# Patient Record
Sex: FEMALE | Race: WHITE | NOT HISPANIC OR LATINO | Employment: OTHER | URBAN - NONMETROPOLITAN AREA
[De-identification: names, ages, dates, MRNs, and addresses within clinical notes are randomized per-mention and may not be internally consistent; named-entity substitution may affect disease eponyms.]

---

## 2020-09-09 ENCOUNTER — HOSPITAL ENCOUNTER (INPATIENT)
Facility: HOSPITAL | Age: 55
LOS: 13 days | Discharge: HOME | End: 2020-09-23
Attending: EMERGENCY MEDICINE | Admitting: INTERNAL MEDICINE

## 2020-09-09 DIAGNOSIS — I50.43 ACUTE ON CHRONIC COMBINED SYSTOLIC AND DIASTOLIC CHF (CONGESTIVE HEART FAILURE) (HCC): ICD-10-CM

## 2020-09-09 DIAGNOSIS — R77.8 ELEVATED TROPONIN: ICD-10-CM

## 2020-09-09 DIAGNOSIS — U07.1 COVID-19 VIRUS DETECTED: ICD-10-CM

## 2020-09-09 DIAGNOSIS — E87.1 HYPONATREMIA: ICD-10-CM

## 2020-09-09 DIAGNOSIS — I50.9 ACUTE ON CHRONIC CONGESTIVE HEART FAILURE, UNSPECIFIED HEART FAILURE TYPE (HCC): Primary | ICD-10-CM

## 2020-09-09 DIAGNOSIS — D69.2 PURPURA (HCC): ICD-10-CM

## 2020-09-09 DIAGNOSIS — J96.11 CHRONIC RESPIRATORY FAILURE WITH HYPOXIA (HCC): ICD-10-CM

## 2020-09-09 PROCEDURE — 99285 EMERGENCY DEPT VISIT HI MDM: CPT

## 2020-09-10 ENCOUNTER — APPOINTMENT (OUTPATIENT)
Dept: GENERAL RADIOLOGY | Facility: HOSPITAL | Age: 55
End: 2020-09-10

## 2020-09-10 ENCOUNTER — APPOINTMENT (OUTPATIENT)
Dept: CT IMAGING | Facility: HOSPITAL | Age: 55
End: 2020-09-10

## 2020-09-10 PROBLEM — I50.9 ACUTE ON CHRONIC CONGESTIVE HEART FAILURE: Status: ACTIVE | Noted: 2020-09-10

## 2020-09-10 LAB
6-ACETYL MORPHINE: NEGATIVE
A-A DO2: 61.6 MMHG (ref 0–300)
ALBUMIN SERPL-MCNC: 3.49 G/DL (ref 3.5–5.2)
ALBUMIN/GLOB SERPL: 1.2 G/DL
ALP SERPL-CCNC: 105 U/L (ref 39–117)
ALT SERPL W P-5'-P-CCNC: 25 U/L (ref 1–33)
AMPHET+METHAMPHET UR QL: NEGATIVE
ANION GAP SERPL CALCULATED.3IONS-SCNC: 21.1 MMOL/L (ref 5–15)
ANION GAP SERPL CALCULATED.3IONS-SCNC: 5.2 MMOL/L (ref 5–15)
ANISOCYTOSIS BLD QL: NORMAL
ARTERIAL PATENCY WRIST A: ABNORMAL
AST SERPL-CCNC: 24 U/L (ref 1–32)
ATMOSPHERIC PRESS: 733 MMHG
BACTERIA UR QL AUTO: NORMAL /HPF
BARBITURATES UR QL SCN: NEGATIVE
BASE EXCESS BLDA CALC-SCNC: 1.3 MMOL/L (ref 0–2)
BASOPHILS # BLD AUTO: 0.07 10*3/MM3 (ref 0–0.2)
BASOPHILS NFR BLD AUTO: 0.7 % (ref 0–1.5)
BDY SITE: ABNORMAL
BENZODIAZ UR QL SCN: NEGATIVE
BILIRUB CONJ SERPL-MCNC: 0.8 MG/DL (ref 0–0.3)
BILIRUB INDIRECT SERPL-MCNC: 1.3 MG/DL
BILIRUB SERPL-MCNC: 2 MG/DL (ref 0–1.2)
BILIRUB SERPL-MCNC: 2.1 MG/DL (ref 0–1.2)
BILIRUB UR QL STRIP: NEGATIVE
BODY TEMPERATURE: 0 C
BUN SERPL-MCNC: 33 MG/DL (ref 6–20)
BUN SERPL-MCNC: 36 MG/DL (ref 6–20)
BUN/CREAT SERPL: 26.6 (ref 7–25)
BUN/CREAT SERPL: 30 (ref 7–25)
BUPRENORPHINE SERPL-MCNC: NEGATIVE NG/ML
CALCIUM SPEC-SCNC: 8.8 MG/DL (ref 8.6–10.5)
CALCIUM SPEC-SCNC: 9.2 MG/DL (ref 8.6–10.5)
CANNABINOIDS SERPL QL: NEGATIVE
CHLORIDE SERPL-SCNC: 95 MMOL/L (ref 98–107)
CHLORIDE SERPL-SCNC: 98 MMOL/L (ref 98–107)
CLARITY UR: CLEAR
CO2 BLDA-SCNC: 27.2 MMOL/L (ref 22–33)
CO2 SERPL-SCNC: 17.9 MMOL/L (ref 22–29)
CO2 SERPL-SCNC: 27.8 MMOL/L (ref 22–29)
COCAINE UR QL: NEGATIVE
COHGB MFR BLD: 3.1 % (ref 0–5)
COLOR UR: YELLOW
CREAT SERPL-MCNC: 1.2 MG/DL (ref 0.57–1)
CREAT SERPL-MCNC: 1.24 MG/DL (ref 0.57–1)
CRP SERPL-MCNC: 0.53 MG/DL (ref 0–0.5)
D-LACTATE SERPL-SCNC: 1.5 MMOL/L (ref 0.5–2)
DEPRECATED RDW RBC AUTO: 75.7 FL (ref 37–54)
EOSINOPHIL # BLD AUTO: 0.07 10*3/MM3 (ref 0–0.4)
EOSINOPHIL NFR BLD AUTO: 0.7 % (ref 0.3–6.2)
ERYTHROCYTE [DISTWIDTH] IN BLOOD BY AUTOMATED COUNT: 21.3 % (ref 12.3–15.4)
GAS FLOW AIRWAY: 2 LPM
GFR SERPL CREATININE-BSD FRML MDRD: 45 ML/MIN/1.73
GFR SERPL CREATININE-BSD FRML MDRD: 47 ML/MIN/1.73
GLOBULIN UR ELPH-MCNC: 2.8 GM/DL
GLUCOSE BLDC GLUCOMTR-MCNC: 355 MG/DL (ref 70–130)
GLUCOSE BLDC GLUCOMTR-MCNC: 380 MG/DL (ref 70–130)
GLUCOSE BLDC GLUCOMTR-MCNC: 382 MG/DL (ref 70–130)
GLUCOSE BLDC GLUCOMTR-MCNC: 418 MG/DL (ref 70–130)
GLUCOSE SERPL-MCNC: 337 MG/DL (ref 65–99)
GLUCOSE SERPL-MCNC: 379 MG/DL (ref 65–99)
GLUCOSE UR STRIP-MCNC: ABNORMAL MG/DL
HBA1C MFR BLD: 8.4 % (ref 4.8–5.6)
HCO3 BLDA-SCNC: 26 MMOL/L (ref 20–26)
HCT VFR BLD AUTO: 36.6 % (ref 34–46.6)
HCT VFR BLD CALC: 32.2 % (ref 38–51)
HGB BLD-MCNC: 10.2 G/DL (ref 12–15.9)
HGB BLDA-MCNC: 10.5 G/DL (ref 13.5–17.5)
HGB UR QL STRIP.AUTO: NEGATIVE
HYALINE CASTS UR QL AUTO: NORMAL /LPF
HYPOCHROMIA BLD QL: NORMAL
IMM GRANULOCYTES # BLD AUTO: 0.06 10*3/MM3 (ref 0–0.05)
IMM GRANULOCYTES NFR BLD AUTO: 0.6 % (ref 0–0.5)
INHALED O2 CONCENTRATION: 28 %
INR PPP: 1.64 (ref 0.9–1.1)
KETONES UR QL STRIP: NEGATIVE
LEUKOCYTE ESTERASE UR QL STRIP.AUTO: NEGATIVE
LYMPHOCYTES # BLD AUTO: 0.69 10*3/MM3 (ref 0.7–3.1)
LYMPHOCYTES NFR BLD AUTO: 6.7 % (ref 19.6–45.3)
Lab: ABNORMAL
MACROCYTES BLD QL SMEAR: NORMAL
MAGNESIUM SERPL-MCNC: 1.9 MG/DL (ref 1.6–2.6)
MCH RBC QN AUTO: 27.6 PG (ref 26.6–33)
MCHC RBC AUTO-ENTMCNC: 27.9 G/DL (ref 31.5–35.7)
MCV RBC AUTO: 99.2 FL (ref 79–97)
METHADONE UR QL SCN: NEGATIVE
METHGB BLD QL: 0.1 % (ref 0–3)
MODALITY: ABNORMAL
MONOCYTES # BLD AUTO: 0.07 10*3/MM3 (ref 0.1–0.9)
MONOCYTES NFR BLD AUTO: 0.7 % (ref 5–12)
NEUTROPHILS NFR BLD AUTO: 9.3 10*3/MM3 (ref 1.7–7)
NEUTROPHILS NFR BLD AUTO: 90.6 % (ref 42.7–76)
NITRITE UR QL STRIP: NEGATIVE
NOTE: ABNORMAL
NRBC BLD AUTO-RTO: 0 /100 WBC (ref 0–0.2)
NT-PROBNP SERPL-MCNC: 3325 PG/ML (ref 0–900)
OPIATES UR QL: POSITIVE
OXYCODONE UR QL SCN: NEGATIVE
OXYHGB MFR BLDV: 94.6 % (ref 94–99)
PCO2 BLDA: 40.7 MM HG (ref 35–45)
PCO2 TEMP ADJ BLD: ABNORMAL MM[HG]
PCP UR QL SCN: NEGATIVE
PH BLDA: 7.41 PH UNITS (ref 7.35–7.45)
PH UR STRIP.AUTO: <=5 [PH] (ref 5–8)
PH, TEMP CORRECTED: ABNORMAL
PLAT MORPH BLD: NORMAL
PLATELET # BLD AUTO: 199 10*3/MM3 (ref 140–450)
PMV BLD AUTO: 10.1 FL (ref 6–12)
PO2 BLDA: 84.8 MM HG (ref 83–108)
PO2 TEMP ADJ BLD: ABNORMAL MM[HG]
POTASSIUM SERPL-SCNC: 4.9 MMOL/L (ref 3.5–5.2)
POTASSIUM SERPL-SCNC: 5.4 MMOL/L (ref 3.5–5.2)
PROT SERPL-MCNC: 6.3 G/DL (ref 6–8.5)
PROT UR QL STRIP: ABNORMAL
PROTHROMBIN TIME: 19.3 SECONDS (ref 11.9–14.1)
RBC # BLD AUTO: 3.69 10*6/MM3 (ref 3.77–5.28)
RBC # UR: NORMAL /HPF
REF LAB TEST METHOD: NORMAL
SAO2 % BLDCOA: 97.7 % (ref 94–99)
SARS-COV-2 RDRP RESP QL NAA+PROBE: NOT DETECTED
SODIUM SERPL-SCNC: 128 MMOL/L (ref 136–145)
SODIUM SERPL-SCNC: 137 MMOL/L (ref 136–145)
SP GR UR STRIP: 1.01 (ref 1–1.03)
SQUAMOUS #/AREA URNS HPF: NORMAL /HPF
T4 FREE SERPL-MCNC: 1.39 NG/DL (ref 0.93–1.7)
TROPONIN T SERPL-MCNC: 0.02 NG/ML (ref 0–0.03)
TROPONIN T SERPL-MCNC: 0.03 NG/ML (ref 0–0.03)
TROPONIN T SERPL-MCNC: 0.03 NG/ML (ref 0–0.03)
TROPONIN T SERPL-MCNC: 0.04 NG/ML (ref 0–0.03)
TSH SERPL DL<=0.05 MIU/L-ACNC: 3.83 UIU/ML (ref 0.27–4.2)
UROBILINOGEN UR QL STRIP: ABNORMAL
VENTILATOR MODE: ABNORMAL
WBC # BLD AUTO: 10.26 10*3/MM3 (ref 3.4–10.8)
WBC UR QL AUTO: NORMAL /HPF

## 2020-09-10 PROCEDURE — 63710000001 INSULIN DETEMIR PER 5 UNITS: Performed by: INTERNAL MEDICINE

## 2020-09-10 PROCEDURE — 93010 ELECTROCARDIOGRAM REPORT: CPT | Performed by: INTERNAL MEDICINE

## 2020-09-10 PROCEDURE — 80307 DRUG TEST PRSMV CHEM ANLYZR: CPT | Performed by: PHYSICIAN ASSISTANT

## 2020-09-10 PROCEDURE — 63710000001 PREDNISONE PER 5 MG: Performed by: FAMILY MEDICINE

## 2020-09-10 PROCEDURE — 71250 CT THORAX DX C-: CPT

## 2020-09-10 PROCEDURE — 93005 ELECTROCARDIOGRAM TRACING: CPT | Performed by: EMERGENCY MEDICINE

## 2020-09-10 PROCEDURE — 87635 SARS-COV-2 COVID-19 AMP PRB: CPT | Performed by: EMERGENCY MEDICINE

## 2020-09-10 PROCEDURE — 63710000001 ONDANSETRON ODT 4 MG TABLET DISPERSIBLE: Performed by: EMERGENCY MEDICINE

## 2020-09-10 PROCEDURE — 82962 GLUCOSE BLOOD TEST: CPT

## 2020-09-10 PROCEDURE — 82247 BILIRUBIN TOTAL: CPT | Performed by: PHYSICIAN ASSISTANT

## 2020-09-10 PROCEDURE — 36600 WITHDRAWAL OF ARTERIAL BLOOD: CPT

## 2020-09-10 PROCEDURE — 84484 ASSAY OF TROPONIN QUANT: CPT | Performed by: EMERGENCY MEDICINE

## 2020-09-10 PROCEDURE — 82805 BLOOD GASES W/O2 SATURATION: CPT

## 2020-09-10 PROCEDURE — 86140 C-REACTIVE PROTEIN: CPT | Performed by: EMERGENCY MEDICINE

## 2020-09-10 PROCEDURE — 25010000002 ONDANSETRON PER 1 MG: Performed by: EMERGENCY MEDICINE

## 2020-09-10 PROCEDURE — 80050 GENERAL HEALTH PANEL: CPT | Performed by: EMERGENCY MEDICINE

## 2020-09-10 PROCEDURE — 99223 1ST HOSP IP/OBS HIGH 75: CPT | Performed by: PHYSICIAN ASSISTANT

## 2020-09-10 PROCEDURE — 82375 ASSAY CARBOXYHB QUANT: CPT

## 2020-09-10 PROCEDURE — 99253 IP/OBS CNSLTJ NEW/EST LOW 45: CPT | Performed by: SPECIALIST

## 2020-09-10 PROCEDURE — 25010000002 FUROSEMIDE PER 20 MG: Performed by: FAMILY MEDICINE

## 2020-09-10 PROCEDURE — 25010000002 FUROSEMIDE PER 20 MG: Performed by: EMERGENCY MEDICINE

## 2020-09-10 PROCEDURE — 85007 BL SMEAR W/DIFF WBC COUNT: CPT | Performed by: EMERGENCY MEDICINE

## 2020-09-10 PROCEDURE — 83050 HGB METHEMOGLOBIN QUAN: CPT

## 2020-09-10 PROCEDURE — 71045 X-RAY EXAM CHEST 1 VIEW: CPT

## 2020-09-10 PROCEDURE — 83735 ASSAY OF MAGNESIUM: CPT | Performed by: PHYSICIAN ASSISTANT

## 2020-09-10 PROCEDURE — 93005 ELECTROCARDIOGRAM TRACING: CPT | Performed by: PHYSICIAN ASSISTANT

## 2020-09-10 PROCEDURE — 25010000002 MORPHINE PER 10 MG: Performed by: EMERGENCY MEDICINE

## 2020-09-10 PROCEDURE — 80048 BASIC METABOLIC PNL TOTAL CA: CPT | Performed by: FAMILY MEDICINE

## 2020-09-10 PROCEDURE — 63710000001 INSULIN ASPART PER 5 UNITS: Performed by: INTERNAL MEDICINE

## 2020-09-10 PROCEDURE — 84484 ASSAY OF TROPONIN QUANT: CPT | Performed by: FAMILY MEDICINE

## 2020-09-10 PROCEDURE — 83036 HEMOGLOBIN GLYCOSYLATED A1C: CPT | Performed by: PHYSICIAN ASSISTANT

## 2020-09-10 PROCEDURE — 25010000002 CEFTRIAXONE PER 250 MG: Performed by: EMERGENCY MEDICINE

## 2020-09-10 PROCEDURE — 36415 COLL VENOUS BLD VENIPUNCTURE: CPT

## 2020-09-10 PROCEDURE — 85610 PROTHROMBIN TIME: CPT | Performed by: EMERGENCY MEDICINE

## 2020-09-10 PROCEDURE — 25010000002 MORPHINE PER 10 MG: Performed by: FAMILY MEDICINE

## 2020-09-10 PROCEDURE — 87040 BLOOD CULTURE FOR BACTERIA: CPT | Performed by: EMERGENCY MEDICINE

## 2020-09-10 PROCEDURE — 71250 CT THORAX DX C-: CPT | Performed by: RADIOLOGY

## 2020-09-10 PROCEDURE — 63710000001 INSULIN REGULAR HUMAN PER 5 UNITS: Performed by: EMERGENCY MEDICINE

## 2020-09-10 PROCEDURE — 83880 ASSAY OF NATRIURETIC PEPTIDE: CPT | Performed by: EMERGENCY MEDICINE

## 2020-09-10 PROCEDURE — 82248 BILIRUBIN DIRECT: CPT | Performed by: PHYSICIAN ASSISTANT

## 2020-09-10 PROCEDURE — 63710000001 ONDANSETRON PER 8 MG: Performed by: FAMILY MEDICINE

## 2020-09-10 PROCEDURE — 83605 ASSAY OF LACTIC ACID: CPT | Performed by: EMERGENCY MEDICINE

## 2020-09-10 PROCEDURE — 63710000001 INSULIN ASPART PER 5 UNITS: Performed by: PHYSICIAN ASSISTANT

## 2020-09-10 PROCEDURE — 81001 URINALYSIS AUTO W/SCOPE: CPT | Performed by: PHYSICIAN ASSISTANT

## 2020-09-10 PROCEDURE — 84439 ASSAY OF FREE THYROXINE: CPT | Performed by: EMERGENCY MEDICINE

## 2020-09-10 RX ORDER — VENLAFAXINE 75 MG/1
75 TABLET ORAL DAILY
Status: ON HOLD | COMMUNITY
End: 2020-09-10

## 2020-09-10 RX ORDER — PREDNISONE 2.5 MG
2.5 TABLET ORAL DAILY
COMMUNITY
End: 2020-10-21

## 2020-09-10 RX ORDER — HYDROCODONE BITARTRATE AND ACETAMINOPHEN 5; 325 MG/1; MG/1
1 TABLET ORAL ONCE
Status: COMPLETED | OUTPATIENT
Start: 2020-09-10 | End: 2020-09-10

## 2020-09-10 RX ORDER — ASPIRIN 81 MG/1
81 TABLET ORAL DAILY
Status: DISCONTINUED | OUTPATIENT
Start: 2020-09-10 | End: 2020-09-23 | Stop reason: HOSPADM

## 2020-09-10 RX ORDER — FUROSEMIDE 10 MG/ML
40 INJECTION INTRAMUSCULAR; INTRAVENOUS EVERY 8 HOURS
Status: COMPLETED | OUTPATIENT
Start: 2020-09-10 | End: 2020-09-13

## 2020-09-10 RX ORDER — WARFARIN SODIUM 3 MG/1
1.5 TABLET ORAL
Status: COMPLETED | OUTPATIENT
Start: 2020-09-10 | End: 2020-09-10

## 2020-09-10 RX ORDER — SODIUM CHLORIDE 0.9 % (FLUSH) 0.9 %
10 SYRINGE (ML) INJECTION AS NEEDED
Status: DISCONTINUED | OUTPATIENT
Start: 2020-09-10 | End: 2020-09-23 | Stop reason: HOSPADM

## 2020-09-10 RX ORDER — DEXTROSE MONOHYDRATE 25 G/50ML
25 INJECTION, SOLUTION INTRAVENOUS
Status: DISCONTINUED | OUTPATIENT
Start: 2020-09-10 | End: 2020-09-23 | Stop reason: HOSPADM

## 2020-09-10 RX ORDER — VENLAFAXINE HYDROCHLORIDE 75 MG/1
75 CAPSULE, EXTENDED RELEASE ORAL DAILY
Status: DISCONTINUED | OUTPATIENT
Start: 2020-09-10 | End: 2020-09-23 | Stop reason: HOSPADM

## 2020-09-10 RX ORDER — NICOTINE POLACRILEX 4 MG
15 LOZENGE BUCCAL
Status: DISCONTINUED | OUTPATIENT
Start: 2020-09-10 | End: 2020-09-23 | Stop reason: HOSPADM

## 2020-09-10 RX ORDER — PREGABALIN 100 MG/1
100 CAPSULE ORAL EVERY 12 HOURS SCHEDULED
Status: DISCONTINUED | OUTPATIENT
Start: 2020-09-10 | End: 2020-09-23 | Stop reason: HOSPADM

## 2020-09-10 RX ORDER — SPIRONOLACTONE 25 MG/1
25 TABLET ORAL DAILY
Status: DISCONTINUED | OUTPATIENT
Start: 2020-09-10 | End: 2020-09-15

## 2020-09-10 RX ORDER — AMIODARONE HYDROCHLORIDE 200 MG/1
200 TABLET ORAL DAILY
Status: DISCONTINUED | OUTPATIENT
Start: 2020-09-10 | End: 2020-09-23 | Stop reason: HOSPADM

## 2020-09-10 RX ORDER — AMIODARONE HYDROCHLORIDE 200 MG/1
200 TABLET ORAL DAILY
COMMUNITY
End: 2020-10-14

## 2020-09-10 RX ORDER — ONDANSETRON 2 MG/ML
4 INJECTION INTRAMUSCULAR; INTRAVENOUS ONCE
Status: COMPLETED | OUTPATIENT
Start: 2020-09-10 | End: 2020-09-10

## 2020-09-10 RX ORDER — VENLAFAXINE 37.5 MG/1
75 TABLET ORAL DAILY
Status: DISCONTINUED | OUTPATIENT
Start: 2020-09-10 | End: 2020-09-10

## 2020-09-10 RX ORDER — ROPINIROLE 1 MG/1
1 TABLET, FILM COATED ORAL 3 TIMES DAILY
Status: DISCONTINUED | OUTPATIENT
Start: 2020-09-10 | End: 2020-09-23 | Stop reason: HOSPADM

## 2020-09-10 RX ORDER — FOLIC ACID 1 MG/1
1 TABLET ORAL DAILY
COMMUNITY

## 2020-09-10 RX ORDER — PREDNISONE 1 MG/1
2.5 TABLET ORAL DAILY
Status: DISCONTINUED | OUTPATIENT
Start: 2020-09-10 | End: 2020-09-23 | Stop reason: HOSPADM

## 2020-09-10 RX ORDER — WARFARIN SODIUM 3 MG/1
3 TABLET ORAL
COMMUNITY
End: 2020-09-23 | Stop reason: HOSPADM

## 2020-09-10 RX ORDER — VENLAFAXINE HYDROCHLORIDE 75 MG/1
75 CAPSULE, EXTENDED RELEASE ORAL DAILY
COMMUNITY
End: 2023-01-31

## 2020-09-10 RX ORDER — SODIUM CHLORIDE 0.9 % (FLUSH) 0.9 %
10 SYRINGE (ML) INJECTION EVERY 12 HOURS SCHEDULED
Status: DISCONTINUED | OUTPATIENT
Start: 2020-09-10 | End: 2020-09-23 | Stop reason: HOSPADM

## 2020-09-10 RX ORDER — ONDANSETRON 4 MG/1
4 TABLET, FILM COATED ORAL EVERY 8 HOURS PRN
Status: DISCONTINUED | OUTPATIENT
Start: 2020-09-10 | End: 2020-09-17

## 2020-09-10 RX ORDER — PREGABALIN 100 MG/1
100 CAPSULE ORAL 2 TIMES DAILY
COMMUNITY

## 2020-09-10 RX ORDER — WARFARIN SODIUM 3 MG/1
3 TABLET ORAL
Status: DISCONTINUED | OUTPATIENT
Start: 2020-09-10 | End: 2020-09-11

## 2020-09-10 RX ORDER — ONDANSETRON 4 MG/1
4 TABLET, FILM COATED ORAL EVERY 8 HOURS PRN
COMMUNITY

## 2020-09-10 RX ORDER — NITROGLYCERIN 0.4 MG/1
0.4 TABLET SUBLINGUAL
Status: DISCONTINUED | OUTPATIENT
Start: 2020-09-10 | End: 2020-09-23 | Stop reason: HOSPADM

## 2020-09-10 RX ORDER — AMOXICILLIN 250 MG
2 CAPSULE ORAL NIGHTLY
Status: DISCONTINUED | OUTPATIENT
Start: 2020-09-10 | End: 2020-09-23 | Stop reason: HOSPADM

## 2020-09-10 RX ORDER — LISINOPRIL 2.5 MG/1
5 TABLET ORAL DAILY
Status: DISCONTINUED | OUTPATIENT
Start: 2020-09-10 | End: 2020-09-11

## 2020-09-10 RX ORDER — FOLIC ACID 1 MG/1
1 TABLET ORAL DAILY
Status: DISCONTINUED | OUTPATIENT
Start: 2020-09-10 | End: 2020-09-23 | Stop reason: HOSPADM

## 2020-09-10 RX ORDER — ONDANSETRON 4 MG/1
4 TABLET, ORALLY DISINTEGRATING ORAL ONCE
Status: COMPLETED | OUTPATIENT
Start: 2020-09-10 | End: 2020-09-10

## 2020-09-10 RX ORDER — AMPICILLIN 500 MG/1
500 CAPSULE ORAL 3 TIMES DAILY
COMMUNITY
End: 2020-09-23 | Stop reason: HOSPADM

## 2020-09-10 RX ORDER — POLYETHYLENE GLYCOL 3350 17 G/17G
17 POWDER, FOR SOLUTION ORAL DAILY
Status: DISCONTINUED | OUTPATIENT
Start: 2020-09-10 | End: 2020-09-23 | Stop reason: HOSPADM

## 2020-09-10 RX ORDER — FUROSEMIDE 10 MG/ML
40 INJECTION INTRAMUSCULAR; INTRAVENOUS ONCE
Status: COMPLETED | OUTPATIENT
Start: 2020-09-10 | End: 2020-09-10

## 2020-09-10 RX ADMIN — SODIUM CHLORIDE 2 G: 900 INJECTION INTRAVENOUS at 08:01

## 2020-09-10 RX ADMIN — HUMAN INSULIN 10 UNITS: 100 INJECTION, SOLUTION SUBCUTANEOUS at 08:52

## 2020-09-10 RX ADMIN — PREGABALIN 100 MG: 100 CAPSULE ORAL at 20:01

## 2020-09-10 RX ADMIN — FOLIC ACID 1 MG: 1 TABLET ORAL at 16:38

## 2020-09-10 RX ADMIN — SODIUM CHLORIDE, PRESERVATIVE FREE 10 ML: 5 INJECTION INTRAVENOUS at 20:02

## 2020-09-10 RX ADMIN — ROPINIROLE HYDROCHLORIDE 1 MG: 1 TABLET, FILM COATED ORAL at 20:01

## 2020-09-10 RX ADMIN — INSULIN DETEMIR 10 UNITS: 100 INJECTION, SOLUTION SUBCUTANEOUS at 20:59

## 2020-09-10 RX ADMIN — ASPIRIN 81 MG: 81 TABLET, COATED ORAL at 16:38

## 2020-09-10 RX ADMIN — HYDROCODONE BITARTRATE AND ACETAMINOPHEN 1 TABLET: 5; 325 TABLET ORAL at 11:04

## 2020-09-10 RX ADMIN — DOCUSATE SODIUM 50 MG AND SENNOSIDES 8.6 MG 2 TABLET: 8.6; 5 TABLET, FILM COATED ORAL at 20:01

## 2020-09-10 RX ADMIN — FUROSEMIDE 40 MG: 10 INJECTION, SOLUTION INTRAMUSCULAR; INTRAVENOUS at 23:55

## 2020-09-10 RX ADMIN — MORPHINE SULFATE 4 MG: 4 INJECTION, SOLUTION INTRAMUSCULAR; INTRAVENOUS at 16:39

## 2020-09-10 RX ADMIN — ONDANSETRON HYDROCHLORIDE 4 MG: 4 TABLET, FILM COATED ORAL at 22:33

## 2020-09-10 RX ADMIN — VENLAFAXINE HYDROCHLORIDE 75 MG: 75 CAPSULE, EXTENDED RELEASE ORAL at 18:26

## 2020-09-10 RX ADMIN — FUROSEMIDE 40 MG: 10 INJECTION, SOLUTION INTRAMUSCULAR; INTRAVENOUS at 05:00

## 2020-09-10 RX ADMIN — POLYETHYLENE GLYCOL 3350 17 G: 17 POWDER, FOR SOLUTION ORAL at 18:26

## 2020-09-10 RX ADMIN — HYDROCODONE BITARTRATE AND ACETAMINOPHEN 1 TABLET: 5; 325 TABLET ORAL at 04:27

## 2020-09-10 RX ADMIN — WARFARIN SODIUM 1.5 MG: 3 TABLET ORAL at 18:25

## 2020-09-10 RX ADMIN — INSULIN ASPART 10 UNITS: 100 INJECTION, SOLUTION INTRAVENOUS; SUBCUTANEOUS at 20:01

## 2020-09-10 RX ADMIN — MORPHINE SULFATE 4 MG: 4 INJECTION, SOLUTION INTRAMUSCULAR; INTRAVENOUS at 07:30

## 2020-09-10 RX ADMIN — FUROSEMIDE 40 MG: 10 INJECTION, SOLUTION INTRAMUSCULAR; INTRAVENOUS at 16:39

## 2020-09-10 RX ADMIN — INSULIN ASPART 12 UNITS: 100 INJECTION, SOLUTION INTRAVENOUS; SUBCUTANEOUS at 16:39

## 2020-09-10 RX ADMIN — MORPHINE SULFATE 4 MG: 4 INJECTION, SOLUTION INTRAMUSCULAR; INTRAVENOUS at 22:33

## 2020-09-10 RX ADMIN — PREDNISONE 2.5 MG: 5 TABLET ORAL at 18:26

## 2020-09-10 RX ADMIN — ONDANSETRON 4 MG: 2 INJECTION INTRAMUSCULAR; INTRAVENOUS at 07:30

## 2020-09-10 RX ADMIN — DOXYCYCLINE 100 MG: 100 INJECTION, POWDER, LYOPHILIZED, FOR SOLUTION INTRAVENOUS at 08:45

## 2020-09-10 RX ADMIN — AMIODARONE HYDROCHLORIDE 200 MG: 200 TABLET ORAL at 16:38

## 2020-09-10 RX ADMIN — SPIRONOLACTONE 25 MG: 25 TABLET ORAL at 16:39

## 2020-09-10 RX ADMIN — ROPINIROLE HYDROCHLORIDE 1 MG: 1 TABLET, FILM COATED ORAL at 18:26

## 2020-09-10 RX ADMIN — LISINOPRIL 5 MG: 2.5 TABLET ORAL at 16:39

## 2020-09-10 RX ADMIN — METOPROLOL TARTRATE 25 MG: 25 TABLET, FILM COATED ORAL at 16:38

## 2020-09-10 RX ADMIN — WARFARIN 3 MG: 3 TABLET ORAL at 16:38

## 2020-09-10 RX ADMIN — ONDANSETRON 4 MG: 4 TABLET, ORALLY DISINTEGRATING ORAL at 04:27

## 2020-09-10 NOTE — ED NOTES
Rob rn reports he is busy right now and will call me back as soon as he can for report.      Olivia Rosales RN  09/10/20 8402

## 2020-09-10 NOTE — ED NOTES
Pt alert and oriented, skin warm, pink, seeping to bilateral legs noted. Respirations are even and unlabored, pt remains on 2lpm nc. Pt remains in afib. Vss. Pt ready for transport to floor.      Olivia Rosales RN  09/10/20 0180

## 2020-09-10 NOTE — PROGRESS NOTES
Pharmacy Anticoagulation Service Note:    Ms. Purdy has been evaluated for warfarin management while hospitalized.       Indication for anticoagulation: atrial fibrillation  Specified goal INR range:  2-3  Usual home regimen:  3 mg daily (last dose taken on 9/9 prior to admission)  Admission INR: 1.64  Today's INR:  1.64   Other pertinent information:  Home medications include: amiodarone, ropinirole, and prednisone, all of which increase warfarin sensitivity.  TSH/T4 WNL.  Question of L foot vasculitis which did not improve in a trial off warfarin.     Assessment/Plan:  Since INR is subtherapeutic today, will give a total of 4.5 mg this evening.  Will monitor INRs daily while here to guide further dosing.      Thank you.  Isaura Clemente, Pharm.D.  9/10/2020  16:56

## 2020-09-10 NOTE — H&P
"     [facility] HOSPITALIST HISTORY AND PHYSICAL    Patient Identification:  Name:  Yumiko Purdy  Age:  54 y.o.  Sex:  female  :  1965  MRN:  [id number]   Visit Number:  [id number]  Primary Care Physician:  Niko Schaefer MD     2020 11:51 PM    Subjective     Chief complaint:   Chief Complaint   Patient presents with   • Leg Swelling   • Rash     History of presenting illness:   Yumiko Purdy is a 54 y.o. female with past medical history significant for chronic atrial fibrillation, congestive heart failure, diabetes mellitus, and cirrhosis of the liver.  She presented to the emergency department of [facility] on 2020 with complaints of lower extremity edema and rash. She reports 1 week of increased edema \"all over\" associated with shortness of breath. She has been short of air with minimal exertion. She reports having some soreness in the central chest earlier in the week, but no further in the last couple days. She went to [facility] on Monday and Wednesday of this week with similar complaints. She was given single doses of diuretics on both occasions, without much improvement in her symptoms. The swelling and SOA has worsened in the last few days, therefore, she came to the ED at [facility] for further evaluation.     She reports she has had a diffuse rash for the last few months and was told she had vasculitis. She was worked up for this at [facility] in May 2020 and she does not believe she received a final diagnosis. She had a similar rash a few years ago which had not returned until a few months ago. Her warfarin was recently changed to Xarelto to see if it was causing the rash, with no improvement noted. So her warfarin was recently resumed.      Upon arrival to the ED, vitals were /75, heart rate 112, respirations 18, SPO2 100%, afebrile.  ABG is unremarkable.  Serial troponins were mildly elevated with a downward trend at 0.045, 0.034, and 0.029 " consecutively.  proBNP 3325.  Sodium low at 128 with glucose of 379.  Creatinine 1.24 with BUN of 33.  Lactic acid is 1.5.  INR 1.64.  TSH and free T4 are within normal limits.  X-ray shows cardiomegaly and vascular congestion with asymmetric infiltrates concerning for pneumonia on the right lung per radiology. COVID testing is negative with [employer] nasopharyngeal swab. Patient has been admitted to the telemetry unit of [facility] for further evaluation and therapy.   ---------------------------------------------------------------------------------------------------------------------   Review of Systems   Constitutional: Negative for chills and fever.   HENT: Negative for congestion, rhinorrhea, sinus pressure and sinus pain.    Respiratory: Positive for shortness of breath. Negative for cough and wheezing.    Cardiovascular: Positive for chest pain and leg swelling.   Gastrointestinal: Positive for constipation. Negative for anal bleeding and blood in stool.   Genitourinary: Positive for decreased urine volume and dysuria. Negative for difficulty urinating, frequency, hematuria and urgency.   Musculoskeletal: Negative for arthralgias and back pain.   Skin: Positive for rash and wound. Negative for color change and pallor.   Neurological: Negative for dizziness, syncope and weakness.   Psychiatric/Behavioral: Negative for agitation, behavioral problems and confusion.      ---------------------------------------------------------------------------------------------------------------------   Past Medical History:   Diagnosis Date   • Anemia    • CHF (congestive heart failure) (CMS/HCC)    • Chronic a-fib (CMS/HCC)     stated by patient    • Cirrhosis of liver (CMS/HCC)     stated by patient    • Diabetes mellitus (CMS/HCC)      Past Surgical History:   Procedure Laterality Date   • APPENDECTOMY     • CHOLECYSTECTOMY     • TOE AMPUTATION Right     stated by patient.      History reviewed. No pertinent family  history.  Social History     Socioeconomic History   • Marital status:      Spouse name: Not on file   • Number of children: Not on file   • Years of education: Not on file   • Highest education level: Not on file     ---------------------------------------------------------------------------------------------------------------------   Allergies:  Phenergan [promethazine]  ---------------------------------------------------------------------------------------------------------------------   Prior to Admission Medications     Prescriptions Last Dose Informant Patient Reported? Taking?    amiodarone (PACERONE) 200 MG tablet  Self Yes Yes    Take 200 mg by mouth Daily.    ampicillin (PRINCIPEN) 500 MG capsule  Self Yes Yes    Take 500 mg by mouth 3 (Three) Times a Day.    folic acid (FOLVITE) 1 MG tablet  Self Yes Yes    Take 1 mg by mouth Daily.    Insulin Glargine (BASAGLAR KWIKPEN SC)  Self Yes Yes    Inject 100 Units under the skin into the appropriate area as directed Daily.    Insulin Regular Human, Conc, (HumuLIN R U-500 KwikPen) 500 UNIT/ML solution pen-injector CONCENTRATED injection  Self Yes Yes    Inject 30 Units under the skin into the appropriate area as directed 3 (Three) Times a Day Before Meals.    metoprolol tartrate (LOPRESSOR) 25 MG tablet  Self Yes Yes    Take 25 mg by mouth 2 (Two) Times a Day.    ondansetron (ZOFRAN) 4 MG tablet  Self Yes Yes    Take 4 mg by mouth Every 8 (Eight) Hours As Needed for Nausea or Vomiting.    pregabalin (LYRICA) 100 MG capsule  Self Yes Yes    Take 100 mg by mouth 2 (Two) Times a Day.    venlafaxine (EFFEXOR) 75 MG tablet  Self Yes Yes    Take 75 mg by mouth Daily.    warfarin (COUMADIN) 3 MG tablet  Self Yes Yes    Take 3 mg by mouth Daily.        ---------------------------------------------------------------------------------------------------------------------  Objective   Hospital Scheduled Meds:         ---------------------------------------------------------------------------------------------------------------------   Vital Signs:  Temp:  [97.7 °F (36.5 °C)-98 °F (36.7 °C)] 98 °F (36.7 °C)  Heart Rate:  [112-116] 114  Resp:  [18] 18  BP: (104-154)/() 148/94  Mean Arterial Pressure (Non-Invasive) for the past 24 hrs (Last 3 readings):   Noninvasive MAP (mmHg)   09/10/20 1302 121   09/10/20 1242 113   09/10/20 1222 126     SpO2 Percentage    09/10/20 1255 09/10/20 1302 09/10/20 1322   SpO2: 100% 100% 100%     SpO2:  [98 %-100 %] 100 %  on  Flow (L/min):  [2] 2;   Device (Oxygen Therapy): nasal cannula    Body mass index is 41.6 kg/m².  Wt Readings from Last 3 Encounters:   09/09/20 113 kg (250 lb)     ---------------------------------------------------------------------------------------------------------------------   Physical Exam:  Constitutional:  Well-developed and well-nourished.  No respiratory distress on  2L O2 via nasal cannula.      HENT:  Head: Normocephalic and atraumatic.  Mouth:  Moist mucous membranes.    Eyes:  Conjunctivae and EOM are normal. No scleral icterus. No erythema or drainage.   Neck:  Neck supple.  + JVD present.  No carotid bruits noted.   Cardiovascular:  Normal rate, regular rhythm and normal heart sounds with no murmur.  Pulmonary/Chest:  No respiratory distress, no wheezes. Reduced breath sounds at both bases. Difficult assessment due to body habitus.   Abdominal:  Soft. Abdominal wall edema present. Bowel sounds are present x4. No distension and no tenderness.   Musculoskeletal: 2+ edema bilateral lower extremities noted. Great toe on the right has been amputated. Anasarca noted.  Neurological:  Alert and oriented to person, place, and time.  No cranial nerve deficit.  No tongue deviation.  No facial droop.  No slurred speech.   Skin:  Skin is warm and dry. Diffuse purpura/petechial rash noted on all 4 extremities and to a milder degree on the abdomen with diffuse  scabbing noted as well. Wound present at the base of the 1st and 2nd toes on the left foot.   Psychiatric:  Normal mood and affect.  Behavior is normal.  Judgment and thought content normal.   Peripheral vascular: 2+ edema bilateral lower extremities and 1+ pedal pulses bilaterally.   Genitourinary: No bauer catheter in place.   ---------------------------------------------------------------------------------------------------------------------  EKG:          I have personally reviewed the EKG.  ---------------------------------------------------------------------------------------------------------------------   Results from last 7 days   Lab Units 09/10/20  0921 09/10/20  0256 09/10/20  0058   TROPONIN T ng/mL 0.029 0.034* 0.045*     Results from last 7 days   Lab Units 09/10/20  0058   PROBNP pg/mL 3,325.0*       Results from last 7 days   Lab Units 09/10/20  0049   PH, ARTERIAL pH units 7.413   PO2 ART mm Hg 84.8   PCO2, ARTERIAL mm Hg 40.7   HCO3 ART mmol/L 26.0     Results from last 7 days   Lab Units 09/10/20  0058   CRP mg/dL 0.53*   LACTATE mmol/L 1.5   WBC 10*3/mm3 10.26   HEMOGLOBIN g/dL 10.2*   HEMATOCRIT % 36.6   MCV fL 99.2*   MCHC g/dL 27.9*   PLATELETS 10*3/mm3 199   INR  1.64*     Results from last 7 days   Lab Units 09/10/20  0058   SODIUM mmol/L 128*   POTASSIUM mmol/L 4.9   CHLORIDE mmol/L 95*   CO2 mmol/L 27.8   BUN mg/dL 33*   CREATININE mg/dL 1.24*   EGFR IF NONAFRICN AM mL/min/1.73 45*   CALCIUM mg/dL 8.8   GLUCOSE mg/dL 379*   ALBUMIN g/dL 3.49*   BILIRUBIN mg/dL 2.0*   ALK PHOS U/L 105   AST (SGOT) U/L 24   ALT (SGPT) U/L 25   Estimated Creatinine Clearance: 65.8 mL/min (A) (by C-G formula based on SCr of 1.24 mg/dL (H)).  No results found for: AMMONIA    Glucose   Date/Time Value Ref Range Status   09/10/2020 0843 355 (H) 70 - 130 mg/dL Final     Lab Results   Component Value Date    HGBA1C 5.4 04/21/2016     Lab Results   Component Value Date    TSH 3.830 09/10/2020    FREET4 1.39  09/10/2020     No results found for: BLOODCX  No results found for: URINECX  No results found for: WOUNDCX  No results found for: STOOLCX  No results found for: RESPCX    Pain Management Panel     Pain Management Panel Latest Ref Rng & Units 12/8/2015    AMPHETAMINES SCREEN, URINE Negative Negative    BARBITURATES SCREEN Negative Negative    BENZODIAZEPINE SCREEN, URINE Negative Negative    COCAINE SCREEN, URINE Negative Negative    METHADONE SCREEN, URINE Negative Negative        I have personally reviewed the above laboratory results.   ---------------------------------------------------------------------------------------------------------------------  Imaging Results (Last 7 Days)     Procedure Component Value Units Date/Time    CT Chest Without Contrast [[id number]] Collected:  09/10/20 0934     Updated:  09/10/20 0938    Narrative:       CT CHEST WO CONTRAST-      TECHNIQUE: Multiple axial CT images were obtained from lung apex through  upper abdomen without administration of IV contrast. Reformatted images  in the coronal and/or sagittal plane(s) were generated from the axial  data set to facilitate diagnostic accuracy and/or surgical planning.  Oral Contrast:NONE.     Radiation dose reduction techniques were utilized per ALARA protocol.  Automated exposure control was initiated through either or Five9 or  DoseRight software packages by  protocol.             Clinical information  SOA, abn CXR; I50.9-Heart failure, unspecified; D69.2-Other  nonthrombocytopenic purpura; E87.7-Kzry-yozpqniduo and hyponatremia      Comparison  NONE.     Findings  LUNGS: Unremarkable. No parenchymal soft tissue nodules.  No focal air  space disease.     HEART: Unremarkable.     PERICARDIUM: No effusion.     MEDIASTINUM: No masses. No enlarged lymph nodes.  No fluid collections.     PLEURA: SMALL RIGHT PLEURAL EFFUSION AND CARDIOMEGALY NOTED.     MAJOR AIRWAYS: Clear. No intrinsic mass.     VASCULATURE: No evidence  of aneurysm.     VISUALIZED UPPER ABDOMEN:        LIVER: Homogeneous. No focal hepatic mass or ductal dilatation.        SPLEEN: Homogeneous. No splenomegaly.        ADRENALS: No mass.        KIDNEYS: No mass. No obstructive uropathy.  No evidence of  urolithiasis.        GI TRACT: Non-dilated. No definite wall thickening.        PERITONEUM: No free air. No free fluid or loculated fluid  collections.           ABDOMINAL WALL: GENERALIZED BODY WALL ANASARCA SUBCUTANEOUS EDEMA  PREDOMINANTLY ON THE LEFT SIDE OF THE CHEST AND SHOULDER AS WELL AS  BREAST TISSUE THICKENING.        OTHER: Lymph nodes in left axillary region may be secondary to edema.     BONES: No acute bony abnormality.       Impression:       Impression:  Small right pleural effusion and cardiomegaly noted. Generalized body  wall anasarca subcutaneous edema predominantly on the left side of the  chest and shoulder as well as breast tissue thickening. Lymph nodes in  left axillary region may be secondary to edema.     This report was finalized on 9/10/2020 9:36 AM by Dr. Syed Cárdenas MD.       XR Chest 1 View [[id number]] Collected:  09/10/20 0221     Updated:  09/10/20 0223    Narrative:       CR Chest 1 Vw    INDICATION:   Difficulty breathing today.     COMPARISON:    6/15/2016    FINDINGS:  Single portable AP view(s) of the chest.  Heart is enlarged. There is vascular congestion. There is infiltrate in the right mid lower lung. Asymmetry is concerning for pneumonia. No pneumothorax is seen.      Impression:         1. Cardiomegaly and vascular congestion.  2. Asymmetric infiltrates in the right lung concerning for pneumonia.    Signer Name: Braydon uHdson MD   Signed: 9/10/2020 2:21 AM   Workstation Name: [facility]    [facility]        I have personally reviewed the above radiology results.   ---------------------------------------------------------------------------------------------------------------------  Assessment &  Plan      · Acute on chronic systolic versus diastolic CHF with anasarca  -She received IV furosemide 40mg x1 in the ED with good urine output reported, monitor renal function closely  -Obtain transthoracic echocardiogram   -Strict I's and O's and daily weights  -Follow up on serial troponins   -Cardiology consulted, appreciate their input    · Elevated troponin  -Now normalized, follow up on serial troponins and EKGs  -Reports anginal symptoms earlier in the week, none last 24 hours  -Place on low-dose aspirin for now  -Check fasting lipid panel in AM   -Echocardiogram pending  -Cardiology following    · Pseudohyponatremia secondary to hyperglycemia  -Corrected sodium for hyperglycemia is 132  -Volume overload likely contributing as well  -Monitor with repeat BMP in AM    · Acute renal insufficiency  -Admission creatinine 1.24, unknown recent baseline, but reported to be elevated at [facility] recently   -Cautious with diuresis  -Avoid nephrotoxic medications     · Possibly paroxysmal atrial fibrillation  -In accelerated junctional rhythm per cardiology on admission EKG, ?afib versus atrial flutter on telemetry monitoring  -Repeat EKG pending  -Monitor electrolytes  -On rhythm management with amiodarone and metoprolol, resumed   -Chronically anticoagulated with warfarin with subtherapeutic INR, pharmacy consulted to dose    · Petechial/purpura rash, wound left foot  -Reported to have vasculitis with prior workup at , records requested  -Rash reportedly present for several months  -Wound care consulted    · Chronic hypoxic respiratory failure  -On 2L O2 via nasal cannula at home and maintaining O2 sat on this for now  -Monitor continuous pulse oximetry    · Anemia, Macrocytic   -Unknown recent baseline, but patient reports chronic anemia, no bleeding reported    · IDDM, type II  -Will place on low dose sliding scale insulin with finger stick blood glucose readings AC and HS. Place on hypoglycemia protocol.    -Check hemoglobin A1c level    · Cirrhosis of the liver  -Reported remote heavy alcohol intake, denies other known liver issues  -Bilirubin elevated, check direct/indirect levels    · Constipation  -Place on sennakot and miralax    · Dysuria  -Check UA with culture if indicated    DVT Prophylaxis: Anticoagulated with warfarin.     The patient is considered to be a high risk patient due to: CHF, anasarca, renal insufficiency, ?vasculitis.     Code Status: FULL CODE    I have discussed the patient's assessment and plan with the patient and admitting physician, Dr. Guajardo.    BRITTANY Grier  09/10/20  13:27         Gen: alert oriented x 3 NAD  HEENT: PERRL, EOMI, AT NC  CV: RRR  LUNG: CTAB  ABD: s, nt , nd  EXT: no c, c, e  Neuro: CN II-XII intact grossly   Skin: multiple excoriations in various stages of healing      I have reviewed the chart, agree with assessment and plans per above.    I have personally seen and examined patient at bedside.            ---------------------------------------------------------------------------------------------------------------------

## 2020-09-10 NOTE — ED NOTES
Assisted pt to bedside toilet and back to bed, pt complains pain is worse.      Olivia Rosales RN  09/10/20 0743

## 2020-09-10 NOTE — PLAN OF CARE
Patient admitted to [address] [place] [address]. No s/s of distress. Will continue to monitor and follow plan of care.

## 2020-09-10 NOTE — ED NOTES
Pt complaining bilateral leg pain is still present and is worse, provider made aware, will await new orders. Call light within reach of pt.     Olivia Rosales, RN  09/10/20 4653

## 2020-09-10 NOTE — ED NOTES
Pt assisted to bedside toilet and back to bed, pt reports increased pain with movement. Call light within reach of pt.     Olivia Rosales RN  09/10/20 1181

## 2020-09-10 NOTE — ED NOTES
Pt placed on hospital bed at this time. Call light within reach of pt.      Olivia Rosales, RN  09/10/20 9408

## 2020-09-10 NOTE — ED PROVIDER NOTES
Subjective   History of Present Illness    Review of Systems    Past Medical History:   Diagnosis Date   • Anemia    • CHF (congestive heart failure) (CMS/HCC)    • Chronic a-fib (CMS/HCC)     stated by patient    • Cirrhosis of liver (CMS/HCC)     stated by patient    • Diabetes mellitus (CMS/HCC)        Allergies   Allergen Reactions   • Phenergan [Promethazine]        Past Surgical History:   Procedure Laterality Date   • APPENDECTOMY     • CHOLECYSTECTOMY     • TOE AMPUTATION Right     stated by patient.        History reviewed. No pertinent family history.    Social History     Socioeconomic History   • Marital status:      Spouse name: Not on file   • Number of children: Not on file   • Years of education: Not on file   • Highest education level: Not on file           Objective   Physical Exam    Procedures  EKG shows a regular supraventricular rhythm, accelerated junctional versus sinus tach.  Possible old anteroseptal infarct versus poor R wave progression.  No apparent acute ischemia.  CT Chest Without Contrast   Final Result   Impression:   Small right pleural effusion and cardiomegaly noted. Generalized body   wall anasarca subcutaneous edema predominantly on the left side of the   chest and shoulder as well as breast tissue thickening. Lymph nodes in   left axillary region may be secondary to edema.       This report was finalized on 9/10/2020 9:36 AM by Dr. Syed Cárdenas MD.          XR Chest 1 View   Final Result      1. Cardiomegaly and vascular congestion.   2. Asymmetric infiltrates in the right lung concerning for pneumonia.      Signer Name: Braydon Hudson MD    Signed: 9/10/2020 2:21 AM    Workstation Name: [facility]     [facility]        Results for orders placed or performed during the hospital encounter of 09/09/20   COVID-19, [facility] IN-HOUSE,NP Swab (NO TRANSPORT MEDIA) 2 HR TAT - Swab, Nasopharynx   Result Value Ref Range    COVID19 Not Detected Not Detected  - Ref. Range   Comprehensive Metabolic Panel   Result Value Ref Range    Glucose 379 (H) 65 - 99 mg/dL    BUN 33 (H) 6 - 20 mg/dL    Creatinine 1.24 (H) 0.57 - 1.00 mg/dL    Sodium 128 (L) 136 - 145 mmol/L    Potassium 4.9 3.5 - 5.2 mmol/L    Chloride 95 (L) 98 - 107 mmol/L    CO2 27.8 22.0 - 29.0 mmol/L    Calcium 8.8 8.6 - 10.5 mg/dL    Total Protein 6.3 6.0 - 8.5 g/dL    Albumin 3.49 (L) 3.50 - 5.20 g/dL    ALT (SGPT) 25 1 - 33 U/L    AST (SGOT) 24 1 - 32 U/L    Alkaline Phosphatase 105 39 - 117 U/L    Total Bilirubin 2.0 (H) 0.0 - 1.2 mg/dL    eGFR Non African Amer 45 (L) >60 mL/min/1.73    Globulin 2.8 gm/dL    A/G Ratio 1.2 g/dL    BUN/Creatinine Ratio 26.6 (H) 7.0 - 25.0    Anion Gap 5.2 5.0 - 15.0 mmol/L   BNP   Result Value Ref Range    proBNP 3,325.0 (H) 0.0 - 900.0 pg/mL   Troponin   Result Value Ref Range    Troponin T 0.045 (C) 0.000 - 0.030 ng/mL   Troponin   Result Value Ref Range    Troponin T 0.034 (C) 0.000 - 0.030 ng/mL   Lactic Acid, Plasma   Result Value Ref Range    Lactate 1.5 0.5 - 2.0 mmol/L   C-reactive Protein   Result Value Ref Range    C-Reactive Protein 0.53 (H) 0.00 - 0.50 mg/dL   Protime-INR   Result Value Ref Range    Protime 19.3 (H) 11.9 - 14.1 Seconds    INR 1.64 (H) 0.90 - 1.10   CBC Auto Differential   Result Value Ref Range    WBC 10.26 3.40 - 10.80 10*3/mm3    RBC 3.69 (L) 3.77 - 5.28 10*6/mm3    Hemoglobin 10.2 (L) 12.0 - 15.9 g/dL    Hematocrit 36.6 34.0 - 46.6 %    MCV 99.2 (H) 79.0 - 97.0 fL    MCH 27.6 26.6 - 33.0 pg    MCHC 27.9 (L) 31.5 - 35.7 g/dL    RDW 21.3 (H) 12.3 - 15.4 %    RDW-SD 75.7 (H) 37.0 - 54.0 fl    MPV 10.1 6.0 - 12.0 fL    Platelets 199 140 - 450 10*3/mm3    Neutrophil % 90.6 (H) 42.7 - 76.0 %    Lymphocyte % 6.7 (L) 19.6 - 45.3 %    Monocyte % 0.7 (L) 5.0 - 12.0 %    Eosinophil % 0.7 0.3 - 6.2 %    Basophil % 0.7 0.0 - 1.5 %    Immature Grans % 0.6 (H) 0.0 - 0.5 %    Neutrophils, Absolute 9.30 (H) 1.70 - 7.00 10*3/mm3    Lymphocytes, Absolute 0.69 (L)  0.70 - 3.10 10*3/mm3    Monocytes, Absolute 0.07 (L) 0.10 - 0.90 10*3/mm3    Eosinophils, Absolute 0.07 0.00 - 0.40 10*3/mm3    Basophils, Absolute 0.07 0.00 - 0.20 10*3/mm3    Immature Grans, Absolute 0.06 (H) 0.00 - 0.05 10*3/mm3    nRBC 0.0 0.0 - 0.2 /100 WBC   Blood Gas, Arterial With Co-Ox   Result Value Ref Range    Site Left Brachial     Bharathi's Test N/A     pH, Arterial 7.413 7.350 - 7.450 pH units    pCO2, Arterial 40.7 35.0 - 45.0 mm Hg    pO2, Arterial 84.8 83.0 - 108.0 mm Hg    HCO3, Arterial 26.0 20.0 - 26.0 mmol/L    Base Excess, Arterial 1.3 0.0 - 2.0 mmol/L    O2 Saturation, Arterial 97.7 94.0 - 99.0 %    Hemoglobin, Blood Gas 10.5 (L) 13.5 - 17.5 g/dL    Hematocrit, Blood Gas 32.2 (L) 38.0 - 51.0 %    Oxyhemoglobin 94.6 94 - 99 %    Methemoglobin 0.10 0.00 - 3.00 %    Carboxyhemoglobin 3.1 0 - 5 %    A-a Gradiant 61.6 0.0 - 300.0 mmHg    CO2 Content 27.2 22 - 33 mmol/L    Temperature 0.0 C    Barometric Pressure for Blood Gas 733 mmHg    Modality Nasal Cannula     FIO2 28 %    Flow Rate 2.0 lpm    Ventilator Mode NA     Note      Collected by [id number]     pH, Temp Corrected      pCO2, Temperature Corrected      pO2, Temperature Corrected     Scan Slide   Result Value Ref Range    Anisocytosis Slight/1+ None Seen    Hypochromia Mod/2+ None Seen    Macrocytes Slight/1+ None Seen    Platelet Morphology Normal Normal   Troponin   Result Value Ref Range    Troponin T 0.029 0.000 - 0.030 ng/mL   POC Glucose Once   Result Value Ref Range    Glucose 355 (H) 70 - 130 mg/dL                ED Course  ED Course as of Sep 10 1014   Thu Sep 10, 2020   0410 Sinus tachycardia versus accelerated junctional rhythm.  Rate 114.  Low voltage.  Normal axis.  No acute ST elevation or depression.    [BC]   0444 Admission pending bed availability.    [BC]   0904 I assumed patient's care from Dr. Desai this morning at shift change.  Please see his chart for details.  Patient is doing well, states breathing is improving.   Patient does have anasarca, with edema in her legs, even in her abdominal wall.  Currently she is eating breakfast, tolerating it well.  I have ordered CT chest without contrast to clarify the situation.  I ordered this at 0739, but still has not been done.  House supervisor advises that we have discharges scheduled upstairs, that we should be able to get a bed for her here.    [CM]   1002 Patient's emergency department stay has not been complicated.  I discussed the case with Dr. Guajardo.  He is admitting patient to the hospitalist service.    [CM]      ED Course User Index  [BC] Sam Desai MD  [CM] Jonathan Craig MD                                           [facility]    Final diagnoses:   Acute on chronic congestive heart failure, unspecified heart failure type (CMS/HCC)   Purpura (CMS/HCC)   Hyponatremia             Please note that portions of this note were completed with a voice recognition program.        Jonathan Craig MD  09/10/20 2899

## 2020-09-10 NOTE — ED PROVIDER NOTES
Subjective   54-year-old white female complains of leg swelling and rash.  Patient describes 6-day history of increased lower extremity, upper extremity, face, and abdominal swelling.  She has a history of congestive heart failure.  She also has a rash noted on her legs and other areas of her body.  She said that previously she was diagnosed with vasculitis, and the rash had resolved.  This rash again started a few weeks ago.  She also has a history of chronic A. fib and is on Coumadin for chronic anticoagulation.  She notes her dyspnea is worse with exertion.  She denied any chest pain, fever, chills, abdominal pain or other complaints.          Review of Systems   All other systems reviewed and are negative.      Past Medical History:   Diagnosis Date   • Anemia    • CHF (congestive heart failure) (CMS/HCC)    • Chronic a-fib (CMS/HCC)     stated by patient    • Cirrhosis of liver (CMS/HCC)     stated by patient    • Diabetes mellitus (CMS/HCC)        Allergies   Allergen Reactions   • Phenergan [Promethazine]        Past Surgical History:   Procedure Laterality Date   • APPENDECTOMY     • CHOLECYSTECTOMY     • TOE AMPUTATION Right     stated by patient.        History reviewed. No pertinent family history.    Social History     Socioeconomic History   • Marital status:      Spouse name: Not on file   • Number of children: Not on file   • Years of education: Not on file   • Highest education level: Not on file   Tobacco Use   • Smoking status: Never Smoker   • Smokeless tobacco: Never Used           Objective   Physical Exam   Constitutional: She is oriented to person, place, and time. She appears well-developed and well-nourished.   HENT:   Head: Normocephalic and atraumatic.   Cardiovascular: Normal rate, regular rhythm and normal heart sounds. Exam reveals no gallop and no friction rub.   No murmur heard.  Pulmonary/Chest: Effort normal and breath sounds normal. No respiratory distress. She has no  wheezes. She has no rales.   Abdominal: Soft. Bowel sounds are normal. She exhibits no distension. There is no tenderness.   Musculoskeletal: Normal range of motion. She exhibits edema (4+ edema bilateral lower extremities extending to the lower abdomen and lower back.).   Neurological: She is alert and oriented to person, place, and time.   Skin: Skin is warm and dry.        Multiple areas of petechiae and purpura, nonblanching.  These are of varying sizes from 2 mm up to 10 cm.  Some have ulceration and necrotic appearing superficial ulcers.  The largest is on the right posterior calf.  Lesions are worse on the lower extremities, she has fewer on the distal upper extremities, and small amount on the back.   Psychiatric: She has a normal mood and affect.   Nursing note and vitals reviewed.      Procedures           ED Course  ED Course as of Sep 10 1952   Thu Sep 10, 2020   0410 Sinus tachycardia versus accelerated junctional rhythm.  Rate 114.  Low voltage.  Normal axis.  No acute ST elevation or depression.    [BC]   0444 Admission pending bed availability.    [BC]   0904 I assumed patient's care from Dr. Desai this morning at shift change.  Please see his chart for details.  Patient is doing well, states breathing is improving.  Patient does have anasarca, with edema in her legs, even in her abdominal wall.  Currently she is eating breakfast, tolerating it well.  I have ordered CT chest without contrast to clarify the situation.  I ordered this at 0739, but still has not been done.  House supervisor advises that we have discharges scheduled upstairs, that we should be able to get a bed for her here.    [CM]   1002 Patient's emergency department stay has not been complicated.  I discussed the case with Dr. Guajardo.  He is admitting patient to the hospitalist service.    [CM]      ED Course User Index  [BC] Sam Desai MD  [CM] Jonathan Craig MD                                           MDM  Number of  Diagnoses or Management Options  Acute on chronic congestive heart failure, unspecified heart failure type (CMS/HCC):   Hyponatremia:   Purpura (CMS/HCC):      Amount and/or Complexity of Data Reviewed  Clinical lab tests: reviewed  Tests in the radiology section of CPT®: reviewed  Tests in the medicine section of CPT®: reviewed  Decide to obtain previous medical records or to obtain history from someone other than the patient: yes    Risk of Complications, Morbidity, and/or Mortality  Presenting problems: high  Diagnostic procedures: moderate  Management options: high        Final diagnoses:   Acute on chronic congestive heart failure, unspecified heart failure type (CMS/HCC)   Purpura (CMS/HCC)   Hyponatremia            Sam Desai MD  09/10/20 1953

## 2020-09-10 NOTE — ED NOTES
Pt alert and oriented, skin pink and warm, seeping noted to bilateral legs, afib noted. Pt remains on 2lpm nc. poc updated, waiting on admit bed, call light within reach of pt.      Olivia Rosales RN  09/10/20 9027

## 2020-09-10 NOTE — ED NOTES
Numerous areas of broken tissue and ulcers to bilateral legs. Numerous areas of darkened red and purple skin noted to pt trunk, back, abdomen, bilateral arms and legs. Generalized petechia and purpura noted. Present at change of shift, redness to pt buttocks. Pt has generalized edema all over, weeping noted to bilateral legs.      Olivia Rosales RN  09/10/20 0756

## 2020-09-10 NOTE — PROGRESS NOTES
Discharge Planning Assessment   [place]     Patient Name: Yumiko Purdy  MRN: [id number]  Today's Date: 9/10/2020    Admit Date: 9/9/2020    Discharge Needs Assessment     Row Name 09/10/20 1532       Living Environment    Lives With  child(ferdinand), adult    Name(s) of Who Lives With Patient  Pt lives at home with her son, Sam.     Current Living Arrangements  home/apartment/condo    Primary Care Provided by  self;child(ferdinand);other (see comments) brother    Provides Primary Care For  no one, unable/limited ability to care for self    Family Caregiver if Needed  child(ferdinand), adult;sibling(s)    Quality of Family Relationships  involved;helpful    Able to Return to Prior Arrangements  yes       Resource/Environmental Concerns    Transportation Concerns  car, none       Transition Planning    Patient/Family Anticipates Transition to  home with family    Transportation Anticipated  family or friend will provide       Discharge Needs Assessment    Equipment Currently Used at Home  oxygen;wheelchair;walker, rolling;shower chair    Equipment Needed After Discharge  none        Discharge Plan     Row Name 09/10/20 5982       Plan    Plan  SS spoke with pt on this day. Pt lives at home with her son, Sam. Pt's brother also lives next door and assists her when needed. Pt does not receive  services. Pt has O2 @ 2L via [facility], wheelchair, walker, and shower chair. Pt does not have a POA or a living will. Pt's PCP is Niko Schaefer. Pt plans to return home at discharge, with transportation provided by family. SS will follow and assist as needed.     Patient/Family in Agreement with Plan  yes          Demographic Summary     Row Name 09/10/20 7391       General Information    Admission Type  inpatient    Referral Source  nursing    Reason for Consult  discharge planning    Preferred Language  English     Used During This Interaction  no            SAMY Mccoy

## 2020-09-10 NOTE — CONSULTS
Date of Admit: 9/9/2020  Date of Consult: 09/10/20  No ref. provider found        Acute on chronic congestive heart failure (CMS/[facility])      Assessment      1. Acute on chronic diastolic versus systolic congestive heart failure, echo pending, decompensated  2. Elevated troponin, trending up to 0.045 now trending down to normal, EKG tracing reviewed and shows no acute ischemia  3. Paroxysmal atrial fibrillation, on Coumadin, INR 1.64, amiodarone   4. Diabetes mellitus type 2  5. Cirrhosis of the liver    Recommendations     1. We will get an echocardiogram to assess her cardiac function will motion valve morphology continue with diuretics for now we will try to keep her negative about a liter a day  2. Troponin slightly elevated could be related to acute CHF however consider ischemia work-up once her CHF is compensated  3. Paroxysmal atrial fibrillation currently she is in sinus rhythm continue with anticoagulation keep INR between 2-3  4.  Vasculitis management as per medicine service      Reason for consultation: Congestive heart failure    Subjective       Subjective     History of Present Illness     Yumiko Purdy is a 54 year old female with a past medical history significant for chronic atrial fibrillation, congestive heart failure, diabetes mellitus and cirrhosis of the liver.  Patient presented to the ER with complaints of lower extremity edema, abdominal distention and rash.  Patient states that for the last week she has had worsening lower extremity edema that goes up into her knees all the way up into her abdomen.  She also has had worsening shortness of breath and orthopnea.  He denies any recent chest pains.  She states that she follows with her PCP who manages her water pills and heart medications.  She was recently seen at [facility] on Monday and Wednesday of this week with similar complaints and was given single doses of diuretics without much improvement of her symptoms.  She also reports she  is a diffuse rash for last few months and was told that she had vasculitis.  She was worked up for this at [facility] in May 2020.  She was taken off Coumadin briefly because it was thought that this may be causing the rash however there was no improvement noted so she was placed back on Xarelto.  She states that she had a left heart catheterization a couple years ago at [facility] in [facility] that showed nonobstructive CAD however these records are unavailable.  Patient did receive Lasix IV in the ER and reports that her breathing has had mild improvement.  Creatinine 1.24 on admission.    Cardiac risk factors:arteriosclerotic heart disease, hypercholesterolemia, hypertension, Sedentary life style and Obesity    Last Stress: 2.10.2015  Conclusions  *1. Low probability nuclear stress test.  *2. Small partially reversible defect noted in the mid inferolateral and  apical lateral segment.  *3. Normal overall wall motion is noted with a stress EF of 65%     Electronically signed by: Jer Alvarez MD on 02/11/2015 11:40:39  Thank you for the courtesy of this referral.      Past Medical History:   Diagnosis Date   • Anemia    • CHF (congestive heart failure) (CMS/HCC)    • Chronic a-fib (CMS/HCC)     stated by patient    • Cirrhosis of liver (CMS/HCC)     stated by patient    • Diabetes mellitus (CMS/HCC)      Past Surgical History:   Procedure Laterality Date   • APPENDECTOMY     • CHOLECYSTECTOMY     • TOE AMPUTATION Right     stated by patient.      History reviewed. No pertinent family history.  Social History     Tobacco Use   • Smoking status: Not on file   Substance Use Topics   • Alcohol use: Not on file   • Drug use: Not on file     Medications Prior to Admission   Medication Sig Dispense Refill Last Dose   • amiodarone (PACERONE) 200 MG tablet Take 200 mg by mouth Daily.      • ampicillin (PRINCIPEN) 500 MG capsule Take 500 mg by mouth 3 (Three) Times a Day.      • folic acid  (FOLVITE) 1 MG tablet Take 1 mg by mouth Daily.      • Insulin Glargine (BASAGLAR KWIKPEN SC) Inject 100 Units under the skin into the appropriate area as directed Daily.      • Insulin Regular Human, Conc, (HumuLIN R U-500 KwikPen) 500 UNIT/ML solution pen-injector CONCENTRATED injection Inject 30 Units under the skin into the appropriate area as directed 3 (Three) Times a Day Before Meals.      • metoprolol tartrate (LOPRESSOR) 25 MG tablet Take 25 mg by mouth 2 (Two) Times a Day.      • ondansetron (ZOFRAN) 4 MG tablet Take 4 mg by mouth Every 8 (Eight) Hours As Needed for Nausea or Vomiting.      • pregabalin (LYRICA) 100 MG capsule Take 100 mg by mouth 2 (Two) Times a Day.      • venlafaxine (EFFEXOR) 75 MG tablet Take 75 mg by mouth Daily.      • warfarin (COUMADIN) 3 MG tablet Take 3 mg by mouth Daily.        Allergies:  Phenergan [promethazine]    Review of Systems   Constitutional: Negative for chills, diaphoresis, fatigue, fever and unexpected weight change.   HENT: Negative for congestion and trouble swallowing.    Eyes: Negative for photophobia and visual disturbance.   Respiratory: Positive for shortness of breath. Negative for chest tightness and wheezing.    Cardiovascular: Positive for leg swelling. Negative for chest pain and palpitations.   Gastrointestinal: Positive for abdominal distention. Negative for abdominal pain, nausea and vomiting.   Endocrine: Negative for polyphagia and polyuria.   Genitourinary: Negative for dysuria and hematuria.   Musculoskeletal: Negative for neck pain and neck stiffness.   Skin: Positive for rash. Negative for wound.   Allergic/Immunologic: Negative for food allergies and immunocompromised state.   Neurological: Positive for weakness. Negative for dizziness, syncope and light-headedness.   Hematological: Negative for adenopathy. Does not bruise/bleed easily.   Psychiatric/Behavioral: Negative for confusion and suicidal ideas.       Objective     Objective       Vital Signs  Temp:  [97.7 °F (36.5 °C)-98 °F (36.7 °C)] 98 °F (36.7 °C)  Heart Rate:  [112-116] 116  Resp:  [18] 18  BP: (104-154)/() 148/90     Vital Signs (last 72 hrs)       09/07 0700  -  09/08 0659 09/08 0700  -  09/09 0659 09/09 0700  -  09/10 0659 09/10 0700  -  09/10 1453   Most Recent    Temp (°F)       97.7      98     98 (36.7)    Heart Rate     112 -  115    113 -  116     116    Resp       18      18     18    BP     113/77 -  130/75    104/78 -  154/100     148/90    SpO2 (%)       100    98 -  100     99        Body mass index is 50.95 kg/m².    Intake/Output Summary (Last 24 hours) at 9/10/2020 1453  Last data filed at 9/10/2020 1104  Gross per 24 hour   Intake 300 ml   Output 500 ml   Net -200 ml     Physical Exam   Constitutional: She is oriented to person, place, and time. She appears well-developed and well-nourished.   Chronically ill appearing female in no acute distress    HENT:   Head: Normocephalic and atraumatic.   Eyes: Pupils are equal, round, and reactive to light. Conjunctivae, EOM and lids are normal.   Neck: Normal range of motion. Neck supple. JVD present.   Cardiovascular: Normal rate, regular rhythm, S1 normal, S2 normal and normal heart sounds.   No murmur heard.  Pulses:       Radial pulses are 2+ on the right side, and 2+ on the left side.        Dorsalis pedis pulses are 2+ on the right side, and 1+ on the left side.        Posterior tibial pulses are 2+ on the right side, and 1+ on the left side.   Pulmonary/Chest: Effort normal and breath sounds normal. No respiratory distress. She has no wheezes.   Abdominal: Soft. Normal appearance and bowel sounds are normal. She exhibits no distension. There is no tenderness.   Musculoskeletal: Normal range of motion. She exhibits edema (2+ BLE).   Neurological: She is alert and oriented to person, place, and time. She has normal strength.   Skin: Skin is warm, dry and intact. Rash (Diffuse purpura and petechial rash on BLE  extending up into abdomen) noted.   Psychiatric: She has a normal mood and affect. Her speech is normal and behavior is normal. Judgment and thought content normal. Cognition and memory are normal.     Results review     Results Review:    I reviewed the patient's new clinical results.  Results from last 7 days   Lab Units 09/10/20  0921 09/10/20  0256 09/10/20  0058   TROPONIN T ng/mL 0.029 0.034* 0.045*     Results from last 7 days   Lab Units 09/10/20  0058   WBC 10*3/mm3 10.26   HEMOGLOBIN g/dL 10.2*   PLATELETS 10*3/mm3 199     Results from last 7 days   Lab Units 09/10/20  0058   SODIUM mmol/L 128*   POTASSIUM mmol/L 4.9   CHLORIDE mmol/L 95*   CO2 mmol/L 27.8   BUN mg/dL 33*   CREATININE mg/dL 1.24*   CALCIUM mg/dL 8.8   GLUCOSE mg/dL 379*   ALT (SGPT) U/L 25   AST (SGOT) U/L 24     Lab Results   Component Value Date    INR 1.64 (H) 09/10/2020    INR 0.90 06/02/2016    INR 0.97 04/20/2016     Lab Results   Component Value Date    MG 1.8 06/29/2016    MG 1.7 05/09/2016    MG 1.8 05/08/2016     Lab Results   Component Value Date    TSH 3.830 09/10/2020    CHLPL 103 04/21/2016    TRIG 154 (H) 04/21/2016    HDL 22 (L) 04/21/2016    LDL 50 04/21/2016      Lab Results   Component Value Date    PROBNP 3,325.0 (H) 09/10/2020       ECG  ECG/EMG Results (last 24 hours)     Procedure Component Value Units Date/Time    ECG 12 Lead [[id number]] Collected:  09/10/20 0136     Updated:  09/10/20 1318    Narrative:       Test Reason : Short of breath  Blood Pressure : **/** mmHG  Vent. Rate : 114 BPM     Atrial Rate : 052 BPM     P-R Int : 000 ms          QRS Dur : 090 ms      QT Int : 350 ms       P-R-T Axes : 000 083 011 degrees     QTc Int : 482 ms    Accelerated Junctional rhythm  Low voltage QRS  Cannot rule out Anteroseptal infarct , age undetermined  Abnormal ECG  No previous ECGs available  Confirmed by Mark Hutchins (2020) on 9/10/2020 1:18:21 PM    Referred By:  CURD           Confirmed By:Mark Hutchins             Imaging Results (Last 72 Hours)     Procedure Component Value Units Date/Time    CT Chest Without Contrast [[id number]] Collected:  09/10/20 0934     Updated:  09/10/20 0938    Narrative:       CT CHEST WO CONTRAST-      TECHNIQUE: Multiple axial CT images were obtained from lung apex through  upper abdomen without administration of IV contrast. Reformatted images  in the coronal and/or sagittal plane(s) were generated from the axial  data set to facilitate diagnostic accuracy and/or surgical planning.  Oral Contrast:NONE.     Radiation dose reduction techniques were utilized per ALARA protocol.  Automated exposure control was initiated through either or [employer] or  frenting software packages by  protocol.             Clinical information  SOA, abn CXR; I50.9-Heart failure, unspecified; D69.2-Other  nonthrombocytopenic purpura; E87.7-Oiob-oohivasftd and hyponatremia      Comparison  NONE.     Findings  LUNGS: Unremarkable. No parenchymal soft tissue nodules.  No focal air  space disease.     HEART: Unremarkable.     PERICARDIUM: No effusion.     MEDIASTINUM: No masses. No enlarged lymph nodes.  No fluid collections.     PLEURA: SMALL RIGHT PLEURAL EFFUSION AND CARDIOMEGALY NOTED.     MAJOR AIRWAYS: Clear. No intrinsic mass.     VASCULATURE: No evidence of aneurysm.     VISUALIZED UPPER ABDOMEN:        LIVER: Homogeneous. No focal hepatic mass or ductal dilatation.        SPLEEN: Homogeneous. No splenomegaly.        ADRENALS: No mass.        KIDNEYS: No mass. No obstructive uropathy.  No evidence of  urolithiasis.        GI TRACT: Non-dilated. No definite wall thickening.        PERITONEUM: No free air. No free fluid or loculated fluid  collections.           ABDOMINAL WALL: GENERALIZED BODY WALL ANASARCA SUBCUTANEOUS EDEMA  PREDOMINANTLY ON THE LEFT SIDE OF THE CHEST AND SHOULDER AS WELL AS  BREAST TISSUE THICKENING.        OTHER: Lymph nodes in left axillary region may be secondary to edema.      BONES: No acute bony abnormality.       Impression:       Impression:  Small right pleural effusion and cardiomegaly noted. Generalized body  wall anasarca subcutaneous edema predominantly on the left side of the  chest and shoulder as well as breast tissue thickening. Lymph nodes in  left axillary region may be secondary to edema.     This report was finalized on 9/10/2020 9:36 AM by Dr. Syed Cárdenas MD.       XR Chest 1 View [[id number]] Collected:  09/10/20 0221     Updated:  09/10/20 0223    Narrative:       CR Chest 1 Vw    INDICATION:   Difficulty breathing today.     COMPARISON:    6/15/2016    FINDINGS:  Single portable AP view(s) of the chest.  Heart is enlarged. There is vascular congestion. There is infiltrate in the right mid lower lung. Asymmetry is concerning for pneumonia. No pneumothorax is seen.      Impression:         1. Cardiomegaly and vascular congestion.  2. Asymmetric infiltrates in the right lung concerning for pneumonia.    Signer Name: Braydon Hudson MD   Signed: 9/10/2020 2:21 AM   Workstation Name: [facility]    [facility]          I have discussed my impression and recommendations with the patient and family.    Thank you very much for asking us to be involved in this patient's care.  We will follow along with you.      KENNY Beckman, acting as scribe for Dr. Charlee Ken MD [facility]  09/10/20  14:53  ICharlee MD, [facility], performed the services described in this documentation as documented by the above-named individual under my supervision and made necessary changes in the note is both accurate and complete  Please note that portions of this note were completed with a voice recognition program.

## 2020-09-10 NOTE — ED NOTES
Pt resting on hospital bed with eyes closed, awakens to verbal stimuli, pt remains on 2lpm nc. afib noted. Pt alert and oriented, skin pw, seeping noted to bilateral lower legs. poc updated, pt waiting on admit bed, call light within reach of pt.      Olivia Rosales, RN  09/10/20 9414

## 2020-09-11 ENCOUNTER — APPOINTMENT (OUTPATIENT)
Dept: CARDIOLOGY | Facility: HOSPITAL | Age: 55
End: 2020-09-11

## 2020-09-11 LAB
ANION GAP SERPL CALCULATED.3IONS-SCNC: 11.8 MMOL/L (ref 5–15)
BH CV ECHO MEAS - ACS: 1.8 CM
BH CV ECHO MEAS - AO MAX PG: 5.3 MMHG
BH CV ECHO MEAS - AO MEAN PG: 3 MMHG
BH CV ECHO MEAS - AO ROOT AREA (BSA CORRECTED): 1.2
BH CV ECHO MEAS - AO ROOT AREA: 6.8 CM^2
BH CV ECHO MEAS - AO ROOT DIAM: 3 CM
BH CV ECHO MEAS - AO V2 MAX: 115 CM/SEC
BH CV ECHO MEAS - AO V2 MEAN: 79.4 CM/SEC
BH CV ECHO MEAS - AO V2 VTI: 20.1 CM
BH CV ECHO MEAS - BSA(HAYCOCK): 2.6 M^2
BH CV ECHO MEAS - BSA: 2.4 M^2
BH CV ECHO MEAS - BZI_BMI: 50.4 KILOGRAMS/M^2
BH CV ECHO MEAS - BZI_METRIC_HEIGHT: 165.1 CM
BH CV ECHO MEAS - BZI_METRIC_WEIGHT: 137.4 KG
BH CV ECHO MEAS - EDV(CUBED): 187.1 ML
BH CV ECHO MEAS - EDV(MOD-SP4): 59.9 ML
BH CV ECHO MEAS - EDV(TEICH): 161.3 ML
BH CV ECHO MEAS - EF(CUBED): -10.3 %
BH CV ECHO MEAS - EF(MOD-SP4): 29.9 %
BH CV ECHO MEAS - EF(TEICH): -7.8 %
BH CV ECHO MEAS - ESV(CUBED): 206.4 ML
BH CV ECHO MEAS - ESV(MOD-SP4): 42 ML
BH CV ECHO MEAS - ESV(TEICH): 173.9 ML
BH CV ECHO MEAS - FS: -3.3 %
BH CV ECHO MEAS - IVS/LVPW: 0.75
BH CV ECHO MEAS - IVSD: 0.7 CM
BH CV ECHO MEAS - LA DIMENSION: 4 CM
BH CV ECHO MEAS - LA/AO: 1.4
BH CV ECHO MEAS - LV DIASTOLIC VOL/BSA (35-75): 25.4 ML/M^2
BH CV ECHO MEAS - LV MASS(C)D: 175.7 GRAMS
BH CV ECHO MEAS - LV MASS(C)DI: 74.4 GRAMS/M^2
BH CV ECHO MEAS - LV SYSTOLIC VOL/BSA (12-30): 17.8 ML/M^2
BH CV ECHO MEAS - LVIDD: 5.7 CM
BH CV ECHO MEAS - LVIDS: 5.9 CM
BH CV ECHO MEAS - LVLD AP4: 7.1 CM
BH CV ECHO MEAS - LVLS AP4: 7.1 CM
BH CV ECHO MEAS - LVOT AREA (M): 4.9 CM^2
BH CV ECHO MEAS - LVOT AREA: 4.9 CM^2
BH CV ECHO MEAS - LVOT DIAM: 2.5 CM
BH CV ECHO MEAS - LVPWD: 0.93 CM
BH CV ECHO MEAS - MV A MAX VEL: 50.2 CM/SEC
BH CV ECHO MEAS - MV E MAX VEL: 121 CM/SEC
BH CV ECHO MEAS - MV E/A: 2.4
BH CV ECHO MEAS - PA ACC TIME: 0.07 SEC
BH CV ECHO MEAS - PA PR(ACCEL): 45.7 MMHG
BH CV ECHO MEAS - RAP SYSTOLE: 10 MMHG
BH CV ECHO MEAS - RVSP: 30.5 MMHG
BH CV ECHO MEAS - SI(AO): 58.2 ML/M^2
BH CV ECHO MEAS - SI(CUBED): -8.2 ML/M^2
BH CV ECHO MEAS - SI(MOD-SP4): 7.6 ML/M^2
BH CV ECHO MEAS - SI(TEICH): -5.3 ML/M^2
BH CV ECHO MEAS - SV(AO): 137.4 ML
BH CV ECHO MEAS - SV(CUBED): -19.3 ML
BH CV ECHO MEAS - SV(MOD-SP4): 17.9 ML
BH CV ECHO MEAS - SV(TEICH): -12.5 ML
BH CV ECHO MEAS - TR MAX VEL: 226 CM/SEC
BUN SERPL-MCNC: 41 MG/DL (ref 6–20)
BUN/CREAT SERPL: 31.5 (ref 7–25)
CALCIUM SPEC-SCNC: 9.1 MG/DL (ref 8.6–10.5)
CHLORIDE SERPL-SCNC: 97 MMOL/L (ref 98–107)
CHOLEST SERPL-MCNC: 137 MG/DL (ref 0–200)
CO2 SERPL-SCNC: 25.2 MMOL/L (ref 22–29)
CREAT SERPL-MCNC: 1.3 MG/DL (ref 0.57–1)
DEPRECATED RDW RBC AUTO: 74.4 FL (ref 37–54)
ERYTHROCYTE [DISTWIDTH] IN BLOOD BY AUTOMATED COUNT: 21.2 % (ref 12.3–15.4)
FOLATE SERPL-MCNC: >20 NG/ML (ref 4.78–24.2)
GFR SERPL CREATININE-BSD FRML MDRD: 43 ML/MIN/1.73
GLUCOSE BLDC GLUCOMTR-MCNC: 225 MG/DL (ref 70–130)
GLUCOSE BLDC GLUCOMTR-MCNC: 325 MG/DL (ref 70–130)
GLUCOSE BLDC GLUCOMTR-MCNC: 327 MG/DL (ref 70–130)
GLUCOSE BLDC GLUCOMTR-MCNC: 390 MG/DL (ref 70–130)
GLUCOSE BLDC GLUCOMTR-MCNC: 413 MG/DL (ref 70–130)
GLUCOSE SERPL-MCNC: 376 MG/DL (ref 65–99)
HCT VFR BLD AUTO: 34.2 % (ref 34–46.6)
HDLC SERPL-MCNC: 43 MG/DL (ref 40–60)
HGB BLD-MCNC: 9.7 G/DL (ref 12–15.9)
INR PPP: 1.72 (ref 0.9–1.1)
LDLC SERPL CALC-MCNC: 78 MG/DL (ref 0–100)
LDLC/HDLC SERPL: 1.81 {RATIO}
MAGNESIUM SERPL-MCNC: 2.1 MG/DL (ref 1.6–2.6)
MAXIMAL PREDICTED HEART RATE: 167 BPM
MCH RBC QN AUTO: 27.9 PG (ref 26.6–33)
MCHC RBC AUTO-ENTMCNC: 28.4 G/DL (ref 31.5–35.7)
MCV RBC AUTO: 98.3 FL (ref 79–97)
PLATELET # BLD AUTO: 198 10*3/MM3 (ref 140–450)
PMV BLD AUTO: 10.3 FL (ref 6–12)
POTASSIUM SERPL-SCNC: 5.3 MMOL/L (ref 3.5–5.2)
PROTHROMBIN TIME: 20 SECONDS (ref 11.9–14.1)
RBC # BLD AUTO: 3.48 10*6/MM3 (ref 3.77–5.28)
SODIUM SERPL-SCNC: 134 MMOL/L (ref 136–145)
STRESS TARGET HR: 142 BPM
TRIGL SERPL-MCNC: 80 MG/DL (ref 0–150)
TROPONIN T SERPL-MCNC: 0.03 NG/ML (ref 0–0.03)
VIT B12 BLD-MCNC: 1134 PG/ML (ref 211–946)
VLDLC SERPL-MCNC: 16 MG/DL
WBC # BLD AUTO: 9.14 10*3/MM3 (ref 3.4–10.8)

## 2020-09-11 PROCEDURE — 99233 SBSQ HOSP IP/OBS HIGH 50: CPT | Performed by: FAMILY MEDICINE

## 2020-09-11 PROCEDURE — 63710000001 INSULIN DETEMIR PER 5 UNITS: Performed by: INTERNAL MEDICINE

## 2020-09-11 PROCEDURE — 93306 TTE W/DOPPLER COMPLETE: CPT | Performed by: INTERNAL MEDICINE

## 2020-09-11 PROCEDURE — 80048 BASIC METABOLIC PNL TOTAL CA: CPT | Performed by: FAMILY MEDICINE

## 2020-09-11 PROCEDURE — 85610 PROTHROMBIN TIME: CPT | Performed by: PHYSICIAN ASSISTANT

## 2020-09-11 PROCEDURE — 83735 ASSAY OF MAGNESIUM: CPT | Performed by: FAMILY MEDICINE

## 2020-09-11 PROCEDURE — 63710000001 INSULIN ASPART PER 5 UNITS: Performed by: INTERNAL MEDICINE

## 2020-09-11 PROCEDURE — 99233 SBSQ HOSP IP/OBS HIGH 50: CPT | Performed by: INTERNAL MEDICINE

## 2020-09-11 PROCEDURE — 25010000002 FUROSEMIDE PER 20 MG: Performed by: FAMILY MEDICINE

## 2020-09-11 PROCEDURE — 80061 LIPID PANEL: CPT | Performed by: PHYSICIAN ASSISTANT

## 2020-09-11 PROCEDURE — 82962 GLUCOSE BLOOD TEST: CPT

## 2020-09-11 PROCEDURE — 94799 UNLISTED PULMONARY SVC/PX: CPT

## 2020-09-11 PROCEDURE — 82607 VITAMIN B-12: CPT | Performed by: PHYSICIAN ASSISTANT

## 2020-09-11 PROCEDURE — 63710000001 ONDANSETRON PER 8 MG: Performed by: FAMILY MEDICINE

## 2020-09-11 PROCEDURE — 85027 COMPLETE CBC AUTOMATED: CPT | Performed by: FAMILY MEDICINE

## 2020-09-11 PROCEDURE — 63710000001 PREDNISONE PER 5 MG: Performed by: FAMILY MEDICINE

## 2020-09-11 PROCEDURE — 25010000002 MORPHINE PER 10 MG: Performed by: FAMILY MEDICINE

## 2020-09-11 PROCEDURE — 84484 ASSAY OF TROPONIN QUANT: CPT | Performed by: FAMILY MEDICINE

## 2020-09-11 PROCEDURE — 93306 TTE W/DOPPLER COMPLETE: CPT

## 2020-09-11 PROCEDURE — 63710000001 INSULIN ASPART PER 5 UNITS: Performed by: PHYSICIAN ASSISTANT

## 2020-09-11 PROCEDURE — 82746 ASSAY OF FOLIC ACID SERUM: CPT | Performed by: PHYSICIAN ASSISTANT

## 2020-09-11 RX ORDER — CARVEDILOL 6.25 MG/1
6.25 TABLET ORAL 2 TIMES DAILY WITH MEALS
Status: DISCONTINUED | OUTPATIENT
Start: 2020-09-11 | End: 2020-09-13

## 2020-09-11 RX ORDER — VALSARTAN 80 MG/1
40 TABLET ORAL
Status: DISCONTINUED | OUTPATIENT
Start: 2020-09-11 | End: 2020-09-12

## 2020-09-11 RX ADMIN — INSULIN DETEMIR 10 UNITS: 100 INJECTION, SOLUTION SUBCUTANEOUS at 20:55

## 2020-09-11 RX ADMIN — METOPROLOL TARTRATE 25 MG: 25 TABLET, FILM COATED ORAL at 08:36

## 2020-09-11 RX ADMIN — MORPHINE SULFATE 4 MG: 4 INJECTION, SOLUTION INTRAMUSCULAR; INTRAVENOUS at 15:22

## 2020-09-11 RX ADMIN — INSULIN ASPART 10 UNITS: 100 INJECTION, SOLUTION INTRAVENOUS; SUBCUTANEOUS at 17:08

## 2020-09-11 RX ADMIN — PREGABALIN 100 MG: 100 CAPSULE ORAL at 20:54

## 2020-09-11 RX ADMIN — SODIUM CHLORIDE, PRESERVATIVE FREE 10 ML: 5 INJECTION INTRAVENOUS at 08:38

## 2020-09-11 RX ADMIN — FUROSEMIDE 40 MG: 10 INJECTION, SOLUTION INTRAMUSCULAR; INTRAVENOUS at 08:35

## 2020-09-11 RX ADMIN — MORPHINE SULFATE 4 MG: 4 INJECTION, SOLUTION INTRAMUSCULAR; INTRAVENOUS at 07:15

## 2020-09-11 RX ADMIN — LISINOPRIL 5 MG: 2.5 TABLET ORAL at 08:35

## 2020-09-11 RX ADMIN — INSULIN ASPART 5 UNITS: 100 INJECTION, SOLUTION INTRAVENOUS; SUBCUTANEOUS at 08:37

## 2020-09-11 RX ADMIN — POLYETHYLENE GLYCOL 3350 17 G: 17 POWDER, FOR SOLUTION ORAL at 08:33

## 2020-09-11 RX ADMIN — VALSARTAN 40 MG: 80 TABLET, FILM COATED ORAL at 13:34

## 2020-09-11 RX ADMIN — FUROSEMIDE 40 MG: 10 INJECTION, SOLUTION INTRAMUSCULAR; INTRAVENOUS at 15:23

## 2020-09-11 RX ADMIN — PREDNISONE 2.5 MG: 5 TABLET ORAL at 08:36

## 2020-09-11 RX ADMIN — ROPINIROLE HYDROCHLORIDE 1 MG: 1 TABLET, FILM COATED ORAL at 20:54

## 2020-09-11 RX ADMIN — SODIUM CHLORIDE, PRESERVATIVE FREE 10 ML: 5 INJECTION INTRAVENOUS at 20:54

## 2020-09-11 RX ADMIN — WARFARIN 4.5 MG: 3 TABLET ORAL at 13:34

## 2020-09-11 RX ADMIN — INSULIN ASPART 5 UNITS: 100 INJECTION, SOLUTION INTRAVENOUS; SUBCUTANEOUS at 11:49

## 2020-09-11 RX ADMIN — INSULIN ASPART 10 UNITS: 100 INJECTION, SOLUTION INTRAVENOUS; SUBCUTANEOUS at 08:33

## 2020-09-11 RX ADMIN — CARVEDILOL 6.25 MG: 6.25 TABLET, FILM COATED ORAL at 17:08

## 2020-09-11 RX ADMIN — INSULIN ASPART 12 UNITS: 100 INJECTION, SOLUTION INTRAVENOUS; SUBCUTANEOUS at 11:48

## 2020-09-11 RX ADMIN — ONDANSETRON HYDROCHLORIDE 4 MG: 4 TABLET, FILM COATED ORAL at 15:22

## 2020-09-11 RX ADMIN — FUROSEMIDE 40 MG: 10 INJECTION, SOLUTION INTRAMUSCULAR; INTRAVENOUS at 23:34

## 2020-09-11 RX ADMIN — MORPHINE SULFATE 4 MG: 4 INJECTION, SOLUTION INTRAMUSCULAR; INTRAVENOUS at 02:28

## 2020-09-11 RX ADMIN — ROPINIROLE HYDROCHLORIDE 1 MG: 1 TABLET, FILM COATED ORAL at 15:23

## 2020-09-11 RX ADMIN — ROPINIROLE HYDROCHLORIDE 1 MG: 1 TABLET, FILM COATED ORAL at 08:36

## 2020-09-11 RX ADMIN — ASPIRIN 81 MG: 81 TABLET, COATED ORAL at 08:34

## 2020-09-11 RX ADMIN — FOLIC ACID 1 MG: 1 TABLET ORAL at 08:34

## 2020-09-11 RX ADMIN — DOCUSATE SODIUM 50 MG AND SENNOSIDES 8.6 MG 2 TABLET: 8.6; 5 TABLET, FILM COATED ORAL at 20:54

## 2020-09-11 RX ADMIN — SPIRONOLACTONE 25 MG: 25 TABLET ORAL at 08:35

## 2020-09-11 RX ADMIN — AMIODARONE HYDROCHLORIDE 200 MG: 200 TABLET ORAL at 08:34

## 2020-09-11 RX ADMIN — PREGABALIN 100 MG: 100 CAPSULE ORAL at 08:34

## 2020-09-11 RX ADMIN — SODIUM CHLORIDE, PRESERVATIVE FREE 10 ML: 5 INJECTION INTRAVENOUS at 08:36

## 2020-09-11 RX ADMIN — VENLAFAXINE HYDROCHLORIDE 75 MG: 75 CAPSULE, EXTENDED RELEASE ORAL at 08:36

## 2020-09-11 RX ADMIN — INSULIN ASPART 5 UNITS: 100 INJECTION, SOLUTION INTRAVENOUS; SUBCUTANEOUS at 17:09

## 2020-09-11 NOTE — NURSING NOTE
Time In 1200 Time Out 1215      Order received for Cardiac Rehab Consultation.     CR staff will follow up with patient      Information discussed with: Patient        Educated on: Benefits of Exercise,  Educated on Cardiac Rehab and Program Protocol, Brochure and/or educational material provided, Contact information given and Teach Back Verified        Comments: Patient interested in the program. She stated she will think about it.  I explained it will be October before I will be scheduling more patients. Due to covid pandemic we are limited space at this time. She stated that was fine with her.       Thank you for the referral. Please contact the Cardiac Rehab Dept. (ext. [phone]) with any further questions or concerns.

## 2020-09-11 NOTE — PROGRESS NOTES
Discharge Planning Assessment   Isaías     Patient Name: Yumiko Purdy  MRN: [id number]  Today's Date: 9/11/2020    Admit Date: 9/9/2020        Discharge Plan     Row Name 09/11/20 1313       Plan    Plan  Pt admitted on 9/9/20.  Pt lives at home with son, Sam, and plans to return home at discharge.  Pt currently does not utilize home health services.  Pt currently utilizes home 02 at 2 liters, w/c, walker and shower chair via [facility].  SS will follow.        MG DoranW

## 2020-09-11 NOTE — PLAN OF CARE
Pt resting quietly in bed with no complaints noted.   V/S stable with no signs of respiratory distress present.   Pt gets up to BSC with one assist.  UO good r/t diuresis of pt.   Will continue to monitor pt and follow plan of care.

## 2020-09-11 NOTE — PROGRESS NOTES
LOS: 1 day     Name: Yumiko Purdy  Age/Sex: 54 y.o. female  :  1965        PCP: Niko Schaefer MD  REF: No ref. provider found    Active Problems:    Acute on chronic congestive heart failure (CMS/[facility])      Reason for follow-up: Congestive heart failure    Subjective       Subjective     Yumiko Purdy is a 54 year old female with a past medical history significant for chronic atrial fibrillation, congestive heart failure, diabetes mellitus and cirrhosis of the liver.  Patient presented to the ER with complaints of lower extremity edema, abdominal distention and rash.  Patient states that for the last week she has had worsening lower extremity edema that goes up into her knees all the way up into her abdomen.  She also has had worsening shortness of breath and orthopnea.  He denies any recent chest pains.  She states that she follows with her PCP who manages her water pills and heart medications.  She was recently seen at [facility] on Monday and Wednesday of this week with similar complaints and was given single doses of diuretics without much improvement of her symptoms.  She also reports she is a diffuse rash for last few months and was told that she had vasculitis.  She was worked up for this at [facility] in May 2020.  She was taken off Coumadin briefly because it was thought that this may be causing the rash however there was no improvement noted so she was placed back on Xarelto.  She states that she had a left heart catheterization a couple years ago at [facility] in [facility] that showed nonobstructive CAD however these records are unavailable.  Patient did receive Lasix IV in the ER and reports that her breathing has had mild improvement.  Creatinine 1.24 on admission.     Interval History: Patient has had good urine output with IV diuresis. Creatinine slightly worse today at 1.30.  No complaints of chest pains.  Her echo Doppler study revealed globally  dilated left ventricle chamber with severely depressed global LV systolic function with an estimated LV ejection fraction of about 20-25%.  There was no significant valvular disease noted.      Vital Signs  Temp:  [97 °F (36.1 °C)-98.4 °F (36.9 °C)] 97.7 °F (36.5 °C)  Heart Rate:  [112-116] 116  Resp:  [18-22] 20  BP: (110-154)/() 119/82     Vital Signs (last 72 hrs)       09/08 0700  -  09/09 0659 09/09 0700  -  09/10 0659 09/10 0700  -  09/11 0659 09/11 0700  -  09/11 0902   Most Recent    Temp (°F)     97.7    97 -  98.4       97 (36.1)    Heart Rate   112 -  115    112 -  116       112    Resp     18    18 -  22       20    BP   113/77 -  130/75    104/78 -  154/100       136/80    SpO2 (%)     100    92 -  100       92        Body mass index is 50.42 kg/m².    Intake/Output Summary (Last 24 hours) at 9/11/2020 1128  Last data filed at 9/11/2020 0948  Gross per 24 hour   Intake 836 ml   Output 1500 ml   Net -664 ml     Objective    Objective     I have seen and examined Ms. Purdy today.    Physical Exam:     General Appearance:   Chronically ill looking lady who is alert, cooperative, in no acute distress   Head:    Normocephalic, without obvious abnormality, atraumatic   Eyes:            Conjunctivae and sclerae normal, no   icterus, no pallor, corneas clear.   Neck:   No adenopathy, supple, trachea midline, no thyromegaly, no   carotid bruit, no JVD   Lungs:    Scattered rales,respirations regular, even and                  unlabored    Heart:    Regular rhythm and normal rate, normal S1 and S2, no            murmur, no gallop, no rub, no click   Chest Wall:    No abnormalities observed   Abdomen:     Normal bowel sounds, no masses, no organomegaly, soft        non-tender, non-distended, no guarding, no rebound                tenderness   Extremities:   Moves all extremities well, 3+ edema on both legs.       Pulses:   Pulses palpable and 1+ equal bilaterally   Skin:  Has purpura/petechical rash on BLE  and upper extremity       Neurologic:  Alert and oriented   I have seen and performed a physical examination today.  Results review     Yumiko Purdy   Echo Complete w/Doppler and Color Flow   Order# [id number]   Reading physician:   Ramon Sahu MD Ordering physician:   Sonja An PA Study date: 20   Patient Information     Patient Name  Yumiko Purdy MRN  [id number] Sex  Female  (Age)  1965 (54 y.o.)   Admission Information     Admission Date/Time Discharge Date/Time Room/Bed   20  2351  3303/2S   Sedation Narrator Report     Interpretation Summary     · The left ventricular cavity is mildly dilated.  · The following left ventricular wall segments are hypokinetic: mid anterior, apical lateral, apical inferior, apical septal, basal inferoseptal, mid inferoseptal, apex hypokinetic, mid anteroseptal, basal anterior and basal inferoseptal.  · Left ventricular systolic function is severely decreased.  · Estimated EF appears to be in the range of 21 - 25%.  · The aortic valve is structurally normal. No aortic valve regurgitation is present. No aortic valve stenosis is present.  · The mitral valve is normal in structure. Mild mitral valve regurgitation is present. No significant mitral valve stenosis is present.  · The tricuspid valve is normal. Mild to moderate tricuspid valve regurgitation is present. Estimated right ventricular systolic pressure from tricuspid regurgitation is normal (<35 mmHg).  · There is no evidence of pericardial effusion.       Results Review:   Results from last 7 days   Lab Units 20  0044 09/10/20  0058   WBC 10*3/mm3 9.14 10.26   HEMOGLOBIN g/dL 9.7* 10.2*   PLATELETS 10*3/mm3 198 199     Results from last 7 days   Lab Units 20  0044 09/10/20  1516 09/10/20  0058   SODIUM mmol/L 134* 137 128*   POTASSIUM mmol/L 5.3* 5.4* 4.9   CHLORIDE mmol/L 97* 98 95*   CO2 mmol/L 25.2 17.9* 27.8   BUN mg/dL 41* 36* 33*   CREATININE mg/dL 1.30* 1.20* 1.24*    CALCIUM mg/dL 9.1 9.2 8.8   GLUCOSE mg/dL 376* 337* 379*   ALT (SGPT) U/L  --   --  25   AST (SGOT) U/L  --   --  24     Results from last 7 days   Lab Units 09/11/20  0044 09/10/20  1727 09/10/20  0921 09/10/20  0256 09/10/20  0058   TROPONIN T ng/mL 0.027 0.024 0.029 0.034* 0.045*     Lab Results   Component Value Date    INR 1.72 (H) 09/11/2020    INR 1.64 (H) 09/10/2020    INR 0.90 06/02/2016    INR 0.97 04/20/2016     Lab Results   Component Value Date    MG 2.1 09/11/2020    MG 1.9 09/10/2020    MG 1.8 06/29/2016     Lab Results   Component Value Date    TSH 3.830 09/10/2020    CHLPL 103 04/21/2016    TRIG 80 09/11/2020    HDL 43 09/11/2020    LDL 78 09/11/2020      Imaging Results (Last 48 Hours)     Procedure Component Value Units Date/Time    CT Chest Without Contrast [[id number]] Collected:  09/10/20 0934     Updated:  09/10/20 0938    Narrative:       CT CHEST WO CONTRAST-      TECHNIQUE: Multiple axial CT images were obtained from lung apex through  upper abdomen without administration of IV contrast. Reformatted images  in the coronal and/or sagittal plane(s) were generated from the axial  data set to facilitate diagnostic accuracy and/or surgical planning.  Oral Contrast:NONE.     Radiation dose reduction techniques were utilized per ALARA protocol.  Automated exposure control was initiated through either or [employer] or  DoseRight software packages by  protocol.             Clinical information  SOA, abn CXR; I50.9-Heart failure, unspecified; D69.2-Other  nonthrombocytopenic purpura; E87.2-Zrnz-bortwzjffq and hyponatremia      Comparison  NONE.     Findings  LUNGS: Unremarkable. No parenchymal soft tissue nodules.  No focal air  space disease.     HEART: Unremarkable.     PERICARDIUM: No effusion.     MEDIASTINUM: No masses. No enlarged lymph nodes.  No fluid collections.     PLEURA: SMALL RIGHT PLEURAL EFFUSION AND CARDIOMEGALY NOTED.     MAJOR AIRWAYS: Clear. No intrinsic mass.      VASCULATURE: No evidence of aneurysm.     VISUALIZED UPPER ABDOMEN:        LIVER: Homogeneous. No focal hepatic mass or ductal dilatation.        SPLEEN: Homogeneous. No splenomegaly.        ADRENALS: No mass.        KIDNEYS: No mass. No obstructive uropathy.  No evidence of  urolithiasis.        GI TRACT: Non-dilated. No definite wall thickening.        PERITONEUM: No free air. No free fluid or loculated fluid  collections.           ABDOMINAL WALL: GENERALIZED BODY WALL ANASARCA SUBCUTANEOUS EDEMA  PREDOMINANTLY ON THE LEFT SIDE OF THE CHEST AND SHOULDER AS WELL AS  BREAST TISSUE THICKENING.        OTHER: Lymph nodes in left axillary region may be secondary to edema.     BONES: No acute bony abnormality.       Impression:       Impression:  Small right pleural effusion and cardiomegaly noted. Generalized body  wall anasarca subcutaneous edema predominantly on the left side of the  chest and shoulder as well as breast tissue thickening. Lymph nodes in  left axillary region may be secondary to edema.     This report was finalized on 9/10/2020 9:36 AM by Dr. Syed Cárdenas MD.       XR Chest 1 View [[id number]] Collected:  09/10/20 0221     Updated:  09/10/20 0223    Narrative:       CR Chest 1 Vw    INDICATION:   Difficulty breathing today.     COMPARISON:    6/15/2016    FINDINGS:  Single portable AP view(s) of the chest.  Heart is enlarged. There is vascular congestion. There is infiltrate in the right mid lower lung. Asymmetry is concerning for pneumonia. No pneumothorax is seen.      Impression:         1. Cardiomegaly and vascular congestion.  2. Asymmetric infiltrates in the right lung concerning for pneumonia.    Signer Name: Braydon Hudson MD   Signed: 9/10/2020 2:21 AM   Workstation Name: [facility]    [facility]        Lab Results   Component Value Date    BNP 86 05/08/2016      I reviewed the patient's new clinical results.    Telemetry: A.fib  bpm      Medication Review:      amiodarone 200 mg Oral Daily   aspirin 81 mg Oral Daily   folic acid 1 mg Oral Daily   furosemide 40 mg Intravenous Q8H   insulin aspart 0-14 Units Subcutaneous TID AC   insulin aspart 5 Units Subcutaneous TID With Meals   insulin detemir 10 Units Subcutaneous Nightly   lisinopril 5 mg Oral Daily   metoprolol tartrate 25 mg Oral Q12H   polyethylene glycol 17 g Oral Daily   predniSONE 2.5 mg Oral Daily   pregabalin 100 mg Oral Q12H   rOPINIRole 1 mg Oral TID   senna-docusate sodium 2 tablet Oral Nightly   sodium chloride 10 mL Intravenous Q12H   sodium chloride 10 mL Intravenous Q12H   spironolactone 25 mg Oral Daily   venlafaxine XR 75 mg Oral Daily   warfarin 4.5 mg Oral Once         Pharmacy to dose warfarin        Assessment      Assessment:  1. Acute on chronic  started systolic congestive heart failure.  2. Advanced cardiomyopathy with LV ejection fraction of about 20-25% (newly diagnosed).  3. Reported vasculitis with purpuric rash on both lower extremities.  4. Elevated troponin, trending up to 0.045 now trending down to normal, EKG tracing reviewed and shows no acute ischemia  5. Paroxysmal atrial fibrillation, on Coumadin, INR 1.72, on amiodarone   6. Diabetes mellitus type 2  7. Cirrhosis of the liver    Plan     Recommendations:  1. I have discussed the echocardiographic findings with the patient and the need for further cardiac evaluation with cardiac catheterization later on when she is more stable.  She expressed understanding and seems agreeable.  2. Continue with IV diuretics for another day and continue to monitor the kidney function closely.  3. Consult nephrology for chronic kidney disease and her vasculitis.  4. Start low-dose ARB and if her kidney function remains stable, will switch to Entresto and continue monitor the kidney function closely.  5. Discontinue metoprolol tartrate and start carvedilol for better efficacy for her cardiomyopathy.  6. She will need further cardiac evaluation  with cardiac catheterization at some point when her heart failure has resolved and her kidney function remained stable.  7. Arrange LifeVest prior to discharge.  8. Continue to emphasize on salt restricted diet and the foods to avoid.    I discussed the patients findings and my recommendations with patient.      Ramon Sahu MD, acting as scribe for Dr. Ramon Sahu MD, [facility]  09/11/20  11:28    Please note that portions of this note were completed with a voice recognition program.

## 2020-09-11 NOTE — PAYOR COMM NOTE
"[facility]  NPI:[id number]    Utilization Review  Contact: Sandy Powell RN  Phone: [phone]  Fax:[phone]    INITIATE INPATIENT AUTHORIZATION   Icd: i50.9   R60.1   J18.9    Dio Brery (54 y.o. Female)     Date of Birth Social Security Number Address Home Phone MRN    1965  [address]   [place] [address] [phone] [id number]    [employer] Marital Status          Unknown        Admission Date Admission Type Admitting Provider Attending Provider Department, Room/Bed    9/10/20 Emergency Ronald Guajardo, Ronald Esquivel DO [facility] 3 [facility], 3303/2S    Discharge Date Discharge Disposition Discharge Destination                       Attending Provider:  Ronald Guajardo DO    Allergies:  Phenergan [Promethazine]    Isolation:  None   Infection:  COVID (rule out) (09/10/20)   Code Status:  CPR    Ht:  165.1 cm (65\")   Wt:  137 kg (303 lb)    Admission Cmt:  None   Principal Problem:  None                Active Insurance as of 2020     Primary Coverage     Payor Plan Insurance Group Employer/Plan Group    [employer] [place] [employer] [place]ICA[place]      Payor Plan Address Payor Plan Phone Number Payor Plan Fax Number Effective Dates    [address] [address] [phone]  2020 - None Entered    [facility] [id number]       Subscriber Name Subscriber Birth Date Member ID       DIO BERRY 1965 [id number]                 Emergency Contacts      (Rel.) Home Phone Work Phone Mobile Phone    Gladis Sigala (Grandparent) [phone]               History & Physical      Ronald Guajardo DO at 09/10/20 1326               [facility] HOSPITALIST HISTORY AND PHYSICAL    Patient Identification:  Name:  Dio Berry  Age:  54 y.o.  Sex:  female  :  1965  MRN:  [id number]   Visit Number:  [id number]  Primary Care Physician:  Niko Schaefer MD     2020 11:51 PM    Subjective     Chief complaint:   Chief " "Complaint   Patient presents with   • Leg Swelling   • Rash     History of presenting illness:   Yumiko Purdy is a 54 y.o. female with past medical history significant for chronic atrial fibrillation, congestive heart failure, diabetes mellitus, and cirrhosis of the liver.  She presented to the emergency department of [facility]2020 with complaints of lower extremity edema and rash. She reports 1 week of increased edema \"all over\" associated with shortness of breath. She has been short of air with minimal exertion. She reports having some soreness in the central chest earlier in the week, but no further in the last couple days. She went to [facility] on Monday and Wednesday of this week with similar complaints. She was given single doses of diuretics on both occasions, without much improvement in her symptoms. The swelling and SOA has worsened in the last few days, therefore, she came to the ED at [facility] for further evaluation.     She reports she has had a diffuse rash for the last few months and was told she had vasculitis. She was worked up for this at [facility] in May 2020 and she does not believe she received a final diagnosis. She had a similar rash a few years ago which had not returned until a few months ago. Her warfarin was recently changed to Xarelto to see if it was causing the rash, with no improvement noted. So her warfarin was recently resumed.      Upon arrival to the ED, vitals were /75, heart rate 112, respirations 18, SPO2 100%, afebrile.  ABG is unremarkable.  Serial troponins were mildly elevated with a downward trend at 0.045, 0.034, and 0.029 consecutively.  proBNP 3325.  Sodium low at 128 with glucose of 379.  Creatinine 1.24 with BUN of 33.  Lactic acid is 1.5.  INR 1.64.  TSH and free T4 are within normal limits.  X-ray shows cardiomegaly and vascular congestion with asymmetric infiltrates concerning for pneumonia on the right lung per radiology. COVID " testing is negative with Abbott nasopharyngeal swab. Patient has been admitted to the telemetry unit of [facility] for further evaluation and therapy.   ---------------------------------------------------------------------------------------------------------------------   Review of Systems   Constitutional: Negative for chills and fever.   HENT: Negative for congestion, rhinorrhea, sinus pressure and sinus pain.    Respiratory: Positive for shortness of breath. Negative for cough and wheezing.    Cardiovascular: Positive for chest pain and leg swelling.   Gastrointestinal: Positive for constipation. Negative for anal bleeding and blood in stool.   Genitourinary: Positive for decreased urine volume and dysuria. Negative for difficulty urinating, frequency, hematuria and urgency.   Musculoskeletal: Negative for arthralgias and back pain.   Skin: Positive for rash and wound. Negative for color change and pallor.   Neurological: Negative for dizziness, syncope and weakness.   Psychiatric/Behavioral: Negative for agitation, behavioral problems and confusion.      ---------------------------------------------------------------------------------------------------------------------   Past Medical History:   Diagnosis Date   • Anemia    • CHF (congestive heart failure) (CMS/HCC)    • Chronic a-fib (CMS/HCC)     stated by patient    • Cirrhosis of liver (CMS/HCC)     stated by patient    • Diabetes mellitus (CMS/HCC)      Past Surgical History:   Procedure Laterality Date   • APPENDECTOMY     • CHOLECYSTECTOMY     • TOE AMPUTATION Right     stated by patient.      History reviewed. No pertinent family history.  Social History     Socioeconomic History   • Marital status:      Spouse name: Not on file   • Number of children: Not on file   • Years of education: Not on file   • Highest education level: Not on file      ---------------------------------------------------------------------------------------------------------------------   Allergies:  Phenergan [promethazine]  ---------------------------------------------------------------------------------------------------------------------   Prior to Admission Medications     Prescriptions Last Dose Informant Patient Reported? Taking?    amiodarone (PACERONE) 200 MG tablet  Self Yes Yes    Take 200 mg by mouth Daily.    ampicillin (PRINCIPEN) 500 MG capsule  Self Yes Yes    Take 500 mg by mouth 3 (Three) Times a Day.    folic acid (FOLVITE) 1 MG tablet  Self Yes Yes    Take 1 mg by mouth Daily.    Insulin Glargine (BASAGLAR KWIKPEN SC)  Self Yes Yes    Inject 100 Units under the skin into the appropriate area as directed Daily.    Insulin Regular Human, Conc, (HumuLIN R U-500 KwikPen) 500 UNIT/ML solution pen-injector CONCENTRATED injection  Self Yes Yes    Inject 30 Units under the skin into the appropriate area as directed 3 (Three) Times a Day Before Meals.    metoprolol tartrate (LOPRESSOR) 25 MG tablet  Self Yes Yes    Take 25 mg by mouth 2 (Two) Times a Day.    ondansetron (ZOFRAN) 4 MG tablet  Self Yes Yes    Take 4 mg by mouth Every 8 (Eight) Hours As Needed for Nausea or Vomiting.    pregabalin (LYRICA) 100 MG capsule  Self Yes Yes    Take 100 mg by mouth 2 (Two) Times a Day.    venlafaxine (EFFEXOR) 75 MG tablet  Self Yes Yes    Take 75 mg by mouth Daily.    warfarin (COUMADIN) 3 MG tablet  Self Yes Yes    Take 3 mg by mouth Daily.        ---------------------------------------------------------------------------------------------------------------------  Objective   [facility] Meds:        ---------------------------------------------------------------------------------------------------------------------   Vital Signs:  Temp:  [97.7 °F (36.5 °C)-98 °F (36.7 °C)] 98 °F (36.7 °C)  Heart Rate:  [112-116] 114  Resp:  [18] 18  BP: (104-154)/() 148/94  Mean  Arterial Pressure (Non-Invasive) for the past 24 hrs (Last 3 readings):   Noninvasive MAP (mmHg)   09/10/20 1302 121   09/10/20 1242 113   09/10/20 1222 126     SpO2 Percentage    09/10/20 1255 09/10/20 1302 09/10/20 1322   SpO2: 100% 100% 100%     SpO2:  [98 %-100 %] 100 %  on  Flow (L/min):  [2] 2;   Device (Oxygen Therapy): nasal cannula    Body mass index is 41.6 kg/m².  Wt Readings from Last 3 Encounters:   09/09/20 113 kg (250 lb)     ---------------------------------------------------------------------------------------------------------------------   Physical Exam:  Constitutional:  Well-developed and well-nourished.  No respiratory distress on  2L O2 via nasal cannula.      HENT:  Head: Normocephalic and atraumatic.  Mouth:  Moist mucous membranes.    Eyes:  Conjunctivae and EOM are normal. No scleral icterus. No erythema or drainage.   Neck:  Neck supple.  + JVD present.  No carotid bruits noted.   Cardiovascular:  Normal rate, regular rhythm and normal heart sounds with no murmur.  Pulmonary/Chest:  No respiratory distress, no wheezes. Reduced breath sounds at both bases. Difficult assessment due to body habitus.   Abdominal:  Soft. Abdominal wall edema present. Bowel sounds are present x4. No distension and no tenderness.   Musculoskeletal: 2+ edema bilateral lower extremities noted. Great toe on the right has been amputated. Anasarca noted.  Neurological:  Alert and oriented to person, place, and time.  No cranial nerve deficit.  No tongue deviation.  No facial droop.  No slurred speech.   Skin:  Skin is warm and dry. Diffuse purpura/petechial rash noted on all 4 extremities and to a milder degree on the abdomen with diffuse scabbing noted as well. Wound present at the base of the 1st and 2nd toes on the left foot.   Psychiatric:  Normal mood and affect.  Behavior is normal.  Judgment and thought content normal.   Peripheral vascular: 2+ edema bilateral lower extremities and 1+ pedal pulses bilaterally.    Genitourinary: No bauer catheter in place.   ---------------------------------------------------------------------------------------------------------------------  EKG:          I have personally reviewed the EKG.  ---------------------------------------------------------------------------------------------------------------------   Results from last 7 days   Lab Units 09/10/20  0921 09/10/20  0256 09/10/20  0058   TROPONIN T ng/mL 0.029 0.034* 0.045*     Results from last 7 days   Lab Units 09/10/20  0058   PROBNP pg/mL 3,325.0*       Results from last 7 days   Lab Units 09/10/20  0049   PH, ARTERIAL pH units 7.413   PO2 ART mm Hg 84.8   PCO2, ARTERIAL mm Hg 40.7   HCO3 ART mmol/L 26.0     Results from last 7 days   Lab Units 09/10/20  0058   CRP mg/dL 0.53*   LACTATE mmol/L 1.5   WBC 10*3/mm3 10.26   HEMOGLOBIN g/dL 10.2*   HEMATOCRIT % 36.6   MCV fL 99.2*   MCHC g/dL 27.9*   PLATELETS 10*3/mm3 199   INR  1.64*     Results from last 7 days   Lab Units 09/10/20  0058   SODIUM mmol/L 128*   POTASSIUM mmol/L 4.9   CHLORIDE mmol/L 95*   CO2 mmol/L 27.8   BUN mg/dL 33*   CREATININE mg/dL 1.24*   EGFR IF NONAFRICN AM mL/min/1.73 45*   CALCIUM mg/dL 8.8   GLUCOSE mg/dL 379*   ALBUMIN g/dL 3.49*   BILIRUBIN mg/dL 2.0*   ALK PHOS U/L 105   AST (SGOT) U/L 24   ALT (SGPT) U/L 25   Estimated Creatinine Clearance: 65.8 mL/min (A) (by C-G formula based on SCr of 1.24 mg/dL (H)).  No results found for: AMMONIA    Glucose   Date/Time Value Ref Range Status   09/10/2020 0843 355 (H) 70 - 130 mg/dL Final     Lab Results   Component Value Date    HGBA1C 5.4 04/21/2016     Lab Results   Component Value Date    TSH 3.830 09/10/2020    FREET4 1.39 09/10/2020     No results found for: BLOODCX  No results found for: URINECX  No results found for: WOUNDCX  No results found for: STOOLCX  No results found for: RESPCX    Pain Management Panel     Pain Management Panel Latest Ref Rng & Units 12/8/2015    AMPHETAMINES SCREEN, URINE Negative  Negative    BARBITURATES SCREEN Negative Negative    BENZODIAZEPINE SCREEN, URINE Negative Negative    COCAINE SCREEN, URINE Negative Negative    METHADONE SCREEN, URINE Negative Negative        I have personally reviewed the above laboratory results.   ---------------------------------------------------------------------------------------------------------------------  Imaging Results (Last 7 Days)     Procedure Component Value Units Date/Time    CT Chest Without Contrast [[id number]] Collected:  09/10/20 0934     Updated:  09/10/20 0938    Narrative:       CT CHEST WO CONTRAST-      TECHNIQUE: Multiple axial CT images were obtained from lung apex through  upper abdomen without administration of IV contrast. Reformatted images  in the coronal and/or sagittal plane(s) were generated from the axial  data set to facilitate diagnostic accuracy and/or surgical planning.  Oral Contrast:NONE.     Radiation dose reduction techniques were utilized per ALARA protocol.  Automated exposure control was initiated through either or [employer] or  [employer] software packages by  protocol.             Clinical information  SOA, abn CXR; I50.9-Heart failure, unspecified; D69.2-Other  nonthrombocytopenic purpura; E87.5-Xigt-votidlwekt and hyponatremia      Comparison  NONE.     Findings  LUNGS: Unremarkable. No parenchymal soft tissue nodules.  No focal air  space disease.     HEART: Unremarkable.     PERICARDIUM: No effusion.     MEDIASTINUM: No masses. No enlarged lymph nodes.  No fluid collections.     PLEURA: SMALL RIGHT PLEURAL EFFUSION AND CARDIOMEGALY NOTED.     MAJOR AIRWAYS: Clear. No intrinsic mass.     VASCULATURE: No evidence of aneurysm.     VISUALIZED UPPER ABDOMEN:        LIVER: Homogeneous. No focal hepatic mass or ductal dilatation.        SPLEEN: Homogeneous. No splenomegaly.        ADRENALS: No mass.        KIDNEYS: No mass. No obstructive uropathy.  No evidence of  urolithiasis.        GI TRACT:  Non-dilated. No definite wall thickening.        PERITONEUM: No free air. No free fluid or loculated fluid  collections.           ABDOMINAL WALL: GENERALIZED BODY WALL ANASARCA SUBCUTANEOUS EDEMA  PREDOMINANTLY ON THE LEFT SIDE OF THE CHEST AND SHOULDER AS WELL AS  BREAST TISSUE THICKENING.        OTHER: Lymph nodes in left axillary region may be secondary to edema.     BONES: No acute bony abnormality.       Impression:       Impression:  Small right pleural effusion and cardiomegaly noted. Generalized body  wall anasarca subcutaneous edema predominantly on the left side of the  chest and shoulder as well as breast tissue thickening. Lymph nodes in  left axillary region may be secondary to edema.     This report was finalized on 9/10/2020 9:36 AM by Dr. Syed Cárdenas MD.       XR Chest 1 View [[id number]] Collected:  09/10/20 0221     Updated:  09/10/20 0223    Narrative:       CR Chest 1 Vw    INDICATION:   Difficulty breathing today.     COMPARISON:    6/15/2016    FINDINGS:  Single portable AP view(s) of the chest.  Heart is enlarged. There is vascular congestion. There is infiltrate in the right mid lower lung. Asymmetry is concerning for pneumonia. No pneumothorax is seen.      Impression:         1. Cardiomegaly and vascular congestion.  2. Asymmetric infiltrates in the right lung concerning for pneumonia.    Signer Name: Braydon Hudson MD   Signed: 9/10/2020 2:21 AM   Workstation Name: [email or web address]    [facility]        I have personally reviewed the above radiology results.   ---------------------------------------------------------------------------------------------------------------------  Assessment & Plan      · Acute on chronic systolic versus diastolic CHF with anasarca  -She received IV furosemide 40mg x1 in the ED with good urine output reported, monitor renal function closely  -Obtain transthoracic echocardiogram   -Strict I's and O's and daily weights  -Follow up on serial  troponins   -Cardiology consulted, appreciate their input    · Elevated troponin  -Now normalized, follow up on serial troponins and EKGs  -Reports anginal symptoms earlier in the week, none last 24 hours  -Place on low-dose aspirin for now  -Check fasting lipid panel in AM   -Echocardiogram pending  -Cardiology following    · Pseudohyponatremia secondary to hyperglycemia  -Corrected sodium for hyperglycemia is 132  -Volume overload likely contributing as well  -Monitor with repeat BMP in AM    · Acute renal insufficiency  -Admission creatinine 1.24, unknown recent baseline, but reported to be elevated at [facility] recently   -Cautious with diuresis  -Avoid nephrotoxic medications     · Possibly paroxysmal atrial fibrillation  -In accelerated junctional rhythm per cardiology on admission EKG, ?afib versus atrial flutter on telemetry monitoring  -Repeat EKG pending  -Monitor electrolytes  -On rhythm management with amiodarone and metoprolol, resumed   -Chronically anticoagulated with warfarin with subtherapeutic INR, pharmacy consulted to dose    · Petechial/purpura rash, wound left foot  -Reported to have vasculitis with prior workup at , records requested  -Rash reportedly present for several months  -Wound care consulted    · Chronic hypoxic respiratory failure  -On 2L O2 via nasal cannula at home and maintaining O2 sat on this for now  -Monitor continuous pulse oximetry    · Anemia, Macrocytic   -Unknown recent baseline, but patient reports chronic anemia, no bleeding reported    · IDDM, type II  -Will place on low dose sliding scale insulin with finger stick blood glucose readings AC and HS. Place on hypoglycemia protocol.   -Check hemoglobin A1c level    · Cirrhosis of the liver  -Reported remote heavy alcohol intake, denies other known liver issues  -Bilirubin elevated, check direct/indirect levels    · Constipation  -Place on sennakot and miralax    · Dysuria  -Check UA with culture if  indicated    DVT Prophylaxis: Anticoagulated with warfarin.     The patient is considered to be a high risk patient due to: CHF, anasarca, renal insufficiency, ?vasculitis.     Code Status: FULL CODE    I have discussed the patient's assessment and plan with the patient and admitting physician, Dr. Guajardo.    BRITTANY Grier  09/10/20  13:27         Gen: alert oriented x 3 NAD  HEENT: PERRL, EOMI, AT NC  CV: RRR  LUNG: CTAB  ABD: s, nt , nd  EXT: no c, c, e  Neuro: CN II-XII intact grossly   Skin: multiple excoriations in various stages of healing      I have reviewed the chart, agree with assessment and plans per above.    I have personally seen and examined patient at bedside.            ---------------------------------------------------------------------------------------------------------------------       Electronically signed by Ronald Guajardo DO at 09/10/20 1638          Emergency Department Notes      Sam Desai MD at 09/10/20 0039          Subjective   54-year-old white female complains of leg swelling and rash.  Patient describes 6-day history of increased lower extremity, upper extremity, face, and abdominal swelling.  She has a history of congestive heart failure.  She also has a rash noted on her legs and other areas of her body.  She said that previously she was diagnosed with vasculitis, and the rash had resolved.  This rash again started a few weeks ago.  She also has a history of chronic A. fib and is on Coumadin for chronic anticoagulation.  She notes her dyspnea is worse with exertion.  She denied any chest pain, fever, chills, abdominal pain or other complaints.          Review of Systems   All other systems reviewed and are negative.      Past Medical History:   Diagnosis Date   • Anemia    • CHF (congestive heart failure) (CMS/HCC)    • Chronic a-fib (CMS/HCC)     stated by patient    • Cirrhosis of liver (CMS/HCC)     stated by patient    • Diabetes mellitus (CMS/HCC)         Allergies   Allergen Reactions   • Phenergan [Promethazine]        Past Surgical History:   Procedure Laterality Date   • APPENDECTOMY     • CHOLECYSTECTOMY     • TOE AMPUTATION Right     stated by patient.        History reviewed. No pertinent family history.    Social History     Socioeconomic History   • Marital status:      Spouse name: Not on file   • Number of children: Not on file   • Years of education: Not on file   • Highest education level: Not on file   Tobacco Use   • Smoking status: Never Smoker   • Smokeless tobacco: Never Used           Objective   Physical Exam   Constitutional: She is oriented to person, place, and time. She appears well-developed and well-nourished.   HENT:   Head: Normocephalic and atraumatic.   Cardiovascular: Normal rate, regular rhythm and normal heart sounds. Exam reveals no gallop and no friction rub.   No murmur heard.  Pulmonary/Chest: Effort normal and breath sounds normal. No respiratory distress. She has no wheezes. She has no rales.   Abdominal: Soft. Bowel sounds are normal. She exhibits no distension. There is no tenderness.   Musculoskeletal: Normal range of motion. She exhibits edema (4+ edema bilateral lower extremities extending to the lower abdomen and lower back.).   Neurological: She is alert and oriented to person, place, and time.   Skin: Skin is warm and dry.        Multiple areas of petechiae and purpura, nonblanching.  These are of varying sizes from 2 mm up to 10 cm.  Some have ulceration and necrotic appearing superficial ulcers.  The largest is on the right posterior calf.  Lesions are worse on the lower extremities, she has fewer on the distal upper extremities, and small amount on the back.   Psychiatric: She has a normal mood and affect.   Nursing note and vitals reviewed.      Procedures          ED Course  ED Course as of Sep 10 1952   Thu Sep 10, 2020   0410 Sinus tachycardia versus accelerated junctional rhythm.  Rate 114.  Low  voltage.  Normal axis.  No acute ST elevation or depression.    [BC]   0444 Admission pending bed availability.    [BC]   0904 I assumed patient's care from Dr. Desai this morning at shift change.  Please see his chart for details.  Patient is doing well, states breathing is improving.  Patient does have anasarca, with edema in her legs, even in her abdominal wall.  Currently she is eating breakfast, tolerating it well.  I have ordered CT chest without contrast to clarify the situation.  I ordered this at 0739, but still has not been done.  House supervisor advises that we have discharges scheduled upstairs, that we should be able to get a bed for her here.    [CM]   1002 Patient's emergency department stay has not been complicated.  I discussed the case with Dr. Guajardo.  He is admitting patient to the hospitalist service.    [CM]      ED Course User Index  [BC] Sam Desai MD  [CM] Jonathan Craig MD                                           MDM  Number of Diagnoses or Management Options  Acute on chronic congestive heart failure, unspecified heart failure type (CMS/HCC):   Hyponatremia:   Purpura (CMS/HCC):      Amount and/or Complexity of Data Reviewed  Clinical lab tests: reviewed  Tests in the radiology section of CPT®:  reviewed  Tests in the medicine section of CPT®:  reviewed  Decide to obtain previous medical records or to obtain history from someone other than the patient: yes    Risk of Complications, Morbidity, and/or Mortality  Presenting problems: high  Diagnostic procedures: moderate  Management options: high        Final diagnoses:   Acute on chronic congestive heart failure, unspecified heart failure type (CMS/HCC)   Purpura (CMS/HCC)   Hyponatremia            Sam Desai MD  09/10/20 1953      Electronically signed by Sam Desai MD at 09/10/20 1953     Nic Glez at 09/10/20 0150        Called [facility] to get medical records.      Nic Glez  09/10/20  0151      Electronically signed by Nic Glez at 09/10/20 0151     Olivia Rosales, RN at 09/10/20 0709        Assisted pt to bedside toilet and back to bed, pt complains pain is worse.      Olivia Rosales RN  09/10/20 0737      Electronically signed by Olivia Rosales RN at 09/10/20 0737     Olivia Rosales RN at 09/10/20 0710        Numerous areas of broken tissue and ulcers to bilateral legs. Numerous areas of darkened red and purple skin noted to pt trunk, back, abdomen, bilateral arms and legs. Generalized petechia and purpura noted. Present at change of shift, redness to pt buttocks. Pt has generalized edema all over, weeping noted to bilateral legs.      Olivia Rosales RN  09/10/20 0751      Electronically signed by Olivia Rosales RN at 09/10/20 [id number]     Olivia Rosales RN at 09/10/20 0900        Called [facility] for hospital bed.     Olivia Rosales RN  09/10/20 0909      Electronically signed by Olivia Rosales RN at 09/10/20 0909     Jonathan Craig MD at 09/10/20 0911          Subjective   History of Present Illness    Review of Systems    Past Medical History:   Diagnosis Date   • Anemia    • CHF (congestive heart failure) (CMS/HCC)    • Chronic a-fib (CMS/HCC)     stated by patient    • Cirrhosis of liver (CMS/HCC)     stated by patient    • Diabetes mellitus (CMS/HCC)        Allergies   Allergen Reactions   • Phenergan [Promethazine]        Past Surgical History:   Procedure Laterality Date   • APPENDECTOMY     • CHOLECYSTECTOMY     • TOE AMPUTATION Right     stated by patient.        History reviewed. No pertinent family history.    Social History     Socioeconomic History   • Marital status:      Spouse name: Not on file   • Number of children: Not on file   • Years of education: Not on file   • Highest education level: Not on file           Objective   Physical Exam    Procedures  EKG shows a regular  supraventricular rhythm, accelerated junctional versus sinus tach.  Possible old anteroseptal infarct versus poor R wave progression.  No apparent acute ischemia.  CT Chest Without Contrast   Final Result   Impression:   Small right pleural effusion and cardiomegaly noted. Generalized body   wall anasarca subcutaneous edema predominantly on the left side of the   chest and shoulder as well as breast tissue thickening. Lymph nodes in   left axillary region may be secondary to edema.       This report was finalized on 9/10/2020 9:36 AM by Dr. Syed Cárdenas MD.          XR Chest 1 View   Final Result      1. Cardiomegaly and vascular congestion.   2. Asymmetric infiltrates in the right lung concerning for pneumonia.      Signer Name: Braydon Hudson MD    Signed: 9/10/2020 2:21 AM    Workstation Name: [id number]     [facility]        Results for orders placed or performed during the hospital encounter of 09/09/20   COVID-19, [facility] IN-HOUSE,NP Swab (NO TRANSPORT MEDIA) 2 HR TAT - Swab, Nasopharynx   Result Value Ref Range    COVID19 Not Detected Not Detected - Ref. Range   Comprehensive Metabolic Panel   Result Value Ref Range    Glucose 379 (H) 65 - 99 mg/dL    BUN 33 (H) 6 - 20 mg/dL    Creatinine 1.24 (H) 0.57 - 1.00 mg/dL    Sodium 128 (L) 136 - 145 mmol/L    Potassium 4.9 3.5 - 5.2 mmol/L    Chloride 95 (L) 98 - 107 mmol/L    CO2 27.8 22.0 - 29.0 mmol/L    Calcium 8.8 8.6 - 10.5 mg/dL    Total Protein 6.3 6.0 - 8.5 g/dL    Albumin 3.49 (L) 3.50 - 5.20 g/dL    ALT (SGPT) 25 1 - 33 U/L    AST (SGOT) 24 1 - 32 U/L    Alkaline Phosphatase 105 39 - 117 U/L    Total Bilirubin 2.0 (H) 0.0 - 1.2 mg/dL    eGFR Non African Amer 45 (L) >60 mL/min/1.73    Globulin 2.8 gm/dL    A/G Ratio 1.2 g/dL    BUN/Creatinine Ratio 26.6 (H) 7.0 - 25.0    Anion Gap 5.2 5.0 - 15.0 mmol/L   BNP   Result Value Ref Range    proBNP 3,325.0 (H) 0.0 - 900.0 pg/mL   Troponin   Result Value Ref Range    Troponin T 0.045 (C)  0.000 - 0.030 ng/mL   Troponin   Result Value Ref Range    Troponin T 0.034 (C) 0.000 - 0.030 ng/mL   Lactic Acid, Plasma   Result Value Ref Range    Lactate 1.5 0.5 - 2.0 mmol/L   C-reactive Protein   Result Value Ref Range    C-Reactive Protein 0.53 (H) 0.00 - 0.50 mg/dL   Protime-INR   Result Value Ref Range    Protime 19.3 (H) 11.9 - 14.1 Seconds    INR 1.64 (H) 0.90 - 1.10   CBC Auto Differential   Result Value Ref Range    WBC 10.26 3.40 - 10.80 10*3/mm3    RBC 3.69 (L) 3.77 - 5.28 10*6/mm3    Hemoglobin 10.2 (L) 12.0 - 15.9 g/dL    Hematocrit 36.6 34.0 - 46.6 %    MCV 99.2 (H) 79.0 - 97.0 fL    MCH 27.6 26.6 - 33.0 pg    MCHC 27.9 (L) 31.5 - 35.7 g/dL    RDW 21.3 (H) 12.3 - 15.4 %    RDW-SD 75.7 (H) 37.0 - 54.0 fl    MPV 10.1 6.0 - 12.0 fL    Platelets 199 140 - 450 10*3/mm3    Neutrophil % 90.6 (H) 42.7 - 76.0 %    Lymphocyte % 6.7 (L) 19.6 - 45.3 %    Monocyte % 0.7 (L) 5.0 - 12.0 %    Eosinophil % 0.7 0.3 - 6.2 %    Basophil % 0.7 0.0 - 1.5 %    Immature Grans % 0.6 (H) 0.0 - 0.5 %    Neutrophils, Absolute 9.30 (H) 1.70 - 7.00 10*3/mm3    Lymphocytes, Absolute 0.69 (L) 0.70 - 3.10 10*3/mm3    Monocytes, Absolute 0.07 (L) 0.10 - 0.90 10*3/mm3    Eosinophils, Absolute 0.07 0.00 - 0.40 10*3/mm3    Basophils, Absolute 0.07 0.00 - 0.20 10*3/mm3    Immature Grans, Absolute 0.06 (H) 0.00 - 0.05 10*3/mm3    nRBC 0.0 0.0 - 0.2 /100 WBC   Blood Gas, Arterial With Co-Ox   Result Value Ref Range    Site Left Brachial     Bharathi's Test N/A     pH, Arterial 7.413 7.350 - 7.450 pH units    pCO2, Arterial 40.7 35.0 - 45.0 mm Hg    pO2, Arterial 84.8 83.0 - 108.0 mm Hg    HCO3, Arterial 26.0 20.0 - 26.0 mmol/L    Base Excess, Arterial 1.3 0.0 - 2.0 mmol/L    O2 Saturation, Arterial 97.7 94.0 - 99.0 %    Hemoglobin, Blood Gas 10.5 (L) 13.5 - 17.5 g/dL    Hematocrit, Blood Gas 32.2 (L) 38.0 - 51.0 %    Oxyhemoglobin 94.6 94 - 99 %    Methemoglobin 0.10 0.00 - 3.00 %    Carboxyhemoglobin 3.1 0 - 5 %    A-a Gradiant 61.6 0.0 -  300.0 mmHg    CO2 Content 27.2 22 - 33 mmol/L    Temperature 0.0 C    Barometric Pressure for Blood Gas 733 mmHg    Modality Nasal Cannula     FIO2 28 %    Flow Rate 2.0 lpm    Ventilator Mode NA     Note      Collected by [id number]     pH, Temp Corrected      pCO2, Temperature Corrected      pO2, Temperature Corrected     Scan Slide   Result Value Ref Range    Anisocytosis Slight/1+ None Seen    Hypochromia Mod/2+ None Seen    Macrocytes Slight/1+ None Seen    Platelet Morphology Normal Normal   Troponin   Result Value Ref Range    Troponin T 0.029 0.000 - 0.030 ng/mL   POC Glucose Once   Result Value Ref Range    Glucose 355 (H) 70 - 130 mg/dL               ED Course  ED Course as of Sep 10 1014   Thu Sep 10, 2020   0410 Sinus tachycardia versus accelerated junctional rhythm.  Rate 114.  Low voltage.  Normal axis.  No acute ST elevation or depression.    [BC]   0444 Admission pending bed availability.    [BC]   0904 I assumed patient's care from Dr. Desai this morning at shift change.  Please see his chart for details.  Patient is doing well, states breathing is improving.  Patient does have anasarca, with edema in her legs, even in her abdominal wall.  Currently she is eating breakfast, tolerating it well.  I have ordered CT chest without contrast to clarify the situation.  I ordered this at 0739, but still has not been done.  House supervisor advises that we have discharges scheduled upstairs, that we should be able to get a bed for her here.    [CM]   1002 Patient's emergency department stay has not been complicated.  I discussed the case with Dr. Guajardo.  He is admitting patient to the hospitalist service.    [CM]      ED Course User Index  [BC] Sam Desai MD  [CM] Jonathan Craig MD                                           [facility]    Final diagnoses:   Acute on chronic congestive heart failure, unspecified heart failure type (CMS/HCC)   Purpura (CMS/HCC)   Hyponatremia             Please note that portions  of this note were completed with a voice recognition program.        Jonathan Craig MD  09/10/20 1014      Electronically signed by Jonathan Craig MD at 09/10/20 1014     Olivia Rosales RN at 09/10/20 1045        Pt complaining bilateral leg pain is still present and is worse, provider made aware, will await new orders. Call light within reach of pt.     Olivia Rosales RN  09/10/20 1045      Electronically signed by Olivia Rosales RN at 09/10/20 1045     Olivia Rosales RN at 09/10/20 1100        Pt assisted to bedside toilet and back to bed, pt reports increased pain with movement. Call light within reach of pt.     Olivia Rosales RN  09/10/20 1115      Electronically signed by Olivia Rosales RN at 09/10/20 1115     Olivia Rosales RN at 09/10/20 1104        Pt placed on hospital bed at this time. Call light within reach of pt.      Olivia Rosales RN  09/10/20 1105      Electronically signed by Olivia Rosales RN at 09/10/20 1105     Olivia Rosales RN at 09/10/20 1111        Pt alert and oriented, skin pink and warm, seeping noted to bilateral legs, afib noted. Pt remains on 2lpm nc. poc updated, waiting on admit bed, call light within reach of pt.      Olivia Rosales RN  09/10/20 1112      Electronically signed by Olivia Rosales RN at 09/10/20 1112     Olivia Rosales RN at 09/10/20 1210        Pt resting on hospital bed with eyes closed, awakens to verbal stimuli, pt remains on 2lpm nc. afib noted. Pt alert and oriented, skin pw, seeping noted to bilateral lower legs. poc updated, pt waiting on admit bed, call light within reach of pt.      Olivia Rosales RN  09/10/20 1226      Electronically signed by Olivia Rosales RN at 09/10/20 1226     Olivia Rosales RN at 09/10/20 1306        glenn Rivas reports he is busy right now and will call me back as soon as he can for  report.      Olivia Rosales RN  09/10/20 1306      Electronically signed by Olivia Rosales RN at 09/10/20 1306     Olivia Rosales RN at 09/10/20 1309        Pt alert and oriented, skin warm, pink, seeping to bilateral legs noted. Respirations are even and unlabored, pt remains on 2lpm nc. Pt remains in afib. Vss. Pt ready for transport to floor.      Olivia Rosales RN  09/10/20 1319      Electronically signed by Olivia Rosales RN at 09/10/20 1319         Facility-Administered Medications as of 9/11/2020   Medication Dose Route Frequency Provider Last Rate Last Dose   • amiodarone (PACERONE) tablet 200 mg  200 mg Oral Daily Ronald Guajardo DO   200 mg at 09/10/20 1638   • aspirin EC tablet 81 mg  81 mg Oral Daily Sonja An PA   81 mg at 09/10/20 1638   • [COMPLETED] cefTRIAXone (ROCEPHIN) 2 g/100 mL 0.9% NS IVPB (MBP)  2 g Intravenous Once Jonathan Craig MD   Stopped at 09/10/20 0833   • dextrose (D50W) 25 g/ 50mL Intravenous Solution 25 g  25 g Intravenous Q15 Min PRN Sonja An PA       • dextrose (GLUTOSE) oral gel 15 g  15 g Oral Q15 Min PRN Sonja An PA       • [COMPLETED] doxycycline (VIBRAMYCIN) 100 mg/100 mL 0.9% NS MBP  100 mg Intravenous Once Jonathan Craig MD   Stopped at 09/10/20 1103   • folic acid (FOLVITE) tablet 1 mg  1 mg Oral Daily Ronald Guajardo DO   1 mg at 09/10/20 1638   • [COMPLETED] furosemide (LASIX) injection 40 mg  40 mg Intravenous Once Sam Desai MD   40 mg at 09/10/20 0500   • furosemide (LASIX) injection 40 mg  40 mg Intravenous Q8H Ronald Guajardo DO   40 mg at 09/10/20 2355   • glucagon (human recombinant) (GLUCAGEN DIAGNOSTIC) injection 1 mg  1 mg Subcutaneous Q15 Min PRN Juve, Sonja Magalis, PA       • [COMPLETED] HYDROcodone-acetaminophen (NORCO) 5-325 MG per tablet 1 tablet  1 tablet Oral Once Sam Desai MD   1 tablet at 09/10/20 0427   • [COMPLETED]  HYDROcodone-acetaminophen (NORCO) 5-325 MG per tablet 1 tablet  1 tablet Oral Once Jonathan Craig MD   1 tablet at 09/10/20 1104   • insulin aspart (novoLOG) injection 0-14 Units  0-14 Units Subcutaneous TID AC Sonja An PA   12 Units at 09/10/20 1639   • [COMPLETED] insulin aspart (novoLOG) injection 10 Units  10 Units Subcutaneous Once Dave Bishop MD   10 Units at 09/10/20 2001   • insulin aspart (novoLOG) injection 5 Units  5 Units Subcutaneous TID With Meals Dave Bishop MD       • insulin detemir (LEVEMIR) injection 10 Units  10 Units Subcutaneous Nightly Dave Bishop MD   10 Units at 09/10/20 2059   • [COMPLETED] insulin regular (humuLIN R,novoLIN R) injection 10 Units  10 Units Subcutaneous Once Jonathan Craig MD   10 Units at 09/10/20 0852   • lisinopril (PRINIVIL,ZESTRIL) tablet 5 mg  5 mg Oral Daily Ronald Guajardo DO   5 mg at 09/10/20 1639   • metoprolol tartrate (LOPRESSOR) tablet 25 mg  25 mg Oral Q12H Sonja An PA   25 mg at 09/10/20 1638   • [COMPLETED] morphine injection 4 mg  4 mg Intravenous Once Sam Desai MD   4 mg at 09/10/20 0730   • morphine injection 4 mg  4 mg Intravenous Q6H PRN Ronald Guajardo DO   4 mg at 09/11/20 0715   • nitroglycerin (NITROSTAT) SL tablet 0.4 mg  0.4 mg Sublingual Q5 Min PRN Sonja An PA       • [COMPLETED] ondansetron (ZOFRAN) injection 4 mg  4 mg Intravenous Once Sam Desai MD   4 mg at 09/10/20 0730   • ondansetron (ZOFRAN) tablet 4 mg  4 mg Oral Q8H PRN Ronald Guajardo DO   4 mg at 09/10/20 2233   • [COMPLETED] ondansetron ODT (ZOFRAN-ODT) disintegrating tablet 4 mg  4 mg Oral Once Sam Desai MD   4 mg at 09/10/20 6747   • Pharmacy to dose warfarin   Does not apply Continuous PRN Ronald Guajardo DO       • polyethylene glycol (MIRALAX) packet 17 g  17 g Oral Daily Sonja An PA   17 g at 09/10/20 9416   • predniSONE (DELTASONE) tablet 2.5 mg  2.5 mg Oral Daily  Ronald Guajardo DO   2.5 mg at 09/10/20 1826   • pregabalin (LYRICA) capsule 100 mg  100 mg Oral Q12H Ronald Guajardo DO   100 mg at 09/10/20 2001   • rOPINIRole (REQUIP) tablet 1 mg  1 mg Oral TID Ronald Guajardo DO   1 mg at 09/10/20 2001   • sennosides-docusate (PERICOLACE) 8.6-50 MG per tablet 2 tablet  2 tablet Oral Nightly Sonja An PA   2 tablet at 09/10/20 2001   • sodium chloride 0.9 % flush 10 mL  10 mL Intravenous PRN Ronald Guajardo DO       • sodium chloride 0.9 % flush 10 mL  10 mL Intravenous Q12H Ronald Guajardo DO   10 mL at 09/10/20 2002   • sodium chloride 0.9 % flush 10 mL  10 mL Intravenous PRN Ronald Guajardo DO       • sodium chloride 0.9 % flush 10 mL  10 mL Intravenous Q12H Sonja An PA   10 mL at 09/10/20 2002   • sodium chloride 0.9 % flush 10 mL  10 mL Intravenous PRN Sonja An PA       • spironolactone (ALDACTONE) tablet 25 mg  25 mg Oral Daily Ronald Guajardo DO   25 mg at 09/10/20 1639   • venlafaxine XR (EFFEXOR-XR) 24 hr capsule 75 mg  75 mg Oral Daily Ronald Guajardo DO   75 mg at 09/10/20 1826   • [COMPLETED] warfarin (COUMADIN) tablet 1.5 mg  1.5 mg Oral Once Sonja An PA   1.5 mg at 09/10/20 1825   • warfarin (COUMADIN) tablet 3 mg  3 mg Oral Daily Ronald Guajardo DO   3 mg at 09/10/20 1638     Physician Progress Notes (all)    No notes of this type exist for this encounter.            Consult Notes (all)      Charlee Ken MD at 09/10/20 1457      Consult Orders    1. Inpatient Cardiology Consult [[id number]] ordered by Ronald Guajardo DO at 09/10/20 1010                Date of Admit: 9/9/2020  Date of Consult: 09/10/20  No ref. provider found        Acute on chronic congestive heart failure (CMS/[facility])      Assessment      2. Acute on chronic diastolic versus systolic congestive heart failure, echo pending, decompensated  3. Elevated troponin, trending up to 0.045 now trending down to normal, EKG tracing reviewed and  shows no acute ischemia  4. Paroxysmal atrial fibrillation, on Coumadin, INR 1.64, amiodarone   5. Diabetes mellitus type 2  6. Cirrhosis of the liver    Recommendations     2. We will get an echocardiogram to assess her cardiac function will motion valve morphology continue with diuretics for now we will try to keep her negative about a liter a day  3. Troponin slightly elevated could be related to acute CHF however consider ischemia work-up once her CHF is compensated  4. Paroxysmal atrial fibrillation currently she is in sinus rhythm continue with anticoagulation keep INR between 2-3  4.  Vasculitis management as per medicine service      Reason for consultation: Congestive heart failure    Subjective       Subjective     History of Present Illness     Yumiko Purdy is a 54 year old female with a past medical history significant for chronic atrial fibrillation, congestive heart failure, diabetes mellitus and cirrhosis of the liver.  Patient presented to the ER with complaints of lower extremity edema, abdominal distention and rash.  Patient states that for the last week she has had worsening lower extremity edema that goes up into her knees all the way up into her abdomen.  She also has had worsening shortness of breath and orthopnea.  He denies any recent chest pains.  She states that she follows with her PCP who manages her water pills and heart medications.  She was recently seen at [facility] on Monday and Wednesday of this week with similar complaints and was given single doses of diuretics without much improvement of her symptoms.  She also reports she is a diffuse rash for last few months and was told that she had vasculitis.  She was worked up for this at [facility] in May 2020.  She was taken off Coumadin briefly because it was thought that this may be causing the rash however there was no improvement noted so she was placed back on Xarelto.  She states that she had a left heart  catheterization a couple years ago at [facility] in [facility] that showed nonobstructive CAD however these records are unavailable.  Patient did receive Lasix IV in the ER and reports that her breathing has had mild improvement.  Creatinine 1.24 on admission.    Cardiac risk factors:arteriosclerotic heart disease, hypercholesterolemia, hypertension, Sedentary life style and Obesity    Last Stress: 2.10.2015  Conclusions  *1. Low probability nuclear stress test.  *2. Small partially reversible defect noted in the mid inferolateral and  apical lateral segment.  *3. Normal overall wall motion is noted with a stress EF of 65%     Electronically signed by: Jer Alvarez MD on 02/11/2015 11:40:39  Thank you for the courtesy of this referral.      Past Medical History:   Diagnosis Date   • Anemia    • CHF (congestive heart failure) (CMS/HCC)    • Chronic a-fib (CMS/HCC)     stated by patient    • Cirrhosis of liver (CMS/HCC)     stated by patient    • Diabetes mellitus (CMS/HCC)      Past Surgical History:   Procedure Laterality Date   • APPENDECTOMY     • CHOLECYSTECTOMY     • TOE AMPUTATION Right     stated by patient.      History reviewed. No pertinent family history.  Social History     Tobacco Use   • Smoking status: Not on file   Substance Use Topics   • Alcohol use: Not on file   • Drug use: Not on file     Medications Prior to Admission   Medication Sig Dispense Refill Last Dose   • amiodarone (PACERONE) 200 MG tablet Take 200 mg by mouth Daily.      • ampicillin (PRINCIPEN) 500 MG capsule Take 500 mg by mouth 3 (Three) Times a Day.      • folic acid (FOLVITE) 1 MG tablet Take 1 mg by mouth Daily.      • Insulin Glargine (BASAGLAR KWIKPEN SC) Inject 100 Units under the skin into the appropriate area as directed Daily.      • Insulin Regular Human, Conc, (HumuLIN R U-500 KwikPen) 500 UNIT/ML solution pen-injector CONCENTRATED injection Inject 30 Units under the skin into the appropriate area  as directed 3 (Three) Times a Day Before Meals.      • metoprolol tartrate (LOPRESSOR) 25 MG tablet Take 25 mg by mouth 2 (Two) Times a Day.      • ondansetron (ZOFRAN) 4 MG tablet Take 4 mg by mouth Every 8 (Eight) Hours As Needed for Nausea or Vomiting.      • pregabalin (LYRICA) 100 MG capsule Take 100 mg by mouth 2 (Two) Times a Day.      • venlafaxine (EFFEXOR) 75 MG tablet Take 75 mg by mouth Daily.      • warfarin (COUMADIN) 3 MG tablet Take 3 mg by mouth Daily.        Allergies:  Phenergan [promethazine]    Review of Systems   Constitutional: Negative for chills, diaphoresis, fatigue, fever and unexpected weight change.   HENT: Negative for congestion and trouble swallowing.    Eyes: Negative for photophobia and visual disturbance.   Respiratory: Positive for shortness of breath. Negative for chest tightness and wheezing.    Cardiovascular: Positive for leg swelling. Negative for chest pain and palpitations.   Gastrointestinal: Positive for abdominal distention. Negative for abdominal pain, nausea and vomiting.   Endocrine: Negative for polyphagia and polyuria.   Genitourinary: Negative for dysuria and hematuria.   Musculoskeletal: Negative for neck pain and neck stiffness.   Skin: Positive for rash. Negative for wound.   Allergic/Immunologic: Negative for food allergies and immunocompromised state.   Neurological: Positive for weakness. Negative for dizziness, syncope and light-headedness.   Hematological: Negative for adenopathy. Does not bruise/bleed easily.   Psychiatric/Behavioral: Negative for confusion and suicidal ideas.       Objective     Objective      Vital Signs  Temp:  [97.7 °F (36.5 °C)-98 °F (36.7 °C)] 98 °F (36.7 °C)  Heart Rate:  [112-116] 116  Resp:  [18] 18  BP: (104-154)/() 148/90     Vital Signs (last 72 hrs)       09/07 0700  -  09/08 0659 09/08 0700  -  09/09 0659 09/09 0700  -  09/10 0659 09/10 0700  -  09/10 1453   Most Recent    Temp (°F)       97.7      98     98 (36.7)     Heart Rate     112 -  115    113 -  116     116    Resp       18      18     18    BP     113/77 -  130/75    104/78 -  154/100     148/90    SpO2 (%)       100    98 -  100     99        Body mass index is 50.95 kg/m².    Intake/Output Summary (Last 24 hours) at 9/10/2020 1453  Last data filed at 9/10/2020 1104  Gross per 24 hour   Intake 300 ml   Output 500 ml   Net -200 ml     Physical Exam   Constitutional: She is oriented to person, place, and time. She appears well-developed and well-nourished.   Chronically ill appearing female in no acute distress    HENT:   Head: Normocephalic and atraumatic.   Eyes: Pupils are equal, round, and reactive to light. Conjunctivae, EOM and lids are normal.   Neck: Normal range of motion. Neck supple. JVD present.   Cardiovascular: Normal rate, regular rhythm, S1 normal, S2 normal and normal heart sounds.   No murmur heard.  Pulses:       Radial pulses are 2+ on the right side, and 2+ on the left side.        Dorsalis pedis pulses are 2+ on the right side, and 1+ on the left side.        Posterior tibial pulses are 2+ on the right side, and 1+ on the left side.   Pulmonary/Chest: Effort normal and breath sounds normal. No respiratory distress. She has no wheezes.   Abdominal: Soft. Normal appearance and bowel sounds are normal. She exhibits no distension. There is no tenderness.   Musculoskeletal: Normal range of motion. She exhibits edema (2+ BLE).   Neurological: She is alert and oriented to person, place, and time. She has normal strength.   Skin: Skin is warm, dry and intact. Rash (Diffuse purpura and petechial rash on BLE extending up into abdomen) noted.   Psychiatric: She has a normal mood and affect. Her speech is normal and behavior is normal. Judgment and thought content normal. Cognition and memory are normal.     Results review     Results Review:    I reviewed the patient's new clinical results.  Results from last 7 days   Lab Units 09/10/20  0921 09/10/20  0256  09/10/20  0058   TROPONIN T ng/mL 0.029 0.034* 0.045*     Results from last 7 days   Lab Units 09/10/20  0058   WBC 10*3/mm3 10.26   HEMOGLOBIN g/dL 10.2*   PLATELETS 10*3/mm3 199     Results from last 7 days   Lab Units 09/10/20  0058   SODIUM mmol/L 128*   POTASSIUM mmol/L 4.9   CHLORIDE mmol/L 95*   CO2 mmol/L 27.8   BUN mg/dL 33*   CREATININE mg/dL 1.24*   CALCIUM mg/dL 8.8   GLUCOSE mg/dL 379*   ALT (SGPT) U/L 25   AST (SGOT) U/L 24     Lab Results   Component Value Date    INR 1.64 (H) 09/10/2020    INR 0.90 06/02/2016    INR 0.97 04/20/2016     Lab Results   Component Value Date    MG 1.8 06/29/2016    MG 1.7 05/09/2016    MG 1.8 05/08/2016     Lab Results   Component Value Date    TSH 3.830 09/10/2020    CHLPL 103 04/21/2016    TRIG 154 (H) 04/21/2016    HDL 22 (L) 04/21/2016    LDL 50 04/21/2016      Lab Results   Component Value Date    PROBNP 3,325.0 (H) 09/10/2020       ECG  ECG/EMG Results (last 24 hours)     Procedure Component Value Units Date/Time    ECG 12 Lead [[id number]] Collected:  09/10/20 0136     Updated:  09/10/20 1318    Narrative:       Test Reason : Short of breath  Blood Pressure : **/** mmHG  Vent. Rate : 114 BPM     Atrial Rate : 052 BPM     P-R Int : 000 ms          QRS Dur : 090 ms      QT Int : 350 ms       P-R-T Axes : 000 083 011 degrees     QTc Int : 482 ms    Accelerated Junctional rhythm  Low voltage QRS  Cannot rule out Anteroseptal infarct , age undetermined  Abnormal ECG  No previous ECGs available  Confirmed by Mark Hutchins (2020) on 9/10/2020 1:18:21 PM    Referred By:  CURD           Confirmed By:Mark Hutchins            Imaging Results (Last 72 Hours)     Procedure Component Value Units Date/Time    CT Chest Without Contrast [[id number]] Collected:  09/10/20 0934     Updated:  09/10/20 0938    Narrative:       CT CHEST WO CONTRAST-      TECHNIQUE: Multiple axial CT images were obtained from lung apex through  upper abdomen without administration of IV contrast.  Reformatted images  in the coronal and/or sagittal plane(s) were generated from the axial  data set to facilitate diagnostic accuracy and/or surgical planning.  Oral Contrast:NONE.     Radiation dose reduction techniques were utilized per ALARA protocol.  Automated exposure control was initiated through either or [employer] or  [employer] software packages by  protocol.             Clinical information  SOA, abn CXR; I50.9-Heart failure, unspecified; D69.2-Other  nonthrombocytopenic purpura; E87.1-Aovn-zbdgixupec and hyponatremia      Comparison  NONE.     Findings  LUNGS: Unremarkable. No parenchymal soft tissue nodules.  No focal air  space disease.     HEART: Unremarkable.     PERICARDIUM: No effusion.     MEDIASTINUM: No masses. No enlarged lymph nodes.  No fluid collections.     PLEURA: SMALL RIGHT PLEURAL EFFUSION AND CARDIOMEGALY NOTED.     MAJOR AIRWAYS: Clear. No intrinsic mass.     VASCULATURE: No evidence of aneurysm.     VISUALIZED UPPER ABDOMEN:        LIVER: Homogeneous. No focal hepatic mass or ductal dilatation.        SPLEEN: Homogeneous. No splenomegaly.        ADRENALS: No mass.        KIDNEYS: No mass. No obstructive uropathy.  No evidence of  urolithiasis.        GI TRACT: Non-dilated. No definite wall thickening.        PERITONEUM: No free air. No free fluid or loculated fluid  collections.           ABDOMINAL WALL: GENERALIZED BODY WALL ANASARCA SUBCUTANEOUS EDEMA  PREDOMINANTLY ON THE LEFT SIDE OF THE CHEST AND SHOULDER AS WELL AS  BREAST TISSUE THICKENING.        OTHER: Lymph nodes in left axillary region may be secondary to edema.     BONES: No acute bony abnormality.       Impression:       Impression:  Small right pleural effusion and cardiomegaly noted. Generalized body  wall anasarca subcutaneous edema predominantly on the left side of the  chest and shoulder as well as breast tissue thickening. Lymph nodes in  left axillary region may be secondary to edema.     This report was  finalized on 9/10/2020 9:36 AM by Dr. Syed Cárdenas MD.       XR Chest 1 View [[id number]] Collected:  09/10/20 0221     Updated:  09/10/20 0223    Narrative:       CR Chest 1 Vw    INDICATION:   Difficulty breathing today.     COMPARISON:    6/15/2016    FINDINGS:  Single portable AP view(s) of the chest.  Heart is enlarged. There is vascular congestion. There is infiltrate in the right mid lower lung. Asymmetry is concerning for pneumonia. No pneumothorax is seen.      Impression:         1. Cardiomegaly and vascular congestion.  2. Asymmetric infiltrates in the right lung concerning for pneumonia.    Signer Name: Braydon Hudson MD   Signed: 9/10/2020 2:21 AM   Workstation Name: [facility]    [facility]          I have discussed my impression and recommendations with the patient and family.    Thank you very much for asking us to be involved in this patient's care.  We will follow along with you.      KENNY Beckman, acting as scribe for Dr. Charlee Ken MD [facility]  09/10/20  14:53  I, Charlee Ken MD, [facility], performed the services described in this documentation as documented by the above-named individual under my supervision and made necessary changes in the note is both accurate and complete  Please note that portions of this note were completed with a voice recognition program.          Electronically signed by Charlee Ken MD at 09/10/20 1552

## 2020-09-11 NOTE — NURSING NOTE
Patient with extensive rash to bilat upper and lower extremities of various stages of healing.  States she was diagnosed with vasculitis at [facility] and was unable to continue treatment due to reaction to steroids; they caused her to swell.  Lesions range from red to purple, some are covered in scab tissue.  Her posterior right calf has large, open lesions. This appears to be related to ruptured blistering. Serosanguinous drainage noted to bed from this area. Will cleanse with NS, apply vaseline gauze, abd pad, and roll guaze.

## 2020-09-11 NOTE — PLAN OF CARE
Problem: Patient Care Overview  Goal: Interprofessional Rounds/Family Conf  Outcome: Ongoing (interventions implemented as appropriate)  Note:   Patient has complained of pain x2 in BLE, PRN pain medicine administered which was effective. Pt is stable. Will continue to follow care plan.

## 2020-09-11 NOTE — PROGRESS NOTES
[facility]iatric [facility]OSPITAL[facility]    PROGRESS NOTE    Name:  Yumiko Purdy   Age:  54 y.o.  Sex:  female  :  1965  MRN:  [id number]   Visit Number:  [id number]  Admission Date:  2020  Date Of Service:  20  Primary Care Physician:  Niko Schaefer MD     LOS: 1 day :  Patient Care Team:  Niko Schaefer MD as PCP - General (Gastroenterology):    Chief Complaint:      Rash  Leg swelling    Subjective / Interval History:   Patient seen and examined bedside  No adverse events overnight  Patient denies chest pain, abdominal pain, extremity pain      Vital Signs:    Temp:  [97 °F (36.1 °C)-98.4 °F (36.9 °C)] 97.7 °F (36.5 °C)  Heart Rate:  [112-116] 116  Resp:  [18-22] 20  BP: (110-154)/() 119/82    Intake and output:    I/O last 3 completed shifts:  In: 900 [P.O.:600; IV Piggyback:300]  Out: 2000 [Urine:2000]  I/O this shift:  In: 236 [P.O.:236]  Out: -     Physical Examination:    General Appearance:  Alert and cooperative, not in any acute distress.   Head:  Atraumatic and normocephalic, without obvious abnormality.   Eyes:          PERRLA, conjunctivae and sclerae normal, no Icterus. No pallor. Extra-occular movements are within normal limits.   Neck: Supple, trachea midline, no thyromegaly, no carotid bruit.   Lungs:   Chest shape is normal. Breath sounds heard bilaterally equally.  No crackles or wheezing. No Pleural rub or bronchial breathing.   Heart:  Normal S1 and S2, no murmur, no gallop, no rub. No JVD   Abdomen:   Normal bowel sounds, no masses, no organomegaly. Soft       non-tender, non-distended, no guarding, no rebound tenderness.   Extremities: Moves all extremities well, 3+ pitting  bl le edema, no cyanosis, no  clubbing.   Skin: No bleeding, bruising but has many ~100 U/L E and torso annular erythematous, various stages of healing lesions   Neurologic: Awake, alert and oriented times 3. Moves all 4 extremities equally.   Laboratory results:    Results from last 7 days   Lab  Units 09/11/20  0044 09/10/20  1516 09/10/20  0058   SODIUM mmol/L 134* 137 128*   POTASSIUM mmol/L 5.3* 5.4* 4.9   CHLORIDE mmol/L 97* 98 95*   CO2 mmol/L 25.2 17.9* 27.8   BUN mg/dL 41* 36* 33*   CREATININE mg/dL 1.30* 1.20* 1.24*   CALCIUM mg/dL 9.1 9.2 8.8   BILIRUBIN mg/dL  --  2.1* 2.0*   ALK PHOS U/L  --   --  105   ALT (SGPT) U/L  --   --  25   AST (SGOT) U/L  --   --  24   GLUCOSE mg/dL 376* 337* 379*     Results from last 7 days   Lab Units 09/11/20  0044 09/10/20  0058   WBC 10*3/mm3 9.14 10.26   HEMOGLOBIN g/dL 9.7* 10.2*   HEMATOCRIT % 34.2 36.6   PLATELETS 10*3/mm3 198 199     Results from last 7 days   Lab Units 09/11/20  0044 09/10/20  0058   INR  1.72* 1.64*     Results from last 7 days   Lab Units 09/11/20  0044 09/10/20  1727 09/10/20  0921   TROPONIN T ng/mL 0.027 0.024 0.029     Results from last 7 days   Lab Units 09/10/20  0134 09/10/20  0058   BLOODCX  No growth at 24 hours No growth at 24 hours       I have reviewed the patient's laboratory results.    Radiology results:    Imaging Results (Last 24 Hours)     ** No results found for the last 24 hours. **          I have reviewed the patient's radiology reports.    Medication Review:     I have reviewed the patients active and prn medications.       Assessment:    Acute on chronic congestive heart failure (CMS/HCC)          Plan:    Anasarca  Secondary to AE sCHF  ECHO 9/10/2020 reveals 21-25% EF w/out filling defects noted  Appreciate Cards seeing our patient  Continue diuresis w goal ~ 1 L per day negative balance    Decompensated AE sCHF  BNP 3,325  Continue diuresis  Appreciate cards  Severely reduced EF 21-25%  Heart failure clinic participation after DC    Elevated troponin  Mild,  0.045>> 0.024>> 0.027  Possibly from fluid overload straining myocardium  Cardiology not concerned for ischemia    Hyperglycemia secondary to DM2 and contributing steroid effect  Continue to treat with correctional insulin after ACHS accuchecks along with  long acting scheduled insulin  Taper down steroids from initial 125 mg dose from ER    Vasculitis   Annular lesions throughout upper and lower torso/extremities in various stages of healing.  They also highly resemble pickers nodules as well, will ask patient if she picks at her skin from anxiety, agitation, etc.  Could entertain punch biopsy while here and send for path to confirm vasculitis diagnosis vs aforementioned self inflicting wounds.  Wound care would be appropriate regardless of diagnosis    Paroxysmal atrial fibrillation  Currently in NSR  Continue TELE  Electrolytes WNL  Appreciate Cards  Appreciate INR monitoring Pharmacy provides  Currently INR subtherapeutic on home dose warfarin  Pharmacy adjusting dose to get to target INR    Subtherapeutic INR  Appreciate Pharmacy monitoring and adjusting warfarin  Currently 1.72  4.5 mg warfarin dose this evening up from the 3 mg home dose    Cirrhosis with hyperbilirubinemia  Avoid hepatotoxic medications.  Direct bilirubin elevated as well as total bili  Continue monitoring bilirubin  Transaminases WNL  Secondary to alcohol abuse previous dependence    IDRIS, iatrogenic  Creatinine normal 6/29/2016  POA was 1.24  Could be from chronic diuretic therapy vs recent hospitalization with attempts at aggressive diuresis  Continue to monitor      Warfarin as VTE prophylaxis  Carb conscious and cardiac diet      40 minutes was spent in chart review, examining the patient and formulating new plans for care.    Ronald Guajardo DO  09/11/20  11:07

## 2020-09-11 NOTE — CONSULTS
Nut[facility] Services    Patient Name:  Yumiko Purdy  YOB: 1965  MRN: [id number]  Admit Date:  9/9/2020    RD attempted to educate pt. Pt falling asleep during discussion - Will cont to follow and educate appropriately.    Thank you for your consult.     Electronically signed by:  Mary Ann Ohara RD  09/11/20 11:22

## 2020-09-12 LAB
ALBUMIN SERPL-MCNC: 3.17 G/DL (ref 3.5–5.2)
ALP SERPL-CCNC: 87 U/L (ref 39–117)
ALT SERPL W P-5'-P-CCNC: 25 U/L (ref 1–33)
ANION GAP SERPL CALCULATED.3IONS-SCNC: 10.7 MMOL/L (ref 5–15)
AST SERPL-CCNC: 26 U/L (ref 1–32)
BILIRUB CONJ SERPL-MCNC: 0.4 MG/DL (ref 0–0.3)
BILIRUB INDIRECT SERPL-MCNC: 0.8 MG/DL
BILIRUB SERPL-MCNC: 1.2 MG/DL (ref 0–1.2)
BUN SERPL-MCNC: 50 MG/DL (ref 6–20)
BUN/CREAT SERPL: 35.7 (ref 7–25)
CALCIUM SPEC-SCNC: 9.1 MG/DL (ref 8.6–10.5)
CHLORIDE SERPL-SCNC: 101 MMOL/L (ref 98–107)
CO2 SERPL-SCNC: 23.3 MMOL/L (ref 22–29)
CREAT SERPL-MCNC: 1.4 MG/DL (ref 0.57–1)
DEPRECATED RDW RBC AUTO: 71.3 FL (ref 37–54)
ERYTHROCYTE [DISTWIDTH] IN BLOOD BY AUTOMATED COUNT: 21.2 % (ref 12.3–15.4)
GFR SERPL CREATININE-BSD FRML MDRD: 39 ML/MIN/1.73
GLUCOSE BLDC GLUCOMTR-MCNC: 170 MG/DL (ref 70–130)
GLUCOSE BLDC GLUCOMTR-MCNC: 233 MG/DL (ref 70–130)
GLUCOSE BLDC GLUCOMTR-MCNC: 235 MG/DL (ref 70–130)
GLUCOSE BLDC GLUCOMTR-MCNC: 237 MG/DL (ref 70–130)
GLUCOSE SERPL-MCNC: 169 MG/DL (ref 65–99)
HCT VFR BLD AUTO: 35.4 % (ref 34–46.6)
HGB BLD-MCNC: 10.5 G/DL (ref 12–15.9)
INR PPP: 2.08 (ref 0.9–1.1)
MAGNESIUM SERPL-MCNC: 2.2 MG/DL (ref 1.6–2.6)
MCH RBC QN AUTO: 27.9 PG (ref 26.6–33)
MCHC RBC AUTO-ENTMCNC: 29.7 G/DL (ref 31.5–35.7)
MCV RBC AUTO: 93.9 FL (ref 79–97)
PLATELET # BLD AUTO: 139 10*3/MM3 (ref 140–450)
PMV BLD AUTO: 9.9 FL (ref 6–12)
POTASSIUM SERPL-SCNC: 4.7 MMOL/L (ref 3.5–5.2)
PROT SERPL-MCNC: 6 G/DL (ref 6–8.5)
PROTHROMBIN TIME: 23.3 SECONDS (ref 11.9–14.1)
RBC # BLD AUTO: 3.77 10*6/MM3 (ref 3.77–5.28)
SODIUM SERPL-SCNC: 135 MMOL/L (ref 136–145)
WBC # BLD AUTO: 8.65 10*3/MM3 (ref 3.4–10.8)

## 2020-09-12 PROCEDURE — 82962 GLUCOSE BLOOD TEST: CPT

## 2020-09-12 PROCEDURE — 85027 COMPLETE CBC AUTOMATED: CPT | Performed by: FAMILY MEDICINE

## 2020-09-12 PROCEDURE — 63710000001 INSULIN DETEMIR PER 5 UNITS: Performed by: INTERNAL MEDICINE

## 2020-09-12 PROCEDURE — 80076 HEPATIC FUNCTION PANEL: CPT | Performed by: FAMILY MEDICINE

## 2020-09-12 PROCEDURE — 84156 ASSAY OF PROTEIN URINE: CPT | Performed by: INTERNAL MEDICINE

## 2020-09-12 PROCEDURE — 63710000001 INSULIN ASPART PER 5 UNITS: Performed by: INTERNAL MEDICINE

## 2020-09-12 PROCEDURE — 99232 SBSQ HOSP IP/OBS MODERATE 35: CPT | Performed by: NURSE PRACTITIONER

## 2020-09-12 PROCEDURE — 93010 ELECTROCARDIOGRAM REPORT: CPT | Performed by: INTERNAL MEDICINE

## 2020-09-12 PROCEDURE — 94799 UNLISTED PULMONARY SVC/PX: CPT

## 2020-09-12 PROCEDURE — 85610 PROTHROMBIN TIME: CPT | Performed by: PHYSICIAN ASSISTANT

## 2020-09-12 PROCEDURE — 99233 SBSQ HOSP IP/OBS HIGH 50: CPT | Performed by: FAMILY MEDICINE

## 2020-09-12 PROCEDURE — 82043 UR ALBUMIN QUANTITATIVE: CPT | Performed by: INTERNAL MEDICINE

## 2020-09-12 PROCEDURE — 63710000001 ONDANSETRON PER 8 MG: Performed by: FAMILY MEDICINE

## 2020-09-12 PROCEDURE — 93005 ELECTROCARDIOGRAM TRACING: CPT | Performed by: INTERNAL MEDICINE

## 2020-09-12 PROCEDURE — 63710000001 PREDNISONE PER 5 MG: Performed by: FAMILY MEDICINE

## 2020-09-12 PROCEDURE — 25010000002 FUROSEMIDE PER 20 MG: Performed by: FAMILY MEDICINE

## 2020-09-12 PROCEDURE — 80048 BASIC METABOLIC PNL TOTAL CA: CPT | Performed by: FAMILY MEDICINE

## 2020-09-12 PROCEDURE — 25010000002 MORPHINE PER 10 MG: Performed by: FAMILY MEDICINE

## 2020-09-12 PROCEDURE — 83735 ASSAY OF MAGNESIUM: CPT | Performed by: FAMILY MEDICINE

## 2020-09-12 PROCEDURE — 63710000001 INSULIN ASPART PER 5 UNITS: Performed by: PHYSICIAN ASSISTANT

## 2020-09-12 PROCEDURE — 82570 ASSAY OF URINE CREATININE: CPT | Performed by: INTERNAL MEDICINE

## 2020-09-12 RX ADMIN — ONDANSETRON HYDROCHLORIDE 4 MG: 4 TABLET, FILM COATED ORAL at 21:20

## 2020-09-12 RX ADMIN — POLYETHYLENE GLYCOL 3350 17 G: 17 POWDER, FOR SOLUTION ORAL at 07:55

## 2020-09-12 RX ADMIN — CARVEDILOL 6.25 MG: 6.25 TABLET, FILM COATED ORAL at 16:43

## 2020-09-12 RX ADMIN — ROPINIROLE HYDROCHLORIDE 1 MG: 1 TABLET, FILM COATED ORAL at 07:53

## 2020-09-12 RX ADMIN — SPIRONOLACTONE 25 MG: 25 TABLET ORAL at 07:54

## 2020-09-12 RX ADMIN — VENLAFAXINE HYDROCHLORIDE 75 MG: 75 CAPSULE, EXTENDED RELEASE ORAL at 07:53

## 2020-09-12 RX ADMIN — INSULIN ASPART 5 UNITS: 100 INJECTION, SOLUTION INTRAVENOUS; SUBCUTANEOUS at 07:55

## 2020-09-12 RX ADMIN — INSULIN ASPART 3 UNITS: 100 INJECTION, SOLUTION INTRAVENOUS; SUBCUTANEOUS at 07:55

## 2020-09-12 RX ADMIN — FUROSEMIDE 40 MG: 10 INJECTION, SOLUTION INTRAMUSCULAR; INTRAVENOUS at 21:13

## 2020-09-12 RX ADMIN — ROPINIROLE HYDROCHLORIDE 1 MG: 1 TABLET, FILM COATED ORAL at 20:13

## 2020-09-12 RX ADMIN — MORPHINE SULFATE 4 MG: 4 INJECTION, SOLUTION INTRAMUSCULAR; INTRAVENOUS at 09:11

## 2020-09-12 RX ADMIN — PREGABALIN 100 MG: 100 CAPSULE ORAL at 07:54

## 2020-09-12 RX ADMIN — MORPHINE SULFATE 4 MG: 4 INJECTION, SOLUTION INTRAMUSCULAR; INTRAVENOUS at 15:21

## 2020-09-12 RX ADMIN — FOLIC ACID 1 MG: 1 TABLET ORAL at 07:53

## 2020-09-12 RX ADMIN — INSULIN ASPART 5 UNITS: 100 INJECTION, SOLUTION INTRAVENOUS; SUBCUTANEOUS at 16:44

## 2020-09-12 RX ADMIN — PREGABALIN 100 MG: 100 CAPSULE ORAL at 20:13

## 2020-09-12 RX ADMIN — INSULIN ASPART 5 UNITS: 100 INJECTION, SOLUTION INTRAVENOUS; SUBCUTANEOUS at 11:10

## 2020-09-12 RX ADMIN — FUROSEMIDE 40 MG: 10 INJECTION, SOLUTION INTRAMUSCULAR; INTRAVENOUS at 07:55

## 2020-09-12 RX ADMIN — SODIUM CHLORIDE, PRESERVATIVE FREE 10 ML: 5 INJECTION INTRAVENOUS at 20:12

## 2020-09-12 RX ADMIN — CARVEDILOL 6.25 MG: 6.25 TABLET, FILM COATED ORAL at 07:53

## 2020-09-12 RX ADMIN — DOCUSATE SODIUM 50 MG AND SENNOSIDES 8.6 MG 2 TABLET: 8.6; 5 TABLET, FILM COATED ORAL at 20:13

## 2020-09-12 RX ADMIN — WARFARIN 4 MG: 3 TABLET ORAL at 16:43

## 2020-09-12 RX ADMIN — ROPINIROLE HYDROCHLORIDE 1 MG: 1 TABLET, FILM COATED ORAL at 16:43

## 2020-09-12 RX ADMIN — MORPHINE SULFATE 4 MG: 4 INJECTION, SOLUTION INTRAMUSCULAR; INTRAVENOUS at 03:03

## 2020-09-12 RX ADMIN — INSULIN ASPART 5 UNITS: 100 INJECTION, SOLUTION INTRAVENOUS; SUBCUTANEOUS at 16:43

## 2020-09-12 RX ADMIN — FUROSEMIDE 40 MG: 10 INJECTION, SOLUTION INTRAMUSCULAR; INTRAVENOUS at 16:43

## 2020-09-12 RX ADMIN — MORPHINE SULFATE 4 MG: 4 INJECTION, SOLUTION INTRAMUSCULAR; INTRAVENOUS at 21:13

## 2020-09-12 RX ADMIN — INSULIN DETEMIR 10 UNITS: 100 INJECTION, SOLUTION SUBCUTANEOUS at 20:13

## 2020-09-12 RX ADMIN — AMIODARONE HYDROCHLORIDE 200 MG: 200 TABLET ORAL at 07:54

## 2020-09-12 RX ADMIN — ASPIRIN 81 MG: 81 TABLET, COATED ORAL at 07:53

## 2020-09-12 RX ADMIN — PREDNISONE 2.5 MG: 5 TABLET ORAL at 07:54

## 2020-09-12 RX ADMIN — VALSARTAN 40 MG: 80 TABLET, FILM COATED ORAL at 07:54

## 2020-09-12 NOTE — PROGRESS NOTES
[facility] HOSPITALIST    PROGRESS NOTE    Name:  Yumiko Purdy   Age:  54 y.o.  Sex:  female  :  1965  MRN:  [id number]   Visit Number:  [id number]  Admission Date:  2020  Date Of Service:  20  Primary Care Physician:  Niko Schaefer MD     LOS: 2 days :  Patient Care Team:  Niko Schaefer MD as PCP - General (Gastroenterology):    Chief Complaint:      swelling    Subjective / Interval History:   Patient seen and examined bedside  No adverse events overnight  Patient denies chest pain, abdominal pain, extremity pain      Vital Signs:    Temp:  [97.2 °F (36.2 °C)-97.7 °F (36.5 °C)] 97.7 °F (36.5 °C)  Heart Rate:  [] 109  Resp:  [18-20] 20  BP: ()/(54-71) 98/54    Intake and output:    I/O last 3 completed shifts:  In: 1316 [P.O.:1316]  Out: 3150 [Urine:3150]  I/O this shift:  In: 240 [P.O.:240]  Out: 500 [Urine:500]    Physical Examination:    General Appearance:  Alert and cooperative, not in any acute distress.   Head:  Atraumatic and normocephalic, without obvious abnormality.   Eyes:          PERRLA, conjunctivae and sclerae normal, no Icterus. No pallor. Extra-occular movements are within normal limits.   Neck: Supple, trachea midline, no thyromegaly, no carotid bruit.   Lungs:   Chest shape is normal. Breath sounds heard bilaterally equally.  No crackles or wheezing. No Pleural rub or bronchial breathing.   Heart:  Normal S1 and S2, no murmur, no gallop, no rub. No JVD   Abdomen:   Normal bowel sounds, no masses, no organomegaly. Soft       non-tender, non-distended, no guarding, no rebound tenderness.   Extremities: Moves all extremities well, 2+  Edema BL LE  no cyanosis, no            clubbing.   Skin: No bleeding, bruising or rash.   Neurologic: Awake, alert and oriented times 3. Moves all 4 extremities equally.   Laboratory results:    Results from last 7 days   Lab Units 20  0149 20  0044 09/10/20  1516 09/10/20  0058   SODIUM mmol/L 135* 134* 137  128*   POTASSIUM mmol/L 4.7 5.3* 5.4* 4.9   CHLORIDE mmol/L 101 97* 98 95*   CO2 mmol/L 23.3 25.2 17.9* 27.8   BUN mg/dL 50* 41* 36* 33*   CREATININE mg/dL 1.40* 1.30* 1.20* 1.24*   CALCIUM mg/dL 9.1 9.1 9.2 8.8   BILIRUBIN mg/dL  --   --  2.1* 2.0*   ALK PHOS U/L  --   --   --  105   ALT (SGPT) U/L  --   --   --  25   AST (SGOT) U/L  --   --   --  24   GLUCOSE mg/dL 169* 376* 337* 379*     Results from last 7 days   Lab Units 09/12/20  0149 09/11/20  0044 09/10/20  0058   WBC 10*3/mm3 8.65 9.14 10.26   HEMOGLOBIN g/dL 10.5* 9.7* 10.2*   HEMATOCRIT % 35.4 34.2 36.6   PLATELETS 10*3/mm3 139* 198 199     Results from last 7 days   Lab Units 09/12/20  0149 09/11/20  0044 09/10/20  0058   INR  2.08* 1.72* 1.64*     Results from last 7 days   Lab Units 09/11/20  0044 09/10/20  1727 09/10/20  0921   TROPONIN T ng/mL 0.027 0.024 0.029     Results from last 7 days   Lab Units 09/10/20  0134 09/10/20  0058   BLOODCX  No growth at 2 days No growth at 2 days       I have reviewed the patient's laboratory results.    Radiology results:    Imaging Results (Last 24 Hours)     ** No results found for the last 24 hours. **          I have reviewed the patient's radiology reports.    Medication Review:     I have reviewed the patients active and prn medications.       Assessment:    Acute on chronic congestive heart failure (CMS/HCC)          Plan:    Anasarca  Secondary to AE sCHF  ECHO 9/10/2020 reveals 21-25% EF w/out filling defects noted  Appreciate Cards seeing our patient  Continue diuresis w goal ~ 1 L per day negative balance     Decompensated AE sCHF  BNP 3,325  Continue diuresis  Appreciate cards  Starting entresto   Severely reduced EF 21-25%  Heart failure clinic participation after DC     Elevated troponin  Mild,  0.045>> 0.024>> 0.027  Possibly from fluid overload straining myocardium  Cardiology not concerned for ischemia     Hyperglycemia secondary to DM2 and contributing steroid effect  Continue to treat with  correctional insulin after ACHS accuchecks along with long acting scheduled insulin  Taper down steroids from initial 125 mg dose from ER     Vasculitis   Annular lesions throughout upper and lower torso/extremities in various stages of healing.  Wound care would be appropriate  Nephrology consulted     Paroxysmal atrial fibrillation  Currently in NSR  Continue TELE  Electrolytes WNL  Appreciate Cards  Appreciate INR monitoring Pharmacy provides  Currently INR subtherapeutic on home dose warfarin  Pharmacy adjusting dose to get to target INR     Subtherapeutic INR  Appreciate Pharmacy monitoring and adjusting warfarin  Currently 2.08  4.5 mg warfarin dose       Cirrhosis with hyperbilirubinemia  Avoid hepatotoxic medications.  Direct bilirubin elevated as well as total bili  Continue monitoring bilirubin  Transaminases WNL  Secondary to alcohol abuse previous dependence     IDRIS, iatrogenic  Creatinine normal 6/29/2016  POA was 1.24  Could be from chronic diuretic therapy vs recent hospitalization with attempts at aggressive diuresis  Continue to monitor        Warfarin as VTE prophylaxis  Carb conscious and cardiac diet              40 minutes was spent in chart review, examining the patient and formulating new plans for care.    Ronald Guajardo DO  09/12/20  11:36 EDT

## 2020-09-12 NOTE — PHARMACY PATIENT ASSISTANCE
Pharmacy was asked to check on the cost of Entresto for this patient. It will require a PA through her insurance. Will update when more info is available.     Thank you,  Chyna Avilez [facility]

## 2020-09-12 NOTE — PROGRESS NOTES
LOS: 2 days     Name: Yumiko Purdy  Age/Sex: 54 y.o. female  :  1965        PCP: Niko Schaefer MD  REF: No ref. provider found    Active Problems:    Acute on chronic congestive heart failure (CMS/HCC)      Reason for follow-up: Congestive heart failure and cardiomyopathy    Subjective       Subjective     Yumiko Purdy is a 54 year old female with a past medical history significant for chronic atrial fibrillation, congestive heart failure, diabetes mellitus and cirrhosis of the liver.  Patient presented to the ER with complaints of lower extremity edema, abdominal distention and rash.  Patient states that for the last week she has had worsening lower extremity edema that goes up into her knees all the way up into her abdomen.  She also has had worsening shortness of breath and orthopnea.  He denies any recent chest pains.  She states that she follows with her PCP who manages her water pills and heart medications.  She was recently seen at [facility] on Monday and Wednesday of this week with similar complaints and was given single doses of diuretics without much improvement of her symptoms.  She also reports she is a diffuse rash for last few months and was told that she had vasculitis.  She was worked up for this at [facility] in May 2020.  She was taken off Coumadin briefly because it was thought that this may be causing the rash however there was no improvement noted so she was placed back on Xarelto.  She states that she had a left heart catheterization a couple years ago at [facility] in [facility] that showed nonobstructive CAD however these records are unavailable.  Patient did receive Lasix IV in the ER and reports that her breathing has had mild improvement.  Creatinine 1.24 on admission.      Interval History: Patient has had good urine output with IV diuresis -1574.  Creatinine has worsened today at 1.4.  Patient reports that her breathing is a little  better today.  She denies any chest pain.      Vital Signs  Temp:  [97.2 °F (36.2 °C)-97.7 °F (36.5 °C)] 97.7 °F (36.5 °C)  Heart Rate:  [] 99  Resp:  [18-20] 18  BP: (100-119)/(63-82) 113/63     Vital Signs (last 72 hrs)       09/09 0700  -  09/10 0659 09/10 0700  -  09/11 0659 09/11 0700  -  09/12 0659 09/12 0700  -  09/12 0813   Most Recent    Temp (°F)   97.7    97 -  98.4    97.2 -  97.7       97.7 (36.5)    Heart Rate 112 -  115    112 -  116    99 -  116       99    Resp   18    18 -  22    18 -  20       18    /77 -  130/75    104/78 -  154/100    100/71 -  119/82       113/63    SpO2 (%)   100    92 -  100    93 -  99       99        Body mass index is 49.71 kg/m².    Intake/Output Summary (Last 24 hours) at 9/12/2020 0813  Last data filed at 9/12/2020 0252  Gross per 24 hour   Intake 1076 ml   Output 2650 ml   Net -1574 ml     Objective    Objective       Physical Exam:     General Appearance:   Chronically ill-appearing female in no acute distress, alert, cooperative, in no acute distress   Head:    Normocephalic, without obvious abnormality, atraumatic   Eyes:            Conjunctivae and sclerae normal, no   icterus, no pallor, corneas clear.   Neck:   No adenopathy, supple, trachea midline, no thyromegaly, no   carotid bruit, no JVD   Lungs:    Rales,respirations regular, even and                  unlabored    Heart:    iregular rhythm and tachycardia, normal S1 and S2, no            murmur, no gallop, no rub, no click   Chest Wall:    No abnormalities observed   Abdomen:     Normal bowel sounds, no masses, no organomegaly, soft        non-tender, non-distended, no guarding, no rebound                tenderness   Extremities:   Moves all extremities well, 3+ BLE edema with both lower  extremities wrapped, no cyanosis, no   redness   Pulses:   Pulses palpable and equal bilaterally   Skin:   No bleeding, bruising or purpura/petechial rash on bilateral lower extremities and upper extremities        Neurologic:  Alert and oriented   I have seen and performed a physical examination today.   Results review       Results Review:   Results from last 7 days   Lab Units 09/12/20  0149 09/11/20  0044 09/10/20  0058   WBC 10*3/mm3 8.65 9.14 10.26   HEMOGLOBIN g/dL 10.5* 9.7* 10.2*   PLATELETS 10*3/mm3 139* 198 199     Results from last 7 days   Lab Units 09/12/20  0149 09/11/20  0044 09/10/20  1516 09/10/20  0058   SODIUM mmol/L 135* 134* 137 128*   POTASSIUM mmol/L 4.7 5.3* 5.4* 4.9   CHLORIDE mmol/L 101 97* 98 95*   CO2 mmol/L 23.3 25.2 17.9* 27.8   BUN mg/dL 50* 41* 36* 33*   CREATININE mg/dL 1.40* 1.30* 1.20* 1.24*   CALCIUM mg/dL 9.1 9.1 9.2 8.8   GLUCOSE mg/dL 169* 376* 337* 379*   ALT (SGPT) U/L  --   --   --  25   AST (SGOT) U/L  --   --   --  24     Results from last 7 days   Lab Units 09/11/20  0044 09/10/20  1727 09/10/20  0921 09/10/20  0256 09/10/20  0058   TROPONIN T ng/mL 0.027 0.024 0.029 0.034* 0.045*     Lab Results   Component Value Date    INR 2.08 (H) 09/12/2020    INR 1.72 (H) 09/11/2020    INR 1.64 (H) 09/10/2020    INR 0.90 06/02/2016    INR 0.97 04/20/2016     Lab Results   Component Value Date    MG 2.2 09/12/2020    MG 2.1 09/11/2020    MG 1.9 09/10/2020     Lab Results   Component Value Date    TSH 3.830 09/10/2020    CHLPL 103 04/21/2016    TRIG 80 09/11/2020    HDL 43 09/11/2020    LDL 78 09/11/2020      Imaging Results (Last 48 Hours)     Procedure Component Value Units Date/Time    CT Chest Without Contrast [[id number]] Collected: 09/10/20 0934     Updated: 09/10/20 0938    Narrative:      CT CHEST WO CONTRAST-      TECHNIQUE: Multiple axial CT images were obtained from lung apex through  upper abdomen without administration of IV contrast. Reformatted images  in the coronal and/or sagittal plane(s) were generated from the axial  data set to facilitate diagnostic accuracy and/or surgical planning.  Oral Contrast:NONE.     Radiation dose reduction techniques were utilized per ALARA  protocol.  Automated exposure control was initiated through either or imbookin ([employer]) or  NLT SPINE software packages by  protocol.             Clinical information  SOA, abn CXR; I50.9-Heart failure, unspecified; D69.2-Other  nonthrombocytopenic purpura; E87.4-Jtnf-whpvsuyhxm and hyponatremia      Comparison  NONE.     Findings  LUNGS: Unremarkable. No parenchymal soft tissue nodules.  No focal air  space disease.     HEART: Unremarkable.     PERICARDIUM: No effusion.     MEDIASTINUM: No masses. No enlarged lymph nodes.  No fluid collections.     PLEURA: SMALL RIGHT PLEURAL EFFUSION AND CARDIOMEGALY NOTED.     MAJOR AIRWAYS: Clear. No intrinsic mass.     VASCULATURE: No evidence of aneurysm.     VISUALIZED UPPER ABDOMEN:        LIVER: Homogeneous. No focal hepatic mass or ductal dilatation.        SPLEEN: Homogeneous. No splenomegaly.        ADRENALS: No mass.        KIDNEYS: No mass. No obstructive uropathy.  No evidence of  urolithiasis.        GI TRACT: Non-dilated. No definite wall thickening.        PERITONEUM: No free air. No free fluid or loculated fluid  collections.           ABDOMINAL WALL: GENERALIZED BODY WALL ANASARCA SUBCUTANEOUS EDEMA  PREDOMINANTLY ON THE LEFT SIDE OF THE CHEST AND SHOULDER AS WELL AS  BREAST TISSUE THICKENING.        OTHER: Lymph nodes in left axillary region may be secondary to edema.     BONES: No acute bony abnormality.       Impression:      Impression:  Small right pleural effusion and cardiomegaly noted. Generalized body  wall anasarca subcutaneous edema predominantly on the left side of the  chest and shoulder as well as breast tissue thickening. Lymph nodes in  left axillary region may be secondary to edema.     This report was finalized on 9/10/2020 9:36 AM by Dr. Syed Cárdenas MD.           Lab Results   Component Value Date    BNP 86 05/08/2016     Echo   Results for orders placed during the hospital encounter of 09/09/20   Adult Transthoracic Echo Complete W/ Cont  if Necessary Per Protocol    Narrative · The left ventricular cavity is mildly dilated.  · The following left ventricular wall segments are hypokinetic: mid   anterior, apical lateral, apical inferior, apical septal, basal   inferoseptal, mid inferoseptal, apex hypokinetic, mid anteroseptal, basal   anterior and basal inferoseptal.  · Left ventricular systolic function is severely decreased.  · Estimated EF appears to be in the range of 21 - 25%.  · The aortic valve is structurally normal. No aortic valve regurgitation   is present. No aortic valve stenosis is present.  · The mitral valve is normal in structure. Mild mitral valve regurgitation   is present. No significant mitral valve stenosis is present.  · The tricuspid valve is normal. Mild to moderate tricuspid valve   regurgitation is present. Estimated right ventricular systolic pressure   from tricuspid regurgitation is normal (<35 mmHg).  · There is no evidence of pericardial effusion.         I reviewed the patient's new clinical results.    Telemetry: A.fib  bpm        Medication Review:   amiodarone, 200 mg, Oral, Daily  aspirin, 81 mg, Oral, Daily  carvedilol, 6.25 mg, Oral, BID With Meals  folic acid, 1 mg, Oral, Daily  furosemide, 40 mg, Intravenous, Q8H  insulin aspart, 0-14 Units, Subcutaneous, TID AC  insulin aspart, 5 Units, Subcutaneous, TID With Meals  insulin detemir, 10 Units, Subcutaneous, Nightly  polyethylene glycol, 17 g, Oral, Daily  predniSONE, 2.5 mg, Oral, Daily  pregabalin, 100 mg, Oral, Q12H  rOPINIRole, 1 mg, Oral, TID  senna-docusate sodium, 2 tablet, Oral, Nightly  sodium chloride, 10 mL, Intravenous, Q12H  sodium chloride, 10 mL, Intravenous, Q12H  spironolactone, 25 mg, Oral, Daily  valsartan, 40 mg, Oral, Q24H  venlafaxine XR, 75 mg, Oral, Daily        Pharmacy to dose warfarin,         Assessment      Assessment:  1. Acute on chronic combined systolic and diastolic congestive heart failure, decompensated  2. Newly  diagnosed advanced cardiomyopathy with LV ejection fraction of about 20-25%   3. Reported vasculitis with purpuric rash on both lower extremities.  4. Elevated troponin, trending up to 0.045 now trending down to normal, EKG tracing reviewed and shows no acute ischemia  5. Paroxysmal atrial fibrillation, on Coumadin, INR 2.08, on amiodarone   6. Acute kidney injury, creatinine 1.4, worsening  7. Diabetes mellitus type 2  8. Cirrhosis of the liver    Plan     Recommendations:  1. Cardiomyopathy/CHF/Elevated troponin  · Patient continues to have good urine output with IV diuresis.  Kidney function has worsened today with creatinine of 1.4.  Nephrology has been consulted given the need for left heart catheterization in the next few days and worsening kidney function.  · Echocardiogram showed LV function is about 20-25%.  Patient will need to undergo left heart catheterization to rule out ischemia.  We will plan for this when her heart failure is more compensated and kidney function stabilizes.  Patient is agreeable for this.  · Continue with Coreg, spironolactone and valsartan for her cardiomyopathy.  If kidney function stabilizes will consider switching valsartan to Entresto.  · LifeVest has been ordered and will need to be placed before discharge.  · Outpatient referral to heart failure clinic has been placed.    2.  Atrial fibrillation  · Patient is currently atrial fibrillation with some mild tachycardia.  Will continue to monitor given patient is in decompensated heart failure.  If tachycardia does not improve with diuresis will consider increasing Coreg if blood pressure allows.  · Continue with amiodarone.  Pharmacy is dosing Coumadin.  INR 2.08 today.      I discussed the patients findings and my recommendations with patient and family      Sandy KENNY Ellis  09/12/20  08:13 EDT    Please note that portions of this note were completed with a voice recognition program.

## 2020-09-12 NOTE — PLAN OF CARE
Problem: Patient Care Overview  Goal: Plan of Care Review  Recent Flowsheet Documentation  Taken 9/12/2020 1511 by Luis Antonio Dawn RN  Progress: improving  Plan of Care Reviewed With: patient  Outcome Summary: Patient tolerated shift well.  Requests pain meds frequently.   Goal Outcome Evaluation:  Plan of Care Reviewed With: patient  Progress: improving  Outcome Summary: Patient tolerated shift well.  Requests pain meds frequently.

## 2020-09-12 NOTE — CONSULTS
Referring Provider: Dr Guajardo  Reason for Consultation: Acute renal failure    Chief complaint swelling of the leg and rash    Subjective .     History of present illness: Patient has been having progressively worsening swelling of the abdomen and lower extremities for the last several days.  She is very vague about the time intervals.  She reports that she has had the skin rash at least going back to May this year when she had been hospitalized at the [facility].  She reports having a skin biopsy but is uncertain as to which hospital it was done.  She was told that very little could be done for as she was poorly tolerant of steroids and she is on a very low dose for the same.  The patient has not had any fever or chills.  She has had no hemoptysis or epistaxis or hematemesis or melena.  She has had no oral ulcers.  Patient denies any oliguria or dysuria or hematuria.  She also reports a recent diagnosis of cirrhosis.  She has known systolic CHF and an echocardiogram done at this facility confirms the same.  Patient presented to this facility after having visited the emergency room at [facility] twice and not being admitted    Her creatinine is marginally higher with concomitant Lasix and valsartan therapy    All other systems were reviewed and negative.    History  Past Medical History:   Diagnosis Date   • Anemia    • CHF (congestive heart failure) (CMS/HCC)    • Chronic a-fib (CMS/HCC)     stated by patient    • Cirrhosis of liver (CMS/HCC)     stated by patient    • Diabetes mellitus (CMS/HCC)    ,   Past Surgical History:   Procedure Laterality Date   • APPENDECTOMY     • CHOLECYSTECTOMY     • TOE AMPUTATION Right     stated by patient.    , History reviewed. No pertinent family history.,   Social History     Tobacco Use   • Smoking status: Never Smoker   • Smokeless tobacco: Never Used   Substance Use Topics   • Alcohol use: Not on file   • Drug use: Not on file    and  Allergies:  Phenergan [promethazine]      Vital Signs   Temp:  [97.4 °F (36.3 °C)-98 °F (36.7 °C)] 98 °F (36.7 °C)  Heart Rate:  [] 110  Resp:  [18-20] 20  BP: ()/(54-67) 103/59    Physical Exam:     General Appearance:    Alert, cooperative, in no acute distress, oriented times three, obese with anasarca   Head:    Normocephalic, without obvious abnormality   Eyes:            no pallor, pupils CCERLA       Throat:   oral mucosa moist   Neck:   Mild JVD       Lungs:     Clear to auscultation anteriorly    Heart:    Regular rhythm and normal rate, normal S1 and S2   Chest Wall:    No abnormalities observed   Abdomen:     Normal bowel sounds, no tenderness   Rectal:     Deferred   Extremities:  Very edematous       Skin:  Extensive vasculitic rash different stages of evolution       Neurologic:   Cr Ns grossly intact, moves all extremities.     Results Review:   I reviewed the patient's new clinical results.      Lab Results (last 24 hours)     Procedure Component Value Units Date/Time    POC Glucose Once [[id number]]  (Abnormal) Collected: 09/12/20 1628    Specimen: Blood Updated: 09/12/20 1640     Glucose 237 mg/dL     Hepatic Function Panel [[id number]]  (Abnormal) Collected: 09/12/20 0149    Specimen: Blood Updated: 09/12/20 1214     Total Protein 6.0 g/dL      Albumin 3.17 g/dL      ALT (SGPT) 25 U/L      AST (SGOT) 26 U/L      Alkaline Phosphatase 87 U/L      Total Bilirubin 1.2 mg/dL      Bilirubin, Direct 0.4 mg/dL      Bilirubin, Indirect 0.8 mg/dL     POC Glucose Once [[id number]]  (Abnormal) Collected: 09/12/20 0958    Specimen: Blood Updated: 09/12/20 1006     Glucose 235 mg/dL     POC Glucose Once [[id number]]  (Abnormal) Collected: 09/12/20 0658    Specimen: Blood Updated: 09/12/20 0706     Glucose 170 mg/dL     Protime-INR [[id number]]  (Abnormal) Collected: 09/12/20 0149    Specimen: Blood Updated: 09/12/20 0657     Protime 23.3 Seconds      Comment: Note new Reference Range        INR 2.08     Narrative:      Suggested INR therapeutic range for stable oral anticoagulant therapy:    Low Intensity therapy:   1.5-2.0  Moderate Intensity therapy:   2.0-3.0  High Intensity therapy:   2.5-4.0    CBC (No Diff) [[id number]]  (Abnormal) Collected: 09/12/20 0149    Specimen: Blood Updated: 09/12/20 0654     WBC 8.65 10*3/mm3      RBC 3.77 10*6/mm3      Hemoglobin 10.5 g/dL      Hematocrit 35.4 %      MCV 93.9 fL      MCH 27.9 pg      MCHC 29.7 g/dL      RDW 21.2 %      RDW-SD 71.3 fl      MPV 9.9 fL      Platelets 139 10*3/mm3     Basic Metabolic Panel [[id number]]  (Abnormal) Collected: 09/12/20 0149    Specimen: Blood Updated: 09/12/20 0637     Glucose 169 mg/dL      BUN 50 mg/dL      Creatinine 1.40 mg/dL      Sodium 135 mmol/L      Potassium 4.7 mmol/L      Comment: Slight hemolysis detected by analyzer. Results may be affected.        Chloride 101 mmol/L      CO2 23.3 mmol/L      Calcium 9.1 mg/dL      eGFR Non African Amer 39 mL/min/1.73      BUN/Creatinine Ratio 35.7     Anion Gap 10.7 mmol/L     Narrative:      GFR Normal >60  Chronic Kidney Disease <60  Kidney Failure <15      Magnesium [[id number]]  (Normal) Collected: 09/12/20 0149    Specimen: Blood Updated: 09/12/20 0637     Magnesium 2.2 mg/dL     Blood Culture - Blood, Arm, Right [[id number]] Collected: 09/10/20 0134    Specimen: Blood from Arm, Right Updated: 09/12/20 0145     Blood Culture No growth at 2 days    Blood Culture - Blood, Arm, Left [[id number]] Collected: 09/10/20 0058    Specimen: Blood from Arm, Left Updated: 09/12/20 0115     Blood Culture No growth at 2 days    POC Glucose Once [[id number]]  (Abnormal) Collected: 09/11/20 2054    Specimen: Blood Updated: 09/11/20 2103     Glucose 225 mg/dL           Imaging Results (Last 24 Hours)     ** No results found for the last 24 hours. **                    Assessment and Plan:    1.  CKD stage III  2.  Vasculitis  3.  Anasarca  4.  Chronic systolic CHF  5.  Cirrhosis  6.   Diabetes    Recommendations at this point are to go ahead and stop the valsartan while aggressive diuresis is in progress.  Check for proteinuria.  Order serologies for vasculitis and lupus.  And get records from [facility].          Ramiro Madrid MD  09/12/20  19:43 EDT

## 2020-09-13 LAB
ALBUMIN SERPL-MCNC: 3.25 G/DL (ref 3.5–5.2)
ALBUMIN UR-MCNC: 3.2 MG/DL
ALP SERPL-CCNC: 86 U/L (ref 39–117)
ALT SERPL W P-5'-P-CCNC: 25 U/L (ref 1–33)
ANION GAP SERPL CALCULATED.3IONS-SCNC: 9 MMOL/L (ref 5–15)
AST SERPL-CCNC: 26 U/L (ref 1–32)
BILIRUB CONJ SERPL-MCNC: 0.5 MG/DL (ref 0–0.3)
BILIRUB INDIRECT SERPL-MCNC: 1.2 MG/DL
BILIRUB SERPL-MCNC: 1.7 MG/DL (ref 0–1.2)
BUN SERPL-MCNC: 45 MG/DL (ref 6–20)
BUN/CREAT SERPL: 32.8 (ref 7–25)
C3 SERPL-MCNC: 90 MG/DL (ref 82–167)
C4 SERPL-MCNC: 16 MG/DL (ref 14–44)
CALCIUM SPEC-SCNC: 9 MG/DL (ref 8.6–10.5)
CHLORIDE SERPL-SCNC: 100 MMOL/L (ref 98–107)
CO2 SERPL-SCNC: 30 MMOL/L (ref 22–29)
CREAT SERPL-MCNC: 1.37 MG/DL (ref 0.57–1)
CREAT UR-MCNC: 46.3 MG/DL
CREAT UR-MCNC: 46.3 MG/DL
DEPRECATED RDW RBC AUTO: 75.7 FL (ref 37–54)
ERYTHROCYTE [DISTWIDTH] IN BLOOD BY AUTOMATED COUNT: 21.1 % (ref 12.3–15.4)
GFR SERPL CREATININE-BSD FRML MDRD: 40 ML/MIN/1.73
GLUCOSE BLDC GLUCOMTR-MCNC: 131 MG/DL (ref 70–130)
GLUCOSE BLDC GLUCOMTR-MCNC: 141 MG/DL (ref 70–130)
GLUCOSE BLDC GLUCOMTR-MCNC: 190 MG/DL (ref 70–130)
GLUCOSE BLDC GLUCOMTR-MCNC: 97 MG/DL (ref 70–130)
GLUCOSE SERPL-MCNC: 155 MG/DL (ref 65–99)
HCT VFR BLD AUTO: 32.1 % (ref 34–46.6)
HGB BLD-MCNC: 9.1 G/DL (ref 12–15.9)
INR PPP: 2.18 (ref 0.9–1.1)
MAGNESIUM SERPL-MCNC: 2.1 MG/DL (ref 1.6–2.6)
MCH RBC QN AUTO: 28 PG (ref 26.6–33)
MCHC RBC AUTO-ENTMCNC: 28.3 G/DL (ref 31.5–35.7)
MCV RBC AUTO: 98.8 FL (ref 79–97)
MICROALBUMIN/CREAT UR: 69.1 MG/G
NT-PROBNP SERPL-MCNC: 1788 PG/ML (ref 0–900)
PLATELET # BLD AUTO: 141 10*3/MM3 (ref 140–450)
PMV BLD AUTO: 9.6 FL (ref 6–12)
POTASSIUM SERPL-SCNC: 4.8 MMOL/L (ref 3.5–5.2)
PROT SERPL-MCNC: 5.8 G/DL (ref 6–8.5)
PROT UR-MCNC: 9 MG/DL
PROT/CREAT UR: 194.4 MG/G CREA (ref 0–200)
PROTHROMBIN TIME: 24.2 SECONDS (ref 11.9–14.1)
RBC # BLD AUTO: 3.25 10*6/MM3 (ref 3.77–5.28)
SODIUM SERPL-SCNC: 139 MMOL/L (ref 136–145)
WBC # BLD AUTO: 6.56 10*3/MM3 (ref 3.4–10.8)

## 2020-09-13 PROCEDURE — 86256 FLUORESCENT ANTIBODY TITER: CPT | Performed by: INTERNAL MEDICINE

## 2020-09-13 PROCEDURE — 99231 SBSQ HOSP IP/OBS SF/LOW 25: CPT | Performed by: NURSE PRACTITIONER

## 2020-09-13 PROCEDURE — 99233 SBSQ HOSP IP/OBS HIGH 50: CPT | Performed by: FAMILY MEDICINE

## 2020-09-13 PROCEDURE — 94799 UNLISTED PULMONARY SVC/PX: CPT

## 2020-09-13 PROCEDURE — 93010 ELECTROCARDIOGRAM REPORT: CPT | Performed by: INTERNAL MEDICINE

## 2020-09-13 PROCEDURE — 83520 IMMUNOASSAY QUANT NOS NONAB: CPT | Performed by: INTERNAL MEDICINE

## 2020-09-13 PROCEDURE — 63710000001 ONDANSETRON PER 8 MG: Performed by: FAMILY MEDICINE

## 2020-09-13 PROCEDURE — 25010000002 FUROSEMIDE PER 20 MG: Performed by: FAMILY MEDICINE

## 2020-09-13 PROCEDURE — 93005 ELECTROCARDIOGRAM TRACING: CPT | Performed by: INTERNAL MEDICINE

## 2020-09-13 PROCEDURE — 63710000001 INSULIN ASPART PER 5 UNITS: Performed by: PHYSICIAN ASSISTANT

## 2020-09-13 PROCEDURE — 80048 BASIC METABOLIC PNL TOTAL CA: CPT | Performed by: FAMILY MEDICINE

## 2020-09-13 PROCEDURE — 25010000002 FUROSEMIDE PER 20 MG: Performed by: INTERNAL MEDICINE

## 2020-09-13 PROCEDURE — 63710000001 PREDNISONE PER 5 MG: Performed by: FAMILY MEDICINE

## 2020-09-13 PROCEDURE — 86225 DNA ANTIBODY NATIVE: CPT | Performed by: INTERNAL MEDICINE

## 2020-09-13 PROCEDURE — 80076 HEPATIC FUNCTION PANEL: CPT | Performed by: FAMILY MEDICINE

## 2020-09-13 PROCEDURE — 82962 GLUCOSE BLOOD TEST: CPT

## 2020-09-13 PROCEDURE — 85027 COMPLETE CBC AUTOMATED: CPT | Performed by: FAMILY MEDICINE

## 2020-09-13 PROCEDURE — 63710000001 INSULIN ASPART PER 5 UNITS: Performed by: INTERNAL MEDICINE

## 2020-09-13 PROCEDURE — 83735 ASSAY OF MAGNESIUM: CPT | Performed by: FAMILY MEDICINE

## 2020-09-13 PROCEDURE — 83880 ASSAY OF NATRIURETIC PEPTIDE: CPT | Performed by: NURSE PRACTITIONER

## 2020-09-13 PROCEDURE — 25010000002 MORPHINE PER 10 MG: Performed by: FAMILY MEDICINE

## 2020-09-13 PROCEDURE — 85610 PROTHROMBIN TIME: CPT | Performed by: PHYSICIAN ASSISTANT

## 2020-09-13 PROCEDURE — 86160 COMPLEMENT ANTIGEN: CPT | Performed by: INTERNAL MEDICINE

## 2020-09-13 PROCEDURE — 63710000001 INSULIN DETEMIR PER 5 UNITS: Performed by: INTERNAL MEDICINE

## 2020-09-13 PROCEDURE — 86038 ANTINUCLEAR ANTIBODIES: CPT | Performed by: INTERNAL MEDICINE

## 2020-09-13 RX ORDER — CARVEDILOL 6.25 MG/1
12.5 TABLET ORAL 2 TIMES DAILY WITH MEALS
Status: DISCONTINUED | OUTPATIENT
Start: 2020-09-13 | End: 2020-09-13

## 2020-09-13 RX ORDER — CARVEDILOL 6.25 MG/1
6.25 TABLET ORAL 2 TIMES DAILY WITH MEALS
Status: DISCONTINUED | OUTPATIENT
Start: 2020-09-13 | End: 2020-09-14

## 2020-09-13 RX ORDER — FUROSEMIDE 10 MG/ML
60 INJECTION INTRAMUSCULAR; INTRAVENOUS EVERY 12 HOURS
Status: DISCONTINUED | OUTPATIENT
Start: 2020-09-13 | End: 2020-09-15

## 2020-09-13 RX ADMIN — WARFARIN 4 MG: 3 TABLET ORAL at 15:46

## 2020-09-13 RX ADMIN — SPIRONOLACTONE 25 MG: 25 TABLET ORAL at 08:32

## 2020-09-13 RX ADMIN — MORPHINE SULFATE 4 MG: 4 INJECTION, SOLUTION INTRAMUSCULAR; INTRAVENOUS at 08:43

## 2020-09-13 RX ADMIN — ASPIRIN 81 MG: 81 TABLET, COATED ORAL at 08:32

## 2020-09-13 RX ADMIN — SODIUM CHLORIDE, PRESERVATIVE FREE 10 ML: 5 INJECTION INTRAVENOUS at 08:34

## 2020-09-13 RX ADMIN — INSULIN ASPART 5 UNITS: 100 INJECTION, SOLUTION INTRAVENOUS; SUBCUTANEOUS at 11:46

## 2020-09-13 RX ADMIN — ROPINIROLE HYDROCHLORIDE 1 MG: 1 TABLET, FILM COATED ORAL at 15:46

## 2020-09-13 RX ADMIN — ROPINIROLE HYDROCHLORIDE 1 MG: 1 TABLET, FILM COATED ORAL at 08:32

## 2020-09-13 RX ADMIN — PREGABALIN 100 MG: 100 CAPSULE ORAL at 20:28

## 2020-09-13 RX ADMIN — SODIUM CHLORIDE, PRESERVATIVE FREE 10 ML: 5 INJECTION INTRAVENOUS at 20:28

## 2020-09-13 RX ADMIN — PREDNISONE 2.5 MG: 5 TABLET ORAL at 08:32

## 2020-09-13 RX ADMIN — FUROSEMIDE 40 MG: 10 INJECTION, SOLUTION INTRAMUSCULAR; INTRAVENOUS at 08:32

## 2020-09-13 RX ADMIN — INSULIN ASPART 3 UNITS: 100 INJECTION, SOLUTION INTRAVENOUS; SUBCUTANEOUS at 11:47

## 2020-09-13 RX ADMIN — FUROSEMIDE 60 MG: 20 INJECTION, SOLUTION INTRAMUSCULAR; INTRAVENOUS at 15:47

## 2020-09-13 RX ADMIN — POLYETHYLENE GLYCOL 3350 17 G: 17 POWDER, FOR SOLUTION ORAL at 08:32

## 2020-09-13 RX ADMIN — ROPINIROLE HYDROCHLORIDE 1 MG: 1 TABLET, FILM COATED ORAL at 20:28

## 2020-09-13 RX ADMIN — SODIUM CHLORIDE, PRESERVATIVE FREE 10 ML: 5 INJECTION INTRAVENOUS at 08:35

## 2020-09-13 RX ADMIN — CARVEDILOL 6.25 MG: 6.25 TABLET, FILM COATED ORAL at 15:46

## 2020-09-13 RX ADMIN — FOLIC ACID 1 MG: 1 TABLET ORAL at 08:32

## 2020-09-13 RX ADMIN — INSULIN ASPART 5 UNITS: 100 INJECTION, SOLUTION INTRAVENOUS; SUBCUTANEOUS at 08:33

## 2020-09-13 RX ADMIN — CARVEDILOL 6.25 MG: 6.25 TABLET, FILM COATED ORAL at 08:32

## 2020-09-13 RX ADMIN — MORPHINE SULFATE 4 MG: 4 INJECTION, SOLUTION INTRAMUSCULAR; INTRAVENOUS at 15:46

## 2020-09-13 RX ADMIN — AMIODARONE HYDROCHLORIDE 200 MG: 200 TABLET ORAL at 08:32

## 2020-09-13 RX ADMIN — PREGABALIN 100 MG: 100 CAPSULE ORAL at 08:32

## 2020-09-13 RX ADMIN — DOCUSATE SODIUM 50 MG AND SENNOSIDES 8.6 MG 2 TABLET: 8.6; 5 TABLET, FILM COATED ORAL at 20:28

## 2020-09-13 RX ADMIN — VENLAFAXINE HYDROCHLORIDE 75 MG: 75 CAPSULE, EXTENDED RELEASE ORAL at 08:32

## 2020-09-13 RX ADMIN — INSULIN ASPART 5 UNITS: 100 INJECTION, SOLUTION INTRAVENOUS; SUBCUTANEOUS at 16:39

## 2020-09-13 RX ADMIN — MORPHINE SULFATE 4 MG: 4 INJECTION, SOLUTION INTRAMUSCULAR; INTRAVENOUS at 03:05

## 2020-09-13 RX ADMIN — ONDANSETRON HYDROCHLORIDE 4 MG: 4 TABLET, FILM COATED ORAL at 20:50

## 2020-09-13 RX ADMIN — ONDANSETRON HYDROCHLORIDE 4 MG: 4 TABLET, FILM COATED ORAL at 13:16

## 2020-09-13 RX ADMIN — INSULIN DETEMIR 10 UNITS: 100 INJECTION, SOLUTION SUBCUTANEOUS at 20:29

## 2020-09-13 NOTE — PLAN OF CARE
Goal Outcome Evaluation:  Plan of Care Reviewed With: patient  Progress: improving  Outcome Summary: Patient tolerated shift well.  Requests pain meds frequently.  PT alert and cooperative to care. PT has been on RA this morning with SpO2 remaining above 93% PT does use O2 at night per her baseline. PT expresses arthritis pain to BLE and BUE and her back . PRN Morphine controls the pain, but only for about 3 hrs. PT up with assist to bedside commode

## 2020-09-13 NOTE — PROGRESS NOTES
"    [facility]    Name:  Yumiko Purdy   Age:  54 y.o.  Sex:  female  :  1965  MRN:  [id number]   Visit Number:  [id number]  Admission Date:  2020  Date Of Service:  20  Primary Care Physician:  Niko Schaefer MD     LOS: 3 days :  Patient Care Team:  Niko Schaefer MD as PCP - General (Gastroenterology):    Chief Complaint:      Swelling and rash    Subjective / Interval History:   Patient seen and examined bedside  No adverse events overnight  Patient denies chest pain, abdominal pain, extremity pain  \"Heart racing this am\"      Vital Signs:    Temp:  [97.4 °F (36.3 °C)-98.4 °F (36.9 °C)] 97.7 °F (36.5 °C)  Heart Rate:  [] 106  Resp:  [18-20] 20  BP: (103-119)/(59-71) 116/71    Intake and output:    I/O last 3 completed shifts:  In: 1580 [P.O.:1580]  Out: 4960 [Urine:4960]  No intake/output data recorded.    Physical Examination:    General Appearance:  Alert and cooperative, not in any acute distress.   Head:  Atraumatic and normocephalic, without obvious abnormality.   Eyes:          PERRLA, conjunctivae and sclerae normal, no Icterus. No pallor. Extra-occular movements are within normal limits.   Neck: Supple, trachea midline, no thyromegaly, no carotid bruit.   Lungs:   Chest shape is normal. Breath sounds heard bilaterally equally.  No crackles or wheezing. No Pleural rub or bronchial breathing.   Heart:  Sinus tachy, normal S1 and S2, no murmur, no gallop, no rub. No JVD   Abdomen:   Normal bowel sounds, no masses, no organomegaly. Soft       non-tender, non-distended, no guarding, no rebound tenderness.   Extremities: Moves all extremities well, 2+ BL LE edema, no cyanosis, no   clubbing.   Skin: No bleeding, bruising or rash.   Neurologic: Awake, alert and oriented times 3. Moves all 4 extremities equally.   Laboratory results:    Results from last 7 days   Lab Units 20  0343 20  0149 20  0044 09/10/20  1516 09/10/20  0058 "   SODIUM mmol/L 139 135* 134* 137 128*   POTASSIUM mmol/L 4.8 4.7 5.3* 5.4* 4.9   CHLORIDE mmol/L 100 101 97* 98 95*   CO2 mmol/L 30.0* 23.3 25.2 17.9* 27.8   BUN mg/dL 45* 50* 41* 36* 33*   CREATININE mg/dL 1.37* 1.40* 1.30* 1.20* 1.24*   CALCIUM mg/dL 9.0 9.1 9.1 9.2 8.8   BILIRUBIN mg/dL 1.7* 1.2  --  2.1* 2.0*   ALK PHOS U/L 86 87  --   --  105   ALT (SGPT) U/L 25 25  --   --  25   AST (SGOT) U/L 26 26  --   --  24   GLUCOSE mg/dL 155* 169* 376* 337* 379*     Results from last 7 days   Lab Units 09/13/20  0343 09/12/20  0149 09/11/20  0044   WBC 10*3/mm3 6.56 8.65 9.14   HEMOGLOBIN g/dL 9.1* 10.5* 9.7*   HEMATOCRIT % 32.1* 35.4 34.2   PLATELETS 10*3/mm3 141 139* 198     Results from last 7 days   Lab Units 09/13/20  0343 09/12/20  0149 09/11/20  0044   INR  2.18* 2.08* 1.72*     Results from last 7 days   Lab Units 09/11/20  0044 09/10/20  1727 09/10/20  0921   TROPONIN T ng/mL 0.027 0.024 0.029     Results from last 7 days   Lab Units 09/10/20  0134 09/10/20  0058   BLOODCX  No growth at 3 days No growth at 3 days       I have reviewed the patient's laboratory results.    Radiology results:    Imaging Results (Last 24 Hours)     ** No results found for the last 24 hours. **          I have reviewed the patient's radiology reports.    Medication Review:     I have reviewed the patients active and prn medications.       Assessment:    Acute on chronic congestive heart failure (CMS/HCC)          Plan:    Anasarca  Secondary to AE sCHF  ECHO 9/10/2020 reveals 21-25% EF w/out filling defects noted  Appreciate Cards seeing our patient  Continue diuresis w goal ~ 1 L per day negative balance     Decompensated AE sCHF  BNP 3,325  Continue diuresis  Appreciate cards  Starting entresto   Severely reduced EF 21-25%  Heart failure clinic participation after DC     Elevated troponin  Mild,  0.045>> 0.024>> 0.027  Possibly from fluid overload straining myocardium  Cardiology not concerned for ischemia     Hyperglycemia  secondary to DM2 and contributing steroid effect  Continue to treat with correctional insulin after ACHS accuchecks along with long acting scheduled insulin  Taper down steroids from initial 125 mg dose from ER     Vasculitis   Annular lesions throughout upper and lower torso/extremities in various stages of healing.  Wound care would be appropriate  Nephrology consulted     Paroxysmal atrial fibrillation  Currently in NSR  Continue TELE  Electrolytes WNL  Appreciate Cards  Appreciate INR monitoring Pharmacy provides  Currently INR subtherapeutic on home dose warfarin  Pharmacy adjusting dose to get to target INR     Subtherapeutic INR  Appreciate Pharmacy monitoring and adjusting warfarin  Currently 2.18  4.5 mg warfarin dose        Cirrhosis with hyperbilirubinemia  Avoid hepatotoxic medications.  Direct bilirubin elevated as well as total bili  Continue monitoring bilirubin  Transaminases WNL  Secondary to alcohol abuse previous dependence     IDRIS, iatrogenic  Creatinine normal 6/29/2016  POA was 1.24  Could be from chronic diuretic therapy vs recent hospitalization with attempts at aggressive diuresis  Continue to monitor        Warfarin as VTE prophylaxis  Carb conscious and cardiac diet          40 minutes was spent in chart review, examining the patient and formulating new plans for care.    Ronald uGajardo DO  09/13/20  11:26 EDT

## 2020-09-13 NOTE — PLAN OF CARE
Problem: Skin Injury Risk (Adult)  Intervention: Prevent/Manage Excess Moisture  Recent Flowsheet Documentation  Taken 9/12/2020 2113 by Cindy Lubin, RN  Skin Protection:   adhesive use limited   incontinence pads utilized  Intervention: Prevent/Minimize Shear/Friction Injuries  Recent Flowsheet Documentation  Taken 9/12/2020 2113 by Cindy Lubin, RN  Pressure Reduction Devices:   positioning supports utilized   pressure-redistributing mattress utilized   foam padding utilized  Intervention: Prevent or Minimize Pressure  Recent Flowsheet Documentation  Taken 9/12/2020 2113 by Cindy Lubin, RN  Pressure Reduction Techniques:   frequent weight shift encouraged   heels elevated off bed   weight shift assistance provided

## 2020-09-13 NOTE — PROGRESS NOTES
LOS: 3 days     Name: Yumiko Purdy  Age/Sex: 54 y.o. female  :  1965        PCP: Niko Schaefer MD  REF: No ref. provider found    Active Problems:    Acute on chronic congestive heart failure (CMS/[facility])      Reason for follow-up: Congestive heart failure and cardiomyopathy    Subjective       Subjective     Yumiko Purdy is a 54 year old female with a past medical history significant for chronic atrial fibrillation, congestive heart failure, diabetes mellitus and cirrhosis of the liver.  Patient presented to the ER with complaints of lower extremity edema, abdominal distention and rash.  Patient states that for the last week she has had worsening lower extremity edema that goes up into her knees all the way up into her abdomen.  She also has had worsening shortness of breath and orthopnea.  He denies any recent chest pains.  She states that she follows with her PCP who manages her water pills and heart medications.  She was recently seen at [facility] on Monday and Wednesday of this week with similar complaints and was given single doses of diuretics without much improvement of her symptoms.  She also reports she is a diffuse rash for last few months and was told that she had vasculitis.  She was worked up for this at [place] in May 2020.  She was taken off Coumadin briefly because it was thought that this may be causing the rash however there was no improvement noted so she was placed back on Xarelto.  She states that she had a left heart catheterization a couple years ago at [facility] that showed nonobstructive CAD however these records are unavailable.  Patient did receive Lasix IV in the ER and reports that her breathing has had mild improvement.  Creatinine 1.24 on admission.    Interval History: Patient continues to have good urine output with IV diuresis.  Creatinine slightly improved at 1.37 from 1.4.  Valsartan has been discontinued due to  worsening renal function.  Patient reports that her breathing has slightly improved today.  She does report increased pain in her lower extremities.  Denies any chest pain.      Vital Signs  Temp:  [97.4 °F (36.3 °C)-98.4 °F (36.9 °C)] 97.4 °F (36.3 °C)  Heart Rate:  [] 119  Resp:  [18-20] 18  BP: ()/(54-71) 116/71     Vital Signs (last 72 hrs)       09/10 0700  -  09/11 0659 09/11 0700  -  09/12 0659 09/12 0700  -  09/13 0659 09/13 0700  -  09/13 0820   Most Recent    Temp (°F) 97 -  98.4    97.2 -  97.7    97.4 -  98.4       97.4 (36.3)    Heart Rate 112 -  116    99 -  116    97 -  119       119    Resp 18 -  22    18 -  20    18 -  20       18    /78 -  154/100    100/71 -  119/82    98/54 -  119/70       116/71    SpO2 (%) 92 -  100    93 -  99    94 -  99       99        Body mass index is 49.62 kg/m².    Intake/Output Summary (Last 24 hours) at 9/13/2020 0820  Last data filed at 9/13/2020 0531  Gross per 24 hour   Intake 860 ml   Output 4160 ml   Net -3300 ml     Objective    Objective       Physical Exam:     General Appearance:   Obese, alert, cooperative, in no acute distress   Head:    Normocephalic, without obvious abnormality, atraumatic   Eyes:            Conjunctivae and sclerae normal, no   icterus, no pallor, corneas clear.   Neck:   No adenopathy, supple, trachea midline, no thyromegaly, no   carotid bruit, JVD   Lungs:    Clear to auscultation anteriorly,respirations regular, even and    unlabored    Heart:    Regular rhythm and tachycardia, normal S1 and S2, no            murmur, no gallop, no rub, no click   Chest Wall:    No abnormalities observed   Abdomen:     Normal bowel sounds, no masses, no organomegaly, soft        non-tender, non-distended, no guarding, no rebound                tenderness   Extremities:   Moves all extremities well, no edema, no cyanosis, no             redness   Pulses:   Pulses palpable and equal bilaterally   Skin:   No bleeding, bruising,  vasculitic rash on bilateral lower extremities and bilateral upper extremities       Neurologic:  Alert and oriented   I have seen and performed a physical examination today.     Results review       Results Review:   Results from last 7 days   Lab Units 09/13/20  0343 09/12/20  0149 09/11/20  0044 09/10/20  0058   WBC 10*3/mm3 6.56 8.65 9.14 10.26   HEMOGLOBIN g/dL 9.1* 10.5* 9.7* 10.2*   PLATELETS 10*3/mm3 141 139* 198 199     Results from last 7 days   Lab Units 09/13/20  0343 09/12/20  0149 09/11/20  0044 09/10/20  1516 09/10/20  0058   SODIUM mmol/L 139 135* 134* 137 128*   POTASSIUM mmol/L 4.8 4.7 5.3* 5.4* 4.9   CHLORIDE mmol/L 100 101 97* 98 95*   CO2 mmol/L 30.0* 23.3 25.2 17.9* 27.8   BUN mg/dL 45* 50* 41* 36* 33*   CREATININE mg/dL 1.37* 1.40* 1.30* 1.20* 1.24*   CALCIUM mg/dL 9.0 9.1 9.1 9.2 8.8   GLUCOSE mg/dL 155* 169* 376* 337* 379*   ALT (SGPT) U/L 25 25  --   --  25   AST (SGOT) U/L 26 26  --   --  24     Results from last 7 days   Lab Units 09/11/20  0044 09/10/20  1727 09/10/20  0921 09/10/20  0256 09/10/20  0058   TROPONIN T ng/mL 0.027 0.024 0.029 0.034* 0.045*     Lab Results   Component Value Date    INR 2.18 (H) 09/13/2020    INR 2.08 (H) 09/12/2020    INR 1.72 (H) 09/11/2020    INR 1.64 (H) 09/10/2020    INR 0.90 06/02/2016    INR 0.97 04/20/2016     Lab Results   Component Value Date    MG 2.1 09/13/2020    MG 2.2 09/12/2020    MG 2.1 09/11/2020     Lab Results   Component Value Date    TSH 3.830 09/10/2020    CHLPL 103 04/21/2016    TRIG 80 09/11/2020    HDL 43 09/11/2020    LDL 78 09/11/2020      Imaging Results (Last 48 Hours)     ** No results found for the last 48 hours. **        Lab Results   Component Value Date    BNP 86 05/08/2016     Echo   Results for orders placed during the hospital encounter of 09/09/20   Adult Transthoracic Echo Complete W/ Cont if Necessary Per Protocol    Narrative · The left ventricular cavity is mildly dilated.  · The following left ventricular wall  segments are hypokinetic: mid   anterior, apical lateral, apical inferior, apical septal, basal   inferoseptal, mid inferoseptal, apex hypokinetic, mid anteroseptal, basal   anterior and basal inferoseptal.  · Left ventricular systolic function is severely decreased.  · Estimated EF appears to be in the range of 21 - 25%.  · The aortic valve is structurally normal. No aortic valve regurgitation   is present. No aortic valve stenosis is present.  · The mitral valve is normal in structure. Mild mitral valve regurgitation   is present. No significant mitral valve stenosis is present.  · The tricuspid valve is normal. Mild to moderate tricuspid valve   regurgitation is present. Estimated right ventricular systolic pressure   from tricuspid regurgitation is normal (<35 mmHg).  · There is no evidence of pericardial effusion.         I reviewed the patient's new clinical results.    Telemetry: Sinus tachycardia 110-120s       Medication Review:   amiodarone, 200 mg, Oral, Daily  aspirin, 81 mg, Oral, Daily  carvedilol, 6.25 mg, Oral, BID With Meals  folic acid, 1 mg, Oral, Daily  furosemide, 40 mg, Intravenous, Q8H  insulin aspart, 0-14 Units, Subcutaneous, TID AC  insulin aspart, 5 Units, Subcutaneous, TID With Meals  insulin detemir, 10 Units, Subcutaneous, Nightly  polyethylene glycol, 17 g, Oral, Daily  predniSONE, 2.5 mg, Oral, Daily  pregabalin, 100 mg, Oral, Q12H  rOPINIRole, 1 mg, Oral, TID  senna-docusate sodium, 2 tablet, Oral, Nightly  sodium chloride, 10 mL, Intravenous, Q12H  sodium chloride, 10 mL, Intravenous, Q12H  spironolactone, 25 mg, Oral, Daily  venlafaxine XR, 75 mg, Oral, Daily  warfarin, 4 mg, Oral, Once        Pharmacy to dose warfarin,         Assessment      Assessment:  1. Acute on chronic combined systolic and diastolic congestive heart failure, improving   2. Newly diagnosed advanced cardiomyopathy with LV ejection fraction of about 20-25%   3. 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  4. Elevated troponin, trending up to 0.045 now trending down to normal, EKG tracing reviewed and shows no acute ischemia  5. Paroxysmal atrial fibrillation, on Coumadin, INR 2.18, on amiodarone   6. Acute kidney injury, creatinine 1.37, slight improvement from 1.4 yesterday   7. Diabetes mellitus type 2  8. Cirrhosis of the liver    Plan     Recommendations:  1. Cardiomyopathy, CHF, elevated troponin  · Patient he has had good urine output with IV diuresis.  Creatinine 1.37 from 1.4 yesterday.  Nephrology is following and has discontinued valsartan in the setting of diuresis with worsening kidney function.  Appreciate their recommendations.  Will recheck proBNP and continue with IV diuresis and monitor kidney function closely.  · Pharmacy checked on the cost of Entresto and it was approved with a $0 co-pay.  Once kidney function stabilizes and blood pressure remains stable will consider adding Entresto for her cardiomyopathy.  · Patient will need to undergo left heart catheterization due to decreased LV function and elevated troponin.  We will plan for this when patient is more compensated from a heart failure standpoint and kidney function stabilizes.  Will keep n.p.o. after midnight and reassess kidney function and volume status in the a.m.  · Patient noted to be tachycardic today in the 120s. WBC within normal limits.  She reports increased pain in her lower extremities which may be contributing to her tachycardia. Will consider increasing Coreg if tachycardia persists however blood pressure is borderline.  · LifeVest has been ordered and will need to be placed after left heart catheterization is performed.  · Outpatient referral to heart failure clinic has been placed.      I discussed the patients findings and my recommendations with patient and family      SandyKENNY Maki  09/13/20  08:20 EDT    Please note that portions of this note were completed with a voice recognition program.

## 2020-09-13 NOTE — PROGRESS NOTES
"Interval History:     Patient Complaints: Patient is still edematous.  She has diuresed well.  Her creatinine is stable.  We have done an extensive review of records obtained from [facility] from June 2020.  Her serologies for systemic illness were negative and that includes lupus and vasculitis and hepatitis and HIV and rheumatoid arthritis.      She did undergo a skin biopsy then and lesion demonstrated dermatitis with eosinophils and hemorrhage.  Vasculitis was not seen in those sections.  I paraphrase the comment \"a drug reaction, and amino bullous disease and a reaction to an arthro-product assault could all account for these changes\".  The patient had been advised dermatology follow-up with [facility] dermatology in [place] as well as a rheumatology follow-up with the [facility].  I do not believe she has gone for either    Additional note is made of the fact that the rheumatologist felt that Coumadin could have been contributing to this rash and she had been discharged home on Xarelto.  I make this comment as she is on Coumadin now.  Noted that her antiphospholipid panel at that time was negative. It would seem less likely as a drug induced reaction would become a lot worse if the drug was continued for such length of time     I note cardiology plans for possible cardiac catheterization in the morning        Vital Signs  Temp:  [97.4 °F (36.3 °C)-98.4 °F (36.9 °C)] 97.7 °F (36.5 °C)  Heart Rate:  [] 106  Resp:  [18-20] 20  BP: (103-119)/(59-71) 116/71    Physical Exam:    General:           No distress      HEENT:  Pallor               Neck:  JVD       Lungs:    Basal crackles   Heart:   regular,  no rub       Abdomen:   Normal bowel sounds, soft non-tender, non-distended, no guarding       Extremities:  Edema and extensive rash annular and hemorrhagic and ecchymotic       Skin:  As above       Neurologic: Cranial nerves grossly intact,  " moves all extremities.        Results Review:    I reviewed the patient's new clinical results.    Lab Results (last 24 hours)     Procedure Component Value Units Date/Time    C3 Complement [[id number]]  (Normal) Collected: 09/13/20 0343    Specimen: Blood Updated: 09/13/20 1253     C3 Complement 90.0 mg/dl     C4 Complement [[id number]]  (Normal) Collected: 09/13/20 0343    Specimen: Blood Updated: 09/13/20 1253     C4 Complement 16.0 mg/dl     Protein / Creatinine Ratio, Urine - Urine, Clean Catch [[id number]] Collected: 09/12/20 1944    Specimen: Urine, Clean Catch Updated: 09/13/20 1245     Protein/Creatinine Ratio, Urine 194.4 mg/G Crea      Creatinine, Urine 46.3 mg/dL      Total Protein, Urine 9.0 mg/dL     Microalbumin / Creatinine Urine Ratio - Urine, Clean Catch [[id number]] Collected: 09/12/20 1944    Specimen: Urine, Clean Catch Updated: 09/13/20 1245     Microalbumin/Creatinine Ratio 69.1 mg/g      Creatinine, Urine 46.3 mg/dL      Microalbumin, Urine 3.2 mg/dL     BNP [[id number]]  (Abnormal) Collected: 09/13/20 0343    Specimen: Blood Updated: 09/13/20 1118     proBNP 1,788.0 pg/mL     Narrative:      Among patients with dyspnea, NT-proBNP is highly sensitive for the detection of acute congestive heart failure. In addition NT-proBNP of <300 pg/ml effectively rules out acute congestive heart failure with 99% negative predictive value.    Results may be falsely decreased if patient taking Biotin.      POC Glucose Once [[id number]]  (Abnormal) Collected: 09/13/20 0958    Specimen: Blood Updated: 09/13/20 1025     Glucose 190 mg/dL     POC Glucose Once [[id number]]  (Abnormal) Collected: 09/13/20 0612    Specimen: Blood Updated: 09/13/20 0629     Glucose 131 mg/dL     CBC (No Diff) [[id number]]  (Abnormal) Collected: 09/13/20 0343    Specimen: Blood Updated: 09/13/20 0512     WBC 6.56 10*3/mm3      RBC 3.25 10*6/mm3      Hemoglobin 9.1 g/dL      Hematocrit 32.1 %      MCV 98.8 fL      MCH 28.0 pg      MCHC 28.3  g/dL      RDW 21.1 %      RDW-SD 75.7 fl      MPV 9.6 fL      Platelets 141 10*3/mm3     Basic Metabolic Panel [[id number]]  (Abnormal) Collected: 09/13/20 0343    Specimen: Blood Updated: 09/13/20 0455     Glucose 155 mg/dL      BUN 45 mg/dL      Creatinine 1.37 mg/dL      Sodium 139 mmol/L      Potassium 4.8 mmol/L      Chloride 100 mmol/L      CO2 30.0 mmol/L      Calcium 9.0 mg/dL      eGFR Non African Amer 40 mL/min/1.73      BUN/Creatinine Ratio 32.8     Anion Gap 9.0 mmol/L     Narrative:      GFR Normal >60  Chronic Kidney Disease <60  Kidney Failure <15      Magnesium [[id number]]  (Normal) Collected: 09/13/20 0343    Specimen: Blood Updated: 09/13/20 0455     Magnesium 2.1 mg/dL     Hepatic Function Panel [[id number]]  (Abnormal) Collected: 09/13/20 0343    Specimen: Blood Updated: 09/13/20 0455     Total Protein 5.8 g/dL      Albumin 3.25 g/dL      ALT (SGPT) 25 U/L      AST (SGOT) 26 U/L      Alkaline Phosphatase 86 U/L      Total Bilirubin 1.7 mg/dL      Bilirubin, Direct 0.5 mg/dL      Bilirubin, Indirect 1.2 mg/dL     Protime-INR [[id number]]  (Abnormal) Collected: 09/13/20 0343    Specimen: Blood Updated: 09/13/20 0441     Protime 24.2 Seconds      Comment: Note new Reference Range        INR 2.18    Narrative:      Suggested INR therapeutic range for stable oral anticoagulant therapy:    Low Intensity therapy:   1.5-2.0  Moderate Intensity therapy:   2.0-3.0  High Intensity therapy:   2.5-4.0    Antinuclear Antibodies Direct [[id number]] Collected: 09/13/20 0343    Specimen: Blood Updated: 09/13/20 0423    Anti-DNA Antibody, Double-stranded [[id number]] Collected: 09/13/20 0343    Specimen: Blood Updated: 09/13/20 0423    ANCA Panel [[id number]] Collected: 09/13/20 0343    Specimen: Blood Updated: 09/13/20 0423    Blood Culture - Blood, Arm, Right [[id number]] Collected: 09/10/20 0134    Specimen: Blood from Arm, Right Updated: 09/13/20 0146     Blood Culture No growth at 3 days    Blood Culture - Blood,  Arm, Left [[id number]] Collected: 09/10/20 0058    Specimen: Blood from Arm, Left Updated: 09/13/20 0115     Blood Culture No growth at 3 days    POC Glucose Once [[id number]]  (Abnormal) Collected: 09/12/20 2012    Specimen: Blood Updated: 09/12/20 2018     Glucose 233 mg/dL     POC Glucose Once [[id number]]  (Abnormal) Collected: 09/12/20 1628    Specimen: Blood Updated: 09/12/20 1640     Glucose 237 mg/dL           Imaging Results (Last 24 Hours)     ** No results found for the last 24 hours. **          Assessment and Plan:    1.  CKD stage III  2.    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  Anasarca  4.  Chronic systolic CHF  5.  Cirrhosis  6.  Diabetes    Although I have reordered serologies, it seems unlikely that this is vasculitis but we will await serologies that I have reordered yesterday.    I have had an extensive discussion with Dr. Guajardo about the report from the [facility] and I will defer the decision to continue Coumadin to the primary team and to cardiology    I did speak to the cardiology nurse practitioner Sandy Ellis and did advise that for now I am continuing diuretics in a modified regimen, but in case cardiac catheterization is planned, I would like to hold diuretics for 24 hours to mitigate risk of dye injury    Ramiro Madrid MD  09/13/20  13:20 EDT

## 2020-09-13 NOTE — PHARMACY PATIENT ASSISTANCE
Update to previous assistance note: Entresto prior auth has been approved with a $0 copay. No further issues noted at this time.    Thank you,  Chyna Avilez, PharmD

## 2020-09-14 LAB
ALBUMIN SERPL-MCNC: 3 G/DL (ref 3.5–5.2)
ALP SERPL-CCNC: 106 U/L (ref 39–117)
ALT SERPL W P-5'-P-CCNC: 30 U/L (ref 1–33)
ANA SER QL: NEGATIVE
ANION GAP SERPL CALCULATED.3IONS-SCNC: 9.6 MMOL/L (ref 5–15)
AST SERPL-CCNC: 42 U/L (ref 1–32)
BILIRUB CONJ SERPL-MCNC: 0.6 MG/DL (ref 0–0.3)
BILIRUB INDIRECT SERPL-MCNC: 1.4 MG/DL
BILIRUB SERPL-MCNC: 2 MG/DL (ref 0–1.2)
BUN SERPL-MCNC: 49 MG/DL (ref 6–20)
BUN/CREAT SERPL: 33.6 (ref 7–25)
CALCIUM SPEC-SCNC: 8.9 MG/DL (ref 8.6–10.5)
CHLORIDE SERPL-SCNC: 100 MMOL/L (ref 98–107)
CO2 SERPL-SCNC: 28.4 MMOL/L (ref 22–29)
CREAT SERPL-MCNC: 1.46 MG/DL (ref 0.57–1)
DEPRECATED RDW RBC AUTO: 73.3 FL (ref 37–54)
DSDNA AB SER-ACNC: 1 IU/ML (ref 0–9)
ERYTHROCYTE [DISTWIDTH] IN BLOOD BY AUTOMATED COUNT: 21 % (ref 12.3–15.4)
GFR SERPL CREATININE-BSD FRML MDRD: 37 ML/MIN/1.73
GLUCOSE BLDC GLUCOMTR-MCNC: 119 MG/DL (ref 70–130)
GLUCOSE BLDC GLUCOMTR-MCNC: 183 MG/DL (ref 70–130)
GLUCOSE BLDC GLUCOMTR-MCNC: 239 MG/DL (ref 70–130)
GLUCOSE BLDC GLUCOMTR-MCNC: 246 MG/DL (ref 70–130)
GLUCOSE SERPL-MCNC: 103 MG/DL (ref 65–99)
HCT VFR BLD AUTO: 34 % (ref 34–46.6)
HGB BLD-MCNC: 9.7 G/DL (ref 12–15.9)
INR PPP: 1.97 (ref 0.9–1.1)
MAGNESIUM SERPL-MCNC: 2 MG/DL (ref 1.6–2.6)
MCH RBC QN AUTO: 27.7 PG (ref 26.6–33)
MCHC RBC AUTO-ENTMCNC: 28.5 G/DL (ref 31.5–35.7)
MCV RBC AUTO: 97.1 FL (ref 79–97)
PLATELET # BLD AUTO: 130 10*3/MM3 (ref 140–450)
PMV BLD AUTO: 9.9 FL (ref 6–12)
POTASSIUM SERPL-SCNC: 5.4 MMOL/L (ref 3.5–5.2)
PROT SERPL-MCNC: 5.7 G/DL (ref 6–8.5)
PROTHROMBIN TIME: 22.3 SECONDS (ref 11.9–14.1)
RBC # BLD AUTO: 3.5 10*6/MM3 (ref 3.77–5.28)
SODIUM SERPL-SCNC: 138 MMOL/L (ref 136–145)
WBC # BLD AUTO: 7.34 10*3/MM3 (ref 3.4–10.8)

## 2020-09-14 PROCEDURE — 82962 GLUCOSE BLOOD TEST: CPT

## 2020-09-14 PROCEDURE — 63710000001 INSULIN ASPART PER 5 UNITS: Performed by: PHYSICIAN ASSISTANT

## 2020-09-14 PROCEDURE — 80076 HEPATIC FUNCTION PANEL: CPT | Performed by: FAMILY MEDICINE

## 2020-09-14 PROCEDURE — 25010000002 FUROSEMIDE PER 20 MG: Performed by: INTERNAL MEDICINE

## 2020-09-14 PROCEDURE — 63710000001 INSULIN ASPART PER 5 UNITS: Performed by: INTERNAL MEDICINE

## 2020-09-14 PROCEDURE — 80048 BASIC METABOLIC PNL TOTAL CA: CPT | Performed by: FAMILY MEDICINE

## 2020-09-14 PROCEDURE — 63710000001 INSULIN DETEMIR PER 5 UNITS: Performed by: INTERNAL MEDICINE

## 2020-09-14 PROCEDURE — 99232 SBSQ HOSP IP/OBS MODERATE 35: CPT | Performed by: SPECIALIST

## 2020-09-14 PROCEDURE — 63710000001 PREDNISONE PER 5 MG: Performed by: FAMILY MEDICINE

## 2020-09-14 PROCEDURE — 85610 PROTHROMBIN TIME: CPT | Performed by: PHYSICIAN ASSISTANT

## 2020-09-14 PROCEDURE — 25010000002 MORPHINE PER 10 MG: Performed by: FAMILY MEDICINE

## 2020-09-14 PROCEDURE — 94799 UNLISTED PULMONARY SVC/PX: CPT

## 2020-09-14 PROCEDURE — 83735 ASSAY OF MAGNESIUM: CPT | Performed by: FAMILY MEDICINE

## 2020-09-14 PROCEDURE — 85027 COMPLETE CBC AUTOMATED: CPT | Performed by: FAMILY MEDICINE

## 2020-09-14 PROCEDURE — 99232 SBSQ HOSP IP/OBS MODERATE 35: CPT | Performed by: INTERNAL MEDICINE

## 2020-09-14 RX ORDER — CARVEDILOL 6.25 MG/1
6.25 TABLET ORAL ONCE
Status: DISCONTINUED | OUTPATIENT
Start: 2020-09-14 | End: 2020-09-15

## 2020-09-14 RX ORDER — CARVEDILOL 6.25 MG/1
12.5 TABLET ORAL 2 TIMES DAILY WITH MEALS
Status: DISCONTINUED | OUTPATIENT
Start: 2020-09-14 | End: 2020-09-15

## 2020-09-14 RX ORDER — WARFARIN SODIUM 5 MG/1
5 TABLET ORAL
Status: COMPLETED | OUTPATIENT
Start: 2020-09-14 | End: 2020-09-14

## 2020-09-14 RX ADMIN — INSULIN ASPART 5 UNITS: 100 INJECTION, SOLUTION INTRAVENOUS; SUBCUTANEOUS at 11:21

## 2020-09-14 RX ADMIN — FUROSEMIDE 60 MG: 20 INJECTION, SOLUTION INTRAMUSCULAR; INTRAVENOUS at 05:43

## 2020-09-14 RX ADMIN — ROPINIROLE HYDROCHLORIDE 1 MG: 1 TABLET, FILM COATED ORAL at 17:01

## 2020-09-14 RX ADMIN — DOCUSATE SODIUM 50 MG AND SENNOSIDES 8.6 MG 2 TABLET: 8.6; 5 TABLET, FILM COATED ORAL at 20:31

## 2020-09-14 RX ADMIN — FUROSEMIDE 60 MG: 20 INJECTION, SOLUTION INTRAMUSCULAR; INTRAVENOUS at 17:05

## 2020-09-14 RX ADMIN — AMIODARONE HYDROCHLORIDE 200 MG: 200 TABLET ORAL at 08:27

## 2020-09-14 RX ADMIN — WARFARIN 5 MG: 5 TABLET ORAL at 17:01

## 2020-09-14 RX ADMIN — INSULIN ASPART 5 UNITS: 100 INJECTION, SOLUTION INTRAVENOUS; SUBCUTANEOUS at 08:26

## 2020-09-14 RX ADMIN — CARVEDILOL 6.25 MG: 6.25 TABLET, FILM COATED ORAL at 08:27

## 2020-09-14 RX ADMIN — ROPINIROLE HYDROCHLORIDE 1 MG: 1 TABLET, FILM COATED ORAL at 08:27

## 2020-09-14 RX ADMIN — MORPHINE SULFATE 4 MG: 4 INJECTION, SOLUTION INTRAMUSCULAR; INTRAVENOUS at 04:48

## 2020-09-14 RX ADMIN — ROPINIROLE HYDROCHLORIDE 1 MG: 1 TABLET, FILM COATED ORAL at 20:31

## 2020-09-14 RX ADMIN — PREGABALIN 100 MG: 100 CAPSULE ORAL at 08:28

## 2020-09-14 RX ADMIN — INSULIN ASPART 3 UNITS: 100 INJECTION, SOLUTION INTRAVENOUS; SUBCUTANEOUS at 11:20

## 2020-09-14 RX ADMIN — SODIUM CHLORIDE, PRESERVATIVE FREE 10 ML: 5 INJECTION INTRAVENOUS at 08:29

## 2020-09-14 RX ADMIN — POLYETHYLENE GLYCOL 3350 17 G: 17 POWDER, FOR SOLUTION ORAL at 08:28

## 2020-09-14 RX ADMIN — PREDNISONE 2.5 MG: 5 TABLET ORAL at 08:27

## 2020-09-14 RX ADMIN — PREGABALIN 100 MG: 100 CAPSULE ORAL at 20:31

## 2020-09-14 RX ADMIN — INSULIN ASPART 5 UNITS: 100 INJECTION, SOLUTION INTRAVENOUS; SUBCUTANEOUS at 17:03

## 2020-09-14 RX ADMIN — INSULIN ASPART 5 UNITS: 100 INJECTION, SOLUTION INTRAVENOUS; SUBCUTANEOUS at 17:02

## 2020-09-14 RX ADMIN — INSULIN DETEMIR 10 UNITS: 100 INJECTION, SOLUTION SUBCUTANEOUS at 20:33

## 2020-09-14 RX ADMIN — MORPHINE SULFATE 4 MG: 4 INJECTION, SOLUTION INTRAMUSCULAR; INTRAVENOUS at 11:20

## 2020-09-14 RX ADMIN — SODIUM CHLORIDE, PRESERVATIVE FREE 10 ML: 5 INJECTION INTRAVENOUS at 20:31

## 2020-09-14 RX ADMIN — FOLIC ACID 1 MG: 1 TABLET ORAL at 08:27

## 2020-09-14 RX ADMIN — VENLAFAXINE HYDROCHLORIDE 75 MG: 75 CAPSULE, EXTENDED RELEASE ORAL at 08:28

## 2020-09-14 RX ADMIN — SPIRONOLACTONE 25 MG: 25 TABLET ORAL at 08:27

## 2020-09-14 RX ADMIN — ASPIRIN 81 MG: 81 TABLET, COATED ORAL at 08:27

## 2020-09-14 NOTE — PROGRESS NOTES
LOS: 4 days     Name: Yumiko Purdy  Age/Sex: 54 y.o. female  :  1965        PCP: Niko Schaefer MD  REF: No ref. provider found    Active Problems:    Acute on chronic congestive heart failure (CMS/[facility])      Reason for follow-up: Congestive heart failure and cardiomyopathy     Subjective       Subjective     Yumiko Purdy is a 54 year old female with a past medical history significant for chronic atrial fibrillation, congestive heart failure, diabetes mellitus and cirrhosis of the liver.  Patient presented to the ER with complaints of lower extremity edema, abdominal distention and rash.  Patient states that for the last week she has had worsening lower extremity edema that goes up into her knees all the way up into her abdomen.  She also has had worsening shortness of breath and orthopnea.  He denies any recent chest pains.  She states that she follows with her PCP who manages her water pills and heart medications.  She was recently seen at [facility] on Monday and Wednesday of this week with similar complaints and was given single doses of diuretics without much improvement of her symptoms.  She also reports she is a diffuse rash for last few months and was told that she had vasculitis.  She was worked up for this at [facility] in May 2020.  She was taken off Coumadin briefly because it was thought that this may be causing the rash however there was no improvement noted so she was placed back on Xarelto.  She states that she had a left heart catheterization a couple years ago at [facility] in [facility] that showed nonobstructive CAD however these records are unavailable.  Patient did receive Lasix IV in the ER and reports that her breathing has had mild improvement.  Creatinine 1.24 on admission.    Interval History: Patient in atrial fibrillation with intermittent RVR. She continues to have good urine output with IV diuresis. Creatinine today at 1.46 up from  yesterday. Breathing is improved today per her report.       Vital Signs  Temp:  [97.6 °F (36.4 °C)-98 °F (36.7 °C)] 98 °F (36.7 °C)  Heart Rate:  [] 76  Resp:  [18-20] 18  BP: (107-138)/(51-64) 109/51     Vital Signs (last 72 hrs)       09/11 0700  -  09/12 0659 09/12 0700  -  09/13 0659 09/13 0700  -  09/14 0659 09/14 0700  -  09/14 0854   Most Recent    Temp (°F) 97.2 -  97.7    97.4 -  98.4    97.6 -  98       98 (36.7)    Heart Rate 99 -  116    97 -  119    76 -  127       76    Resp 18 -  20    18 -  20    18 -  20       18    /71 -  119/82    98/54 -  119/70    107/57 -  138/63       109/51    SpO2 (%) 93 -  99    94 -  99    94 -  99       95        Body mass index is 49.01 kg/m².    Intake/Output Summary (Last 24 hours) at 9/14/2020 0854  Last data filed at 9/14/2020 0500  Gross per 24 hour   Intake --   Output 2500 ml   Net -2500 ml     Objective    Objective       Physical Exam:     General Appearance:    Alert, cooperative, in no acute distress   Head:    Normocephalic, without obvious abnormality, atraumatic   Eyes:            Conjunctivae and sclerae normal, no   icterus, no pallor, corneas clear.   Neck:   No adenopathy, supple, trachea midline, no thyromegaly, no   carotid bruit, no JVD   Lungs:     Clear to auscultation,respirations regular, even and                  unlabored    Heart:    irregular rhythm and tachycardia, normal S1 and S2, no            murmur, no gallop, no rub, no click   Chest Wall:    No abnormalities observed   Abdomen:     Normal bowel sounds, no masses, no organomegaly, soft        non-tender, non-distended, no guarding, no rebound                tenderness   Extremities:   Moves all extremities well, no edema, no cyanosis, no             redness   Pulses:   Pulses palpable and equal bilaterally   Skin:   No bleeding, bruising, diffuse rash over BLE and RUE       Neurologic:   Alert and oriented    I have seen and performed a physical examination today.      Results review       Results Review:   Results from last 7 days   Lab Units 09/14/20  0706 09/13/20  0343 09/12/20  0149 09/11/20  0044 09/10/20  0058   WBC 10*3/mm3 7.34 6.56 8.65 9.14 10.26   HEMOGLOBIN g/dL 9.7* 9.1* 10.5* 9.7* 10.2*   PLATELETS 10*3/mm3 130* 141 139* 198 199     Results from last 7 days   Lab Units 09/14/20  0514 09/13/20  0343 09/12/20  0149 09/11/20  0044 09/10/20  1516 09/10/20  0058   SODIUM mmol/L 138 139 135* 134* 137 128*   POTASSIUM mmol/L 5.4* 4.8 4.7 5.3* 5.4* 4.9   CHLORIDE mmol/L 100 100 101 97* 98 95*   CO2 mmol/L 28.4 30.0* 23.3 25.2 17.9* 27.8   BUN mg/dL 49* 45* 50* 41* 36* 33*   CREATININE mg/dL 1.46* 1.37* 1.40* 1.30* 1.20* 1.24*   CALCIUM mg/dL 8.9 9.0 9.1 9.1 9.2 8.8   GLUCOSE mg/dL 103* 155* 169* 376* 337* 379*   ALT (SGPT) U/L 30 25 25  --   --  25   AST (SGOT) U/L 42* 26 26  --   --  24     Results from last 7 days   Lab Units 09/11/20  0044 09/10/20  1727 09/10/20  0921 09/10/20  0256 09/10/20  0058   TROPONIN T ng/mL 0.027 0.024 0.029 0.034* 0.045*     Lab Results   Component Value Date    INR 1.97 (H) 09/14/2020    INR 2.18 (H) 09/13/2020    INR 2.08 (H) 09/12/2020    INR 1.72 (H) 09/11/2020    INR 1.64 (H) 09/10/2020    INR 0.90 06/02/2016    INR 0.97 04/20/2016     Lab Results   Component Value Date    MG 2.0 09/14/2020    MG 2.1 09/13/2020    MG 2.2 09/12/2020     Lab Results   Component Value Date    TSH 3.830 09/10/2020    CHLPL 103 04/21/2016    TRIG 80 09/11/2020    HDL 43 09/11/2020    LDL 78 09/11/2020      Imaging Results (Last 48 Hours)     ** No results found for the last 48 hours. **        Lab Results   Component Value Date    BNP 86 05/08/2016     Echo   Results for orders placed during the hospital encounter of 09/09/20   Adult Transthoracic Echo Complete W/ Cont if Necessary Per Protocol    Narrative · The left ventricular cavity is mildly dilated.  · The following left ventricular wall segments are hypokinetic: mid   anterior, apical lateral,  apical inferior, apical septal, basal   inferoseptal, mid inferoseptal, apex hypokinetic, mid anteroseptal, basal   anterior and basal inferoseptal.  · Left ventricular systolic function is severely decreased.  · Estimated EF appears to be in the range of 21 - 25%.  · The aortic valve is structurally normal. No aortic valve regurgitation   is present. No aortic valve stenosis is present.  · The mitral valve is normal in structure. Mild mitral valve regurgitation   is present. No significant mitral valve stenosis is present.  · The tricuspid valve is normal. Mild to moderate tricuspid valve   regurgitation is present. Estimated right ventricular systolic pressure   from tricuspid regurgitation is normal (<35 mmHg).  · There is no evidence of pericardial effusion.         I reviewed the patient's new clinical results.    Telemetry: Atrial fibrillation 120-130 bpm       Medication Review:   amiodarone, 200 mg, Oral, Daily  aspirin, 81 mg, Oral, Daily  carvedilol, 6.25 mg, Oral, BID With Meals  folic acid, 1 mg, Oral, Daily  furosemide, 60 mg, Intravenous, Q12H  insulin aspart, 0-14 Units, Subcutaneous, TID AC  insulin aspart, 5 Units, Subcutaneous, TID With Meals  insulin detemir, 10 Units, Subcutaneous, Nightly  polyethylene glycol, 17 g, Oral, Daily  predniSONE, 2.5 mg, Oral, Daily  pregabalin, 100 mg, Oral, Q12H  rOPINIRole, 1 mg, Oral, TID  senna-docusate sodium, 2 tablet, Oral, Nightly  sodium chloride, 10 mL, Intravenous, Q12H  sodium chloride, 10 mL, Intravenous, Q12H  spironolactone, 25 mg, Oral, Daily  venlafaxine XR, 75 mg, Oral, Daily        Pharmacy to dose warfarin,         Assessment      Assessment:  1. Acute on chronic combined systolic and diastolic congestive heart failure, improving   2. Newly diagnosed advanced cardiomyopathy with LV ejection fraction of about 20-25%   3. Elevated troponin, trending up to 0.045 now trending down to normal, EKG tracing reviewed and shows no acute  ischemia  4. Paroxysmal atrial fibrillation with intermittent RVR, on Coumadin, INR 1.97, on amiodarone   5. Acute kidney injury, creatinine 1.46, worsening   6. Diabetes mellitus type 2  7. Cirrhosis of the liver     Plan     Recommendations:  1. Patient still tachycardic will adjust dose of coreg to try to improve rate control  2. Appreciate nephrology input  3. Consider switching to Eliquis, will consult pharmacy for cost    I discussed the patients findings and my recommendations with patient and family      Sandy KENNY Ellis, acting as scribe for Dr. Charlee Ken MD [facility]  09/14/20  08:54 EDT  ICharlee MD, [facility], performed the services described in this documentation as documented by the above-named individual under my supervision and made necessary changes in the note is both accurate and complete  Please note that portions of this note were completed with a voice recognition program.

## 2020-09-14 NOTE — PLAN OF CARE
Goal Outcome Evaluation:  Plan of Care Reviewed With: patient  Progress: improving  Outcome Summary: Patient tolerated shift well.  Requests pain meds frequently.continues to request IV pain medication frequently, no s/s of any distress, will continue with current plan of care

## 2020-09-14 NOTE — PHARMACY PATIENT ASSISTANCE
Pharmacy was asked to find the cost of Eliquis and Xarelto for patient. Both are covered on the patient's insurance with no copay.    Thank you,    Ave Crane, PharmD  09/14/20  11:34 EDT

## 2020-09-14 NOTE — PROGRESS NOTES
[facility] Medicine Services  PROGRESS NOTE     Patient Identification:  Name:  Yumiko Purdy  Age:  54 y.o.  Sex:  female  :  1965  MRN:  [id number]  Visit Number:  [id number]  Primary Care Provider:  Niko Schaefer MD    Length of stay:  4    ----------------------------------------------------------------------------------------------------------------------  Subjective     Chief Complaint:  Follow up for CHF     Subjective:  Today, the patient reports feeling okay but still not to her baseline and does not feel good overall.  She otherwise is unable to provide any further history.  She does report some tenderness in the back of her leg where her rash and ulcerations are.    ----------------------------------------------------------------------------------------------------------------------  Objective     Current Hospital Meds:  amiodarone, 200 mg, Oral, Daily  aspirin, 81 mg, Oral, Daily  carvedilol, 12.5 mg, Oral, BID With Meals  carvedilol, 6.25 mg, Oral, Once  folic acid, 1 mg, Oral, Daily  furosemide, 60 mg, Intravenous, Q12H  insulin aspart, 0-14 Units, Subcutaneous, TID AC  insulin aspart, 5 Units, Subcutaneous, TID With Meals  insulin detemir, 10 Units, Subcutaneous, Nightly  polyethylene glycol, 17 g, Oral, Daily  predniSONE, 2.5 mg, Oral, Daily  pregabalin, 100 mg, Oral, Q12H  rOPINIRole, 1 mg, Oral, TID  senna-docusate sodium, 2 tablet, Oral, Nightly  sodium chloride, 10 mL, Intravenous, Q12H  sodium chloride, 10 mL, Intravenous, Q12H  spironolactone, 25 mg, Oral, Daily  venlafaxine XR, 75 mg, Oral, Daily      Pharmacy Consult,   Pharmacy to dose warfarin,       ----------------------------------------------------------------------------------------------------------------------  Vital Signs:  Temp:  [97.6 °F (36.4 °C)-98.3 °F (36.8 °C)] 98.3 °F (36.8 °C)  Heart Rate:  [] 104  Resp:  [18] 18  BP: ()/(51-69) 99/69  Mean Arterial  Pressure (Non-Invasive) for the past 24 hrs (Last 3 readings):   Noninvasive MAP (mmHg)   09/14/20 1439 76   09/14/20 1009 79   09/14/20 0622 69     SpO2 Percentage    09/14/20 0622 09/14/20 1009 09/14/20 1439   SpO2: 95% 96% 97%     SpO2:  [94 %-97 %] 97 %  on  Flow (L/min):  [2] 2;   Device (Oxygen Therapy): nasal cannula    Body mass index is 49.01 kg/m².  Wt Readings from Last 3 Encounters:   09/14/20 134 kg (294 lb 8 oz)        Intake/Output Summary (Last 24 hours) at 9/14/2020 1836  Last data filed at 9/14/2020 1740  Gross per 24 hour   Intake 840 ml   Output 2000 ml   Net -1160 ml     Diet Regular; Cardiac, Consistent Carbohydrate, Renal, Daily Fluid Restriction, Low Sodium; 1500 mL Fluid Per Day; 2,000 mg Na  ----------------------------------------------------------------------------------------------------------------------  Physical exam:   GEN: Chronically ill-appearing, pale but in no acute distress  CV: Tachycardic, regular rhythm, no audible murmur  PULM: Clear to auscultation at the apices, decreased breath sounds at the bases  SKIN: Large petechial rash all over extremities with some scabs and ulcerations, no evidence of cellulitis  -------------------------------------------------------------------------------------------------  Results from last 7 days   Lab Units 09/14/20  0706 09/14/20  0705 09/13/20  0343 09/12/20  0149 09/11/20  0044 09/10/20  0058   CRP mg/dL  --   --   --   --   --  0.53*   LACTATE mmol/L  --   --   --   --   --  1.5   WBC 10*3/mm3 7.34  --  6.56 8.65 9.14 10.26   HEMOGLOBIN g/dL 9.7*  --  9.1* 10.5* 9.7* 10.2*   HEMATOCRIT % 34.0  --  32.1* 35.4 34.2 36.6   MCV fL 97.1*  --  98.8* 93.9 98.3* 99.2*   MCHC g/dL 28.5*  --  28.3* 29.7* 28.4* 27.9*   PLATELETS 10*3/mm3 130*  --  141 139* 198 199   INR   --  1.97* 2.18* 2.08* 1.72* 1.64*     Results from last 7 days   Lab Units 09/14/20  0514 09/13/20  0343 09/12/20  0149   SODIUM mmol/L 138 139 135*   POTASSIUM mmol/L 5.4* 4.8  4.7   MAGNESIUM mg/dL 2.0 2.1 2.2   CHLORIDE mmol/L 100 100 101   CO2 mmol/L 28.4 30.0* 23.3   BUN mg/dL 49* 45* 50*   CREATININE mg/dL 1.46* 1.37* 1.40*   EGFR IF NONAFRICN AM mL/min/1.73 37* 40* 39*   CALCIUM mg/dL 8.9 9.0 9.1   GLUCOSE mg/dL 103* 155* 169*   ALBUMIN g/dL 3.00* 3.25* 3.17*   BILIRUBIN mg/dL 2.0* 1.7* 1.2   ALK PHOS U/L 106 86 87   AST (SGOT) U/L 42* 26 26   ALT (SGPT) U/L 30 25 25   Estimated Creatinine Clearance: 61.8 mL/min (A) (by C-G formula based on SCr of 1.46 mg/dL (H)).  No results found for: AMMONIA    Glucose   Date/Time Value Ref Range Status   09/14/2020 1608 246 (H) 70 - 130 mg/dL Final   09/14/2020 1011 183 (H) 70 - 130 mg/dL Final   09/14/2020 0624 119 70 - 130 mg/dL Final   09/13/2020 2004 97 70 - 130 mg/dL Final   09/13/2020 1620 141 (H) 70 - 130 mg/dL Final   09/13/2020 0958 190 (H) 70 - 130 mg/dL Final   09/13/2020 0612 131 (H) 70 - 130 mg/dL Final   09/12/2020 2012 233 (H) 70 - 130 mg/dL Final     Lab Results   Component Value Date    HGBA1C 8.40 (H) 09/10/2020     Lab Results   Component Value Date    TSH 3.830 09/10/2020    FREET4 1.39 09/10/2020       Blood Culture   Date Value Ref Range Status   09/10/2020 No growth at 4 days  Preliminary   09/10/2020 No growth at 4 days  Preliminary       ----------------------------------------------------------------------------------------------------------------------  Assessment/Plan       · Acute systolic congestive heart failure-EF 21 to 25%.  No prior echocardiogram in the system.  Was admitted with volume overload concerning for CHF. generally edematous with anasarca.  Has been receiving 60 mg of IV Lasix twice daily with good response.  Reaction has been stable for the most part around 1.4.  Will monitor closely.  On Aldactone with potassium borderline high however it has remained stable.  Will monitor closely and if continues to rise will stop.  She is on carvedilol low-dose however dose was increased for A. fib but if A. fib is  not controlled with carvedilol and given blood pressures the limiting factor will transition to metoprolol.  Patient may be a candidate for Entresto however hurled per nephrology recommendations for now.  Will discuss need fo ischemic work-up with cardiology as outpatient versus inpatient.  · Eosinophilic skin rash.  Vasculitis was ruled out recently per biopsy but serologic w/u still pending. On low-dose prednisone at this time.  She has been followed up at  for work-up  · Persistent atrial fibrillation-on amiodarone chronically however was very tachycardic this morning.  Telemetry shows sinus tachycardia.  Cardiology had increased carvedilol dose this morning.  Heart rate remains elevated.  Continue warfarin  · Borderline hyperkalemia-potassium around 5.4.  Historically between 4-5.  Will monitor closely on Aldactone  · Liver cirrhosis-due to history of alcoholism  · Chronic thrombocytopenia-new liver cirrhosis  · Type 2 diabetes with peripheral neuropathy-continue Levemir and aspart, open  · The patient-continue MiraLAX, doc/senna  · Depression-continue venlafaxine  · But obesity-BMI 49, complicating aspects of care  · chronic anemia-likely due to chronic disease.  Hemoglobin 9.7 mostly stable    --------------------------------------------------  DVT Prophylaxis: Friend     Disposition: Further diuresis and renal function.  --------------------------------------------------      Katayoun Behbahani, MD  Hospitalist Service -- [facility]     09/14/20  18:36 EDT

## 2020-09-14 NOTE — PROGRESS NOTES
Discharge Planning Assessment  LATASHA Metz     Patient Name: Yumiko Purdy  MRN: [id number]  Today's Date: 9/14/2020    Admit Date: 9/9/2020    Discharge Needs Assessment    No documentation.       Discharge Plan     Row Name 09/14/20 1515       Plan    Plan Pt plans on returning home with son at discharge. Pt currently does not utilize  services. Pt uses home O2@ 2l n/c, wheelchair and walker from [facility] [employer]. SS will follow.          Renea Ferro

## 2020-09-14 NOTE — PLAN OF CARE
Goal Outcome Evaluation:  Plan of Care Reviewed With: patient  Progress: improving  Outcome Summary: Patient tolerated shift well.  Requests pain meds frequently. Wound care completed, and yellow drainage noted from wound on lower legs.

## 2020-09-14 NOTE — PROGRESS NOTES
Nephrology Progress Note      Subjective     Patient feels better today, no chest pain or shortness of breath    Objective       Vital signs :     Temp:  [97.6 °F (36.4 °C)-98 °F (36.7 °C)] 98 °F (36.7 °C)  Heart Rate:  [] 76  Resp:  [18-20] 18  BP: (107-138)/(51-64) 109/51      Intake/Output Summary (Last 24 hours) at 9/14/2020 0951  Last data filed at 9/14/2020 0500  Gross per 24 hour   Intake --   Output 2500 ml   Net -2500 ml       Physical Exam:    General Appearance : not in acute distress  Lungs : clear to auscultation, respirations regular  Heart :  regular rhythm & normal rate, normal S1, S2 and no murmur, no rub  Abdomen : normal bowel sounds, no masses, no hepatomegaly, no splenomegaly, soft non-tender and no guarding  Extremities : moves extremities well, No edema, no cyanosis and no redness  Skin :  generalized rash on both Upper and lower ext  Neurologic :   orientated to person, place, time and situation, Grossly no focal deficits      Laboratory Data :     Albumin Albumin   Date Value Ref Range Status   09/14/2020 3.00 (L) 3.50 - 5.20 g/dL Final   09/13/2020 3.25 (L) 3.50 - 5.20 g/dL Final   09/12/2020 3.17 (L) 3.50 - 5.20 g/dL Final      Magnesium Magnesium   Date Value Ref Range Status   09/14/2020 2.0 1.6 - 2.6 mg/dL Final   09/13/2020 2.1 1.6 - 2.6 mg/dL Final   09/12/2020 2.2 1.6 - 2.6 mg/dL Final          PTH               No results found for: PTH    CBC and coagulation:  Results from last 7 days   Lab Units 09/14/20  0706 09/14/20  0705 09/13/20  0343 09/12/20  0149  09/10/20  0058   LACTATE mmol/L  --   --   --   --   --  1.5   CRP mg/dL  --   --   --   --   --  0.53*   WBC 10*3/mm3 7.34  --  6.56 8.65   < > 10.26   HEMOGLOBIN g/dL 9.7*  --  9.1* 10.5*   < > 10.2*   HEMATOCRIT % 34.0  --  32.1* 35.4   < > 36.6   MCV fL 97.1*  --  98.8* 93.9   < > 99.2*   MCHC g/dL 28.5*  --  28.3* 29.7*   < > 27.9*   PLATELETS 10*3/mm3 130*  --  141 139*   < > 199   INR   --  1.97* 2.18* 2.08*   < >  1.64*    < > = values in this interval not displayed.     Acid/base balance:  Results from last 7 days   Lab Units 09/10/20  0049   PH, ARTERIAL pH units 7.413   PO2 ART mm Hg 84.8   PCO2, ARTERIAL mm Hg 40.7   HCO3 ART mmol/L 26.0     Renal and electrolytes:  Results from last 7 days   Lab Units 09/14/20  0514 09/13/20  0343 09/12/20  0149 09/11/20  0044 09/10/20  1516   SODIUM mmol/L 138 139 135* 134* 137   POTASSIUM mmol/L 5.4* 4.8 4.7 5.3* 5.4*   MAGNESIUM mg/dL 2.0 2.1 2.2 2.1  --    CHLORIDE mmol/L 100 100 101 97* 98   CO2 mmol/L 28.4 30.0* 23.3 25.2 17.9*   BUN mg/dL 49* 45* 50* 41* 36*   CREATININE mg/dL 1.46* 1.37* 1.40* 1.30* 1.20*   EGFR IF NONAFRICN AM mL/min/1.73 37* 40* 39* 43* 47*   CALCIUM mg/dL 8.9 9.0 9.1 9.1 9.2     Estimated Creatinine Clearance: 61.8 mL/min (A) (by C-G formula based on SCr of 1.46 mg/dL (H)).    Liver and pancreatic function:  Results from last 7 days   Lab Units 09/14/20  0514 09/13/20  0343 09/12/20  0149   ALBUMIN g/dL 3.00* 3.25* 3.17*   BILIRUBIN mg/dL 2.0* 1.7* 1.2   ALK PHOS U/L 106 86 87   AST (SGOT) U/L 42* 26 26   ALT (SGPT) U/L 30 25 25         Cardiac:  Results from last 7 days   Lab Units 09/13/20  0343 09/10/20  0058   PROBNP pg/mL 1,788.0* 3,325.0*     Liver and pancreatic function:  Results from last 7 days   Lab Units 09/14/20  0514 09/13/20  0343 09/12/20  0149   ALBUMIN g/dL 3.00* 3.25* 3.17*   BILIRUBIN mg/dL 2.0* 1.7* 1.2   ALK PHOS U/L 106 86 87   AST (SGOT) U/L 42* 26 26   ALT (SGPT) U/L 30 25 25       Medications :     amiodarone, 200 mg, Oral, Daily  aspirin, 81 mg, Oral, Daily  carvedilol, 6.25 mg, Oral, BID With Meals  folic acid, 1 mg, Oral, Daily  furosemide, 60 mg, Intravenous, Q12H  insulin aspart, 0-14 Units, Subcutaneous, TID AC  insulin aspart, 5 Units, Subcutaneous, TID With Meals  insulin detemir, 10 Units, Subcutaneous, Nightly  polyethylene glycol, 17 g, Oral, Daily  predniSONE, 2.5 mg, Oral, Daily  pregabalin, 100 mg, Oral,  Q12H  rOPINIRole, 1 mg, Oral, TID  senna-docusate sodium, 2 tablet, Oral, Nightly  sodium chloride, 10 mL, Intravenous, Q12H  sodium chloride, 10 mL, Intravenous, Q12H  spironolactone, 25 mg, Oral, Daily  venlafaxine XR, 75 mg, Oral, Daily      Pharmacy to dose warfarin,           Assessment/Plan      1.  CKD stage III  2.  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  Anasarca  4.  Chronic systolic CHF  5.  Cirrhosis  6.  Diabetes     Cr is slightly up than admission, would be likely due to diuretics. I will hold diuretics today. Serology for vasculitis are pending.   UPCR 194mg/g proteinuria, no hematuria and its extremely unlikely having vasculitis. Still recommend holding warferin at this time.     skin biopsy then and lesion demonstrated dermatitis with eosinophils and hemorrhage      Ayanna Mejia MD  09/14/20  09:51 EDT

## 2020-09-14 NOTE — CONSULTS
Heart Failure Education Consult   54 y.o. female with past medical history significant for chronic atrial fibrillation, congestive heart failure, diabetes mellitus, and cirrhosis of the liver  Type of Heart Failure: Systolic     Length of diagnosis: New Diagnosis      Current HF knowledge: poor     Have you had HF education/teaching in the past? No  Do you check your weight daily? No    Current weight        09/13/20  0456 09/14/20  0500   Weight: 135 kg (298 lb 3.2 oz) 134 kg (294 lb 8 oz)          Most recent EF 21-25% Date 9/11/2020       Most recent ProBNP 1788pg/ml Date 9/13/2020      Edema Yes 4+ with multiple scabbed areas on BLE and BLE and wraps on BLE          Shortness of Breath: Yes and Improving     Number of pillows used to sleep at night: 4  Barriers to learning: Other drowsiness     Materials Provided:Living with HF Book, HF Action Plan, Daily Weight Monitoring  and 2 Gm Na+ diet             Referral candidate for HF Clinic:Yes      Would benefit from HF clinic follow and additional eduction as she had difficulty remaining awake during this encounter. Small pieces of education would benefit her as opposed to a large amount at once.    Thank you for this consult. Please let me know if I can be of any assistance with HF education for this patient.  Katalina Bedolla, RN   09/14/20 14:38 EDT

## 2020-09-14 NOTE — PAYOR COMM NOTE
"[facility]  NPI:[id number]    Utilization Review  Contact: Sandy Powell RN  Phone: [phone]  Fax:[phone]    CLINICAL UPDATE   auth # [id number]    Dio Berry (54 y.o. Female)     Date of Birth Social Security Number Address Home Phone MRN    1965  [address]   [place] [address] [phone] [id number]    Tenriism Marital Status          Unknown        Admission Date Admission Type Admitting Provider Attending Provider Department, Room/Bed    9/10/20 Emergency Likens, Dwayne, DO Behbahani, Katayoun, MD [facility] 3 [facility], 3303/2S    Discharge Date Discharge Disposition Discharge Destination                       Attending Provider: Behbahani, Katayoun, MD    Allergies: Phenergan [Promethazine]    Isolation: None   Infection: None   Code Status: CPR    Ht: 165.1 cm (65\")   Wt: 134 kg (294 lb 8 oz)    Admission Cmt: None   Principal Problem: None                Active Insurance as of 2020     Primary Coverage     Payor Plan Insurance Group Employer/Plan Group    [employer] [place] [employer] [place]      Payor Plan Address Payor Plan Phone Number Payor Plan Fax Number Effective Dates    [address] [address] [phone]  2020 - None Entered    [place] [address]       Subscriber Name Subscriber Birth Date Member ID       DIO BERRY 1965 [id number]                 Emergency Contacts      (Rel.) Home Phone Work Phone Mobile Phone    Gladis Sigala (Grandparent) [phone]            Charlee Ken MD   Physician   Cardiology   Progress Notes   Signed   Date of Service:  20   Creation Time:  20            Signed        Expand All Collapse All []Expand All by Default    Show:Clear all  [x]Manual[x]Template[x]Copied    Added by:  [x]Sandy Ellis APRN[x]Charlee Ken MD    []Ryanver for details         LOS: 4 days      Name: Dio Brery  Age/Sex: 54 y.o. female  :  1965 "        PCP: Niko Schaefer MD  REF: No ref. provider found     Active Problems:    Acute on chronic congestive heart failure (CMS/[facility]ly [facility])        Reason for follow-up: Congestive heart failure and cardiomyopathy      Subjective            Subjective         Yumiko Purdy is a 54 year old female with a past medical history significant for chronic atrial fibrillation, congestive heart failure, diabetes mellitus and cirrhosis of the liver.  Patient presented to the ER with complaints of lower extremity edema, abdominal distention and rash.  Patient states that for the last week she has had worsening lower extremity edema that goes up into her knees all the way up into her abdomen.  She also has had worsening shortness of breath and orthopnea.  He denies any recent chest pains.  She states that she follows with her PCP who manages her water pills and heart medications.  She was recently seen at [facility] on Monday and Wednesday of this week with similar complaints and was given single doses of diuretics without much improvement of her symptoms.  She also reports she is a diffuse rash for last few months and was told that she had vasculitis.  She was worked up for this at [facility] in May 2020.  She was taken off Coumadin briefly because it was thought that this may be causing the rash however there was no improvement noted so she was placed back on Xarelto.  She states that she had a left heart catheterization a couple years ago at [facility] in [facility] that showed nonobstructive CAD however these records are unavailable.  Patient did receive Lasix IV in the ER and reports that her breathing has had mild improvement.  Creatinine 1.24 on admission.     Interval History: Patient in atrial fibrillation with intermittent RVR. She continues to have good urine output with IV diuresis. Creatinine today at 1.46 up from yesterday. Breathing is improved today per her report.         Vital  Signs  Temp:  [97.6 °F (36.4 °C)-98 °F (36.7 °C)] 98 °F (36.7 °C)  Heart Rate:  [] 76  Resp:  [18-20] 18  BP: (107-138)/(51-64) 109/51                 Vital Signs (last 72 hrs)        09/11 0700  -  09/12 0659 09/12 0700  -  09/13 0659 09/13 0700  -  09/14 0659 09/14 0700  -  09/14 0854   Most Recent     Temp (°F) 97.2 -  97.7    97.4 -  98.4    97.6 -  98        98 (36.7)     Heart Rate 99 -  116    97 -  119    76 -  127        76     Resp 18 -  20    18 -  20    18 -  20        18     /71 -  119/82    98/54 -  119/70    107/57 -  138/63        109/51     SpO2 (%) 93 -  99    94 -  99    94 -  99        95          Body mass index is 49.01 kg/m².     Intake/Output Summary (Last 24 hours) at 9/14/2020 0854  Last data filed at 9/14/2020 0500      Gross per 24 hour   Intake --   Output 2500 ml   Net -2500 ml      Objective     Objective         Physical Exam:                General Appearance:    Alert, cooperative, in no acute distress   Head:    Normocephalic, without obvious abnormality, atraumatic   Eyes:            Conjunctivae and sclerae normal, no   icterus, no pallor, corneas clear.   Neck:   No adenopathy, supple, trachea midline, no thyromegaly, no   carotid bruit, no JVD   Lungs:     Clear to auscultation,respirations regular, even and                  unlabored    Heart:    irregular rhythm and tachycardia, normal S1 and S2, no            murmur, no gallop, no rub, no click   Chest Wall:    No abnormalities observed   Abdomen:     Normal bowel sounds, no masses, no organomegaly, soft        non-tender, non-distended, no guarding, no rebound                tenderness   Extremities:   Moves all extremities well, no edema, no cyanosis, no             redness   Pulses:   Pulses palpable and equal bilaterally   Skin:   No bleeding, bruising, diffuse rash over BLE and RUE         Neurologic:   Alert and oriented    I have seen and performed a physical examination today.      Results review          Results Review:            Results from last 7 days   Lab Units 09/14/20  0706 09/13/20  0343 09/12/20  0149 09/11/20  0044 09/10/20  0058   WBC 10*3/mm3 7.34 6.56 8.65 9.14 10.26   HEMOGLOBIN g/dL 9.7* 9.1* 10.5* 9.7* 10.2*   PLATELETS 10*3/mm3 130* 141 139* 198 199                Results from last 7 days   Lab Units 09/14/20  0514 09/13/20  0343 09/12/20  0149 09/11/20  0044 09/10/20  1516 09/10/20  0058   SODIUM mmol/L 138 139 135* 134* 137 128*   POTASSIUM mmol/L 5.4* 4.8 4.7 5.3* 5.4* 4.9   CHLORIDE mmol/L 100 100 101 97* 98 95*   CO2 mmol/L 28.4 30.0* 23.3 25.2 17.9* 27.8   BUN mg/dL 49* 45* 50* 41* 36* 33*   CREATININE mg/dL 1.46* 1.37* 1.40* 1.30* 1.20* 1.24*   CALCIUM mg/dL 8.9 9.0 9.1 9.1 9.2 8.8   GLUCOSE mg/dL 103* 155* 169* 376* 337* 379*   ALT (SGPT) U/L 30 25 25  --   --  25   AST (SGOT) U/L 42* 26 26  --   --  24               Results from last 7 days   Lab Units 09/11/20  0044 09/10/20  1727 09/10/20  0921 09/10/20  0256 09/10/20  0058   TROPONIN T ng/mL 0.027 0.024 0.029 0.034* 0.045*            Lab Results   Component Value Date     INR 1.97 (H) 09/14/2020     INR 2.18 (H) 09/13/2020     INR 2.08 (H) 09/12/2020     INR 1.72 (H) 09/11/2020     INR 1.64 (H) 09/10/2020     INR 0.90 06/02/2016     INR 0.97 04/20/2016            Lab Results   Component Value Date     MG 2.0 09/14/2020     MG 2.1 09/13/2020     MG 2.2 09/12/2020            Lab Results   Component Value Date     TSH 3.830 09/10/2020     CHLPL 103 04/21/2016     TRIG 80 09/11/2020     HDL 43 09/11/2020     LDL 78 09/11/2020          Imaging Results (Last 48 Hours)      ** No results found for the last 48 hours. **                Lab Results   Component Value Date     BNP 86 05/08/2016      Echo        Results for orders placed during the hospital encounter of 09/09/20   Adult Transthoracic Echo Complete W/ Cont if Necessary Per Protocol     Narrative · The left ventricular cavity is mildly dilated.  · The following left ventricular wall  segments are hypokinetic: mid   anterior, apical lateral, apical inferior, apical septal, basal   inferoseptal, mid inferoseptal, apex hypokinetic, mid anteroseptal, basal   anterior and basal inferoseptal.  · Left ventricular systolic function is severely decreased.  · Estimated EF appears to be in the range of 21 - 25%.  · The aortic valve is structurally normal. No aortic valve regurgitation   is present. No aortic valve stenosis is present.  · The mitral valve is normal in structure. Mild mitral valve regurgitation   is present. No significant mitral valve stenosis is present.  · The tricuspid valve is normal. Mild to moderate tricuspid valve   regurgitation is present. Estimated right ventricular systolic pressure   from tricuspid regurgitation is normal (<35 mmHg).  · There is no evidence of pericardial effusion.          I reviewed the patient's new clinical results.     Telemetry: Atrial fibrillation 120-130 bpm        Medication Review:   amiodarone, 200 mg, Oral, Daily  aspirin, 81 mg, Oral, Daily  carvedilol, 6.25 mg, Oral, BID With Meals  folic acid, 1 mg, Oral, Daily  furosemide, 60 mg, Intravenous, Q12H  insulin aspart, 0-14 Units, Subcutaneous, TID AC  insulin aspart, 5 Units, Subcutaneous, TID With Meals  insulin detemir, 10 Units, Subcutaneous, Nightly  polyethylene glycol, 17 g, Oral, Daily  predniSONE, 2.5 mg, Oral, Daily  pregabalin, 100 mg, Oral, Q12H  rOPINIRole, 1 mg, Oral, TID  senna-docusate sodium, 2 tablet, Oral, Nightly  sodium chloride, 10 mL, Intravenous, Q12H  sodium chloride, 10 mL, Intravenous, Q12H  spironolactone, 25 mg, Oral, Daily  venlafaxine XR, 75 mg, Oral, Daily           Pharmacy to dose warfarin,            Assessment       Assessment:  1. Acute on chronic combined systolic and diastolic congestive heart failure, improving   2. Newly diagnosed advanced cardiomyopathy with LV ejection fraction of about 20-25%   3. Elevated troponin, trending up to 0.045 now trending down to  normal, EKG tracing reviewed and shows no acute ischemia  4. Paroxysmal atrial fibrillation with intermittent RVR, on Coumadin, INR 1.97, on amiodarone   5. Acute kidney injury, creatinine 1.46, worsening   6. Diabetes mellitus type 2  7. Cirrhosis of the liver     Plan      Recommendations:  1. Patient still tachycardic will adjust dose of coreg to try to improve rate control  2. Appreciate nephrology input  3. Consider switching to Eliquis, will consult pharmacy for cost     I discussed the patients findings and my recommendations with patient and family        Sandy KENNY Ellis, acting as scribe for Dr. Charlee Ken MD [facility]  09/14/20  08:54 EDT  I, Charlee Ken MD, [facility], performed the services described in this documentation as documented by the above-named individual under my supervision and made necessary changes in the note is both accurate and complete  Please note that portions of this note were completed with a voice recognition program.               Revision History                          Ayanna Mejia MD   Physician   Medicine   Progress Notes   Signed   Date of Service:  09/14/20 0951   Creation Time:  09/14/20 0951            Signed        Expand All Collapse All []Expand All by Default    Show:Clear all  [x]Manual[x]Template[x]Copied    Added by:  [x]Ayanna Mejia MD    []Placido for details  Nephrology Progress Note           Subjective         Patient feels better today, no chest pain or shortness of breath        Objective            Vital signs :      Temp:  [97.6 °F (36.4 °C)-98 °F (36.7 °C)] 98 °F (36.7 °C)  Heart Rate:  [] 76  Resp:  [18-20] 18  BP: (107-138)/(51-64) 109/51        Intake/Output Summary (Last 24 hours) at 9/14/2020 0951  Last data filed at 9/14/2020 0500      Gross per 24 hour   Intake --   Output 2500 ml   Net -2500 ml         Physical Exam:     General Appearance : not in acute distress  Lungs : clear to auscultation, respirations regular  Heart :  regular rhythm &  normal rate, normal S1, S2 and no murmur, no rub  Abdomen : normal bowel sounds, no masses, no hepatomegaly, no splenomegaly, soft non-tender and no guarding  Extremities : moves extremities well, No edema, no cyanosis and no redness  Skin :  generalized rash on both Upper and lower ext  Neurologic :   orientated to person, place, time and situation, Grossly no focal deficits        Laboratory Data :      Albumin       Albumin   Date Value Ref Range Status   09/14/2020 3.00 (L) 3.50 - 5.20 g/dL Final   09/13/2020 3.25 (L) 3.50 - 5.20 g/dL Final   09/12/2020 3.17 (L) 3.50 - 5.20 g/dL Final       Magnesium       Magnesium   Date Value Ref Range Status   09/14/2020 2.0 1.6 - 2.6 mg/dL Final   09/13/2020 2.1 1.6 - 2.6 mg/dL Final   09/12/2020 2.2 1.6 - 2.6 mg/dL Final             PTH               No results found for: PTH     CBC and coagulation:            Results from last 7 days   Lab Units 09/14/20  0706 09/14/20  0705 09/13/20  0343 09/12/20  0149   09/10/20  0058   LACTATE mmol/L  --   --   --   --   --  1.5   CRP mg/dL  --   --   --   --   --  0.53*   WBC 10*3/mm3 7.34  --  6.56 8.65   < > 10.26   HEMOGLOBIN g/dL 9.7*  --  9.1* 10.5*   < > 10.2*   HEMATOCRIT % 34.0  --  32.1* 35.4   < > 36.6   MCV fL 97.1*  --  98.8* 93.9   < > 99.2*   MCHC g/dL 28.5*  --  28.3* 29.7*   < > 27.9*   PLATELETS 10*3/mm3 130*  --  141 139*   < > 199   INR    --  1.97* 2.18* 2.08*   < > 1.64*    < > = values in this interval not displayed.      Acid/base balance:       Results from last 7 days   Lab Units 09/10/20  0049   PH, ARTERIAL pH units 7.413   PO2 ART mm Hg 84.8   PCO2, ARTERIAL mm Hg 40.7   HCO3 ART mmol/L 26.0      Renal and electrolytes:           Results from last 7 days   Lab Units 09/14/20  0514 09/13/20  0343 09/12/20  0149 09/11/20  0044 09/10/20  1516   SODIUM mmol/L 138 139 135* 134* 137   POTASSIUM mmol/L 5.4* 4.8 4.7 5.3* 5.4*   MAGNESIUM mg/dL 2.0 2.1 2.2 2.1  --    CHLORIDE mmol/L 100 100 101 97* 98   CO2 mmol/L  28.4 30.0* 23.3 25.2 17.9*   BUN mg/dL 49* 45* 50* 41* 36*   CREATININE mg/dL 1.46* 1.37* 1.40* 1.30* 1.20*   EGFR IF NONAFRICN AM mL/min/1.73 37* 40* 39* 43* 47*   CALCIUM mg/dL 8.9 9.0 9.1 9.1 9.2      Estimated Creatinine Clearance: 61.8 mL/min (A) (by C-G formula based on SCr of 1.46 mg/dL (H)).     Liver and pancreatic function:         Results from last 7 days   Lab Units 09/14/20  0514 09/13/20  0343 09/12/20  0149   ALBUMIN g/dL 3.00* 3.25* 3.17*   BILIRUBIN mg/dL 2.0* 1.7* 1.2   ALK PHOS U/L 106 86 87   AST (SGOT) U/L 42* 26 26   ALT (SGPT) U/L 30 25 25            Cardiac:        Results from last 7 days   Lab Units 09/13/20  0343 09/10/20  0058   PROBNP pg/mL 1,788.0* 3,325.0*      Liver and pancreatic function:         Results from last 7 days   Lab Units 09/14/20  0514 09/13/20  0343 09/12/20  0149   ALBUMIN g/dL 3.00* 3.25* 3.17*   BILIRUBIN mg/dL 2.0* 1.7* 1.2   ALK PHOS U/L 106 86 87   AST (SGOT) U/L 42* 26 26   ALT (SGPT) U/L 30 25 25         Medications :      amiodarone, 200 mg, Oral, Daily  aspirin, 81 mg, Oral, Daily  carvedilol, 6.25 mg, Oral, BID With Meals  folic acid, 1 mg, Oral, Daily  furosemide, 60 mg, Intravenous, Q12H  insulin aspart, 0-14 Units, Subcutaneous, TID AC  insulin aspart, 5 Units, Subcutaneous, TID With Meals  insulin detemir, 10 Units, Subcutaneous, Nightly  polyethylene glycol, 17 g, Oral, Daily  predniSONE, 2.5 mg, Oral, Daily  pregabalin, 100 mg, Oral, Q12H  rOPINIRole, 1 mg, Oral, TID  senna-docusate sodium, 2 tablet, Oral, Nightly  sodium chloride, 10 mL, Intravenous, Q12H  sodium chloride, 10 mL, Intravenous, Q12H  spironolactone, 25 mg, Oral, Daily  venlafaxine XR, 75 mg, Oral, Daily        Pharmacy to dose warfarin,                  Assessment/Plan          1.  CKD stage III  2.  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  Anasarca  4.  Chronic systolic CHF  5.  Cirrhosis  6.  Diabetes     Cr is slightly up than admission, would  be likely due to diuretics. I will hold diuretics today. Serology for vasculitis are pending.   UPCR 194mg/g proteinuria, no hematuria and its extremely unlikely having vasculitis. Still recommend holding warferin at this time.      skin biopsy then and lesion demonstrated dermatitis with eosinophils and hemorrhage        Ayanna Mejia MD  09/14/20  09:51 EDT

## 2020-09-14 NOTE — CONSULTS
"Diabetes Education  Assessment/Teaching    Patient Name:  Yumiko Purdy  YOB: 1965  MRN: [id number]  Admit Date:  9/9/2020      Assessment Date:  9/14/2020    Most Recent Value   General Information    Height  165.1 cm (65\")   Height Method  Stated   Weight  134 kg (294 lb 8 oz)   Weight Method  Bed scale   Pregnancy Assessment   Diabetes History   Education Preferences   Nutrition Information   Assessment Topics   DM Goals            Most Recent Value   DM Education Needs   Meter  Has own   Frequency of Testing  3 times a day   Blood Glucose Target  200   Medication  Insulin   Problem Solving  Hypoglycemia, Hyperglycemia   Reducing Risks  A1C testing, Eye exam, Foot care, Lipids, Blood pressure, Neuropathy, Cardiovascular   Healthy Eating  Other (comment) [discussed healthy eating options, and about fast food options that family is bringing to bedside.]   Physical Activity Frequency  Rarely   Healthy Coping  Appropriate   Discharge Plan  Home, Follow-up with PCP   Motivation  Moderate [patient dozed off frequently during discussions.]   Teaching Method  Explanation, Handouts, Demonstration, Discussion   Patient Response  Verbalized understanding, Needs reinforcement            Other Comments:  H1C 8.4.        Electronically signed by:  Katalina Bedolla RN  09/14/20 16:28 EDT  "

## 2020-09-15 LAB
ALBUMIN SERPL-MCNC: 3.1 G/DL (ref 3.5–5.2)
ALP SERPL-CCNC: 104 U/L (ref 39–117)
ALT SERPL W P-5'-P-CCNC: 26 U/L (ref 1–33)
ANION GAP SERPL CALCULATED.3IONS-SCNC: 13.9 MMOL/L (ref 5–15)
AST SERPL-CCNC: 29 U/L (ref 1–32)
BACTERIA SPEC AEROBE CULT: NORMAL
BACTERIA SPEC AEROBE CULT: NORMAL
BILIRUB CONJ SERPL-MCNC: 0.6 MG/DL (ref 0–0.3)
BILIRUB INDIRECT SERPL-MCNC: 1.3 MG/DL
BILIRUB SERPL-MCNC: 1.9 MG/DL (ref 0–1.2)
BUN SERPL-MCNC: 50 MG/DL (ref 6–20)
BUN/CREAT SERPL: 37.3 (ref 7–25)
C-ANCA TITR SER IF: NORMAL TITER
C3 SERPL-MCNC: 95 MG/DL (ref 82–167)
C4 SERPL-MCNC: 19 MG/DL (ref 14–44)
CALCIUM SPEC-SCNC: 9.1 MG/DL (ref 8.6–10.5)
CHLORIDE SERPL-SCNC: 98 MMOL/L (ref 98–107)
CO2 SERPL-SCNC: 29.1 MMOL/L (ref 22–29)
CREAT SERPL-MCNC: 1.34 MG/DL (ref 0.57–1)
DEPRECATED RDW RBC AUTO: 72.7 FL (ref 37–54)
ERYTHROCYTE [DISTWIDTH] IN BLOOD BY AUTOMATED COUNT: 20.8 % (ref 12.3–15.4)
GFR SERPL CREATININE-BSD FRML MDRD: 41 ML/MIN/1.73
GLUCOSE BLDC GLUCOMTR-MCNC: 137 MG/DL (ref 70–130)
GLUCOSE BLDC GLUCOMTR-MCNC: 218 MG/DL (ref 70–130)
GLUCOSE BLDC GLUCOMTR-MCNC: 223 MG/DL (ref 70–130)
GLUCOSE BLDC GLUCOMTR-MCNC: 282 MG/DL (ref 70–130)
GLUCOSE SERPL-MCNC: 198 MG/DL (ref 65–99)
HCT VFR BLD AUTO: 32.8 % (ref 34–46.6)
HGB BLD-MCNC: 9.6 G/DL (ref 12–15.9)
INR PPP: 2.08 (ref 0.9–1.1)
INR PPP: 2.13 (ref 0.9–1.1)
MAGNESIUM SERPL-MCNC: 1.9 MG/DL (ref 1.6–2.6)
MCH RBC QN AUTO: 28.2 PG (ref 26.6–33)
MCHC RBC AUTO-ENTMCNC: 29.3 G/DL (ref 31.5–35.7)
MCV RBC AUTO: 96.5 FL (ref 79–97)
MYELOPEROXIDASE AB SER-ACNC: <9 U/ML (ref 0–9)
P-ANCA ATYPICAL TITR SER IF: NORMAL TITER
P-ANCA TITR SER IF: NORMAL TITER
PLATELET # BLD AUTO: 130 10*3/MM3 (ref 140–450)
PMV BLD AUTO: 10.6 FL (ref 6–12)
POTASSIUM SERPL-SCNC: 4.6 MMOL/L (ref 3.5–5.2)
PROT SERPL-MCNC: 5.7 G/DL (ref 6–8.5)
PROTEINASE3 AB SER IA-ACNC: <3.5 U/ML (ref 0–3.5)
PROTHROMBIN TIME: 23.2 SECONDS (ref 11.9–14.1)
PROTHROMBIN TIME: 23.7 SECONDS (ref 11.9–14.1)
RBC # BLD AUTO: 3.4 10*6/MM3 (ref 3.77–5.28)
SODIUM SERPL-SCNC: 141 MMOL/L (ref 136–145)
WBC # BLD AUTO: 8.23 10*3/MM3 (ref 3.4–10.8)

## 2020-09-15 PROCEDURE — 94799 UNLISTED PULMONARY SVC/PX: CPT

## 2020-09-15 PROCEDURE — 86160 COMPLEMENT ANTIGEN: CPT | Performed by: INTERNAL MEDICINE

## 2020-09-15 PROCEDURE — 83520 IMMUNOASSAY QUANT NOS NONAB: CPT | Performed by: INTERNAL MEDICINE

## 2020-09-15 PROCEDURE — 99232 SBSQ HOSP IP/OBS MODERATE 35: CPT | Performed by: INTERNAL MEDICINE

## 2020-09-15 PROCEDURE — 97162 PT EVAL MOD COMPLEX 30 MIN: CPT

## 2020-09-15 PROCEDURE — 63710000001 PREDNISONE PER 5 MG: Performed by: FAMILY MEDICINE

## 2020-09-15 PROCEDURE — 82962 GLUCOSE BLOOD TEST: CPT

## 2020-09-15 PROCEDURE — 80048 BASIC METABOLIC PNL TOTAL CA: CPT | Performed by: INTERNAL MEDICINE

## 2020-09-15 PROCEDURE — 63710000001 INSULIN DETEMIR PER 5 UNITS: Performed by: INTERNAL MEDICINE

## 2020-09-15 PROCEDURE — 63710000001 INSULIN ASPART PER 5 UNITS: Performed by: INTERNAL MEDICINE

## 2020-09-15 PROCEDURE — 80076 HEPATIC FUNCTION PANEL: CPT | Performed by: FAMILY MEDICINE

## 2020-09-15 PROCEDURE — 25010000002 MORPHINE PER 10 MG: Performed by: HOSPITALIST

## 2020-09-15 PROCEDURE — 86256 FLUORESCENT ANTIBODY TITER: CPT | Performed by: INTERNAL MEDICINE

## 2020-09-15 PROCEDURE — 85610 PROTHROMBIN TIME: CPT | Performed by: PHYSICIAN ASSISTANT

## 2020-09-15 PROCEDURE — 85610 PROTHROMBIN TIME: CPT | Performed by: INTERNAL MEDICINE

## 2020-09-15 PROCEDURE — 85027 COMPLETE CBC AUTOMATED: CPT | Performed by: FAMILY MEDICINE

## 2020-09-15 PROCEDURE — 63710000001 INSULIN ASPART PER 5 UNITS: Performed by: PHYSICIAN ASSISTANT

## 2020-09-15 PROCEDURE — 25010000002 FUROSEMIDE PER 20 MG: Performed by: INTERNAL MEDICINE

## 2020-09-15 PROCEDURE — 83735 ASSAY OF MAGNESIUM: CPT | Performed by: FAMILY MEDICINE

## 2020-09-15 PROCEDURE — 63710000001 ONDANSETRON PER 8 MG: Performed by: FAMILY MEDICINE

## 2020-09-15 PROCEDURE — 25010000002 ALBUMIN HUMAN 25% PER 50 ML: Performed by: INTERNAL MEDICINE

## 2020-09-15 PROCEDURE — P9047 ALBUMIN (HUMAN), 25%, 50ML: HCPCS | Performed by: INTERNAL MEDICINE

## 2020-09-15 RX ORDER — VALSARTAN 80 MG/1
40 TABLET ORAL
Status: DISCONTINUED | OUTPATIENT
Start: 2020-09-15 | End: 2020-09-17

## 2020-09-15 RX ORDER — MORPHINE SULFATE 2 MG/ML
1 INJECTION, SOLUTION INTRAMUSCULAR; INTRAVENOUS EVERY 4 HOURS PRN
Status: DISCONTINUED | OUTPATIENT
Start: 2020-09-15 | End: 2020-09-18

## 2020-09-15 RX ORDER — ALBUMIN (HUMAN) 12.5 G/50ML
12.5 SOLUTION INTRAVENOUS ONCE
Status: COMPLETED | OUTPATIENT
Start: 2020-09-15 | End: 2020-09-15

## 2020-09-15 RX ORDER — FUROSEMIDE 10 MG/ML
60 INJECTION INTRAMUSCULAR; INTRAVENOUS ONCE
Status: DISCONTINUED | OUTPATIENT
Start: 2020-09-15 | End: 2020-09-15

## 2020-09-15 RX ORDER — WARFARIN SODIUM 5 MG/1
5 TABLET ORAL
Status: COMPLETED | OUTPATIENT
Start: 2020-09-15 | End: 2020-09-15

## 2020-09-15 RX ORDER — FUROSEMIDE 10 MG/ML
60 INJECTION INTRAMUSCULAR; INTRAVENOUS DAILY
Status: DISCONTINUED | OUTPATIENT
Start: 2020-09-16 | End: 2020-09-16

## 2020-09-15 RX ORDER — SPIRONOLACTONE 25 MG/1
12.5 TABLET ORAL DAILY
Status: DISCONTINUED | OUTPATIENT
Start: 2020-09-16 | End: 2020-09-23 | Stop reason: HOSPADM

## 2020-09-15 RX ADMIN — ROPINIROLE HYDROCHLORIDE 1 MG: 1 TABLET, FILM COATED ORAL at 08:50

## 2020-09-15 RX ADMIN — PREDNISONE 2.5 MG: 5 TABLET ORAL at 11:35

## 2020-09-15 RX ADMIN — SODIUM CHLORIDE, PRESERVATIVE FREE 10 ML: 5 INJECTION INTRAVENOUS at 20:48

## 2020-09-15 RX ADMIN — MORPHINE SULFATE 1 MG: 2 INJECTION, SOLUTION INTRAMUSCULAR; INTRAVENOUS at 03:08

## 2020-09-15 RX ADMIN — INSULIN ASPART 5 UNITS: 100 INJECTION, SOLUTION INTRAVENOUS; SUBCUTANEOUS at 17:33

## 2020-09-15 RX ADMIN — SPIRONOLACTONE 25 MG: 25 TABLET ORAL at 08:51

## 2020-09-15 RX ADMIN — PREGABALIN 100 MG: 100 CAPSULE ORAL at 08:50

## 2020-09-15 RX ADMIN — POLYETHYLENE GLYCOL 3350 17 G: 17 POWDER, FOR SOLUTION ORAL at 08:49

## 2020-09-15 RX ADMIN — ASPIRIN 81 MG: 81 TABLET, COATED ORAL at 08:50

## 2020-09-15 RX ADMIN — SODIUM CHLORIDE, PRESERVATIVE FREE 10 ML: 5 INJECTION INTRAVENOUS at 08:54

## 2020-09-15 RX ADMIN — INSULIN DETEMIR 10 UNITS: 100 INJECTION, SOLUTION SUBCUTANEOUS at 20:49

## 2020-09-15 RX ADMIN — ONDANSETRON HYDROCHLORIDE 4 MG: 4 TABLET, FILM COATED ORAL at 03:12

## 2020-09-15 RX ADMIN — INSULIN ASPART 5 UNITS: 100 INJECTION, SOLUTION INTRAVENOUS; SUBCUTANEOUS at 08:50

## 2020-09-15 RX ADMIN — DOCUSATE SODIUM 50 MG AND SENNOSIDES 8.6 MG 2 TABLET: 8.6; 5 TABLET, FILM COATED ORAL at 20:48

## 2020-09-15 RX ADMIN — ROPINIROLE HYDROCHLORIDE 1 MG: 1 TABLET, FILM COATED ORAL at 17:33

## 2020-09-15 RX ADMIN — FUROSEMIDE 60 MG: 20 INJECTION, SOLUTION INTRAMUSCULAR; INTRAVENOUS at 05:23

## 2020-09-15 RX ADMIN — INSULIN ASPART 5 UNITS: 100 INJECTION, SOLUTION INTRAVENOUS; SUBCUTANEOUS at 11:37

## 2020-09-15 RX ADMIN — VENLAFAXINE HYDROCHLORIDE 75 MG: 75 CAPSULE, EXTENDED RELEASE ORAL at 08:50

## 2020-09-15 RX ADMIN — PREGABALIN 100 MG: 100 CAPSULE ORAL at 20:48

## 2020-09-15 RX ADMIN — WARFARIN 5 MG: 5 TABLET ORAL at 17:33

## 2020-09-15 RX ADMIN — AMIODARONE HYDROCHLORIDE 200 MG: 200 TABLET ORAL at 08:50

## 2020-09-15 RX ADMIN — VALSARTAN 40 MG: 80 TABLET, FILM COATED ORAL at 11:36

## 2020-09-15 RX ADMIN — FOLIC ACID 1 MG: 1 TABLET ORAL at 08:51

## 2020-09-15 RX ADMIN — INSULIN ASPART 5 UNITS: 100 INJECTION, SOLUTION INTRAVENOUS; SUBCUTANEOUS at 08:52

## 2020-09-15 RX ADMIN — CARVEDILOL 12.5 MG: 6.25 TABLET, FILM COATED ORAL at 08:51

## 2020-09-15 RX ADMIN — INSULIN ASPART 8 UNITS: 100 INJECTION, SOLUTION INTRAVENOUS; SUBCUTANEOUS at 11:37

## 2020-09-15 RX ADMIN — ALBUMIN HUMAN 12.5 G: 0.25 SOLUTION INTRAVENOUS at 12:45

## 2020-09-15 RX ADMIN — ROPINIROLE HYDROCHLORIDE 1 MG: 1 TABLET, FILM COATED ORAL at 20:48

## 2020-09-15 NOTE — NURSING NOTE
Notified Dr. Mejia of patients blood pressure of 88/60. Doctor stated to hold Lasix 60 mg noted. Will continue to monitor.     Dr. Behbahani notified as well.

## 2020-09-15 NOTE — PROGRESS NOTES
LOS: 5 days     Name: Yumiko Purdy  Age/Sex: 54 y.o. female  :  1965        PCP: Niko Schaefer MD  REF: No ref. provider found    Active Problems:    Acute on chronic congestive heart failure (CMS/HCC)      Reason for follow-up: Congestive heart failure and cardiomyopathy    Subjective       Subjective     Yumiko Purdy is a 54 year old female with a past medical history significant for chronic atrial fibrillation, congestive heart failure, diabetes mellitus and cirrhosis of the liver.  Patient was admitted for decompensated congestive heart failure and found to have newly diagnosed cardiomyopathy.    Interval History: Patient continues to have good urine output with IV diuresis.  Creatinine slightly improved today 1.34.  She says her breathing is better.    Vital Signs  Temp:  [97.9 °F (36.6 °C)-98.3 °F (36.8 °C)] 98 °F (36.7 °C)  Heart Rate:  [104-119] 116  Resp:  [18-20] 18  BP: ()/(63-77) 111/77     Vital Signs (last 72 hrs)        0700  -   0659  0700  -   0659  0700  -  09/15 0659 09/15 0700  -  09/15 0918   Most Recent    Temp (°F) 97.4 -  98.4    97.6 -  98    97.9 -  98.3       98 (36.7)    Heart Rate 97 -  119    76 -  127    104 -  119      116     116    Resp 18 -  20    18 -  20    18 -  20       18    BP 98/54 -  119/70    107/57 -  138/63    98/72 -  126/74      111/77     111/77    SpO2 (%) 94 -  99    94 -  99    96 -  99      97     97        Body mass index is 48.1 kg/m².    Intake/Output Summary (Last 24 hours) at 9/15/2020 0918  Last data filed at 9/15/2020 0010  Gross per 24 hour   Intake 840 ml   Output 3150 ml   Net -2310 ml     Objective    Objective     I have seen and examined Ms. Purdy today.  Physical Exam:     General Appearance:    Alert, cooperative, in no acute distress   Head:    Normocephalic, without obvious abnormality, atraumatic   Eyes:            Conjunctivae and sclerae normal, no   icterus, no pallor, corneas clear.   Neck:    No adenopathy, supple, trachea midline, no thyromegaly, no   carotid bruit, no JVD   Lungs:     Clearer to auscultation,respirations regular, even and                  unlabored    Heart:   Irregularly irregular rhythm and normal rate, normal S1 and S2, no significant  murmur, no gallop, no rub, no click   Chest Wall:    No abnormalities observed   Abdomen:     Normal bowel sounds, no masses, no organomegaly, soft        non-tender, non-distended, no guarding, no rebound                tenderness   Extremities:  Has purpuric rash on both lower extremities and upper extremities..   Pulses:   Pulses are 1+ palpable and equal bilaterally   Skin:   No bleeding, bruising or rash       Neurologic:   Alert and oriented, no focal neurologic deficits noted.   I have seen and performed a physical examination today.    Results review       Results Review:   Results from last 7 days   Lab Units 09/15/20  0049 09/14/20  0706 09/13/20  0343 09/12/20  0149 09/11/20  0044 09/10/20  0058   WBC 10*3/mm3 8.23 7.34 6.56 8.65 9.14 10.26   HEMOGLOBIN g/dL 9.6* 9.7* 9.1* 10.5* 9.7* 10.2*   PLATELETS 10*3/mm3 130* 130* 141 139* 198 199     Results from last 7 days   Lab Units 09/15/20  0049 09/14/20  0514 09/13/20  0343 09/12/20  0149 09/11/20  0044 09/10/20  1516 09/10/20  0058   SODIUM mmol/L 141 138 139 135* 134* 137 128*   POTASSIUM mmol/L 4.6 5.4* 4.8 4.7 5.3* 5.4* 4.9   CHLORIDE mmol/L 98 100 100 101 97* 98 95*   CO2 mmol/L 29.1* 28.4 30.0* 23.3 25.2 17.9* 27.8   BUN mg/dL 50* 49* 45* 50* 41* 36* 33*   CREATININE mg/dL 1.34* 1.46* 1.37* 1.40* 1.30* 1.20* 1.24*   CALCIUM mg/dL 9.1 8.9 9.0 9.1 9.1 9.2 8.8   GLUCOSE mg/dL 198* 103* 155* 169* 376* 337* 379*   ALT (SGPT) U/L 26 30 25 25  --   --  25   AST (SGOT) U/L 29 42* 26 26  --   --  24     Results from last 7 days   Lab Units 09/11/20  0044 09/10/20  1727 09/10/20  0921 09/10/20  0256 09/10/20  0058   TROPONIN T ng/mL 0.027 0.024 0.029 0.034* 0.045*     Lab Results   Component Value  Date    INR 2.08 (H) 09/15/2020    INR 1.97 (H) 09/14/2020    INR 2.18 (H) 09/13/2020    INR 2.08 (H) 09/12/2020    INR 1.72 (H) 09/11/2020    INR 1.64 (H) 09/10/2020    INR 0.90 06/02/2016     Lab Results   Component Value Date    MG 1.9 09/15/2020    MG 2.0 09/14/2020    MG 2.1 09/13/2020     Lab Results   Component Value Date    TSH 3.830 09/10/2020    CHLPL 103 04/21/2016    TRIG 80 09/11/2020    HDL 43 09/11/2020    LDL 78 09/11/2020      Imaging Results (Last 48 Hours)     ** No results found for the last 48 hours. **        Lab Results   Component Value Date    BNP 86 05/08/2016     Echo   Results for orders placed during the hospital encounter of 09/09/20   Adult Transthoracic Echo Complete W/ Cont if Necessary Per Protocol    Narrative · The left ventricular cavity is mildly dilated.  · The following left ventricular wall segments are hypokinetic: mid   anterior, apical lateral, apical inferior, apical septal, basal   inferoseptal, mid inferoseptal, apex hypokinetic, mid anteroseptal, basal   anterior and basal inferoseptal.  · Left ventricular systolic function is severely decreased.  · Estimated EF appears to be in the range of 21 - 25%.  · The aortic valve is structurally normal. No aortic valve regurgitation   is present. No aortic valve stenosis is present.  · The mitral valve is normal in structure. Mild mitral valve regurgitation   is present. No significant mitral valve stenosis is present.  · The tricuspid valve is normal. Mild to moderate tricuspid valve   regurgitation is present. Estimated right ventricular systolic pressure   from tricuspid regurgitation is normal (<35 mmHg).  · There is no evidence of pericardial effusion.         I reviewed the patient's new clinical results.    Telemetry: A.fib 110-120 bpm      Medication Review:   amiodarone, 200 mg, Oral, Daily  aspirin, 81 mg, Oral, Daily  carvedilol, 12.5 mg, Oral, BID With Meals  carvedilol, 6.25 mg, Oral, Once  folic acid, 1 mg, Oral,  Daily  [START ON 9/16/2020] furosemide, 60 mg, Intravenous, Daily  insulin aspart, 0-14 Units, Subcutaneous, TID AC  insulin aspart, 5 Units, Subcutaneous, TID With Meals  insulin detemir, 10 Units, Subcutaneous, Nightly  polyethylene glycol, 17 g, Oral, Daily  predniSONE, 2.5 mg, Oral, Daily  pregabalin, 100 mg, Oral, Q12H  rOPINIRole, 1 mg, Oral, TID  senna-docusate sodium, 2 tablet, Oral, Nightly  sodium chloride, 10 mL, Intravenous, Q12H  sodium chloride, 10 mL, Intravenous, Q12H  spironolactone, 25 mg, Oral, Daily  venlafaxine XR, 75 mg, Oral, Daily        Pharmacy Consult,   Pharmacy to dose warfarin,         Assessment      Assessment:  1. Acute on chronic combined systolic and diastolic congestive heart failure, improving   2. Newly diagnosed advanced cardiomyopathy with LV ejection fraction of about 20-25%, unclear chronicity   3. Elevated troponin, trending up to 0.045 now trending down to normal, EKG tracing reviewed and shows no acute ischemia, patient will need to undergo left heart catheterization  4. Paroxysmal atrial fibrillation with intermittent RVR, on Coumadin, INR 1.97, on amiodarone   5. Eosinophilic skin rash, vasculitis was ruled out with recent biopsy at   6. Acute kidney injury, creatinine 1.34, improved    7. Diabetes mellitus type 2  8. Cirrhosis of the liver    Plan     Recommendations:  1. Continue with IV furosemide as tolerated.  2. Add valsartan 40 mg daily.  Continue to monitor the kidney function closely.  3. I have discussed with her about the option of KHAI guided cardioversion to hopefully get her back to sinus rhythm that might help her LV systolic function.  I discussed the procedure, potential risk and benefits and alternatives.  She expressed understanding is wanting to proceed.  We will schedule for a.m.  4. With regards to further evaluation for underlying significant coronary artery disease, with her significant peripheral artery disease, it may be technically difficult  to get an arterial access for coronary angiography.  We will evaluate with Lexiscan sestamibi study for any underlying significant myocardial ischemia and then consider cardiac catheterization if she has large areas of ischemia.    I discussed the patients findings and my recommendations with patient and family      Sandy KENNY Ellis, acting as scribe for Dr. Ramon Sahu MD, FACC  09/15/20  09:18 EDT    I, Ramon Sahu [facility], [facility], personally performed the services described in this documentation as documented by the above named individual under my supervision and made necessary changes and the note is both accurate and complete.     Ramon Sahu MD, FACC  9/15/2020            Please note that portions of this note were completed with a voice recognition program.

## 2020-09-15 NOTE — PROGRESS NOTES
[facility] Medicine Services  PROGRESS NOTE     Patient Identification:  Name:  Yumiko Purdy  Age:  54 y.o.  Sex:  female  :  1965  MRN:  [id number]  Visit Number:  [id number]  Primary Care Provider:  Niko Schaefer MD    Length of stay:  5    ----------------------------------------------------------------------------------------------------------------------  Subjective     Chief Complaint:  Follow up for CHF, persistent flutter with RVR    Subjective:  Today, the patient reports not doing well because she feels weak and has some pain on areas of rash that have ulcerated.  Otherwise no events overnight.  She continues to remain tachycardic.  And was hypotensive later in the afternoon yesterday therefore, p.m. dose of Coreg was held.  Blood pressure has improved this morning.    ----------------------------------------------------------------------------------------------------------------------  Objective     Current Hospital Meds:  amiodarone, 200 mg, Oral, Daily  aspirin, 81 mg, Oral, Daily  carvedilol, 12.5 mg, Oral, BID With Meals  folic acid, 1 mg, Oral, Daily  [START ON 2020] furosemide, 60 mg, Intravenous, Daily  furosemide, 60 mg, Intravenous, Once  insulin aspart, 0-14 Units, Subcutaneous, TID AC  insulin aspart, 5 Units, Subcutaneous, TID With Meals  insulin detemir, 10 Units, Subcutaneous, Nightly  polyethylene glycol, 17 g, Oral, Daily  predniSONE, 2.5 mg, Oral, Daily  pregabalin, 100 mg, Oral, Q12H  rOPINIRole, 1 mg, Oral, TID  senna-docusate sodium, 2 tablet, Oral, Nightly  sodium chloride, 10 mL, Intravenous, Q12H  sodium chloride, 10 mL, Intravenous, Q12H  [START ON 2020] spironolactone, 12.5 mg, Oral, Daily  valsartan, 40 mg, Oral, Q24H  venlafaxine XR, 75 mg, Oral, Daily  warfarin, 5 mg, Oral, Once      Pharmacy Consult,   Pharmacy to dose warfarin,        ----------------------------------------------------------------------------------------------------------------------  Vital Signs:  Temp:  [97.8 °F (36.6 °C)-98.3 °F (36.8 °C)] 98 °F (36.7 °C)  Heart Rate:  [100-116] 103  Resp:  [18-20] 18  BP: ()/(60-77) 88/60  Mean Arterial Pressure (Non-Invasive) for the past 24 hrs (Last 3 readings):   Noninvasive MAP (mmHg)   09/15/20 1500 71   09/15/20 1448 68   09/15/20 1020 61     SpO2 Percentage    09/15/20 0622 09/15/20 0849 09/15/20 1448   SpO2: 98% 97% 98%     SpO2:  [97 %-99 %] 98 %  on  Flow (L/min):  [2] 2;   Device (Oxygen Therapy): nasal cannula    Body mass index is 48.1 kg/m².  Wt Readings from Last 3 Encounters:   09/15/20 131 kg (289 lb 1 oz)        Intake/Output Summary (Last 24 hours) at 9/15/2020 1553  Last data filed at 9/15/2020 0010  Gross per 24 hour   Intake 240 ml   Output 2100 ml   Net -1860 ml     NPO Diet  Diet Regular; Cardiac, Consistent Carbohydrate, Renal, Daily Fluid Restriction, Low Sodium; 1500 mL Fluid Per Day; 2,000 mg Na  ----------------------------------------------------------------------------------------------------------------------  Physical exam:   GEN: NAD, pale and chronically ill-appearing  CV: Regular rhythm but tachycardic with no audible murmurs  PULM: Mostly clear to auscultation at the apices decreased breath sound at the bases mild crackles  GI: Abdomen is obese, positive bowel sounds, soft and nontender  SKIN: 2+ pitting edema lower extremities, rash has remained unchanged, ulcerated large/and posterior right calf    -------------------------------------------------------------------------------------------------      Results from last 7 days   Lab Units 09/15/20  1021 09/15/20  0049 09/14/20  0706 09/14/20  0705 09/13/20  0343 09/12/20  0149 09/11/20  0044 09/10/20  0058   CRP mg/dL  --   --   --   --   --   --   --  0.53*   LACTATE mmol/L  --   --   --   --   --   --   --  1.5   WBC 10*3/mm3  --  8.23 7.34   --  6.56 8.65 9.14 10.26   HEMOGLOBIN g/dL  --  9.6* 9.7*  --  9.1* 10.5* 9.7* 10.2*   HEMATOCRIT %  --  32.8* 34.0  --  32.1* 35.4 34.2 36.6   MCV fL  --  96.5 97.1*  --  98.8* 93.9 98.3* 99.2*   MCHC g/dL  --  29.3* 28.5*  --  28.3* 29.7* 28.4* 27.9*   PLATELETS 10*3/mm3  --  130* 130*  --  141 139* 198 199   INR  2.13* 2.08*  --  1.97* 2.18* 2.08* 1.72* 1.64*     Results from last 7 days   Lab Units 09/15/20  0049 09/14/20  0514 09/13/20  0343   SODIUM mmol/L 141 138 139   POTASSIUM mmol/L 4.6 5.4* 4.8   MAGNESIUM mg/dL 1.9 2.0 2.1   CHLORIDE mmol/L 98 100 100   CO2 mmol/L 29.1* 28.4 30.0*   BUN mg/dL 50* 49* 45*   CREATININE mg/dL 1.34* 1.46* 1.37*   EGFR IF NONAFRICN AM mL/min/1.73 41* 37* 40*   CALCIUM mg/dL 9.1 8.9 9.0   GLUCOSE mg/dL 198* 103* 155*   ALBUMIN g/dL 3.10* 3.00* 3.25*   BILIRUBIN mg/dL 1.9* 2.0* 1.7*   ALK PHOS U/L 104 106 86   AST (SGOT) U/L 29 42* 26   ALT (SGPT) U/L 26 30 25   Estimated Creatinine Clearance: 66.4 mL/min (A) (by C-G formula based on SCr of 1.34 mg/dL (H)).  No results found for: AMMONIA    Glucose   Date/Time Value Ref Range Status   09/15/2020 1019 282 (H) 70 - 130 mg/dL Final   09/15/2020 0623 218 (H) 70 - 130 mg/dL Final   09/14/2020 1952 239 (H) 70 - 130 mg/dL Final   09/14/2020 1608 246 (H) 70 - 130 mg/dL Final   09/14/2020 1011 183 (H) 70 - 130 mg/dL Final   09/14/2020 0624 119 70 - 130 mg/dL Final   09/13/2020 2004 97 70 - 130 mg/dL Final   09/13/2020 1620 141 (H) 70 - 130 mg/dL Final     Lab Results   Component Value Date    HGBA1C 8.40 (H) 09/10/2020     Lab Results   Component Value Date    TSH 3.830 09/10/2020    FREET4 1.39 09/10/2020     ----------------------------------------------------------------------------------------------------------------------  Assessment/Plan       · Acute systolic congestive heart failure-EF 21 to 25%.  Responding well to IV Lasix daily.  Negative 2300 fluid balance yesterday.cardiology has started valsartan, will decrease Aldactone  dose to 12.5 mg given potassium and a higher level of normal and monitor closely in the meantime. She is on carvedilol but not tolerating at times due to intermittent hypotension.  Likely need to reduce dose.  She will undergo stress test in 2 days for ischemic work-up.  Given renal function left heart catheterization will be challenging. will need to discuss possible ICD placement as outpatient if no recovery noted on repeat TTE.  · Persistent atrial fibrillation-on amiodarone but remains tachycardic.  Cardiology would like to cardiovert tomorrow.  This could be contributing to cardiomyopathy.cont warfarin, INR is therapeutic.   · Eosinophilic skin rash.  Vasculitis was ruled out recently per biopsy but serologic w/u still pending. On low-dose prednisone at this time.  She has been followed up at  for work-up  · Borderline hyperkalemia-potassium- Aldactone reduced as above. Will need very close monitoring with addition of Valsartan.   · CKD3- cr stable for the most part  · Liver cirrhosis-due to history of alcoholism  · Chronic thrombocytopenia- liver cirrhosis  · Type 2 diabetes with peripheral neuropathy-continue Levemir and aspart  · The patient-continue MiraLAX, doc/senna  · Depression-continue venlafaxine  · But obesity-BMI 48, complicating aspects of care  · chronic anemia-likely due to chronic disease.  Hemoglobin 9.7 mostly stable    --------------------------------------------------  DVT Prophylaxis: warfarin     Disposition: home vs rehab pending further PT assessment and recs  --------------------------------------------------      Katayoun Behbahani, MD  Hospitalist Service -- [facility]     09/15/20  15:53 EDT

## 2020-09-15 NOTE — PROGRESS NOTES
Nephrology Progress Note      Subjective     Mild shortness of breath, otherwise feeling comfortable    Objective       Vital signs :     Temp:  [97.9 °F (36.6 °C)-98.3 °F (36.8 °C)] 98 °F (36.7 °C)  Heart Rate:  [104-119] 116  Resp:  [18-20] 18  BP: ()/(63-77) 111/77      Intake/Output Summary (Last 24 hours) at 9/15/2020 0935  Last data filed at 9/15/2020 0010  Gross per 24 hour   Intake 840 ml   Output 3150 ml   Net -2310 ml       Physical Exam:    General Appearance : not in acute distress  Lungs : clear to auscultation, respirations regular  Heart :  regular rhythm & normal rate, normal S1, S2 and no murmur, no rub  Abdomen : normal bowel sounds, no masses, no hepatomegaly, no splenomegaly, soft non-tender and no guarding  Extremities : moves extremities well, No edema, no cyanosis and no redness  Skin :  generalized rash on both Upper and lower ext  Neurologic :   orientated to person, place, time and situation, Grossly no focal deficits      Laboratory Data :     Albumin Albumin   Date Value Ref Range Status   09/15/2020 3.10 (L) 3.50 - 5.20 g/dL Final   09/14/2020 3.00 (L) 3.50 - 5.20 g/dL Final   09/13/2020 3.25 (L) 3.50 - 5.20 g/dL Final      Magnesium Magnesium   Date Value Ref Range Status   09/15/2020 1.9 1.6 - 2.6 mg/dL Final   09/14/2020 2.0 1.6 - 2.6 mg/dL Final   09/13/2020 2.1 1.6 - 2.6 mg/dL Final          PTH               No results found for: PTH    CBC and coagulation:  Results from last 7 days   Lab Units 09/15/20  0049 09/14/20  0706 09/14/20  0705 09/13/20  0343  09/10/20  0058   LACTATE mmol/L  --   --   --   --   --  1.5   CRP mg/dL  --   --   --   --   --  0.53*   WBC 10*3/mm3 8.23 7.34  --  6.56   < > 10.26   HEMOGLOBIN g/dL 9.6* 9.7*  --  9.1*   < > 10.2*   HEMATOCRIT % 32.8* 34.0  --  32.1*   < > 36.6   MCV fL 96.5 97.1*  --  98.8*   < > 99.2*   MCHC g/dL 29.3* 28.5*  --  28.3*   < > 27.9*   PLATELETS 10*3/mm3 130* 130*  --  141   < > 199   INR  2.08*  --  1.97* 2.18*   < >  1.64*    < > = values in this interval not displayed.     Acid/base balance:  Results from last 7 days   Lab Units 09/10/20  0049   PH, ARTERIAL pH units 7.413   PO2 ART mm Hg 84.8   PCO2, ARTERIAL mm Hg 40.7   HCO3 ART mmol/L 26.0     Renal and electrolytes:  Results from last 7 days   Lab Units 09/15/20  0049 09/14/20  0514 09/13/20  0343 09/12/20  0149 09/11/20  0044   SODIUM mmol/L 141 138 139 135* 134*   POTASSIUM mmol/L 4.6 5.4* 4.8 4.7 5.3*   MAGNESIUM mg/dL 1.9 2.0 2.1 2.2 2.1   CHLORIDE mmol/L 98 100 100 101 97*   CO2 mmol/L 29.1* 28.4 30.0* 23.3 25.2   BUN mg/dL 50* 49* 45* 50* 41*   CREATININE mg/dL 1.34* 1.46* 1.37* 1.40* 1.30*   EGFR IF NONAFRICN AM mL/min/1.73 41* 37* 40* 39* 43*   CALCIUM mg/dL 9.1 8.9 9.0 9.1 9.1     Estimated Creatinine Clearance: 66.4 mL/min (A) (by C-G formula based on SCr of 1.34 mg/dL (H)).    Liver and pancreatic function:  Results from last 7 days   Lab Units 09/15/20  0049 09/14/20  0514 09/13/20  0343   ALBUMIN g/dL 3.10* 3.00* 3.25*   BILIRUBIN mg/dL 1.9* 2.0* 1.7*   ALK PHOS U/L 104 106 86   AST (SGOT) U/L 29 42* 26   ALT (SGPT) U/L 26 30 25         Cardiac:  Results from last 7 days   Lab Units 09/13/20  0343 09/10/20  0058   PROBNP pg/mL 1,788.0* 3,325.0*     Liver and pancreatic function:  Results from last 7 days   Lab Units 09/15/20  0049 09/14/20  0514 09/13/20  0343   ALBUMIN g/dL 3.10* 3.00* 3.25*   BILIRUBIN mg/dL 1.9* 2.0* 1.7*   ALK PHOS U/L 104 106 86   AST (SGOT) U/L 29 42* 26   ALT (SGPT) U/L 26 30 25       Medications :     amiodarone, 200 mg, Oral, Daily  aspirin, 81 mg, Oral, Daily  carvedilol, 12.5 mg, Oral, BID With Meals  folic acid, 1 mg, Oral, Daily  [START ON 9/16/2020] furosemide, 60 mg, Intravenous, Daily  insulin aspart, 0-14 Units, Subcutaneous, TID AC  insulin aspart, 5 Units, Subcutaneous, TID With Meals  insulin detemir, 10 Units, Subcutaneous, Nightly  polyethylene glycol, 17 g, Oral, Daily  predniSONE, 2.5 mg, Oral, Daily  pregabalin, 100  mg, Oral, Q12H  rOPINIRole, 1 mg, Oral, TID  senna-docusate sodium, 2 tablet, Oral, Nightly  sodium chloride, 10 mL, Intravenous, Q12H  sodium chloride, 10 mL, Intravenous, Q12H  spironolactone, 25 mg, Oral, Daily  venlafaxine XR, 75 mg, Oral, Daily      Pharmacy Consult,   Pharmacy to dose warfarin,           Assessment/Plan      1.  CKD stage III  2.  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  Anasarca  4.  Chronic systolic CHF  5.  Cirrhosis  6.  Diabetes     Cr got better, will resume lasix with albumin support, noted lasix 60mg IV has been ordered. Serology for vasculitis are pending.   UPCR 194mg/g proteinuria, no hematuria and its extremely unlikely having vasculitis.     skin biopsy then and lesion demonstrated dermatitis with eosinophils and hemorrhage      Ayanna Mejia MD  09/15/20  09:35 EDT

## 2020-09-15 NOTE — THERAPY EVALUATION
Acute Care - Physical Therapy Initial Evaluation   Isaías     Patient Name: Yumiko Purdy  : 1965  MRN: [id number]  Today's Date: 9/15/2020   Onset of Illness/Injury or Date of Surgery: 20       PT Assessment (last 12 hours)      PT Evaluation and Treatment     Row Name 09/15/20 1136          Physical Therapy Time and Intention    Subjective Information  complains of;weakness;fatigue  -CT     Document Type  evaluation  -CT     Mode of Treatment  physical therapy  -CT     Patient Effort  good  -CT     Symptoms Noted During/After Treatment  fatigue  -CT     Comment  Pt tolerated evaluation well with rest breaks provided as needed. Pt transfer to BS and chair today but reports she is unable to ambulate.   -CT     Row Name 09/15/20 1136          General Information    Patient Profile Reviewed  yes  -CT     Onset of Illness/Injury or Date of Surgery  20  -CT     Referring Physician  Likens  -CT     Patient Observations  alert;cooperative;agree to therapy  -CT     Prior Level of Function  independent:;min assist:  -CT     Equipment Currently Used at Home  walker, rolling  -CT     Existing Precautions/Restrictions  fall;oxygen therapy device and L/min  -CT     Limitations/Impairments  safety/cognitive  -CT     Equipment Issued to Patient  gait belt  -CT     Risks Reviewed  patient:;LOB;nausea/vomiting;dizziness;increased discomfort;change in vital signs;increased drainage;lines disloged  -CT     Benefits Reviewed  patient:;improve function;increase independence;increase strength;increase balance;decrease pain;decrease risk of DVT;improve skin integrity;increase knowledge  -CT     Barriers to Rehab  medically complex  -CT     Row Name 09/15/20 1136          Previous Level of Function/Home Environm    BADLs, Premorbid Functional Level  needs assistive device for safe performance  -CT     IADLs, Premorbid Functional Level  needs assistive device for safe performance  -CT     Household Ambulation,  Premorbid Functional Level  needs assistive device for safe performance  -CT     Row Name 09/15/20 1136          Living Environment    Current Living Arrangements  home/apartment/condo  -CT     Lives With  child(ferdinand), adult  -CT     Row Name 09/15/20 1136          Cognition    Orientation Status (Cognition)  oriented x 3  -CT     Follows Commands (Cognition)  follows multi-step commands  -CT     Row Name 09/15/20 1136          Range of Motion Comprehensive    Comment, General Range of Motion  BLE grossly decreased 25% but functional for pt  -CT     Row Name 09/15/20 1136          Strength Comprehensive (MMT)    Comment, General Manual Muscle Testing (MMT) Assessment  BLE grossly 3/5 within available ROM  -CT     Row Name 09/15/20 1136          Bed Mobility    Bed Mobility  bed mobility (all) activities  -CT     All Activities, [place] (Bed Mobility)  moderate assist (50% patient effort)  -CT     Bed Mobility, Safety Issues  decreased use of arms for pushing/pulling;decreased use of legs for bridging/pushing  -CT     Assistive Device (Bed Mobility)  bed rails  -CT     Row Name 09/15/20 1136          Transfers    Transfers  bed-chair transfer;chair-bed transfer;sit-stand transfer;stand-sit transfer  -CT     Bed-Chair [place] (Transfers)  moderate assist (50% patient effort)  -CT     Chair-Bed [place] (Transfers)  moderate assist (50% patient effort)  -CT     Assistive Device (Bed-Chair Transfers)  -- holds onto arm rest  -CT     Sit-Stand [place] (Transfers)  moderate assist (50% patient effort)  -CT     Stand-Sit [place] (Transfers)  moderate assist (50% patient effort)  -CT     Row Name 09/15/20 1136          Chair-Bed Transfer    Assistive Device (Chair-Bed Transfers)  -- holds onto arm rest  -CT     Row Name 09/15/20 1136          Sit-Stand Transfer    Assistive Device (Sit-Stand Transfers)  -- holds onto arm rest  -CT     Row Name 09/15/20 1136          Stand-Sit Transfer    Assistive  Device (Stand-Sit Transfers)  -- holds onto arm rest  -CT     Row Name 09/15/20 1136          Gait/Stairs (Locomotion)    Comment (Gait/Stairs)  pt reports she is unable to ambulate today  -CT     Row Name 09/15/20 1136          Safety Issues, Functional Mobility    Safety Issues Affecting Function (Mobility)  safety precaution awareness;safety precautions follow-through/compliance  -CT     Impairments Affecting Function (Mobility)  endurance/activity tolerance;strength  -CT     Row Name             Wound 09/10/20 1345 Right calf Venous Ulcer    Wound - Properties Group Placement Date: 09/10/20  -KG Placement Time: 1345  -KG Side: Right  -KG Location: calf  -KG Primary Wound Type: Venous ulcer  -KG Additional Comments: several large open ulcers on back of right leg  -KG    Retired Wound - Properties Group Date first assessed: 09/10/20  -KG Time first assessed: 1345  -KG Side: Right  -KG Location: calf  -KG Primary Wound Type: Venous ulcer  -KG Retired Stage, Pressure Injury : other (see comments)  -KG Additional Comments: several large open ulcers on back of right leg  -KG    Row Name             Wound 09/10/20 1345 Left anterior toe Venous Ulcer    Wound - Properties Group Placement Date: 09/10/20  -KG Placement Time: 1345  -KG Side: Left  -KG Location: toe  -KG Primary Wound Type: Venous ulcer  -KG    Retired Wound - Properties Group Date first assessed: 09/10/20  -KG Time first assessed: 1345  -KG Side: Left  -KG Retired Orientation: anterior  -KG Location: toe  -KG Primary Wound Type: Venous ulcer  -KG Retired Stage, Pressure Injury : other (see comments)  -KG    Row Name 09/15/20 1136          Coping    Observed Emotional State  calm;cooperative  -CT     Row Name 09/15/20 1136          Plan of Care Review    Plan of Care Reviewed With  patient  -CT     Row Name 09/15/20 1136          Physical Therapy Goals    Bed Mobility Goal Selection (PT)  bed mobility, PT goal 1  -CT     Transfer Goal Selection (PT)   transfer, PT goal 1  -CT     Row Name 09/15/20 1136          Bed Mobility Goal 1 (PT)    Activity/Assistive Device (Bed Mobility Goal 1, PT)  bed mobility activities, all  -CT     [place] Level/Cues Needed (Bed Mobility Goal 1, PT)  standby assist  -CT     Time Frame (Bed Mobility Goal 1, PT)  by discharge  -CT     Row Name 09/15/20 1136          Transfer Goal 1 (PT)    Activity/Assistive Device (Transfer Goal 1, PT)  sit-to-stand/stand-to-sit;bed-to-chair/chair-to-bed  -CT     [place] Level/Cues Needed (Transfer Goal 1, PT)  standby assist  -CT     Time Frame (Transfer Goal 1, PT)  by discharge  -CT     Row Name 09/15/20 1136          Positioning and Restraints    Pre-Treatment Position  in bed  -CT     Row Name 09/15/20 1136          Therapy Assessment/Plan (PT)    Patient/Family Therapy Goals Statement (PT)  Pt goals are to return to PLOF  -CT     Functional Level at Time of Evaluation (PT)  Mod A   -CT     PT Diagnosis (PT)  decreased functional mobility  -CT     Rehab Potential (PT)  good, to achieve stated therapy goals  -CT     Criteria for Skilled Interventions Met (PT)  yes;meets criteria  -CT     Predicted Duration of Therapy Intervention (PT)  length of stay  -CT     Row Name 09/15/20 1136          Therapy Plan Review/Discharge Plan (PT)    Therapy Plan Review (PT)  evaluation/treatment results reviewed;care plan/treatment goals reviewed;risks/benefits reviewed;current/potential barriers reviewed  -CT       User Key  (r) = Recorded By, (t) = Taken By, (c) = Cosigned By    Initials Name Provider Type    Rochelle Auguste RN Registered Nurse    Lynn Chaudhary, PT Physical Therapist        Physical Therapy Education                 Title: PT OT SLP Therapies (Done)     Topic: Physical Therapy (Done)     Point: Mobility training (Done)     Learning Progress Summary           Patient Acceptance, E,TB, VU by CT at 9/15/2020 1143                   Point: Home exercise program (Done)      Learning Progress Summary           Patient Acceptance, E,TB, VU by CT at 9/15/2020 1143                   Point: Body mechanics (Done)     Learning Progress Summary           Patient Acceptance, E,TB, VU by CT at 9/15/2020 1143                   Point: Precautions (Done)     Learning Progress Summary           Patient Acceptance, E,TB, VU by CT at 9/15/2020 1143                               User Key     Initials Effective Dates Name Provider Type Discipline    CT 04/03/18 -  Lynn Mueller PT Physical Therapist PT              PT Recommendation and Plan  Anticipated Discharge Disposition (PT): (tbd)  Planned Therapy Interventions (PT): balance training, bed mobility training, gait training, home exercise program, manual therapy techniques, motor coordination training, neuromuscular re-education, patient/family education, postural re-education, strengthening, transfer training  Therapy Frequency (PT): 3 times/wk(3-5 times/wk )  Plan of Care Reviewed With: patient       Time Calculation:   PT Charges     Row Name 09/15/20 1143             Time Calculation    PT Received On  09/15/20  -CT      PT Goal Re-Cert Due Date  09/29/20  -CT        User Key  (r) = Recorded By, (t) = Taken By, (c) = Cosigned By    Initials Name Provider Type    CT Lynn Mueller PT Physical Therapist        Therapy Charges for Today     Code Description Service Date Service Provider Modifiers Qty    95649505901 HC PT EVAL MOD COMPLEXITY 4 9/15/2020 Lynn Mueller, PT GP 1               Lynn Mueller, PT  9/15/2020

## 2020-09-15 NOTE — PLAN OF CARE
Goal Outcome Evaluation:  Plan of Care Reviewed With: patient  Progress: improving  Pt with no s/s of any distress, continues to complain of pain frequently, blood pressure monitored for IV pain meds, will continue with current plan of care

## 2020-09-15 NOTE — PLAN OF CARE
Goal Outcome Evaluation:  Plan of Care Reviewed With: patient  Progress: improving  Outcome Summary: Patient tolerated shift well.  Requests pain meds frequently.  Patient's Blood pressure has been running low. Last taken 88/60. IV Albumin given and Lasix and Metoprolol held.  Notified. Patient has no other complaints. NPO after midnight for a KHAI and possible Cardioversion.

## 2020-09-16 ENCOUNTER — ANESTHESIA EVENT (OUTPATIENT)
Dept: CARDIOLOGY | Facility: HOSPITAL | Age: 55
End: 2020-09-16

## 2020-09-16 ENCOUNTER — ANESTHESIA (OUTPATIENT)
Dept: CARDIOLOGY | Facility: HOSPITAL | Age: 55
End: 2020-09-16

## 2020-09-16 ENCOUNTER — APPOINTMENT (OUTPATIENT)
Dept: CARDIOLOGY | Facility: HOSPITAL | Age: 55
End: 2020-09-16

## 2020-09-16 LAB
ALBUMIN SERPL-MCNC: 2.88 G/DL (ref 3.5–5.2)
ALP SERPL-CCNC: 81 U/L (ref 39–117)
ALT SERPL W P-5'-P-CCNC: 22 U/L (ref 1–33)
ANION GAP SERPL CALCULATED.3IONS-SCNC: 6.4 MMOL/L (ref 5–15)
ANISOCYTOSIS BLD QL: ABNORMAL
AST SERPL-CCNC: 24 U/L (ref 1–32)
BILIRUB CONJ SERPL-MCNC: 0.6 MG/DL (ref 0–0.3)
BILIRUB INDIRECT SERPL-MCNC: 1.6 MG/DL
BILIRUB SERPL-MCNC: 2.2 MG/DL (ref 0–1.2)
BUN SERPL-MCNC: 39 MG/DL (ref 6–20)
BUN/CREAT SERPL: 32.5 (ref 7–25)
CALCIUM SPEC-SCNC: 8.5 MG/DL (ref 8.6–10.5)
CHLORIDE SERPL-SCNC: 100 MMOL/L (ref 98–107)
CO2 SERPL-SCNC: 33.6 MMOL/L (ref 22–29)
CREAT SERPL-MCNC: 1.2 MG/DL (ref 0.57–1)
CYTOLOGIST CVX/VAG CYTO: NORMAL
DEPRECATED RDW RBC AUTO: 71.8 FL (ref 37–54)
EOSINOPHIL NFR BLD MANUAL: 3 % (ref 0.3–6.2)
ERYTHROCYTE [DISTWIDTH] IN BLOOD BY AUTOMATED COUNT: 20.5 % (ref 12.3–15.4)
GFR SERPL CREATININE-BSD FRML MDRD: 47 ML/MIN/1.73
GLUCOSE BLDC GLUCOMTR-MCNC: 111 MG/DL (ref 70–130)
GLUCOSE BLDC GLUCOMTR-MCNC: 117 MG/DL (ref 70–130)
GLUCOSE BLDC GLUCOMTR-MCNC: 129 MG/DL (ref 70–130)
GLUCOSE BLDC GLUCOMTR-MCNC: 141 MG/DL (ref 70–130)
GLUCOSE BLDC GLUCOMTR-MCNC: 151 MG/DL (ref 70–130)
GLUCOSE SERPL-MCNC: 150 MG/DL (ref 65–99)
HCT VFR BLD AUTO: 29.9 % (ref 34–46.6)
HGB BLD-MCNC: 8.5 G/DL (ref 12–15.9)
HYPOCHROMIA BLD QL: ABNORMAL
INR PPP: 2.38 (ref 0.9–1.1)
LYMPHOCYTES # BLD MANUAL: ABNORMAL 10*3/UL
LYMPHOCYTES NFR BLD MANUAL: 16 % (ref 19.6–45.3)
LYMPHOCYTES NFR BLD MANUAL: 2 % (ref 5–12)
MACROCYTES BLD QL SMEAR: ABNORMAL
MCH RBC QN AUTO: 27.2 PG (ref 26.6–33)
MCHC RBC AUTO-ENTMCNC: 28.4 G/DL (ref 31.5–35.7)
MCV RBC AUTO: 95.8 FL (ref 79–97)
NEUTROPHILS # BLD AUTO: ABNORMAL 10*3/UL
NEUTROPHILS NFR BLD MANUAL: 79 % (ref 42.7–76)
PATH INTERP BLD-IMP: NORMAL
PLAT MORPH BLD: NORMAL
PLATELET # BLD AUTO: 107 10*3/MM3 (ref 140–450)
PMV BLD AUTO: 10 FL (ref 6–12)
POLYCHROMASIA BLD QL SMEAR: ABNORMAL
POTASSIUM SERPL-SCNC: 4.2 MMOL/L (ref 3.5–5.2)
PROT SERPL-MCNC: 5.3 G/DL (ref 6–8.5)
PROTHROMBIN TIME: 25.9 SECONDS (ref 11.9–14.1)
RBC # BLD AUTO: 3.12 10*6/MM3 (ref 3.77–5.28)
SODIUM SERPL-SCNC: 140 MMOL/L (ref 136–145)
WBC # BLD AUTO: 6.94 10*3/MM3 (ref 3.4–10.8)

## 2020-09-16 PROCEDURE — 93010 ELECTROCARDIOGRAM REPORT: CPT | Performed by: SPECIALIST

## 2020-09-16 PROCEDURE — 63710000001 PREDNISONE PER 5 MG: Performed by: FAMILY MEDICINE

## 2020-09-16 PROCEDURE — 85610 PROTHROMBIN TIME: CPT | Performed by: PHYSICIAN ASSISTANT

## 2020-09-16 PROCEDURE — 63710000001 INSULIN ASPART PER 5 UNITS: Performed by: INTERNAL MEDICINE

## 2020-09-16 PROCEDURE — 93312 ECHO TRANSESOPHAGEAL: CPT | Performed by: SPECIALIST

## 2020-09-16 PROCEDURE — 85027 COMPLETE CBC AUTOMATED: CPT | Performed by: FAMILY MEDICINE

## 2020-09-16 PROCEDURE — 93325 DOPPLER ECHO COLOR FLOW MAPG: CPT | Performed by: INTERNAL MEDICINE

## 2020-09-16 PROCEDURE — 80048 BASIC METABOLIC PNL TOTAL CA: CPT | Performed by: INTERNAL MEDICINE

## 2020-09-16 PROCEDURE — 80076 HEPATIC FUNCTION PANEL: CPT | Performed by: FAMILY MEDICINE

## 2020-09-16 PROCEDURE — 93005 ELECTROCARDIOGRAM TRACING: CPT | Performed by: SPECIALIST

## 2020-09-16 PROCEDURE — 93321 DOPPLER ECHO F-UP/LMTD STD: CPT | Performed by: INTERNAL MEDICINE

## 2020-09-16 PROCEDURE — 85060 BLOOD SMEAR INTERPRETATION: CPT | Performed by: INTERNAL MEDICINE

## 2020-09-16 PROCEDURE — 85007 BL SMEAR W/DIFF WBC COUNT: CPT | Performed by: INTERNAL MEDICINE

## 2020-09-16 PROCEDURE — B245ZZ4 ULTRASONOGRAPHY OF LEFT HEART, TRANSESOPHAGEAL: ICD-10-PCS | Performed by: SPECIALIST

## 2020-09-16 PROCEDURE — 93321 DOPPLER ECHO F-UP/LMTD STD: CPT | Performed by: SPECIALIST

## 2020-09-16 PROCEDURE — 63710000001 ONDANSETRON PER 8 MG: Performed by: FAMILY MEDICINE

## 2020-09-16 PROCEDURE — 25010000002 PHENYLEPHRINE PER 1 ML: Performed by: NURSE ANESTHETIST, CERTIFIED REGISTERED

## 2020-09-16 PROCEDURE — 92960 CARDIOVERSION ELECTRIC EXT: CPT

## 2020-09-16 PROCEDURE — P9047 ALBUMIN (HUMAN), 25%, 50ML: HCPCS | Performed by: INTERNAL MEDICINE

## 2020-09-16 PROCEDURE — 82962 GLUCOSE BLOOD TEST: CPT

## 2020-09-16 PROCEDURE — 25010000002 MIDAZOLAM PER 1 MG: Performed by: NURSE ANESTHETIST, CERTIFIED REGISTERED

## 2020-09-16 PROCEDURE — 99232 SBSQ HOSP IP/OBS MODERATE 35: CPT | Performed by: INTERNAL MEDICINE

## 2020-09-16 PROCEDURE — 63710000001 INSULIN ASPART PER 5 UNITS: Performed by: SPECIALIST

## 2020-09-16 PROCEDURE — 93312 ECHO TRANSESOPHAGEAL: CPT | Performed by: INTERNAL MEDICINE

## 2020-09-16 PROCEDURE — 5A2204Z RESTORATION OF CARDIAC RHYTHM, SINGLE: ICD-10-PCS | Performed by: SPECIALIST

## 2020-09-16 PROCEDURE — 25010000002 PROPOFOL 10 MG/ML EMULSION: Performed by: NURSE ANESTHETIST, CERTIFIED REGISTERED

## 2020-09-16 PROCEDURE — 93325 DOPPLER ECHO COLOR FLOW MAPG: CPT | Performed by: SPECIALIST

## 2020-09-16 PROCEDURE — 25010000002 ALBUMIN HUMAN 25% PER 50 ML: Performed by: INTERNAL MEDICINE

## 2020-09-16 PROCEDURE — 92960 CARDIOVERSION ELECTRIC EXT: CPT | Performed by: SPECIALIST

## 2020-09-16 RX ORDER — MIDAZOLAM HYDROCHLORIDE 1 MG/ML
INJECTION INTRAMUSCULAR; INTRAVENOUS AS NEEDED
Status: DISCONTINUED | OUTPATIENT
Start: 2020-09-16 | End: 2020-09-16 | Stop reason: SURG

## 2020-09-16 RX ORDER — LIDOCAINE HYDROCHLORIDE 20 MG/ML
INJECTION, SOLUTION EPIDURAL; INFILTRATION; INTRACAUDAL; PERINEURAL AS NEEDED
Status: DISCONTINUED | OUTPATIENT
Start: 2020-09-16 | End: 2020-09-16 | Stop reason: SURG

## 2020-09-16 RX ORDER — BISACODYL 10 MG
10 SUPPOSITORY, RECTAL RECTAL ONCE
Status: COMPLETED | OUTPATIENT
Start: 2020-09-16 | End: 2020-09-16

## 2020-09-16 RX ORDER — ACETAMINOPHEN 325 MG/1
650 TABLET ORAL EVERY 6 HOURS PRN
Status: DISCONTINUED | OUTPATIENT
Start: 2020-09-16 | End: 2020-09-23 | Stop reason: HOSPADM

## 2020-09-16 RX ORDER — SODIUM CHLORIDE 9 MG/ML
INJECTION, SOLUTION INTRAVENOUS CONTINUOUS PRN
Status: DISCONTINUED | OUTPATIENT
Start: 2020-09-16 | End: 2020-09-16 | Stop reason: SURG

## 2020-09-16 RX ORDER — PROPOFOL 10 MG/ML
VIAL (ML) INTRAVENOUS AS NEEDED
Status: DISCONTINUED | OUTPATIENT
Start: 2020-09-16 | End: 2020-09-16 | Stop reason: SURG

## 2020-09-16 RX ORDER — ALBUMIN (HUMAN) 12.5 G/50ML
12.5 SOLUTION INTRAVENOUS ONCE
Status: COMPLETED | OUTPATIENT
Start: 2020-09-16 | End: 2020-09-16

## 2020-09-16 RX ADMIN — PHENYLEPHRINE HYDROCHLORIDE 100 MCG: 10 INJECTION INTRAVENOUS at 10:58

## 2020-09-16 RX ADMIN — VENLAFAXINE HYDROCHLORIDE 75 MG: 75 CAPSULE, EXTENDED RELEASE ORAL at 13:02

## 2020-09-16 RX ADMIN — ROPINIROLE HYDROCHLORIDE 1 MG: 1 TABLET, FILM COATED ORAL at 16:18

## 2020-09-16 RX ADMIN — ALBUMIN HUMAN 12.5 G: 0.25 SOLUTION INTRAVENOUS at 16:18

## 2020-09-16 RX ADMIN — PROPOFOL 50 MG: 10 INJECTION, EMULSION INTRAVENOUS at 10:50

## 2020-09-16 RX ADMIN — LIDOCAINE HYDROCHLORIDE 5 ML: 20 INJECTION, SOLUTION EPIDURAL; INFILTRATION; INTRACAUDAL; PERINEURAL at 10:50

## 2020-09-16 RX ADMIN — MIDAZOLAM HYDROCHLORIDE 2 MG: 1 INJECTION, SOLUTION INTRAMUSCULAR; INTRAVENOUS at 10:46

## 2020-09-16 RX ADMIN — PREGABALIN 100 MG: 100 CAPSULE ORAL at 13:44

## 2020-09-16 RX ADMIN — FOLIC ACID 1 MG: 1 TABLET ORAL at 13:05

## 2020-09-16 RX ADMIN — BISACODYL 10 MG: 10 SUPPOSITORY RECTAL at 21:29

## 2020-09-16 RX ADMIN — ROPINIROLE HYDROCHLORIDE 1 MG: 1 TABLET, FILM COATED ORAL at 13:02

## 2020-09-16 RX ADMIN — ACETAMINOPHEN 650 MG: 325 TABLET ORAL at 13:03

## 2020-09-16 RX ADMIN — INSULIN ASPART 5 UNITS: 100 INJECTION, SOLUTION INTRAVENOUS; SUBCUTANEOUS at 08:15

## 2020-09-16 RX ADMIN — SODIUM CHLORIDE: 0.9 INJECTION, SOLUTION INTRAVENOUS at 10:37

## 2020-09-16 RX ADMIN — POLYETHYLENE GLYCOL 3350 17 G: 17 POWDER, FOR SOLUTION ORAL at 13:04

## 2020-09-16 RX ADMIN — INSULIN ASPART 5 UNITS: 100 INJECTION, SOLUTION INTRAVENOUS; SUBCUTANEOUS at 17:17

## 2020-09-16 RX ADMIN — WARFARIN 4 MG: 3 TABLET ORAL at 18:07

## 2020-09-16 RX ADMIN — PREDNISONE 2.5 MG: 5 TABLET ORAL at 13:04

## 2020-09-16 RX ADMIN — ASPIRIN 81 MG: 81 TABLET, COATED ORAL at 13:03

## 2020-09-16 RX ADMIN — DOCUSATE SODIUM 50 MG AND SENNOSIDES 8.6 MG 2 TABLET: 8.6; 5 TABLET, FILM COATED ORAL at 21:29

## 2020-09-16 RX ADMIN — AMIODARONE HYDROCHLORIDE 200 MG: 200 TABLET ORAL at 13:03

## 2020-09-16 RX ADMIN — ONDANSETRON HYDROCHLORIDE 4 MG: 4 TABLET, FILM COATED ORAL at 13:44

## 2020-09-16 RX ADMIN — SODIUM CHLORIDE, PRESERVATIVE FREE 10 ML: 5 INJECTION INTRAVENOUS at 13:00

## 2020-09-16 RX ADMIN — INSULIN ASPART 5 UNITS: 100 INJECTION, SOLUTION INTRAVENOUS; SUBCUTANEOUS at 13:02

## 2020-09-16 NOTE — PROGRESS NOTES
Discharge Planning Assessment   Isaías     Patient Name: Yumiko Purdy  MRN: [id number]  Today's Date: 9/16/2020    Admit Date: 9/9/2020        Discharge Plan     Row Name 09/16/20 1523       Plan    Plan  Pt admitted on 9/9/20.  Pt lives at home with son, Sam, and plans to return home at discharge.  Pt currently does not utilize home health services.  Pt currently utilizes home 02 at 2 liters, w/c, walker and shower chair via [employer].  SS will follow and assist with discharge needs.            MG DoranW

## 2020-09-16 NOTE — ANESTHESIA POSTPROCEDURE EVALUATION
Patient: Yumiko Purdy    Procedure Summary     Date: 09/16/20 Room / Location: [facility] NONINVASIVE LAB    Anesthesia Start: 1041 Anesthesia Stop: Not recorded    Procedure: ADULT TRANSESOPHAGEAL ECHO (KHAI) W/ CONT IF NECESSARY PER PROTOCOL Diagnosis: (Cardiac Source of Emboli)    Scheduled Providers: Not recorded Provider: Not recorded    Anesthesia Type: general ASA Status: 3          Anesthesia Type: general    Vitals  Vitals Value Taken Time   /74 09/16/20 1041   Temp     Pulse 117 09/16/20 1041   Resp 18 09/16/20 1041   SpO2 100 % 09/16/20 1041           Post Anesthesia Care and Evaluation    Patient location during evaluation: bedside  Patient participation: complete - patient participated  Level of consciousness: awake and alert  Pain score: 0  Pain management: adequate  Airway patency: patent  Anesthetic complications: No anesthetic complications  PONV Status: controlled  Cardiovascular status: acceptable  Respiratory status: acceptable and room air  Hydration status: euvolemic  No anesthesia care post op

## 2020-09-16 NOTE — PROGRESS NOTES
LOS: 6 days     Name: Yumiko Purdy  Age/Sex: 54 y.o. female  :  1965        PCP: Niko Schaefer MD  REF: No ref. provider found    Active Problems:    Acute on chronic congestive heart failure (CMS/HCC)      Reason for follow-up: Acute systolic heart failure and newly diagnosed cardiomyopathy.    Subjective     Subjective  uYmiko Purdy is a 54 year old female with a past medical history significant for chronic atrial fibrillation, congestive heart failure, diabetes mellitus and cirrhosis of the liver.  Patient was admitted for decompensated congestive heart failure and found to have newly diagnosed cardiomyopathy    Interval History: Patient states that she is breathing some better.  She had fairly good urine output in the last 24 hours with a negative fluid balance of 2128cc.    ROS    Vital Signs  Temp:  [96.1 °F (35.6 °C)-98 °F (36.7 °C)] 96.1 °F (35.6 °C)  Heart Rate:  [100-116] 114  Resp:  [18] 18  BP: ()/(58-70) 95/58  Vital Signs (last 72 hrs)        0700  -   0659  0700  -  09/15 0659 09/15 0700  -   0659 16 0700  -   0925   Most Recent    Temp (°F) 97.6 -  98    97.9 -  98.3    96.1 -  98       96.1 (35.6)    Heart Rate 76 -  127    104 -  119    100 -  116       114    Resp 18 -  20    18 -  20      18       18    /57 -  138/63    98/72 -  126/74    87/61 -  111/77       95/58    SpO2 (%) 94 -  99    96 -  99    97 -  99       98        Body mass index is 45.76 kg/m².    Intake/Output Summary (Last 24 hours) at 2020 0925  Last data filed at 2020 0200  Gross per 24 hour   Intake 721.74 ml   Output 2850 ml   Net -2128.26 ml     Objective    Objective       Physical Exam:     General Appearance:    Alert, cooperative, in no acute distress   Head:    Normocephalic, without obvious abnormality, atraumatic   Eyes:            Conjunctivae and sclerae normal, no   icterus, no pallor, corneas clear.   Neck:   No adenopathy, supple, trachea midline,  no thyromegaly, no   carotid bruit, no JVD   Lungs:     Clearer to auscultation,respirations regular, even and                  unlabored    Heart:   Irregularly irregular rhythm and normal rate, normal S1 and S2, no  murmur, no gallop, no rub, no click   Chest Wall:    No abnormalities observed   Abdomen:     Normal bowel sounds, no masses, no organomegaly, soft        non-tender, non-distended, no guarding, no rebound                tenderness   Extremities:  2+ edema on both legs with purpuric rash on both lower extremities and some on the upper extremities as well.   Pulses:   Pulses 0-1+ palpable and equal bilaterally             Results review       Results Review:   Results from last 7 days   Lab Units 09/16/20  0343 09/15/20  0049 09/14/20  0706 09/13/20  0343 09/12/20  0149 09/11/20  0044 09/10/20  0058   WBC 10*3/mm3 6.94 8.23 7.34 6.56 8.65 9.14 10.26   HEMOGLOBIN g/dL 8.5* 9.6* 9.7* 9.1* 10.5* 9.7* 10.2*   PLATELETS 10*3/mm3 107* 130* 130* 141 139* 198 199     Results from last 7 days   Lab Units 09/16/20  0343 09/15/20  0049 09/14/20  0514 09/13/20  0343 09/12/20  0149 09/11/20  0044 09/10/20  1516 09/10/20  0058   SODIUM mmol/L 140 141 138 139 135* 134* 137 128*   POTASSIUM mmol/L 4.2 4.6 5.4* 4.8 4.7 5.3* 5.4* 4.9   CHLORIDE mmol/L 100 98 100 100 101 97* 98 95*   CO2 mmol/L 33.6* 29.1* 28.4 30.0* 23.3 25.2 17.9* 27.8   BUN mg/dL 39* 50* 49* 45* 50* 41* 36* 33*   CREATININE mg/dL 1.20* 1.34* 1.46* 1.37* 1.40* 1.30* 1.20* 1.24*   CALCIUM mg/dL 8.5* 9.1 8.9 9.0 9.1 9.1 9.2 8.8   GLUCOSE mg/dL 150* 198* 103* 155* 169* 376* 337* 379*   ALT (SGPT) U/L 22 26 30 25 25  --   --  25   AST (SGOT) U/L 24 29 42* 26 26  --   --  24     Results from last 7 days   Lab Units 09/11/20  0044 09/10/20  1727 09/10/20  0921 09/10/20  0256 09/10/20  0058   TROPONIN T ng/mL 0.027 0.024 0.029 0.034* 0.045*     Lab Results   Component Value Date    INR 2.38 (H) 09/16/2020    INR 2.13 (H) 09/15/2020    INR 2.08 (H) 09/15/2020     INR 1.97 (H) 09/14/2020    INR 2.18 (H) 09/13/2020    INR 2.08 (H) 09/12/2020    INR 1.72 (H) 09/11/2020     Lab Results   Component Value Date    MG 1.9 09/15/2020    MG 2.0 09/14/2020    MG 2.1 09/13/2020     Lab Results   Component Value Date    TSH 3.830 09/10/2020    CHLPL 103 04/21/2016    TRIG 80 09/11/2020    HDL 43 09/11/2020    LDL 78 09/11/2020      Imaging Results (Last 48 Hours)     ** No results found for the last 48 hours. **        Lab Results   Component Value Date    BNP 86 05/08/2016     ECG    Echo   Results for orders placed during the hospital encounter of 09/09/20   Adult Transthoracic Echo Complete W/ Cont if Necessary Per Protocol    Narrative · The left ventricular cavity is mildly dilated.  · The following left ventricular wall segments are hypokinetic: mid   anterior, apical lateral, apical inferior, apical septal, basal   inferoseptal, mid inferoseptal, apex hypokinetic, mid anteroseptal, basal   anterior and basal inferoseptal.  · Left ventricular systolic function is severely decreased.  · Estimated EF appears to be in the range of 21 - 25%.  · The aortic valve is structurally normal. No aortic valve regurgitation   is present. No aortic valve stenosis is present.  · The mitral valve is normal in structure. Mild mitral valve regurgitation   is present. No significant mitral valve stenosis is present.  · The tricuspid valve is normal. Mild to moderate tricuspid valve   regurgitation is present. Estimated right ventricular systolic pressure   from tricuspid regurgitation is normal (<35 mmHg).  · There is no evidence of pericardial effusion.         I reviewed the patient's new clinical results.    Telemetry: Atrial fibrillation with ventricular rate around 100 bpm     Medication Review:   amiodarone, 200 mg, Oral, Daily  aspirin, 81 mg, Oral, Daily  folic acid, 1 mg, Oral, Daily  furosemide, 60 mg, Intravenous, Daily  insulin aspart, 0-14 Units, Subcutaneous, TID AC  insulin aspart, 5  Units, Subcutaneous, TID With Meals  insulin detemir, 10 Units, Subcutaneous, Nightly  metoprolol tartrate, 25 mg, Oral, Q12H  polyethylene glycol, 17 g, Oral, Daily  predniSONE, 2.5 mg, Oral, Daily  pregabalin, 100 mg, Oral, Q12H  rOPINIRole, 1 mg, Oral, TID  senna-docusate sodium, 2 tablet, Oral, Nightly  sodium chloride, 10 mL, Intravenous, Q12H  sodium chloride, 10 mL, Intravenous, Q12H  spironolactone, 12.5 mg, Oral, Daily  valsartan, 40 mg, Oral, Q24H  venlafaxine XR, 75 mg, Oral, Daily        Pharmacy Consult,   Pharmacy to dose warfarin,         Assessment    1. Acute on chronic combined systolic and diastolic heart failure, improving.  2. Newly diagnosed advanced cardiomyopathy of unclear etiology (possibly ischemic) with LV ejection fraction of 20 to 25%.  3. Mild elevated troponin, trending down to normal.  4. Persistent atrial fibrillation with controlled ventricular rate.  Patient has been on warfarin and amiodarone.  5. Acute kidney injury on chronic kidney disease, improving.  6. Type 2 diabetes mellitus.  7. Cirrhosis of liver.    Plan   1. Continue with IV furosemide and continue to monitor her kidney function.  2. Continue with valsartan at current dose.  3. Patient will schedule for KHAI guided cardioversion today.  I have discussed with her about the procedure, potential risks and benefits.  She is agreeable.  4. Will evaluate with Lexiscan study tomorrow for any underlying significant myocardial ischemia causing her cardiomyopathy.    I discussed the patients findings and my recommendations with patient.      Ramon Sahu [facility]  09/16/20  09:25 EDT    Please note that portions of this note were completed with a voice recognition program.

## 2020-09-16 NOTE — PLAN OF CARE
Goal Outcome Evaluation:    Patient is alert and oriented x4. Patient has complained of pain frequently today. Patients BP has been running in the 80's (systolic) so no morphine has been given. Patient has a wound on her coccyx, wound care consulted. Educated patient on her KHAI and the stress test she is scheduled for tomorrow. Patient stated she has no further questions at this time. Patient complained of nausea intermittently throughout shift, zofran given with some relief.  Patient will be NPO at midnight. Patient is currently resting in bed. Will continue to monitor and provide care.

## 2020-09-16 NOTE — PROGRESS NOTES
[facility] Medicine Services  PROGRESS NOTE     Patient Identification:  Name:  Yumiko Purdy  Age:  54 y.o.  Sex:  female  :  1965  MRN:  [id number]  Visit Number:  [id number]  Primary Care Provider:  Niko Schaefer MD    Length of stay:  6    ----------------------------------------------------------------------------------------------------------------------  Subjective     Chief Complaint:  Follow up for CHF and persistent a flutter with RVR    Subjective:  Today, the patient is slightly lethargic after cardioversion.  Denies any chest pain or shortness of breath.  After procedure blood pressure and vital vital signs were checked and she was found to be hypotensive with systolic blood pressure in the 70s when blood pressure was repeated with chronic cough blood pressure improved to 88.  Patient remains asymptomatic.  She is in normal sinus rhythm.    ----------------------------------------------------------------------------------------------------------------------  Objective     Current Hospital Meds:  albumin human, 12.5 g, Intravenous, Once  amiodarone, 200 mg, Oral, Daily  aspirin, 81 mg, Oral, Daily  folic acid, 1 mg, Oral, Daily  insulin aspart, 0-14 Units, Subcutaneous, TID AC  insulin aspart, 5 Units, Subcutaneous, TID With Meals  insulin detemir, 10 Units, Subcutaneous, Nightly  metoprolol tartrate, 25 mg, Oral, Q12H  polyethylene glycol, 17 g, Oral, Daily  predniSONE, 2.5 mg, Oral, Daily  pregabalin, 100 mg, Oral, Q12H  rOPINIRole, 1 mg, Oral, TID  senna-docusate sodium, 2 tablet, Oral, Nightly  sodium chloride, 10 mL, Intravenous, Q12H  sodium chloride, 10 mL, Intravenous, Q12H  spironolactone, 12.5 mg, Oral, Daily  valsartan, 40 mg, Oral, Q24H  venlafaxine XR, 75 mg, Oral, Daily  warfarin, 4 mg, Oral, Once      Pharmacy Consult,   Pharmacy to dose warfarin,        ----------------------------------------------------------------------------------------------------------------------  Vital Signs:  Temp:  [96.1 °F (35.6 °C)-98 °F (36.7 °C)] 96.1 °F (35.6 °C)  Heart Rate:  [] 74  Resp:  [10-18] 10  BP: ()/(45-74) 86/55  Mean Arterial Pressure (Non-Invasive) for the past 24 hrs (Last 3 readings):   Noninvasive MAP (mmHg)   09/16/20 0648 69   09/15/20 1800 71   09/15/20 1500 71     SpO2 Percentage    09/16/20 1041 09/16/20 1115 09/16/20 1128   SpO2: 100% 99% 99%     SpO2:  [98 %-100 %] 99 %  on  Flow (L/min):  [2] 2;   Device (Oxygen Therapy): nasal cannula    Body mass index is 45.76 kg/m².  Wt Readings from Last 3 Encounters:   09/16/20 125 kg (275 lb)        Intake/Output Summary (Last 24 hours) at 9/16/2020 1446  Last data filed at 9/16/2020 1102  Gross per 24 hour   Intake 461.74 ml   Output 1050 ml   Net -588.26 ml     NPO Diet  ----------------------------------------------------------------------------------------------------------------------  Physical exam:   GEN: Pale, chronically ill-appearing, no acute distress  CV: RRR, no audible murmur  PULM: Mostly clear to auscultation, decreased breath sound at the bases but no audible crackles  SKIN: 1+ pitting edema bilaterally, ulcerative rash posterior right lower extremity still present.  Multiple scabs over arms and legs remains unchanged with large petechial rash still remains the same  --------------------------------------------------------------------------------------------  Results from last 7 days   Lab Units 09/16/20  0343 09/15/20  1021 09/15/20  0049 09/14/20  0706 09/14/20  0705 09/13/20  0343 09/12/20  0149 09/11/20  0044 09/10/20  0058   CRP mg/dL  --   --   --   --   --   --   --   --  0.53*   LACTATE mmol/L  --   --   --   --   --   --   --   --  1.5   WBC 10*3/mm3 6.94  --  8.23 7.34  --  6.56 8.65 9.14 10.26   HEMOGLOBIN g/dL 8.5*  --  9.6* 9.7*  --  9.1* 10.5* 9.7* 10.2*   HEMATOCRIT %  29.9*  --  32.8* 34.0  --  32.1* 35.4 34.2 36.6   MCV fL 95.8  --  96.5 97.1*  --  98.8* 93.9 98.3* 99.2*   MCHC g/dL 28.4*  --  29.3* 28.5*  --  28.3* 29.7* 28.4* 27.9*   PLATELETS 10*3/mm3 107*  --  130* 130*  --  141 139* 198 199   INR  2.38* 2.13* 2.08*  --  1.97* 2.18* 2.08* 1.72* 1.64*     Results from last 7 days   Lab Units 09/16/20  0343 09/15/20  0049 09/14/20  0514 09/13/20  0343   SODIUM mmol/L 140 141 138 139   POTASSIUM mmol/L 4.2 4.6 5.4* 4.8   MAGNESIUM mg/dL  --  1.9 2.0 2.1   CHLORIDE mmol/L 100 98 100 100   CO2 mmol/L 33.6* 29.1* 28.4 30.0*   BUN mg/dL 39* 50* 49* 45*   CREATININE mg/dL 1.20* 1.34* 1.46* 1.37*   EGFR IF NONAFRICN AM mL/min/1.73 47* 41* 37* 40*   CALCIUM mg/dL 8.5* 9.1 8.9 9.0   GLUCOSE mg/dL 150* 198* 103* 155*   ALBUMIN g/dL 2.88* 3.10* 3.00* 3.25*   BILIRUBIN mg/dL 2.2* 1.9* 2.0* 1.7*   ALK PHOS U/L 81 104 106 86   AST (SGOT) U/L 24 29 42* 26   ALT (SGPT) U/L 22 26 30 25   Estimated Creatinine Clearance: 72.1 mL/min (A) (by C-G formula based on SCr of 1.2 mg/dL (H)).  No results found for: AMMONIA    Glucose   Date/Time Value Ref Range Status   09/16/2020 1220 117 70 - 130 mg/dL Final   09/16/2020 0715 141 (H) 70 - 130 mg/dL Final   09/15/2020 2023 223 (H) 70 - 130 mg/dL Final   09/15/2020 1625 137 (H) 70 - 130 mg/dL Final   09/15/2020 1019 282 (H) 70 - 130 mg/dL Final   09/15/2020 0623 218 (H) 70 - 130 mg/dL Final   09/14/2020 1952 239 (H) 70 - 130 mg/dL Final   09/14/2020 1608 246 (H) 70 - 130 mg/dL Final     Lab Results   Component Value Date    HGBA1C 8.40 (H) 09/10/2020     Lab Results   Component Value Date    TSH 3.830 09/10/2020    FREET4 1.39 09/10/2020       ----------------------------------------------------------------------------------------------------------------------  Assessment/Plan       · Acute systolic congestive heart failure-EF 21 to 25%.  Has been receiving diuresis once daily with good response.  Net -2100 cc yesterday.  Given she is hypotensive  postprocedure after cardioversion Lasix and metoprolol as well as valsartan have been held today.  Will reassess in afternoon.  Patient remains asymptomatic.  Will be pursuing stress test tomorrow for ischemic work-up. cont low dose Aldactone for now, K in normal range today.  · Persistent atrial fibrillation-status post DCCV this morning.  Currently normal sinus rhythm.  cont amiodarone.  Beta-blocker held this morning due to hypotension (likely due to sedation for procedure. However, BP is low at baseline).  Warfarin is therapeutic.  · Eosinophilic skin rash.  Vasculitis was ruled out recently per biopsy, ANCA is negative. Therefore, likely ruled out per Nephrology. On low-dose prednisone at this time.  She has been followed up at  for work-up. Rash has not changed while here.  · Borderline hyperkalemia- K in normal range today  · CKD3- cr stable for the most part (1.2-1.5)  · Liver cirrhosis-due to history of alcoholism  · acute thrombocytopenia- (corrected from prior note).  Noted gradual decline since admission (199 to 107  Today). Peripheral smear did not show schistocytes but pending final review by pathologist. Not on heparin product, therefore, HIT ruled out.   · Type 2 diabetes with peripheral neuropathy-continue Levemir and aspart  · constipation-continue MiraLAX, doc/senna  · Depression-continue venlafaxine  · But obesity-BMI 48, complicating aspects of care  · chronic anemia-likely due to chronic disease.  Hemoglobin 9.7 mostly stable  · Chronic hypoxic resp failure- on 2L NC at home at all times. Stable here. Unclear etiology.    --------------------------------------------------  DVT Prophylaxis: warfarin. Dose management by pharmacy     Disposition: home vs rehab pending further PT assessemrnt  --------------------------------------------------      Katayoun Behbahani, MD  Hospitalist Service -- [facility]     09/16/20  14:46 EDT

## 2020-09-16 NOTE — ANESTHESIA PREPROCEDURE EVALUATION
Anesthesia Evaluation     NPO Solid Status: > 8 hours             Airway   Mallampati: II  Dental    (+) poor dentition    Pulmonary    Cardiovascular     Rhythm: irregular    (+) dysrhythmias Atrial Fib,       Neuro/Psych  GI/Hepatic/Renal/Endo      Musculoskeletal     Abdominal    Substance History      OB/GYN          Other                      Anesthesia Plan    ASA 3     general     intravenous induction     Anesthetic plan, all risks, benefits, and alternatives have been provided, discussed and informed consent has been obtained with: patient.

## 2020-09-16 NOTE — PLAN OF CARE
Goal Outcome Evaluation:  Plan of Care Reviewed With: patient  Pt with no complaints at this time, no s/s of any distress, skin care performed as  directed, pt NPO at this time for KHAI/cardioversion in am, will continue with current plan of care

## 2020-09-16 NOTE — PROGRESS NOTES
Nephrology Progress Note      Subjective     Shortness of breath is better, was able to sleep well last night.     Objective       Vital signs :     Temp:  [96.1 °F (35.6 °C)-98 °F (36.7 °C)] 96.1 °F (35.6 °C)  Heart Rate:  [100-116] 114  Resp:  [18] 18  BP: ()/(58-70) 95/58      Intake/Output Summary (Last 24 hours) at 9/16/2020 0938  Last data filed at 9/16/2020 0200  Gross per 24 hour   Intake 721.74 ml   Output 2850 ml   Net -2128.26 ml       Physical Exam:    General Appearance : not in acute distress  Lungs : clear to auscultation, respirations regular  Heart :  regular rhythm & normal rate, normal S1, S2 and no murmur, no rub  Abdomen : normal bowel sounds, no masses, no hepatomegaly, no splenomegaly, soft non-tender and no guarding  Extremities : moves extremities well, No edema, no cyanosis and no redness  Skin :  generalized rash on both Upper and lower ext  Neurologic :   orientated to person, place, time and situation, Grossly no focal deficits      Laboratory Data :     Albumin Albumin   Date Value Ref Range Status   09/16/2020 2.88 (L) 3.50 - 5.20 g/dL Final   09/15/2020 3.10 (L) 3.50 - 5.20 g/dL Final   09/14/2020 3.00 (L) 3.50 - 5.20 g/dL Final      Magnesium Magnesium   Date Value Ref Range Status   09/15/2020 1.9 1.6 - 2.6 mg/dL Final   09/14/2020 2.0 1.6 - 2.6 mg/dL Final          PTH               No results found for: PTH    CBC and coagulation:  Results from last 7 days   Lab Units 09/16/20  0343 09/15/20  1021 09/15/20  0049 09/14/20  0706  09/10/20  0058   LACTATE mmol/L  --   --   --   --   --  1.5   CRP mg/dL  --   --   --   --   --  0.53*   WBC 10*3/mm3 6.94  --  8.23 7.34   < > 10.26   HEMOGLOBIN g/dL 8.5*  --  9.6* 9.7*   < > 10.2*   HEMATOCRIT % 29.9*  --  32.8* 34.0   < > 36.6   MCV fL 95.8  --  96.5 97.1*   < > 99.2*   MCHC g/dL 28.4*  --  29.3* 28.5*   < > 27.9*   PLATELETS 10*3/mm3 107*  --  130* 130*   < > 199   INR  2.38* 2.13* 2.08*  --    < > 1.64*    < > = values in this  interval not displayed.     Acid/base balance:  Results from last 7 days   Lab Units 09/10/20  0049   PH, ARTERIAL pH units 7.413   PO2 ART mm Hg 84.8   PCO2, ARTERIAL mm Hg 40.7   HCO3 ART mmol/L 26.0     Renal and electrolytes:  Results from last 7 days   Lab Units 09/16/20  0343 09/15/20  0049 09/14/20  0514 09/13/20  0343 09/12/20  0149   SODIUM mmol/L 140 141 138 139 135*   POTASSIUM mmol/L 4.2 4.6 5.4* 4.8 4.7   MAGNESIUM mg/dL  --  1.9 2.0 2.1 2.2   CHLORIDE mmol/L 100 98 100 100 101   CO2 mmol/L 33.6* 29.1* 28.4 30.0* 23.3   BUN mg/dL 39* 50* 49* 45* 50*   CREATININE mg/dL 1.20* 1.34* 1.46* 1.37* 1.40*   EGFR IF NONAFRICN AM mL/min/1.73 47* 41* 37* 40* 39*   CALCIUM mg/dL 8.5* 9.1 8.9 9.0 9.1     Estimated Creatinine Clearance: 72.1 mL/min (A) (by C-G formula based on SCr of 1.2 mg/dL (H)).    Liver and pancreatic function:  Results from last 7 days   Lab Units 09/16/20  0343 09/15/20  0049 09/14/20  0514   ALBUMIN g/dL 2.88* 3.10* 3.00*   BILIRUBIN mg/dL 2.2* 1.9* 2.0*   ALK PHOS U/L 81 104 106   AST (SGOT) U/L 24 29 42*   ALT (SGPT) U/L 22 26 30         Cardiac:  Results from last 7 days   Lab Units 09/13/20  0343 09/10/20  0058   PROBNP pg/mL 1,788.0* 3,325.0*     Liver and pancreatic function:  Results from last 7 days   Lab Units 09/16/20  0343 09/15/20  0049 09/14/20  0514   ALBUMIN g/dL 2.88* 3.10* 3.00*   BILIRUBIN mg/dL 2.2* 1.9* 2.0*   ALK PHOS U/L 81 104 106   AST (SGOT) U/L 24 29 42*   ALT (SGPT) U/L 22 26 30       Medications :     amiodarone, 200 mg, Oral, Daily  aspirin, 81 mg, Oral, Daily  folic acid, 1 mg, Oral, Daily  furosemide, 60 mg, Intravenous, Daily  insulin aspart, 0-14 Units, Subcutaneous, TID AC  insulin aspart, 5 Units, Subcutaneous, TID With Meals  insulin detemir, 10 Units, Subcutaneous, Nightly  metoprolol tartrate, 25 mg, Oral, Q12H  polyethylene glycol, 17 g, Oral, Daily  predniSONE, 2.5 mg, Oral, Daily  pregabalin, 100 mg, Oral, Q12H  rOPINIRole, 1 mg, Oral,  TID  senna-docusate sodium, 2 tablet, Oral, Nightly  sodium chloride, 10 mL, Intravenous, Q12H  sodium chloride, 10 mL, Intravenous, Q12H  spironolactone, 12.5 mg, Oral, Daily  valsartan, 40 mg, Oral, Q24H  venlafaxine XR, 75 mg, Oral, Daily      Pharmacy Consult,   Pharmacy to dose warfarin,           Assessment/Plan      1.  CKD stage III  2.  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  Anasarca  4.  Chronic systolic CHF  5.  Cirrhosis  6.  Diabetes     Cr is 1.2, that is at baseline, ANCA panel is negative. She is euvolumic on exam, IV diuretics can be switch to PO from tomorrow, I will give her IV albumin today as well. I will sign off, please call if any questions, will see her in clinic in 3 wks after discharge.   UPCR 194mg/g proteinuria, no hematuria and its extremely unlikely having vasculitis.     skin biopsy then and lesion demonstrated dermatitis with eosinophils and hemorrhage      Ayanna Mejia MD  09/16/20  09:38 EDT

## 2020-09-17 ENCOUNTER — APPOINTMENT (OUTPATIENT)
Dept: GENERAL RADIOLOGY | Facility: HOSPITAL | Age: 55
End: 2020-09-17

## 2020-09-17 ENCOUNTER — APPOINTMENT (OUTPATIENT)
Dept: CARDIOLOGY | Facility: HOSPITAL | Age: 55
End: 2020-09-17

## 2020-09-17 ENCOUNTER — APPOINTMENT (OUTPATIENT)
Dept: NUCLEAR MEDICINE | Facility: HOSPITAL | Age: 55
End: 2020-09-17

## 2020-09-17 LAB
ANION GAP SERPL CALCULATED.3IONS-SCNC: 7.7 MMOL/L (ref 5–15)
BUN SERPL-MCNC: 38 MG/DL (ref 6–20)
BUN/CREAT SERPL: 27.1 (ref 7–25)
CALCIUM SPEC-SCNC: 8.7 MG/DL (ref 8.6–10.5)
CHLORIDE SERPL-SCNC: 100 MMOL/L (ref 98–107)
CO2 SERPL-SCNC: 33.3 MMOL/L (ref 22–29)
CREAT SERPL-MCNC: 1.4 MG/DL (ref 0.57–1)
DEPRECATED RDW RBC AUTO: 74.4 FL (ref 37–54)
ERYTHROCYTE [DISTWIDTH] IN BLOOD BY AUTOMATED COUNT: 20.8 % (ref 12.3–15.4)
GFR SERPL CREATININE-BSD FRML MDRD: 39 ML/MIN/1.73
GLUCOSE BLDC GLUCOMTR-MCNC: 120 MG/DL (ref 70–130)
GLUCOSE BLDC GLUCOMTR-MCNC: 122 MG/DL (ref 70–130)
GLUCOSE BLDC GLUCOMTR-MCNC: 152 MG/DL (ref 70–130)
GLUCOSE BLDC GLUCOMTR-MCNC: 184 MG/DL (ref 70–130)
GLUCOSE BLDC GLUCOMTR-MCNC: 203 MG/DL (ref 70–130)
GLUCOSE SERPL-MCNC: 125 MG/DL (ref 65–99)
HCT VFR BLD AUTO: 31.1 % (ref 34–46.6)
HGB BLD-MCNC: 8.8 G/DL (ref 12–15.9)
INR PPP: 2.77 (ref 0.9–1.1)
MCH RBC QN AUTO: 27.7 PG (ref 26.6–33)
MCHC RBC AUTO-ENTMCNC: 28.3 G/DL (ref 31.5–35.7)
MCV RBC AUTO: 97.8 FL (ref 79–97)
PLATELET # BLD AUTO: 121 10*3/MM3 (ref 140–450)
PMV BLD AUTO: 10.9 FL (ref 6–12)
POTASSIUM SERPL-SCNC: 4.4 MMOL/L (ref 3.5–5.2)
PROTHROMBIN TIME: 29.2 SECONDS (ref 11.9–14.1)
RBC # BLD AUTO: 3.18 10*6/MM3 (ref 3.77–5.28)
SODIUM SERPL-SCNC: 141 MMOL/L (ref 136–145)
WBC # BLD AUTO: 7.87 10*3/MM3 (ref 3.4–10.8)

## 2020-09-17 PROCEDURE — 99232 SBSQ HOSP IP/OBS MODERATE 35: CPT | Performed by: INTERNAL MEDICINE

## 2020-09-17 PROCEDURE — 78452 HT MUSCLE IMAGE SPECT MULT: CPT

## 2020-09-17 PROCEDURE — 94799 UNLISTED PULMONARY SVC/PX: CPT

## 2020-09-17 PROCEDURE — 74018 RADEX ABDOMEN 1 VIEW: CPT | Performed by: RADIOLOGY

## 2020-09-17 PROCEDURE — 63710000001 INSULIN ASPART PER 5 UNITS: Performed by: SPECIALIST

## 2020-09-17 PROCEDURE — 97116 GAIT TRAINING THERAPY: CPT

## 2020-09-17 PROCEDURE — 63710000001 PREDNISONE PER 5 MG: Performed by: SPECIALIST

## 2020-09-17 PROCEDURE — 97530 THERAPEUTIC ACTIVITIES: CPT

## 2020-09-17 PROCEDURE — A9500 TC99M SESTAMIBI: HCPCS | Performed by: INTERNAL MEDICINE

## 2020-09-17 PROCEDURE — 85027 COMPLETE CBC AUTOMATED: CPT | Performed by: SPECIALIST

## 2020-09-17 PROCEDURE — 93017 CV STRESS TEST TRACING ONLY: CPT

## 2020-09-17 PROCEDURE — 85610 PROTHROMBIN TIME: CPT | Performed by: SPECIALIST

## 2020-09-17 PROCEDURE — 0 TECHNETIUM SESTAMIBI: Performed by: INTERNAL MEDICINE

## 2020-09-17 PROCEDURE — 93018 CV STRESS TEST I&R ONLY: CPT | Performed by: INTERNAL MEDICINE

## 2020-09-17 PROCEDURE — 25010000002 MORPHINE PER 10 MG: Performed by: SPECIALIST

## 2020-09-17 PROCEDURE — 74018 RADEX ABDOMEN 1 VIEW: CPT

## 2020-09-17 PROCEDURE — 25010000002 REGADENOSON 0.4 MG/5ML SOLUTION: Performed by: INTERNAL MEDICINE

## 2020-09-17 PROCEDURE — 82962 GLUCOSE BLOOD TEST: CPT

## 2020-09-17 PROCEDURE — 63710000001 INSULIN DETEMIR PER 5 UNITS: Performed by: SPECIALIST

## 2020-09-17 PROCEDURE — 80048 BASIC METABOLIC PNL TOTAL CA: CPT | Performed by: SPECIALIST

## 2020-09-17 PROCEDURE — 25010000002 ONDANSETRON PER 1 MG: Performed by: INTERNAL MEDICINE

## 2020-09-17 PROCEDURE — 78452 HT MUSCLE IMAGE SPECT MULT: CPT | Performed by: INTERNAL MEDICINE

## 2020-09-17 RX ORDER — CARVEDILOL 3.12 MG/1
3.12 TABLET ORAL 2 TIMES DAILY WITH MEALS
Status: DISCONTINUED | OUTPATIENT
Start: 2020-09-17 | End: 2020-09-18

## 2020-09-17 RX ORDER — BUMETANIDE 1 MG/1
2 TABLET ORAL ONCE
Status: COMPLETED | OUTPATIENT
Start: 2020-09-17 | End: 2020-09-17

## 2020-09-17 RX ORDER — DIAPER,BRIEF,INFANT-TODD,DISP
EACH MISCELLANEOUS EVERY 12 HOURS SCHEDULED
Status: DISCONTINUED | OUTPATIENT
Start: 2020-09-17 | End: 2020-09-23 | Stop reason: HOSPADM

## 2020-09-17 RX ORDER — MAGNESIUM CARB/ALUMINUM HYDROX 105-160MG
150 TABLET,CHEWABLE ORAL ONCE
Status: COMPLETED | OUTPATIENT
Start: 2020-09-17 | End: 2020-09-17

## 2020-09-17 RX ORDER — ONDANSETRON 2 MG/ML
4 INJECTION INTRAMUSCULAR; INTRAVENOUS EVERY 6 HOURS PRN
Status: DISCONTINUED | OUTPATIENT
Start: 2020-09-17 | End: 2020-09-23 | Stop reason: HOSPADM

## 2020-09-17 RX ADMIN — BUMETANIDE 2 MG: 1 TABLET ORAL at 18:13

## 2020-09-17 RX ADMIN — ROPINIROLE HYDROCHLORIDE 1 MG: 1 TABLET, FILM COATED ORAL at 18:11

## 2020-09-17 RX ADMIN — REGADENOSON 0.4 MG: 0.08 INJECTION, SOLUTION INTRAVENOUS at 15:34

## 2020-09-17 RX ADMIN — ACETAMINOPHEN 650 MG: 325 TABLET ORAL at 07:40

## 2020-09-17 RX ADMIN — SPIRONOLACTONE 12.5 MG: 25 TABLET ORAL at 18:12

## 2020-09-17 RX ADMIN — INSULIN ASPART 5 UNITS: 100 INJECTION, SOLUTION INTRAVENOUS; SUBCUTANEOUS at 18:10

## 2020-09-17 RX ADMIN — CARVEDILOL 3.12 MG: 3.12 TABLET, FILM COATED ORAL at 18:17

## 2020-09-17 RX ADMIN — PREDNISONE 2.5 MG: 5 TABLET ORAL at 18:14

## 2020-09-17 RX ADMIN — AMIODARONE HYDROCHLORIDE 200 MG: 200 TABLET ORAL at 18:14

## 2020-09-17 RX ADMIN — SODIUM CHLORIDE, PRESERVATIVE FREE 10 ML: 5 INJECTION INTRAVENOUS at 18:15

## 2020-09-17 RX ADMIN — MORPHINE SULFATE 1 MG: 2 INJECTION, SOLUTION INTRAMUSCULAR; INTRAVENOUS at 18:15

## 2020-09-17 RX ADMIN — FOLIC ACID 1 MG: 1 TABLET ORAL at 18:13

## 2020-09-17 RX ADMIN — PREGABALIN 100 MG: 100 CAPSULE ORAL at 20:39

## 2020-09-17 RX ADMIN — ASPIRIN 81 MG: 81 TABLET, COATED ORAL at 18:13

## 2020-09-17 RX ADMIN — DOCUSATE SODIUM 50 MG AND SENNOSIDES 8.6 MG 2 TABLET: 8.6; 5 TABLET, FILM COATED ORAL at 20:39

## 2020-09-17 RX ADMIN — Medication: at 18:18

## 2020-09-17 RX ADMIN — TECHNETIUM TC 99M SESTAMIBI 1 DOSE: 1 INJECTION INTRAVENOUS at 12:45

## 2020-09-17 RX ADMIN — CITROMA MAGNESIUM CITRATE 150 ML: 1.75 LIQUID ORAL at 18:31

## 2020-09-17 RX ADMIN — ACETAMINOPHEN 650 MG: 325 TABLET ORAL at 20:39

## 2020-09-17 RX ADMIN — TECHNETIUM TC 99M SESTAMIBI 1 DOSE: 1 INJECTION INTRAVENOUS at 15:34

## 2020-09-17 RX ADMIN — HYDROCORTISONE: 1 CREAM TOPICAL at 18:19

## 2020-09-17 RX ADMIN — ONDANSETRON 4 MG: 2 INJECTION INTRAMUSCULAR; INTRAVENOUS at 07:41

## 2020-09-17 RX ADMIN — INSULIN DETEMIR 10 UNITS: 100 INJECTION, SOLUTION SUBCUTANEOUS at 20:44

## 2020-09-17 RX ADMIN — VENLAFAXINE HYDROCHLORIDE 75 MG: 75 CAPSULE, EXTENDED RELEASE ORAL at 18:13

## 2020-09-17 RX ADMIN — HYDROCORTISONE: 1 CREAM TOPICAL at 20:44

## 2020-09-17 NOTE — PROGRESS NOTES
LOS: 7 days     Name: Yumiko Purdy  Age/Sex: 54 y.o. female  :  1965        PCP: Niko Schaefer MD  REF: No ref. provider found    Active Problems:    Acute on chronic congestive heart failure (CMS/HCC)      Reason for follow-up: Acute systolic heart failure and newly diagnosed cardiomyopathy     Subjective       Subjective     Yumiko Purdy is a 54 year old female with a past medical history significant for chronic atrial fibrillation, congestive heart failure, diabetes mellitus and cirrhosis of the liver.  Patient was admitted for decompensated congestive heart failure and found to have newly diagnosed cardiomyopathy with LV ejection fraction of about 20 to 25%.    Interval History: Patient is resting in bed. She had a successful cardioversion yesterday and is maintaining normal sinus rhythm. Creatinine slightly worse today. Breathing has improved. She is scheduled for a stress test today.       Vital Signs  Temp:  [96.7 °F (35.9 °C)-98.2 °F (36.8 °C)] 96.8 °F (36 °C)  Heart Rate:  [] 70  Resp:  [10-20] 20  BP: ()/(45-74) 106/59     Vital Signs (last 72 hrs)        0700  -  /15 0659 09/15 0700  -   0659  0700  -   0659  0700  -   0851   Most Recent    Temp (°F) 97.9 -  98.3    96.1 -  98    96.7 -  98.2      96.8     96.8 (36)    Heart Rate 104 -  119    100 -  116    68 -  117      70     70    Resp 18 -  20      18    10 -  20      20     20    BP 98/72 -  126/74    87/61 -  111/77    72/48 -  101/74      106/59     106/59    SpO2 (%) 96 -  99    97 -  99    97 -  100      97     97        Body mass index is 45.2 kg/m².    Intake/Output Summary (Last 24 hours) at 2020 0851  Last data filed at 2020 2300  Gross per 24 hour   Intake 320 ml   Output 1000 ml   Net -680 ml     Objective    Objective     I have seen and examined Ms. Purdy today.  Physical Exam:     General Appearance:    Alert, cooperative, in no acute distress   Head:     Normocephalic, without obvious abnormality, atraumatic   Eyes:            Conjunctivae and sclerae normal, no   icterus, no pallor, corneas clear.   Neck:   No adenopathy, supple, trachea midline, no thyromegaly, no   carotid bruit, no JVD   Lungs:     Clearer to auscultation,respirations regular, even and                  unlabored    Heart:    Regular rhythm and normal rate, normal S1 and S2, grade 3/6 systolic ejection  murmur LLSB, no gallop, no rub, no click   Chest Wall:    No abnormalities observed   Abdomen:     Normal bowel sounds, no masses, no organomegaly, soft        non-tender, non-distended, no guarding, no rebound                tenderness   Extremities:   Moves all extremities well, 1-2+ BLE edema with purpuric rash on BLE and BUE, no cyanosis.   Pulses:   Pulses palpable and equal bilaterally   Skin:   No bleeding, bruising or rash       Neurologic:   Alert and oriented    I have seen and performed a physical examination today.     Results review       Results Review:   Results from last 7 days   Lab Units 09/17/20  0459 09/16/20  0343 09/15/20  0049 09/14/20  0706 09/13/20  0343 09/12/20  0149 09/11/20  0044   WBC 10*3/mm3 7.87 6.94 8.23 7.34 6.56 8.65 9.14   HEMOGLOBIN g/dL 8.8* 8.5* 9.6* 9.7* 9.1* 10.5* 9.7*   PLATELETS 10*3/mm3 121* 107* 130* 130* 141 139* 198     Results from last 7 days   Lab Units 09/17/20  0459 09/16/20  0343 09/15/20  0049 09/14/20  0514 09/13/20  0343 09/12/20  0149 09/11/20  0044   SODIUM mmol/L 141 140 141 138 139 135* 134*   POTASSIUM mmol/L 4.4 4.2 4.6 5.4* 4.8 4.7 5.3*   CHLORIDE mmol/L 100 100 98 100 100 101 97*   CO2 mmol/L 33.3* 33.6* 29.1* 28.4 30.0* 23.3 25.2   BUN mg/dL 38* 39* 50* 49* 45* 50* 41*   CREATININE mg/dL 1.40* 1.20* 1.34* 1.46* 1.37* 1.40* 1.30*   CALCIUM mg/dL 8.7 8.5* 9.1 8.9 9.0 9.1 9.1   GLUCOSE mg/dL 125* 150* 198* 103* 155* 169* 376*   ALT (SGPT) U/L  --  22 26 30 25 25  --    AST (SGOT) U/L  --  24 29 42* 26 26  --      Results from last 7  days   Lab Units 09/11/20  0044 09/10/20  1727 09/10/20  0921   TROPONIN T ng/mL 0.027 0.024 0.029     Lab Results   Component Value Date    INR 2.77 (H) 09/17/2020    INR 2.38 (H) 09/16/2020    INR 2.13 (H) 09/15/2020    INR 2.08 (H) 09/15/2020    INR 1.97 (H) 09/14/2020    INR 2.18 (H) 09/13/2020    INR 2.08 (H) 09/12/2020     Lab Results   Component Value Date    MG 1.9 09/15/2020    MG 2.0 09/14/2020    MG 2.1 09/13/2020     Lab Results   Component Value Date    TSH 3.830 09/10/2020    CHLPL 103 04/21/2016    TRIG 80 09/11/2020    HDL 43 09/11/2020    LDL 78 09/11/2020      Imaging Results (Last 48 Hours)     ** No results found for the last 48 hours. **        Lab Results   Component Value Date    BNP 86 05/08/2016     Echo   Results for orders placed during the hospital encounter of 09/09/20   Adult Transesophageal Echo (KHAI) W/ Cont if Necessary Per Protocol (Cardiology Department)    Narrative · Ejection fraction appears to be 21 - 25%. Left ventricular systolic   function is severely decreased.  · Right ventricular cavity is mildly dilated. Moderately reduced right   ventricular systolic function noted.  · Trace mitral valve regurgitation is present.  · Moderate tricuspid valve regurgitation is present. Estimated right   ventricular systolic pressure from tricuspid regurgitation is normal (<35   mmHg).  · There is (grade 1) plaque in the proximal aorta present. There is (grade   1) plaque in the ascending aorta present. There is (grade 1) plaque in the   aortic arch present. There is (grade 1) plaque in the descending aorta   present.         I reviewed the patient's new clinical results.    Telemetry: NSR 70's        Medication Review:   amiodarone, 200 mg, Oral, Daily  aspirin, 81 mg, Oral, Daily  folic acid, 1 mg, Oral, Daily  insulin aspart, 0-14 Units, Subcutaneous, TID AC  insulin aspart, 5 Units, Subcutaneous, TID With Meals  insulin detemir, 10 Units, Subcutaneous, Nightly  metoprolol tartrate, 25  mg, Oral, Q12H  polyethylene glycol, 17 g, Oral, Daily  predniSONE, 2.5 mg, Oral, Daily  pregabalin, 100 mg, Oral, Q12H  rOPINIRole, 1 mg, Oral, TID  senna-docusate sodium, 2 tablet, Oral, Nightly  sodium chloride, 10 mL, Intravenous, Q12H  sodium chloride, 10 mL, Intravenous, Q12H  spironolactone, 12.5 mg, Oral, Daily  valsartan, 40 mg, Oral, Q24H  venlafaxine XR, 75 mg, Oral, Daily        Pharmacy Consult,   Pharmacy to dose warfarin,         Assessment      Assessment:  1. Acute on chronic combined systolic and diastolic heart failure, clinically improved   2. Newly diagnosed advanced cardiomyopathy of unclear etiology (possibly ischemic) with LV ejection fraction of 20 to 25%, undergoing stress test today  3. Mild elevated troponin, trending down to normal  4. Persistent atrial fibrillation with controlled ventricular rate. Patient has been on warfarin and amiodarone, underwent successful cardioversion yesterday, maintaining NSR  5. Acute kidney injury on chronic kidney disease, cr. 1.40, worse slightly today which in part could be due to an episode of hypotension yesterday.  6. Type 2 diabetes mellitus  7. Cirrhosis of liver    Plan     Recommendations:  1. Will hold valsartan for now.  2. We will change metoprolol tartrate to carvedilol for better efficacy for her cardiomyopathy.  We will start her at a low dose and gradually increase it as tolerated by her blood pressure.  3. Hold IV diuretics today.  4. Evaluate further with a Lexiscan sestamibi study for any underlying significant myocardial ischemia/coronary artery disease causing her cardiomyopathy.  5. We will try to change her anticoagulant from warfarin to Eliquis.  Patient is currently fully anticoagulated with warfarin.  Hence will have to hold Coumadin for couple of days and then start Eliquis.    I discussed the patients findings and my recommendations with patient .      KENNY Beckman, acting as scribe for Dr. Ramon Sahu MD  [facility]  09/17/20  08:51 EDT    I, Ramon Sahu MD, [facility], personally performed the services described in this documentation as documented by the above named individual under my supervision and made necessary changes and the note is both accurate and complete.     Ramon Sahu MD, [facility]  9/17/2020        Please note that portions of this note were completed with a voice recognition program.

## 2020-09-17 NOTE — NURSING NOTE
"Attempted to get standing weight on patient. Patient refuses stating \"what are you doing why are you doing this\". Educated patient there is a order for standing weights. Patient refused at this time. I asked patient if she would stand on the scale if she had to get up to the bedside commode again and she said \"if im able to I may try it\".   "

## 2020-09-17 NOTE — NURSING NOTE
Consult to reevaluate wound treatments.  Skin to carlotta area and bilateral buttocks Intact with non blanchable erythema. Area moist with slight yeasty odor. New order for magic barrier cream. Prior popped venous ulcer to RT leg has resolved with new orders for cortisone cream BID and leave open to air. Continue to paint arterial ulcers with betadine and apply cortisone cream to laceration to LT great toe.

## 2020-09-17 NOTE — PLAN OF CARE
Goal Outcome Evaluation:  Plan of Care Reviewed With: patient  Progress: improving    Patient c/o constipation with no bowel movement in 7-8 days. Patient received a suppository with relief. Patient had 2 good bowel movements. Patient NPO for stress test this AM. Wound care consult for MASD to gluteal area. BP remains low, will cont. To monitor.

## 2020-09-17 NOTE — THERAPY TREATMENT NOTE
Acute Care - Physical Therapy Treatment Note   [place]     Patient Name: Yumiko Purdy  : 1965  MRN: [id number]  Today's Date: 2020   Onset of Illness/Injury or Date of Surgery: 20       PT Assessment (last 12 hours)      PT Evaluation and Treatment     Row Name 20 1130          Physical Therapy Time and Intention    Subjective Information  complains of;weakness  -CT     Document Type  therapy note (daily note)  -CT     Mode of Treatment  physical therapy  -CT     Patient Effort  good  -CT     Comment  Pt agreeable to therapy and tolerated well.   -CT     Row Name 20 1130          General Information    Patient Profile Reviewed  yes  -CT     Equipment Currently Used at Home  walker, rolling  -CT     Existing Precautions/Restrictions  fall;oxygen therapy device and L/min  -CT     Limitations/Impairments  safety/cognitive  -CT     Row Name 20 1130          Cognition    Orientation Status (Cognition)  oriented x 3  -CT     Follows Commands (Cognition)  follows multi-step commands  -CT     Row Name 20 1130          Bed Mobility    Bed Mobility  bed mobility (all) activities  -CT     All Activities, [place] (Bed Mobility)  moderate assist (50% patient effort)  -CT     Bed Mobility, Safety Issues  decreased use of arms for pushing/pulling;decreased use of legs for bridging/pushing  -CT     Assistive Device (Bed Mobility)  bed rails  -CT     Row Name 20 1130          Transfers    Transfers  bed-chair transfer;chair-bed transfer;sit-stand transfer;stand-sit transfer  -CT     Sit-Stand [place] (Transfers)  moderate assist (50% patient effort)  -CT     Stand-Sit [place] (Transfers)  moderate assist (50% patient effort)  -CT     Row Name 20 1130          Sit-Stand Transfer    Assistive Device (Sit-Stand Transfers)  walker, front-wheeled  -CT     Row Name 20 1130          Stand-Sit Transfer    Assistive Device (Stand-Sit Transfers)  walker,  front-wheeled  -CT     Row Name 09/17/20 1130          Gait/Stairs (Locomotion)    [place] Level (Gait)  minimum assist (75% patient effort)  -CT     Assistive Device (Gait)  walker, front-wheeled  -CT     Distance in Feet (Gait)  40  -CT     Pattern (Gait)  step-to  -CT     Deviations/Abnormal Patterns (Gait)  gait speed decreased  -CT     Bilateral Gait Deviations  forward flexed posture;weight shift ability decreased  -CT     Row Name 09/17/20 1130          Safety Issues, Functional Mobility    Impairments Affecting Function (Mobility)  endurance/activity tolerance;strength  -CT     Row Name             Wound 09/10/20 1345 Right calf Venous Ulcer    Wound - Properties Group Placement Date: 09/10/20  -KG Placement Time: 1345  -KG Side: Right  -KG Location: calf  -KG Primary Wound Type: Venous ulcer  -KG Additional Comments: several large open ulcers on back of right leg  -KG    Retired Wound - Properties Group Date first assessed: 09/10/20  -KG Time first assessed: 1345  -KG Side: Right  -KG Location: calf  -KG Primary Wound Type: Venous ulcer  -KG Retired Stage, Pressure Injury : other (see comments)  -KG Additional Comments: several large open ulcers on back of right leg  -KG    Row Name             Wound 09/10/20 1345 Left anterior toe Venous Ulcer    Wound - Properties Group Placement Date: 09/10/20  -KG Placement Time: 1345  -KG Side: Left  -KG Location: toe  -KG Primary Wound Type: Venous ulcer  -KG    Retired Wound - Properties Group Date first assessed: 09/10/20  -KG Time first assessed: 1345  -KG Side: Left  -KG Retired Orientation: anterior  -KG Location: toe  -KG Primary Wound Type: Venous ulcer  -KG Retired Stage, Pressure Injury : other (see comments)  -KG    Row Name 09/17/20 1130          Coping    Observed Emotional State  calm;cooperative  -CT     Row Name 09/17/20 1130          Plan of Care Review    Plan of Care Reviewed With  patient  -CT     Progress  improving  -CT     Row Name 09/17/20  1130          Positioning and Restraints    Pre-Treatment Position  in bed  -CT     Post Treatment Position  chair  -CT     In Chair  sitting;call light within reach;encouraged to call for assist;notified nsg  -CT     Row Name 09/17/20 1130          Therapy Assessment/Plan (PT)    Rehab Potential (PT)  --  -CT     Criteria for Skilled Interventions Met (PT)  --  -CT     Row Name 09/17/20 1130          Progress Summary (PT)    Progress Toward Functional Goals (PT)  progress toward functional goals is good  -CT       User Key  (r) = Recorded By, (t) = Taken By, (c) = Cosigned By    Initials Name Provider Type    Rochelle Auguste, RN Registered Nurse    Lynn Chaudhary, PT Physical Therapist        Physical Therapy Education                 Title: PT OT SLP Therapies (Done)     Topic: Physical Therapy (Done)     Point: Mobility training (Done)     Learning Progress Summary           Patient Acceptance, E,TB, VU by CT at 9/17/2020 1132    Acceptance, E, VU by EA at 9/15/2020 2116    Acceptance, E, VU by RD at 9/15/2020 1236    Acceptance, E,TB, VU by CT at 9/15/2020 1143                   Point: Home exercise program (Done)     Learning Progress Summary           Patient Acceptance, E,TB, VU by CT at 9/17/2020 1132    Acceptance, E, VU by EA at 9/15/2020 2116    Acceptance, E, VU by RD at 9/15/2020 1236    Acceptance, E,TB, VU by CT at 9/15/2020 1143                   Point: Body mechanics (Done)     Learning Progress Summary           Patient Acceptance, E,TB, VU by CT at 9/17/2020 1132    Acceptance, E, VU by EA at 9/15/2020 2116    Acceptance, E, VU by RD at 9/15/2020 1236    Acceptance, E,TB, VU by CT at 9/15/2020 1143                   Point: Precautions (Done)     Learning Progress Summary           Patient Acceptance, E,TB, VU by CT at 9/17/2020 1132    Acceptance, E, VU by EA at 9/15/2020 2116    Acceptance, E, VU by RD at 9/15/2020 1236    Acceptance, E,TB, VU by CT at 9/15/2020 1143                                User Key     Initials Effective Dates Name Provider Type Discipline    EA 06/16/16 -  Chelle Nino, RN Registered Nurse Nurse    CT 04/03/18 -  Lynn Mueller PT Physical Therapist PT    RD 06/10/20 -  Silvana Melgar, OLIVIA Registered Nurse Nurse              PT Recommendation and Plan  Anticipated Discharge Disposition (PT): (tbd)  Planned Therapy Interventions (PT): balance training, bed mobility training, gait training, home exercise program, manual therapy techniques, motor coordination training, neuromuscular re-education, patient/family education, postural re-education, strengthening, transfer training  Therapy Frequency (PT): 3 times/wk(3-5 times/wk)  Progress Summary (PT)  Progress Toward Functional Goals (PT): progress toward functional goals is good  Plan of Care Reviewed With: patient  Progress: improving       Time Calculation:   PT Charges     Row Name 09/17/20 1132             Time Calculation    PT Received On  09/17/20  -CT      PT Goal Re-Cert Due Date  09/29/20  -CT         Time Calculation- PT    Total Timed Code Minutes- PT  28 minute(s)  -CT        User Key  (r) = Recorded By, (t) = Taken By, (c) = Cosigned By    Initials Name Provider Type    CT Lynn Mueller, PARK Physical Therapist        Therapy Charges for Today     Code Description Service Date Service Provider Modifiers Qty    55886049085 HC GAIT TRAINING EA 15 MIN 9/17/2020 Lynn Mueller, PT GP 1    21943350748 HC PT THERAPEUTIC ACT EA 15 MIN 9/17/2020 Lynn Mueller, PT GP 1               Lynn Mueller PT  9/17/2020

## 2020-09-17 NOTE — PROGRESS NOTES
[facility] Medicine Services  PROGRESS NOTE     Patient Identification:  Name:  Yumiko Purdy  Age:  54 y.o.  Sex:  female  :  1965  MRN:  [id number]  Visit Number:  [id number]  Primary Care Provider:  Niko Schaefer MD    Length of stay:  7    ----------------------------------------------------------------------------------------------------------------------  Subjective     Chief Complaint:  Follow up for CHF     Subjective:  Today, the patient reports feeling better. Had three large Bm overnight. No fevers, CP or SOA.  One episode of vomiting this morning. Denies abdominal pain.     ----------------------------------------------------------------------------------------------------------------------  Objective     Current Hospital Meds:  amiodarone, 200 mg, Oral, Daily  aspirin, 81 mg, Oral, Daily  carvedilol, 3.125 mg, Oral, BID With Meals  folic acid, 1 mg, Oral, Daily  hydrocortisone, , Topical, Q12H  insulin aspart, 0-14 Units, Subcutaneous, TID AC  insulin aspart, 5 Units, Subcutaneous, TID With Meals  insulin detemir, 10 Units, Subcutaneous, Nightly  magnesium citrate, 150 mL, Oral, Once  polyethylene glycol, 17 g, Oral, Daily  predniSONE, 2.5 mg, Oral, Daily  pregabalin, 100 mg, Oral, Q12H  rOPINIRole, 1 mg, Oral, TID  senna-docusate sodium, 2 tablet, Oral, Nightly  sodium chloride, 10 mL, Intravenous, Q12H  sodium chloride, 10 mL, Intravenous, Q12H  spironolactone, 12.5 mg, Oral, Daily  venlafaxine XR, 75 mg, Oral, Daily      Pharmacy Consult,   Pharmacy to dose warfarin,       ----------------------------------------------------------------------------------------------------------------------  Vital Signs:  Temp:  [96.7 °F (35.9 °C)-98.1 °F (36.7 °C)] 97.9 °F (36.6 °C)  Heart Rate:  [68-75] 75  Resp:  [16-20] 20  BP: ()/(48-65) 139/65  Mean Arterial Pressure (Non-Invasive) for the past 24 hrs (Last 3 readings):   Noninvasive MAP (mmHg)    09/17/20 1035 120   09/17/20 0700 75   09/16/20 1827 63     SpO2 Percentage    09/17/20 0700 09/17/20 0730 09/17/20 1035   SpO2: 97% (P) 99% 98%     SpO2:  [97 %-99 %] 98 %  on  Flow (L/min):  [2] 2;   Device (Oxygen Therapy): nasal cannula    Body mass index is 45.2 kg/m².  Wt Readings from Last 3 Encounters:   09/17/20 123 kg (271 lb 9.6 oz)        Intake/Output Summary (Last 24 hours) at 9/17/2020 1521  Last data filed at 9/16/2020 2300  Gross per 24 hour   Intake 220 ml   Output 1000 ml   Net -780 ml     NPO Diet  ----------------------------------------------------------------------------------------------------------------------  Physical exam:   GEN: obese, NAD, sitting up in chair  CV: RRR, no audible murmur  PULM: CTA mostly, decrease bibasilar breathsounds  SKIN: rash is unchanged for the most part. Areas of ulceration post right calf w/o purulence. Trace edema only  PSYCH: alert and oriented x 4, no anxiety or confusion  --------------------------------------------------------------------------------------------  Results from last 7 days   Lab Units 09/17/20  0459 09/16/20  0343 09/15/20  1021 09/15/20  0049  09/14/20  0705 09/13/20  0343 09/12/20  0149   WBC 10*3/mm3 7.87 6.94  --  8.23   < >  --  6.56 8.65   HEMOGLOBIN g/dL 8.8* 8.5*  --  9.6*   < >  --  9.1* 10.5*   HEMATOCRIT % 31.1* 29.9*  --  32.8*   < >  --  32.1* 35.4   MCV fL 97.8* 95.8  --  96.5   < >  --  98.8* 93.9   MCHC g/dL 28.3* 28.4*  --  29.3*   < >  --  28.3* 29.7*   PLATELETS 10*3/mm3 121* 107*  --  130*   < >  --  141 139*   INR  2.77* 2.38* 2.13* 2.08*  --  1.97* 2.18* 2.08*    < > = values in this interval not displayed.     Results from last 7 days   Lab Units 09/17/20  0459 09/16/20  0343 09/15/20  0049 09/14/20  0514 09/13/20  0343   SODIUM mmol/L 141 140 141 138 139   POTASSIUM mmol/L 4.4 4.2 4.6 5.4* 4.8   MAGNESIUM mg/dL  --   --  1.9 2.0 2.1   CHLORIDE mmol/L 100 100 98 100 100   CO2 mmol/L 33.3* 33.6* 29.1* 28.4 30.0*    BUN mg/dL 38* 39* 50* 49* 45*   CREATININE mg/dL 1.40* 1.20* 1.34* 1.46* 1.37*   EGFR IF NONAFRICN AM mL/min/1.73 39* 47* 41* 37* 40*   CALCIUM mg/dL 8.7 8.5* 9.1 8.9 9.0   GLUCOSE mg/dL 125* 150* 198* 103* 155*   ALBUMIN g/dL  --  2.88* 3.10* 3.00* 3.25*   BILIRUBIN mg/dL  --  2.2* 1.9* 2.0* 1.7*   ALK PHOS U/L  --  81 104 106 86   AST (SGOT) U/L  --  24 29 42* 26   ALT (SGPT) U/L  --  22 26 30 25   Estimated Creatinine Clearance: 61.2 mL/min (A) (by C-G formula based on SCr of 1.4 mg/dL (H)).  No results found for: AMMONIA    Glucose   Date/Time Value Ref Range Status   09/17/2020 1050 120 70 - 130 mg/dL Final   09/17/2020 0659 122 70 - 130 mg/dL Final   09/16/2020 2119 151 (H) 70 - 130 mg/dL Final   09/16/2020 1835 129 70 - 130 mg/dL Final   09/16/2020 1601 111 70 - 130 mg/dL Final   09/16/2020 1220 117 70 - 130 mg/dL Final   09/16/2020 0715 141 (H) 70 - 130 mg/dL Final   09/15/2020 2023 223 (H) 70 - 130 mg/dL Final       ----------------------------------------------------------------------------------------------------------------------  Imaging Results (Last 24 Hours)     Procedure Component Value Units Date/Time    XR Abdomen KUB [[id number]] Collected: 09/17/20 0928     Updated: 09/17/20 0930    Narrative:      EXAMINATION: XR ABDOMEN KUB-      CLINICAL INDICATION: vomiting; I50.9-Heart failure, unspecified;  D69.2-Other nonthrombocytopenic purpura; E87.1-[place] and  hyponatremia; I50.43-Acute on chronic combined systolic (congestive) and  diastolic (congestive) heart failure; R79.89-Other specified abnormal  findings of blood chemistry        COMPARISON: None available     FINDINGS: One view of the abdomen shows large volume stool     No evidence of bowel obstruction     Surgical clips in the right upper quadrant.       Impression:      Large volume stool in the colon      This report was finalized on 9/17/2020 9:28 AM by Dr. Angelo Deleon MD.           I have personally reviewed the above  radiology results.   ----------------------------------------------------------------------------------------------------------------------  Assessment/Plan       · Acute systolic congestive heart failure-EF 21 to 25%.  diuresed well for a few days. This was held yesterday  Due to hypotension after DCCV. BP has recovered. She appears to be euvolemic on exam. Will start po maintenance diuresis. Cardiology has stopped Valsartan due to BP intolerance. BB changed back to Coreg again and will titrate dose as tolerated. Cont Aldactone (will increase dose back to 25 mg since no longer on ARB). Stress test completed today for ischemic etiology and pending result.   · Persistent atrial fibrillation-s/p DCCV 9/16.  Currently normal sinus rhythm.  cont amiodarone.  Warfarin is therapeutic. INR managed by pharmacy.   · Eosinophilic skin rash.  Vasculitis was ruled out recently per biopsy, ANCA is negative. Therefore, likely ruled out per Nephrology. On low-dose prednisone at this time.  She has been followed up at  for work-up. Rash has not changed while here. Will need f/u with dermatology as op. Rash is chronic for  The past couple of years. May benefit from wound clinic to assure proper care and treatment.   · CKD3- cr stable for the most part (1.2-1.5)  · Liver cirrhosis-due to history of alcoholism  · acute thrombocytopenia- slight improvement noted, etiology remains unclear.   · Type 2 diabetes with peripheral neuropathy-continue Levemir and aspart  · constipation- on MiraLAX, doc/senna. Had 3 BM yesterday. Vomited this am. KUB shows persistent constipation in colon. Mag citrate ordered.   · Depression-continue venlafaxine  · But obesity-BMI 45, complicating aspects of care  · chronic anemia-likely due to chronic disease.  Hemoglobin 9.7 mostly stable  · Chronic hypoxic resp failure- on 2L NC at home at all times. Stable here. Unclear etiology.    --------------------------------------------------  DVT Prophylaxis:  warfarin     Disposition: home soon, likely tomorrow   --------------------------------------------------      Katayoun Behbahani, MD  Hospitalist Service -- [facility]     09/17/20  15:21 EDT

## 2020-09-18 LAB
ANION GAP SERPL CALCULATED.3IONS-SCNC: 9.5 MMOL/L (ref 5–15)
BH CV NUCLEAR PRIOR STUDY: 3
BH CV STRESS BP STAGE 1: NORMAL
BH CV STRESS BP STAGE 2: NORMAL
BH CV STRESS COMMENTS STAGE 1: NORMAL
BH CV STRESS COMMENTS STAGE 2: NORMAL
BH CV STRESS DOSE REGADENOSON STAGE 1: 0.4
BH CV STRESS DURATION MIN STAGE 1: 0
BH CV STRESS DURATION MIN STAGE 2: 4
BH CV STRESS DURATION SEC STAGE 1: 10
BH CV STRESS DURATION SEC STAGE 2: 0
BH CV STRESS HR STAGE 1: 75
BH CV STRESS HR STAGE 2: 76
BH CV STRESS PROTOCOL 1: NORMAL
BH CV STRESS RECOVERY BP: NORMAL MMHG
BH CV STRESS RECOVERY HR: 74 BPM
BH CV STRESS STAGE 1: 1
BH CV STRESS STAGE 2: 2
BUN SERPL-MCNC: 30 MG/DL (ref 6–20)
BUN/CREAT SERPL: 25.4 (ref 7–25)
CALCIUM SPEC-SCNC: 8.5 MG/DL (ref 8.6–10.5)
CHLORIDE SERPL-SCNC: 101 MMOL/L (ref 98–107)
CO2 SERPL-SCNC: 27.5 MMOL/L (ref 22–29)
CREAT SERPL-MCNC: 1.18 MG/DL (ref 0.57–1)
DEPRECATED RDW RBC AUTO: 77.4 FL (ref 37–54)
ERYTHROCYTE [DISTWIDTH] IN BLOOD BY AUTOMATED COUNT: 20.7 % (ref 12.3–15.4)
FERRITIN SERPL-MCNC: 1289 NG/ML (ref 13–150)
GFR SERPL CREATININE-BSD FRML MDRD: 48 ML/MIN/1.73
GLUCOSE BLDC GLUCOMTR-MCNC: 172 MG/DL (ref 70–130)
GLUCOSE BLDC GLUCOMTR-MCNC: 177 MG/DL (ref 70–130)
GLUCOSE BLDC GLUCOMTR-MCNC: 208 MG/DL (ref 70–130)
GLUCOSE BLDC GLUCOMTR-MCNC: 266 MG/DL (ref 70–130)
GLUCOSE SERPL-MCNC: 215 MG/DL (ref 65–99)
HCT VFR BLD AUTO: 30.7 % (ref 34–46.6)
HGB BLD-MCNC: 8.6 G/DL (ref 12–15.9)
INR PPP: 3.28 (ref 0.9–1.1)
IRON 24H UR-MRATE: 36 MCG/DL (ref 37–145)
IRON SATN MFR SERPL: 15 % (ref 20–50)
LV EF NUC BP: 40 %
MAXIMAL PREDICTED HEART RATE: 167 BPM
MCH RBC QN AUTO: 28.4 PG (ref 26.6–33)
MCHC RBC AUTO-ENTMCNC: 28 G/DL (ref 31.5–35.7)
MCV RBC AUTO: 101.3 FL (ref 79–97)
PERCENT MAX PREDICTED HR: 45.51 %
PLATELET # BLD AUTO: 100 10*3/MM3 (ref 140–450)
PMV BLD AUTO: 10.6 FL (ref 6–12)
POTASSIUM SERPL-SCNC: 4.1 MMOL/L (ref 3.5–5.2)
PROTHROMBIN TIME: 33.4 SECONDS (ref 11.9–14.1)
RBC # BLD AUTO: 3.03 10*6/MM3 (ref 3.77–5.28)
SODIUM SERPL-SCNC: 138 MMOL/L (ref 136–145)
STRESS BASELINE BP: NORMAL MMHG
STRESS BASELINE HR: 73 BPM
STRESS PERCENT HR: 54 %
STRESS POST PEAK BP: NORMAL MMHG
STRESS POST PEAK HR: 76 BPM
STRESS TARGET HR: 142 BPM
TIBC SERPL-MCNC: 237 MCG/DL (ref 298–536)
TRANSFERRIN SERPL-MCNC: 159 MG/DL (ref 200–360)
WBC # BLD AUTO: 5.82 10*3/MM3 (ref 3.4–10.8)

## 2020-09-18 PROCEDURE — 94799 UNLISTED PULMONARY SVC/PX: CPT

## 2020-09-18 PROCEDURE — 99232 SBSQ HOSP IP/OBS MODERATE 35: CPT | Performed by: INTERNAL MEDICINE

## 2020-09-18 PROCEDURE — 84466 ASSAY OF TRANSFERRIN: CPT | Performed by: INTERNAL MEDICINE

## 2020-09-18 PROCEDURE — 25010000002 ONDANSETRON PER 1 MG: Performed by: INTERNAL MEDICINE

## 2020-09-18 PROCEDURE — 82962 GLUCOSE BLOOD TEST: CPT

## 2020-09-18 PROCEDURE — 63710000001 INSULIN ASPART PER 5 UNITS: Performed by: SPECIALIST

## 2020-09-18 PROCEDURE — 83540 ASSAY OF IRON: CPT | Performed by: INTERNAL MEDICINE

## 2020-09-18 PROCEDURE — 82728 ASSAY OF FERRITIN: CPT | Performed by: INTERNAL MEDICINE

## 2020-09-18 PROCEDURE — 99232 SBSQ HOSP IP/OBS MODERATE 35: CPT | Performed by: SPECIALIST

## 2020-09-18 PROCEDURE — 85610 PROTHROMBIN TIME: CPT | Performed by: SPECIALIST

## 2020-09-18 PROCEDURE — 80048 BASIC METABOLIC PNL TOTAL CA: CPT | Performed by: SPECIALIST

## 2020-09-18 PROCEDURE — 63710000001 PREDNISONE PER 5 MG: Performed by: SPECIALIST

## 2020-09-18 PROCEDURE — 25010000002 MORPHINE PER 10 MG: Performed by: SPECIALIST

## 2020-09-18 PROCEDURE — 63710000001 INSULIN DETEMIR PER 5 UNITS: Performed by: SPECIALIST

## 2020-09-18 PROCEDURE — 85027 COMPLETE CBC AUTOMATED: CPT | Performed by: SPECIALIST

## 2020-09-18 RX ORDER — CARVEDILOL 6.25 MG/1
6.25 TABLET ORAL 2 TIMES DAILY WITH MEALS
Status: DISCONTINUED | OUTPATIENT
Start: 2020-09-18 | End: 2020-09-23 | Stop reason: HOSPADM

## 2020-09-18 RX ORDER — BUMETANIDE 1 MG/1
2 TABLET ORAL DAILY
Status: DISCONTINUED | OUTPATIENT
Start: 2020-09-18 | End: 2020-09-23 | Stop reason: HOSPADM

## 2020-09-18 RX ADMIN — INSULIN DETEMIR 10 UNITS: 100 INJECTION, SOLUTION SUBCUTANEOUS at 21:52

## 2020-09-18 RX ADMIN — VENLAFAXINE HYDROCHLORIDE 75 MG: 75 CAPSULE, EXTENDED RELEASE ORAL at 08:03

## 2020-09-18 RX ADMIN — INSULIN ASPART 5 UNITS: 100 INJECTION, SOLUTION INTRAVENOUS; SUBCUTANEOUS at 17:22

## 2020-09-18 RX ADMIN — INSULIN ASPART 5 UNITS: 100 INJECTION, SOLUTION INTRAVENOUS; SUBCUTANEOUS at 11:51

## 2020-09-18 RX ADMIN — ROPINIROLE HYDROCHLORIDE 1 MG: 1 TABLET, FILM COATED ORAL at 08:03

## 2020-09-18 RX ADMIN — ACETAMINOPHEN 650 MG: 325 TABLET ORAL at 21:51

## 2020-09-18 RX ADMIN — ASPIRIN 81 MG: 81 TABLET, COATED ORAL at 08:04

## 2020-09-18 RX ADMIN — HYDROCORTISONE: 1 CREAM TOPICAL at 21:52

## 2020-09-18 RX ADMIN — SODIUM CHLORIDE, PRESERVATIVE FREE 10 ML: 5 INJECTION INTRAVENOUS at 08:06

## 2020-09-18 RX ADMIN — PREGABALIN 100 MG: 100 CAPSULE ORAL at 21:51

## 2020-09-18 RX ADMIN — Medication: at 08:07

## 2020-09-18 RX ADMIN — POLYETHYLENE GLYCOL 3350 17 G: 17 POWDER, FOR SOLUTION ORAL at 08:06

## 2020-09-18 RX ADMIN — ONDANSETRON 4 MG: 2 INJECTION INTRAMUSCULAR; INTRAVENOUS at 23:33

## 2020-09-18 RX ADMIN — INSULIN ASPART 5 UNITS: 100 INJECTION, SOLUTION INTRAVENOUS; SUBCUTANEOUS at 17:25

## 2020-09-18 RX ADMIN — INSULIN ASPART 3 UNITS: 100 INJECTION, SOLUTION INTRAVENOUS; SUBCUTANEOUS at 07:27

## 2020-09-18 RX ADMIN — SPIRONOLACTONE 12.5 MG: 25 TABLET ORAL at 08:05

## 2020-09-18 RX ADMIN — ONDANSETRON 4 MG: 2 INJECTION INTRAMUSCULAR; INTRAVENOUS at 07:28

## 2020-09-18 RX ADMIN — Medication: at 21:51

## 2020-09-18 RX ADMIN — ACETAMINOPHEN 650 MG: 325 TABLET ORAL at 11:49

## 2020-09-18 RX ADMIN — HYDROCORTISONE: 1 CREAM TOPICAL at 08:07

## 2020-09-18 RX ADMIN — BUMETANIDE 2 MG: 1 TABLET ORAL at 09:55

## 2020-09-18 RX ADMIN — FOLIC ACID 1 MG: 1 TABLET ORAL at 08:05

## 2020-09-18 RX ADMIN — INSULIN ASPART 5 UNITS: 100 INJECTION, SOLUTION INTRAVENOUS; SUBCUTANEOUS at 07:25

## 2020-09-18 RX ADMIN — SODIUM CHLORIDE, PRESERVATIVE FREE 10 ML: 5 INJECTION INTRAVENOUS at 21:51

## 2020-09-18 RX ADMIN — AMIODARONE HYDROCHLORIDE 200 MG: 200 TABLET ORAL at 08:02

## 2020-09-18 RX ADMIN — INSULIN ASPART 8 UNITS: 100 INJECTION, SOLUTION INTRAVENOUS; SUBCUTANEOUS at 11:49

## 2020-09-18 RX ADMIN — CARVEDILOL 3.12 MG: 3.12 TABLET, FILM COATED ORAL at 07:24

## 2020-09-18 RX ADMIN — ROPINIROLE HYDROCHLORIDE 1 MG: 1 TABLET, FILM COATED ORAL at 21:51

## 2020-09-18 RX ADMIN — MORPHINE SULFATE 1 MG: 2 INJECTION, SOLUTION INTRAMUSCULAR; INTRAVENOUS at 00:58

## 2020-09-18 RX ADMIN — PREDNISONE 2.5 MG: 5 TABLET ORAL at 08:03

## 2020-09-18 RX ADMIN — PREGABALIN 100 MG: 100 CAPSULE ORAL at 08:03

## 2020-09-18 NOTE — NURSING NOTE
Patient received mag citrate at start of shift. Patient has constant liquid bowel movements and is unable to get up to bedside without incontinence. Stool is constantly flowing out of patient if she moves in the least bit. Patients carlotta area/buttock is extremely excoriated and almost bleeding from being wiped constantly. Notified Zahra NP for rectal tube placement to help with patient comfort and to decrease risk of further skin break down to her bottom. Patient tolerated well with output immediately. Will cont to monitor.

## 2020-09-18 NOTE — PLAN OF CARE
Goal Outcome Evaluation:  Plan of Care Reviewed With: patient  Pt required rectal tube placement r/t several liquid bowel movements with incontinence to protect skin.

## 2020-09-18 NOTE — PROGRESS NOTES
[facility] Medicine Services  PROGRESS NOTE     Patient Identification:  Name:  Yumiko Purdy  Age:  54 y.o.  Sex:  female  :  1965  MRN:  [id number]  Visit Number:  [id number]  Primary Care Provider:  Niko Schaefer MD    Length of stay:  8    ----------------------------------------------------------------------------------------------------------------------  Subjective     Chief Complaint:  Follow up for CHF     Subjective:  Patient was found to have constipation a few days ago and bowel regimens were uptitrated.  Had 3 bowel movements 2 days ago but had an episode of nausea and vomiting in the morning.  KUB showed retained stool in the colon still.  She received laxatives with good results and had diarrhea overnight early this morning.  Constipation likely has resolved.  She complains of musculoskeletal pain today but no dizziness or chest pain.  Her blood pressure intermittently is low 80s 200s however she remains asymptomatic.     ----------------------------------------------------------------------------------------------------------------------  Objective     Current Hospital Meds:  amiodarone, 200 mg, Oral, Daily  aspirin, 81 mg, Oral, Daily  bumetanide, 2 mg, Oral, Daily  carvedilol, 6.25 mg, Oral, BID With Meals  folic acid, 1 mg, Oral, Daily  hydrocortisone, , Topical, Q12H  insulin aspart, 0-14 Units, Subcutaneous, TID AC  insulin aspart, 5 Units, Subcutaneous, TID With Meals  insulin detemir, 10 Units, Subcutaneous, Nightly  polyethylene glycol, 17 g, Oral, Daily  predniSONE, 2.5 mg, Oral, Daily  pregabalin, 100 mg, Oral, Q12H  rOPINIRole, 1 mg, Oral, TID  senna-docusate sodium, 2 tablet, Oral, Nightly  sodium chloride, 10 mL, Intravenous, Q12H  sodium chloride, 10 mL, Intravenous, Q12H  spironolactone, 12.5 mg, Oral, Daily  venlafaxine XR, 75 mg, Oral, Daily      Pharmacy Consult,        ----------------------------------------------------------------------------------------------------------------------  Vital Signs:  Temp:  [97 °F (36.1 °C)-98.7 °F (37.1 °C)] 97.9 °F (36.6 °C)  Heart Rate:  [70-74] 72  Resp:  [18-20] 20  BP: (100-123)/(51-68) 103/53  Mean Arterial Pressure (Non-Invasive) for the past 24 hrs (Last 3 readings):   Noninvasive MAP (mmHg)   09/18/20 0711 70   09/18/20 0255 79   09/17/20 2038 79     SpO2 Percentage    09/18/20 0012 09/18/20 0255 09/18/20 0711   SpO2: 99% 99% 99%     SpO2:  [94 %-99 %] 99 %  on  Flow (L/min):  [2] 2;   Device (Oxygen Therapy): nasal cannula    Body mass index is 43.87 kg/m².  Wt Readings from Last 3 Encounters:   09/18/20 120 kg (263 lb 9.6 oz)        Intake/Output Summary (Last 24 hours) at 9/18/2020 1147  Last data filed at 9/18/2020 0400  Gross per 24 hour   Intake 1140 ml   Output 1900 ml   Net -760 ml     Diet Regular; Cardiac, Consistent Carbohydrate, Renal, Daily Fluid Restriction, Low Sodium; 1500 mL Fluid Per Day; 2,000 mg Na  ----------------------------------------------------------------------------------------------------------------------  Physical exam:   GEN: Chronically ill-appearing but in no acute distress  CV: RRR, no audible murmur  PULM: Clear to auscultation, no wheeze or crackles.  Some decreased bibasilar breath sounds noted  GI: Obese, positive bowel sounds, soft and nontender  SKIN: Trace edema bilateral lower extremities in dependent position.  Appears to have evidence of chronic venous stasis.  Scabs and rash remain unchanged  PSYCH: Alert and oriented x4, no confusions or agitation  --------------------------------------------------------------------------------------------   Results from last 7 days   Lab Units 09/18/20  0211 09/17/20  0459 09/16/20  0343 09/15/20  1021 09/15/20  0049  09/14/20  0705 09/13/20  0343   WBC 10*3/mm3 5.82 7.87 6.94  --  8.23   < >  --  6.56   HEMOGLOBIN g/dL 8.6* 8.8* 8.5*  --  9.6*   < >   --  9.1*   HEMATOCRIT % 30.7* 31.1* 29.9*  --  32.8*   < >  --  32.1*   MCV fL 101.3* 97.8* 95.8  --  96.5   < >  --  98.8*   MCHC g/dL 28.0* 28.3* 28.4*  --  29.3*   < >  --  28.3*   PLATELETS 10*3/mm3 100* 121* 107*  --  130*   < >  --  141   INR  3.28* 2.77* 2.38* 2.13* 2.08*  --  1.97* 2.18*    < > = values in this interval not displayed.     Results from last 7 days   Lab Units 09/18/20  0211 09/17/20  0459 09/16/20  0343 09/15/20  0049 09/14/20  0514 09/13/20  0343   SODIUM mmol/L 138 141 140 141 138 139   POTASSIUM mmol/L 4.1 4.4 4.2 4.6 5.4* 4.8   MAGNESIUM mg/dL  --   --   --  1.9 2.0 2.1   CHLORIDE mmol/L 101 100 100 98 100 100   CO2 mmol/L 27.5 33.3* 33.6* 29.1* 28.4 30.0*   BUN mg/dL 30* 38* 39* 50* 49* 45*   CREATININE mg/dL 1.18* 1.40* 1.20* 1.34* 1.46* 1.37*   EGFR IF NONAFRICN AM mL/min/1.73 48* 39* 47* 41* 37* 40*   CALCIUM mg/dL 8.5* 8.7 8.5* 9.1 8.9 9.0   GLUCOSE mg/dL 215* 125* 150* 198* 103* 155*   ALBUMIN g/dL  --   --  2.88* 3.10* 3.00* 3.25*   BILIRUBIN mg/dL  --   --  2.2* 1.9* 2.0* 1.7*   ALK PHOS U/L  --   --  81 104 106 86   AST (SGOT) U/L  --   --  24 29 42* 26   ALT (SGPT) U/L  --   --  22 26 30 25   Estimated Creatinine Clearance: 71.5 mL/min (A) (by C-G formula based on SCr of 1.18 mg/dL (H)).  No results found for: AMMONIA    Glucose   Date/Time Value Ref Range Status   09/18/2020 1036 266 (H) 70 - 130 mg/dL Final   09/18/2020 0611 172 (H) 70 - 130 mg/dL Final   09/17/2020 2044 203 (H) 70 - 130 mg/dL Final   09/17/2020 1910 184 (H) 70 - 130 mg/dL Final   09/17/2020 1739 152 (H) 70 - 130 mg/dL Final   09/17/2020 1050 120 70 - 130 mg/dL Final   09/17/2020 0659 122 70 - 130 mg/dL Final   09/16/2020 2119 151 (H) 70 - 130 mg/dL Final       ----------------------------------------------------------------------------------------------------------------------  Assessment/Plan       · Acute systolic congestive heart failure-echocardiogram showed EF of 21 to 25%.  A stress test done yesterday  which showed EF of 40%.  She does have degree of moderately severe inferior wall reversible ischemia on stress.  Cardiology is following.  Risks and benefits of cardiac catheterization is being considered.  Patient is on Coumadin with therapeutic INR therefore will hold Coumadin for now and monitor platelet counts through the weekend to understand the trend better before deciding on left heart cath on Monday.  She did not tolerated valsartan therefore was discontinued yesterday.  She is on low-dose carvedilol with blood pressure ranging from 80s to 100s but she remains asymptomatic.   · Persistent atrial fibrillation-status post DCCV 2 days ago.  Continue to remain in normal sinus rhythm.  Continue amiodarone and low-dose Coreg.  We will hold warfarin today to prep for possible left heart catheterization on Monday.   · Constipation-likely resolved.  Having diarrhea from laxative last night.  Continue current bowel regimen  · Eosinophilic skin rash-unclear etiology.  Was ruled out for vasculitis recently.  She does have extremely elevated ferritin at 1300 which is concerning for a rheumatologic inflammatory process.  Patient will need to follow-up with rheumatology as outpatient.  · Acute thrombocytopenia-etiology also unclear.  Normal platelets on admission but have trended down during this admission.  Peripheral smear done and reviewed by pathology showed normal platelet morphology with  Anemia.  Iron is slightly low but iron studies are consistent with anemia of chronic disease.  If etiology remains unclear and no recovery delaying other medical work-up and care can benefit from hematology consult.  --------------------------------------------------  DVT Prophylaxis: hold warfarin. INR is therapeutic. Can start hep sq once INR < 2.      Disposition: monitor as inpatient until Monday. Monitoring platelets and reassess creatinine and plat count on Monday for possible LHC.    --------------------------------------------------       Katayoun Behbahani, MD  Hospitalist Service -- [facility]     09/18/20  11:47 EDT

## 2020-09-18 NOTE — PROGRESS NOTES
Patient Identification:  Name:  Yumiko Purdy  Age:  54 y.o.  Sex:  female  :  1965  MRN:  [id number]  Visit Number:  [id number]    Chief Complaint:   Acute systolic heart failure    Subjective:  The patient is resting in bed. She has had some chest pains with exertion. Breathing is about the same. She reports diarrhea this a.m.  ----------------------------------------------------------------------------------------------------------------------  Current Hospital Meds:  amiodarone, 200 mg, Oral, Daily  aspirin, 81 mg, Oral, Daily  bumetanide, 2 mg, Oral, Daily  carvedilol, 3.125 mg, Oral, BID With Meals  folic acid, 1 mg, Oral, Daily  hydrocortisone, , Topical, Q12H  insulin aspart, 0-14 Units, Subcutaneous, TID AC  insulin aspart, 5 Units, Subcutaneous, TID With Meals  insulin detemir, 10 Units, Subcutaneous, Nightly  polyethylene glycol, 17 g, Oral, Daily  predniSONE, 2.5 mg, Oral, Daily  pregabalin, 100 mg, Oral, Q12H  rOPINIRole, 1 mg, Oral, TID  senna-docusate sodium, 2 tablet, Oral, Nightly  sodium chloride, 10 mL, Intravenous, Q12H  sodium chloride, 10 mL, Intravenous, Q12H  spironolactone, 12.5 mg, Oral, Daily  venlafaxine XR, 75 mg, Oral, Daily      Pharmacy Consult,   Pharmacy to dose warfarin,       ----------------------------------------------------------------------------------------------------------------------  Vital Signs:  Temp:  [97 °F (36.1 °C)-98.7 °F (37.1 °C)] 97.9 °F (36.6 °C)  Heart Rate:  [70-74] 72  Resp:  [18-20] 20  BP: (100-123)/(51-68) 103/53      20  0500 20  0816 20  0500   Weight: 125 kg (276 lb) 123 kg (271 lb 9.6 oz) 120 kg (263 lb 9.6 oz)     Body mass index is 43.87 kg/m².    Intake/Output Summary (Last 24 hours) at 2020 1118  Last data filed at 2020 0400  Gross per 24 hour   Intake 1140 ml   Output 1900 ml   Net -760 ml     Diet Regular; Cardiac, Consistent Carbohydrate, Renal, Daily Fluid Restriction, Low Sodium; 1500 mL Fluid Per Day;  2,000 mg Na  ----------------------------------------------------------------------------------------------------------------------  Physical exam:    HEENT:  Head:  Normocephalic and atraumatic.     Eyes:  Conjunctivae and EOM are normal.  Pupils are equal, round, and reactive to light.  No scleral icterus.    Neck:  Neck supple.  No JVD present.  No bruit.  Cardiovascular: Normal rate, regular rhythm, grade 2/6 systolic ejection murmur  Pulmonary/Chest:  Vesicular breath sounds B/L, Clear to auscultation, with no added sounds.  Abdominal:  Soft.  Bowel sounds are normal.  No distension and no tenderness.  No organomegaly.  Neurological:  Alert and oriented to person, place, and time. No focal defecits  Skin:  Skin is warm and dry. Erythematous macular rash noted. No pallor.   Musculoskeletal:  No tenderness, and no deformity.  No red or swollen joints anywhere.   Lower extremities: No edema, Peripheral vascular:  2+ Pulses B/L DP.  ----------------------------------------------------------------------------------------------------------------------    ----------------------------------------------------------------------------------------------------------------------      Results from last 7 days   Lab Units 09/18/20  0211 09/17/20  0459 09/16/20  0343 09/15/20  1021 09/15/20  0049  09/14/20  0705 09/13/20 0343   WBC 10*3/mm3 5.82 7.87 6.94  --  8.23   < >  --  6.56   HEMOGLOBIN g/dL 8.6* 8.8* 8.5*  --  9.6*   < >  --  9.1*   HEMATOCRIT % 30.7* 31.1* 29.9*  --  32.8*   < >  --  32.1*   MCV fL 101.3* 97.8* 95.8  --  96.5   < >  --  98.8*   MCHC g/dL 28.0* 28.3* 28.4*  --  29.3*   < >  --  28.3*   PLATELETS 10*3/mm3 100* 121* 107*  --  130*   < >  --  141   INR  3.28* 2.77* 2.38* 2.13* 2.08*  --  1.97* 2.18*    < > = values in this interval not displayed.         Results from last 7 days   Lab Units 09/18/20  0211 09/17/20  0459 09/16/20  0343 09/15/20  0049 09/14/20  0514 09/13/20  0343   SODIUM mmol/L 138 141  140 141 138 139   POTASSIUM mmol/L 4.1 4.4 4.2 4.6 5.4* 4.8   MAGNESIUM mg/dL  --   --   --  1.9 2.0 2.1   CHLORIDE mmol/L 101 100 100 98 100 100   CO2 mmol/L 27.5 33.3* 33.6* 29.1* 28.4 30.0*   BUN mg/dL 30* 38* 39* 50* 49* 45*   CREATININE mg/dL 1.18* 1.40* 1.20* 1.34* 1.46* 1.37*   EGFR IF NONAFRICN AM mL/min/1.73 48* 39* 47* 41* 37* 40*   CALCIUM mg/dL 8.5* 8.7 8.5* 9.1 8.9 9.0   GLUCOSE mg/dL 215* 125* 150* 198* 103* 155*   ALBUMIN g/dL  --   --  2.88* 3.10* 3.00* 3.25*   BILIRUBIN mg/dL  --   --  2.2* 1.9* 2.0* 1.7*   ALK PHOS U/L  --   --  81 104 106 86   AST (SGOT) U/L  --   --  24 29 42* 26   ALT (SGPT) U/L  --   --  22 26 30 25   Estimated Creatinine Clearance: 71.5 mL/min (A) (by C-G formula based on SCr of 1.18 mg/dL (H)).    No results found for: AMMONIA      No results found for: BLOODCX  No results found for: URINECX  No results found for: WOUNDCX  No results found for: STOOLCX    I have personally looked at the labs and they are summarized above.  ----------------------------------------------------------------------------------------------------------------------  Imaging Results (Last 24 Hours)     ** No results found for the last 24 hours. **        ----------------------------------------------------------------------------------------------------------------------    Assessment:  1. Acute on chronic combined systolic and diastolic heart failure, clinically improved   2. Newly diagnosed advanced cardiomyopathy of unclear etiology (possibly ischemic) with LV ejection fraction of 20 to 25%, stress results pending.  3. Mild elevated troponin, trending down to normal  4. Paroxysmal atrial fibrillation with controlled ventricular rate. Patient has been on warfarin and amiodarone, underwent successful cardioversion yesterday, maintaining NSR  5. Acute kidney injury on chronic kidney disease, cr. 1.18, improved today.  6. Type 2 diabetes mellitus  7. Cirrhosis of liver    Plan:  1.  Patient is  maintaining normal sinus rhythm continue current medications except increasing the carvedilol to 6.25 mg/day we will hold valsartan for now  2.  We will switch to Eliquis hold warfarin  3.  her creatinine is much better we will put hger a standing putting her in a standing dose of p.o. diuretic  4. Will monitor renal function if she continues to be stable consider restarting valsartan in a day or 2      KENNY Lawler   09/18/20 11:18 EDT     Addendum:  I, Charlee Ken MD, [facility], performed the services described in this documentation as documented by the above-named individual under my supervision and made necessary changes in the note is both accurate and complete  Dr. Charlee Ken MD

## 2020-09-18 NOTE — PROGRESS NOTES
Discharge Planning Assessment   Isaías     Patient Name: Yumiko Purdy  MRN: [id number]  Today's Date: 9/18/2020    Admit Date: 9/9/2020        Discharge Plan     Row Name 09/18/20 1604       Plan    Plan  Pt admitted on 9/9/20.  Pt lives at home with son, Sam, and plans to return home at discharge.  Pt currently does not utilize home health services.  Pt currently utilizes home 02, w/c. walker and shower chair.  SS will follow and assist with discharge needs.        Continued Care and Services - Admitted Since 9/9/2020    Coordination has not been started for this encounter.       Sonya Lindquist, MGW

## 2020-09-18 NOTE — PAYOR COMM NOTE
"[facility]  NPI:[id number]    Utilization Review  Contact: Sandy Powell RN  Phone: [phone]  Fax:[phone]    CLINICAL UPDATE    auth # [id number]    Dio Berry (54 y.o. Female)     Date of Birth Social Security Number Address Home Phone MRN    1965  [address]   [place] [address] [phone] [id number]    Ori[employer] orthodox Marital Status          Unknown        Admission Date Admission Type Admitting Provider Attending Provider Department, Room/Bed    9/10/20 Emergency Likens, Dwayne, DO Behbahani, Katayoun, MD Psychiatric 3 [facility], 3303/2S    Discharge Date Discharge Disposition Discharge Destination                       Attending Provider: Behbahani, Katayoun, MD    Allergies: Phenergan [Promethazine]    Isolation: None   Infection: None   Code Status: CPR    Ht: 165.1 cm (65\")   Wt: 120 kg (263 lb 9.6 oz)    Admission Cmt: None   Principal Problem: None                Active Insurance as of 2020     Primary Coverage     Payor Plan Insurance Group Employer/Plan Group    [employer] [place] [employer]ICA[employer]      Payor Plan Address Payor Plan Phone Number Payor Plan Fax Number Effective Dates    [address] [address] [phone]  2020 - None Entered    [place] [address]       Subscriber Name Subscriber Birth Date Member ID       DIO BERRY 1965 [id number]                 Emergency Contacts      (Rel.) Home Phone Work Phone Mobile Phone    Gladis Sigala (Grandparent) [phone]            Charlee Ken MD   Physician   Cardiology   Progress Notes   Signed   Date of Service:  20   Creation Time:  20            Signed        Expand All Collapse All []Expand All by Default    Show:Clear all  [x]Manual[x]Template[x]Copied    Added by:  [x]Charlee Ken MD[x]Courtney Baires APRN    []Placido for details  Patient Identification:  Name:  Dio Berry  Age:  54 y.o.  Sex:  female  :  " 1965  MRN:  [id number]  Visit Number:  [id number]     Chief Complaint:   Acute systolic heart failure     Subjective:  The patient is resting in bed. She has had some chest pains with exertion. Breathing is about the same. She reports diarrhea this a.m.  ----------------------------------------------------------------------------------------------------------------------  Current Hospital Meds:  amiodarone, 200 mg, Oral, Daily  aspirin, 81 mg, Oral, Daily  bumetanide, 2 mg, Oral, Daily  carvedilol, 3.125 mg, Oral, BID With Meals  folic acid, 1 mg, Oral, Daily  hydrocortisone, , Topical, Q12H  insulin aspart, 0-14 Units, Subcutaneous, TID AC  insulin aspart, 5 Units, Subcutaneous, TID With Meals  insulin detemir, 10 Units, Subcutaneous, Nightly  polyethylene glycol, 17 g, Oral, Daily  predniSONE, 2.5 mg, Oral, Daily  pregabalin, 100 mg, Oral, Q12H  rOPINIRole, 1 mg, Oral, TID  senna-docusate sodium, 2 tablet, Oral, Nightly  sodium chloride, 10 mL, Intravenous, Q12H  sodium chloride, 10 mL, Intravenous, Q12H  spironolactone, 12.5 mg, Oral, Daily  venlafaxine XR, 75 mg, Oral, Daily        Pharmacy Consult,   Pharmacy to dose warfarin,         ----------------------------------------------------------------------------------------------------------------------  Vital Signs:  Temp:  [97 °F (36.1 °C)-98.7 °F (37.1 °C)] 97.9 °F (36.6 °C)  Heart Rate:  [70-74] 72  Resp:  [18-20] 20  BP: (100-123)/(51-68) 103/53  Vitals              09/17/20  0500 09/17/20  0816 09/18/20  0500   Weight: 125 kg (276 lb) 123 kg (271 lb 9.6 oz) 120 kg (263 lb 9.6 oz)        Body mass index is 43.87 kg/m².     Intake/Output Summary (Last 24 hours) at 9/18/2020 1118  Last data filed at 9/18/2020 0400      Gross per 24 hour   Intake 1140 ml   Output 1900 ml   Net -760 ml      Diet Regular; Cardiac, Consistent Carbohydrate, Renal, Daily Fluid Restriction, Low Sodium; 1500 mL Fluid Per Day; 2,000 mg  Na  ----------------------------------------------------------------------------------------------------------------------  Physical exam:     HEENT:  Head:  Normocephalic and atraumatic.     Eyes:  Conjunctivae and EOM are normal.  Pupils are equal, round, and reactive to light.  No scleral icterus.    Neck:  Neck supple.  No JVD present.  No bruit.  Cardiovascular: Normal rate, regular rhythm, grade 2/6 systolic ejection murmur  Pulmonary/Chest:  Vesicular breath sounds B/L, Clear to auscultation, with no added sounds.  Abdominal:  Soft.  Bowel sounds are normal.  No distension and no tenderness.  No organomegaly.  Neurological:  Alert and oriented to person, place, and time. No focal defecits  Skin:  Skin is warm and dry. Erythematous macular rash noted. No pallor.   Musculoskeletal:  No tenderness, and no deformity.  No red or swollen joints anywhere.   Lower extremities: No edema, Peripheral vascular:  2+ Pulses B/L DP.  ----------------------------------------------------------------------------------------------------------------------     ----------------------------------------------------------------------------------------------------------------------                  Results from last 7 days   Lab Units 09/18/20  0211 09/17/20  0459 09/16/20  0343 09/15/20  1021 09/15/20  0049   09/14/20  0705 09/13/20 0343   WBC 10*3/mm3 5.82 7.87 6.94  --  8.23   < >  --  6.56   HEMOGLOBIN g/dL 8.6* 8.8* 8.5*  --  9.6*   < >  --  9.1*   HEMATOCRIT % 30.7* 31.1* 29.9*  --  32.8*   < >  --  32.1*   MCV fL 101.3* 97.8* 95.8  --  96.5   < >  --  98.8*   MCHC g/dL 28.0* 28.3* 28.4*  --  29.3*   < >  --  28.3*   PLATELETS 10*3/mm3 100* 121* 107*  --  130*   < >  --  141   INR   3.28* 2.77* 2.38* 2.13* 2.08*  --  1.97* 2.18*    < > = values in this interval not displayed.                    Results from last 7 days   Lab Units 09/18/20  0211 09/17/20  0459 09/16/20  0343 09/15/20  0049 09/14/20  0514 09/13/20  0343   SODIUM  mmol/L 138 141 140 141 138 139   POTASSIUM mmol/L 4.1 4.4 4.2 4.6 5.4* 4.8   MAGNESIUM mg/dL  --   --   --  1.9 2.0 2.1   CHLORIDE mmol/L 101 100 100 98 100 100   CO2 mmol/L 27.5 33.3* 33.6* 29.1* 28.4 30.0*   BUN mg/dL 30* 38* 39* 50* 49* 45*   CREATININE mg/dL 1.18* 1.40* 1.20* 1.34* 1.46* 1.37*   EGFR IF NONAFRICN AM mL/min/1.73 48* 39* 47* 41* 37* 40*   CALCIUM mg/dL 8.5* 8.7 8.5* 9.1 8.9 9.0   GLUCOSE mg/dL 215* 125* 150* 198* 103* 155*   ALBUMIN g/dL  --   --  2.88* 3.10* 3.00* 3.25*   BILIRUBIN mg/dL  --   --  2.2* 1.9* 2.0* 1.7*   ALK PHOS U/L  --   --  81 104 106 86   AST (SGOT) U/L  --   --  24 29 42* 26   ALT (SGPT) U/L  --   --  22 26 30 25   Estimated Creatinine Clearance: 71.5 mL/min (A) (by C-G formula based on SCr of 1.18 mg/dL (H)).     No results found for: AMMONIA      No results found for: BLOODCX  No results found for: URINECX  No results found for: WOUNDCX  No results found for: STOOLCX     I have personally looked at the labs and they are summarized above.  ----------------------------------------------------------------------------------------------------------------------      Imaging Results (Last 24 Hours)      ** No results found for the last 24 hours. **          ----------------------------------------------------------------------------------------------------------------------     Assessment:  1. Acute on chronic combined systolic and diastolic heart failure, clinically improved   2. Newly diagnosed advanced cardiomyopathy of unclear etiology (possibly ischemic) with LV ejection fraction of 20 to 25%, stress results pending.  3. Mild elevated troponin, trending down to normal  4. Paroxysmal atrial fibrillation with controlled ventricular rate. Patient has been on warfarin and amiodarone, underwent successful cardioversion yesterday, maintaining NSR  5. Acute kidney injury on chronic kidney disease, cr. 1.18, improved today.  6. Type 2 diabetes mellitus  7. Cirrhosis of liver      Plan:  1.  Patient is maintaining normal sinus rhythm continue current medications except increasing the carvedilol to 6.25 mg/day we will hold valsartan for now  2.  We will switch to Eliquis hold warfarin  3.  her creatinine is much better we will put hger a standing putting her in a standing dose of p.o. diuretic  4. Will monitor renal function if she continues to be stable consider restarting valsartan in a day or 2        Courtney Baires, KENNY   20 11:18 EDT      Addendum:  Charlee SANTIAGO MD, [facility], performed the services described in this documentation as documented by the above-named individual under my supervision and made necessary changes in the note is both accurate and complete  Dr. Charlee Ken MD                       Revision History                          Behbahani, Katayoun, MD   Physician   Medicine   Progress Notes   Signed   Date of Service:  20   Creation Time:  20            Signed            Show:Clear all  [x]Manual[x]Template[]Copied    Added by:  [x]Behbahani, Katayoun, MD    []Placido for details                            [facility] Medicine Services  PROGRESS NOTE     Patient Identification:  Name:  Yumiko Purdy  Age:  54 y.o.  Sex:  female  :  1965  MRN:  [id number]  Visit Number:  [id number]  Primary Care Provider:  Niko Schaefer MD     Length of stay:  8     ----------------------------------------------------------------------------------------------------------------------  Subjective      Chief Complaint:  Follow up for CHF     Subjective:  Patient was found to have constipation a few days ago and bowel regimens were uptitrated.  Had 3 bowel movements 2 days ago but had an episode of nausea and vomiting in the morning.  KUB showed retained stool in the colon still.  She received laxatives with good results and had diarrhea overnight early this morning.  Constipation likely has resolved.  She complains of  musculoskeletal pain today but no dizziness or chest pain.  Her blood pressure intermittently is low 80s 200s however she remains asymptomatic.      ----------------------------------------------------------------------------------------------------------------------  Objective      Current [facility] Meds:  amiodarone, 200 mg, Oral, Daily  aspirin, 81 mg, Oral, Daily  bumetanide, 2 mg, Oral, Daily  carvedilol, 6.25 mg, Oral, BID With Meals  folic acid, 1 mg, Oral, Daily  hydrocortisone, , Topical, Q12H  insulin aspart, 0-14 Units, Subcutaneous, TID AC  insulin aspart, 5 Units, Subcutaneous, TID With Meals  insulin detemir, 10 Units, Subcutaneous, Nightly  polyethylene glycol, 17 g, Oral, Daily  predniSONE, 2.5 mg, Oral, Daily  pregabalin, 100 mg, Oral, Q12H  rOPINIRole, 1 mg, Oral, TID  senna-docusate sodium, 2 tablet, Oral, Nightly  sodium chloride, 10 mL, Intravenous, Q12H  sodium chloride, 10 mL, Intravenous, Q12H  spironolactone, 12.5 mg, Oral, Daily  venlafaxine XR, 75 mg, Oral, Daily        Pharmacy Consult,         ----------------------------------------------------------------------------------------------------------------------  Vital Signs:  Temp:  [97 °F (36.1 °C)-98.7 °F (37.1 °C)] 97.9 °F (36.6 °C)  Heart Rate:  [70-74] 72  Resp:  [18-20] 20  BP: (100-123)/(51-68) 103/53  Mean Arterial Pressure (Non-Invasive) for the past 24 hrs (Last 3 readings):    Noninvasive MAP (mmHg)   09/18/20 0711 70   09/18/20 0255 79   09/17/20 2038 79            SpO2 Percentage     09/18/20 0012 09/18/20 0255 09/18/20 0711   SpO2: 99% 99% 99%      SpO2:  [94 %-99 %] 99 %  on  Flow (L/min):  [2] 2;   Device (Oxygen Therapy): nasal cannula     Body mass index is 43.87 kg/m².      Wt Readings from Last 3 Encounters:   09/18/20 120 kg (263 lb 9.6 oz)         Intake/Output Summary (Last 24 hours) at 9/18/2020 1147  Last data filed at 9/18/2020 0400      Gross per 24 hour   Intake 1140 ml   Output 1900 ml   Net -760 ml      Diet  Regular; Cardiac, Consistent Carbohydrate, Renal, Daily Fluid Restriction, Low Sodium; 1500 mL Fluid Per Day; 2,000 mg Na  ----------------------------------------------------------------------------------------------------------------------  Physical exam:   GEN: Chronically ill-appearing but in no acute distress  CV: RRR, no audible murmur  PULM: Clear to auscultation, no wheeze or crackles.  Some decreased bibasilar breath sounds noted  GI: Obese, positive bowel sounds, soft and nontender  SKIN: Trace edema bilateral lower extremities in dependent position.  Appears to have evidence of chronic venous stasis.  Scabs and rash remain unchanged  PSYCH: Alert and oriented x4, no confusions or agitation  --------------------------------------------------------------------------------------------               Results from last 7 days   Lab Units 09/18/20  0211 09/17/20  0459 09/16/20  0343 09/15/20  1021 09/15/20  0049   09/14/20  0705 09/13/20  0343   WBC 10*3/mm3 5.82 7.87 6.94  --  8.23   < >  --  6.56   HEMOGLOBIN g/dL 8.6* 8.8* 8.5*  --  9.6*   < >  --  9.1*   HEMATOCRIT % 30.7* 31.1* 29.9*  --  32.8*   < >  --  32.1*   MCV fL 101.3* 97.8* 95.8  --  96.5   < >  --  98.8*   MCHC g/dL 28.0* 28.3* 28.4*  --  29.3*   < >  --  28.3*   PLATELETS 10*3/mm3 100* 121* 107*  --  130*   < >  --  141   INR   3.28* 2.77* 2.38* 2.13* 2.08*  --  1.97* 2.18*    < > = values in this interval not displayed.                Results from last 7 days   Lab Units 09/18/20  0211 09/17/20  0459 09/16/20  0343 09/15/20  0049 09/14/20  0514 09/13/20  0343   SODIUM mmol/L 138 141 140 141 138 139   POTASSIUM mmol/L 4.1 4.4 4.2 4.6 5.4* 4.8   MAGNESIUM mg/dL  --   --   --  1.9 2.0 2.1   CHLORIDE mmol/L 101 100 100 98 100 100   CO2 mmol/L 27.5 33.3* 33.6* 29.1* 28.4 30.0*   BUN mg/dL 30* 38* 39* 50* 49* 45*   CREATININE mg/dL 1.18* 1.40* 1.20* 1.34* 1.46* 1.37*   EGFR IF NONAFRICN AM mL/min/1.73 48* 39* 47* 41* 37* 40*   CALCIUM mg/dL 8.5* 8.7  8.5* 9.1 8.9 9.0   GLUCOSE mg/dL 215* 125* 150* 198* 103* 155*   ALBUMIN g/dL  --   --  2.88* 3.10* 3.00* 3.25*   BILIRUBIN mg/dL  --   --  2.2* 1.9* 2.0* 1.7*   ALK PHOS U/L  --   --  81 104 106 86   AST (SGOT) U/L  --   --  24 29 42* 26   ALT (SGPT) U/L  --   --  22 26 30 25   Estimated Creatinine Clearance: 71.5 mL/min (A) (by C-G formula based on SCr of 1.18 mg/dL (H)).  No results found for: AMMONIA           Glucose   Date/Time Value Ref Range Status   09/18/2020 1036 266 (H) 70 - 130 mg/dL Final   09/18/2020 0611 172 (H) 70 - 130 mg/dL Final   09/17/2020 2044 203 (H) 70 - 130 mg/dL Final   09/17/2020 1910 184 (H) 70 - 130 mg/dL Final   09/17/2020 1739 152 (H) 70 - 130 mg/dL Final   09/17/2020 1050 120 70 - 130 mg/dL Final   09/17/2020 0659 122 70 - 130 mg/dL Final   09/16/2020 2119 151 (H) 70 - 130 mg/dL Final         ----------------------------------------------------------------------------------------------------------------------  Assessment/Plan         · Acute systolic congestive heart failure-echocardiogram showed EF of 21 to 25%.  A stress test done yesterday which showed EF of 40%.  She does have degree of moderately severe inferior wall reversible ischemia on stress.  Cardiology is following.  Risks and benefits of cardiac catheterization is being considered.  Patient is on Coumadin with therapeutic INR therefore will hold Coumadin for now and monitor platelet counts through the weekend to understand the trend better before deciding on left heart cath on Monday.  She did not tolerated valsartan therefore was discontinued yesterday.  She is on low-dose carvedilol with blood pressure ranging from 80s to 100s but she remains asymptomatic.   · Persistent atrial fibrillation-status post DCCV 2 days ago.  Continue to remain in normal sinus rhythm.  Continue amiodarone and low-dose Coreg.  We will hold warfarin today to prep for possible left heart catheterization on Monday.   · Constipation-likely  resolved.  Having diarrhea from laxative last night.  Continue current bowel regimen  · Eosinophilic skin rash-unclear etiology.  Was ruled out for vasculitis recently.  She does have extremely elevated ferritin at 1300 which is concerning for a rheumatologic inflammatory process.  Patient will need to follow-up with rheumatology as outpatient.  · Acute thrombocytopenia-etiology also unclear.  Normal platelets on admission but have trended down during this admission.  Peripheral smear done and reviewed by pathology showed normal platelet morphology with  Anemia.  Iron is slightly low but iron studies are consistent with anemia of chronic disease.  If etiology remains unclear and no recovery delaying other medical work-up and care can benefit from hematology consult.  --------------------------------------------------  DVT Prophylaxis: hold warfarin. INR is therapeutic. Can start hep sq once INR < 2.      Disposition: monitor as inpatient until Monday. Monitoring platelets and reassess creatinine and plat count on Monday for possible LHC.   --------------------------------------------------        Katayoun Behbahani, MD  Hospitalist Service -- [facility]     09/18/20  11:47 EDT

## 2020-09-19 LAB
ANION GAP SERPL CALCULATED.3IONS-SCNC: 6.4 MMOL/L (ref 5–15)
BUN SERPL-MCNC: 27 MG/DL (ref 6–20)
BUN/CREAT SERPL: 19 (ref 7–25)
C-ANCA TITR SER IF: NORMAL TITER
CALCIUM SPEC-SCNC: 8.5 MG/DL (ref 8.6–10.5)
CHLORIDE SERPL-SCNC: 102 MMOL/L (ref 98–107)
CO2 SERPL-SCNC: 32.6 MMOL/L (ref 22–29)
CREAT SERPL-MCNC: 1.42 MG/DL (ref 0.57–1)
GFR SERPL CREATININE-BSD FRML MDRD: 39 ML/MIN/1.73
GLUCOSE BLDC GLUCOMTR-MCNC: 110 MG/DL (ref 70–130)
GLUCOSE BLDC GLUCOMTR-MCNC: 192 MG/DL (ref 70–130)
GLUCOSE BLDC GLUCOMTR-MCNC: 194 MG/DL (ref 70–130)
GLUCOSE BLDC GLUCOMTR-MCNC: 201 MG/DL (ref 70–130)
GLUCOSE BLDC GLUCOMTR-MCNC: 246 MG/DL (ref 70–130)
GLUCOSE SERPL-MCNC: 105 MG/DL (ref 65–99)
INR PPP: 2.77 (ref 0.9–1.1)
MYELOPEROXIDASE AB SER IA-ACNC: <9 U/ML (ref 0–9)
P-ANCA ATYPICAL TITR SER IF: NORMAL TITER
P-ANCA TITR SER IF: NORMAL TITER
POTASSIUM SERPL-SCNC: 4.2 MMOL/L (ref 3.5–5.2)
PROTEINASE3 AB SER IA-ACNC: <3.5 U/ML (ref 0–3.5)
PROTHROMBIN TIME: 29.2 SECONDS (ref 11.9–14.1)
SODIUM SERPL-SCNC: 141 MMOL/L (ref 136–145)

## 2020-09-19 PROCEDURE — 85610 PROTHROMBIN TIME: CPT | Performed by: SPECIALIST

## 2020-09-19 PROCEDURE — 63710000001 INSULIN ASPART PER 5 UNITS: Performed by: SPECIALIST

## 2020-09-19 PROCEDURE — 63710000001 INSULIN DETEMIR PER 5 UNITS: Performed by: SPECIALIST

## 2020-09-19 PROCEDURE — 99232 SBSQ HOSP IP/OBS MODERATE 35: CPT | Performed by: HOSPITALIST

## 2020-09-19 PROCEDURE — 80048 BASIC METABOLIC PNL TOTAL CA: CPT | Performed by: INTERNAL MEDICINE

## 2020-09-19 PROCEDURE — 82962 GLUCOSE BLOOD TEST: CPT

## 2020-09-19 PROCEDURE — 25010000002 ONDANSETRON PER 1 MG: Performed by: INTERNAL MEDICINE

## 2020-09-19 PROCEDURE — 86022 PLATELET ANTIBODIES: CPT | Performed by: HOSPITALIST

## 2020-09-19 PROCEDURE — 99232 SBSQ HOSP IP/OBS MODERATE 35: CPT | Performed by: SPECIALIST

## 2020-09-19 PROCEDURE — 63710000001 PREDNISONE PER 5 MG: Performed by: SPECIALIST

## 2020-09-19 PROCEDURE — 82542 COL CHROMOTOGRAPHY QUAL/QUAN: CPT | Performed by: HOSPITALIST

## 2020-09-19 PROCEDURE — 94799 UNLISTED PULMONARY SVC/PX: CPT

## 2020-09-19 RX ADMIN — INSULIN DETEMIR 10 UNITS: 100 INJECTION, SOLUTION SUBCUTANEOUS at 21:20

## 2020-09-19 RX ADMIN — HYDROCORTISONE: 1 CREAM TOPICAL at 09:27

## 2020-09-19 RX ADMIN — SODIUM CHLORIDE, PRESERVATIVE FREE 10 ML: 5 INJECTION INTRAVENOUS at 20:38

## 2020-09-19 RX ADMIN — SODIUM CHLORIDE, PRESERVATIVE FREE 10 ML: 5 INJECTION INTRAVENOUS at 09:26

## 2020-09-19 RX ADMIN — FOLIC ACID 1 MG: 1 TABLET ORAL at 09:25

## 2020-09-19 RX ADMIN — INSULIN ASPART 5 UNITS: 100 INJECTION, SOLUTION INTRAVENOUS; SUBCUTANEOUS at 11:59

## 2020-09-19 RX ADMIN — ACETAMINOPHEN 650 MG: 325 TABLET ORAL at 13:42

## 2020-09-19 RX ADMIN — POLYETHYLENE GLYCOL 3350 17 G: 17 POWDER, FOR SOLUTION ORAL at 09:24

## 2020-09-19 RX ADMIN — SODIUM CHLORIDE, PRESERVATIVE FREE 10 ML: 5 INJECTION INTRAVENOUS at 09:41

## 2020-09-19 RX ADMIN — INSULIN ASPART 3 UNITS: 100 INJECTION, SOLUTION INTRAVENOUS; SUBCUTANEOUS at 09:24

## 2020-09-19 RX ADMIN — ONDANSETRON 4 MG: 2 INJECTION INTRAMUSCULAR; INTRAVENOUS at 05:25

## 2020-09-19 RX ADMIN — ASPIRIN 81 MG: 81 TABLET, COATED ORAL at 09:25

## 2020-09-19 RX ADMIN — INSULIN ASPART 5 UNITS: 100 INJECTION, SOLUTION INTRAVENOUS; SUBCUTANEOUS at 09:23

## 2020-09-19 RX ADMIN — VENLAFAXINE HYDROCHLORIDE 75 MG: 75 CAPSULE, EXTENDED RELEASE ORAL at 09:24

## 2020-09-19 RX ADMIN — ROPINIROLE HYDROCHLORIDE 1 MG: 1 TABLET, FILM COATED ORAL at 09:25

## 2020-09-19 RX ADMIN — INSULIN ASPART 5 UNITS: 100 INJECTION, SOLUTION INTRAVENOUS; SUBCUTANEOUS at 17:46

## 2020-09-19 RX ADMIN — ROPINIROLE HYDROCHLORIDE 1 MG: 1 TABLET, FILM COATED ORAL at 16:13

## 2020-09-19 RX ADMIN — CARVEDILOL 6.25 MG: 6.25 TABLET, FILM COATED ORAL at 09:25

## 2020-09-19 RX ADMIN — PREDNISONE 2.5 MG: 5 TABLET ORAL at 09:25

## 2020-09-19 RX ADMIN — HYDROCORTISONE: 1 CREAM TOPICAL at 20:37

## 2020-09-19 RX ADMIN — ACETAMINOPHEN 650 MG: 325 TABLET ORAL at 20:37

## 2020-09-19 RX ADMIN — AMIODARONE HYDROCHLORIDE 200 MG: 200 TABLET ORAL at 09:25

## 2020-09-19 RX ADMIN — PREGABALIN 100 MG: 100 CAPSULE ORAL at 20:37

## 2020-09-19 RX ADMIN — SODIUM CHLORIDE, PRESERVATIVE FREE 10 ML: 5 INJECTION INTRAVENOUS at 20:37

## 2020-09-19 RX ADMIN — ROPINIROLE HYDROCHLORIDE 1 MG: 1 TABLET, FILM COATED ORAL at 20:37

## 2020-09-19 RX ADMIN — PREGABALIN 100 MG: 100 CAPSULE ORAL at 10:24

## 2020-09-19 RX ADMIN — ACETAMINOPHEN 650 MG: 325 TABLET ORAL at 04:00

## 2020-09-19 RX ADMIN — DOCUSATE SODIUM 50 MG AND SENNOSIDES 8.6 MG 2 TABLET: 8.6; 5 TABLET, FILM COATED ORAL at 20:37

## 2020-09-19 RX ADMIN — Medication: at 09:26

## 2020-09-19 RX ADMIN — INSULIN ASPART 3 UNITS: 100 INJECTION, SOLUTION INTRAVENOUS; SUBCUTANEOUS at 17:46

## 2020-09-19 NOTE — PROGRESS NOTES
Patient Identification:  Name:  Yumiko Purdy  Age:  54 y.o.  Sex:  female  :  1965  MRN:  [id number]  Visit Number:  [id number]    Chief Complaint:   Acute systolic heart failure    Subjective:  The patient states she Is breathing better. She c/o diffuse arthritic pains. Nuclear stress test revealed a small to medium sized, mild degree of ischemia in the inferior wall. The patient reports she had a heart catheterization 2 to 3 years ago and [facility] in [place], [place].  ----------------------------------------------------------------------------------------------------------------------  Current Hospital Meds:  amiodarone, 200 mg, Oral, Daily  aspirin, 81 mg, Oral, Daily  bumetanide, 2 mg, Oral, Daily  carvedilol, 6.25 mg, Oral, BID With Meals  folic acid, 1 mg, Oral, Daily  hydrocortisone, , Topical, Q12H  insulin aspart, 0-14 Units, Subcutaneous, TID AC  insulin aspart, 5 Units, Subcutaneous, TID With Meals  insulin detemir, 10 Units, Subcutaneous, Nightly  polyethylene glycol, 17 g, Oral, Daily  predniSONE, 2.5 mg, Oral, Daily  pregabalin, 100 mg, Oral, Q12H  rOPINIRole, 1 mg, Oral, TID  senna-docusate sodium, 2 tablet, Oral, Nightly  sodium chloride, 10 mL, Intravenous, Q12H  sodium chloride, 10 mL, Intravenous, Q12H  spironolactone, 12.5 mg, Oral, Daily  venlafaxine XR, 75 mg, Oral, Daily      Pharmacy Consult,       ----------------------------------------------------------------------------------------------------------------------  Vital Signs:  Temp:  [97.5 °F (36.4 °C)-98.2 °F (36.8 °C)] 97.5 °F (36.4 °C)  Heart Rate:  [57-79] 79  Resp:  [18-20] 18  BP: ()/(56-61) 102/60      20  0816 20  0500 20  0500   Weight: 123 kg (271 lb 9.6 oz) 120 kg (263 lb 9.6 oz) 119 kg (262 lb 3.2 oz)     Body mass index is 43.63 kg/m².    Intake/Output Summary (Last 24 hours) at 2020 0937  Last data filed at 2020 0909  Gross per 24 hour   Intake 960 ml   Output 1125 ml   Net  -165 ml     Diet Regular; Cardiac, Consistent Carbohydrate, Renal, Daily Fluid Restriction, Low Sodium; 1500 mL Fluid Per Day; 2,000 mg Na  ----------------------------------------------------------------------------------------------------------------------  Physical exam:    HEENT:  Head:  Normocephalic and atraumatic.     Eyes:  Conjunctivae and EOM are normal.  Pupils are equal, round, and reactive to light.  No scleral icterus.    Neck:  Neck supple.  No JVD present.  No bruit.  Cardiovascular: Normal rate, regular rhythm,grade 2/6 systolic ejection murmur  Pulmonary/Chest:  Vesicular breath sounds B/L, Clear to auscultation, with no added sounds.  Abdominal:  Soft.  Bowel sounds are normal.  No distension and no tenderness.  No organomegaly.  Neurological:  Alert and oriented to person, place, and time. No focal defecits  Skin:  Skin is warm and dry. Erythematous, macular rash, improving. No pallor.   Musculoskeletal:  No tenderness, and no deformity.  No red or swollen joints anywhere.   Lower extremities: No edema, Peripheral vascular:  2+ Pulses B/L DP.  ----------------------------------------------------------------------------------------------------------------------    ----------------------------------------------------------------------------------------------------------------------      Results from last 7 days   Lab Units 09/19/20  0143 09/18/20  0211 09/17/20  0459 09/16/20  0343 09/15/20  1021 09/15/20  0049  09/14/20  0705   WBC 10*3/mm3  --  5.82 7.87 6.94  --  8.23   < >  --    HEMOGLOBIN g/dL  --  8.6* 8.8* 8.5*  --  9.6*   < >  --    HEMATOCRIT %  --  30.7* 31.1* 29.9*  --  32.8*   < >  --    MCV fL  --  101.3* 97.8* 95.8  --  96.5   < >  --    MCHC g/dL  --  28.0* 28.3* 28.4*  --  29.3*   < >  --    PLATELETS 10*3/mm3  --  100* 121* 107*  --  130*   < >  --    INR  2.77* 3.28* 2.77* 2.38* 2.13* 2.08*  --  1.97*    < > = values in this interval not displayed.         Results from last 7  days   Lab Units 09/19/20  0143 09/18/20  0211 09/17/20  0459 09/16/20  0343 09/15/20  0049 09/14/20  0514 09/13/20  0343   SODIUM mmol/L 141 138 141 140 141 138 139   POTASSIUM mmol/L 4.2 4.1 4.4 4.2 4.6 5.4* 4.8   MAGNESIUM mg/dL  --   --   --   --  1.9 2.0 2.1   CHLORIDE mmol/L 102 101 100 100 98 100 100   CO2 mmol/L 32.6* 27.5 33.3* 33.6* 29.1* 28.4 30.0*   BUN mg/dL 27* 30* 38* 39* 50* 49* 45*   CREATININE mg/dL 1.42* 1.18* 1.40* 1.20* 1.34* 1.46* 1.37*   EGFR IF NONAFRICN AM mL/min/1.73 39* 48* 39* 47* 41* 37* 40*   CALCIUM mg/dL 8.5* 8.5* 8.7 8.5* 9.1 8.9 9.0   GLUCOSE mg/dL 105* 215* 125* 150* 198* 103* 155*   ALBUMIN g/dL  --   --   --  2.88* 3.10* 3.00* 3.25*   BILIRUBIN mg/dL  --   --   --  2.2* 1.9* 2.0* 1.7*   ALK PHOS U/L  --   --   --  81 104 106 86   AST (SGOT) U/L  --   --   --  24 29 42* 26   ALT (SGPT) U/L  --   --   --  22 26 30 25   Estimated Creatinine Clearance: 59.2 mL/min (A) (by C-G formula based on SCr of 1.42 mg/dL (H)).    No results found for: AMMONIA      No results found for: BLOODCX  No results found for: URINECX  No results found for: WOUNDCX  No results found for: STOOLCX    I have personally looked at the labs and they are summarized above.  ----------------------------------------------------------------------------------------------------------------------  Imaging Results (Last 24 Hours)     ** No results found for the last 24 hours. **        ----------------------------------------------------------------------------------------------------------------------    Assessment:  1. Acute on chronic combined systolic and diastolic heart failure, clinically improved   2. Newly diagnosed advanced cardiomyopathy of unclear etiology (possibly ischemic) with LV ejection fraction of 20 to 25%, stress test revealing a small to medium sized, mild degree of ischemia in the inferior wall  3. Mild elevated troponin, trending down to normal  4. Paroxysmal atrial fibrillation with controlled  ventricular rate. Patient has been on warfarin and amiodarone, underwent successful cardioversion 9/17/2020, maintaining NSR  5. Acute kidney injury on chronic kidney disease, slightly worse today, 1.42  6. Type 2 diabetes mellitus  7. Cirrhosis of liver    Plan:  1.  Patient feels a little bit better today however her creatinine is rising we will hold diuretic this morning  2.  I discussed the stress test result will consider cardiac catheterization she did have higher catheterization she said about 2 years ago at [facility] in [place] we will try to get the report  3.  She is maintaining sinus rhythm continue current medications  4.  Plan to switch warfarin to Eliquis her INR today is still therapeutic once INR is below 2 we can start Eliquis      Courtney Dequan, KENNY   09/19/20 09:37 EDT     Addendum:  I, Charlee Ken MD, [facility], performed the services described in this documentation as documented by the above-named individual under my supervision and made necessary changes in the note is both accurate and complete  Dr. Charlee Ken MD

## 2020-09-19 NOTE — PROGRESS NOTES
[facility] HOSPITALIST PROGRESS NOTE     Patient Identification:  Name:  Yumiko Purdy  Age:  54 y.o.  Sex:  female  :  1965  MRN:  [id number]  Visit Number:  [id number]  Primary Care Provider:  Niko Schaefer MD    Length of stay:  9    Subjective: Anasarca weight gain and shortness of breath has improved, unfortunately unable to measure I's and O's, based on weight she is losing weight.  Her blood pressure is borderline low today unable to receive diuretics, I did prioritize her beta-blocker.  With regards to her rash she said she disease chronic and has been going for years.  Her work-up for vasculitis is negative.  Platelets slightly low, I have ordered HIT work-up.  Neurology help appreciated.    Chief Complaint: Anasarca  ----------------------------------------------------------------------------------------------------------------------  Current Hospital Meds:  amiodarone, 200 mg, Oral, Daily  aspirin, 81 mg, Oral, Daily  bumetanide, 2 mg, Oral, Daily  carvedilol, 6.25 mg, Oral, BID With Meals  folic acid, 1 mg, Oral, Daily  hydrocortisone, , Topical, Q12H  insulin aspart, 0-14 Units, Subcutaneous, TID AC  insulin aspart, 5 Units, Subcutaneous, TID With Meals  insulin detemir, 10 Units, Subcutaneous, Nightly  polyethylene glycol, 17 g, Oral, Daily  predniSONE, 2.5 mg, Oral, Daily  pregabalin, 100 mg, Oral, Q12H  rOPINIRole, 1 mg, Oral, TID  senna-docusate sodium, 2 tablet, Oral, Nightly  sodium chloride, 10 mL, Intravenous, Q12H  sodium chloride, 10 mL, Intravenous, Q12H  spironolactone, 12.5 mg, Oral, Daily  venlafaxine XR, 75 mg, Oral, Daily      Pharmacy Consult,       ----------------------------------------------------------------------------------------------------------------------  Vital Signs:  Temp:  [97.5 °F (36.4 °C)-98.4 °F (36.9 °C)] 98.4 °F (36.9 °C)  Heart Rate:  [57-79] 76  Resp:  [18-20] 18  BP: ()/(55-61) 109/58       Tele: Currently sinus rhythm 70 bpm       09/17/20  0816 09/18/20  0500 09/19/20  0500   Weight: 123 kg (271 lb 9.6 oz) 120 kg (263 lb 9.6 oz) 119 kg (262 lb 3.2 oz)     Body mass index is 43.63 kg/m².    Intake/Output Summary (Last 24 hours) at 9/19/2020 1156  Last data filed at 9/19/2020 0909  Gross per 24 hour   Intake 960 ml   Output 1125 ml   Net -165 ml     Diet Regular; Cardiac, Consistent Carbohydrate, Renal, Daily Fluid Restriction, Low Sodium; 1500 mL Fluid Per Day; 2,000 mg Na  ----------------------------------------------------------------------------------------------------------------------  Physical exam:  General: Chronically ill-appearing, she is lying in bed in sitting up position, no complaints, she obvious anasarca and hypervolemia noted.   No respiratory distress.    Skin:  Skin is warm and dry.  Diffuse rash noted with scabbing noted no signs of cellulitis.  No pallor.    HENT:  Head:  Normocephalic and atraumatic.  Mouth:  Moist mucous membranes.    Eyes:  Conjunctivae and EOM are normal.  Pupils are equal, round, and reactive to light.  No scleral icterus.    Neck:  Neck supple.  JVD present.     Pulmonary/Chest:  No respiratory distress, no wheezes, no crackles, with normal breath sounds and good air movement.  Slight decreased breath sounds both bases especially in the left  Cardiovascular:  Normal rate, regular rhythm and normal heart sounds with no murmur.  Abdominal:  Soft.  Bowel sounds are normal.  No distension and no tenderness.   Extremities: Anasarca and edema noted again rash noted.Strong pulses in all 4 extremities with no clubbing, no cyanosis.  Neurological:  Motor strength equal no obvious deficit, sensory grossly intact.   No cranial nerve deficit.  No tongue deviation.  No facial droop.  No slurred speech.    Genitourinary: No Harrington catheter  Back: No skin  break  ----------------------------------------------------------------------------------------------------------------------  ----------------------------------------------------------------------------------------------------------------------      Results from last 7 days   Lab Units 09/19/20 0143 09/18/20 0211 09/17/20  0459 09/16/20  0343 09/15/20  1021 09/15/20  0049  09/14/20  0705   WBC 10*3/mm3  --  5.82 7.87 6.94  --  8.23   < >  --    HEMOGLOBIN g/dL  --  8.6* 8.8* 8.5*  --  9.6*   < >  --    HEMATOCRIT %  --  30.7* 31.1* 29.9*  --  32.8*   < >  --    MCV fL  --  101.3* 97.8* 95.8  --  96.5   < >  --    MCHC g/dL  --  28.0* 28.3* 28.4*  --  29.3*   < >  --    PLATELETS 10*3/mm3  --  100* 121* 107*  --  130*   < >  --    INR  2.77* 3.28* 2.77* 2.38* 2.13* 2.08*  --  1.97*    < > = values in this interval not displayed.         Results from last 7 days   Lab Units 09/19/20 0143 09/18/20 0211 09/17/20  0459 09/16/20  0343 09/15/20  0049 09/14/20  0514 09/13/20  0343   SODIUM mmol/L 141 138 141 140 141 138 139   POTASSIUM mmol/L 4.2 4.1 4.4 4.2 4.6 5.4* 4.8   MAGNESIUM mg/dL  --   --   --   --  1.9 2.0 2.1   CHLORIDE mmol/L 102 101 100 100 98 100 100   CO2 mmol/L 32.6* 27.5 33.3* 33.6* 29.1* 28.4 30.0*   BUN mg/dL 27* 30* 38* 39* 50* 49* 45*   CREATININE mg/dL 1.42* 1.18* 1.40* 1.20* 1.34* 1.46* 1.37*   EGFR IF NONAFRICN AM mL/min/1.73 39* 48* 39* 47* 41* 37* 40*   CALCIUM mg/dL 8.5* 8.5* 8.7 8.5* 9.1 8.9 9.0   GLUCOSE mg/dL 105* 215* 125* 150* 198* 103* 155*   ALBUMIN g/dL  --   --   --  2.88* 3.10* 3.00* 3.25*   BILIRUBIN mg/dL  --   --   --  2.2* 1.9* 2.0* 1.7*   ALK PHOS U/L  --   --   --  81 104 106 86   AST (SGOT) U/L  --   --   --  24 29 42* 26   ALT (SGPT) U/L  --   --   --  22 26 30 25   Estimated Creatinine Clearance: 59.2 mL/min (A) (by C-G formula based on SCr of 1.42 mg/dL (H)).    No results found for: AMMONIA      No results found for: BLOODCX  No results found for: URINECX  No results  found for: WOUNDCX  No results found for: STOOLCX    I have personally looked at the labs and they are summarized above.  ----------------------------------------------------------------------------------------------------------------------  Imaging Results (Last 24 Hours)     ** No results found for the last 24 hours. **        ----------------------------------------------------------------------------------------------------------------------  Assessment and Plan:    -Acute on chronic combined systolic and diastolic  -Nearly diagnosed severe cardiomyopathy ejection was 20 to 25%  -Abnormal stress test  -Acute kidney injury  -Morbid obesity with a BMI of 43.6  -Diabetes mellitus type 2 insulin requiring  -Paroxysmal atrial fibrillation currently sinus rhythm  -Liver cirrhosis  -Chronic diffuse rash  -Macrocytosis with anemia likely related to liver disease with normal TSH B12 folate  -Mild thrombocytopenia could be related to liver cirrhosis.  -Debility and functional decline    Follow-up INR, work-up for ischemia care of cardiology discretion, will place hold parameters for Coreg and Lasix.  HIT work-up will be ordered.  PT OT.  Patient will require rehab.    Halle Keita MD  09/19/20  11:56 EDT

## 2020-09-19 NOTE — PLAN OF CARE
Goal Outcome Evaluation:  Plan of Care Reviewed With: patient  Progress: no change  Outcome Summary: Pt provided prn tylenol x2. No s/s of distress noted. Will continue to monitor.

## 2020-09-19 NOTE — PLAN OF CARE
Goal Outcome Evaluation:  Pt has rested in bed throughout shift. Pt morning dose of Bumex was not given per MD order. Pt complained of pain in her right extremity this shift and was given PRN medications. Pt has had no other complaints this shift. Wound care completed per order. Will continue to monitor and follow plan of care.

## 2020-09-20 LAB
ANION GAP SERPL CALCULATED.3IONS-SCNC: 7 MMOL/L (ref 5–15)
ANISOCYTOSIS BLD QL: NORMAL
BASOPHILS # BLD AUTO: 0.03 10*3/MM3 (ref 0–0.2)
BASOPHILS NFR BLD AUTO: 0.8 % (ref 0–1.5)
BUN SERPL-MCNC: 26 MG/DL (ref 6–20)
BUN/CREAT SERPL: 16.8 (ref 7–25)
CALCIUM SPEC-SCNC: 8.2 MG/DL (ref 8.6–10.5)
CHLORIDE SERPL-SCNC: 100 MMOL/L (ref 98–107)
CO2 SERPL-SCNC: 32 MMOL/L (ref 22–29)
CREAT SERPL-MCNC: 1.55 MG/DL (ref 0.57–1)
DEPRECATED RDW RBC AUTO: 71.5 FL (ref 37–54)
EOSINOPHIL # BLD AUTO: 0.06 10*3/MM3 (ref 0–0.4)
EOSINOPHIL NFR BLD AUTO: 1.5 % (ref 0.3–6.2)
ERYTHROCYTE [DISTWIDTH] IN BLOOD BY AUTOMATED COUNT: 20 % (ref 12.3–15.4)
FIBRINOGEN PPP-MCNC: 338 MG/DL (ref 173–524)
GFR SERPL CREATININE-BSD FRML MDRD: 35 ML/MIN/1.73
GLUCOSE BLDC GLUCOMTR-MCNC: 162 MG/DL (ref 70–130)
GLUCOSE BLDC GLUCOMTR-MCNC: 232 MG/DL (ref 70–130)
GLUCOSE BLDC GLUCOMTR-MCNC: 253 MG/DL (ref 70–130)
GLUCOSE BLDC GLUCOMTR-MCNC: 287 MG/DL (ref 70–130)
GLUCOSE SERPL-MCNC: 194 MG/DL (ref 65–99)
HCT VFR BLD AUTO: 28.6 % (ref 34–46.6)
HGB BLD-MCNC: 8.1 G/DL (ref 12–15.9)
HYPOCHROMIA BLD QL: NORMAL
IMM GRANULOCYTES # BLD AUTO: 0.02 10*3/MM3 (ref 0–0.05)
IMM GRANULOCYTES NFR BLD AUTO: 0.5 % (ref 0–0.5)
INR PPP: 1.55 (ref 0.9–1.1)
LYMPHOCYTES # BLD AUTO: 0.9 10*3/MM3 (ref 0.7–3.1)
LYMPHOCYTES NFR BLD AUTO: 22.6 % (ref 19.6–45.3)
MACROCYTES BLD QL SMEAR: NORMAL
MCH RBC QN AUTO: 27.6 PG (ref 26.6–33)
MCHC RBC AUTO-ENTMCNC: 28.3 G/DL (ref 31.5–35.7)
MCV RBC AUTO: 97.3 FL (ref 79–97)
MONOCYTES # BLD AUTO: 0.32 10*3/MM3 (ref 0.1–0.9)
MONOCYTES NFR BLD AUTO: 8 % (ref 5–12)
NEUTROPHILS NFR BLD AUTO: 2.66 10*3/MM3 (ref 1.7–7)
NEUTROPHILS NFR BLD AUTO: 66.6 % (ref 42.7–76)
NRBC BLD AUTO-RTO: 0 /100 WBC (ref 0–0.2)
PLATELET # BLD AUTO: 103 10*3/MM3 (ref 140–450)
PMV BLD AUTO: 11.1 FL (ref 6–12)
POTASSIUM SERPL-SCNC: 4.2 MMOL/L (ref 3.5–5.2)
PROTHROMBIN TIME: 18.4 SECONDS (ref 11.9–14.1)
RBC # BLD AUTO: 2.94 10*6/MM3 (ref 3.77–5.28)
SMALL PLATELETS BLD QL SMEAR: NORMAL
SODIUM SERPL-SCNC: 139 MMOL/L (ref 136–145)
WBC # BLD AUTO: 3.99 10*3/MM3 (ref 3.4–10.8)

## 2020-09-20 PROCEDURE — 63710000001 INSULIN DETEMIR PER 5 UNITS: Performed by: SPECIALIST

## 2020-09-20 PROCEDURE — 85384 FIBRINOGEN ACTIVITY: CPT | Performed by: HOSPITALIST

## 2020-09-20 PROCEDURE — 63710000001 INSULIN ASPART PER 5 UNITS: Performed by: SPECIALIST

## 2020-09-20 PROCEDURE — 99232 SBSQ HOSP IP/OBS MODERATE 35: CPT | Performed by: HOSPITALIST

## 2020-09-20 PROCEDURE — 94799 UNLISTED PULMONARY SVC/PX: CPT

## 2020-09-20 PROCEDURE — 25010000002 ONDANSETRON PER 1 MG: Performed by: INTERNAL MEDICINE

## 2020-09-20 PROCEDURE — 85610 PROTHROMBIN TIME: CPT | Performed by: HOSPITALIST

## 2020-09-20 PROCEDURE — 85007 BL SMEAR W/DIFF WBC COUNT: CPT | Performed by: HOSPITALIST

## 2020-09-20 PROCEDURE — 63710000001 PREDNISONE PER 5 MG: Performed by: SPECIALIST

## 2020-09-20 PROCEDURE — 80048 BASIC METABOLIC PNL TOTAL CA: CPT | Performed by: HOSPITALIST

## 2020-09-20 PROCEDURE — 82962 GLUCOSE BLOOD TEST: CPT

## 2020-09-20 PROCEDURE — 99232 SBSQ HOSP IP/OBS MODERATE 35: CPT | Performed by: SPECIALIST

## 2020-09-20 PROCEDURE — 85025 COMPLETE CBC W/AUTO DIFF WBC: CPT | Performed by: HOSPITALIST

## 2020-09-20 RX ADMIN — ONDANSETRON 4 MG: 2 INJECTION INTRAMUSCULAR; INTRAVENOUS at 14:16

## 2020-09-20 RX ADMIN — BUMETANIDE 2 MG: 1 TABLET ORAL at 08:39

## 2020-09-20 RX ADMIN — ACETAMINOPHEN 650 MG: 325 TABLET ORAL at 05:23

## 2020-09-20 RX ADMIN — INSULIN ASPART 5 UNITS: 100 INJECTION, SOLUTION INTRAVENOUS; SUBCUTANEOUS at 08:44

## 2020-09-20 RX ADMIN — Medication: at 08:39

## 2020-09-20 RX ADMIN — AMIODARONE HYDROCHLORIDE 200 MG: 200 TABLET ORAL at 08:39

## 2020-09-20 RX ADMIN — INSULIN ASPART 3 UNITS: 100 INJECTION, SOLUTION INTRAVENOUS; SUBCUTANEOUS at 08:40

## 2020-09-20 RX ADMIN — ACETAMINOPHEN 650 MG: 325 TABLET ORAL at 14:16

## 2020-09-20 RX ADMIN — SODIUM CHLORIDE, PRESERVATIVE FREE 10 ML: 5 INJECTION INTRAVENOUS at 08:40

## 2020-09-20 RX ADMIN — ASPIRIN 81 MG: 81 TABLET, COATED ORAL at 08:39

## 2020-09-20 RX ADMIN — DOCUSATE SODIUM 50 MG AND SENNOSIDES 8.6 MG 2 TABLET: 8.6; 5 TABLET, FILM COATED ORAL at 20:44

## 2020-09-20 RX ADMIN — HYDROCORTISONE: 1 CREAM TOPICAL at 08:40

## 2020-09-20 RX ADMIN — ROPINIROLE HYDROCHLORIDE 1 MG: 1 TABLET, FILM COATED ORAL at 08:39

## 2020-09-20 RX ADMIN — PREGABALIN 100 MG: 100 CAPSULE ORAL at 20:44

## 2020-09-20 RX ADMIN — POLYETHYLENE GLYCOL 3350 17 G: 17 POWDER, FOR SOLUTION ORAL at 08:39

## 2020-09-20 RX ADMIN — ROPINIROLE HYDROCHLORIDE 1 MG: 1 TABLET, FILM COATED ORAL at 20:45

## 2020-09-20 RX ADMIN — INSULIN ASPART 5 UNITS: 100 INJECTION, SOLUTION INTRAVENOUS; SUBCUTANEOUS at 11:14

## 2020-09-20 RX ADMIN — INSULIN DETEMIR 10 UNITS: 100 INJECTION, SOLUTION SUBCUTANEOUS at 20:46

## 2020-09-20 RX ADMIN — VENLAFAXINE HYDROCHLORIDE 75 MG: 75 CAPSULE, EXTENDED RELEASE ORAL at 08:39

## 2020-09-20 RX ADMIN — HYDROCORTISONE: 1 CREAM TOPICAL at 20:45

## 2020-09-20 RX ADMIN — PREGABALIN 100 MG: 100 CAPSULE ORAL at 08:39

## 2020-09-20 RX ADMIN — ROPINIROLE HYDROCHLORIDE 1 MG: 1 TABLET, FILM COATED ORAL at 17:12

## 2020-09-20 RX ADMIN — CARVEDILOL 6.25 MG: 6.25 TABLET, FILM COATED ORAL at 08:39

## 2020-09-20 RX ADMIN — INSULIN ASPART 8 UNITS: 100 INJECTION, SOLUTION INTRAVENOUS; SUBCUTANEOUS at 17:13

## 2020-09-20 RX ADMIN — SODIUM CHLORIDE, PRESERVATIVE FREE 10 ML: 5 INJECTION INTRAVENOUS at 08:41

## 2020-09-20 RX ADMIN — INSULIN ASPART 5 UNITS: 100 INJECTION, SOLUTION INTRAVENOUS; SUBCUTANEOUS at 17:13

## 2020-09-20 RX ADMIN — FOLIC ACID 1 MG: 1 TABLET ORAL at 08:39

## 2020-09-20 RX ADMIN — SPIRONOLACTONE 12.5 MG: 25 TABLET ORAL at 08:39

## 2020-09-20 RX ADMIN — PREDNISONE 2.5 MG: 5 TABLET ORAL at 08:39

## 2020-09-20 NOTE — PLAN OF CARE
Problem: Adult Inpatient Plan of Care  Goal: Patient-Specific Goal (Individualized)  Outcome: Ongoing, Progressing   Patient resting in bed with complaints of generalized pain, prn medication administered.  No other complaints at this time, patient adhering to 1500 fluid restriction as advised.  Will monitor.

## 2020-09-20 NOTE — PROGRESS NOTES
[facility] HOSPITALIST PROGRESS NOTE     Patient Identification:  Name:  Yumiko Purdy  Age:  54 y.o.  Sex:  female  :  1965  MRN:  [id number]  Visit Number:  [id number]  Primary Care Provider:  Niko Schaefer MD    Length of stay:  10    Subjective: Patient seen and examined, patient is currently eating breakfast, eating well, no complaints, breathing reported as same as yesterday.  She did not receive diuretics yesterday due to borderline hypotension.  Creatinine slightly increased.  Difficult to measure accurate urine output.  Cardiology contemplating of cardiac catheterization, she is off Eliquis, may need to call and reconsult nephrology if renal function is worsening.    Chief Complaint: Anasarca shortness of breath  ----------------------------------------------------------------------------------------------------------------------  Current Hospital Meds:  amiodarone, 200 mg, Oral, Daily  aspirin, 81 mg, Oral, Daily  bumetanide, 2 mg, Oral, Daily  carvedilol, 6.25 mg, Oral, BID With Meals  folic acid, 1 mg, Oral, Daily  hydrocortisone, , Topical, Q12H  insulin aspart, 0-14 Units, Subcutaneous, TID AC  insulin aspart, 5 Units, Subcutaneous, TID With Meals  insulin detemir, 10 Units, Subcutaneous, Nightly  polyethylene glycol, 17 g, Oral, Daily  predniSONE, 2.5 mg, Oral, Daily  pregabalin, 100 mg, Oral, Q12H  rOPINIRole, 1 mg, Oral, TID  senna-docusate sodium, 2 tablet, Oral, Nightly  sodium chloride, 10 mL, Intravenous, Q12H  sodium chloride, 10 mL, Intravenous, Q12H  spironolactone, 12.5 mg, Oral, Daily  venlafaxine XR, 75 mg, Oral, Daily      Pharmacy Consult,       ----------------------------------------------------------------------------------------------------------------------  Vital Signs:  Temp:  [98.1 °F (36.7 °C)-98.5 °F (36.9 °C)] 98.2 °F (36.8 °C)  Heart Rate:  [70-76] 71  Resp:  [18-20] 18  BP: ()/(55-66) 120/64       Tele: Sinus rhythm rate of 77 bpm       09/18/20  0500 09/19/20  0500 09/20/20  0500   Weight: 120 kg (263 lb 9.6 oz) 119 kg (262 lb 3.2 oz) 120 kg (265 lb) (OLIVIA melvin)     Body mass index is 44.1 kg/m².    Intake/Output Summary (Last 24 hours) at 9/20/2020 1010  Last data filed at 9/20/2020 0857  Gross per 24 hour   Intake 1200 ml   Output 550 ml   Net 650 ml     Diet Regular; Cardiac, Consistent Carbohydrate, Renal, Daily Fluid Restriction, Low Sodium; 1500 mL Fluid Per Day; 2,000 mg Na  ----------------------------------------------------------------------------------------------------------------------  Physical exam:  General: Chronically ill-appearing, morbidly obese, just finished breakfast she is sitting upright on her bed, awake, alert, oriented to self, place, and time.  No respiratory distress.   Skin:  Skin is warm and dry.  Diffuse rash noted with scabbing noted no signs of cellulitis.  No pallor.    HENT:  Head:  Normocephalic and atraumatic.  Mouth:  Moist mucous membranes.    Eyes:  Conjunctivae and EOM are normal.  Pupils are equal, round, and reactive to light.  No scleral icterus.    Neck:  Neck supple.  JVD present.     Pulmonary/Chest:  No respiratory distress, no wheezes, no crackles, with normal breath sounds and good air movement.  Slight decreased breath sounds both bases especially in the left  Cardiovascular:  Normal rate, regular rhythm and normal heart sounds with no murmur.  Abdominal:  Soft.  Bowel sounds are normal.  No distension and no tenderness.   Extremities: Anasarca and edema noted, again rash noted.Strong pulses in all 4 extremities with no clubbing, no cyanosis.  Neurological:  Motor strength equal no obvious deficit, sensory grossly intact.   No cranial nerve deficit.  No tongue deviation.  No facial droop.  No slurred speech.    Genitourinary: No Harrington catheter  Back: No skin  break  ----------------------------------------------------------------------------------------------------------------------  ----------------------------------------------------------------------------------------------------------------------      Results from last 7 days   Lab Units 09/20/20  0007 09/19/20  0143 09/18/20  0211 09/17/20  0459 09/16/20  0343 09/15/20  1021 09/15/20  0049   WBC 10*3/mm3 3.99  --  5.82 7.87 6.94  --  8.23   HEMOGLOBIN g/dL 8.1*  --  8.6* 8.8* 8.5*  --  9.6*   HEMATOCRIT % 28.6*  --  30.7* 31.1* 29.9*  --  32.8*   MCV fL 97.3*  --  101.3* 97.8* 95.8  --  96.5   MCHC g/dL 28.3*  --  28.0* 28.3* 28.4*  --  29.3*   PLATELETS 10*3/mm3 103*  --  100* 121* 107*  --  130*   INR  1.55* 2.77* 3.28* 2.77* 2.38* 2.13* 2.08*         Results from last 7 days   Lab Units 09/20/20 0007 09/19/20 0143 09/18/20 0211 09/16/20  0343 09/15/20  0049 09/14/20  0514   SODIUM mmol/L 139 141 138   < > 140 141 138   POTASSIUM mmol/L 4.2 4.2 4.1   < > 4.2 4.6 5.4*   MAGNESIUM mg/dL  --   --   --   --   --  1.9 2.0   CHLORIDE mmol/L 100 102 101   < > 100 98 100   CO2 mmol/L 32.0* 32.6* 27.5   < > 33.6* 29.1* 28.4   BUN mg/dL 26* 27* 30*   < > 39* 50* 49*   CREATININE mg/dL 1.55* 1.42* 1.18*   < > 1.20* 1.34* 1.46*   EGFR IF NONAFRICN AM mL/min/1.73 35* 39* 48*   < > 47* 41* 37*   CALCIUM mg/dL 8.2* 8.5* 8.5*   < > 8.5* 9.1 8.9   GLUCOSE mg/dL 194* 105* 215*   < > 150* 198* 103*   ALBUMIN g/dL  --   --   --   --  2.88* 3.10* 3.00*   BILIRUBIN mg/dL  --   --   --   --  2.2* 1.9* 2.0*   ALK PHOS U/L  --   --   --   --  81 104 106   AST (SGOT) U/L  --   --   --   --  24 29 42*   ALT (SGPT) U/L  --   --   --   --  22 26 30    < > = values in this interval not displayed.   Estimated Creatinine Clearance: 54.5 mL/min (A) (by C-G formula based on SCr of 1.55 mg/dL (H)).    No results found for: AMMONIA      No results found for: BLOODCX  No results found for: URINECX  No results found for: WOUNDCX  No results found  for: STOOLCX    I have personally looked at the labs and they are summarized above.  ----------------------------------------------------------------------------------------------------------------------  Imaging Results (Last 24 Hours)     ** No results found for the last 24 hours. **        ----------------------------------------------------------------------------------------------------------------------  Assessment and Plan:  -Acute on chronic combined systolic and diastolic heart failure  -Newly diagnosed severe cardiomyopathy with ejection fraction 20 to 25%  -Acute kidney injury and CKD stage III  -Morbid obesity  -Liver cirrhosis  -Anasarca multifactorial secondary to heart failure, hypoalbuminemia with liver cirrhosis  -Chronic diffuse rash biopsy apparently is negative for vasculitis  -Macrocytosis with normal B12 folate  -Mild thrombocytopenia  -Functional decline and physical deconditioning  -Paroxysmal atrial fibrillation currently off anticoagulation for possible diagnostic heart cath  -Diabetes type 2 insulin requiring    We will monitor renal function closely, monitor electrolytes, follow-up HIT work-up if renal function continues to worsen may need to reconsult nephrology especially if diagnostic cardiac cath has been contemplated.  Continue PT OT.  With regards to diuretics and cardiac management, cardiology help appreciated.    Halle Keita MD  09/20/20  10:10 EDT

## 2020-09-20 NOTE — PROGRESS NOTES
Patient Identification:  Name:  Yumiko Purdy  Age:  54 y.o.  Sex:  female  :  1965  MRN:  [id number]  Visit Number:  [id number]    Chief Complaint:   Acute systolic heart failure    Subjective:  The patient states she is feeling about the same today. BLE edema is persistent. She was hypotensive yesterday, diuretics were held. Creatinine is slightly worse today. INR is 1.55  ----------------------------------------------------------------------------------------------------------------------  Current Hospital Meds:  amiodarone, 200 mg, Oral, Daily  aspirin, 81 mg, Oral, Daily  bumetanide, 2 mg, Oral, Daily  carvedilol, 6.25 mg, Oral, BID With Meals  folic acid, 1 mg, Oral, Daily  hydrocortisone, , Topical, Q12H  insulin aspart, 0-14 Units, Subcutaneous, TID AC  insulin aspart, 5 Units, Subcutaneous, TID With Meals  insulin detemir, 10 Units, Subcutaneous, Nightly  polyethylene glycol, 17 g, Oral, Daily  predniSONE, 2.5 mg, Oral, Daily  pregabalin, 100 mg, Oral, Q12H  rOPINIRole, 1 mg, Oral, TID  senna-docusate sodium, 2 tablet, Oral, Nightly  sodium chloride, 10 mL, Intravenous, Q12H  sodium chloride, 10 mL, Intravenous, Q12H  spironolactone, 12.5 mg, Oral, Daily  venlafaxine XR, 75 mg, Oral, Daily      Pharmacy Consult,       ----------------------------------------------------------------------------------------------------------------------  Vital Signs:  Temp:  [98.1 °F (36.7 °C)-98.5 °F (36.9 °C)] 98.2 °F (36.8 °C)  Heart Rate:  [70-76] 71  Resp:  [18-20] 18  BP: ()/(55-66) 120/64      20  0500 20  0500 20  0500   Weight: 120 kg (263 lb 9.6 oz) 119 kg (262 lb 3.2 oz) 120 kg (265 lb) (RN Rochelle aware)     Body mass index is 44.1 kg/m².    Intake/Output Summary (Last 24 hours) at 2020 0937  Last data filed at 2020 0857  Gross per 24 hour   Intake 1200 ml   Output 550 ml   Net 650 ml     Diet Regular; Cardiac, Consistent Carbohydrate, Renal, Daily Fluid Restriction, Low  Sodium; 1500 mL Fluid Per Day; 2,000 mg Na  ----------------------------------------------------------------------------------------------------------------------  Physical exam:    HEENT:  Head:  Normocephalic and atraumatic.     Eyes:  Conjunctivae and EOM are normal.  Pupils are equal, round, and reactive to light.  No scleral icterus.    Neck:  Neck supple.  No JVD present.  No bruit.  Cardiovascular: Normal rate, regular rhythm, grade 2/6 systolic ejection murmur  Pulmonary/Chest:  Vesicular breath sounds B/L, Clear to auscultation, with no added sounds.  Abdominal:  Soft.  Bowel sounds are normal.  No distension and no tenderness.  No organomegaly.  Neurological:  Alert and oriented to person, place, and time. No focal defecits  Skin:  Macular rash noted. No pallor.   Musculoskeletal:  No tenderness, and no deformity.  No red or swollen joints anywhere.   Lower extremities: BLE edema, Peripheral vascular:  2+ Pulses B/L DP.  ----------------------------------------------------------------------------------------------------------------------    ----------------------------------------------------------------------------------------------------------------------      Results from last 7 days   Lab Units 09/20/20  0007 09/19/20 0143 09/18/20 0211 09/17/20  0459 09/16/20 0343 09/15/20  1021 09/15/20  0049   WBC 10*3/mm3 3.99  --  5.82 7.87 6.94  --  8.23   HEMOGLOBIN g/dL 8.1*  --  8.6* 8.8* 8.5*  --  9.6*   HEMATOCRIT % 28.6*  --  30.7* 31.1* 29.9*  --  32.8*   MCV fL 97.3*  --  101.3* 97.8* 95.8  --  96.5   MCHC g/dL 28.3*  --  28.0* 28.3* 28.4*  --  29.3*   PLATELETS 10*3/mm3 103*  --  100* 121* 107*  --  130*   INR  1.55* 2.77* 3.28* 2.77* 2.38* 2.13* 2.08*         Results from last 7 days   Lab Units 09/20/20  0007 09/19/20  0143 09/18/20  0211  09/16/20  0343 09/15/20  0049 09/14/20  0514   SODIUM mmol/L 139 141 138   < > 140 141 138   POTASSIUM mmol/L 4.2 4.2 4.1   < > 4.2 4.6 5.4*   MAGNESIUM mg/dL  --    --   --   --   --  1.9 2.0   CHLORIDE mmol/L 100 102 101   < > 100 98 100   CO2 mmol/L 32.0* 32.6* 27.5   < > 33.6* 29.1* 28.4   BUN mg/dL 26* 27* 30*   < > 39* 50* 49*   CREATININE mg/dL 1.55* 1.42* 1.18*   < > 1.20* 1.34* 1.46*   EGFR IF NONAFRICN AM mL/min/1.73 35* 39* 48*   < > 47* 41* 37*   CALCIUM mg/dL 8.2* 8.5* 8.5*   < > 8.5* 9.1 8.9   GLUCOSE mg/dL 194* 105* 215*   < > 150* 198* 103*   ALBUMIN g/dL  --   --   --   --  2.88* 3.10* 3.00*   BILIRUBIN mg/dL  --   --   --   --  2.2* 1.9* 2.0*   ALK PHOS U/L  --   --   --   --  81 104 106   AST (SGOT) U/L  --   --   --   --  24 29 42*   ALT (SGPT) U/L  --   --   --   --  22 26 30    < > = values in this interval not displayed.   Estimated Creatinine Clearance: 54.5 mL/min (A) (by C-G formula based on SCr of 1.55 mg/dL (H)).    No results found for: AMMONIA      No results found for: BLOODCX  No results found for: URINECX  No results found for: WOUNDCX  No results found for: STOOLCX    I have personally looked at the labs and they are summarized above.  ----------------------------------------------------------------------------------------------------------------------  Imaging Results (Last 24 Hours)     ** No results found for the last 24 hours. **        ----------------------------------------------------------------------------------------------------------------------    Assessment:  1. Acute on chronic combined systolic and diastolic heart failure, clinically improved   2. Newly diagnosed advanced cardiomyopathy of unclear etiology (possibly ischemic) with LV ejection fraction of 20 to 25%, stress test revealing a small to medium sized, mild degree of ischemia in the inferior wall  3. Mild elevated troponin, trending down to normal  4. Paroxysmal atrial fibrillation with controlled ventricular rate. Patient has been on warfarin and amiodarone, underwent successful cardioversion 9/17/2020, maintaining NSR  5. Acute kidney injury on chronic kidney  disease, slightly worse today, 1.55  6. Type 2 diabetes mellitus  7. Cirrhosis of liver    Plan:  1. Better blood pressure, but rising creatinine, hold diuresis today  2. INR subtherapeutic, will not yet start eliquis for possible catheterization if creatinine is better, start heparin in the intrim  3. Maintaining sinus rhythm, continue  Current management      Courtneybrooklynn Abbottpherd, KENNY   09/20/20 09:37 EDT     Addendum:  I, Charlee Ken MD, FACC, performed the services described in this documentation as documented by the above-named individual under my supervision and made necessary changes in the note is both accurate and complete    Dr. Charlee Ken MD

## 2020-09-20 NOTE — PLAN OF CARE
Goal Outcome Evaluation: pt mobility improving, encouraged self care, scabs  to lower extremities drying, no reports of distress

## 2020-09-21 LAB
ABO GROUP BLD: NORMAL
ANION GAP SERPL CALCULATED.3IONS-SCNC: 5.1 MMOL/L (ref 5–15)
ANISOCYTOSIS BLD QL: NORMAL
ANISOCYTOSIS BLD QL: NORMAL
APTT PPP: 36.9 SECONDS (ref 25.6–35.3)
APTT PPP: 57.7 SECONDS (ref 25.6–35.3)
APTT PPP: 89.5 SECONDS (ref 25.6–35.3)
B PERT DNA SPEC QL NAA+PROBE: NOT DETECTED
BASOPHILS # BLD AUTO: 0.03 10*3/MM3 (ref 0–0.2)
BASOPHILS # BLD AUTO: 0.03 10*3/MM3 (ref 0–0.2)
BASOPHILS NFR BLD AUTO: 0.6 % (ref 0–1.5)
BASOPHILS NFR BLD AUTO: 0.7 % (ref 0–1.5)
BLD GP AB SCN SERPL QL: NEGATIVE
BUN SERPL-MCNC: 24 MG/DL (ref 6–20)
BUN/CREAT SERPL: 18 (ref 7–25)
C PNEUM DNA NPH QL NAA+NON-PROBE: NOT DETECTED
CALCIUM SPEC-SCNC: 8.4 MG/DL (ref 8.6–10.5)
CHLORIDE SERPL-SCNC: 101 MMOL/L (ref 98–107)
CO2 SERPL-SCNC: 31.9 MMOL/L (ref 22–29)
CREAT SERPL-MCNC: 1.33 MG/DL (ref 0.57–1)
CRP SERPL-MCNC: 0.69 MG/DL (ref 0–0.5)
D DIMER PPP FEU-MCNC: 0.41 MCGFEU/ML (ref 0–0.5)
D-LACTATE SERPL-SCNC: 1.2 MMOL/L (ref 0.5–2)
DEPRECATED RDW RBC AUTO: 70.9 FL (ref 37–54)
DEPRECATED RDW RBC AUTO: 72.4 FL (ref 37–54)
EOSINOPHIL # BLD AUTO: 0.05 10*3/MM3 (ref 0–0.4)
EOSINOPHIL # BLD AUTO: 0.1 10*3/MM3 (ref 0–0.4)
EOSINOPHIL NFR BLD AUTO: 1.2 % (ref 0.3–6.2)
EOSINOPHIL NFR BLD AUTO: 2.2 % (ref 0.3–6.2)
ERYTHROCYTE [DISTWIDTH] IN BLOOD BY AUTOMATED COUNT: 20.1 % (ref 12.3–15.4)
ERYTHROCYTE [DISTWIDTH] IN BLOOD BY AUTOMATED COUNT: 20.3 % (ref 12.3–15.4)
FERRITIN SERPL-MCNC: 1242 NG/ML (ref 13–150)
FLUAV H1 2009 PAND RNA NPH QL NAA+PROBE: NOT DETECTED
FLUAV H1 HA GENE NPH QL NAA+PROBE: NOT DETECTED
FLUAV H3 RNA NPH QL NAA+PROBE: NOT DETECTED
FLUAV SUBTYP SPEC NAA+PROBE: NOT DETECTED
FLUBV RNA ISLT QL NAA+PROBE: NOT DETECTED
GFR SERPL CREATININE-BSD FRML MDRD: 42 ML/MIN/1.73
GLUCOSE BLDC GLUCOMTR-MCNC: 149 MG/DL (ref 70–130)
GLUCOSE BLDC GLUCOMTR-MCNC: 151 MG/DL (ref 70–130)
GLUCOSE BLDC GLUCOMTR-MCNC: 188 MG/DL (ref 70–130)
GLUCOSE BLDC GLUCOMTR-MCNC: 208 MG/DL (ref 70–130)
GLUCOSE BLDC GLUCOMTR-MCNC: 221 MG/DL (ref 70–130)
GLUCOSE SERPL-MCNC: 123 MG/DL (ref 65–99)
HADV DNA SPEC NAA+PROBE: NOT DETECTED
HCOV 229E RNA SPEC QL NAA+PROBE: NOT DETECTED
HCOV HKU1 RNA SPEC QL NAA+PROBE: NOT DETECTED
HCOV NL63 RNA SPEC QL NAA+PROBE: NOT DETECTED
HCOV OC43 RNA SPEC QL NAA+PROBE: NOT DETECTED
HCT VFR BLD AUTO: 29.4 % (ref 34–46.6)
HCT VFR BLD AUTO: 29.6 % (ref 34–46.6)
HGB BLD-MCNC: 8.4 G/DL (ref 12–15.9)
HGB BLD-MCNC: 8.5 G/DL (ref 12–15.9)
HMPV RNA NPH QL NAA+NON-PROBE: NOT DETECTED
HPIV1 RNA SPEC QL NAA+PROBE: NOT DETECTED
HPIV2 RNA SPEC QL NAA+PROBE: NOT DETECTED
HPIV3 RNA NPH QL NAA+PROBE: NOT DETECTED
HPIV4 P GENE NPH QL NAA+PROBE: NOT DETECTED
HYPOCHROMIA BLD QL: NORMAL
HYPOCHROMIA BLD QL: NORMAL
IMM GRANULOCYTES # BLD AUTO: 0.01 10*3/MM3 (ref 0–0.05)
IMM GRANULOCYTES # BLD AUTO: 0.02 10*3/MM3 (ref 0–0.05)
IMM GRANULOCYTES NFR BLD AUTO: 0.2 % (ref 0–0.5)
IMM GRANULOCYTES NFR BLD AUTO: 0.5 % (ref 0–0.5)
INR PPP: 1.17 (ref 0.9–1.1)
INR PPP: 1.26 (ref 0.9–1.1)
LDH SERPL-CCNC: 297 U/L (ref 135–214)
LYMPHOCYTES # BLD AUTO: 0.98 10*3/MM3 (ref 0.7–3.1)
LYMPHOCYTES # BLD AUTO: 1.18 10*3/MM3 (ref 0.7–3.1)
LYMPHOCYTES NFR BLD AUTO: 21.1 % (ref 19.6–45.3)
LYMPHOCYTES NFR BLD AUTO: 28.9 % (ref 19.6–45.3)
M PNEUMO IGG SER IA-ACNC: NOT DETECTED
MACROCYTES BLD QL SMEAR: NORMAL
MACROCYTES BLD QL SMEAR: NORMAL
MCH RBC QN AUTO: 27.8 PG (ref 26.6–33)
MCH RBC QN AUTO: 28.1 PG (ref 26.6–33)
MCHC RBC AUTO-ENTMCNC: 28.6 G/DL (ref 31.5–35.7)
MCHC RBC AUTO-ENTMCNC: 28.7 G/DL (ref 31.5–35.7)
MCV RBC AUTO: 97.4 FL (ref 79–97)
MCV RBC AUTO: 98 FL (ref 79–97)
MONOCYTES # BLD AUTO: 0.2 10*3/MM3 (ref 0.1–0.9)
MONOCYTES # BLD AUTO: 0.23 10*3/MM3 (ref 0.1–0.9)
MONOCYTES NFR BLD AUTO: 4.3 % (ref 5–12)
MONOCYTES NFR BLD AUTO: 5.6 % (ref 5–12)
NEUTROPHILS NFR BLD AUTO: 2.57 10*3/MM3 (ref 1.7–7)
NEUTROPHILS NFR BLD AUTO: 3.33 10*3/MM3 (ref 1.7–7)
NEUTROPHILS NFR BLD AUTO: 63.1 % (ref 42.7–76)
NEUTROPHILS NFR BLD AUTO: 71.6 % (ref 42.7–76)
NRBC BLD AUTO-RTO: 0 /100 WBC (ref 0–0.2)
NRBC BLD AUTO-RTO: 0 /100 WBC (ref 0–0.2)
PLAT MORPH BLD: NORMAL
PLATELET # BLD AUTO: 113 10*3/MM3 (ref 140–450)
PLATELET # BLD AUTO: 115 10*3/MM3 (ref 140–450)
PMV BLD AUTO: 10.3 FL (ref 6–12)
PMV BLD AUTO: 12 FL (ref 6–12)
POTASSIUM SERPL-SCNC: 4.3 MMOL/L (ref 3.5–5.2)
PROTHROMBIN TIME: 14.7 SECONDS (ref 11.9–14.1)
PROTHROMBIN TIME: 15.7 SECONDS (ref 11.9–14.1)
RBC # BLD AUTO: 3.02 10*6/MM3 (ref 3.77–5.28)
RBC # BLD AUTO: 3.02 10*6/MM3 (ref 3.77–5.28)
RH BLD: POSITIVE
RHINOVIRUS RNA SPEC NAA+PROBE: NOT DETECTED
RSV RNA NPH QL NAA+NON-PROBE: NOT DETECTED
SARS-COV-2 RDRP RESP QL NAA+PROBE: DETECTED
SMALL PLATELETS BLD QL SMEAR: NORMAL
SODIUM SERPL-SCNC: 138 MMOL/L (ref 136–145)
T&S EXPIRATION DATE: NORMAL
WBC # BLD AUTO: 4.08 10*3/MM3 (ref 3.4–10.8)
WBC # BLD AUTO: 4.65 10*3/MM3 (ref 3.4–10.8)

## 2020-09-21 PROCEDURE — 63710000001 INSULIN ASPART PER 5 UNITS: Performed by: HOSPITALIST

## 2020-09-21 PROCEDURE — 99232 SBSQ HOSP IP/OBS MODERATE 35: CPT | Performed by: SPECIALIST

## 2020-09-21 PROCEDURE — 85007 BL SMEAR W/DIFF WBC COUNT: CPT | Performed by: NURSE PRACTITIONER

## 2020-09-21 PROCEDURE — 63710000001 PREDNISONE PER 5 MG: Performed by: SPECIALIST

## 2020-09-21 PROCEDURE — 82728 ASSAY OF FERRITIN: CPT | Performed by: HOSPITALIST

## 2020-09-21 PROCEDURE — 85025 COMPLETE CBC W/AUTO DIFF WBC: CPT | Performed by: HOSPITALIST

## 2020-09-21 PROCEDURE — 85730 THROMBOPLASTIN TIME PARTIAL: CPT | Performed by: HOSPITALIST

## 2020-09-21 PROCEDURE — 85610 PROTHROMBIN TIME: CPT | Performed by: NURSE PRACTITIONER

## 2020-09-21 PROCEDURE — 83605 ASSAY OF LACTIC ACID: CPT | Performed by: HOSPITALIST

## 2020-09-21 PROCEDURE — 82962 GLUCOSE BLOOD TEST: CPT

## 2020-09-21 PROCEDURE — 0099U HC BIOFIRE FILMARRAY RESP PANEL 1: CPT | Performed by: HOSPITALIST

## 2020-09-21 PROCEDURE — 83615 LACTATE (LD) (LDH) ENZYME: CPT | Performed by: HOSPITALIST

## 2020-09-21 PROCEDURE — 86901 BLOOD TYPING SEROLOGIC RH(D): CPT | Performed by: HOSPITALIST

## 2020-09-21 PROCEDURE — 25010000002 HEPARIN (PORCINE) PER 1000 UNITS: Performed by: NURSE PRACTITIONER

## 2020-09-21 PROCEDURE — U0004 COV-19 TEST NON-CDC HGH THRU: HCPCS | Performed by: HOSPITALIST

## 2020-09-21 PROCEDURE — 63710000001 INSULIN ASPART PER 5 UNITS: Performed by: SPECIALIST

## 2020-09-21 PROCEDURE — 84145 PROCALCITONIN (PCT): CPT | Performed by: HOSPITALIST

## 2020-09-21 PROCEDURE — 63710000001 INSULIN DETEMIR PER 5 UNITS: Performed by: HOSPITALIST

## 2020-09-21 PROCEDURE — 86850 RBC ANTIBODY SCREEN: CPT | Performed by: HOSPITALIST

## 2020-09-21 PROCEDURE — 85610 PROTHROMBIN TIME: CPT | Performed by: SPECIALIST

## 2020-09-21 PROCEDURE — 85007 BL SMEAR W/DIFF WBC COUNT: CPT | Performed by: HOSPITALIST

## 2020-09-21 PROCEDURE — 85730 THROMBOPLASTIN TIME PARTIAL: CPT | Performed by: NURSE PRACTITIONER

## 2020-09-21 PROCEDURE — 85025 COMPLETE CBC W/AUTO DIFF WBC: CPT | Performed by: NURSE PRACTITIONER

## 2020-09-21 PROCEDURE — 86900 BLOOD TYPING SEROLOGIC ABO: CPT | Performed by: HOSPITALIST

## 2020-09-21 PROCEDURE — 85379 FIBRIN DEGRADATION QUANT: CPT | Performed by: HOSPITALIST

## 2020-09-21 PROCEDURE — 86140 C-REACTIVE PROTEIN: CPT | Performed by: HOSPITALIST

## 2020-09-21 PROCEDURE — 80048 BASIC METABOLIC PNL TOTAL CA: CPT | Performed by: HOSPITALIST

## 2020-09-21 PROCEDURE — 87635 SARS-COV-2 COVID-19 AMP PRB: CPT | Performed by: NURSE PRACTITIONER

## 2020-09-21 PROCEDURE — 99232 SBSQ HOSP IP/OBS MODERATE 35: CPT | Performed by: HOSPITALIST

## 2020-09-21 RX ORDER — HEPARIN SODIUM 5000 [USP'U]/ML
5000 INJECTION, SOLUTION INTRAVENOUS; SUBCUTANEOUS ONCE
Status: COMPLETED | OUTPATIENT
Start: 2020-09-21 | End: 2020-09-21

## 2020-09-21 RX ORDER — HEPARIN SODIUM 5000 [USP'U]/ML
5000 INJECTION, SOLUTION INTRAVENOUS; SUBCUTANEOUS AS NEEDED
Status: DISCONTINUED | OUTPATIENT
Start: 2020-09-21 | End: 2020-09-22

## 2020-09-21 RX ORDER — HEPARIN SODIUM 10000 [USP'U]/100ML
8.13 INJECTION, SOLUTION INTRAVENOUS
Status: DISCONTINUED | OUTPATIENT
Start: 2020-09-21 | End: 2020-09-22

## 2020-09-21 RX ORDER — HEPARIN SODIUM 5000 [USP'U]/ML
2500 INJECTION, SOLUTION INTRAVENOUS; SUBCUTANEOUS AS NEEDED
Status: DISCONTINUED | OUTPATIENT
Start: 2020-09-21 | End: 2020-09-22

## 2020-09-21 RX ADMIN — HYDROCORTISONE: 1 CREAM TOPICAL at 20:53

## 2020-09-21 RX ADMIN — ROPINIROLE HYDROCHLORIDE 1 MG: 1 TABLET, FILM COATED ORAL at 08:30

## 2020-09-21 RX ADMIN — INSULIN DETEMIR 10 UNITS: 100 INJECTION, SOLUTION SUBCUTANEOUS at 20:48

## 2020-09-21 RX ADMIN — ASPIRIN 81 MG: 81 TABLET, COATED ORAL at 08:30

## 2020-09-21 RX ADMIN — INSULIN ASPART 5 UNITS: 100 INJECTION, SOLUTION INTRAVENOUS; SUBCUTANEOUS at 17:05

## 2020-09-21 RX ADMIN — CARVEDILOL 6.25 MG: 6.25 TABLET, FILM COATED ORAL at 17:00

## 2020-09-21 RX ADMIN — INSULIN ASPART 5 UNITS: 100 INJECTION, SOLUTION INTRAVENOUS; SUBCUTANEOUS at 08:30

## 2020-09-21 RX ADMIN — PREGABALIN 100 MG: 100 CAPSULE ORAL at 20:47

## 2020-09-21 RX ADMIN — ACETAMINOPHEN 650 MG: 325 TABLET ORAL at 17:05

## 2020-09-21 RX ADMIN — SODIUM CHLORIDE, PRESERVATIVE FREE 10 ML: 5 INJECTION INTRAVENOUS at 08:32

## 2020-09-21 RX ADMIN — HEPARIN SODIUM 5000 UNITS: 5000 INJECTION INTRAVENOUS; SUBCUTANEOUS at 11:18

## 2020-09-21 RX ADMIN — SODIUM CHLORIDE, PRESERVATIVE FREE 10 ML: 5 INJECTION INTRAVENOUS at 20:48

## 2020-09-21 RX ADMIN — INSULIN ASPART 3 UNITS: 100 INJECTION, SOLUTION INTRAVENOUS; SUBCUTANEOUS at 11:19

## 2020-09-21 RX ADMIN — HEPARIN SODIUM 8.13 UNITS/KG/HR: 10000 INJECTION, SOLUTION INTRAVENOUS at 11:18

## 2020-09-21 RX ADMIN — POLYETHYLENE GLYCOL 3350 17 G: 17 POWDER, FOR SOLUTION ORAL at 08:29

## 2020-09-21 RX ADMIN — HYDROCORTISONE: 1 CREAM TOPICAL at 11:19

## 2020-09-21 RX ADMIN — ACETAMINOPHEN 650 MG: 325 TABLET ORAL at 23:00

## 2020-09-21 RX ADMIN — BUMETANIDE 2 MG: 1 TABLET ORAL at 08:30

## 2020-09-21 RX ADMIN — VENLAFAXINE HYDROCHLORIDE 75 MG: 75 CAPSULE, EXTENDED RELEASE ORAL at 08:30

## 2020-09-21 RX ADMIN — SODIUM CHLORIDE, PRESERVATIVE FREE 10 ML: 5 INJECTION INTRAVENOUS at 08:29

## 2020-09-21 RX ADMIN — ROPINIROLE HYDROCHLORIDE 1 MG: 1 TABLET, FILM COATED ORAL at 20:48

## 2020-09-21 RX ADMIN — SODIUM CHLORIDE, PRESERVATIVE FREE 10 ML: 5 INJECTION INTRAVENOUS at 20:53

## 2020-09-21 RX ADMIN — DOCUSATE SODIUM 50 MG AND SENNOSIDES 8.6 MG 2 TABLET: 8.6; 5 TABLET, FILM COATED ORAL at 20:47

## 2020-09-21 RX ADMIN — PREGABALIN 100 MG: 100 CAPSULE ORAL at 08:29

## 2020-09-21 RX ADMIN — ROPINIROLE HYDROCHLORIDE 1 MG: 1 TABLET, FILM COATED ORAL at 17:00

## 2020-09-21 RX ADMIN — SPIRONOLACTONE 12.5 MG: 25 TABLET ORAL at 08:30

## 2020-09-21 RX ADMIN — FOLIC ACID 1 MG: 1 TABLET ORAL at 08:30

## 2020-09-21 RX ADMIN — INSULIN ASPART 5 UNITS: 100 INJECTION, SOLUTION INTRAVENOUS; SUBCUTANEOUS at 11:21

## 2020-09-21 RX ADMIN — ACETAMINOPHEN 650 MG: 325 TABLET ORAL at 03:19

## 2020-09-21 RX ADMIN — AMIODARONE HYDROCHLORIDE 200 MG: 200 TABLET ORAL at 08:30

## 2020-09-21 RX ADMIN — PREDNISONE 2.5 MG: 5 TABLET ORAL at 08:30

## 2020-09-21 RX ADMIN — Medication: at 08:29

## 2020-09-21 NOTE — NURSING NOTE
Attempted to call emergency contact about patient being transferred to new room, emergency contact did not answer and has a voicemail that has not been set up yet.

## 2020-09-21 NOTE — PROGRESS NOTES
[facility] HOSPITALIST PROGRESS NOTE     Patient Identification:  Name:  Yumiko Purdy  Age:  54 y.o.  Sex:  female  :  1965  MRN:  [id number]  Visit Number:  [id number]  Primary Care Provider:  Niko Schaefer MD    Length of stay:  11    Subjective: Patient seen and examined, patient is currently out of bed to chair, she is breathing better she appears better, blood pressure is normally hypotensive, she has good urine output.  Renal function also noted to be improved.  Patient tentatively is for heart catheterization for new onset cardiomyopathy.  Neurology help appreciated.  Patient continued to maintain sinus rhythm after cardioversion.  Her skin rash also notably has improved.    Chief Complaint: Shortness of breath edema  ----------------------------------------------------------------------------------------------------------------------  Current Hospital Meds:  amiodarone, 200 mg, Oral, Daily  aspirin, 81 mg, Oral, Daily  bumetanide, 2 mg, Oral, Daily  carvedilol, 6.25 mg, Oral, BID With Meals  folic acid, 1 mg, Oral, Daily  hydrocortisone, , Topical, Q12H  insulin aspart, 0-14 Units, Subcutaneous, TID AC  insulin aspart, 5 Units, Subcutaneous, TID With Meals  insulin detemir, 10 Units, Subcutaneous, Nightly  polyethylene glycol, 17 g, Oral, Daily  predniSONE, 2.5 mg, Oral, Daily  pregabalin, 100 mg, Oral, Q12H  rOPINIRole, 1 mg, Oral, TID  senna-docusate sodium, 2 tablet, Oral, Nightly  sodium chloride, 10 mL, Intravenous, Q12H  sodium chloride, 10 mL, Intravenous, Q12H  spironolactone, 12.5 mg, Oral, Daily  venlafaxine XR, 75 mg, Oral, Daily      heparin (porcine), 8.13 Units/kg/hr, Last Rate: 8.13 Units/kg/hr (20 1118)  Pharmacy Consult,       ----------------------------------------------------------------------------------------------------------------------  Vital Signs:  Temp:  [97.6 °F (36.4 °C)-98.6 °F (37 °C)] 98.3 °F (36.8 °C)  Heart Rate:  [65-79] 71  Resp:  [18-20]  18  BP: ()/(52-71) 114/67       Tele: Sinus rhythm 72 bpm      09/19/20  0500 09/20/20  0500 09/21/20  0359   Weight: 119 kg (262 lb 3.2 oz) 120 kg (265 lb) (RN Rochelle aware) 123 kg (270 lb 8 oz)     Body mass index is 45.01 kg/m².    Intake/Output Summary (Last 24 hours) at 9/21/2020 1125  Last data filed at 9/21/2020 0931  Gross per 24 hour   Intake 600 ml   Output 2450 ml   Net -1850 ml     NPO Diet  ----------------------------------------------------------------------------------------------------------------------  Physical exam:  General: Comfortable,awake, alert, oriented to self, place, and time, chronically ill-appearing, she is currently out of bed to chair/commode.  Morbidly obese no respiratory distress.   Skin:  Skin is warm and dry.  The rash that was noted is now less prominent and now drying, with scabbing noted no signs of cellulitis.  No pallor.    HENT:  Head:  Normocephalic and atraumatic.  Mouth:  Moist mucous membranes.    Eyes:  Conjunctivae and EOM are normal.  Pupils are equal, round, and reactive to light.  No scleral icterus.    Neck:  Neck supple.  JVD present.     Pulmonary/Chest:  No respiratory distress, no wheezes, no crackles, with normal breath sounds and good air movement.  Slight decreased breath sounds both bases especially in the left  Cardiovascular:  Normal rate, regular rhythm and normal heart sounds with no murmur.  Abdominal:  Soft.  Bowel sounds are normal.  No distension and no tenderness.   Extremities: Anasarca and edema noted, again rash noted.Strong pulses in all 4 extremities with no clubbing, no cyanosis.  Neurological:  Motor strength equal no obvious deficit, sensory grossly intact.   No cranial nerve deficit.  No tongue deviation.  No facial droop.  No slurred speech.    Genitourinary: No Harrington catheter  Back: No skin  break    ----------------------------------------------------------------------------------------------------------------------  ----------------------------------------------------------------------------------------------------------------------      Results from last 7 days   Lab Units 09/21/20 0057 09/20/20 0007 09/19/20 0143 09/18/20 0211 09/17/20  0459 09/16/20  0343 09/15/20  1021   WBC 10*3/mm3 4.08 3.99  --  5.82 7.87 6.94  --    HEMOGLOBIN g/dL 8.4* 8.1*  --  8.6* 8.8* 8.5*  --    HEMATOCRIT % 29.4* 28.6*  --  30.7* 31.1* 29.9*  --    MCV fL 97.4* 97.3*  --  101.3* 97.8* 95.8  --    MCHC g/dL 28.6* 28.3*  --  28.0* 28.3* 28.4*  --    PLATELETS 10*3/mm3 113* 103*  --  100* 121* 107*  --    INR  1.26* 1.55* 2.77* 3.28* 2.77* 2.38* 2.13*         Results from last 7 days   Lab Units 09/21/20 0057 09/20/20 0007 09/19/20 0143 09/16/20  0343 09/15/20  0049   SODIUM mmol/L 138 139 141   < > 140 141   POTASSIUM mmol/L 4.3 4.2 4.2   < > 4.2 4.6   MAGNESIUM mg/dL  --   --   --   --   --  1.9   CHLORIDE mmol/L 101 100 102   < > 100 98   CO2 mmol/L 31.9* 32.0* 32.6*   < > 33.6* 29.1*   BUN mg/dL 24* 26* 27*   < > 39* 50*   CREATININE mg/dL 1.33* 1.55* 1.42*   < > 1.20* 1.34*   EGFR IF NONAFRICN AM mL/min/1.73 42* 35* 39*   < > 47* 41*   CALCIUM mg/dL 8.4* 8.2* 8.5*   < > 8.5* 9.1   GLUCOSE mg/dL 123* 194* 105*   < > 150* 198*   ALBUMIN g/dL  --   --   --   --  2.88* 3.10*   BILIRUBIN mg/dL  --   --   --   --  2.2* 1.9*   ALK PHOS U/L  --   --   --   --  81 104   AST (SGOT) U/L  --   --   --   --  24 29   ALT (SGPT) U/L  --   --   --   --  22 26    < > = values in this interval not displayed.   Estimated Creatinine Clearance: 64.4 mL/min (A) (by C-G formula based on SCr of 1.33 mg/dL (H)).    No results found for: AMMONIA      No results found for: BLOODCX  No results found for: URINECX  No results found for: WOUNDCX  No results found for: STOOLCX    I have personally looked at the labs and they are summarized  above.  ----------------------------------------------------------------------------------------------------------------------  Imaging Results (Last 24 Hours)     ** No results found for the last 24 hours. **        ----------------------------------------------------------------------------------------------------------------------  Assessment and Plan:    -Acute on chronic combined systolic and diastolic heart failure  -New onset severe cardiomyopathy  -Acute kidney injury on CKD stage III now improved  -Liver cirrhosis  -Macrocytosis with normal B12 folate  -Mild thrombocytopenia  -Thrombocytopenia  -Debility and physical deconditioning  -Skin rash negative for vasculitis on biopsy,  -Anasarca multifactorial secondary to heart failure, hypoalbuminemia and liver cirrhosis  -Diabetes type 2 insulin requiring    Continue out of bed to chair, PT OT, monitor renal function, follow-up signout report for HIT work-up, patient is for cardiac cath for work-up of new onset cardiomyopathy.  Patient will require physical therapy.  Will discuss with .    Halle Keita MD  09/21/20  11:25 EDT

## 2020-09-21 NOTE — PROGRESS NOTES
Discharge Planning Assessment  [facility]     Patient Name: Yumiko Purdy  MRN: [id number]  Today's Date: 9/21/2020    Admit Date: 9/9/2020        Discharge Plan     Row Name 09/21/20 1236       Plan    Plan  Pt admitted on 9/9/20.  Pt lives at home with son Sam.  SS spoke with pt on this date regarding discharge options.  Pt is adamantly refusing nursing home placement for rehab.  Pt stated that son Sam is agreeable and able to care for her at home.  SS requested to discuss with son, Sam.  Pt stated son would be at [facility] on this date and she would notify him that SS needed to clarify with him that he would be available and agreeable to care for pt at home.  Pt currently does not utilize home health services.  Pt currently utilizes home 02 at 2 liters, wheelchair, walker and shower chair via [employer].  SS will follow.       SAMY Doran

## 2020-09-21 NOTE — PLAN OF CARE
Goal Outcome Evaluation:  Plan of Care Reviewed With: patient  Progress: no change   Patient resting in bed, has been able to get up to BSC today with one assist.  Patient currently NPO pending heart cath and is on heparin gtt. Will monitor.

## 2020-09-21 NOTE — PROGRESS NOTES
LOS: 11 days     Name: Yumiko Purdy  Age/Sex: 54 y.o. female  :  1965        PCP: Niko Schaefer MD  REF: No ref. provider found    Active Problems:    Acute on chronic congestive heart failure (CMS/HCC)      Reason for follow-up: Acute systolic heart failure, atrial fibrillation and abnormal stress test    Subjective       Subjective     Yumiko Purdy is a 54-year-old female admitted with decompensated congestive heart failure and found to have newly diagnosed cardiomyopathy with acute kidney injury.    Interval History: Patient had good urine output with IV diuresis.  Creatinine slightly better today. She states her breathing has improved. Denies any chest pain.       Vital Signs  Temp:  [97.6 °F (36.4 °C)-98.6 °F (37 °C)] 98.1 °F (36.7 °C)  Heart Rate:  [65-79] 71  Resp:  [18-20] 20  BP: ()/(52-71) 100/52     Vital Signs (last 72 hrs)        07  -   0659  07  -   0659  07  -   0659  07  -   0843   Most Recent    Temp (°F) 97.5 -  98.2    98.1 -  98.5    97.6 -  98.6       98.1 (36.7)    Heart Rate 57 -  76    70 -  79    65 -  79       71    Resp 18 -  20    18 -  20    18 -  20       20    BP 90/56 -  129/58    90/57 -  120/64    99/60 -  126/71       100/52    SpO2 (%) 90 -  99    92 -  96    95 -  99       96        Body mass index is 45.01 kg/m².    Intake/Output Summary (Last 24 hours) at 2020 0843  Last data filed at 2020 0839  Gross per 24 hour   Intake 720 ml   Output 2850 ml   Net -2130 ml     Objective    Objective       Physical Exam:     General Appearance:    Alert, cooperative, in no acute distress   Head:    Normocephalic, without obvious abnormality, atraumatic   Eyes:            Conjunctivae and sclerae normal, no   icterus, no pallor, corneas clear.   Neck:   No adenopathy, supple, trachea midline, no thyromegaly, no   carotid bruit, no JVD   Lungs:     Clear to auscultation,respirations regular, even and                   unlabored    Heart:    Regular rhythm and normal rate, normal S1 and S2, no            murmur, no gallop, no rub, no click   Chest Wall:    No abnormalities observed   Abdomen:     Normal bowel sounds, no masses, no organomegaly, soft        non-tender, non-distended, no guarding, no rebound                tenderness   Extremities:   Moves all extremities well, 1+ BLE edema, no cyanosis, no             redness   Pulses:   Pulses palpable and equal bilaterally   Skin:   No bleeding, bruising or rash       Neurologic:  Alert and oriented   I have seen and performed a physical examination today.     Results review       Results Review:   Results from last 7 days   Lab Units 09/21/20  0057 09/20/20  0007 09/18/20  0211 09/17/20  0459 09/16/20  0343 09/15/20  0049   WBC 10*3/mm3 4.08 3.99 5.82 7.87 6.94 8.23   HEMOGLOBIN g/dL 8.4* 8.1* 8.6* 8.8* 8.5* 9.6*   PLATELETS 10*3/mm3 113* 103* 100* 121* 107* 130*     Results from last 7 days   Lab Units 09/21/20  0057 09/20/20  0007 09/19/20  0143 09/18/20  0211 09/17/20  0459 09/16/20  0343 09/15/20  0049   SODIUM mmol/L 138 139 141 138 141 140 141   POTASSIUM mmol/L 4.3 4.2 4.2 4.1 4.4 4.2 4.6   CHLORIDE mmol/L 101 100 102 101 100 100 98   CO2 mmol/L 31.9* 32.0* 32.6* 27.5 33.3* 33.6* 29.1*   BUN mg/dL 24* 26* 27* 30* 38* 39* 50*   CREATININE mg/dL 1.33* 1.55* 1.42* 1.18* 1.40* 1.20* 1.34*   CALCIUM mg/dL 8.4* 8.2* 8.5* 8.5* 8.7 8.5* 9.1   GLUCOSE mg/dL 123* 194* 105* 215* 125* 150* 198*   ALT (SGPT) U/L  --   --   --   --   --  22 26   AST (SGOT) U/L  --   --   --   --   --  24 29         Lab Results   Component Value Date    INR 1.26 (H) 09/21/2020    INR 1.55 (H) 09/20/2020    INR 2.77 (H) 09/19/2020    INR 3.28 (H) 09/18/2020    INR 2.77 (H) 09/17/2020    INR 2.38 (H) 09/16/2020    INR 2.13 (H) 09/15/2020     Lab Results   Component Value Date    MG 1.9 09/15/2020    MG 2.0 09/14/2020    MG 2.1 09/13/2020     Lab Results   Component Value Date    TSH 3.830 09/10/2020     CHLPL 103 04/21/2016    TRIG 80 09/11/2020    HDL 43 09/11/2020    LDL 78 09/11/2020      Imaging Results (Last 48 Hours)     ** No results found for the last 48 hours. **        Lab Results   Component Value Date    BNP 86 05/08/2016       Echo   Results for orders placed during the hospital encounter of 09/09/20   Adult Transesophageal Echo ([facility]) W/ Cont if Necessary Per Protocol (Cardiology Department)    Narrative · Ejection fraction appears to be 21 - 25%. Left ventricular systolic   function is severely decreased.  · Right ventricular cavity is mildly dilated. Moderately reduced right   ventricular systolic function noted.  · Trace mitral valve regurgitation is present.  · Moderate tricuspid valve regurgitation is present. Estimated right   ventricular systolic pressure from tricuspid regurgitation is normal (<35   mmHg).  · There is (grade 1) plaque in the proximal aorta present. There is (grade   1) plaque in the ascending aorta present. There is (grade 1) plaque in the   aortic arch present. There is (grade 1) plaque in the descending aorta   present.         I reviewed the patient's new clinical results.    Telemetry: NSR 60's        Medication Review:   amiodarone, 200 mg, Oral, Daily  aspirin, 81 mg, Oral, Daily  bumetanide, 2 mg, Oral, Daily  carvedilol, 6.25 mg, Oral, BID With Meals  folic acid, 1 mg, Oral, Daily  hydrocortisone, , Topical, Q12H  insulin aspart, 0-14 Units, Subcutaneous, TID AC  insulin aspart, 5 Units, Subcutaneous, TID With Meals  insulin detemir, 10 Units, Subcutaneous, Nightly  polyethylene glycol, 17 g, Oral, Daily  predniSONE, 2.5 mg, Oral, Daily  pregabalin, 100 mg, Oral, Q12H  rOPINIRole, 1 mg, Oral, TID  senna-docusate sodium, 2 tablet, Oral, Nightly  sodium chloride, 10 mL, Intravenous, Q12H  sodium chloride, 10 mL, Intravenous, Q12H  spironolactone, 12.5 mg, Oral, Daily  venlafaxine XR, 75 mg, Oral, Daily        Pharmacy Consult,         Assessment       Assessment:  1. Acute on chronic combined systolic and diastolic congestive heart failure, clinically improving  2. Newly diagnosed advanced cardiomyopathy of unclear etiology (possibly ischemic) with LV ejection fraction of 20 to 25%  3. Abnormal nuclear stress test showing small to medium sized mild degree of ischemia in the inferior wall  4. Paroxysmal atrial fibrillation with controlled ventricular rate, patient underwent successful cardioversion on 9/17/2020 and is maintaining normal sinus rhythm, on Coumadin which is being held for possible LHC and amiodarone  5. Acute kidney injury on chronic kidney disease, creatinine 1.33 today, improved  6. Diabetes mellitus type 2  7. Cirrhosis of the liver    Plan     Recommendations:  1. Patient is feeling better continues to diurese spite holding diuretics her creatinine is improved  2. Plan for cardiac catheterization  3. We will start heparin pending catheterization subsequently we will start on Eliquis  4. Maintaining sinus rhythm    I discussed the patients findings and my recommendations with patient and family      KENNY Beckman, acting as scribe for Dr. Charlee Ken MD FACC  09/21/20  08:43 EDT  I, Charlee Ken MD, FACC, performed the services described in this documentation as documented by the above-named individual under my supervision and made necessary changes in the note is both accurate and complete  Please note that portions of this note were completed with a voice recognition program.

## 2020-09-21 NOTE — PLAN OF CARE
Goal Outcome Evaluation:  Plan of Care Reviewed With: patient  Progress: no change  Outcome Summary: Pt provided prn tylenol x2. No s/s of distress noted. Will continue to monitor.    Pt has been lying in bed and reports no new issues at this time. Will continue to monitor. Continue plan of care.

## 2020-09-22 ENCOUNTER — APPOINTMENT (OUTPATIENT)
Dept: GENERAL RADIOLOGY | Facility: HOSPITAL | Age: 55
End: 2020-09-22

## 2020-09-22 LAB
ANION GAP SERPL CALCULATED.3IONS-SCNC: 5.9 MMOL/L (ref 5–15)
ANISOCYTOSIS BLD QL: NORMAL
APTT PPP: 51.7 SECONDS (ref 25.6–35.3)
APTT PPP: 56.6 SECONDS (ref 25.6–35.3)
BASOPHILS # BLD AUTO: 0.02 10*3/MM3 (ref 0–0.2)
BASOPHILS NFR BLD AUTO: 0.4 % (ref 0–1.5)
BUN SERPL-MCNC: 19 MG/DL (ref 6–20)
BUN/CREAT SERPL: 16.4 (ref 7–25)
CALCIUM SPEC-SCNC: 8.6 MG/DL (ref 8.6–10.5)
CHLORIDE SERPL-SCNC: 100 MMOL/L (ref 98–107)
CO2 SERPL-SCNC: 33.1 MMOL/L (ref 22–29)
CREAT SERPL-MCNC: 1.16 MG/DL (ref 0.57–1)
CRP SERPL-MCNC: 0.55 MG/DL (ref 0–0.5)
DEPRECATED RDW RBC AUTO: 71.7 FL (ref 37–54)
EOSINOPHIL # BLD AUTO: 0.13 10*3/MM3 (ref 0–0.4)
EOSINOPHIL NFR BLD AUTO: 2.8 % (ref 0.3–6.2)
ERYTHROCYTE [DISTWIDTH] IN BLOOD BY AUTOMATED COUNT: 19.9 % (ref 12.3–15.4)
GFR SERPL CREATININE-BSD FRML MDRD: 49 ML/MIN/1.73
GLUCOSE BLDC GLUCOMTR-MCNC: 123 MG/DL (ref 70–130)
GLUCOSE BLDC GLUCOMTR-MCNC: 127 MG/DL (ref 70–130)
GLUCOSE BLDC GLUCOMTR-MCNC: 164 MG/DL (ref 70–130)
GLUCOSE BLDC GLUCOMTR-MCNC: 234 MG/DL (ref 70–130)
GLUCOSE SERPL-MCNC: 135 MG/DL (ref 65–99)
HCT VFR BLD AUTO: 29.9 % (ref 34–46.6)
HGB BLD-MCNC: 8.5 G/DL (ref 12–15.9)
HYPOCHROMIA BLD QL: NORMAL
IMM GRANULOCYTES # BLD AUTO: 0.02 10*3/MM3 (ref 0–0.05)
IMM GRANULOCYTES NFR BLD AUTO: 0.4 % (ref 0–0.5)
INR PPP: 1.09 (ref 0.9–1.1)
LYMPHOCYTES # BLD AUTO: 1.83 10*3/MM3 (ref 0.7–3.1)
LYMPHOCYTES NFR BLD AUTO: 40 % (ref 19.6–45.3)
MACROCYTES BLD QL SMEAR: NORMAL
MCH RBC QN AUTO: 27.8 PG (ref 26.6–33)
MCHC RBC AUTO-ENTMCNC: 28.4 G/DL (ref 31.5–35.7)
MCV RBC AUTO: 97.7 FL (ref 79–97)
MONOCYTES # BLD AUTO: 0.18 10*3/MM3 (ref 0.1–0.9)
MONOCYTES NFR BLD AUTO: 3.9 % (ref 5–12)
NEUTROPHILS NFR BLD AUTO: 2.4 10*3/MM3 (ref 1.7–7)
NEUTROPHILS NFR BLD AUTO: 52.5 % (ref 42.7–76)
NRBC BLD AUTO-RTO: 0 /100 WBC (ref 0–0.2)
PF4 HEPARIN CMPLX IGG SERPL IA: 0.04 OD (ref 0–0.4)
PLAT MORPH BLD: NORMAL
PLATELET # BLD AUTO: 120 10*3/MM3 (ref 140–450)
PMV BLD AUTO: 10.6 FL (ref 6–12)
POLYCHROMASIA BLD QL SMEAR: NORMAL
POTASSIUM SERPL-SCNC: 4.3 MMOL/L (ref 3.5–5.2)
PROCALCITONIN SERPL-MCNC: 0.18 NG/ML (ref 0–0.25)
PROTHROMBIN TIME: 13.9 SECONDS (ref 11.9–14.1)
RBC # BLD AUTO: 3.06 10*6/MM3 (ref 3.77–5.28)
SARS-COV-2 RNA NOSE QL NAA+PROBE: DETECTED
SODIUM SERPL-SCNC: 139 MMOL/L (ref 136–145)
WBC # BLD AUTO: 4.58 10*3/MM3 (ref 3.4–10.8)

## 2020-09-22 PROCEDURE — 82962 GLUCOSE BLOOD TEST: CPT

## 2020-09-22 PROCEDURE — 85730 THROMBOPLASTIN TIME PARTIAL: CPT | Performed by: HOSPITALIST

## 2020-09-22 PROCEDURE — 71045 X-RAY EXAM CHEST 1 VIEW: CPT | Performed by: RADIOLOGY

## 2020-09-22 PROCEDURE — 25010000002 ONDANSETRON PER 1 MG: Performed by: HOSPITALIST

## 2020-09-22 PROCEDURE — 80048 BASIC METABOLIC PNL TOTAL CA: CPT | Performed by: HOSPITALIST

## 2020-09-22 PROCEDURE — 97530 THERAPEUTIC ACTIVITIES: CPT

## 2020-09-22 PROCEDURE — 63710000001 INSULIN DETEMIR PER 5 UNITS: Performed by: HOSPITALIST

## 2020-09-22 PROCEDURE — 99232 SBSQ HOSP IP/OBS MODERATE 35: CPT | Performed by: SPECIALIST

## 2020-09-22 PROCEDURE — 85610 PROTHROMBIN TIME: CPT | Performed by: HOSPITALIST

## 2020-09-22 PROCEDURE — 85025 COMPLETE CBC W/AUTO DIFF WBC: CPT | Performed by: HOSPITALIST

## 2020-09-22 PROCEDURE — 99233 SBSQ HOSP IP/OBS HIGH 50: CPT | Performed by: HOSPITALIST

## 2020-09-22 PROCEDURE — 63710000001 PREDNISONE PER 5 MG: Performed by: HOSPITALIST

## 2020-09-22 PROCEDURE — 25010000002 HEPARIN (PORCINE) PER 1000 UNITS: Performed by: HOSPITALIST

## 2020-09-22 PROCEDURE — 85007 BL SMEAR W/DIFF WBC COUNT: CPT | Performed by: HOSPITALIST

## 2020-09-22 PROCEDURE — 63710000001 INSULIN ASPART PER 5 UNITS: Performed by: HOSPITALIST

## 2020-09-22 PROCEDURE — 71045 X-RAY EXAM CHEST 1 VIEW: CPT

## 2020-09-22 PROCEDURE — 94799 UNLISTED PULMONARY SVC/PX: CPT

## 2020-09-22 PROCEDURE — 97116 GAIT TRAINING THERAPY: CPT

## 2020-09-22 PROCEDURE — 86140 C-REACTIVE PROTEIN: CPT | Performed by: NURSE PRACTITIONER

## 2020-09-22 RX ADMIN — BUMETANIDE 2 MG: 1 TABLET ORAL at 08:41

## 2020-09-22 RX ADMIN — CARVEDILOL 6.25 MG: 6.25 TABLET, FILM COATED ORAL at 08:41

## 2020-09-22 RX ADMIN — DOCUSATE SODIUM 50 MG AND SENNOSIDES 8.6 MG 2 TABLET: 8.6; 5 TABLET, FILM COATED ORAL at 20:33

## 2020-09-22 RX ADMIN — ROPINIROLE HYDROCHLORIDE 1 MG: 1 TABLET, FILM COATED ORAL at 20:33

## 2020-09-22 RX ADMIN — PREGABALIN 100 MG: 100 CAPSULE ORAL at 20:33

## 2020-09-22 RX ADMIN — ASPIRIN 81 MG: 81 TABLET, COATED ORAL at 08:40

## 2020-09-22 RX ADMIN — INSULIN DETEMIR 10 UNITS: 100 INJECTION, SOLUTION SUBCUTANEOUS at 20:28

## 2020-09-22 RX ADMIN — INSULIN ASPART 5 UNITS: 100 INJECTION, SOLUTION INTRAVENOUS; SUBCUTANEOUS at 11:00

## 2020-09-22 RX ADMIN — ACETAMINOPHEN 650 MG: 325 TABLET ORAL at 08:39

## 2020-09-22 RX ADMIN — ROPINIROLE HYDROCHLORIDE 1 MG: 1 TABLET, FILM COATED ORAL at 08:40

## 2020-09-22 RX ADMIN — ONDANSETRON 4 MG: 2 INJECTION INTRAMUSCULAR; INTRAVENOUS at 08:40

## 2020-09-22 RX ADMIN — SODIUM CHLORIDE, PRESERVATIVE FREE 10 ML: 5 INJECTION INTRAVENOUS at 20:34

## 2020-09-22 RX ADMIN — HEPARIN SODIUM 2500 UNITS: 5000 INJECTION INTRAVENOUS; SUBCUTANEOUS at 13:43

## 2020-09-22 RX ADMIN — HYDROCORTISONE: 1 CREAM TOPICAL at 08:43

## 2020-09-22 RX ADMIN — INSULIN ASPART 5 UNITS: 100 INJECTION, SOLUTION INTRAVENOUS; SUBCUTANEOUS at 08:39

## 2020-09-22 RX ADMIN — SPIRONOLACTONE 12.5 MG: 25 TABLET ORAL at 08:40

## 2020-09-22 RX ADMIN — INSULIN ASPART 5 UNITS: 100 INJECTION, SOLUTION INTRAVENOUS; SUBCUTANEOUS at 17:09

## 2020-09-22 RX ADMIN — APIXABAN 5 MG: 5 TABLET, FILM COATED ORAL at 20:33

## 2020-09-22 RX ADMIN — ACETAMINOPHEN 650 MG: 325 TABLET ORAL at 15:08

## 2020-09-22 RX ADMIN — HYDROCORTISONE: 1 CREAM TOPICAL at 20:34

## 2020-09-22 RX ADMIN — CARVEDILOL 6.25 MG: 6.25 TABLET, FILM COATED ORAL at 17:08

## 2020-09-22 RX ADMIN — PREDNISONE 2.5 MG: 5 TABLET ORAL at 08:40

## 2020-09-22 RX ADMIN — AMIODARONE HYDROCHLORIDE 200 MG: 200 TABLET ORAL at 08:40

## 2020-09-22 RX ADMIN — FOLIC ACID 1 MG: 1 TABLET ORAL at 08:40

## 2020-09-22 RX ADMIN — ACETAMINOPHEN 650 MG: 325 TABLET ORAL at 21:30

## 2020-09-22 RX ADMIN — INSULIN ASPART 3 UNITS: 100 INJECTION, SOLUTION INTRAVENOUS; SUBCUTANEOUS at 17:10

## 2020-09-22 RX ADMIN — ROPINIROLE HYDROCHLORIDE 1 MG: 1 TABLET, FILM COATED ORAL at 15:09

## 2020-09-22 RX ADMIN — PREGABALIN 100 MG: 100 CAPSULE ORAL at 08:41

## 2020-09-22 RX ADMIN — VENLAFAXINE HYDROCHLORIDE 75 MG: 75 CAPSULE, EXTENDED RELEASE ORAL at 08:39

## 2020-09-22 RX ADMIN — APIXABAN 5 MG: 5 TABLET, FILM COATED ORAL at 15:08

## 2020-09-22 RX ADMIN — Medication: at 08:41

## 2020-09-22 NOTE — PAYOR COMM NOTE
"[facility]  NPI:[id number]    Utilization Review  Contact: Sandy Powell RN  Phone: [phone]  Fax:[phone]    CLINICAL UPDATE        CLINICAL FOR RECONSIDERATION OF DENIAL PLEASE BE AWARE THIS PATIENT IS IN HEARTY FAILURE AND POSITIVE COVID 19      Dio Berry (54 y.o. Female)     Date of Birth Social Security Number Address Home Phone MRN    1965  [address]   [place] [address] [phone] [id number]    Mandaen Marital Status          Unknown        Admission Date Admission Type Admitting Provider Attending Provider Department, Room/Bed    9/10/20 Emergency Halle Keita MD Oculam, Claire Chin, MD [facility], P220/1P    Discharge Date Discharge Disposition Discharge Destination                       Attending Provider: Halle Keita MD    Allergies: Phenergan [Promethazine]    Isolation: Enh Drop/Con   Infection: COVID (confirmed) (09/21/20)   Code Status: CPR    Ht: 165.1 cm (65\")   Wt: 123 kg (271 lb)    Admission Cmt: None   Principal Problem: None                Active Insurance as of 9/9/2020     Primary Coverage     Payor Plan Insurance Group Employer/Plan Group    [employer] [place] [employer] [place]      Payor Plan Address Payor Plan Phone Number Payor Plan Fax Number Effective Dates    [address] [address] [phone]  9/9/2020 - None Entered    [facility] [id number]       Subscriber Name Subscriber Birth Date Member ID       DIO BERRY 1965 [id number]                 Emergency Contacts      (Rel.) Home Phone Work Phone Mobile Phone    Hayden Cárdenas (Brother) -- -- [phone]            COVID PRE-OP / PRE-PROCEDURE SCREENING ORDER (NO ISOLATION) - Swab, Nasal Cavity [[id number]] (Order [id number])  Order  Date: 9/21/2020 Department: [facility] Released By/Authorizing: Sandy Ellis APRN (auto-released)   Linked Results    Procedure Abnormality Status   COVID-19 ABBOTT " IN-HOUSE,NP Swab (NO TRANSPORT MEDIA) 2 HR TAT - Swab, Nasal Cavity CriticalCritical  Final result   Reprint Order Requisition    COVID-19, ABBOTT IN-HOUSE,NP Swab (NO TRANSPORT MEDIA) 2 HR TAT - Swab, Nasal Cavity (Order #[id number]) on 9/21/20       Contains critical data COVID-19, ABBOTT IN-HOUSE,NP Swab (NO TRANSPORT MEDIA) 2 HR TAT - Swab, Nasal Cavity  Order: [id number] - Part of Panel Order [id number]  Status:  Final result   Visible to patient:  No (not released) Next appt:  None  Specimen Information: Nasal Cavity; Swab        Component   Ref Range & Units    COVID19   Not Detected - Ref. Range DetectedCritical     Resulting Agency  [facility]      Narrative  Performed by:  [facility]  Fact sheet for providers: https://www.fda.gov/media/305473/download     Fact sheet for patients: https://www.fda.gov/media/393131/download      Specimen Collected: 09/21/20 13:25 Last Resulted: 09/21/20 14:15 Order Details View Encounter Lab and Collection Details Routing Result History            Result Read / Acknowledged     Acknowledge result  No acknowledgement history exists for this order.   Other Results from 9/9/2020     aPTT  Final result 9/22/2020    Protime-INR  Final result 9/22/2020    Basic Metabolic Panel  Final result 9/22/2020    CBC Auto Differential  Final result 9/22/2020    POC Glucose Once  Final result 9/22/2020    Scan Slide  Final result 9/22/2020    aPTT  Final result 9/21/2020    POC Glucose Once  Final result 9/21/2020    POC Glucose Once  Final result 9/21/2020    Lactate Dehydrogenase  Final result 9/21/2020    C-reactive Protein  Final result 9/21/2020    Lactic Acid, Plasma  Final result 9/21/2020    Ferritin  Final result 9/21/2020    aPTT  Final result 9/21/2020    D-dimer, Quantitative  Final result 9/21/2020    Type & Screen  Edited Result - FINAL 9/21/2020    Procalcitonin  Final result 9/21/2020    Respiratory Panel, PCR - Swab, Nasopharynx  Final result 9/21/2020    COVID-19,[facility], NP  SWAB IN [employer]IRAL TRANSPORT MEDIA 24-30 HR TAT - Swab, Nasopharynx  In process 2020    Protime-INR  Final result 2020   (important suggestion)  Warning: Additional results from 2020 are available but are not displayed in this report.     Charlee Ken MD   Physician   Cardiology   Progress Notes   Signed   Date of Service:  20   Creation Time:  20            Signed        Expand All Collapse All []Expand All by Default    Show:Clear all  [x]Manual[x]Template[]Copied    Added by:  [x]Sandy Ellis APRN[x]Charlee Ken MD    []Hover for details         LOS: 11 days      Name: Yumiko Purdy  Age/Sex: 54 y.o. female  :  1965        PCP: Niko Schaefer MD  REF: No ref. provider found     Active Problems:    Acute on chronic congestive heart failure (CMS/HCC)        Reason for follow-up: Acute systolic heart failure, atrial fibrillation and abnormal stress test     Subjective            Subjective         Yumiko Purdy is a 54-year-old female admitted with decompensated congestive heart failure and found to have newly diagnosed cardiomyopathy with acute kidney injury.     Interval History: Patient had good urine output with IV diuresis.  Creatinine slightly better today. She states her breathing has improved. Denies any chest pain.         Vital Signs  Temp:  [97.6 °F (36.4 °C)-98.6 °F (37 °C)] 98.1 °F (36.7 °C)  Heart Rate:  [65-79] 71  Resp:  [18-20] 20  BP: ()/(52-71) 100/52                 Vital Signs (last 72 hrs)         0700  -   0659  07  -   0659  07  -   0659  07  -   0843   Most Recent     Temp (°F) 97.5 -  98.2    98.1 -  98.5    97.6 -  98.6        98.1 (36.7)     Heart Rate 57 -  76    70 -  79    65 -  79        71     Resp 18 -  20    18 -  20    18 -  20        20     BP 90/56 -  129/58    90/57 -  120/64    99/60 -  126/71        100/52     SpO2 (%) 90 -  99    92 -  96    95 -  99        96           Body mass index is 45.01 kg/m².     Intake/Output Summary (Last 24 hours) at 9/21/2020 0843  Last data filed at 9/21/2020 0839      Gross per 24 hour   Intake 720 ml   Output 2850 ml   Net -2130 ml      Objective     Objective         Physical Exam:                General Appearance:    Alert, cooperative, in no acute distress   Head:    Normocephalic, without obvious abnormality, atraumatic   Eyes:            Conjunctivae and sclerae normal, no   icterus, no pallor, corneas clear.   Neck:   No adenopathy, supple, trachea midline, no thyromegaly, no   carotid bruit, no JVD   Lungs:     Clear to auscultation,respirations regular, even and                  unlabored    Heart:    Regular rhythm and normal rate, normal S1 and S2, no            murmur, no gallop, no rub, no click   Chest Wall:    No abnormalities observed   Abdomen:     Normal bowel sounds, no masses, no organomegaly, soft        non-tender, non-distended, no guarding, no rebound                tenderness   Extremities:   Moves all extremities well, 1+ BLE edema, no cyanosis, no             redness   Pulses:   Pulses palpable and equal bilaterally   Skin:   No bleeding, bruising or rash         Neurologic:  Alert and oriented   I have seen and performed a physical examination today.      Results review         Results Review:             Results from last 7 days   Lab Units 09/21/20  0057 09/20/20  0007 09/18/20  0211 09/17/20 0459 09/16/20  0343 09/15/20  0049   WBC 10*3/mm3 4.08 3.99 5.82 7.87 6.94 8.23   HEMOGLOBIN g/dL 8.4* 8.1* 8.6* 8.8* 8.5* 9.6*   PLATELETS 10*3/mm3 113* 103* 100* 121* 107* 130*                 Results from last 7 days   Lab Units 09/21/20  0057 09/20/20  0007 09/19/20  0143 09/18/20  0211 09/17/20  0459 09/16/20  0343 09/15/20  0049   SODIUM mmol/L 138 139 141 138 141 140 141   POTASSIUM mmol/L 4.3 4.2 4.2 4.1 4.4 4.2 4.6   CHLORIDE mmol/L 101 100 102 101 100 100 98   CO2 mmol/L 31.9* 32.0* 32.6* 27.5 33.3* 33.6* 29.1*    BUN mg/dL 24* 26* 27* 30* 38* 39* 50*   CREATININE mg/dL 1.33* 1.55* 1.42* 1.18* 1.40* 1.20* 1.34*   CALCIUM mg/dL 8.4* 8.2* 8.5* 8.5* 8.7 8.5* 9.1   GLUCOSE mg/dL 123* 194* 105* 215* 125* 150* 198*   ALT (SGPT) U/L  --   --   --   --   --  22 26   AST (SGOT) U/L  --   --   --   --   --  24 29                Lab Results   Component Value Date     INR 1.26 (H) 09/21/2020     INR 1.55 (H) 09/20/2020     INR 2.77 (H) 09/19/2020     INR 3.28 (H) 09/18/2020     INR 2.77 (H) 09/17/2020     INR 2.38 (H) 09/16/2020     INR 2.13 (H) 09/15/2020            Lab Results   Component Value Date     MG 1.9 09/15/2020     MG 2.0 09/14/2020     MG 2.1 09/13/2020            Lab Results   Component Value Date     TSH 3.830 09/10/2020     CHLPL 103 04/21/2016     TRIG 80 09/11/2020     HDL 43 09/11/2020     LDL 78 09/11/2020          Imaging Results (Last 48 Hours)      ** No results found for the last 48 hours. **                Lab Results   Component Value Date     BNP 86 05/08/2016         Echo        Results for orders placed during the hospital encounter of 09/09/20   Adult Transesophageal Echo (KHAI) W/ Cont if Necessary Per Protocol (Cardiology Department)     Narrative · Ejection fraction appears to be 21 - 25%. Left ventricular systolic   function is severely decreased.  · Right ventricular cavity is mildly dilated. Moderately reduced right   ventricular systolic function noted.  · Trace mitral valve regurgitation is present.  · Moderate tricuspid valve regurgitation is present. Estimated right   ventricular systolic pressure from tricuspid regurgitation is normal (<35   mmHg).  · There is (grade 1) plaque in the proximal aorta present. There is (grade   1) plaque in the ascending aorta present. There is (grade 1) plaque in the   aortic arch present. There is (grade 1) plaque in the descending aorta   present.          I reviewed the patient's new clinical results.     Telemetry: NSR 60's         Medication Review:    amiodarone, 200 mg, Oral, Daily  aspirin, 81 mg, Oral, Daily  bumetanide, 2 mg, Oral, Daily  carvedilol, 6.25 mg, Oral, BID With Meals  folic acid, 1 mg, Oral, Daily  hydrocortisone, , Topical, Q12H  insulin aspart, 0-14 Units, Subcutaneous, TID AC  insulin aspart, 5 Units, Subcutaneous, TID With Meals  insulin detemir, 10 Units, Subcutaneous, Nightly  polyethylene glycol, 17 g, Oral, Daily  predniSONE, 2.5 mg, Oral, Daily  pregabalin, 100 mg, Oral, Q12H  rOPINIRole, 1 mg, Oral, TID  senna-docusate sodium, 2 tablet, Oral, Nightly  sodium chloride, 10 mL, Intravenous, Q12H  sodium chloride, 10 mL, Intravenous, Q12H  spironolactone, 12.5 mg, Oral, Daily  venlafaxine XR, 75 mg, Oral, Daily           Pharmacy Consult,            Assessment       Assessment:  1. Acute on chronic combined systolic and diastolic congestive heart failure, clinically improving  2. Newly diagnosed advanced cardiomyopathy of unclear etiology (possibly ischemic) with LV ejection fraction of 20 to 25%  3. Abnormal nuclear stress test showing small to medium sized mild degree of ischemia in the inferior wall  4. Paroxysmal atrial fibrillation with controlled ventricular rate, patient underwent successful cardioversion on 9/17/2020 and is maintaining normal sinus rhythm, on Coumadin which is being held for possible LHC and amiodarone  5. Acute kidney injury on chronic kidney disease, creatinine 1.33 today, improved  6. Diabetes mellitus type 2  7. Cirrhosis of the liver     Plan      Recommendations:  1. Patient is feeling better continues to diurese spite holding diuretics her creatinine is improved  2. Plan for cardiac catheterization  3. We will start heparin pending catheterization subsequently we will start on Eliquis  4. Maintaining sinus rhythm     I discussed the patients findings and my recommendations with patient and family        Sandy KENNY Ellis, acting as scribe for Dr. Charlee Ken MD [facility]  09/21/20  08:43 EDT  Charlee SANTIAGO  MD Jesus Manuel, [facility], performed the services described in this documentation as documented by the above-named individual under my supervision and made necessary changes in the note is both accurate and complete  Please note that portions of this note were completed with a voice recognition program.               Revision History                                Halle Keita MD   Physician   Medicine   Progress Notes   Signed   Date of Service:  20   Creation Time:  20            Signed        Expand All Collapse All []Expand All by Default    Show:Clear all  [x]Manual[x]Template[x]Copied    Added by:  [x]Halle Keita MD    []Placido for details       [facility] PROGRESS NOTE     Patient Identification:  Name:  Yumiko Purdy  Age:  54 y.o.  Sex:  female  :  1965  MRN:  [id number]  Visit Number:  [id number]  Primary Care Provider:  Niko Schaefer MD     Length of stay:  11     Subjective: Patient seen and examined, patient is currently out of bed to chair, she is breathing better she appears better, blood pressure is normally hypotensive, she has good urine output.  Renal function also noted to be improved.  Patient tentatively is for heart catheterization for new onset cardiomyopathy.  Neurology help appreciated.  Patient continued to maintain sinus rhythm after cardioversion.  Her skin rash also notably has improved.     Chief Complaint: Shortness of breath edema  ----------------------------------------------------------------------------------------------------------------------  Current Hospital Meds:  amiodarone, 200 mg, Oral, Daily  aspirin, 81 mg, Oral, Daily  bumetanide, 2 mg, Oral, Daily  carvedilol, 6.25 mg, Oral, BID With Meals  folic acid, 1 mg, Oral, Daily  hydrocortisone, , Topical, Q12H  insulin aspart, 0-14 Units, Subcutaneous, TID AC  insulin aspart, 5 Units, Subcutaneous, TID With Meals  insulin detemir, 10 Units, Subcutaneous,  Nightly  polyethylene glycol, 17 g, Oral, Daily  predniSONE, 2.5 mg, Oral, Daily  pregabalin, 100 mg, Oral, Q12H  rOPINIRole, 1 mg, Oral, TID  senna-docusate sodium, 2 tablet, Oral, Nightly  sodium chloride, 10 mL, Intravenous, Q12H  sodium chloride, 10 mL, Intravenous, Q12H  spironolactone, 12.5 mg, Oral, Daily  venlafaxine XR, 75 mg, Oral, Daily        heparin (porcine), 8.13 Units/kg/hr, Last Rate: 8.13 Units/kg/hr (09/21/20 1118)  Pharmacy Consult,         ----------------------------------------------------------------------------------------------------------------------  Vital Signs:  Temp:  [97.6 °F (36.4 °C)-98.6 °F (37 °C)] 98.3 °F (36.8 °C)  Heart Rate:  [65-79] 71  Resp:  [18-20] 18  BP: ()/(52-71) 114/67       Tele: Sinus rhythm 72 bpm  Vitals              09/19/20  0500 09/20/20  0500 09/21/20  0359   Weight: 119 kg (262 lb 3.2 oz) 120 kg (265 lb) (RN Rochelle aware) 123 kg (270 lb 8 oz)        Body mass index is 45.01 kg/m².     Intake/Output Summary (Last 24 hours) at 9/21/2020 1125  Last data filed at 9/21/2020 0931      Gross per 24 hour   Intake 600 ml   Output 2450 ml   Net -1850 ml      NPO Diet  ----------------------------------------------------------------------------------------------------------------------  Physical exam:  General: Comfortable,awake, alert, oriented to self, place, and time, chronically ill-appearing, she is currently out of bed to chair/commode.  Morbidly obese no respiratory distress.   Skin:  Skin is warm and dry.  The rash that was noted is now less prominent and now drying, with scabbing noted no signs of cellulitis.  No pallor.    HENT:  Head:  Normocephalic and atraumatic.  Mouth:  Moist mucous membranes.    Eyes:  Conjunctivae and EOM are normal.  Pupils are equal, round, and reactive to light.  No scleral icterus.    Neck:  Neck supple.  JVD present.     Pulmonary/Chest:  No respiratory distress, no wheezes, no crackles, with normal breath sounds and good air  movement.  Slight decreased breath sounds both bases especially in the left  Cardiovascular:  Normal rate, regular rhythm and normal heart sounds with no murmur.  Abdominal:  Soft.  Bowel sounds are normal.  No distension and no tenderness.   Extremities: Anasarca and edema noted, again rash noted.Strong pulses in all 4 extremities with no clubbing, no cyanosis.  Neurological:  Motor strength equal no obvious deficit, sensory grossly intact.   No cranial nerve deficit.  No tongue deviation.  No facial droop.  No slurred speech.    Genitourinary: No Harrington catheter  Back: No skin break     ----------------------------------------------------------------------------------------------------------------------  ----------------------------------------------------------------------------------------------------------------------                 Results from last 7 days   Lab Units 09/21/20  0057 09/20/20  0007 09/19/20  0143 09/18/20  0211 09/17/20  0459 09/16/20  0343 09/15/20  1021   WBC 10*3/mm3 4.08 3.99  --  5.82 7.87 6.94  --    HEMOGLOBIN g/dL 8.4* 8.1*  --  8.6* 8.8* 8.5*  --    HEMATOCRIT % 29.4* 28.6*  --  30.7* 31.1* 29.9*  --    MCV fL 97.4* 97.3*  --  101.3* 97.8* 95.8  --    MCHC g/dL 28.6* 28.3*  --  28.0* 28.3* 28.4*  --    PLATELETS 10*3/mm3 113* 103*  --  100* 121* 107*  --    INR   1.26* 1.55* 2.77* 3.28* 2.77* 2.38* 2.13*                    Results from last 7 days   Lab Units 09/21/20  0057 09/20/20  0007 09/19/20  0143   09/16/20  0343 09/15/20  0049   SODIUM mmol/L 138 139 141   < > 140 141   POTASSIUM mmol/L 4.3 4.2 4.2   < > 4.2 4.6   MAGNESIUM mg/dL  --   --   --   --   --  1.9   CHLORIDE mmol/L 101 100 102   < > 100 98   CO2 mmol/L 31.9* 32.0* 32.6*   < > 33.6* 29.1*   BUN mg/dL 24* 26* 27*   < > 39* 50*   CREATININE mg/dL 1.33* 1.55* 1.42*   < > 1.20* 1.34*   EGFR IF NONAFRICN AM mL/min/1.73 42* 35* 39*   < > 47* 41*   CALCIUM mg/dL 8.4* 8.2* 8.5*   < > 8.5* 9.1   GLUCOSE mg/dL 123* 194* 105*    < > 150* 198*   ALBUMIN g/dL  --   --   --   --  2.88* 3.10*   BILIRUBIN mg/dL  --   --   --   --  2.2* 1.9*   ALK PHOS U/L  --   --   --   --  81 104   AST (SGOT) U/L  --   --   --   --  24 29   ALT (SGPT) U/L  --   --   --   --  22 26    < > = values in this interval not displayed.   Estimated Creatinine Clearance: 64.4 mL/min (A) (by C-G formula based on SCr of 1.33 mg/dL (H)).     No results found for: AMMONIA      No results found for: BLOODCX  No results found for: URINECX  No results found for: WOUNDCX  No results found for: STOOLCX     I have personally looked at the labs and they are summarized above.  ----------------------------------------------------------------------------------------------------------------------      Imaging Results (Last 24 Hours)      ** No results found for the last 24 hours. **          ----------------------------------------------------------------------------------------------------------------------  Assessment and Plan:     -Acute on chronic combined systolic and diastolic heart failure  -New onset severe cardiomyopathy  -Acute kidney injury on CKD stage III now improved  -Liver cirrhosis  -Macrocytosis with normal B12 folate  -Mild thrombocytopenia  -Thrombocytopenia  -Debility and physical deconditioning  -Skin rash negative for vasculitis on biopsy,  -Anasarca multifactorial secondary to heart failure, hypoalbuminemia and liver cirrhosis  -Diabetes type 2 insulin requiring     Continue out of bed to chair, PT OT, monitor renal function, follow-up signout report for HIT work-up, patient is for cardiac cath for work-up of new onset cardiomyopathy.  Patient will require physical therapy.  Will discuss with .     Halle Keita MD  09/21/20  11:25 EDT

## 2020-09-22 NOTE — CONSULTS
INFECTIOUS DISEASE CONSULTATION REPORT        Patient Identification:  Name:  Yumiko Purdy  Age:  54 y.o.  Sex:  female  :  1965  MRN:  [id number]   Visit Number:  [id number]  Primary Care Physician:  Niko Schaefer MD       LOS: 12 days        Subjective       Subjective     History of present illness:      Thank you Dr. Keita for allowing us to participate in the care of your patient.  As you well know, Ms. Yumiko Purdy is a 54 y.o. female with past medical history significant for chronic A. fib, CHF, diabetes, cirrhosis, who presented to [facility] Emergency Department on 2020 for lower extremity edema and rash.  Patient has been afebrile since admission.  WBC normal.  CRP 0.69.  Lactic acid normal.  D-dimer normal at 0.41.  Procalcitonin normal at 0.18.  Respiratory panel PCR negative.  Admission COVID-19 PCR on 9/10/2020 was negative.  COVID-19 PCR performed on 2020 for possibility of heart cath and was found to be positive. Repeat Lexar COVID-19 PCR in progress.  Patient is currently on home level of oxygen 2 L per nasal cannula with no apparent distress.  Denies worsening shortness of breath.  Denies any known sick contacts.      Infectious Disease consultation was requested for antimicrobial management.      ---------------------------------------------------------------------------------------------------------------------     Review Of Systems:    Constitutional: no fever, chills and night sweats. No appetite change or unexpected weight change. No fatigue.  Eyes: no eye drainage, itching or redness.  HEENT: no mouth sores, dysphagia or nose bleed.  Respiratory: no for shortness of breath, cough or production of sputum.  Cardiovascular: no chest pain, no palpitations, no orthopnea.  Gastrointestinal: no nausea, vomiting or diarrhea. No abdominal pain, hematemesis or rectal bleeding.  Genitourinary: no dysuria or polyuria.  Hematologic/lymphatic: no lymph node  abnormalities, no easy bruising or easy bleeding.  Musculoskeletal: no muscle or joint pain.  Skin: No rash and no itching.  Neurological: no loss of consciousness, no seizure, no headache.  Behavioral/Psych: no depression or suicidal ideation.  Endocrine: no hot flashes.  Immunologic: negative.    ---------------------------------------------------------------------------------------------------------------------     Past Medical History    Past Medical History:   Diagnosis Date   • Anemia    • CHF (congestive heart failure) (CMS/HCC)    • Chronic a-fib (CMS/HCC)     stated by patient    • Cirrhosis of liver (CMS/HCC)     stated by patient    • Diabetes mellitus (CMS/HCC)        Past Surgical History    Past Surgical History:   Procedure Laterality Date   • APPENDECTOMY     • CHOLECYSTECTOMY     • TOE AMPUTATION Right     stated by patient.        Family History    History reviewed. No pertinent family history.    Social History    Social History     Tobacco Use   • Smoking status: Never Smoker   • Smokeless tobacco: Never Used   Substance Use Topics   • Alcohol use: Not on file   • Drug use: Not on file       Allergies    Phenergan [promethazine]  ---------------------------------------------------------------------------------------------------------------------     Home Medications:    Prior to Admission Medications     Prescriptions Last Dose Informant Patient Reported? Taking?    amiodarone (PACERONE) 200 MG tablet 9/9/2020 Pharmacy Yes Yes    Take 200 mg by mouth Daily.    ampicillin (PRINCIPEN) 500 MG capsule 9/9/2020 Pharmacy Yes Yes    Take 500 mg by mouth 3 (Three) Times a Day. Prior to [facility] Admission, Patient was on: was filled on 09/03/20 for 7 day supply, has 4 or 5 doses left according to patient.    folic acid (FOLVITE) 1 MG tablet 9/9/2020 Pharmacy Yes Yes    Take 1 mg by mouth Daily.    Insulin Glargine (BASAGLAR KWIKPEN SC) 9/9/2020 Pharmacy Yes Yes    Inject 100 Units under the skin into the  appropriate area as directed Daily.    Insulin Regular Human, Conc, (HumuLIN R U-500 KwikPen) 500 UNIT/ML solution pen-injector CONCENTRATED injection 9/9/2020 Pharmacy Yes Yes    Inject 30 Units under the skin into the appropriate area as directed 3 (Three) Times a Day Before Meals.    metoprolol tartrate (LOPRESSOR) 25 MG tablet 9/9/2020 Pharmacy Yes Yes    Take 25 mg by mouth 2 (Two) Times a Day.    ondansetron (ZOFRAN) 4 MG tablet 9/9/2020 Pharmacy Yes Yes    Take 4 mg by mouth Every 8 (Eight) Hours As Needed for Nausea or Vomiting.    predniSONE (DELTASONE) 2.5 MG tablet Past Week Pharmacy Yes Yes    Take 2.5 mg by mouth Daily.    pregabalin (LYRICA) 100 MG capsule 9/9/2020 Pharmacy Yes Yes    Take 100 mg by mouth 2 (Two) Times a Day As Needed.    venlafaxine XR (EFFEXOR-XR) 75 MG 24 hr capsule 9/9/2020 Pharmacy Yes Yes    Take 75 mg by mouth Daily.    warfarin (COUMADIN) 3 MG tablet 9/9/2020 Pharmacy Yes Yes    Take 3 mg by mouth Daily.        ---------------------------------------------------------------------------------------------------------------------    Objective       Objective     Hospital Scheduled Meds:  amiodarone, 200 mg, Oral, Daily  aspirin, 81 mg, Oral, Daily  bumetanide, 2 mg, Oral, Daily  carvedilol, 6.25 mg, Oral, BID With Meals  folic acid, 1 mg, Oral, Daily  hydrocortisone, , Topical, Q12H  insulin aspart, 0-14 Units, Subcutaneous, TID AC  insulin aspart, 5 Units, Subcutaneous, TID With Meals  insulin detemir, 10 Units, Subcutaneous, Nightly  polyethylene glycol, 17 g, Oral, Daily  predniSONE, 2.5 mg, Oral, Daily  pregabalin, 100 mg, Oral, Q12H  rOPINIRole, 1 mg, Oral, TID  senna-docusate sodium, 2 tablet, Oral, Nightly  sodium chloride, 10 mL, Intravenous, Q12H  sodium chloride, 10 mL, Intravenous, Q12H  spironolactone, 12.5 mg, Oral, Daily  venlafaxine XR, 75 mg, Oral, Daily      heparin (porcine), 8.13 Units/kg/hr, Last Rate: 6.13 Units/kg/hr (09/22/20 0639)  Pharmacy Consult,        ---------------------------------------------------------------------------------------------------------------------   Vital Signs:  Temp:  [97.4 °F (36.3 °C)-98.5 °F (36.9 °C)] 97.7 °F (36.5 °C)  Heart Rate:  [62-75] 67  Resp:  [13-22] 14  BP: ()/(59-95) 114/70  Mean Arterial Pressure (Non-Invasive) for the past 24 hrs (Last 3 readings):   Noninvasive MAP (mmHg)   09/22/20 0904 91   09/22/20 0802 105   09/22/20 0702 107     SpO2 Percentage    09/22/20 0702 09/22/20 0802 09/22/20 0904   SpO2: 100% 100% 100%     SpO2:  [93 %-100 %] 100 %  on  Flow (L/min):  [2] 2;   Device (Oxygen Therapy): nasal cannula    Body mass index is 45.1 kg/m².  Wt Readings from Last 3 Encounters:   09/22/20 123 kg (271 lb)     ---------------------------------------------------------------------------------------------------------------------     Physical Exam:    Deferred due to COVID-19 isolation.    ---------------------------------------------------------------------------------------------------------------------              Results from last 7 days   Lab Units 09/22/20  0613 09/21/20  1605 09/21/20  1047 09/21/20  0057 09/20/20  0007 09/19/20  0143 09/18/20  0211 09/17/20  0459   CRP mg/dL  --  0.69*  --   --   --   --   --   --    LACTATE mmol/L  --  1.2  --   --   --   --   --   --    WBC 10*3/mm3 4.58  --  4.65 4.08 3.99  --  5.82 7.87   HEMOGLOBIN g/dL 8.5*  --  8.5* 8.4* 8.1*  --  8.6* 8.8*   HEMATOCRIT % 29.9*  --  29.6* 29.4* 28.6*  --  30.7* 31.1*   MCV fL 97.7*  --  98.0* 97.4* 97.3*  --  101.3* 97.8*   MCHC g/dL 28.4*  --  28.7* 28.6* 28.3*  --  28.0* 28.3*   PLATELETS 10*3/mm3 120*  --  115* 113* 103*  --  100* 121*   INR  1.09  --  1.17* 1.26* 1.55* 2.77* 3.28* 2.77*     Results from last 7 days   Lab Units 09/22/20  0613 09/21/20  0057 09/20/20  0007  09/16/20  0343   SODIUM mmol/L 139 138 139   < > 140   POTASSIUM mmol/L 4.3 4.3 4.2   < > 4.2   CHLORIDE mmol/L 100 101 100   < > 100   CO2 mmol/L 33.1* 31.9*  32.0*   < > 33.6*   BUN mg/dL 19 24* 26*   < > 39*   CREATININE mg/dL 1.16* 1.33* 1.55*   < > 1.20*   EGFR IF NONAFRICN AM mL/min/1.73 49* 42* 35*   < > 47*   CALCIUM mg/dL 8.6 8.4* 8.2*   < > 8.5*   GLUCOSE mg/dL 135* 123* 194*   < > 150*   ALBUMIN g/dL  --   --   --   --  2.88*   BILIRUBIN mg/dL  --   --   --   --  2.2*   ALK PHOS U/L  --   --   --   --  81   AST (SGOT) U/L  --   --   --   --  24   ALT (SGPT) U/L  --   --   --   --  22    < > = values in this interval not displayed.   Estimated Creatinine Clearance: 73.8 mL/min (A) (by C-G formula based on SCr of 1.16 mg/dL (H)).  No results found for: AMMONIA    Glucose   Date/Time Value Ref Range Status   09/22/2020 0613 123 70 - 130 mg/dL Final   09/21/2020 2046 221 (H) 70 - 130 mg/dL Final   09/21/2020 1703 208 (H) 70 - 130 mg/dL Final   09/21/2020 1009 188 (H) 70 - 130 mg/dL Final   09/21/2020 0559 149 (H) 70 - 130 mg/dL Final   09/21/2020 0205 151 (H) 70 - 130 mg/dL Final   09/20/2020 1955 253 (H) 70 - 130 mg/dL Final   09/20/2020 1650 287 (H) 70 - 130 mg/dL Final     Lab Results   Component Value Date    HGBA1C 8.40 (H) 09/10/2020     Lab Results   Component Value Date    TSH 3.830 09/10/2020    FREET4 1.39 09/10/2020       No results found for: BLOODCX  No results found for: URINECX  No results found for: WOUNDCX  No results found for: STOOLCX  No results found for: RESPCX  Pain Management Panel     Pain Management Panel Latest Ref Rng & Units 9/12/2020 9/12/2020    CREATININE UR mg/dL 46.3 46.3    AMPHETAMINES SCREEN, URINE Negative - -    BARBITURATES SCREEN Negative - -    BENZODIAZEPINE SCREEN, URINE Negative - -    BUPRENORPHINEUR Negative - -    COCAINE SCREEN, URINE Negative - -    METHADONE SCREEN, URINE Negative - -        I have personally reviewed the above laboratory results.   ---------------------------------------------------------------------------------------------------------------------  Imaging Results (Last 7 Days)     Procedure  Component Value Units Date/Time    XR Abdomen KUB [[id number]] Collected: 09/17/20 0928     Updated: 09/17/20 0930    Narrative:      EXAMINATION: XR ABDOMEN KUB-      CLINICAL INDICATION: vomiting; I50.9-Heart failure, unspecified;  D69.2-Other nonthrombocytopenic purpura; E87.9-Rkyx-prlgctscao and  hyponatremia; I50.43-Acute on chronic combined systolic (congestive) and  diastolic (congestive) heart failure; R79.89-Other specified abnormal  findings of blood chemistry        COMPARISON: None available     FINDINGS: One view of the abdomen shows large volume stool     No evidence of bowel obstruction     Surgical clips in the right upper quadrant.       Impression:      Large volume stool in the colon      This report was finalized on 9/17/2020 9:28 AM by Dr. Angelo Deleon MD.           I have personally reviewed the above radiology results.   ---------------------------------------------------------------------------------------------------------------------      Assessment & Plan        Assessment/Plan       ASSESSMENT:    1.  COVID-19    PLAN:    Patient presents with lower extremity edema and rash.  Patient has been afebrile since admission.  WBC normal.  CRP 0.69.  Lactic acid normal.  D-dimer normal at 0.41.  Procalcitonin normal at 0.18.  Respiratory panel PCR negative.  Admission COVID-19 PCR on 9/10/2020 was negative.  COVID-19 PCR performed on 9/21/2020 for possibility of heart cath and was found to be positive. Repeat Lexar COVID-19 PCR in progress.  Patient is currently on home level of oxygen 2 L per nasal cannula with no apparent distress.  Denies worsening shortness of breath.  Denies any known sick contacts.    Repeat chest x-ray ordered.  At this time it is likely patient is either an asymptomatic positive or false positive.  As patient is showing no symptoms at this time with normal WBC and almost normal CRP we do not recommend antibiotic therapy at this time.      Again, thank you Dr. Keita for  allowing us to participate in the care of your patient and please feel free to call for any questions you may have.        Code Status:   Code Status and Medical Interventions:   Ordered at: 09/10/20 1356     Code Status:    CPR     Medical Interventions (Level of Support Prior to Arrest):    Full           KENNY Amos  09/22/20  10:23 EDT

## 2020-09-22 NOTE — PLAN OF CARE
Goal Outcome Evaluation:  Plan of Care Reviewed With: patient  Progress: improving  Outcome Summary: Patient heparin infusion stopped at 1407. VS have remain stable. Pt on 2L O2 NC. No complaints noted at this time.

## 2020-09-22 NOTE — CONSULTS
Duplicate consult order. Please refer to previous consult note.    Thank you,   KENNY Amos  Infectious Disease

## 2020-09-22 NOTE — PLAN OF CARE
Problem: Heart Failure Comorbidity  Goal: Maintenance of Heart Failure Symptom Control  Outcome: Ongoing, Progressing   Goal Outcome Evaluation:  Plan of Care Reviewed With: patient  Progress: no change   Patient resting in bed. Vital signs are stable and on 2 L NC. Heparin drip running at 6.13 with next PTT draw at 0530. No complaints noted at this time. Will continue to monitor.

## 2020-09-22 NOTE — PROGRESS NOTES
LOS: 12 days     Name: Yumiko Purdy  Age/Sex: 54 y.o. female  :  1965        PCP: Niko Schaefer MD  REF: No ref. provider found    Active Problems:    Acute on chronic congestive heart failure (CMS/HCC)      Reason for follow-up: Acute systolic heart failure and atrial fibrillation    Subjective       Subjective     Yumiko Purdy is a 54-year-old female admitted with decompensated congestive heart failure and found to have newly diagnosed cardiomyopathy with acute kidney injury.    Interval History: Patient reports her breathing is some better today.  Kidney function much improved and back to baseline.  She denies any chest pain.  Patient did test positive for COVID-19 infection yesterday.      Vital Signs  Temp:  [97.4 °F (36.3 °C)-98.5 °F (36.9 °C)] 97.7 °F (36.5 °C)  Heart Rate:  [62-75] 67  Resp:  [-22] 14  BP: ()/(59-95) 114/70     Vital Signs (last 72 hrs)        0700  -   0659  07  -   0659  07  -   0659  07  -   1019   Most Recent    Temp (°F) 98.1 -  98.5    97.6 -  98.6    97.4 -  98.5      97.7     97.7 (36.5)    Heart Rate 70 -  79    65 -  79    62 -  75    65 -  67     67    Resp 18 -  20    18 -  20    13 -  22    14 -  16     14    BP 90/57 -  120/64    99/60 -  126/71    94/82 -  139/79    114/70 -  127/78     114/70    SpO2 (%) 92 -  96    95 -  99    93 -  100      100     100        Body mass index is 45.1 kg/m².    Intake/Output Summary (Last 24 hours) at 2020 1019  Last data filed at 2020 0904  Gross per 24 hour   Intake 169.34 ml   Output 2725 ml   Net -2555.66 ml     Objective    Objective       Physical Exam:  Patient was interviewed through video due to the coronavirus pandemic patient skin tone was normal as well as patient`s rate of breathing as well as clarity of speech the patient was sitting there was no pallor and breathing rate was normal patient was not wheezing the patient was able to complete full  sentences without difficulty patient mood and behavior were both appropriate there was no sign of the stress the patient could also follow instruction to with examination and patient was not in pain all or distress and through the camera the patient's eyes and oropharynx seems to be unremarkable and facial appearance were essentially unremarkable.    Results review       Results Review:   Results from last 7 days   Lab Units 09/22/20  0613 09/21/20  1047 09/21/20  0057 09/20/20  0007 09/18/20  0211 09/17/20  0459 09/16/20  0343   WBC 10*3/mm3 4.58 4.65 4.08 3.99 5.82 7.87 6.94   HEMOGLOBIN g/dL 8.5* 8.5* 8.4* 8.1* 8.6* 8.8* 8.5*   PLATELETS 10*3/mm3 120* 115* 113* 103* 100* 121* 107*     Results from last 7 days   Lab Units 09/22/20  0613 09/21/20  0057 09/20/20  0007 09/19/20  0143 09/18/20  0211 09/17/20  0459 09/16/20  0343   SODIUM mmol/L 139 138 139 141 138 141 140   POTASSIUM mmol/L 4.3 4.3 4.2 4.2 4.1 4.4 4.2   CHLORIDE mmol/L 100 101 100 102 101 100 100   CO2 mmol/L 33.1* 31.9* 32.0* 32.6* 27.5 33.3* 33.6*   BUN mg/dL 19 24* 26* 27* 30* 38* 39*   CREATININE mg/dL 1.16* 1.33* 1.55* 1.42* 1.18* 1.40* 1.20*   CALCIUM mg/dL 8.6 8.4* 8.2* 8.5* 8.5* 8.7 8.5*   GLUCOSE mg/dL 135* 123* 194* 105* 215* 125* 150*   ALT (SGPT) U/L  --   --   --   --   --   --  22   AST (SGOT) U/L  --   --   --   --   --   --  24         Lab Results   Component Value Date    INR 1.09 09/22/2020    INR 1.17 (H) 09/21/2020    INR 1.26 (H) 09/21/2020    INR 1.55 (H) 09/20/2020    INR 2.77 (H) 09/19/2020    INR 3.28 (H) 09/18/2020    INR 2.77 (H) 09/17/2020     Lab Results   Component Value Date    MG 1.9 09/15/2020    MG 2.0 09/14/2020    MG 2.1 09/13/2020     Lab Results   Component Value Date    TSH 3.830 09/10/2020    CHLPL 103 04/21/2016    TRIG 80 09/11/2020    HDL 43 09/11/2020    LDL 78 09/11/2020      Imaging Results (Last 48 Hours)     ** No results found for the last 48 hours. **        Lab Results   Component Value Date    BNP 86  05/08/2016       Echo   Results for orders placed during the hospital encounter of 09/09/20   Adult Transesophageal Echo ([facility]) W/ Cont if Necessary Per Protocol (Cardiology Department)    Narrative · Ejection fraction appears to be 21 - 25%. Left ventricular systolic   function is severely decreased.  · Right ventricular cavity is mildly dilated. Moderately reduced right   ventricular systolic function noted.  · Trace mitral valve regurgitation is present.  · Moderate tricuspid valve regurgitation is present. Estimated right   ventricular systolic pressure from tricuspid regurgitation is normal (<35   mmHg).  · There is (grade 1) plaque in the proximal aorta present. There is (grade   1) plaque in the ascending aorta present. There is (grade 1) plaque in the   aortic arch present. There is (grade 1) plaque in the descending aorta   present.         I reviewed the patient's new clinical results.    Telemetry: Normal sinus rhythm 60s    Medication Review:   amiodarone, 200 mg, Oral, Daily  aspirin, 81 mg, Oral, Daily  bumetanide, 2 mg, Oral, Daily  carvedilol, 6.25 mg, Oral, BID With Meals  folic acid, 1 mg, Oral, Daily  hydrocortisone, , Topical, Q12H  insulin aspart, 0-14 Units, Subcutaneous, TID AC  insulin aspart, 5 Units, Subcutaneous, TID With Meals  insulin detemir, 10 Units, Subcutaneous, Nightly  polyethylene glycol, 17 g, Oral, Daily  predniSONE, 2.5 mg, Oral, Daily  pregabalin, 100 mg, Oral, Q12H  rOPINIRole, 1 mg, Oral, TID  senna-docusate sodium, 2 tablet, Oral, Nightly  sodium chloride, 10 mL, Intravenous, Q12H  sodium chloride, 10 mL, Intravenous, Q12H  spironolactone, 12.5 mg, Oral, Daily  venlafaxine XR, 75 mg, Oral, Daily        heparin (porcine), 8.13 Units/kg/hr, Last Rate: 6.13 Units/kg/hr (09/22/20 0639)  Pharmacy Consult,         Assessment      Assessment:  1. Acute on chronic combined systolic and diastolic congestive heart failure, clinically improving  2. Newly diagnosed advanced  cardiomyopathy of unclear etiology (possibly ischemic) with LV ejection fraction of 20 to 25%  3. Abnormal nuclear stress test showing small to medium sized mild degree of ischemia in the inferior wall  4. Paroxysmal atrial fibrillation with controlled ventricular rate, patient underwent successful cardioversion on 9/17/2020 and is maintaining normal sinus rhythm, on Coumadin  5. Acute kidney injury on chronic kidney disease, creatinine 1.16 today, improved  6. Diabetes mellitus type 2  7. Cirrhosis of the liver  8. COVID-19 infection    Plan     Recommendations:  1. Unfortunately could not proceed with cardiac aspiration because of COVID-19 status she is actually not having any chest pain so cardiac excision can be done electively after the quarantine.  2. CHF now is compensated through current management  3. She is maintaining sinus rhythm we will switch her to Eliquis instead of the warfarin  4. Patient can be discharged home if is okay with medicine service to be followed up with cardiology office after the quarantine.  For elective cardiac catheterization as an outpatient, in the interim will follow patient the patient at a distance    I discussed the patients findings and my recommendations with patient and family      SandyKENNY Maki, acting as scribe for Dr. Charlee Ken MD [facility]  09/22/20  10:19 EDT    Please note that portions of this note were completed with a voice recognition program.

## 2020-09-22 NOTE — PROGRESS NOTES
[facility] HOSPITALIST PROGRESS NOTE     Patient Identification:  Name:  Yumiko Purdy  Age:  54 y.o.  Sex:  female  :  1965  MRN:  [id number]  Visit Number:  [id number]  Primary Care Provider:  Niko Schaefer MD    Length of stay:  12    Subjective: Patient seen personally inside her room together with Prakash Cardona RN.  Patient is Accu-Chek result noted, improved, she continued to have improvement of her renal function, she is maintained on Bumex 2 mg daily, she diuresed well, her skin rashes is also drying and starting to scab.  There is no more redness.  She reports she was started on prednisone by her primary care writer because of this, I think once this continues to improve we can slowly taper off the low-dose prednisone.  She was transferred from third floor to the PCU for enhanced isolation protocol after she turned out positive for COVID-19 during screen for planned cardiac cath.  Today cardiology is planning to proceed with cardiac cath.  Help appreciated.  She continued to maintain sinus rhythm.  Review of her CT scan on admission #10 did not show any infiltrate.  She does have bilateral pleural effusion worse on the right.    Chief Complaint: Follow-up of COVID-19 positive, CHF  ----------------------------------------------------------------------------------------------------------------------  Current Hospital Meds:  amiodarone, 200 mg, Oral, Daily  aspirin, 81 mg, Oral, Daily  bumetanide, 2 mg, Oral, Daily  carvedilol, 6.25 mg, Oral, BID With Meals  folic acid, 1 mg, Oral, Daily  hydrocortisone, , Topical, Q12H  insulin aspart, 0-14 Units, Subcutaneous, TID AC  insulin aspart, 5 Units, Subcutaneous, TID With Meals  insulin detemir, 10 Units, Subcutaneous, Nightly  polyethylene glycol, 17 g, Oral, Daily  predniSONE, 2.5 mg, Oral, Daily  pregabalin, 100 mg, Oral, Q12H  rOPINIRole, 1 mg, Oral, TID  senna-docusate sodium, 2 tablet, Oral, Nightly  sodium chloride, 10 mL,  Intravenous, Q12H  sodium chloride, 10 mL, Intravenous, Q12H  spironolactone, 12.5 mg, Oral, Daily  venlafaxine XR, 75 mg, Oral, Daily      heparin (porcine), 8.13 Units/kg/hr, Last Rate: 6.13 Units/kg/hr (09/22/20 0639)  Pharmacy Consult,       ----------------------------------------------------------------------------------------------------------------------  Vital Signs:  Temp:  [97.4 °F (36.3 °C)-98.5 °F (36.9 °C)] 97.7 °F (36.5 °C)  Heart Rate:  [62-75] 65  Resp:  [13-22] 14  BP: ()/(59-95) 103/63       Tele: Sinus rhythm 63 bpm      09/20/20  0500 09/21/20  0359 09/22/20  0403   Weight: 120 kg (265 lb) (RN Rochelle aware) 123 kg (270 lb 8 oz) 123 kg (271 lb)     Body mass index is 45.1 kg/m².    Intake/Output Summary (Last 24 hours) at 9/22/2020 1033  Last data filed at 9/22/2020 0904  Gross per 24 hour   Intake 169.34 ml   Output 2725 ml   Net -2555.66 ml     NPO Diet  ----------------------------------------------------------------------------------------------------------------------  Physical exam:  General: Comfortable,awake, alert, oriented to self, place, and time, she is currently sitting on her bed, trouble, she is not tachypneic.  Well-developed and well-nourished.  No respiratory distress.    Skin:  Skin is warm and dry.  Rash as mentioned no longer has purpura, is dried with scab dried.  No obvious signs of cellulitis. No pallor.    HENT:  Head:  Normocephalic and atraumatic.  Mouth:  Moist mucous membranes.    Eyes:  Conjunctivae and EOM are normal.  Pupils are equal, round, and reactive to light.  No scleral icterus.    Neck:  Neck supple.  No JVD present.  No hepatojugular reflux  Pulmonary/Chest:  No respiratory distress, no wheezes, no crackles, with normal breath sounds and good air movement.  Cardiovascular:  Normal rate, regular rhythm and normal heart sounds with no murmur.  Abdominal:  Soft.  Bowel sounds are normal.  No distension and no tenderness.  Obese  Extremities: Edema has  almost completely resolved, no cyanosis or clubbing, rash as mentioned above.  Strong pedal pulse bilateral no edema, no tenderness, and no deformity.  No red or swollen joints anywhere.  Strong pulses in all 4 extremities with no clubbing, no cyanosis, no edema.  Neurological:  Motor strength equal no obvious deficit, sensory grossly intact.   No cranial nerve deficit.  No tongue deviation.  No facial droop.  No slurred speech.    Genitourinary: She has no Harrington catheter  Back: No skin break  ----------------------------------------------------------------------------------------------------------------------  ----------------------------------------------------------------------------------------------------------------------      Results from last 7 days   Lab Units 09/22/20  0613 09/21/20  1605 09/21/20  1047 09/21/20  0057 09/20/20  0007 09/19/20  0143 09/18/20  0211 09/17/20  0459   CRP mg/dL  --  0.69*  --   --   --   --   --   --    LACTATE mmol/L  --  1.2  --   --   --   --   --   --    WBC 10*3/mm3 4.58  --  4.65 4.08 3.99  --  5.82 7.87   HEMOGLOBIN g/dL 8.5*  --  8.5* 8.4* 8.1*  --  8.6* 8.8*   HEMATOCRIT % 29.9*  --  29.6* 29.4* 28.6*  --  30.7* 31.1*   MCV fL 97.7*  --  98.0* 97.4* 97.3*  --  101.3* 97.8*   MCHC g/dL 28.4*  --  28.7* 28.6* 28.3*  --  28.0* 28.3*   PLATELETS 10*3/mm3 120*  --  115* 113* 103*  --  100* 121*   INR  1.09  --  1.17* 1.26* 1.55* 2.77* 3.28* 2.77*         Results from last 7 days   Lab Units 09/22/20  0613 09/21/20  0057 09/20/20  0007  09/16/20  0343   SODIUM mmol/L 139 138 139   < > 140   POTASSIUM mmol/L 4.3 4.3 4.2   < > 4.2   CHLORIDE mmol/L 100 101 100   < > 100   CO2 mmol/L 33.1* 31.9* 32.0*   < > 33.6*   BUN mg/dL 19 24* 26*   < > 39*   CREATININE mg/dL 1.16* 1.33* 1.55*   < > 1.20*   EGFR IF NONAFRICN AM mL/min/1.73 49* 42* 35*   < > 47*   CALCIUM mg/dL 8.6 8.4* 8.2*   < > 8.5*   GLUCOSE mg/dL 135* 123* 194*   < > 150*   ALBUMIN g/dL  --   --   --   --  2.88*    BILIRUBIN mg/dL  --   --   --   --  2.2*   ALK PHOS U/L  --   --   --   --  81   AST (SGOT) U/L  --   --   --   --  24   ALT (SGPT) U/L  --   --   --   --  22    < > = values in this interval not displayed.   Estimated Creatinine Clearance: 73.8 mL/min (A) (by C-G formula based on SCr of 1.16 mg/dL (H)).    No results found for: AMMONIA      No results found for: BLOODCX  No results found for: URINECX  No results found for: WOUNDCX  No results found for: STOOLCX    I have personally looked at the labs and they are summarized above.  ----------------------------------------------------------------------------------------------------------------------  Imaging Results (Last 24 Hours)     ** No results found for the last 24 hours. **        ----------------------------------------------------------------------------------------------------------------------  Assessment and Plan:    -Acute on chronic combined systolic and diastolic heart failure  -New onset cardiomyopathy needs ischemic work-up  -Acute kidney injury and CKD stage III now continue to improve  -Mild thrombocytopenia improving  -Macrocytosis with normal B12 and folate and TSH  -History of liver cirrhosis likely secondary to [place]  -Diabetes type 2 is requiring  -Diffuse skin rash improved, negative for vasculitis work-up  -Anasarca multifactorial secondary to hypoalbuminemia, CHF and liver cirrhosis    Patient is symptomatic in terms of her COVID-19 infection at least for now.  Infectious disease recommendation noted and appreciated, follow-up on cardiology, no plans of cardiac catheterization at this time, patient is improved medically, will plan to do it at a later date once her COVID-19 is resolved.  Patient refused home health services when discharged, she has a brother and son who will help her at home, she does not want home health or home PT.  , I think she can be discharged home with self isolation and home health.  With regards to prednisone very  low dose.  Her heparin drip has been discontinued she will not be placed on Eliquis for stroke prophylaxis.    Total time spent on this encounter including discussion with patient examined, cardiology service,  regarding discharge plan is more than 30 minutes.    Halle Keita MD  09/22/20  10:33 EDT

## 2020-09-22 NOTE — THERAPY TREATMENT NOTE
Acute Care - Physical Therapy Treatment Note  [facility]     Patient Name: Yumiko Purdy  : 1965  MRN: [id number]  Today's Date: 2020   Onset of Illness/Injury or Date of Surgery: 20       PT Assessment (last 12 hours)      PT Evaluation and Treatment     Row Name 20 1636          Physical Therapy Time and Intention    Subjective Information  no complaints  -AG     Document Type  therapy note (daily note)  -AG     Mode of Treatment  individual therapy;physical therapy  -AG     Patient Effort  good  -AG     Symptoms Noted During/After Treatment  fatigue  -AG     Row Name 20 1636          Cognition    Orientation Status (Cognition)  oriented x 3  -AG     Follows Commands (Cognition)  WNL  -AG     Row Name 20 1636          Pain    Additional Documentation  Pain Scale: Numbers Pre/Post-Treatment (Group)  -AG     Row Name 20 1636          Pain Scale: Numbers Pre/Post-Treatment    Pretreatment Pain Rating  0/10 - no pain  -AG     Posttreatment Pain Rating  0/10 - no pain  -AG     Row Name 20 1636          Bed Mobility    Bed Mobility  scooting/bridging;supine-sit;sit-supine  -AG     Scooting/Bridging [place] (Bed Mobility)  standby assist;verbal cues;nonverbal cues (demo/gesture)  -AG     Supine-Sit [place] (Bed Mobility)  verbal cues;nonverbal cues (demo/gesture);contact guard  -AG     Sit-Supine [place] (Bed Mobility)  verbal cues;nonverbal cues (demo/gesture);contact guard  -AG     Bed Mobility, Safety Issues  decreased use of arms for pushing/pulling;decreased use of legs for bridging/pushing  -AG     Assistive Device (Bed Mobility)  bed rails  -AG     Row Name 20 1636          Transfers    Transfers  sit-stand transfer;stand-sit transfer;stand pivot/stand step transfer;toilet transfer  -AG     Sit-Stand [place] (Transfers)  verbal cues;nonverbal cues (demo/gesture);minimum assist (75% patient effort)  -AG     Stand-Sit [place] (Transfers)   verbal cues;nonverbal cues (demo/gesture);minimum assist (75% patient effort)  -AG     [place] Level (Toilet Transfer)  verbal cues;nonverbal cues (demo/gesture);contact guard  -AG     Assistive Device (Toilet Transfer)  commode, bedside without drop arms;raised toilet seat  -AG     Row Name 09/22/20 1636          Sit-Stand Transfer    Assistive Device (Sit-Stand Transfers)  walker, front-wheeled  -AG     Row Name 09/22/20 1636          Stand-Sit Transfer    Assistive Device (Stand-Sit Transfers)  walker, front-wheeled  -AG     Row Name 09/22/20 1636          Stand Pivot/Stand Step Transfer    Stand Pivot/Stand Step [place] (Transfers)  verbal cues;nonverbal cues (demo/gesture);minimum assist (75% patient effort)  -AG     Assistive Device (Stand Pivot Stand Step Transfer)  walker, front-wheeled  -AG     Row Name 09/22/20 1636          Toilet Transfer    Type (Toilet Transfer)  stand pivot/stand step  -AG     Row Name 09/22/20 1636          Gait/Stairs (Locomotion)    [place] Level (Gait)  verbal cues;nonverbal cues (demo/gesture);contact guard  -AG     Assistive Device (Gait)  walker, front-wheeled  -AG     Distance in Feet (Gait)  40  -AG     Pattern (Gait)  step-to  -AG     Deviations/Abnormal Patterns (Gait)  base of support, wide;weight shifting decreased  -AG     Bilateral Gait Deviations  forward flexed posture  -AG     Row Name 09/22/20 1636          Safety Issues, Functional Mobility    Safety Issues Affecting Function (Mobility)  safety precaution awareness;safety precautions follow-through/compliance  -AG     Impairments Affecting Function (Mobility)  balance;cognition;endurance/activity tolerance;strength  -AG     Row Name 09/22/20 1636          Balance    Balance Assessment  sitting static balance;sitting dynamic balance;sit to stand dynamic balance;standing static balance  -AG     Static Sitting Balance  WFL  -AG     Sit to Stand Dynamic Balance  mild impairment  -AG     Static Standing  Balance  mild impairment;supported;standing  -AG     Row Name             Wound 09/10/20 1345 Right calf Venous Ulcer    Wound - Properties Group Placement Date: 09/10/20  -KG Placement Time: 1345  -KG Side: Right  -KG Location: calf  -KG Primary Wound Type: Venous ulcer  -KG Additional Comments: several large open ulcers on back of right leg  -KG    Retired Wound - Properties Group Date first assessed: 09/10/20  -KG Time first assessed: 1345  -KG Side: Right  -KG Location: calf  -KG Primary Wound Type: Venous ulcer  -KG Retired Stage, Pressure Injury : other (see comments)  -KG Additional Comments: several large open ulcers on back of right leg  -KG    Row Name             Wound 09/10/20 1345 Left anterior toe Venous Ulcer    Wound - Properties Group Placement Date: 09/10/20  -KG Placement Time: 1345  -KG Side: Left  -KG Location: toe  -KG Primary Wound Type: Venous ulcer  -KG    Retired Wound - Properties Group Date first assessed: 09/10/20  -KG Time first assessed: 1345  -KG Side: Left  -KG Retired Orientation: anterior  -KG Location: toe  -KG Primary Wound Type: Venous ulcer  -KG Retired Stage, Pressure Injury : other (see comments)  -KG    Row Name 09/22/20 1636          Coping    Observed Emotional State  calm;cooperative  -AG     Verbalized Emotional State  acceptance  -AG     Trust Relationship/Rapport  care explained;choices provided;thoughts/feelings acknowledged  -AG     Row Name 09/22/20 1636          Plan of Care Review    Plan of Care Reviewed With  patient  -AG     Progress  improving  -AG     Row Name 09/22/20 1636          Positioning and Restraints    Pre-Treatment Position  in bed  -AG     Post Treatment Position  bed  -AG     In Bed  notified nsg;sitting EOB;call light within reach;encouraged to call for assist;side rails up x3  -AG     Row Name 09/22/20 1636          Progress Summary (PT)    Progress Toward Functional Goals (PT)  progress toward functional goals as expected  -AG     Daily  Progress Summary (PT)  demonstration of more independent bed mobility and transfers today.   -AG     Barriers to Overall Progress (PT)  PCU setting (COVID); decr functional endurance  -AG       User Key  (r) = Recorded By, (t) = Taken By, (c) = Cosigned By    Initials Name Provider Type    Rochelle Auguste, RN Registered Nurse    Renea Bullock, PT Physical Therapist        Physical Therapy Education                 Title: PT OT SLP Therapies (Done)     Topic: Physical Therapy (Done)     Point: Mobility training (Done)     Learning Progress Summary           Patient Acceptance, E,D, VU,NR by AG at 9/22/2020 1635    Acceptance, E,TB, VU by MP at 9/21/2020 0935    Acceptance, E, VU by SM at 9/20/2020 2314    Acceptance, E,TB, VU by MP at 9/20/2020 0927    Acceptance, E,TB, VU by CT at 9/17/2020 1132    Acceptance, E, VU by EA at 9/15/2020 2116    Acceptance, E, VU by RD at 9/15/2020 1236    Acceptance, E,TB, VU by CT at 9/15/2020 1143                   Point: Home exercise program (Done)     Learning Progress Summary           Patient Acceptance, E,D, VU,NR by AG at 9/22/2020 1635    Acceptance, E,TB, VU by MP at 9/21/2020 0935    Acceptance, E, VU by SM at 9/20/2020 2314    Acceptance, E,TB, VU by MP at 9/20/2020 0927    Acceptance, E,TB, VU by CT at 9/17/2020 1132    Acceptance, E, VU by EA at 9/15/2020 2116    Acceptance, E, VU by RD at 9/15/2020 1236    Acceptance, E,TB, VU by CT at 9/15/2020 1143                   Point: Body mechanics (Done)     Learning Progress Summary           Patient Acceptance, E,D, VU,NR by AG at 9/22/2020 1635    Acceptance, E,TB, VU by MP at 9/21/2020 0935    Acceptance, E, VU by SM at 9/20/2020 2314    Acceptance, E,TB, VU by MP at 9/20/2020 0927    Acceptance, E,TB, VU by CT at 9/17/2020 1132    Acceptance, E, VU by EA at 9/15/2020 2116    Acceptance, E, VU by RD at 9/15/2020 1236    Acceptance, E,TB, VU by CT at 9/15/2020 1143                   Point: Precautions (Done)      Learning Progress Summary           Patient Acceptance, E,D, VU,NR by  at 9/22/2020 1635    Acceptance, E,TB, VU by MP at 9/21/2020 0935    Acceptance, E, VU by  at 9/20/2020 2314    Acceptance, E,TB, VU by MP at 9/20/2020 0927    Acceptance, E,TB, VU by CT at 9/17/2020 1132    Acceptance, E, VU by  at 9/15/2020 2116    Acceptance, E, VU by RD at 9/15/2020 1236    Acceptance, E,TB, VU by CT at 9/15/2020 1143                               User Key     Initials Effective Dates Name Provider Type Discipline     06/16/16 -  Chelle Nino, RN Registered Nurse Nurse     06/16/16 -  Libra Muro, RN Registered Nurse Nurse     04/03/18 -  Renea Luna, PT Physical Therapist PT    CT 04/03/18 -  Lynn Mueller, PARK Physical Therapist PT     09/12/18 -  Yasir Cisneros, OLIVIA Registered Nurse Nurse     06/10/20 -  Silvana Melgar RN Registered Nurse Nurse              PT Recommendation and Plan     Progress Summary (PT)  Progress Toward Functional Goals (PT): progress toward functional goals as expected  Daily Progress Summary (PT): demonstration of more independent bed mobility and transfers today.   Barriers to Overall Progress (PT): PCU setting (COVID); decr functional endurance  Plan of Care Reviewed With: patient  Progress: improving       Time Calculation:   PT Charges     Row Name 09/22/20 1641             Time Calculation    PT Received On  09/22/20  -AG         Time Calculation- PT    Total Timed Code Minutes- PT  30 minute(s)  -        User Key  (r) = Recorded By, (t) = Taken By, (c) = Cosigned By    Initials Name Provider Type     Renea Luna, PT Physical Therapist        Therapy Charges for Today     Code Description Service Date Service Provider Modifiers Qty    94146729114 HC GAIT TRAINING EA 15 MIN 9/22/2020 Renea Luna, PT GP 1    06448373652 HC PT THERAPEUTIC ACT EA 15 MIN 9/22/2020 Renea Luna, PT GP 1               Renea Luna PT  9/22/2020

## 2020-09-22 NOTE — NURSING NOTE
Spoke with pt about the possibility of going home with home health. She currently refuses home health. Asked her for a number to contact son at home to make him aware of need for isolation due to positive covid test. She states that the cell phone she has is the one that her son would typically use. However, due to her having it I would not be able to speak with him. She states her son however, would have no issues with her returning home.

## 2020-09-23 ENCOUNTER — READMISSION MANAGEMENT (OUTPATIENT)
Dept: CALL CENTER | Facility: HOSPITAL | Age: 55
End: 2020-09-23

## 2020-09-23 VITALS
OXYGEN SATURATION: 96 % | SYSTOLIC BLOOD PRESSURE: 90 MMHG | TEMPERATURE: 98.2 F | DIASTOLIC BLOOD PRESSURE: 70 MMHG | HEIGHT: 65 IN | HEART RATE: 68 BPM | RESPIRATION RATE: 16 BRPM | WEIGHT: 271 LBS | BODY MASS INDEX: 45.15 KG/M2

## 2020-09-23 LAB
ANION GAP SERPL CALCULATED.3IONS-SCNC: 13.8 MMOL/L (ref 5–15)
BUN SERPL-MCNC: 17 MG/DL (ref 6–20)
BUN/CREAT SERPL: 13.5 (ref 7–25)
CALCIUM SPEC-SCNC: 8.7 MG/DL (ref 8.6–10.5)
CHLORIDE SERPL-SCNC: 99 MMOL/L (ref 98–107)
CO2 SERPL-SCNC: 28.2 MMOL/L (ref 22–29)
CREAT SERPL-MCNC: 1.26 MG/DL (ref 0.57–1)
CRP SERPL-MCNC: 0.48 MG/DL (ref 0–0.5)
GFR SERPL CREATININE-BSD FRML MDRD: 44 ML/MIN/1.73
GLUCOSE BLDC GLUCOMTR-MCNC: 139 MG/DL (ref 70–130)
GLUCOSE BLDC GLUCOMTR-MCNC: 209 MG/DL (ref 70–130)
GLUCOSE SERPL-MCNC: 134 MG/DL (ref 65–99)
INR PPP: 1.25 (ref 0.9–1.1)
POTASSIUM SERPL-SCNC: 4.4 MMOL/L (ref 3.5–5.2)
PROTHROMBIN TIME: 15.5 SECONDS (ref 11.9–14.1)
SODIUM SERPL-SCNC: 141 MMOL/L (ref 136–145)
SRA .2 IU/ML UFH SER-ACNC: 7 % (ref 0–20)
SRA 100IU/ML UFH SER-ACNC: 3 % (ref 0–20)
SRA UFH SER-IMP: NORMAL

## 2020-09-23 PROCEDURE — 63710000001 INSULIN ASPART PER 5 UNITS: Performed by: HOSPITALIST

## 2020-09-23 PROCEDURE — 99239 HOSP IP/OBS DSCHRG MGMT >30: CPT | Performed by: HOSPITALIST

## 2020-09-23 PROCEDURE — 86140 C-REACTIVE PROTEIN: CPT | Performed by: NURSE PRACTITIONER

## 2020-09-23 PROCEDURE — 85610 PROTHROMBIN TIME: CPT | Performed by: HOSPITALIST

## 2020-09-23 PROCEDURE — 80048 BASIC METABOLIC PNL TOTAL CA: CPT | Performed by: HOSPITALIST

## 2020-09-23 PROCEDURE — 82962 GLUCOSE BLOOD TEST: CPT

## 2020-09-23 PROCEDURE — 63710000001 PREDNISONE PER 5 MG: Performed by: HOSPITALIST

## 2020-09-23 RX ORDER — ROPINIROLE 1 MG/1
1 TABLET, FILM COATED ORAL 3 TIMES DAILY
Qty: 90 TABLET | Refills: 0 | Status: SHIPPED | OUTPATIENT
Start: 2020-09-23 | End: 2023-01-31 | Stop reason: SDUPTHER

## 2020-09-23 RX ORDER — OMEPRAZOLE 20 MG/1
20 CAPSULE, DELAYED RELEASE ORAL DAILY
Qty: 30 CAPSULE | Refills: 3 | Status: SHIPPED | OUTPATIENT
Start: 2020-09-23 | End: 2023-01-31

## 2020-09-23 RX ORDER — POLYETHYLENE GLYCOL 3350 17 G/17G
17 POWDER, FOR SOLUTION ORAL DAILY
Qty: 30 PACKET | Refills: 3 | Status: SHIPPED | OUTPATIENT
Start: 2020-09-24

## 2020-09-23 RX ORDER — BUMETANIDE 2 MG/1
2 TABLET ORAL DAILY
Qty: 30 TABLET | Refills: 3 | Status: SHIPPED | OUTPATIENT
Start: 2020-09-24 | End: 2023-01-31

## 2020-09-23 RX ORDER — SPIRONOLACTONE 25 MG/1
12.5 TABLET ORAL DAILY
Qty: 30 TABLET | Refills: 3 | Status: SHIPPED | OUTPATIENT
Start: 2020-09-24 | End: 2023-01-31

## 2020-09-23 RX ORDER — DIAPER,BRIEF,INFANT-TODD,DISP
EACH MISCELLANEOUS 2 TIMES DAILY
Qty: 1 EACH | Refills: 0 | Status: SHIPPED | OUTPATIENT
Start: 2020-09-23 | End: 2020-11-06

## 2020-09-23 RX ORDER — CARVEDILOL 6.25 MG/1
6.25 TABLET ORAL 2 TIMES DAILY WITH MEALS
Qty: 60 TABLET | Refills: 3 | Status: SHIPPED | OUTPATIENT
Start: 2020-09-23 | End: 2020-10-21 | Stop reason: SDUPTHER

## 2020-09-23 RX ORDER — AMOXICILLIN 250 MG
2 CAPSULE ORAL NIGHTLY
Qty: 60 TABLET | Refills: 3 | Status: SHIPPED | OUTPATIENT
Start: 2020-09-23 | End: 2020-10-14

## 2020-09-23 RX ORDER — ASPIRIN 81 MG/1
81 TABLET ORAL DAILY
Qty: 30 TABLET | Refills: 3 | Status: SHIPPED | OUTPATIENT
Start: 2020-09-24

## 2020-09-23 RX ADMIN — ASPIRIN 81 MG: 81 TABLET, COATED ORAL at 08:32

## 2020-09-23 RX ADMIN — APIXABAN 5 MG: 5 TABLET, FILM COATED ORAL at 08:35

## 2020-09-23 RX ADMIN — INSULIN ASPART 5 UNITS: 100 INJECTION, SOLUTION INTRAVENOUS; SUBCUTANEOUS at 11:22

## 2020-09-23 RX ADMIN — ACETAMINOPHEN 650 MG: 325 TABLET ORAL at 08:33

## 2020-09-23 RX ADMIN — CARVEDILOL 6.25 MG: 6.25 TABLET, FILM COATED ORAL at 08:30

## 2020-09-23 RX ADMIN — BUMETANIDE 2 MG: 1 TABLET ORAL at 08:32

## 2020-09-23 RX ADMIN — PREDNISONE 2.5 MG: 5 TABLET ORAL at 08:34

## 2020-09-23 RX ADMIN — PREGABALIN 100 MG: 100 CAPSULE ORAL at 08:34

## 2020-09-23 RX ADMIN — INSULIN ASPART 5 UNITS: 100 INJECTION, SOLUTION INTRAVENOUS; SUBCUTANEOUS at 08:34

## 2020-09-23 RX ADMIN — INSULIN ASPART 5 UNITS: 100 INJECTION, SOLUTION INTRAVENOUS; SUBCUTANEOUS at 11:23

## 2020-09-23 RX ADMIN — FOLIC ACID 1 MG: 1 TABLET ORAL at 08:32

## 2020-09-23 RX ADMIN — HYDROCORTISONE: 1 CREAM TOPICAL at 08:36

## 2020-09-23 RX ADMIN — AMIODARONE HYDROCHLORIDE 200 MG: 200 TABLET ORAL at 08:32

## 2020-09-23 RX ADMIN — SODIUM CHLORIDE, PRESERVATIVE FREE 10 ML: 5 INJECTION INTRAVENOUS at 08:31

## 2020-09-23 RX ADMIN — ROPINIROLE HYDROCHLORIDE 1 MG: 1 TABLET, FILM COATED ORAL at 08:30

## 2020-09-23 RX ADMIN — VENLAFAXINE HYDROCHLORIDE 75 MG: 75 CAPSULE, EXTENDED RELEASE ORAL at 08:32

## 2020-09-23 RX ADMIN — POLYETHYLENE GLYCOL 3350 17 G: 17 POWDER, FOR SOLUTION ORAL at 08:31

## 2020-09-23 RX ADMIN — SPIRONOLACTONE 12.5 MG: 25 TABLET ORAL at 08:31

## 2020-09-23 RX ADMIN — SODIUM CHLORIDE, PRESERVATIVE FREE 10 ML: 5 INJECTION INTRAVENOUS at 08:36

## 2020-09-23 NOTE — PROGRESS NOTES
PROGRESS NOTE         Patient Identification:  Name:  Yumiko Purdy  Age:  54 y.o.  Sex:  female  :  1965  MRN:  [id number]  Visit Number:  [id number]  Primary Care Provider:  Niko Schaefer MD         LOS: 13 days       ----------------------------------------------------------------------------------------------------------------------  Subjective       Chief Complaints:    Leg Swelling and Rash        Interval History:      Case discussed with primary RN.  Patient sanya on home level of oxygen requirement at 2 L nasal cannula.  Afebrile, no diarrhea.  CRP normal at 0.48.  Chest x-ray unremarkable.  Lexar COVID-19 PCR positive.    Review of Systems:    Constitutional: no fever, chills and night sweats.  Generalized fatigue.  Eyes: no eye drainage, itching or redness.  HEENT: no mouth sores, dysphagia or nose bleed.  Respiratory: no for shortness of breath, cough or production of sputum.  Cardiovascular: no chest pain, no palpitations, no orthopnea.  Gastrointestinal: no nausea, vomiting or diarrhea. No abdominal pain, hematemesis or rectal bleeding.  Genitourinary: no dysuria or polyuria.  Hematologic/lymphatic: no lymph node abnormalities, no easy bruising or easy bleeding.  Musculoskeletal: no muscle or joint pain.  Skin: No rash and no itching.  Neurological: no loss of consciousness, no seizure, no headache.  Behavioral/Psych: no depression or suicidal ideation.  Endocrine: no hot flashes.  Immunologic: negative.    ----------------------------------------------------------------------------------------------------------------------      Objective       Current [facility] Meds:  amiodarone, 200 mg, Oral, Daily  apixaban, 5 mg, Oral, Q12H  aspirin, 81 mg, Oral, Daily  bumetanide, 2 mg, Oral, Daily  carvedilol, 6.25 mg, Oral, BID With Meals  folic acid, 1 mg, Oral, Daily  hydrocortisone, , Topical, Q12H  insulin aspart, 0-14 Units, Subcutaneous, TID AC  insulin aspart, 5 Units, Subcutaneous,  TID With Meals  insulin detemir, 10 Units, Subcutaneous, Nightly  polyethylene glycol, 17 g, Oral, Daily  predniSONE, 2.5 mg, Oral, Daily  pregabalin, 100 mg, Oral, Q12H  rOPINIRole, 1 mg, Oral, TID  senna-docusate sodium, 2 tablet, Oral, Nightly  sodium chloride, 10 mL, Intravenous, Q12H  sodium chloride, 10 mL, Intravenous, Q12H  spironolactone, 12.5 mg, Oral, Daily  venlafaxine XR, 75 mg, Oral, Daily      Pharmacy Consult,       ----------------------------------------------------------------------------------------------------------------------    Vital Signs:  Temp:  [97.7 °F (36.5 °C)-98.2 °F (36.8 °C)] 97.9 °F (36.6 °C)  Heart Rate:  [62-73] 73  Resp:  [13-23] 16  BP: ()/(48-91) 116/51  Mean Arterial Pressure (Non-Invasive) for the past 24 hrs (Last 3 readings):   Noninvasive MAP (mmHg)   09/23/20 0902 73   09/23/20 0503 82   09/23/20 0402 87     SpO2 Percentage    09/23/20 0503 09/23/20 0608 09/23/20 0902   SpO2: 99% 97% 97%     SpO2:  [92 %-100 %] 97 %  on  Flow (L/min):  [2] 2;   Device (Oxygen Therapy): room air    Body mass index is 45.1 kg/m².  Wt Readings from Last 3 Encounters:   09/23/20 123 kg (271 lb)        Intake/Output Summary (Last 24 hours) at 9/23/2020 0928  Last data filed at 9/23/2020 0600  Gross per 24 hour   Intake 715 ml   Output 2600 ml   Net -1885 ml     Diet Regular; Cardiac, Consistent Carbohydrate, Renal, Daily Fluid Restriction, Low Sodium; 1500 mL Fluid Per Day; 2,000 mg Na  ----------------------------------------------------------------------------------------------------------------------      Physical Exam:    Deferred due to COVID-19 isolation.    ----------------------------------------------------------------------------------------------------------------------            Results from last 7 days   Lab Units 09/23/20  0340 09/22/20  1113 09/22/20  0613 09/21/20  1605 09/21/20  1047 09/21/20  0057 09/20/20  0007 09/19/20  0143 09/18/20  0211   CRP mg/dL 0.48 0.55*  --   0.69*  --   --   --   --   --    LACTATE mmol/L  --   --   --  1.2  --   --   --   --   --    WBC 10*3/mm3  --   --  4.58  --  4.65 4.08 3.99  --  5.82   HEMOGLOBIN g/dL  --   --  8.5*  --  8.5* 8.4* 8.1*  --  8.6*   HEMATOCRIT %  --   --  29.9*  --  29.6* 29.4* 28.6*  --  30.7*   MCV fL  --   --  97.7*  --  98.0* 97.4* 97.3*  --  101.3*   MCHC g/dL  --   --  28.4*  --  28.7* 28.6* 28.3*  --  28.0*   PLATELETS 10*3/mm3  --   --  120*  --  115* 113* 103*  --  100*   INR  1.25*  --  1.09  --  1.17* 1.26* 1.55* 2.77* 3.28*     Results from last 7 days   Lab Units 09/23/20  0340 09/22/20  0613 09/21/20  0057   SODIUM mmol/L 141 139 138   POTASSIUM mmol/L 4.4 4.3 4.3   CHLORIDE mmol/L 99 100 101   CO2 mmol/L 28.2 33.1* 31.9*   BUN mg/dL 17 19 24*   CREATININE mg/dL 1.26* 1.16* 1.33*   EGFR IF NONAFRICN AM mL/min/1.73 44* 49* 42*   CALCIUM mg/dL 8.7 8.6 8.4*   GLUCOSE mg/dL 134* 135* 123*   Estimated Creatinine Clearance: 68 mL/min (A) (by C-G formula based on SCr of 1.26 mg/dL (H)).  No results found for: AMMONIA    Glucose   Date/Time Value Ref Range Status   09/23/2020 0522 139 (H) 70 - 130 mg/dL Final   09/22/2020 2027 234 (H) 70 - 130 mg/dL Final   09/22/2020 1703 164 (H) 70 - 130 mg/dL Final   09/22/2020 1101 127 70 - 130 mg/dL Final   09/22/2020 0613 123 70 - 130 mg/dL Final   09/21/2020 2046 221 (H) 70 - 130 mg/dL Final   09/21/2020 1703 208 (H) 70 - 130 mg/dL Final   09/21/2020 1009 188 (H) 70 - 130 mg/dL Final     Lab Results   Component Value Date    HGBA1C 8.40 (H) 09/10/2020     Lab Results   Component Value Date    TSH 3.830 09/10/2020    FREET4 1.39 09/10/2020       No results found for: BLOODCX  No results found for: URINECX  No results found for: WOUNDCX  No results found for: STOOLCX  No results found for: RESPCX  Pain Management Panel     Pain Management Panel Latest Ref Rng & Units 9/12/2020 9/12/2020    CREATININE UR mg/dL 46.3 46.3    AMPHETAMINES SCREEN, URINE Negative - -    BARBITURATES SCREEN  Negative - -    BENZODIAZEPINE SCREEN, URINE Negative - -    BUPRENORPHINEUR Negative - -    COCAINE SCREEN, URINE Negative - -    METHADONE SCREEN, URINE Negative - -            ----------------------------------------------------------------------------------------------------------------------  Imaging Results (Last 24 Hours)     Procedure Component Value Units Date/Time    XR Chest 1 View [[id number]] Collected: 09/22/20 1259     Updated: 09/22/20 1330    Narrative:      EXAMINATION: XR CHEST 1 VW-      CLINICAL INDICATION:     COVID-19; I50.9-Heart failure, unspecified;  D69.2-Other nonthrombocytopenic purpura; E87.2-Epnr-lnkvplsnpx and  hyponatremia; I50.43-Acute on chronic combined systolic (congestive) and  diastolic (congestive) heart failure; R79.89-Other specified abnormal  findings of blood chemistry     TECHNIQUE:  XR CHEST 1 VW-      COMPARISON: 09/10/2020      FINDINGS:      Lungs are aerated.   Heart size, mediastinum, and pulmonary vascularity are unremarkable.   No pneumothorax.   No effusions.   No acute osseous findings.          Impression:      No radiographic evidence of acute cardiac or pulmonary  disease.     This report was finalized on 9/22/2020 12:59 PM by Dr. Marlo Parr MD.             ----------------------------------------------------------------------------------------------------------------------    Assessment/Plan       Assessment/Plan     ASSESSMENT:    1.  COVID-19    PLAN:    Case discussed with primary RN.  Patient sanya on home level of oxygen requirement at 2 L nasal cannula.  Afebrile, no diarrhea.  CRP normal at 0.48.  Chest x-ray unremarkable.  Lexar COVID-19 PCR positive.    Admission COVID-19 PCR on 9/10/2020 was negative.  COVID-19 PCR performed on 9/21/2020 for possibility of heart cath and was found to be positive.    At this time it is likely patient is either an asymptomatic positive, as patient is showing no respiratory symptoms and continues with normal WBC  and normal CRP.  We do not recommend antibiotic therapy.  Patient is not a candidate for Remdesivir or convalescent plasma therapy.  If patient is to be discharged recommend self-isolation upon discharge.  Patient stable from ID standpoint.    Code Status:   Code Status and Medical Interventions:   Ordered at: 09/10/20 1356     Code Status:    CPR     Medical Interventions (Level of Support Prior to Arrest):    Full       KENNY Amos  09/23/20  09:28 EDT

## 2020-09-23 NOTE — OUTREACH NOTE
Prep Survey      Responses   Denominational facility patient discharged from?  [facility]   Is LACE score < 7 ?  No   Eligibility  Readm Mgmt   Discharge diagnosis  anasarca secondary to acute on chronic combined systolic and diastolic CHF,    atrial fibrillation status post cardioversion,    Covid 19 positive    COVID-19 Test Status  Confirmed   Does the patient have one of the following disease processes/diagnoses(primary or secondary)?  Other   Does the patient have Home health ordered?  No   Is there a DME ordered?  No   Prep survey completed?  Yes          Nicki Crane RN

## 2020-09-23 NOTE — PLAN OF CARE
Goal Outcome Evaluation:  Plan of Care Reviewed With: patient  Progress: improving   Patient resting in bed. Vital signs are stable. 2 L NC. No complaints noted at this time. Will continue to monitor.

## 2020-09-23 NOTE — PAYOR COMM NOTE
"[facility]  NPI:[id number]    Utilization Review  Contact: Sandy Powell RN  Phone: [phone]  Fax:[phone]    DISCHARGE NOTIFICATION      Dio Berry (54 y.o. Female)     Date of Birth Social Security Number Address Home Phone MRN    1965  [address]   [place] [address] [phone] [id number]    [employer] Status          Unknown        Admission Date Admission Type Admitting Provider Attending Provider Department, Room/Bed    9/10/2020 Emergency Halle Keita MD  [facility] PROGRESS CARE, P220/1P    Discharge Date Discharge Disposition Discharge Destination        2020 Home or Self Care              Attending Provider: (none)   Allergies: Phenergan [Promethazine]    Isolation: Enh Drop/Con   Infection: COVID (confirmed) (20)   Code Status: CPR    Ht: 165.1 cm (65\")   Wt: 123 kg (271 lb)    Admission Cmt: None   Principal Problem: None                Active Insurance as of 2020     Primary Coverage     Payor Plan Insurance Group Employer/Plan Group    [employer] [place] [employer] [place]ICA[place]      Payor Plan Address Payor Plan Phone Number Payor Plan Fax Number Effective Dates    [address] [address] [phone]  2020 - None Entered    [facility] [id number]       Subscriber Name Subscriber Birth Date Member ID       DIO BERRY 1965 [id number]                 Emergency Contacts      (Rel.) Home Phone Work Phone Mobile Phone    Hayden Cárdenas (Brother) -- -- [phone]               Discharge Summary      Halle Keita MD at [facility]              [facility] HOSPITALIST MEDICINE DISCHARGE SUMMARY    Patient Identification:  Name:  Dio Berry  Age:  54 y.o.  Sex:  female  :  1965  MRN:  [id number]  Visit Number:  [id number]    Date of Admission: 2020  Date of Discharge: 2020  DISCHARGE DISPOSITION   Stable  PCP: Niko Schaefer MD    DISCHARGE DIAGNOSIS : Chronic " hypoxic respiratory failure, macrocytic anemia with normal TSH B12 folate, mild thrombocytopenia likely secondary to liver cirrhosis, constipation resolved, morbid obesity with BMI 45.1, COVID-19 positive asymptomatic, atrial fibrillation status post cardioversion, acute kidney injury and CKD stage III now resolved, pseudohyponatremia secondary to hyperglycemia, episodes of diffuse purpuric rash, records from [facility] is negative for vasculitis improved with hydrocortisone cream, diabetes type 2 insulin requiring, acute hyperkalemia resolved, anasarca secondary to acute on chronic combined systolic and diastolic CHF, newly diagnosed cardiomyopathy with ejection fraction of 21 to 25%.  Peripheral arterial disease.  Abnormal nuclear stress test showing small to medium sized inferior wall ischemia, diagnostic cardiac cath deferred due to COVID-19 as well as patient symptomatic at this time to be followed outpatient by cardiology.    HOSPITAL COURSE  Patient is a 54 y.o. female presented to [facility] complaining of worsening of edema anasarca and worsening of diffuse purpuric rash.  Review of her records shows she has this previous episode in the past she was seen by rheumatologist, dermatologist and biopsy was performed.  There was no report of findings on pathology on her biopsy to suggest vasculitis.  Here at our facility serologic work-up was also performed which came back negative.  She was noted to have anasarca and CHF, aggressive diuresis was done, 2D echo shows new advanced cardiomyopathy with ejection fraction of 21 to 25%.  She was also in atrial fibrillation, because of her significant cardiomyopathy and atrial fibrillation, patient underwent transesophageal echo with cardioversion which was successful.  Since then she continues to maintain sinus rhythm.  She did develop episodes of acute kidney injury CKD stage but subsequently improved.  After her renal function remained stable she was  restarted on Bumex 2 mg daily which she continues to diurese well, renal function remained stable and in fact improved.  Regarding the rash, is not clear as to the cause, she was started on low-dose prednisone outpatient which we will continue and to be tapered by her primary care provider.  We did start her on hydrocortisone cream twice a day with significant improvement the purpuric rash is almost completely resolved is now dried and scabbing.  As to the cause of the rash is unclear, initially Coumadin was suspected but was held and did not show improvement of the rash, restarting the Coumadin also did not worsen the rash.  Nonetheless for stroke prophylaxis cardiology recommended Eliquis which the patient was started.  On admission patient was also noted to have mild thrombocytopenia but remained stable, B12 folate is normal, HIT work-up was ordered with HIT platelet antibody is negative, serotonin release assay is pending. Of note while she was here cardiology was contemplating of performing heart catheterization but on screening she was found to be positive for COVID-19.  She was transferred to enhance isolation, she was monitored closely and did not have respiratory symptoms, x-ray was unremarkable.  Infectious disease recommends self isolation with no treatment at this time.  Patient is for discharge, she was offered multiple times today and yesterday for home health which she refused, she claims she has a brother and son who can help her.  She was informed the importance of isolation due to COVID-19 infection, she was also advised to have low threshold for me to the emergency room should she develop short of breath, coughing or fever.  She reveals remain on isolation for total of 14 days.  Appointment has been made through tele-visit to her primary care provider within a week for follow-up with COVID-19 infection.  Pulse ox monitor was also given to the patient.      VITAL SIGNS:      09/21/20  0359  09/22/20  0403 09/23/20  0400   Weight: 123 kg (270 lb 8 oz) 123 kg (271 lb) 123 kg (271 lb)     Body mass index is 45.1 kg/m².  Vitals:    09/23/20 0902   BP: 116/51   Pulse: 73   Resp: 16   Temp: 97.9 °F (36.6 °C)   SpO2: 97%     PHYSICAL EXAM:  General: Comfortable,awake, alert, oriented to self, place, and time, she is currently sitting on her bed, trouble, she is not tachypneic.  Well-developed and well-nourished.  No respiratory distress.    Skin:  Skin is warm and dry.  Rash as mentioned no longer has purpura, is dried with scab dried.  No obvious signs of cellulitis. No pallor.    HENT:  Head:  Normocephalic and atraumatic.  Mouth:  Moist mucous membranes.    Eyes:  Conjunctivae and EOM are normal.  Pupils are equal, round, and reactive to light.  No scleral icterus.    Neck:  Neck supple.  No JVD present.  No hepatojugular reflux  Pulmonary/Chest:  No respiratory distress, no wheezes, no crackles, with normal breath sounds and good air movement.  Cardiovascular:  Normal rate, regular rhythm and normal heart sounds with no murmur.  Abdominal:  Soft.  Bowel sounds are normal.  No distension and no tenderness.  Obese  Extremities: Edema has almost completely resolved, no cyanosis or clubbing, rash as mentioned above.  Strong pedal pulse bilateral no edema, no tenderness, and no deformity.  No red or swollen joints right big toe noted to be amputated.    Strong pulses in all 4 extremities with no clubbing, no cyanosis, no edema.  Neurological:  Motor strength equal no obvious deficit, sensory grossly intact.   No cranial nerve deficit.  No tongue deviation.  No facial droop.  No slurred speech.    Genitourinary: She has no Harrington catheter  Back: No skin break except for the purpura which is already scabbing and dry.    ----------    DISCHARGE MEDICATIONS:     Discharge Medications      New Medications      Instructions Start Date   apixaban 5 MG tablet tablet  Commonly known as: ELIQUIS   5 mg, Oral, Every 12  Hours Scheduled      aspirin 81 MG EC tablet   81 mg, Oral, Daily   Start Date: September 24, 2020     bumetanide 2 MG tablet  Commonly known as: BUMEX   2 mg, Oral, Daily   Start Date: September 24, 2020     carvedilol 6.25 MG tablet  Commonly known as: COREG   6.25 mg, Oral, 2 Times Daily With Meals      hydrocortisone 1 % cream   Topical, 2 Times Daily, 453.6 grams jar Apply bid until rash resolves      insulin detemir 100 UNIT/ML injection  Commonly known as: LEVEMIR   10 Units, Subcutaneous, Nightly      omeprazole 20 MG capsule  Commonly known as: PrilOSEC   20 mg, Oral, Daily      polyethylene glycol 17 g packet  Commonly known as: MIRALAX   17 g, Oral, Daily   Start Date: September 24, 2020     rOPINIRole 1 MG tablet  Commonly known as: REQUIP   1 mg, Oral, 3 Times Daily, Take 1 hour before bedtime.      sennosides-docusate 8.6-50 MG per tablet  Commonly known as: PERICOLACE   2 tablets, Oral, Nightly      spironolactone 25 MG tablet  Commonly known as: ALDACTONE   12.5 mg, Oral, Daily   Start Date: September 24, 2020        Continue These Medications      Instructions Start Date   amiodarone 200 MG tablet  Commonly known as: PACERONE   200 mg, Oral, Daily      folic acid 1 MG tablet  Commonly known as: FOLVITE   1 mg, Oral, Daily      ondansetron 4 MG tablet  Commonly known as: ZOFRAN   4 mg, Oral, Every 8 Hours PRN      predniSONE 2.5 MG tablet  Commonly known as: DELTASONE   2.5 mg, Oral, Daily      pregabalin 100 MG capsule  Commonly known as: LYRICA   100 mg, Oral, 2 Times Daily PRN      venlafaxine XR 75 MG 24 hr capsule  Commonly known as: EFFEXOR-XR   75 mg, Oral, Daily         Stop These Medications    ampicillin 500 MG capsule  Commonly known as: PRINCIPEN     BASAGLAR KWIKPEN SC     HumuLIN R U-500 KwikPen 500 UNIT/ML solution pen-injector CONCENTRATED injection  Generic drug: Insulin Regular Human (Conc)     metoprolol tartrate 25 MG tablet  Commonly known as: LOPRESSOR     warfarin 3 MG  tablet  Commonly known as: COUMADIN                  Additional Instructions for the Follow-ups that You Need to Schedule     Discharge Follow-up with PCP   As directed       Currently Documented PCP:    Niko Schaefer MD    PCP Phone Number:    [phone]     Follow Up Details: Posthospitalization follow-up by (Televisit) COVID-Jose positive on self-isolation         Discharge Follow-up with Specified Provider: Cardiology; 3 Weeks   As directed      To: Cardiology    Follow Up: 3 Weeks           Follow-up Information     [facility] .    Specialty: Cardiology  Contact information:  [address] [place] [id number] [phone]           Ayanna Mejia MD Follow up in 3 week(s).    Specialty: Nephrology  Contact information:  [address] [place] [address] [phone]             Niko Schaefer MD .    Specialty: Gastroenterology  Why: Posthospitalization follow-up by (Televisit) COVID-19 positive on self-isolation  Contact information:  [address] [place] [address] [address]TERO [address] [place] [address] [phone]                   Halle Keita MD  09/23/20  10:52 EDT    Please note that this discharge summary required more than 30 minutes to complete.      Electronically signed by Halle Keita MD at 09/23/20 7101

## 2020-09-23 NOTE — NURSING NOTE
"Pt at this time refuses home health, states \"that she does not want or need them at this time\". She states that \"if she ever needs them she will notify her primary pcp and get home health, but does not want them now\".  "

## 2020-09-23 NOTE — DISCHARGE SUMMARY
[facility] HOSPITALIST MEDICINE DISCHARGE SUMMARY    Patient Identification:  Name:  Yumiko Purdy  Age:  54 y.o.  Sex:  female  :  1965  MRN:  [id number]  Visit Number:  [id number]    Date of Admission: 2020  Date of Discharge: 2020  DISCHARGE DISPOSITION   Stable  PCP: Niko Schaefer MD    DISCHARGE DIAGNOSIS : Chronic hypoxic respiratory failure, macrocytic anemia with normal TSH B12 folate, mild thrombocytopenia likely secondary to liver cirrhosis, constipation resolved, morbid obesity with BMI 45.1, COVID-19 positive asymptomatic, atrial fibrillation status post cardioversion, acute kidney injury and CKD stage III now resolved, pseudohyponatremia secondary to hyperglycemia, episodes of diffuse purpuric rash, records from [facility] is negative for vasculitis improved with hydrocortisone cream, diabetes type 2 insulin requiring, acute hyperkalemia resolved, anasarca secondary to acute on chronic combined systolic and diastolic CHF, newly diagnosed cardiomyopathy with ejection fraction of 21 to 25%.  Peripheral arterial disease.  Abnormal nuclear stress test showing small to medium sized inferior wall ischemia, diagnostic cardiac cath deferred due to COVID-19 as well as patient symptomatic at this time to be followed outpatient by cardiology.    HOSPITAL COURSE  Patient is a 54 y.o. female presented to [facility] complaining of worsening of edema anasarca and worsening of diffuse purpuric rash.  Review of her records shows she has this previous episode in the past she was seen by rheumatologist, dermatologist and biopsy was performed.  There was no report of findings on pathology on her biopsy to suggest vasculitis.  Here at our facility serologic work-up was also performed which came back negative.  She was noted to have anasarca and CHF, aggressive diuresis was done, 2D echo shows new advanced cardiomyopathy with ejection fraction of 21 to 25%.  She  was also in atrial fibrillation, because of her significant cardiomyopathy and atrial fibrillation, patient underwent transesophageal echo with cardioversion which was successful.  Since then she continues to maintain sinus rhythm.  She did develop episodes of acute kidney injury CKD stage but subsequently improved.  After her renal function remained stable she was restarted on Bumex 2 mg daily which she continues to diurese well, renal function remained stable and in fact improved.  Regarding the rash, is not clear as to the cause, she was started on low-dose prednisone outpatient which we will continue and to be tapered by her primary care provider.  We did start her on hydrocortisone cream twice a day with significant improvement the purpuric rash is almost completely resolved is now dried and scabbing.  As to the cause of the rash is unclear, initially Coumadin was suspected but was held and did not show improvement of the rash, restarting the Coumadin also did not worsen the rash.  Nonetheless for stroke prophylaxis cardiology recommended Eliquis which the patient was started.  On admission patient was also noted to have mild thrombocytopenia but remained stable, B12 folate is normal, HIT work-up was ordered with HIT platelet antibody is negative, serotonin release assay is pending. Of note while she was here cardiology was contemplating of performing heart catheterization but on screening she was found to be positive for COVID-19.  She was transferred to enhance isolation, she was monitored closely and did not have respiratory symptoms, x-ray was unremarkable.  Infectious disease recommends self isolation with no treatment at this time.  Patient is for discharge, she was offered multiple times today and yesterday for home health which she refused, she claims she has a brother and son who can help her.  She was informed the importance of isolation due to COVID-19 infection, she was also advised to have low  threshold for me to the emergency room should she develop short of breath, coughing or fever.  She reveals remain on isolation for total of 14 days.  Appointment has been made through tele-visit to her primary care provider within a week for follow-up with COVID-19 infection.  Pulse ox monitor was also given to the patient.      VITAL SIGNS:      09/21/20  0359 09/22/20  0403 09/23/20  0400   Weight: 123 kg (270 lb 8 oz) 123 kg (271 lb) 123 kg (271 lb)     Body mass index is 45.1 kg/m².  Vitals:    09/23/20 0902   BP: 116/51   Pulse: 73   Resp: 16   Temp: 97.9 °F (36.6 °C)   SpO2: 97%     PHYSICAL EXAM:  General: Comfortable,awake, alert, oriented to self, place, and time, she is currently sitting on her bed, trouble, she is not tachypneic.  Well-developed and well-nourished.  No respiratory distress.    Skin:  Skin is warm and dry.  Rash as mentioned no longer has purpura, is dried with scab dried.  No obvious signs of cellulitis. No pallor.    HENT:  Head:  Normocephalic and atraumatic.  Mouth:  Moist mucous membranes.    Eyes:  Conjunctivae and EOM are normal.  Pupils are equal, round, and reactive to light.  No scleral icterus.    Neck:  Neck supple.  No JVD present.  No hepatojugular reflux  Pulmonary/Chest:  No respiratory distress, no wheezes, no crackles, with normal breath sounds and good air movement.  Cardiovascular:  Normal rate, regular rhythm and normal heart sounds with no murmur.  Abdominal:  Soft.  Bowel sounds are normal.  No distension and no tenderness.  Obese  Extremities: Edema has almost completely resolved, no cyanosis or clubbing, rash as mentioned above.  Strong pedal pulse bilateral no edema, no tenderness, and no deformity.  No red or swollen joints right big toe noted to be amputated.    Strong pulses in all 4 extremities with no clubbing, no cyanosis, no edema.  Neurological:  Motor strength equal no obvious deficit, sensory grossly intact.   No cranial nerve deficit.  No tongue  deviation.  No facial droop.  No slurred speech.    Genitourinary: She has no Harrington catheter  Back: No skin break except for the purpura which is already scabbing and dry.    ----------    DISCHARGE MEDICATIONS:     Discharge Medications      New Medications      Instructions Start Date   apixaban 5 MG tablet tablet  Commonly known as: ELIQUIS   5 mg, Oral, Every 12 Hours Scheduled      aspirin 81 MG EC tablet   81 mg, Oral, Daily   Start Date: September 24, 2020     bumetanide 2 MG tablet  Commonly known as: BUMEX   2 mg, Oral, Daily   Start Date: September 24, 2020     carvedilol 6.25 MG tablet  Commonly known as: COREG   6.25 mg, Oral, 2 Times Daily With Meals      hydrocortisone 1 % cream   Topical, 2 Times Daily, 453.6 grams jar Apply bid until rash resolves      insulin detemir 100 UNIT/ML injection  Commonly known as: LEVEMIR   10 Units, Subcutaneous, Nightly      omeprazole 20 MG capsule  Commonly known as: PrilOSEC   20 mg, Oral, Daily      polyethylene glycol 17 g packet  Commonly known as: MIRALAX   17 g, Oral, Daily   Start Date: September 24, 2020     rOPINIRole 1 MG tablet  Commonly known as: REQUIP   1 mg, Oral, 3 Times Daily, Take 1 hour before bedtime.      sennosides-docusate 8.6-50 MG per tablet  Commonly known as: PERICOLACE   2 tablets, Oral, Nightly      spironolactone 25 MG tablet  Commonly known as: ALDACTONE   12.5 mg, Oral, Daily   Start Date: September 24, 2020        Continue These Medications      Instructions Start Date   amiodarone 200 MG tablet  Commonly known as: PACERONE   200 mg, Oral, Daily      folic acid 1 MG tablet  Commonly known as: FOLVITE   1 mg, Oral, Daily      ondansetron 4 MG tablet  Commonly known as: ZOFRAN   4 mg, Oral, Every 8 Hours PRN      predniSONE 2.5 MG tablet  Commonly known as: DELTASONE   2.5 mg, Oral, Daily      pregabalin 100 MG capsule  Commonly known as: LYRICA   100 mg, Oral, 2 Times Daily PRN      venlafaxine XR 75 MG 24 hr capsule  Commonly known as:  EFFEXOR-XR   75 mg, Oral, Daily         Stop These Medications    ampicillin 500 MG capsule  Commonly known as: PRINCIPEN     BASAGLAR KWIKPEN SC     HumuLIN R U-500 KwikPen 500 UNIT/ML solution pen-injector CONCENTRATED injection  Generic drug: Insulin Regular Human (Conc)     metoprolol tartrate 25 MG tablet  Commonly known as: LOPRESSOR     warfarin 3 MG tablet  Commonly known as: COUMADIN                  Additional Instructions for the Follow-ups that You Need to Schedule     Discharge Follow-up with PCP   As directed       Currently Documented PCP:    Niko Schaefer MD    PCP Phone Number:    [phone]     Follow Up Details: Posthospitalization follow-up by (Televisit) COVID-19 positive on self-isolation         Discharge Follow-up with Specified Provider: Cardiology; 3 Weeks   As directed      To: Cardiology    Follow Up: 3 Weeks           Follow-up Information     [facility] .    Specialty: Cardiology  Contact information:  [address] [place] [id number] [phone]           Ayanna Mejia MD Follow up in 3 week(s).    Specialty: Nephrology  Contact information:  [address] [place] [address] [phone]             Niko Schaefer MD .    Specialty: Gastroenterology  Why: Posthospitalization follow-up by (Televisit) COVID-19 positive on self-isolation  Contact information:  [address] [place] [address] [address]TERO [address] [place] [address] [phone]                   Halle Keita MD  09/23/20  10:52 EDT    Please note that this discharge summary required more than 30 minutes to complete.

## 2020-09-23 NOTE — PROGRESS NOTES
Discharge Planning Assessment  LATASHA Metz     Patient Name: Yumiko Pudry  MRN: [id number]  Today's Date: 9/23/2020    Admit Date: 9/9/2020      Discharge Plan     Row Name 09/23/20 1120       Plan    Final Discharge Disposition Code  01 - home or self-care    Final Note  Pt is being discharged home on this date. Pt refused home health services. No other needs identified.        Expected Discharge Date and Time     Expected Discharge Date Expected Discharge Time    Sep 23, 2020               Neyda Prieto

## 2020-09-24 ENCOUNTER — READMISSION MANAGEMENT (OUTPATIENT)
Dept: CALL CENTER | Facility: HOSPITAL | Age: 55
End: 2020-09-24

## 2020-09-24 NOTE — OUTREACH NOTE
Medical Week 1 Survey      Responses   [facility] patient discharged from?  [facility]   COVID-19 Test Status  Confirmed   Does the patient have one of the following disease processes/diagnoses(primary or secondary)?  Other   Is there a successful TCM telephone encounter documented?  No   Week 1 attempt successful?  No   Unsuccessful attempts  Attempt 1          Jovita Queen RN

## 2020-09-25 ENCOUNTER — READMISSION MANAGEMENT (OUTPATIENT)
Dept: CALL CENTER | Facility: HOSPITAL | Age: 55
End: 2020-09-25

## 2020-09-25 NOTE — OUTREACH NOTE
Medical Week 1 Survey      Responses   [facility] [place] patient discharged from?  [facility]   COVID-19 Test Status  Confirmed   Does the patient have one of the following disease processes/diagnoses(primary or secondary)?  Other   Is there a successful TCM telephone encounter documented?  No   Week 1 attempt successful?  No          Sabine Lynch RN

## 2020-09-26 ENCOUNTER — READMISSION MANAGEMENT (OUTPATIENT)
Dept: CALL CENTER | Facility: HOSPITAL | Age: 55
End: 2020-09-26

## 2020-09-26 NOTE — OUTREACH NOTE
Medical Week 1 Survey      Responses   [facility] patient discharged from?  [facility]   COVID-19 Test Status  Confirmed   Does the patient have one of the following disease processes/diagnoses(primary or secondary)?  Other   Is there a successful TCM telephone encounter documented?  No   Week 1 attempt successful?  No          Rosalino Rodriguez RN

## 2020-09-29 ENCOUNTER — READMISSION MANAGEMENT (OUTPATIENT)
Dept: CALL CENTER | Facility: HOSPITAL | Age: 55
End: 2020-09-29

## 2020-09-29 NOTE — OUTREACH NOTE
Medical Week 1 Survey      Responses   [facility] patient discharged from?  [facility]   COVID-19 Test Status  Confirmed   Does the patient have one of the following disease processes/diagnoses(primary or secondary)?  Other   Is there a successful TCM telephone encounter documented?  No   Week 1 attempt successful?  Yes   Call start time  0832   Call end time  0832   Discharge diagnosis  anasarca secondary to acute on chronic combined systolic and diastolic CHF,    atrial fibrillation status post cardioversion,    Covid 19 positive    Meds reviewed with patient/caregiver?  Yes   Is the patient having any side effects they believe may be caused by any medication additions or changes?  No   Does the patient have all medications ordered at discharge?  Yes   Is the patient taking all medications as directed (includes completed medication regime)?  Yes   Does the patient have a primary care provider?   Yes   Does the patient have an appointment with their PCP within 7 days of discharge?  Yes   Has the patient kept scheduled appointments due by today?  N/A   Pulse Ox monitoring  None   Psychosocial issues?  No   Did the patient receive a copy of their discharge instructions?  Yes   Nursing interventions  Reviewed instructions with patient   What is the patient's perception of their health status since discharge?  Same   Is the patient/caregiver able to teach back signs and symptoms related to disease process for when to call PCP?  Yes   Is the patient/caregiver able to teach back signs and symptoms related to disease process for when to call 911?  Yes   Is the patient/caregiver able to teach back the hierarchy of who to call/visit for symptoms/problems? PCP, Specialist, Home health nurse, Urgent Care, ED, 911  Yes   Week 1 call completed?  Yes          Jovita Queen RN

## 2020-10-02 ENCOUNTER — READMISSION MANAGEMENT (OUTPATIENT)
Dept: CALL CENTER | Facility: HOSPITAL | Age: 55
End: 2020-10-02

## 2020-10-02 NOTE — OUTREACH NOTE
COVID-19 Week 2 Survey      Responses   [facility] patient discharged from?  [facility]   Does the patient have one of the following disease processes/diagnoses(primary or secondary)?  Other   Call Number  Call 1   COVID-19 Week 2: Call 1 attempt successful?  No   Discharge diagnosis  anasarca secondary to acute on chronic combined systolic and diastolic CHF,    atrial fibrillation status post cardioversion,    Covid 19 positive           Emilie Maxwell LPN

## 2020-10-05 ENCOUNTER — READMISSION MANAGEMENT (OUTPATIENT)
Dept: CALL CENTER | Facility: HOSPITAL | Age: 55
End: 2020-10-05

## 2020-10-05 NOTE — OUTREACH NOTE
COVID-19 Week 2 Survey      Responses   [facility] patient discharged from?  [facility]   Does the patient have one of the following disease processes/diagnoses(primary or secondary)?  Other   Call Number  Call 2   Discharge diagnosis  anasarca secondary to acute on chronic combined systolic and diastolic CHF,    atrial fibrillation status post cardioversion,    Covid 19 positive           Emilie Maxwell LPN

## 2020-10-12 ENCOUNTER — READMISSION MANAGEMENT (OUTPATIENT)
Dept: CALL CENTER | Facility: HOSPITAL | Age: 55
End: 2020-10-12

## 2020-10-12 NOTE — OUTREACH NOTE
COVID-19 Week 3 Survey      Responses   [facility] patient discharged from?  [facility]   Does the patient have one of the following disease processes/diagnoses(primary or secondary)?  Other   Call Number  Call 1   COVID-19 Week 3: Call 1 attempt successful?  No   Discharge diagnosis  anasarca secondary to acute on chronic combined systolic and diastolic CHF,    atrial fibrillation status post cardioversion,    Covid 19 positive           Sabine Lynch RN

## 2020-10-14 ENCOUNTER — HOSPITAL ENCOUNTER (OUTPATIENT)
Dept: CARDIOLOGY | Facility: HOSPITAL | Age: 55
Discharge: HOME | End: 2020-10-14
Admitting: NURSE PRACTITIONER

## 2020-10-14 ENCOUNTER — TELEPHONE (OUTPATIENT)
Dept: CARDIOLOGY | Facility: CLINIC | Age: 55
End: 2020-10-14

## 2020-10-14 VITALS
DIASTOLIC BLOOD PRESSURE: 65 MMHG | WEIGHT: 229.6 LBS | TEMPERATURE: 98.1 F | RESPIRATION RATE: 14 BRPM | SYSTOLIC BLOOD PRESSURE: 108 MMHG | BODY MASS INDEX: 38.21 KG/M2 | OXYGEN SATURATION: 95 % | HEART RATE: 81 BPM

## 2020-10-14 DIAGNOSIS — I50.42 CHRONIC COMBINED SYSTOLIC AND DIASTOLIC CONGESTIVE HEART FAILURE (HCC): ICD-10-CM

## 2020-10-14 DIAGNOSIS — E66.01 SEVERE OBESITY (BMI 35.0-39.9) WITH COMORBIDITY (HCC): ICD-10-CM

## 2020-10-14 DIAGNOSIS — N18.2 CKD (CHRONIC KIDNEY DISEASE) STAGE 2, GFR 60-89 ML/MIN: ICD-10-CM

## 2020-10-14 DIAGNOSIS — I42.9 CARDIOMYOPATHY, UNSPECIFIED TYPE (HCC): Primary | ICD-10-CM

## 2020-10-14 LAB
ANION GAP SERPL CALCULATED.3IONS-SCNC: 7.7 MMOL/L (ref 5–15)
BUN SERPL-MCNC: 20 MG/DL (ref 6–20)
BUN/CREAT SERPL: 23.5 (ref 7–25)
CALCIUM SPEC-SCNC: 9.1 MG/DL (ref 8.6–10.5)
CHLORIDE SERPL-SCNC: 100 MMOL/L (ref 98–107)
CO2 SERPL-SCNC: 27.3 MMOL/L (ref 22–29)
CREAT SERPL-MCNC: 0.85 MG/DL (ref 0.57–1)
GFR SERPL CREATININE-BSD FRML MDRD: 70 ML/MIN/1.73
GLUCOSE SERPL-MCNC: 209 MG/DL (ref 65–99)
MAGNESIUM SERPL-MCNC: 1.7 MG/DL (ref 1.6–2.6)
NT-PROBNP SERPL-MCNC: 1851 PG/ML (ref 0–900)
POTASSIUM SERPL-SCNC: 4.6 MMOL/L (ref 3.5–5.2)
SODIUM SERPL-SCNC: 135 MMOL/L (ref 136–145)

## 2020-10-14 PROCEDURE — 94618 PULMONARY STRESS TESTING: CPT

## 2020-10-14 PROCEDURE — 83735 ASSAY OF MAGNESIUM: CPT | Performed by: NURSE PRACTITIONER

## 2020-10-14 PROCEDURE — 80048 BASIC METABOLIC PNL TOTAL CA: CPT | Performed by: NURSE PRACTITIONER

## 2020-10-14 PROCEDURE — 99214 OFFICE O/P EST MOD 30 MIN: CPT | Performed by: NURSE PRACTITIONER

## 2020-10-14 PROCEDURE — 83880 ASSAY OF NATRIURETIC PEPTIDE: CPT | Performed by: NURSE PRACTITIONER

## 2020-10-14 PROCEDURE — G0463 HOSPITAL OUTPT CLINIC VISIT: HCPCS

## 2020-10-14 RX ORDER — INSULIN HUMAN 500 [IU]/ML
40 INJECTION, SOLUTION SUBCUTANEOUS 2 TIMES DAILY
COMMUNITY

## 2020-10-14 RX ORDER — INSULIN GLARGINE 100 [IU]/ML
100 INJECTION, SOLUTION SUBCUTANEOUS DAILY
COMMUNITY
End: 2023-01-31 | Stop reason: CLARIF

## 2020-10-14 RX ORDER — AMIODARONE HYDROCHLORIDE 200 MG/1
200 TABLET ORAL 2 TIMES DAILY
COMMUNITY

## 2020-10-14 RX ORDER — CLINDAMYCIN HYDROCHLORIDE 300 MG/1
300 CAPSULE ORAL EVERY 6 HOURS
COMMUNITY
End: 2020-10-21

## 2020-10-14 NOTE — TELEPHONE ENCOUNTER
Spoke with patient in regards to MUGA scan scheduled tomorrow. Prep instructions were given. Advised patient of appointment at 2:00 p.m. tomorrow. Advised that registration/pre-authorization department would contact patient if there was no pre-authorization given from insurance. She expresses understanding.

## 2020-10-14 NOTE — PROGRESS NOTES
Heart Failure Clinic    Patient and son did wear a mask for this visit.     Vitals:    10/14/20 1010   BP: 108/65   Pulse: 81   Resp: 14   Temp: 98.1 °F (36.7 °C)   SpO2: 95%        Method of arrival: Other wheelchair. Uses a walker at home but able to walk to scale and stand to be weighed    Weighing self daily: No    Monitoring Heart Failure Zones: No    Today's HF Zone: Yellow     Taking medications as prescribed: Yes    Edema 1+    Shortness of Air: Yes but improved.    States that she has lost nearly 50 pounds in fluid since hospitalization. Still has some areas on her BLE that are scabbed, but improved from hospitalization.    Number of pillows used at night:>3    Educational Materials given:  Sodium Restriction in Diet  Daily Weight monitoring  Don't Pass the Salt recipe book  Katalina Bedolla RN 10/14/20 10:25 EDT

## 2020-10-14 NOTE — PROGRESS NOTES
[facility] CHF CLINIC OFFICE VISIT    Subjective:     History of Present Illness  Yumiko Purdy is a 54 y.o.  female who presents to the clinic today accompanied by her son for hospital follow up. She was recently hospitalized for acute on chronic systolic and diastolic heart failure with anasarca. During her stay she was also diagnosed with new onset cardiomyopathy with EF of 21-25%. Her current medications are Bumex 2 mg daily, Spironolactone 12.5 mg daily and Coreg 6.25 BID. She was placed on Valsartan during hospital stay but was discontinued due to hypotension. She reports medication compliance. She does daily home weights with readings around 226 lbs. She has a home wrist BP monitor and reports readings around 140 on top and unsure of any other numbers or HR. She doesn't monitor sodium or fluids. She reports prior to hospitalization she was on lasix po as an outpatient and since being placed on the bumex she feels results are better. She reports urine output is good. She reports a hx of heart cath in the past at [facility] with little blockage and medical management about 4 years ago. She denies any implantable devices. She doesn't recall anyone talking to her about a life vest. Reports chronically sleeping on 3 pillows. Denies any chest pain, palpitations, worsening shortness of air or leg swelling.     PCP: Dr. Schaefer  Cardiologist: Dr. Ken   Nephrologist:Dr. Mejia - missed recent appt and needs to resschedule    Hospitalizations: Discharged on 9/23/2020    Past Medical History:   Diagnosis Date   • Anemia    • CHF (congestive heart failure) (CMS/HCC)    • Chronic a-fib (CMS/HCC)     stated by patient    • Cirrhosis of liver (CMS/HCC)     stated by patient    • Diabetes mellitus (CMS/HCC)      Past Surgical History:   Procedure Laterality Date   • APPENDECTOMY     • CHOLECYSTECTOMY     • TOE AMPUTATION Right     stated by patient.      Social History     Socioeconomic History   • Marital status:       Spouse name: Not on file   • Number of children: Not on file   • Years of education: Not on file   • Highest education level: Not on file   Tobacco Use   • Smoking status: Never Smoker   • Smokeless tobacco: Never Used     No family history on file.    Allergies:  Allergies   Allergen Reactions   • Phenergan [Promethazine]      Review of Systems   Constitution: Negative for chills, fever, weight gain and weight loss.   HENT: Negative for congestion, ear pain, hoarse voice and sore throat.    Eyes: Negative for discharge, double vision and visual disturbance.   Cardiovascular: Positive for dyspnea on exertion and leg swelling. Negative for chest pain, claudication, irregular heartbeat, near-syncope, orthopnea, palpitations and syncope.   Respiratory: Positive for shortness of breath. Negative for cough and wheezing.    Endocrine: Negative for cold intolerance, heat intolerance and polyuria.   Hematologic/Lymphatic: Negative for bleeding problem. Does not bruise/bleed easily.   Skin: Negative for color change, dry skin, flushing and rash.   Musculoskeletal: Negative for back pain, joint pain, joint swelling and neck pain.   Gastrointestinal: Negative for abdominal pain, constipation, diarrhea, nausea and vomiting.   Genitourinary: Negative for dysuria, flank pain, frequency, hesitancy and urgency.   Neurological: Negative for difficulty with concentration, excessive daytime sleepiness, dizziness, loss of balance, numbness, tremors and weakness.   Psychiatric/Behavioral: Negative for depression. The patient does not have insomnia and is not nervous/anxious.        Current Outpatient Medications   Medication Sig Dispense Refill   • amiodarone (PACERONE) 200 MG tablet Take 200 mg by mouth Daily.     • apixaban (ELIQUIS) 5 MG tablet tablet Take 1 tablet by mouth Every 12 (Twelve) Hours. Indications: Atrial Fibrillation 60 tablet 3   • aspirin 81 MG EC tablet Take 1 tablet by mouth Daily. 30 tablet 3   • bumetanide (BUMEX)  2 MG tablet Take 1 tablet by mouth Daily. 30 tablet 3   • carvedilol (COREG) 6.25 MG tablet Take 1 tablet by mouth 2 (Two) Times a Day With Meals. 60 tablet 3   • folic acid (FOLVITE) 1 MG tablet Take 1 mg by mouth Daily.     • hydrocortisone 1 % cream Apply  topically to the appropriate area as directed 2 (Two) Times a Day. 453.6 grams jar  Apply bid until rash resolves 1 each 0   • insulin detemir (LEVEMIR) 100 UNIT/ML injection Inject 10 Units under the skin into the appropriate area as directed Every Night. 10 mL 12   • omeprazole (PrilOSEC) 20 MG capsule Take 1 capsule by mouth Daily. 30 capsule 3   • ondansetron (ZOFRAN) 4 MG tablet Take 4 mg by mouth Every 8 (Eight) Hours As Needed for Nausea or Vomiting.     • polyethylene glycol (MIRALAX) 17 g packet Take 17 g by mouth Daily. 30 packet 3   • predniSONE (DELTASONE) 2.5 MG tablet Take 2.5 mg by mouth Daily.     • pregabalin (LYRICA) 100 MG capsule Take 100 mg by mouth 2 (Two) Times a Day As Needed.     • rOPINIRole (REQUIP) 1 MG tablet Take 1 tablet by mouth 3 (Three) Times a Day. Take 1 hour before bedtime. 90 tablet 0   • sennosides-docusate (PERICOLACE) 8.6-50 MG per tablet Take 2 tablets by mouth Every Night. 60 tablet 3   • spironolactone (ALDACTONE) 25 MG tablet Take 0.5 tablets by mouth Daily. 30 tablet 3   • venlafaxine XR (EFFEXOR-XR) 75 MG 24 hr capsule Take 75 mg by mouth Daily.       No current facility-administered medications for this encounter.       Objective:   There were no vitals filed for this visit.    Wt Readings from Last 3 Encounters:   09/23/20 123 kg (271 lb)        Vitals signs reviewed.   Constitutional:       Appearance: Normal appearance. Well-developed.      Comments: Wears mask during encounter, came into clinic in wheelchair but attempted 6MWT   Eyes:      Conjunctiva/sclera: Conjunctivae normal.   HENT:      Head: Normocephalic.   Neck:      Musculoskeletal: Normal range of motion and neck supple.      Vascular: No JVD or  JVR.   Pulmonary:      Effort: Pulmonary effort is normal.      Breath sounds: Normal breath sounds.   Cardiovascular:      Normal rate. Regular rhythm.   Pulses:     Intact distal pulses.   Edema:     Ankle: bilateral trace edema of the ankle.     Feet: bilateral trace edema of the feet.  Abdominal:      General: Bowel sounds are normal.      Palpations: Abdomen is soft. There is no hepatomegaly or splenomegaly.   Musculoskeletal: Normal range of motion.   Skin:     General: Skin is warm and dry.   Neurological:      Mental Status: Alert and oriented to person, place, and time.   Psychiatric:         Attention and Perception: Attention normal.         Mood and Affect: Mood normal.         Speech: Speech normal.         Behavior: Behavior is cooperative.         Cognition and Memory: Cognition normal.     Cardiographics  Results for orders placed during the hospital encounter of 09/09/20   Adult Transesophageal Echo ([facility]) W/ Cont if Necessary Per Protocol (Cardiology Department)    Narrative · Ejection fraction appears to be 21 - 25%. Left ventricular systolic   function is severely decreased.  · Right ventricular cavity is mildly dilated. Moderately reduced right   ventricular systolic function noted.  · Trace mitral valve regurgitation is present.  · Moderate tricuspid valve regurgitation is present. Estimated right   ventricular systolic pressure from tricuspid regurgitation is normal (<35   mmHg).  · There is (grade 1) plaque in the proximal aorta present. There is (grade   1) plaque in the ascending aorta present. There is (grade 1) plaque in the   aortic arch present. There is (grade 1) plaque in the descending aorta   present.        EKG 9/16/2020    Chest x-ray:9/22/2020    IMPRESSION:  No radiographic evidence of acute cardiac or pulmonary  disease.  This report was finalized on 9/22/2020 12:59 PM by Dr. Marlo Parr MD.    Stress Test: 9/17/2020  Interpretation Summary  · A pharmacological stress test  was performed using regadenoson without low-level exercise.  · Findings consistent with a normal ECG stress test.  · Myocardial perfusion imaging indicates a small-to-medium-sized, mild degree of ischemia located in the inferior wall.  · Abnormal LV wall motion consistent with moderate global hypokinesis.  · Left ventricular ejection fraction is moderately reduced. (Calculated EF = 40%).  · Impressions are consistent with an intermediate risk study.        Lab Review     Lab Results   Component Value Date    TSH 3.830 09/10/2020     Lab Results   Component Value Date    GLUCOSE 134 (H) 09/23/2020    BUN 17 09/23/2020    CREATININE 1.26 (H) 09/23/2020    EGFRIFNONA 44 (L) 09/23/2020    BCR 13.5 09/23/2020    K 4.4 09/23/2020    CO2 28.2 09/23/2020    CALCIUM 8.7 09/23/2020    ALBUMIN 2.88 (L) 09/16/2020    LABIL2 1.3 (L) 06/09/2016    AST 24 09/16/2020    ALT 22 09/16/2020     Lab Results   Component Value Date    WBC 4.58 09/22/2020    HGB 8.5 (L) 09/22/2020    HCT 29.9 (L) 09/22/2020    MCV 97.7 (H) 09/22/2020     (L) 09/22/2020     Lab Results   Component Value Date    TROPONINT 0.027 09/11/2020     Lab Results   Component Value Date    PROBNP 1,788.0 (H) 09/13/2020     6MWT - Patient walked 45 meters resting and requiring assistance. Her oxygen saturation remained > 94%. She complained of dyspnea and fatigue at 3-4 on Don Scale.      The following portions of the patient's history were reviewed and updated as appropriate: allergies, current medications, past family history, past medical history, past social history, past surgical history and problem list.     Old records reviewed and pertinent information is included in the above objective data.     Assessment/Plan:   1. Chronic systolic and diastolic congestive heart failure  2. Advanced cardiomyopathy, new onset with unclear etiology, EF 21-25%  3. CKD, stage II  4. Obesity    1. BNP, BMP and magnesium today  2. Reviewed and discussed labs with patient  and son today  3. 6MWT  4. Reviewed and discussed 6MWT with patient and son today  5. Discussed lifevest due to EF of 21-25% with patient today and she is willing to proceed - order placed.   6. Continue to monitor BP  7. Order placed for a MUGA scan ASAP to look further at EF - results pending.   8. Continue on current medications. Further medication titrations at follow up visit if BP, HR and kidney function allows.   9. Follow up visit in 1 week, sooner if needed.       30 minutes face to face spent counseling patient extensively on dietary Na+ intake, importance of activity, weight monitoring, compliance with medications and follow up appointments.

## 2020-10-14 NOTE — PATIENT INSTRUCTIONS
Heart Failure, Diagnosis    Heart failure means that your heart is not able to pump blood in the right way. This makes it hard for your body to work well. Heart failure is usually a long-term (chronic) condition. You must take good care of yourself and follow your treatment plan from your doctor.  What are the causes?  This condition may be caused by:  · High blood pressure.  · Build up of cholesterol and fat in the arteries.  · Heart attack. This injures the heart muscle.  · Heart valves that do not open and close properly.  · Damage of the heart muscle. This is also called cardiomyopathy.  · Lung disease.  · Abnormal heart rhythms.  What increases the risk?  The risk of heart failure goes up as a person ages. This condition is also more likely to develop in people who:  · Are overweight.  · Are male.  · Smoke or chew tobacco.  · Abuse alcohol or illegal drugs.  · Have taken medicines that can damage the heart.  · Have diabetes.  · Have abnormal heart rhythms.  · Have thyroid problems.  · Have low blood counts (anemia).  What are the signs or symptoms?  Symptoms of this condition include:  · Shortness of breath.  · Coughing.  · Swelling of the feet, ankles, legs, or belly.  · Losing weight for no reason.  · Trouble breathing.  · Waking from sleep because of the need to sit up and get more air.  · Rapid heartbeat.  · Being very tired.  · Feeling dizzy, or feeling like you may pass out (faint).  · Having no desire to eat.  · Feeling like you may vomit (nauseous).  · Peeing (urinating) more at night.  · Feeling confused.  How is this treated?         This condition may be treated with:  · Medicines. These can be given to treat blood pressure and to make the heart muscles stronger.  · Changes in your daily life. These may include eating a healthy diet, staying at a healthy body weight, quitting tobacco and illegal drug use, or doing exercises.  · Surgery. Surgery can be done to open blocked valves, or to put devices in  the heart, such as pacemakers.  · A donor heart (heart transplant). You will receive a healthy heart from a donor.  Follow these instructions at home:  · Treat other conditions as told by your doctor. These may include high blood pressure, diabetes, thyroid disease, or abnormal heart rhythms.  · Learn as much as you can about heart failure.  · Get support as you need it.  · Keep all follow-up visits as told by your doctor. This is important.  Summary  · Heart failure means that your heart is not able to pump blood in the right way.  · This condition is caused by high blood pressure, heart attack, or damage of the heart muscle.  · Symptoms of this condition include shortness of breath and swelling of the feet, ankles, legs, or belly. You may also feel very tired or feel like you may vomit.  · You may be treated with medicines, surgery, or changes in your daily life.  · Treat other health conditions as told by your doctor.  This information is not intended to replace advice given to you by your health care provider. Make sure you discuss any questions you have with your health care provider.  Document Released: 09/26/2009 Document Revised: 03/06/2020 Document Reviewed: 03/06/2020  "[employer] Patient Education © 2020 "[employer] Inc.    Living With Heart Failure   Heart failure is a long-term (chronic) condition in which the heart cannot pump enough blood through the body. When this happens, parts of the body do not get the blood and oxygen they need.  There is no cure for heart failure at this time, so it is important for you to take good care of yourself and follow the treatment plan set by your health care provider. If you are living with heart failure, there are ways to help you manage the disease.  Follow these instructions at home:  Living with heart failure requires you to make changes in your life. Your health care team will teach you about the changes you need to make in order to relieve your symptoms and lower  your risk of going to the hospital. Follow the treatment plan as set by your health care provider.  Medicines  Medicines are important in reducing your heart's workload, slowing the progression of heart failure, and improving your symptoms.  · Take over-the-counter and prescription medicines only as told by your health care provider.  · Do not stop taking your medicine unless your health care provider tells you to do that.  · Do not skip any dose of your medicine.  · Refill prescriptions before you run out of medicine. You need your medicines every day.  Eating and drinking    · Eat heart-healthy foods. Talk with a dietitian to make an eating plan that is right for you.  ? If directed by your health care provider:  ? Limit salt (sodium). Lowering your sodium intake may reduce symptoms of heart failure. Ask a dietitian to recommend heart-healthy seasonings.  ? Limit your fluid intake. Fluid restriction may reduce symptoms of heart failure.  ? Use low-fat cooking methods instead of frying. Low-fat methods include roasting, grilling, broiling, baking, poaching, steaming, and stir-frying.  ? Choose foods that contain no trans fat and are low in saturated fat and cholesterol. Healthy choices include fresh or frozen fruits and vegetables, fish, lean meats, legumes, fat-free or low-fat dairy products, and whole-grain or high-fiber foods.  · Limit alcohol intake to no more than 1 drink a day for nonpregnant women and 2 drinks a day for men. One drink equals 12 oz of beer, 5 oz of wine, or 1½ oz of hard liquor.  ? Drinking more than that is harmful to your heart. Tell your health care provider if you drink alcohol several times a week.  ? Talk with your health care provider about whether any level of alcohol use is safe for you.  Activity    · Ask your health care provider about attending cardiac rehabilitation. These programs include aerobic physical activity, which provides many benefits for your heart.  · If no cardiac  rehabilitation program is available, ask your health care provider what aerobic exercises are safe for you to do.  Lifestyle  Make the lifestyle changes recommended by your health care provider. In general:  · Lose weight if your health care provider tells you to do that. Weight loss may reduce symptoms of heart failure.  · Do not use any products that contain nicotine or tobacco, such as cigarettes or e-cigarettes. If you need help quitting, ask your health care provider.  · Do not use street (illegal) drugs.  · Return to your normal activities as told by your health care provider. Ask your health care provider what activities are safe for you.  General instructions    · Make sure you weigh yourself every day to track your weight. Rapid weight gain may indicate an increase in fluid in your body and may increase the workload of your heart.  ? Weigh yourself every morning. Do this after you urinate but before you eat breakfast.  ? Wear the same type of clothing, without shoes, each time you weigh yourself.  ? Weigh yourself on the same scale and in the same spot each time.  · Living with chronic heart failure often leads to emotions such as fear, stress, anxiety, and depression. If you feel any of these emotions and need help coping, contact your health care provider. Other ways to get help include:  ? Talking to friends and family members about your condition. They can give you support and guidance. Explain your symptoms to them and, if comfortable, invite them to attend appointments or rehabilitation with you.  ? Joining a support group for people with chronic heart failure. Talking with other people who have the same symptoms may give you new ways of coping with your disease and your emotions.  · Stay up to date with your shots (vaccines). Staying current on pneumococcal and influenza vaccines is especially important in preventing germs from attacking your airways (respiratory infections).  · Keep all follow-up  visits as told by your health care provider. This is important.  How to recognize changes in your condition  You and your family members need to know what changes to watch for in your condition.  Watch for the following changes and report them to your health care provider:  · Sudden weight gain. Ask your health care provider what amount of weight gain to report.  · Shortness of breath:  ? Feeling short of breath while at rest, with no exercise or activity that required great effort.  ? Feeling breathless with activity.  · Swelling of your lower legs or ankles.  · Difficulty sleeping:  ? You wake up feeling short of breath.  ? You have to use more pillows to raise your head in order to sleep.  · Frequent, dry, hacking cough.  · Loss of appetite.  · Feeling more tired all the time.  · Depression or feelings of sadness or hopelessness.  · Bloating in the stomach.  Where to find more information  · Local support groups. Ask your health care provider about groups near you.  · The American Heart Association: www.heart.org  Contact a health care provider if:  · You have a rapid weight gain.  · You have increasing shortness of breath that is unusual for you.  · You are unable to participate in your usual physical activities.  · You tire easily.  · You cough more than normal, especially with physical activity.  · You have any swelling or more swelling in areas such as your hands, feet, ankles, or abdomen.  · You feel like your heart is beating quickly (palpitations).  · You become dizzy or light-headed when you stand up.  Get help right away if:  · You have difficulty breathing.  · You notice or your family notices a change in your awareness, such as having trouble staying awake or having difficulty with concentration.  · You have pain or discomfort in your chest.  · You have an episode of fainting (syncope).  Summary  · There is no cure for heart failure, so it is important for you to take good care of yourself and follow  the treatment plan set by your health care provider.  · Medicines are important in reducing your heart's workload, slowing the progression of heart failure, and improving your symptoms.  · Living with chronic heart failure often leads to emotions such as fear, stress, anxiety, and depression. If you are feeling any of these emotions and need help coping, contact your health care provider.  This information is not intended to replace advice given to you by your health care provider. Make sure you discuss any questions you have with your health care provider.  Document Released: 05/02/2018 Document Revised: 11/30/2018 Document Reviewed: 05/02/2018  [employer] Patient Education © 2020 [employer]    Obesity, Adult  Obesity is having too much body fat. Being obese means that your weight is more than what is healthy for you.  BMI is a number that explains how much body fat you have. If you have a BMI of 30 or more, you are obese. Obesity is often caused by eating or drinking more calories than your body uses. Changing your lifestyle can help you lose weight.  Obesity can cause serious health problems, such as:  · Stroke.  · Coronary artery disease (CAD).  · Type 2 diabetes.  · Some types of cancer, including cancers of the colon, breast, uterus, and gallbladder.  · Osteoarthritis.  · High blood pressure (hypertension).  · High cholesterol.  · Sleep apnea.  · Gallbladder stones.  · Infertility problems.  What are the causes?  · Eating meals each day that are high in calories, sugar, and fat.  · Being born with genes that may make you more likely to become obese.  · Having a medical condition that causes obesity.  · Taking certain medicines.  · Sitting a lot (having a sedentary lifestyle).  · Not getting enough sleep.  · Drinking a lot of drinks that have sugar in them.  What increases the risk?  · Having a family history of obesity.  · Being an  woman.  · Being a  man.  · Living in an area with  limited access to:  ? [place], recreation centers, or sidewalks.  ? Healthy food choices, such as grocery stores and [employer] markets.  What are the signs or symptoms?  The main sign is having too much body fat.  How is this treated?  · Treatment for this condition often includes changing your lifestyle. Treatment may include:  ? Changing your diet. This may include making a healthy meal plan.  ? Exercise. This may include activity that causes your heart to beat faster (aerobic exercise) and strength training. Work with your doctor to design a program that works for you.  ? Medicine to help you lose weight. This may be used if you are not able to lose 1 pound a week after 6 weeks of healthy eating and more exercise.  ? Treating conditions that cause the obesity.  ? Surgery. Options may include gastric banding and gastric bypass. This may be done if:  § Other treatments have not helped to improve your condition.  § You have a BMI of 40 or higher.  § You have life-threatening health problems related to obesity.  Follow these instructions at home:  Eating and drinking    · Follow advice from your doctor about what to eat and drink. Your doctor may tell you to:  ? Limit fast food, sweets, and processed snack foods.  ? Choose low-fat options. For example, choose low-fat milk instead of whole milk.  ? Eat 5 or more servings of fruits or vegetables each day.  ? Eat at home more often. This gives you more control over what you eat.  ? Choose healthy foods when you eat out.  ? Learn to read food labels. This will help you learn how much food is in 1 serving.  ? Keep low-fat snacks available.  ? Avoid drinks that have a lot of sugar in them. These include soda, fruit juice, iced tea with sugar, and flavored milk.  · Drink enough water to keep your pee (urine) pale yellow.  · Do not go on fad diets.  Physical activity  · Exercise often, as told by your doctor. Most adults should get up to 150 minutes of moderate-intensity  exercise every week.Ask your doctor:  ? What types of exercise are safe for you.  ? How often you should exercise.  · Warm up and stretch before being active.  · Do slow stretching after being active (cool down).  · Rest between times of being active.  Lifestyle  · Work with your doctor and a food expert (dietitian) to set a weight-loss goal that is best for you.  · Limit your screen time.  · Find ways to reward yourself that do not involve food.  · Do not drink alcohol if:  ? Your doctor tells you not to drink.  ? You are pregnant, may be pregnant, or are planning to become pregnant.  · If you drink alcohol:  ? Limit how much you use to:  § 0-1 drink a day for women.  § 0-2 drinks a day for men.  ? Be aware of how much alcohol is in your drink. In the [place] one drink equals one 12 oz bottle of beer (355 mL), one 5 oz glass of wine (148 mL), or one 1½ oz glass of hard liquor (44 mL).  General instructions  · Keep a weight-loss journal. This can help you keep track of:  ? The food that you eat.  ? How much exercise you get.  · Take over-the-counter and prescription medicines only as told by your doctor.  · Take vitamins and supplements only as told by your doctor.  · Think about joining a support group.  · Keep all follow-up visits as told by your doctor. This is important.  Contact a doctor if:  · You cannot meet your weight loss goal after you have changed your diet and lifestyle for 6 weeks.  Get help right away if you:  · Are having trouble breathing.  · Are having thoughts of harming yourself.  Summary  · Obesity is having too much body fat.  · Being obese means that your weight is more than what is healthy for you.  · Work with your doctor to set a weight-loss goal.  · Get regular exercise as told by your doctor.  This information is not intended to replace advice given to you by your health care provider. Make sure you discuss any questions you have with your health care provider.  Document Released:  03/11/2013 Document Revised: 08/22/2019 Document Reviewed: 08/22/2019  [employer] Patient Education © 2020 [employer]

## 2020-10-14 NOTE — PROGRESS NOTES
Heart Failure Pharmacy Note  Patient Name: Yumiko Purdy  Referring Provider: Sandy  Primary Cardiologist: Jesus Manuel    Medication Use:   Adherence: reported missing a couple of doses of medications  Hx of med intolerances: none reported   Affordability: Eliquis and Entresto $0 co-pay, Reports issues with insulin prescribed at discharge   Retail Rx Management: None at this time     Past Medical History:   Diagnosis Date   • Anemia    • CHF (congestive heart failure) (CMS/HCC)    • Chronic a-fib (CMS/HCC)     stated by patient    • Cirrhosis of liver (CMS/HCC)     stated by patient    • Diabetes mellitus (CMS/[facility])      ALLERGIES: Phenergan [promethazine]  Current Outpatient Medications   Medication Sig Dispense Refill   • apixaban (ELIQUIS) 5 MG tablet tablet Take 1 tablet by mouth Every 12 (Twelve) Hours. Indications: Atrial Fibrillation 60 tablet 3   • aspirin 81 MG EC tablet Take 1 tablet by mouth Daily. 30 tablet 3   • bumetanide (BUMEX) 2 MG tablet Take 1 tablet by mouth Daily. 30 tablet 3   • carvedilol (COREG) 6.25 MG tablet Take 1 tablet by mouth 2 (Two) Times a Day With Meals. 60 tablet 3   • clindamycin (CLEOCIN) 300 MG capsule Take 300 mg by mouth Every 6 (Six) Hours.     • folic acid (FOLVITE) 1 MG tablet Take 1 mg by mouth Daily.     • hydrocortisone 1 % cream Apply  topically to the appropriate area as directed 2 (Two) Times a Day. 453.6 grams jar  Apply bid until rash resolves 1 each 0   • Insulin Glargine (BASAGLAR KWIKPEN) 100 UNIT/ML injection pen Inject 100 Units under the skin into the appropriate area as directed Daily.     • Insulin Regular Human, Conc, (HumuLIN R U-500 KwikPen) 500 UNIT/ML solution pen-injector CONCENTRATED injection Inject 30 Units under the skin into the appropriate area as directed 3 (Three) Times a Day Before Meals.     • omeprazole (PrilOSEC) 20 MG capsule Take 1 capsule by mouth Daily. 30 capsule 3   • ondansetron (ZOFRAN) 4 MG tablet Take 4 mg by mouth Every 8 (Eight)  Hours As Needed for Nausea or Vomiting.     • polyethylene glycol (MIRALAX) 17 g packet Take 17 g by mouth Daily. 30 packet 3   • predniSONE (DELTASONE) 2.5 MG tablet Take 2.5 mg by mouth Daily.     • pregabalin (LYRICA) 100 MG capsule Take 100 mg by mouth 2 (Two) Times a Day As Needed.     • rOPINIRole (REQUIP) 1 MG tablet Take 1 tablet by mouth 3 (Three) Times a Day. Take 1 hour before bedtime. 90 tablet 0   • spironolactone (ALDACTONE) 25 MG tablet Take 0.5 tablets by mouth Daily. 30 tablet 3   • venlafaxine XR (EFFEXOR-XR) 75 MG 24 hr capsule Take 75 mg by mouth Daily.     • amiodarone (PACERONE) 200 MG tablet Take 200 mg by mouth Daily.     • sacubitril-valsartan (ENTRESTO) 24-26 MG tablet 24-26 tablets. 60 tablet 0     No current facility-administered medications for this encounter.        Vaccination History:   Pneumonia: Needs vaccination   Annual Influenza: Needs vaccination     Objective  Vitals:    10/14/20 1010   BP: 108/65   BP Location: Left arm   Patient Position: Sitting   Cuff Size: Adult   Pulse: 81   Resp: 14   Temp: 98.1 °F (36.7 °C)   TempSrc: Oral   SpO2: 95%   Weight: 104 kg (229 lb 9.6 oz)     Wt Readings from Last 3 Encounters:   10/14/20 104 kg (229 lb 9.6 oz)   09/23/20 123 kg (271 lb)         10/14/20  1010   Weight: 104 kg (229 lb 9.6 oz)     Lab Results   Component Value Date    GLUCOSE 134 (H) 09/23/2020    BUN 17 09/23/2020    CREATININE 1.26 (H) 09/23/2020    EGFRIFNONA 44 (L) 09/23/2020    BCR 13.5 09/23/2020    K 4.4 09/23/2020    CO2 28.2 09/23/2020    CALCIUM 8.7 09/23/2020    ALBUMIN 2.88 (L) 09/16/2020    LABIL2 1.3 (L) 06/09/2016    AST 24 09/16/2020    ALT 22 09/16/2020     Lab Results   Component Value Date    WBC 4.58 09/22/2020    HGB 8.5 (L) 09/22/2020    HCT 29.9 (L) 09/22/2020    MCV 97.7 (H) 09/22/2020     (L) 09/22/2020     Lab Results   Component Value Date    TROPONINT 0.027 09/11/2020     Lab Results   Component Value Date    PROBNP 1,788.0 (H) 09/13/2020      Results for orders placed during the hospital encounter of 09/09/20   Adult Transesophageal Echo ([facility]) W/ Cont if Necessary Per Protocol (Cardiology Department)    Narrative · Ejection fraction appears to be 21 - 25%. Left ventricular systolic   function is severely decreased.  · Right ventricular cavity is mildly dilated. Moderately reduced right   ventricular systolic function noted.  · Trace mitral valve regurgitation is present.  · Moderate tricuspid valve regurgitation is present. Estimated right   ventricular systolic pressure from tricuspid regurgitation is normal (<35   mmHg).  · There is (grade 1) plaque in the proximal aorta present. There is (grade   1) plaque in the ascending aorta present. There is (grade 1) plaque in the   aortic arch present. There is (grade 1) plaque in the descending aorta   present.               GDMT:       Class   Drug   Dose Last Dose Adjustment Additional Titration   ACEi/ARB/ARNI       Beta Blocker carvedilol 6.25mg  9/23 needed   MRA spironolactone 12.5mg 9/24 needed     Drug Therapy Problems    1. Drug Interactions Screening: Aspirin and Eliquis may increase bleeding risk, amiodarone and zofran may prolong QTc interval   2. Uncontrolled Diabetes   3. Non-compliance: Pt no longer taking amiodarone. Rx last filled at [facility] on 8/31 for a 30 days supply by prescriber Masha Escalante.   4. GDMT: Will need additional titration/ARB added     Recommendations:     1. Educated Pt on risk of bleeding associated with aspirin and Eliquis.  Advised Pt to avoid any additional OTC aspirin for pain or headache and to only take the 81mg prescribed. Also educated to avoid any OTC NSAIDs. Pt not currently taking amiodarone but still advised to limit zofran use to only when truly needed.   2. Educated Pt on importance of monitoring blood sugar and keeping a log.  Educated on S/Sx of hypoglycemia and the Rule of 15. I have scheduled Pt appointment with PCP for Tuesday Oct 20th at 8:45 for patient  to discuss glucose management.   3. Have scheduled Pt appointment with cardiology for 10/27/20.  Pt will need negative COVID test approximately 2 days before appointment and results need faxed to Dr. Sahu's office.  Pt's PCP has COVID testing daily.   4. GDMT: Consider up-titration at a later time when BP can tolerate.     Discharge medications have been reviewed and reconciled.    Thank you for allowing me to participate in the care of your patient,    Zunilda Gonzalez, PharmD  10/14/20  11:03 EDT

## 2020-10-14 NOTE — PROGRESS NOTES
Exercise Oximetry    Patient Name:Yumiko Purdy   MRN: [id number]   Date: 10/14/20             ROOM AIR BASELINE   SpO2% 95   Heart Rate 81   Blood Pressure 108/65     EXERCISE ON ROOM AIR SpO2% EXERCISE ON O2 @  LPM SpO2%   1 MINUTE 94/91 1 MINUTE    2 MINUTES 96/96 2 MINUTES    3 MINUTES n/a 3 MINUTES    4 MINUTES n/a 4 MINUTES    5 MINUTES n/a 5 MINUTES    6 MINUTES n/a 6 MINUTES               Pre Assessment Post Assessment    Dyspnea (Don Scale)  3 Dyspnea (Don Scale) 4   Fatigue (Don Scale)  3 Fatigue (Don Scale) 4   SpO2% Post Exercise  97 SpO2% Post Exercise 97   HR Post Exercise  80 HR Post Exercise 97   Assistance Required- yes Time to Recovery 0     Comments: Patient walked 45 meters with resting a couple times while holding onto the wall/RN assisting. Patent requested at 2 minutes in to stop the test.

## 2020-10-15 ENCOUNTER — HOSPITAL ENCOUNTER (OUTPATIENT)
Dept: NUCLEAR MEDICINE | Facility: HOSPITAL | Age: 55
Discharge: HOME | End: 2020-10-15

## 2020-10-15 DIAGNOSIS — I50.42 CHRONIC COMBINED SYSTOLIC AND DIASTOLIC CONGESTIVE HEART FAILURE (HCC): ICD-10-CM

## 2020-10-15 DIAGNOSIS — I42.9 CARDIOMYOPATHY, UNSPECIFIED TYPE (HCC): ICD-10-CM

## 2020-10-15 PROCEDURE — 78472 GATED HEART PLANAR SINGLE: CPT | Performed by: RADIOLOGY

## 2020-10-15 PROCEDURE — 0 TECHNETIUM LABELED RED BLOOD CELLS: Performed by: NURSE PRACTITIONER

## 2020-10-15 PROCEDURE — A9560 TC99M LABELED RBC: HCPCS | Performed by: NURSE PRACTITIONER

## 2020-10-15 PROCEDURE — 78472 GATED HEART PLANAR SINGLE: CPT

## 2020-10-15 RX ADMIN — TECHNETIUM TC 99M-LABELED RED BLOOD CELLS 1 DOSE: KIT at 14:40

## 2020-10-16 ENCOUNTER — TELEPHONE (OUTPATIENT)
Dept: CARDIOLOGY | Facility: CLINIC | Age: 55
End: 2020-10-16

## 2020-10-16 NOTE — TELEPHONE ENCOUNTER
Called and spoke with Mrs. Purdy about her MUGA scan.  Her EF was reported at 38% on MUGA. She is advised of these results and will cancel placement for lifevest and continue to optimize medical therapy. Keep scheduled follow up as previously discussed. Verbal order to [facility] to d/c lifevest order.

## 2020-10-19 ENCOUNTER — READMISSION MANAGEMENT (OUTPATIENT)
Dept: CALL CENTER | Facility: HOSPITAL | Age: 55
End: 2020-10-19

## 2020-10-19 NOTE — OUTREACH NOTE
COVID-19 Week 4 Survey      Responses   [facility] patient discharged from?  [facility]   Does the patient have one of the following disease processes/diagnoses(primary or secondary)?  Other   Call Number  Call 1   COVID-19 Week 4: Call 1 attempt successful?  No          Christina Edmonds RN

## 2020-10-21 ENCOUNTER — HOSPITAL ENCOUNTER (OUTPATIENT)
Dept: CARDIOLOGY | Facility: HOSPITAL | Age: 55
Discharge: HOME | End: 2020-10-21
Admitting: NURSE PRACTITIONER

## 2020-10-21 VITALS
HEART RATE: 79 BPM | TEMPERATURE: 97.9 F | BODY MASS INDEX: 38.67 KG/M2 | DIASTOLIC BLOOD PRESSURE: 66 MMHG | SYSTOLIC BLOOD PRESSURE: 102 MMHG | RESPIRATION RATE: 20 BRPM | WEIGHT: 232.4 LBS | OXYGEN SATURATION: 97 %

## 2020-10-21 DIAGNOSIS — I50.42 CHRONIC COMBINED SYSTOLIC AND DIASTOLIC CONGESTIVE HEART FAILURE (HCC): ICD-10-CM

## 2020-10-21 DIAGNOSIS — N18.2 CKD (CHRONIC KIDNEY DISEASE) STAGE 2, GFR 60-89 ML/MIN: ICD-10-CM

## 2020-10-21 DIAGNOSIS — I42.9 CARDIOMYOPATHY, UNSPECIFIED TYPE (HCC): Primary | ICD-10-CM

## 2020-10-21 DIAGNOSIS — E66.01 SEVERE OBESITY (BMI 35.0-39.9) WITH COMORBIDITY (HCC): ICD-10-CM

## 2020-10-21 DIAGNOSIS — R17 ELEVATED BILIRUBIN: ICD-10-CM

## 2020-10-21 DIAGNOSIS — D64.9 CHRONIC ANEMIA: ICD-10-CM

## 2020-10-21 LAB
ALBUMIN SERPL-MCNC: 3.54 G/DL (ref 3.5–5.2)
ALBUMIN/GLOB SERPL: 1.4 G/DL
ALP SERPL-CCNC: 94 U/L (ref 39–117)
ALT SERPL W P-5'-P-CCNC: 17 U/L (ref 1–33)
ANION GAP SERPL CALCULATED.3IONS-SCNC: 7.2 MMOL/L (ref 5–15)
ANION GAP SERPL CALCULATED.3IONS-SCNC: 8.1 MMOL/L (ref 5–15)
AST SERPL-CCNC: 21 U/L (ref 1–32)
BILIRUB SERPL-MCNC: 2.3 MG/DL (ref 0–1.2)
BUN SERPL-MCNC: 18 MG/DL (ref 6–20)
BUN SERPL-MCNC: 18 MG/DL (ref 6–20)
BUN/CREAT SERPL: 22.2 (ref 7–25)
BUN/CREAT SERPL: 22.2 (ref 7–25)
CALCIUM SPEC-SCNC: 8.6 MG/DL (ref 8.6–10.5)
CALCIUM SPEC-SCNC: 8.6 MG/DL (ref 8.6–10.5)
CHLORIDE SERPL-SCNC: 99 MMOL/L (ref 98–107)
CHLORIDE SERPL-SCNC: 99 MMOL/L (ref 98–107)
CO2 SERPL-SCNC: 25.9 MMOL/L (ref 22–29)
CO2 SERPL-SCNC: 26.8 MMOL/L (ref 22–29)
CREAT SERPL-MCNC: 0.81 MG/DL (ref 0.57–1)
CREAT SERPL-MCNC: 0.81 MG/DL (ref 0.57–1)
GFR SERPL CREATININE-BSD FRML MDRD: 74 ML/MIN/1.73
GFR SERPL CREATININE-BSD FRML MDRD: 74 ML/MIN/1.73
GLOBULIN UR ELPH-MCNC: 2.6 GM/DL
GLUCOSE SERPL-MCNC: 345 MG/DL (ref 65–99)
GLUCOSE SERPL-MCNC: 345 MG/DL (ref 65–99)
MAGNESIUM SERPL-MCNC: 1.6 MG/DL (ref 1.6–2.6)
NT-PROBNP SERPL-MCNC: 2003 PG/ML (ref 0–900)
POTASSIUM SERPL-SCNC: 4.9 MMOL/L (ref 3.5–5.2)
POTASSIUM SERPL-SCNC: 4.9 MMOL/L (ref 3.5–5.2)
PROT SERPL-MCNC: 6.1 G/DL (ref 6–8.5)
SODIUM SERPL-SCNC: 133 MMOL/L (ref 136–145)
SODIUM SERPL-SCNC: 133 MMOL/L (ref 136–145)

## 2020-10-21 PROCEDURE — 99214 OFFICE O/P EST MOD 30 MIN: CPT | Performed by: NURSE PRACTITIONER

## 2020-10-21 PROCEDURE — 83735 ASSAY OF MAGNESIUM: CPT | Performed by: NURSE PRACTITIONER

## 2020-10-21 PROCEDURE — 80053 COMPREHEN METABOLIC PANEL: CPT | Performed by: NURSE PRACTITIONER

## 2020-10-21 PROCEDURE — G0463 HOSPITAL OUTPT CLINIC VISIT: HCPCS | Performed by: PHARMACIST

## 2020-10-21 PROCEDURE — 83880 ASSAY OF NATRIURETIC PEPTIDE: CPT | Performed by: NURSE PRACTITIONER

## 2020-10-21 RX ORDER — CARVEDILOL 6.25 MG/1
3.12 TABLET ORAL 2 TIMES DAILY WITH MEALS
Qty: 60 TABLET | Refills: 3
Start: 2020-10-21 | End: 2020-10-27 | Stop reason: ALTCHOICE

## 2020-10-21 NOTE — PATIENT INSTRUCTIONS
Living With Heart Failure   Heart failure is a long-term (chronic) condition in which the heart cannot pump enough blood through the body. When this happens, parts of the body do not get the blood and oxygen they need.  There is no cure for heart failure at this time, so it is important for you to take good care of yourself and follow the treatment plan set by your health care provider. If you are living with heart failure, there are ways to help you manage the disease.  Follow these instructions at home:  Living with heart failure requires you to make changes in your life. Your health care team will teach you about the changes you need to make in order to relieve your symptoms and lower your risk of going to the hospital. Follow the treatment plan as set by your health care provider.  Medicines  Medicines are important in reducing your heart's workload, slowing the progression of heart failure, and improving your symptoms.  · Take over-the-counter and prescription medicines only as told by your health care provider.  · Do not stop taking your medicine unless your health care provider tells you to do that.  · Do not skip any dose of your medicine.  · Refill prescriptions before you run out of medicine. You need your medicines every day.  Eating and drinking    · Eat heart-healthy foods. Talk with a dietitian to make an eating plan that is right for you.  ? If directed by your health care provider:  ? Limit salt (sodium). Lowering your sodium intake may reduce symptoms of heart failure. Ask a dietitian to recommend heart-healthy seasonings.  ? Limit your fluid intake. Fluid restriction may reduce symptoms of heart failure.  ? Use low-fat cooking methods instead of frying. Low-fat methods include roasting, grilling, broiling, baking, poaching, steaming, and stir-frying.  ? Choose foods that contain no trans fat and are low in saturated fat and cholesterol. Healthy choices include fresh or frozen fruits and vegetables,  fish, lean meats, legumes, fat-free or low-fat dairy products, and whole-grain or high-fiber foods.  · Limit alcohol intake to no more than 1 drink a day for nonpregnant women and 2 drinks a day for men. One drink equals 12 oz of beer, 5 oz of wine, or 1½ oz of hard liquor.  ? Drinking more than that is harmful to your heart. Tell your health care provider if you drink alcohol several times a week.  ? Talk with your health care provider about whether any level of alcohol use is safe for you.  Activity    · Ask your health care provider about attending cardiac rehabilitation. These programs include aerobic physical activity, which provides many benefits for your heart.  · If no cardiac rehabilitation program is available, ask your health care provider what aerobic exercises are safe for you to do.  Lifestyle  Make the lifestyle changes recommended by your health care provider. In general:  · Lose weight if your health care provider tells you to do that. Weight loss may reduce symptoms of heart failure.  · Do not use any products that contain nicotine or tobacco, such as cigarettes or e-cigarettes. If you need help quitting, ask your health care provider.  · Do not use street (illegal) drugs.  · Return to your normal activities as told by your health care provider. Ask your health care provider what activities are safe for you.  General instructions    · Make sure you weigh yourself every day to track your weight. Rapid weight gain may indicate an increase in fluid in your body and may increase the workload of your heart.  ? Weigh yourself every morning. Do this after you urinate but before you eat breakfast.  ? Wear the same type of clothing, without shoes, each time you weigh yourself.  ? Weigh yourself on the same scale and in the same spot each time.  · Living with chronic heart failure often leads to emotions such as fear, stress, anxiety, and depression. If you feel any of these emotions and need help coping,  contact your health care provider. Other ways to get help include:  ? Talking to friends and family members about your condition. They can give you support and guidance. Explain your symptoms to them and, if comfortable, invite them to attend appointments or rehabilitation with you.  ? Joining a support group for people with chronic heart failure. Talking with other people who have the same symptoms may give you new ways of coping with your disease and your emotions.  · Stay up to date with your shots (vaccines). Staying current on pneumococcal and influenza vaccines is especially important in preventing germs from attacking your airways (respiratory infections).  · Keep all follow-up visits as told by your health care provider. This is important.  How to recognize changes in your condition  You and your family members need to know what changes to watch for in your condition.  Watch for the following changes and report them to your health care provider:  · Sudden weight gain. Ask your health care provider what amount of weight gain to report.  · Shortness of breath:  ? Feeling short of breath while at rest, with no exercise or activity that required great effort.  ? Feeling breathless with activity.  · Swelling of your lower legs or ankles.  · Difficulty sleeping:  ? You wake up feeling short of breath.  ? You have to use more pillows to raise your head in order to sleep.  · Frequent, dry, hacking cough.  · Loss of appetite.  · Feeling more tired all the time.  · Depression or feelings of sadness or hopelessness.  · Bloating in the stomach.  Where to find more information  · Local support groups. Ask your health care provider about groups near you.  · The American Heart Association: www.heart.org  Contact a health care provider if:  · You have a rapid weight gain.  · You have increasing shortness of breath that is unusual for you.  · You are unable to participate in your usual physical activities.  · You tire  easily.  · You cough more than normal, especially with physical activity.  · You have any swelling or more swelling in areas such as your hands, feet, ankles, or abdomen.  · You feel like your heart is beating quickly (palpitations).  · You become dizzy or light-headed when you stand up.  Get help right away if:  · You have difficulty breathing.  · You notice or your family notices a change in your awareness, such as having trouble staying awake or having difficulty with concentration.  · You have pain or discomfort in your chest.  · You have an episode of fainting (syncope).  Summary  · There is no cure for heart failure, so it is important for you to take good care of yourself and follow the treatment plan set by your health care provider.  · Medicines are important in reducing your heart's workload, slowing the progression of heart failure, and improving your symptoms.  · Living with chronic heart failure often leads to emotions such as fear, stress, anxiety, and depression. If you are feeling any of these emotions and need help coping, contact your health care provider.  This information is not intended to replace advice given to you by your health care provider. Make sure you discuss any questions you have with your health care provider.  Document Released: 05/02/2018 Document Revised: 11/30/2018 Document Reviewed: 05/02/2018  [employer] Patient Education © 2020 [employer]    Heart Failure, Self Care  Heart failure is a serious condition. This document explains the things you need to do to take care of yourself after a heart failure diagnosis. You may be asked to change your diet, take certain medicines, and make other lifestyle changes in order to stay as healthy as possible. Your health care provider may also give you more specific instructions. If you have problems or questions, contact your health care provider.  What are the risks?  Having heart failure puts you at higher risk for certain problems. These  problems can get worse if you do not take good care of yourself. Problems may include:  · Blood clotting problems. This may cause a stroke.  · Damage to the kidneys, liver, or lungs.  · Abnormal heart rhythms.  Supplies needed:  · Scale for monitoring weight.  · Blood pressure monitor.  · Notebook.  · Medicines.  How to care for yourself when you have heart failure  Medicines  Take over-the-counter and prescription medicines only as told by your health care provider. Medicines reduce the workload of your heart, slow the progression of heart failure, and improve symptoms. Take your medicines every day.  · Do not stop taking your medicine unless your health care provider tells you to do so.  · Do not skip any dose of medicine.  · Refill your prescriptions before you run out of medicine.  Eating and drinking    · Eat heart-healthy foods. Talk with a dietitian to make an eating plan that is right for you.  ? Choose foods that contain no trans fat and are low in saturated fat and cholesterol. Healthy choices include fresh or frozen fruits and vegetables, fish, lean meats, legumes, fat-free or low-fat dairy products, and whole-grain or high-fiber foods.  ? Limit salt (sodium) if told by your health care provider. Sodium restriction may reduce symptoms of heart failure. Ask a dietitian to recommend heart-healthy seasonings.  ? Use healthy cooking methods instead of frying. Healthy methods include roasting, grilling, broiling, baking, poaching, steaming, and stir-frying.  · Limit your fluid intake, if directed by your health care provider. Fluid restriction may reduce symptoms of heart failure.  Alcohol use  · Do not drink alcohol if:  ? Your health care provider tells you not to drink.  ? Your heart was damaged by alcohol, or you have severe heart failure.  ? You are pregnant, may be pregnant, or are planning to become pregnant.  · If you drink alcohol:  ? Limit how much you use to:  § 0-1 drink a day for women.  § 0-2  drinks a day for men.  ? Be aware of how much alcohol is in your drink. In the [place] one drink equals one 12 oz bottle of beer (355 mL), one 5 oz glass of wine (148 mL), or one 1½ oz glass of hard liquor (44 mL).  Lifestyle    · Do not use any products that contain nicotine or tobacco, such as cigarettes, e-cigarettes, and chewing tobacco. If you need help quitting, ask your health care provider.  ? Do not use nicotine gum or patches before talking to your health care provider.  · Do not use illegal drugs.  · Work with your health care provider to safely reach the right body weight.  · Do physical activity if told by your health care provider. Talk to your health care provider before you begin an exercise if:  ? You are an older adult.  ? You have severe heart failure.  · Learn to manage stress. If you need help to do this, ask your health care provider.  · Participate in or seek rehabilitation as needed to keep or improve your independence and quality of life.  · Plan rest periods when you get tired.  Monitoring important information    · Weigh yourself every day. This will help you to notice if too much fluid is building up in your body.  ? Weigh yourself every morning after you urinate and before you eat breakfast.  ? Wear the same amount of clothing each time you weigh yourself.  ? Record your daily weight. Provide your health care provider with your weight record.  · Monitor and record your pulse and blood pressure as told by your health care provider.  Dealing with extreme temperatures  · If the weather is extremely hot:  ? Avoid vigorous physical activity.  ? Use air conditioning or fans, or find a cooler location.  ? Avoid caffeine and alcohol.  ? Wear loose-fitting, lightweight, and light-colored clothing.  · If the weather is extremely cold:  ? Avoid vigorous activity.  ? Layer your clothes.  ? Wear mittens or gloves, a hat, and a scarf when you go outside.  ? Avoid alcohol.  Follow these instructions at  home:  · Stay up to date with vaccines. Pneumococcal and flu (influenza) vaccines are especially important in preventing infections of the airways.  · Keep all follow-up visits as told by your health care provider. This is important.  Contact a health care provider if you:  · Have a rapid weight gain.  · Have increasing shortness of breath.  · Are unable to participate in your usual physical activities.  · Get tired easily.  · Cough more than normal, especially with physical activity.  · Lose your appetite or feel nauseous.  · Have any swelling or more swelling in areas such as your hands, feet, ankles, or abdomen.  · Are unable to sleep because it is hard to breathe.  · Feel like your heart is beating quickly (palpitations).  · Become dizzy or light-headed when you stand up.  Get help right away if you:  · Have trouble breathing.  · Notice or your family notices a change in your awareness, such as having trouble staying awake or concentrating.  · Have pain or discomfort in your chest.  · Have an episode of fainting (syncope).  These symptoms may represent a serious problem that is an emergency. Do not wait to see if the symptoms will go away. Get medical help right away. Call your local emergency services (911 in the [place] Do not drive yourself to the hospital.  Summary  · Heart failure is a serious condition. To care for yourself, you may be asked to change your diet, take certain medicines, and make other lifestyle changes.  · Take your medicines every day. Do not stop taking them unless your health care provider tells you to do so.  · Eat heart-healthy foods, such as fresh or frozen fruits and vegetables, fish, lean meats, legumes, fat-free or low-fat dairy products, and whole-grain or high-fiber foods.  · Ask your health care provider if you have any alcohol restrictions. You may have to stop drinking alcohol if you have severe heart failure.  · Contact your health care provider if you notice problems, such as  rapid weight gain or a fast heartbeat. Get help right away if you faint, or have chest pain or trouble breathing.  This information is not intended to replace advice given to you by your health care provider. Make sure you discuss any questions you have with your health care provider.  Document Released: 04/01/2020 Document Revised: 03/31/2020 Document Reviewed: 04/01/2020  Elsevier Patient Education © 2020 [employer]    Hypertension, Adult  Hypertension is another name for high blood pressure. High blood pressure forces your heart to work harder to pump blood. This can cause problems over time.  There are two numbers in a blood pressure reading. There is a top number (systolic) over a bottom number (diastolic). It is best to have a blood pressure that is below 120/80. Healthy choices can help lower your blood pressure, or you may need medicine to help lower it.  What are the causes?  The cause of this condition is not known. Some conditions may be related to high blood pressure.  What increases the risk?  · Smoking.  · Having type 2 diabetes mellitus, high cholesterol, or both.  · Not getting enough exercise or physical activity.  · Being overweight.  · Having too much fat, sugar, calories, or salt (sodium) in your diet.  · Drinking too much alcohol.  · Having long-term (chronic) kidney disease.  · Having a family history of high blood pressure.  · Age. Risk increases with age.  · Race. You may be at higher risk if you are .  · Gender. Men are at higher risk than women before age 45. After age 65, women are at higher risk than men.  · Having obstructive sleep apnea.  · Stress.  What are the signs or symptoms?  · High blood pressure may not cause symptoms. Very high blood pressure (hypertensive crisis) may cause:  ? Headache.  ? Feelings of worry or nervousness (anxiety).  ? Shortness of breath.  ? Nosebleed.  ? A feeling of being sick to your stomach (nausea).  ? Throwing up  (vomiting).  ? Changes in how you see.  ? Very bad chest pain.  ? Seizures.  How is this treated?  · This condition is treated by making healthy lifestyle changes, such as:  ? Eating healthy foods.  ? Exercising more.  ? Drinking less alcohol.  · Your health care provider may prescribe medicine if lifestyle changes are not enough to get your blood pressure under control, and if:  ? Your top number is above 130.  ? Your bottom number is above 80.  · Your personal target blood pressure may vary.  Follow these instructions at home:  Eating and drinking    · If told, follow the DASH eating plan. To follow this plan:  ? Fill one half of your plate at each meal with fruits and vegetables.  ? Fill one fourth of your plate at each meal with whole grains. Whole grains include whole-wheat pasta, brown rice, and whole-grain bread.  ? Eat or drink low-fat dairy products, such as skim milk or low-fat yogurt.  ? Fill one fourth of your plate at each meal with low-fat (lean) proteins. Low-fat proteins include fish, chicken without skin, eggs, beans, and tofu.  ? Avoid fatty meat, cured and processed meat, or chicken with skin.  ? Avoid pre-made or processed food.  · Eat less than 1,500 mg of salt each day.  · Do not drink alcohol if:  ? Your doctor tells you not to drink.  ? You are pregnant, may be pregnant, or are planning to become pregnant.  · If you drink alcohol:  ? Limit how much you use to:  § 0-1 drink a day for women.  § 0-2 drinks a day for men.  ? Be aware of how much alcohol is in your drink. In the [place] one drink equals one 12 oz bottle of beer (355 mL), one 5 oz glass of wine (148 mL), or one 1½ oz glass of hard liquor (44 mL).  Lifestyle    · Work with your doctor to stay at a healthy weight or to lose weight. Ask your doctor what the best weight is for you.  · Get at least 30 minutes of exercise most days of the week. This may include walking, swimming, or biking.  · Get at least 30 minutes of exercise that  strengthens your muscles (resistance exercise) at least 3 days a week. This may include lifting weights or doing Pilates.  · Do not use any products that contain nicotine or tobacco, such as cigarettes, e-cigarettes, and chewing tobacco. If you need help quitting, ask your doctor.  · Check your blood pressure at home as told by your doctor.  · Keep all follow-up visits as told by your doctor. This is important.  Medicines  · Take over-the-counter and prescription medicines only as told by your doctor. Follow directions carefully.  · Do not skip doses of blood pressure medicine. The medicine does not work as well if you skip doses. Skipping doses also puts you at risk for problems.  · Ask your doctor about side effects or reactions to medicines that you should watch for.  Contact a doctor if you:  · Think you are having a reaction to the medicine you are taking.  · Have headaches that keep coming back (recurring).  · Feel dizzy.  · Have swelling in your ankles.  · Have trouble with your vision.  Get help right away if you:  · Get a very bad headache.  · Start to feel mixed up (confused).  · Feel weak or numb.  · Feel faint.  · Have very bad pain in your:  ? Chest.  ? Belly (abdomen).  · Throw up more than once.  · Have trouble breathing.  Summary  · Hypertension is another name for high blood pressure.  · High blood pressure forces your heart to work harder to pump blood.  · For most people, a normal blood pressure is less than 120/80.  · Making healthy choices can help lower blood pressure. If your blood pressure does not get lower with healthy choices, you may need to take medicine.  This information is not intended to replace advice given to you by your health care provider. Make sure you discuss any questions you have with your health care provider.  Document Released: 06/05/2009 Document Revised: 08/28/2019 Document Reviewed: 08/28/2019  Elsevier Patient Education © 2020 [employer] Inc.    Obesity, Adult  Obesity  is having too much body fat. Being obese means that your weight is more than what is healthy for you.  BMI is a number that explains how much body fat you have. If you have a BMI of 30 or more, you are obese. Obesity is often caused by eating or drinking more calories than your body uses. Changing your lifestyle can help you lose weight.  Obesity can cause serious health problems, such as:  · Stroke.  · Coronary artery disease (CAD).  · Type 2 diabetes.  · Some types of cancer, including cancers of the colon, breast, uterus, and gallbladder.  · Osteoarthritis.  · High blood pressure (hypertension).  · High cholesterol.  · Sleep apnea.  · Gallbladder stones.  · Infertility problems.  What are the causes?  · Eating meals each day that are high in calories, sugar, and fat.  · Being born with genes that may make you more likely to become obese.  · Having a medical condition that causes obesity.  · Taking certain medicines.  · Sitting a lot (having a sedentary lifestyle).  · Not getting enough sleep.  · Drinking a lot of drinks that have sugar in them.  What increases the risk?  · Having a family history of obesity.  · Being an  woman.  · Being a  man.  · Living in an area with limited access to:  ? [place], recreation centers, or sidewalks.  ? Healthy food choices, such as grocery stores and [employer] markets.  What are the signs or symptoms?  The main sign is having too much body fat.  How is this treated?  · Treatment for this condition often includes changing your lifestyle. Treatment may include:  ? Changing your diet. This may include making a healthy meal plan.  ? Exercise. This may include activity that causes your heart to beat faster (aerobic exercise) and strength training. Work with your doctor to design a program that works for you.  ? Medicine to help you lose weight. This may be used if you are not able to lose 1 pound a week after 6 weeks of healthy eating and more  exercise.  ? Treating conditions that cause the obesity.  ? Surgery. Options may include gastric banding and gastric bypass. This may be done if:  § Other treatments have not helped to improve your condition.  § You have a BMI of 40 or higher.  § You have life-threatening health problems related to obesity.  Follow these instructions at home:  Eating and drinking    · Follow advice from your doctor about what to eat and drink. Your doctor may tell you to:  ? Limit fast food, sweets, and processed snack foods.  ? Choose low-fat options. For example, choose low-fat milk instead of whole milk.  ? Eat 5 or more servings of fruits or vegetables each day.  ? Eat at home more often. This gives you more control over what you eat.  ? Choose healthy foods when you eat out.  ? Learn to read food labels. This will help you learn how much food is in 1 serving.  ? Keep low-fat snacks available.  ? Avoid drinks that have a lot of sugar in them. These include soda, fruit juice, iced tea with sugar, and flavored milk.  · Drink enough water to keep your pee (urine) pale yellow.  · Do not go on fad diets.  Physical activity  · Exercise often, as told by your doctor. Most adults should get up to 150 minutes of moderate-intensity exercise every week.Ask your doctor:  ? What types of exercise are safe for you.  ? How often you should exercise.  · Warm up and stretch before being active.  · Do slow stretching after being active (cool down).  · Rest between times of being active.  Lifestyle  · Work with your doctor and a food expert (dietitian) to set a weight-loss goal that is best for you.  · Limit your screen time.  · Find ways to reward yourself that do not involve food.  · Do not drink alcohol if:  ? Your doctor tells you not to drink.  ? You are pregnant, may be pregnant, or are planning to become pregnant.  · If you drink alcohol:  ? Limit how much you use to:  § 0-1 drink a day for women.  § 0-2 drinks a day for men.  ? Be aware of  how much alcohol is in your drink. In the [place] one drink equals one 12 oz bottle of beer (355 mL), one 5 oz glass of wine (148 mL), or one 1½ oz glass of hard liquor (44 mL).  General instructions  · Keep a weight-loss journal. This can help you keep track of:  ? The food that you eat.  ? How much exercise you get.  · Take over-the-counter and prescription medicines only as told by your doctor.  · Take vitamins and supplements only as told by your doctor.  · Think about joining a support group.  · Keep all follow-up visits as told by your doctor. This is important.  Contact a doctor if:  · You cannot meet your weight loss goal after you have changed your diet and lifestyle for 6 weeks.  Get help right away if you:  · Are having trouble breathing.  · Are having thoughts of harming yourself.  Summary  · Obesity is having too much body fat.  · Being obese means that your weight is more than what is healthy for you.  · Work with your doctor to set a weight-loss goal.  · Get regular exercise as told by your doctor.  This information is not intended to replace advice given to you by your health care provider. Make sure you discuss any questions you have with your health care provider.  Document Released: 03/11/2013 Document Revised: 08/22/2019 Document Reviewed: 08/22/2019  Elsevier Patient Education © 2020 [employer]

## 2020-10-21 NOTE — PROGRESS NOTES
[facility] CHF CLINIC OFFICE VISIT    Subjective:     History of Present Illness  Yumiko Purdy is a 54 y.o.  female who presents to the clinic today accompanied by her son for heart failure follow up. She has hx of chronic systolic and diastolic heart failure. Most recent echo on 9/16/2020 shows EF of 21-25% however muga on 10/16/2020 showed an EF at 38%. She continues on Bumex 2 mg daily, Spironolactone 12.5 mg daily and Coreg 6.25 BID. She was placed on Valsartan during hospital stay but was discontinued due to hypotension. She reports medication compliance. She reports she occasionally weights herself. No record available for review but reports that she is checking BP and HR at home. Her BP is generally > 100 systolic. Reports her reading this morning was around 120 systolic. She apparently seen primary yesterday and had low systolic BP of 69. She reports that her BP is generally not that low. She reports just feeling tired yesterday and her primary advised her to cut her coreg down and stop it. She hasn't done that yet and took a full 6.25 mg pill this am. She reports trying to adhere to sodium and fluid restrictions.  Apparently there was some confusion with amiodarone and she hasn't been taking that. Reports trying to walk in the house some but reports fatigue. She reports hx of chronic anemia and her PCP ordered an IV iron transfusion yesterday and she is waiting to have that done. She reports GI workup for anemia about 1-2 years ago by Dr. Schaefer. She also reports hx of cirrhosis and elevated bilirubin levels in the past. Denies any chest pain, palpitations, worsening shortness of air or leg swelling.     PCP: Dr. Schaefer  Cardiologist: Dr. Ken   Nephrologist:Dr. Mejia - missed recent appt and needs to resschedule    Hospitalizations: Discharged on 9/23/2020    Past Medical History:   Diagnosis Date   • Anemia    • CHF (congestive heart failure) (CMS/HCC)    • Chronic a-fib (CMS/HCC)     stated by patient     • Cirrhosis of liver (CMS/HCC)     stated by patient    • Diabetes mellitus (CMS/HCC)      Past Surgical History:   Procedure Laterality Date   • APPENDECTOMY     • CHOLECYSTECTOMY     • TOE AMPUTATION Right     stated by patient.      Social History     Socioeconomic History   • Marital status:      Spouse name: Not on file   • Number of children: Not on file   • Years of education: Not on file   • Highest education level: Not on file   Tobacco Use   • Smoking status: Never Smoker   • Smokeless tobacco: Never Used     No family history on file.    Allergies:  Allergies   Allergen Reactions   • Phenergan [Promethazine]      Review of Systems   Constitution: Negative for chills, fever, weight gain and weight loss.   HENT: Negative for congestion, ear pain, hoarse voice and sore throat.    Eyes: Negative for discharge, double vision and visual disturbance.   Cardiovascular: Positive for leg swelling (stable, improved). Negative for chest pain, claudication, dyspnea on exertion, irregular heartbeat, near-syncope, orthopnea, palpitations and syncope.   Respiratory: Positive for shortness of breath (stable, improved). Negative for cough and wheezing.    Endocrine: Negative for cold intolerance, heat intolerance and polyuria.   Hematologic/Lymphatic: Negative for bleeding problem. Does not bruise/bleed easily.   Skin: Negative for color change, dry skin, flushing and rash.   Musculoskeletal: Negative for back pain, joint pain, joint swelling and neck pain.   Gastrointestinal: Negative for abdominal pain, constipation, diarrhea, nausea and vomiting.   Genitourinary: Negative for dysuria, flank pain, frequency, hesitancy and urgency.   Neurological: Negative for difficulty with concentration, excessive daytime sleepiness, dizziness, loss of balance, numbness, tremors and weakness.   Psychiatric/Behavioral: Negative for depression. The patient does not have insomnia and is not nervous/anxious.        Current  Outpatient Medications   Medication Sig Dispense Refill   • apixaban (ELIQUIS) 5 MG tablet tablet Take 1 tablet by mouth Every 12 (Twelve) Hours. Indications: Atrial Fibrillation 60 tablet 3   • aspirin 81 MG EC tablet Take 1 tablet by mouth Daily. 30 tablet 3   • bumetanide (BUMEX) 2 MG tablet Take 1 tablet by mouth Daily. 30 tablet 3   • carvedilol (COREG) 6.25 MG tablet Take 1 tablet by mouth 2 (Two) Times a Day With Meals. 60 tablet 3   • folic acid (FOLVITE) 1 MG tablet Take 1 mg by mouth Daily.     • hydrocortisone 1 % cream Apply  topically to the appropriate area as directed 2 (Two) Times a Day. 453.6 grams jar  Apply bid until rash resolves 1 each 0   • Insulin Glargine (BASAGLAR KWIKPEN) 100 UNIT/ML injection pen Inject 100 Units under the skin into the appropriate area as directed Daily.     • Insulin Regular Human, Conc, (HumuLIN R U-500 KwikPen) 500 UNIT/ML solution pen-injector CONCENTRATED injection Inject 30 Units under the skin into the appropriate area as directed 3 (Three) Times a Day Before Meals.     • omeprazole (PrilOSEC) 20 MG capsule Take 1 capsule by mouth Daily. 30 capsule 3   • ondansetron (ZOFRAN) 4 MG tablet Take 4 mg by mouth Every 8 (Eight) Hours As Needed for Nausea or Vomiting.     • polyethylene glycol (MIRALAX) 17 g packet Take 17 g by mouth Daily. 30 packet 3   • pregabalin (LYRICA) 100 MG capsule Take 100 mg by mouth 2 (Two) Times a Day As Needed.     • rOPINIRole (REQUIP) 1 MG tablet Take 1 tablet by mouth 3 (Three) Times a Day. Take 1 hour before bedtime. 90 tablet 0   • spironolactone (ALDACTONE) 25 MG tablet Take 0.5 tablets by mouth Daily. 30 tablet 3   • venlafaxine XR (EFFEXOR-XR) 75 MG 24 hr capsule Take 75 mg by mouth Daily.     • amiodarone (PACERONE) 200 MG tablet Take 200 mg by mouth Daily.       No current facility-administered medications for this encounter.       Objective:     Vitals:    10/21/20 1025   BP: 102/66   BP Location: Left arm   Patient Position:  Sitting   Cuff Size: Large Adult   Pulse: 79   Resp: 20   Temp: 97.9 °F (36.6 °C)   TempSrc: Oral   SpO2: 97%   Weight: 105 kg (232 lb 6.4 oz)       Wt Readings from Last 3 Encounters:   10/21/20 105 kg (232 lb 6.4 oz)   10/14/20 104 kg (229 lb 9.6 oz)   09/23/20 123 kg (271 lb)        Vitals signs reviewed.   Constitutional:       Appearance: Normal appearance. Well-developed.      Comments: Wears mask during encounter, came into clinic in wheelchair    Eyes:      General: Scleral icterus (minimal).   HENT:      Head: Normocephalic.   Neck:      Musculoskeletal: Normal range of motion and neck supple.      Vascular: No JVD or JVR.   Pulmonary:      Effort: Pulmonary effort is normal.      Breath sounds: Normal breath sounds.   Cardiovascular:      Normal rate. Regular rhythm.   Pulses:     Intact distal pulses.   Edema:     Ankle: bilateral trace edema of the ankle.     Feet: bilateral trace edema of the feet.  Abdominal:      General: Bowel sounds are normal.      Palpations: Abdomen is soft. There is no hepatomegaly or splenomegaly.   Musculoskeletal: Normal range of motion.   Skin:     General: Skin is warm and dry.      Coloration: Skin is jaundiced (minimal).   Neurological:      Mental Status: Alert and oriented to person, place, and time.   Psychiatric:         Attention and Perception: Attention normal.         Mood and Affect: Mood normal.         Speech: Speech normal.         Behavior: Behavior is cooperative.         Cognition and Memory: Cognition normal.     Cardiographics  Results for orders placed during the hospital encounter of 09/09/20   Adult Transesophageal Echo (K[facility]I) W/ Cont if Necessary Per Protocol (Cardiology Department)    Narrative · Ejection fraction appears to be 21 - 25%. Left ventricular systolic   function is severely decreased.  · Right ventricular cavity is mildly dilated. Moderately reduced right   ventricular systolic function noted.  · Trace mitral valve regurgitation is  present.  · Moderate tricuspid valve regurgitation is present. Estimated right   ventricular systolic pressure from tricuspid regurgitation is normal (<35   mmHg).  · There is (grade 1) plaque in the proximal aorta present. There is (grade   1) plaque in the ascending aorta present. There is (grade 1) plaque in the   aortic arch present. There is (grade 1) plaque in the descending aorta   present.        EKG 9/16/2020    Chest x-ray:9/22/2020    IMPRESSION:  No radiographic evidence of acute cardiac or pulmonary  disease.  This report was finalized on 9/22/2020 12:59 PM by Dr. Marlo Parr MD.    Stress Test: 9/17/2020  Interpretation Summary  · A pharmacological stress test was performed using regadenoson without low-level exercise.  · Findings consistent with a normal ECG stress test.  · Myocardial perfusion imaging indicates a small-to-medium-sized, mild degree of ischemia located in the inferior wall.  · Abnormal LV wall motion consistent with moderate global hypokinesis.  · Left ventricular ejection fraction is moderately reduced. (Calculated EF = 40%).  · Impressions are consistent with an intermediate risk study.        Lab Review     Lab Results   Component Value Date    TSH 3.830 09/10/2020     Lab Results   Component Value Date    GLUCOSE 209 (H) 10/14/2020    BUN 20 10/14/2020    CREATININE 0.85 10/14/2020    EGFRIFNONA 70 10/14/2020    BCR 23.5 10/14/2020    K 4.6 10/14/2020    CO2 27.3 10/14/2020    CALCIUM 9.1 10/14/2020    ALBUMIN 2.88 (L) 09/16/2020    LABIL2 1.3 (L) 06/09/2016    AST 24 09/16/2020    ALT 22 09/16/2020     Lab Results   Component Value Date    WBC 4.58 09/22/2020    HGB 8.5 (L) 09/22/2020    HCT 29.9 (L) 09/22/2020    MCV 97.7 (H) 09/22/2020     (L) 09/22/2020     Lab Results   Component Value Date    TROPONINT 0.027 09/11/2020     Lab Results   Component Value Date    PROBNP 1,851.0 (H) 10/14/2020     The following portions of the patient's history were reviewed and updated  as appropriate: allergies, current medications, past family history, past medical history, past social history, past surgical history and problem list.     Old records reviewed and pertinent information is included in the above objective data.     Assessment/Plan:   1. Chronic systolic and diastolic congestive heart failure  2. Cardiomyopathy, new onset with unclear etiology, EF 38% on muga 10/16/2020   3. Elevated bilirubin  4. Chronic anemia  3. CKD, stage II  4. Obesity    1. BNP, BMP and magnesium today  2. Reviewed and discussed labs with patient and son today  3. Discussed benefits of coreg and risk of hypotension.   4. Decrease coreg 6.25 mg BID to 3.125 mg BID  5. Monitor BP, call if systolic <100 and HR <60. Cautioned in quick positional changed and risk of hypotension.   6. Contacted Dr. Schaefer's office about elevated bilirubin they report recent elevation and following. Labs faxed to office.   7. Follow up with pcp for iron infusion  8. Keep follow up with cardiology and discuss amiodarone further.   9. Follow up in 1 week sooner if needed     25 minutes face to face spent counseling patient extensively on dietary Na+ intake, importance of activity, weight monitoring, compliance with medications and follow up appointments.

## 2020-10-21 NOTE — PROGRESS NOTES
Heart Failure Pharmacy Note  Patient Name: Yumiko Purdy  Referring Provider: Sandy  Primary Cardiologist: Jesus Manuel    Medication Use:   Adherence: no issues   Hx of med intolerances: none reported   Affordability: no issues   Retail Rx Management: None at this time     Past Medical History:   Diagnosis Date   • Anemia    • CHF (congestive heart failure) (CMS/HCC)    • Chronic a-fib (CMS/HCC)     stated by patient    • Cirrhosis of liver (CMS/HCC)     stated by patient    • Diabetes mellitus (CMS/[facility])      ALLERGIES: Phenergan [promethazine]  Current Outpatient Medications   Medication Sig Dispense Refill   • apixaban (ELIQUIS) 5 MG tablet tablet Take 1 tablet by mouth Every 12 (Twelve) Hours. Indications: Atrial Fibrillation 60 tablet 3   • aspirin 81 MG EC tablet Take 1 tablet by mouth Daily. 30 tablet 3   • bumetanide (BUMEX) 2 MG tablet Take 1 tablet by mouth Daily. 30 tablet 3   • carvedilol (COREG) 6.25 MG tablet Take 0.5 tablets by mouth 2 (Two) Times a Day With Meals. 60 tablet 3   • folic acid (FOLVITE) 1 MG tablet Take 1 mg by mouth Daily.     • hydrocortisone 1 % cream Apply  topically to the appropriate area as directed 2 (Two) Times a Day. 453.6 grams jar  Apply bid until rash resolves 1 each 0   • Insulin Glargine (BASAGLAR KWIKPEN) 100 UNIT/ML injection pen Inject 100 Units under the skin into the appropriate area as directed Daily.     • Insulin Regular Human, Conc, (HumuLIN R U-500 KwikPen) 500 UNIT/ML solution pen-injector CONCENTRATED injection Inject 30 Units under the skin into the appropriate area as directed 3 (Three) Times a Day Before Meals.     • omeprazole (PrilOSEC) 20 MG capsule Take 1 capsule by mouth Daily. 30 capsule 3   • ondansetron (ZOFRAN) 4 MG tablet Take 4 mg by mouth Every 8 (Eight) Hours As Needed for Nausea or Vomiting.     • polyethylene glycol (MIRALAX) 17 g packet Take 17 g by mouth Daily. 30 packet 3   • pregabalin (LYRICA) 100 MG capsule Take 100 mg by mouth 2 (Two)  Times a Day As Needed.     • rOPINIRole (REQUIP) 1 MG tablet Take 1 tablet by mouth 3 (Three) Times a Day. Take 1 hour before bedtime. 90 tablet 0   • spironolactone (ALDACTONE) 25 MG tablet Take 0.5 tablets by mouth Daily. 30 tablet 3   • venlafaxine XR (EFFEXOR-XR) 75 MG 24 hr capsule Take 75 mg by mouth Daily.     • amiodarone (PACERONE) 200 MG tablet Take 200 mg by mouth Daily.       No current facility-administered medications for this encounter.        Vaccination History:   Pneumonia: Needs vaccination   Annual Influenza: Needs vaccination     Objective  Vitals:    10/21/20 1025   BP: 102/66   BP Location: Left arm   Patient Position: Sitting   Cuff Size: Large Adult   Pulse: 79   Resp: 20   Temp: 97.9 °F (36.6 °C)   TempSrc: Oral   SpO2: 97%   Weight: 105 kg (232 lb 6.4 oz)     Wt Readings from Last 3 Encounters:   10/21/20 105 kg (232 lb 6.4 oz)   10/14/20 104 kg (229 lb 9.6 oz)   09/23/20 123 kg (271 lb)         10/21/20  1025   Weight: 105 kg (232 lb 6.4 oz)     Lab Results   Component Value Date    GLUCOSE 345 (H) 10/21/2020    GLUCOSE 345 (H) 10/21/2020    BUN 18 10/21/2020    BUN 18 10/21/2020    CREATININE 0.81 10/21/2020    CREATININE 0.81 10/21/2020    EGFRIFNONA 74 10/21/2020    EGFRIFNONA 74 10/21/2020    BCR 22.2 10/21/2020    BCR 22.2 10/21/2020    K 4.9 10/21/2020    K 4.9 10/21/2020    CO2 26.8 10/21/2020    CO2 25.9 10/21/2020    CALCIUM 8.6 10/21/2020    CALCIUM 8.6 10/21/2020    ALBUMIN 3.54 10/21/2020    LABIL2 1.3 (L) 06/09/2016    AST 21 10/21/2020    ALT 17 10/21/2020     Lab Results   Component Value Date    WBC 4.58 09/22/2020    HGB 8.5 (L) 09/22/2020    HCT 29.9 (L) 09/22/2020    MCV 97.7 (H) 09/22/2020     (L) 09/22/2020     Lab Results   Component Value Date    TROPONINT 0.027 09/11/2020     Lab Results   Component Value Date    PROBNP 2,003.0 (H) 10/21/2020     Results for orders placed during the hospital encounter of 09/09/20   Adult Transesophageal Echo ([facility]I) W/ Cont if  Necessary Per Protocol (Cardiology Department)    Narrative · Ejection fraction appears to be 21 - 25%. Left ventricular systolic   function is severely decreased.  · Right ventricular cavity is mildly dilated. Moderately reduced right   ventricular systolic function noted.  · Trace mitral valve regurgitation is present.  · Moderate tricuspid valve regurgitation is present. Estimated right   ventricular systolic pressure from tricuspid regurgitation is normal (<35   mmHg).  · There is (grade 1) plaque in the proximal aorta present. There is (grade   1) plaque in the ascending aorta present. There is (grade 1) plaque in the   aortic arch present. There is (grade 1) plaque in the descending aorta   present.               GDMT:       Class   Drug   Dose Last Dose Adjustment Additional Titration   ACEi/ARB/ARNI       Beta Blocker carvedilol 6.25mg  9/23 needed   MRA spironolactone 12.5mg 9/24 needed     Drug Therapy Problems    1.Non-compliance: Pt no longer taking amiodarone. Rx last filled at [facility] on 8/31 for a 30 days supply by prescriber Masha Escalante.   2. Hypotensive episode- PCP instructed Pt to titrate coreg down by taking 6.25mg daily x 1 week. Pt hasn't started down titration       Recommendations:     1. Educated Pt on the importance of following up with cardiology so they can assess need for amiodarone since Pt has not been taking.  PCP still has as an active med and Pt is unsure why she stopped taking. Educated her on potential adverse effects with amiodarone that will require close monitoring by cardiology.   2. Recommended Pt get carvedilol 3.125mg BID instead of a once daily carvedilol dose and close monitoring of BP.     Thank you for allowing me to participate in the care of your patient,    Zunilda Gonzalez, PharmD  10/21/20  12:20 EDT

## 2020-10-27 ENCOUNTER — OFFICE VISIT (OUTPATIENT)
Dept: CARDIOLOGY | Facility: CLINIC | Age: 55
End: 2020-10-27

## 2020-10-27 VITALS — DIASTOLIC BLOOD PRESSURE: 60 MMHG | HEART RATE: 63 BPM | SYSTOLIC BLOOD PRESSURE: 111 MMHG | TEMPERATURE: 98.7 F

## 2020-10-27 DIAGNOSIS — E66.01 SEVERE OBESITY (BMI 35.0-39.9) WITH COMORBIDITY (HCC): ICD-10-CM

## 2020-10-27 DIAGNOSIS — I42.9 CARDIOMYOPATHY, UNSPECIFIED TYPE (HCC): Primary | ICD-10-CM

## 2020-10-27 DIAGNOSIS — I50.43 ACUTE ON CHRONIC COMBINED SYSTOLIC AND DIASTOLIC CHF (CONGESTIVE HEART FAILURE) (HCC): ICD-10-CM

## 2020-10-27 PROCEDURE — 99213 OFFICE O/P EST LOW 20 MIN: CPT | Performed by: SPECIALIST

## 2020-10-27 RX ORDER — PREDNISONE 2.5 MG
2.5 TABLET ORAL DAILY
COMMUNITY
End: 2023-01-31

## 2020-10-27 RX ORDER — LISINOPRIL 5 MG/1
5 TABLET ORAL DAILY
Qty: 30 TABLET | Refills: 11 | Status: SHIPPED | OUTPATIENT
Start: 2020-10-27 | End: 2023-01-31

## 2020-10-27 RX ORDER — METOPROLOL SUCCINATE 50 MG/1
50 TABLET, EXTENDED RELEASE ORAL DAILY
COMMUNITY
End: 2023-01-31 | Stop reason: SDUPTHER

## 2020-10-27 NOTE — PROGRESS NOTES
Subjective   Follow up, cardiomyopathy, CHF, paroxysmal atrial fibrillation  Yumiko Purdy is a 54 y.o. female who presents to day for Follow-up (PER CHF CLINIC-PATIENT BEEN HOSPITALIZED SINCE AT [facility]) and Med Management (LIST).    CHIEF COMPLIANT  Chief Complaint   Patient presents with   • Follow-up     PER CHF CLINIC-PATIENT BEEN HOSPITALIZED SINCE AT [facility]   • Med Management     LIST       Active Problems:  Problem List Items Addressed This Visit        Cardiovascular and Mediastinum    Acute on chronic congestive heart failure (CMS/HCC) - Primary    Relevant Medications    metoprolol succinate XL (TOPROL-XL) 50 MG 24 hr tablet    Cardiomyopathy (CMS/HCC)    Relevant Medications    metoprolol succinate XL (TOPROL-XL) 50 MG 24 hr tablet       Digestive    Severe obesity (BMI 35.0-39.9) with comorbidity (CMS/HCC)          HPI  HPI  Discharged few days ago from [facility], admitted with CHF, she in class III dyspnea, with mild LE edema, she denies any chest pain, she denies recent palpitations, she sleeps on 3 pillows, no PND, she never smoked  PRIOR MEDS  Current Outpatient Medications on File Prior to Visit   Medication Sig Dispense Refill   • apixaban (ELIQUIS) 5 MG tablet tablet Take 1 tablet by mouth Every 12 (Twelve) Hours. Indications: Atrial Fibrillation 60 tablet 3   • aspirin 81 MG EC tablet Take 1 tablet by mouth Daily. 30 tablet 3   • bumetanide (BUMEX) 2 MG tablet Take 1 tablet by mouth Daily. 30 tablet 3   • folic acid (FOLVITE) 1 MG tablet Take 1 mg by mouth Daily.     • Insulin Glargine (BASAGLAR KWIKPEN) 100 UNIT/ML injection pen Inject 100 Units under the skin into the appropriate area as directed Daily.     • Insulin Regular Human, Conc, (HumuLIN R U-500 KwikPen) 500 UNIT/ML solution pen-injector CONCENTRATED injection Inject 30 Units under the skin into the appropriate area as directed 3 (Three) Times a Day Before Meals.     • metoprolol succinate XL (TOPROL-XL) 50 MG 24 hr tablet Take 50 mg by  mouth Daily.     • omeprazole (PrilOSEC) 20 MG capsule Take 1 capsule by mouth Daily. 30 capsule 3   • ondansetron (ZOFRAN) 4 MG tablet Take 4 mg by mouth Every 8 (Eight) Hours As Needed for Nausea or Vomiting.     • polyethylene glycol (MIRALAX) 17 g packet Take 17 g by mouth Daily. 30 packet 3   • predniSONE (DELTASONE) 2.5 MG tablet Take 2.5 mg by mouth Daily.     • pregabalin (LYRICA) 100 MG capsule Take 100 mg by mouth 2 (Two) Times a Day As Needed.     • rOPINIRole (REQUIP) 1 MG tablet Take 1 tablet by mouth 3 (Three) Times a Day. Take 1 hour before bedtime. 90 tablet 0   • spironolactone (ALDACTONE) 25 MG tablet Take 0.5 tablets by mouth Daily. (Patient taking differently: Take 25 mg by mouth Daily.) 30 tablet 3   • venlafaxine XR (EFFEXOR-XR) 75 MG 24 hr capsule Take 75 mg by mouth Daily.     • amiodarone (PACERONE) 200 MG tablet Take 200 mg by mouth Daily.     • hydrocortisone 1 % cream Apply  topically to the appropriate area as directed 2 (Two) Times a Day. 453.6 grams jar  Apply bid until rash resolves 1 each 0   • [DISCONTINUED] carvedilol (COREG) 6.25 MG tablet Take 0.5 tablets by mouth 2 (Two) Times a Day With Meals. 60 tablet 3     No current facility-administered medications on file prior to visit.        ALLERGIES  Phenergan [promethazine]    HISTORY  Past Medical History:   Diagnosis Date   • Anemia    • CHF (congestive heart failure) (CMS/HCC)    • Chronic a-fib (CMS/HCC)     stated by patient    • Cirrhosis of liver (CMS/HCC)     stated by patient    • Diabetes mellitus (CMS/HCC)        Social History     Socioeconomic History   • Marital status:      Spouse name: Not on file   • Number of children: Not on file   • Years of education: Not on file   • Highest education level: Not on file   Tobacco Use   • Smoking status: Never Smoker   • Smokeless tobacco: Never Used       History reviewed. No pertinent family history.    Review of Systems   Constitutional: Negative for activity change and  appetite change.   HENT: Negative for congestion.    Eyes: Negative for discharge and itching.   Respiratory: Positive for shortness of breath. Negative for apnea, cough, choking, chest tightness, wheezing and stridor.    Cardiovascular: Positive for palpitations and leg swelling.   Gastrointestinal: Negative for abdominal distention and abdominal pain.   Endocrine: Negative for cold intolerance and heat intolerance.   Genitourinary: Negative for flank pain.   Musculoskeletal: Negative for neck stiffness.   Skin: Negative for color change and pallor.   Allergic/Immunologic: Negative for immunocompromised state.   Neurological: Negative for dizziness.   Hematological: Does not bruise/bleed easily.   Psychiatric/Behavioral: Negative for agitation and behavioral problems.       Objective     VITALS: /60   Pulse 63   Temp 98.7 °F (37.1 °C)     LABS:   Lab Results (most recent)     None          IMAGING:   Ct Chest Without Contrast    Result Date: 9/10/2020  Impression: Small right pleural effusion and cardiomegaly noted. Generalized body wall anasarca subcutaneous edema predominantly on the left side of the chest and shoulder as well as breast tissue thickening. Lymph nodes in left axillary region may be secondary to edema.  This report was finalized on 9/10/2020 9:36 AM by Dr. Syed Cárdenas MD.      Xr Chest 1 View    Result Date: 9/22/2020  No radiographic evidence of acute cardiac or pulmonary disease.  This report was finalized on 9/22/2020 12:59 PM by Dr. Marlo Parr MD.      Xr Chest 1 View    Result Date: 9/10/2020  1. Cardiomegaly and vascular congestion. 2. Asymmetric infiltrates in the right lung concerning for pneumonia. Signer Name: Braydon Hudson MD  Signed: 9/10/2020 2:21 AM  Workstation Name: [facility]  [facility]    Xr Abdomen Kub    Result Date: 9/17/2020  Large volume stool in the colon  This report was finalized on 9/17/2020 9:28 AM by Dr. Angelo Deleon MD.         EXAM:  Physical Exam  Vitals signs reviewed.   Constitutional:       Appearance: She is well-developed.   HENT:      Head: Normocephalic and atraumatic.   Eyes:      Pupils: Pupils are equal, round, and reactive to light.   Neck:      Musculoskeletal: Neck supple.      Thyroid: No thyromegaly.      Vascular: No JVD.   Cardiovascular:      Rate and Rhythm: Normal rate and regular rhythm.      Heart sounds: Normal heart sounds. No murmur. No friction rub. No gallop.    Pulmonary:      Effort: Pulmonary effort is normal. No respiratory distress.      Breath sounds: Normal breath sounds. No stridor. No wheezing or rales.   Chest:      Chest wall: No tenderness.   Musculoskeletal:         General: No tenderness or deformity.   Skin:     General: Skin is warm and dry.   Neurological:      Mental Status: She is alert and oriented to person, place, and time.      Cranial Nerves: No cranial nerve deficit.      Coordination: Coordination normal.         Procedure   Procedures       Assessment/Plan    Diagnosis Plan   1. Acute on chronic combined systolic and diastolic congestive heart failure (CMS/HCC)     2. Cardiomyopathy, unspecified type (CMS/HCC)     3. Severe obesity (BMI 35.0-39.9) with comorbidity (CMS/HCC)     1. Stress test with mild ischemia, but with improved systolic function, will plan for left heart catheterization, risks and benefits of heart catheterization discussed with patient in details, patient agreed to go for the procedure, will hold Eliquis one day prior to catheterization  2.  She could not tolerate Coreg with low blood pressure and valsartan as has already started her metoprolol and she is tolerating that I will start her on a small dose of lisinopril see if she can tolerate that at 5 mg/day  3.  Advised to lose weight  4.  We will continue with amiodarone for now as well as Eliquis for her paroxysmal atrial fibrillation  5.  Regarding her lipids she had good satisfactory lipids back on  September 6.  She will follow up with Dr. Schaefer for anemia and abnormal liver functions    No follow-ups on file.    Diagnoses and all orders for this visit:    1. Acute on chronic combined systolic and diastolic congestive heart failure (CMS/HCC) (Primary)    2. Cardiomyopathy, unspecified type (CMS/HCC)    3. Severe obesity (BMI 35.0-39.9) with comorbidity (CMS/HCC)                   MEDS ORDERED DURING VISIT:  No orders of the defined types were placed in this encounter.        This document has been electronically signed by Charlee Ken MD  October 27, 2020 13:15 EDT

## 2020-10-30 ENCOUNTER — HOSPITAL ENCOUNTER (OUTPATIENT)
Dept: CARDIOLOGY | Facility: HOSPITAL | Age: 55
End: 2020-10-30

## 2020-11-04 ENCOUNTER — TRANSCRIBE ORDERS (OUTPATIENT)
Dept: ADMINISTRATIVE | Facility: HOSPITAL | Age: 55
End: 2020-11-04

## 2020-11-04 DIAGNOSIS — Z01.818 PRE-OPERATIVE CLEARANCE: Primary | ICD-10-CM

## 2020-11-05 ENCOUNTER — DOCUMENTATION (OUTPATIENT)
Dept: CARDIAC REHAB | Facility: HOSPITAL | Age: 55
End: 2020-11-05

## 2020-11-05 NOTE — PROGRESS NOTES
Due to Covid 19 Pandemic. Cardiac Rehab has limited space at this time.  Patients are contacted and made aware of this and if they are interested in participating in the program they are called back as soon as an opening is available.  We are sorry for the inconvenience .       Patient stated she is not interested in participating in the program

## 2020-11-06 ENCOUNTER — HOSPITAL ENCOUNTER (OUTPATIENT)
Dept: CARDIOLOGY | Facility: HOSPITAL | Age: 55
Discharge: HOME | End: 2020-11-06

## 2020-11-06 ENCOUNTER — LAB (OUTPATIENT)
Dept: LAB | Facility: HOSPITAL | Age: 55
End: 2020-11-06

## 2020-11-06 VITALS
OXYGEN SATURATION: 100 % | WEIGHT: 225 LBS | TEMPERATURE: 98.7 F | HEART RATE: 71 BPM | RESPIRATION RATE: 20 BRPM | DIASTOLIC BLOOD PRESSURE: 62 MMHG | SYSTOLIC BLOOD PRESSURE: 103 MMHG | BODY MASS INDEX: 37.44 KG/M2

## 2020-11-06 DIAGNOSIS — D64.9 CHRONIC ANEMIA: ICD-10-CM

## 2020-11-06 DIAGNOSIS — I50.42 CHRONIC COMBINED SYSTOLIC AND DIASTOLIC CONGESTIVE HEART FAILURE (HCC): ICD-10-CM

## 2020-11-06 DIAGNOSIS — N18.2 CKD (CHRONIC KIDNEY DISEASE) STAGE 2, GFR 60-89 ML/MIN: ICD-10-CM

## 2020-11-06 DIAGNOSIS — I42.9 CARDIOMYOPATHY, UNSPECIFIED TYPE (HCC): Primary | ICD-10-CM

## 2020-11-06 DIAGNOSIS — Z01.818 PRE-OPERATIVE CLEARANCE: ICD-10-CM

## 2020-11-06 DIAGNOSIS — E87.1 HYPONATREMIA: ICD-10-CM

## 2020-11-06 DIAGNOSIS — E66.01 SEVERE OBESITY (BMI 35.0-39.9) WITH COMORBIDITY (HCC): ICD-10-CM

## 2020-11-06 LAB
ANION GAP SERPL CALCULATED.3IONS-SCNC: 5.8 MMOL/L (ref 5–15)
BUN SERPL-MCNC: 32 MG/DL (ref 6–20)
BUN/CREAT SERPL: 30.8 (ref 7–25)
CALCIUM SPEC-SCNC: 9.2 MG/DL (ref 8.6–10.5)
CHLORIDE SERPL-SCNC: 98 MMOL/L (ref 98–107)
CO2 SERPL-SCNC: 26.2 MMOL/L (ref 22–29)
CREAT SERPL-MCNC: 1.04 MG/DL (ref 0.57–1)
GFR SERPL CREATININE-BSD FRML MDRD: 55 ML/MIN/1.73
GLUCOSE SERPL-MCNC: 335 MG/DL (ref 65–99)
MAGNESIUM SERPL-MCNC: 2 MG/DL (ref 1.6–2.6)
NT-PROBNP SERPL-MCNC: 934.4 PG/ML (ref 0–900)
POTASSIUM SERPL-SCNC: 5.1 MMOL/L (ref 3.5–5.2)
SODIUM SERPL-SCNC: 130 MMOL/L (ref 136–145)

## 2020-11-06 PROCEDURE — 99214 OFFICE O/P EST MOD 30 MIN: CPT | Performed by: NURSE PRACTITIONER

## 2020-11-06 PROCEDURE — C9803 HOPD COVID-19 SPEC COLLECT: HCPCS

## 2020-11-06 PROCEDURE — U0004 COV-19 TEST NON-CDC HGH THRU: HCPCS | Performed by: INTERNAL MEDICINE

## 2020-11-06 PROCEDURE — 83880 ASSAY OF NATRIURETIC PEPTIDE: CPT | Performed by: NURSE PRACTITIONER

## 2020-11-06 PROCEDURE — G0463 HOSPITAL OUTPT CLINIC VISIT: HCPCS | Performed by: PHARMACIST

## 2020-11-06 PROCEDURE — 80048 BASIC METABOLIC PNL TOTAL CA: CPT | Performed by: NURSE PRACTITIONER

## 2020-11-06 PROCEDURE — 83735 ASSAY OF MAGNESIUM: CPT | Performed by: NURSE PRACTITIONER

## 2020-11-06 NOTE — PROGRESS NOTES
[facility] CHF CLINIC OFFICE VISIT    Subjective:     History of Present Illness  Yumiko Purdy is a 54 y.o.  female who presents to the clinic today accompanied by her son for heart failure follow up. She has hx of chronic systolic and diastolic heart failure. Most recent echo on 9/16/2020 shows EF of 21-25% however muga on 10/16/2020 showed an EF at 38%. She continues on Bumex 2 mg daily, Spironolactone 25 mg daily, Lisinopril 5 mg daily and Metoprolol 50 mg daily. She apparently was hospitalized at [facility] for atrial flutter, no record available for review. She reports her coreg was stopped and metoprolol was started. She seen Dr. Ken in follow up and due to intolerance of Valsartan and Entresto she was started on Lisinopril 5 mg daily. She seems to be tolerating it okay. She doesn't weigh herself as she feels unsteady getting on the scale and she is afraid of falling. She reports checking home BP and HR but doesn't recall what readings are and doesn't bring in a record. She reports during her hospitalization stay she received iron transfusion. She reports NOT taking her amiodarone. She is scheduled to have a heart cath by Dr. Ken on Tuesday. She has refused cardiac rehab at this time. She reports she drinks a lot and probably doesn't adhere to sodium restrictions. Denies chest pain or palpitations. She reports she is compliant with diabetic medications and follows with Dr. Schaefer.      PCP: Dr. Schaefer  Cardiologist: Dr. Ken   Nephrologist:Dr. Mejia - missed recent appt and needs to resschedule    Hospitalizations: Discharged on 9/23/2020    Past Medical History:   Diagnosis Date   • Anemia    • CHF (congestive heart failure) (CMS/HCC)    • Chronic a-fib (CMS/HCC)     stated by patient    • Cirrhosis of liver (CMS/HCC)     stated by patient    • Diabetes mellitus (CMS/HCC)      Past Surgical History:   Procedure Laterality Date   • APPENDECTOMY     • CHOLECYSTECTOMY     • TOE AMPUTATION Right     stated  by patient.      Social History     Socioeconomic History   • Marital status:      Spouse name: Not on file   • Number of children: Not on file   • Years of education: Not on file   • Highest education level: Not on file   Tobacco Use   • Smoking status: Never Smoker   • Smokeless tobacco: Never Used     No family history on file.    Allergies:  Allergies   Allergen Reactions   • Phenergan [Promethazine]      Review of Systems   Constitution: Positive for malaise/fatigue. Negative for chills, fever, weight gain and weight loss.   HENT: Negative for congestion, ear pain, hoarse voice and sore throat.    Eyes: Negative for discharge, double vision and visual disturbance.   Cardiovascular: Positive for leg swelling (stable, improved). Negative for chest pain, claudication, dyspnea on exertion, irregular heartbeat, near-syncope, orthopnea, palpitations and syncope.   Respiratory: Positive for shortness of breath (stable, improved). Negative for cough and wheezing.    Endocrine: Negative for cold intolerance, heat intolerance and polyuria.   Hematologic/Lymphatic: Negative for bleeding problem. Does not bruise/bleed easily.   Skin: Negative for color change, dry skin, flushing and rash.   Musculoskeletal: Negative for back pain, joint pain, joint swelling and neck pain.   Gastrointestinal: Negative for abdominal pain, constipation, diarrhea, nausea and vomiting.   Genitourinary: Negative for dysuria, flank pain, frequency, hesitancy and urgency.   Neurological: Positive for weakness. Negative for difficulty with concentration, excessive daytime sleepiness, dizziness, loss of balance, numbness and tremors.   Psychiatric/Behavioral: Negative for depression. The patient does not have insomnia and is not nervous/anxious.      Current Outpatient Medications   Medication Sig Dispense Refill   • amiodarone (PACERONE) 200 MG tablet Take 200 mg by mouth Daily.     • apixaban (ELIQUIS) 5 MG tablet tablet Take 1 tablet by  mouth Every 12 (Twelve) Hours. Indications: Atrial Fibrillation 60 tablet 3   • aspirin 81 MG EC tablet Take 1 tablet by mouth Daily. 30 tablet 3   • bumetanide (BUMEX) 2 MG tablet Take 1 tablet by mouth Daily. 30 tablet 3   • folic acid (FOLVITE) 1 MG tablet Take 1 mg by mouth Daily.     • Insulin Glargine (BASAGLAR KWIKPEN) 100 UNIT/ML injection pen Inject 100 Units under the skin into the appropriate area as directed Daily.     • Insulin Regular Human, Conc, (HumuLIN R U-500 KwikPen) 500 UNIT/ML solution pen-injector CONCENTRATED injection Inject 30 Units under the skin into the appropriate area as directed 3 (Three) Times a Day Before Meals.     • lisinopril (PRINIVIL,ZESTRIL) 5 MG tablet Take 1 tablet by mouth Daily. 30 tablet 11   • metoprolol succinate XL (TOPROL-XL) 50 MG 24 hr tablet Take 50 mg by mouth Daily.     • omeprazole (PrilOSEC) 20 MG capsule Take 1 capsule by mouth Daily. 30 capsule 3   • ondansetron (ZOFRAN) 4 MG tablet Take 4 mg by mouth Every 8 (Eight) Hours As Needed for Nausea or Vomiting.     • polyethylene glycol (MIRALAX) 17 g packet Take 17 g by mouth Daily. 30 packet 3   • predniSONE (DELTASONE) 2.5 MG tablet Take 2.5 mg by mouth Daily.     • pregabalin (LYRICA) 100 MG capsule Take 100 mg by mouth 2 (Two) Times a Day As Needed.     • rOPINIRole (REQUIP) 1 MG tablet Take 1 tablet by mouth 3 (Three) Times a Day. Take 1 hour before bedtime. 90 tablet 0   • spironolactone (ALDACTONE) 25 MG tablet Take 0.5 tablets by mouth Daily. (Patient taking differently: Take 25 mg by mouth Daily.) 30 tablet 3   • venlafaxine XR (EFFEXOR-XR) 75 MG 24 hr capsule Take 75 mg by mouth Daily.       No current facility-administered medications for this encounter.       Objective:     Vitals:    11/06/20 1100   BP: 103/62   BP Location: Left arm   Patient Position: Sitting   Pulse: 71   Resp: 20   Temp: 98.7 °F (37.1 °C)   TempSrc: Oral   SpO2: 100%   Weight: 102 kg (225 lb)       Wt Readings from Last 3  Encounters:   11/06/20 102 kg (225 lb)   10/21/20 105 kg (232 lb 6.4 oz)   10/14/20 104 kg (229 lb 9.6 oz)        Vitals signs reviewed.   Constitutional:       Appearance: Normal appearance. Well-developed.      Comments: Wears mask during encounter, came into clinic in wheelchair    Eyes:      General: Scleral icterus: minimal.      Conjunctiva/sclera: Conjunctivae normal.   HENT:      Head: Normocephalic.   Neck:      Musculoskeletal: Normal range of motion and neck supple.      Vascular: No JVD or JVR.   Pulmonary:      Effort: Pulmonary effort is normal.      Breath sounds: Normal breath sounds.   Cardiovascular:      Normal rate. Regular rhythm.   Pulses:     Intact distal pulses.   Edema:     Ankle: bilateral trace edema of the ankle.     Feet: bilateral trace edema of the feet.  Abdominal:      General: Bowel sounds are normal.      Palpations: Abdomen is soft. There is no hepatomegaly or splenomegaly.   Musculoskeletal: Normal range of motion.   Skin:     General: Skin is warm and dry.      Coloration: Jaundice: minimal.   Neurological:      Mental Status: Alert and oriented to person, place, and time.   Psychiatric:         Attention and Perception: Attention normal.         Mood and Affect: Mood normal.         Speech: Speech normal.         Behavior: Behavior is cooperative.         Cognition and Memory: Cognition normal.     Cardiographics  Results for orders placed during the hospital encounter of 09/09/20   Adult Transesophageal Echo ([facility]) W/ Cont if Necessary Per Protocol (Cardiology Department)    Narrative · Ejection fraction appears to be 21 - 25%. Left ventricular systolic   function is severely decreased.  · Right ventricular cavity is mildly dilated. Moderately reduced right   ventricular systolic function noted.  · Trace mitral valve regurgitation is present.  · Moderate tricuspid valve regurgitation is present. Estimated right   ventricular systolic pressure from tricuspid regurgitation is  normal (<35   mmHg).  · There is (grade 1) plaque in the proximal aorta present. There is (grade   1) plaque in the ascending aorta present. There is (grade 1) plaque in the   aortic arch present. There is (grade 1) plaque in the descending aorta   present.        EKG 9/16/2020    Chest x-ray:9/22/2020    IMPRESSION:  No radiographic evidence of acute cardiac or pulmonary  disease.  This report was finalized on 9/22/2020 12:59 PM by Dr. Marlo Parr MD.    Stress Test: 9/17/2020  Interpretation Summary  · A pharmacological stress test was performed using regadenoson without low-level exercise.  · Findings consistent with a normal ECG stress test.  · Myocardial perfusion imaging indicates a small-to-medium-sized, mild degree of ischemia located in the inferior wall.  · Abnormal LV wall motion consistent with moderate global hypokinesis.  · Left ventricular ejection fraction is moderately reduced. (Calculated EF = 40%).  · Impressions are consistent with an intermediate risk study.        Lab Review     Lab Results   Component Value Date    TSH 3.830 09/10/2020     Lab Results   Component Value Date    GLUCOSE 345 (H) 10/21/2020    GLUCOSE 345 (H) 10/21/2020    BUN 18 10/21/2020    BUN 18 10/21/2020    CREATININE 0.81 10/21/2020    CREATININE 0.81 10/21/2020    EGFRIFNONA 74 10/21/2020    EGFRIFNONA 74 10/21/2020    BCR 22.2 10/21/2020    BCR 22.2 10/21/2020    K 4.9 10/21/2020    K 4.9 10/21/2020    CO2 26.8 10/21/2020    CO2 25.9 10/21/2020    CALCIUM 8.6 10/21/2020    CALCIUM 8.6 10/21/2020    ALBUMIN 3.54 10/21/2020    LABIL2 1.3 (L) 06/09/2016    AST 21 10/21/2020    ALT 17 10/21/2020     Lab Results   Component Value Date    WBC 4.58 09/22/2020    HGB 8.5 (L) 09/22/2020    HCT 29.9 (L) 09/22/2020    MCV 97.7 (H) 09/22/2020     (L) 09/22/2020     Lab Results   Component Value Date    TROPONINT 0.027 09/11/2020     Lab Results   Component Value Date    PROBNP 2,003.0 (H) 10/21/2020     The following  portions of the patient's history were reviewed and updated as appropriate: allergies, current medications, past family history, past medical history, past social history, past surgical history and problem list.     Old records reviewed and pertinent information is included in the above objective data.     Assessment/Plan:   1. Chronic systolic and diastolic congestive heart failure  2. Cardiomyopathy, new onset with unclear etiology, EF 38% on muga 10/16/2020   3. Chronic anemia  4. CKD, stage II  5. Hyponatremia   4. Obesity    1. BNP, BMP and magnesium today  2. Reviewed and discussed labs with patient and son today  3. Closely monitor kidney function and potassium while taking Lisinopril and Spironolactone. If persistent elevation will need medication changes.   4. Attempt to obtain medical records from [facility]  5. Monitor and record BP and HR.   6. Keep appt for heart cath and further discuss with cardiologists that she isn't taking her amiodarone   7. Advised to follow up with PCP for hyperglycemia  8. Encourage to r/s with nephrology  9. Encouraged strict intake <1500 mL and monitor sodium  9. Follow up in 2 week sooner if needed    25 minutes face to face spent counseling patient extensively on dietary Na+ intake, importance of activity, weight monitoring, compliance with medications and follow up appointments.

## 2020-11-06 NOTE — PATIENT INSTRUCTIONS
"Heart Failure Eating Plan  Heart failure, also called congestive heart failure, occurs when your heart does not pump blood well enough to meet your body's needs for oxygen-rich blood. Heart failure is a long-term (chronic) condition. Living with heart failure can be challenging. However, following your health care provider's instructions about a healthy lifestyle and working with a diet and nutrition specialist (dietitian) to choose the right foods may help to improve your symptoms.  What are tips for following this plan?  Reading food labels  · Check food labels for the amount of sodium per serving. Choose foods that have less than 140 mg (milligrams) of sodium in each serving.  · Check food labels for the number of calories per serving. This is important if you need to limit your daily calorie intake to lose weight.  · Check food labels for the serving size. If you eat more than one serving, you will be eating more sodium and calories than what is listed on the label.  · Look for foods that are labeled as \"sodium-free,\" \"very low sodium,\" or \"low sodium.\"  ? Foods labeled as \"reduced sodium\" or \"lightly salted\" may still have more sodium than what is recommended for you.  Cooking  · Avoid adding salt when cooking. Ask your health care provider or dietitian before using salt substitutes.  · Season food with salt-free seasonings, spices, or herbs. Check the label of seasoning mixes to make sure they do not contain salt.  · Cook with heart-healthy oils, such as olive, canola, soybean, or sunflower oil.  · Do not javed foods. Cook foods using low-fat methods, such as baking, boiling, grilling, and broiling.  · Limit unhealthy fats when cooking by:  ? Removing the skin from poultry, such as chicken.  ? Removing all visible fats from meats.  ? Skimming the fat off from stews, soups, and gravies before serving them.  Meal planning    · Limit your intake of:  ? Processed, canned, or pre-packaged foods.  ? Foods that are " high in trans fat, such as fried foods.  ? Sweets, desserts, sugary drinks, and other foods with added sugar.  ? Full-fat dairy products, such as whole milk.  · Eat a balanced diet that includes:  ? 4-5 servings of fruit each day and 4-5 servings of vegetables each day. At each meal, try to fill half of your plate with fruits and vegetables.  ? Up to 6-8 servings of whole grains each day.  ? Up to 2 servings of lean meat, poultry, or fish each day. One serving of meat is equal to 3 oz. This is about the same size as a deck of cards.  ? 2 servings of low-fat dairy each day.  ? Heart-healthy fats. Healthy fats called omega-3 fatty acids are found in foods such as flaxseed and cold-water fish like sardines, salmon, and mackerel.  · Aim to eat 25-35 g (grams) of fiber a day. Foods that are high in fiber include apples, broccoli, carrots, beans, peas, and whole grains.  · Do not add salt or condiments that contain salt (such as soy sauce) to foods before eating.  · When eating at a restaurant, ask that your food be prepared with less salt or no salt, if possible.  · Try to eat 2 or more vegetarian meals each week.  · Eat more home-cooked food and eat less restaurant, buffet, and fast food.  General information  · Do not eat more than 2,300 mg of salt (sodium) a day. The amount of sodium that is recommended for you may be lower, depending on your condition.  · Maintain a healthy body weight as directed. Ask your health care provider what a healthy weight is for you.  ? Check your weight every day.  ? Work with your health care provider and dietitian to make a plan that is right for you to lose weight or maintain your current weight.  · Limit how much fluid you drink. Ask your health care provider or dietitian how much fluid you can have each day.  · Limit or avoid alcohol as told by your health care provider or dietitian.  Recommended foods  The items listed may not be a complete list. Talk with your dietitian about what  dietary choices are best for you.  Fruits  All fresh, frozen, and canned fruits. Dried fruits, such as raisins, prunes, and cranberries.  Vegetables  All fresh vegetables. Vegetables that are frozen without sauce or added salt. Low-sodium or sodium-free canned vegetables.  Grains  Bread with less than 80 mg of sodium per slice. Whole-wheat pasta, quinoa, and brown rice. Oats and oatmeal. Barley. Millet. Grits and cream of wheat. Whole-grain and whole-wheat cold cereal.  Meats and other protein foods  Lean cuts of meat. Skinless chicken and turkey. Fish with high omega-3 fatty acids, such as salmon, sardines, and other cold-water fishes. Eggs. Dried beans, peas, and edamame. Unsalted nuts and nut butters.  Dairy  Low-fat or nonfat (skim) milk and dried milk. Rice milk, soy milk, and almond milk. Low-fat or nonfat yogurt. Small amounts of reduced-sodium block cheese. Low-sodium cottage cheese.  Fats and oils  Olive, canola, soybean, flaxseed, or sunflower oil. Avocado.  Sweets and desserts  Apple sauce. Granola bars. Sugar-free pudding and gelatin. Frozen fruit bars.  Seasoning and other foods  Fresh and dried herbs. Lemon or lime juice. Vinegar. Low-sodium ketchup. Salt-free marinades, salad dressings, sauces, and seasonings.  The items listed above may not be a complete list of foods and beverages you can eat. Contact a dietitian for more information.  Foods to avoid  The items listed may not be a complete list. Talk with your dietitian about what dietary choices are best for you.  Fruits  Fruits that are dried with sodium-containing preservatives.  Vegetables  Canned vegetables. Frozen vegetables with sauce or seasonings. Creamed vegetables. French fries. Onion rings. Pickled vegetables and sauerkraut.  Grains  Bread with more than 80 mg of sodium per slice. Hot or cold cereal with more than 140 mg sodium per serving. Salted pretzels and crackers. Pre-packaged breadcrumbs. Bagels, croissants, and biscuits.  Meats  and other protein foods  Ribs and chicken wings. Rajan, ham, pepperoni, bologna, salami, and packaged luncheon meats. Hot dogs, bratwurst, and sausage. Canned meat. Smoked meat and fish. Salted nuts and seeds.  Dairy  Whole milk, half-and-half, and cream. Buttermilk. Processed cheese, cheese spreads, and cheese curds. Regular cottage cheese. Feta cheese. Shredded cheese. String cheese.  Fats and oils  Butter, lard, shortening, ghee, and rajan fat. Canned and packaged gravies.  Seasoning and other foods  Onion salt, garlic salt, table salt, and sea salt. Marinades. Regular salad dressings. Relishes, pickles, and olives. Meat flavorings and tenderizers, and bouillon cubes. Horseradish, ketchup, and mustard. Worcestershire sauce. Teriyaki sauce, soy sauce (including reduced sodium). Hot sauce and Tabasco sauce. Steak sauce, fish sauce, oyster sauce, and cocktail sauce. Taco seasonings. Barbecue sauce. Tartar sauce.  The items listed above may not be a complete list of foods and beverages you should avoid. Contact a dietitian for more information.  Summary  · A heart failure eating plan includes changes that limit your intake of sodium and unhealthy fat, and it may help you lose weight or maintain a healthy weight. Your health care provider may also recommend limiting how much fluid you drink.  · Most people with heart failure should eat no more than 2,300 mg of salt (sodium) a day. The amount of sodium that is recommended for you may be lower, depending on your condition.  · Contact your health care provider or dietitian before making any major changes to your diet.  This information is not intended to replace advice given to you by your health care provider. Make sure you discuss any questions you have with your health care provider.  Document Released: 05/04/2018 Document Revised: 02/13/2020 Document Reviewed: 05/04/2018  [employer] Patient Education © 2020 [employer]    Heart Failure, Self Care  Heart failure is a  serious condition. This document explains the things you need to do to take care of yourself after a heart failure diagnosis. You may be asked to change your diet, take certain medicines, and make other lifestyle changes in order to stay as healthy as possible. Your health care provider may also give you more specific instructions. If you have problems or questions, contact your health care provider.  What are the risks?  Having heart failure puts you at higher risk for certain problems. These problems can get worse if you do not take good care of yourself. Problems may include:  · Blood clotting problems. This may cause a stroke.  · Damage to the kidneys, liver, or lungs.  · Abnormal heart rhythms.  Supplies needed:  · Scale for monitoring weight.  · Blood pressure monitor.  · Notebook.  · Medicines.  How to care for yourself when you have heart failure  Medicines  Take over-the-counter and prescription medicines only as told by your health care provider. Medicines reduce the workload of your heart, slow the progression of heart failure, and improve symptoms. Take your medicines every day.  · Do not stop taking your medicine unless your health care provider tells you to do so.  · Do not skip any dose of medicine.  · Refill your prescriptions before you run out of medicine.  Eating and drinking    · Eat heart-healthy foods. Talk with a dietitian to make an eating plan that is right for you.  ? Choose foods that contain no trans fat and are low in saturated fat and cholesterol. Healthy choices include fresh or frozen fruits and vegetables, fish, lean meats, legumes, fat-free or low-fat dairy products, and whole-grain or high-fiber foods.  ? Limit salt (sodium) if told by your health care provider. Sodium restriction may reduce symptoms of heart failure. Ask a dietitian to recommend heart-healthy seasonings.  ? Use healthy cooking methods instead of frying. Healthy methods include roasting, grilling, broiling, baking,  poaching, steaming, and stir-frying.  · Limit your fluid intake, if directed by your health care provider. Fluid restriction may reduce symptoms of heart failure.  Alcohol use  · Do not drink alcohol if:  ? Your health care provider tells you not to drink.  ? Your heart was damaged by alcohol, or you have severe heart failure.  ? You are pregnant, may be pregnant, or are planning to become pregnant.  · If you drink alcohol:  ? Limit how much you use to:  § 0-1 drink a day for women.  § 0-2 drinks a day for men.  ? Be aware of how much alcohol is in your drink. In the [place] one drink equals one 12 oz bottle of beer (355 mL), one 5 oz glass of wine (148 mL), or one 1½ oz glass of hard liquor (44 mL).  Lifestyle    · Do not use any products that contain nicotine or tobacco, such as cigarettes, e-cigarettes, and chewing tobacco. If you need help quitting, ask your health care provider.  ? Do not use nicotine gum or patches before talking to your health care provider.  · Do not use illegal drugs.  · Work with your health care provider to safely reach the right body weight.  · Do physical activity if told by your health care provider. Talk to your health care provider before you begin an exercise if:  ? You are an older adult.  ? You have severe heart failure.  · Learn to manage stress. If you need help to do this, ask your health care provider.  · Participate in or seek rehabilitation as needed to keep or improve your independence and quality of life.  · Plan rest periods when you get tired.  Monitoring important information    · Weigh yourself every day. This will help you to notice if too much fluid is building up in your body.  ? Weigh yourself every morning after you urinate and before you eat breakfast.  ? Wear the same amount of clothing each time you weigh yourself.  ? Record your daily weight. Provide your health care provider with your weight record.  · Monitor and record your pulse and blood pressure as told by  your health care provider.  Dealing with extreme temperatures  · If the weather is extremely hot:  ? Avoid vigorous physical activity.  ? Use air conditioning or fans, or find a cooler location.  ? Avoid caffeine and alcohol.  ? Wear loose-fitting, lightweight, and light-colored clothing.  · If the weather is extremely cold:  ? Avoid vigorous activity.  ? Layer your clothes.  ? Wear mittens or gloves, a hat, and a scarf when you go outside.  ? Avoid alcohol.  Follow these instructions at home:  · Stay up to date with vaccines. Pneumococcal and flu (influenza) vaccines are especially important in preventing infections of the airways.  · Keep all follow-up visits as told by your health care provider. This is important.  Contact a health care provider if you:  · Have a rapid weight gain.  · Have increasing shortness of breath.  · Are unable to participate in your usual physical activities.  · Get tired easily.  · Cough more than normal, especially with physical activity.  · Lose your appetite or feel nauseous.  · Have any swelling or more swelling in areas such as your hands, feet, ankles, or abdomen.  · Are unable to sleep because it is hard to breathe.  · Feel like your heart is beating quickly (palpitations).  · Become dizzy or light-headed when you stand up.  Get help right away if you:  · Have trouble breathing.  · Notice or your family notices a change in your awareness, such as having trouble staying awake or concentrating.  · Have pain or discomfort in your chest.  · Have an episode of fainting (syncope).  These symptoms may represent a serious problem that is an emergency. Do not wait to see if the symptoms will go away. Get medical help right away. Call your local emergency services (911 in the [place] Do not drive yourself to the hospital.  Summary  · Heart failure is a serious condition. To care for yourself, you may be asked to change your diet, take certain medicines, and make other lifestyle  changes.  · Take your medicines every day. Do not stop taking them unless your health care provider tells you to do so.  · Eat heart-healthy foods, such as fresh or frozen fruits and vegetables, fish, lean meats, legumes, fat-free or low-fat dairy products, and whole-grain or high-fiber foods.  · Ask your health care provider if you have any alcohol restrictions. You may have to stop drinking alcohol if you have severe heart failure.  · Contact your health care provider if you notice problems, such as rapid weight gain or a fast heartbeat. Get help right away if you faint, or have chest pain or trouble breathing.  This information is not intended to replace advice given to you by your health care provider. Make sure you discuss any questions you have with your health care provider.  Document Released: 04/01/2020 Document Revised: 03/31/2020 Document Reviewed: 04/01/2020  [employer] Patient Education © 2020 [employer]    Hyponatremia  Hyponatremia is when the amount of salt (sodium) in your blood is too low. When salt levels are low, your body may take in extra water. This can cause swelling throughout the body. The swelling often affects the brain.  What are the causes?  This condition may be caused by:  · Certain medical problems or conditions.  · Vomiting a lot.  · Having watery poop (diarrhea) often.  · Certain medicines or illegal drugs.  · Not having enough water in the body (dehydration).  · Drinking too much water.  · Eating a diet that is low in salt.  · Large burns on your body.  · Too much sweating.  What increases the risk?  You are more likely to get this condition if you:  · Have long-term (chronic) kidney disease.  · Have heart failure.  · Have a medical condition that causes you to have watery poop often.  · Do very hard exercises.  · Take medicines that affect the amount of salt is in your blood.  What are the signs or symptoms?  Symptoms of this condition include:  · Headache.  · Feeling like you  may vomit (nausea).  · Vomiting.  · Being very tired (lethargic).  · Muscle weakness and cramps.  · Not wanting to eat as much as normal (loss of appetite).  · Feeling weak or light-headed.  Severe symptoms of this condition include:  · Confusion.  · Feeling restless (agitation).  · Having a fast heart rate.  · Passing out (fainting).  · Seizures.  · Coma.  How is this treated?  Treatment for this condition depends on the cause. Treatment may include:  · Getting fluids through an IV tube that is put into one of your veins.  · Taking medicines to fix the salt levels in your blood. If medicines are causing the problem, your medicines will need to be changed.  · Limiting how much water or fluid you take in.  · Monitoring in the hospital to watch your symptoms.  Follow these instructions at home:    · Take over-the-counter and prescription medicines only as told by your doctor. Many medicines can make this condition worse. Talk with your doctor about any medicines that you are taking.  · Eat and drink exactly as you are told by your doctor.  ? Eat only the foods you are told to eat.  ? Limit how much fluid you take.  · Do not drink alcohol.  · Keep all follow-up visits as told by your doctor. This is important.  Contact a doctor if:  · You feel more like you may vomit.  · You feel more tired.  · Your headache gets worse.  · You feel more confused.  · You feel weaker.  · Your symptoms go away and then they come back.  · You have trouble following the diet instructions.  Get help right away if:  · You have a seizure.  · You pass out.  · You keep having watery poop.  · You keep vomiting.  Summary  · Hyponatremia is when the amount of salt in your blood is too low.  · When salt levels are low, you can have swelling throughout the body. The swelling mostly affects the brain.  · Treatment depends on the cause. Treatment may include getting IV fluids, medicines, or not drinking as much fluid.  This information is not intended  to replace advice given to you by your health care provider. Make sure you discuss any questions you have with your health care provider.  Document Released: 08/29/2012 Document Revised: 03/05/2020 Document Reviewed: 11/21/2019  [employer] Patient Education © 2020 [employer]    Hyperkalemia  Hyperkalemia is when you have too much potassium in your blood. Potassium helps your body in many ways, but having too much can cause problems. If there is too much potassium in your blood, it can affect how your heart works.  Potassium is normally removed from your body by your kidneys. Many things can cause the amount in your blood to be high. Medicines and other treatments can be used to bring the amount to a normal level. Treatment may need to be done in the hospital.  Follow these instructions at home:    · Take over-the-counter and prescription medicines only as told by your doctor.  · Do not take any of the following unless your doctor says it is okay:  ? Supplements.  ? Natural products.  ? Herbs.  ? Vitamins.  · Limit how much alcohol you drink as told by your doctor.  · Do not use drugs. If you need help quitting, ask your doctor.  · If you have kidney disease, you may need to follow a low-potassium diet. A  (dietitian) can help you.  · Keep all follow-up visits as told by your doctor. This is important.  Contact a doctor if:  · Your heartbeat is not regular or is very slow.  · You feel dizzy (light-headed).  · You feel weak.  · You feel sick to your stomach (nauseous).  · You have tingling in your hands or feet.  · You lose feeling (have numbness) in your hands or feet.  Get help right away if:  · You are short of breath.  · You have chest pain.  · You pass out (faint).  · You cannot move your muscles.  Summary  · Hyperkalemia is when you have too much potassium in your blood.  · Take over-the-counter and prescription medicines only as told by your doctor.  · Limit how much alcohol you drink as told by  your doctor.  · Contact a doctor if your heartbeat is not regular.  This information is not intended to replace advice given to you by your health care provider. Make sure you discuss any questions you have with your health care provider.  Document Released: 12/18/2006 Document Revised: 12/03/2018 Document Reviewed: 12/03/2018  [employer] Patient Education © 2020 [employer]

## 2020-11-06 NOTE — PROGRESS NOTES
Heart Failure Pharmacy Note  Patient Name: Yumiko Purdy  Referring Provider: Sandy  Primary Cardiologist: Jesus Manuel    Medication Use:   Adherence: no issues   Hx of med intolerances: none reported   Affordability: no issues   Retail Rx Management: None at this time     Past Medical History:   Diagnosis Date   • Anemia    • CHF (congestive heart failure) (CMS/HCC)    • Chronic a-fib (CMS/HCC)     stated by patient    • Cirrhosis of liver (CMS/HCC)     stated by patient    • Diabetes mellitus (CMS/HCC)      ALLERGIES: Phenergan [promethazine]  Current Outpatient Medications   Medication Sig Dispense Refill   • amiodarone (PACERONE) 200 MG tablet Take 200 mg by mouth Daily.     • apixaban (ELIQUIS) 5 MG tablet tablet Take 1 tablet by mouth Every 12 (Twelve) Hours. Indications: Atrial Fibrillation 60 tablet 3   • aspirin 81 MG EC tablet Take 1 tablet by mouth Daily. 30 tablet 3   • bumetanide (BUMEX) 2 MG tablet Take 1 tablet by mouth Daily. 30 tablet 3   • folic acid (FOLVITE) 1 MG tablet Take 1 mg by mouth Daily.     • Insulin Glargine (BASAGLAR KWIKPEN) 100 UNIT/ML injection pen Inject 100 Units under the skin into the appropriate area as directed Daily.     • Insulin Regular Human, Conc, (HumuLIN R U-500 KwikPen) 500 UNIT/ML solution pen-injector CONCENTRATED injection Inject 30 Units under the skin into the appropriate area as directed 3 (Three) Times a Day Before Meals.     • lisinopril (PRINIVIL,ZESTRIL) 5 MG tablet Take 1 tablet by mouth Daily. 30 tablet 11   • metoprolol succinate XL (TOPROL-XL) 50 MG 24 hr tablet Take 50 mg by mouth Daily.     • omeprazole (PrilOSEC) 20 MG capsule Take 1 capsule by mouth Daily. 30 capsule 3   • ondansetron (ZOFRAN) 4 MG tablet Take 4 mg by mouth Every 8 (Eight) Hours As Needed for Nausea or Vomiting.     • polyethylene glycol (MIRALAX) 17 g packet Take 17 g by mouth Daily. 30 packet 3   • predniSONE (DELTASONE) 2.5 MG tablet Take 2.5 mg by mouth Daily.     • pregabalin  (LYRICA) 100 MG capsule Take 100 mg by mouth 2 (Two) Times a Day As Needed.     • rOPINIRole (REQUIP) 1 MG tablet Take 1 tablet by mouth 3 (Three) Times a Day. Take 1 hour before bedtime. 90 tablet 0   • spironolactone (ALDACTONE) 25 MG tablet Take 0.5 tablets by mouth Daily. (Patient taking differently: Take 25 mg by mouth Daily.) 30 tablet 3   • venlafaxine XR (EFFEXOR-XR) 75 MG 24 hr capsule Take 75 mg by mouth Daily.       No current facility-administered medications for this encounter.        Vaccination History:   Pneumonia: Needs vaccination   Annual Influenza: Needs vaccination     Objective  Vitals:    11/06/20 1100   BP: 103/62   BP Location: Left arm   Patient Position: Sitting   Pulse: 71   Resp: 20   Temp: 98.7 °F (37.1 °C)   TempSrc: Oral   SpO2: 100%   Weight: 102 kg (225 lb)     Wt Readings from Last 3 Encounters:   11/06/20 102 kg (225 lb)   10/21/20 105 kg (232 lb 6.4 oz)   10/14/20 104 kg (229 lb 9.6 oz)         11/06/20  1100   Weight: 102 kg (225 lb)     Lab Results   Component Value Date    GLUCOSE 335 (H) 11/06/2020    BUN 32 (H) 11/06/2020    CREATININE 1.04 (H) 11/06/2020    EGFRIFNONA 55 (L) 11/06/2020    BCR 30.8 (H) 11/06/2020    K 5.1 11/06/2020    CO2 26.2 11/06/2020    CALCIUM 9.2 11/06/2020    ALBUMIN 3.54 10/21/2020    LABIL2 1.3 (L) 06/09/2016    AST 21 10/21/2020    ALT 17 10/21/2020     Lab Results   Component Value Date    WBC 4.58 09/22/2020    HGB 8.5 (L) 09/22/2020    HCT 29.9 (L) 09/22/2020    MCV 97.7 (H) 09/22/2020     (L) 09/22/2020     Lab Results   Component Value Date    TROPONINT 0.027 09/11/2020     Lab Results   Component Value Date    PROBNP 934.4 (H) 11/06/2020     Results for orders placed during the hospital encounter of 09/09/20   Adult Transesophageal Echo ([facility]) W/ Cont if Necessary Per Protocol (Cardiology Department)    Narrative · Ejection fraction appears to be 21 - 25%. Left ventricular systolic   function is severely decreased.  · Right ventricular  cavity is mildly dilated. Moderately reduced right   ventricular systolic function noted.  · Trace mitral valve regurgitation is present.  · Moderate tricuspid valve regurgitation is present. Estimated right   ventricular systolic pressure from tricuspid regurgitation is normal (<35   mmHg).  · There is (grade 1) plaque in the proximal aorta present. There is (grade   1) plaque in the ascending aorta present. There is (grade 1) plaque in the   aortic arch present. There is (grade 1) plaque in the descending aorta   present.               GDMT:       Class   Drug   Dose Last Dose Adjustment Additional Titration   ACEi/ARB/ARNI lisinopril 5mg 10/27 needed   Beta Blocker Metoprolol XL 50mg  10/24 needed   MRA spironolactone 12.5mg 9/24 needed     Drug Therapy Problems    1. Elevated Potassium       Recommendations:     1. Would recommend close monitoring of electrolytes as she is on spironolactone and lisinopril.     Thank you for allowing me to participate in the care of your patient,    Zunilda Gonzalez, PharmD  11/06/20  12:16 EST

## 2020-11-07 LAB — SARS-COV-2 RNA RESP QL NAA+PROBE: NOT DETECTED

## 2020-11-10 ENCOUNTER — HOSPITAL ENCOUNTER (OUTPATIENT)
Facility: HOSPITAL | Age: 55
Setting detail: HOSPITAL OUTPATIENT SURGERY
Discharge: HOME | End: 2020-11-10
Attending: SPECIALIST | Admitting: SPECIALIST

## 2020-11-10 VITALS
HEART RATE: 72 BPM | HEIGHT: 65 IN | BODY MASS INDEX: 37.49 KG/M2 | DIASTOLIC BLOOD PRESSURE: 47 MMHG | RESPIRATION RATE: 20 BRPM | SYSTOLIC BLOOD PRESSURE: 101 MMHG | OXYGEN SATURATION: 100 % | TEMPERATURE: 98.5 F | WEIGHT: 225 LBS

## 2020-11-10 DIAGNOSIS — I42.9 CARDIOMYOPATHY, UNSPECIFIED TYPE (HCC): ICD-10-CM

## 2020-11-10 LAB
ANION GAP SERPL CALCULATED.3IONS-SCNC: 10.7 MMOL/L (ref 5–15)
BUN SERPL-MCNC: 40 MG/DL (ref 6–20)
BUN/CREAT SERPL: 34.5 (ref 7–25)
CALCIUM SPEC-SCNC: 9.4 MG/DL (ref 8.6–10.5)
CHLORIDE SERPL-SCNC: 93 MMOL/L (ref 98–107)
CO2 SERPL-SCNC: 27.3 MMOL/L (ref 22–29)
CREAT SERPL-MCNC: 1.16 MG/DL (ref 0.57–1)
DEPRECATED RDW RBC AUTO: 97.4 FL (ref 37–54)
ERYTHROCYTE [DISTWIDTH] IN BLOOD BY AUTOMATED COUNT: 27 % (ref 12.3–15.4)
GFR SERPL CREATININE-BSD FRML MDRD: 49 ML/MIN/1.73
GLUCOSE SERPL-MCNC: 394 MG/DL (ref 65–99)
HCT VFR BLD AUTO: 31.8 % (ref 34–46.6)
HGB BLD-MCNC: 9.2 G/DL (ref 12–15.9)
MCH RBC QN AUTO: 29.1 PG (ref 26.6–33)
MCHC RBC AUTO-ENTMCNC: 28.9 G/DL (ref 31.5–35.7)
MCV RBC AUTO: 100.6 FL (ref 79–97)
PLATELET # BLD AUTO: 193 10*3/MM3 (ref 140–450)
PMV BLD AUTO: 10 FL (ref 6–12)
POTASSIUM SERPL-SCNC: 5.1 MMOL/L (ref 3.5–5.2)
RBC # BLD AUTO: 3.16 10*6/MM3 (ref 3.77–5.28)
SODIUM SERPL-SCNC: 131 MMOL/L (ref 136–145)
WBC # BLD AUTO: 10.42 10*3/MM3 (ref 3.4–10.8)

## 2020-11-10 PROCEDURE — C1894 INTRO/SHEATH, NON-LASER: HCPCS | Performed by: SPECIALIST

## 2020-11-10 PROCEDURE — C1769 GUIDE WIRE: HCPCS | Performed by: SPECIALIST

## 2020-11-10 PROCEDURE — 0 IOPAMIDOL PER 1 ML: Performed by: SPECIALIST

## 2020-11-10 PROCEDURE — 93458 L HRT ARTERY/VENTRICLE ANGIO: CPT | Performed by: SPECIALIST

## 2020-11-10 PROCEDURE — 80048 BASIC METABOLIC PNL TOTAL CA: CPT | Performed by: SPECIALIST

## 2020-11-10 PROCEDURE — 85027 COMPLETE CBC AUTOMATED: CPT | Performed by: SPECIALIST

## 2020-11-10 PROCEDURE — C1760 CLOSURE DEV, VASC: HCPCS | Performed by: SPECIALIST

## 2020-11-10 RX ORDER — SODIUM CHLORIDE 9 MG/ML
INJECTION, SOLUTION INTRAVENOUS CONTINUOUS PRN
Status: COMPLETED | OUTPATIENT
Start: 2020-11-10 | End: 2020-11-10

## 2020-11-10 RX ORDER — LIDOCAINE HYDROCHLORIDE 20 MG/ML
INJECTION, SOLUTION INFILTRATION; PERINEURAL AS NEEDED
Status: DISCONTINUED | OUTPATIENT
Start: 2020-11-10 | End: 2020-11-10 | Stop reason: HOSPADM

## 2020-11-10 NOTE — NURSING NOTE
Pt does not ruben ambulate. Pt came into facility with personal wheelchair. Assisted pt with putting on clothes and site rechecked with no bleeding, no bruising, no hematoma noted. Pt denies pain at site.

## 2020-11-10 NOTE — NURSING NOTE
Pt to do 2 hours of bedrest and then may be discharged home per Dr Ken. Also Dr Ken ordered for pt to resume usual diet, resume usual meds, except the eliquis which will be restarted at usual dose on Thursday.

## 2020-11-10 NOTE — NURSING NOTE
BRUCE Wade takes pt out of [facility] via pt own wheelchair to the [facility] in stable condition

## 2020-11-10 NOTE — NURSING NOTE
Pt taken out of [facility] per BRUCE Wade, RT in pt own wheelchair to private vehicle. Discharge instructions given and explained to the patient and she verbalized understanding.

## 2020-11-10 NOTE — NURSING NOTE
BRUCE Wade and pt returned to [facility] . Called brother Hayden. He stated he was pulling into the hospital now.

## 2020-11-10 NOTE — DISCHARGE INSTR - ACTIVITY
Do not start taking eliquis until Thursday as per DR Ken orders. No driving for 24 hours   No siting in tubs or hot tubs for 5 days   No lotions, ointments , or powders on the site until healed.  No heavy lifting, vigorous activity, bending at the waist, or climbing stairs for a 5 days

## 2023-01-31 ENCOUNTER — HOSPITAL ENCOUNTER (OUTPATIENT)
Dept: CARDIOLOGY | Facility: HOSPITAL | Age: 58
Discharge: HOME | End: 2023-01-31
Admitting: PHYSICIAN ASSISTANT
Payer: COMMERCIAL

## 2023-01-31 VITALS
DIASTOLIC BLOOD PRESSURE: 42 MMHG | OXYGEN SATURATION: 93 % | SYSTOLIC BLOOD PRESSURE: 104 MMHG | BODY MASS INDEX: 37.44 KG/M2 | HEART RATE: 55 BPM | HEIGHT: 65 IN

## 2023-01-31 DIAGNOSIS — N18.32 STAGE 3B CHRONIC KIDNEY DISEASE: ICD-10-CM

## 2023-01-31 DIAGNOSIS — R53.81 DEBILITY: ICD-10-CM

## 2023-01-31 DIAGNOSIS — I42.9 CARDIOMYOPATHY, UNSPECIFIED TYPE: ICD-10-CM

## 2023-01-31 DIAGNOSIS — E66.01 MORBID OBESITY WITH BMI OF 50.0-59.9, ADULT: ICD-10-CM

## 2023-01-31 DIAGNOSIS — I50.43 ACUTE ON CHRONIC COMBINED SYSTOLIC AND DIASTOLIC CHF (CONGESTIVE HEART FAILURE): Primary | ICD-10-CM

## 2023-01-31 DIAGNOSIS — D64.9 CHRONIC ANEMIA: ICD-10-CM

## 2023-01-31 LAB
ABSOLUTE LUNG FLUID CONTENT: 47 % (ref 20–35)
ANION GAP SERPL CALCULATED.3IONS-SCNC: 8.9 MMOL/L (ref 5–15)
ANISOCYTOSIS BLD QL: NORMAL
BASOPHILS # BLD AUTO: 0.09 10*3/MM3 (ref 0–0.2)
BASOPHILS NFR BLD AUTO: 1.3 % (ref 0–1.5)
BUN SERPL-MCNC: 29 MG/DL (ref 6–20)
BUN/CREAT SERPL: 18.7 (ref 7–25)
CALCIUM SPEC-SCNC: 8.2 MG/DL (ref 8.6–10.5)
CHLORIDE SERPL-SCNC: 106 MMOL/L (ref 98–107)
CO2 SERPL-SCNC: 24.1 MMOL/L (ref 22–29)
CREAT SERPL-MCNC: 1.55 MG/DL (ref 0.57–1)
DEPRECATED RDW RBC AUTO: 82.5 FL (ref 37–54)
EGFRCR SERPLBLD CKD-EPI 2021: 39.2 ML/MIN/1.73
EOSINOPHIL # BLD AUTO: 0.35 10*3/MM3 (ref 0–0.4)
EOSINOPHIL NFR BLD AUTO: 5.1 % (ref 0.3–6.2)
ERYTHROCYTE [DISTWIDTH] IN BLOOD BY AUTOMATED COUNT: 23.1 % (ref 12.3–15.4)
GLUCOSE SERPL-MCNC: 261 MG/DL (ref 65–99)
HCT VFR BLD AUTO: 32.9 % (ref 34–46.6)
HGB BLD-MCNC: 9.7 G/DL (ref 12–15.9)
HYPOCHROMIA BLD QL: NORMAL
IMM GRANULOCYTES # BLD AUTO: 0.04 10*3/MM3 (ref 0–0.05)
IMM GRANULOCYTES NFR BLD AUTO: 0.6 % (ref 0–0.5)
LYMPHOCYTES # BLD AUTO: 0.92 10*3/MM3 (ref 0.7–3.1)
LYMPHOCYTES NFR BLD AUTO: 13.4 % (ref 19.6–45.3)
MACROCYTES BLD QL SMEAR: NORMAL
MAGNESIUM SERPL-MCNC: 2.1 MG/DL (ref 1.6–2.6)
MCH RBC QN AUTO: 29.9 PG (ref 26.6–33)
MCHC RBC AUTO-ENTMCNC: 29.5 G/DL (ref 31.5–35.7)
MCV RBC AUTO: 101.5 FL (ref 79–97)
MONOCYTES # BLD AUTO: 0.25 10*3/MM3 (ref 0.1–0.9)
MONOCYTES NFR BLD AUTO: 3.6 % (ref 5–12)
NEUTROPHILS NFR BLD AUTO: 5.23 10*3/MM3 (ref 1.7–7)
NEUTROPHILS NFR BLD AUTO: 76 % (ref 42.7–76)
NRBC BLD AUTO-RTO: 0 /100 WBC (ref 0–0.2)
NT-PROBNP SERPL-MCNC: 7736 PG/ML (ref 0–900)
PLAT MORPH BLD: NORMAL
PLATELET # BLD AUTO: 135 10*3/MM3 (ref 140–450)
PMV BLD AUTO: 9.7 FL (ref 6–12)
POTASSIUM SERPL-SCNC: 4.5 MMOL/L (ref 3.5–5.2)
RBC # BLD AUTO: 3.24 10*6/MM3 (ref 3.77–5.28)
SODIUM SERPL-SCNC: 139 MMOL/L (ref 136–145)
WBC NRBC COR # BLD: 6.88 10*3/MM3 (ref 3.4–10.8)

## 2023-01-31 PROCEDURE — 94726 PLETHYSMOGRAPHY LUNG VOLUMES: CPT | Performed by: PHYSICIAN ASSISTANT

## 2023-01-31 PROCEDURE — 85025 COMPLETE CBC W/AUTO DIFF WBC: CPT | Performed by: PHYSICIAN ASSISTANT

## 2023-01-31 PROCEDURE — 85007 BL SMEAR W/DIFF WBC COUNT: CPT | Performed by: PHYSICIAN ASSISTANT

## 2023-01-31 PROCEDURE — 80048 BASIC METABOLIC PNL TOTAL CA: CPT | Performed by: PHYSICIAN ASSISTANT

## 2023-01-31 PROCEDURE — 83735 ASSAY OF MAGNESIUM: CPT | Performed by: PHYSICIAN ASSISTANT

## 2023-01-31 PROCEDURE — 83880 ASSAY OF NATRIURETIC PEPTIDE: CPT | Performed by: PHYSICIAN ASSISTANT

## 2023-01-31 PROCEDURE — 99215 OFFICE O/P EST HI 40 MIN: CPT | Performed by: PHYSICIAN ASSISTANT

## 2023-01-31 RX ORDER — LEVOTHYROXINE SODIUM 0.05 MG/1
50 TABLET ORAL DAILY
COMMUNITY

## 2023-01-31 RX ORDER — BUSPIRONE HYDROCHLORIDE 15 MG/1
15 TABLET ORAL 2 TIMES DAILY
COMMUNITY
End: 2023-02-06 | Stop reason: SDUPTHER

## 2023-01-31 RX ORDER — ACETAMINOPHEN 500 MG
500 TABLET ORAL 2 TIMES DAILY PRN
COMMUNITY

## 2023-01-31 RX ORDER — ROPINIROLE 0.5 MG/1
0.5 TABLET, FILM COATED ORAL NIGHTLY
COMMUNITY

## 2023-01-31 RX ORDER — VITAMIN B COMPLEX
2500 TABLET ORAL DAILY
COMMUNITY

## 2023-01-31 RX ORDER — BUMETANIDE 2 MG/1
2 TABLET ORAL DAILY
Qty: 7 TABLET | Refills: 0 | Status: SHIPPED | OUTPATIENT
Start: 2023-01-31 | End: 2023-02-06 | Stop reason: SDUPTHER

## 2023-01-31 RX ORDER — FERROUS SULFATE 325(65) MG
325 TABLET ORAL 2 TIMES DAILY
COMMUNITY

## 2023-01-31 RX ORDER — METOPROLOL SUCCINATE 50 MG/1
50 TABLET, EXTENDED RELEASE ORAL NIGHTLY
Qty: 30 TABLET | Refills: 2 | Status: SHIPPED | OUTPATIENT
Start: 2023-01-31 | End: 2023-02-06 | Stop reason: DRUGHIGH

## 2023-01-31 RX ORDER — FUROSEMIDE 40 MG/1
40 TABLET ORAL DAILY
COMMUNITY
End: 2023-01-31 | Stop reason: HOSPADM

## 2023-01-31 NOTE — PROGRESS NOTES
Heart Failure Clinic  Pharmacist Note     Yumiko Purdy is a 57 y.o. female seen in the Heart Failure Clinic for HFrEF.  Yumiko Purdy reports a fair understanding of medications.  She reports taking furosemide 40mg daily since Friday and reports she is having SOB and swelling. She denies taking metolazone or spironolactone and neither medication was in her bag of meds she brought in today. She reports she has home health and when they checked her BP yesterday her top number was 120.     Medication Use:   Hx of med intolerances: None related to HF  Retail Rx Management: Uses [facility]    Past Medical History:   Diagnosis Date   • Anemia    • CHF (congestive heart failure) (HCC)    • Chronic a-fib (HCC)     stated by patient    • Cirrhosis of liver (HCC)     stated by patient    • Diabetes mellitus (HCC)      ALLERGIES: Phenergan [promethazine]  Current Outpatient Medications   Medication Sig Dispense Refill   • acetaminophen (TYLENOL) 500 MG tablet Take 500 mg by mouth 2 (Two) Times a Day As Needed for Mild Pain.     • amiodarone (PACERONE) 200 MG tablet Take 200 mg by mouth 2 (Two) Times a Day.     • apixaban (ELIQUIS) 5 MG tablet tablet Take 1 tablet by mouth Every 12 (Twelve) Hours. Indications: Atrial Fibrillation 60 tablet 3   • aspirin 81 MG EC tablet Take 1 tablet by mouth Daily. 30 tablet 3   • busPIRone (BUSPAR) 15 MG tablet Take 15 mg by mouth 2 (Two) Times a Day.     • Cyanocobalamin 2500 MCG sublingual tablet Place 2,500 mcg under the tongue Daily.     • ferrous sulfate 325 (65 FE) MG tablet Take 325 mg by mouth 2 (Two) Times a Day.     • folic acid (FOLVITE) 1 MG tablet Take 1 mg by mouth Daily.     • furosemide (LASIX) 40 MG tablet Take 40 mg by mouth Daily.     • Insulin Regular Human, Conc, (HumuLIN R U-500 KwikPen) 500 UNIT/ML solution pen-injector CONCENTRATED injection Inject 40 Units under the skin into the appropriate area as directed 2 (Two) Times a Day.     • levothyroxine (SYNTHROID, LEVOTHROID)  "50 MCG tablet Take 50 mcg by mouth Daily.     • metoprolol succinate XL (TOPROL-XL) 50 MG 24 hr tablet Take 50 mg by mouth Daily.     • ondansetron (ZOFRAN) 4 MG tablet Take 4 mg by mouth Every 8 (Eight) Hours As Needed for Nausea or Vomiting.     • polyethylene glycol (MIRALAX) 17 g packet Take 17 g by mouth Daily. (Patient taking differently: Take 17 g by mouth Daily As Needed.) 30 packet 3   • pregabalin (LYRICA) 100 MG capsule Take 100 mg by mouth 2 (Two) Times a Day As Needed.     • rOPINIRole (REQUIP) 0.5 MG tablet Take 0.5 mg by mouth Every Night. Take 1 hour before bedtime.       No current facility-administered medications for this encounter.       Vaccination History:   Pneumonia: Needs, declines  Annual Influenza: Needs, declines  COVID 19: Needs, declines     Objective  Vitals:    01/31/23 0915   BP: 104/42   BP Location: Left arm   Patient Position: Sitting   Cuff Size: Adult   Pulse: 55   SpO2: 91%   Height: 165.1 cm (65\")     Wt Readings from Last 3 Encounters:   11/10/20 102 kg (225 lb)   11/06/20 102 kg (225 lb)   10/21/20 105 kg (232 lb 6.4 oz)     There were no vitals filed for this visit.  Lab Results   Component Value Date    GLUCOSE 394 (H) 11/10/2020    BUN 40 (H) 11/10/2020    CREATININE 1.16 (H) 11/10/2020    EGFRIFNONA 49 (L) 11/10/2020    BCR 34.5 (H) 11/10/2020    K 5.1 11/10/2020    CO2 27.3 11/10/2020    CALCIUM 9.4 11/10/2020    ALBUMIN 3.54 10/21/2020    LABIL2 1.3 (L) 06/09/2016    AST 21 10/21/2020    ALT 17 10/21/2020     Lab Results   Component Value Date    WBC 10.42 11/10/2020    HGB 9.2 (L) 11/10/2020    HCT 31.8 (L) 11/10/2020    .6 (H) 11/10/2020     11/10/2020     Lab Results   Component Value Date    TROPONINT 0.027 09/11/2020     Lab Results   Component Value Date    PROBNP 934.4 (H) 11/06/2020     Results for orders placed during the hospital encounter of 09/09/20    Adult Transesophageal Echo ([facility]) W/ Cont if Necessary Per Protocol (Cardiology " Department)    Interpretation Summary  · Ejection fraction appears to be 21 - 25%. Left ventricular systolic function is severely decreased.  · Right ventricular cavity is mildly dilated. Moderately reduced right ventricular systolic function noted.  · Trace mitral valve regurgitation is present.  · Moderate tricuspid valve regurgitation is present. Estimated right ventricular systolic pressure from tricuspid regurgitation is normal (<35 mmHg).  · There is (grade 1) plaque in the proximal aorta present. There is (grade 1) plaque in the ascending aorta present. There is (grade 1) plaque in the aortic arch present. There is (grade 1) plaque in the descending aorta present.         GDMT    Drug Class   Drug   Dose Last Dose Adjustment Additional Titration   Notes   ACEi/ARB/ARNI     Was previously on lisinopril    Beta Blocker Metoprolol XL 50mg QD      MRA     Was previously on spironolactone 50mg BID Nov-Dec 22   SGLT2i Jardiance  10mg QD  N/A        Drug Therapy Problems    1. GDMT: Needs additional therapy/titration     Recommendations:     1. Would defer titration until BP can tolerate     Discharge medications have been reviewed and reconciled.    Patient was educated on heart failure medications and the importance of medication adherence. All questions were addressed and patient expressed some understanding. Would benefit from additional education at each visit.    Thank you for allowing me to participate in the care of your patient,    Zunilda Gonzalez, PharmD  01/31/23  10:01 EST

## 2023-01-31 NOTE — PROGRESS NOTES
Heart Failure Clinic    Date: 01/31/23     Vitals:    01/31/23 0915   BP: 104/42   Pulse: 55   SpO2: 91%        Method of arrival: Other wheelchair    Weighing self daily: No    Monitoring Heart Failure Zones: No    Today's HF Zone: Reviewed Zones    Taking medications as prescribed: Yes    Edema Yes    Shortness of Air: Yes    Number of pillows used at night:hospital bed    Educational Materials given:  [facility], [facility] patient booklet                                                                         ReDS Value:   Over 41 Hypervolemic Status      Chris Aviles RN 01/31/23 09:17 EST

## 2023-01-31 NOTE — PROGRESS NOTES
[facility]  ABDIFATAH Galarza Raju N, MD  [address] [place] [address]    Thank you for asking me to see Yumiko Purdy for congestive heart failure.    HPI:     This is a 57 y.o. female with known past medical history of:  · Chronic systolic & diastolic HF  · TTE from November 2022 with visually estimated ejection fraction of 30% ±5% and noted abnormal systolic strain pattern as well as grade 3 diastolic dysfunction as well as abnormal TAPSE with abnormal right ventricular function  · KHAI on 09/2020 with EF 21-25% with LV systolic function severely decreased and RV cavity mildly dilated with moderately reduced RV systolic function noted, moderate TVR, and aortic plaquing  · Right heart cath on 12/22/2022 with elevated left and right heart pressures with report not available  · ASCVD  · LHC 11/10/20 with preserved LV systolic, EF 50-55% with elevated LV end diastolic pressure consistent with diastolic dysfunction and right dominant system with 30-40% mid LAD lesion.   · LHC attempted on 12/2022 at [facility] with unsuccessful access due to small artery appearing occluded or near occluded radially on right  · Peripheral Arterial disease  · Atrial Flutter  · CKD stage IIIb  · Cirrhosis of the liver  · Stage III CKD  · Chronic hypoxemic respiratory failure  · Morbid Obesity  · Diffuse purpuric rash with prior w/u negative for vasculitis    Yumiko Purdy presents for today for HF clinic evaluation.  The patient is typically seen by Masha Escalante APRN.  Patient's primary cardiologist is Dr. Jad Meredith.      • Last known EF 21-25% by KHAI and 50-55% by LHC in 11/2020.    • Last known hospitalization and/or ED visit: Patient has not been hospitalized at this facility since 2020.  However she was hospitalized at [facility] in [facility] in December 2022 with heart failure admission with discharge summary reviewed standing  admission with acute on chronic mixed systolic diastolic heart failure during which time she was on dobutamine drip and discharged with p.o. Bumex and Zaroxolyn with note of chronic atrial fibrillation.  She was on dobutamine drip for some time and nephrology did also assist in her diuretic adjustments recommending ultimately Bumex twice daily metolazone and spironolactone on discharge.  • Accompanied by: Son      Initial visit data/details regarding:   • Dyspnea: Worsening dyspnea on exertion and debility  • Lower extremity swelling: Bilateral lower extremity swelling present but has been having Unna boots at home  • Abdominal swelling: Worsening abdominal swelling in the setting of known Cirrhosis  • Home weight: Not currently monitoring due to inability to stand  • Home BP: Monitoring booklet provided  • Home heart rate: 50s-60s  • Daily activities of living: Performing with assistance  • Pillows/lying flat: Sleeps in hospital bed.  • Mobility assistance devices:  Wheelchair and rolling walker; bedside commode at home  • Patient has not been evaluated in this clinic since 2020.  She presented today as a walk-in and requested to be seen.    • Patient reports worsening debility with home health currently coming to her home and requires assistance with majority of daily activities and tasks.  She has been previously offered skilled nursing facility rehabilitation but did decline as recently as a few weeks ago.  • She reports swelling and debility overall are worsening in addition to worsening shortness of breath.  • We did review labs and discuss treatment plans today at which point she did decline IV diuresis in clinic.   • She has been having dry cough that is periodically productive but has been dry for the past few weeks.        Specialists:   • Cardiology: [facility] with EP & General        Review of Systems - Review of Systems   Constitutional: Positive for malaise/fatigue. Negative for chills  and decreased appetite.   HENT: Negative for congestion, ear discharge and ear pain.    Eyes: Negative for blurred vision, discharge and double vision.   Cardiovascular: Positive for dyspnea on exertion and leg swelling.   Respiratory: Positive for cough and shortness of breath. Negative for hemoptysis.    Endocrine: Negative for cold intolerance and heat intolerance.   Hematologic/Lymphatic: Negative for adenopathy and bleeding problem.   Skin: Negative for color change, dry skin and nail changes.   Musculoskeletal: Positive for arthritis and muscle weakness. Negative for myalgias.   Gastrointestinal: Positive for bloating. Negative for abdominal pain.   Genitourinary: Negative for bladder incontinence, decreased libido and dysuria.   Neurological: Negative for brief paralysis and difficulty with concentration.   Psychiatric/Behavioral: Negative for altered mental status, depression and hallucinations.   Allergic/Immunologic: Negative for environmental allergies and HIV exposure.         All other systems were reviewed and were negative.    Patient Active Problem List   Diagnosis   • Acute on chronic congestive heart failure (HCC)   • Cardiomyopathy (HCC)   • CKD (chronic kidney disease) stage 2, GFR 60-89 ml/min   • Severe obesity (BMI 35.0-39.9) with comorbidity (HCC)   • Chronic anemia   • Elevated bilirubin   • Acute on chronic combined systolic and diastolic CHF (congestive heart failure) (HCC)   • Hyponatremia       family history is not on file.     reports that she has never smoked. She has never used smokeless tobacco. She reports that she does not drink alcohol and does not use drugs.    Allergies   Allergen Reactions   • Phenergan [Promethazine]          Current Outpatient Medications:   •  acetaminophen (TYLENOL) 500 MG tablet, Take 500 mg by mouth 2 (Two) Times a Day As Needed for Mild Pain., Disp: , Rfl:   •  amiodarone (PACERONE) 200 MG tablet, Take 200 mg by mouth 2 (Two) Times a Day., Disp: , Rfl:    •  apixaban (ELIQUIS) 5 MG tablet tablet, Take 1 tablet by mouth Every 12 (Twelve) Hours. Indications: Atrial Fibrillation, Disp: 60 tablet, Rfl: 3  •  aspirin 81 MG EC tablet, Take 1 tablet by mouth Daily., Disp: 30 tablet, Rfl: 3  •  busPIRone (BUSPAR) 15 MG tablet, Take 15 mg by mouth 2 (Two) Times a Day., Disp: , Rfl:   •  Cyanocobalamin 2500 MCG sublingual tablet, Place 2,500 mcg under the tongue Daily., Disp: , Rfl:   •  empagliflozin (Jardiance) 10 MG tablet tablet, Take 1 tablet by mouth Daily for 90 days., Disp: 90 tablet, Rfl: 0  •  ferrous sulfate 325 (65 FE) MG tablet, Take 325 mg by mouth 2 (Two) Times a Day., Disp: , Rfl:   •  folic acid (FOLVITE) 1 MG tablet, Take 1 mg by mouth Daily., Disp: , Rfl:   •  Insulin Regular Human, Conc, (HumuLIN R U-500 KwikPen) 500 UNIT/ML solution pen-injector CONCENTRATED injection, Inject 40 Units under the skin into the appropriate area as directed 2 (Two) Times a Day., Disp: , Rfl:   •  levothyroxine (SYNTHROID, LEVOTHROID) 50 MCG tablet, Take 50 mcg by mouth Daily., Disp: , Rfl:   •  metoprolol succinate XL (TOPROL-XL) 50 MG 24 hr tablet, Take 1 tablet by mouth Every Night for 90 days., Disp: 30 tablet, Rfl: 2  •  ondansetron (ZOFRAN) 4 MG tablet, Take 4 mg by mouth Every 8 (Eight) Hours As Needed for Nausea or Vomiting., Disp: , Rfl:   •  polyethylene glycol (MIRALAX) 17 g packet, Take 17 g by mouth Daily. (Patient taking differently: Take 17 g by mouth Daily As Needed.), Disp: 30 packet, Rfl: 3  •  pregabalin (LYRICA) 100 MG capsule, Take 100 mg by mouth 2 (Two) Times a Day As Needed., Disp: , Rfl:   •  rOPINIRole (REQUIP) 0.5 MG tablet, Take 0.5 mg by mouth Every Night. Take 1 hour before bedtime., Disp: , Rfl:   •  bumetanide (BUMEX) 2 MG tablet, Take 1 tablet by mouth Daily for 7 days., Disp: 7 tablet, Rfl: 0      Physical Exam:  I have reviewed the patient's current vital signs as documented in the patient's EMR.     Vitals:    01/31/23 0915 01/31/23 1026  "  BP: 104/42    BP Location: Left arm    Patient Position: Sitting    Cuff Size: Adult    Pulse: 55    SpO2: 91% 93%   Height: 165.1 cm (65\")        Physical Exam  Vitals and nursing note reviewed.   Constitutional:       Appearance: Normal appearance.   HENT:      Head: Normocephalic and atraumatic.   Eyes:      General: Lids are normal.   Cardiovascular:      Rate and Rhythm: Normal rate and regular rhythm.      Heart sounds: S1 normal and S2 normal.   Pulmonary:      Effort: No tachypnea or bradypnea.      Breath sounds: No decreased breath sounds, wheezing, rhonchi or rales.   Chest:      Chest wall: No mass or lacerations.   Abdominal:      General: Abdomen is protuberant. Bowel sounds are normal.      Palpations: Abdomen is soft.      Tenderness: There is no abdominal tenderness.   Musculoskeletal:      Right lower leg: Edema present.      Left lower leg: Edema present.      Right foot: Swelling present.      Left foot: Swelling present.   Skin:     General: Skin is warm and moist.   Neurological:      Mental Status: She is alert and oriented to person, place, and time.   Psychiatric:         Attention and Perception: Attention normal.         Mood and Affect: Mood normal.          JVP: Volume/Pulsation: Normal.        DATA REVIEWED:     EKG. I personally reviewed and interpreted the EKG.    Last EKG at [facility] not available for viewing.      ---------------------------------------------------  TTE/KHAI:    TRANSTHORACIC ECHOCARDIOGRAPHY REPORT    Demographics    Patient Name:          JAMAL WHARTON      :            1965    Medical Record Number: [id number]          Age:            56 year(s)    Corporate ID Number:   [id number]          Gender          Female    Account Number:        [id number]    Sonographer:           Hayden Chaudhry,     Height:         65 inches                           [facility]    Referring Physician:   ANDREAS HERNANDEZ     Weight:         240 pounds    Interpreting           " MATHEUS IRELAND   BMI:            39.94 kg/m^2    Physician:             MD    Date of Service:       11/17/2022          Blood Pressure: 113/40 mmHg    Room Number:           320   Type of Study:    TTE procedure: EC Echo Complete, ECHO COMPLETE (DOPPLER / COLOR) W OR WO    CONTRAST.    Patient Status: Routine IP    Study Location: [facility] Quality: Adequate visualization    Contrast Medium: Optison. Amount - 3 ml    Impression:    Indication:Hypertensive heart disease with heart failure I11.0    Mild left ventricular hypertrophy. Visually estimated ejection fraction    30% +/- 5%. Abnormal left ventricular systolic function; abnormal systolic    strain pattern. Restrictive filling pattern consistent with severely    increased LV filling pressure (Grade III Diastolic dysfunction).    Dilated right ventricle. Abnormal TAPSE; abnormal right ventricular    function. Abnormal right atrial size.    Mild mitral stenosis. Peak/Mean 6/2mmHg    Moderate tricuspid (2+) regurgitation.    No masses or thrombi are seen.   Measurements Summary:    LVEDd: 4.99 cm         LVESd: 4.08 cm          IVSEd: 0.79 cm    AO Root:2.97 cm        LVPWd: 1.04 cm    Contractility Score    Global Left Ventricular Hypokinesis was noted.    LV regional wall motion: (0-Not visualized 1-Normal 2-Hypokinesis    3-Akinesis 4-Dyskinesis 5-Aneurysm)    Left Ventricle    Peak E-wave: 1.22   Peak A-wave: 0.2 m/s    E/A ratio: 5.99    m/s                 Volume [id number]  LV length: 8.47 cm    ml    Volume [id number] ml    LVOT diameter: 2.05 cm    Normal sized left ventricle.    Mild left ventricular hypertrophy.    Visually estimated ejection fraction 30% +/- 5%.    Abnormal left ventricular systolic function; abnormal systolic strain    pattern.    Restrictive filling pattern consistent with severely increased LV filling    pressure (Grade III Diastolic dysfunction).    No left ventricular masses or thrombi.    Right Ventricle     Diastolic dimension: 4.72     RV systolic pressure: 22.15 mmHg    cm    Dilated right ventricle.    Abnormal TAPSE; abnormal right ventricular function.    Left Atrium    LA dimension: 4.64 cm               LA volume:95.38 ml    LA/Aorta: 1.56    Abnormal left atrial volume index 45ml/m2.    Intact atrial septum.    No atrial mass or thrombus.    Right Atrium    Abnormal right atrial size.    Intact atrial septum.    No atrial mass or thrombus.    Mitral Valve    Deceleration time:     Area PHT: 2.04 cm^2  Mean velocity:    248.96 msec            P1/2t: 107.68 msec   0.57 m/s    Area (continuity):   Mean gradient:    1.73 cm^2            1.89 mmHg    Peak gradient:    5.97 mmHg    Thickened mitral valve leaflets.    Mild mitral regurgitation.    Mild mitral stenosis. Peak/Mean 6/2mmHg    Echogenic density noted on mitral valve chordae. Seen on prior exam    10/16/2022    Aortic Valve    LVOT VTI: 18.22 cm    Mildly thickend free edges of the aortic valve leaflets.    No aortic regurgitation.    No aortic stenosis.    No masses or vegetations seen.    Tricuspid Valve    TR velocity: 2.19 m/s     TR gradient: 19.54273 mmHg    Estimated RAP: 3 mmHg     RVSP: 22.17 mmHg    Structurally normal tricuspid valve.    Moderate tricuspid (2+) regurgitation.    RVSP likely underestimated due to RV systolic function.    No tricuspid stenosis.    No masses or vegetations seen.    Pulmonic Valve    Acceleration time: 119.95 msec          PASP: 22.15 mmHg    Structurally normal pulmonic valve.    Mild pulmonic regurgitation (1+).    No pulmonic stenosis.    No masses or vegetations seen.   Great Vessels   Aorta    Aortic Root: 2.97 cm    Ascending Aorta: 2.76 cm    LVOT Diameter: 2.05 cm   Visualized aorta is normal.   Normal aortic root.   No evidence of dissection.   IVC is not well visualized.   Unable to obtain adequate subcostal view.    Pericardium / Pleura   No pericardial effusion.    Other    No masses or thrombi.    No  intracardiac shunt.    ----------------------------------------------------------------    Electronically signed by MATHEUS IRELAND MD(Interpreting    Physician) on 11/17/2022 17:38       Results for orders placed during the hospital encounter of 09/09/20    Adult Transesophageal Echo (KHAI) W/ Cont if Necessary Per Protocol (Cardiology Department)    Interpretation Summary  · Ejection fraction appears to be 21 - 25%. Left ventricular systolic function is severely decreased.  · Right ventricular cavity is mildly dilated. Moderately reduced right ventricular systolic function noted.  · Trace mitral valve regurgitation is present.  · Moderate tricuspid valve regurgitation is present. Estimated right ventricular systolic pressure from tricuspid regurgitation is normal (<35 mmHg).  · There is (grade 1) plaque in the proximal aorta present. There is (grade 1) plaque in the ascending aorta present. There is (grade 1) plaque in the aortic arch present. There is (grade 1) plaque in the descending aorta present.    RHC report:      CARDIAC CATH - RIGHT HEART CATH    Anatomical Region Laterality Modality   Heart -- Other     Narrative    Cardiac Diagnostic Report    Demographics    Patient Name       JAMAL WHARTON      Date of Birth          1965    Patient #          [id number]          Accession #            [id number]    Visit #            [id number]          Medical Record #    Physician          MATHEUS IRELAND   Date of Study          12/22/2022                       MD    Referring                              Interventional    Physician                              physician    Procedure   Procedure Type    Diagnostic procedure: RHC with Cardiomems, RIGHT HEART CATH   Indications: Angina.    Conclusions   Procedure Description   Using ultrasound and micropuncture, access to right brachial vein obtained   and 7F sheath inserted. 7F PA catheter used for RHC.   I attempted right radial access for LHC but  unsuccessful due to very small   artery which appears occluded or near-occluded.   Procedure Summary   Elevated left and right heart filling pressures.   Elevated pulmonary pressures.   Borderline-low Ramos CO/CI.    Procedure Data   Procedure Date   Date: 12/22/2022Start: 15:32End: 16:09   Entry Locations     - Retrograde Percutaneous access was performed through the Right Axiliary.       A 7 Fr sheath was inserted. Hemostasis was successfully obtained using       Manual Compression.       Entry Comments: brachial vein.   Procedure Medications     - Fentanyl I.V. 25 mcg.     - Versed Sheath 1 mg.     - Oxygen NC 6 l/min.   Diagnostic Catheters     - A7 FR MON [place] [id number] used for:Arch.   Contrast Material     - None0 ml   Fluoroscopy Time: Total: 2:48 minutes.   Fluoroscopy Dose: Total: 155 mGy.    Medical History    Risk Factors    The patient risk factors include:obesity, hypercholesterolemia, treated    and uncontrolled hypertension, diabetes mellitus, last creatinine: 2    mg/dl, creatinine clearance: 69.72 ml/min and dyslipidemia.    Admission Data    Hemodynamics    Condition: Baseline    O2 Consumption: Estimated: 297.50Heart Rate: 41 bpm   Oxygen Saturation   +--------+-----+----+----------------------+---+---------------------------+   !Location!pCO2 !pO2 !% Saturation          !Hgb!O2 Content                 !   +--------+-----+----+----------------------+---+---------------------------+   !PA      !     !    !51                    !   !                           !   +--------+-----+----+----------------------+---+---------------------------+   !RA      !     !    !58                    !   !                           !   +--------+-----+----+----------------------+---+---------------------------+   !AO      !     !    !94                    !   !                           !   +--------+-----+----+----------------------+---+---------------------------+   Pressures (mmHg)    +-----+--------------------------------------------------------------------+   !Site !Pressure                                                            !   +-----+--------------------------------------------------------------------+   !RA   !28/28 (25)                                                          !   +-----+--------------------------------------------------------------------+   !RV   !81/10 ,27                                                           !   +-----+--------------------------------------------------------------------+   !PA   !73/32 (43)                                                          !   +-----+--------------------------------------------------------------------+   !PCW  !68/66 (45)                                                          !   +-----+--------------------------------------------------------------------+   !PCW  !78/59 (45)                                                          !   +-----+--------------------------------------------------------------------+   !PCW  !23/36 (27)                                                          !   +-----+--------------------------------------------------------------------+   Cardiac Output   +------+-------------------------+----------------------------+------------+   !Method!CO (l/min)               !CI (l/min/m2)               !SV (ml)     !   +------+-------------------------+----------------------------+------------+   !Ramos  !5.35                     !2.2                         !131.67      !   +------+-------------------------+----------------------------+------------+   Shunts   Oxygen Values    O2 Capacity 129.2 O2 Consumption 297.5    Flows (l/min)    Qs 6.4 Qe/Qp 1.2    Qp 5.35 Qp/Qs 0.84    Qe 6.4   Vascular Resistance (dynes x sec x cm-5)   +-------------------------------------+----+---+----+----+---------+-------+   !CO method                            !TSVR!SVR!TPVR!PVR !TPVR/TSVR!PVR/SVR!    +-------------------------------------+----+---+----+----+---------+-------+   !Ramos                                 !    !   !8.03!2.94!         !       !   +-------------------------------------+----+---+----+----+---------+-------+   !Qp or Qs                             !    !   !8.03!2.94!         !       !   +-------------------------------------+----+---+----+----+---------+-------+    Signatures    ----------------------------------------------------------------    Electronically signed by MATHEUS IRELAND MD(Physician) on    12/22/2022 16:19    ----------------------------------------------------------------        -----------------------------------------------------  CXR/Imaging:   Imaging Results (Most Recent)     None          I personally reviewed and interpreted the CXR.      -----------------------------------------------------  CT:   No radiology results for the last 30 days.  I personally reviewed the images of the CT scan.  My personal interpretation is below.      ----------------------------------------------------    PFTs:    No PFTS available.      --------------------------------------------------------------------------------------------------    Laboratory evaluations:    Lab Results   Component Value Date    GLUCOSE 261 (H) 01/31/2023    BUN 29 (H) 01/31/2023    CREATININE 1.55 (H) 01/31/2023    EGFRIFNONA 49 (L) 11/10/2020    BCR 18.7 01/31/2023    K 4.5 01/31/2023    CO2 24.1 01/31/2023    CALCIUM 8.2 (L) 01/31/2023    ALBUMIN 3.54 10/21/2020    LABIL2 1.3 (L) 06/09/2016    AST 21 10/21/2020    ALT 17 10/21/2020     Lab Results   Component Value Date    WBC 6.88 01/31/2023    HGB 9.7 (L) 01/31/2023    HCT 32.9 (L) 01/31/2023    .5 (H) 01/31/2023     (L) 01/31/2023     Lab Results   Component Value Date    CHOL 137 09/11/2020    CHLPL 103 04/21/2016    TRIG 80 09/11/2020    HDL 43 09/11/2020    LDL 78 09/11/2020     Lab Results   Component Value Date    TSH 3.830  09/10/2020     Lab Results   Component Value Date    HGBA1C 8.40 (H) 09/10/2020     Lab Results   Component Value Date    ALT 17 10/21/2020     Lab Results   Component Value Date    HGBA1C 8.40 (H) 09/10/2020    HGBA1C 5.4 04/21/2016     Lab Results   Component Value Date    MICROALBUR 3.2 09/12/2020    CREATININE 1.55 (H) 01/31/2023     Lab Results   Component Value Date    IRON 34.0 (L) 12/21/2022    TIBC 237 (L) 09/18/2020    FERRITIN 1,219.30 (H) 12/21/2022     Lab Results   Component Value Date    INR 1.10 12/15/2022    INR 1.25 (H) 09/23/2020    INR 1.09 09/22/2020    PROTIME 11.6 12/15/2022    PROTIME 15.5 (H) 09/23/2020    PROTIME 13.9 09/22/2020        Lab Results   Component Value Date    ABSOLUTELUNG 47 (A) 01/31/2023       PAH RISK ASSESSMENT:      1. Acute on chronic combined systolic and diastolic CHF (congestive heart failure) (HCC)    2. Chronic anemia    3. Stage 3b chronic kidney disease (HCC)    4. Cardiomyopathy, unspecified type (HCC)    5. Debility    6. Morbid obesity with BMI of 50.0-59.9, adult ([facility])          ORDERS PLACED TODAY:  Orders Placed This Encounter   Procedures   • Basic Metabolic Panel   • proBNP   • Magnesium   • Basic Metabolic Panel   • proBNP   • Magnesium   • CBC Auto Differential   • Scan Slide   • ReDs Vest   • CBC & Differential   • CBC & Differential        Diagnoses and all orders for this visit:    1. Acute on chronic combined systolic and diastolic CHF (congestive heart failure) (HCC) (Primary)  -     CBC & Differential; Future  -     Basic Metabolic Panel; Future  -     proBNP; Future  -     Magnesium; Future  -     CBC & Differential; Standing  -     CBC & Differential  -     Basic Metabolic Panel; Standing  -     Basic Metabolic Panel  -     proBNP; Standing  -     proBNP  -     Magnesium; Standing  -     Magnesium  -     ReDs Vest    2. Chronic anemia  -     CBC & Differential; Future  -     CBC & Differential; Standing  -     CBC & Differential    3. Stage 3b  chronic kidney disease ([facility])  -     Basic Metabolic Panel; Future  -     Basic Metabolic Panel; Standing  -     Basic Metabolic Panel    4. Cardiomyopathy, unspecified type ([facility])  -     proBNP; Future  -     proBNP; Standing  -     proBNP    5. Debility    6. Morbid obesity with BMI of 50.0-59.9, adult ([facility])    Other orders  -     bumetanide (BUMEX) 2 MG tablet; Take 1 tablet by mouth Daily for 7 days.  Dispense: 7 tablet; Refill: 0  -     metoprolol succinate XL (TOPROL-XL) 50 MG 24 hr tablet; Take 1 tablet by mouth Every Night for 90 days.  Dispense: 30 tablet; Refill: 2  -     empagliflozin (Jardiance) 10 MG tablet tablet; Take 1 tablet by mouth Daily for 90 days.  Dispense: 90 tablet; Refill: 0             MEDS ORDERED TODAY:    New Medications Ordered This Visit   Medications   • bumetanide (BUMEX) 2 MG tablet     Sig: Take 1 tablet by mouth Daily for 7 days.     Dispense:  7 tablet     Refill:  0   • metoprolol succinate XL (TOPROL-XL) 50 MG 24 hr tablet     Sig: Take 1 tablet by mouth Every Night for 90 days.     Dispense:  30 tablet     Refill:  2   • empagliflozin (Jardiance) 10 MG tablet tablet     Sig: Take 1 tablet by mouth Daily for 90 days.     Dispense:  90 tablet     Refill:  0        ---------------------------------------------------------------------------------------------------------------------------          ASSESSMENT/PLAN:      Diagnosis Plan   1. Acute on chronic combined systolic and diastolic CHF (congestive heart failure) ([facility])  CBC & Differential    Basic Metabolic Panel    proBNP    Magnesium    CBC & Differential    CBC & Differential    Basic Metabolic Panel    Basic Metabolic Panel    proBNP    proBNP    Magnesium    Magnesium    ReDs Vest      2. Chronic anemia  CBC & Differential    CBC & Differential    CBC & Differential      3. Stage 3b chronic kidney disease ([facility])  Basic Metabolic Panel    Basic Metabolic Panel    Basic Metabolic Panel      4. Cardiomyopathy, unspecified type  (HCC)  proBNP    proBNP    proBNP      5. Debility        6. Morbid obesity with BMI of 50.0-59.9, adult (HCC)            acute on chronic moderate systolic and diastolic heart failure. CHF. Etiology: Unknown/Idiopathic. LVEF 30-35%.     NYHA stage Stage D: Presence of advanced heart disease with continued heart failure symptoms requiring aggressive medical therapyFC-Class IV: Unable to carry on any physical activity without discomfort. Symptoms of heart failureat rest. If any physical activity is undertaken, discomfort increases.     Today, Patient is currently volume overloaded.and with  Moderate perfusion. The patient's hemodynamics are currently acceptable. HR is: bradycardic and is at goal. BP/MAP was reviewed and there is notroom for medication up-titration.  Clinical trajectory was assessed and haswaxed and waned.     CHF GOAL DIRECTED MEDICAL THERAPY FOR PATIENT ADDRESSED/ADJUSTED:     GDMT    Drug Class   Drug   Dose Last Dose Adjustment Additional Titration   Notes   ACEi/ARB/ARNI ACEI previously stopped due to renal fxn       Beta Blocker  Metoprolol Succinate   50mg qhs Instructed to take nightly     MRA Spirono previously stopped due to renal fxn       SGLT2i Jardiance 10mg qd  N/A      -CHF Specific BB:   • Toprol XL 50mg qd continued for now.    We discussed processes/benefits of HF clinic including nursing, pharmacist, and provider evaluation during each visit with ability for in office ReDS vest, labs, and ability to provide IV diuresis in the clinic with close outpatient monitoring.  Additionally, patient was educated about the availability of delivery of medications to patient's clinic room prior to leaving the building which assists with medication compliance and insures medications are in hands when changes are made (if patient opts for apothecary usage) with thorough guidance regarding changes and medication schedule provided.      -ACE/ARB/ARNi:   • ACEi recently held during [facility]  hospitalization.     -MRA:   • The patient is FC-NYHA Class IV and MRA is indicated.   • Spironolactone was previously stopped on regimen due to renal function.    • Will continue to monitor real function and attempt to adjust/re-add.      -SGLT2 inhibitor therapy:   • A BMP at initiation to verify GFR >30 was recommended, as was interval GFR surveillance.  • The patient was advised to hold SGLT2I when PO intake is restricted due to a planned surgery, or due to an underlying illness.    • Recommend continuing  Jardiance (empagliflozin) 10mg with quarterly assessment of GFR.  • Pt was advised SEs, some severe, including hypersensitivity and Yesenia's; coupled with discussion regarding common side effects of UTIs and female genital mycotic infections were discussed. If you will be NPO, or are sick (poor PO intake, N&V) please hold the medication until you are back to a normal diet.     -Diuretic regimen:   • ReDS Vest reading for 01/31/23 was 47; ReDs Vest reading reviewed with patient.  • Mrs. Purdy ultimately declined IV diuresis in office today.     • We are stopping Lasix and starting Bumex 2mg qd until she returns next week for follow-up labs.   • BMP reviewed with creatinine improved in comparison to prior values.  Creatinine today is 1.5 with prior values of 1.9-2.3 in available labs from [facility].    • BMP, Mag, & ProBNP reviewed with patient.      -Fluid restriction/Sodium restriction:   • Requested 2000 ml restriction  • Patient has been asked to weigh daily and was provided with a printed diuretic strategy.  • 1,500 mg Na restriction was discussed.    -Acute vs. Chronic underlying conditions other than HF addressed during visit:     • Paroxysmal Atrial fibrillation/Flutter, currently SR:   o Office visit reviewed from January 27, 2023 with [facility] electrophysiology with cardiomyopathy felt to be multifactorial with DMC and severely worsened by atrial fibrillation with chronic anemia  with noted recent hospital discharge with amiodarone and Eliquis she was in sinus rhythm on her January 27, 2023 evaluation with amiodarone renewed but with Lasix 20 mg restarted and encouragement to follow-up more closely in [place].    o Continue Eliquis 5mg qd with [id number] at least 4.    o Continue Amiodarone as prescribed per EP.      • CKD IIIb:   o Monitor BMP. Reviewed today with creatinine appearing 1.5 with baseline previously 1.9-2.2 per review of OSH labs.      • Debility:   o Continue home health with PT/OT.   o Multiple types of DME at home.     · Iron/Anemia in CHF:  o Prior Iron labs from [facility] with Iron 31, ferritin 1219.30, and TIBC 234 with iron saturation 13%.    o Continue BID ferrous sulfate.          • Identifiable barriers to Heart Failure Self-care:   o Medical Barriers: Debility  o Social Barriers: Transport limitations      --------------------------------------------------------------------------------          BMI cannot be calculated due to outdated height or weight values with patient unable to stand. She has self reported weight of 315              >45 minutes out of 60 minutes face to face spent counseling patient extensively on dietary Na+ intake, importance of activity, weight monitoring, compliance with medications in addition to importance of titration with goal directed medical therapy and follow up appointments.            This document has been electronically signed by Sonja Romo PA-C  January 31, 2023 12:24 EST      Dictated Utilizing Dragon Dictation: Part of this note may be an electronic transcription/translation of spoken language to printed text using the Dragon Dictation System.    Follow-up appointment and medication changes provided in hand delivered After Visit Summary as well as reviewed in the room.

## 2023-02-06 ENCOUNTER — HOSPITAL ENCOUNTER (OUTPATIENT)
Dept: CARDIOLOGY | Facility: HOSPITAL | Age: 58
Discharge: HOME | End: 2023-02-06
Admitting: PHYSICIAN ASSISTANT
Payer: COMMERCIAL

## 2023-02-06 VITALS — SYSTOLIC BLOOD PRESSURE: 111 MMHG | OXYGEN SATURATION: 95 % | HEART RATE: 58 BPM | DIASTOLIC BLOOD PRESSURE: 44 MMHG

## 2023-02-06 DIAGNOSIS — N18.30 STAGE 3 CHRONIC KIDNEY DISEASE, UNSPECIFIED WHETHER STAGE 3A OR 3B CKD: ICD-10-CM

## 2023-02-06 DIAGNOSIS — I50.43 ACUTE ON CHRONIC COMBINED SYSTOLIC AND DIASTOLIC CHF (CONGESTIVE HEART FAILURE): Primary | ICD-10-CM

## 2023-02-06 LAB
ANION GAP SERPL CALCULATED.3IONS-SCNC: 11.2 MMOL/L (ref 5–15)
BUN SERPL-MCNC: 31 MG/DL (ref 6–20)
BUN/CREAT SERPL: 19.5 (ref 7–25)
CALCIUM SPEC-SCNC: 9 MG/DL (ref 8.6–10.5)
CHLORIDE SERPL-SCNC: 95 MMOL/L (ref 98–107)
CO2 SERPL-SCNC: 29.8 MMOL/L (ref 22–29)
CREAT SERPL-MCNC: 1.59 MG/DL (ref 0.57–1)
EGFRCR SERPLBLD CKD-EPI 2021: 38 ML/MIN/1.73
GLUCOSE SERPL-MCNC: 334 MG/DL (ref 65–99)
MAGNESIUM SERPL-MCNC: 2.1 MG/DL (ref 1.6–2.6)
NT-PROBNP SERPL-MCNC: 8067 PG/ML (ref 0–900)
POTASSIUM SERPL-SCNC: 3.8 MMOL/L (ref 3.5–5.2)
SODIUM SERPL-SCNC: 136 MMOL/L (ref 136–145)

## 2023-02-06 PROCEDURE — 83880 ASSAY OF NATRIURETIC PEPTIDE: CPT | Performed by: PHYSICIAN ASSISTANT

## 2023-02-06 PROCEDURE — 99215 OFFICE O/P EST HI 40 MIN: CPT | Performed by: PHYSICIAN ASSISTANT

## 2023-02-06 PROCEDURE — 83735 ASSAY OF MAGNESIUM: CPT | Performed by: PHYSICIAN ASSISTANT

## 2023-02-06 PROCEDURE — 80048 BASIC METABOLIC PNL TOTAL CA: CPT | Performed by: PHYSICIAN ASSISTANT

## 2023-02-06 RX ORDER — METOPROLOL SUCCINATE 25 MG/1
25 TABLET, EXTENDED RELEASE ORAL NIGHTLY
Qty: 30 TABLET | Refills: 2 | Status: SHIPPED | OUTPATIENT
Start: 2023-02-06 | End: 2023-02-21 | Stop reason: SDUPTHER

## 2023-02-06 RX ORDER — BUMETANIDE 2 MG/1
2 TABLET ORAL DAILY
Qty: 30 TABLET | Refills: 0 | Status: SHIPPED | OUTPATIENT
Start: 2023-02-06 | End: 2023-02-21 | Stop reason: SDUPTHER

## 2023-02-06 RX ORDER — OMEPRAZOLE 20 MG/1
1 CAPSULE, DELAYED RELEASE ORAL DAILY
COMMUNITY
Start: 2023-01-31

## 2023-02-06 RX ORDER — METOLAZONE 5 MG/1
5 TABLET ORAL DAILY
COMMUNITY
Start: 2023-02-03 | End: 2023-03-17 | Stop reason: ALTCHOICE

## 2023-02-06 RX ORDER — BUSPIRONE HYDROCHLORIDE 15 MG/1
15 TABLET ORAL 2 TIMES DAILY
Qty: 60 TABLET | Refills: 0 | Status: SHIPPED | OUTPATIENT
Start: 2023-02-06 | End: 2023-03-17

## 2023-02-06 NOTE — PROGRESS NOTES
Heart Failure Clinic    Date: 02/06/23     Vitals:    02/06/23 1450   BP: 111/44   Pulse: 58   SpO2: 95%        Method of arrival: Other wheelchair    Weighing self daily: No    Monitoring Heart Failure Zones: No    Today's HF Zone: Yellow     Taking medications as prescribed: Yes    Edema Yes, improved    Shortness of Air: Yes, about the same    Number of pillows used at night: hospital bed    Educational Materials given:  estrella Aviles RN 02/06/23 14:51 EST

## 2023-02-06 NOTE — PROGRESS NOTES
[facility]  ABDIFATAH Galarza Melanie Lind, APRN  [address] [place] [address] [address]  [place] [address]    Thank you for asking me to see Yumiko Purdy for congestive heart failure.    HPI:     This is a 57 y.o. female with known past medical history of:  · Chronic systolic & diastolic HF  · TTE from November 2022 with visually estimated ejection fraction of 30% ±5% and noted abnormal systolic strain pattern as well as grade 3 diastolic dysfunction as well as abnormal TAPSE with abnormal right ventricular function  · KHAI on 09/2020 with EF 21-25% with LV systolic function severely decreased and RV cavity mildly dilated with moderately reduced RV systolic function noted, moderate TVR, and aortic plaquing  · Right heart cath on 12/22/2022 with elevated left and right heart pressures with report not available  · ASCVD  · LHC 11/10/20 with preserved LV systolic, EF 50-55% with elevated LV end diastolic pressure consistent with diastolic dysfunction and right dominant system with 30-40% mid LAD lesion.   · LHC attempted on 12/2022 at [facility] with unsuccessful access due to small artery appearing occluded or near occluded radially on right  · Peripheral Arterial disease  · Atrial Flutter  · CKD stage IIIb  · Cirrhosis of the liver  · Stage III CKD  · Chronic hypoxemic respiratory failure  · Morbid Obesity  · Diffuse purpuric rash with prior w/u negative for vasculitis    Yumiko Purdy presents for today for HF clinic evaluation.  The patient is typically seen by Masha Escalante APRN.  Patient's primary cardiologist is Dr. Jad Meredith.      • Last known EF 21-25% by KHAI and 50-55% by LHC in 11/2020.    • Last known hospitalization and/or ED visit: Patient has not been hospitalized at this facility since 2020.  However she was hospitalized at [facility] in [facility] in December 2022 with heart failure admission with discharge summary reviewed  standing admission with acute on chronic mixed systolic diastolic heart failure during which time she was on dobutamine drip and discharged with p.o. Bumex and Zaroxolyn with note of chronic atrial fibrillation.  She was on dobutamine drip for some time and nephrology did also assist in her diuretic adjustments recommending ultimately Bumex twice daily metolazone and spironolactone on discharge.  • Accompanied by: Son      Initial visit data/details regarding:   • Dyspnea: Dyspnea on exertion and debility are stable  • Lower extremity swelling: Bilateral lower extremity swelling is present but somewhat improved.   • Abdominal swelling: Worsening abdominal swelling in the setting of known Cirrhosis  • Home weight: Not currently monitoring due to inability to stand  • Home BP: Monitoring booklet provided  • Home heart rate: 50s  • Daily activities of living: Performing with assistance  • Pillows/lying flat: Sleeps in hospital bed.  • Mobility assistance devices:  Wheelchair and rolling walker; bedside commode at home  • Since last visit, she reports she saw Nephrology and was started on Metolazone 5mg qd in addition to daily Bumex 2mg.  She reports she is making more urine and feels less swollen but does not feel any more short of breath.   • She reports HR has been staying in the 50s with some lightheadedness.        Specialists:   • Cardiology: [facility] with EP & General        Review of Systems - Review of Systems   Constitutional: Positive for malaise/fatigue. Negative for chills and decreased appetite.   HENT: Negative for congestion, ear discharge and ear pain.    Eyes: Negative for blurred vision, discharge and double vision.   Cardiovascular: Positive for dyspnea on exertion and leg swelling.   Respiratory: Positive for cough and shortness of breath. Negative for hemoptysis.    Endocrine: Negative for cold intolerance and heat intolerance.   Hematologic/Lymphatic: Negative for adenopathy and  bleeding problem.   Skin: Negative for color change, dry skin and nail changes.   Musculoskeletal: Positive for arthritis and muscle weakness. Negative for myalgias.   Gastrointestinal: Positive for bloating. Negative for abdominal pain.   Genitourinary: Negative for bladder incontinence, decreased libido and dysuria.   Neurological: Positive for dizziness. Negative for brief paralysis and difficulty with concentration.   Psychiatric/Behavioral: Negative for altered mental status, depression and hallucinations.   Allergic/Immunologic: Negative for environmental allergies and HIV exposure.         All other systems were reviewed and were negative.    Patient Active Problem List   Diagnosis   • Acute on chronic congestive heart failure (HCC)   • Cardiomyopathy (HCC)   • CKD (chronic kidney disease) stage 2, GFR 60-89 ml/min   • Severe obesity (BMI 35.0-39.9) with comorbidity (HCC)   • Chronic anemia   • Elevated bilirubin   • Acute on chronic combined systolic and diastolic CHF (congestive heart failure) (HCC)   • Hyponatremia       family history is not on file.     reports that she has never smoked. She has never used smokeless tobacco. She reports that she does not drink alcohol and does not use drugs.    Allergies   Allergen Reactions   • Phenergan [Promethazine]          Current Outpatient Medications:   •  acetaminophen (TYLENOL) 500 MG tablet, Take 500 mg by mouth 2 (Two) Times a Day As Needed for Mild Pain., Disp: , Rfl:   •  amiodarone (PACERONE) 200 MG tablet, Take 200 mg by mouth 2 (Two) Times a Day., Disp: , Rfl:   •  apixaban (ELIQUIS) 5 MG tablet tablet, Take 1 tablet by mouth Every 12 (Twelve) Hours. Indications: Atrial Fibrillation, Disp: 60 tablet, Rfl: 3  •  aspirin 81 MG EC tablet, Take 1 tablet by mouth Daily., Disp: 30 tablet, Rfl: 3  •  bumetanide (BUMEX) 2 MG tablet, Take 1 tablet by mouth Daily for 30 days., Disp: 30 tablet, Rfl: 0  •  busPIRone (BUSPAR) 15 MG tablet, Take 1 tablet by mouth 2  (Two) Times a Day for 30 days., Disp: 60 tablet, Rfl: 0  •  Cyanocobalamin 2500 MCG sublingual tablet, Place 2,500 mcg under the tongue Daily., Disp: , Rfl:   •  empagliflozin (Jardiance) 10 MG tablet tablet, Take 1 tablet by mouth Daily for 90 days., Disp: 90 tablet, Rfl: 0  •  ferrous sulfate 325 (65 FE) MG tablet, Take 325 mg by mouth 2 (Two) Times a Day., Disp: , Rfl:   •  folic acid (FOLVITE) 1 MG tablet, Take 1 mg by mouth Daily., Disp: , Rfl:   •  Insulin Glargine (LANTUS SOLOSTAR) 100 UNIT/ML injection pen, Inject 58 Units under the skin into the appropriate area as directed 2 (Two) Times a Day., Disp: , Rfl:   •  Insulin Regular Human, Conc, (HumuLIN R U-500 KwikPen) 500 UNIT/ML solution pen-injector CONCENTRATED injection, Inject 40 Units under the skin into the appropriate area as directed 2 (Two) Times a Day., Disp: , Rfl:   •  levothyroxine (SYNTHROID, LEVOTHROID) 50 MCG tablet, Take 50 mcg by mouth Daily., Disp: , Rfl:   •  metOLazone (ZAROXOLYN) 5 MG tablet, Take 5 mg by mouth Daily., Disp: , Rfl:   •  omeprazole (priLOSEC) 20 MG capsule, Take 1 capsule by mouth Daily., Disp: , Rfl:   •  ondansetron (ZOFRAN) 4 MG tablet, Take 4 mg by mouth Every 8 (Eight) Hours As Needed for Nausea or Vomiting., Disp: , Rfl:   •  polyethylene glycol (MIRALAX) 17 g packet, Take 17 g by mouth Daily. (Patient taking differently: Take 17 g by mouth Daily As Needed.), Disp: 30 packet, Rfl: 3  •  pregabalin (LYRICA) 100 MG capsule, Take 100 mg by mouth 2 (Two) Times a Day As Needed., Disp: , Rfl:   •  rOPINIRole (REQUIP) 0.5 MG tablet, Take 0.5 mg by mouth Every Night. Take 1 hour before bedtime., Disp: , Rfl:   •  metoprolol succinate XL (Toprol XL) 25 MG 24 hr tablet, Take 1 tablet by mouth Every Night for 90 days., Disp: 30 tablet, Rfl: 2      Physical Exam:  I have reviewed the patient's current vital signs as documented in the patient's EMR.     Vitals:    02/06/23 1450   BP: 111/44   BP Location: Left arm   Patient  Position: Sitting   Cuff Size: Large Adult   Pulse: 58   SpO2: 95%       Physical Exam  Vitals and nursing note reviewed.   Constitutional:       Appearance: Normal appearance.   HENT:      Head: Normocephalic and atraumatic.   Eyes:      General: Lids are normal.   Cardiovascular:      Rate and Rhythm: Normal rate and regular rhythm.      Heart sounds: S1 normal and S2 normal.   Pulmonary:      Effort: No tachypnea or bradypnea.      Breath sounds: No decreased breath sounds, wheezing, rhonchi or rales.   Chest:      Chest wall: No mass or lacerations.   Abdominal:      General: Abdomen is protuberant. Bowel sounds are normal.      Palpations: Abdomen is soft.      Tenderness: There is no abdominal tenderness.   Musculoskeletal:      Right lower leg: Edema present.      Left lower leg: Edema present.      Right foot: Swelling present.      Left foot: Swelling present.   Skin:     General: Skin is warm and moist.      Comments: Unna boots present   Neurological:      Mental Status: She is alert and oriented to person, place, and time.   Psychiatric:         Attention and Perception: Attention normal.         Mood and Affect: Mood normal.          JVP: Volume/Pulsation: Normal.        DATA REVIEWED:     EKG. I personally reviewed and interpreted the EKG.    Last EKG at [facility] not available for viewing.      ---------------------------------------------------  TTE/KHAI:    TRANSTHORACIC ECHOCARDIOGRAPHY REPORT    Demographics    Patient Name:          JAMAL WHARTON      :            1965    Medical Record Number: [id number]          Age:            56 year(s)    Corporate ID Number:   [id number]          Gender          Female    Account Number:        [id number]    Sonographer:           Hayden Chaudhry,     Height:         65 inches                           [facility]    Referring Physician:   ANDREAS HERNANDEZ     Weight:         240 pounds    Interpreting           MATHEUS IRELAND   BMI:            39.94  kg/m^2    Physician:             MD    Date of Service:       11/17/2022          Blood Pressure: 113/40 mmHg    Room Number:           320   Type of Study:    TTE procedure: EC Echo Complete, ECHO COMPLETE (DOPPLER / COLOR) W OR WO    CONTRAST.    Patient Status: Routine IP    Study Location: [facility] Quality: Adequate visualization    Contrast Medium: Optison. Amount - 3 ml    Impression:    Indication:Hypertensive heart disease with heart failure I11.0    Mild left ventricular hypertrophy. Visually estimated ejection fraction    30% +/- 5%. Abnormal left ventricular systolic function; abnormal systolic    strain pattern. Restrictive filling pattern consistent with severely    increased LV filling pressure (Grade III Diastolic dysfunction).    Dilated right ventricle. Abnormal TAPSE; abnormal right ventricular    function. Abnormal right atrial size.    Mild mitral stenosis. Peak/Mean 6/2mmHg    Moderate tricuspid (2+) regurgitation.    No masses or thrombi are seen.   Measurements Summary:    LVEDd: 4.99 cm         LVESd: 4.08 cm          IVSEd: 0.79 cm    AO Root:2.97 cm        LVPWd: 1.04 cm    Contractility Score    Global Left Ventricular Hypokinesis was noted.    LV regional wall motion: (0-Not visualized 1-Normal 2-Hypokinesis    3-Akinesis 4-Dyskinesis 5-Aneurysm)    Left Ventricle    Peak E-wave: 1.22   Peak A-wave: 0.2 m/s    E/A ratio: 5.99    m/s                 Volume [id number]  LV length: 8.47 cm    ml    Volume [id number] ml    LVOT diameter: 2.05 cm    Normal sized left ventricle.    Mild left ventricular hypertrophy.    Visually estimated ejection fraction 30% +/- 5%.    Abnormal left ventricular systolic function; abnormal systolic strain    pattern.    Restrictive filling pattern consistent with severely increased LV filling    pressure (Grade III Diastolic dysfunction).    No left ventricular masses or thrombi.    Right Ventricle    Diastolic dimension: 4.72     RV systolic  pressure: 22.15 mmHg    cm    Dilated right ventricle.    Abnormal TAPSE; abnormal right ventricular function.    Left Atrium    LA dimension: 4.64 cm               LA volume:95.38 ml    LA/Aorta: 1.56    Abnormal left atrial volume index 45ml/m2.    Intact atrial septum.    No atrial mass or thrombus.    Right Atrium    Abnormal right atrial size.    Intact atrial septum.    No atrial mass or thrombus.    Mitral Valve    Deceleration time:     Area PHT: 2.04 cm^2  Mean velocity:    248.96 msec            P1/2t: 107.68 msec   0.57 m/s    Area (continuity):   Mean gradient:    1.73 cm^2            1.89 mmHg    Peak gradient:    5.97 mmHg    Thickened mitral valve leaflets.    Mild mitral regurgitation.    Mild mitral stenosis. Peak/Mean 6/2mmHg    Echogenic density noted on mitral valve chordae. Seen on prior exam    10/16/2022    Aortic Valve    LVOT VTI: 18.22 cm    Mildly thickend free edges of the aortic valve leaflets.    No aortic regurgitation.    No aortic stenosis.    No masses or vegetations seen.    Tricuspid Valve    TR velocity: 2.19 m/s     TR gradient: 19.76735 mmHg    Estimated RAP: 3 mmHg     RVSP: 22.17 mmHg    Structurally normal tricuspid valve.    Moderate tricuspid (2+) regurgitation.    RVSP likely underestimated due to RV systolic function.    No tricuspid stenosis.    No masses or vegetations seen.    Pulmonic Valve    Acceleration time: 119.95 msec          PASP: 22.15 mmHg    Structurally normal pulmonic valve.    Mild pulmonic regurgitation (1+).    No pulmonic stenosis.    No masses or vegetations seen.   Great Vessels   Aorta    Aortic Root: 2.97 cm    Ascending Aorta: 2.76 cm    LVOT Diameter: 2.05 cm   Visualized aorta is normal.   Normal aortic root.   No evidence of dissection.   IVC is not well visualized.   Unable to obtain adequate subcostal view.    Pericardium / Pleura   No pericardial effusion.    Other    No masses or thrombi.    No intracardiac shunt.     ----------------------------------------------------------------    Electronically signed by MATHEUS IRELAND MD(Interpreting    Physician) on 11/17/2022 17:38       Results for orders placed during the hospital encounter of 09/09/20    Adult Transesophageal Echo (KHAI) W/ Cont if Necessary Per Protocol (Cardiology Department)    Interpretation Summary  · Ejection fraction appears to be 21 - 25%. Left ventricular systolic function is severely decreased.  · Right ventricular cavity is mildly dilated. Moderately reduced right ventricular systolic function noted.  · Trace mitral valve regurgitation is present.  · Moderate tricuspid valve regurgitation is present. Estimated right ventricular systolic pressure from tricuspid regurgitation is normal (<35 mmHg).  · There is (grade 1) plaque in the proximal aorta present. There is (grade 1) plaque in the ascending aorta present. There is (grade 1) plaque in the aortic arch present. There is (grade 1) plaque in the descending aorta present.    RHC report:      CARDIAC CATH - RIGHT HEART CATH    Anatomical Region Laterality Modality   Heart -- Other     Narrative    Cardiac Diagnostic Report    Demographics    Patient Name       JAMAL WHARTON      Date of Birth          1965    Patient #          [id number]          Accession #            [id number]    Visit #            [id number]          Medical Record #    Physician          MATHEUS IRELAND   Date of Study          12/22/2022                       MD    Referring                              Interventional    Physician                              physician    Procedure   Procedure Type    Diagnostic procedure: RHC with Cardiomems, RIGHT HEART CATH   Indications: Angina.    Conclusions   Procedure Description   Using ultrasound and micropuncture, access to right brachial vein obtained   and 7F sheath inserted. 7F PA catheter used for RHC.   I attempted right radial access for LHC but unsuccessful due to very  small   artery which appears occluded or near-occluded.   Procedure Summary   Elevated left and right heart filling pressures.   Elevated pulmonary pressures.   Borderline-low Ramos CO/CI.    Procedure Data   Procedure Date   Date: 12/22/2022Start: 15:32End: 16:09   Entry Locations     - Retrograde Percutaneous access was performed through the Right Axiliary.       A 7 Fr sheath was inserted. Hemostasis was successfully obtained using       Manual Compression.       Entry Comments: brachial vein.   Procedure Medications     - Fentanyl I.V. 25 mcg.     - Versed Sheath 1 mg.     - Oxygen NC 6 l/min.   Diagnostic Catheters     - A7 FR MON Henning-Misael [id number] used for:Arch.   Contrast Material     - None0 ml   Fluoroscopy Time: Total: 2:48 minutes.   Fluoroscopy Dose: Total: 155 mGy.    Medical History    Risk Factors    The patient risk factors include:obesity, hypercholesterolemia, treated    and uncontrolled hypertension, diabetes mellitus, last creatinine: 2    mg/dl, creatinine clearance: 69.72 ml/min and dyslipidemia.    Admission Data    Hemodynamics    Condition: Baseline    O2 Consumption: Estimated: 297.50Heart Rate: 41 bpm   Oxygen Saturation   +--------+-----+----+----------------------+---+---------------------------+   !Location!pCO2 !pO2 !% Saturation          !Hgb!O2 Content                 !   +--------+-----+----+----------------------+---+---------------------------+   !PA      !     !    !51                    !   !                           !   +--------+-----+----+----------------------+---+---------------------------+   !RA      !     !    !58                    !   !                           !   +--------+-----+----+----------------------+---+---------------------------+   !AO      !     !    !94                    !   !                           !   +--------+-----+----+----------------------+---+---------------------------+   Pressures (mmHg)    +-----+--------------------------------------------------------------------+   !Site !Pressure                                                            !   +-----+--------------------------------------------------------------------+   !RA   !28/28 (25)                                                          !   +-----+--------------------------------------------------------------------+   !RV   !81/10 ,27                                                           !   +-----+--------------------------------------------------------------------+   !PA   !73/32 (43)                                                          !   +-----+--------------------------------------------------------------------+   !PCW  !68/66 (45)                                                          !   +-----+--------------------------------------------------------------------+   !PCW  !78/59 (45)                                                          !   +-----+--------------------------------------------------------------------+   !PCW  !23/36 (27)                                                          !   +-----+--------------------------------------------------------------------+   Cardiac Output   +------+-------------------------+----------------------------+------------+   !Method!CO (l/min)               !CI (l/min/m2)               !SV (ml)     !   +------+-------------------------+----------------------------+------------+   !Ramos  !5.35                     !2.2                         !131.67      !   +------+-------------------------+----------------------------+------------+   Shunts   Oxygen Values    O2 Capacity 129.2 O2 Consumption 297.5    Flows (l/min)    Qs 6.4 Qe/Qp 1.2    Qp 5.35 Qp/Qs 0.84    Qe 6.4   Vascular Resistance (dynes x sec x cm-5)   +-------------------------------------+----+---+----+----+---------+-------+   !CO method                            !TSVR!SVR!TPVR!PVR !TPVR/TSVR!PVR/SVR!    +-------------------------------------+----+---+----+----+---------+-------+   !Ramos                                 !    !   !8.03!2.94!         !       !   +-------------------------------------+----+---+----+----+---------+-------+   !Qp or Qs                             !    !   !8.03!2.94!         !       !   +-------------------------------------+----+---+----+----+---------+-------+    Signatures    ----------------------------------------------------------------    Electronically signed by MATHEUS IRELAND MD(Physician) on    12/22/2022 16:19    ----------------------------------------------------------------        -----------------------------------------------------  CXR/Imaging:   Imaging Results (Most Recent)     None          I personally reviewed and interpreted the CXR.      -----------------------------------------------------  CT:   No radiology results for the last 30 days.  I personally reviewed the images of the CT scan.  My personal interpretation is below.      ----------------------------------------------------    PFTs:    No PFTS available.      --------------------------------------------------------------------------------------------------    Laboratory evaluations:    Lab Results   Component Value Date    GLUCOSE 261 (H) 01/31/2023    BUN 29 (H) 01/31/2023    CREATININE 1.55 (H) 01/31/2023    EGFRIFNONA 49 (L) 11/10/2020    BCR 18.7 01/31/2023    K 4.5 01/31/2023    CO2 24.1 01/31/2023    CALCIUM 8.2 (L) 01/31/2023    ALBUMIN 3.54 10/21/2020    LABIL2 1.3 (L) 06/09/2016    AST 21 10/21/2020    ALT 17 10/21/2020     Lab Results   Component Value Date    WBC 6.88 01/31/2023    HGB 9.7 (L) 01/31/2023    HCT 32.9 (L) 01/31/2023    .5 (H) 01/31/2023     (L) 01/31/2023     Lab Results   Component Value Date    CHOL 137 09/11/2020    CHLPL 103 04/21/2016    TRIG 80 09/11/2020    HDL 43 09/11/2020    LDL 78 09/11/2020     Lab Results   Component Value Date    TSH 3.830  09/10/2020     Lab Results   Component Value Date    HGBA1C 8.40 (H) 09/10/2020     Lab Results   Component Value Date    ALT 17 10/21/2020     Lab Results   Component Value Date    HGBA1C 8.40 (H) 09/10/2020    HGBA1C 5.4 04/21/2016     Lab Results   Component Value Date    MICROALBUR 3.2 09/12/2020    CREATININE 1.55 (H) 01/31/2023     Lab Results   Component Value Date    IRON 34.0 (L) 12/21/2022    TIBC 237 (L) 09/18/2020    FERRITIN 1,219.30 (H) 12/21/2022     Lab Results   Component Value Date    INR 1.10 12/15/2022    INR 1.25 (H) 09/23/2020    INR 1.09 09/22/2020    PROTIME 11.6 12/15/2022    PROTIME 15.5 (H) 09/23/2020    PROTIME 13.9 09/22/2020        Lab Results   Component Value Date    ABSOLUTELUNG 47 (A) 01/31/2023       PAH RISK ASSESSMENT:      1. Acute on chronic combined systolic and diastolic CHF (congestive heart failure) (HCC)    2. Stage 3 chronic kidney disease, unspecified whether stage 3a or 3b CKD (HCC)          ORDERS PLACED TODAY:  Orders Placed This Encounter   Procedures   • Basic Metabolic Panel   • Magnesium   • proBNP   • Basic Metabolic Panel   • Magnesium   • proBNP   • ReDs Vest        Diagnoses and all orders for this visit:    1. Acute on chronic combined systolic and diastolic CHF (congestive heart failure) (HCC) (Primary)  -     Basic Metabolic Panel; Future  -     Magnesium; Future  -     proBNP; Future  -     ReDs Vest  -     Basic Metabolic Panel; Standing  -     Basic Metabolic Panel  -     Magnesium; Standing  -     Magnesium  -     proBNP; Standing  -     proBNP    2. Stage 3 chronic kidney disease, unspecified whether stage 3a or 3b CKD (HCC)    Other orders  -     metoprolol succinate XL (Toprol XL) 25 MG 24 hr tablet; Take 1 tablet by mouth Every Night for 90 days.  Dispense: 30 tablet; Refill: 2  -     bumetanide (BUMEX) 2 MG tablet; Take 1 tablet by mouth Daily for 30 days.  Dispense: 30 tablet; Refill: 0  -     busPIRone (BUSPAR) 15 MG tablet; Take 1 tablet by  mouth 2 (Two) Times a Day for 30 days.  Dispense: 60 tablet; Refill: 0             MEDS ORDERED TODAY:    New Medications Ordered This Visit   Medications   • metoprolol succinate XL (Toprol XL) 25 MG 24 hr tablet     Sig: Take 1 tablet by mouth Every Night for 90 days.     Dispense:  30 tablet     Refill:  2   • bumetanide (BUMEX) 2 MG tablet     Sig: Take 1 tablet by mouth Daily for 30 days.     Dispense:  30 tablet     Refill:  0   • busPIRone (BUSPAR) 15 MG tablet     Sig: Take 1 tablet by mouth 2 (Two) Times a Day for 30 days.     Dispense:  60 tablet     Refill:  0        ---------------------------------------------------------------------------------------------------------------------------          ASSESSMENT/PLAN:      Diagnosis Plan   1. Acute on chronic combined systolic and diastolic CHF (congestive heart failure) (HCC)  Basic Metabolic Panel    Magnesium    proBNP    ReDs Vest    Basic Metabolic Panel    Basic Metabolic Panel    Magnesium    Magnesium    proBNP    proBNP      2. Stage 3 chronic kidney disease, unspecified whether stage 3a or 3b CKD (HCC)            acute on chronic moderate systolic and diastolic heart failure. CHF. Etiology: Unknown/Idiopathic. LVEF 30-35%.     NYHA stage Stage D: Presence of advanced heart disease with continued heart failure symptoms requiring aggressive medical therapyFC-Class IV: Unable to carry on any physical activity without discomfort. Symptoms of heart failureat rest. If any physical activity is undertaken, discomfort increases.     Today, Patient is currently volume overloaded.and with  Moderate perfusion. The patient's hemodynamics are currently acceptable. HR is: bradycardic and is at goal. BP/MAP was reviewed and there is notroom for medication up-titration.  Clinical trajectory was assessed and haswaxed and waned.     CHF GOAL DIRECTED MEDICAL THERAPY FOR PATIENT ADDRESSED/ADJUSTED:     GDMT    Drug Class   Drug   Dose Last Dose Adjustment Additional  Titration   Notes   ACEi/ARB/ARNI ACEI previously stopped due to renal fxn       Beta Blocker  Metoprolol Succinate   50mg qhs Instructed to take nightly     MRA Spirono previously stopped due to renal fxn       SGLT2i Jardiance 10mg qd  N/A      -CHF Specific BB:   • Toprol XL 50mg qd will be decreased to 25mg qd given lower HR and BP give dizziness.      -ACE/ARB/ARNi:   • ACEi recently held during [facility] hospitalization.     -MRA:   • The patient is FC-NYHA Class IV and MRA is indicated.   • Spironolactone was previously stopped on regimen due to renal function.    • Will continue to monitor real function and attempt to adjust/re-add.      -SGLT2 inhibitor therapy:   • A BMP at initiation to verify GFR >30 was recommended, as was interval GFR surveillance.  • The patient was advised to hold SGLT2I when PO intake is restricted due to a planned surgery, or due to an underlying illness.    • Recommend continuing  Jardiance (empagliflozin) 10mg with quarterly assessment of GFR.  • Pt was advised SEs, some severe, including hypersensitivity and Yesenia's; coupled with discussion regarding common side effects of UTIs and female genital mycotic infections were discussed. If you will be NPO, or are sick (poor PO intake, N&V) please hold the medication until you are back to a normal diet.     -Diuretic regimen:   • ReDS Vest reading for 02/06/23 could not be obtained due to BMI & inability to stand with no prior known baseline.    • Bumex 2mg qd with Metolaznoe 5mg added per Dr. Mejia with labs on Thursday with home health to be sent to Nephro. I have called Nephrology and requested last office visit note from last week and am currently awaiting fax.   • BMP reviewed with creatinine improved in comparison to prior values.  Creatinine today is 1.5 with prior values of 1.9-2.3 in available labs from [facility].    • BMP, Mag, & ProBNP reviewed with patient.      -Fluid restriction/Sodium restriction:   • Requested  2000 ml restriction  • Patient has been asked to weigh daily and was provided with a printed diuretic strategy.  • 1,500 mg Na restriction was discussed.    -Acute vs. Chronic underlying conditions other than HF addressed during visit:     • Paroxysmal Atrial fibrillation/Flutter, currently SR:   o Office visit reviewed from January 27, 2023 with [facility] electrophysiology with cardiomyopathy felt to be multifactorial with DMC and severely worsened by atrial fibrillation with chronic anemia with noted recent hospital discharge with amiodarone and Eliquis she was in sinus rhythm on her January 27, 2023 evaluation with amiodarone renewed but with Lasix 20 mg restarted and encouragement to follow-up more closely in [place].    o Continue Eliquis 5mg qd with [id number] at least 4.    o Continue Amiodarone as prescribed per EP.      • CKD IIIb:   o Monitor BMP. Reviewed today with creatinine appearing 1.5 with baseline previously 1.9-2.2 per review of OSH labs.    o Continues to be within baseline.      • Debility:   o Continue home health with PT/OT.   o Multiple types of DME at home.     · Iron/Anemia in CHF:  o Prior Iron labs from [facility] with Iron 31, ferritin 1219.30, and TIBC 234 with iron saturation 13%.    o Continue BID ferrous sulfate.          • Identifiable barriers to Heart Failure Self-care:   o Medical Barriers: Debility  o Social Barriers: Transport limitations      --------------------------------------------------------------------------------          BMI cannot be calculated due to outdated height or weight values with patient unable to stand. She has self reported weight of 315              >45 minutes out of 60 minutes face to face spent counseling patient extensively on dietary Na+ intake, importance of activity, weight monitoring, compliance with medications in addition to importance of titration with goal directed medical therapy and follow up appointments.            This document  has been electronically signed by Sonja Romo PA-C  February 6, 2023 15:31 EST      Dictated Utilizing Dragon Dictation: Part of this note may be an electronic transcription/translation of spoken language to printed text using the Dragon Dictation System.    Follow-up appointment and medication changes provided in hand delivered After Visit Summary as well as reviewed in the room.

## 2023-02-06 NOTE — PROGRESS NOTES
Heart Failure Clinic  Pharmacist Note     Yumiko Purdy is a 57 y.o. female seen in the Heart Failure Clinic for HFrEF.  Yumiko Purdy reports a fair understanding of medications. Patient had a new Rx bottle of Metolazone 5mg to be taken daily 30 minutes before Bumex 2mg daily, prescribed by Dr. Mejia on 2/3/23. Pt reports that the swelling is slowly getting better, but reports continued swelling in her legs and belly. She reports that her SOB has not changed. She is unable to weight herself at home. She reports that her BP's at home have been running low and that they concern her. She reports that most SBP are <120, but denies any <100. She does report some episodes of lightheadedness and dizziness with her HR being in the 50's.    Medication Use:   Hx of med intolerances: None related to HF  Retail Rx Management: Uses [facility]    Past Medical History:   Diagnosis Date   • Anemia    • CHF (congestive heart failure) (HCC)    • Chronic a-fib (HCC)     stated by patient    • Cirrhosis of liver (HCC)     stated by patient    • Diabetes mellitus (HCC)      ALLERGIES: Phenergan [promethazine]  Current Outpatient Medications   Medication Sig Dispense Refill   • acetaminophen (TYLENOL) 500 MG tablet Take 500 mg by mouth 2 (Two) Times a Day As Needed for Mild Pain.     • amiodarone (PACERONE) 200 MG tablet Take 200 mg by mouth 2 (Two) Times a Day.     • apixaban (ELIQUIS) 5 MG tablet tablet Take 1 tablet by mouth Every 12 (Twelve) Hours. Indications: Atrial Fibrillation 60 tablet 3   • aspirin 81 MG EC tablet Take 1 tablet by mouth Daily. 30 tablet 3   • bumetanide (BUMEX) 2 MG tablet Take 1 tablet by mouth Daily for 30 days. 30 tablet 0   • busPIRone (BUSPAR) 15 MG tablet Take 1 tablet by mouth 2 (Two) Times a Day for 30 days. 60 tablet 0   • Cyanocobalamin 2500 MCG sublingual tablet Place 2,500 mcg under the tongue Daily.     • empagliflozin (Jardiance) 10 MG tablet tablet Take 1 tablet by mouth Daily for 90 days. 90 tablet  0   • ferrous sulfate 325 (65 FE) MG tablet Take 325 mg by mouth 2 (Two) Times a Day.     • folic acid (FOLVITE) 1 MG tablet Take 1 mg by mouth Daily.     • Insulin Glargine (LANTUS SOLOSTAR) 100 UNIT/ML injection pen Inject 58 Units under the skin into the appropriate area as directed 2 (Two) Times a Day.     • Insulin Regular Human, Conc, (HumuLIN R U-500 KwikPen) 500 UNIT/ML solution pen-injector CONCENTRATED injection Inject 40 Units under the skin into the appropriate area as directed 2 (Two) Times a Day.     • levothyroxine (SYNTHROID, LEVOTHROID) 50 MCG tablet Take 50 mcg by mouth Daily.     • metOLazone (ZAROXOLYN) 5 MG tablet Take 5 mg by mouth Daily.     • omeprazole (priLOSEC) 20 MG capsule Take 1 capsule by mouth Daily.     • ondansetron (ZOFRAN) 4 MG tablet Take 4 mg by mouth Every 8 (Eight) Hours As Needed for Nausea or Vomiting.     • polyethylene glycol (MIRALAX) 17 g packet Take 17 g by mouth Daily. (Patient taking differently: Take 17 g by mouth Daily As Needed.) 30 packet 3   • pregabalin (LYRICA) 100 MG capsule Take 100 mg by mouth 2 (Two) Times a Day As Needed.     • rOPINIRole (REQUIP) 0.5 MG tablet Take 0.5 mg by mouth Every Night. Take 1 hour before bedtime.     • metoprolol succinate XL (Toprol XL) 25 MG 24 hr tablet Take 1 tablet by mouth Every Night for 90 days. 30 tablet 2     No current facility-administered medications for this encounter.       Vaccination History:   Pneumonia: Needs, declines  Annual Influenza: Needs, declines  COVID 19: Needs, declines     Objective  Vitals:    02/06/23 1450   BP: 111/44   BP Location: Left arm   Patient Position: Sitting   Cuff Size: Large Adult   Pulse: 58   SpO2: 95%     Wt Readings from Last 3 Encounters:   11/10/20 102 kg (225 lb)   11/06/20 102 kg (225 lb)   10/21/20 105 kg (232 lb 6.4 oz)     There were no vitals filed for this visit.  Lab Results   Component Value Date    GLUCOSE 334 (H) 02/06/2023    BUN 31 (H) 02/06/2023    CREATININE 1.59  (H) 02/06/2023    EGFRIFNONA 49 (L) 11/10/2020    BCR 19.5 02/06/2023    K 3.8 02/06/2023    CO2 29.8 (H) 02/06/2023    CALCIUM 9.0 02/06/2023    ALBUMIN 3.54 10/21/2020    LABIL2 1.3 (L) 06/09/2016    AST 21 10/21/2020    ALT 17 10/21/2020     Lab Results   Component Value Date    WBC 6.88 01/31/2023    HGB 9.7 (L) 01/31/2023    HCT 32.9 (L) 01/31/2023    .5 (H) 01/31/2023     (L) 01/31/2023     Lab Results   Component Value Date    TROPONINT 0.027 09/11/2020     Lab Results   Component Value Date    PROBNP 8,067.0 (H) 02/06/2023     Results for orders placed during the hospital encounter of 09/09/20    Adult Transesophageal Echo ([facility]) W/ Cont if Necessary Per Protocol (Cardiology Department)    Interpretation Summary  · Ejection fraction appears to be 21 - 25%. Left ventricular systolic function is severely decreased.  · Right ventricular cavity is mildly dilated. Moderately reduced right ventricular systolic function noted.  · Trace mitral valve regurgitation is present.  · Moderate tricuspid valve regurgitation is present. Estimated right ventricular systolic pressure from tricuspid regurgitation is normal (<35 mmHg).  · There is (grade 1) plaque in the proximal aorta present. There is (grade 1) plaque in the ascending aorta present. There is (grade 1) plaque in the aortic arch present. There is (grade 1) plaque in the descending aorta present.         GDMT    Drug Class   Drug   Dose Last Dose Adjustment Additional Titration   Notes   ACEi/ARB/ARNI     Was previously on lisinopril    Beta Blocker Metoprolol XL 50mg QD      MRA     Was previously on spironolactone 50mg BID Nov-Dec 22   SGLT2i Jardiance  10mg QD  N/A        Drug Therapy Problems    1. Symptoms of hypotension  2. Patient request refills on Buspar  3. Vaccinations  4. Kidney function    Recommendations:     1. Recommend decreasing the Toprol dose to 25mg daily.  Encouraged her to continue to check her BP daily.   2. Sonja to send in  to [facility]  3. Pt declined all vaccinations again today  4. Pt follows up with Nephrology on Friday- will manage diuresis there.       Patient was educated on heart failure medications and the importance of medication adherence. All questions were addressed and patient expressed some understanding. Would benefit from additional education at each visit.    Thank you for allowing me to participate in the care of your patient,    Kelli Soto [facility]  02/06/23  15:36 EST

## 2023-02-21 ENCOUNTER — HOSPITAL ENCOUNTER (OUTPATIENT)
Dept: CARDIOLOGY | Facility: HOSPITAL | Age: 58
Discharge: HOME | End: 2023-02-21
Admitting: PHYSICIAN ASSISTANT
Payer: COMMERCIAL

## 2023-02-21 VITALS — DIASTOLIC BLOOD PRESSURE: 61 MMHG | HEART RATE: 65 BPM | OXYGEN SATURATION: 95 % | SYSTOLIC BLOOD PRESSURE: 106 MMHG

## 2023-02-21 DIAGNOSIS — I42.9 CARDIOMYOPATHY, UNSPECIFIED TYPE: ICD-10-CM

## 2023-02-21 DIAGNOSIS — K74.60 CIRRHOSIS OF LIVER WITHOUT ASCITES, UNSPECIFIED HEPATIC CIRRHOSIS TYPE: ICD-10-CM

## 2023-02-21 DIAGNOSIS — I50.22 CHRONIC HFREF (HEART FAILURE WITH REDUCED EJECTION FRACTION): Primary | ICD-10-CM

## 2023-02-21 LAB
ABSOLUTE LUNG FLUID CONTENT: 38 % (ref 20–35)
ALBUMIN SERPL-MCNC: 4.2 G/DL (ref 3.5–5.2)
ALP SERPL-CCNC: 101 U/L (ref 39–117)
ALT SERPL W P-5'-P-CCNC: 20 U/L (ref 1–33)
ANION GAP SERPL CALCULATED.3IONS-SCNC: 13.2 MMOL/L (ref 5–15)
AST SERPL-CCNC: 33 U/L (ref 1–32)
BILIRUB CONJ SERPL-MCNC: 0.8 MG/DL (ref 0–0.3)
BILIRUB INDIRECT SERPL-MCNC: 4.1 MG/DL
BILIRUB SERPL-MCNC: 4.9 MG/DL (ref 0–1.2)
BUN SERPL-MCNC: 44 MG/DL (ref 6–20)
BUN/CREAT SERPL: 28.6 (ref 7–25)
CALCIUM SPEC-SCNC: 9.5 MG/DL (ref 8.6–10.5)
CHLORIDE SERPL-SCNC: 94 MMOL/L (ref 98–107)
CO2 SERPL-SCNC: 29.8 MMOL/L (ref 22–29)
CREAT SERPL-MCNC: 1.54 MG/DL (ref 0.57–1)
EGFRCR SERPLBLD CKD-EPI 2021: 39.5 ML/MIN/1.73
GLUCOSE SERPL-MCNC: 302 MG/DL (ref 65–99)
MAGNESIUM SERPL-MCNC: 2 MG/DL (ref 1.6–2.6)
NT-PROBNP SERPL-MCNC: 5411 PG/ML (ref 0–900)
POTASSIUM SERPL-SCNC: 4.3 MMOL/L (ref 3.5–5.2)
PROT SERPL-MCNC: 7.2 G/DL (ref 6–8.5)
SODIUM SERPL-SCNC: 137 MMOL/L (ref 136–145)

## 2023-02-21 PROCEDURE — 94726 PLETHYSMOGRAPHY LUNG VOLUMES: CPT | Performed by: PHYSICIAN ASSISTANT

## 2023-02-21 PROCEDURE — 80076 HEPATIC FUNCTION PANEL: CPT | Performed by: PHYSICIAN ASSISTANT

## 2023-02-21 PROCEDURE — 80048 BASIC METABOLIC PNL TOTAL CA: CPT | Performed by: PHYSICIAN ASSISTANT

## 2023-02-21 PROCEDURE — 83880 ASSAY OF NATRIURETIC PEPTIDE: CPT | Performed by: PHYSICIAN ASSISTANT

## 2023-02-21 PROCEDURE — 99215 OFFICE O/P EST HI 40 MIN: CPT | Performed by: PHYSICIAN ASSISTANT

## 2023-02-21 PROCEDURE — 83735 ASSAY OF MAGNESIUM: CPT | Performed by: PHYSICIAN ASSISTANT

## 2023-02-21 RX ORDER — METOPROLOL SUCCINATE 25 MG/1
25 TABLET, EXTENDED RELEASE ORAL NIGHTLY
Qty: 30 TABLET | Refills: 2 | Status: SHIPPED | OUTPATIENT
Start: 2023-02-21 | End: 2023-05-22

## 2023-02-21 RX ORDER — BUMETANIDE 2 MG/1
2 TABLET ORAL DAILY
Qty: 30 TABLET | Refills: 0 | Status: SHIPPED | OUTPATIENT
Start: 2023-02-21 | End: 2023-03-17

## 2023-02-21 NOTE — PROGRESS NOTES
[facility]  ABDIFATAH Galarza Samy C, MD  [address], [place] [address]    Thank you for asking me to see Yumiko Purdy for congestive heart failure.    HPI:     This is a 57 y.o. female with known past medical history of:  · Chronic systolic & diastolic HF  · TTE from November 2022 with visually estimated ejection fraction of 30% ±5% and noted abnormal systolic strain pattern as well as grade 3 diastolic dysfunction as well as abnormal TAPSE with abnormal right ventricular function  · KHAI on 09/2020 with EF 21-25% with LV systolic function severely decreased and RV cavity mildly dilated with moderately reduced RV systolic function noted, moderate TVR, and aortic plaquing  · Right heart cath on 12/22/2022 with elevated left and right heart pressures with report not available  · ASCVD  · LHC 11/10/20 with preserved LV systolic, EF 50-55% with elevated LV end diastolic pressure consistent with diastolic dysfunction and right dominant system with 30-40% mid LAD lesion.   · LHC attempted on 12/2022 at [facility] with unsuccessful access due to small artery appearing occluded or near occluded radially on right  · Peripheral Arterial disease  · Atrial Flutter  · CKD stage IIIb  · Cirrhosis of the liver  · Stage III CKD  · Chronic hypoxemic respiratory failure  · Morbid Obesity  · Diffuse purpuric rash with prior w/u negative for vasculitis    Yumiko Purdy presents for today for HF clinic evaluation.  The patient is typically seen by Masha Escalante APRN.  Patient's primary cardiologist is Dr. Jad Meredith.      • Last known EF 21-25% by KHAI and 50-55% by LHC in 11/2020.    • Last known hospitalization and/or ED visit: Patient has not been hospitalized at this facility since 2020.  However she was hospitalized at [facility] in [facility] in December 2022 with heart failure admission with discharge summary  reviewed standing admission with acute on chronic mixed systolic diastolic heart failure during which time she was on dobutamine drip and discharged with p.o. Bumex and Zaroxolyn with note of chronic atrial fibrillation.  She was on dobutamine drip for some time and nephrology did also assist in her diuretic adjustments recommending ultimately Bumex twice daily metolazone and spironolactone on discharge.  • Accompanied by: Son      Initial visit data/details regarding:   • Dyspnea: Dyspnea on exertion and debility are slowly improving  • Lower extremity swelling: Bilateral lower extremity swelling is present but continues to improve  • Abdominal swelling: Abdominal swelling is improving since metolazone was added by Nephro.   • Home weight: Not currently monitoring due to inability to stand  • Home BP: SBP has been in the 100s-110s with less drops at home  • Home heart rate: 60s  • Daily activities of living: Performing with assistance  • Pillows/lying flat: Sleeps in hospital bed.  • Mobility assistance devices:  WC at home, bedside commode.    • Overall, Mrs. Purdy feels she is improving.  She reports since addition of metolazone she is making more urine and her abdomen is less swollen.   • She denies chest pain and palpitations.  Home Health continues to come and help wrap her legs.    • She has ongoing dry cough, which she has had for several years duration.  She reports it is not productive and is not any worse than usual.    • She reports she sees her Nephrologist in early march.        Specialists:   • Cardiology: [facility] with EP & General        Review of Systems - Review of Systems   Constitutional: Negative for chills, decreased appetite and malaise/fatigue.   HENT: Negative for congestion, ear discharge and ear pain.    Eyes: Negative for blurred vision, discharge and double vision.   Cardiovascular: Positive for dyspnea on exertion.   Respiratory: Positive for cough. Negative for  hemoptysis and shortness of breath.    Endocrine: Negative for cold intolerance and heat intolerance.   Hematologic/Lymphatic: Negative for adenopathy and bleeding problem.   Skin: Negative for color change, dry skin and nail changes.   Musculoskeletal: Positive for arthritis and muscle weakness. Negative for myalgias.   Gastrointestinal: Positive for bloating. Negative for abdominal pain.   Genitourinary: Negative for bladder incontinence, decreased libido and dysuria.   Neurological: Negative for brief paralysis, difficulty with concentration and dizziness.   Psychiatric/Behavioral: Negative for altered mental status, depression and hallucinations.   Allergic/Immunologic: Negative for environmental allergies and HIV exposure.         All other systems were reviewed and were negative.    Patient Active Problem List   Diagnosis   • Acute on chronic congestive heart failure (HCC)   • Cardiomyopathy (HCC)   • CKD (chronic kidney disease) stage 2, GFR 60-89 ml/min   • Severe obesity (BMI 35.0-39.9) with comorbidity (HCC)   • Chronic anemia   • Elevated bilirubin   • Acute on chronic combined systolic and diastolic CHF (congestive heart failure) (HCC)   • Hyponatremia       family history is not on file.     reports that she has never smoked. She has never used smokeless tobacco. She reports that she does not drink alcohol and does not use drugs.    Allergies   Allergen Reactions   • Phenergan [Promethazine]          Current Outpatient Medications:   •  acetaminophen (TYLENOL) 500 MG tablet, Take 500 mg by mouth 2 (Two) Times a Day As Needed for Mild Pain., Disp: , Rfl:   •  amiodarone (PACERONE) 200 MG tablet, Take 200 mg by mouth 2 (Two) Times a Day., Disp: , Rfl:   •  apixaban (ELIQUIS) 5 MG tablet tablet, Take 1 tablet by mouth Every 12 (Twelve) Hours. Indications: Atrial Fibrillation, Disp: 60 tablet, Rfl: 3  •  aspirin 81 MG EC tablet, Take 1 tablet by mouth Daily., Disp: 30 tablet, Rfl: 3  •  bumetanide (BUMEX) 2  MG tablet, Take 1 tablet by mouth Daily for 30 days., Disp: 30 tablet, Rfl: 0  •  busPIRone (BUSPAR) 15 MG tablet, Take 1 tablet by mouth 2 (Two) Times a Day for 30 days., Disp: 60 tablet, Rfl: 0  •  Cyanocobalamin 2500 MCG sublingual tablet, Place 2,500 mcg under the tongue Daily., Disp: , Rfl:   •  empagliflozin (Jardiance) 10 MG tablet tablet, Take 1 tablet by mouth Daily for 90 days., Disp: 90 tablet, Rfl: 0  •  ferrous sulfate 325 (65 FE) MG tablet, Take 325 mg by mouth 2 (Two) Times a Day., Disp: , Rfl:   •  folic acid (FOLVITE) 1 MG tablet, Take 1 mg by mouth Daily., Disp: , Rfl:   •  Insulin Glargine (LANTUS SOLOSTAR) 100 UNIT/ML injection pen, Inject 58 Units under the skin into the appropriate area as directed 2 (Two) Times a Day., Disp: , Rfl:   •  Insulin Regular Human, Conc, (HumuLIN R U-500 KwikPen) 500 UNIT/ML solution pen-injector CONCENTRATED injection, Inject 40 Units under the skin into the appropriate area as directed 2 (Two) Times a Day., Disp: , Rfl:   •  levothyroxine (SYNTHROID, LEVOTHROID) 50 MCG tablet, Take 50 mcg by mouth Daily., Disp: , Rfl:   •  metOLazone (ZAROXOLYN) 5 MG tablet, Take 5 mg by mouth Daily., Disp: , Rfl:   •  metoprolol succinate XL (Toprol XL) 25 MG 24 hr tablet, Take 1 tablet by mouth Every Night for 90 days., Disp: 30 tablet, Rfl: 2  •  omeprazole (priLOSEC) 20 MG capsule, Take 1 capsule by mouth Daily., Disp: , Rfl:   •  ondansetron (ZOFRAN) 4 MG tablet, Take 4 mg by mouth Every 8 (Eight) Hours As Needed for Nausea or Vomiting., Disp: , Rfl:   •  polyethylene glycol (MIRALAX) 17 g packet, Take 17 g by mouth Daily. (Patient taking differently: Take 17 g by mouth Daily As Needed.), Disp: 30 packet, Rfl: 3  •  pregabalin (LYRICA) 100 MG capsule, Take 100 mg by mouth 2 (Two) Times a Day As Needed., Disp: , Rfl:   •  rOPINIRole (REQUIP) 0.5 MG tablet, Take 0.5 mg by mouth Every Night. Take 1 hour before bedtime., Disp: , Rfl:       Physical Exam:  I have reviewed the  patient's current vital signs as documented in the patient's EMR.     Vitals:    23 0846   BP: 106/61   BP Location: Left arm   Patient Position: Sitting   Cuff Size: Adult   Pulse: 65   SpO2: 95%       Physical Exam  Vitals and nursing note reviewed.   Constitutional:       Appearance: Normal appearance.   HENT:      Head: Normocephalic and atraumatic.   Eyes:      General: Lids are normal.   Cardiovascular:      Rate and Rhythm: Normal rate and regular rhythm.      Heart sounds: S1 normal and S2 normal.   Pulmonary:      Effort: No tachypnea or bradypnea.      Breath sounds: No decreased breath sounds, wheezing, rhonchi or rales.   Chest:      Chest wall: No mass or lacerations.   Abdominal:      General: Abdomen is protuberant. Bowel sounds are normal.      Palpations: Abdomen is soft.      Tenderness: There is no abdominal tenderness.      Comments: Less distended than on prior visits.     Musculoskeletal:      Right lower leg: Edema present.      Left lower leg: Edema present.      Right foot: Swelling present.      Left foot: Swelling present.   Skin:     General: Skin is warm and moist.      Comments: Unna boots present   Neurological:      Mental Status: She is alert and oriented to person, place, and time.   Psychiatric:         Attention and Perception: Attention normal.         Mood and Affect: Mood normal.          JVP: Volume/Pulsation: Normal.        DATA REVIEWED:     EKG. I personally reviewed and interpreted the EKG.    Last EKG at [facility] not available for viewing.      ---------------------------------------------------  TTE/KHAI:    TRANSTHORACIC ECHOCARDIOGRAPHY REPORT    Demographics    Patient Name:          JAMAL WHARTON      :            1965    Medical Record Number: [id number]          Age:            56 year(s)    Corporate ID Number:   [id number]          Gender          Female    Account Number:        [id number]    Sonographer:           Hayden Chaudhry,     Height:          65 inches                           [facility]    Referring Physician:   ANDREAS HERNANDEZ     Weight:         240 pounds    Interpreting           MATHEUS IRELAND   BMI:            39.94 kg/m^2    Physician:             MD    Date of Service:       11/17/2022          Blood Pressure: 113/40 mmHg    Room Number:           320   Type of Study:    TTE procedure: EC Echo Complete, ECHO COMPLETE (DOPPLER / COLOR) W OR WO    CONTRAST.    Patient Status: Routine IP    Study Location: PortableTechnical Quality: Adequate visualization    Contrast Medium: Optison. Amount - 3 ml    Impression:    Indication:Hypertensive heart disease with heart failure I11.0    Mild left ventricular hypertrophy. Visually estimated ejection fraction    30% +/- 5%. Abnormal left ventricular systolic function; abnormal systolic    strain pattern. Restrictive filling pattern consistent with severely    increased LV filling pressure (Grade III Diastolic dysfunction).    Dilated right ventricle. Abnormal TAPSE; abnormal right ventricular    function. Abnormal right atrial size.    Mild mitral stenosis. Peak/Mean 6/2mmHg    Moderate tricuspid (2+) regurgitation.    No masses or thrombi are seen.   Measurements Summary:    LVEDd: 4.99 cm         LVESd: 4.08 cm          IVSEd: 0.79 cm    AO Root:2.97 cm        LVPWd: 1.04 cm    Contractility Score    Global Left Ventricular Hypokinesis was noted.    LV regional wall motion: (0-Not visualized 1-Normal 2-Hypokinesis    3-Akinesis 4-Dyskinesis 5-Aneurysm)    Left Ventricle    Peak E-wave: 1.22   Peak A-wave: 0.2 m/s    E/A ratio: 5.99    m/s                 Volume [id number]  LV length: 8.47 cm    ml    Volume stclesnf23.87 ml    LVOT diameter: 2.05 cm    Normal sized left ventricle.    Mild left ventricular hypertrophy.    Visually estimated ejection fraction 30% +/- 5%.    Abnormal left ventricular systolic function; abnormal systolic strain    pattern.    Restrictive filling pattern consistent with  severely increased LV filling    pressure (Grade III Diastolic dysfunction).    No left ventricular masses or thrombi.    Right Ventricle    Diastolic dimension: 4.72     RV systolic pressure: 22.15 mmHg    cm    Dilated right ventricle.    Abnormal TAPSE; abnormal right ventricular function.    Left Atrium    LA dimension: 4.64 cm               LA volume:95.38 ml    LA/Aorta: 1.56    Abnormal left atrial volume index 45ml/m2.    Intact atrial septum.    No atrial mass or thrombus.    Right Atrium    Abnormal right atrial size.    Intact atrial septum.    No atrial mass or thrombus.    Mitral Valve    Deceleration time:     Area PHT: 2.04 cm^2  Mean velocity:    248.96 msec            P1/2t: 107.68 msec   0.57 m/s    Area (continuity):   Mean gradient:    1.73 cm^2            1.89 mmHg    Peak gradient:    5.97 mmHg    Thickened mitral valve leaflets.    Mild mitral regurgitation.    Mild mitral stenosis. Peak/Mean 6/2mmHg    Echogenic density noted on mitral valve chordae. Seen on prior exam    10/16/2022    Aortic Valve    LVOT VTI: 18.22 cm    Mildly thickend free edges of the aortic valve leaflets.    No aortic regurgitation.    No aortic stenosis.    No masses or vegetations seen.    Tricuspid Valve    TR velocity: 2.19 m/s     TR gradient: 19.34344 mmHg    Estimated RAP: 3 mmHg     RVSP: 22.17 mmHg    Structurally normal tricuspid valve.    Moderate tricuspid (2+) regurgitation.    RVSP likely underestimated due to RV systolic function.    No tricuspid stenosis.    No masses or vegetations seen.    Pulmonic Valve    Acceleration time: 119.95 msec          PASP: 22.15 mmHg    Structurally normal pulmonic valve.    Mild pulmonic regurgitation (1+).    No pulmonic stenosis.    No masses or vegetations seen.   Great Vessels   Aorta    Aortic Root: 2.97 cm    Ascending Aorta: 2.76 cm    LVOT Diameter: 2.05 cm   Visualized aorta is normal.   Normal aortic root.   No evidence of dissection.   IVC is not well  visualized.   Unable to obtain adequate subcostal view.    Pericardium / Pleura   No pericardial effusion.    Other    No masses or thrombi.    No intracardiac shunt.    ----------------------------------------------------------------    Electronically signed by MATHEUS IRELAND MD(Interpreting    Physician) on 11/17/2022 17:38       Results for orders placed during the hospital encounter of 09/09/20    Adult Transesophageal Echo (KHAI) W/ Cont if Necessary Per Protocol (Cardiology Department)    Interpretation Summary  · Ejection fraction appears to be 21 - 25%. Left ventricular systolic function is severely decreased.  · Right ventricular cavity is mildly dilated. Moderately reduced right ventricular systolic function noted.  · Trace mitral valve regurgitation is present.  · Moderate tricuspid valve regurgitation is present. Estimated right ventricular systolic pressure from tricuspid regurgitation is normal (<35 mmHg).  · There is (grade 1) plaque in the proximal aorta present. There is (grade 1) plaque in the ascending aorta present. There is (grade 1) plaque in the aortic arch present. There is (grade 1) plaque in the descending aorta present.    RHC report:      CARDIAC CATH - RIGHT HEART CATH    Anatomical Region Laterality Modality   Heart -- Other     Narrative    Cardiac Diagnostic Report    Demographics    Patient Name       JAMAL WHARTON      Date of Birth          1965    Patient #          [id number]          Accession #            [id number]    Visit #            [id number]          Medical Record #    Physician          MATHEUS IRELAND   Date of Study          12/22/2022                       MD    Referring                              Interventional    Physician                              physician    Procedure   Procedure Type    Diagnostic procedure: RHC with Cardiomems, RIGHT HEART CATH   Indications: Angina.    Conclusions   Procedure Description   Using ultrasound and micropuncture,  access to right brachial vein obtained   and 7F sheath inserted. 7F PA catheter used for RHC.   I attempted right radial access for LHC but unsuccessful due to very small   artery which appears occluded or near-occluded.   Procedure Summary   Elevated left and right heart filling pressures.   Elevated pulmonary pressures.   Borderline-low Ramos CO/CI.    Procedure Data   Procedure Date   Date: 12/22/2022Start: 15:32End: 16:09   Entry Locations     - Retrograde Percutaneous access was performed through the Right Axiliary.       A 7 Fr sheath was inserted. Hemostasis was successfully obtained using       Manual Compression.       Entry Comments: brachial vein.   Procedure Medications     - Fentanyl I.V. 25 mcg.     - Versed Sheath 1 mg.     - Oxygen NC 6 l/min.   Diagnostic Catheters     - A7 FR MON [employer] [id number] used for:Arch.   Contrast Material     - None0 ml   Fluoroscopy Time: Total: 2:48 minutes.   Fluoroscopy Dose: Total: 155 mGy.    Medical History    Risk Factors    The patient risk factors include:obesity, hypercholesterolemia, treated    and uncontrolled hypertension, diabetes mellitus, last creatinine: 2    mg/dl, creatinine clearance: 69.72 ml/min and dyslipidemia.    Admission Data    Hemodynamics    Condition: Baseline    O2 Consumption: Estimated: 297.50Heart Rate: 41 bpm   Oxygen Saturation   +--------+-----+----+----------------------+---+---------------------------+   !Location!pCO2 !pO2 !% Saturation          !Hgb!O2 Content                 !   +--------+-----+----+----------------------+---+---------------------------+   !PA      !     !    !51                    !   !                           !   +--------+-----+----+----------------------+---+---------------------------+   !RA      !     !    !58                    !   !                           !   +--------+-----+----+----------------------+---+---------------------------+   !AO      !     !    !94                    !   !                            !   +--------+-----+----+----------------------+---+---------------------------+   Pressures (mmHg)   +-----+--------------------------------------------------------------------+   !Site !Pressure                                                            !   +-----+--------------------------------------------------------------------+   !RA   !28/28 (25)                                                          !   +-----+--------------------------------------------------------------------+   !RV   !81/10 ,27                                                           !   +-----+--------------------------------------------------------------------+   !PA   !73/32 (43)                                                          !   +-----+--------------------------------------------------------------------+   !PCW  !68/66 (45)                                                          !   +-----+--------------------------------------------------------------------+   !PCW  !78/59 (45)                                                          !   +-----+--------------------------------------------------------------------+   !PCW  !23/36 (27)                                                          !   +-----+--------------------------------------------------------------------+   Cardiac Output   +------+-------------------------+----------------------------+------------+   !Method!CO (l/min)               !CI (l/min/m2)               !SV (ml)     !   +------+-------------------------+----------------------------+------------+   !Ramos  !5.35                     !2.2                         !131.67      !   +------+-------------------------+----------------------------+------------+   Shunts   Oxygen Values    O2 Capacity 129.2 O2 Consumption 297.5    Flows (l/min)    Qs 6.4 Qe/Qp 1.2    Qp 5.35 Qp/Qs 0.84    Qe 6.4   Vascular Resistance (dynes x sec x cm-5)    +-------------------------------------+----+---+----+----+---------+-------+   !CO method                            !TSVR!SVR!TPVR!PVR !TPVR/TSVR!PVR/SVR!   +-------------------------------------+----+---+----+----+---------+-------+   !Ramos                                 !    !   !8.03!2.94!         !       !   +-------------------------------------+----+---+----+----+---------+-------+   !Qp or Qs                             !    !   !8.03!2.94!         !       !   +-------------------------------------+----+---+----+----+---------+-------+    Signatures    ----------------------------------------------------------------    Electronically signed by MATHEUS IRELAND MD(Physician) on    12/22/2022 16:19    ----------------------------------------------------------------        -----------------------------------------------------  CXR/Imaging:   Imaging Results (Most Recent)     None          I personally reviewed and interpreted the CXR.      -----------------------------------------------------  CT:   No radiology results for the last 30 days.  I personally reviewed the images of the CT scan.  My personal interpretation is below.      ----------------------------------------------------    PFTs:    No PFTS available.      --------------------------------------------------------------------------------------------------    Laboratory evaluations:    Lab Results   Component Value Date    GLUCOSE 334 (H) 02/06/2023    BUN 31 (H) 02/06/2023    CREATININE 1.59 (H) 02/06/2023    EGFRIFNONA 49 (L) 11/10/2020    BCR 19.5 02/06/2023    K 3.8 02/06/2023    CO2 29.8 (H) 02/06/2023    CALCIUM 9.0 02/06/2023    ALBUMIN 3.54 10/21/2020    LABIL2 1.3 (L) 06/09/2016    AST 21 10/21/2020    ALT 17 10/21/2020     Lab Results   Component Value Date    WBC 6.88 01/31/2023    HGB 9.7 (L) 01/31/2023    HCT 32.9 (L) 01/31/2023    .5 (H) 01/31/2023     (L) 01/31/2023     Lab Results   Component Value Date    CHOL 137  09/11/2020    CHLPL 103 04/21/2016    TRIG 80 09/11/2020    HDL 43 09/11/2020    LDL 78 09/11/2020     Lab Results   Component Value Date    TSH 3.830 09/10/2020     Lab Results   Component Value Date    HGBA1C 8.40 (H) 09/10/2020     Lab Results   Component Value Date    ALT 17 10/21/2020     Lab Results   Component Value Date    HGBA1C 8.40 (H) 09/10/2020    HGBA1C 5.4 04/21/2016     Lab Results   Component Value Date    MICROALBUR 3.2 09/12/2020    CREATININE 1.59 (H) 02/06/2023     Lab Results   Component Value Date    IRON 34.0 (L) 12/21/2022    TIBC 237 (L) 09/18/2020    FERRITIN 1,219.30 (H) 12/21/2022     Lab Results   Component Value Date    INR 1.10 12/15/2022    INR 1.25 (H) 09/23/2020    INR 1.09 09/22/2020    PROTIME 11.6 12/15/2022    PROTIME 15.5 (H) 09/23/2020    PROTIME 13.9 09/22/2020        Lab Results   Component Value Date    ABSOLUTELUNG 47 (A) 01/31/2023       PAH RISK ASSESSMENT:      1. Chronic HFrEF (heart failure with reduced ejection fraction) (HCC)    2. Cardiomyopathy, unspecified type (HCC)          ORDERS PLACED TODAY:  Orders Placed This Encounter   Procedures   • Basic Metabolic Panel   • proBNP   • Magnesium   • Basic Metabolic Panel   • proBNP   • Magnesium   • ReDs Vest        Diagnoses and all orders for this visit:    1. Chronic HFrEF (heart failure with reduced ejection fraction) (HCC) (Primary)  -     Basic Metabolic Panel; Future  -     proBNP; Future  -     Magnesium; Future  -     ReDs Vest  -     Basic Metabolic Panel; Standing  -     Basic Metabolic Panel  -     proBNP; Standing  -     proBNP  -     Magnesium; Standing  -     Magnesium    2. Cardiomyopathy, unspecified type (HCC)  -     Basic Metabolic Panel; Future  -     proBNP; Future  -     Magnesium; Future  -     Basic Metabolic Panel; Standing  -     Basic Metabolic Panel  -     proBNP; Standing  -     proBNP  -     Magnesium; Standing  -     Magnesium             MEDS ORDERED TODAY:    No orders of the defined  types were placed in this encounter.       ---------------------------------------------------------------------------------------------------------------------------          ASSESSMENT/PLAN:      Diagnosis Plan   1. Chronic HFrEF (heart failure with reduced ejection fraction) ([facility])  Basic Metabolic Panel    proBNP    Magnesium    ReDs Vest    Basic Metabolic Panel    Basic Metabolic Panel    proBNP    proBNP    Magnesium    Magnesium      2. Cardiomyopathy, unspecified type ([facility])  Basic Metabolic Panel    proBNP    Magnesium    Basic Metabolic Panel    Basic Metabolic Panel    proBNP    proBNP    Magnesium    Magnesium          not acutely decompensated chronic moderate systolic and diastolic heart failure. CHF. Etiology: Unknown/Idiopathic. LVEF 30-35%.     NYHA stage Stage D: Presence of advanced heart disease with continued heart failure symptoms requiring aggressive medical therapyFC-Class IV: Unable to carry on any physical activity without discomfort. Symptoms of heart failureat rest. If any physical activity is undertaken, discomfort increases.     Today, Patient is approaching euvolemia and with  Moderate perfusion. The patient's hemodynamics are currently acceptable. HR is: normal and is at goal. BP/MAP was reviewed and there is notroom for medication up-titration.  Clinical trajectory was assessed and hasimproved.     CHF GOAL DIRECTED MEDICAL THERAPY FOR PATIENT ADDRESSED/ADJUSTED:     GDMT    Drug Class   Drug   Dose Last Dose Adjustment Additional Titration   Notes   ACEi/ARB/ARNI ACEI previously stopped due to renal fxn       Beta Blocker  Metoprolol Succinate   25 mg qhs Instructed to take nightly on prior visits with improval      MRA Spirono previously stopped due to renal fxn       SGLT2i Jardiance 10mg qd  N/A      -CHF Specific BB:   • Toprol XL 25mg (previously decreased in early 02/2023) is being tolerated well with dizziness resolved and fewer lower blood pressures.    • Hold parameters  reviewed.      -ACE/ARB/ARNi:   • ACEi recently held during [facility] hospitalization.     -MRA:   • The patient is FC-NYHA Class IV and MRA is indicated.   • Spironolactone was previously stopped on regimen due to renal function.    • Will continue to monitor real function and attempt to adjust/re-add; however, she is also being followed by Nephrology with Metolazone recently added, thus will hold off on MRA addition.      -SGLT2 inhibitor therapy:   • A BMP at initiation to verify GFR >30 was recommended, as was interval GFR surveillance.  • The patient was advised to hold SGLT2I when PO intake is restricted due to a planned surgery, or due to an underlying illness.    • Recommend continuing  Jardiance (empagliflozin) 10mg with quarterly assessment of GFR. She continues to deny  difficulties.    • Pt was advised SEs, some severe, including hypersensitivity and Yesenia's; coupled with discussion regarding common side effects of UTIs and female genital mycotic infections were discussed. If you will be NPO, or are sick (poor PO intake, N&V) please hold the medication until you are back to a normal diet.     -Diuretic regimen:   • ReDS Vest reading for 02/21/23  Was 38, though in patient without standing weight.  She appears euvolemic with improving proBNP.     • Continue Bumex 2mg & metolazone 5mg daily regimen.   • BMP, Mag, & ProBNP reviewed with patient with toleration of current medication regimen.      -Fluid restriction/Sodium restriction:   • Requested 2000 ml restriction  • Patient has been asked to weigh daily and was provided with a printed diuretic strategy.  • 1,500 mg Na restriction was discussed.    -Acute vs. Chronic underlying conditions other than HF addressed during visit:     • Paroxysmal Atrial fibrillation/Flutter, currently SR:   o Office visit reviewed from January 27, 2023 with [facility] electrophysiology with cardiomyopathy felt to be multifactorial with DMC and severely worsened by  atrial fibrillation with chronic anemia with noted recent hospital discharge with amiodarone and Eliquis she was in sinus rhythm on her January 27, 2023 evaluation with amiodarone renewed but with Lasix 20 mg restarted and encouragement to follow-up more closely in [place].    o Continue Eliquis 5mg qd with [id number] at least 4.    o Continue Amiodarone as prescribed per EP.      • CKD IIIb:   o Monitor BMP. Reviewed today with creatinine appearing 1.5 with baseline previously 1.9-2.2 per review of OSH labs.    o Continues to be within baseline.      • Debility:   o Continue home health with PT/OT.   o Multiple types of DME at home.     · Iron/Anemia in CHF:  o Prior Iron labs from [facility] with Iron 31, ferritin 1219.30, and TIBC 234 with iron saturation 13%.    o Continue BID ferrous sulfate.          • Identifiable barriers to Heart Failure Self-care:   o Medical Barriers: Debility  o Social Barriers: Transport limitations      --------------------------------------------------------------------------------          BMI cannot be calculated due to outdated height or weight values with patient unable to stand. She has self reported weight of 315              >45 minutes out of 60 minutes face to face spent counseling patient extensively on dietary Na+ intake, importance of activity, weight monitoring, compliance with medications in addition to importance of titration with goal directed medical therapy and follow up appointments.            This document has been electronically signed by Sonja Romo PA-C  February 21, 2023 09:03 EST      Dictated Utilizing Dragon Dictation: Part of this note may be an electronic transcription/translation of spoken language to printed text using the Dragon Dictation System.    Follow-up appointment and medication changes provided in hand delivered After Visit Summary as well as reviewed in the room.

## 2023-02-21 NOTE — PROGRESS NOTES
Heart Failure Clinic    Date: 02/21/23     Vitals:    02/21/23 0846   BP: 106/61   Pulse: 65   SpO2: 95%        Method of arrival: Other wheelchair    Weighing self daily: No, unable to stand  Monitoring Heart Failure Zones: Yes    Today's HF Zone: Yellow     Taking medications as prescribed: Yes    Edema Yes, but improving    Shortness of Air: Yes    Number of pillows used at night:<2 hospital bed    Educational Materials given:  avs                                                                         ReDS Value: 38  36-41 Possible Hypervolemic Status      Chris Aviles RN 02/21/23 08:48 EST

## 2023-02-21 NOTE — PROGRESS NOTES
Heart Failure Clinic  Pharmacist Note     Yumiko Purdy is a 57 y.o. female seen in the Heart Failure Clinic for HFrEF.  Yumiko Purdy reports a fair understanding of medications. Patient reports continuing taking metolazone 5 mg once daily 30 minutes before bumex 2 mg once daily every day. Pt reports that the swelling is getting better but still has some swelling. She reports that she still has SOB. She is unable to weigh herself at home. She reports that her BP's have not been as low as before last visit, denies any BP's <100 and reports only mild issues with lightheadedness or dizziness compared to previous experience.    Medication Use:   Hx of med intolerances: None related to HF  Retail Rx Management: Uses [facility]    Past Medical History:   Diagnosis Date   • Anemia    • CHF (congestive heart failure) (HCC)    • Chronic a-fib (HCC)     stated by patient    • Cirrhosis of liver (HCC)     stated by patient    • Diabetes mellitus (HCC)      ALLERGIES: Phenergan [promethazine]  Current Outpatient Medications   Medication Sig Dispense Refill   • acetaminophen (TYLENOL) 500 MG tablet Take 500 mg by mouth 2 (Two) Times a Day As Needed for Mild Pain.     • amiodarone (PACERONE) 200 MG tablet Take 200 mg by mouth 2 (Two) Times a Day.     • apixaban (ELIQUIS) 5 MG tablet tablet Take 1 tablet by mouth Every 12 (Twelve) Hours. Indications: Atrial Fibrillation 60 tablet 3   • aspirin 81 MG EC tablet Take 1 tablet by mouth Daily. 30 tablet 3   • bumetanide (BUMEX) 2 MG tablet Take 1 tablet by mouth Daily for 30 days. 30 tablet 0   • busPIRone (BUSPAR) 15 MG tablet Take 1 tablet by mouth 2 (Two) Times a Day for 30 days. 60 tablet 0   • Cyanocobalamin 2500 MCG sublingual tablet Place 2,500 mcg under the tongue Daily.     • empagliflozin (Jardiance) 10 MG tablet tablet Take 1 tablet by mouth Daily for 90 days. 90 tablet 1   • ferrous sulfate 325 (65 FE) MG tablet Take 325 mg by mouth 2 (Two) Times a Day.     • folic acid  (FOLVITE) 1 MG tablet Take 1 mg by mouth Daily.     • Insulin Glargine (LANTUS SOLOSTAR) 100 UNIT/ML injection pen Inject 58 Units under the skin into the appropriate area as directed 2 (Two) Times a Day.     • Insulin Regular Human, Conc, (HumuLIN R U-500 KwikPen) 500 UNIT/ML solution pen-injector CONCENTRATED injection Inject 40 Units under the skin into the appropriate area as directed 2 (Two) Times a Day.     • levothyroxine (SYNTHROID, LEVOTHROID) 50 MCG tablet Take 50 mcg by mouth Daily.     • metOLazone (ZAROXOLYN) 5 MG tablet Take 5 mg by mouth Daily.     • metoprolol succinate XL (Toprol XL) 25 MG 24 hr tablet Take 1 tablet by mouth Every Night for 90 days. 30 tablet 2   • ondansetron (ZOFRAN) 4 MG tablet Take 4 mg by mouth Every 8 (Eight) Hours As Needed for Nausea or Vomiting.     • polyethylene glycol (MIRALAX) 17 g packet Take 17 g by mouth Daily. (Patient taking differently: Take 17 g by mouth Daily As Needed.) 30 packet 3   • pregabalin (LYRICA) 100 MG capsule Take 100 mg by mouth 2 (Two) Times a Day As Needed. Taking once daily     • rOPINIRole (REQUIP) 0.5 MG tablet Take 0.5 mg by mouth Every Night. Take 1 hour before bedtime.     • omeprazole (priLOSEC) 20 MG capsule Take 1 capsule by mouth Daily.       No current facility-administered medications for this encounter.       Vaccination History:   Pneumonia: Needs, declines  Annual Influenza: Needs, declines  COVID 19: Needs, declines     Objective  Vitals:    02/21/23 0846   BP: 106/61   BP Location: Left arm   Patient Position: Sitting   Cuff Size: Adult   Pulse: 65   SpO2: 95%     Wt Readings from Last 3 Encounters:   11/10/20 102 kg (225 lb)   11/06/20 102 kg (225 lb)   10/21/20 105 kg (232 lb 6.4 oz)     There were no vitals filed for this visit.  Lab Results   Component Value Date    GLUCOSE 302 (H) 02/21/2023    BUN 44 (H) 02/21/2023    CREATININE 1.54 (H) 02/21/2023    EGFRIFNONA 49 (L) 11/10/2020    BCR 28.6 (H) 02/21/2023    K 4.3  02/21/2023    CO2 29.8 (H) 02/21/2023    CALCIUM 9.5 02/21/2023    ALBUMIN 4.2 02/21/2023    LABIL2 1.3 (L) 06/09/2016    AST 33 (H) 02/21/2023    ALT 20 02/21/2023     Lab Results   Component Value Date    WBC 6.88 01/31/2023    HGB 9.7 (L) 01/31/2023    HCT 32.9 (L) 01/31/2023    .5 (H) 01/31/2023     (L) 01/31/2023     Lab Results   Component Value Date    TROPONINT 0.027 09/11/2020     Lab Results   Component Value Date    PROBNP 5,411.0 (H) 02/21/2023     Results for orders placed during the hospital encounter of 09/09/20    Adult Transesophageal Echo (KHAI) W/ Cont if Necessary Per Protocol (Cardiology Department)    Interpretation Summary  · Ejection fraction appears to be 21 - 25%. Left ventricular systolic function is severely decreased.  · Right ventricular cavity is mildly dilated. Moderately reduced right ventricular systolic function noted.  · Trace mitral valve regurgitation is present.  · Moderate tricuspid valve regurgitation is present. Estimated right ventricular systolic pressure from tricuspid regurgitation is normal (<35 mmHg).  · There is (grade 1) plaque in the proximal aorta present. There is (grade 1) plaque in the ascending aorta present. There is (grade 1) plaque in the aortic arch present. There is (grade 1) plaque in the descending aorta present.         GDMT    Drug Class   Drug   Dose Last Dose Adjustment Additional Titration   Notes   ACEi/ARB/ARNI     Was previously on lisinopril    Beta Blocker Metoprolol XL 25mg QD Decreased from 50 mg daily on 2/6/23 due to hypotensive symptoms     MRA     Was previously on spironolactone 50mg BID Nov-Dec 22   SGLT2i Jardiance  10mg QD  N/A        Drug Therapy Problems    1. Symptoms of hypotension -improved with decreased toprol dose, limiting titration towards GMDT  2. Vaccinations - patient declines  3. Renal function - stable since last visit    Recommendations:     1. Continue to monitor symptoms of hypotension and renal  function.      Patient was educated on heart failure medications and the importance of medication adherence. All questions were addressed and patient expressed some understanding. Would benefit from additional education at each visit.    Thank you for allowing me to participate in the care of your patient,    Ruslan Almazan RPH  02/21/23  12:32 EST

## 2023-03-17 ENCOUNTER — HOSPITAL ENCOUNTER (OUTPATIENT)
Dept: CARDIOLOGY | Facility: HOSPITAL | Age: 58
Discharge: HOME | End: 2023-03-17
Admitting: PHYSICIAN ASSISTANT
Payer: COMMERCIAL

## 2023-03-17 VITALS — OXYGEN SATURATION: 96 % | HEART RATE: 66 BPM | DIASTOLIC BLOOD PRESSURE: 59 MMHG | SYSTOLIC BLOOD PRESSURE: 100 MMHG

## 2023-03-17 DIAGNOSIS — R30.0 DYSURIA: ICD-10-CM

## 2023-03-17 DIAGNOSIS — I50.42 CHRONIC COMBINED SYSTOLIC AND DIASTOLIC HEART FAILURE: Primary | ICD-10-CM

## 2023-03-17 LAB
ABSOLUTE LUNG FLUID CONTENT: 399 % (ref 20–35)
ANION GAP SERPL CALCULATED.3IONS-SCNC: 9.4 MMOL/L (ref 5–15)
BUN SERPL-MCNC: 31 MG/DL (ref 6–20)
BUN/CREAT SERPL: 23.7 (ref 7–25)
CALCIUM SPEC-SCNC: 9.5 MG/DL (ref 8.6–10.5)
CHLORIDE SERPL-SCNC: 97 MMOL/L (ref 98–107)
CO2 SERPL-SCNC: 29.6 MMOL/L (ref 22–29)
CREAT SERPL-MCNC: 1.31 MG/DL (ref 0.57–1)
EGFRCR SERPLBLD CKD-EPI 2021: 47.9 ML/MIN/1.73
GLUCOSE SERPL-MCNC: 373 MG/DL (ref 65–99)
MAGNESIUM SERPL-MCNC: 2.1 MG/DL (ref 1.6–2.6)
NT-PROBNP SERPL-MCNC: 3174 PG/ML (ref 0–900)
POTASSIUM SERPL-SCNC: 4.5 MMOL/L (ref 3.5–5.2)
SODIUM SERPL-SCNC: 136 MMOL/L (ref 136–145)

## 2023-03-17 PROCEDURE — 80048 BASIC METABOLIC PNL TOTAL CA: CPT | Performed by: PHYSICIAN ASSISTANT

## 2023-03-17 PROCEDURE — 94726 PLETHYSMOGRAPHY LUNG VOLUMES: CPT | Performed by: PHYSICIAN ASSISTANT

## 2023-03-17 PROCEDURE — 83735 ASSAY OF MAGNESIUM: CPT | Performed by: PHYSICIAN ASSISTANT

## 2023-03-17 PROCEDURE — 83880 ASSAY OF NATRIURETIC PEPTIDE: CPT | Performed by: PHYSICIAN ASSISTANT

## 2023-03-17 RX ORDER — BUMETANIDE 2 MG/1
2 TABLET ORAL DAILY
Qty: 45 TABLET | Refills: 0 | Status: SHIPPED | OUTPATIENT
Start: 2023-03-17 | End: 2023-04-16

## 2023-03-17 RX ORDER — VERICIGUAT 2.5 MG/1
2.5 TABLET, FILM COATED ORAL DAILY
Qty: 30 TABLET | Refills: 0 | Status: SHIPPED | OUTPATIENT
Start: 2023-03-17 | End: 2023-04-16

## 2023-03-17 RX ORDER — NITROGLYCERIN 0.4 MG/1
0.4 TABLET SUBLINGUAL
COMMUNITY

## 2023-03-17 RX ORDER — CEFDINIR 300 MG/1
300 CAPSULE ORAL 2 TIMES DAILY
Qty: 14 CAPSULE | Refills: 0 | Status: SHIPPED | OUTPATIENT
Start: 2023-03-17 | End: 2023-03-24

## 2023-03-17 RX ORDER — DAPAGLIFLOZIN 10 MG/1
10 TABLET, FILM COATED ORAL DAILY
COMMUNITY

## 2023-03-17 NOTE — PROGRESS NOTES
Heart Failure Clinic    Date: 03/17/23     Vitals:    03/17/23 1144   BP: 100/59   Pulse: 66   SpO2: 96%        Method of arrival: Other wheelchair    Weighing self daily: No    Monitoring Heart Failure Zones: Most days    Today's HF Zone: Yellow     Taking medications as prescribed: Yes    Edema Yes, but improved    Shortness of Air: Yes, but about the same as usual    Number of pillows used at night:Recliner    Educational Materials given:  avs                                                                         ReDS Value: 39  36-41 Possible Hypervolemic Status      Chris Aviles RN 03/17/23 11:46 EDT

## 2023-03-17 NOTE — PROGRESS NOTES
" Heart Failure Clinic  Pharmacist Note     Yumiko Purdy is a 57 y.o. female seen in the Heart Failure Clinic for HFrEF.  Yumiko Purdy reports a fair understanding of medications. She reports that she does not weigh herself at home because she cannot stand on the scale.  She also reports checking her BP at home and says it runs \"good\".  She reports Dr. Mejia stopped her metolazone and changed her Jardiance to Farxiga (has brought in copy of chart note from Dr. Mejia's office).    Medication Use:   Hx of med intolerances: None related to HF  Retail Rx Management: Uses [facility]    Past Medical History:   Diagnosis Date   • Anemia    • CHF (congestive heart failure) (HCC)    • Chronic a-fib (HCC)     stated by patient    • Cirrhosis of liver (HCC)     stated by patient    • Diabetes mellitus (HCC)      ALLERGIES: Phenergan [promethazine]  Current Outpatient Medications   Medication Sig Dispense Refill   • acetaminophen (TYLENOL) 500 MG tablet Take 1 tablet by mouth 2 (Two) Times a Day As Needed for Mild Pain.     • amiodarone (PACERONE) 200 MG tablet Take 1 tablet by mouth 2 (Two) Times a Day.     • apixaban (ELIQUIS) 5 MG tablet tablet Take 1 tablet by mouth Every 12 (Twelve) Hours. Indications: Atrial Fibrillation 60 tablet 3   • aspirin 81 MG EC tablet Take 1 tablet by mouth Daily. 30 tablet 3   • bumetanide (BUMEX) 2 MG tablet Take 1 tablet by mouth Daily for 30 days. Extra 2mg in the morning as needed for swelling, weight gain greater than 3 lbs in a day or 5lbs in a week. 45 tablet 0   • busPIRone (BUSPAR) 15 MG tablet Take 1 tablet by mouth 2 (Two) Times a Day for 30 days. 60 tablet 0   • Cyanocobalamin 2500 MCG sublingual tablet Place 2,500 mcg under the tongue Daily.     • dapagliflozin Propanediol (Farxiga) 10 MG tablet Take 10 mg by mouth Daily.     • ferrous sulfate 325 (65 FE) MG tablet Take 1 tablet by mouth 2 (Two) Times a Day.     • folic acid (FOLVITE) 1 MG tablet Take 1 tablet by mouth Daily.     • " Insulin Glargine (LANTUS SOLOSTAR) 100 UNIT/ML injection pen Inject 58 Units under the skin into the appropriate area as directed 2 (Two) Times a Day.     • Insulin Regular Human, Conc, (HumuLIN R U-500 KwikPen) 500 UNIT/ML solution pen-injector CONCENTRATED injection Inject 40 Units under the skin into the appropriate area as directed 2 (Two) Times a Day.     • levothyroxine (SYNTHROID, LEVOTHROID) 50 MCG tablet Take 1 tablet by mouth Daily.     • metoprolol succinate XL (Toprol XL) 25 MG 24 hr tablet Take 1 tablet by mouth Every Night for 90 days. 30 tablet 2   • nitroglycerin (NITROSTAT) 0.4 MG SL tablet Place 1 tablet under the tongue Every 5 (Five) Minutes As Needed for Chest Pain. Take no more than 3 doses in 15 minutes.     • omeprazole (priLOSEC) 20 MG capsule Take 1 capsule by mouth Daily.     • ondansetron (ZOFRAN) 4 MG tablet Take 1 tablet by mouth Every 8 (Eight) Hours As Needed for Nausea or Vomiting.     • polyethylene glycol (MIRALAX) 17 g packet Take 17 g by mouth Daily. (Patient taking differently: Take 17 g by mouth Daily As Needed.) 30 packet 3   • pregabalin (LYRICA) 100 MG capsule Take 1 capsule by mouth 2 (Two) Times a Day.     • rOPINIRole (REQUIP) 0.5 MG tablet Take 1 tablet by mouth Every Night. Take 1 hour before bedtime.     • cefdinir (OMNICEF) 300 MG capsule Take 1 capsule by mouth 2 (Two) Times a Day for 7 days. 14 capsule 0   • Vericiguat (Verquvo) 2.5 MG tablet Take 1 tablet by mouth Daily for 30 days. 30 tablet 0     No current facility-administered medications for this encounter.       Vaccination History:   Pneumonia: Needs, declines  Annual Influenza: Needs, declines  COVID 19: Needs, declines     Objective  Vitals:    03/17/23 1144   BP: 100/59   BP Location: Right arm   Patient Position: Sitting   Cuff Size: Adult   Pulse: 66   SpO2: 96%     Wt Readings from Last 3 Encounters:   11/10/20 102 kg (225 lb)   11/06/20 102 kg (225 lb)   10/21/20 105 kg (232 lb 6.4 oz)     There were  no vitals filed for this visit.  Lab Results   Component Value Date    GLUCOSE 373 (H) 03/17/2023    BUN 31 (H) 03/17/2023    CREATININE 1.31 (H) 03/17/2023    EGFRIFNONA 49 (L) 11/10/2020    BCR 23.7 03/17/2023    K 4.5 03/17/2023    CO2 29.6 (H) 03/17/2023    CALCIUM 9.5 03/17/2023    ALBUMIN 4.2 02/21/2023    LABIL2 1.3 (L) 06/09/2016    AST 33 (H) 02/21/2023    ALT 20 02/21/2023     Lab Results   Component Value Date    WBC 6.88 01/31/2023    HGB 9.7 (L) 01/31/2023    HCT 32.9 (L) 01/31/2023    .5 (H) 01/31/2023     (L) 01/31/2023     Lab Results   Component Value Date    TROPONINT 0.027 09/11/2020     Lab Results   Component Value Date    PROBNP 3,174.0 (H) 03/17/2023     Results for orders placed during the hospital encounter of 09/09/20    Adult Transesophageal Echo (KHAI) W/ Cont if Necessary Per Protocol (Cardiology Department)    Interpretation Summary  · Ejection fraction appears to be 21 - 25%. Left ventricular systolic function is severely decreased.  · Right ventricular cavity is mildly dilated. Moderately reduced right ventricular systolic function noted.  · Trace mitral valve regurgitation is present.  · Moderate tricuspid valve regurgitation is present. Estimated right ventricular systolic pressure from tricuspid regurgitation is normal (<35 mmHg).  · There is (grade 1) plaque in the proximal aorta present. There is (grade 1) plaque in the ascending aorta present. There is (grade 1) plaque in the aortic arch present. There is (grade 1) plaque in the descending aorta present.         GDMT    Drug Class   Drug   Dose Last Dose Adjustment Additional Titration   Notes   ACEi/ARB/ARNI     Was previously on lisinopril    Beta Blocker Metoprolol XL 25mg QD Decreased from 50 mg daily on 2/6/23 due to hypotensive symptoms     MRA     Was previously on spironolactone 50mg BID Nov-Dec 22   SGLT2i Jardiance  10mg QD  N/A        Drug Therapy Problems    1. Consider Verquvo     Recommendations:      1. Will begin prior authorization for Verquvo.  [facility]     Patient was educated on heart failure medications and the importance of medication adherence. All questions were addressed and patient expressed some understanding. Would benefit from additional education at each visit.    Thank you for allowing me to participate in the care of your patient,    Zunilda Gonzalez, PharmD  03/17/23  12:51 EDT

## 2023-03-17 NOTE — PROGRESS NOTES
[facility]  ABDIFATAH Galarza Melanie Lind, APRN  [address] [place] [address] [address]  [place] [address]    Thank you for asking me to see Yumiko Purdy for congestive heart failure.    HPI:     This is a 57 y.o. female with known past medical history of:  · Chronic systolic & diastolic HF  · TTE from November 2022 with visually estimated ejection fraction of 30% ±5% and noted abnormal systolic strain pattern as well as grade 3 diastolic dysfunction as well as abnormal TAPSE with abnormal right ventricular function  · KHAI on 09/2020 with EF 21-25% with LV systolic function severely decreased and RV cavity mildly dilated with moderately reduced RV systolic function noted, moderate TVR, and aortic plaquing  · Right heart cath on 12/22/2022 with elevated left and right heart pressures with report not available  · ASCVD  · LHC 11/10/20 with preserved LV systolic, EF 50-55% with elevated LV end diastolic pressure consistent with diastolic dysfunction and right dominant system with 30-40% mid LAD lesion.   · LHC attempted on 12/2022 at [facility] with unsuccessful access due to small artery appearing occluded or near occluded radially on right  · Peripheral Arterial disease  · Atrial Flutter  · CKD stage IIIb  · Cirrhosis of the liver  · Stage III CKD  · Chronic hypoxemic respiratory failure  · Morbid Obesity  · Diffuse purpuric rash with prior w/u negative for vasculitis    Yumiko Purdy presents for today for HF clinic evaluation.  The patient is typically seen by Masha Escalante APRN.  Patient's primary cardiologist is Dr. Jad Meredith.      • Last known EF 21-25% by KHAI and 50-55% by LHC in 11/2020.    • Last known hospitalization and/or ED visit: Patient has not been hospitalized at this facility since 2020.  However she was hospitalized at [facility] in [facility] in December 2022 with heart failure admission with discharge summary reviewed  standing admission with acute on chronic mixed systolic diastolic heart failure during which time she was on dobutamine drip and discharged with p.o. Bumex and Zaroxolyn with note of chronic atrial fibrillation.  She was on dobutamine drip for some time and nephrology did also assist in her diuretic adjustments recommending ultimately Bumex twice daily metolazone and spironolactone on discharge.  • Accompanied by: Son      03/17/23 visit data/details regarding:   • Dyspnea: Dyspnea on exertion and debility are slowly improving  • Lower extremity swelling: Bilateral lower extremity swelling is significnatly improved  • Abdominal swelling: Abdominal swelling is improving since metolazone was added by Nephro.   • Home weight: Not currently monitoring due to inability to stand  • Home BP: SBP has been in the 100s-110s with less drops at home  • Home heart rate: 60s  • Daily activities of living: Performing with assistance  • HF zone: Yellow  • Pillows/lying flat: Sleeps in hospital bed.  • Mobility assistance devices:  WC at home, bedside commode.    • Mrs. Purdy is doing well.  She reports Nephrology changed some of her medications.   • Her lower extremity edema is significantly improved and she is no longer requiring unna boots.    • Overall, she does feel much improved.    • She reports some recent onset of dysuria that is worsening. We discuss antibiotics and f/u with PCP.  Please see assessment and plan.        Specialists:   • Cardiology: [facility] with EP & General        Review of Systems - Review of Systems   Constitutional: Negative for chills, decreased appetite and malaise/fatigue.   HENT: Negative for congestion, ear discharge and ear pain.    Eyes: Negative for blurred vision, discharge and double vision.   Cardiovascular: Negative for dyspnea on exertion and leg swelling.   Respiratory: Negative for cough, hemoptysis and shortness of breath.    Endocrine: Negative for cold intolerance and  heat intolerance.   Hematologic/Lymphatic: Negative for adenopathy and bleeding problem.   Skin: Negative for color change, dry skin and nail changes.   Musculoskeletal: Negative for arthritis, muscle weakness and myalgias.   Gastrointestinal: Negative for bloating and abdominal pain.   Genitourinary: Negative for bladder incontinence, decreased libido and dysuria.   Neurological: Negative for brief paralysis, difficulty with concentration and dizziness.   Psychiatric/Behavioral: Negative for altered mental status, depression and hallucinations.   Allergic/Immunologic: Negative for environmental allergies and HIV exposure.         All other systems were reviewed and were negative.    Patient Active Problem List   Diagnosis   • Acute on chronic congestive heart failure (HCC)   • Cardiomyopathy (HCC)   • CKD (chronic kidney disease) stage 2, GFR 60-89 ml/min   • Severe obesity (BMI 35.0-39.9) with comorbidity (HCC)   • Chronic anemia   • Elevated bilirubin   • Acute on chronic combined systolic and diastolic CHF (congestive heart failure) (HCC)   • Hyponatremia       family history is not on file.     reports that she has never smoked. She has never used smokeless tobacco. She reports that she does not drink alcohol and does not use drugs.    Allergies   Allergen Reactions   • Phenergan [Promethazine]          Current Outpatient Medications:   •  acetaminophen (TYLENOL) 500 MG tablet, Take 500 mg by mouth 2 (Two) Times a Day As Needed for Mild Pain., Disp: , Rfl:   •  amiodarone (PACERONE) 200 MG tablet, Take 200 mg by mouth 2 (Two) Times a Day., Disp: , Rfl:   •  apixaban (ELIQUIS) 5 MG tablet tablet, Take 1 tablet by mouth Every 12 (Twelve) Hours. Indications: Atrial Fibrillation, Disp: 60 tablet, Rfl: 3  •  aspirin 81 MG EC tablet, Take 1 tablet by mouth Daily., Disp: 30 tablet, Rfl: 3  •  bumetanide (BUMEX) 2 MG tablet, Take 1 tablet by mouth Daily for 30 days., Disp: 30 tablet, Rfl: 0  •  busPIRone (BUSPAR) 15 MG  tablet, Take 1 tablet by mouth 2 (Two) Times a Day for 30 days., Disp: 60 tablet, Rfl: 0  •  Cyanocobalamin 2500 MCG sublingual tablet, Place 2,500 mcg under the tongue Daily., Disp: , Rfl:   •  empagliflozin (Jardiance) 10 MG tablet tablet, Take 1 tablet by mouth Daily for 90 days., Disp: 90 tablet, Rfl: 1  •  ferrous sulfate 325 (65 FE) MG tablet, Take 325 mg by mouth 2 (Two) Times a Day., Disp: , Rfl:   •  folic acid (FOLVITE) 1 MG tablet, Take 1 mg by mouth Daily., Disp: , Rfl:   •  Insulin Glargine (LANTUS SOLOSTAR) 100 UNIT/ML injection pen, Inject 58 Units under the skin into the appropriate area as directed 2 (Two) Times a Day., Disp: , Rfl:   •  Insulin Regular Human, Conc, (HumuLIN R U-500 KwikPen) 500 UNIT/ML solution pen-injector CONCENTRATED injection, Inject 40 Units under the skin into the appropriate area as directed 2 (Two) Times a Day., Disp: , Rfl:   •  levothyroxine (SYNTHROID, LEVOTHROID) 50 MCG tablet, Take 50 mcg by mouth Daily., Disp: , Rfl:   •  metOLazone (ZAROXOLYN) 5 MG tablet, Take 5 mg by mouth Daily., Disp: , Rfl:   •  metoprolol succinate XL (Toprol XL) 25 MG 24 hr tablet, Take 1 tablet by mouth Every Night for 90 days., Disp: 30 tablet, Rfl: 2  •  omeprazole (priLOSEC) 20 MG capsule, Take 1 capsule by mouth Daily., Disp: , Rfl:   •  ondansetron (ZOFRAN) 4 MG tablet, Take 4 mg by mouth Every 8 (Eight) Hours As Needed for Nausea or Vomiting., Disp: , Rfl:   •  polyethylene glycol (MIRALAX) 17 g packet, Take 17 g by mouth Daily. (Patient taking differently: Take 17 g by mouth Daily As Needed.), Disp: 30 packet, Rfl: 3  •  pregabalin (LYRICA) 100 MG capsule, Take 100 mg by mouth 2 (Two) Times a Day As Needed. Taking once daily, Disp: , Rfl:   •  rOPINIRole (REQUIP) 0.5 MG tablet, Take 0.5 mg by mouth Every Night. Take 1 hour before bedtime., Disp: , Rfl:       Physical Exam:  I have reviewed the patient's current vital signs as documented in the patient's EMR.     Vitals:    03/17/23  1144   BP: 100/59   BP Location: Right arm   Patient Position: Sitting   Cuff Size: Adult   Pulse: 66   SpO2: 96%       Physical Exam  Vitals and nursing note reviewed.   Constitutional:       Appearance: Normal appearance.   HENT:      Head: Normocephalic and atraumatic.   Eyes:      General: Lids are normal.   Cardiovascular:      Rate and Rhythm: Normal rate and regular rhythm.      Heart sounds: S1 normal and S2 normal.   Pulmonary:      Effort: No tachypnea or bradypnea.      Breath sounds: No decreased breath sounds, wheezing, rhonchi or rales.   Chest:      Chest wall: No mass or lacerations.   Abdominal:      General: Abdomen is protuberant. Bowel sounds are normal.      Palpations: Abdomen is soft.      Tenderness: There is no abdominal tenderness.      Comments: Less distended than on prior visits.     Musculoskeletal:      Right lower leg: No edema.      Left lower leg: No edema.      Right foot: No swelling.      Left foot: No swelling.   Skin:     General: Skin is warm and moist.      Comments: Unna boots present   Neurological:      Mental Status: She is alert and oriented to person, place, and time.   Psychiatric:         Attention and Perception: Attention normal.         Mood and Affect: Mood normal.          JVP: Volume/Pulsation: Normal.        DATA REVIEWED:     EKG. I personally reviewed and interpreted the EKG.    Last EKG at [facility] not available for viewing.      ---------------------------------------------------  TTE/KHAI:    TRANSTHORACIC ECHOCARDIOGRAPHY REPORT    Demographics    Patient Name:          JAMAL WHARTON      :            1965    Medical Record Number: [id number]          Age:            56 year(s)    Corporate ID Number:   [id number]          Gender          Female    Account Number:        [id number]    Sonographer:           Hayden Chaudhry,     Height:         65 inches                           [facility]    Referring Physician:   ANDREAS HERNANDEZ     Weight:         240  pounds    Interpreting           MATHEUS IRELAND   BMI:            39.94 kg/m^2    Physician:             MD    Date of Service:       11/17/2022          Blood Pressure: 113/40 mmHg    Room Number:           320   Type of Study:    TTE procedure: EC Echo Complete, ECHO COMPLETE (DOPPLER / COLOR) W OR WO    CONTRAST.    Patient Status: Routine IP    Study Location: [facility] Quality: Adequate visualization    Contrast Medium: Optison. Amount - 3 ml    Impression:    Indication:Hypertensive heart disease with heart failure I11.0    Mild left ventricular hypertrophy. Visually estimated ejection fraction    30% +/- 5%. Abnormal left ventricular systolic function; abnormal systolic    strain pattern. Restrictive filling pattern consistent with severely    increased LV filling pressure (Grade III Diastolic dysfunction).    Dilated right ventricle. Abnormal TAPSE; abnormal right ventricular    function. Abnormal right atrial size.    Mild mitral stenosis. Peak/Mean 6/2mmHg    Moderate tricuspid (2+) regurgitation.    No masses or thrombi are seen.   Measurements Summary:    LVEDd: 4.99 cm         LVESd: 4.08 cm          IVSEd: 0.79 cm    AO Root:2.97 cm        LVPWd: 1.04 cm    Contractility Score    Global Left Ventricular Hypokinesis was noted.    LV regional wall motion: (0-Not visualized 1-Normal 2-Hypokinesis    3-Akinesis 4-Dyskinesis 5-Aneurysm)    Left Ventricle    Peak E-wave: 1.22   Peak A-wave: 0.2 m/s    E/A ratio: 5.99    m/s                 Volume [id number]  LV length: 8.47 cm    ml    Volume [id number] ml    LVOT diameter: 2.05 cm    Normal sized left ventricle.    Mild left ventricular hypertrophy.    Visually estimated ejection fraction 30% +/- 5%.    Abnormal left ventricular systolic function; abnormal systolic strain    pattern.    Restrictive filling pattern consistent with severely increased LV filling    pressure (Grade III Diastolic dysfunction).    No left ventricular masses or  thrombi.    Right Ventricle    Diastolic dimension: 4.72     RV systolic pressure: 22.15 mmHg    cm    Dilated right ventricle.    Abnormal TAPSE; abnormal right ventricular function.    Left Atrium    LA dimension: 4.64 cm               LA volume:95.38 ml    LA/Aorta: 1.56    Abnormal left atrial volume index 45ml/m2.    Intact atrial septum.    No atrial mass or thrombus.    Right Atrium    Abnormal right atrial size.    Intact atrial septum.    No atrial mass or thrombus.    Mitral Valve    Deceleration time:     Area PHT: 2.04 cm^2  Mean velocity:    248.96 msec            P1/2t: 107.68 msec   0.57 m/s    Area (continuity):   Mean gradient:    1.73 cm^2            1.89 mmHg    Peak gradient:    5.97 mmHg    Thickened mitral valve leaflets.    Mild mitral regurgitation.    Mild mitral stenosis. Peak/Mean 6/2mmHg    Echogenic density noted on mitral valve chordae. Seen on prior exam    10/16/2022    Aortic Valve    LVOT VTI: 18.22 cm    Mildly thickend free edges of the aortic valve leaflets.    No aortic regurgitation.    No aortic stenosis.    No masses or vegetations seen.    Tricuspid Valve    TR velocity: 2.19 m/s     TR gradient: 19.10907 mmHg    Estimated RAP: 3 mmHg     RVSP: 22.17 mmHg    Structurally normal tricuspid valve.    Moderate tricuspid (2+) regurgitation.    RVSP likely underestimated due to RV systolic function.    No tricuspid stenosis.    No masses or vegetations seen.    Pulmonic Valve    Acceleration time: 119.95 msec          PASP: 22.15 mmHg    Structurally normal pulmonic valve.    Mild pulmonic regurgitation (1+).    No pulmonic stenosis.    No masses or vegetations seen.   Great Vessels   Aorta    Aortic Root: 2.97 cm    Ascending Aorta: 2.76 cm    LVOT Diameter: 2.05 cm   Visualized aorta is normal.   Normal aortic root.   No evidence of dissection.   IVC is not well visualized.   Unable to obtain adequate subcostal view.    Pericardium / Pleura   No pericardial effusion.    Other     No masses or thrombi.    No intracardiac shunt.    ----------------------------------------------------------------    Electronically signed by MATHEUS IRELAND MD(Interpreting    Physician) on 11/17/2022 17:38       Results for orders placed during the hospital encounter of 09/09/20    Adult Transesophageal Echo (KHAI) W/ Cont if Necessary Per Protocol (Cardiology Department)    Interpretation Summary  · Ejection fraction appears to be 21 - 25%. Left ventricular systolic function is severely decreased.  · Right ventricular cavity is mildly dilated. Moderately reduced right ventricular systolic function noted.  · Trace mitral valve regurgitation is present.  · Moderate tricuspid valve regurgitation is present. Estimated right ventricular systolic pressure from tricuspid regurgitation is normal (<35 mmHg).  · There is (grade 1) plaque in the proximal aorta present. There is (grade 1) plaque in the ascending aorta present. There is (grade 1) plaque in the aortic arch present. There is (grade 1) plaque in the descending aorta present.    RHC report:      CARDIAC CATH - RIGHT HEART CATH    Anatomical Region Laterality Modality   Heart -- Other     Narrative    Cardiac Diagnostic Report    Demographics    Patient Name       JAMAL WHARTON      Date of Birth          1965    Patient #          [id number]          Accession #            [id number]    Visit #            [id number]          Medical Record #    Physician          MATHEUS IRELAND   Date of Study          12/22/2022                       MD    Referring                              Interventional    Physician                              physician    Procedure   Procedure Type    Diagnostic procedure: RHC with Cardiomems, RIGHT HEART CATH   Indications: Angina.    Conclusions   Procedure Description   Using ultrasound and micropuncture, access to right brachial vein obtained   and 7F sheath inserted. 7F PA catheter used for RHC.   I attempted right  radial access for LHC but unsuccessful due to very small   artery which appears occluded or near-occluded.   Procedure Summary   Elevated left and right heart filling pressures.   Elevated pulmonary pressures.   Borderline-low Ramos CO/CI.    Procedure Data   Procedure Date   Date: 12/22/2022Start: 15:32End: 16:09   Entry Locations     - Retrograde Percutaneous access was performed through the Right Axiliary.       A 7 Fr sheath was inserted. Hemostasis was successfully obtained using       Manual Compression.       Entry Comments: brachial vein.   Procedure Medications     - Fentanyl I.V. 25 mcg.     - Versed Sheath 1 mg.     - Oxygen NC 6 l/min.   Diagnostic Catheters     - A7 FR MON Hampton-Misael [id number] used for:Arch.   Contrast Material     - None0 ml   Fluoroscopy Time: Total: 2:48 minutes.   Fluoroscopy Dose: Total: 155 mGy.    Medical History    Risk Factors    The patient risk factors include:obesity, hypercholesterolemia, treated    and uncontrolled hypertension, diabetes mellitus, last creatinine: 2    mg/dl, creatinine clearance: 69.72 ml/min and dyslipidemia.    Admission Data    Hemodynamics    Condition: Baseline    O2 Consumption: Estimated: 297.50Heart Rate: 41 bpm   Oxygen Saturation   +--------+-----+----+----------------------+---+---------------------------+   !Location!pCO2 !pO2 !% Saturation          !Hgb!O2 Content                 !   +--------+-----+----+----------------------+---+---------------------------+   !PA      !     !    !51                    !   !                           !   +--------+-----+----+----------------------+---+---------------------------+   !RA      !     !    !58                    !   !                           !   +--------+-----+----+----------------------+---+---------------------------+   !AO      !     !    !94                    !   !                           !   +--------+-----+----+----------------------+---+---------------------------+   Pressures (mmHg)    +-----+--------------------------------------------------------------------+   !Site !Pressure                                                            !   +-----+--------------------------------------------------------------------+   !RA   !28/28 (25)                                                          !   +-----+--------------------------------------------------------------------+   !RV   !81/10 ,27                                                           !   +-----+--------------------------------------------------------------------+   !PA   !73/32 (43)                                                          !   +-----+--------------------------------------------------------------------+   !PCW  !68/66 (45)                                                          !   +-----+--------------------------------------------------------------------+   !PCW  !78/59 (45)                                                          !   +-----+--------------------------------------------------------------------+   !PCW  !23/36 (27)                                                          !   +-----+--------------------------------------------------------------------+   Cardiac Output   +------+-------------------------+----------------------------+------------+   !Method!CO (l/min)               !CI (l/min/m2)               !SV (ml)     !   +------+-------------------------+----------------------------+------------+   !Ramos  !5.35                     !2.2                         !131.67      !   +------+-------------------------+----------------------------+------------+   Shunts   Oxygen Values    O2 Capacity 129.2 O2 Consumption 297.5    Flows (l/min)    Qs 6.4 Qe/Qp 1.2    Qp 5.35 Qp/Qs 0.84    Qe 6.4   Vascular Resistance (dynes x sec x cm-5)   +-------------------------------------+----+---+----+----+---------+-------+   !CO method                            !TSVR!SVR!TPVR!PVR !TPVR/TSVR!PVR/SVR!    +-------------------------------------+----+---+----+----+---------+-------+   !Ramos                                 !    !   !8.03!2.94!         !       !   +-------------------------------------+----+---+----+----+---------+-------+   !Qp or Qs                             !    !   !8.03!2.94!         !       !   +-------------------------------------+----+---+----+----+---------+-------+    Signatures    ----------------------------------------------------------------    Electronically signed by MATHEUS IRELAND MD(Physician) on    12/22/2022 16:19    ----------------------------------------------------------------        -----------------------------------------------------  CXR/Imaging:   Imaging Results (Most Recent)     None          I personally reviewed and interpreted the CXR.      -----------------------------------------------------  CT:   No radiology results for the last 30 days.  I personally reviewed the images of the CT scan.  My personal interpretation is below.      ----------------------------------------------------    PFTs:    No PFTS available.      --------------------------------------------------------------------------------------------------    Laboratory evaluations:    Lab Results   Component Value Date    GLUCOSE 302 (H) 02/21/2023    BUN 44 (H) 02/21/2023    CREATININE 1.54 (H) 02/21/2023    EGFRIFNONA 49 (L) 11/10/2020    BCR 28.6 (H) 02/21/2023    K 4.3 02/21/2023    CO2 29.8 (H) 02/21/2023    CALCIUM 9.5 02/21/2023    ALBUMIN 4.2 02/21/2023    LABIL2 1.3 (L) 06/09/2016    AST 33 (H) 02/21/2023    ALT 20 02/21/2023     Lab Results   Component Value Date    WBC 6.88 01/31/2023    HGB 9.7 (L) 01/31/2023    HCT 32.9 (L) 01/31/2023    .5 (H) 01/31/2023     (L) 01/31/2023     Lab Results   Component Value Date    CHOL 137 09/11/2020    CHLPL 103 04/21/2016    TRIG 80 09/11/2020    HDL 43 09/11/2020    LDL 78 09/11/2020     Lab Results   Component Value Date    TSH 3.830  09/10/2020     Lab Results   Component Value Date    HGBA1C 8.40 (H) 09/10/2020     Lab Results   Component Value Date    ALT 20 02/21/2023     Lab Results   Component Value Date    HGBA1C 8.40 (H) 09/10/2020    HGBA1C 5.4 04/21/2016     Lab Results   Component Value Date    MICROALBUR 3.2 09/12/2020    CREATININE 1.54 (H) 02/21/2023     Lab Results   Component Value Date    IRON 34.0 (L) 12/21/2022    TIBC 237 (L) 09/18/2020    FERRITIN 1,219.30 (H) 12/21/2022     Lab Results   Component Value Date    INR 1.10 12/15/2022    INR 1.25 (H) 09/23/2020    INR 1.09 09/22/2020    PROTIME 11.6 12/15/2022    PROTIME 15.5 (H) 09/23/2020    PROTIME 13.9 09/22/2020        Lab Results   Component Value Date    ABSOLUTELUNG 38 (A) 02/21/2023    ABSOLUTELUNG 47 (A) 01/31/2023       PAH RISK ASSESSMENT:      1. Chronic combined systolic and diastolic heart failure (HCC)          ORDERS PLACED TODAY:  Orders Placed This Encounter   Procedures   • proBNP   • Magnesium   • Basic Metabolic Panel   • proBNP   • Magnesium   • Basic Metabolic Panel   • ReDs Vest        Diagnoses and all orders for this visit:    1. Chronic combined systolic and diastolic heart failure (HCC) (Primary)  -     proBNP; Future  -     Magnesium; Future  -     Basic Metabolic Panel; Future  -     ReDs Vest  -     proBNP; Standing  -     proBNP  -     Magnesium; Standing  -     Magnesium  -     Basic Metabolic Panel; Standing  -     Basic Metabolic Panel             MEDS ORDERED TODAY:    No orders of the defined types were placed in this encounter.       ---------------------------------------------------------------------------------------------------------------------------          ASSESSMENT/PLAN:      Diagnosis Plan   1. Chronic combined systolic and diastolic heart failure (HCC)  proBNP    Magnesium    Basic Metabolic Panel    ReDs Vest    proBNP    proBNP    Magnesium    Magnesium    Basic Metabolic Panel    Basic Metabolic Panel          not acutely  decompensated chronic moderate systolic and diastolic heart failure. CHF. Etiology: Unknown/Idiopathic. LVEF 30-35%.     NYHA stage Stage D: Presence of advanced heart disease with continued heart failure symptoms requiring aggressive medical therapyFC-Class IV: Unable to carry on any physical activity without discomfort. Symptoms of heart failureat rest. If any physical activity is undertaken, discomfort increases.     Today, Patient is approaching euvolemia and with  Moderate perfusion. The patient's hemodynamics are currently acceptable. HR is: normal and is at goal. BP/MAP was reviewed and there is notroom for medication up-titration.  Clinical trajectory was assessed and hasimproved.     CHF GOAL DIRECTED MEDICAL THERAPY FOR PATIENT ADDRESSED/ADJUSTED:     GDMT    Drug Class   Drug   Dose Last Dose Adjustment Additional Titration   Notes   ACEi/ARB/ARNI ACEI previously stopped due to renal fxn       Beta Blocker  Metoprolol Succinate   25 mg qhs Instructed to take nightly on prior visits with improval      MRA Spirono previously stopped due to renal fxn       SGLT2i Farxiga 10mg qd  N/A      Secondary management: Consider Verquevo for patient. She has had hospitalizations in the last year at [facility] and [facility] with acute heart failure requiring IV diuresis.  Additionally, her last EF, per available echocardiogram from 11/2022 within this system was 30+/-5, regardless of plus or minus, either total is less than 44%.  Further GDMT titration is limited by CKD and Cirrhosis in addition to borderline pressures.  Additionally, worried we may also have to stop SGLT2i soon due to suspected UTI.      -CHF Specific BB:   • Toprol XL 25mg (previously decreased in early 02/2023) is being tolerated well with dizziness resolved and fewer lower blood pressures.    • Hold parameters reviewed.      -ACE/ARB/ARNi:   • ACEi recently held during [facility] hospitalization.     -MRA:   • The patient is FC-NYHA  Class IV and MRA is indicated.   • Spironolactone was previously stopped on regimen due to renal function.    • Will continue to monitor real function and attempt to adjust/re-add; however, she is also being followed by Nephrology with Metolazone recently added, thus will hold off on MRA addition.      -SGLT2 inhibitor therapy:   • A BMP at initiation to verify GFR >30 was recommended, as was interval GFR surveillance.  • The patient was advised to hold SGLT2I when PO intake is restricted due to a planned surgery, or due to an underlying illness.    • Recommend continuing  Farxiga 10mg with quarterly assessment of GFR. She continues to deny  difficulties.   She was transitioned by Nephrology.    • Pt was advised SEs, some severe, including hypersensitivity and Yesenia's; coupled with discussion regarding common side effects of UTIs and female genital mycotic infections were discussed. If you will be NPO, or are sick (poor PO intake, N&V) please hold the medication until you are back to a normal diet.     -Diuretic regimen:   • ReDS Vest reading for 03/17/23 39 though in patient without standing weight.  She appears euvolemic with improving proBNP.     • Continue Bumex 2mg with metolazone discontinued by Nephrology.  Instructed her to take extra 2mg as needed for 3lb weight gain in a day and/or 5lbs in a week in addition to worsening swelling or shortness of breath.   • BMP, Mag, & ProBNP reviewed with patient with toleration of current medication regimen.      -Fluid restriction/Sodium restriction:   • Requested 2000 ml restriction  • Patient has been asked to weigh daily and was provided with a printed diuretic strategy.  • 1,500 mg Na restriction was discussed.    -Acute vs. Chronic underlying conditions other than HF addressed during visit:     • Dysuria:   o Given recent Jardiance and Farxiga, omnicef 300mg BID sent to her pharmacy.   o She plans to f/u with per PCP on 03/28/23.   o Instructed her to call  back if she has more dysuria after this, as we may need to stop the Farxiga if she begins to have regular  infections.     • Paroxysmal Atrial fibrillation/Flutter, currently SR:   o Office visit reviewed from January 27, 2023 with [facility] electrophysiology with cardiomyopathy felt to be multifactorial with DMC and severely worsened by atrial fibrillation with chronic anemia with noted recent hospital discharge with amiodarone and Eliquis she was in sinus rhythm on her January 27, 2023 evaluation with amiodarone renewed but with Lasix 20 mg restarted and encouragement to follow-up more closely in [place].    o Continue Eliquis 5mg qd with [id number] at least 4.    o Continue Amiodarone as prescribed per EP.  Per extensive review of outside cardiology notes, she has been tried on reduced dose of amiodarone 200mg qd and had recurrent hospitalization with atrial fibrillation with RVR. While she does have known Cirrhosis, prior attempts to decrease amiodarone have resulted in RVR events.    o Continue current Amiodarone 200mg BID as previously directed.      • CKD IIIb:   o Monitor BMP. Reviewed today with creatinine appearing 1.5 with baseline previously 1.9-2.2 per review of OSH labs.    o Continues to be within baseline.      • Debility:   o Continue home health with PT/OT.   o Multiple types of DME at home.     · Iron/Anemia in CHF:  o Prior Iron labs from [facility] with Iron 31, ferritin 1219.30, and TIBC 234 with iron saturation 13%.    o Continue BID ferrous sulfate.          • Identifiable barriers to Heart Failure Self-care:   o Medical Barriers: Debility  o Social Barriers: Transport limitations      --------------------------------------------------------------------------------          BMI cannot be calculated due to outdated height or weight values with patient unable to stand. She has self reported weight of 315              >45 minutes out of 60 minutes face to face spent counseling patient  extensively on dietary Na+ intake, importance of activity, weight monitoring, compliance with medications in addition to importance of titration with goal directed medical therapy and follow up appointments.            This document has been electronically signed by Sonja Romo PA-C  March 17, 2023 11:51 EDT      Dictated Utilizing Dragon Dictation: Part of this note may be an electronic transcription/translation of spoken language to printed text using the Dragon Dictation System.    Follow-up appointment and medication changes provided in hand delivered After Visit Summary as well as reviewed in the room.

## 2023-04-20 ENCOUNTER — HOSPITAL ENCOUNTER (OUTPATIENT)
Dept: CARDIOLOGY | Facility: HOSPITAL | Age: 58
Discharge: HOME | End: 2023-04-20
Payer: COMMERCIAL

## 2023-04-20 VITALS — OXYGEN SATURATION: 95 % | SYSTOLIC BLOOD PRESSURE: 101 MMHG | HEART RATE: 82 BPM | DIASTOLIC BLOOD PRESSURE: 64 MMHG

## 2023-04-20 DIAGNOSIS — E87.6 HYPOKALEMIA: ICD-10-CM

## 2023-04-20 DIAGNOSIS — N18.2 CKD (CHRONIC KIDNEY DISEASE) STAGE 2, GFR 60-89 ML/MIN: ICD-10-CM

## 2023-04-20 DIAGNOSIS — I42.9 CARDIOMYOPATHY, UNSPECIFIED TYPE: ICD-10-CM

## 2023-04-20 DIAGNOSIS — I50.42 CHRONIC COMBINED SYSTOLIC AND DIASTOLIC CHF (CONGESTIVE HEART FAILURE): Primary | ICD-10-CM

## 2023-04-20 DIAGNOSIS — E66.01 SEVERE OBESITY (BMI 35.0-39.9) WITH COMORBIDITY: ICD-10-CM

## 2023-04-20 DIAGNOSIS — K74.60 CIRRHOSIS OF LIVER WITHOUT ASCITES, UNSPECIFIED HEPATIC CIRRHOSIS TYPE: ICD-10-CM

## 2023-04-20 LAB
ABSOLUTE LUNG FLUID CONTENT: 38 % (ref 20–35)
ANION GAP SERPL CALCULATED.3IONS-SCNC: 12.6 MMOL/L (ref 5–15)
BUN SERPL-MCNC: 40 MG/DL (ref 6–20)
BUN/CREAT SERPL: 27.8 (ref 7–25)
CALCIUM SPEC-SCNC: 9.8 MG/DL (ref 8.6–10.5)
CHLORIDE SERPL-SCNC: 89 MMOL/L (ref 98–107)
CO2 SERPL-SCNC: 34.4 MMOL/L (ref 22–29)
CREAT SERPL-MCNC: 1.44 MG/DL (ref 0.57–1)
EGFRCR SERPLBLD CKD-EPI 2021: 42.8 ML/MIN/1.73
GLUCOSE SERPL-MCNC: 366 MG/DL (ref 65–99)
MAGNESIUM SERPL-MCNC: 2.2 MG/DL (ref 1.6–2.6)
NT-PROBNP SERPL-MCNC: 1182 PG/ML (ref 0–900)
POTASSIUM SERPL-SCNC: 3.2 MMOL/L (ref 3.5–5.2)
SODIUM SERPL-SCNC: 136 MMOL/L (ref 136–145)

## 2023-04-20 PROCEDURE — 83880 ASSAY OF NATRIURETIC PEPTIDE: CPT | Performed by: PHYSICIAN ASSISTANT

## 2023-04-20 PROCEDURE — 80048 BASIC METABOLIC PNL TOTAL CA: CPT | Performed by: PHYSICIAN ASSISTANT

## 2023-04-20 PROCEDURE — 94726 PLETHYSMOGRAPHY LUNG VOLUMES: CPT | Performed by: PHYSICIAN ASSISTANT

## 2023-04-20 PROCEDURE — 83735 ASSAY OF MAGNESIUM: CPT | Performed by: PHYSICIAN ASSISTANT

## 2023-04-20 RX ORDER — POTASSIUM CHLORIDE 20 MEQ/1
20 TABLET, EXTENDED RELEASE ORAL DAILY
Qty: 3 TABLET | Refills: 0 | Status: SHIPPED | OUTPATIENT
Start: 2023-04-20 | End: 2023-04-23

## 2023-04-20 RX ORDER — BUMETANIDE 2 MG/1
2 TABLET ORAL DAILY
Qty: 45 TABLET | Refills: 0 | Status: SHIPPED | OUTPATIENT
Start: 2023-04-20 | End: 2023-05-20

## 2023-04-20 RX ORDER — NITROGLYCERIN 0.4 MG/1
0.4 TABLET SUBLINGUAL
Qty: 10 TABLET | Refills: 0 | Status: SHIPPED | OUTPATIENT
Start: 2023-04-20 | End: 2023-04-30

## 2023-04-20 RX ORDER — DAPAGLIFLOZIN 10 MG/1
10 TABLET, FILM COATED ORAL DAILY
Qty: 90 TABLET | Refills: 0 | Status: SHIPPED | OUTPATIENT
Start: 2023-04-20 | End: 2023-07-19

## 2023-04-20 RX ORDER — METOPROLOL SUCCINATE 25 MG/1
25 TABLET, EXTENDED RELEASE ORAL NIGHTLY
Qty: 30 TABLET | Refills: 2 | Status: SHIPPED | OUTPATIENT
Start: 2023-04-20 | End: 2023-07-19

## 2023-04-20 NOTE — PROGRESS NOTES
Heart Failure [facility]    Date: 04/20/23     Vitals:    04/20/23 1301   BP: 101/64   Pulse: 82   SpO2: 95%        Method of arrival: Other wheelchair    Weighing self daily: No    Monitoring Heart Failure Zones: Most days    Today's HF Zone: Yellow     Taking medications as prescribed: Yes    Edema Yes, improved    Shortness of Air: Yes,     Number of pillows used at night:<2    Educational Materials given:  avs                                                                         ReDS Value: 38  36-41 Possible Hypervolemic Status      Chris Aviles RN 04/20/23 13:05 EDT

## 2023-04-20 NOTE — PROGRESS NOTES
[facility]  Sonja Romo PA-C       Referring, Self  [facility] [place] [address]    Thank you for asking me to see Yumiko Purdy for congestive heart failure.    HPI:     This is a 57 y.o. female with known past medical history of:  · Chronic systolic & diastolic HF  · TTE from November 2022 with visually estimated ejection fraction of 30% ±5% and noted abnormal systolic strain pattern as well as grade 3 diastolic dysfunction as well as abnormal TAPSE with abnormal right ventricular function  · KHAI on 09/2020 with EF 21-25% with LV systolic function severely decreased and RV cavity mildly dilated with moderately reduced RV systolic function noted, moderate TVR, and aortic plaquing  · Right heart cath on 12/22/2022 with elevated left and right heart pressures with report not available  · ASCVD  · LHC 11/10/20 with preserved LV systolic, EF 50-55% with elevated LV end diastolic pressure consistent with diastolic dysfunction and right dominant system with 30-40% mid LAD lesion.   · LHC attempted on 12/2022 at [facility] with unsuccessful access due to small artery appearing occluded or near occluded radially on right  · Peripheral Arterial disease  · Atrial Flutter  · CKD stage IIIb  · Cirrhosis of the liver  · Stage III CKD  · Chronic hypoxemic respiratory failure  · Morbid Obesity  · Diffuse purpuric rash with prior w/u negative for vasculitis    Yumiko Purdy presents for today for HF clinic evaluation.  The patient is typically seen by Masha Escalante APRN.  Patient's primary cardiologist is Dr. Jad Meredith.      • Last known EF 21-25% by KHAI and 50-55% by LHC in 11/2020.    • Last known hospitalization and/or ED visit: Patient has not been hospitalized at this facility since 2020.  However she was hospitalized at [facility] in December 2022 with heart failure admission with discharge summary reviewed standing  admission with acute on chronic mixed systolic diastolic heart failure during which time she was on dobutamine drip and discharged with p.o. Bumex and Zaroxolyn with note of chronic atrial fibrillation.  She was on dobutamine drip for some time and nephrology did also assist in her diuretic adjustments recommending ultimately Bumex twice daily metolazone and spironolactone on discharge.  • Accompanied by: Son      04/20/23 visit data/details regarding:   • Dyspnea: Dyspnea on exertion remains present.  Patient is WC bound and mostly just exerts herself with transfers and such   • Lower extremity swelling: Bilateral lower extremity swelling is significantly improved and remains so with current bumex regimen  • Abdominal swelling: Abdominal swelling is improving since metolazone was added by Nephro.   • Home weight: Not currently monitoring due to inability to stand  • Home BP: SBP has been in the 100s-110s with less drops at home  • Home heart rate: 60s  • Daily activities of living: Performing with assistance  • HF zone: Yellow  • Pillows/lying flat: Sleeps in hospital bed.  • Mobility assistance devices:  WC at home, bedside commode.    • Mrs. Purdy is chest pain free and doing well from respiratory standpoint. She continues to do well without swelling.   • She denies further urinary symptoms since her last visit and feels she is tolerating Farxiga well at present.    • She remains WC bound at present.  She reports she has not tried her chair exercises very often.  She is monitoring her fluid intake.        Specialists:   • Cardiology: [facility] with EP & General        Review of Systems - Review of Systems   Constitutional: Negative for chills, decreased appetite and malaise/fatigue.   HENT: Negative for congestion, ear discharge and ear pain.    Eyes: Negative for blurred vision, discharge and double vision.   Cardiovascular: Negative for dyspnea on exertion and leg swelling.   Respiratory:  Negative for cough, hemoptysis and shortness of breath.    Endocrine: Negative for cold intolerance and heat intolerance.   Hematologic/Lymphatic: Negative for adenopathy and bleeding problem.   Skin: Negative for color change, dry skin and nail changes.   Musculoskeletal: Negative for arthritis, muscle weakness and myalgias.   Gastrointestinal: Negative for bloating and abdominal pain.   Genitourinary: Negative for bladder incontinence, decreased libido and dysuria.   Neurological: Negative for brief paralysis, difficulty with concentration and dizziness.   Psychiatric/Behavioral: Negative for altered mental status, depression and hallucinations.   Allergic/Immunologic: Negative for environmental allergies and HIV exposure.         All other systems were reviewed and were negative.    Patient Active Problem List   Diagnosis   • Acute on chronic congestive heart failure   • Cardiomyopathy   • CKD (chronic kidney disease) stage 2, GFR 60-89 ml/min   • Severe obesity (BMI 35.0-39.9) with comorbidity   • Chronic anemia   • Elevated bilirubin   • Acute on chronic combined systolic and diastolic CHF (congestive heart failure)   • Hyponatremia       family history is not on file.     reports that she has never smoked. She has never used smokeless tobacco. She reports that she does not drink alcohol and does not use drugs.    Allergies   Allergen Reactions   • Phenergan [Promethazine]          Current Outpatient Medications:   •  acetaminophen (TYLENOL) 500 MG tablet, Take 1 tablet by mouth 2 (Two) Times a Day As Needed for Mild Pain., Disp: , Rfl:   •  amiodarone (PACERONE) 200 MG tablet, Take 1 tablet by mouth 2 (Two) Times a Day., Disp: , Rfl:   •  apixaban (ELIQUIS) 5 MG tablet tablet, Take 1 tablet by mouth Every 12 (Twelve) Hours. Indications: Atrial Fibrillation, Disp: 60 tablet, Rfl: 3  •  aspirin 81 MG EC tablet, Take 1 tablet by mouth Daily., Disp: 30 tablet, Rfl: 3  •  bumetanide (BUMEX) 2 MG tablet, Take 1  tablet by mouth Daily for 30 days. Extra 2mg in the morning as needed for swelling, weight gain greater than 3 lbs in a day or 5lbs in a week., Disp: 45 tablet, Rfl: 0  •  busPIRone (BUSPAR) 15 MG tablet, Take 1 tablet by mouth 2 (Two) Times a Day for 30 days., Disp: 60 tablet, Rfl: 0  •  Cyanocobalamin 2500 MCG sublingual tablet, Place 2,500 mcg under the tongue Daily., Disp: , Rfl:   •  dapagliflozin Propanediol (Farxiga) 10 MG tablet, Take 10 mg by mouth Daily for 90 days., Disp: 90 tablet, Rfl: 0  •  ferrous sulfate 325 (65 FE) MG tablet, Take 1 tablet by mouth 2 (Two) Times a Day., Disp: , Rfl:   •  folic acid (FOLVITE) 1 MG tablet, Take 1 tablet by mouth Daily., Disp: , Rfl:   •  Insulin Glargine (LANTUS SOLOSTAR) 100 UNIT/ML injection pen, Inject 58 Units under the skin into the appropriate area as directed 2 (Two) Times a Day., Disp: , Rfl:   •  Insulin Regular Human, Conc, (HumuLIN R U-500 KwikPen) 500 UNIT/ML solution pen-injector CONCENTRATED injection, Inject 40 Units under the skin into the appropriate area as directed 3 (Three) Times a Day Before Meals., Disp: , Rfl:   •  levothyroxine (SYNTHROID, LEVOTHROID) 50 MCG tablet, Take 1 tablet by mouth Daily., Disp: , Rfl:   •  metoprolol succinate XL (Toprol XL) 25 MG 24 hr tablet, Take 1 tablet by mouth Every Night for 90 days., Disp: 30 tablet, Rfl: 2  •  nitroglycerin (NITROSTAT) 0.4 MG SL tablet, Place 1 tablet under the tongue Every 5 (Five) Minutes As Needed for Chest Pain for up to 10 days. Take no more than 3 doses in 15 minutes., Disp: 10 tablet, Rfl: 0  •  omeprazole (priLOSEC) 20 MG capsule, Take 1 capsule by mouth Daily., Disp: , Rfl:   •  ondansetron (ZOFRAN) 4 MG tablet, Take 1 tablet by mouth Every 8 (Eight) Hours As Needed for Nausea or Vomiting., Disp: , Rfl:   •  polyethylene glycol (MIRALAX) 17 g packet, Take 17 g by mouth Daily. (Patient taking differently: Take 17 g by mouth Daily As Needed.), Disp: 30 packet, Rfl: 3  •  pregabalin  (LYRICA) 100 MG capsule, Take 1 capsule by mouth 2 (Two) Times a Day., Disp: , Rfl:   •  rOPINIRole (REQUIP) 0.5 MG tablet, Take 1 tablet by mouth Every Night. Take 1 hour before bedtime., Disp: , Rfl:   •  potassium chloride (K-DUR,KLOR-CON) 20 MEQ CR tablet, Take 1 tablet by mouth Daily for 3 days., Disp: 3 tablet, Rfl: 0  •  Vericiguat (Verquvo) 2.5 MG tablet, Take 1 tablet by mouth Daily for 30 days., Disp: 30 tablet, Rfl: 0      Physical Exam:  I have reviewed the patient's current vital signs as documented in the patient's EMR.     Vitals:    04/20/23 1301   BP: 101/64   BP Location: Left arm   Patient Position: Sitting   Cuff Size: Adult   Pulse: 82   SpO2: 95%       Physical Exam  Vitals and nursing note reviewed.   Constitutional:       Appearance: Normal appearance.   HENT:      Head: Normocephalic and atraumatic.   Eyes:      General: Lids are normal.   Cardiovascular:      Rate and Rhythm: Normal rate and regular rhythm.      Heart sounds: S1 normal and S2 normal.   Pulmonary:      Effort: No tachypnea or bradypnea.      Breath sounds: No decreased breath sounds, wheezing, rhonchi or rales.   Chest:      Chest wall: No mass or lacerations.   Abdominal:      General: Abdomen is protuberant. Bowel sounds are normal.      Palpations: Abdomen is soft.      Tenderness: There is no abdominal tenderness.      Comments: Less distended than on prior visits.     Musculoskeletal:      Right lower leg: No edema.      Left lower leg: No edema.      Right foot: No swelling.      Left foot: No swelling.   Skin:     General: Skin is warm and moist.   Neurological:      Mental Status: She is alert and oriented to person, place, and time.   Psychiatric:         Attention and Perception: Attention normal.         Mood and Affect: Mood normal.          JVP: Volume/Pulsation: Normal.        DATA REVIEWED:     EKG. I personally reviewed and interpreted the EKG.    Last EKG at  [facility] not available for viewing.       ---------------------------------------------------  TTE/KHAI:    TRANSTHORACIC ECHOCARDIOGRAPHY REPORT    Demographics    Patient Name:          JAMAL WHARTON      :            1965    Medical Record Number: [id number]          Age:            56 year(s)    Corporate ID Number:   [id number]          Gender          Female    Account Number:        [id number]    Sonographer:           Hayden Chaudhry,     Height:         65 inches                           [facility]    Referring Physician:   ANDREAS HERNANDEZ     Weight:         240 pounds    Interpreting           MATHEUS IRELAND   BMI:            39.94 kg/m^2    Physician:             MD    Date of Service:       2022          Blood Pressure: 113/40 mmHg    Room Number:           320   Type of Study:    TTE procedure: EC Echo Complete, ECHO COMPLETE (DOPPLER / COLOR) W OR WO    CONTRAST.    Patient Status: Routine IP    Study Location: PortableTechnical Quality: Adequate visualization    Contrast Medium: Optison. Amount - 3 ml    Impression:    Indication:Hypertensive heart disease with heart failure I11.0    Mild left ventricular hypertrophy. Visually estimated ejection fraction    30% +/- 5%. Abnormal left ventricular systolic function; abnormal systolic    strain pattern. Restrictive filling pattern consistent with severely    increased LV filling pressure (Grade III Diastolic dysfunction).    Dilated right ventricle. Abnormal TAPSE; abnormal right ventricular    function. Abnormal right atrial size.    Mild mitral stenosis. Peak/Mean 6/2mmHg    Moderate tricuspid (2+) regurgitation.    No masses or thrombi are seen.   Measurements Summary:    LVEDd: 4.99 cm         LVESd: 4.08 cm          IVSEd: 0.79 cm    AO Root:2.97 cm        LVPWd: 1.04 cm    Contractility Score    Global Left Ventricular Hypokinesis was noted.    LV regional wall motion: (0-Not visualized 1-Normal 2-Hypokinesis    3-Akinesis 4-Dyskinesis 5-Aneurysm)    Left Ventricle    Peak  E-wave: 1.22   Peak A-wave: 0.2 m/s    E/A ratio: 5.99    m/s                 Volume [id number]  LV length: 8.47 cm    ml    Volume qgeczths47.87 ml    LVOT diameter: 2.05 cm    Normal sized left ventricle.    Mild left ventricular hypertrophy.    Visually estimated ejection fraction 30% +/- 5%.    Abnormal left ventricular systolic function; abnormal systolic strain    pattern.    Restrictive filling pattern consistent with severely increased LV filling    pressure (Grade III Diastolic dysfunction).    No left ventricular masses or thrombi.    Right Ventricle    Diastolic dimension: 4.72     RV systolic pressure: 22.15 mmHg    cm    Dilated right ventricle.    Abnormal TAPSE; abnormal right ventricular function.    Left Atrium    LA dimension: 4.64 cm               LA volume:95.38 ml    LA/Aorta: 1.56    Abnormal left atrial volume index 45ml/m2.    Intact atrial septum.    No atrial mass or thrombus.    Right Atrium    Abnormal right atrial size.    Intact atrial septum.    No atrial mass or thrombus.    Mitral Valve    Deceleration time:     Area PHT: 2.04 cm^2  Mean velocity:    248.96 msec            P1/2t: 107.68 msec   0.57 m/s    Area (continuity):   Mean gradient:    1.73 cm^2            1.89 mmHg    Peak gradient:    5.97 mmHg    Thickened mitral valve leaflets.    Mild mitral regurgitation.    Mild mitral stenosis. Peak/Mean 6/2mmHg    Echogenic density noted on mitral valve chordae. Seen on prior exam    10/16/2022    Aortic Valve    LVOT VTI: 18.22 cm    Mildly thickend free edges of the aortic valve leaflets.    No aortic regurgitation.    No aortic stenosis.    No masses or vegetations seen.    Tricuspid Valve    TR velocity: 2.19 m/s     TR gradient: 19.77183 mmHg    Estimated RAP: 3 mmHg     RVSP: 22.17 mmHg    Structurally normal tricuspid valve.    Moderate tricuspid (2+) regurgitation.    RVSP likely underestimated due to RV systolic function.    No tricuspid stenosis.    No masses or  vegetations seen.    Pulmonic Valve    Acceleration time: 119.95 msec          PASP: 22.15 mmHg    Structurally normal pulmonic valve.    Mild pulmonic regurgitation (1+).    No pulmonic stenosis.    No masses or vegetations seen.   Great Vessels   Aorta    Aortic Root: 2.97 cm    Ascending Aorta: 2.76 cm    LVOT Diameter: 2.05 cm   Visualized aorta is normal.   Normal aortic root.   No evidence of dissection.   IVC is not well visualized.   Unable to obtain adequate subcostal view.    Pericardium / Pleura   No pericardial effusion.    Other    No masses or thrombi.    No intracardiac shunt.    ----------------------------------------------------------------    Electronically signed by MATHEUS IRELAND MD(Interpreting    Physician) on 11/17/2022 17:38       Results for orders placed during the hospital encounter of 09/09/20    Adult Transesophageal Echo (KHAI) W/ Cont if Necessary Per Protocol (Cardiology Department)    Interpretation Summary  · Ejection fraction appears to be 21 - 25%. Left ventricular systolic function is severely decreased.  · Right ventricular cavity is mildly dilated. Moderately reduced right ventricular systolic function noted.  · Trace mitral valve regurgitation is present.  · Moderate tricuspid valve regurgitation is present. Estimated right ventricular systolic pressure from tricuspid regurgitation is normal (<35 mmHg).  · There is (grade 1) plaque in the proximal aorta present. There is (grade 1) plaque in the ascending aorta present. There is (grade 1) plaque in the aortic arch present. There is (grade 1) plaque in the descending aorta present.    [facility] report:      CARDIAC CATH - RIGHT HEART CATH    Anatomical Region Laterality Modality   Heart -- Other     Narrative    Cardiac Diagnostic Report    Demographics    Patient Name       JAMAL WHARTON      Date of Birth          1965    Patient #          [id number]          Accession #            [id number]    Visit #             [id number]          Medical Record #    Physician          MATHEUS IRELAND   Date of Study          12/22/2022                       MD    Referring                              Interventional    Physician                              physician    Procedure   Procedure Type    Diagnostic procedure: RHC with Cardiomems, RIGHT HEART CATH   Indications: Angina.    Conclusions   Procedure Description   Using ultrasound and micropuncture, access to right brachial vein obtained   and 7F sheath inserted. 7F PA catheter used for RHC.   I attempted right radial access for LHC but unsuccessful due to very small   artery which appears occluded or near-occluded.   Procedure Summary   Elevated left and right heart filling pressures.   Elevated pulmonary pressures.   Borderline-low Ramos CO/CI.    Procedure Data   Procedure Date   Date: 12/22/2022Start: 15:32End: 16:09   Entry Locations     - Retrograde Percutaneous access was performed through the Right Axiliary.       A 7 Fr sheath was inserted. Hemostasis was successfully obtained using       Manual Compression.       Entry Comments: brachial vein.   Procedure Medications     - Fentanyl I.V. 25 mcg.     - Versed Sheath 1 mg.     - Oxygen NC 6 l/min.   Diagnostic Catheters     - A7 FR MON [place] [id number] used for:Arch.   Contrast Material     - None0 ml   Fluoroscopy Time: Total: 2:48 minutes.   Fluoroscopy Dose: Total: 155 mGy.    Medical History    Risk Factors    The patient risk factors include:obesity, hypercholesterolemia, treated    and uncontrolled hypertension, diabetes mellitus, last creatinine: 2    mg/dl, creatinine clearance: 69.72 ml/min and dyslipidemia.    Admission Data    Hemodynamics    Condition: Baseline    O2 Consumption: Estimated: 297.50Heart Rate: 41 bpm   Oxygen Saturation   +--------+-----+----+----------------------+---+---------------------------+   !Location!pCO2 !pO2 !% Saturation          !Hgb!O2 Content                 !    +--------+-----+----+----------------------+---+---------------------------+   !PA      !     !    !51                    !   !                           !   +--------+-----+----+----------------------+---+---------------------------+   !RA      !     !    !58                    !   !                           !   +--------+-----+----+----------------------+---+---------------------------+   !AO      !     !    !94                    !   !                           !   +--------+-----+----+----------------------+---+---------------------------+   Pressures (mmHg)   +-----+--------------------------------------------------------------------+   !Site !Pressure                                                            !   +-----+--------------------------------------------------------------------+   !RA   !28/28 (25)                                                          !   +-----+--------------------------------------------------------------------+   !RV   !81/10 ,27                                                           !   +-----+--------------------------------------------------------------------+   !PA   !73/32 (43)                                                          !   +-----+--------------------------------------------------------------------+   !PCW  !68/66 (45)                                                          !   +-----+--------------------------------------------------------------------+   !PCW  !78/59 (45)                                                          !   +-----+--------------------------------------------------------------------+   !PCW  !23/36 (27)                                                          !   +-----+--------------------------------------------------------------------+   Cardiac Output   +------+-------------------------+----------------------------+------------+   !Method!CO (l/min)               !CI (l/min/m2)               !SV (ml)     !    +------+-------------------------+----------------------------+------------+   !Ramos  !5.35                     !2.2                         !131.67      !   +------+-------------------------+----------------------------+------------+   Shunts   Oxygen Values    O2 Capacity 129.2 O2 Consumption 297.5    Flows (l/min)    Qs 6.4 Qe/Qp 1.2    Qp 5.35 Qp/Qs 0.84    Qe 6.4   Vascular Resistance (dynes x sec x cm-5)   +-------------------------------------+----+---+----+----+---------+-------+   !CO method                            !TSVR!SVR!TPVR!PVR !TPVR/TSVR!PVR/SVR!   +-------------------------------------+----+---+----+----+---------+-------+   !Ramos                                 !    !   !8.03!2.94!         !       !   +-------------------------------------+----+---+----+----+---------+-------+   !Qp or Qs                             !    !   !8.03!2.94!         !       !   +-------------------------------------+----+---+----+----+---------+-------+    Signatures    ----------------------------------------------------------------    Electronically signed by MATHEUS IRELAND MD(Physician) on    12/22/2022 16:19    ----------------------------------------------------------------        -----------------------------------------------------  CXR/Imaging:   Imaging Results (Most Recent)     None          I personally reviewed and interpreted the CXR.      -----------------------------------------------------  CT:   No radiology results for the last 30 days.  I personally reviewed the images of the CT scan.  My personal interpretation is below.      ----------------------------------------------------    PFTs:    No PFTS available.      --------------------------------------------------------------------------------------------------    Laboratory evaluations:    Lab Results   Component Value Date    GLUCOSE 366 (H) 04/20/2023    BUN 40 (H) 04/20/2023    CREATININE 1.44 (H) 04/20/2023    EGFRIFNONA 49 (L) 11/10/2020     BCR 27.8 (H) 04/20/2023    K 3.2 (L) 04/20/2023    CO2 34.4 (H) 04/20/2023    CALCIUM 9.8 04/20/2023    ALBUMIN 4.2 02/21/2023    LABIL2 1.3 (L) 06/09/2016    AST 33 (H) 02/21/2023    ALT 20 02/21/2023     Lab Results   Component Value Date    WBC 6.88 01/31/2023    HGB 9.7 (L) 01/31/2023    HCT 32.9 (L) 01/31/2023    .5 (H) 01/31/2023     (L) 01/31/2023     Lab Results   Component Value Date    CHOL 137 09/11/2020    CHLPL 103 04/21/2016    TRIG 80 09/11/2020    HDL 43 09/11/2020    LDL 78 09/11/2020     Lab Results   Component Value Date    TSH 3.830 09/10/2020     Lab Results   Component Value Date    HGBA1C 8.40 (H) 09/10/2020     Lab Results   Component Value Date    ALT 20 02/21/2023     Lab Results   Component Value Date    HGBA1C 8.40 (H) 09/10/2020    HGBA1C 5.4 04/21/2016     Lab Results   Component Value Date    MICROALBUR 3.2 09/12/2020    CREATININE 1.44 (H) 04/20/2023     Lab Results   Component Value Date    IRON 34.0 (L) 12/21/2022    TIBC 237 (L) 09/18/2020    FERRITIN 1,219.30 (H) 12/21/2022     Lab Results   Component Value Date    INR 1.10 12/15/2022    INR 1.25 (H) 09/23/2020    INR 1.09 09/22/2020    PROTIME 11.6 12/15/2022    PROTIME 15.5 (H) 09/23/2020    PROTIME 13.9 09/22/2020        Lab Results   Component Value Date    ABSOLUTELUNG 38 (A) 04/20/2023    ABSOLUTELUNG 399 (A) 03/17/2023    ABSOLUTELUNG 38 (A) 02/21/2023       PAH RISK ASSESSMENT:      1. Chronic combined systolic and diastolic CHF (congestive heart failure)    2. CKD (chronic kidney disease) stage 2, GFR 60-89 ml/min    3. Severe obesity (BMI 35.0-39.9) with comorbidity    4. Cardiomyopathy, unspecified type    5. Cirrhosis of liver without ascites, unspecified hepatic cirrhosis type    6. Hypokalemia          ORDERS PLACED TODAY:  Orders Placed This Encounter   Procedures   • Basic Metabolic Panel   • Magnesium   • proBNP   • Basic Metabolic Panel   • Magnesium   • proBNP   • ReDs Vest        Diagnoses and  all orders for this visit:    1. Chronic combined systolic and diastolic CHF (congestive heart failure) (Primary)  -     Basic Metabolic Panel; Future  -     Magnesium; Future  -     proBNP; Future  -     ReDs Vest  -     Basic Metabolic Panel; Standing  -     Basic Metabolic Panel  -     Magnesium; Standing  -     Magnesium  -     proBNP; Standing  -     proBNP    2. CKD (chronic kidney disease) stage 2, GFR 60-89 ml/min    3. Severe obesity (BMI 35.0-39.9) with comorbidity    4. Cardiomyopathy, unspecified type    5. Cirrhosis of liver without ascites, unspecified hepatic cirrhosis type    6. Hypokalemia    Other orders  -     bumetanide (BUMEX) 2 MG tablet; Take 1 tablet by mouth Daily for 30 days. Extra 2mg in the morning as needed for swelling, weight gain greater than 3 lbs in a day or 5lbs in a week.  Dispense: 45 tablet; Refill: 0  -     dapagliflozin Propanediol (Farxiga) 10 MG tablet; Take 10 mg by mouth Daily for 90 days.  Dispense: 90 tablet; Refill: 0  -     nitroglycerin (NITROSTAT) 0.4 MG SL tablet; Place 1 tablet under the tongue Every 5 (Five) Minutes As Needed for Chest Pain for up to 10 days. Take no more than 3 doses in 15 minutes.  Dispense: 10 tablet; Refill: 0  -     metoprolol succinate XL (Toprol XL) 25 MG 24 hr tablet; Take 1 tablet by mouth Every Night for 90 days.  Dispense: 30 tablet; Refill: 2  -     potassium chloride (K-DUR,KLOR-CON) 20 MEQ CR tablet; Take 1 tablet by mouth Daily for 3 days.  Dispense: 3 tablet; Refill: 0             MEDS ORDERED TODAY:    New Medications Ordered This Visit   Medications   • bumetanide (BUMEX) 2 MG tablet     Sig: Take 1 tablet by mouth Daily for 30 days. Extra 2mg in the morning as needed for swelling, weight gain greater than 3 lbs in a day or 5lbs in a week.     Dispense:  45 tablet     Refill:  0   • dapagliflozin Propanediol (Farxiga) 10 MG tablet     Sig: Take 10 mg by mouth Daily for 90 days.     Dispense:  90 tablet     Refill:  0   •  nitroglycerin (NITROSTAT) 0.4 MG SL tablet     Sig: Place 1 tablet under the tongue Every 5 (Five) Minutes As Needed for Chest Pain for up to 10 days. Take no more than 3 doses in 15 minutes.     Dispense:  10 tablet     Refill:  0   • metoprolol succinate XL (Toprol XL) 25 MG 24 hr tablet     Sig: Take 1 tablet by mouth Every Night for 90 days.     Dispense:  30 tablet     Refill:  2   • potassium chloride (K-DUR,KLOR-CON) 20 MEQ CR tablet     Sig: Take 1 tablet by mouth Daily for 3 days.     Dispense:  3 tablet     Refill:  0        ---------------------------------------------------------------------------------------------------------------------------          ASSESSMENT/PLAN:      Diagnosis Plan   1. Chronic combined systolic and diastolic CHF (congestive heart failure)  Basic Metabolic Panel    Magnesium    proBNP    ReDs Vest    Basic Metabolic Panel    Basic Metabolic Panel    Magnesium    Magnesium    proBNP    proBNP      2. CKD (chronic kidney disease) stage 2, GFR 60-89 ml/min        3. Severe obesity (BMI 35.0-39.9) with comorbidity        4. Cardiomyopathy, unspecified type        5. Cirrhosis of liver without ascites, unspecified hepatic cirrhosis type        6. Hypokalemia            not acutely decompensated chronic moderate systolic and diastolic heart failure. CHF. Etiology: Unknown/Idiopathic. LVEF 30-35%.     NYHA stage Stage D: Presence of advanced heart disease with continued heart failure symptoms requiring aggressive medical therapyFC-Class IV: Unable to carry on any physical activity without discomfort. Symptoms of heart failureat rest. If any physical activity is undertaken, discomfort increases.     Today, Patient is approaching euvolemia and with  Moderate perfusion. The patient's hemodynamics are currently acceptable. HR is: normal and is at goal. BP/MAP was reviewed and there is notroom for medication up-titration.  Clinical trajectory was assessed and hasimproved.     CHF GOAL  DIRECTED MEDICAL THERAPY FOR PATIENT ADDRESSED/ADJUSTED:     GDMT    Drug Class   Drug   Dose Last Dose Adjustment Additional Titration   Notes   ACEi/ARB/ARNI ACEI previously stopped due to renal fxn       Beta Blocker  Metoprolol Succinate   25 mg qhs Instructed to take nightly on prior visits with improval      MRA Spirono previously stopped due to renal fxn       SGLT2i Farxiga 10mg qd  N/A      [facility]. She has had hospitalizations in the last year at [facility] and [facility] with acute heart failure requiring IV diuresis.  Additionally, her last EF, per available echocardiogram from 11/2022 within this system was 30+/-5, regardless of plus or minus, either total is less than 44%.  Further GDMT titration is limited by CKD and Cirrhosis in addition to borderline pressures.  Additionally, worried we may also have to stop SGLT2i soon due to suspected UTI.      -CHF Specific BB:   • Toprol XL 25mg (previously decreased in early 02/2023) is being tolerated well with dizziness resolved and fewer lower blood pressures.    • Hold parameters reviewed.      -ACE/ARB/ARNi:   • ACEi recently held during [facility] hospitalization.     -MRA:   • The patient is FC-NYHA Class IV and MRA is indicated.   • Spironolactone was previously stopped on regimen due to renal function.    • Will continue to monitor real function and attempt to adjust/re-add; however, she is also being followed by Nephrology with Metolazone recently added, thus will hold off on MRA addition.      -SGLT2 inhibitor therapy:   • A BMP at initiation to verify GFR >30 was recommended, as was interval GFR surveillance.  • The patient was advised to hold SGLT2I when PO intake is restricted due to a planned surgery, or due to an underlying illness.    • Recommend continuing  Farxiga 10mg with quarterly assessment of GFR. She continues to deny  difficulties.   She was transitioned by Nephrology.     • Pt was advised SEs, some severe, including hypersensitivity and Yesenia's; coupled with discussion regarding common side effects of UTIs and female genital mycotic infections were discussed. If you will be NPO, or are sick (poor PO intake, N&V) please hold the medication until you are back to a normal diet.       Secondary pharmacological therapy in HFreF:   • EF is less than 45% @ 30-35%.   • Maximized on GDMT as tolerated thus far (see chart above)  • Recent hospitalization or IV diuresis includes hospitalizations within the last year at [facility], [facility], and [facility] with acute heart failure in addition to afib with RVR.    • Given HFrEF with optimally treated with primary therapies for HFrEF and vasodilator therapy, start Verquevo 2.5mg qd with close BP monitoring with further titration at next visit with review of BP log.  Hesitant to titrate without review of BP log as patient has not monitored BP closely in the past.         -Diuretic regimen:   • ReDS Vest reading for 04/20/23 39 though in patient without standing weight.  She appears euvolemic with improving proBNP.     • Continue Bumex 2mg with metolazone discontinued by Nephrology.  Instructed her to take extra 2mg as needed for 3lb weight gain in a day and/or 5lbs in a week in addition to worsening swelling or shortness of breath.   • BMP, Mag, & ProBNP reviewed with patient with toleration of current medication regimen.      -Fluid restriction/Sodium restriction:   • Requested 2000 ml restriction  • Patient has been asked to weigh daily and was provided with a printed diuretic strategy.  • 1,500 mg Na restriction was discussed.    -Acute vs. Chronic underlying conditions other than HF addressed during visit:       • Acute hypokalemia:   o 20 meq potassium x3 days.     • Paroxysmal Atrial fibrillation/Flutter, currently SR:   o Office visit reviewed from January 27, 2023 with [facility] electrophysiology with cardiomyopathy felt to be  multifactorial with DMC and severely worsened by atrial fibrillation with chronic anemia with noted recent hospital discharge with amiodarone and Eliquis she was in sinus rhythm on her January 27, 2023 evaluation with amiodarone renewed but with Lasix 20 mg restarted and encouragement to follow-up more closely in [place].    o Continue Eliquis 5mg qd with [id number] at least 4.    o Continue Amiodarone as prescribed per EP.  Per extensive review of outside cardiology notes, she has been tried on reduced dose of amiodarone 200mg qd and had recurrent hospitalization with atrial fibrillation with RVR. While she does have known Cirrhosis, prior attempts to decrease amiodarone have resulted in RVR events.    o Continue current Amiodarone 200mg BID as previously directed.      • CKD IIIb:   o Monitor BMP. Reviewed today with creatinine appearing 1.5 with baseline previously 1.9-2.2 per review of OSH labs.    o Continues to be within baseline.      • Debility:   o Continue home health with PT/OT.   o Multiple types of DME at home.     · Iron/Anemia in CHF:  o Prior Iron labs from [facility] with Iron 31, ferritin 1219.30, and TIBC 234 with iron saturation 13%.    o Continue BID ferrous sulfate.          • Identifiable barriers to Heart Failure Self-care:   o Medical Barriers: Debility  o Social Barriers: Transport limitations      --------------------------------------------------------------------------------          BMI cannot be calculated due to outdated height or weight values with patient unable to stand. She has self reported weight of 315              >45 minutes out of 60 minutes face to face spent counseling patient extensively on dietary Na+ intake, importance of activity, weight monitoring, compliance with medications in addition to importance of titration with goal directed medical therapy and follow up appointments.            This document has been electronically signed by ABDIFATAH Huff  20, 2023 15:27 EDT      Dictated Utilizing Dragon Dictation: Part of this note may be an electronic transcription/translation of spoken language to printed text using the Dragon Dictation System.    Follow-up appointment and medication changes provided in hand delivered After Visit Summary as well as reviewed in the room.

## 2023-04-20 NOTE — PROGRESS NOTES
" Heart Failure Clinic  Pharmacist Note     Yumiko Purdy is a 57 y.o. female seen in the Heart Failure Clinic for HFrEF.  Yumiko Purdy reports a fair understanding of medications. She reports that she does not weigh herself at home because she cannot stand on the scale.  She also reports checking her BP at home and says it runs \"good\".  It is around 120/60s at home and today in clinic it was 101/64 mmHg. She reports she occasionally has lightheadedness or dizziness but nothing too significant. She reports to have some SOB with activity and denies any swelling.    Medication Use:   Hx of med intolerances: None related to HF  Retail Rx Management: Uses [facility]    Past Medical History:   Diagnosis Date   • Anemia    • CHF (congestive heart failure)    • Chronic a-fib     stated by patient    • Cirrhosis of liver     stated by patient    • Diabetes mellitus      ALLERGIES: Phenergan [promethazine]  Current Outpatient Medications   Medication Sig Dispense Refill   • acetaminophen (TYLENOL) 500 MG tablet Take 1 tablet by mouth 2 (Two) Times a Day As Needed for Mild Pain.     • amiodarone (PACERONE) 200 MG tablet Take 1 tablet by mouth 2 (Two) Times a Day.     • apixaban (ELIQUIS) 5 MG tablet tablet Take 1 tablet by mouth Every 12 (Twelve) Hours. Indications: Atrial Fibrillation 60 tablet 3   • aspirin 81 MG EC tablet Take 1 tablet by mouth Daily. 30 tablet 3   • bumetanide (BUMEX) 2 MG tablet Take 1 tablet by mouth Daily for 30 days. Extra 2mg in the morning as needed for swelling, weight gain greater than 3 lbs in a day or 5lbs in a week. 45 tablet 0   • busPIRone (BUSPAR) 15 MG tablet Take 1 tablet by mouth 2 (Two) Times a Day for 30 days. 60 tablet 0   • Cyanocobalamin 2500 MCG sublingual tablet Place 2,500 mcg under the tongue Daily.     • dapagliflozin Propanediol (Farxiga) 10 MG tablet Take 10 mg by mouth Daily for 90 days. 90 tablet 0   • ferrous sulfate 325 (65 FE) MG tablet Take 1 tablet by mouth 2 (Two) Times a " Day.     • folic acid (FOLVITE) 1 MG tablet Take 1 tablet by mouth Daily.     • Insulin Glargine (LANTUS SOLOSTAR) 100 UNIT/ML injection pen Inject 58 Units under the skin into the appropriate area as directed 2 (Two) Times a Day.     • Insulin Regular Human, Conc, (HumuLIN R U-500 KwikPen) 500 UNIT/ML solution pen-injector CONCENTRATED injection Inject 40 Units under the skin into the appropriate area as directed 3 (Three) Times a Day Before Meals.     • levothyroxine (SYNTHROID, LEVOTHROID) 50 MCG tablet Take 1 tablet by mouth Daily.     • metoprolol succinate XL (Toprol XL) 25 MG 24 hr tablet Take 1 tablet by mouth Every Night for 90 days. 30 tablet 2   • nitroglycerin (NITROSTAT) 0.4 MG SL tablet Place 1 tablet under the tongue Every 5 (Five) Minutes As Needed for Chest Pain for up to 10 days. Take no more than 3 doses in 15 minutes. 10 tablet 0   • omeprazole (priLOSEC) 20 MG capsule Take 1 capsule by mouth Daily.     • ondansetron (ZOFRAN) 4 MG tablet Take 1 tablet by mouth Every 8 (Eight) Hours As Needed for Nausea or Vomiting.     • polyethylene glycol (MIRALAX) 17 g packet Take 17 g by mouth Daily. (Patient taking differently: Take 17 g by mouth Daily As Needed.) 30 packet 3   • pregabalin (LYRICA) 100 MG capsule Take 1 capsule by mouth 2 (Two) Times a Day.     • rOPINIRole (REQUIP) 0.5 MG tablet Take 1 tablet by mouth Every Night. Take 1 hour before bedtime.     • Vericiguat (Verquvo) 2.5 MG tablet Take 1 tablet by mouth Daily for 30 days. 30 tablet 0     No current facility-administered medications for this encounter.       Vaccination History:   Pneumonia: Needs, declines  Annual Influenza: Needs, declines  COVID 19: Needs, declines     Objective  Vitals:    04/20/23 1301   BP: 101/64   BP Location: Left arm   Patient Position: Sitting   Cuff Size: Adult   Pulse: 82   SpO2: 95%     Wt Readings from Last 3 Encounters:   11/10/20 102 kg (225 lb)   11/06/20 102 kg (225 lb)   10/21/20 105 kg (232 lb 6.4 oz)      Lab Results   Component Value Date    GLUCOSE 366 (H) 04/20/2023    BUN 40 (H) 04/20/2023    CREATININE 1.44 (H) 04/20/2023    EGFRIFNONA 49 (L) 11/10/2020    BCR 27.8 (H) 04/20/2023    K 3.2 (L) 04/20/2023    CO2 34.4 (H) 04/20/2023    CALCIUM 9.8 04/20/2023    ALBUMIN 4.2 02/21/2023    LABIL2 1.3 (L) 06/09/2016    AST 33 (H) 02/21/2023    ALT 20 02/21/2023     Lab Results   Component Value Date    WBC 6.88 01/31/2023    HGB 9.7 (L) 01/31/2023    HCT 32.9 (L) 01/31/2023    .5 (H) 01/31/2023     (L) 01/31/2023     Lab Results   Component Value Date    TROPONINT 0.027 09/11/2020     Lab Results   Component Value Date    PROBNP 1,182.0 (H) 04/20/2023     Results for orders placed during the hospital encounter of 09/09/20    Adult Transesophageal Echo (KHAI) W/ Cont if Necessary Per Protocol (Cardiology Department)    Interpretation Summary  · Ejection fraction appears to be 21 - 25%. Left ventricular systolic function is severely decreased.  · Right ventricular cavity is mildly dilated. Moderately reduced right ventricular systolic function noted.  · Trace mitral valve regurgitation is present.  · Moderate tricuspid valve regurgitation is present. Estimated right ventricular systolic pressure from tricuspid regurgitation is normal (<35 mmHg).  · There is (grade 1) plaque in the proximal aorta present. There is (grade 1) plaque in the ascending aorta present. There is (grade 1) plaque in the aortic arch present. There is (grade 1) plaque in the descending aorta present.         GDMT    Drug Class   Drug   Dose Last Dose Adjustment Additional Titration   Notes   ACEi/ARB/ARNI     Was previously on lisinopril    Beta Blocker Metoprolol XL 25mg QD Decreased from 50 mg daily on 2/6/23 due to hypotensive symptoms     MRA     Was previously on spironolactone 50mg BID Nov-Dec 22   SGLT2i Jardiance  10mg QD  N/A        Drug Therapy Problems    1. Verquvo   2. Refill Request    Recommendations:     1. Prior  authorization was approved for Verquvo. Delivered prescription to the patient while in clinic. Counseled about administration and adverse effects.  2. Patient requested refills for Bumex, Sonja to send to [facility].    Patient was educated on heart failure medications and the importance of medication adherence. All questions were addressed and patient expressed some understanding. Would benefit from additional education at each visit.    Thank you for allowing me to participate in the care of your patient,    Carina Matthew [facility]  04/20/23  14:02 EDT

## 2023-05-18 ENCOUNTER — HOSPITAL ENCOUNTER (OUTPATIENT)
Dept: CARDIOLOGY | Facility: HOSPITAL | Age: 58
Discharge: HOME | End: 2023-05-18
Payer: COMMERCIAL

## 2023-05-18 VITALS
HEART RATE: 76 BPM | BODY MASS INDEX: 41.65 KG/M2 | HEIGHT: 65 IN | OXYGEN SATURATION: 96 % | DIASTOLIC BLOOD PRESSURE: 59 MMHG | WEIGHT: 250 LBS | SYSTOLIC BLOOD PRESSURE: 111 MMHG

## 2023-05-18 DIAGNOSIS — I50.42 CHRONIC COMBINED SYSTOLIC AND DIASTOLIC CHF, NYHA CLASS 3: Primary | ICD-10-CM

## 2023-05-18 DIAGNOSIS — I42.9 CARDIOMYOPATHY, UNSPECIFIED TYPE: ICD-10-CM

## 2023-05-18 DIAGNOSIS — N18.2 CKD (CHRONIC KIDNEY DISEASE) STAGE 2, GFR 60-89 ML/MIN: ICD-10-CM

## 2023-05-18 LAB
ABSOLUTE LUNG FLUID CONTENT: 30 % (ref 20–35)
ANION GAP SERPL CALCULATED.3IONS-SCNC: 14 MMOL/L (ref 5–15)
BUN SERPL-MCNC: 31 MG/DL (ref 6–20)
BUN/CREAT SERPL: 27.7 (ref 7–25)
CALCIUM SPEC-SCNC: 9.2 MG/DL (ref 8.6–10.5)
CHLORIDE SERPL-SCNC: 98 MMOL/L (ref 98–107)
CO2 SERPL-SCNC: 25 MMOL/L (ref 22–29)
CREAT SERPL-MCNC: 1.12 MG/DL (ref 0.57–1)
EGFRCR SERPLBLD CKD-EPI 2021: 57.8 ML/MIN/1.73
GLUCOSE SERPL-MCNC: 307 MG/DL (ref 65–99)
MAGNESIUM SERPL-MCNC: 2 MG/DL (ref 1.6–2.6)
NT-PROBNP SERPL-MCNC: 920.1 PG/ML (ref 0–900)
POTASSIUM SERPL-SCNC: 4.1 MMOL/L (ref 3.5–5.2)
SODIUM SERPL-SCNC: 137 MMOL/L (ref 136–145)

## 2023-05-18 PROCEDURE — 94726 PLETHYSMOGRAPHY LUNG VOLUMES: CPT | Performed by: PHYSICIAN ASSISTANT

## 2023-05-18 PROCEDURE — 80048 BASIC METABOLIC PNL TOTAL CA: CPT | Performed by: PHYSICIAN ASSISTANT

## 2023-05-18 PROCEDURE — 83880 ASSAY OF NATRIURETIC PEPTIDE: CPT | Performed by: PHYSICIAN ASSISTANT

## 2023-05-18 PROCEDURE — 83735 ASSAY OF MAGNESIUM: CPT | Performed by: PHYSICIAN ASSISTANT

## 2023-05-18 RX ORDER — VERICIGUAT 2.5 MG/1
2.5 TABLET, FILM COATED ORAL DAILY
Qty: 30 TABLET | Refills: 1 | Status: SHIPPED | OUTPATIENT
Start: 2023-05-18 | End: 2023-06-17

## 2023-05-18 NOTE — PROGRESS NOTES
" Heart Failure Clinic  Pharmacist Note     Yumiko Purdy is a 57 y.o. female seen in the Heart Failure Clinic for HFrEF.  Yumiko Purdy reports a fair understanding of medications. She reports that she does not weigh herself at home because she cannot stand on the scale.  She also reports checking her BP at home and says it runs \"good\".  She reports SBP is around  at home and today in clinic it was 111/59 mmHg. She reports she had lightheadedness or dizziness and about a week ago her PCP changed her Metoprolol dose to 12.5 mg once daily. Since decreasing the Metoprolol dose, she reports hypotension s/s have slightly improved. Her nephrologist increased her Bumex dose to 4 mg on Monday and Thursday and 2 mg ROW, she denies taking any additional doses. She reports to have some SOB with activity and swelling in her feet.    Medication Use:   Hx of med intolerances: None related to HF  Retail Rx Management: Uses [facility]    Past Medical History:   Diagnosis Date   • Anemia    • CHF (congestive heart failure)    • Chronic a-fib     stated by patient    • Cirrhosis of liver     stated by patient    • Diabetes mellitus      ALLERGIES: Phenergan [promethazine]  Current Outpatient Medications   Medication Sig Dispense Refill   • acetaminophen (TYLENOL) 500 MG tablet Take 1 tablet by mouth 2 (Two) Times a Day As Needed for Mild Pain.     • amiodarone (PACERONE) 200 MG tablet Take 1 tablet by mouth 2 (Two) Times a Day.     • apixaban (ELIQUIS) 5 MG tablet tablet Take 1 tablet by mouth Every 12 (Twelve) Hours. Indications: Atrial Fibrillation 60 tablet 3   • aspirin 81 MG EC tablet Take 1 tablet by mouth Daily. 30 tablet 3   • bumetanide (BUMEX) 2 MG tablet Take 1 tablet by mouth Daily for 30 days. Extra 2mg in the morning as needed for swelling, weight gain greater than 3 lbs in a day or 5lbs in a week. 45 tablet 0   • busPIRone (BUSPAR) 15 MG tablet Take 1 tablet by mouth 2 (Two) Times a Day for 30 days. 60 tablet 0   • " Cyanocobalamin 2500 MCG sublingual tablet Place 2,500 mcg under the tongue Daily.     • dapagliflozin Propanediol (Farxiga) 10 MG tablet Take 10 mg by mouth Daily for 90 days. 90 tablet 0   • ferrous sulfate 325 (65 FE) MG tablet Take 1 tablet by mouth 2 (Two) Times a Day.     • folic acid (FOLVITE) 1 MG tablet Take 1 tablet by mouth Daily.     • Insulin Glargine (LANTUS SOLOSTAR) 100 UNIT/ML injection pen Inject 58 Units under the skin into the appropriate area as directed 2 (Two) Times a Day.     • Insulin Regular Human, Conc, (HumuLIN R U-500 KwikPen) 500 UNIT/ML solution pen-injector CONCENTRATED injection Inject 40 Units under the skin into the appropriate area as directed 3 (Three) Times a Day Before Meals.     • levothyroxine (SYNTHROID, LEVOTHROID) 50 MCG tablet Take 1 tablet by mouth Daily.     • metoprolol succinate XL (Toprol XL) 25 MG 24 hr tablet Take 1 tablet by mouth Every Night for 90 days. 30 tablet 2   • nitroglycerin (NITROSTAT) 0.4 MG SL tablet Place 1 tablet under the tongue Every 5 (Five) Minutes As Needed for Chest Pain for up to 10 days. Take no more than 3 doses in 15 minutes. 10 tablet 0   • omeprazole (priLOSEC) 20 MG capsule Take 1 capsule by mouth Daily.     • ondansetron (ZOFRAN) 4 MG tablet Take 1 tablet by mouth Every 8 (Eight) Hours As Needed for Nausea or Vomiting.     • polyethylene glycol (MIRALAX) 17 g packet Take 17 g by mouth Daily. (Patient taking differently: Take 17 g by mouth Daily As Needed.) 30 packet 3   • pregabalin (LYRICA) 100 MG capsule Take 1 capsule by mouth 2 (Two) Times a Day.     • rOPINIRole (REQUIP) 0.5 MG tablet Take 1 tablet by mouth Every Night. Take 1 hour before bedtime.     • Vericiguat 5 MG tablet Take 1 tablet by mouth Daily. 30 tablet 0     No current facility-administered medications for this encounter.       Vaccination History:   Pneumonia: Needs, declines  Annual Influenza: Needs, declines  COVID 19: Needs, declines     Objective  Vitals:     "05/18/23 1303   BP: 111/59   BP Location: Left arm   Patient Position: Sitting   Cuff Size: Adult   Pulse: 76   SpO2: 96%   Weight: 113 kg (250 lb)   Height: 165.1 cm (65\")     Wt Readings from Last 3 Encounters:   05/18/23 113 kg (250 lb)   11/10/20 102 kg (225 lb)   11/06/20 102 kg (225 lb)     Lab Results   Component Value Date    GLUCOSE 366 (H) 04/20/2023    BUN 40 (H) 04/20/2023    CREATININE 1.44 (H) 04/20/2023    EGFRIFNONA 49 (L) 11/10/2020    BCR 27.8 (H) 04/20/2023    K 3.2 (L) 04/20/2023    CO2 34.4 (H) 04/20/2023    CALCIUM 9.8 04/20/2023    ALBUMIN 4.2 02/21/2023    LABIL2 1.3 (L) 06/09/2016    AST 33 (H) 02/21/2023    ALT 20 02/21/2023     Lab Results   Component Value Date    WBC 6.88 01/31/2023    HGB 9.7 (L) 01/31/2023    HCT 32.9 (L) 01/31/2023    .5 (H) 01/31/2023     (L) 01/31/2023     Lab Results   Component Value Date    TROPONINT 0.027 09/11/2020     Lab Results   Component Value Date    PROBNP 1,182.0 (H) 04/20/2023     Results for orders placed during the hospital encounter of 09/09/20    Adult Transesophageal Echo (KHAI) W/ Cont if Necessary Per Protocol (Cardiology Department)    Interpretation Summary  · Ejection fraction appears to be 21 - 25%. Left ventricular systolic function is severely decreased.  · Right ventricular cavity is mildly dilated. Moderately reduced right ventricular systolic function noted.  · Trace mitral valve regurgitation is present.  · Moderate tricuspid valve regurgitation is present. Estimated right ventricular systolic pressure from tricuspid regurgitation is normal (<35 mmHg).  · There is (grade 1) plaque in the proximal aorta present. There is (grade 1) plaque in the ascending aorta present. There is (grade 1) plaque in the aortic arch present. There is (grade 1) plaque in the descending aorta present.         GDMT    Drug Class   Drug   Dose Last Dose Adjustment Additional Titration   Notes   ACEi/ARB/ARNI     Was previously on lisinopril, " stopped d/t renal function    Beta Blocker Metoprolol XL 12.5 mg QD > 1 week  Decreased from 25 mg daily ~ 5/8 due to hypotensive symptoms   MRA     Was previously on spironolactone 50mg BID Nov-Dec 22, stopped d/t renal function   SGLT2i Jardiance  10mg QD  N/A     Verquvo 2.5 mg QD  Needs        Drug Therapy Problems    1. GDMT  2. Refill Request - Verquvo  3. DDI: Levothyroxine and Ferrous Sulfate - Patient is concurrently medications together; Iron Preparations may decrease the serum concentration of Levothyroxine    Recommendations:     1. Due to hypotension symptoms and low home BP readings, consider continuing Verquvo 2.5 mg daily. At next visit, if BP improves, consider titration of Verquvo for optimal HF benefit.  2. Patient requested refills for Verquvo[facility]  3. Rosa Soares, PharmD Candidate recommended to patient to separate Levothyroxine and Ferrous Sulfate by four hours to avoid DDI. Follow up at next visit.    Patient was educated on heart failure medications and the importance of medication adherence. All questions were addressed and patient expressed some understanding. Would benefit from additional education at each visit.    Thank you for allowing me to participate in the care of your patient,    Carina Matthew [facility]  05/18/23  13:11 EDT

## 2023-05-18 NOTE — PROGRESS NOTES
[facility]  ABDIFATAH Galarza Melanie Lind, APRN  [address] [place] [address] [address]  [place] [address]    Thank you for asking me to see Yumiko Purdy for congestive heart failure.    HPI:     This is a 57 y.o. female with known past medical history of:  · Chronic systolic & diastolic HF  · TTE from November 2022 with visually estimated ejection fraction of 30% ±5% and noted abnormal systolic strain pattern as well as grade 3 diastolic dysfunction as well as abnormal TAPSE with abnormal right ventricular function  · KHAI on 09/2020 with EF 21-25% with LV systolic function severely decreased and RV cavity mildly dilated with moderately reduced RV systolic function noted, moderate TVR, and aortic plaquing  · Right heart cath on 12/22/2022 with elevated left and right heart pressures with report not available  · ASCVD  · LHC 11/10/20 with preserved LV systolic, EF 50-55% with elevated LV end diastolic pressure consistent with diastolic dysfunction and right dominant system with 30-40% mid LAD lesion.   · LHC attempted on 12/2022 at [facility] with unsuccessful access due to small artery appearing occluded or near occluded radially on right  · Peripheral Arterial disease  · Atrial Flutter  · CKD stage IIIb  · Cirrhosis of the liver  · Stage III CKD  · Chronic hypoxemic respiratory failure  · Morbid Obesity  · Diffuse purpuric rash with prior w/u negative for vasculitis    Yumiko Purdy presents for today for HF clinic evaluation.  The patient is typically seen by Masha Escalante APRN.  Patient's primary cardiologist is Dr. Jad Meredith.      • Last known EF 21-25% by KHAI and 50-55% by LHC in 11/2020.    • Last known hospitalization and/or ED visit: Patient has not been hospitalized at this facility since 2020.  However she was hospitalized at [facility] in December 2022 with heart failure admission with discharge summary reviewed  standing admission with acute on chronic mixed systolic diastolic heart failure during which time she was on dobutamine drip and discharged with p.o. Bumex and Zaroxolyn with note of chronic atrial fibrillation.  She was on dobutamine drip for some time and nephrology did also assist in her diuretic adjustments recommending ultimately Bumex twice daily metolazone and spironolactone on discharge.  • Accompanied by: Son      05/18/23 visit data/details regarding:   • Dyspnea: Improving, Patient is WC bound and mostly just exerts herself with transfers and such   • Lower extremity swelling: Bilateral lower extremity Swelling intermittently worsening  • Abdominal swelling: Abdominal swelling is improving since metolazone was added by Nephro.   • Home weight: Not currently monitoring due to inability to stand  • Home BP: SBP has been in the 100s-110s with less drops at home  • Home heart rate: 60s  • Daily activities of living: Performing with assistance  • HF zone: Yellow  • Pillows/lying flat: Sleeps in hospital bed.  • Mobility assistance devices:  WC at home, bedside commode.    • Mrs. Purdy reports her Toprol XL was recently reduced by her PCP due to lower blood pressures. She reports her diuretic regimen was also adjusted by Nephrology earlier in the week due to swelling and she finds this is helping.   • She reports some lower blood pressures after starting Verquevo.  Will try 2.5mg dosing again this month and titrate upward next month if she tolerates.        Specialists:   • Cardiology: [facility] with EP & General        Review of Systems - Review of Systems   Constitutional: Negative for chills, decreased appetite and malaise/fatigue.   HENT: Negative for congestion, ear discharge and ear pain.    Eyes: Negative for blurred vision, discharge and double vision.   Cardiovascular: Positive for leg swelling. Negative for dyspnea on exertion.   Respiratory: Negative for cough, hemoptysis and shortness  of breath.    Endocrine: Negative for cold intolerance and heat intolerance.   Hematologic/Lymphatic: Negative for adenopathy and bleeding problem.   Skin: Negative for color change, dry skin and nail changes.   Musculoskeletal: Negative for arthritis, muscle weakness and myalgias.   Gastrointestinal: Negative for bloating and abdominal pain.   Genitourinary: Negative for bladder incontinence, decreased libido and dysuria.   Neurological: Negative for brief paralysis, difficulty with concentration and dizziness.   Psychiatric/Behavioral: Negative for altered mental status, depression and hallucinations.   Allergic/Immunologic: Negative for environmental allergies and HIV exposure.         All other systems were reviewed and were negative.    Patient Active Problem List   Diagnosis   • Acute on chronic congestive heart failure   • Cardiomyopathy   • CKD (chronic kidney disease) stage 2, GFR 60-89 ml/min   • Severe obesity (BMI 35.0-39.9) with comorbidity   • Chronic anemia   • Elevated bilirubin   • Acute on chronic combined systolic and diastolic CHF (congestive heart failure)   • Hyponatremia       family history is not on file.     reports that she has never smoked. She has never used smokeless tobacco. She reports that she does not drink alcohol and does not use drugs.    Allergies   Allergen Reactions   • Phenergan [Promethazine]          Current Outpatient Medications:   •  acetaminophen (TYLENOL) 500 MG tablet, Take 1 tablet by mouth 2 (Two) Times a Day As Needed for Mild Pain., Disp: , Rfl:   •  amiodarone (PACERONE) 200 MG tablet, Take 1 tablet by mouth 2 (Two) Times a Day., Disp: , Rfl:   •  apixaban (ELIQUIS) 5 MG tablet tablet, Take 1 tablet by mouth Every 12 (Twelve) Hours. Indications: Atrial Fibrillation, Disp: 60 tablet, Rfl: 3  •  aspirin 81 MG EC tablet, Take 1 tablet by mouth Daily., Disp: 30 tablet, Rfl: 3  •  bumetanide (BUMEX) 2 MG tablet, Take 1 tablet by mouth Daily for 30 days. Extra 2mg in  the morning as needed for swelling, weight gain greater than 3 lbs in a day or 5lbs in a week. (Patient taking differently: Take 1 tablet by mouth Daily. Taking 4 mg on Mondays and Thursdays per nephrology. Taking 2 mg remaining days of the week.), Disp: 45 tablet, Rfl: 0  •  busPIRone (BUSPAR) 15 MG tablet, Take 1 tablet by mouth 2 (Two) Times a Day for 30 days., Disp: 60 tablet, Rfl: 0  •  Cyanocobalamin 2500 MCG sublingual tablet, Place 2,500 mcg under the tongue Daily., Disp: , Rfl:   •  dapagliflozin Propanediol (Farxiga) 10 MG tablet, Take 10 mg by mouth Daily for 90 days., Disp: 90 tablet, Rfl: 0  •  ferrous sulfate 325 (65 FE) MG tablet, Take 1 tablet by mouth 2 (Two) Times a Day., Disp: , Rfl:   •  folic acid (FOLVITE) 1 MG tablet, Take 1 tablet by mouth Daily., Disp: , Rfl:   •  Insulin Glargine (LANTUS SOLOSTAR) 100 UNIT/ML injection pen, Inject 58 Units under the skin into the appropriate area as directed 2 (Two) Times a Day., Disp: , Rfl:   •  Insulin Regular Human, Conc, (HumuLIN R U-500 KwikPen) 500 UNIT/ML solution pen-injector CONCENTRATED injection, Inject 40 Units under the skin into the appropriate area as directed 3 (Three) Times a Day Before Meals., Disp: , Rfl:   •  levothyroxine (SYNTHROID, LEVOTHROID) 50 MCG tablet, Take 1 tablet by mouth Daily., Disp: , Rfl:   •  metoprolol succinate XL (Toprol XL) 25 MG 24 hr tablet, Take 1 tablet by mouth Every Night for 90 days. (Patient taking differently: Take 12.5 mg by mouth Every Night. Changed to 12.5 mg per PCP request about a week ago.), Disp: 30 tablet, Rfl: 2  •  nitroglycerin (NITROSTAT) 0.4 MG SL tablet, Place 1 tablet under the tongue Every 5 (Five) Minutes As Needed for Chest Pain for up to 10 days. Take no more than 3 doses in 15 minutes., Disp: 10 tablet, Rfl: 0  •  omeprazole (priLOSEC) 20 MG capsule, Take 1 capsule by mouth Daily., Disp: , Rfl:   •  ondansetron (ZOFRAN) 4 MG tablet, Take 1 tablet by mouth Every 8 (Eight) Hours As Needed  "for Nausea or Vomiting., Disp: , Rfl:   •  polyethylene glycol (MIRALAX) 17 g packet, Take 17 g by mouth Daily. (Patient taking differently: Take 17 g by mouth Daily As Needed.), Disp: 30 packet, Rfl: 3  •  pregabalin (LYRICA) 100 MG capsule, Take 1 capsule by mouth 2 (Two) Times a Day., Disp: , Rfl:   •  rOPINIRole (REQUIP) 0.5 MG tablet, Take 1 tablet by mouth Every Night. Take 1 hour before bedtime., Disp: , Rfl:   •  Vericiguat (Verquvo) 2.5 MG tablet, Take 1 tablet by mouth Daily for 30 days., Disp: 30 tablet, Rfl: 1      Physical Exam:  I have reviewed the patient's current vital signs as documented in the patient's EMR.     Vitals:    23 1303   BP: 111/59   BP Location: Left arm   Patient Position: Sitting   Cuff Size: Adult   Pulse: 76   SpO2: 96%   Weight: 113 kg (250 lb)   Height: 165.1 cm (65\")       Physical Exam  Vitals and nursing note reviewed.   Constitutional:       Appearance: Normal appearance.   HENT:      Head: Normocephalic and atraumatic.   Eyes:      General: Lids are normal.   Cardiovascular:      Rate and Rhythm: Normal rate and regular rhythm.      Heart sounds: S1 normal and S2 normal.   Pulmonary:      Effort: No tachypnea or bradypnea.      Breath sounds: No decreased breath sounds, wheezing, rhonchi or rales.   Chest:      Chest wall: No mass or lacerations.   Abdominal:      General: Abdomen is protuberant. Bowel sounds are normal.      Palpations: Abdomen is soft.      Tenderness: There is no abdominal tenderness.      Comments: Less distended than on prior visits.     Musculoskeletal:      Right lower le+ Edema present.      Left lower le+ Edema present.      Right foot: No swelling.      Left foot: No swelling.      Comments: Mild, non-pitting edema   Skin:     General: Skin is warm and moist.   Neurological:      Mental Status: She is alert and oriented to person, place, and time.   Psychiatric:         Attention and Perception: Attention normal.         Mood and " Affect: Mood normal.          JVP: Volume/Pulsation: Normal.        DATA REVIEWED:     EKG. I personally reviewed and interpreted the EKG.    Last EKG at [facility] not available for viewing.      ---------------------------------------------------  TTE/KHAI:    TRANSTHORACIC ECHOCARDIOGRAPHY REPORT    Demographics    Patient Name:          JAMAL WHARTON      :            1965    Medical Record Number: [id number]          Age:            56 year(s)    Corporate ID Number:   [id number]          Gender          Female    Account Number:        [id number]    Sonographer:           Haydenrafael Chaudhry,     Height:         65 inches                           [facility]    Referring Physician:   ANDRAES HERNANDEZ     Weight:         240 pounds    Interpreting           MATHEUS IRELAND   BMI:            39.94 kg/m^2    Physician:             MD    Date of Service:       2022          Blood Pressure: 113/40 mmHg    Room Number:           320   Type of Study:    TTE procedure: EC Echo Complete, ECHO COMPLETE (DOPPLER / COLOR) W OR WO    CONTRAST.    Patient Status: Routine IP    [facility]    Contrast Medium: Optison. Amount - 3 ml    Impression:    Indication:Hypertensive heart disease with heart failure I11.0    Mild left ventricular hypertrophy. Visually estimated ejection fraction    30% +/- 5%. Abnormal left ventricular systolic function; abnormal systolic    strain pattern. Restrictive filling pattern consistent with severely    increased LV filling pressure (Grade III Diastolic dysfunction).    Dilated right ventricle. Abnormal TAPSE; abnormal right ventricular    function. Abnormal right atrial size.    Mild mitral stenosis. Peak/Mean 6/2mmHg    Moderate tricuspid (2+) regurgitation.    No masses or thrombi are seen.   Measurements Summary:    LVEDd: 4.99 cm         LVESd: 4.08 cm          IVSEd: 0.79 cm    AO Root:2.97 cm        LVPWd: 1.04 cm    Contractility  Score    Global Left Ventricular Hypokinesis was noted.    LV regional wall motion: (0-Not visualized 1-Normal 2-Hypokinesis    3-Akinesis 4-Dyskinesis 5-Aneurysm)    Left Ventricle    Peak E-wave: 1.22   Peak A-wave: 0.2 m/s    E/A ratio: 5.99    m/s                 Volume [id number]  LV length: 8.47 cm    ml    Volume jowvxkoq17.87 ml    LVOT diameter: 2.05 cm    Normal sized left ventricle.    Mild left ventricular hypertrophy.    Visually estimated ejection fraction 30% +/- 5%.    Abnormal left ventricular systolic function; abnormal systolic strain    pattern.    Restrictive filling pattern consistent with severely increased LV filling    pressure (Grade III Diastolic dysfunction).    No left ventricular masses or thrombi.    Right Ventricle    Diastolic dimension: 4.72     RV systolic pressure: 22.15 mmHg    cm    Dilated right ventricle.    Abnormal TAPSE; abnormal right ventricular function.    Left Atrium    LA dimension: 4.64 cm               LA volume:95.38 ml    LA/Aorta: 1.56    Abnormal left atrial volume index 45ml/m2.    Intact atrial septum.    No atrial mass or thrombus.    Right Atrium    Abnormal right atrial size.    Intact atrial septum.    No atrial mass or thrombus.    Mitral Valve    Deceleration time:     Area PHT: 2.04 cm^2  Mean velocity:    248.96 msec            P1/2t: 107.68 msec   0.57 m/s    Area (continuity):   Mean gradient:    1.73 cm^2            1.89 mmHg    Peak gradient:    5.97 mmHg    Thickened mitral valve leaflets.    Mild mitral regurgitation.    Mild mitral stenosis. Peak/Mean 6/2mmHg    Echogenic density noted on mitral valve chordae. Seen on prior exam    10/16/2022    Aortic Valve    LVOT VTI: 18.22 cm    Mildly thickend free edges of the aortic valve leaflets.    No aortic regurgitation.    No aortic stenosis.    No masses or vegetations seen.    Tricuspid Valve    TR velocity: 2.19 m/s     TR gradient: 19.24877 mmHg    Estimated RAP: 3 mmHg     RVSP: 22.17 mmHg     Structurally normal tricuspid valve.    Moderate tricuspid (2+) regurgitation.    RVSP likely underestimated due to RV systolic function.    No tricuspid stenosis.    No masses or vegetations seen.    Pulmonic Valve    Acceleration time: 119.95 msec          PASP: 22.15 mmHg    Structurally normal pulmonic valve.    Mild pulmonic regurgitation (1+).    No pulmonic stenosis.    No masses or vegetations seen.   Great Vessels   Aorta    Aortic Root: 2.97 cm    Ascending Aorta: 2.76 cm    LVOT Diameter: 2.05 cm   Visualized aorta is normal.   Normal aortic root.   No evidence of dissection.   IVC is not well visualized.   Unable to obtain adequate subcostal view.    Pericardium / Pleura   No pericardial effusion.    Other    No masses or thrombi.    No intracardiac shunt.    ----------------------------------------------------------------    Electronically signed by MATHEUS IRELAND MD(Interpreting    Physician) on 11/17/2022 17:38       Results for orders placed during the hospital encounter of 09/09/20    Adult Transesophageal Echo (KHAI) W/ Cont if Necessary Per Protocol (Cardiology Department)    Interpretation Summary  · Ejection fraction appears to be 21 - 25%. Left ventricular systolic function is severely decreased.  · Right ventricular cavity is mildly dilated. Moderately reduced right ventricular systolic function noted.  · Trace mitral valve regurgitation is present.  · Moderate tricuspid valve regurgitation is present. Estimated right ventricular systolic pressure from tricuspid regurgitation is normal (<35 mmHg).  · There is (grade 1) plaque in the proximal aorta present. There is (grade 1) plaque in the ascending aorta present. There is (grade 1) plaque in the aortic arch present. There is (grade 1) plaque in the descending aorta present.    [facility] report:      CARDIAC CATH - RIGHT HEART CATH    Anatomical Region Laterality Modality   Heart -- Other     Narrative    Cardiac Diagnostic Report     Demographics    Patient Name       JAMAL WHARTON      Date of Birth          1965    Patient #          [id number]          Accession #            [id number]    Visit #            [id number]          Medical Record #    Physician          MATHEUS IRELNAD   Date of Study          12/22/2022                       MD    Referring                              Interventional    Physician                              physician    Procedure   Procedure Type    Diagnostic procedure: RHC with Cardiomems, RIGHT HEART CATH   Indications: Angina.    Conclusions   Procedure Description   Using ultrasound and micropuncture, access to right brachial vein obtained   and 7F sheath inserted. 7F PA catheter used for RHC.   I attempted right radial access for LHC but unsuccessful due to very small   artery which appears occluded or near-occluded.   Procedure Summary   Elevated left and right heart filling pressures.   Elevated pulmonary pressures.   Borderline-low Ramos CO/CI.    Procedure Data   Procedure Date   Date: 12/22/2022Start: 15:32End: 16:09   Entry Locations     - Retrograde Percutaneous access was performed through the Right Axiliary.       A 7 Fr sheath was inserted. Hemostasis was successfully obtained using       Manual Compression.       Entry Comments: brachial vein.   Procedure Medications     - Fentanyl I.V. 25 mcg.     - Versed Sheath 1 mg.     - Oxygen NC 6 l/min.   Diagnostic Catheters     - A7 FR MON [place]-Misael [id number] used for:Arch.   Contrast Material     - None0 ml   Fluoroscopy Time: Total: 2:48 minutes.   Fluoroscopy Dose: Total: 155 mGy.    Medical History    Risk Factors    The patient risk factors include:obesity, hypercholesterolemia, treated    and uncontrolled hypertension, diabetes mellitus, last creatinine: 2    mg/dl, creatinine clearance: 69.72 ml/min and dyslipidemia.    Admission Data    Hemodynamics    Condition: Baseline    O2 Consumption: Estimated: 297.50Heart Rate: 41 bpm   Oxygen  Saturation   +--------+-----+----+----------------------+---+---------------------------+   !Location!pCO2 !pO2 !% Saturation          !Hgb!O2 Content                 !   +--------+-----+----+----------------------+---+---------------------------+   !PA      !     !    !51                    !   !                           !   +--------+-----+----+----------------------+---+---------------------------+   !RA      !     !    !58                    !   !                           !   +--------+-----+----+----------------------+---+---------------------------+   !AO      !     !    !94                    !   !                           !   +--------+-----+----+----------------------+---+---------------------------+   Pressures (mmHg)   +-----+--------------------------------------------------------------------+   !Site !Pressure                                                            !   +-----+--------------------------------------------------------------------+   !RA   !28/28 (25)                                                          !   +-----+--------------------------------------------------------------------+   !RV   !81/10 ,27                                                           !   +-----+--------------------------------------------------------------------+   !PA   !73/32 (43)                                                          !   +-----+--------------------------------------------------------------------+   !PCW  !68/66 (45)                                                          !   +-----+--------------------------------------------------------------------+   !PCW  !78/59 (45)                                                          !   +-----+--------------------------------------------------------------------+   !PCW  !23/36 (27)                                                          !   +-----+--------------------------------------------------------------------+   Cardiac Output    +------+-------------------------+----------------------------+------------+   !Method!CO (l/min)               !CI (l/min/m2)               !SV (ml)     !   +------+-------------------------+----------------------------+------------+   !Ramos  !5.35                     !2.2                         !131.67      !   +------+-------------------------+----------------------------+------------+   Shunts   Oxygen Values    O2 Capacity 129.2 O2 Consumption 297.5    Flows (l/min)    Qs 6.4 Qe/Qp 1.2    Qp 5.35 Qp/Qs 0.84    Qe 6.4   Vascular Resistance (dynes x sec x cm-5)   +-------------------------------------+----+---+----+----+---------+-------+   !CO method                            !TSVR!SVR!TPVR!PVR !TPVR/TSVR!PVR/SVR!   +-------------------------------------+----+---+----+----+---------+-------+   !Ramos                                 !    !   !8.03!2.94!         !       !   +-------------------------------------+----+---+----+----+---------+-------+   !Qp or Qs                             !    !   !8.03!2.94!         !       !   +-------------------------------------+----+---+----+----+---------+-------+    Signatures    ----------------------------------------------------------------    Electronically signed by MATHEUS IRELAND MD(Physician) on    12/22/2022 16:19    ----------------------------------------------------------------        -----------------------------------------------------  CXR/Imaging:   Imaging Results (Most Recent)     None          I personally reviewed and interpreted the CXR.      -----------------------------------------------------  CT:   No radiology results for the last 30 days.  I personally reviewed the images of the CT scan.  My personal interpretation is below.      ----------------------------------------------------    PFTs:    No PFTS available.      --------------------------------------------------------------------------------------------------    Laboratory  evaluations:    Lab Results   Component Value Date    GLUCOSE 307 (H) 05/18/2023    BUN 31 (H) 05/18/2023    CREATININE 1.12 (H) 05/18/2023    EGFRIFNONA 49 (L) 11/10/2020    BCR 27.7 (H) 05/18/2023    K 4.1 05/18/2023    CO2 25.0 05/18/2023    CALCIUM 9.2 05/18/2023    ALBUMIN 4.2 02/21/2023    LABIL2 1.3 (L) 06/09/2016    AST 33 (H) 02/21/2023    ALT 20 02/21/2023     Lab Results   Component Value Date    WBC 6.88 01/31/2023    HGB 9.7 (L) 01/31/2023    HCT 32.9 (L) 01/31/2023    .5 (H) 01/31/2023     (L) 01/31/2023     Lab Results   Component Value Date    CHOL 137 09/11/2020    CHLPL 103 04/21/2016    TRIG 80 09/11/2020    HDL 43 09/11/2020    LDL 78 09/11/2020     Lab Results   Component Value Date    TSH 3.830 09/10/2020     Lab Results   Component Value Date    HGBA1C 8.40 (H) 09/10/2020     Lab Results   Component Value Date    ALT 20 02/21/2023     Lab Results   Component Value Date    HGBA1C 8.40 (H) 09/10/2020    HGBA1C 5.4 04/21/2016     Lab Results   Component Value Date    MICROALBUR 3.2 09/12/2020    CREATININE 1.12 (H) 05/18/2023     Lab Results   Component Value Date    IRON 34.0 (L) 12/21/2022    TIBC 237 (L) 09/18/2020    FERRITIN 1,219.30 (H) 12/21/2022     Lab Results   Component Value Date    INR 1.10 12/15/2022    INR 1.25 (H) 09/23/2020    INR 1.09 09/22/2020    PROTIME 11.6 12/15/2022    PROTIME 15.5 (H) 09/23/2020    PROTIME 13.9 09/22/2020        Lab Results   Component Value Date    ABSOLUTELUNG 30 05/18/2023    ABSOLUTELUNG 38 (A) 04/20/2023    ABSOLUTELUNG 399 (A) 03/17/2023       PAH RISK ASSESSMENT:      1. Chronic combined systolic and diastolic CHF, NYHA class 3    2. CKD (chronic kidney disease) stage 2, GFR 60-89 ml/min    3. Cardiomyopathy, unspecified type          ORDERS PLACED TODAY:  Orders Placed This Encounter   Procedures   • proBNP   • Magnesium   • Basic Metabolic Panel   • proBNP   • Magnesium   • Basic Metabolic Panel   • ReDs Vest        Diagnoses and  all orders for this visit:    1. Chronic combined systolic and diastolic CHF, NYHA class 3 (Primary)  -     proBNP; Future  -     Magnesium; Future  -     Basic Metabolic Panel; Future  -     proBNP; Standing  -     proBNP  -     Magnesium; Standing  -     Magnesium  -     Basic Metabolic Panel; Standing  -     Basic Metabolic Panel  -     ReDs Vest    2. CKD (chronic kidney disease) stage 2, GFR 60-89 ml/min  -     Basic Metabolic Panel; Future  -     Basic Metabolic Panel; Standing  -     Basic Metabolic Panel    3. Cardiomyopathy, unspecified type  -     proBNP; Future  -     proBNP; Standing  -     proBNP    Other orders  -     Discontinue: Vericiguat 5 MG tablet; Take 1 tablet by mouth Daily.  Dispense: 30 tablet; Refill: 0  -     Vericiguat (Verquvo) 2.5 MG tablet; Take 1 tablet by mouth Daily for 30 days.  Dispense: 30 tablet; Refill: 1             MEDS ORDERED TODAY:    New Medications Ordered This Visit   Medications   • Vericiguat (Verquvo) 2.5 MG tablet     Sig: Take 1 tablet by mouth Daily for 30 days.     Dispense:  30 tablet     Refill:  1        ---------------------------------------------------------------------------------------------------------------------------          ASSESSMENT/PLAN:      Diagnosis Plan   1. Chronic combined systolic and diastolic CHF, NYHA class 3  proBNP    Magnesium    Basic Metabolic Panel    proBNP    proBNP    Magnesium    Magnesium    Basic Metabolic Panel    Basic Metabolic Panel    ReDs Vest      2. CKD (chronic kidney disease) stage 2, GFR 60-89 ml/min  Basic Metabolic Panel    Basic Metabolic Panel    Basic Metabolic Panel      3. Cardiomyopathy, unspecified type  proBNP    proBNP    proBNP          not acutely decompensated chronic moderate systolic and diastolic heart failure. CHF. Etiology: Unknown/Idiopathic. LVEF 30-35%.     NYHA stage Stage D: Presence of advanced heart disease with continued heart failure symptoms requiring aggressive medical therapyFC-Class  IV: Unable to carry on any physical activity without discomfort. Symptoms of heart failureat rest. If any physical activity is undertaken, discomfort increases.     Today, Patient is approaching euvolemia and with  Moderate perfusion. The patient's hemodynamics are currently acceptable. HR is: normal and is at goal. BP/MAP was reviewed and there is notroom for medication up-titration.  Clinical trajectory was assessed and hasimproved.     CHF GOAL DIRECTED MEDICAL THERAPY FOR PATIENT ADDRESSED/ADJUSTED:     GDMT    Drug Class   Drug   Dose Last Dose Adjustment Additional Titration   Notes   ACEi/ARB/ARNI ACEI previously stopped due to renal fxn       Beta Blocker  Metoprolol Succinate   12.5 mg qhs Reduced by PCP     MRA Spirono previously stopped due to renal fxn       SGLT2i Farxiga 10mg qd  N/A      Secondary management:     -CHF Specific BB:   • Toprol XL 12.5mg (previously decreased in early 02/2023) is being tolerated well with dizziness resolved and fewer lower blood pressures.    • Continue monitoring as she is also on amiodarone for her Afib.   • Hold parameters reviewed.      -ACE/ARB/ARNi:   • ACEi recently held during [facility] hospitalization.     -MRA:   • The patient is FC-NYHA Class IV and MRA is indicated.   • Spironolactone was previously stopped on regimen due to renal function.    • Will continue to monitor real function and attempt to adjust/re-add; however, she is also being followed by Nephrology with Metolazone recently added, thus will hold off on MRA addition.      -SGLT2 inhibitor therapy:   • A BMP at initiation to verify GFR >30 was recommended, as was interval GFR surveillance.  • The patient was advised to hold SGLT2I when PO intake is restricted due to a planned surgery, or due to an underlying illness.    • Recommend continuing  Farxiga 10mg with quarterly assessment of GFR. She continues to deny  difficulties.   She was transitioned by Nephrology.    • Pt was advised SEs, some  severe, including hypersensitivity and Yesenia's; coupled with discussion regarding common side effects of UTIs and female genital mycotic infections were discussed. If you will be NPO, or are sick (poor PO intake, N&V) please hold the medication until you are back to a normal diet.       Secondary pharmacological therapy in HFreF:   • EF is less than 45% @ 30-35%.   • Maximized on GDMT as tolerated thus far (see chart above)  • Recent hospitalization or IV diuresis includes hospitalizations within the last year at [facility], [facility], and [facility] with acute heart failure in addition to afib with RVR.    • Given HFrEF with optimally treated with primary therapies for HFrEF and vasodilator therapy, continue Verquevo 2.5mg qd.  Will consider upward titration next month as she reports lower blood pressures with recent medication adjustments.        -Diuretic regimen:   • ReDS Vest reading for 05/18/23 30 though in patient without standing weight.  She appears euvolemic with improving proBNP.     • Continue Bumex 2mg daily with 4mg on Mon & Tues per her Nephrologist. ProBNP is the best it has been in sometime on today's reading.    • BMP, Mag, & ProBNP reviewed with patient with toleration of current medication regimen.      -Fluid restriction/Sodium restriction:   • Requested 2000 ml restriction  • Patient has been asked to weigh daily and was provided with a printed diuretic strategy.  • 1,500 mg Na restriction was discussed.    -Acute vs. Chronic underlying conditions other than HF addressed during visit:         • Paroxysmal Atrial fibrillation/Flutter, currently SR:   o Office visit reviewed from January 27, 2023 with [facility] electrophysiology with cardiomyopathy felt to be multifactorial with DMC and severely worsened by atrial fibrillation with chronic anemia with noted recent hospital discharge with amiodarone and Eliquis she was in sinus rhythm on her January 27, 2023 evaluation with amiodarone  renewed but with Lasix 20 mg restarted and encouragement to follow-up more closely in [place].    o Continue Eliquis 5mg qd with [id number] at least 4.    o Continue Amiodarone as prescribed per EP.  Per extensive review of outside cardiology notes, she has been tried on reduced dose of amiodarone 200mg qd and had recurrent hospitalization with atrial fibrillation with RVR. While she does have known Cirrhosis, prior attempts to decrease amiodarone have resulted in RVR events.    o Continue current Amiodarone 200mg BID as previously directed.      • CKD IIIb:   o Monitor BMP. Reviewed today with creatinine appearing 1.5 with baseline previously 1.9-2.2 per review of OSH labs.    o Continues to be within baseline.      • Debility:   o Continue home health with PT/OT.   o Multiple types of DME at home.     · Iron/Anemia in CHF:  o Prior Iron labs from [facility] with Iron 31, ferritin 1219.30, and TIBC 234 with iron saturation 13%.    o Continue BID ferrous sulfate.          • Identifiable barriers to Heart Failure Self-care:   o Medical Barriers: Debility  o Social Barriers: Transport limitations      --------------------------------------------------------------------------------          BMI cannot be calculated due to outdated height or weight values with patient unable to stand. She has self reported weight of 315              >45 minutes out of 60 minutes face to face spent counseling patient extensively on dietary Na+ intake, importance of activity, weight monitoring, compliance with medications in addition to importance of titration with goal directed medical therapy and follow up appointments.            This document has been electronically signed by Sonja Romo PA-C  May 18, 2023 13:59 EDT      Dictated Utilizing Dragon Dictation: Part of this note may be an electronic transcription/translation of spoken language to printed text using the Dragon Dictation System.    Follow-up appointment and  medication changes provided in hand delivered After Visit Summary as well as reviewed in the room.

## 2023-05-18 NOTE — ADDENDUM NOTE
Encounter addended by: Carina Matthew RPH on: 5/18/2023 5:33 PM   Actions taken: Clinical Note Signed

## 2023-05-18 NOTE — PROGRESS NOTES
[facility] Failure [facility]    Date: 05/18/23     Vitals:    05/18/23 1303   BP: 111/59   Pulse: 76   SpO2: 96%        Method of arrival: Other wheelchair    Weighing self daily: No    Monitoring Heart Failure Zones: Yes    Today's HF Zone: Yellow     Taking medications as prescribed: Yes    Edema Yes    Shortness of Air: Yes    Number of pillows used at night:<2    Educational Materials given:  avs                                                                         ReDS Value: 30  25-35 Optimal Value Status      Chris Aviles RN 05/18/23 13:04 EDT

## 2023-06-15 ENCOUNTER — HOSPITAL ENCOUNTER (OUTPATIENT)
Dept: CARDIOLOGY | Facility: HOSPITAL | Age: 58
Discharge: HOME | End: 2023-06-15
Payer: COMMERCIAL

## 2023-06-15 VITALS
HEIGHT: 65 IN | SYSTOLIC BLOOD PRESSURE: 121 MMHG | DIASTOLIC BLOOD PRESSURE: 79 MMHG | WEIGHT: 240 LBS | HEART RATE: 72 BPM | OXYGEN SATURATION: 98 % | BODY MASS INDEX: 39.99 KG/M2

## 2023-06-15 DIAGNOSIS — I48.20 CHRONIC ATRIAL FIBRILLATION: ICD-10-CM

## 2023-06-15 DIAGNOSIS — N18.2 CKD (CHRONIC KIDNEY DISEASE) STAGE 2, GFR 60-89 ML/MIN: ICD-10-CM

## 2023-06-15 DIAGNOSIS — K74.60 CIRRHOSIS OF LIVER WITHOUT ASCITES, UNSPECIFIED HEPATIC CIRRHOSIS TYPE: ICD-10-CM

## 2023-06-15 DIAGNOSIS — E66.01 SEVERE OBESITY (BMI 35.0-39.9) WITH COMORBIDITY: ICD-10-CM

## 2023-06-15 DIAGNOSIS — I50.43 ACUTE ON CHRONIC COMBINED SYSTOLIC AND DIASTOLIC ACC/AHA STAGE C CONGESTIVE HEART FAILURE: Primary | ICD-10-CM

## 2023-06-15 LAB
ABSOLUTE LUNG FLUID CONTENT: 40 % (ref 20–35)
ANION GAP SERPL CALCULATED.3IONS-SCNC: 13.2 MMOL/L (ref 5–15)
BUN SERPL-MCNC: 23 MG/DL (ref 6–20)
BUN/CREAT SERPL: 17.6 (ref 7–25)
CALCIUM SPEC-SCNC: 9.4 MG/DL (ref 8.6–10.5)
CHLORIDE SERPL-SCNC: 92 MMOL/L (ref 98–107)
CO2 SERPL-SCNC: 28.8 MMOL/L (ref 22–29)
CREAT SERPL-MCNC: 1.31 MG/DL (ref 0.57–1)
EGFRCR SERPLBLD CKD-EPI 2021: 47.9 ML/MIN/1.73
GLUCOSE SERPL-MCNC: 348 MG/DL (ref 65–99)
MAGNESIUM SERPL-MCNC: 2.2 MG/DL (ref 1.6–2.6)
NT-PROBNP SERPL-MCNC: 1372 PG/ML (ref 0–900)
POTASSIUM SERPL-SCNC: 3.8 MMOL/L (ref 3.5–5.2)
SODIUM SERPL-SCNC: 134 MMOL/L (ref 136–145)

## 2023-06-15 PROCEDURE — 80048 BASIC METABOLIC PNL TOTAL CA: CPT | Performed by: PHYSICIAN ASSISTANT

## 2023-06-15 PROCEDURE — 83735 ASSAY OF MAGNESIUM: CPT | Performed by: PHYSICIAN ASSISTANT

## 2023-06-15 PROCEDURE — 83880 ASSAY OF NATRIURETIC PEPTIDE: CPT | Performed by: PHYSICIAN ASSISTANT

## 2023-06-15 PROCEDURE — 94726 PLETHYSMOGRAPHY LUNG VOLUMES: CPT | Performed by: PHYSICIAN ASSISTANT

## 2023-06-15 RX ORDER — BUMETANIDE 2 MG/1
2 TABLET ORAL DAILY
Qty: 45 TABLET | Refills: 0 | Status: SHIPPED | OUTPATIENT
Start: 2023-06-15 | End: 2023-07-15

## 2023-06-15 NOTE — PROGRESS NOTES
[facility]  ABDIFATAH Galarza Melanie Lind, APRN  [address] [place] [address] [address]  [place] [address]    Thank you for asking me to see Yumiko Purdy for congestive heart failure.    HPI:     This is a 57 y.o. female with known past medical history of:  · Chronic systolic & diastolic HF  · TTE from November 2022 with visually estimated ejection fraction of 30% ±5% and noted abnormal systolic strain pattern as well as grade 3 diastolic dysfunction as well as abnormal TAPSE with abnormal right ventricular function  · KHAI on 09/2020 with EF 21-25% with LV systolic function severely decreased and RV cavity mildly dilated with moderately reduced RV systolic function noted, moderate TVR, and aortic plaquing  · Right heart cath on 12/22/2022 with elevated left and right heart pressures with report not available  · ASCVD  · LHC 11/10/20 with preserved LV systolic, EF 50-55% with elevated LV end diastolic pressure consistent with diastolic dysfunction and right dominant system with 30-40% mid LAD lesion.   · LHC attempted on 12/2022 at [facility] with unsuccessful access due to small artery appearing occluded or near occluded radially on right  · Peripheral Arterial disease  · Atrial Flutter  · CKD stage IIIb  · Cirrhosis of the liver  · Stage III CKD  · Chronic hypoxemic respiratory failure  · Morbid Obesity  · Diffuse purpuric rash with prior w/u negative for vasculitis    Yumiko Purdy presents for today for HF clinic evaluation.  The patient is typically seen by Masha Escalante APRN.  Patient's primary cardiologist is Dr. Jad Meredith.      • Last known EF 21-25% by KHAI and 50-55% by LHC in 11/2020.    • Last known hospitalization and/or ED visit: Patient has not been hospitalized at this facility since 2020.  However she was hospitalized at [facility] in [facility] in December 2022 with heart failure admission with discharge summary reviewed  standing admission with acute on chronic mixed systolic diastolic heart failure during which time she was on dobutamine drip and discharged with p.o. Bumex and Zaroxolyn with note of chronic atrial fibrillation.  She was on dobutamine drip for some time and nephrology did also assist in her diuretic adjustments recommending ultimately Bumex twice daily metolazone and spironolactone on discharge.  • Accompanied by: Son      06/15/23 visit data/details regarding:   • Dyspnea: Improving, Patient is WC bound and mostly just exerts herself with transfers and such; This remains unchanged on today's visit.   • Lower extremity swelling: Bilateral lower extremity Swelling intermittently worsening since diuretics were decreased per Nephrology  • Abdominal swelling: Abdominal swelling is improving.    • Home weight: Not currently monitoring due to inability to stand  • Home BP: SBP has been in the 100s-110s with less drops at home  • Home heart rate: 60s  • Daily activities of living: Performing with assistance  • HF zone: Yellow  • Pillows/lying flat: Sleeps in hospital bed.  • Mobility assistance devices:  WC at home, bedside commode.    • Mrs. Purdy reports worsening swelling and shortness of breath with fatigue since no longer taking metolazone. We discuss IV diuresis with Bumex in clinic today. Patient declines and wishes to attempt adjustments with oral medications first.       Specialists:   • Cardiology: [facility] with EP & General        Review of Systems - Review of Systems   Constitutional: Positive for malaise/fatigue. Negative for chills and decreased appetite.   HENT: Negative for congestion, ear discharge and ear pain.    Eyes: Negative for blurred vision, discharge and double vision.   Cardiovascular: Positive for dyspnea on exertion and leg swelling.   Respiratory: Negative for cough, hemoptysis and shortness of breath.    Endocrine: Negative for cold intolerance and heat intolerance.    Hematologic/Lymphatic: Negative for adenopathy and bleeding problem.   Skin: Negative for color change, dry skin and nail changes.   Musculoskeletal: Negative for arthritis, muscle weakness and myalgias.   Gastrointestinal: Negative for bloating and abdominal pain.   Genitourinary: Negative for bladder incontinence, decreased libido and dysuria.   Neurological: Negative for brief paralysis, difficulty with concentration and dizziness.   Psychiatric/Behavioral: Negative for altered mental status, depression and hallucinations.   Allergic/Immunologic: Negative for environmental allergies and HIV exposure.         All other systems were reviewed and were negative.    Patient Active Problem List   Diagnosis   • Acute on chronic congestive heart failure   • Cardiomyopathy   • CKD (chronic kidney disease) stage 2, GFR 60-89 ml/min   • Severe obesity (BMI 35.0-39.9) with comorbidity   • Chronic anemia   • Elevated bilirubin   • Acute on chronic combined systolic and diastolic CHF (congestive heart failure)   • Hyponatremia   • Chronic atrial fibrillation   • Cirrhosis       family history is not on file.     reports that she has never smoked. She has never used smokeless tobacco. She reports that she does not drink alcohol and does not use drugs.    Allergies   Allergen Reactions   • Phenergan [Promethazine]          Current Outpatient Medications:   •  acetaminophen (TYLENOL) 500 MG tablet, Take 1 tablet by mouth 2 (Two) Times a Day As Needed for Mild Pain., Disp: , Rfl:   •  amiodarone (PACERONE) 200 MG tablet, Take 1 tablet by mouth 2 (Two) Times a Day., Disp: , Rfl:   •  apixaban (ELIQUIS) 5 MG tablet tablet, Take 1 tablet by mouth Every 12 (Twelve) Hours. Indications: Atrial Fibrillation, Disp: 60 tablet, Rfl: 3  •  aspirin 81 MG EC tablet, Take 1 tablet by mouth Daily., Disp: 30 tablet, Rfl: 3  •  bumetanide (BUMEX) 2 MG tablet, Take 1 tablet by mouth Daily for 30 days. Two 2mg tablets for 5 days then resume prior  dosing of 2mg daily and 4mg on Mon & Thurs, Disp: 45 tablet, Rfl: 0  •  busPIRone (BUSPAR) 15 MG tablet, Take 1 tablet by mouth 2 (Two) Times a Day for 30 days., Disp: 60 tablet, Rfl: 0  •  Cyanocobalamin 2500 MCG sublingual tablet, Place 2,500 mcg under the tongue Daily., Disp: , Rfl:   •  dapagliflozin Propanediol (Farxiga) 10 MG tablet, Take 10 mg by mouth Daily for 90 days., Disp: 90 tablet, Rfl: 0  •  ferrous sulfate 325 (65 FE) MG tablet, Take 1 tablet by mouth 2 (Two) Times a Day., Disp: , Rfl:   •  folic acid (FOLVITE) 1 MG tablet, Take 1 tablet by mouth Daily., Disp: , Rfl:   •  Insulin Glargine (LANTUS SOLOSTAR) 100 UNIT/ML injection pen, Inject 58 Units under the skin into the appropriate area as directed 2 (Two) Times a Day., Disp: , Rfl:   •  Insulin Regular Human, Conc, (HumuLIN R U-500 KwikPen) 500 UNIT/ML solution pen-injector CONCENTRATED injection, Inject 55 Units under the skin into the appropriate area as directed 3 (Three) Times a Day Before Meals., Disp: , Rfl:   •  levothyroxine (SYNTHROID, LEVOTHROID) 50 MCG tablet, Take 1 tablet by mouth Daily., Disp: , Rfl:   •  metoprolol succinate XL (Toprol XL) 25 MG 24 hr tablet, Take 1 tablet by mouth Every Night for 90 days. (Patient taking differently: Take 12.5 mg by mouth Every Night. Changed to 12.5 mg per PCP request about a week ago.), Disp: 30 tablet, Rfl: 2  •  nitroglycerin (NITROSTAT) 0.4 MG SL tablet, Place 1 tablet under the tongue Every 5 (Five) Minutes As Needed for Chest Pain for up to 10 days. Take no more than 3 doses in 15 minutes., Disp: 10 tablet, Rfl: 0  •  omeprazole (priLOSEC) 20 MG capsule, Take 1 capsule by mouth Daily., Disp: , Rfl:   •  ondansetron (ZOFRAN) 4 MG tablet, Take 1 tablet by mouth Every 8 (Eight) Hours As Needed for Nausea or Vomiting., Disp: , Rfl:   •  polyethylene glycol (MIRALAX) 17 g packet, Take 17 g by mouth Daily. (Patient taking differently: Take 17 g by mouth Daily As Needed.), Disp: 30 packet, Rfl:  "3  •  pregabalin (LYRICA) 100 MG capsule, Take 1 capsule by mouth 2 (Two) Times a Day., Disp: , Rfl:   •  rOPINIRole (REQUIP) 0.5 MG tablet, Take 1 tablet by mouth Every Night. Take 1 hour before bedtime., Disp: , Rfl:   •  Vericiguat 5 MG tablet, Take 1 tablet by mouth Daily for 30 days., Disp: 30 tablet, Rfl: 0      Physical Exam:  I have reviewed the patient's current vital signs as documented in the patient's EMR.     Vitals:    06/15/23 1306   BP: 121/79   BP Location: Left arm   Patient Position: Sitting   Cuff Size: Adult   Pulse: 72   SpO2: 98%   Weight: 109 kg (240 lb)   Height: 165.1 cm (65\")       Physical Exam  Vitals and nursing note reviewed.   Constitutional:       Appearance: Normal appearance.   HENT:      Head: Normocephalic and atraumatic.   Eyes:      General: Lids are normal.   Cardiovascular:      Rate and Rhythm: Normal rate and regular rhythm.      Heart sounds: S1 normal and S2 normal.   Pulmonary:      Effort: No tachypnea or bradypnea.      Breath sounds: No decreased breath sounds, wheezing, rhonchi or rales.   Chest:      Chest wall: No mass or lacerations.   Abdominal:      General: Abdomen is protuberant. Bowel sounds are normal.      Palpations: Abdomen is soft.      Tenderness: There is no abdominal tenderness.      Comments: Less distended than on prior visits.     Musculoskeletal:      Right lower le+ Pitting Edema present.      Left lower le+ Pitting Edema present.      Right foot: No swelling.      Left foot: No swelling.   Skin:     General: Skin is warm and moist.   Neurological:      Mental Status: She is alert and oriented to person, place, and time.   Psychiatric:         Attention and Perception: Attention normal.         Mood and Affect: Mood normal.          JVP: Volume/Pulsation: Normal.        DATA REVIEWED:     EKG. I personally reviewed and interpreted the EKG.    Last E[facility] at  [facility] not available for viewing.  "     ---------------------------------------------------  TTE/KHAI:    TRANSTHORACIC ECHOCARDIOGRAPHY REPORT    Demographics    Patient Name:          JAMAL WHARTON      :            1965    Medical Record Number: [id number]          Age:            56 year(s)    Corporate ID Number:   [id number]          Gender          Female    Account Number:        [id number]    Sonographer:           Hayden Chaudhry,     Height:         65 inches                           [facility]    Referring Physician:   ANDREAS HERNANDEZ     Weight:         240 pounds    Interpreting           MATHEUS IRELAND   BMI:            39.94 kg/m^2    Physician:             MD    Date of Service:       2022          Blood Pressure: 113/40 mmHg    Room Number:           320   Type of Study:    TTE procedure: EC Echo Complete, ECHO COMPLETE (DOPPLER / COLOR) W OR WO    CONTRAST.    Patient Status: Routine IP    Study Location: PortableTechnical Quality: Adequate visualization    Contrast Medium: Optison. Amount - 3 ml    Impression:    Indication:Hypertensive heart disease with heart failure I11.0    Mild left ventricular hypertrophy. Visually estimated ejection fraction    30% +/- 5%. Abnormal left ventricular systolic function; abnormal systolic    strain pattern. Restrictive filling pattern consistent with severely    increased LV filling pressure (Grade III Diastolic dysfunction).    Dilated right ventricle. Abnormal TAPSE; abnormal right ventricular    function. Abnormal right atrial size.    Mild mitral stenosis. Peak/Mean 6/2mmHg    Moderate tricuspid (2+) regurgitation.    No masses or thrombi are seen.   Measurements Summary:    LVEDd: 4.99 cm         LVESd: 4.08 cm          IVSEd: 0.79 cm    AO Root:2.97 cm        LVPWd: 1.04 cm    Contractility Score    Global Left Ventricular Hypokinesis was noted.    LV regional wall motion: (0-Not visualized 1-Normal 2-Hypokinesis    3-Akinesis 4-Dyskinesis 5-Aneurysm)    Left Ventricle    Peak  E-wave: 1.22   Peak A-wave: 0.2 m/s    E/A ratio: 5.99    m/s                 Volume [id number]  LV length: 8.47 cm    ml    Volume [id number] ml    LVOT diameter: 2.05 cm    Normal sized left ventricle.    Mild left ventricular hypertrophy.    Visually estimated ejection fraction 30% +/- 5%.    Abnormal left ventricular systolic function; abnormal systolic strain    pattern.    Restrictive filling pattern consistent with severely increased LV filling    pressure (Grade III Diastolic dysfunction).    No left ventricular masses or thrombi.    Right Ventricle    Diastolic dimension: 4.72     RV systolic pressure: 22.15 mmHg    cm    Dilated right ventricle.    Abnormal TAPSE; abnormal right ventricular function.    Left Atrium    LA dimension: 4.64 cm               LA volume:95.38 ml    LA/Aorta: 1.56    Abnormal left atrial volume index 45ml/m2.    Intact atrial septum.    No atrial mass or thrombus.    Right Atrium    Abnormal right atrial size.    Intact atrial septum.    No atrial mass or thrombus.    Mitral Valve    Deceleration time:     Area PHT: 2.04 cm^2  Mean velocity:    248.96 msec            P1/2t: 107.68 msec   0.57 m/s    Area (continuity):   Mean gradient:    1.73 cm^2            1.89 mmHg    Peak gradient:    5.97 mmHg    Thickened mitral valve leaflets.    Mild mitral regurgitation.    Mild mitral stenosis. Peak/Mean 6/2mmHg    Echogenic density noted on mitral valve chordae. Seen on prior exam    10/16/2022    Aortic Valve    LVOT VTI: 18.22 cm    Mildly thickend free edges of the aortic valve leaflets.    No aortic regurgitation.    No aortic stenosis.    No masses or vegetations seen.    Tricuspid Valve    TR velocity: 2.19 m/s     TR gradient: 19.32994 mmHg    Estimated RAP: 3 mmHg     RVSP: 22.17 mmHg    Structurally normal tricuspid valve.    Moderate tricuspid (2+) regurgitation.    RVSP likely underestimated due to RV systolic function.    No tricuspid stenosis.    No masses or  vegetations seen.    Pulmonic Valve    Acceleration time: 119.95 msec          PASP: 22.15 mmHg    Structurally normal pulmonic valve.    Mild pulmonic regurgitation (1+).    No pulmonic stenosis.    No masses or vegetations seen.   Great Vessels   Aorta    Aortic Root: 2.97 cm    Ascending Aorta: 2.76 cm    LVOT Diameter: 2.05 cm   Visualized aorta is normal.   Normal aortic root.   No evidence of dissection.   IVC is not well visualized.   Unable to obtain adequate subcostal view.    Pericardium / Pleura   No pericardial effusion.    Other    No masses or thrombi.    No intracardiac shunt.    ----------------------------------------------------------------    Electronically signed by MATHEUS IRELAND MD(Interpreting    Physician) on 11/17/2022 17:38       Results for orders placed during the hospital encounter of 09/09/20    Adult Transesophageal Echo (KHAI) W/ Cont if Necessary Per Protocol (Cardiology Department)    Interpretation Summary  · Ejection fraction appears to be 21 - 25%. Left ventricular systolic function is severely decreased.  · Right ventricular cavity is mildly dilated. Moderately reduced right ventricular systolic function noted.  · Trace mitral valve regurgitation is present.  · Moderate tricuspid valve regurgitation is present. Estimated right ventricular systolic pressure from tricuspid regurgitation is normal (<35 mmHg).  · There is (grade 1) plaque in the proximal aorta present. There is (grade 1) plaque in the ascending aorta present. There is (grade 1) plaque in the aortic arch present. There is (grade 1) plaque in the descending aorta present.    [facility] report:      CARDIAC CATH - RIGHT HEART CATH    Anatomical Region Laterality Modality   Heart -- Other     Narrative    Cardiac Diagnostic Report    Demographics    Patient Name       JAMAL WHARTON      Date of Birth          1965    Patient #          [id number]          Accession #            [id number]    Visit #             [id number]          Medical Record #    Physician          MATHEUS IRELAND   Date of Study          12/22/2022                       MD    Referring                              Interventional    Physician                              physician    Procedure   Procedure Type    Diagnostic procedure: RHC with Cardiomems, RIGHT HEART CATH   Indications: Angina.    Conclusions   Procedure Description   Using ultrasound and micropuncture, access to right brachial vein obtained   and 7F sheath inserted. 7F PA catheter used for RHC.   I attempted right radial access for LHC but unsuccessful due to very small   artery which appears occluded or near-occluded.   Procedure Summary   Elevated left and right heart filling pressures.   Elevated pulmonary pressures.   Borderline-low Ramos CO/CI.    Procedure Data   Procedure Date   Date: 12/22/2022Start: 15:32End: 16:09   Entry Locations     - Retrograde Percutaneous access was performed through the Right Axiliary.       A 7 Fr sheath was inserted. Hemostasis was successfully obtained using       Manual Compression.       Entry Comments: brachial vein.   Procedure Medications     - Fentanyl I.V. 25 mcg.     - Versed Sheath 1 mg.     - Oxygen NC 6 l/min.   Diagnostic Catheters     - A7 FR MON [place] [id number] used for:Arch.   Contrast Material     - None0 ml   Fluoroscopy Time: Total: 2:48 minutes.   Fluoroscopy Dose: Total: 155 mGy.    Medical History    Risk Factors    The patient risk factors include:obesity, hypercholesterolemia, treated    and uncontrolled hypertension, diabetes mellitus, last creatinine: 2    mg/dl, creatinine clearance: 69.72 ml/min and dyslipidemia.    Admission Data    Hemodynamics    Condition: Baseline    O2 Consumption: Estimated: 297.50Heart Rate: 41 bpm   Oxygen Saturation   +--------+-----+----+----------------------+---+---------------------------+   !Location!pCO2 !pO2 !% Saturation          !Hgb!O2 Content                 !    +--------+-----+----+----------------------+---+---------------------------+   !PA      !     !    !51                    !   !                           !   +--------+-----+----+----------------------+---+---------------------------+   !RA      !     !    !58                    !   !                           !   +--------+-----+----+----------------------+---+---------------------------+   !AO      !     !    !94                    !   !                           !   +--------+-----+----+----------------------+---+---------------------------+   Pressures (mmHg)   +-----+--------------------------------------------------------------------+   !Site !Pressure                                                            !   +-----+--------------------------------------------------------------------+   !RA   !28/28 (25)                                                          !   +-----+--------------------------------------------------------------------+   !RV   !81/10 ,27                                                           !   +-----+--------------------------------------------------------------------+   !PA   !73/32 (43)                                                          !   +-----+--------------------------------------------------------------------+   !PCW  !68/66 (45)                                                          !   +-----+--------------------------------------------------------------------+   !PCW  !78/59 (45)                                                          !   +-----+--------------------------------------------------------------------+   !PCW  !23/36 (27)                                                          !   +-----+--------------------------------------------------------------------+   Cardiac Output   +------+-------------------------+----------------------------+------------+   !Method!CO (l/min)               !CI (l/min/m2)               !SV (ml)     !    +------+-------------------------+----------------------------+------------+   !Ramos  !5.35                     !2.2                         !131.67      !   +------+-------------------------+----------------------------+------------+   Shunts   Oxygen Values    O2 Capacity 129.2 O2 Consumption 297.5    Flows (l/min)    Qs 6.4 Qe/Qp 1.2    Qp 5.35 Qp/Qs 0.84    Qe 6.4   Vascular Resistance (dynes x sec x cm-5)   +-------------------------------------+----+---+----+----+---------+-------+   !CO method                            !TSVR!SVR!TPVR!PVR !TPVR/TSVR!PVR/SVR!   +-------------------------------------+----+---+----+----+---------+-------+   !Ramos                                 !    !   !8.03!2.94!         !       !   +-------------------------------------+----+---+----+----+---------+-------+   !Qp or Qs                             !    !   !8.03!2.94!         !       !   +-------------------------------------+----+---+----+----+---------+-------+    Signatures    ----------------------------------------------------------------    Electronically signed by MATHEUS IRELAND MD(Physician) on    12/22/2022 16:19    ----------------------------------------------------------------        -----------------------------------------------------  CXR/Imaging:   Imaging Results (Most Recent)     None          I personally reviewed and interpreted the CXR.      -----------------------------------------------------  CT:   No radiology results for the last 30 days.  I personally reviewed the images of the CT scan.  My personal interpretation is below.      ----------------------------------------------------    PFTs:    No PFTS available.      --------------------------------------------------------------------------------------------------    Laboratory evaluations:    Lab Results   Component Value Date    GLUCOSE 348 (H) 06/15/2023    BUN 23 (H) 06/15/2023    CREATININE 1.31 (H) 06/15/2023    EGFRIFNONA 49 (L) 11/10/2020     BCR 17.6 06/15/2023    K 3.8 06/15/2023    CO2 28.8 06/15/2023    CALCIUM 9.4 06/15/2023    ALBUMIN 4.2 02/21/2023    LABIL2 1.3 (L) 06/09/2016    AST 33 (H) 02/21/2023    ALT 20 02/21/2023     Lab Results   Component Value Date    WBC 6.88 01/31/2023    HGB 9.7 (L) 01/31/2023    HCT 32.9 (L) 01/31/2023    .5 (H) 01/31/2023     (L) 01/31/2023     Lab Results   Component Value Date    CHOL 137 09/11/2020    CHLPL 103 04/21/2016    TRIG 80 09/11/2020    HDL 43 09/11/2020    LDL 78 09/11/2020     Lab Results   Component Value Date    TSH 3.830 09/10/2020     Lab Results   Component Value Date    HGBA1C 8.40 (H) 09/10/2020     Lab Results   Component Value Date    ALT 20 02/21/2023     Lab Results   Component Value Date    HGBA1C 8.40 (H) 09/10/2020    HGBA1C 5.4 04/21/2016     Lab Results   Component Value Date    MICROALBUR 3.2 09/12/2020    CREATININE 1.31 (H) 06/15/2023     Lab Results   Component Value Date    IRON 34.0 (L) 12/21/2022    TIBC 237 (L) 09/18/2020    FERRITIN 1,219.30 (H) 12/21/2022     Lab Results   Component Value Date    INR 1.10 12/15/2022    INR 1.25 (H) 09/23/2020    INR 1.09 09/22/2020    PROTIME 11.6 12/15/2022    PROTIME 15.5 (H) 09/23/2020    PROTIME 13.9 09/22/2020        Lab Results   Component Value Date    ABSOLUTELUNG 40 (A) 06/15/2023    ABSOLUTELUNG 30 05/18/2023    ABSOLUTELUNG 38 (A) 04/20/2023       PAH RISK ASSESSMENT:      1. Acute on chronic combined systolic and diastolic ACC/AHA stage C congestive heart failure    2. Severe obesity (BMI 35.0-39.9) with comorbidity    3. CKD (chronic kidney disease) stage 2, GFR 60-89 ml/min    4. Chronic atrial fibrillation    5. Cirrhosis of liver without ascites, unspecified hepatic cirrhosis type          ORDERS PLACED TODAY:  Orders Placed This Encounter   Procedures   • Basic Metabolic Panel   • Magnesium   • proBNP   • Basic Metabolic Panel   • Magnesium   • proBNP   • ReDs Vest        Diagnoses and all orders for this  visit:    1. Acute on chronic combined systolic and diastolic ACC/AHA stage C congestive heart failure (Primary)  -     Basic Metabolic Panel; Future  -     Magnesium; Future  -     proBNP; Future  -     ReDs Vest  -     Basic Metabolic Panel; Standing  -     Basic Metabolic Panel  -     Magnesium; Standing  -     Magnesium  -     proBNP; Standing  -     proBNP    2. Severe obesity (BMI 35.0-39.9) with comorbidity    3. CKD (chronic kidney disease) stage 2, GFR 60-89 ml/min    4. Chronic atrial fibrillation    5. Cirrhosis of liver without ascites, unspecified hepatic cirrhosis type    Other orders  -     Vericiguat 5 MG tablet; Take 1 tablet by mouth Daily for 30 days.  Dispense: 30 tablet; Refill: 0  -     bumetanide (BUMEX) 2 MG tablet; Take 1 tablet by mouth Daily for 30 days. Two 2mg tablets for 5 days then resume prior dosing of 2mg daily and 4mg on Mon & Thurs  Dispense: 45 tablet; Refill: 0             MEDS ORDERED TODAY:    New Medications Ordered This Visit   Medications   • Vericiguat 5 MG tablet     Sig: Take 1 tablet by mouth Daily for 30 days.     Dispense:  30 tablet     Refill:  0   • bumetanide (BUMEX) 2 MG tablet     Sig: Take 1 tablet by mouth Daily for 30 days. Two 2mg tablets for 5 days then resume prior dosing of 2mg daily and 4mg on Mon & Thurs     Dispense:  45 tablet     Refill:  0        ---------------------------------------------------------------------------------------------------------------------------          ASSESSMENT/PLAN:      Diagnosis Plan   1. Acute on chronic combined systolic and diastolic ACC/AHA stage C congestive heart failure  Basic Metabolic Panel    Magnesium    proBNP    ReDs Vest    Basic Metabolic Panel    Basic Metabolic Panel    Magnesium    Magnesium    proBNP    proBNP      2. Severe obesity (BMI 35.0-39.9) with comorbidity        3. CKD (chronic kidney disease) stage 2, GFR 60-89 ml/min        4. Chronic atrial fibrillation        5. Cirrhosis of liver  without ascites, unspecified hepatic cirrhosis type            acute exacerbation of previously diagnosed moderate systolic and diastolic heart failure. CHF. Etiology: Unknown/Idiopathic. LVEF 30-35%.     NYHA stage Stage D: Presence of advanced heart disease with continued heart failure symptoms requiring aggressive medical therapyFC-Class IV: Unable to carry on any physical activity without discomfort. Symptoms of heart failureat rest. If any physical activity is undertaken, discomfort increases.     Today, Patient is approaching euvolemia and with  Moderate perfusion. The patient's hemodynamics are currently acceptable. HR is: normal and is at goal. BP/MAP was reviewed and there is notroom for medication up-titration.  Clinical trajectory was assessed and hasimproved.     CHF GOAL DIRECTED MEDICAL THERAPY FOR PATIENT ADDRESSED/ADJUSTED:     GDMT    Drug Class   Drug   Dose Last Dose Adjustment Additional Titration   Notes   ACEi/ARB/ARNI ACEI previously stopped due to renal fxn       Beta Blocker  Metoprolol Succinate   12.5 mg qhs Reduced by PCP     MRA Spirono previously stopped due to renal fxn       SGLT2i Farxiga 10mg qd  N/A      Secondary management:     -CHF Specific BB:   • Toprol XL 12.5mg (previously decreased in early 02/2023) is being tolerated well with dizziness resolved and fewer lower blood pressures.    • Continue monitoring as she is also on amiodarone for her Afib.   • Hold parameters reviewed.      -ACE/ARB/ARNi:   • ACEi recently held during [facility] hospitalization.     -MRA:   • The patient is FC-NYHA Class IV and MRA is indicated.   • Spironolactone was previously stopped on regimen due to renal function.    • Will continue to monitor real function and attempt to adjust/re-add; however, she is also being followed by Nephrology with Metolazone recently added, thus will hold off on MRA addition.      -SGLT2 inhibitor therapy:   • A BMP at initiation to verify GFR >30 was recommended, as was  interval GFR surveillance.  • The patient was advised to hold SGLT2I when PO intake is restricted due to a planned surgery, or due to an underlying illness.    • Recommend continuing  Farxiga 10mg with quarterly assessment of GFR. She continues to deny  difficulties.   She was transitioned by Nephrology.    • Pt was advised SEs, some severe, including hypersensitivity and Yesenia's; coupled with discussion regarding common side effects of UTIs and female genital mycotic infections were discussed. If you will be NPO, or are sick (poor PO intake, N&V) please hold the medication until you are back to a normal diet.       Secondary pharmacological therapy in HFreF:   • EF is less than 45% @ 30-35%.   • Maximized on GDMT as tolerated thus far (see chart above)  • Recent hospitalization or IV diuresis includes hospitalizations within the last year at [facility], [facility], and [facility] with acute heart failure in addition to afib with RVR.    • Given HFrEF with optimally treated with primary therapies for HFrEF and vasodilator therapy, will upward titrate verquevo to 5mg qd.       -Diuretic regimen:   • ReDS Vest reading for 06/15/23  Is elevated at 40 with elevation in proBNP as well.    • Declines IV diuresis in clinic today.  Would like to try oral route first.  Instructed to take 4mg of Bumex for the next 5 days followed by resuming her prior home dosing afterward.     • BMP, Mag, & ProBNP reviewed with patient with toleration of current medication regimen.      -Fluid restriction/Sodium restriction:   • Requested 2000 ml restriction  • Patient has been asked to weigh daily and was provided with a printed diuretic strategy.  • 1,500 mg Na restriction was discussed.    -Acute vs. Chronic underlying conditions other than HF addressed during visit:         • Paroxysmal Atrial fibrillation/Flutter, currently SR:   o Office visit reviewed from January 27, 2023 with [facility] electrophysiology with  cardiomyopathy felt to be multifactorial with DMC and severely worsened by atrial fibrillation with chronic anemia with noted recent hospital discharge with amiodarone and Eliquis she was in sinus rhythm on her January 27, 2023 evaluation with amiodarone renewed but with Lasix 20 mg restarted and encouragement to follow-up more closely in [place].    o Continue Eliquis 5mg qd with [id number] at least 4.    o Continue Amiodarone as prescribed per EP.  Per extensive review of outside cardiology notes, she has been tried on reduced dose of amiodarone 200mg qd and had recurrent hospitalization with atrial fibrillation with RVR. While she does have known Cirrhosis, prior attempts to decrease amiodarone have resulted in RVR events.    o Continue current Amiodarone 200mg BID as previously directed.      • CKD IIIb:   o Monitor BMP. Reviewed today with creatinine appearing 1.5 with baseline previously 1.9-2.2 per review of OSH labs.    o Continues to be within baseline.      • Debility:   o Continue home health with PT/OT.   o Multiple types of DME at home.     · Iron/Anemia in CHF:  o Prior Iron labs from [facility] with Iron 31, ferritin 1219.30, and TIBC 234 with iron saturation 13%.    o Continue BID ferrous sulfate.          • Identifiable barriers to Heart Failure Self-care:   o Medical Barriers: Debility  o Social Barriers: Transport limitations      --------------------------------------------------------------------------------          BMI cannot be calculated due to outdated height or weight values with patient unable to stand. She has self reported weight of 315              >45 minutes out of 60 minutes face to face spent counseling patient extensively on dietary Na+ intake, importance of activity, weight monitoring, compliance with medications in addition to importance of titration with goal directed medical therapy and follow up appointments.            This document has been electronically signed by Sonja  Anna Romo PA-C  Lena 15, 2023 14:45 EDT      Dictated Utilizing Dragon Dictation: Part of this note may be an electronic transcription/translation of spoken language to printed text using the Dragon Dictation System.    Follow-up appointment and medication changes provided in hand delivered After Visit Summary as well as reviewed in the room.

## 2023-06-15 NOTE — PROGRESS NOTES
" Heart Failure Clinic  Pharmacist Note     Yumiko Purdy is a 57 y.o. female seen in the Heart Failure Clinic for HFrEF.  Yumiko Purdy reports a fair understanding of medications. She reports that she does not weigh herself at home because she cannot stand on the scale.  She also reports checking her BP at home and says it runs \"good\". Today her BP was 121/79 and she confirmed that that was normal. She denies having any SBP's <100. Her nephrologist increased her Bumex dose to 4 mg on Monday and Thursday and 2 mg ROW recently and she reports that he took away Metolazone and she can tell the difference. Within the last week, she reports increased SOB and swelling in her legs and stomach. She reports numbness in her hands and legs that she thinks is from Amiodarone and she is interested in an alternative. Upon asking if she had been  her ferrous sulfate from her levothyroxine, she stated no and that she needed to be reminded about what to do with that.      Medication Use:   Hx of med intolerances: None related to HF  Retail Rx Management: Uses [facility]    Past Medical History:   Diagnosis Date    Anemia     CHF (congestive heart failure)     Chronic a-fib     stated by patient     Cirrhosis of liver     stated by patient     Diabetes mellitus      ALLERGIES: Phenergan [promethazine]  Current Outpatient Medications   Medication Sig Dispense Refill    acetaminophen (TYLENOL) 500 MG tablet Take 1 tablet by mouth 2 (Two) Times a Day As Needed for Mild Pain.      amiodarone (PACERONE) 200 MG tablet Take 1 tablet by mouth 2 (Two) Times a Day.      apixaban (ELIQUIS) 5 MG tablet tablet Take 1 tablet by mouth Every 12 (Twelve) Hours. Indications: Atrial Fibrillation 60 tablet 3    aspirin 81 MG EC tablet Take 1 tablet by mouth Daily. 30 tablet 3    bumetanide (BUMEX) 2 MG tablet Take 1 tablet by mouth Daily for 30 days. Two 2mg tablets for 5 days then resume prior dosing of 2mg daily and 4mg on Mon & Thurs 45 tablet 0 "    busPIRone (BUSPAR) 15 MG tablet Take 1 tablet by mouth 2 (Two) Times a Day for 30 days. 60 tablet 0    Cyanocobalamin 2500 MCG sublingual tablet Place 2,500 mcg under the tongue Daily.      dapagliflozin Propanediol (Farxiga) 10 MG tablet Take 10 mg by mouth Daily for 90 days. 90 tablet 0    ferrous sulfate 325 (65 FE) MG tablet Take 1 tablet by mouth 2 (Two) Times a Day.      folic acid (FOLVITE) 1 MG tablet Take 1 tablet by mouth Daily.      Insulin Glargine (LANTUS SOLOSTAR) 100 UNIT/ML injection pen Inject 58 Units under the skin into the appropriate area as directed 2 (Two) Times a Day.      Insulin Regular Human, Conc, (HumuLIN R U-500 KwikPen) 500 UNIT/ML solution pen-injector CONCENTRATED injection Inject 55 Units under the skin into the appropriate area as directed 3 (Three) Times a Day Before Meals.      levothyroxine (SYNTHROID, LEVOTHROID) 50 MCG tablet Take 1 tablet by mouth Daily.      metoprolol succinate XL (Toprol XL) 25 MG 24 hr tablet Take 1 tablet by mouth Every Night for 90 days. (Patient taking differently: Take 12.5 mg by mouth Every Night. Changed to 12.5 mg per PCP request about a week ago.) 30 tablet 2    nitroglycerin (NITROSTAT) 0.4 MG SL tablet Place 1 tablet under the tongue Every 5 (Five) Minutes As Needed for Chest Pain for up to 10 days. Take no more than 3 doses in 15 minutes. 10 tablet 0    omeprazole (priLOSEC) 20 MG capsule Take 1 capsule by mouth Daily.      ondansetron (ZOFRAN) 4 MG tablet Take 1 tablet by mouth Every 8 (Eight) Hours As Needed for Nausea or Vomiting.      polyethylene glycol (MIRALAX) 17 g packet Take 17 g by mouth Daily. (Patient taking differently: Take 17 g by mouth Daily As Needed.) 30 packet 3    pregabalin (LYRICA) 100 MG capsule Take 1 capsule by mouth 2 (Two) Times a Day.      rOPINIRole (REQUIP) 0.5 MG tablet Take 1 tablet by mouth Every Night. Take 1 hour before bedtime.      Vericiguat 5 MG tablet Take 1 tablet by mouth Daily for 30 days. 30  "tablet 0     No current facility-administered medications for this encounter.       Vaccination History:   Pneumonia: Needs, declines  Annual Influenza: Needs, declines  COVID 19: Needs, declines     Objective  Vitals:    06/15/23 1306   BP: 121/79   BP Location: Left arm   Patient Position: Sitting   Cuff Size: Adult   Pulse: 72   SpO2: 98%   Weight: 109 kg (240 lb)   Height: 165.1 cm (65\")     Wt Readings from Last 3 Encounters:   06/15/23 109 kg (240 lb)   05/18/23 113 kg (250 lb)   11/10/20 102 kg (225 lb)     Lab Results   Component Value Date    GLUCOSE 348 (H) 06/15/2023    BUN 23 (H) 06/15/2023    CREATININE 1.31 (H) 06/15/2023    EGFRIFNONA 49 (L) 11/10/2020    BCR 17.6 06/15/2023    K 3.8 06/15/2023    CO2 28.8 06/15/2023    CALCIUM 9.4 06/15/2023    ALBUMIN 4.2 02/21/2023    LABIL2 1.3 (L) 06/09/2016    AST 33 (H) 02/21/2023    ALT 20 02/21/2023     Lab Results   Component Value Date    WBC 6.88 01/31/2023    HGB 9.7 (L) 01/31/2023    HCT 32.9 (L) 01/31/2023    .5 (H) 01/31/2023     (L) 01/31/2023     Lab Results   Component Value Date    TROPONINT 0.027 09/11/2020     Lab Results   Component Value Date    PROBNP 1,372.0 (H) 06/15/2023     Results for orders placed during the hospital encounter of 09/09/20    Adult Transesophageal Echo ([facility]) W/ Cont if Necessary Per Protocol (Cardiology Department)    Interpretation Summary  · Ejection fraction appears to be 21 - 25%. Left ventricular systolic function is severely decreased.  · Right ventricular cavity is mildly dilated. Moderately reduced right ventricular systolic function noted.  · Trace mitral valve regurgitation is present.  · Moderate tricuspid valve regurgitation is present. Estimated right ventricular systolic pressure from tricuspid regurgitation is normal (<35 mmHg).  · There is (grade 1) plaque in the proximal aorta present. There is (grade 1) plaque in the ascending aorta present. There is (grade 1) plaque in the aortic arch " present. There is (grade 1) plaque in the descending aorta present.         GDMT    Drug Class   Drug   Dose Last Dose Adjustment Additional Titration   Notes   ACEi/ARB/ARNI     Was previously on lisinopril, stopped d/t renal function    Beta Blocker Metoprolol XL 12.5 mg QD > 1 week  Decreased from 25 mg daily ~ 5/8 due to hypotensive symptoms   MRA     Was previously on spironolactone 50mg BID Nov-Dec 22, stopped d/t renal function   SGLT2i Farxiga 10mg QD  N/A     Verquvo 5 mg QD 06/15/23 Needs        Drug Therapy Problems    1. GDMT  2. Increased SOB and swelling  3. DDI: Levothyroxine and Ferrous Sulfate - Patient is concurrently medications together; Iron Preparations may decrease the serum concentration of Levothyroxine    Recommendations:     1. Recommend increasing to Verquvo 5 mg daily. Will mail to patient.   2. Sonja to increase Bumex to 4mg daily for the next 5 days then to resume 4mg on Mon/Thurs and 2mg ROW.   3. I went over the AVS with the patient, labeling what each medication was for and when to take each one. I specifically recommended and charted for her to take her ferrous sulfate in the afternoon and at bedtime to separate it from her levothyroxine dose in the AM. Patient understood.     Patient was educated on heart failure medications and the importance of medication adherence. All questions were addressed and patient expressed some understanding. Would benefit from additional education at each visit.    Thank you for allowing me to participate in the care of your patient,    Kelli Soto [facility]  06/15/23  14:30 EDT

## 2023-06-15 NOTE — PROGRESS NOTES
Heart Failure Clinic    Date: 06/15/23     Vitals:    06/15/23 1306   BP: 121/79   Pulse: 72   SpO2: 98%    Weight 240    Method of arrival: Other wheelchair    Weighing self daily: No    Monitoring Heart Failure Zones: Yes    Today's HF Zone: Yellow     Taking medications as prescribed: Yes    Edema Yes    Shortness of Air: Yes    Number of pillows used at night:hospital bed    Educational Materials given:  avs                                                                         ReDS Value: 40  36-41 Possible Hypervolemic Status      Tushar Phoenix Rep 06/15/23 13:07 EDT

## 2023-08-17 ENCOUNTER — HOSPITAL ENCOUNTER (OUTPATIENT)
Dept: CARDIOLOGY | Facility: HOSPITAL | Age: 58
Discharge: HOME | End: 2023-08-17
Admitting: PHYSICIAN ASSISTANT
Payer: COMMERCIAL

## 2023-08-17 VITALS
WEIGHT: 240 LBS | HEART RATE: 69 BPM | OXYGEN SATURATION: 97 % | SYSTOLIC BLOOD PRESSURE: 100 MMHG | BODY MASS INDEX: 39.99 KG/M2 | HEIGHT: 65 IN | DIASTOLIC BLOOD PRESSURE: 59 MMHG

## 2023-08-17 DIAGNOSIS — N18.2 CKD (CHRONIC KIDNEY DISEASE) STAGE 2, GFR 60-89 ML/MIN: ICD-10-CM

## 2023-08-17 DIAGNOSIS — I50.42 CHRONIC COMBINED SYSTOLIC AND DIASTOLIC HEART FAILURE: ICD-10-CM

## 2023-08-17 DIAGNOSIS — E87.1 HYPONATREMIA: Primary | ICD-10-CM

## 2023-08-17 DIAGNOSIS — K74.60 CIRRHOSIS OF LIVER WITHOUT ASCITES, UNSPECIFIED HEPATIC CIRRHOSIS TYPE: ICD-10-CM

## 2023-08-17 LAB
ABSOLUTE LUNG FLUID CONTENT: 36 % (ref 20–35)
ALBUMIN SERPL-MCNC: 4.2 G/DL (ref 3.5–5.2)
ALBUMIN/GLOB SERPL: 1.4 G/DL
ALP SERPL-CCNC: 90 U/L (ref 39–117)
ALT SERPL W P-5'-P-CCNC: 32 U/L (ref 1–33)
ANION GAP SERPL CALCULATED.3IONS-SCNC: 13.8 MMOL/L (ref 5–15)
AST SERPL-CCNC: 30 U/L (ref 1–32)
BILIRUB SERPL-MCNC: 2.7 MG/DL (ref 0–1.2)
BUN SERPL-MCNC: 32 MG/DL (ref 6–20)
BUN/CREAT SERPL: 25.8 (ref 7–25)
CALCIUM SPEC-SCNC: 9.4 MG/DL (ref 8.6–10.5)
CHLORIDE SERPL-SCNC: 96 MMOL/L (ref 98–107)
CO2 SERPL-SCNC: 25.2 MMOL/L (ref 22–29)
CREAT SERPL-MCNC: 1.24 MG/DL (ref 0.57–1)
EGFRCR SERPLBLD CKD-EPI 2021: 51.2 ML/MIN/1.73
GLOBULIN UR ELPH-MCNC: 3 GM/DL
GLUCOSE SERPL-MCNC: 300 MG/DL (ref 65–99)
MAGNESIUM SERPL-MCNC: 2.3 MG/DL (ref 1.6–2.6)
NT-PROBNP SERPL-MCNC: 630.5 PG/ML (ref 0–900)
POTASSIUM SERPL-SCNC: 3.7 MMOL/L (ref 3.5–5.2)
PROT SERPL-MCNC: 7.2 G/DL (ref 6–8.5)
SODIUM SERPL-SCNC: 135 MMOL/L (ref 136–145)

## 2023-08-17 PROCEDURE — 83735 ASSAY OF MAGNESIUM: CPT | Performed by: PHYSICIAN ASSISTANT

## 2023-08-17 PROCEDURE — 80053 COMPREHEN METABOLIC PANEL: CPT | Performed by: PHYSICIAN ASSISTANT

## 2023-08-17 PROCEDURE — 83880 ASSAY OF NATRIURETIC PEPTIDE: CPT | Performed by: PHYSICIAN ASSISTANT

## 2023-08-17 PROCEDURE — 94726 PLETHYSMOGRAPHY LUNG VOLUMES: CPT | Performed by: PHYSICIAN ASSISTANT

## 2023-08-17 RX ORDER — BUMETANIDE 2 MG/1
2 TABLET ORAL DAILY
Qty: 45 TABLET | Refills: 0 | Status: SHIPPED | OUTPATIENT
Start: 2023-08-17 | End: 2023-09-22

## 2023-08-17 NOTE — PROGRESS NOTES
Heart Failure Clinic    Date: 08/17/23     Vitals:    08/17/23 1325   BP: 100/59   Pulse: 69   SpO2: 97%        Method of arrival: Other wheelchair    Weighing self daily: No    Monitoring Heart Failure Zones: Yes    Today's HF Zone: Yellow     Taking medications as prescribed: Yes    Edema Yes    Shortness of Air: Yes alot    Number of pillows used at night:<2    Educational Materials given:                                                                           ReDS Value:   36-41 Possible Hypervolemic Status  36%      Ruma Hancock MA 08/17/23 13:26 EDT

## 2023-08-17 NOTE — PROGRESS NOTES
[facility]  ABDIFATAH Galarza Melanie Lind, APRN  [address] [place] [address] [address]  [place] [address]    Thank you for asking me to see Yumiko Purdy for congestive heart failure.    HPI:     This is a 57 y.o. female with known past medical history of:  Chronic systolic & diastolic HF  TTE from November 2022 with visually estimated ejection fraction of 30% ñ5% and noted abnormal systolic strain pattern as well as grade 3 diastolic dysfunction as well as abnormal TAPSE with abnormal right ventricular function  KHAI on 09/2020 with EF 21-25% with LV systolic function severely decreased and RV cavity mildly dilated with moderately reduced RV systolic function noted, moderate TVR, and aortic plaquing  Right heart cath on 12/22/2022 with elevated left and right heart pressures with report not available  ASCVD  LHC 11/10/20 with preserved LV systolic, EF 50-55% with elevated LV end diastolic pressure consistent with diastolic dysfunction and right dominant system with 30-40% mid LAD lesion.   LHC attempted on 12/2022 at [facility] with unsuccessful access due to small artery appearing occluded or near occluded radially on right  Peripheral Arterial disease  Atrial Flutter  CKD stage IIIb  Cirrhosis of the liver  Stage III CKD  Chronic hypoxemic respiratory failure  Morbid Obesity  Diffuse purpuric rash with prior w/u negative for vasculitis    Yumiko Purdy presents for today for HF clinic evaluation.  The patient is typically seen by Masha Davidson APRN.  Patient's primary cardiologist is Dr. Jad Meredith.      Last known EF 21-25% by KHAI and 50-55% by LHC in 11/2020.    Last known hospitalization and/or ED visit: Patient has not been hospitalized at this facility since 2020.  However she was hospitalized at [facility] in [facility] in December 2022 with heart failure admission with discharge summary reviewed standing admission with acute on  chronic mixed systolic diastolic heart failure during which time she was on dobutamine drip and discharged with p.o. Bumex and Zaroxolyn with note of chronic atrial fibrillation.  She was on dobutamine drip for some time and nephrology did also assist in her diuretic adjustments recommending ultimately Bumex twice daily metolazone and spironolactone on discharge.  Accompanied by: Son      08/17/23 visit data/details regarding:   Dyspnea: Improving, Patient is WC bound and mostly just exerts herself with transfers and such; This remains unchanged on today's visit.   Lower extremity swelling: Bilateral lower extremity swelling improved today  Abdominal swelling: Abdominal swelling is improving.    Home weight: Not currently monitoring due to inability to stand  Home BP: SBP has been in the 100s-110s with less drops at home  Home heart rate: 60s  Daily activities of living: Performing with assistance  HF zone: Yellow  Pillows/lying flat: Sleeps in hospital bed.  Mobility assistance devices:  WC at home, bedside commode.    Patient was recently given Bactrim DS therapy for a UTI.  Will check BMP today patient has known CKD and is not an ideal candidate for Bactrim.  Will stop her Farxiga given recent UTIs.    She is tolerating Verquevo well and we discuss upward titration.       Specialists:   Cardiology: [facility] with EP & General        Review of Systems - Review of Systems   Constitutional: Negative for chills, decreased appetite and malaise/fatigue.   HENT:  Negative for congestion, ear discharge and ear pain.    Eyes:  Negative for blurred vision, discharge and double vision.   Cardiovascular:  Negative for dyspnea on exertion and leg swelling.   Respiratory:  Negative for cough, hemoptysis and shortness of breath.    Endocrine: Negative for cold intolerance and heat intolerance.   Hematologic/Lymphatic: Negative for adenopathy and bleeding problem.   Skin:  Negative for color change, dry skin and  nail changes.   Musculoskeletal:  Negative for arthritis, muscle weakness and myalgias.   Gastrointestinal:  Negative for bloating and abdominal pain.   Genitourinary:  Negative for bladder incontinence, decreased libido and dysuria.   Neurological:  Negative for brief paralysis, difficulty with concentration and dizziness.   Psychiatric/Behavioral:  Negative for altered mental status, depression and hallucinations.    Allergic/Immunologic: Negative for environmental allergies and HIV exposure.       All other systems were reviewed and were negative.    Patient Active Problem List   Diagnosis    Acute on chronic congestive heart failure    Cardiomyopathy    CKD (chronic kidney disease) stage 2, GFR 60-89 ml/min    Severe obesity (BMI 35.0-39.9) with comorbidity    Chronic anemia    Elevated bilirubin    Acute on chronic combined systolic and diastolic CHF (congestive heart failure)    Hyponatremia    Chronic atrial fibrillation    Cirrhosis       family history is not on file.     reports that she has never smoked. She has never used smokeless tobacco. She reports that she does not drink alcohol and does not use drugs.    Allergies   Allergen Reactions    Phenergan [Promethazine]          Current Outpatient Medications:     acetaminophen (TYLENOL) 500 MG tablet, Take 1 tablet by mouth 2 (Two) Times a Day As Needed for Mild Pain., Disp: , Rfl:     acetaminophen-codeine (TYLENOL with CODEINE #3) 300-30 MG per tablet, Take 1 tablet by mouth Every 6 (Six) Hours As Needed., Disp: , Rfl:     amiodarone (PACERONE) 200 MG tablet, Take 1 tablet by mouth Daily., Disp: , Rfl:     apixaban (ELIQUIS) 5 MG tablet tablet, Take 1 tablet by mouth Every 12 (Twelve) Hours. Indications: Atrial Fibrillation, Disp: 60 tablet, Rfl: 3    aspirin 81 MG EC tablet, Take 1 tablet by mouth Daily., Disp: 30 tablet, Rfl: 3    bumetanide (BUMEX) 2 MG tablet, Take 2 tablets by mouth daily on Monday and Thursday, and take 1 tablet daily all other  days., Disp: 45 tablet, Rfl: 0    busPIRone (BUSPAR) 15 MG tablet, Take 1 tablet by mouth 2 (Two) Times a Day for 30 days., Disp: 60 tablet, Rfl: 0    Cyanocobalamin 2500 MCG sublingual tablet, Place 2,500 mcg under the tongue Daily., Disp: , Rfl:     Diclofenac Sodium (VOLTAREN) 1 % gel gel, Apply 2 g topically to the appropriate area as directed 4 (Four) Times a Day., Disp: , Rfl:     ferrous sulfate 325 (65 FE) MG tablet, Take 1 tablet by mouth 2 (Two) Times a Day., Disp: , Rfl:     folic acid (FOLVITE) 1 MG tablet, Take 1 tablet by mouth Daily., Disp: , Rfl:     Insulin Glargine (LANTUS SOLOSTAR) 100 UNIT/ML injection pen, Inject 58 Units under the skin into the appropriate area as directed 2 (Two) Times a Day., Disp: , Rfl:     Insulin Regular Human, Conc, (HumuLIN R U-500 KwikPen) 500 UNIT/ML solution pen-injector CONCENTRATED injection, Inject 55 Units under the skin into the appropriate area as directed 3 (Three) Times a Day Before Meals., Disp: , Rfl:     levothyroxine (SYNTHROID, LEVOTHROID) 50 MCG tablet, Take 1 tablet by mouth Daily., Disp: , Rfl:     metOLazone (ZAROXOLYN) 2.5 MG tablet, Take 1 tablet by mouth 3 (Three) Times a Week if Needed (Worsening swelling and shortness of breath.)., Disp: 30 tablet, Rfl: 11    metoprolol succinate XL (Toprol XL) 25 MG 24 hr tablet, Take 1 tablet by mouth Every Night for 90 days. (Patient taking differently: Take 0.5 tablets by mouth Every Night. Changed to 12.5 mg per PCP request about a week ago.), Disp: 30 tablet, Rfl: 2    nitroglycerin (NITROSTAT) 0.4 MG SL tablet, Place 1 tablet under the tongue Every 5 (Five) Minutes As Needed for Chest Pain for up to 10 days. Take no more than 3 doses in 15 minutes., Disp: 10 tablet, Rfl: 0    omeprazole (priLOSEC) 20 MG capsule, Take 1 capsule by mouth Daily., Disp: , Rfl:     ondansetron (ZOFRAN) 4 MG tablet, Take 1 tablet by mouth Every 8 (Eight) Hours As Needed for Nausea or Vomiting., Disp: , Rfl:     polyethylene  "glycol (MIRALAX) 17 g packet, Take 17 g by mouth Daily. (Patient taking differently: Take 17 g by mouth Daily As Needed.), Disp: 30 packet, Rfl: 3    pregabalin (LYRICA) 100 MG capsule, Take 1 capsule by mouth 2 (Two) Times a Day., Disp: , Rfl:     rOPINIRole (REQUIP) 0.5 MG tablet, Take 1 tablet by mouth Every Night. Take 1 hour before bedtime., Disp: , Rfl:     Vericiguat 10 MG tablet, Take 1 tablet by mouth Daily for 90 days., Disp: 90 tablet, Rfl: 1      Physical Exam:  I have reviewed the patient's current vital signs as documented in the patient's EMR.     Vitals:    23 1325   BP: 100/59   BP Location: Right arm   Patient Position: Sitting   Cuff Size: Small Adult   Pulse: 69   SpO2: 97%   Weight: 109 kg (240 lb)   Height: 165.1 cm (65\")       Physical Exam  Vitals and nursing note reviewed.   Constitutional:       Appearance: Normal appearance.   HENT:      Head: Normocephalic and atraumatic.   Eyes:      General: Lids are normal.   Cardiovascular:      Rate and Rhythm: Normal rate and regular rhythm.      Heart sounds: S1 normal and S2 normal.   Pulmonary:      Effort: No tachypnea or bradypnea.      Breath sounds: No decreased breath sounds, wheezing, rhonchi or rales.   Chest:      Chest wall: No mass or lacerations.   Abdominal:      General: Abdomen is protuberant. Bowel sounds are normal.      Palpations: Abdomen is soft.      Tenderness: There is no abdominal tenderness.      Comments: Less distended than on prior visits.     Musculoskeletal:      Right lower le+ Edema present.      Left lower le+ Edema present.      Right foot: No swelling.      Left foot: No swelling.   Skin:     General: Skin is warm and moist.   Neurological:      Mental Status: She is alert and oriented to person, place, and time.   Psychiatric:         Attention and Perception: Attention normal.         Mood and Affect: Mood normal.        JVP: Volume/Pulsation: Normal.        DATA REVIEWED:     EKG. I personally " reviewed and interpreted the EKG.    Last EKG at [facility] not available for viewing.      ---------------------------------------------------  TTE/KHAI:    TRANSTHORACIC ECHOCARDIOGRAPHY REPORT    Demographics    Patient Name:          JAMAL WHARTON      :            1965    Medical Record Number: [id number]          Age:            56 year(s)    Corporate ID Number:   [id number]          Gender          Female    Account Number:        [id number]    Sonographer:           Hayden Chaudhry,     Height:         65 inches                           [facility]    Referring Physician:   ANDREAS HERNANDEZ     Weight:         240 pounds    Interpreting           MATHEUS IRELAND   BMI:            39.94 kg/m^2    Physician:             MD    Date of Service:       2022          Blood Pressure: 113/40 mmHg    Room Number:           320   Type of Study:    TTE procedure: EC Echo Complete, ECHO COMPLETE (DOPPLER / COLOR) W OR WO    CONTRAST.    Patient Status: Routine IP    Study Location: [facility] Quality: Adequate visualization    Contrast Medium: Optison. Amount - 3 ml    Impression:    Indication:Hypertensive heart disease with heart failure I11.0    Mild left ventricular hypertrophy. Visually estimated ejection fraction    30% +/- 5%. Abnormal left ventricular systolic function; abnormal systolic    strain pattern. Restrictive filling pattern consistent with severely    increased LV filling pressure (Grade III Diastolic dysfunction).    Dilated right ventricle. Abnormal TAPSE; abnormal right ventricular    function. Abnormal right atrial size.    Mild mitral stenosis. Peak/Mean 6/2mmHg    Moderate tricuspid (2+) regurgitation.    No masses or thrombi are seen.   Measurements Summary:    LVEDd: 4.99 cm         LVESd: 4.08 cm          IVSEd: 0.79 cm    AO Root:2.97 cm        LVPWd: 1.04 cm    Contractility Score    Global Left Ventricular Hypokinesis was noted.    LV regional wall motion: (0-Not visualized  1-Normal 2-Hypokinesis    3-Akinesis 4-Dyskinesis 5-Aneurysm)    Left Ventricle    Peak E-wave: 1.22   Peak A-wave: 0.2 m/s    E/A ratio: 5.99    m/s                 Volume [id number]  LV length: 8.47 cm    ml    Volume uhlgnzda00.87 ml    LVOT diameter: 2.05 cm    Normal sized left ventricle.    Mild left ventricular hypertrophy.    Visually estimated ejection fraction 30% +/- 5%.    Abnormal left ventricular systolic function; abnormal systolic strain    pattern.    Restrictive filling pattern consistent with severely increased LV filling    pressure (Grade III Diastolic dysfunction).    No left ventricular masses or thrombi.    Right Ventricle    Diastolic dimension: 4.72     RV systolic pressure: 22.15 mmHg    cm    Dilated right ventricle.    Abnormal TAPSE; abnormal right ventricular function.    Left Atrium    LA dimension: 4.64 cm               LA volume:95.38 ml    LA/Aorta: 1.56    Abnormal left atrial volume index 45ml/m2.    Intact atrial septum.    No atrial mass or thrombus.    Right Atrium    Abnormal right atrial size.    Intact atrial septum.    No atrial mass or thrombus.    Mitral Valve    Deceleration time:     Area PHT: 2.04 cm^2  Mean velocity:    248.96 msec            P1/2t: 107.68 msec   0.57 m/s    Area (continuity):   Mean gradient:    1.73 cm^2            1.89 mmHg    Peak gradient:    5.97 mmHg    Thickened mitral valve leaflets.    Mild mitral regurgitation.    Mild mitral stenosis. Peak/Mean 6/2mmHg    Echogenic density noted on mitral valve chordae. Seen on prior exam    10/16/2022    Aortic Valve    LVOT VTI: 18.22 cm    Mildly thickend free edges of the aortic valve leaflets.    No aortic regurgitation.    No aortic stenosis.    No masses or vegetations seen.    Tricuspid Valve    TR velocity: 2.19 m/s     TR gradient: 19.80999 mmHg    Estimated RAP: 3 mmHg     RVSP: 22.17 mmHg    Structurally normal tricuspid valve.    Moderate tricuspid (2+) regurgitation.    RVSP likely  underestimated due to RV systolic function.    No tricuspid stenosis.    No masses or vegetations seen.    Pulmonic Valve    Acceleration time: 119.95 msec          PASP: 22.15 mmHg    Structurally normal pulmonic valve.    Mild pulmonic regurgitation (1+).    No pulmonic stenosis.    No masses or vegetations seen.   Great Vessels   Aorta    Aortic Root: 2.97 cm    Ascending Aorta: 2.76 cm    LVOT Diameter: 2.05 cm   Visualized aorta is normal.   Normal aortic root.   No evidence of dissection.   IVC is not well visualized.   Unable to obtain adequate subcostal view.    Pericardium / Pleura   No pericardial effusion.    Other    No masses or thrombi.    No intracardiac shunt.    ----------------------------------------------------------------    Electronically signed by MATHEUS IRELAND MD(Interpreting    Physician) on 11/17/2022 17:38       Results for orders placed during the hospital encounter of 09/09/20    Adult Transesophageal Echo (KHAI) W/ Cont if Necessary Per Protocol (Cardiology Department)    Interpretation Summary  ú Ejection fraction appears to be 21 - 25%. Left ventricular systolic function is severely decreased.  ú Right ventricular cavity is mildly dilated. Moderately reduced right ventricular systolic function noted.  ú Trace mitral valve regurgitation is present.  ú Moderate tricuspid valve regurgitation is present. Estimated right ventricular systolic pressure from tricuspid regurgitation is normal (<35 mmHg).  ú There is (grade 1) plaque in the proximal aorta present. There is (grade 1) plaque in the ascending aorta present. There is (grade 1) plaque in the aortic arch present. There is (grade 1) plaque in the descending aorta present.    [facility] report:      CARDIAC CATH - RIGHT HEART CATH    Anatomical Region Laterality Modality   Heart -- Other     Narrative    Cardiac Diagnostic Report    Demographics    Patient Name       JAMAL WHARTON      Date of Birth          1965    Patient #           [id number]          Accession #            [id number]    Visit #            [id number]          Medical Record #    Physician          MATHEUS IRELAND   Date of Study          12/22/2022                       MD    Referring                              Interventional    Physician                              physician    Procedure   Procedure Type    Diagnostic procedure: RHC with Cardiomems, RIGHT HEART CATH   Indications: Angina.    Conclusions   Procedure Description   Using ultrasound and micropuncture, access to right brachial vein obtained   and 7F sheath inserted. 7F PA catheter used for RHC.   I attempted right radial access for LHC but unsuccessful due to very small   artery which appears occluded or near-occluded.   Procedure Summary   Elevated left and right heart filling pressures.   Elevated pulmonary pressures.   Borderline-low Ramos CO/CI.    Procedure Data   Procedure Date   Date: 12/22/2022Start: 15:32End: 16:09   Entry Locations     - Retrograde Percutaneous access was performed through the Right Axiliary.       A 7 Fr sheath was inserted. Hemostasis was successfully obtained using       Manual Compression.       Entry Comments: brachial vein.   Procedure Medications     - Fentanyl I.V. 25 mcg.     - Versed Sheath 1 mg.     - Oxygen NC 6 l/min.   Diagnostic Catheters     - A7 FR MON [place] [id number] used for:Arch.   Contrast Material     - None0 ml   Fluoroscopy Time: Total: 2:48 minutes.   Fluoroscopy Dose: Total: 155 mGy.    Medical History    Risk Factors    The patient risk factors include:obesity, hypercholesterolemia, treated    and uncontrolled hypertension, diabetes mellitus, last creatinine: 2    mg/dl, creatinine clearance: 69.72 ml/min and dyslipidemia.    Admission Data    Hemodynamics    Condition: Baseline    O2 Consumption: Estimated: 297.50Heart Rate: 41 bpm   Oxygen Saturation   +--------+-----+----+----------------------+---+---------------------------+   !Location!pCO2 !pO2  !% Saturation          !Hgb!O2 Content                 !   +--------+-----+----+----------------------+---+---------------------------+   !PA      !     !    !51                    !   !                           !   +--------+-----+----+----------------------+---+---------------------------+   !RA      !     !    !58                    !   !                           !   +--------+-----+----+----------------------+---+---------------------------+   !AO      !     !    !94                    !   !                           !   +--------+-----+----+----------------------+---+---------------------------+   Pressures (mmHg)   +-----+--------------------------------------------------------------------+   !Site !Pressure                                                            !   +-----+--------------------------------------------------------------------+   !RA   !28/28 (25)                                                          !   +-----+--------------------------------------------------------------------+   !RV   !81/10 ,27                                                           !   +-----+--------------------------------------------------------------------+   !PA   !73/32 (43)                                                          !   +-----+--------------------------------------------------------------------+   !PCW  !68/66 (45)                                                          !   +-----+--------------------------------------------------------------------+   !PCW  !78/59 (45)                                                          !   +-----+--------------------------------------------------------------------+   !PCW  !23/36 (27)                                                          !   +-----+--------------------------------------------------------------------+   Cardiac Output   +------+-------------------------+----------------------------+------------+   !Method!CO (l/min)               !CI  (l/min/m2)               !SV (ml)     !   +------+-------------------------+----------------------------+------------+   !Ramos  !5.35                     !2.2                         !131.67      !   +------+-------------------------+----------------------------+------------+   Shunts   Oxygen Values    O2 Capacity 129.2 O2 Consumption 297.5    Flows (l/min)    Qs 6.4 Qe/Qp 1.2    Qp 5.35 Qp/Qs 0.84    Qe 6.4   Vascular Resistance (dynes x sec x cm-5)   +-------------------------------------+----+---+----+----+---------+-------+   !CO method                            !TSVR!SVR!TPVR!PVR !TPVR/TSVR!PVR/SVR!   +-------------------------------------+----+---+----+----+---------+-------+   !Ramos                                 !    !   !8.03!2.94!         !       !   +-------------------------------------+----+---+----+----+---------+-------+   !Qp or Qs                             !    !   !8.03!2.94!         !       !   +-------------------------------------+----+---+----+----+---------+-------+    Signatures    ----------------------------------------------------------------    Electronically signed by MATHEUS IRELAND MD(Physician) on    12/22/2022 16:19    ----------------------------------------------------------------        -----------------------------------------------------  CXR/Imaging:   Imaging Results (Most Recent)       None            I personally reviewed and interpreted the CXR.      -----------------------------------------------------  CT:   No radiology results for the last 30 days.  I personally reviewed the images of the CT scan.  My personal interpretation is below.      ----------------------------------------------------    PFTs:    No PFTS available.      --------------------------------------------------------------------------------------------------    Laboratory evaluations:    Lab Results   Component Value Date    GLUCOSE 300 (H) 08/17/2023    BUN 32 (H) 08/17/2023    CREATININE 1.24  (H) 08/17/2023    EGFRIFNONA 49 (L) 11/10/2020    BCR 25.8 (H) 08/17/2023    K 3.7 08/17/2023    CO2 25.2 08/17/2023    CALCIUM 9.4 08/17/2023    ALBUMIN 4.2 08/17/2023    LABIL2 1.3 (L) 06/09/2016    AST 30 08/17/2023    ALT 32 08/17/2023     Lab Results   Component Value Date    WBC 6.88 01/31/2023    HGB 9.7 (L) 01/31/2023    HCT 32.9 (L) 01/31/2023    .5 (H) 01/31/2023     (L) 01/31/2023     Lab Results   Component Value Date    CHOL 137 09/11/2020    CHLPL 103 04/21/2016    TRIG 80 09/11/2020    HDL 43 09/11/2020    LDL 78 09/11/2020     Lab Results   Component Value Date    TSH 3.830 09/10/2020     Lab Results   Component Value Date    HGBA1C 8.40 (H) 09/10/2020     Lab Results   Component Value Date    ALT 32 08/17/2023     Lab Results   Component Value Date    HGBA1C 8.40 (H) 09/10/2020    HGBA1C 5.4 04/21/2016     Lab Results   Component Value Date    MICROALBUR 3.2 09/12/2020    CREATININE 1.24 (H) 08/17/2023     Lab Results   Component Value Date    IRON 34.0 (L) 12/21/2022    TIBC 237 (L) 09/18/2020    FERRITIN 1,219.30 (H) 12/21/2022     Lab Results   Component Value Date    INR 1.10 12/15/2022    INR 1.25 (H) 09/23/2020    INR 1.09 09/22/2020    PROTIME 11.6 12/15/2022    PROTIME 15.5 (H) 09/23/2020    PROTIME 13.9 09/22/2020        Lab Results   Component Value Date    ABSOLUTELUNG 36 (A) 08/17/2023    ABSOLUTELUNG 37 (A) 07/17/2023    ABSOLUTELUNG 40 (A) 06/15/2023       PAH RISK ASSESSMENT:      1. Hyponatremia    2. Chronic combined systolic and diastolic heart failure    3. Cirrhosis of liver without ascites, unspecified hepatic cirrhosis type    4. CKD (chronic kidney disease) stage 2, GFR 60-89 ml/min          ORDERS PLACED TODAY:  Orders Placed This Encounter   Procedures    Magnesium    proBNP    Comprehensive Metabolic Panel    Comprehensive Metabolic Panel    Magnesium    Magnesium    proBNP    Comprehensive Metabolic Panel    ReDs Veswilfredo        Diagnoses and all orders for this  visit:    1. Hyponatremia (Primary)  -     Comprehensive Metabolic Panel; Future  -     Comprehensive Metabolic Panel; Standing  -     Comprehensive Metabolic Panel    2. Chronic combined systolic and diastolic heart failure  -     Magnesium; Future  -     proBNP; Future  -     Comprehensive Metabolic Panel; Future  -     ReDs Vest  -     Magnesium; Standing  -     Magnesium  -     proBNP; Standing  -     proBNP  -     Comprehensive Metabolic Panel; Standing  -     Comprehensive Metabolic Panel    3. Cirrhosis of liver without ascites, unspecified hepatic cirrhosis type  -     Comprehensive Metabolic Panel; Future  -     Comprehensive Metabolic Panel; Standing  -     Comprehensive Metabolic Panel    4. CKD (chronic kidney disease) stage 2, GFR 60-89 ml/min  -     Comprehensive Metabolic Panel; Future  -     Comprehensive Metabolic Panel; Standing  -     Comprehensive Metabolic Panel    Other orders  -     Cancel: Basic Metabolic Panel; Standing  -     Comprehensive Metabolic Panel; Standing  -     Magnesium; Standing  -     Cancel: Basic Metabolic Panel  -     Comprehensive Metabolic Panel  -     Magnesium  -     Discontinue: Vericiguat 10 MG tablet; Take 1 tablet by mouth Daily for 90 days.  Dispense: 90 tablet; Refill: 1  -     bumetanide (BUMEX) 2 MG tablet; Take 2 tablets by mouth daily on Monday and Thursday, and take 1 tablet daily all other days.  Dispense: 45 tablet; Refill: 0  -     Vericiguat 10 MG tablet; Take 1 tablet by mouth Daily for 90 days.  Dispense: 90 tablet; Refill: 1             MEDS ORDERED TODAY:    New Medications Ordered This Visit   Medications    bumetanide (BUMEX) 2 MG tablet     Sig: Take 2 tablets by mouth daily on Monday and Thursday, and take 1 tablet daily all other days.     Dispense:  45 tablet     Refill:  0    Vericiguat 10 MG tablet     Sig: Take 1 tablet by mouth Daily for 90 days.     Dispense:  90 tablet     Refill:  1     Please mail to patient.         ---------------------------------------------------------------------------------------------------------------------------          ASSESSMENT/PLAN:      Diagnosis Plan   1. Hyponatremia  Comprehensive Metabolic Panel    Comprehensive Metabolic Panel    Comprehensive Metabolic Panel      2. Chronic combined systolic and diastolic heart failure  Magnesium    proBNP    Comprehensive Metabolic Panel    ReDs Vest    Magnesium    Magnesium    proBNP    proBNP    Comprehensive Metabolic Panel    Comprehensive Metabolic Panel      3. Cirrhosis of liver without ascites, unspecified hepatic cirrhosis type  Comprehensive Metabolic Panel    Comprehensive Metabolic Panel    Comprehensive Metabolic Panel      4. CKD (chronic kidney disease) stage 2, GFR 60-89 ml/min  Comprehensive Metabolic Panel    Comprehensive Metabolic Panel    Comprehensive Metabolic Panel          not acutely decompensated chronic moderate systolic and diastolic heart failure. CHF. Etiology: Unknown/Idiopathic. LVEF 30-35%.     NYHA stage Stage D: Presence of advanced heart disease with continued heart failure symptoms requiring aggressive medical therapyFC-Class IV: Unable to carry on any physical activity without discomfort. Symptoms of heart failureat rest. If any physical activity is undertaken, discomfort increases.     Today, Patient is approaching euvolemia and with  Moderate perfusion. The patient's hemodynamics are currently acceptable. HR is: normal and is at goal. BP/MAP was reviewed and there is notroom for medication up-titration.  Clinical trajectory was assessed and hasimproved.     CHF GOAL DIRECTED MEDICAL THERAPY FOR PATIENT ADDRESSED/ADJUSTED:     GDMT    Drug Class   Drug   Dose Last Dose Adjustment Additional Titration   Notes   ACEi/ARB/ARNI ACEI previously stopped due to renal fxn       Beta Blocker  Metoprolol Succinate   12.5 mg qhs Reduced by PCP     MRA Spirono previously stopped due to renal fxn       SGLT2i Stop due to  recent UTIs N/A    Secondaries Verquevo 10mg qd Titrate up to 10mg on 08/17/23.        Secondary management:     -CHF Specific BB:   Toprol XL 12.5mg (previously decreased in early 02/2023) is being tolerated well with dizziness resolved and fewer lower blood pressures.    Continue monitoring as she is also on amiodarone for her Afib.   Hold parameters reviewed.      -ACE/ARB/ARNi:   ACEi recently held during [facility] hospitalization.     -MRA:   The patient is FC-NYHA Class IV and MRA is indicated.   Spironolactone was previously stopped on regimen due to renal function.        -SGLT2 inhibitor therapy:   Farxiga stopped due to UTIs in August 2023.        Secondary pharmacological therapy in HFreF:   EF is less than 45% @ 30-35%.   Maximized on GDMT as tolerated thus far (see chart above)  Recent hospitalization or IV diuresis includes hospitalizations within the last year at [facility], [facility], and [facility] with acute heart failure in addition to afib with RVR.    Given HFrEF with optimally treated with primary therapies for HFrEF and vasodilator therapy, will continue Verquevo and upward titrate to 10mg.        -Diuretic regimen:   ReDS Vest reading for 08/17/23  Is elevated at 36 with proBNP significantly improved.  Continue daily Bumex with Metolazone with patient tolerating regimen with proBNP improved and renal function improved as well.   BMP, Mag, & ProBNP reviewed with patient with toleration of current medication regimen.      -Fluid restriction/Sodium restriction:   Requested 2000 ml restriction  Patient has been asked to weigh daily and was provided with a printed diuretic strategy.  1,500 mg Na restriction was discussed.    -Acute vs. Chronic underlying conditions other than HF addressed during visit:             Paroxysmal Atrial fibrillation/Flutter, currently SR:   Office visit reviewed from January 27, 2023 with [facility] electrophysiology with cardiomyopathy felt to be multifactorial  with DMC and severely worsened by atrial fibrillation with chronic anemia with noted recent hospital discharge with amiodarone and Eliquis she was in sinus rhythm on her January 27, 2023 evaluation with amiodarone renewed but with Lasix 20 mg restarted and encouragement to follow-up more closely in [place].    Continue Eliquis 5mg qd with [id number] at least 4.    Continue Amiodarone as prescribed per EP.  Per extensive review of outside cardiology notes, she has been tried on reduced dose of amiodarone 200mg qd and had recurrent hospitalization with atrial fibrillation with RVR. While she does have known Cirrhosis, prior attempts to decrease amiodarone have resulted in RVR events.    Continue current Amiodarone 200mg BID as previously directed.      CKD IIIb:   Monitor BMP. Reviewed today with creatinine appearing 1.5 with baseline previously 1.9-2.2 per review of OSH labs.    Continues to be within baseline.      Debility:   Continue home health with PT/OT.   Multiple types of DME at home.     Iron/Anemia in CHF:  Prior Iron labs from [facility] with Iron 31, ferritin 1219.30, and TIBC 234 with iron saturation 13%.    Continue BID ferrous sulfate.          Identifiable barriers to Heart Failure Self-care:   Medical Barriers:  Debility  Social Barriers: Transport limitations      --------------------------------------------------------------------------------          BMI cannot be calculated due to outdated height or weight values with patient unable to stand. She has self reported weight of 240.               >30  minutes out of 60 minutes face to face spent counseling patient extensively on dietary Na+ intake, importance of activity, weight monitoring, compliance with medications in addition to importance of titration with goal directed medical therapy and follow up appointments.            This document has been electronically signed by Sonja Romo PA-C  August 17, 2023 16:49 EDT      Dictated Utilizing  Dragon Dictation: Part of this note may be an electronic transcription/translation of spoken language to printed text using the Dragon Dictation System.    Follow-up appointment and medication changes provided in hand delivered After Visit Summary as well as reviewed in the room.

## 2023-08-17 NOTE — PROGRESS NOTES
" Heart Failure Clinic  Pharmacist Note     Yumiko Purdy is a 57 y.o. female seen in the Heart Failure Clinic for HFrEF.  Yumiko Purdy reports a fair understanding of medications.  She has been taking bumex 2mg daily everyday, with an additional 2mg (total 4mg) on Monday and Thursday.  She is also taking metolazone 2.5mg three times a week.  She cannot weigh herself at home but does check her blood pressure at home and says it is \"doing okay\".  She does report she had a course of Bactrim last week for a UTI.     Medication Use:   Hx of med intolerances: Farxiga (UTI)   Retail Rx Management: Uses [facility]/ Uses our pharmacy for verquvo.     Past Medical History:   Diagnosis Date    Anemia     CHF (congestive heart failure)     Chronic a-fib     stated by patient     Cirrhosis of liver     stated by patient     Diabetes mellitus      ALLERGIES: Phenergan [promethazine]  Current Outpatient Medications   Medication Sig Dispense Refill    acetaminophen (TYLENOL) 500 MG tablet Take 1 tablet by mouth 2 (Two) Times a Day As Needed for Mild Pain.      acetaminophen-codeine (TYLENOL with CODEINE #3) 300-30 MG per tablet Take 1 tablet by mouth Every 6 (Six) Hours As Needed.      amiodarone (PACERONE) 200 MG tablet Take 1 tablet by mouth Daily.      apixaban (ELIQUIS) 5 MG tablet tablet Take 1 tablet by mouth Every 12 (Twelve) Hours. Indications: Atrial Fibrillation 60 tablet 3    aspirin 81 MG EC tablet Take 1 tablet by mouth Daily. 30 tablet 3    bumetanide (BUMEX) 2 MG tablet Take 2 tablets by mouth daily on Monday and Thursday, and take 1 tablet daily all other days. 45 tablet 0    busPIRone (BUSPAR) 15 MG tablet Take 1 tablet by mouth 2 (Two) Times a Day for 30 days. 60 tablet 0    Cyanocobalamin 2500 MCG sublingual tablet Place 2,500 mcg under the tongue Daily.      Diclofenac Sodium (VOLTAREN) 1 % gel gel Apply 2 g topically to the appropriate area as directed 4 (Four) Times a Day.      ferrous sulfate 325 (65 FE) MG " tablet Take 1 tablet by mouth 2 (Two) Times a Day.      folic acid (FOLVITE) 1 MG tablet Take 1 tablet by mouth Daily.      Insulin Glargine (LANTUS SOLOSTAR) 100 UNIT/ML injection pen Inject 58 Units under the skin into the appropriate area as directed 2 (Two) Times a Day.      Insulin Regular Human, Conc, (HumuLIN R U-500 KwikPen) 500 UNIT/ML solution pen-injector CONCENTRATED injection Inject 55 Units under the skin into the appropriate area as directed 3 (Three) Times a Day Before Meals.      levothyroxine (SYNTHROID, LEVOTHROID) 50 MCG tablet Take 1 tablet by mouth Daily.      metOLazone (ZAROXOLYN) 2.5 MG tablet Take 1 tablet by mouth 3 (Three) Times a Week if Needed (Worsening swelling and shortness of breath.). 30 tablet 11    metoprolol succinate XL (Toprol XL) 25 MG 24 hr tablet Take 1 tablet by mouth Every Night for 90 days. (Patient taking differently: Take 0.5 tablets by mouth Every Night. Changed to 12.5 mg per PCP request about a week ago.) 30 tablet 2    nitroglycerin (NITROSTAT) 0.4 MG SL tablet Place 1 tablet under the tongue Every 5 (Five) Minutes As Needed for Chest Pain for up to 10 days. Take no more than 3 doses in 15 minutes. 10 tablet 0    omeprazole (priLOSEC) 20 MG capsule Take 1 capsule by mouth Daily.      ondansetron (ZOFRAN) 4 MG tablet Take 1 tablet by mouth Every 8 (Eight) Hours As Needed for Nausea or Vomiting.      polyethylene glycol (MIRALAX) 17 g packet Take 17 g by mouth Daily. (Patient taking differently: Take 17 g by mouth Daily As Needed.) 30 packet 3    pregabalin (LYRICA) 100 MG capsule Take 1 capsule by mouth 2 (Two) Times a Day.      rOPINIRole (REQUIP) 0.5 MG tablet Take 1 tablet by mouth Every Night. Take 1 hour before bedtime.      Vericiguat 10 MG tablet Take 1 tablet by mouth Daily for 90 days. 90 tablet 1     No current facility-administered medications for this encounter.       Vaccination History:   Pneumonia: Needs, declines  Annual Influenza: Needs,  "declines      Objective  Vitals:    08/17/23 1325   BP: 100/59   BP Location: Right arm   Patient Position: Sitting   Cuff Size: Small Adult   Pulse: 69   SpO2: 97%   Weight: 109 kg (240 lb)   Height: 165.1 cm (65\")     Wt Readings from Last 3 Encounters:   08/17/23 109 kg (240 lb)   07/17/23 109 kg (240 lb)   06/15/23 109 kg (240 lb)     Lab Results   Component Value Date    GLUCOSE 300 (H) 08/17/2023    BUN 32 (H) 08/17/2023    CREATININE 1.24 (H) 08/17/2023    EGFRIFNONA 49 (L) 11/10/2020    BCR 25.8 (H) 08/17/2023    K 3.7 08/17/2023    CO2 25.2 08/17/2023    CALCIUM 9.4 08/17/2023    ALBUMIN 4.2 08/17/2023    LABIL2 1.3 (L) 06/09/2016    AST 30 08/17/2023    ALT 32 08/17/2023     Lab Results   Component Value Date    WBC 6.88 01/31/2023    HGB 9.7 (L) 01/31/2023    HCT 32.9 (L) 01/31/2023    .5 (H) 01/31/2023     (L) 01/31/2023     Lab Results   Component Value Date    TROPONINT 0.027 09/11/2020     Lab Results   Component Value Date    PROBNP 630.5 08/17/2023     Results for orders placed during the hospital encounter of 09/09/20    Adult Transesophageal Echo ([facility]) W/ Cont if Necessary Per Protocol (Cardiology Department)    Interpretation Summary  ú Ejection fraction appears to be 21 - 25%. Left ventricular systolic function is severely decreased.  ú Right ventricular cavity is mildly dilated. Moderately reduced right ventricular systolic function noted.  ú Trace mitral valve regurgitation is present.  ú Moderate tricuspid valve regurgitation is present. Estimated right ventricular systolic pressure from tricuspid regurgitation is normal (<35 mmHg).  ú There is (grade 1) plaque in the proximal aorta present. There is (grade 1) plaque in the ascending aorta present. There is (grade 1) plaque in the aortic arch present. There is (grade 1) plaque in the descending aorta present.         GDMT    Drug Class   Drug   Dose Last Dose Adjustment Additional Titration   Notes   ACEi/ARB/ARNI     Was " previously on lisinopril, stopped d/t renal function    Beta Blocker Metoprolol XL 12.5 mg QD > 3 months   Decreased from 25 mg daily ~ 5/8 due to hypotensive symptoms   MRA     Was previously on spironolactone 50mg BID Nov-Dec 22, stopped d/t renal function   SGLT2i    N/A Stopped 8/17/23 due to UTI    Verquvo 10 mg QD 8/17/23         Drug Therapy Problems    1. Adverse Side Effect - Farxiga    Recommendations:     Farxiga stopped today due to UTI.    Patient was educated on heart failure medications and the importance of medication adherence. All questions were addressed and patient expressed some understanding. Would benefit from additional education at each visit.    Thank you for allowing me to participate in the care of your patient,    Zunilda Gonzalez, PharmD  08/17/23  15:47 EDT

## 2023-09-29 ENCOUNTER — HOSPITAL ENCOUNTER (OUTPATIENT)
Dept: CARDIOLOGY | Facility: HOSPITAL | Age: 58
Discharge: HOME | End: 2023-09-29
Payer: COMMERCIAL

## 2023-09-29 VITALS
DIASTOLIC BLOOD PRESSURE: 49 MMHG | HEART RATE: 73 BPM | WEIGHT: 240 LBS | OXYGEN SATURATION: 95 % | BODY MASS INDEX: 39.99 KG/M2 | SYSTOLIC BLOOD PRESSURE: 103 MMHG | HEIGHT: 65 IN

## 2023-09-29 DIAGNOSIS — I50.22 CHRONIC HFREF (HEART FAILURE WITH REDUCED EJECTION FRACTION): Primary | ICD-10-CM

## 2023-09-29 DIAGNOSIS — I48.20 CHRONIC ATRIAL FIBRILLATION: ICD-10-CM

## 2023-09-29 DIAGNOSIS — N18.2 CKD (CHRONIC KIDNEY DISEASE) STAGE 2, GFR 60-89 ML/MIN: ICD-10-CM

## 2023-09-29 DIAGNOSIS — I42.9 CARDIOMYOPATHY, UNSPECIFIED TYPE: ICD-10-CM

## 2023-09-29 DIAGNOSIS — D64.9 CHRONIC ANEMIA: ICD-10-CM

## 2023-09-29 LAB
ABSOLUTE LUNG FLUID CONTENT: 34 % (ref 20–35)
ANION GAP SERPL CALCULATED.3IONS-SCNC: 12.3 MMOL/L (ref 5–15)
BUN SERPL-MCNC: 29 MG/DL (ref 6–20)
BUN/CREAT SERPL: 21.5 (ref 7–25)
CALCIUM SPEC-SCNC: 9.2 MG/DL (ref 8.6–10.5)
CHLORIDE SERPL-SCNC: 98 MMOL/L (ref 98–107)
CO2 SERPL-SCNC: 28.7 MMOL/L (ref 22–29)
CREAT SERPL-MCNC: 1.35 MG/DL (ref 0.57–1)
EGFRCR SERPLBLD CKD-EPI 2021: 46.2 ML/MIN/1.73
GLUCOSE SERPL-MCNC: 320 MG/DL (ref 65–99)
MAGNESIUM SERPL-MCNC: 2.3 MG/DL (ref 1.6–2.6)
NT-PROBNP SERPL-MCNC: 555.6 PG/ML (ref 0–900)
POTASSIUM SERPL-SCNC: 3.6 MMOL/L (ref 3.5–5.2)
SODIUM SERPL-SCNC: 139 MMOL/L (ref 136–145)

## 2023-09-29 PROCEDURE — 94726 PLETHYSMOGRAPHY LUNG VOLUMES: CPT | Performed by: PHYSICIAN ASSISTANT

## 2023-09-29 PROCEDURE — 83735 ASSAY OF MAGNESIUM: CPT | Performed by: PHYSICIAN ASSISTANT

## 2023-09-29 PROCEDURE — 80048 BASIC METABOLIC PNL TOTAL CA: CPT | Performed by: PHYSICIAN ASSISTANT

## 2023-09-29 PROCEDURE — 99214 OFFICE O/P EST MOD 30 MIN: CPT | Performed by: PHYSICIAN ASSISTANT

## 2023-09-29 PROCEDURE — 83880 ASSAY OF NATRIURETIC PEPTIDE: CPT | Performed by: PHYSICIAN ASSISTANT

## 2023-09-29 RX ORDER — HYDROCODONE BITARTRATE AND ACETAMINOPHEN 5; 325 MG/1; MG/1
1 TABLET ORAL EVERY 12 HOURS PRN
COMMUNITY
Start: 2023-09-12

## 2023-09-29 NOTE — PROGRESS NOTES
" Heart Failure Clinic  Pharmacist Note     Yumiko Purdy is a 57 y.o. female seen in the Heart Failure Clinic for HFrEF.  Yumiko Purdy reports a fair understanding of medications. Patient reports that she has seen Dr. Mejia about 2 weeks ago and he started her back on Farxiga 10mg and discontinued the Metolazone and increased her Bumex to 4mg on Monday through Saturday and 2mg on Sundays. She reports SOB at rest and still swollen in her feet and stomach and that they are tight. She cannot weigh herself at home but does check her blood pressure at home and says it is \"doing okay\".  She reports that she has not been taking her Toprol at all due to it causing hypotension and her feeling dizzy.     Medication Use:   Hx of med intolerances: Farxiga (UTI) -but Nephro started back in September  Retail Rx Management: Uses [facility]/ Uses our pharmacy for verquvo.     Past Medical History:   Diagnosis Date    Anemia     CHF (congestive heart failure)     Chronic a-fib     stated by patient     Cirrhosis of liver     stated by patient     Diabetes mellitus      ALLERGIES: Phenergan [promethazine]  Current Outpatient Medications   Medication Sig Dispense Refill    acetaminophen (TYLENOL) 500 MG tablet Take 1 tablet by mouth 2 (Two) Times a Day As Needed for Mild Pain.      amiodarone (PACERONE) 200 MG tablet Take 1 tablet by mouth Daily.      apixaban (ELIQUIS) 5 MG tablet tablet Take 1 tablet by mouth Every 12 (Twelve) Hours. Indications: Atrial Fibrillation 60 tablet 3    aspirin 81 MG EC tablet Take 1 tablet by mouth Daily. 30 tablet 3    bumetanide (BUMEX) 2 MG tablet Take 2 tablets by mouth daily on Monday and Thursday, and take 1 tablet daily all other days. (Patient taking differently: Take 1 tablet by mouth Daily. 4mg Monday through Saturday and 2mg on Sunday per Dr. Mejia) 45 tablet 0    busPIRone (BUSPAR) 15 MG tablet Take 1 tablet by mouth 2 (Two) Times a Day for 30 days. 60 tablet 0    Diclofenac Sodium (VOLTAREN) 1 % " gel gel Apply 2 g topically to the appropriate area as directed 4 (Four) Times a Day.      ferrous sulfate 325 (65 FE) MG tablet Take 1 tablet by mouth 2 (Two) Times a Day.      folic acid (FOLVITE) 1 MG tablet Take 1 tablet by mouth Daily.      HYDROcodone-acetaminophen (NORCO) 5-325 MG per tablet Take 1 tablet by mouth Every 12 (Twelve) Hours As Needed.      Insulin Glargine (LANTUS SOLOSTAR) 100 UNIT/ML injection pen Inject 58 Units under the skin into the appropriate area as directed 2 (Two) Times a Day.      Insulin Regular Human, Conc, (HumuLIN R U-500 KwikPen) 500 UNIT/ML solution pen-injector CONCENTRATED injection Inject 55 Units under the skin into the appropriate area as directed 3 (Three) Times a Day Before Meals.      levothyroxine (SYNTHROID, LEVOTHROID) 50 MCG tablet Take 1 tablet by mouth Daily.      nitroglycerin (NITROSTAT) 0.4 MG SL tablet Place 1 tablet under the tongue Every 5 (Five) Minutes As Needed for Chest Pain for up to 10 days. Take no more than 3 doses in 15 minutes. 10 tablet 0    omeprazole (priLOSEC) 20 MG capsule Take 1 capsule by mouth Daily.      ondansetron (ZOFRAN) 4 MG tablet Take 1 tablet by mouth Every 8 (Eight) Hours As Needed for Nausea or Vomiting.      polyethylene glycol (MIRALAX) 17 g packet Take 17 g by mouth Daily. (Patient taking differently: Take 17 g by mouth Daily As Needed.) 30 packet 3    pregabalin (LYRICA) 100 MG capsule Take 1 capsule by mouth 2 (Two) Times a Day.      rOPINIRole (REQUIP) 0.5 MG tablet Take 1 tablet by mouth Every Night. Take 1 hour before bedtime.      Vericiguat 10 MG tablet Take 1 tablet by mouth Daily for 90 days. 90 tablet 1    metoprolol succinate XL (Toprol XL) 25 MG 24 hr tablet Take 1 tablet by mouth Every Night for 90 days. (Patient not taking: Reported on 9/29/2023) 30 tablet 2     No current facility-administered medications for this encounter.       Vaccination History:   Pneumonia: Needs, declines  Annual Influenza: Needs,  "declines      Objective  Vitals:    09/29/23 1441   BP: 103/49   BP Location: Left arm   Patient Position: Sitting   Cuff Size: Adult   Pulse: 73   SpO2: 95%   Weight: 109 kg (240 lb)   Height: 165.1 cm (65\")       Wt Readings from Last 3 Encounters:   09/29/23 109 kg (240 lb)   08/17/23 109 kg (240 lb)   07/17/23 109 kg (240 lb)     Lab Results   Component Value Date    GLUCOSE 320 (H) 09/29/2023    BUN 29 (H) 09/29/2023    CREATININE 1.35 (H) 09/29/2023    EGFRIFNONA 49 (L) 11/10/2020    BCR 21.5 09/29/2023    K 3.6 09/29/2023    CO2 28.7 09/29/2023    CALCIUM 9.2 09/29/2023    ALBUMIN 4.2 08/17/2023    LABIL2 1.3 (L) 06/09/2016    AST 30 08/17/2023    ALT 32 08/17/2023     Lab Results   Component Value Date    WBC 6.88 01/31/2023    HGB 9.7 (L) 01/31/2023    HCT 32.9 (L) 01/31/2023    .5 (H) 01/31/2023     (L) 01/31/2023     Lab Results   Component Value Date    TROPONINT 0.027 09/11/2020     Lab Results   Component Value Date    PROBNP 555.6 09/29/2023     Results for orders placed during the hospital encounter of 09/09/20    Adult Transesophageal Echo ([facility]) W/ Cont if Necessary Per Protocol (Cardiology Department)    Interpretation Summary  · Ejection fraction appears to be 21 - 25%. Left ventricular systolic function is severely decreased.  · Right ventricular cavity is mildly dilated. Moderately reduced right ventricular systolic function noted.  · Trace mitral valve regurgitation is present.  · Moderate tricuspid valve regurgitation is present. Estimated right ventricular systolic pressure from tricuspid regurgitation is normal (<35 mmHg).  · There is (grade 1) plaque in the proximal aorta present. There is (grade 1) plaque in the ascending aorta present. There is (grade 1) plaque in the aortic arch present. There is (grade 1) plaque in the descending aorta present.         GDMT    Drug Class   Drug   Dose Last Dose Adjustment Additional Titration   Notes   ACEi/ARB/ARNI     Was previously on " lisinopril, stopped d/t renal function    Beta Blocker     Patient self-stopped due to hypoTN ~9/2023   MRA     Was previously on spironolactone 50mg BID Nov-Dec 22, stopped d/t renal function   SGLT2i Farxiga 10mg Restarted by Roberto ~9/2023 N/A Stopped 8/17/23 due to UTI    Verquvo 10 mg QD 8/17/23         Drug Therapy Problems    1. Edema    Recommendations:     Continue current doses set by Nephrology.     Patient was educated on heart failure medications and the importance of medication adherence. All questions were addressed and patient expressed some understanding. Would benefit from additional education at each visit.    Thank you for allowing me to participate in the care of your patient,    Kelli Soto [facility]  09/29/23  15:37 EDT

## 2023-10-02 NOTE — PROGRESS NOTES
[facility]  ABDIFATAH Galarza Melanie Lind, APRN  [address] [place] [address] [address]  [place] [address]    Thank you for asking me to see Yumiko Purdy for congestive heart failure.    HPI:     This is a 57 y.o. female with known past medical history of:  Chronic systolic & diastolic HF  TTE from November 2022 with visually estimated ejection fraction of 30% ±5% and noted abnormal systolic strain pattern as well as grade 3 diastolic dysfunction as well as abnormal TAPSE with abnormal right ventricular function  KHAI on 09/2020 with EF 21-25% with LV systolic function severely decreased and RV cavity mildly dilated with moderately reduced RV systolic function noted, moderate TVR, and aortic plaquing  Right heart cath on 12/22/2022 with elevated left and right heart pressures with report not available  ASCVD  LHC 11/10/20 with preserved LV systolic, EF 50-55% with elevated LV end diastolic pressure consistent with diastolic dysfunction and right dominant system with 30-40% mid LAD lesion.   LHC attempted on 12/2022 at [facility] with unsuccessful access due to small artery appearing occluded or near occluded radially on right  Peripheral Arterial disease  Atrial Flutter  CKD stage IIIb  Cirrhosis of the liver  Stage III CKD  Chronic hypoxemic respiratory failure  Morbid Obesity  Diffuse purpuric rash with prior w/u negative for vasculitis    Yumiko Purdy presents for today for HF clinic evaluation.  The patient is typically seen by Masha Davidson APRN.  Patient's primary cardiologist is Dr. Jad Meredith.      Last known EF 21-25% by KHAI and 50-55% by LHC in 11/2020.    Last known hospitalization and/or ED visit: Patient has not been hospitalized at this facility since 2020.  However she was hospitalized at [facility] in [facility] in December 2022 with heart failure admission with discharge summary reviewed standing admission with acute on  chronic mixed systolic diastolic heart failure during which time she was on dobutamine drip and discharged with p.o. Bumex and Zaroxolyn with note of chronic atrial fibrillation.  She was on dobutamine drip for some time and nephrology did also assist in her diuretic adjustments recommending ultimately Bumex twice daily metolazone and spironolactone on discharge.  Accompanied by: Son      09/29/23  visit data/details regarding:   Dyspnea: Improving, Patient is WC bound and mostly just exerts herself with transfers and such; This remains unchanged on today's visit.   Lower extremity swelling: Bilateral lower extremity swelling improved today  Abdominal swelling: Abdominal swelling is improving.    Home weight: Not currently monitoring due to inability to stand  Home BP: SBP has been in the 100s-110s with less drops at home  Home heart rate: 60s  Daily activities of living: Performing with assistance  HF zone: Yellow  Pillows/lying flat: Sleeps in hospital bed.  Mobility assistance devices:  WC at home, bedside commode.    Patient recently evaluated by Nephrology.  She reports her diuretics were adjusted with increase in her Bumex to 4mg 5 days a week, 2mg 1 day a week, and 0mg on day 7.   She reports this seems to be keeping her closer to euvolemic.   She reports additionally Farxiga was restarted during her Nephro visit.  Of note, this was previously stopped due to recurrent UTIs.  She reports she is tolerating thus far and we will continue to monitor.         Specialists:   Cardiology: [facility] with EP & General        Review of Systems - Review of Systems   Constitutional: Negative for chills, decreased appetite and malaise/fatigue.   HENT:  Negative for congestion, ear discharge and ear pain.    Eyes:  Negative for blurred vision, discharge and double vision.   Cardiovascular:  Negative for dyspnea on exertion and leg swelling.   Respiratory:  Negative for cough, hemoptysis and shortness of breath.     Endocrine: Negative for cold intolerance and heat intolerance.   Hematologic/Lymphatic: Negative for adenopathy and bleeding problem.   Skin:  Negative for color change, dry skin and nail changes.   Musculoskeletal:  Negative for arthritis, muscle weakness and myalgias.   Gastrointestinal:  Negative for bloating and abdominal pain.   Genitourinary:  Negative for bladder incontinence, decreased libido and dysuria.   Neurological:  Negative for brief paralysis, difficulty with concentration and dizziness.   Psychiatric/Behavioral:  Negative for altered mental status, depression and hallucinations.         Very pleasant   Allergic/Immunologic: Negative for environmental allergies and HIV exposure.       All other systems were reviewed and were negative.    Patient Active Problem List   Diagnosis    Acute on chronic congestive heart failure    Cardiomyopathy    CKD (chronic kidney disease) stage 2, GFR 60-89 ml/min    Severe obesity (BMI 35.0-39.9) with comorbidity    Chronic anemia    Elevated bilirubin    Acute on chronic combined systolic and diastolic CHF (congestive heart failure)    Hyponatremia    Chronic atrial fibrillation    Cirrhosis       family history is not on file.     reports that she has never smoked. She has never used smokeless tobacco. She reports that she does not drink alcohol and does not use drugs.    Allergies   Allergen Reactions    Phenergan [Promethazine]          Current Outpatient Medications:     acetaminophen (TYLENOL) 500 MG tablet, Take 1 tablet by mouth 2 (Two) Times a Day As Needed for Mild Pain., Disp: , Rfl:     amiodarone (PACERONE) 200 MG tablet, Take 1 tablet by mouth Daily., Disp: , Rfl:     apixaban (ELIQUIS) 5 MG tablet tablet, Take 1 tablet by mouth Every 12 (Twelve) Hours. Indications: Atrial Fibrillation, Disp: 60 tablet, Rfl: 3    aspirin 81 MG EC tablet, Take 1 tablet by mouth Daily., Disp: 30 tablet, Rfl: 3    bumetanide (BUMEX) 2 MG tablet, Take 2 tablets by mouth  daily on Monday and Thursday, and take 1 tablet daily all other days. (Patient taking differently: Take 1 tablet by mouth Daily. 4mg Monday through Saturday and 2mg on Sunday per Dr. Mejia), Disp: 45 tablet, Rfl: 0    busPIRone (BUSPAR) 15 MG tablet, Take 1 tablet by mouth 2 (Two) Times a Day for 30 days., Disp: 60 tablet, Rfl: 0    Diclofenac Sodium (VOLTAREN) 1 % gel gel, Apply 2 g topically to the appropriate area as directed 4 (Four) Times a Day., Disp: , Rfl:     ferrous sulfate 325 (65 FE) MG tablet, Take 1 tablet by mouth 2 (Two) Times a Day., Disp: , Rfl:     folic acid (FOLVITE) 1 MG tablet, Take 1 tablet by mouth Daily., Disp: , Rfl:     HYDROcodone-acetaminophen (NORCO) 5-325 MG per tablet, Take 1 tablet by mouth Every 12 (Twelve) Hours As Needed., Disp: , Rfl:     Insulin Glargine (LANTUS SOLOSTAR) 100 UNIT/ML injection pen, Inject 58 Units under the skin into the appropriate area as directed 2 (Two) Times a Day., Disp: , Rfl:     Insulin Regular Human, Conc, (HumuLIN R U-500 KwikPen) 500 UNIT/ML solution pen-injector CONCENTRATED injection, Inject 55 Units under the skin into the appropriate area as directed 3 (Three) Times a Day Before Meals., Disp: , Rfl:     levothyroxine (SYNTHROID, LEVOTHROID) 50 MCG tablet, Take 1 tablet by mouth Daily., Disp: , Rfl:     nitroglycerin (NITROSTAT) 0.4 MG SL tablet, Place 1 tablet under the tongue Every 5 (Five) Minutes As Needed for Chest Pain for up to 10 days. Take no more than 3 doses in 15 minutes., Disp: 10 tablet, Rfl: 0    omeprazole (priLOSEC) 20 MG capsule, Take 1 capsule by mouth Daily., Disp: , Rfl:     ondansetron (ZOFRAN) 4 MG tablet, Take 1 tablet by mouth Every 8 (Eight) Hours As Needed for Nausea or Vomiting., Disp: , Rfl:     polyethylene glycol (MIRALAX) 17 g packet, Take 17 g by mouth Daily. (Patient taking differently: Take 17 g by mouth Daily As Needed.), Disp: 30 packet, Rfl: 3    pregabalin (LYRICA) 100 MG capsule, Take 1 capsule by mouth 2  "(Two) Times a Day., Disp: , Rfl:     rOPINIRole (REQUIP) 0.5 MG tablet, Take 1 tablet by mouth Every Night. Take 1 hour before bedtime., Disp: , Rfl:     Vericiguat 10 MG tablet, Take 1 tablet by mouth Daily for 90 days., Disp: 90 tablet, Rfl: 1    metoprolol succinate XL (Toprol XL) 25 MG 24 hr tablet, Take 1 tablet by mouth Every Night for 90 days. (Patient not taking: Reported on 2023), Disp: 30 tablet, Rfl: 2      Physical Exam:  I have reviewed the patient's current vital signs as documented in the patient's EMR.     Vitals:    23 1441   BP: 103/49   BP Location: Left arm   Patient Position: Sitting   Cuff Size: Adult   Pulse: 73   SpO2: 95%   Weight: 109 kg (240 lb)   Height: 165.1 cm (65\")       Physical Exam  Vitals and nursing note reviewed.   Constitutional:       Appearance: Normal appearance.   HENT:      Head: Normocephalic and atraumatic.   Eyes:      General: Lids are normal.   Cardiovascular:      Rate and Rhythm: Normal rate and regular rhythm.      Heart sounds: S1 normal and S2 normal.   Pulmonary:      Effort: No tachypnea or bradypnea.      Breath sounds: No decreased breath sounds, wheezing, rhonchi or rales.   Chest:      Chest wall: No mass or lacerations.   Abdominal:      General: Abdomen is protuberant. Bowel sounds are normal.      Palpations: Abdomen is soft.      Tenderness: There is no abdominal tenderness.      Comments: Less distended than on prior visits.     Musculoskeletal:      Right lower le+ Edema present.      Left lower le+ Edema present.      Right foot: No swelling.      Left foot: No swelling.   Skin:     General: Skin is warm and moist.   Neurological:      Mental Status: She is alert and oriented to person, place, and time.   Psychiatric:         Attention and Perception: Attention normal.         Mood and Affect: Mood normal.        JVP: Volume/Pulsation: Normal.        DATA REVIEWED:     EKG. I personally reviewed and interpreted the EKG.    Last EKG " at [facility] not available for viewing.      ---------------------------------------------------  TTE/KHAI:    TRANSTHORACIC ECHOCARDIOGRAPHY REPORT    Demographics    Patient Name:          JAMAL WHARTON      :            1965    Medical Record Number: [id number]          Age:            56 year(s)    Corporate ID Number:   [id number]          Gender          Female    Account Number:        [id number]    Sonographer:           Hayden Chaudhry,     Height:         65 inches                           [facility]    Referring Physician:   ANDREAS HERNANDEZ     Weight:         240 pounds    Interpreting           MATHEUS IRELAND   BMI:            39.94 kg/m^2    Physician:             MD    Date of Service:       2022          Blood Pressure: 113/40 mmHg    Room Number:           320   Type of Study:    TTE procedure: EC Echo Complete, ECHO COMPLETE (DOPPLER / COLOR) W OR WO    CONTRAST.    Patient Status: Routine IP    Study Location: PortableTechnical Quality: Adequate visualization    Contrast Medium: Optison. Amount - 3 ml    Impression:    Indication:Hypertensive heart disease with heart failure I11.0    Mild left ventricular hypertrophy. Visually estimated ejection fraction    30% +/- 5%. Abnormal left ventricular systolic function; abnormal systolic    strain pattern. Restrictive filling pattern consistent with severely    increased LV filling pressure (Grade III Diastolic dysfunction).    Dilated right ventricle. Abnormal TAPSE; abnormal right ventricular    function. Abnormal right atrial size.    Mild mitral stenosis. Peak/Mean 6/2mmHg    Moderate tricuspid (2+) regurgitation.    No masses or thrombi are seen.   Measurements Summary:    LVEDd: 4.99 cm         LVESd: 4.08 cm          IVSEd: 0.79 cm    AO Root:2.97 cm        LVPWd: 1.04 cm    Contractility Score    Global Left Ventricular Hypokinesis was noted.    LV regional wall motion: (0-Not visualized 1-Normal 2-Hypokinesis    3-Akinesis 4-Dyskinesis  5-Aneurysm)    Left Ventricle    Peak E-wave: 1.22   Peak A-wave: 0.2 m/s    E/A ratio: 5.99    m/s                 Volume [id number]  LV length: 8.47 cm    ml    Volume [id number] ml    LVOT diameter: 2.05 cm    Normal sized left ventricle.    Mild left ventricular hypertrophy.    Visually estimated ejection fraction 30% +/- 5%.    Abnormal left ventricular systolic function; abnormal systolic strain    pattern.    Restrictive filling pattern consistent with severely increased LV filling    pressure (Grade III Diastolic dysfunction).    No left ventricular masses or thrombi.    Right Ventricle    Diastolic dimension: 4.72     RV systolic pressure: 22.15 mmHg    cm    Dilated right ventricle.    Abnormal TAPSE; abnormal right ventricular function.    Left Atrium    LA dimension: 4.64 cm               LA volume:95.38 ml    LA/Aorta: 1.56    Abnormal left atrial volume index 45ml/m2.    Intact atrial septum.    No atrial mass or thrombus.    Right Atrium    Abnormal right atrial size.    Intact atrial septum.    No atrial mass or thrombus.    Mitral Valve    Deceleration time:     Area PHT: 2.04 cm^2  Mean velocity:    248.96 msec            P1/2t: 107.68 msec   0.57 m/s    Area (continuity):   Mean gradient:    1.73 cm^2            1.89 mmHg    Peak gradient:    5.97 mmHg    Thickened mitral valve leaflets.    Mild mitral regurgitation.    Mild mitral stenosis. Peak/Mean 6/2mmHg    Echogenic density noted on mitral valve chordae. Seen on prior exam    10/16/2022    Aortic Valve    LVOT VTI: 18.22 cm    Mildly thickend free edges of the aortic valve leaflets.    No aortic regurgitation.    No aortic stenosis.    No masses or vegetations seen.    Tricuspid Valve    TR velocity: 2.19 m/s     TR gradient: 19.58657 mmHg    Estimated RAP: 3 mmHg     RVSP: 22.17 mmHg    Structurally normal tricuspid valve.    Moderate tricuspid (2+) regurgitation.    RVSP likely underestimated due to RV systolic function.    No  tricuspid stenosis.    No masses or vegetations seen.    Pulmonic Valve    Acceleration time: 119.95 msec          PASP: 22.15 mmHg    Structurally normal pulmonic valve.    Mild pulmonic regurgitation (1+).    No pulmonic stenosis.    No masses or vegetations seen.   Great Vessels   Aorta    Aortic Root: 2.97 cm    Ascending Aorta: 2.76 cm    LVOT Diameter: 2.05 cm   Visualized aorta is normal.   Normal aortic root.   No evidence of dissection.   IVC is not well visualized.   Unable to obtain adequate subcostal view.    Pericardium / Pleura   No pericardial effusion.    Other    No masses or thrombi.    No intracardiac shunt.    ----------------------------------------------------------------    Electronically signed by MATHEUS IRELAND MD(Interpreting    Physician) on 11/17/2022 17:38       Results for orders placed during the hospital encounter of 09/09/20    Adult Transesophageal Echo (KHAI) W/ Cont if Necessary Per Protocol (Cardiology Department)    Interpretation Summary  · Ejection fraction appears to be 21 - 25%. Left ventricular systolic function is severely decreased.  · Right ventricular cavity is mildly dilated. Moderately reduced right ventricular systolic function noted.  · Trace mitral valve regurgitation is present.  · Moderate tricuspid valve regurgitation is present. Estimated right ventricular systolic pressure from tricuspid regurgitation is normal (<35 mmHg).  · There is (grade 1) plaque in the proximal aorta present. There is (grade 1) plaque in the ascending aorta present. There is (grade 1) plaque in the aortic arch present. There is (grade 1) plaque in the descending aorta present.    RHC report:      CARDIAC CATH - RIGHT HEART CATH    Anatomical Region Laterality Modality   Heart -- Other     Narrative    Cardiac Diagnostic Report    Demographics    Patient Name       JAMAL WHARTON      Date of Birth          1965    Patient #          [id number]          Accession #             [id number]    Visit #            [id number]          Medical Record #    Physician          MATHEUS IRELAND   Date of Study          12/22/2022                       MD    Referring                              Interventional    Physician                              physician    Procedure   Procedure Type    Diagnostic procedure: RHC with Cardiomems, RIGHT HEART CATH   Indications: Angina.    Conclusions   Procedure Description   Using ultrasound and micropuncture, access to right brachial vein obtained   and 7F sheath inserted. 7F PA catheter used for RHC.   I attempted right radial access for LHC but unsuccessful due to very small   artery which appears occluded or near-occluded.   Procedure Summary   Elevated left and right heart filling pressures.   Elevated pulmonary pressures.   Borderline-low Ramos CO/CI.    Procedure Data   Procedure Date   Date: 12/22/2022Start: 15:32End: 16:09   Entry Locations     - Retrograde Percutaneous access was performed through the Right Axiliary.       A 7 Fr sheath was inserted. Hemostasis was successfully obtained using       Manual Compression.       Entry Comments: brachial vein.   Procedure Medications     - Fentanyl I.V. 25 mcg.     - Versed Sheath 1 mg.     - Oxygen NC 6 l/min.   Diagnostic Catheters     - A7 FR MON [place] [id number] used for:Arch.   Contrast Material     - None0 ml   Fluoroscopy Time: Total: 2:48 minutes.   Fluoroscopy Dose: Total: 155 mGy.    Medical History    Risk Factors    The patient risk factors include:obesity, hypercholesterolemia, treated    and uncontrolled hypertension, diabetes mellitus, last creatinine: 2    mg/dl, creatinine clearance: 69.72 ml/min and dyslipidemia.    Admission Data    Hemodynamics    Condition: Baseline    O2 Consumption: Estimated: 297.50Heart Rate: 41 bpm   Oxygen Saturation   +--------+-----+----+----------------------+---+---------------------------+   !Location!pCO2 !pO2 !% Saturation          !Hgb!O2 Content                  !   +--------+-----+----+----------------------+---+---------------------------+   !PA      !     !    !51                    !   !                           !   +--------+-----+----+----------------------+---+---------------------------+   !RA      !     !    !58                    !   !                           !   +--------+-----+----+----------------------+---+---------------------------+   !AO      !     !    !94                    !   !                           !   +--------+-----+----+----------------------+---+---------------------------+   Pressures (mmHg)   +-----+--------------------------------------------------------------------+   !Site !Pressure                                                            !   +-----+--------------------------------------------------------------------+   !RA   !28/28 (25)                                                          !   +-----+--------------------------------------------------------------------+   !RV   !81/10 ,27                                                           !   +-----+--------------------------------------------------------------------+   !PA   !73/32 (43)                                                          !   +-----+--------------------------------------------------------------------+   !PCW  !68/66 (45)                                                          !   +-----+--------------------------------------------------------------------+   !PCW  !78/59 (45)                                                          !   +-----+--------------------------------------------------------------------+   !PCW  !23/36 (27)                                                          !   +-----+--------------------------------------------------------------------+   Cardiac Output   +------+-------------------------+----------------------------+------------+   !Method!CO (l/min)               !CI (l/min/m2)               !SV (ml)     !    +------+-------------------------+----------------------------+------------+   !Ramos  !5.35                     !2.2                         !131.67      !   +------+-------------------------+----------------------------+------------+   Shunts   Oxygen Values    O2 Capacity 129.2 O2 Consumption 297.5    Flows (l/min)    Qs 6.4 Qe/Qp 1.2    Qp 5.35 Qp/Qs 0.84    Qe 6.4   Vascular Resistance (dynes x sec x cm-5)   +-------------------------------------+----+---+----+----+---------+-------+   !CO method                            !TSVR!SVR!TPVR!PVR !TPVR/TSVR!PVR/SVR!   +-------------------------------------+----+---+----+----+---------+-------+   !Ramos                                 !    !   !8.03!2.94!         !       !   +-------------------------------------+----+---+----+----+---------+-------+   !Qp or Qs                             !    !   !8.03!2.94!         !       !   +-------------------------------------+----+---+----+----+---------+-------+    Signatures    ----------------------------------------------------------------    Electronically signed by MATHEUS IRELAND MD(Physician) on    12/22/2022 16:19    ----------------------------------------------------------------        -----------------------------------------------------  CXR/Imaging:   Imaging Results (Most Recent)       None            I personally reviewed and interpreted the CXR.      -----------------------------------------------------  CT:   No radiology results for the last 30 days.  I personally reviewed the images of the CT scan.  My personal interpretation is below.      ----------------------------------------------------    PFTs:    No PFTS available.      --------------------------------------------------------------------------------------------------    Laboratory evaluations:    Lab Results   Component Value Date    GLUCOSE 320 (H) 09/29/2023    BUN 29 (H) 09/29/2023    CREATININE 1.35 (H) 09/29/2023    EGFRIFNONA 49 (L)  11/10/2020    BCR 21.5 09/29/2023    K 3.6 09/29/2023    CO2 28.7 09/29/2023    CALCIUM 9.2 09/29/2023    ALBUMIN 4.2 08/17/2023    LABIL2 1.3 (L) 06/09/2016    AST 30 08/17/2023    ALT 32 08/17/2023     Lab Results   Component Value Date    WBC 6.88 01/31/2023    HGB 9.7 (L) 01/31/2023    HCT 32.9 (L) 01/31/2023    .5 (H) 01/31/2023     (L) 01/31/2023     Lab Results   Component Value Date    CHOL 137 09/11/2020    CHLPL 103 04/21/2016    TRIG 80 09/11/2020    HDL 43 09/11/2020    LDL 78 09/11/2020     Lab Results   Component Value Date    TSH 3.830 09/10/2020     Lab Results   Component Value Date    HGBA1C 8.40 (H) 09/10/2020     Lab Results   Component Value Date    ALT 32 08/17/2023     Lab Results   Component Value Date    HGBA1C 8.40 (H) 09/10/2020    HGBA1C 5.4 04/21/2016     Lab Results   Component Value Date    MICROALBUR 3.2 09/12/2020    CREATININE 1.35 (H) 09/29/2023     Lab Results   Component Value Date    IRON 34.0 (L) 12/21/2022    TIBC 237 (L) 09/18/2020    FERRITIN 1,219.30 (H) 12/21/2022     Lab Results   Component Value Date    INR 1.10 12/15/2022    INR 1.25 (H) 09/23/2020    INR 1.09 09/22/2020    PROTIME 11.6 12/15/2022    PROTIME 15.5 (H) 09/23/2020    PROTIME 13.9 09/22/2020        Lab Results   Component Value Date    ABSOLUTELUNG 34 09/29/2023    ABSOLUTELUNG 36 (A) 08/17/2023    ABSOLUTELUNG 37 (A) 07/17/2023       PAH RISK ASSESSMENT:      1. Chronic HFrEF (heart failure with reduced ejection fraction)    2. CKD (chronic kidney disease) stage 2, GFR 60-89 ml/min    3. Chronic atrial fibrillation    4. Chronic anemia    5. Cardiomyopathy, unspecified type          ORDERS PLACED TODAY:  Orders Placed This Encounter   Procedures    Basic Metabolic Panel    Magnesium    proBNP    Basic Metabolic Panel    Magnesium    proBNP    ReDs Vest        Diagnoses and all orders for this visit:    1. Chronic HFrEF (heart failure with reduced ejection fraction) (Primary)  -     Basic  Metabolic Panel; Future  -     Magnesium; Future  -     proBNP; Future  -     Basic Metabolic Panel; Standing  -     Basic Metabolic Panel  -     Magnesium; Standing  -     Magnesium  -     proBNP; Standing  -     proBNP  -     ReDs Vest    2. CKD (chronic kidney disease) stage 2, GFR 60-89 ml/min    3. Chronic atrial fibrillation    4. Chronic anemia    5. Cardiomyopathy, unspecified type             MEDS ORDERED TODAY:    No orders of the defined types were placed in this encounter.       ---------------------------------------------------------------------------------------------------------------------------          ASSESSMENT/PLAN:      Diagnosis Plan   1. Chronic HFrEF (heart failure with reduced ejection fraction)  Basic Metabolic Panel    Magnesium    proBNP    Basic Metabolic Panel    Basic Metabolic Panel    Magnesium    Magnesium    proBNP    proBNP    ReDs Vest      2. CKD (chronic kidney disease) stage 2, GFR 60-89 ml/min        3. Chronic atrial fibrillation        4. Chronic anemia        5. Cardiomyopathy, unspecified type            not acutely decompensated chronic moderate systolic and diastolic heart failure. CHF. Etiology: Unknown/Idiopathic. LVEF 30-35%.     NYHA stage Stage D: Presence of advanced heart disease with continued heart failure symptoms requiring aggressive medical therapyFC-Class IV: Unable to carry on any physical activity without discomfort. Symptoms of heart failureat rest. If any physical activity is undertaken, discomfort increases.     Today, Patient is approaching euvolemia and with  Moderate perfusion. The patient's hemodynamics are currently acceptable. HR is: normal and is at goal. BP/MAP was reviewed and there is notroom for medication up-titration.  Clinical trajectory was assessed and hasimproved.     CHF GOAL DIRECTED MEDICAL THERAPY FOR PATIENT ADDRESSED/ADJUSTED:     GDMT    Drug Class   Drug   Dose Last Dose Adjustment Additional Titration   Notes   ACEi/ARB/ARNI  ACEI previously stopped due to renal fxn       Beta Blocker  Metoprolol Succinate   12.5 mg qhs Reduced by PCP     MRA Spirono previously stopped due to renal fxn       SGLT2i Farxiga 10mg qd Restarted by Nephro N/A    Secondaries Verquevo 10mg qd Titrate up to 10mg on 08/17/23.        Secondary management:     -CHF Specific BB:   Toprol XL 12.5mg (previously decreased in early 02/2023) is being tolerated well with dizziness resolved and fewer lower blood pressures.    Continue monitoring as she is also on amiodarone for her Afib.   Hold parameters reviewed.      -ACE/ARB/ARNi:   ACEi recently held during [facility] hospitalization.     -MRA:   The patient is FC-NYHA Class IV and MRA is indicated.   Spironolactone was previously stopped on regimen due to renal function.        -SGLT2 inhibitor therapy:   Farxiga stopped due to UTIs in August 2023.    Restarted by  Nephro in September 2023; will continue to monitor for  symptoms.        Secondary pharmacological therapy in HFreF:   EF is less than 45% @ 30-35%.   Maximized on GDMT as tolerated thus far (see chart above)  Recent hospitalization or IV diuresis includes hospitalizations within the last year at [facility], [facility], and [facility] with acute heart failure in addition to afib with RVR.    Given HFrEF with optimally treated with primary therapies for HFrEF and vasodilator therapy, will continue Verquevo 10mg      -Diuretic regimen:   ReDS Vest reading for 10/02/23 is 34; continue monitoring  Continue daily Bumex 4mg 5 days a week, 2mg 2 days a week as directed by Nephrology.  BMP evaluated today.    BMP, Mag, & ProBNP reviewed with patient with toleration of current medication regimen.      -Fluid restriction/Sodium restriction:   Requested 2000 ml restriction  Patient has been asked to weigh daily and was provided with a printed diuretic strategy.  1,500 mg Na restriction was discussed.    -Acute vs. Chronic underlying conditions other than HF addressed  during visit:             Paroxysmal Atrial fibrillation/Flutter, currently SR:   Office visit reviewed from January 27, 2023 with [facility] electrophysiology with cardiomyopathy felt to be multifactorial with DMC and severely worsened by atrial fibrillation with chronic anemia with noted recent hospital discharge with amiodarone and Eliquis she was in sinus rhythm on her January 27, 2023 evaluation with amiodarone renewed but with Lasix 20 mg restarted and encouragement to follow-up more closely in [place].    Continue Eliquis 5mg qd with [id number] at least 4.    Continue Amiodarone as prescribed per EP.  Per extensive review of outside cardiology notes, she has been tried on reduced dose of amiodarone 200mg qd and had recurrent hospitalization with atrial fibrillation with RVR. While she does have known Cirrhosis, prior attempts to decrease amiodarone have resulted in RVR events.    Continue current Amiodarone 200mg BID as previously directed.      CKD IIIb:   Monitor BMP. Reviewed today with creatinine appearing 1.5 with baseline previously 1.9-2.2 per review of OSH labs.    Continues to be within baseline.      Debility:   Continue home health with PT/OT.   Multiple types of DME at home.     Iron/Anemia in CHF:  Prior Iron labs from [facility] with Iron 31, ferritin 1219.30, and TIBC 234 with iron saturation 13%.    Continue BID ferrous sulfate.          Identifiable barriers to Heart Failure Self-care:   Medical Barriers:  Debility  Social Barriers: Transport limitations      --------------------------------------------------------------------------------          BMI cannot be calculated due to outdated height or weight values with patient unable to stand. She has self reported weight of 240.               >30 minutes out of 60 minutes face to face spent counseling patient extensively on dietary Na+ intake, importance of activity, weight monitoring, compliance with medications in addition to importance  of titration with goal directed medical therapy and follow up appointments.            This document has been electronically signed by Sonja Romo PA-C  October 2, 2023 09:03 EDT      Dictated Utilizing Dragon Dictation: Part of this note may be an electronic transcription/translation of spoken language to printed text using the Dragon Dictation System.    Follow-up appointment and medication changes provided in hand delivered After Visit Summary as well as reviewed in the room.

## 2023-10-15 ENCOUNTER — HOSPITAL ENCOUNTER (EMERGENCY)
Facility: HOSPITAL | Age: 58
Discharge: HOME | End: 2023-10-15
Attending: EMERGENCY MEDICINE | Admitting: EMERGENCY MEDICINE
Payer: COMMERCIAL

## 2023-10-15 ENCOUNTER — APPOINTMENT (OUTPATIENT)
Dept: GENERAL RADIOLOGY | Facility: HOSPITAL | Age: 58
End: 2023-10-15
Payer: COMMERCIAL

## 2023-10-15 VITALS
RESPIRATION RATE: 17 BRPM | WEIGHT: 250 LBS | TEMPERATURE: 98.2 F | OXYGEN SATURATION: 99 % | DIASTOLIC BLOOD PRESSURE: 60 MMHG | HEIGHT: 65 IN | BODY MASS INDEX: 41.65 KG/M2 | SYSTOLIC BLOOD PRESSURE: 108 MMHG | HEART RATE: 76 BPM

## 2023-10-15 DIAGNOSIS — J06.9 UPPER RESPIRATORY TRACT INFECTION, UNSPECIFIED TYPE: Primary | ICD-10-CM

## 2023-10-15 LAB
ALBUMIN SERPL-MCNC: 3.8 G/DL (ref 3.5–5.2)
ALBUMIN/GLOB SERPL: 1.3 G/DL
ALP SERPL-CCNC: 83 U/L (ref 39–117)
ALT SERPL W P-5'-P-CCNC: 21 U/L (ref 1–33)
ANION GAP SERPL CALCULATED.3IONS-SCNC: 8.9 MMOL/L (ref 5–15)
ANISOCYTOSIS BLD QL: NORMAL
AST SERPL-CCNC: 24 U/L (ref 1–32)
BASOPHILS # BLD AUTO: 0.05 10*3/MM3 (ref 0–0.2)
BASOPHILS NFR BLD AUTO: 0.6 % (ref 0–1.5)
BILIRUB SERPL-MCNC: 2.6 MG/DL (ref 0–1.2)
BUN SERPL-MCNC: 16 MG/DL (ref 6–20)
BUN/CREAT SERPL: 11.6 (ref 7–25)
CALCIUM SPEC-SCNC: 8.8 MG/DL (ref 8.6–10.5)
CHLORIDE SERPL-SCNC: 97 MMOL/L (ref 98–107)
CO2 SERPL-SCNC: 30.1 MMOL/L (ref 22–29)
CREAT SERPL-MCNC: 1.38 MG/DL (ref 0.57–1)
CRP SERPL-MCNC: 4.21 MG/DL (ref 0–0.5)
DEPRECATED RDW RBC AUTO: 86.2 FL (ref 37–54)
EGFRCR SERPLBLD CKD-EPI 2021: 45 ML/MIN/1.73
EOSINOPHIL # BLD AUTO: 0.13 10*3/MM3 (ref 0–0.4)
EOSINOPHIL NFR BLD AUTO: 1.6 % (ref 0.3–6.2)
ERYTHROCYTE [DISTWIDTH] IN BLOOD BY AUTOMATED COUNT: 21.7 % (ref 12.3–15.4)
FLUAV RNA RESP QL NAA+PROBE: NOT DETECTED
FLUBV RNA RESP QL NAA+PROBE: NOT DETECTED
GLOBULIN UR ELPH-MCNC: 3 GM/DL
GLUCOSE SERPL-MCNC: 178 MG/DL (ref 65–99)
HCT VFR BLD AUTO: 30.2 % (ref 34–46.6)
HGB BLD-MCNC: 8.5 G/DL (ref 12–15.9)
HOLD SPECIMEN: NORMAL
HOLD SPECIMEN: NORMAL
HYPOCHROMIA BLD QL: NORMAL
IMM GRANULOCYTES # BLD AUTO: 0.18 10*3/MM3 (ref 0–0.05)
IMM GRANULOCYTES NFR BLD AUTO: 2.2 % (ref 0–0.5)
LYMPHOCYTES # BLD AUTO: 2 10*3/MM3 (ref 0.7–3.1)
LYMPHOCYTES NFR BLD AUTO: 24.8 % (ref 19.6–45.3)
MACROCYTES BLD QL SMEAR: NORMAL
MCH RBC QN AUTO: 31.1 PG (ref 26.6–33)
MCHC RBC AUTO-ENTMCNC: 28.1 G/DL (ref 31.5–35.7)
MCV RBC AUTO: 110.6 FL (ref 79–97)
MONOCYTES # BLD AUTO: 0.35 10*3/MM3 (ref 0.1–0.9)
MONOCYTES NFR BLD AUTO: 4.3 % (ref 5–12)
NEUTROPHILS NFR BLD AUTO: 5.35 10*3/MM3 (ref 1.7–7)
NEUTROPHILS NFR BLD AUTO: 66.5 % (ref 42.7–76)
NRBC BLD AUTO-RTO: 1.2 /100 WBC (ref 0–0.2)
PLATELET # BLD AUTO: 123 10*3/MM3 (ref 140–450)
PMV BLD AUTO: 9.8 FL (ref 6–12)
POTASSIUM SERPL-SCNC: 3.9 MMOL/L (ref 3.5–5.2)
PROT SERPL-MCNC: 6.8 G/DL (ref 6–8.5)
RBC # BLD AUTO: 2.73 10*6/MM3 (ref 3.77–5.28)
SARS-COV-2 RNA RESP QL NAA+PROBE: NOT DETECTED
SMALL PLATELETS BLD QL SMEAR: ADEQUATE
SODIUM SERPL-SCNC: 136 MMOL/L (ref 136–145)
WBC NRBC COR # BLD: 8.06 10*3/MM3 (ref 3.4–10.8)
WHOLE BLOOD HOLD COAG: NORMAL
WHOLE BLOOD HOLD SPECIMEN: NORMAL

## 2023-10-15 PROCEDURE — 99283 EMERGENCY DEPT VISIT LOW MDM: CPT

## 2023-10-15 PROCEDURE — 87636 SARSCOV2 & INF A&B AMP PRB: CPT | Performed by: PHYSICIAN ASSISTANT

## 2023-10-15 PROCEDURE — 36415 COLL VENOUS BLD VENIPUNCTURE: CPT

## 2023-10-15 PROCEDURE — 80053 COMPREHEN METABOLIC PANEL: CPT | Performed by: PHYSICIAN ASSISTANT

## 2023-10-15 PROCEDURE — 71045 X-RAY EXAM CHEST 1 VIEW: CPT | Performed by: RADIOLOGY

## 2023-10-15 PROCEDURE — 71045 X-RAY EXAM CHEST 1 VIEW: CPT

## 2023-10-15 PROCEDURE — 86140 C-REACTIVE PROTEIN: CPT | Performed by: PHYSICIAN ASSISTANT

## 2023-10-15 PROCEDURE — 85007 BL SMEAR W/DIFF WBC COUNT: CPT | Performed by: PHYSICIAN ASSISTANT

## 2023-10-15 PROCEDURE — 85025 COMPLETE CBC W/AUTO DIFF WBC: CPT | Performed by: PHYSICIAN ASSISTANT

## 2023-10-15 RX ORDER — LORAZEPAM 1 MG/1
1 TABLET ORAL ONCE
Status: COMPLETED | OUTPATIENT
Start: 2023-10-15 | End: 2023-10-15

## 2023-10-15 RX ORDER — SODIUM CHLORIDE 0.9 % (FLUSH) 0.9 %
10 SYRINGE (ML) INJECTION AS NEEDED
Status: DISCONTINUED | OUTPATIENT
Start: 2023-10-15 | End: 2023-10-15 | Stop reason: HOSPADM

## 2023-10-15 RX ADMIN — LORAZEPAM 1 MG: 1 TABLET ORAL at 21:04

## 2023-10-15 NOTE — ED PROVIDER NOTES
Subjective   History of Present Illness  57-year-old female with past medical history of anemia, CHF, chronic atrial fibrillation, cirrhosis, and diabetes presents to the emergency room with dysuria and nonproductive cough.  Patient states he was seen at [facility] and diagnosed with a urinary tract infection and upper respiratory infection 6 days ago and has been on cefdinir since that time.  Patient states she does not think that she is improving and feels like her anxiety is getting worse.  She states that she has been very tearful and crying a lot and thinks she may need something for her nerves.  She denies chest pain, shortness of breath, or back pain.  She denies any suicidal or homicidal ideations.  She denies any recent life changes or stressors.  Denies any other complaints or concerns at this time.    History provided by:  Patient   used: No        Review of Systems   Constitutional: Negative.  Negative for fever.   HENT: Negative.     Respiratory: Negative.  Positive for cough.    Cardiovascular: Negative.  Negative for chest pain.   Gastrointestinal: Negative.  Negative for abdominal pain.   Endocrine: Negative.    Genitourinary: Negative.  Positive for dysuria.   Skin: Negative.    Neurological: Negative.    Psychiatric/Behavioral: Negative.  Negative for suicidal ideas. The patient is nervous/anxious.    All other systems reviewed and are negative.      Past Medical History:   Diagnosis Date    Anemia     CHF (congestive heart failure)     Chronic a-fib     stated by patient     Cirrhosis of liver     stated by patient     Diabetes mellitus        Allergies   Allergen Reactions    Phenergan [Promethazine]        Past Surgical History:   Procedure Laterality Date    APPENDECTOMY      CARDIAC CATHETERIZATION N/A 11/10/2020    Procedure: Left Heart Cath;  Surgeon: Charlee Ken MD;  Location: [facility] INVASIVE LOCATION;  Service: Cardiovascular;  Laterality: N/A;     CHOLECYSTECTOMY      TOE AMPUTATION Right     stated by patient.        No family history on file.    Social History     Socioeconomic History    Marital status:    Tobacco Use    Smoking status: Never    Smokeless tobacco: Never   Substance and Sexual Activity    Alcohol use: Never    Drug use: Never    Sexual activity: Defer           Objective   Physical Exam  Vitals and nursing note reviewed.   Constitutional:       General: She is not in acute distress.     Appearance: She is well-developed. She is not diaphoretic.   HENT:      Head: Normocephalic and atraumatic.      Right Ear: External ear normal.      Left Ear: External ear normal.      Nose: Nose normal.   Eyes:      Conjunctiva/sclera: Conjunctivae normal.      Pupils: Pupils are equal, round, and reactive to light.   Neck:      Vascular: No JVD.      Trachea: No tracheal deviation.   Cardiovascular:      Rate and Rhythm: Normal rate and regular rhythm.      Heart sounds: Normal heart sounds. No murmur heard.  Pulmonary:      Effort: Pulmonary effort is normal. No respiratory distress.      Breath sounds: Normal breath sounds. No wheezing.   Abdominal:      General: Bowel sounds are normal.      Palpations: Abdomen is soft.      Tenderness: There is no abdominal tenderness.   Musculoskeletal:         General: No deformity. Normal range of motion.      Cervical back: Normal range of motion and neck supple.   Skin:     General: Skin is warm and dry.      Coloration: Skin is not pale.      Findings: No erythema or rash.   Neurological:      Mental Status: She is alert and oriented to person, place, and time.      Cranial Nerves: No cranial nerve deficit.   Psychiatric:         Behavior: Behavior normal.         Thought Content: Thought content normal.         Procedures           ED Course  ED Course as of 10/17/23 0852   Sun Oct 15, 2023   2044 XR Chest 1 View [TK]      ED Course User Index  [TK] Allen Galvan PA-C             Results for  orders placed or performed during the hospital encounter of 10/15/23   COVID-19 and FLU A/B PCR - Swab, Nasopharynx    Specimen: Nasopharynx; Swab   Result Value Ref Range    COVID19 Not Detected Not Detected - Ref. Range    Influenza A PCR Not Detected Not Detected    Influenza B PCR Not Detected Not Detected   Comprehensive Metabolic Panel    Specimen: Arm, Right; Blood   Result Value Ref Range    Glucose 178 (H) 65 - 99 mg/dL    BUN 16 6 - 20 mg/dL    Creatinine 1.38 (H) 0.57 - 1.00 mg/dL    Sodium 136 136 - 145 mmol/L    Potassium 3.9 3.5 - 5.2 mmol/L    Chloride 97 (L) 98 - 107 mmol/L    CO2 30.1 (H) 22.0 - 29.0 mmol/L    Calcium 8.8 8.6 - 10.5 mg/dL    Total Protein 6.8 6.0 - 8.5 g/dL    Albumin 3.8 3.5 - 5.2 g/dL    ALT (SGPT) 21 1 - 33 U/L    AST (SGOT) 24 1 - 32 U/L    Alkaline Phosphatase 83 39 - 117 U/L    Total Bilirubin 2.6 (H) 0.0 - 1.2 mg/dL    Globulin 3.0 gm/dL    A/G Ratio 1.3 g/dL    BUN/Creatinine Ratio 11.6 7.0 - 25.0    Anion Gap 8.9 5.0 - 15.0 mmol/L    eGFR 45.0 (L) >60.0 mL/min/1.73   C-reactive Protein    Specimen: Arm, Right; Blood   Result Value Ref Range    C-Reactive Protein 4.21 (H) 0.00 - 0.50 mg/dL   CBC Auto Differential    Specimen: Arm, Right; Blood   Result Value Ref Range    WBC 8.06 3.40 - 10.80 10*3/mm3    RBC 2.73 (L) 3.77 - 5.28 10*6/mm3    Hemoglobin 8.5 (L) 12.0 - 15.9 g/dL    Hematocrit 30.2 (L) 34.0 - 46.6 %    .6 (H) 79.0 - 97.0 fL    MCH 31.1 26.6 - 33.0 pg    MCHC 28.1 (L) 31.5 - 35.7 g/dL    RDW 21.7 (H) 12.3 - 15.4 %    RDW-SD 86.2 (H) 37.0 - 54.0 fl    MPV 9.8 6.0 - 12.0 fL    Platelets 123 (L) 140 - 450 10*3/mm3    Neutrophil % 66.5 42.7 - 76.0 %    Lymphocyte % 24.8 19.6 - 45.3 %    Monocyte % 4.3 (L) 5.0 - 12.0 %    Eosinophil % 1.6 0.3 - 6.2 %    Basophil % 0.6 0.0 - 1.5 %    Immature Grans % 2.2 (H) 0.0 - 0.5 %    Neutrophils, Absolute 5.35 1.70 - 7.00 10*3/mm3    Lymphocytes, Absolute 2.00 0.70 - 3.10 10*3/mm3    Monocytes, Absolute 0.35 0.10 - 0.90  10*3/mm3    Eosinophils, Absolute 0.13 0.00 - 0.40 10*3/mm3    Basophils, Absolute 0.05 0.00 - 0.20 10*3/mm3    Immature Grans, Absolute 0.18 (H) 0.00 - 0.05 10*3/mm3    nRBC 1.2 (H) 0.0 - 0.2 /100 WBC   Scan Slide    Specimen: Arm, Right; Blood   Result Value Ref Range    Anisocytosis Mod/2+ None Seen    Hypochromia Mod/2+ None Seen    Macrocytes Slight/1+ None Seen    Platelet Estimate Adequate Normal   Green Top (Gel)   Result Value Ref Range    Extra Tube Hold for add-ons.    Lavender Top   Result Value Ref Range    Extra Tube hold for add-on    Gold Top - SST   Result Value Ref Range    Extra Tube Hold for add-ons.    Light Blue Top   Result Value Ref Range    Extra Tube Hold for add-ons.        XR Chest 1 View   Final Result   Mild pulmonary vascular congestion.           This report was finalized on 10/15/2023 7:11 PM by Alex Pallas, DO.                                              Medical Decision Making  Problems Addressed:  Upper respiratory tract infection, unspecified type: complicated acute illness or injury    Amount and/or Complexity of Data Reviewed  Labs: ordered.  Radiology: ordered. Decision-making details documented in ED Course.    Risk  Prescription drug management.        Final diagnoses:   Upper respiratory tract infection, unspecified type       ED Disposition  ED Disposition       ED Disposition   Discharge    Condition   Stable    Comment   --               Masha Davidson, APRN  [address] [place] [address] DR OTERO [address] [place] [id number] [phone]    In 2 days           Medication List        Changed      bumetanide 2 MG tablet  Commonly known as: BUMEX  Take 2 tablets by mouth daily on Monday and Thursday, and take 1 tablet daily all other days.  What changed: additional instructions     polyethylene glycol 17 g packet  Commonly known as: MIRALAX  Take 17 g by mouth Daily.  What changed:   when to take this  reasons to take this                 Allen Galvan PA-C  10/17/23 1243

## 2023-11-12 ENCOUNTER — APPOINTMENT (OUTPATIENT)
Dept: GENERAL RADIOLOGY | Facility: HOSPITAL | Age: 58
End: 2023-11-12
Payer: COMMERCIAL

## 2023-11-12 ENCOUNTER — HOSPITAL ENCOUNTER (INPATIENT)
Facility: HOSPITAL | Age: 58
LOS: 8 days | Discharge: HOME HEALTH | End: 2023-11-21
Attending: EMERGENCY MEDICINE | Admitting: HOSPITALIST
Payer: COMMERCIAL

## 2023-11-12 DIAGNOSIS — I50.43 ACUTE ON CHRONIC COMBINED SYSTOLIC AND DIASTOLIC CHF (CONGESTIVE HEART FAILURE): ICD-10-CM

## 2023-11-12 DIAGNOSIS — D64.9 SYMPTOMATIC ANEMIA: ICD-10-CM

## 2023-11-12 DIAGNOSIS — K74.69 OTHER CIRRHOSIS OF LIVER: ICD-10-CM

## 2023-11-12 DIAGNOSIS — Z74.09 VERY POOR MOBILITY: Primary | ICD-10-CM

## 2023-11-12 DIAGNOSIS — I50.9 CHRONIC HEART FAILURE, UNSPECIFIED HEART FAILURE TYPE: ICD-10-CM

## 2023-11-12 DIAGNOSIS — I47.20 VENTRICULAR TACHYCARDIA: ICD-10-CM

## 2023-11-12 DIAGNOSIS — R65.20 SEVERE SEPSIS: ICD-10-CM

## 2023-11-12 DIAGNOSIS — Z79.4 TYPE 2 DIABETES MELLITUS WITH HYPERGLYCEMIA, WITH LONG-TERM CURRENT USE OF INSULIN: ICD-10-CM

## 2023-11-12 DIAGNOSIS — I42.9 CARDIOMYOPATHY, UNSPECIFIED TYPE: ICD-10-CM

## 2023-11-12 DIAGNOSIS — I48.91 ATRIAL FIBRILLATION WITH RVR: ICD-10-CM

## 2023-11-12 DIAGNOSIS — R94.31 PROLONGED Q-T INTERVAL ON ECG: ICD-10-CM

## 2023-11-12 DIAGNOSIS — I50.9 CONGESTIVE HEART FAILURE, UNSPECIFIED HF CHRONICITY, UNSPECIFIED HEART FAILURE TYPE: ICD-10-CM

## 2023-11-12 DIAGNOSIS — I47.29: ICD-10-CM

## 2023-11-12 DIAGNOSIS — E11.65 TYPE 2 DIABETES MELLITUS WITH HYPERGLYCEMIA, WITH LONG-TERM CURRENT USE OF INSULIN: ICD-10-CM

## 2023-11-12 DIAGNOSIS — A41.9 SEVERE SEPSIS: ICD-10-CM

## 2023-11-12 DIAGNOSIS — I50.9 ACUTE ON CHRONIC CONGESTIVE HEART FAILURE, UNSPECIFIED HEART FAILURE TYPE: ICD-10-CM

## 2023-11-12 LAB
ALBUMIN SERPL-MCNC: 3.7 G/DL (ref 3.5–5.2)
ALBUMIN/GLOB SERPL: 1.2 G/DL
ALP SERPL-CCNC: 127 U/L (ref 39–117)
ALT SERPL W P-5'-P-CCNC: 21 U/L (ref 1–33)
ANION GAP SERPL CALCULATED.3IONS-SCNC: 14.3 MMOL/L (ref 5–15)
ANISOCYTOSIS BLD QL: ABNORMAL
APTT PPP: 37.5 SECONDS (ref 26.5–34.5)
AST SERPL-CCNC: 21 U/L (ref 1–32)
BILIRUB SERPL-MCNC: 2.7 MG/DL (ref 0–1.2)
BUN SERPL-MCNC: 36 MG/DL (ref 6–20)
BUN/CREAT SERPL: 24.7 (ref 7–25)
CALCIUM SPEC-SCNC: 8.9 MG/DL (ref 8.6–10.5)
CHLORIDE SERPL-SCNC: 91 MMOL/L (ref 98–107)
CK SERPL-CCNC: 41 U/L (ref 20–180)
CO2 SERPL-SCNC: 26.7 MMOL/L (ref 22–29)
CREAT SERPL-MCNC: 1.46 MG/DL (ref 0.57–1)
CRP SERPL-MCNC: 0.88 MG/DL (ref 0–0.5)
DEPRECATED RDW RBC AUTO: 91.3 FL (ref 37–54)
EGFRCR SERPLBLD CKD-EPI 2021: 42.1 ML/MIN/1.73
ERYTHROCYTE [DISTWIDTH] IN BLOOD BY AUTOMATED COUNT: 27.3 % (ref 12.3–15.4)
ETHANOL BLD-MCNC: <10 MG/DL (ref 0–10)
ETHANOL UR QL: <0.01 %
GLOBULIN UR ELPH-MCNC: 3 GM/DL
GLUCOSE SERPL-MCNC: 424 MG/DL (ref 65–99)
HCT VFR BLD AUTO: 26.7 % (ref 34–46.6)
HGB BLD-MCNC: 8.4 G/DL (ref 12–15.9)
HYPOCHROMIA BLD QL: ABNORMAL
INR PPP: 1.2 (ref 0.9–1.1)
LYMPHOCYTES # BLD MANUAL: 2.02 10*3/MM3 (ref 0.7–3.1)
LYMPHOCYTES NFR BLD MANUAL: 1 % (ref 5–12)
MACROCYTES BLD QL SMEAR: ABNORMAL
MAGNESIUM SERPL-MCNC: 2.1 MG/DL (ref 1.6–2.6)
MCH RBC QN AUTO: 35.7 PG (ref 26.6–33)
MCHC RBC AUTO-ENTMCNC: 31.5 G/DL (ref 31.5–35.7)
MCV RBC AUTO: 113.6 FL (ref 79–97)
METAMYELOCYTES NFR BLD MANUAL: 1 % (ref 0–0)
MONOCYTES # BLD: 0.16 10*3/MM3 (ref 0.1–0.9)
MYELOCYTES NFR BLD MANUAL: 3 % (ref 0–0)
NEUTROPHILS # BLD AUTO: 12.77 10*3/MM3 (ref 1.7–7)
NEUTROPHILS NFR BLD MANUAL: 80 % (ref 42.7–76)
NEUTS BAND NFR BLD MANUAL: 2 % (ref 0–5)
NRBC SPEC MANUAL: 3 /100 WBC (ref 0–0.2)
NT-PROBNP SERPL-MCNC: 2746 PG/ML (ref 0–900)
PLAT MORPH BLD: NORMAL
PLATELET # BLD AUTO: 142 10*3/MM3 (ref 140–450)
PMV BLD AUTO: 10.2 FL (ref 6–12)
POIKILOCYTOSIS BLD QL SMEAR: ABNORMAL
POLYCHROMASIA BLD QL SMEAR: ABNORMAL
POTASSIUM SERPL-SCNC: 3.5 MMOL/L (ref 3.5–5.2)
PROT SERPL-MCNC: 6.7 G/DL (ref 6–8.5)
PROTHROMBIN TIME: 15.8 SECONDS (ref 12.1–14.7)
RBC # BLD AUTO: 2.35 10*6/MM3 (ref 3.77–5.28)
SODIUM SERPL-SCNC: 132 MMOL/L (ref 136–145)
STOMATOCYTES BLD QL SMEAR: ABNORMAL
TROPONIN T SERPL HS-MCNC: 93 NG/L
TSH SERPL DL<=0.05 MIU/L-ACNC: 4.68 UIU/ML (ref 0.27–4.2)
VARIANT LYMPHS NFR BLD MANUAL: 13 % (ref 19.6–45.3)
WBC NRBC COR # BLD: 15.57 10*3/MM3 (ref 3.4–10.8)

## 2023-11-12 PROCEDURE — 86140 C-REACTIVE PROTEIN: CPT | Performed by: PHYSICIAN ASSISTANT

## 2023-11-12 PROCEDURE — 84484 ASSAY OF TROPONIN QUANT: CPT | Performed by: PHYSICIAN ASSISTANT

## 2023-11-12 PROCEDURE — 83735 ASSAY OF MAGNESIUM: CPT | Performed by: PHYSICIAN ASSISTANT

## 2023-11-12 PROCEDURE — 25010000002 AMIODARONE PER 30 MG: Performed by: STUDENT IN AN ORGANIZED HEALTH CARE EDUCATION/TRAINING PROGRAM

## 2023-11-12 PROCEDURE — 71045 X-RAY EXAM CHEST 1 VIEW: CPT

## 2023-11-12 PROCEDURE — 86920 COMPATIBILITY TEST SPIN: CPT

## 2023-11-12 PROCEDURE — 99285 EMERGENCY DEPT VISIT HI MDM: CPT

## 2023-11-12 PROCEDURE — 82140 ASSAY OF AMMONIA: CPT | Performed by: STUDENT IN AN ORGANIZED HEALTH CARE EDUCATION/TRAINING PROGRAM

## 2023-11-12 PROCEDURE — 86901 BLOOD TYPING SEROLOGIC RH(D): CPT | Performed by: STUDENT IN AN ORGANIZED HEALTH CARE EDUCATION/TRAINING PROGRAM

## 2023-11-12 PROCEDURE — 83880 ASSAY OF NATRIURETIC PEPTIDE: CPT | Performed by: PHYSICIAN ASSISTANT

## 2023-11-12 PROCEDURE — 85730 THROMBOPLASTIN TIME PARTIAL: CPT | Performed by: PHYSICIAN ASSISTANT

## 2023-11-12 PROCEDURE — 82550 ASSAY OF CK (CPK): CPT | Performed by: PHYSICIAN ASSISTANT

## 2023-11-12 PROCEDURE — 86870 RBC ANTIBODY IDENTIFICATION: CPT | Performed by: STUDENT IN AN ORGANIZED HEALTH CARE EDUCATION/TRAINING PROGRAM

## 2023-11-12 PROCEDURE — 83605 ASSAY OF LACTIC ACID: CPT | Performed by: STUDENT IN AN ORGANIZED HEALTH CARE EDUCATION/TRAINING PROGRAM

## 2023-11-12 PROCEDURE — 71045 X-RAY EXAM CHEST 1 VIEW: CPT | Performed by: RADIOLOGY

## 2023-11-12 PROCEDURE — 85610 PROTHROMBIN TIME: CPT | Performed by: PHYSICIAN ASSISTANT

## 2023-11-12 PROCEDURE — 25010000002 AMIODARONE IN DEXTROSE 5% 360-4.14 MG/200ML-% SOLUTION: Performed by: STUDENT IN AN ORGANIZED HEALTH CARE EDUCATION/TRAINING PROGRAM

## 2023-11-12 PROCEDURE — 82077 ASSAY SPEC XCP UR&BREATH IA: CPT | Performed by: PHYSICIAN ASSISTANT

## 2023-11-12 PROCEDURE — 86900 BLOOD TYPING SEROLOGIC ABO: CPT | Performed by: STUDENT IN AN ORGANIZED HEALTH CARE EDUCATION/TRAINING PROGRAM

## 2023-11-12 PROCEDURE — 25010000002 METOCLOPRAMIDE PER 10 MG: Performed by: STUDENT IN AN ORGANIZED HEALTH CARE EDUCATION/TRAINING PROGRAM

## 2023-11-12 PROCEDURE — 86870 RBC ANTIBODY IDENTIFICATION: CPT

## 2023-11-12 PROCEDURE — 85007 BL SMEAR W/DIFF WBC COUNT: CPT | Performed by: PHYSICIAN ASSISTANT

## 2023-11-12 PROCEDURE — 93005 ELECTROCARDIOGRAM TRACING: CPT | Performed by: STUDENT IN AN ORGANIZED HEALTH CARE EDUCATION/TRAINING PROGRAM

## 2023-11-12 PROCEDURE — 80050 GENERAL HEALTH PANEL: CPT | Performed by: PHYSICIAN ASSISTANT

## 2023-11-12 PROCEDURE — 87040 BLOOD CULTURE FOR BACTERIA: CPT | Performed by: STUDENT IN AN ORGANIZED HEALTH CARE EDUCATION/TRAINING PROGRAM

## 2023-11-12 PROCEDURE — 86922 COMPATIBILITY TEST ANTIGLOB: CPT

## 2023-11-12 PROCEDURE — 36415 COLL VENOUS BLD VENIPUNCTURE: CPT

## 2023-11-12 PROCEDURE — 25010000002 MAGNESIUM SULFATE PER 500 MG OF MAGNESIUM: Performed by: STUDENT IN AN ORGANIZED HEALTH CARE EDUCATION/TRAINING PROGRAM

## 2023-11-12 PROCEDURE — 86850 RBC ANTIBODY SCREEN: CPT | Performed by: STUDENT IN AN ORGANIZED HEALTH CARE EDUCATION/TRAINING PROGRAM

## 2023-11-12 PROCEDURE — 93005 ELECTROCARDIOGRAM TRACING: CPT | Performed by: PHYSICIAN ASSISTANT

## 2023-11-12 RX ORDER — FLUTICASONE PROPIONATE 50 MCG
2 SPRAY, SUSPENSION (ML) NASAL DAILY
COMMUNITY

## 2023-11-12 RX ORDER — MIDAZOLAM HYDROCHLORIDE 1 MG/ML
4 INJECTION INTRAMUSCULAR; INTRAVENOUS ONCE
Status: DISCONTINUED | OUTPATIENT
Start: 2023-11-12 | End: 2023-11-12

## 2023-11-12 RX ORDER — METOCLOPRAMIDE HYDROCHLORIDE 5 MG/ML
10 INJECTION INTRAMUSCULAR; INTRAVENOUS ONCE
Status: COMPLETED | OUTPATIENT
Start: 2023-11-13 | End: 2023-11-12

## 2023-11-12 RX ORDER — MIDAZOLAM HYDROCHLORIDE 1 MG/ML
INJECTION INTRAMUSCULAR; INTRAVENOUS
Status: DISPENSED
Start: 2023-11-12 | End: 2023-11-13

## 2023-11-12 RX ORDER — ERYTHROMYCIN 5 MG/G
1 OINTMENT OPHTHALMIC EVERY 6 HOURS
COMMUNITY

## 2023-11-12 RX ORDER — AMIODARONE HYDROCHLORIDE 50 MG/ML
INJECTION, SOLUTION INTRAVENOUS
Status: COMPLETED | OUTPATIENT
Start: 2023-11-12 | End: 2023-11-12

## 2023-11-12 RX ORDER — MAGNESIUM SULFATE HEPTAHYDRATE 500 MG/ML
INJECTION, SOLUTION INTRAMUSCULAR; INTRAVENOUS
Status: COMPLETED | OUTPATIENT
Start: 2023-11-12 | End: 2023-11-12

## 2023-11-12 RX ORDER — DAPAGLIFLOZIN 10 MG/1
10 TABLET, FILM COATED ORAL DAILY
COMMUNITY
End: 2023-11-13

## 2023-11-12 RX ORDER — METOCLOPRAMIDE HYDROCHLORIDE 5 MG/ML
10 INJECTION INTRAMUSCULAR; INTRAVENOUS ONCE
Status: DISCONTINUED | OUTPATIENT
Start: 2023-11-13 | End: 2023-11-12

## 2023-11-12 RX ORDER — BENZONATATE 100 MG/1
100 CAPSULE ORAL 2 TIMES DAILY PRN
COMMUNITY
End: 2023-11-13

## 2023-11-12 RX ORDER — PREGABALIN 150 MG/1
150 CAPSULE ORAL 2 TIMES DAILY
COMMUNITY

## 2023-11-12 RX ORDER — METOPROLOL TARTRATE 1 MG/ML
INJECTION, SOLUTION INTRAVENOUS
Status: COMPLETED
Start: 2023-11-12 | End: 2023-11-12

## 2023-11-12 RX ORDER — METOCLOPRAMIDE HYDROCHLORIDE 5 MG/ML
10 INJECTION INTRAMUSCULAR; INTRAVENOUS ONCE
Status: DISCONTINUED | OUTPATIENT
Start: 2023-11-12 | End: 2023-11-12

## 2023-11-12 RX ORDER — SODIUM CHLORIDE 0.9 % (FLUSH) 0.9 %
10 SYRINGE (ML) INJECTION AS NEEDED
Status: DISCONTINUED | OUTPATIENT
Start: 2023-11-12 | End: 2023-11-21 | Stop reason: HOSPADM

## 2023-11-12 RX ORDER — BUSPIRONE HYDROCHLORIDE 10 MG/1
10 TABLET ORAL 2 TIMES DAILY
COMMUNITY

## 2023-11-12 RX ORDER — METOPROLOL TARTRATE 1 MG/ML
2.5 INJECTION, SOLUTION INTRAVENOUS ONCE
Status: COMPLETED | OUTPATIENT
Start: 2023-11-13 | End: 2023-11-12

## 2023-11-12 RX ORDER — DOXYCYCLINE HYCLATE 100 MG/1
100 CAPSULE ORAL 2 TIMES DAILY
COMMUNITY
Start: 2023-11-06 | End: 2023-11-21 | Stop reason: HOSPADM

## 2023-11-12 RX ORDER — AZELASTINE 1 MG/ML
2 SPRAY, METERED NASAL 2 TIMES DAILY
COMMUNITY
End: 2023-11-21 | Stop reason: HOSPADM

## 2023-11-12 RX ADMIN — MAGNESIUM SULFATE HEPTAHYDRATE 1 G: 500 INJECTION, SOLUTION INTRAMUSCULAR; INTRAVENOUS at 22:57

## 2023-11-12 RX ADMIN — METOCLOPRAMIDE 10 MG: 5 INJECTION, SOLUTION INTRAMUSCULAR; INTRAVENOUS at 23:49

## 2023-11-12 RX ADMIN — LIDOCAINE HYDROCHLORIDE 100 MG: 20 INJECTION, SOLUTION INTRAVENOUS at 22:58

## 2023-11-12 RX ADMIN — METOPROLOL TARTRATE 2.5 MG: 1 INJECTION, SOLUTION INTRAVENOUS at 23:58

## 2023-11-12 RX ADMIN — AMIODARONE HYDROCHLORIDE 1 MG/MIN: 1.8 INJECTION, SOLUTION INTRAVENOUS at 23:02

## 2023-11-12 RX ADMIN — LIDOCAINE HYDROCHLORIDE 100 MG: 20 INJECTION, SOLUTION INTRAVENOUS at 23:09

## 2023-11-12 RX ADMIN — AMIODARONE HYDROCHLORIDE 150 MG: 50 INJECTION, SOLUTION INTRAVENOUS at 22:55

## 2023-11-12 NOTE — LETTER
[facility] DALILA CASE MAN  [address] [facility] [place] [address]  [phone]        November 21, 2023      Patient: Yumiko Purdy  YOB: 1965  Date of Visit: 11/12/2023                 Pt is not discharge today however, would like to see if pt's wheelchair cushion will be covered under her insurance please.  I can be reached at [phone] until 1400.  Thank you!!!        Merly Ojeda RN

## 2023-11-13 ENCOUNTER — APPOINTMENT (OUTPATIENT)
Dept: CT IMAGING | Facility: HOSPITAL | Age: 58
End: 2023-11-13
Payer: COMMERCIAL

## 2023-11-13 ENCOUNTER — APPOINTMENT (OUTPATIENT)
Dept: CARDIOLOGY | Facility: HOSPITAL | Age: 58
End: 2023-11-13
Payer: COMMERCIAL

## 2023-11-13 PROBLEM — I47.20 VENTRICULAR TACHYCARDIA: Status: ACTIVE | Noted: 2023-11-13

## 2023-11-13 LAB
ABO GROUP BLD: NORMAL
ALBUMIN SERPL-MCNC: 3.2 G/DL (ref 3.5–5.2)
ALBUMIN SERPL-MCNC: 3.6 G/DL (ref 3.5–5.2)
ALBUMIN/GLOB SERPL: 1.2 G/DL
ALBUMIN/GLOB SERPL: 1.2 G/DL
ALP SERPL-CCNC: 103 U/L (ref 39–117)
ALP SERPL-CCNC: 89 U/L (ref 39–117)
ALT SERPL W P-5'-P-CCNC: 18 U/L (ref 1–33)
ALT SERPL W P-5'-P-CCNC: 18 U/L (ref 1–33)
AMMONIA BLD-SCNC: 15 UMOL/L (ref 11–51)
AMPHET+METHAMPHET UR QL: NEGATIVE
AMPHETAMINES UR QL: NEGATIVE
ANION GAP SERPL CALCULATED.3IONS-SCNC: 10.3 MMOL/L (ref 5–15)
ANION GAP SERPL CALCULATED.3IONS-SCNC: 12.1 MMOL/L (ref 5–15)
ANION GAP SERPL CALCULATED.3IONS-SCNC: 7.3 MMOL/L (ref 5–15)
ANISOCYTOSIS BLD QL: ABNORMAL
ANISOCYTOSIS BLD QL: ABNORMAL
APTT PPP: 36.2 SECONDS (ref 26.5–34.5)
AST SERPL-CCNC: 20 U/L (ref 1–32)
AST SERPL-CCNC: 26 U/L (ref 1–32)
ATMOSPHERIC PRESS: 734 MMHG
BACTERIA UR QL AUTO: ABNORMAL /HPF
BARBITURATES UR QL SCN: NEGATIVE
BASE EXCESS BLDV CALC-SCNC: 5.8 MMOL/L (ref 0–2)
BASOPHILS # BLD MANUAL: 0.13 10*3/MM3 (ref 0–0.2)
BASOPHILS NFR BLD MANUAL: 1 % (ref 0–1.5)
BDY SITE: ABNORMAL
BENZODIAZ UR QL SCN: NEGATIVE
BH CV ECHO MEAS - ACS: 2 CM
BH CV ECHO MEAS - AO MAX PG: 10.4 MMHG
BH CV ECHO MEAS - AO MEAN PG: 5 MMHG
BH CV ECHO MEAS - AO ROOT DIAM: 3.4 CM
BH CV ECHO MEAS - AO V2 MAX: 161 CM/SEC
BH CV ECHO MEAS - AO V2 VTI: 28.7 CM
BH CV ECHO MEAS - AVA(I,D): 2.6 CM2
BH CV ECHO MEAS - EDV(CUBED): 107.2 ML
BH CV ECHO MEAS - EDV(MOD-SP4): 112 ML
BH CV ECHO MEAS - EF(MOD-SP4): 48 %
BH CV ECHO MEAS - ESV(CUBED): 42.9 ML
BH CV ECHO MEAS - ESV(MOD-SP4): 58.2 ML
BH CV ECHO MEAS - FS: 26.3 %
BH CV ECHO MEAS - IVS/LVPW: 1.04 CM
BH CV ECHO MEAS - IVSD: 1.25 CM
BH CV ECHO MEAS - LA DIMENSION: 4.1 CM
BH CV ECHO MEAS - LAT PEAK E' VEL: 13.1 CM/SEC
BH CV ECHO MEAS - LV DIASTOLIC VOL/BSA (35-75): 51.5 CM2
BH CV ECHO MEAS - LV MASS(C)D: 222 GRAMS
BH CV ECHO MEAS - LV MAX PG: 5.1 MMHG
BH CV ECHO MEAS - LV MEAN PG: 2 MMHG
BH CV ECHO MEAS - LV SYSTOLIC VOL/BSA (12-30): 26.8 CM2
BH CV ECHO MEAS - LV V1 MAX: 113 CM/SEC
BH CV ECHO MEAS - LV V1 VTI: 19.4 CM
BH CV ECHO MEAS - LVIDD: 4.8 CM
BH CV ECHO MEAS - LVIDS: 3.5 CM
BH CV ECHO MEAS - LVOT AREA: 3.8 CM2
BH CV ECHO MEAS - LVOT DIAM: 2.2 CM
BH CV ECHO MEAS - LVPWD: 1.2 CM
BH CV ECHO MEAS - MED PEAK E' VEL: 6 CM/SEC
BH CV ECHO MEAS - MV DEC SLOPE: 722 CM/SEC2
BH CV ECHO MEAS - MV DEC TIME: 0.18 SEC
BH CV ECHO MEAS - MV E MAX VEL: 127 CM/SEC
BH CV ECHO MEAS - PA ACC TIME: 0.13 SEC
BH CV ECHO MEAS - SI(MOD-SP4): 24.7 ML/M2
BH CV ECHO MEAS - SV(LVOT): 73.7 ML
BH CV ECHO MEAS - SV(MOD-SP4): 53.8 ML
BH CV ECHO MEAS - TAPSE (>1.6): 1.89 CM
BH CV ECHO MEASUREMENTS AVERAGE E/E' RATIO: 13.3
BILIRUB SERPL-MCNC: 2.4 MG/DL (ref 0–1.2)
BILIRUB SERPL-MCNC: 2.5 MG/DL (ref 0–1.2)
BILIRUB UR QL STRIP: NEGATIVE
BLD GP AB SCN SERPL QL: POSITIVE
BUN SERPL-MCNC: 22 MG/DL (ref 6–20)
BUN SERPL-MCNC: 29 MG/DL (ref 6–20)
BUN SERPL-MCNC: 32 MG/DL (ref 6–20)
BUN/CREAT SERPL: 20.2 (ref 7–25)
BUN/CREAT SERPL: 24.2 (ref 7–25)
BUN/CREAT SERPL: 26.4 (ref 7–25)
BUPRENORPHINE SERPL-MCNC: NEGATIVE NG/ML
CALCIUM SPEC-SCNC: 8.6 MG/DL (ref 8.6–10.5)
CALCIUM SPEC-SCNC: 8.8 MG/DL (ref 8.6–10.5)
CALCIUM SPEC-SCNC: 8.8 MG/DL (ref 8.6–10.5)
CANNABINOIDS SERPL QL: NEGATIVE
CHLORIDE SERPL-SCNC: 101 MMOL/L (ref 98–107)
CHLORIDE SERPL-SCNC: 103 MMOL/L (ref 98–107)
CHLORIDE SERPL-SCNC: 98 MMOL/L (ref 98–107)
CLARITY UR: CLEAR
CO2 BLDA-SCNC: 32.4 MMOL/L (ref 22–33)
CO2 SERPL-SCNC: 23.9 MMOL/L (ref 22–29)
CO2 SERPL-SCNC: 26.7 MMOL/L (ref 22–29)
CO2 SERPL-SCNC: 28.7 MMOL/L (ref 22–29)
COCAINE UR QL: NEGATIVE
COHGB MFR BLD: 3.5 % (ref 0–5)
COLD AUTO ANTIBODY: NORMAL
COLD SCREEN: POSITIVE
COLOR UR: YELLOW
CREAT SERPL-MCNC: 1.09 MG/DL (ref 0.57–1)
CREAT SERPL-MCNC: 1.1 MG/DL (ref 0.57–1)
CREAT SERPL-MCNC: 1.32 MG/DL (ref 0.57–1)
D-LACTATE SERPL-SCNC: 2 MMOL/L (ref 0.5–2)
D-LACTATE SERPL-SCNC: 2.5 MMOL/L (ref 0.5–2)
D-LACTATE SERPL-SCNC: 2.5 MMOL/L (ref 0.5–2)
D-LACTATE SERPL-SCNC: 2.8 MMOL/L (ref 0.5–2)
D-LACTATE SERPL-SCNC: 3.7 MMOL/L (ref 0.5–2)
DACRYOCYTES BLD QL SMEAR: ABNORMAL
DEPRECATED RDW RBC AUTO: 100.8 FL (ref 37–54)
DEPRECATED RDW RBC AUTO: 101.3 FL (ref 37–54)
EGFRCR SERPLBLD CKD-EPI 2021: 47.5 ML/MIN/1.73
EGFRCR SERPLBLD CKD-EPI 2021: 59.1 ML/MIN/1.73
EGFRCR SERPLBLD CKD-EPI 2021: 59.7 ML/MIN/1.73
EOSINOPHIL # BLD MANUAL: 0.26 10*3/MM3 (ref 0–0.4)
EOSINOPHIL NFR BLD MANUAL: 2 % (ref 0.3–6.2)
ERYTHROCYTE [DISTWIDTH] IN BLOOD BY AUTOMATED COUNT: 26.7 % (ref 12.3–15.4)
ERYTHROCYTE [DISTWIDTH] IN BLOOD BY AUTOMATED COUNT: 26.9 % (ref 12.3–15.4)
FENTANYL UR-MCNC: NEGATIVE NG/ML
GAS FLOW AIRWAY: 1.5 LPM
GEN 5 2HR TROPONIN T REFLEX: 84 NG/L
GLOBULIN UR ELPH-MCNC: 2.7 GM/DL
GLOBULIN UR ELPH-MCNC: 2.9 GM/DL
GLUCOSE BLDC GLUCOMTR-MCNC: 115 MG/DL (ref 70–130)
GLUCOSE BLDC GLUCOMTR-MCNC: 222 MG/DL (ref 70–130)
GLUCOSE BLDC GLUCOMTR-MCNC: 222 MG/DL (ref 70–130)
GLUCOSE BLDC GLUCOMTR-MCNC: 268 MG/DL (ref 70–130)
GLUCOSE SERPL-MCNC: 105 MG/DL (ref 65–99)
GLUCOSE SERPL-MCNC: 221 MG/DL (ref 65–99)
GLUCOSE SERPL-MCNC: 256 MG/DL (ref 65–99)
GLUCOSE UR STRIP-MCNC: ABNORMAL MG/DL
HBA1C MFR BLD: 7.3 % (ref 4.8–5.6)
HCO3 BLDV-SCNC: 31 MMOL/L (ref 22–28)
HCT VFR BLD AUTO: 26.2 % (ref 34–46.6)
HCT VFR BLD AUTO: 29.6 % (ref 34–46.6)
HGB BLD-MCNC: 7.9 G/DL (ref 12–15.9)
HGB BLD-MCNC: 8.9 G/DL (ref 12–15.9)
HGB BLDA-MCNC: 8.8 G/DL (ref 13.5–17.5)
HGB UR QL STRIP.AUTO: ABNORMAL
HOLD SPECIMEN: NORMAL
HOLD SPECIMEN: NORMAL
HYALINE CASTS UR QL AUTO: ABNORMAL /LPF
HYPOCHROMIA BLD QL: ABNORMAL
HYPOCHROMIA BLD QL: ABNORMAL
INHALED O2 CONCENTRATION: 26 %
KETONES UR QL STRIP: NEGATIVE
LARGE PLATELETS: ABNORMAL
LEFT ATRIUM VOLUME INDEX: 36.9 ML/M2
LEUKOCYTE ESTERASE UR QL STRIP.AUTO: ABNORMAL
LISS SCREEN: NEGATIVE
LYMPHOCYTES # BLD MANUAL: 2.38 10*3/MM3 (ref 0.7–3.1)
LYMPHOCYTES # BLD MANUAL: 2.88 10*3/MM3 (ref 0.7–3.1)
LYMPHOCYTES NFR BLD MANUAL: 2 % (ref 5–12)
LYMPHOCYTES NFR BLD MANUAL: 6 % (ref 5–12)
Lab: ABNORMAL
MACROCYTES BLD QL SMEAR: ABNORMAL
MACROCYTES BLD QL SMEAR: ABNORMAL
MAGNESIUM SERPL-MCNC: 2.4 MG/DL (ref 1.6–2.6)
MAGNESIUM SERPL-MCNC: 2.5 MG/DL (ref 1.6–2.6)
MCH RBC QN AUTO: 34.4 PG (ref 26.6–33)
MCH RBC QN AUTO: 34.6 PG (ref 26.6–33)
MCHC RBC AUTO-ENTMCNC: 30.1 G/DL (ref 31.5–35.7)
MCHC RBC AUTO-ENTMCNC: 30.2 G/DL (ref 31.5–35.7)
MCV RBC AUTO: 114.3 FL (ref 79–97)
MCV RBC AUTO: 114.9 FL (ref 79–97)
METAMYELOCYTES NFR BLD MANUAL: 2 % (ref 0–0)
METAMYELOCYTES NFR BLD MANUAL: 2 % (ref 0–0)
METHADONE UR QL SCN: NEGATIVE
METHGB BLD QL: 0.4 % (ref 0–3)
MODALITY: ABNORMAL
MONOCYTES # BLD: 0.22 10*3/MM3 (ref 0.1–0.9)
MONOCYTES # BLD: 0.79 10*3/MM3 (ref 0.1–0.9)
MYELOCYTES NFR BLD MANUAL: 1 % (ref 0–0)
NEUTROPHILS # BLD AUTO: 7.9 10*3/MM3 (ref 1.7–7)
NEUTROPHILS # BLD AUTO: 8.78 10*3/MM3 (ref 1.7–7)
NEUTROPHILS NFR BLD MANUAL: 62 % (ref 42.7–76)
NEUTROPHILS NFR BLD MANUAL: 71 % (ref 42.7–76)
NEUTS BAND NFR BLD MANUAL: 2 % (ref 0–5)
NEUTS BAND NFR BLD MANUAL: 5 % (ref 0–5)
NITRITE UR QL STRIP: NEGATIVE
NRBC SPEC MANUAL: 2 /100 WBC (ref 0–0.2)
NRBC SPEC MANUAL: 5 /100 WBC (ref 0–0.2)
OPIATES UR QL: NEGATIVE
OXYCODONE UR QL SCN: NEGATIVE
OXYHGB MFR BLDV: 46.5 % (ref 45–75)
PCO2 BLDV: 47.3 MM HG (ref 41–51)
PCP UR QL SCN: NEGATIVE
PH BLDV: 7.42 PH UNITS (ref 7.32–7.42)
PH UR STRIP.AUTO: 5.5 [PH] (ref 5–8)
PHOSPHATE SERPL-MCNC: 3.5 MG/DL (ref 2.5–4.5)
PLAT MORPH BLD: NORMAL
PLATELET # BLD AUTO: 118 10*3/MM3 (ref 140–450)
PLATELET # BLD AUTO: 99 10*3/MM3 (ref 140–450)
PMV BLD AUTO: 10.7 FL (ref 6–12)
PMV BLD AUTO: 10.7 FL (ref 6–12)
PO2 BLDV: 28.3 MM HG (ref 27–53)
POLYCHROMASIA BLD QL SMEAR: ABNORMAL
POLYCHROMASIA BLD QL SMEAR: ABNORMAL
POTASSIUM SERPL-SCNC: 3.4 MMOL/L (ref 3.5–5.2)
POTASSIUM SERPL-SCNC: 3.4 MMOL/L (ref 3.5–5.2)
POTASSIUM SERPL-SCNC: 3.8 MMOL/L (ref 3.5–5.2)
PROT SERPL-MCNC: 5.9 G/DL (ref 6–8.5)
PROT SERPL-MCNC: 6.5 G/DL (ref 6–8.5)
PROT UR QL STRIP: NEGATIVE
QT INTERVAL: 268 MS
QT INTERVAL: 402 MS
QT INTERVAL: 430 MS
QT INTERVAL: 444 MS
QTC INTERVAL: 430 MS
QTC INTERVAL: 531 MS
QTC INTERVAL: 534 MS
QTC INTERVAL: 538 MS
RBC # BLD AUTO: 2.28 10*6/MM3 (ref 3.77–5.28)
RBC # BLD AUTO: 2.59 10*6/MM3 (ref 3.77–5.28)
RBC # UR STRIP: ABNORMAL /HPF
REF LAB TEST METHOD: ABNORMAL
RH BLD: POSITIVE
SAO2 % BLDCOV: 48.4 % (ref 45–75)
SCAN SLIDE: NORMAL
SMALL PLATELETS BLD QL SMEAR: ABNORMAL
SODIUM SERPL-SCNC: 135 MMOL/L (ref 136–145)
SODIUM SERPL-SCNC: 137 MMOL/L (ref 136–145)
SODIUM SERPL-SCNC: 139 MMOL/L (ref 136–145)
SP GR UR STRIP: 1.02 (ref 1–1.03)
SQUAMOUS #/AREA URNS HPF: ABNORMAL /HPF
STOMATOCYTES BLD QL SMEAR: ABNORMAL
STOMATOCYTES BLD QL SMEAR: ABNORMAL
T&S EXPIRATION DATE: NORMAL
T4 FREE SERPL-MCNC: 1.78 NG/DL (ref 0.93–1.7)
TRANS CELLS #/AREA URNS HPF: ABNORMAL /HPF
TRICYCLICS UR QL SCN: NEGATIVE
TROPONIN T DELTA: -9 NG/L
TROPONIN T SERPL HS-MCNC: 84 NG/L
UFH PPP CHRO-ACNC: 0.45 IU/ML (ref 0.3–0.7)
UFH PPP CHRO-ACNC: 0.69 IU/ML (ref 0.3–0.7)
UFH PPP CHRO-ACNC: >1.1 IU/ML (ref 0.3–0.7)
UROBILINOGEN UR QL STRIP: ABNORMAL
VARIANT LYMPHS NFR BLD MANUAL: 22 % (ref 19.6–45.3)
VARIANT LYMPHS NFR BLD MANUAL: 22 % (ref 19.6–45.3)
VENTILATOR MODE: ABNORMAL
WBC # UR STRIP: ABNORMAL /HPF
WBC NRBC COR # BLD: 10.82 10*3/MM3 (ref 3.4–10.8)
WBC NRBC COR # BLD: 13.11 10*3/MM3 (ref 3.4–10.8)
WHOLE BLOOD HOLD COAG: NORMAL
WHOLE BLOOD HOLD SPECIMEN: NORMAL
YEAST URNS QL MICRO: ABNORMAL /HPF

## 2023-11-13 PROCEDURE — 84100 ASSAY OF PHOSPHORUS: CPT | Performed by: INTERNAL MEDICINE

## 2023-11-13 PROCEDURE — 84484 ASSAY OF TROPONIN QUANT: CPT | Performed by: INTERNAL MEDICINE

## 2023-11-13 PROCEDURE — 99222 1ST HOSP IP/OBS MODERATE 55: CPT | Performed by: SPECIALIST

## 2023-11-13 PROCEDURE — 94799 UNLISTED PULMONARY SVC/PX: CPT

## 2023-11-13 PROCEDURE — 85520 HEPARIN ASSAY: CPT

## 2023-11-13 PROCEDURE — 82820 HEMOGLOBIN-OXYGEN AFFINITY: CPT

## 2023-11-13 PROCEDURE — 84439 ASSAY OF FREE THYROXINE: CPT | Performed by: HOSPITALIST

## 2023-11-13 PROCEDURE — 93005 ELECTROCARDIOGRAM TRACING: CPT | Performed by: HOSPITALIST

## 2023-11-13 PROCEDURE — 83605 ASSAY OF LACTIC ACID: CPT | Performed by: STUDENT IN AN ORGANIZED HEALTH CARE EDUCATION/TRAINING PROGRAM

## 2023-11-13 PROCEDURE — 99223 1ST HOSP IP/OBS HIGH 75: CPT | Performed by: HOSPITALIST

## 2023-11-13 PROCEDURE — 93005 ELECTROCARDIOGRAM TRACING: CPT | Performed by: STUDENT IN AN ORGANIZED HEALTH CARE EDUCATION/TRAINING PROGRAM

## 2023-11-13 PROCEDURE — 87040 BLOOD CULTURE FOR BACTERIA: CPT | Performed by: STUDENT IN AN ORGANIZED HEALTH CARE EDUCATION/TRAINING PROGRAM

## 2023-11-13 PROCEDURE — 83735 ASSAY OF MAGNESIUM: CPT | Performed by: INTERNAL MEDICINE

## 2023-11-13 PROCEDURE — 25010000002 EPINEPHRINE 1 MG/10ML SOLUTION PREFILLED SYRINGE

## 2023-11-13 PROCEDURE — 82948 REAGENT STRIP/BLOOD GLUCOSE: CPT

## 2023-11-13 PROCEDURE — 85025 COMPLETE CBC W/AUTO DIFF WBC: CPT | Performed by: HOSPITALIST

## 2023-11-13 PROCEDURE — 83036 HEMOGLOBIN GLYCOSYLATED A1C: CPT | Performed by: INTERNAL MEDICINE

## 2023-11-13 PROCEDURE — 25010000002 HEPARIN (PORCINE) 25000-0.45 UT/250ML-% SOLUTION

## 2023-11-13 PROCEDURE — 85007 BL SMEAR W/DIFF WBC COUNT: CPT | Performed by: HOSPITALIST

## 2023-11-13 PROCEDURE — 93306 TTE W/DOPPLER COMPLETE: CPT | Performed by: SPECIALIST

## 2023-11-13 PROCEDURE — 63710000001 INSULIN GLARGINE PER 5 UNITS: Performed by: INTERNAL MEDICINE

## 2023-11-13 PROCEDURE — 85730 THROMBOPLASTIN TIME PARTIAL: CPT | Performed by: HOSPITALIST

## 2023-11-13 PROCEDURE — 81001 URINALYSIS AUTO W/SCOPE: CPT | Performed by: PHYSICIAN ASSISTANT

## 2023-11-13 PROCEDURE — 71250 CT THORAX DX C-: CPT

## 2023-11-13 PROCEDURE — 80307 DRUG TEST PRSMV CHEM ANLYZR: CPT | Performed by: PHYSICIAN ASSISTANT

## 2023-11-13 PROCEDURE — 80053 COMPREHEN METABOLIC PANEL: CPT | Performed by: HOSPITALIST

## 2023-11-13 PROCEDURE — 85520 HEPARIN ASSAY: CPT | Performed by: HOSPITALIST

## 2023-11-13 PROCEDURE — 63710000001 INSULIN REGULAR HUMAN PER 5 UNITS: Performed by: HOSPITALIST

## 2023-11-13 PROCEDURE — 25010000002 CEFEPIME PER 500 MG: Performed by: STUDENT IN AN ORGANIZED HEALTH CARE EDUCATION/TRAINING PROGRAM

## 2023-11-13 PROCEDURE — 84484 ASSAY OF TROPONIN QUANT: CPT | Performed by: PHYSICIAN ASSISTANT

## 2023-11-13 PROCEDURE — 83605 ASSAY OF LACTIC ACID: CPT | Performed by: INTERNAL MEDICINE

## 2023-11-13 PROCEDURE — 25010000002 MORPHINE PER 10 MG: Performed by: EMERGENCY MEDICINE

## 2023-11-13 PROCEDURE — 25010000002 MORPHINE PER 10 MG: Performed by: INTERNAL MEDICINE

## 2023-11-13 PROCEDURE — 93306 TTE W/DOPPLER COMPLETE: CPT

## 2023-11-13 PROCEDURE — 82805 BLOOD GASES W/O2 SATURATION: CPT

## 2023-11-13 RX ORDER — BENZONATATE 100 MG/1
100 CAPSULE ORAL 2 TIMES DAILY PRN
COMMUNITY

## 2023-11-13 RX ORDER — FUROSEMIDE 10 MG/ML
80 INJECTION INTRAMUSCULAR; INTRAVENOUS ONCE
Status: DISCONTINUED | OUTPATIENT
Start: 2023-11-13 | End: 2023-11-14

## 2023-11-13 RX ORDER — FERROUS SULFATE 325(65) MG
325 TABLET ORAL 2 TIMES DAILY
Status: DISCONTINUED | OUTPATIENT
Start: 2023-11-13 | End: 2023-11-21 | Stop reason: HOSPADM

## 2023-11-13 RX ORDER — PREGABALIN 75 MG/1
150 CAPSULE ORAL 2 TIMES DAILY
Status: CANCELLED | OUTPATIENT
Start: 2023-11-13

## 2023-11-13 RX ORDER — NITROGLYCERIN 0.4 MG/1
0.4 TABLET SUBLINGUAL
COMMUNITY

## 2023-11-13 RX ORDER — BUMETANIDE 1 MG/1
4 TABLET ORAL
Status: CANCELLED | OUTPATIENT
Start: 2023-11-13

## 2023-11-13 RX ORDER — SODIUM CHLORIDE 0.9 % (FLUSH) 0.9 %
10 SYRINGE (ML) INJECTION EVERY 12 HOURS SCHEDULED
Status: DISCONTINUED | OUTPATIENT
Start: 2023-11-14 | End: 2023-11-21 | Stop reason: HOSPADM

## 2023-11-13 RX ORDER — BUMETANIDE 1 MG/1
2 TABLET ORAL
Status: CANCELLED | OUTPATIENT
Start: 2023-11-19

## 2023-11-13 RX ORDER — BENZONATATE 100 MG/1
100 CAPSULE ORAL 2 TIMES DAILY PRN
Status: DISCONTINUED | OUTPATIENT
Start: 2023-11-13 | End: 2023-11-21 | Stop reason: HOSPADM

## 2023-11-13 RX ORDER — BISACODYL 10 MG
10 SUPPOSITORY, RECTAL RECTAL DAILY PRN
Status: DISCONTINUED | OUTPATIENT
Start: 2023-11-13 | End: 2023-11-21 | Stop reason: HOSPADM

## 2023-11-13 RX ORDER — AMOXICILLIN 250 MG
2 CAPSULE ORAL 2 TIMES DAILY
Status: DISCONTINUED | OUTPATIENT
Start: 2023-11-13 | End: 2023-11-21 | Stop reason: HOSPADM

## 2023-11-13 RX ORDER — BENZONATATE 100 MG/1
100 CAPSULE ORAL 2 TIMES DAILY PRN
Status: CANCELLED | OUTPATIENT
Start: 2023-11-13

## 2023-11-13 RX ORDER — SODIUM CHLORIDE 0.9 % (FLUSH) 0.9 %
10 SYRINGE (ML) INJECTION EVERY 12 HOURS SCHEDULED
Status: DISCONTINUED | OUTPATIENT
Start: 2023-11-13 | End: 2023-11-21 | Stop reason: HOSPADM

## 2023-11-13 RX ORDER — ACETAMINOPHEN 650 MG/1
650 SUPPOSITORY RECTAL EVERY 6 HOURS PRN
Status: DISCONTINUED | OUTPATIENT
Start: 2023-11-13 | End: 2023-11-21 | Stop reason: HOSPADM

## 2023-11-13 RX ORDER — ACETAMINOPHEN 500 MG
500 TABLET ORAL 2 TIMES DAILY PRN
Status: DISCONTINUED | OUTPATIENT
Start: 2023-11-13 | End: 2023-11-21 | Stop reason: HOSPADM

## 2023-11-13 RX ORDER — DOXYCYCLINE 100 MG/1
100 CAPSULE ORAL EVERY 12 HOURS SCHEDULED
Status: CANCELLED | OUTPATIENT
Start: 2023-11-13 | End: 2023-11-14

## 2023-11-13 RX ORDER — AMIODARONE HYDROCHLORIDE 200 MG/1
200 TABLET ORAL DAILY
Status: CANCELLED | OUTPATIENT
Start: 2023-11-13

## 2023-11-13 RX ORDER — SODIUM CHLORIDE 0.9 % (FLUSH) 0.9 %
10 SYRINGE (ML) INJECTION AS NEEDED
Status: DISCONTINUED | OUTPATIENT
Start: 2023-11-13 | End: 2023-11-21 | Stop reason: HOSPADM

## 2023-11-13 RX ORDER — PANTOPRAZOLE SODIUM 40 MG/1
40 TABLET, DELAYED RELEASE ORAL
Status: CANCELLED | OUTPATIENT
Start: 2023-11-13

## 2023-11-13 RX ORDER — BUSPIRONE HYDROCHLORIDE 10 MG/1
10 TABLET ORAL 2 TIMES DAILY
Status: DISCONTINUED | OUTPATIENT
Start: 2023-11-13 | End: 2023-11-21 | Stop reason: HOSPADM

## 2023-11-13 RX ORDER — LEVOTHYROXINE SODIUM 0.05 MG/1
50 TABLET ORAL DAILY
Status: CANCELLED | OUTPATIENT
Start: 2023-11-13

## 2023-11-13 RX ORDER — ACETAMINOPHEN 500 MG
500 TABLET ORAL 2 TIMES DAILY PRN
Status: CANCELLED | OUTPATIENT
Start: 2023-11-13

## 2023-11-13 RX ORDER — POLYETHYLENE GLYCOL 3350 17 G/17G
17 POWDER, FOR SOLUTION ORAL DAILY PRN
Status: DISCONTINUED | OUTPATIENT
Start: 2023-11-13 | End: 2023-11-21 | Stop reason: HOSPADM

## 2023-11-13 RX ORDER — OMEPRAZOLE 20 MG/1
20 CAPSULE, DELAYED RELEASE ORAL DAILY
COMMUNITY

## 2023-11-13 RX ORDER — LEVOTHYROXINE SODIUM 0.05 MG/1
50 TABLET ORAL
Status: DISCONTINUED | OUTPATIENT
Start: 2023-11-14 | End: 2023-11-21 | Stop reason: HOSPADM

## 2023-11-13 RX ORDER — FLUTICASONE PROPIONATE 50 MCG
2 SPRAY, SUSPENSION (ML) NASAL DAILY
Status: CANCELLED | OUTPATIENT
Start: 2023-11-13

## 2023-11-13 RX ORDER — NICOTINE POLACRILEX 4 MG
15 LOZENGE BUCCAL
Status: DISCONTINUED | OUTPATIENT
Start: 2023-11-13 | End: 2023-11-21 | Stop reason: HOSPADM

## 2023-11-13 RX ORDER — BUSPIRONE HYDROCHLORIDE 10 MG/1
10 TABLET ORAL 2 TIMES DAILY
Status: CANCELLED | OUTPATIENT
Start: 2023-11-13

## 2023-11-13 RX ORDER — LIDOCAINE 4 G/G
2 PATCH TOPICAL
Status: DISCONTINUED | OUTPATIENT
Start: 2023-11-14 | End: 2023-11-21 | Stop reason: HOSPADM

## 2023-11-13 RX ORDER — MORPHINE SULFATE 2 MG/ML
2 INJECTION, SOLUTION INTRAMUSCULAR; INTRAVENOUS ONCE
Status: COMPLETED | OUTPATIENT
Start: 2023-11-13 | End: 2023-11-13

## 2023-11-13 RX ORDER — FERROUS SULFATE 325(65) MG
325 TABLET ORAL 2 TIMES DAILY
Status: CANCELLED | OUTPATIENT
Start: 2023-11-13

## 2023-11-13 RX ORDER — BISACODYL 5 MG/1
5 TABLET, DELAYED RELEASE ORAL DAILY PRN
Status: DISCONTINUED | OUTPATIENT
Start: 2023-11-13 | End: 2023-11-21 | Stop reason: HOSPADM

## 2023-11-13 RX ORDER — DEXTROSE MONOHYDRATE 25 G/50ML
25 INJECTION, SOLUTION INTRAVENOUS
Status: DISCONTINUED | OUTPATIENT
Start: 2023-11-13 | End: 2023-11-21 | Stop reason: HOSPADM

## 2023-11-13 RX ORDER — NITROGLYCERIN 0.4 MG/1
0.4 TABLET SUBLINGUAL
Status: DISCONTINUED | OUTPATIENT
Start: 2023-11-13 | End: 2023-11-21 | Stop reason: HOSPADM

## 2023-11-13 RX ORDER — PREGABALIN 75 MG/1
150 CAPSULE ORAL 2 TIMES DAILY
Status: DISCONTINUED | OUTPATIENT
Start: 2023-11-13 | End: 2023-11-21 | Stop reason: HOSPADM

## 2023-11-13 RX ORDER — SODIUM CHLORIDE 9 MG/ML
40 INJECTION, SOLUTION INTRAVENOUS AS NEEDED
Status: DISCONTINUED | OUTPATIENT
Start: 2023-11-13 | End: 2023-11-21 | Stop reason: HOSPADM

## 2023-11-13 RX ORDER — HEPARIN SODIUM 10000 [USP'U]/100ML
11.4 INJECTION, SOLUTION INTRAVENOUS
Status: DISCONTINUED | OUTPATIENT
Start: 2023-11-13 | End: 2023-11-15

## 2023-11-13 RX ORDER — BUMETANIDE 2 MG/1
2 TABLET ORAL
COMMUNITY
End: 2023-11-21 | Stop reason: HOSPADM

## 2023-11-13 RX ORDER — ERYTHROMYCIN 5 MG/G
1 OINTMENT OPHTHALMIC EVERY 6 HOURS
Status: CANCELLED | OUTPATIENT
Start: 2023-11-13

## 2023-11-13 RX ORDER — BUMETANIDE 2 MG/1
4 TABLET ORAL SEE ADMIN INSTRUCTIONS
COMMUNITY
End: 2023-11-21 | Stop reason: HOSPADM

## 2023-11-13 RX ORDER — GLUCAGON 1 MG/ML
1 KIT INJECTION
Status: DISCONTINUED | OUTPATIENT
Start: 2023-11-13 | End: 2023-11-21 | Stop reason: HOSPADM

## 2023-11-13 RX ORDER — PANTOPRAZOLE SODIUM 40 MG/1
40 TABLET, DELAYED RELEASE ORAL
Status: DISCONTINUED | OUTPATIENT
Start: 2023-11-14 | End: 2023-11-21 | Stop reason: HOSPADM

## 2023-11-13 RX ORDER — BUMETANIDE 1 MG/1
4 TABLET ORAL SEE ADMIN INSTRUCTIONS
Status: CANCELLED | OUTPATIENT
Start: 2023-11-13

## 2023-11-13 RX ORDER — ERYTHROMYCIN 5 MG/G
1 OINTMENT OPHTHALMIC EVERY 6 HOURS
Status: DISCONTINUED | OUTPATIENT
Start: 2023-11-13 | End: 2023-11-21 | Stop reason: HOSPADM

## 2023-11-13 RX ORDER — FLUTICASONE PROPIONATE 50 MCG
2 SPRAY, SUSPENSION (ML) NASAL DAILY
Status: DISCONTINUED | OUTPATIENT
Start: 2023-11-14 | End: 2023-11-21 | Stop reason: HOSPADM

## 2023-11-13 RX ADMIN — Medication 10 ML: at 09:00

## 2023-11-13 RX ADMIN — HEPARIN SODIUM 7.4 UNITS/KG/HR: 10000 INJECTION, SOLUTION INTRAVENOUS at 16:10

## 2023-11-13 RX ADMIN — INSULIN HUMAN 8 UNITS: 100 INJECTION, SOLUTION PARENTERAL at 06:20

## 2023-11-13 RX ADMIN — Medication 10 ML: at 21:27

## 2023-11-13 RX ADMIN — MORPHINE SULFATE 2 MG: 2 INJECTION, SOLUTION INTRAMUSCULAR; INTRAVENOUS at 22:20

## 2023-11-13 RX ADMIN — INSULIN HUMAN 5 UNITS: 100 INJECTION, SOLUTION PARENTERAL at 18:05

## 2023-11-13 RX ADMIN — MORPHINE SULFATE 2 MG: 2 INJECTION, SOLUTION INTRAMUSCULAR; INTRAVENOUS at 05:15

## 2023-11-13 RX ADMIN — CEFEPIME 2000 MG: 2 INJECTION, POWDER, FOR SOLUTION INTRAVENOUS at 00:25

## 2023-11-13 RX ADMIN — INSULIN GLARGINE 20 UNITS: 100 INJECTION, SOLUTION SUBCUTANEOUS at 13:05

## 2023-11-13 RX ADMIN — INSULIN HUMAN 5 UNITS: 100 INJECTION, SOLUTION PARENTERAL at 12:12

## 2023-11-13 RX ADMIN — DOXYCYCLINE 100 MG: 100 INJECTION, POWDER, LYOPHILIZED, FOR SOLUTION INTRAVENOUS at 00:25

## 2023-11-13 NOTE — ED PROVIDER NOTES
"Subjective   History of Present Illness  57-year-old female presents the emergency room via EMS chief complaint of SVT.  Source of history is patient as well as EMS personnel.  EMS reports that they found the patient she had a rate over 206 of adenosine was given followed by 12.  Patient's rate did drop down into the 130s and they started her on 5:30 100 mL bolus of normal saline.  Patient does have past medical history of congestive heart failure in which she is treated at the heart failure clinic.  Past medical history of ablation performed in [place].  Past medical history of atrial fibrillation as well.  Patient states that she is not having any chest pain so to speak however she is complaining of left-sided neck pain.  Patient is alert and oriented.  ---Please note that patient has a extremely poor understanding of her chronic medical conditions, current medication regiment as well as dosing and frequency. An updated medication record needs to be obtained from pharmacy in order to establish a baseline medication regiment for patient at time of discharge--        Please note the following documentation below was taken directly from evaluation of the patient in the heart failure clinic as of August 2023:  provides extensive summary of patient's overall clinical presentation and current medical management recommendations:    \"Expand All Collapse All  [facility]  ABDIFATAH Galarza [address] OTERO [address] [place] [address]     Thank you for asking me to see Yumiko Purdy for congestive heart failure.     HPI:      This is a 57 y.o. female with known past medical history of:  · Chronic systolic & diastolic HF  º TTE from November 2022 with visually estimated ejection fraction of 30% ±5% and noted abnormal systolic strain pattern as well as grade 3 diastolic dysfunction as well as abnormal TAPSE with abnormal right ventricular " "function  º KHAI on 09/2020 with EF 21-25% with LV systolic function severely decreased and RV cavity mildly dilated with moderately reduced RV systolic function noted, moderate TVR, and aortic plaquing  º Right heart cath on 12/22/2022 with elevated left and right heart pressures with report not available  · ASCVD  º C 11/10/20 with preserved LV systolic, EF 50-55% with elevated LV end diastolic pressure consistent with diastolic dysfunction and right dominant system with 30-40% mid LAD lesion.   º LHC attempted on 12/2022 at [facility] with unsuccessful access due to small artery appearing occluded or near occluded radially on right  · Peripheral Arterial disease  · Atrial Flutter  · CKD stage IIIb  · Cirrhosis of the liver  · Stage III CKD  · Chronic hypoxemic respiratory failure  · Morbid Obesity  · Diffuse purpuric rash with prior w/u negative for vasculitis     Yumiko Purdy presents for today for HF clinic evaluation.  The patient is typically seen by Masha Davidson APRN.  Patient's primary cardiologist is Dr. Jad Meredith.       · Last known EF 21-25% by KHAI and 50-55% by LHC in 11/2020.    · Last known hospitalization and/or ED visit: Patient has not been hospitalized at this facility since 2020.  However she was hospitalized at [facility] in [facility] in December 2022 with heart failure admission with discharge summary reviewed standing admission with acute on chronic mixed systolic diastolic heart failure during which time she was on dobutamine drip and discharged with p.o. Bumex and Zaroxolyn with note of chronic atrial fibrillation.  She was on dobutamine drip for some time and nephrology did also assist in her diuretic adjustments recommending ultimately Bumex twice daily metolazone and spironolactone on discharge.  · Accompanied by: Son\"              Review of Systems   Constitutional: Negative.  Negative for fever.   HENT: Negative.     Respiratory: Negative.   "   Cardiovascular:  Positive for palpitations. Negative for chest pain.   Gastrointestinal: Negative.  Negative for abdominal pain.   Endocrine: Negative.    Genitourinary: Negative.  Negative for dysuria.   Musculoskeletal:  Positive for neck pain.   Skin: Negative.    Neurological: Negative.    Psychiatric/Behavioral: Negative.     All other systems reviewed and are negative.      Past Medical History:   Diagnosis Date    Anemia     CHF (congestive heart failure)     Chronic a-fib     stated by patient     Cirrhosis of liver     stated by patient     Diabetes mellitus        Allergies   Allergen Reactions    Phenergan [Promethazine]        Past Surgical History:   Procedure Laterality Date    APPENDECTOMY      CARDIAC CATHETERIZATION N/A 11/10/2020    Procedure: Left Heart Cath;  Surgeon: Charlee Ken MD;  Location: [facility] CATH INVASIVE LOCATION;  Service: Cardiovascular;  Laterality: N/A;    CHOLECYSTECTOMY      TOE AMPUTATION Right     stated by patient.        History reviewed. No pertinent family history.    Social History     Socioeconomic History    Marital status:    Tobacco Use    Smoking status: Never    Smokeless tobacco: Never   Vaping Use    Vaping Use: Never used   Substance and Sexual Activity    Alcohol use: Never    Drug use: Never    Sexual activity: Defer           Objective   Physical Exam  Vitals and nursing note reviewed.   Constitutional:       General: She is in acute distress.      Appearance: She is well-developed. She is obese. She is ill-appearing and toxic-appearing. She is not diaphoretic.   HENT:      Head: Normocephalic.      Right Ear: External ear normal.      Left Ear: External ear normal.      Nose: Nose normal.   Eyes:      Extraocular Movements: Extraocular movements intact.      Conjunctiva/sclera: Conjunctivae normal.   Neck:      Vascular: No JVD.      Trachea: No tracheal deviation.   Cardiovascular:      Rate and Rhythm: Tachycardia present. Rhythm irregular.       Heart sounds: Normal heart sounds. No murmur heard.  Pulmonary:      Effort: Pulmonary effort is normal. No respiratory distress.      Breath sounds: Normal breath sounds. No wheezing.   Abdominal:      Palpations: Abdomen is soft.      Tenderness: There is no abdominal tenderness.   Musculoskeletal:         General: No deformity. Normal range of motion.      Cervical back: Neck supple. Tenderness present.      Right lower leg: Edema present.      Left lower leg: Edema present.   Skin:     General: Skin is warm and dry.      Coloration: Skin is pale.      Findings: No erythema or rash.   Neurological:      Mental Status: She is alert and oriented to person, place, and time.      Cranial Nerves: No cranial nerve deficit.   Psychiatric:         Behavior: Behavior normal.         Thought Content: Thought content normal.         Critical Care    Performed by: Juanita Box DO  Authorized by: Juanita Box DO    Critical care provider statement:     Critical care time (minutes):  90    Critical care time was exclusive of:  Separately billable procedures and treating other patients and teaching time    Critical care was necessary to treat or prevent imminent or life-threatening deterioration of the following conditions:  Cardiac failure, circulatory failure, CNS failure or compromise, metabolic crisis, hepatic failure, respiratory failure, renal failure, sepsis, shock, endocrine crisis and dehydration    Critical care was time spent personally by me on the following activities:  Blood draw for specimens, evaluation of patient's response to treatment, examination of patient, obtaining history from patient or surrogate, interpretation of cardiac output measurements, ordering and performing treatments and interventions, ordering and review of laboratory studies, ordering and review of radiographic studies, pulse oximetry, re-evaluation of patient's condition, review of old charts, vascular access procedures and development  of treatment plan with patient or surrogate    I assumed direction of critical care for this patient from another provider in my specialty: no           Results for orders placed or performed during the hospital encounter of 11/12/23   Blood Culture - Blood, Arm, Right    Specimen: Arm, Right; Blood   Result Value Ref Range    Blood Culture No growth at 24 hours    Blood Culture - Blood, Hand, Right    Specimen: Hand, Right; Blood   Result Value Ref Range    Blood Culture No growth at 24 hours    Comprehensive Metabolic Panel    Specimen: Arm, Right; Blood   Result Value Ref Range    Glucose 424 (C) 65 - 99 mg/dL    BUN 36 (H) 6 - 20 mg/dL    Creatinine 1.46 (H) 0.57 - 1.00 mg/dL    Sodium 132 (L) 136 - 145 mmol/L    Potassium 3.5 3.5 - 5.2 mmol/L    Chloride 91 (L) 98 - 107 mmol/L    CO2 26.7 22.0 - 29.0 mmol/L    Calcium 8.9 8.6 - 10.5 mg/dL    Total Protein 6.7 6.0 - 8.5 g/dL    Albumin 3.7 3.5 - 5.2 g/dL    ALT (SGPT) 21 1 - 33 U/L    AST (SGOT) 21 1 - 32 U/L    Alkaline Phosphatase 127 (H) 39 - 117 U/L    Total Bilirubin 2.7 (H) 0.0 - 1.2 mg/dL    Globulin 3.0 gm/dL    A/G Ratio 1.2 g/dL    BUN/Creatinine Ratio 24.7 7.0 - 25.0    Anion Gap 14.3 5.0 - 15.0 mmol/L    eGFR 42.1 (L) >60.0 mL/min/1.73   Protime-INR    Specimen: Arm, Right; Blood   Result Value Ref Range    Protime 15.8 (H) 12.1 - 14.7 Seconds    INR 1.20 (H) 0.90 - 1.10   aPTT    Specimen: Arm, Right; Blood   Result Value Ref Range    PTT 37.5 (H) 26.5 - 34.5 seconds   Urinalysis With Microscopic If Indicated (No Culture) - Urine, Clean Catch    Specimen: Urine, Clean Catch   Result Value Ref Range    Color, UA Yellow Yellow, Straw    Appearance, UA Clear Clear    pH, UA 5.5 5.0 - 8.0    Specific Gravity, UA 1.018 1.005 - 1.030    Glucose, UA >=1000 mg/dL (3+) (A) Negative    Ketones, UA Negative Negative    Bilirubin, UA Negative Negative    Blood, UA Moderate (2+) (A) Negative    Protein, UA Negative Negative    Leuk Esterase, UA Trace (A)  Negative    Nitrite, UA Negative Negative    Urobilinogen, UA 0.2 E.U./dL 0.2 - 1.0 E.U./dL   Single High Sensitivity Troponin T    Specimen: Arm, Right; Blood   Result Value Ref Range    HS Troponin T 93 (C) <14 ng/L   BNP    Specimen: Arm, Right; Blood   Result Value Ref Range    proBNP 2,746.0 (H) 0.0 - 900.0 pg/mL   C-reactive Protein    Specimen: Arm, Right; Blood   Result Value Ref Range    C-Reactive Protein 0.88 (H) 0.00 - 0.50 mg/dL   Urine Drug Screen - Urine, Clean Catch    Specimen: Urine, Clean Catch   Result Value Ref Range    THC, Screen, Urine Negative Negative    Phencyclidine (PCP), Urine Negative Negative    Cocaine Screen, Urine Negative Negative    Methamphetamine, Ur Negative Negative    Opiate Screen Negative Negative    Amphetamine Screen, Urine Negative Negative    Benzodiazepine Screen, Urine Negative Negative    Tricyclic Antidepressants Screen Negative Negative    Methadone Screen, Urine Negative Negative    Barbiturates Screen, Urine Negative Negative    Oxycodone Screen, Urine Negative Negative    Buprenorphine, Screen, Urine Negative Negative   Ethanol    Specimen: Arm, Right; Blood   Result Value Ref Range    Ethanol <10 0 - 10 mg/dL    Ethanol % <0.010 %   CK    Specimen: Arm, Right; Blood   Result Value Ref Range    Creatine Kinase 41 20 - 180 U/L   Magnesium    Specimen: Arm, Right; Blood   Result Value Ref Range    Magnesium 2.1 1.6 - 2.6 mg/dL   TSH    Specimen: Arm, Right; Blood   Result Value Ref Range    TSH 4.680 (H) 0.270 - 4.200 uIU/mL   CBC Auto Differential    Specimen: Arm, Right; Blood   Result Value Ref Range    WBC 15.57 (H) 3.40 - 10.80 10*3/mm3    RBC 2.35 (L) 3.77 - 5.28 10*6/mm3    Hemoglobin 8.4 (L) 12.0 - 15.9 g/dL    Hematocrit 26.7 (L) 34.0 - 46.6 %    .6 (H) 79.0 - 97.0 fL    MCH 35.7 (H) 26.6 - 33.0 pg    MCHC 31.5 31.5 - 35.7 g/dL    RDW 27.3 (H) 12.3 - 15.4 %    RDW-SD 91.3 (H) 37.0 - 54.0 fl    MPV 10.2 6.0 - 12.0 fL    Platelets 142 140 - 450  10*3/mm3   Manual Differential    Specimen: Arm, Right; Blood   Result Value Ref Range    Neutrophil % 80.0 (H) 42.7 - 76.0 %    Lymphocyte % 13.0 (L) 19.6 - 45.3 %    Monocyte % 1.0 (L) 5.0 - 12.0 %    Bands %  2.0 0.0 - 5.0 %    Metamyelocyte % 1.0 (H) 0.0 - 0.0 %    Myelocyte % 3.0 (H) 0.0 - 0.0 %    Neutrophils Absolute 12.77 (H) 1.70 - 7.00 10*3/mm3    Lymphocytes Absolute 2.02 0.70 - 3.10 10*3/mm3    Monocytes Absolute 0.16 0.10 - 0.90 10*3/mm3    nRBC 3.0 (H) 0.0 - 0.2 /100 WBC    Anisocytosis Large/3+ None Seen    Hypochromia Slight/1+ None Seen    Macrocytes Slight/1+ None Seen    Poikilocytes Mod/2+ None Seen    Polychromasia Slight/1+ None Seen    Stomatocytes Mod/2+ None Seen    Platelet Morphology Normal Normal   Lactic Acid, Plasma    Specimen: Arm, Right; Blood   Result Value Ref Range    Lactate 3.7 (C) 0.5 - 2.0 mmol/L   High Sensitivity Troponin T 2Hr    Specimen: Hand, Right; Blood   Result Value Ref Range    HS Troponin T 84 (C) <14 ng/L    Troponin T Delta -9 (L) >=-4 - <+4 ng/L   Ammonia    Specimen: Arm, Right; Blood   Result Value Ref Range    Ammonia 15 11 - 51 umol/L   STAT Lactic Acid, Reflex    Specimen: Arm, Right; Blood   Result Value Ref Range    Lactate 2.5 (C) 0.5 - 2.0 mmol/L   Fentanyl, Urine - Urine, Clean Catch    Specimen: Urine, Clean Catch   Result Value Ref Range    Fentanyl, Urine Negative Negative   Urinalysis, Microscopic Only - Urine, Clean Catch    Specimen: Urine, Clean Catch   Result Value Ref Range    RBC, UA 6-10 (A) None Seen, 0-2 /HPF    WBC, UA 0-2 None Seen, 0-2 /HPF    Bacteria, UA Trace (A) None Seen /HPF    Squamous Epithelial Cells, UA 7-12 (A) None Seen, 0-2 /HPF    Transitional Epithelial Cells, UA 0-2 0 - 2 /HPF    Yeast, UA Moderate/2+ Budding Yeast None Seen /HPF    Hyaline Casts, UA None Seen None Seen /LPF    Methodology Manual Light Microscopy    STAT Lactic Acid, Reflex    Specimen: Arm, Right; Blood   Result Value Ref Range    Lactate 2.8 (C) 0.5 -  2.0 mmol/L   T4, Free    Specimen: Hand, Right; Blood   Result Value Ref Range    Free T4 1.78 (H) 0.93 - 1.70 ng/dL   Heparin Anti-Xa    Specimen: Hand, Right; Blood   Result Value Ref Range    Heparin Anti-Xa (UFH) >1.10 (C) 0.30 - 0.70 IU/ml   aPTT    Specimen: Hand, Right; Blood   Result Value Ref Range    PTT 36.2 (H) 26.5 - 34.5 seconds   CBC Auto Differential    Specimen: Hand, Right; Blood   Result Value Ref Range    WBC 13.11 (H) 3.40 - 10.80 10*3/mm3    RBC 2.59 (L) 3.77 - 5.28 10*6/mm3    Hemoglobin 8.9 (L) 12.0 - 15.9 g/dL    Hematocrit 29.6 (L) 34.0 - 46.6 %    .3 (H) 79.0 - 97.0 fL    MCH 34.4 (H) 26.6 - 33.0 pg    MCHC 30.1 (L) 31.5 - 35.7 g/dL    RDW 26.9 (H) 12.3 - 15.4 %    RDW-.3 (H) 37.0 - 54.0 fl    MPV 10.7 6.0 - 12.0 fL    Platelets 118 (L) 140 - 450 10*3/mm3   Comprehensive Metabolic Panel    Specimen: Hand, Right; Blood   Result Value Ref Range    Glucose 256 (H) 65 - 99 mg/dL    BUN 32 (H) 6 - 20 mg/dL    Creatinine 1.32 (H) 0.57 - 1.00 mg/dL    Sodium 135 (L) 136 - 145 mmol/L    Potassium 3.4 (L) 3.5 - 5.2 mmol/L    Chloride 98 98 - 107 mmol/L    CO2 26.7 22.0 - 29.0 mmol/L    Calcium 8.8 8.6 - 10.5 mg/dL    Total Protein 6.5 6.0 - 8.5 g/dL    Albumin 3.6 3.5 - 5.2 g/dL    ALT (SGPT) 18 1 - 33 U/L    AST (SGOT) 20 1 - 32 U/L    Alkaline Phosphatase 103 39 - 117 U/L    Total Bilirubin 2.4 (H) 0.0 - 1.2 mg/dL    Globulin 2.9 gm/dL    A/G Ratio 1.2 g/dL    BUN/Creatinine Ratio 24.2 7.0 - 25.0    Anion Gap 10.3 5.0 - 15.0 mmol/L    eGFR 47.5 (L) >60.0 mL/min/1.73   STAT Lactic Acid, Reflex    Specimen: Arm, Right; Blood   Result Value Ref Range    Lactate 2.5 (C) 0.5 - 2.0 mmol/L   Manual Differential    Specimen: Hand, Right; Blood   Result Value Ref Range    Neutrophil % 62.0 42.7 - 76.0 %    Lymphocyte % 22.0 19.6 - 45.3 %    Monocyte % 6.0 5.0 - 12.0 %    Eosinophil % 2.0 0.3 - 6.2 %    Basophil % 1.0 0.0 - 1.5 %    Bands %  5.0 0.0 - 5.0 %    Metamyelocyte % 2.0 (H) 0.0 - 0.0  %    Neutrophils Absolute 8.78 (H) 1.70 - 7.00 10*3/mm3    Lymphocytes Absolute 2.88 0.70 - 3.10 10*3/mm3    Monocytes Absolute 0.79 0.10 - 0.90 10*3/mm3    Eosinophils Absolute 0.26 0.00 - 0.40 10*3/mm3    Basophils Absolute 0.13 0.00 - 0.20 10*3/mm3    nRBC 5.0 (H) 0.0 - 0.2 /100 WBC    Anisocytosis Large/3+ None Seen    Dacrocytes Slight/1+ None Seen    Hypochromia Mod/2+ None Seen    Macrocytes Large/3+ None Seen    Polychromasia Slight/1+ None Seen    Stomatocytes Slight/1+ None Seen    Platelet Estimate Decreased Normal    Large Platelets Slight/1+ None Seen   Magnesium    Specimen: Blood   Result Value Ref Range    Magnesium 2.5 1.6 - 2.6 mg/dL   High Sensitivity Troponin T    Specimen: Blood   Result Value Ref Range    HS Troponin T 84 (C) <14 ng/L   Basic Metabolic Panel    Specimen: Blood   Result Value Ref Range    Glucose 221 (H) 65 - 99 mg/dL    BUN 29 (H) 6 - 20 mg/dL    Creatinine 1.10 (H) 0.57 - 1.00 mg/dL    Sodium 137 136 - 145 mmol/L    Potassium 3.8 3.5 - 5.2 mmol/L    Chloride 101 98 - 107 mmol/L    CO2 23.9 22.0 - 29.0 mmol/L    Calcium 8.8 8.6 - 10.5 mg/dL    BUN/Creatinine Ratio 26.4 (H) 7.0 - 25.0    Anion Gap 12.1 5.0 - 15.0 mmol/L    eGFR 59.1 (L) >60.0 mL/min/1.73   Hemoglobin A1c    Specimen: Hand, Right; Blood   Result Value Ref Range    Hemoglobin A1C 7.30 (H) 4.80 - 5.60 %   STAT Lactic Acid, Reflex    Specimen: Blood   Result Value Ref Range    Lactate 2.0 0.5 - 2.0 mmol/L   Blood Gas, Venous With Co-Ox    Specimen: Venous Blood   Result Value Ref Range    Site Lab     pH, Venous 7.425 (H) 7.320 - 7.420 pH Units    pCO2, Venous 47.3 41.0 - 51.0 mm Hg    pO2, Venous 28.3 27.0 - 53.0 mm Hg    HCO3, Venous 31.0 (H) 22.0 - 28.0 mmol/L    Base Excess, Venous 5.8 (H) 0.0 - 2.0 mmol/L    O2 Saturation, Venous 48.4 45.0 - 75.0 %    Hemoglobin, Blood Gas 8.8 (L) 13.5 - 17.5 g/dL    CO2 Content 32.4 22 - 33 mmol/L    Barometric Pressure for Blood Gas 734 mmHg    Modality Nasal Cannula     FIO2  26 %    Flow Rate 1.5 lpm    Ventilator Mode NA     Collected by [id number]     Oxyhemoglobin Venous 46.5 45.0 - 75.0 %    Carboxyhemoglobin Venous 3.5 0.0 - 5.0 %    Methemoglobin Venous 0.4 0.0 - 3.0 %   Heparin Anti-Xa    Specimen: Blood   Result Value Ref Range    Heparin Anti-Xa (UFH) 0.69 0.30 - 0.70 IU/ml   Heparin Anti-Xa    Specimen: Blood   Result Value Ref Range    Heparin Anti-Xa (UFH) 0.45 0.30 - 0.70 IU/ml   Phosphorus    Specimen: Blood   Result Value Ref Range    Phosphorus 3.5 2.5 - 4.5 mg/dL   Magnesium    Specimen: Blood   Result Value Ref Range    Magnesium 2.4 1.6 - 2.6 mg/dL   Comprehensive Metabolic Panel    Specimen: Blood   Result Value Ref Range    Glucose 105 (H) 65 - 99 mg/dL    BUN 22 (H) 6 - 20 mg/dL    Creatinine 1.09 (H) 0.57 - 1.00 mg/dL    Sodium 139 136 - 145 mmol/L    Potassium 3.4 (L) 3.5 - 5.2 mmol/L    Chloride 103 98 - 107 mmol/L    CO2 28.7 22.0 - 29.0 mmol/L    Calcium 8.6 8.6 - 10.5 mg/dL    Total Protein 5.9 (L) 6.0 - 8.5 g/dL    Albumin 3.2 (L) 3.5 - 5.2 g/dL    ALT (SGPT) 18 1 - 33 U/L    AST (SGOT) 26 1 - 32 U/L    Alkaline Phosphatase 89 39 - 117 U/L    Total Bilirubin 2.5 (H) 0.0 - 1.2 mg/dL    Globulin 2.7 gm/dL    A/G Ratio 1.2 g/dL    BUN/Creatinine Ratio 20.2 7.0 - 25.0    Anion Gap 7.3 5.0 - 15.0 mmol/L    eGFR 59.7 (L) >60.0 mL/min/1.73   CBC Auto Differential    Specimen: Blood   Result Value Ref Range    WBC 10.82 (H) 3.40 - 10.80 10*3/mm3    RBC 2.28 (L) 3.77 - 5.28 10*6/mm3    Hemoglobin 7.9 (L) 12.0 - 15.9 g/dL    Hematocrit 26.2 (L) 34.0 - 46.6 %    .9 (H) 79.0 - 97.0 fL    MCH 34.6 (H) 26.6 - 33.0 pg    MCHC 30.2 (L) 31.5 - 35.7 g/dL    RDW 26.7 (H) 12.3 - 15.4 %    RDW-.8 (H) 37.0 - 54.0 fl    MPV 10.7 6.0 - 12.0 fL    Platelets 99 (L) 140 - 450 10*3/mm3   Scan Slide    Specimen: Blood   Result Value Ref Range    Scan Slide     Manual Differential    Specimen: Blood   Result Value Ref Range    Neutrophil % 71.0 42.7 - 76.0 %    Lymphocyte % 22.0  19.6 - 45.3 %    Monocyte % 2.0 (L) 5.0 - 12.0 %    Bands %  2.0 0.0 - 5.0 %    Metamyelocyte % 2.0 (H) 0.0 - 0.0 %    Myelocyte % 1.0 (H) 0.0 - 0.0 %    Neutrophils Absolute 7.90 (H) 1.70 - 7.00 10*3/mm3    Lymphocytes Absolute 2.38 0.70 - 3.10 10*3/mm3    Monocytes Absolute 0.22 0.10 - 0.90 10*3/mm3    nRBC 2.0 (H) 0.0 - 0.2 /100 WBC    Anisocytosis Large/3+ None Seen    Hypochromia Mod/2+ None Seen    Macrocytes Mod/2+ None Seen    Polychromasia Slight/1+ None Seen    Stomatocytes Slight/1+ None Seen    Platelet Morphology Normal Normal   POC Glucose Once    Specimen: Blood   Result Value Ref Range    Glucose 268 (H) 70 - 130 mg/dL   POC Glucose Once    Specimen: Blood   Result Value Ref Range    Glucose 222 (H) 70 - 130 mg/dL   POC Glucose Once    Specimen: Blood   Result Value Ref Range    Glucose 222 (H) 70 - 130 mg/dL   POC Glucose Once    Specimen: Blood   Result Value Ref Range    Glucose 115 70 - 130 mg/dL   ECG 12 Lead Tachycardia   Result Value Ref Range    QT Interval 268 ms    QTC Interval 430 ms   ECG 12 Lead Tachycardia   Result Value Ref Range    QT Interval 402 ms    QTC Interval 538 ms   ECG 12 Lead Rhythm Change   Result Value Ref Range    QT Interval 444 ms    QTC Interval 534 ms   ECG 12 Lead Tachycardia   Result Value Ref Range    QT Interval 430 ms    QTC Interval 531 ms   ECG 12 Lead Rhythm Change   Result Value Ref Range    QT Interval 406 ms    QTC Interval 526 ms   ECG 12 Lead Rhythm Change   Result Value Ref Range    QT Interval 406 ms    QTC Interval 523 ms   Adult Transthoracic Echo Complete W/ Cont if Necessary Per Protocol   Result Value Ref Range    LVIDd 4.8 cm    LVIDs 3.5 cm    IVSd 1.25 cm    LVPWd 1.20 cm    FS 26.3 %    IVS/LVPW 1.04 cm    ESV(cubed) 42.9 ml    LV Sys Vol (BSA corrected) 26.8 cm2    EDV(cubed) 107.2 ml    LV Lam Vol (BSA corrected) 51.5 cm2    LV mass(C)d 222.0 grams    LVOT area 3.8 cm2    LVOT diam 2.20 cm    EDV(MOD-sp4) 112.0 ml    ESV(MOD-sp4) 58.2 ml     SV(MOD-sp4) 53.8 ml    SI(MOD-sp4) 24.7 ml/m2    EF(MOD-sp4) 48.0 %    MV E max shaggy 127.0 cm/sec    MV dec time 0.18 sec    LA ESV Index (BP) 36.9 ml/m2    Med Peak E' Shaggy 6.0 cm/sec    Lat Peak E' Shaggy 13.1 cm/sec    Avg E/e' ratio 13.30     SV(LVOT) 73.7 ml    TAPSE (>1.6) 1.89 cm    LA dimension (2D)  4.1 cm    LV V1 max 113.0 cm/sec    LV V1 max PG 5.1 mmHg    LV V1 mean PG 2.00 mmHg    LV V1 VTI 19.4 cm    Ao pk shaggy 161.0 cm/sec    Ao max PG 10.4 mmHg    Ao mean PG 5.0 mmHg    Ao V2 VTI 28.7 cm    PRISCILLA(I,D) 2.6 cm2    MV dec slope 722.0 cm/sec2    PA acc time 0.13 sec    Ao root diam 3.4 cm    ACS 2.00 cm   Type & Screen    Specimen: Arm, Right; Blood   Result Value Ref Range    ABO Type A     RH type Positive     Antibody Screen Positive     T&S Expiration Date 11/15/2023 11:59:59 PM    Prepare RBC, 2 Units   Result Value Ref Range    Product Code V8398I31     Unit Number [id number]     UNIT  ABO A     UNIT  RH POS     Crossmatch Interpretation Compatible     Dispense Status RE     Blood Expiration Date 202312012359     Blood Type Barcode 6200     Product Code T4471F40     Unit Number [id number]     UNIT  ABO A     UNIT  RH POS     Crossmatch Interpretation Compatible     Dispense Status XM     Blood Expiration Date 202312062359     Blood Type Barcode 6200    Antibody Identification    Specimen: Arm, Right; Blood   Result Value Ref Range    Cold Autoantibody COLD AUTO ANTIBODY    Antibody Screen    Specimen: Arm, Right; Blood   Result Value Ref Range    AMARILIS Screen Negative     Cold Screen Positive    Prepare RBC, 1 Units   Result Value Ref Range    Product Code W0187M45     Unit Number [id number]     UNIT  ABO A     UNIT  RH POS     Crossmatch Interpretation Compatible     Dispense Status XM     Blood Expiration Date 202312142359     Blood Type Barcode 6200    Green Top (Gel)   Result Value Ref Range    Extra Tube Hold for add-ons.    Lavender Top   Result Value Ref Range    Extra Tube hold for  add-on    Gold Top - SST   Result Value Ref Range    Extra Tube Hold for add-ons.    Light Blue Top   Result Value Ref Range    Extra Tube Hold for add-ons.            ED Course  ED Course as of 11/14/23 0325   Sun Nov 12, 2023   2308 Patient's rate was highly concerning during physical exam requested the patient be placed on his Zolls rhythm on zolls was highly concerning for V. tach.  Requested assistance of Dr. Lama.  Dr. Box initiated 150 of amiodarone followed by amiodarone drip.  Patient also received mag.  Secondary to acuity of this patient patient will be endorsed to Dr. Box. [RB]   2310 Pt endorsed to Dr. Box [RB]   Mon Nov 13, 2023   0129 Patient was initially seen and evaluated by midlevel provider who rapidly requested my assistance at bedside urgently.  Patient had presented by EMS with  [LK]   0247 Patient is a heart failure patient on Eliquis and hemoglobin goal should be 10.  She has been slowly trending downward and been receiving iron transfusion in outpatient clinic.    Patient sustained wide-complex unstable ventricular tachycardia earlier in the shift was ultimately able to be stabilized.     [LK]   0253 ECG 12 Lead Rhythm Change  Repeat EKG shows sinus rhythm with short [place] interval at this time with a left bundle branch block that is present no acute ST elevation.  Persistent QT prolongation at 534 ms.  Ventricular rate 87  QTc 146  Electronically signed by Juanita Box DO, 11/13/23, 2:54 AM EST.   [LK]   0317 Patient care was handed off to Pierre Fields as he was directly involved initially assessing patient at bedside.  Patient has been medically stabilized and has been hemodynamically stable for several hours prior to handing patient off.  At this time there is no other further medical interventions that are needed urgently in the ER and she is stable to be transferred to PCU or ICU.  Patient does have antibodies to blood which they are working on crossmatching her units of  bloods    Electronically signed by Juanita Box DO, 11/13/23, 3:19 AM EST.     [LK]   0329 XR  [RB]   0438 CT chest rad interpreted:  1.  Enlarged heart size.  2.  Splenomegaly.  3.  Minimal atelectasis at the lung bases.  4.  No edema, pleural effusion, or pneumothorax.  5.  Cholecystectomy.  6.  Very small right thyroid lobe nodule.   [RB]   0538 Per request of Dr. An pt not to receive blood.  [RB]   0544 Discussed case with on-call cardiologist Dr. Ken, request patient's Eliquis to be stopped and placed on heparin.  Also requested the interventional cardiology be aware of this patient.  Agreeable to consult on this patient. [RB]   0544 Patient discussed with Dr. An who is agreeable to admit this patient.  Secondary to there not being any availability within the CCU patient be boarded in the ED until bed becomes available. [RB]   0255 ECG 12 Lead Tachycardia  Vent. Rate :  92 BPM     Atrial Rate :  92 BPM     P-R Int : 104 ms          QRS Dur : 144 ms      QT Int : 430 ms       P-R-T Axes :  46 -45  84 degrees     QTc Int : 531 ms     Sinus rhythm with short NM  Left bundle branch block  Abnormal ECG  When compared with ECG of 13-NOV-2023 02:21, (Unconfirmed)  No significant change was found   [ES]      ED Course User Index  [ES] Long Bullard MD  [LK] Juanita Box DO  [RB] Pierre Fields II, PA         Patient required extensive aggressive medical intervention to prevent rapid decompensation to death.  Ultimately after roughly 60 minutes at bedside patient was able to be converted to atrial fibrillation with a rate control.  Over the course of several hours patient has continued to improve from a hemodynamic standpoint remains in A-fib with a heart rate variable between 86 and 112.  Pressure has averaged a MAP of greater than 65 with a current pressure of 95/51 at 0219.    On review of patient's medical records it appears that she currently is not taking metolazone which had been a  long-term medication for her of 10 mg daily.    -Please note that patient has a extremely poor understanding of her chronic medical conditions, current medication regiment as well as dosing and frequency.  An updated medication record needs to be obtained from pharmacy in order to establish a baseline medication regiment for patient at time of discharge.                                  Medical Decision Making  Problems Addressed:  Atrial fibrillation with RVR: complicated acute illness or injury  Congestive heart failure, unspecified HF chronicity, unspecified heart failure type: complicated acute illness or injury  Other cirrhosis of liver: complicated acute illness or injury  Prolonged Q-T interval on ECG: complicated acute illness or injury  Severe sepsis: complicated acute illness or injury  Sustained monomorphic ventricular tachycardia: complicated acute illness or injury  Symptomatic anemia: complicated acute illness or injury    Amount and/or Complexity of Data Reviewed  Labs: ordered.  Radiology: ordered. Decision-making details documented in ED Course.  ECG/medicine tests: ordered. Decision-making details documented in ED Course.    Risk  OTC drugs.  Prescription drug management.  Decision regarding hospitalization.        Final diagnoses:   Severe sepsis   Other cirrhosis of liver   Atrial fibrillation with RVR   Symptomatic anemia   Congestive heart failure, unspecified HF chronicity, unspecified heart failure type   Sustained monomorphic ventricular tachycardia   Prolonged Q-T interval on ECG       ED Disposition  ED Disposition       ED Disposition   Decision to Admit    Condition   --    Comment   Level of Care: Critical Care [6]   Diagnosis: Ventricular tachycardia [253460]   Admitting Physician: CONNOR SCHMITZ [[id number]]   Attending Physician: CONNOR SCHMITZ [[id number]]   Certification: I Certify That Inpatient Hospital Services Are Medically Necessary For Greater Than 2 Midnights                  No follow-up provider specified.       Medication List        ASK your doctor about these medications      benzonatate 100 MG capsule  Commonly known as: TESSALON  Ask about: Which instructions should I use?     * bumetanide 2 MG tablet  Commonly known as: BUMEX  Ask about: Which instructions should I use?     * bumetanide 2 MG tablet  Commonly known as: BUMEX  Ask about: Which instructions should I use?     busPIRone 10 MG tablet  Commonly known as: BUSPAR  Ask about: Which instructions should I use?     doxycycline 100 MG capsule  Commonly known as: VIBRAMYCIN  Ask about: Should I take this medication?     nitroglycerin 0.4 MG SL tablet  Commonly known as: NITROSTAT  Ask about: Which instructions should I use?     omeprazole 20 MG capsule  Commonly known as: priLOSEC  Ask about: Which instructions should I use?     pregabalin 150 MG capsule  Commonly known as: LYRICA  Ask about: Which instructions should I use?           * This list has 2 medication(s) that are the same as other medications prescribed for you. Read the directions carefully, and ask your doctor or other care provider to review them with you.                     Pierre Fields II, PA  11/13/23 0545       Pierre Fields II, PA  11/14/23 0149

## 2023-11-13 NOTE — SIGNIFICANT NOTE
Case has been discussed between Terrance Loo and Jesus Manuel.  For now, general cardiology will manage.  They will consult interventional cardiology if we are needed.    Electronically signed by KENNY Johnson, 11/13/23, 1:12 PM EST.

## 2023-11-13 NOTE — PAYOR COMM NOTE
"[facility]  NPI:[id number]    Utilization Review  Contact: Sandy Powell RN  Phone: [phone]  Fax:[phone]    INITIATE INPATIENT AUTHORIZATION  Dio Berry (57 y.o. Female)       Date of Birth   1965    Social Security Number       Address   [address]  [place] [address]    Home Phone   [phone]    MRN   [id number]       [facility]   [facility]    Marital Status                               Admission Date   23    Admission Type   Emergency    Admitting Provider   Tomás An MD    Attending Provider   Dave Bishop MD    Department, Room/Bed   [facility] CRITICAL CARE, CC06/       Discharge Date       Discharge Disposition       Discharge Destination                                 Attending Provider: Dave Bishop MD    Allergies: Phenergan [Promethazine]    Isolation: None   Infection: None   Code Status: CPR    Ht: 165.1 cm (65\")   Wt: 106 kg (233 lb 0.4 oz)    Admission Cmt: None   Principal Problem: Ventricular tachycardia [I47.20]                   Active Insurance as of 2023       Primary Coverage       Payor Plan Insurance Group Employer/Plan Group    [employer] [place] [employer] [place]ICA[place]        Payor Plan Address Payor Plan Phone Number Payor Plan Fax Number Effective Dates    [address] [address] [phone]  2020 - None Entered    [facility] [id number]         Subscriber Name Subscriber Birth Date Member ID       DIO BERRY 1965 [id number]                     Emergency Contacts        (Rel.) Home Phone Work Phone Mobile Phone    KATHY BERRY (Son) [phone] -- --    GurwinderBolivar (Other) -- -- [phone]                 History & Physical        Tomás An MD at 23 0559          Hospitalist History and Physical        Patient Identification  Name: Dio Berry  Age/Sex: 57 y.o. female  :  1965        MRN: [id number]  Visit Number: [id number]  Admit Date: " 11/12/2023   PCP: Masha Davidson APRN          Chief complaint short of breath, heart racing    History of Present Illness:  Patient is a 57 y.o. female with history of systolic CHF (EF 21-25% per last ECHO 9/11/2020), afib on eliquis, cirrhosis, insulin dependent type II DM, hypothyroidism, and what appears to be borderline stage IIIa-b CKD (creatinin ranges 1.2-1.5 and GFR ranges 42-50), who presents with reports of acute onset palpitations that occurred just prior to arrival to the ED. Palpitations were accompanied by dyspnea and generalized weakness, along with pain in her right neck and shoulder. EMS reported they found her in SVT and gave her 6mg IV adenosine followed by 12mg adenosine along with a fluid bolus. When she arrived to the ED, initial EKG reported afib w/RVR, but upon further review on enStagels monitor, she appeared to be in Ventricular tachycardia. Thus she was loaded with IV amiodarone and subsequently converted to afib and has been going back and forth between sinus and afib since. Initial EKG showed intraventricular block, and repeat EKG's thereafter showed LBBB which was not present on prior EKGs before today. Labs showed elevated troponin 93 that trended down to 84 on repeat, BNP elevated at 2746, + 3.5, mag 2.1, BUN 36 with Cr 1.46, Glucose 424, alk phos 127 and bilirubin 2.7.  TSH was 4.680 while free T4 was 1.78. CRP was 0.88, lactate 3.7 with repeat down to 2.5, WBC was 15, hemoglobin stable at 8.4, platelets 142, .6. UA showed >1000 glucose but negative ketones and no signs of obvious infection. CT chest showed cardiomegaly, splenomegaly, minimal atelectasis at lung bases, and no edema, pleural effusion or pneumothorax. Patient is being admitted to the CCU for further workup and management.      Review of Systems  Review of Systems   Constitutional:  Positive for activity change and fatigue. Negative for appetite change, chills, diaphoresis, fever and unexpected weight  change.   HENT:  Negative for congestion, postnasal drip, rhinorrhea, sinus pressure, sinus pain and sore throat.    Eyes:  Negative for photophobia, pain, discharge, redness, itching and visual disturbance.   Respiratory:  Positive for shortness of breath. Negative for cough and wheezing.    Cardiovascular:  Positive for palpitations and leg swelling (chronic). Negative for chest pain.   Gastrointestinal:  Positive for abdominal distention and blood in stool (2 weeks ago, states guaiac test at outside hospital negative, has since resolved). Negative for abdominal pain, constipation, diarrhea, nausea and vomiting.   Genitourinary:  Negative for difficulty urinating, dysuria, flank pain, frequency and hematuria.   Musculoskeletal:  Negative for arthralgias, back pain, joint swelling, myalgias, neck pain and neck stiffness.   Skin:  Negative for color change, pallor, rash and wound.   Neurological:  Positive for weakness (generalized). Negative for dizziness, tremors, seizures, syncope, light-headedness, numbness and headaches.   Hematological:  Negative for adenopathy. Does not bruise/bleed easily.   Psychiatric/Behavioral:  Negative for agitation, behavioral problems and confusion.        History  Past Medical History:   Diagnosis Date    Anemia     CHF (congestive heart failure)     Chronic a-fib     stated by patient     Cirrhosis of liver     stated by patient     Diabetes mellitus      Past Surgical History:   Procedure Laterality Date    APPENDECTOMY      CARDIAC CATHETERIZATION N/A 11/10/2020    Procedure: Left Heart Cath;  Surgeon: Charlee Ken MD;  Location: [facility] INVASIVE LOC[facility];  Service: Cardiovascular;  Laterality: N/A;    CHOLECYSTECTOMY      TOE AMPUTATION Right     stated by patient.      No family history on file.  Social History     Tobacco Use    Smoking status: Never    Smokeless tobacco: Never   Substance Use Topics    Alcohol use: Never    Drug use: Never     (Not in a hospital  admission)    Allergies:  Phenergan [promethazine]    Objective    Vital Signs  Temp:  [97.8 °F (36.6 °C)] 97.8 °F (36.6 °C)  Heart Rate:  [] 91  Resp:  [18] 18  BP: ()/(43-76) 97/55  Body mass index is 41.6 kg/m².    Physical Exam:  Physical Exam  Constitutional:       Comments: Middle aged female appears older than stated age, no acute distress but appears both acutely and chronically ill    HENT:      Right Ear: External ear normal.      Left Ear: External ear normal.      Nose: Nose normal.      Mouth/Throat:      Mouth: Mucous membranes are moist.      Pharynx: Oropharynx is clear.   Eyes:      Extraocular Movements: Extraocular movements intact.      Conjunctiva/sclera: Conjunctivae normal.      Pupils: Pupils are equal, round, and reactive to light.   Cardiovascular:      Rate and Rhythm: Tachycardia present. Rhythm irregular.      Pulses: Normal pulses.      Heart sounds: Normal heart sounds. No murmur heard.  Pulmonary:      Effort: Pulmonary effort is normal. No respiratory distress.      Breath sounds: Normal breath sounds. No wheezing or rales.   Abdominal:      Tenderness: There is no abdominal tenderness.      Comments: Scaphoid, distended, ascites noted left pannus   Musculoskeletal:         General: Normal range of motion.      Cervical back: Normal range of motion and neck supple.      Right lower leg: Edema (1+ pitting) present.      Left lower leg: Edema (1+ pitting) present.   Skin:     General: Skin is warm and dry.      Capillary Refill: Capillary refill takes 2 to 3 seconds.      Coloration: Skin is not jaundiced.      Findings: No bruising.   Neurological:      General: No focal deficit present.      Mental Status: She is oriented to person, place, and time.   Psychiatric:         Mood and Affect: Mood normal.         Behavior: Behavior normal.           Results Review:       Lab Results:  Results from last 7 days   Lab Units 11/12/23  2307   WBC 10*3/mm3 15.57*   HEMOGLOBIN g/dL  "8.4*   PLATELETS 10*3/mm3 142     Results from last 7 days   Lab Units 11/12/23  2307   CRP mg/dL 0.88*     Results from last 7 days   Lab Units 11/12/23 2307   SODIUM mmol/L 132*   POTASSIUM mmol/L 3.5   CHLORIDE mmol/L 91*   CO2 mmol/L 26.7   BUN mg/dL 36*   CREATININE mg/dL 1.46*   CALCIUM mg/dL 8.9   GLUCOSE mg/dL 424*     Results from last 7 days   Lab Units 11/12/23  2307   MAGNESIUM mg/dL 2.1     No results found for: \"HGBA1C\"  Results from last 7 days   Lab Units 11/12/23  2307   BILIRUBIN mg/dL 2.7*   ALK PHOS U/L 127*   AST (SGOT) U/L 21   ALT (SGPT) U/L 21     Results from last 7 days   Lab Units 11/13/23  0124 11/12/23 2307   CK TOTAL U/L  --  41   HSTROP T ng/L 84* 93*         Results from last 7 days   Lab Units 11/12/23 2307   INR  1.20*           I have reviewed the patient's laboratory results.    Imaging:  Imaging Results (Last 72 Hours)       Procedure Component Value Units Date/Time    CT Chest Without Contrast Diagnostic [[id number]] Collected: 11/13/23 0418     Updated: 11/13/23 0424    Narrative:      PROCEDURE: CT of the chest performed without IV contrast on November 13, 2023. The examination was performed with 3 mm axial imaging and 3 mm  sagittal and coronal reconstruction images. Total DLP = 1046. The  examination was performed according to as low as reasonably achievable  dose protocol.     HISTORY: Cardiomegaly. CHF. Possible pneumonia. Possible edema.     FINDINGS:     Mild degenerative disc disease throughout the thoracic spine and upper  lumbar spine.  Well-circumscribed areas of relative lucency identified in the lower  thoracic spine and upper lumbar spine levels possibly due to underlying  hemangiomas within the vertebral bodies.  These are seen on image 73, series 5 and image 70, series 5. No chronic  compression fracture deformity.  No acute fracture or dislocation.  Very slight levoconvex curve at the lumbar spine.  Enlarged heart size.  No acute process seen involving the " left thyroid lobe.  Small nodule in the right thyroid lobe.  No thoracic aortic aneurysm.  Coronary arterial vascular calcifications.  No pericardial effusion.  Mild bronchial inflammation.  Minimal atelectasis at the lung bases.  No pleural effusion or pneumothorax.  No pneumomediastinum.  No endobronchial lesion.  Cholecystectomy clips in the right upper quadrant.  Splenomegaly. The spleen measures 16.3 cm in length.  Heterogeneous attenuation to the liver without features of probable  fatty infiltration. There is some mild nodularity to the surface of the  liver suggestive of underlying hepatocellular disease.  No free fluid or free air in the upper abdomen.       Impression:         1.  Enlarged heart size.  2.  Splenomegaly.  3.  Minimal atelectasis at the lung bases.  4.  No edema, pleural effusion, or pneumothorax.  5.  Cholecystectomy.  6.  Very small right thyroid lobe nodule.        This report was finalized on 11/13/2023 4:22 AM by Braydon Johnson MD.       XR Chest 1 View [[id number]] Collected: 11/13/23 0323     Updated: 11/13/23 0326    Narrative:      PROCEDURE: Portable chest x-ray examination performed on November 13, 2023. Single view. Upright position.     HISTORY: Chest pain. Tachycardia.     FINDINGS:     Slight prominence of the heart size  Mild central pulmonary vascular congestion.  No edema, pneumonia, pleural effusion, or pneumothorax.       Impression:         1.  Mild central pulmonary vascular congestion.  2.  No edema.  3.  No pneumonia.  4.  No pleural effusion. No pneumothorax.  5.  Slight prominence to the heart size.     This report was finalized on 11/13/2023 3:24 AM by Braydon Johnson MD.               I have personally reviewed the patient's radiologic imaging.        EKG:   Atrial fibrillation with rapid ventricular response, , QTc 430  Left axis deviation  Nonspecific intraventricular block  Possible Anterolateral infarct (cited on or before 10-SEP-2020)  Abnormal ECG  When  "compared with ECG of 16-SEP-2020 11:22,  Significant changes have occurred      Repeat:     Sinus rhythm with short SC, HR 87, QTc 534  Left bundle branch block  Abnormal ECG  When compared with ECG of 12-NOV-2023 23:32, (Unconfirmed)  Sinus rhythm has replaced Atrial fibrillation        I have personally reviewed the above EKG's. LBBB appears new.       Assessment & Plan    - Ventricular tachycardia, new LBBB, in setting of known severe systolic CHF with EF in the 20s: converted from vtach to afib with IV amiodarone. Reportedly on amio drip for brief period, then stopped by ED provider. Troponin mildly elevated with downward trend. Denies chest pain but does note neck and shoulder soreness that could be angina equivalent symptoms. Cardiology notified; Dr Ken recommends starting IV heparin infusion and consulting interventional cardiology for consideration of AICD. Will admit to CCU. Keep NPO. Repeat EKG now. Await further recommendations from cardiology.  - Type II DM, insulin dependent, with hyperglycemia: monitor accuchecks, cover with SSI, adjust scale as needed, review home insulin regimen once reconciled by pharmacy.  - CKD, stage IIIa-b: creatinine appears within baseline range. Continue to monitor for now.  - Anemia, chronic, macrocytic: hemoglobin within baseline range at time of admission. Patient reports rectal bleeding a couple weeks ago, but guaiac test performed at outside hospital at that time was negative and bleeding has since resolved. Repeat labs pending, continue to monitor closely.  - Cirrhosis with ascites: review home meds once reconciled by pharmacy.    DVT Prophylaxis: IV heparin infusion    Estimated Length of Stay > 2midnights    I discussed the patient's findings, assessment and plan with the patient and ED provider Pierre \"Buddy\" BRITTANY Fields    Patient is high risk due to ventricular tachycardia, new left bundle branch block    Tomás An MD  11/13/23  05:59 " EST      Electronically signed by Tomás An MD at 11/13/23 0621          Emergency Department Notes        Mary Ann Gant, RN at 11/13/23 1155          POC glucose was 222, provider made aware.     Electronically signed by Mary Ann Gant, RN at 11/13/23 1156       Altagracia Valenzuela, RN at 11/13/23 0537          Asim is speaking with Dr. An at this time. Verbal orders from Dr. An to hold blood at this time.     Electronically signed by Altagracia Valenzuela RN at 11/13/23 0538       Reginald Guerrier PCT at 11/12/23 2335          Repeat ECG performed @ 2332; handed to Dr. Box @ 2335    Electronically signed by Reginald Guerrier PCT at 11/12/23 2335       Pierre Fields II, PA at 11/12/23 2306        Procedure Orders    1. Critical Care [[id number]] ordered by Juanita Box DO                 Subjective   History of Present Illness  57-year-old female presents the emergency room via EMS chief complaint of SVT.  Source of history is patient as well as EMS personnel.  EMS reports that they found the patient she had a rate over 206 of adenosine was given followed by 12.  Patient's rate did drop down into the 130s and they started her on 5:30 100 mL bolus of normal saline.  Patient does have past medical history of congestive heart failure in which she is treated at the heart failure clinic.  Past medical history of ablation performed in [place].  Past medical history of atrial fibrillation as well.  Patient states that she is not having any chest pain so to speak however she is complaining of left-sided neck pain.  Patient is alert and oriented.  ---Please note that patient has a extremely poor understanding of her chronic medical conditions, current medication regiment as well as dosing and frequency. An updated medication record needs to be obtained from pharmacy in order to establish a baseline medication regiment for patient at time of discharge--        Please note the following  "documentation below was taken directly from evaluation of the patient in the heart failure clinic as of August 2023:  provides extensive summary of patient's overall clinical presentation and current medical management recommendations:    \"Expand All Collapse All  [facility]  ABDIFATAH Galarza Melanie Lind, APRN  [address] [place] [address] [address]  [place] [address]     Thank you for asking me to see Yumiko Purdy for congestive heart failure.     HPI:      This is a 57 y.o. female with known past medical history of:  · Chronic systolic & diastolic HF  º TTE from November 2022 with visually estimated ejection fraction of 30% ±5% and noted abnormal systolic strain pattern as well as grade 3 diastolic dysfunction as well as abnormal TAPSE with abnormal right ventricular function  º KHAI on 09/2020 with EF 21-25% with LV systolic function severely decreased and RV cavity mildly dilated with moderately reduced RV systolic function noted, moderate TVR, and aortic plaquing  º Right heart cath on 12/22/2022 with elevated left and right heart pressures with report not available  · ASCVD  º LHC 11/10/20 with preserved LV systolic, EF 50-55% with elevated LV end diastolic pressure consistent with diastolic dysfunction and right dominant system with 30-40% mid LAD lesion.   º LHC attempted on 12/2022 at [facility] with unsuccessful access due to small artery appearing occluded or near occluded radially on right  · Peripheral Arterial disease  · Atrial Flutter  · CKD stage IIIb  · Cirrhosis of the liver  · Stage III CKD  · Chronic hypoxemic respiratory failure  · Morbid Obesity  · Diffuse purpuric rash with prior w/u negative for vasculitis     Yumiko Purdy presents for today for HF clinic evaluation.  The patient is typically seen by Masha Davidson APRN.  Patient's primary cardiologist is Dr. Jad Meredith.       · Last known EF 21-25% by KHAI and 50-55% " "by [facility] in 11/2020.    · Last known hospitalization and/or ED visit: Patient has not been hospitalized at this facility since 2020.  However she was hospitalized at [facility] in [facility] in December 2022 with heart failure admission with discharge summary reviewed standing admission with acute on chronic mixed systolic diastolic heart failure during which time she was on dobutamine drip and discharged with p.o. Bumex and Zaroxolyn with note of chronic atrial fibrillation.  She was on dobutamine drip for some time and nephrology did also assist in her diuretic adjustments recommending ultimately Bumex twice daily metolazone and spironolactone on discharge.  · Accompanied by: Son\"              Review of Systems   Constitutional: Negative.  Negative for fever.   HENT: Negative.     Respiratory: Negative.     Cardiovascular:  Positive for palpitations. Negative for chest pain.   Gastrointestinal: Negative.  Negative for abdominal pain.   Endocrine: Negative.    Genitourinary: Negative.  Negative for dysuria.   Musculoskeletal:  Positive for neck pain.   Skin: Negative.    Neurological: Negative.    Psychiatric/Behavioral: Negative.     All other systems reviewed and are negative.      Past Medical History:   Diagnosis Date    Anemia     CHF (congestive heart failure)     Chronic a-fib     stated by patient     Cirrhosis of liver     stated by patient     Diabetes mellitus        Allergies   Allergen Reactions    Phenergan [Promethazine]        Past Surgical History:   Procedure Laterality Date    APPENDECTOMY      CARDIAC CATHETERIZATION N/A 11/10/2020    Procedure: Left Heart Cath;  Surgeon: Charlee Ken MD;  Location: [facility] CATH INVASIVE LOCATION;  Service: Cardiovascular;  Laterality: N/A;    CHOLECYSTECTOMY      TOE AMPUTATION Right     stated by patient.        No family history on file.    Social History     Socioeconomic History    Marital status:    Tobacco Use    Smoking status: Never "    Smokeless tobacco: Never   Substance and Sexual Activity    Alcohol use: Never    Drug use: Never    Sexual activity: Defer           Objective   Physical Exam  Vitals and nursing note reviewed.   Constitutional:       General: She is in acute distress.      Appearance: She is well-developed. She is obese. She is ill-appearing and toxic-appearing. She is not diaphoretic.   HENT:      Head: Normocephalic.      Right Ear: External ear normal.      Left Ear: External ear normal.      Nose: Nose normal.   Eyes:      Extraocular Movements: Extraocular movements intact.      Conjunctiva/sclera: Conjunctivae normal.   Neck:      Vascular: No JVD.      Trachea: No tracheal deviation.   Cardiovascular:      Rate and Rhythm: Tachycardia present. Rhythm irregular.      Heart sounds: Normal heart sounds. No murmur heard.  Pulmonary:      Effort: Pulmonary effort is normal. No respiratory distress.      Breath sounds: Normal breath sounds. No wheezing.   Abdominal:      Palpations: Abdomen is soft.      Tenderness: There is no abdominal tenderness.   Musculoskeletal:         General: No deformity. Normal range of motion.      Cervical back: Neck supple. Tenderness present.      Right lower leg: Edema present.      Left lower leg: Edema present.   Skin:     General: Skin is warm and dry.      Coloration: Skin is pale.      Findings: No erythema or rash.   Neurological:      Mental Status: She is alert and oriented to person, place, and time.      Cranial Nerves: No cranial nerve deficit.   Psychiatric:         Behavior: Behavior normal.         Thought Content: Thought content normal.         Critical Care    Performed by: Juanita Box DO  Authorized by: Juanita Box DO    Critical care provider statement:     Critical care time (minutes):  90    Critical care time was exclusive of:  Separately billable procedures and treating other patients and teaching time    Critical care was necessary to treat or prevent imminent or  life-threatening deterioration of the following conditions:  Cardiac failure, circulatory failure, CNS failure or compromise, metabolic crisis, hepatic failure, respiratory failure, renal failure, sepsis, shock, endocrine crisis and dehydration    Critical care was time spent personally by me on the following activities:  Blood draw for specimens, evaluation of patient's response to treatment, examination of patient, obtaining history from patient or surrogate, interpretation of cardiac output measurements, ordering and performing treatments and interventions, ordering and review of laboratory studies, ordering and review of radiographic studies, pulse oximetry, re-evaluation of patient's condition, review of old charts, vascular access procedures and development of treatment plan with patient or surrogate    I assumed direction of critical care for this patient from another provider in my specialty: no          Results for orders placed or performed during the hospital encounter of 11/12/23   Comprehensive Metabolic Panel    Specimen: Arm, Right; Blood   Result Value Ref Range    Glucose 424 (C) 65 - 99 mg/dL    BUN 36 (H) 6 - 20 mg/dL    Creatinine 1.46 (H) 0.57 - 1.00 mg/dL    Sodium 132 (L) 136 - 145 mmol/L    Potassium 3.5 3.5 - 5.2 mmol/L    Chloride 91 (L) 98 - 107 mmol/L    CO2 26.7 22.0 - 29.0 mmol/L    Calcium 8.9 8.6 - 10.5 mg/dL    Total Protein 6.7 6.0 - 8.5 g/dL    Albumin 3.7 3.5 - 5.2 g/dL    ALT (SGPT) 21 1 - 33 U/L    AST (SGOT) 21 1 - 32 U/L    Alkaline Phosphatase 127 (H) 39 - 117 U/L    Total Bilirubin 2.7 (H) 0.0 - 1.2 mg/dL    Globulin 3.0 gm/dL    A/G Ratio 1.2 g/dL    BUN/Creatinine Ratio 24.7 7.0 - 25.0    Anion Gap 14.3 5.0 - 15.0 mmol/L    eGFR 42.1 (L) >60.0 mL/min/1.73   Protime-INR    Specimen: Arm, Right; Blood   Result Value Ref Range    Protime 15.8 (H) 12.1 - 14.7 Seconds    INR 1.20 (H) 0.90 - 1.10   aPTT    Specimen: Arm, Right; Blood   Result Value Ref Range    PTT 37.5 (H) 26.5  - 34.5 seconds   Urinalysis With Microscopic If Indicated (No Culture) - Urine, Clean Catch    Specimen: Urine, Clean Catch   Result Value Ref Range    Color, UA Yellow Yellow, Straw    Appearance, UA Clear Clear    pH, UA 5.5 5.0 - 8.0    Specific Gravity, UA 1.018 1.005 - 1.030    Glucose, UA >=1000 mg/dL (3+) (A) Negative    Ketones, UA Negative Negative    Bilirubin, UA Negative Negative    Blood, UA Moderate (2+) (A) Negative    Protein, UA Negative Negative    Leuk Esterase, UA Trace (A) Negative    Nitrite, UA Negative Negative    Urobilinogen, UA 0.2 E.U./dL 0.2 - 1.0 E.U./dL   Single High Sensitivity Troponin T    Specimen: Arm, Right; Blood   Result Value Ref Range    HS Troponin T 93 (C) <14 ng/L   BNP    Specimen: Arm, Right; Blood   Result Value Ref Range    proBNP 2,746.0 (H) 0.0 - 900.0 pg/mL   C-reactive Protein    Specimen: Arm, Right; Blood   Result Value Ref Range    C-Reactive Protein 0.88 (H) 0.00 - 0.50 mg/dL   Urine Drug Screen - Urine, Clean Catch    Specimen: Urine, Clean Catch   Result Value Ref Range    THC, Screen, Urine Negative Negative    Phencyclidine (PCP), Urine Negative Negative    Cocaine Screen, Urine Negative Negative    Methamphetamine, Ur Negative Negative    Opiate Screen Negative Negative    Amphetamine Screen, Urine Negative Negative    Benzodiazepine Screen, Urine Negative Negative    Tricyclic Antidepressants Screen Negative Negative    Methadone Screen, Urine Negative Negative    Barbiturates Screen, Urine Negative Negative    Oxycodone Screen, Urine Negative Negative    Buprenorphine, Screen, Urine Negative Negative   Ethanol    Specimen: Arm, Right; Blood   Result Value Ref Range    Ethanol <10 0 - 10 mg/dL    Ethanol % <0.010 %   CK    Specimen: Arm, Right; Blood   Result Value Ref Range    Creatine Kinase 41 20 - 180 U/L   Magnesium    Specimen: Arm, Right; Blood   Result Value Ref Range    Magnesium 2.1 1.6 - 2.6 mg/dL   TSH    Specimen: Arm, Right; Blood   Result  Value Ref Range    TSH 4.680 (H) 0.270 - 4.200 uIU/mL   CBC Auto Differential    Specimen: Arm, Right; Blood   Result Value Ref Range    WBC 15.57 (H) 3.40 - 10.80 10*3/mm3    RBC 2.35 (L) 3.77 - 5.28 10*6/mm3    Hemoglobin 8.4 (L) 12.0 - 15.9 g/dL    Hematocrit 26.7 (L) 34.0 - 46.6 %    .6 (H) 79.0 - 97.0 fL    MCH 35.7 (H) 26.6 - 33.0 pg    MCHC 31.5 31.5 - 35.7 g/dL    RDW 27.3 (H) 12.3 - 15.4 %    RDW-SD 91.3 (H) 37.0 - 54.0 fl    MPV 10.2 6.0 - 12.0 fL    Platelets 142 140 - 450 10*3/mm3   Manual Differential    Specimen: Arm, Right; Blood   Result Value Ref Range    Neutrophil % 80.0 (H) 42.7 - 76.0 %    Lymphocyte % 13.0 (L) 19.6 - 45.3 %    Monocyte % 1.0 (L) 5.0 - 12.0 %    Bands %  2.0 0.0 - 5.0 %    Metamyelocyte % 1.0 (H) 0.0 - 0.0 %    Myelocyte % 3.0 (H) 0.0 - 0.0 %    Neutrophils Absolute 12.77 (H) 1.70 - 7.00 10*3/mm3    Lymphocytes Absolute 2.02 0.70 - 3.10 10*3/mm3    Monocytes Absolute 0.16 0.10 - 0.90 10*3/mm3    nRBC 3.0 (H) 0.0 - 0.2 /100 WBC    Anisocytosis Large/3+ None Seen    Hypochromia Slight/1+ None Seen    Macrocytes Slight/1+ None Seen    Poikilocytes Mod/2+ None Seen    Polychromasia Slight/1+ None Seen    Stomatocytes Mod/2+ None Seen    Platelet Morphology Normal Normal   Lactic Acid, Plasma    Specimen: Arm, Right; Blood   Result Value Ref Range    Lactate 3.7 (C) 0.5 - 2.0 mmol/L   High Sensitivity Troponin T 2Hr    Specimen: Hand, Right; Blood   Result Value Ref Range    HS Troponin T 84 (C) <14 ng/L    Troponin T Delta -9 (L) >=-4 - <+4 ng/L   Ammonia    Specimen: Arm, Right; Blood   Result Value Ref Range    Ammonia 15 11 - 51 umol/L   STAT Lactic Acid, Reflex    Specimen: Arm, Right; Blood   Result Value Ref Range    Lactate 2.5 (C) 0.5 - 2.0 mmol/L   Fentanyl, Urine - Urine, Clean Catch    Specimen: Urine, Clean Catch   Result Value Ref Range    Fentanyl, Urine Negative Negative   Urinalysis, Microscopic Only - Urine, Clean Catch    Specimen: Urine, Clean Catch    Result Value Ref Range    RBC, UA 6-10 (A) None Seen, 0-2 /HPF    WBC, UA 0-2 None Seen, 0-2 /HPF    Bacteria, UA Trace (A) None Seen /HPF    Squamous Epithelial Cells, UA 7-12 (A) None Seen, 0-2 /HPF    Transitional Epithelial Cells, UA 0-2 0 - 2 /HPF    Yeast, UA Moderate/2+ Budding Yeast None Seen /HPF    Hyaline Casts, UA None Seen None Seen /LPF    Methodology Manual Light Microscopy    ECG 12 Lead Tachycardia   Result Value Ref Range    QT Interval 268 ms    QTC Interval 430 ms   ECG 12 Lead Tachycardia   Result Value Ref Range    QT Interval 402 ms    QTC Interval 538 ms   ECG 12 Lead Rhythm Change   Result Value Ref Range    QT Interval 444 ms    QTC Interval 534 ms   Type & Screen    Specimen: Arm, Right; Blood   Result Value Ref Range    ABO Type A     RH type Positive     Antibody Screen Positive     T&S Expiration Date 11/15/2023 11:59:59 PM    Prepare RBC, 2 Units   Result Value Ref Range    Product Code C4347M22     Unit Number [id number]     UNIT  ABO A     UNIT  RH POS     Crossmatch Interpretation Compatible     Dispense Status XM     Blood Expiration Date 202312012359     Blood Type Barcode 6200     Product Code M3589Z31     Unit Number [id number]     UNIT  ABO A     UNIT  RH POS     Crossmatch Interpretation Compatible     Dispense Status XM     Blood Expiration Date 202312062359     Blood Type Barcode 6200    Antibody Identification    Specimen: Arm, Right; Blood   Result Value Ref Range    Cold Autoantibody COLD AUTO ANTIBODY    Antibody Screen    Specimen: Arm, Right; Blood   Result Value Ref Range    AMARILIS Screen Negative     Cold Screen Positive    Green Top (Gel)   Result Value Ref Range    Extra Tube Hold for add-ons.    Lavender Top   Result Value Ref Range    Extra Tube hold for add-on    Gold Top - SST   Result Value Ref Range    Extra Tube Hold for add-ons.    Light Blue Top   Result Value Ref Range    Extra Tube Hold for add-ons.            ED Course  ED Course as of  11/13/23 0545   [place] Nov 12, 2023   2308 Patient's rate was highly concerning during physical exam requested the patient be placed on his Zolls rhythm on zolls was highly concerning for V. tach.  Requested assistance of Dr. Lama.  Dr. Box initiated 150 of amiodarone followed by amiodarone drip.  Patient also received mag.  Secondary to acuity of this patient patient will be endorsed to Dr. Box. [RB]   2310 Pt endorsed to Dr. Box [RB]   Mon Nov 13, 2023   0129 Patient was initially seen and evaluated by midlevel provider who rapidly requested my assistance at bedside urgently.  Patient had presented by EMS with  [LK]   0247 Patient is a heart failure patient on Eliquis and hemoglobin goal should be 10.  She has been slowly trending downward and been receiving iron transfusion in outpatient clinic.    Patient sustained wide-complex unstable ventricular tachycardia earlier in the shift was ultimately able to be stabilized.     [LK]   0253 ECG 12 Lead Rhythm Change  Repeat EKG shows sinus rhythm with short OR interval at this time with a left bundle branch block that is present no acute ST elevation.  Persistent QT prolongation at 534 ms.  Ventricular rate 87  QTc 146  Electronically signed by Juanita Box DO, 11/13/23, 2:54 AM EST.   [LK]   0317 Patient care was handed off to Pierre Fields as he was directly involved initially assessing patient at bedside.  Patient has been medically stabilized and has been hemodynamically stable for several hours prior to handing patient off.  At this time there is no other further medical interventions that are needed urgently in the ER and she is stable to be transferred to PCU or ICU.  Patient does have antibodies to blood which they are working on crossmatching her units of bloods    Electronically signed by Juanita Box DO, 11/13/23, 3:19 AM EST.     [LK]   0329 XR  [RB]   0438 CT chest rad interpreted:  1.  Enlarged heart size.  2.  Splenomegaly.  3.  Minimal  atelectasis at the lung bases.  4.  No edema, pleural effusion, or pneumothorax.  5.  Cholecystectomy.  6.  Very small right thyroid lobe nodule.   [RB]   0538 Per request of Dr. An pt not to receive blood.  [RB]   0544 Discussed case with on-call cardiologist Dr. Ken, request patient's Eliquis to be stopped and placed on heparin.  Also requested the interventional cardiology be aware of this patient.  Agreeable to consult on this patient. [RB]   0544 Patient discussed with Dr. An who is agreeable to admit this patient.  Secondary to there not being any availability within the CCU patient be boarded in the ED until bed becomes available. [RB]      ED Course User Index  [LK] Juanita Box DO  [RB] Pierre Fields II, PA         Patient required extensive aggressive medical intervention to prevent rapid decompensation to death.  Ultimately after roughly 60 minutes at bedside patient was able to be converted to atrial fibrillation with a rate control.  Over the course of several hours patient has continued to improve from a hemodynamic standpoint remains in A-fib with a heart rate variable between 86 and 112.  Pressure has averaged a MAP of greater than 65 with a current pressure of 95/51 at 0219.    On review of patient's medical records it appears that she currently is not taking metolazone which had been a long-term medication for her of 10 mg daily.    -Please note that patient has a extremely poor understanding of her chronic medical conditions, current medication regiment as well as dosing and frequency.  An updated medication record needs to be obtained from pharmacy in order to establish a baseline medication regiment for patient at time of discharge.                                  Medical Decision Making  Problems Addressed:  Atrial fibrillation with RVR: complicated acute illness or injury  Congestive heart failure, unspecified HF chronicity, unspecified heart failure type: complicated acute  illness or injury  Other cirrhosis of liver: complicated acute illness or injury  Prolonged Q-T interval on ECG: complicated acute illness or injury  Severe sepsis: complicated acute illness or injury  Sustained monomorphic ventricular tachycardia: complicated acute illness or injury  Symptomatic anemia: complicated acute illness or injury    Amount and/or Complexity of Data Reviewed  Labs: ordered.  Radiology: ordered. Decision-making details documented in ED Course.  ECG/medicine tests: ordered. Decision-making details documented in ED Course.    Risk  OTC drugs.  Prescription drug management.  Decision regarding hospitalization.        Final diagnoses:   Severe sepsis   Other cirrhosis of liver   Atrial fibrillation with RVR   Symptomatic anemia   Congestive heart failure, unspecified HF chronicity, unspecified heart failure type   Sustained monomorphic ventricular tachycardia   Prolonged Q-T interval on ECG       ED Disposition  ED Disposition       ED Disposition   Intended Admit    Condition   --    Comment   --               No follow-up provider specified.       Medication List        ASK your doctor about these medications      busPIRone 10 MG tablet  Commonly known as: BUSPAR  Ask about: Which instructions should I use?     pregabalin 150 MG capsule  Commonly known as: LYRICA  Ask about: Which instructions should I use?                 Pierre Fields II, PA  11/13/23 0545      Electronically signed by Pierre Fields II, PA at 11/13/23 0545       Facility-Administered Medications as of 11/13/2023   Medication Dose Route Frequency Provider Last Rate Last Admin    [COMPLETED] amiodarone (CORDARONE) injection   Intravenous Code / Trauma / Sedation Medication Juanita Box DO   150 mg at 11/12/23 2635    [COMPLETED] amiodarone 360 mg in 200 mL D5W infusion   Intravenous Code / Trauma / Sedation Continuous Med Juanita Box DO   Stopped at 11/13/23 0017    sennosides-docusate (PERICOLACE) 8.6-50 MG per tablet 2  tablet  2 tablet Oral BID Tomás An MD        And    polyethylene glycol (MIRALAX) packet 17 g  17 g Oral Daily PRN Tomás An MD        And    bisacodyl (DULCOLAX) EC tablet 5 mg  5 mg Oral Daily PRN Tomás An MD        And    bisacodyl (DULCOLAX) suppository 10 mg  10 mg Rectal Daily PRN Tomás An MD        [COMPLETED] cefepime 2000 mg IVPB in 100 ml NS (VTB)  2,000 mg Intravenous Once Juanita Box DO   Stopped at 11/13/23 0104    dextrose (D50W) (25 g/50 mL) IV injection 25 g  25 g Intravenous Q15 Min PRN Tomás An MD        dextrose (GLUTOSE) oral gel 15 g  15 g Oral Q15 Min PRN Tomás An MD        [COMPLETED] doxycycline (VIBRAMYCIN) 100 mg in sodium chloride 0.9 % 100 mL IVPB-VTB  100 mg Intravenous Once Juanita Box DO   Stopped at 11/13/23 0125    glucagon HCl (Diagnostic) injection 1 mg  1 mg Intramuscular Q15 Min PRN Tomás An MD        insulin glargine (LANTUS, SEMGLEE) injection 20 Units  20 Units Subcutaneous Daily Dave Bishop MD   20 Units at 11/13/23 1305    insulin regular (humuLIN R,novoLIN R) injection 3-14 Units  3-14 Units Subcutaneous Q6H Tomás An MD   5 Units at 11/13/23 1212    [COMPLETED] lidocaine (cardiac) (XYLOCAINE) injection   Intravenous Code / Trauma / Sedation Medication Juanita Box DO   100 mg at 11/12/23 2309    [COMPLETED] magnesium sulfate injection    Code / Trauma / Sedation Medication Juanita Box DO   1 g at 11/12/23 2257    [COMPLETED] metoclopramide (REGLAN) injection 10 mg  10 mg Intravenous Once Juanita Box DO   10 mg at 11/12/23 2349    [COMPLETED] metoprolol tartrate (LOPRESSOR) injection 2.5 mg  2.5 mg Intravenous Once Juanita Box DO   2.5 mg at 11/12/23 2358    [COMPLETED] morphine injection 2 mg  2 mg Intravenous Once Francisco Myrick MD   2 mg at 11/13/23 0515    nitroglycerin (NITROSTAT) SL tablet 0.4 mg  0.4 mg Sublingual Q5 Min PRN Juve  Tomás Redman MD        Pharmacy to Dose Heparin   Does not apply Continuous PRN Tomás An MD        sodium chloride 0.9 % flush 10 mL  10 mL Intravenous PRN Tomás An MD        sodium chloride 0.9 % flush 10 mL  10 mL Intravenous Q12H Tomás An MD   10 mL at 11/13/23 0900    sodium chloride 0.9 % flush 10 mL  10 mL Intravenous PRN Tomás An MD        sodium chloride 0.9 % infusion 40 mL  40 mL Intravenous PRN Tomás An MD                Consult Notes (all)        Charlee Ken MD at 11/13/23 0859        Consult Orders    1. Inpatient Cardiology Consult [[id number]] ordered by Tomás An MD                     [facility]  CONSULT  NOTE      Patient information:  Date of Admit: 11/12/2023  Date of Consult: 11/13/23  Hospitalist/Referring MD:Tomás An MD;   PCP: Masha Davidson APRN  MRN:  [id number]  Visit Number:  [id number]    LOS: 0  CODE STATUS:  Code Status and Medical Interventions:   Ordered at: 11/13/23 0558     Code Status (Patient has no pulse and is not breathing):    CPR (Attempt to Resuscitate)     Medical Interventions (Patient has pulse or is breathing):    Full Support       PROBLEM LIST: Principal Problem:    Ventricular tachycardia      Inpatient Cardiology Consult  Consult performed by: Anastasiia Mclaughlin APRN  Consult ordered by: Tomás An MD        08:59 EST  11/13/2023    General Cardiology Consulting Physician: Dr. Charlee Ken MD    Assessment    Atrial fibrillation with RVR status post direct current cardioversion in the emergency room with the patient now in sinus rhythm, CHADS VASc of at least 3  Dilated cardiomyopathy possibly partly related to tachycardia induced  Status post PVI on October 2022 with recurrent A-fib  Chronic anticoagulation with Eliquis  Nonobstructive coronary artery disease per cardiac catheterization 11/20/2020 with  30 to 40% LAD lesion  Diabetes mellitus type 2  Liver cirrhosis  CKD stage IIIa          Recommendations   1.  I reviewed the EKGs looks like more likely atrial fibrillation with RVR in the setting of left bundle branch block rather than ventricular tachycardia, right now she is in sinus rhythm I discussed the case also with Dr. Katz of electrophysiology for possible consideration of AV node ablation and BiV defibrillator placement we will monitor for her for now for further assessment and further control of her atrial fibrillation we will continue with the amiodarone  2.  We will try to diurese her to keep her negative about a liter  3.  Continue GDMT medications  4.  Elevated high-sensitivity troponin most likely secondary to atrial fibrillation with RVR CHF and shock        Reason for Cardiology consultation: Ventricular tachycardia    Subjective Data   ADMISSION INFORMATION:  Chief Complaint   Patient presents with    Rapid Heart Rate     History of Present Illness    Yumiko Purdy is a 57 y.o. female with a past medical history significant for systolic CHF (EF 21-25% per last ECHO 9/11/2020), afib on Eliquis s/p ablation 10/2022, cirrhosis, insulin dependent type II DM, hypothyroidism, and what appears to be borderline stage IIIa-b CKD (creatinin ranges 1.2-1.5 and GFR ranges 42-50).    Patient [facility] to [facility] emergency room (ER) on 11/12/2023 with complaints of acute onset palpitations that occurred just prior to arrival to the ER. EMS reported they found her in SVT and gave her 6mg IV adenosine followed by 12mg adenosine along with a fluid bolus. When she arrived to the ED, initial EKG reported afib w/RVR, but upon further review on Zolls monitor, she appeared to be in Ventricular tachycardia. Thus she was loaded with IV amiodarone and subsequently converted to afib and has been going back and forth between sinus and afib since. Initial EKG showed intraventricular block, and repeat  "EKG's thereafter showed LBBB which was not present on prior EKGs before today. Labs showed elevated troponin 93 that trended down to 84 on repeat, BNP elevated at 2746, + 3.5, mag 2.1, BUN 36 with Cr 1.46, Glucose 424, alk phos 127 and bilirubin 2.7.  TSH was 4.680 while free T4 was 1.78.   CT chest showed cardiomegaly, splenomegaly, minimal atelectasis at lung bases, and no edema, pleural effusion or pneumothorax. Patient is being admitted to the CCU for further workup and management.       Cardiology has been consulted for further evaluation and management.     Primary Cardiologist has been Jad Maravilla MD (Cardiologist), and Samy Claude Elayi, MD (EP) and she was last seen in the office on 01/27/2023.     Patient is also followed in [place] heart failure clinic with her last appointment being 09/29/2023.    Patient is in room  and was examined by Dr. Ken.  Patient is lying in bed resting quietly.  No acute distress noted at this time.  Telemetry reveals atrial fibrillation 90s at this time.  Patient reports, \"I still feel pretty rough but I am better than I was\".  Patient reports compliancy with Eliquis.  Patient reports she has not seen a cardiologist since she was seen at [facility] as she was looking for one more local.  Patient reports she has had intermittent heart fast heart rate but yesterday was her worst episode.    Known medications given enroute vis EMS and in the ER:         Cardiac risk factors:diabetes mellitus and Obesity      Last Echo: Results for orders placed during the hospital encounter of 09/09/20    Adult Transesophageal Echo ([facility]) W/ Cont if Necessary Per Protocol (Cardiology Department)    Interpretation Summary  · Ejection fraction appears to be 21 - 25%. Left ventricular systolic function is severely decreased.  · Right ventricular cavity is mildly dilated. Moderately reduced right ventricular systolic function noted.  · Trace mitral valve regurgitation " is present.  · Moderate tricuspid valve regurgitation is present. Estimated right ventricular systolic pressure from tricuspid regurgitation is normal (<35 mmHg).  · There is (grade 1) plaque in the proximal aorta present. There is (grade 1) plaque in the ascending aorta present. There is (grade 1) plaque in the aortic arch present. There is (grade 1) plaque in the descending aorta present.         Last Stress: Results for orders placed during the hospital encounter of 10/15/20    Nuclear Medicine Cardiac Blood Pool [place] At Rest    Interpretation Summary  EXAMINATION: NUCLEAR MEDICINE CARDIAC BLOOD POOL [facility] AT REST-    CLINICAL INDICATION: Cardiomyopathy  TECHNIQUE: 21 mCi of Tc-99m autologous RBCs were injected IV after  in-vitro RBC labeling. Gated cardiac imaging was performed in the  anterior and left anterior oblique.  COMPARISON: None  FINDINGS: The left ventricle is normal in size with normal systolic  function. The calculated left ventricular ejection fraction (LVEF) = 38  %.  The right and left atria, aorta and pulmonary artery are normal. The  right ventricle is normal in size.    Impression  LVEF: 38%, at rest.    This report was finalized on 10/16/2020 11:57 AM by Dr. Marlo Parr MD.        Last Cath: Results for orders placed during the hospital encounter of 11/10/20    Cardiac Catheterization/Vascular Study    Narrative  Procedure type:  Left heart cath, LV gram, bilateral selective cholangiogram    Indication:  Cardiomyopathy    Procedure:  After informed consent the patient was brought into the cardiac aspiration lab she was prepped and draped in the usual sterile manner the right radial area was incised with 2% Xylocaine however several attempts to engage into the right radial artery was not successful so the right radial artery access was abandoned and the right groin area was excised in 2% Xylocaine right femoral artery was accessed using modified standard technique and 5 [place]riot side-port  arterial sheath was placed and secured then over a guidewire a 5 [place] JL 4 catheter was used left main coronary artery with angiographic pressures were taken then the catheter was removed and over a guidewire a 5 [place] JR4 catheter was used and engagement of the right coronary arteries angioplasty was taken then the catheter was removed then over a guidewire 5 [place] pigtail catheter used aortic valve was done hand-injection LV gram was done then pullback was done then the catheter was removed groin sheath was removed and minx closure device applied with good hemostasis the patient was transported back to her room in stable condition.    Results:  The patient LVEDP was 17 mmHg with hand-injection showing preserved left ventricular systolic function wall motion EF 50-55% with no significant aortic gradient on pullback.    Cholangiogram:  This right dominant system.  Left main cardiac angiographically normal and bifurcates into left and descending and left circumflex arteries.  LAD was normal caliber gives several septal perforators and diagonal branches and wraps around the apex LAD about 30 to 40% mid stenosis.  Left circumflex artery was nondominant for posterior circulation gives rise to several obtuse marginal branches left circumflex arteries branches angiographically normal.  The right coronary artery was a large artery was dominant for posterior circulation giving rise to acute marginal branches PDA and posterolateral branches the right coronary artery and its branches angiographically normal.    Conclusion:  Preserved LV systolic function ejection fraction 50-55% with elevated left ventricular end-diastolic pressure consistent with diastolic dysfunction coronary angiogram showed right dominant system with 30 to 40% mid LAD lesion otherwise coronaries arteries were normal.  Plan of care:  Medical management and secondary preventive measurements of coronary disease good lipid control blood pressure control  healthy lifestyle patient is to follow-up with her primary care physician for further management.                            Past Medical History:   Diagnosis Date    Anemia     CHF (congestive heart failure)     Chronic a-fib     stated by patient     Cirrhosis of liver     stated by patient     Diabetes mellitus      Past Surgical History:   Procedure Laterality Date    APPENDECTOMY      CARDIAC CATHETERIZATION N/A 11/10/2020    Procedure: Left Heart Cath;  Surgeon: Charlee Ken MD;  Location: [facility] CATH INVASIVE LOCATION;  Service: Cardiovascular;  Laterality: N/A;    CHOLECYSTECTOMY      TOE AMPUTATION Right     stated by patient.      No family history on file.  Social History     Tobacco Use    Smoking status: Never    Smokeless tobacco: Never   Substance Use Topics    Alcohol use: Never    Drug use: Never     ALLERGIES: Phenergan [promethazine]    Medications listed below are reported home medications pulling from within the system:  (Not in a hospital admission)      Review of Systems   Constitutional:  Negative for activity change, appetite change and diaphoresis.   HENT:  Negative for facial swelling and trouble swallowing.    Eyes:  Negative for visual disturbance.   Respiratory:  Positive for shortness of breath.    Cardiovascular:  Positive for chest pain and palpitations. Negative for leg swelling.        Pain radiating to her neck and back.   Gastrointestinal:  Negative for blood in stool, nausea and vomiting.   Endocrine: Negative.    Genitourinary:  Negative for hematuria.   Musculoskeletal:  Negative for gait problem.   Skin:  Negative for color change.   Neurological:  Negative for dizziness, syncope, speech difficulty, weakness, light-headedness and headaches.   Psychiatric/Behavioral:  Negative for agitation and behavioral problems.      Objective Data   Vital Signs  Temp:  [97.8 °F (36.6 °C)] 97.8 °F (36.6 °C)  Heart Rate:  [] 95  Resp:  [18] 18  BP: ()/() 94/51  Flow  (L/min):  [2] 2  Device (Oxygen Therapy): nasal cannula  Vital Signs (last 72 hrs)         11/10 0700  11/11 0659 11/11 0700  11/12 0659 11/12 0700  11/13 0659 11/13 0700  11/13 0859   Most Recent      Temp (°F)       97.8       97.8 (36.6) 11/12 2243    Heart Rate     84 -  192    90 -  95     95 11/13 0720    Resp       18       18 11/12 2243    BP     61/61 -  115/107    85/54 -  98/55     94/51 11/13 0720    SpO2 (%)     75 -  100      100     100 11/13 0716    Flow (L/min)       2       2 11/12 2243          BMI:   Body mass index is 41.6 kg/m².  WEIGHT:  Wt Readings from Last 3 Encounters:   11/12/23 113 kg (250 lb)   10/15/23 113 kg (250 lb)   09/29/23 109 kg (240 lb)     DIET:  NPO Diet NPO Type: Strict NPO  I&O:  Intake & Output (last 3 days)         11/10 0701  11/11 0700 11/11 0701  11/12 0700 11/12 0701  11/13 0700 11/13 0701 11/14 0700    I.V. (mL/kg)   41.6 (0.4)     IV Piggyback   200     Total Intake(mL/kg)   241.6 (2.1)     Net   +241.6                     Physical Exam  Constitutional:       Appearance: Normal appearance.      Comments: BMI 41.6.  Tired appearing.    HENT:      Head: Normocephalic and atraumatic.      Nose: Nose normal.      Mouth/Throat:      Mouth: Mucous membranes are moist.      Pharynx: Oropharynx is clear.   Eyes:      Conjunctiva/sclera: Conjunctivae normal.   Cardiovascular:      Rate and Rhythm: Normal rate. Rhythm irregular.      Pulses: Normal pulses.      Heart sounds: Normal heart sounds.   Pulmonary:      Effort: Pulmonary effort is normal.      Breath sounds: Normal breath sounds.   Abdominal:      General: Bowel sounds are normal.      Palpations: Abdomen is soft.   Musculoskeletal:         General: Normal range of motion.      Cervical back: Normal range of motion.   Skin:     General: Skin is warm and dry.   Neurological:      General: No focal deficit present.      Mental Status: She is alert and oriented to person, place, and time.   Psychiatric:         Mood  "and Affect: Mood normal.         Behavior: Behavior normal.       Results review   Results Review:    I have reviewed the patient's new clinical results. 11/13/23 08:59 EST    Results from last 7 days   Lab Units 11/13/23  0124 11/12/23 2307   CK TOTAL U/L  --  41   HSTROP T ng/L 84* 93*     Lab Results   Component Value Date    PROBNP 2,746.0 (H) 11/12/2023    PROBNP 555.6 09/29/2023    PROBNP 630.5 08/17/2023     Results from last 7 days   Lab Units 11/13/23  0711 11/12/23  2307   WBC 10*3/mm3 13.11* 15.57*   HEMOGLOBIN g/dL 8.9* 8.4*   PLATELETS 10*3/mm3 118* 142     Results from last 7 days   Lab Units 11/13/23  0711 11/12/23 2307   SODIUM mmol/L 135* 132*   POTASSIUM mmol/L 3.4* 3.5   CHLORIDE mmol/L 98 91*   CO2 mmol/L 26.7 26.7   BUN mg/dL 32* 36*   CREATININE mg/dL 1.32* 1.46*   CALCIUM mg/dL 8.8 8.9   GLUCOSE mg/dL 256* 424*   ALT (SGPT) U/L 18 21   AST (SGOT) U/L 20 21     Lab Results   Component Value Date    MG 2.1 11/12/2023    MG 2.3 09/29/2023    MG 2.3 08/17/2023     Lab Results   Component Value Date    CHOL 137 09/11/2020    TRIG 80 09/11/2020    HDL 43 09/11/2020    LDL 78 09/11/2020     Estimated Creatinine Clearance: 59.7 mL/min (A) (by C-G formula based on SCr of 1.32 mg/dL (H)).  Lab Results   Component Value Date    HGBA1C 8.40 (H) 09/10/2020    HGBA1C 5.4 04/21/2016     Lab Results   Component Value Date    INR 1.20 (H) 11/12/2023    INR 1.10 12/15/2022    INR 1.25 (H) 09/23/2020    INR 1.09 09/22/2020    INR 1.17 (H) 09/21/2020    INR 1.26 (H) 09/21/2020    INR 1.55 (H) 09/20/2020     No components found for: \"DIG\"  Lab Results   Component Value Date    TSH 4.680 (H) 11/12/2023      No results found for: \"URICACID\"  Pain Management Panel  More data exists         Latest Ref Rng & Units 11/13/2023 9/12/2020   Pain Management Panel   Creatinine, Urine mg/dL  mg/dL - 46.3  46.3    Amphetamine, Urine Qual Negative Negative  -   Barbiturates Screen, Urine Negative Negative  -   Benzodiazepine " "Screen, Urine Negative Negative  -   Buprenorphine, Screen, Urine Negative Negative  -   Cocaine Screen, Urine Negative Negative  -   Fentanyl, Urine Negative Negative  -   Methadone Screen , Urine Negative Negative  -   Methamphetamine, Ur Negative Negative  -     Microbiology Results (last 10 days)       ** No results found for the last 240 hours. **           No results found for: \"BLOODCX\"      EC2023      ECG/EMG Results (last 24 hours)       Procedure Component Value Units Date/Time    ECG 12 Lead Tachycardia [[id number]] Collected: 23     Updated: 23     QT Interval 268 ms      QTC Interval 430 ms     Narrative:      Test Reason : Tachycardia  Blood Pressure :   */*   mmHG  Vent. Rate : 155 BPM     Atrial Rate : 166 BPM     P-R Int :   * ms          QRS Dur : 142 ms      QT Int : 268 ms       P-R-T Axes :   * -49 117 degrees     QTc Int : 430 ms    ** Critical Test Result: High HR  Atrial fibrillation with rapid ventricular response  Left axis deviation  Nonspecific intraventricular block  Possible Anterolateral infarct (cited on or before 10-SEP-2020)  Abnormal ECG  When compared with ECG of 16-SEP-2020 11:22,  Significant changes have occurred    Referred By: RAVI           Confirmed By:     ECG 12 Lead Tachycardia [[id number]] Collected: 23 2332     Updated: 23 2355     QT Interval 402 ms      QTC Interval 538 ms     Narrative:      Test Reason : Tachycardia  Blood Pressure :   */*   mmHG  Vent. Rate : 108 BPM     Atrial Rate : 111 BPM     P-R Int :   * ms          QRS Dur : 146 ms      QT Int : 402 ms       P-R-T Axes :   * -44  65 degrees     QTc Int : 538 ms    Atrial fibrillation with rapid ventricular response  Left axis deviation  Left bundle branch block  Abnormal ECG  When compared with ECG of 2023 22:43, (Unconfirmed)  No significant change was found    Referred By:            Confirmed By:     ECG 12 Lead " Rhythm Change [[id number]] Collected: 11/13/23 0221     Updated: 11/13/23 0222     QT Interval 444 ms      QTC Interval 534 ms     Narrative:      Test Reason : Rhythm Change  Blood Pressure :   */*   mmHG  Vent. Rate :  87 BPM     Atrial Rate :  87 BPM     P-R Int :  96 ms          QRS Dur : 146 ms      QT Int : 444 ms       P-R-T Axes :   * -45  65 degrees     QTc Int : 534 ms    Sinus rhythm with short CO  Left bundle branch block  Abnormal ECG  When compared with ECG of 12-NOV-2023 23:32, (Unconfirmed)  Sinus rhythm has replaced Atrial fibrillation    Referred By: RAVI           Confirmed By:     Adult Transthoracic Echo Complete W/ Cont if Necessary Per Protocol [[id number]] Resulted: 11/13/23 0854     Updated: 11/13/23 0854          TELEMETRY:   SR/ST 90s with frequent PVCs          RADIOLOGY STUDIES:  Imaging Results (Last 72 Hours)       Procedure Component Value Units Date/Time    CT Chest Without Contrast Diagnostic [[id number]] Collected: 11/13/23 0418     Updated: 11/13/23 0424    Narrative:      PROCEDURE: CT of the chest performed without IV contrast on November 13, 2023. The examination was performed with 3 mm axial imaging and 3 mm  sagittal and coronal reconstruction images. Total DLP = 1046. The  examination was performed according to as low as reasonably achievable  dose protocol.     HISTORY: Cardiomegaly. CHF. Possible pneumonia. Possible edema.     FINDINGS:     Mild degenerative disc disease throughout the thoracic spine and upper  lumbar spine.  Well-circumscribed areas of relative lucency identified in the lower  thoracic spine and upper lumbar spine levels possibly due to underlying  hemangiomas within the vertebral bodies.  These are seen on image 73, series 5 and image 70, series 5. No chronic  compression fracture deformity.  No acute fracture or dislocation.  Very slight levoconvex curve at the lumbar spine.  Enlarged heart size.  No acute process seen involving the left thyroid  lobe.  Small nodule in the right thyroid lobe.  No thoracic aortic aneurysm.  Coronary arterial vascular calcifications.  No pericardial effusion.  Mild bronchial inflammation.  Minimal atelectasis at the lung bases.  No pleural effusion or pneumothorax.  No pneumomediastinum.  No endobronchial lesion.  Cholecystectomy clips in the right upper quadrant.  Splenomegaly. The spleen measures 16.3 cm in length.  Heterogeneous attenuation to the liver without features of probable  fatty infiltration. There is some mild nodularity to the surface of the  liver suggestive of underlying hepatocellular disease.  No free fluid or free air in the upper abdomen.       Impression:         1.  Enlarged heart size.  2.  Splenomegaly.  3.  Minimal atelectasis at the lung bases.  4.  No edema, pleural effusion, or pneumothorax.  5.  Cholecystectomy.  6.  Very small right thyroid lobe nodule.        This report was finalized on 11/13/2023 4:22 AM by Braydon Johnson MD.       XR Chest 1 View [[id number]] Collected: 11/13/23 0323     Updated: 11/13/23 0326    Narrative:      PROCEDURE: Portable chest x-ray examination performed on November 13, 2023. Single view. Upright position.     HISTORY: Chest pain. Tachycardia.     FINDINGS:     Slight prominence of the heart size  Mild central pulmonary vascular congestion.  No edema, pneumonia, pleural effusion, or pneumothorax.       Impression:         1.  Mild central pulmonary vascular congestion.  2.  No edema.  3.  No pneumonia.  4.  No pleural effusion. No pneumothorax.  5.  Slight prominence to the heart size.     This report was finalized on 11/13/2023 3:24 AM by Braydon Johnson MD.               ALLERGIES: Phenergan [promethazine]    CURRENT MEDICATIONS:  Current list of medications may not reflect those currently placed in orders that are not signed or are being held.     insulin regular, 3-14 Units, Subcutaneous, Q6H  senna-docusate sodium, 2 tablet, Oral, BID  sodium chloride, 10 mL,  Intravenous, Q12H      Pharmacy to Dose Heparin,         senna-docusate sodium **AND** polyethylene glycol **AND** bisacodyl **AND** bisacodyl    dextrose    dextrose    glucagon (human recombinant)    nitroglycerin    Pharmacy to Dose Heparin    [COMPLETED] Insert Peripheral IV **AND** sodium chloride    sodium chloride    sodium chloride        Thank you very much for asking us to be involved in this patient's care.  We will follow along with you. Please do not hesitate to call for any questions or concerns.     I have discussed the patients findings and recommendations with patient.    Electronically signed by KENNY Finn, 11/13/23, 11:13 AM EST.   Electronically signed by Charlee Ken MD, 11/13/23, 11:17 AM EST.                      Please note that portions of this note were copied and has been reviewed and is accurate as of 11/13/2023 .      Please note that portions of this note were completed with a voice recognition program.     Electronically signed by Charlee Ken MD at 11/13/23 1129

## 2023-11-13 NOTE — PROGRESS NOTES
[facility]  Yumiko Purdy is a  57 y.o. female set to receive heparin infusion when anti-Xa less than 1. On apixaban prior to admission.     Therapy for (VTE/Cardiac):   Cardiac - h/o A fib, may need cardioversion vs AICD  Patient Dosing Weight: 113 kg  Initial Bolus (Y/N):   Y  Any Bolus (Y/N):   Y        Signs or Symptoms of Bleeding: none    Cardiac or Other (Not VTE)   Initial Bolus: 60 units/kg (Max 4,000 units)  Initial rate: 12 units/kg/hr (Max 1,000 units/hr)   Anti Xa Rebolus Infusion Hold time Change infusion Dose (Units/kg/hr) Next Anti Xa or aPTT Level Due   < 0.11 50 Units/kg  (4000 Units Max) None Increase by  3 Units/kg/hr 6 hours   0.11- 0.19 25 Units/kg  (2000 Units Max) None Increase by  2 Units/kg/hr 6 hours   0.2 - 0.29 0 None Increase by  1 Units/kg/hr 6 hours   0.3 - 0.5 0 None No Change 6 hours (after 2 consecutive levels in range check q24h @0700)   0.51 - 0.6 0 None Decrease by  1 Units/kg/hr 6 hours   0.61 - 0.8 0 30 Minutes Decrease by  2 Units/kg/hr 6 hours   0.81 - 1 0 60 Minutes Decrease by  3 Units/kg/hr 6 hours   >1 0 Hold  After Anti Xa less than 0.5 decrease previous rate by  4 Units/kg/hr  Every 2 hours until Anti Xa  less than 0.5 then when infusion restarts in 6 hours         Recommend anti-Xa every 6 hours.          Date   Time   Anti-Xa Current Rate (Unit/kg/hr) Bolus   (Units) Rate Change   (Unit/kg/hr) New Rate (Unit/kg/hr) Next   Anti-Xa Comments  Pump Check Daily   11/13 0711 > 1.1 Hold  0 - Hold  1300 Initial anti-Xa over 1.1 is consistent with apixaban PTA. Discussed with Dr. Ken. Plan to begin heparin infusion per DOAC exposure nomogram when anti-Xa falls below 1 in case cardioversion, etc needed, so rechecking anti-Xa in 6 hrs. Discussed with Glenys in ED also.                                                                                                                                                                                                                                   Pharmacy will continue to follow anti-Xa results and monitor for signs and symptoms of bleeding or thrombosis.    Isaura Clemente, PharmD  11/13/23 09:14 EST

## 2023-11-13 NOTE — CONSULTS
[facility]  CONSULT  NOTE      Patient information:  Date of Admit: 11/12/2023  Date of Consult: 11/13/23  Hospitalist/Referring MD:Tomás An MD;   PCP: Masha Davidson APRN  MRN:  [id number]  Visit Number:  [id number]    LOS: 0  CODE STATUS:  Code Status and Medical Interventions:   Ordered at: 11/13/23 0558     Code Status (Patient has no pulse and is not breathing):    CPR (Attempt to Resuscitate)     Medical Interventions (Patient has pulse or is breathing):    Full Support       PROBLEM LIST: Principal Problem:    Ventricular tachycardia      Inpatient Cardiology Consult  Consult performed by: Anastasiia Mclaughlin APRN  Consult ordered by: Tomás An MD        08:59 EST  11/13/2023    General Cardiology Consulting Physician: Dr. Charlee Ken MD    Assessment    Atrial fibrillation with RVR status post direct current cardioversion in the emergency room with the patient now in sinus rhythm, CHADS VASc of at least 3  Dilated cardiomyopathy possibly partly related to tachycardia induced  Status post PVI on October 2022 with recurrent A-fib  Chronic anticoagulation with Eliquis  Nonobstructive coronary artery disease per cardiac catheterization 11/20/2020 with 30 to 40% LAD lesion  Diabetes mellitus type 2  Liver cirrhosis  CKD stage IIIa          Recommendations   1.  I reviewed the EKGs looks like more likely atrial fibrillation with RVR in the setting of left bundle branch block rather than ventricular tachycardia, right now she is in sinus rhythm I discussed the case also with Dr. Katz of electrophysiology for possible consideration of AV node ablation and BiV defibrillator placement we will monitor for her for now for further assessment and further control of her atrial fibrillation we will continue with the amiodarone  2.  We will try to diurese her to keep her negative about a liter  3.  Continue GDMT medications  4.  Elevated  high-sensitivity troponin most likely secondary to atrial fibrillation with RVR CHF and shock        Reason for Cardiology consultation: Ventricular tachycardia    Subjective Data   ADMISSION INFORMATION:  Chief Complaint   Patient presents with    Rapid Heart Rate     History of Present Illness    Yumiko Purdy is a 57 y.o. female with a past medical history significant for systolic CHF (EF 21-25% per last ECHO 9/11/2020), afib on Eliquis s/p ablation 10/2022, cirrhosis, insulin dependent type II DM, hypothyroidism, and what appears to be borderline stage IIIa-b CKD (creatinin ranges 1.2-1.5 and GFR ranges 42-50).    Patient presented to [facility] emergency room (ER) on 11/12/2023 with complaints of acute onset palpitations that occurred just prior to arrival to the ER. EMS reported they found her in SVT and gave her 6mg IV adenosine followed by 12mg adenosine along with a fluid bolus. When she arrived to the ED, initial EKG reported afib w/RVR, but upon further review on Zolls monitor, she appeared to be in Ventricular tachycardia. Thus she was loaded with IV amiodarone and subsequently converted to afib and has been going back and forth between sinus and afib since. Initial EKG showed intraventricular block, and repeat EKG's thereafter showed LBBB which was not present on prior EKGs before today. Labs showed elevated troponin 93 that trended down to 84 on repeat, BNP elevated at 2746, + 3.5, mag 2.1, BUN 36 with Cr 1.46, Glucose 424, alk phos 127 and bilirubin 2.7.  TSH was 4.680 while free T4 was 1.78.   CT chest showed cardiomegaly, splenomegaly, minimal atelectasis at lung bases, and no edema, pleural effusion or pneumothorax. Patient is being admitted to the CCU for further workup and management.       Cardiology has been consulted for further evaluation and management.     Primary Cardiologist has been Jad Maravilla MD (Cardiologist), and Samy Claude Elayi, MD (EP) and she was  "last seen in the office on 01/27/2023.     Patient is also followed in [facility] with her last appointment being 09/29/2023.    Patient is in room  and was examined by Dr. Ken.  Patient is lying in bed resting quietly.  No acute distress noted at this time.  Telemetry reveals atrial fibrillation 90s at this time.  Patient reports, \"I still feel pretty rough but I am better than I was\".  Patient reports compliancy with Eliquis.  Patient reports she has not seen a cardiologist since she was seen at [facility] as she was looking for one more local.  Patient reports she has had intermittent heart fast heart rate but yesterday was her worst episode.    Known medications given enroute vis EMS and in the ER:         Cardiac risk factors:diabetes mellitus and Obesity      Last Echo: Results for orders placed during the hospital encounter of 09/09/20    Adult Transesophageal Echo ([facility]) W/ Cont if Necessary Per Protocol (Cardiology Department)    Interpretation Summary  · Ejection fraction appears to be 21 - 25%. Left ventricular systolic function is severely decreased.  · Right ventricular cavity is mildly dilated. Moderately reduced right ventricular systolic function noted.  · Trace mitral valve regurgitation is present.  · Moderate tricuspid valve regurgitation is present. Estimated right ventricular systolic pressure from tricuspid regurgitation is normal (<35 mmHg).  · There is (grade 1) plaque in the proximal aorta present. There is (grade 1) plaque in the ascending aorta present. There is (grade 1) plaque in the aortic arch present. There is (grade 1) plaque in the descending aorta present.         Last Stress: Results for orders placed during the hospital encounter of 10/15/20    Nuclear Medicine Cardiac Blood Pool [facility] At Rest    Interpretation Summary  EXAMINATION: NUCLEAR MEDICINE CARDIAC BLOOD POOL [facility] AT REST-    CLINICAL INDICATION: Cardiomyopathy  TECHNIQUE: 21 mCi of " Tc-99m autologous RBCs were injected IV after  in-vitro RBC labeling. Gated cardiac imaging was performed in the  anterior and left anterior oblique.  COMPARISON: None  FINDINGS: The left ventricle is normal in size with normal systolic  function. The calculated left ventricular ejection fraction (LVEF) = 38  %.  The right and left atria, aorta and pulmonary artery are normal. The  right ventricle is normal in size.    Impression  LVEF: 38%, at rest.    This report was finalized on 10/16/2020 11:57 AM by Dr. Marlo Parr MD.        Last Cath: Results for orders placed during the hospital encounter of 11/10/20    Cardiac Catheterization/Vascular Study    Narrative  Procedure type:  Left heart cath, LV gram, bilateral selective cholangiogram    Indication:  Cardiomyopathy    Procedure:  After informed consent the patient was brought into the cardiac aspiration lab she was prepped and draped in the usual sterile manner the right radial area was incised with 2% Xylocaine however several attempts to engage into the right radial artery was not successful so the right radial artery access was abandoned and the right groin area was excised in 2% Xylocaine right femoral artery was accessed using modified standard technique and 5 [place] side-port arterial sheath was placed and secured then over a guidewire a 5 [place] JL 4 catheter was used left main coronary artery with angiographic pressures were taken then the catheter was removed and over a guidewire a 5 [place] JR4 catheter was used and engagement of the right coronary arteries angioplasty was taken then the catheter was removed then over a guidewire 5 [place] pigtail catheter used aortic valve was done hand-injection LV gram was done then pullback was done then the catheter was removed groin sheath was removed and minx closure device applied with good hemostasis the patient was transported back to her room in stable condition.    Results:  The patient LVEDP was 17 mmHg  with hand-injection showing preserved left ventricular systolic function wall motion EF 50-55% with no significant aortic gradient on pullback.    Cholangiogram:  This right dominant system.  Left main cardiac angiographically normal and bifurcates into left and descending and left circumflex arteries.  LAD was normal caliber gives several septal perforators and diagonal branches and wraps around the apex LAD about 30 to 40% mid stenosis.  Left circumflex artery was nondominant for posterior circulation gives rise to several obtuse marginal branches left circumflex arteries branches angiographically normal.  The right coronary artery was a large artery was dominant for posterior circulation giving rise to acute marginal branches PDA and posterolateral branches the right coronary artery and its branches angiographically normal.    Conclusion:  Preserved LV systolic function ejection fraction 50-55% with elevated left ventricular end-diastolic pressure consistent with diastolic dysfunction coronary angiogram showed right dominant system with 30 to 40% mid LAD lesion otherwise coronaries arteries were normal.  Plan of care:  Medical management and secondary preventive measurements of coronary disease good lipid control blood pressure control healthy lifestyle patient is to follow-up with her primary care physician for further management.                            Past Medical History:   Diagnosis Date    Anemia     CHF (congestive heart failure)     Chronic a-fib     stated by patient     Cirrhosis of liver     stated by patient     Diabetes mellitus      Past Surgical History:   Procedure Laterality Date    APPENDECTOMY      CARDIAC CATHETERIZATION N/A 11/10/2020    Procedure: Left Heart Cath;  Surgeon: Charlee Ken MD;  Location: [facility] CATH INVASIVE LOCATION;  Service: Cardiovascular;  Laterality: N/A;    CHOLECYSTECTOMY      TOE AMPUTATION Right     stated by patient.      No family history on file.  Social  History     Tobacco Use    Smoking status: Never    Smokeless tobacco: Never   Substance Use Topics    Alcohol use: Never    Drug use: Never     ALLERGIES: Phenergan [promethazine]    Medications listed below are reported home medications pulling from within the system:  (Not in a hospital admission)      Review of Systems   Constitutional:  Negative for activity change, appetite change and diaphoresis.   HENT:  Negative for facial swelling and trouble swallowing.    Eyes:  Negative for visual disturbance.   Respiratory:  Positive for shortness of breath.    Cardiovascular:  Positive for chest pain and palpitations. Negative for leg swelling.        Pain radiating to her neck and back.   Gastrointestinal:  Negative for blood in stool, nausea and vomiting.   Endocrine: Negative.    Genitourinary:  Negative for hematuria.   Musculoskeletal:  Negative for gait problem.   Skin:  Negative for color change.   Neurological:  Negative for dizziness, syncope, speech difficulty, weakness, light-headedness and headaches.   Psychiatric/Behavioral:  Negative for agitation and behavioral problems.      Objective Data   Vital Signs  Temp:  [97.8 °F (36.6 °C)] 97.8 °F (36.6 °C)  Heart Rate:  [] 95  Resp:  [18] 18  BP: ()/() 94/51  Flow (L/min):  [2] 2  Device (Oxygen Therapy): nasal cannula  Vital Signs (last 72 hrs)         11/10 0700  11/11 0659 11/11 0700  11/12 0659 11/12 0700  11/13 0659 11/13 0700  11/13 0859   Most Recent      Temp (°F)       97.8       97.8 (36.6) 11/12 2243    Heart Rate     84 -  192    90 -  95     95 11/13 0720    Resp       18       18 11/12 2243    BP     61/61 -  115/107    85/54 -  98/55     94/51 11/13 0720    SpO2 (%)     75 -  100      100     100 11/13 0716    Flow (L/min)       2       2 11/12 2243          BMI:   Body mass index is 41.6 kg/m².  WEIGHT:  Wt Readings from Last 3 Encounters:   11/12/23 113 kg (250 lb)   10/15/23 113 kg (250 lb)   09/29/23 109 kg (240 lb)      DIET:  NPO Diet NPO Type: Strict NPO  I&O:  Intake & Output (last 3 days)         11/10 0701 11/11 0700 11/11 0701 11/12 0700 11/12 0701 11/13 0700 11/13 0701 11/14 0700    I.V. (mL/kg)   41.6 (0.4)     IV Piggyback   200     Total Intake(mL/kg)   241.6 (2.1)     Net   +241.6                     Physical Exam  Constitutional:       Appearance: Normal appearance.      Comments: BMI 41.6.  Tired appearing.    HENT:      Head: Normocephalic and atraumatic.      Nose: Nose normal.      Mouth/Throat:      Mouth: Mucous membranes are moist.      Pharynx: Oropharynx is clear.   Eyes:      Conjunctiva/sclera: Conjunctivae normal.   Cardiovascular:      Rate and Rhythm: Normal rate. Rhythm irregular.      Pulses: Normal pulses.      Heart sounds: Normal heart sounds.   Pulmonary:      Effort: Pulmonary effort is normal.      Breath sounds: Normal breath sounds.   Abdominal:      General: Bowel sounds are normal.      Palpations: Abdomen is soft.   Musculoskeletal:         General: Normal range of motion.      Cervical back: Normal range of motion.   Skin:     General: Skin is warm and dry.   Neurological:      General: No focal deficit present.      Mental Status: She is alert and oriented to person, place, and time.   Psychiatric:         Mood and Affect: Mood normal.         Behavior: Behavior normal.       Results review   Results Review:    I have reviewed the patient's new clinical results. 11/13/23 08:59 EST    Results from last 7 days   Lab Units 11/13/23  0124 11/12/23  2307   CK TOTAL U/L  --  41   HSTROP T ng/L 84* 93*     Lab Results   Component Value Date    PROBNP 2,746.0 (H) 11/12/2023    PROBNP 555.6 09/29/2023    PROBNP 630.5 08/17/2023     Results from last 7 days   Lab Units 11/13/23  0711 11/12/23  2307   WBC 10*3/mm3 13.11* 15.57*   HEMOGLOBIN g/dL 8.9* 8.4*   PLATELETS 10*3/mm3 118* 142     Results from last 7 days   Lab Units 11/13/23  0711 11/12/23  2307   SODIUM mmol/L 135* 132*   POTASSIUM  "mmol/L 3.4* 3.5   CHLORIDE mmol/L 98 91*   CO2 mmol/L 26.7 26.7   BUN mg/dL 32* 36*   CREATININE mg/dL 1.32* 1.46*   CALCIUM mg/dL 8.8 8.9   GLUCOSE mg/dL 256* 424*   ALT (SGPT) U/L 18 21   AST (SGOT) U/L 20 21     Lab Results   Component Value Date    MG 2.1 2023    MG 2.3 2023    MG 2.3 2023     Lab Results   Component Value Date    CHOL 137 2020    TRIG 80 2020    HDL 43 2020    LDL 78 2020     Estimated Creatinine Clearance: 59.7 mL/min (A) (by C-G formula based on SCr of 1.32 mg/dL (H)).  Lab Results   Component Value Date    HGBA1C 8.40 (H) 09/10/2020    HGBA1C 5.4 2016     Lab Results   Component Value Date    INR 1.20 (H) 2023    INR 1.10 12/15/2022    INR 1.25 (H) 2020    INR 1.09 2020    INR 1.17 (H) 2020    INR 1.26 (H) 2020    INR 1.55 (H) 2020     No components found for: \"DIG\"  Lab Results   Component Value Date    TSH 4.680 (H) 2023      No results found for: \"URICACID\"  Pain Management Panel  More data exists         Latest Ref Rng & Units 2023   Pain Management Panel   Creatinine, Urine mg/dL  mg/dL - 46.3  46.3    Amphetamine, Urine Qual Negative Negative  -   Barbiturates Screen, Urine Negative Negative  -   Benzodiazepine Screen, Urine Negative Negative  -   Buprenorphine, Screen, Urine Negative Negative  -   Cocaine Screen, Urine Negative Negative  -   Fentanyl, Urine Negative Negative  -   Methadone Screen , Urine Negative Negative  -   Methamphetamine, Ur Negative Negative  -     Microbiology Results (last 10 days)       ** No results found for the last 240 hours. **           No results found for: \"BLOODCX\"      EC2023      ECG/EMG Results (last 24 hours)       Procedure Component Value Units Date/Time    ECG 12 Lead Tachycardia [[id number]] Collected: 23     Updated: 23     QT Interval 268 ms      QTC Interval 430 ms     " Narrative:      Test Reason : Tachycardia  Blood Pressure :   */*   mmHG  Vent. Rate : 155 BPM     Atrial Rate : 166 BPM     P-R Int :   * ms          QRS Dur : 142 ms      QT Int : 268 ms       P-R-T Axes :   * -49 117 degrees     QTc Int : 430 ms    ** Critical Test Result: High HR  Atrial fibrillation with rapid ventricular response  Left axis deviation  Nonspecific intraventricular block  Possible Anterolateral infarct (cited on or before 10-SEP-2020)  Abnormal ECG  When compared with ECG of 16-SEP-2020 11:22,  Significant changes have occurred    Referred By: RAVI           Confirmed By:     ECG 12 Lead Tachycardia [[id number]] Collected: 11/12/23 2332     Updated: 11/12/23 2355     QT Interval 402 ms      QTC Interval 538 ms     Narrative:      Test Reason : Tachycardia  Blood Pressure :   */*   mmHG  Vent. Rate : 108 BPM     Atrial Rate : 111 BPM     P-R Int :   * ms          QRS Dur : 146 ms      QT Int : 402 ms       P-R-T Axes :   * -44  65 degrees     QTc Int : 538 ms    Atrial fibrillation with rapid ventricular response  Left axis deviation  Left bundle branch block  Abnormal ECG  When compared with ECG of 12-NOV-2023 22:43, (Unconfirmed)  No significant change was found    Referred By:            Confirmed By:     ECG 12 Lead Rhythm Change [[id number]] Collected: 11/13/23 0221     Updated: 11/13/23 0222     QT Interval 444 ms      QTC Interval 534 ms     Narrative:      Test Reason : Rhythm Change  Blood Pressure :   */*   mmHG  Vent. Rate :  87 BPM     Atrial Rate :  87 BPM     P-R Int :  96 ms          QRS Dur : 146 ms      QT Int : 444 ms       P-R-T Axes :   * -45  65 degrees     QTc Int : 534 ms    Sinus rhythm with short [place]  Left bundle branch block  Abnormal ECG  When compared with ECG of 12-NOV-2023 23:32, (Unconfirmed)  Sinus rhythm has replaced Atrial fibrillation    Referred By: RAVI           Confirmed By:     Adult Transthoracic Echo Complete W/ Cont if Necessary Per Protocol  [[id number]] Resulted: 11/13/23 0854     Updated: 11/13/23 0854          TELEMETRY:   SR/ST 90s with frequent PVCs          RADIOLOGY STUDIES:  Imaging Results (Last 72 Hours)       Procedure Component Value Units Date/Time    CT Chest Without Contrast Diagnostic [[id number]] Collected: 11/13/23 0418     Updated: 11/13/23 0424    Narrative:      PROCEDURE: CT of the chest performed without IV contrast on November 13, 2023. The examination was performed with 3 mm axial imaging and 3 mm  sagittal and coronal reconstruction images. Total DLP = 1046. The  examination was performed according to as low as reasonably achievable  dose protocol.     HISTORY: Cardiomegaly. CHF. Possible pneumonia. Possible edema.     FINDINGS:     Mild degenerative disc disease throughout the thoracic spine and upper  lumbar spine.  Well-circumscribed areas of relative lucency identified in the lower  thoracic spine and upper lumbar spine levels possibly due to underlying  hemangiomas within the vertebral bodies.  These are seen on image 73, series 5 and image 70, series 5. No chronic  compression fracture deformity.  No acute fracture or dislocation.  Very slight levoconvex curve at the lumbar spine.  Enlarged heart size.  No acute process seen involving the left thyroid lobe.  Small nodule in the right thyroid lobe.  No thoracic aortic aneurysm.  Coronary arterial vascular calcifications.  No pericardial effusion.  Mild bronchial inflammation.  Minimal atelectasis at the lung bases.  No pleural effusion or pneumothorax.  No pneumomediastinum.  No endobronchial lesion.  Cholecystectomy clips in the right upper quadrant.  Splenomegaly. The spleen measures 16.3 cm in length.  Heterogeneous attenuation to the liver without features of probable  fatty infiltration. There is some mild nodularity to the surface of the  liver suggestive of underlying hepatocellular disease.  No free fluid or free air in the upper abdomen.       Impression:          1.  Enlarged heart size.  2.  Splenomegaly.  3.  Minimal atelectasis at the lung bases.  4.  No edema, pleural effusion, or pneumothorax.  5.  Cholecystectomy.  6.  Very small right thyroid lobe nodule.        This report was finalized on 11/13/2023 4:22 AM by Braydon Johnson MD.       XR Chest 1 View [[id number]] Collected: 11/13/23 0323     Updated: 11/13/23 0326    Narrative:      PROCEDURE: Portable chest x-ray examination performed on November 13, 2023. Single view. Upright position.     HISTORY: Chest pain. Tachycardia.     FINDINGS:     Slight prominence of the heart size  Mild central pulmonary vascular congestion.  No edema, pneumonia, pleural effusion, or pneumothorax.       Impression:         1.  Mild central pulmonary vascular congestion.  2.  No edema.  3.  No pneumonia.  4.  No pleural effusion. No pneumothorax.  5.  Slight prominence to the heart size.     This report was finalized on 11/13/2023 3:24 AM by Braydon Johnson MD.               ALLERGIES: Phenergan [promethazine]    CURRENT MEDICATIONS:  Current list of medications may not reflect those currently placed in orders that are not signed or are being held.     insulin regular, 3-14 Units, Subcutaneous, Q6H  senna-docusate sodium, 2 tablet, Oral, BID  sodium chloride, 10 mL, Intravenous, Q12H      Pharmacy to Dose Heparin,         senna-docusate sodium **AND** polyethylene glycol **AND** bisacodyl **AND** bisacodyl    dextrose    dextrose    glucagon (human recombinant)    nitroglycerin    Pharmacy to Dose Heparin    [COMPLETED] Insert Peripheral IV **AND** sodium chloride    sodium chloride    sodium chloride        Thank you very much for asking us to be involved in this patient's care.  We will follow along with you. Please do not hesitate to call for any questions or concerns.     I have discussed the patients findings and recommendations with patient.    Electronically signed by KENNY Finn, 11/13/23, 11:13 AM EST.    Electronically signed by Charlee Ken MD, 11/13/23, 11:17 AM EST.                      Please note that portions of this note were copied and has been reviewed and is accurate as of 11/13/2023 .      Please note that portions of this note were completed with a voice recognition program.

## 2023-11-13 NOTE — CASE MANAGEMENT/SOCIAL WORK
Discharge Planning Assessment   Isaías     Patient Name: Yumiko Purdy  MRN: [id number]  Today's Date: 11/13/2023    Admit Date: 11/12/2023    Plan: Spoke with patient at bedside. Patient lives with son Angel and will return home at discharge. Patient has no POA or Living Will. Patient uses a rolling walker and wheelchair from unknowm supplier. Patient uses Oxygen 2L from [facility] and receives Home Health for [facility]. Patients PCP Masha Davidson, pharmacy [facility] [place], [place]. Patient stated she would need EMS transport home.   Discharge Needs Assessment       Row Name 11/13/23 1049       Living Environment    People in Home child(ferdinand), adult    Name(s) of People in Home Angel Drake son [phone]    Potentially Unsafe Housing Conditions none    In the past 12 months has the electric, gas, oil, or water company threatened to shut off services in your home? No    Primary Care Provided by self;child(ferdinand)    Provides Primary Care For no one, unable/limited ability to care for self    Family Caregiver if Needed child(ferdinand), adult    Family Caregiver Names Angel shannon    Quality of Family Relationships unable to assess    Able to Return to Prior Arrangements yes       Resource/Environmental Concerns    Resource/Environmental Concerns none    Transportation Concerns none       Transportation Needs    In the past 12 months, has lack of transportation kept you from medical appointments or from getting medications? no    In the past 12 months, has lack of transportation kept you from meetings, work, or from getting things needed for daily living? No       Food Insecurity    Within the past 12 months, you worried that your food would run out before you got the money to buy more. Never true    Within the past 12 months, the food you bought just didn't last and you didn't have money to get more. Never true       Transition Planning    Patient/Family Anticipates Transition to home with  family    Patient/Family Anticipated Services at Transition none    Transportation Anticipated public transportation       Discharge Needs Assessment    Readmission Within the Last 30 Days no previous admission in last 30 days    Current Outpatient/Agency/Support Group homecare agency    Equipment Currently Used at Home walker, rolling;wheelchair;oxygen    Concerns to be Addressed discharge planning    Anticipated Changes Related to Illness none    Equipment Needed After Discharge none                   Discharge Plan       Row Name 11/13/23 5976       Plan    Plan Spoke with patient at bedside. Patient lives with son Angel and will return home at discharge. Patient has no POA or Living Will. Patient uses a rolling walker and wheelchair from [facility] supplier. Patient uses Oxygen 2L from [facility] and [facility]. Patients PCP Masha Davidson, pharmacy [email or web address] [place], [place]. Patient stated she would need EMS transport home.    Patient/Family in Agreement with Plan yes                  Continued Care and Services - Admitted Since 11/12/2023    Coordination has not been started for this encounter.          Demographic Summary       Row Name 11/13/23 6969       General Information    Admission Type inpatient    Arrived From home    Referral Source emergency department    Reason for Consult discharge planning    Preferred Language English             Dona Mcmanus

## 2023-11-13 NOTE — PLAN OF CARE
Goal Outcome Evaluation:  Plan of Care Reviewed With: patient        Progress: no change  Outcome Evaluation: A&O X4, admitted to CCU, NSR, VSS, Aspirated on clears, NPO awaiting SLP eval.         Problem: Adult Inpatient Plan of Care  Goal: Patient-Specific Goal (Individualized)  Outcome: Ongoing, Progressing     Problem: Adult Inpatient Plan of Care  Goal: Absence of Hospital-Acquired Illness or Injury  Outcome: Ongoing, Progressing     Problem: Adult Inpatient Plan of Care  Goal: Optimal Comfort and Wellbeing  Outcome: Ongoing, Progressing     Problem: Adult Inpatient Plan of Care  Goal: Readiness for Transition of Care  Outcome: Ongoing, Progressing     Problem: COPD (Chronic Obstructive Pulmonary Disease) Comorbidity  Goal: Maintenance of COPD Symptom Control  Outcome: Ongoing, Progressing     Problem: Adjustment to Illness (Sepsis/Septic Shock)  Goal: Optimal Coping  Outcome: Ongoing, Progressing     Problem: Bleeding (Sepsis/Septic Shock)  Goal: Absence of Bleeding  Outcome: Ongoing, Progressing     Problem: Glycemic Control Impaired (Sepsis/Septic Shock)  Goal: Blood Glucose Level Within Desired Range  Outcome: Ongoing, Progressing     Problem: Infection Progression (Sepsis/Septic Shock)  Goal: Absence of Infection Signs and Symptoms  Outcome: Ongoing, Progressing     Problem: Nutrition Impaired (Sepsis/Septic Shock)  Goal: Optimal Nutrition Intake  Outcome: Ongoing, Progressing     Problem: Skin Injury Risk Increased  Goal: Skin Health and Integrity  Outcome: Ongoing, Progressing

## 2023-11-13 NOTE — H&P
Hospitalist History and Physical        Patient Identification  Name: Yumiko Purdy  Age/Sex: 57 y.o. female  :  1965        MRN: [id number]  Visit Number: [id number]  Admit Date: 2023   PCP: Masha Davidson APRN          Chief complaint short of breath, heart racing    History of Present Illness:  Patient is a 57 y.o. female with history of systolic CHF (EF 21-25% per last ECHO 2020), afib on eliquis, cirrhosis, insulin dependent type II DM, hypothyroidism, and what appears to be borderline stage IIIa-b CKD (creatinin ranges 1.2-1.5 and GFR ranges 42-50), who presents with reports of acute onset palpitations that occurred just prior to arrival to the ED. Palpitations were accompanied by dyspnea and generalized weakness, along with pain in her right neck and shoulder. EMS reported they found her in SVT and gave her 6mg IV adenosine followed by 12mg adenosine along with a fluid bolus. When she arrived to the ED, initial EKG reported afib w/RVR, but upon further review on Zolls monitor, she appeared to be in Ventricular tachycardia. Thus she was loaded with IV amiodarone and subsequently converted to afib and has been going back and forth between sinus and afib since. Initial EKG showed intraventricular block, and repeat EKG's thereafter showed LBBB which was not present on prior EKGs before today. Labs showed elevated troponin 93 that trended down to 84 on repeat, BNP elevated at 2746, + 3.5, mag 2.1, BUN 36 with Cr 1.46, Glucose 424, alk phos 127 and bilirubin 2.7.  TSH was 4.680 while free T4 was 1.78. CRP was 0.88, lactate 3.7 with repeat down to 2.5, WBC was 15, hemoglobin stable at 8.4, platelets 142, .6. UA showed >1000 glucose but negative ketones and no signs of obvious infection. CT chest showed cardiomegaly, splenomegaly, minimal atelectasis at lung bases, and no edema, pleural effusion or pneumothorax. Patient is being admitted to the CCU for further workup and  management.      Review of Systems  Review of Systems   Constitutional:  Positive for activity change and fatigue. Negative for appetite change, chills, diaphoresis, fever and unexpected weight change.   HENT:  Negative for congestion, postnasal drip, rhinorrhea, sinus pressure, sinus pain and sore throat.    Eyes:  Negative for photophobia, pain, discharge, redness, itching and visual disturbance.   Respiratory:  Positive for shortness of breath. Negative for cough and wheezing.    Cardiovascular:  Positive for palpitations and leg swelling (chronic). Negative for chest pain.   Gastrointestinal:  Positive for abdominal distention and blood in stool (2 weeks ago, states guaiac test at outside hospital negative, has since resolved). Negative for abdominal pain, constipation, diarrhea, nausea and vomiting.   Genitourinary:  Negative for difficulty urinating, dysuria, flank pain, frequency and hematuria.   Musculoskeletal:  Negative for arthralgias, back pain, joint swelling, myalgias, neck pain and neck stiffness.   Skin:  Negative for color change, pallor, rash and wound.   Neurological:  Positive for weakness (generalized). Negative for dizziness, tremors, seizures, syncope, light-headedness, numbness and headaches.   Hematological:  Negative for adenopathy. Does not bruise/bleed easily.   Psychiatric/Behavioral:  Negative for agitation, behavioral problems and confusion.        History  Past Medical History:   Diagnosis Date    Anemia     CHF (congestive heart failure)     Chronic a-fib     stated by patient     Cirrhosis of liver     stated by patient     Diabetes mellitus      Past Surgical History:   Procedure Laterality Date    APPENDECTOMY      CARDIAC CATHETERIZATION N/A 11/10/2020    Procedure: Left Heart Cath;  Surgeon: Charlee Ken MD;  Location: [facility] INVASIVE LOCATION;  Service: Cardiovascular;  Laterality: N/A;    CHOLECYSTECTOMY      TOE AMPUTATION Right     stated by patient.      No family  history on file.  Social History     Tobacco Use    Smoking status: Never    Smokeless tobacco: Never   Substance Use Topics    Alcohol use: Never    Drug use: Never     (Not in a hospital admission)    Allergies:  Phenergan [promethazine]    Objective     Vital Signs  Temp:  [97.8 °F (36.6 °C)] 97.8 °F (36.6 °C)  Heart Rate:  [] 91  Resp:  [18] 18  BP: ()/(43-76) 97/55  Body mass index is 41.6 kg/m².    Physical Exam:  Physical Exam  Constitutional:       Comments: Middle aged female appears older than stated age, no acute distress but appears both acutely and chronically ill    HENT:      Right Ear: External ear normal.      Left Ear: External ear normal.      Nose: Nose normal.      Mouth/Throat:      Mouth: Mucous membranes are moist.      Pharynx: Oropharynx is clear.   Eyes:      Extraocular Movements: Extraocular movements intact.      Conjunctiva/sclera: Conjunctivae normal.      Pupils: Pupils are equal, round, and reactive to light.   Cardiovascular:      Rate and Rhythm: Tachycardia present. Rhythm irregular.      Pulses: Normal pulses.      Heart sounds: Normal heart sounds. No murmur heard.  Pulmonary:      Effort: Pulmonary effort is normal. No respiratory distress.      Breath sounds: Normal breath sounds. No wheezing or rales.   Abdominal:      Tenderness: There is no abdominal tenderness.      Comments: Scaphoid, distended, ascites noted left pannus   Musculoskeletal:         General: Normal range of motion.      Cervical back: Normal range of motion and neck supple.      Right lower leg: Edema (1+ pitting) present.      Left lower leg: Edema (1+ pitting) present.   Skin:     General: Skin is warm and dry.      Capillary Refill: Capillary refill takes 2 to 3 seconds.      Coloration: Skin is not jaundiced.      Findings: No bruising.   Neurological:      General: No focal deficit present.      Mental Status: She is oriented to person, place, and time.   Psychiatric:         Mood and  "Affect: Mood normal.         Behavior: Behavior normal.           Results Review:       Lab Results:  Results from last 7 days   Lab Units 11/12/23 2307   WBC 10*3/mm3 15.57*   HEMOGLOBIN g/dL 8.4*   PLATELETS 10*3/mm3 142     Results from last 7 days   Lab Units 11/12/23  2307   CRP mg/dL 0.88*     Results from last 7 days   Lab Units 11/12/23 2307   SODIUM mmol/L 132*   POTASSIUM mmol/L 3.5   CHLORIDE mmol/L 91*   CO2 mmol/L 26.7   BUN mg/dL 36*   CREATININE mg/dL 1.46*   CALCIUM mg/dL 8.9   GLUCOSE mg/dL 424*     Results from last 7 days   Lab Units 11/12/23 2307   MAGNESIUM mg/dL 2.1     No results found for: \"HGBA1C\"  Results from last 7 days   Lab Units 11/12/23 2307   BILIRUBIN mg/dL 2.7*   ALK PHOS U/L 127*   AST (SGOT) U/L 21   ALT (SGPT) U/L 21     Results from last 7 days   Lab Units 11/13/23  0124 11/12/23 2307   CK TOTAL U/L  --  41   HSTROP T ng/L 84* 93*         Results from last 7 days   Lab Units 11/12/23 2307   INR  1.20*           I have reviewed the patient's laboratory results.    Imaging:  Imaging Results (Last 72 Hours)       Procedure Component Value Units Date/Time    CT Chest Without Contrast Diagnostic [[id number]] Collected: 11/13/23 0418     Updated: 11/13/23 0424    Narrative:      PROCEDURE: CT of the chest performed without IV contrast on November 13, 2023. The examination was performed with 3 mm axial imaging and 3 mm  sagittal and coronal reconstruction images. Total DLP = 1046. The  examination was performed according to as low as reasonably achievable  dose protocol.     HISTORY: Cardiomegaly. CHF. Possible pneumonia. Possible edema.     FINDINGS:     Mild degenerative disc disease throughout the thoracic spine and upper  lumbar spine.  Well-circumscribed areas of relative lucency identified in the lower  thoracic spine and upper lumbar spine levels possibly due to underlying  hemangiomas within the vertebral bodies.  These are seen on image 73, series 5 and image 70, series " 5. No chronic  compression fracture deformity.  No acute fracture or dislocation.  Very slight levoconvex curve at the lumbar spine.  Enlarged heart size.  No acute process seen involving the left thyroid lobe.  Small nodule in the right thyroid lobe.  No thoracic aortic aneurysm.  Coronary arterial vascular calcifications.  No pericardial effusion.  Mild bronchial inflammation.  Minimal atelectasis at the lung bases.  No pleural effusion or pneumothorax.  No pneumomediastinum.  No endobronchial lesion.  Cholecystectomy clips in the right upper quadrant.  Splenomegaly. The spleen measures 16.3 cm in length.  Heterogeneous attenuation to the liver without features of probable  fatty infiltration. There is some mild nodularity to the surface of the  liver suggestive of underlying hepatocellular disease.  No free fluid or free air in the upper abdomen.       Impression:         1.  Enlarged heart size.  2.  Splenomegaly.  3.  Minimal atelectasis at the lung bases.  4.  No edema, pleural effusion, or pneumothorax.  5.  Cholecystectomy.  6.  Very small right thyroid lobe nodule.        This report was finalized on 11/13/2023 4:22 AM by Braydon Johnson MD.       XR Chest 1 View [[id number]] Collected: 11/13/23 0323     Updated: 11/13/23 0326    Narrative:      PROCEDURE: Portable chest x-ray examination performed on November 13, 2023. Single view. Upright position.     HISTORY: Chest pain. Tachycardia.     FINDINGS:     Slight prominence of the heart size  Mild central pulmonary vascular congestion.  No edema, pneumonia, pleural effusion, or pneumothorax.       Impression:         1.  Mild central pulmonary vascular congestion.  2.  No edema.  3.  No pneumonia.  4.  No pleural effusion. No pneumothorax.  5.  Slight prominence to the heart size.     This report was finalized on 11/13/2023 3:24 AM by Braydon Johnson MD.               I have personally reviewed the patient's radiologic imaging.        EKG:   Atrial  fibrillation with rapid ventricular response, , QTc 430  Left axis deviation  Nonspecific intraventricular block  Possible Anterolateral infarct (cited on or before 10-SEP-2020)  Abnormal ECG  When compared with ECG of 16-SEP-2020 11:22,  Significant changes have occurred      Repeat:     Sinus rhythm with short AL, HR 87, QTc 534  Left bundle branch block  Abnormal ECG  When compared with ECG of 12-NOV-2023 23:32, (Unconfirmed)  Sinus rhythm has replaced Atrial fibrillation        I have personally reviewed the above EKG's. LBBB appears new.       Assessment & Plan     - Ventricular tachycardia, new LBBB, in setting of known severe systolic CHF with EF in the 20s: converted from vtach to afib with IV amiodarone. Reportedly on amio drip for brief period, then stopped by ED provider. Troponin mildly elevated with downward trend. Denies chest pain but does note neck and shoulder soreness that could be angina equivalent symptoms. Cardiology notified; Dr Ken recommends starting IV heparin infusion and consulting interventional cardiology for consideration of AICD. Will admit to CCU. Keep NPO. Repeat EKG now. Await further recommendations from cardiology.  - Type II DM, insulin dependent, with hyperglycemia: monitor accuchecks, cover with SSI, adjust scale as needed, review home insulin regimen once reconciled by pharmacy.  - CKD, stage IIIa-b: creatinine appears within baseline range. Continue to monitor for now.  - Anemia, chronic, macrocytic: hemoglobin within baseline range at time of admission. Patient reports rectal bleeding a couple weeks ago, but guaiac test performed at outside hospital at that time was negative and bleeding has since resolved. Repeat labs pending, continue to monitor closely.  - Cirrhosis with ascites: review home meds once reconciled by pharmacy.    DVT Prophylaxis: IV heparin infusion    Estimated Length of Stay > 2midnights    I discussed the patient's findings, assessment and plan  "with the patient and ED provider Pierre \"Asim\" BRITTANY Fields    Patient is high risk due to ventricular tachycardia, new left bundle branch block    Tomás An MD  11/13/23  05:59 EST    "

## 2023-11-13 NOTE — PROGRESS NOTES
[facility]    Patient Identification:  Name:  Yumiko Purdy  Age:  57 y.o.  Sex:  female  :  1965  MRN:  [id number]  Visit number:  [id number]  Primary Care Provider:  Masha Davidson APRN    Length of stay in inpatient status:  0    Brief Update     56 yo female admitted earlier today by Dr. An for shortness of air and racing heart; she was found to be in AFib with RVR and was electrically cardioverted in the ED.  When I saw the patient today, she was in trigeminy with heart rates 80s to 90s.  She was not tachycardic and had not flipped back into atrial fibrillation.  However, when she arrived from the emergency department up to the critical care unit, the patient had a very noticeable aspiration on clear liquids and thus she was made NPO.  She was later moved to the progressive care unit as her status had improved on admission.  When I saw the patient, she was already in the progressive care unit.  The patient was upset that she was not allowed to eat any food.  At the time, she denied chest pain and trouble breathing.  She also denies nausea, vomiting, and diarrhea.  Patient understands about her aspiration episode and states that she had had some at home as well.  Patient is willing to stay to have speech therapy evaluate her in the morning.  Patient is still on room air and thus I will not do an x-ray and less she has a fever overnight or worsening respiratory status, as it may be too early to see any edema on the chest x-ray from the aspiration; the patient did not have pneumonia when she was in the emergency department.  The patient's blood pressures have improved compared to what they were in the emergency department, as she had some as low as 67/41 with heart rates in the 80s.  Therefore, after her medicine reconciliation has been performed, I plan on holding any antihypertensives.  I will continue with the IV fluids that were started in the  emergency department.  Echocardiogram was performed today and when compared to one performed in 2020, the EF does appear to have improved, though it is still in the low range of 30%.  Cardiology saw the patient today and they thought that the initial EKGs in the emergency department were A-fib with RVR with a left bundle branch block and not ventricular tachycardia.  Thus, cardiology has recommended continuing with the amiodarone and diuresis.  We will continue with strict input and output measurements and we will repeat her blood work in the morning.  Patient is to continue on the heparin drip for now.  The patient's initial glucose levels were in the 400 range; I did add 20 units of Lantus daily as she takes extremely high doses twice a day and I have continued with the regular insulin sliding scale.  So far, we have improved her glucose levels to the 200 range.  We may need to decrease the insulin later as she is now n.p.o.  The hypoglycemia protocol has been written.    Diagnoses:  -Atrial fibrillation with RVR status post direct current cardioversion in the emergency room, now in sinus rhythm, on Eliquis at home  -Dilated cardiomyopathy possibly partly related to tachycardia induced  -Hypotension present on admission, suspect due to cardiogenic shock as result of the A-fib with RVR  -Suspect type I versus type II non-ST elevation MI present on admission due to the atrial fibrillation with RVR causing cardiogenic shock and hypotension  -History of systolic CHF (EF 21-25% in 2020 and now 31-35%)   -Status post PVI on October 2022 with recurrent A-fib  -History of nonobstructive coronary artery disease per cardiac catheterization 11/20/2020 with 30 to 40% LAD lesion  -History of insulin dependent diabetes mellitus type 2  -History of decompensated liver cirrhosis with ascites  -History of chronic macrocytic anemia, with hemoglobin currently at baseline  -History of CKD stage IIIa (creatinine ranges 1.2-1.5 and GFR  ranges 42-50)       Dave Bishop MD  [facility] ERist  11/13/23  12:40 EST

## 2023-11-14 ENCOUNTER — APPOINTMENT (OUTPATIENT)
Dept: GENERAL RADIOLOGY | Facility: HOSPITAL | Age: 58
End: 2023-11-14
Payer: COMMERCIAL

## 2023-11-14 LAB
ABSOLUTE LUNG FLUID CONTENT: 31 % (ref 20–35)
ATMOSPHERIC PRESS: 735 MMHG
BASE EXCESS BLDV CALC-SCNC: 5.5 MMOL/L (ref 0–2)
BDY SITE: ABNORMAL
CO2 BLDA-SCNC: 31.9 MMOL/L (ref 22–33)
COHGB MFR BLD: 5 % (ref 0–5)
GLUCOSE BLDC GLUCOMTR-MCNC: 136 MG/DL (ref 70–130)
GLUCOSE BLDC GLUCOMTR-MCNC: 233 MG/DL (ref 70–130)
GLUCOSE BLDC GLUCOMTR-MCNC: 328 MG/DL (ref 70–130)
GLUCOSE BLDC GLUCOMTR-MCNC: 359 MG/DL (ref 70–130)
HCO3 BLDV-SCNC: 30.5 MMOL/L (ref 22–28)
HGB BLDA-MCNC: 9 G/DL (ref 13.5–17.5)
INHALED O2 CONCENTRATION: 21 %
Lab: ABNORMAL
METHGB BLD QL: 0.1 % (ref 0–3)
MODALITY: ABNORMAL
OXYHGB MFR BLDV: 70.5 % (ref 45–75)
PCO2 BLDV: 45.8 MM HG (ref 41–51)
PH BLDV: 7.43 PH UNITS (ref 7.32–7.42)
PO2 BLDV: 41.1 MM HG (ref 27–53)
POTASSIUM SERPL-SCNC: 4.2 MMOL/L (ref 3.5–5.2)
QT INTERVAL: 406 MS
QT INTERVAL: 406 MS
QT INTERVAL: 408 MS
QTC INTERVAL: 512 MS
QTC INTERVAL: 523 MS
QTC INTERVAL: 526 MS
SAO2 % BLDCOV: 74.3 % (ref 45–75)
UFH PPP CHRO-ACNC: 0.31 IU/ML (ref 0.3–0.7)
UFH PPP CHRO-ACNC: 0.34 IU/ML (ref 0.3–0.7)
VENTILATOR MODE: ABNORMAL

## 2023-11-14 PROCEDURE — 63710000001 INSULIN REGULAR HUMAN PER 5 UNITS: Performed by: INTERNAL MEDICINE

## 2023-11-14 PROCEDURE — 25010000002 FUROSEMIDE PER 20 MG: Performed by: INTERNAL MEDICINE

## 2023-11-14 PROCEDURE — 71045 X-RAY EXAM CHEST 1 VIEW: CPT | Performed by: RADIOLOGY

## 2023-11-14 PROCEDURE — 25010000002 HEPARIN (PORCINE) 25000-0.45 UT/250ML-% SOLUTION: Performed by: INTERNAL MEDICINE

## 2023-11-14 PROCEDURE — 84132 ASSAY OF SERUM POTASSIUM: CPT | Performed by: INTERNAL MEDICINE

## 2023-11-14 PROCEDURE — 85520 HEPARIN ASSAY: CPT | Performed by: INTERNAL MEDICINE

## 2023-11-14 PROCEDURE — 82948 REAGENT STRIP/BLOOD GLUCOSE: CPT

## 2023-11-14 PROCEDURE — 82820 HEMOGLOBIN-OXYGEN AFFINITY: CPT

## 2023-11-14 PROCEDURE — 99232 SBSQ HOSP IP/OBS MODERATE 35: CPT | Performed by: INTERNAL MEDICINE

## 2023-11-14 PROCEDURE — 82805 BLOOD GASES W/O2 SATURATION: CPT

## 2023-11-14 PROCEDURE — 92611 MOTION FLUOROSCOPY/SWALLOW: CPT

## 2023-11-14 PROCEDURE — 93005 ELECTROCARDIOGRAM TRACING: CPT | Performed by: INTERNAL MEDICINE

## 2023-11-14 PROCEDURE — 94726 PLETHYSMOGRAPHY LUNG VOLUMES: CPT

## 2023-11-14 PROCEDURE — 74230 X-RAY XM SWLNG FUNCJ C+: CPT

## 2023-11-14 PROCEDURE — 71045 X-RAY EXAM CHEST 1 VIEW: CPT

## 2023-11-14 PROCEDURE — 25010000002 POTASSIUM CHLORIDE 10 MEQ/100ML SOLUTION: Performed by: INTERNAL MEDICINE

## 2023-11-14 PROCEDURE — 74230 X-RAY XM SWLNG FUNCJ C+: CPT | Performed by: RADIOLOGY

## 2023-11-14 PROCEDURE — 63710000001 INSULIN GLARGINE PER 5 UNITS: Performed by: INTERNAL MEDICINE

## 2023-11-14 PROCEDURE — 63710000001 INSULIN REGULAR HUMAN PER 5 UNITS: Performed by: HOSPITALIST

## 2023-11-14 RX ORDER — METOPROLOL TARTRATE 1 MG/ML
5 INJECTION, SOLUTION INTRAVENOUS ONCE
Qty: 5 ML | Refills: 0 | Status: COMPLETED | OUTPATIENT
Start: 2023-11-14 | End: 2023-11-14

## 2023-11-14 RX ORDER — VALSARTAN 80 MG/1
40 TABLET ORAL
Status: DISCONTINUED | OUTPATIENT
Start: 2023-11-14 | End: 2023-11-21 | Stop reason: HOSPADM

## 2023-11-14 RX ORDER — FUROSEMIDE 40 MG/1
40 TABLET ORAL DAILY
Status: DISCONTINUED | OUTPATIENT
Start: 2023-11-14 | End: 2023-11-14

## 2023-11-14 RX ORDER — CYCLOBENZAPRINE HCL 10 MG
5 TABLET ORAL ONCE
Status: COMPLETED | OUTPATIENT
Start: 2023-11-14 | End: 2023-11-14

## 2023-11-14 RX ORDER — HYDROCODONE BITARTRATE AND ACETAMINOPHEN 5; 325 MG/1; MG/1
1 TABLET ORAL ONCE AS NEEDED
Status: COMPLETED | OUTPATIENT
Start: 2023-11-14 | End: 2023-11-16

## 2023-11-14 RX ORDER — POTASSIUM CHLORIDE 7.45 MG/ML
10 INJECTION INTRAVENOUS
Status: COMPLETED | OUTPATIENT
Start: 2023-11-14 | End: 2023-11-14

## 2023-11-14 RX ORDER — CARVEDILOL 3.12 MG/1
3.12 TABLET ORAL 2 TIMES DAILY WITH MEALS
Status: DISCONTINUED | OUTPATIENT
Start: 2023-11-14 | End: 2023-11-18

## 2023-11-14 RX ORDER — METOPROLOL TARTRATE 1 MG/ML
INJECTION, SOLUTION INTRAVENOUS
Status: COMPLETED
Start: 2023-11-14 | End: 2023-11-14

## 2023-11-14 RX ORDER — FUROSEMIDE 10 MG/ML
60 INJECTION INTRAMUSCULAR; INTRAVENOUS ONCE
Status: COMPLETED | OUTPATIENT
Start: 2023-11-14 | End: 2023-11-14

## 2023-11-14 RX ORDER — SPIRONOLACTONE 25 MG/1
12.5 TABLET ORAL DAILY
Status: DISCONTINUED | OUTPATIENT
Start: 2023-11-14 | End: 2023-11-15

## 2023-11-14 RX ADMIN — FERROUS SULFATE TAB 325 MG (65 MG ELEMENTAL FE) 325 MG: 325 (65 FE) TAB at 21:27

## 2023-11-14 RX ADMIN — EMPAGLIFLOZIN 10 MG: 10 TABLET, FILM COATED ORAL at 14:08

## 2023-11-14 RX ADMIN — Medication 10 ML: at 08:56

## 2023-11-14 RX ADMIN — CYCLOBENZAPRINE 5 MG: 10 TABLET, FILM COATED ORAL at 21:27

## 2023-11-14 RX ADMIN — BUSPIRONE HYDROCHLORIDE 10 MG: 10 TABLET ORAL at 21:27

## 2023-11-14 RX ADMIN — INSULIN GLARGINE 20 UNITS: 100 INJECTION, SOLUTION SUBCUTANEOUS at 08:55

## 2023-11-14 RX ADMIN — PREGABALIN 150 MG: 75 CAPSULE ORAL at 20:20

## 2023-11-14 RX ADMIN — INSULIN HUMAN 5 UNITS: 100 INJECTION, SOLUTION PARENTERAL at 11:37

## 2023-11-14 RX ADMIN — FUROSEMIDE 60 MG: 10 INJECTION, SOLUTION INTRAMUSCULAR; INTRAVENOUS at 18:04

## 2023-11-14 RX ADMIN — INSULIN HUMAN 10 UNITS: 100 INJECTION, SOLUTION PARENTERAL at 18:03

## 2023-11-14 RX ADMIN — POTASSIUM CHLORIDE 10 MEQ: 7.46 INJECTION, SOLUTION INTRAVENOUS at 06:23

## 2023-11-14 RX ADMIN — CARVEDILOL 3.12 MG: 6.25 TABLET, FILM COATED ORAL at 18:04

## 2023-11-14 RX ADMIN — POTASSIUM CHLORIDE 10 MEQ: 7.46 INJECTION, SOLUTION INTRAVENOUS at 07:25

## 2023-11-14 RX ADMIN — Medication 10 ML: at 20:21

## 2023-11-14 RX ADMIN — Medication 10 ML: at 00:02

## 2023-11-14 RX ADMIN — VALSARTAN 40 MG: 80 TABLET, FILM COATED ORAL at 14:08

## 2023-11-14 RX ADMIN — ERYTHROMYCIN 1 APPLICATION: 5 OINTMENT OPHTHALMIC at 05:29

## 2023-11-14 RX ADMIN — HEPARIN SODIUM 7.4 UNITS/KG/HR: 10000 INJECTION, SOLUTION INTRAVENOUS at 20:19

## 2023-11-14 RX ADMIN — METOPROLOL TARTRATE 5 MG: 1 INJECTION, SOLUTION INTRAVENOUS at 09:45

## 2023-11-14 RX ADMIN — CARVEDILOL 3.12 MG: 6.25 TABLET, FILM COATED ORAL at 11:37

## 2023-11-14 RX ADMIN — SPIRONOLACTONE 12.5 MG: 25 TABLET ORAL at 11:37

## 2023-11-14 RX ADMIN — POTASSIUM CHLORIDE 10 MEQ: 7.46 INJECTION, SOLUTION INTRAVENOUS at 08:55

## 2023-11-14 RX ADMIN — POTASSIUM CHLORIDE 10 MEQ: 7.46 INJECTION, SOLUTION INTRAVENOUS at 05:29

## 2023-11-14 NOTE — CONSULTS
"Diabetes Education  Assessment/Teaching    Patient Name:  Yumiko Purdy  YOB: 1965  MRN: [id number]  Admit Date:  11/12/2023      Assessment Date:  11/14/2023  Flowsheet Row Most Recent Value   General Information     Height 165.1 cm (65\")   Height Method Stated   Weight 104 kg (229 lb 11.5 oz)   Weight Method Bed scale   Pregnancy Assessment    Diabetes History    Education Preferences    Nutrition Information    Assessment Topics    DM Goals             Flowsheet Row Most Recent Value   DM Education Needs    Meter Has own   Frequency of Testing 3 times a day   Medication Insulin, Oral   Problem Solving Hypoglycemia, Hyperglycemia, Sick days, Signs, Symptoms, Treatment   Reducing Risks Cardiovascular, Immunizations, Foot care, Dental exam, Eye exam, Blood pressure, Lipids, A1C testing, Neuropathy, Retinopathy   Healthy Eating Basic meal plan provided   Physical Activity Walking   Physical Activity Frequency Occasionally, Rarely   Healthy Coping Appropriate   Discharge Plan Follow-up with PCP   Motivation Moderate   Teaching Method Explanation, Discussion, Handouts   Patient Response Verbalized understanding              Other Comments:  A1C 7.3 Patient was educated and received AADE7 and ADA handouts on diet, activity, checking blood glucose, taking medication as prescribed, checking feet daily and S/S of hypo/hyperglycemia. Patient was educated on sick rule days. Patient had no questions or concerns. Please re-consult or call for concerns or questions. Thank you.        Electronically signed by:  Ida Santamaria RN  11/14/23 15:57 EST  "

## 2023-11-14 NOTE — CASE MANAGEMENT/SOCIAL WORK
Discharge Planning Assessment   [place]     Patient Name: Yumiko Purdy  MRN: [id number]  Today's Date: 11/14/2023    Admit Date: 11/12/2023     Discharge Plan       Row Name 11/14/23 1059       Plan    Plan ED CM completed initial consult. SS received nursing consult for patient utilizes [facility]. Pt lives at [address] [place] [address] ([facility]) with son Angel and will return home at discharge. Pt utilizes [facility] for nursing and aides services. [facility] will need new HH order at discharge. Pt PCP Masha Davidson. Pt utilizes (r) walker and wheelchair via unknown provider and home oxygen at 2 liters [facility].  Pt has no POA or Living Will. Pt will need EMS to provide transportation at d/c. SS follow and assist with discharge planning.             Expected Discharge Date and Time       Expected Discharge Date Expected Discharge Time    Nov 16, 2023        SAMY Gong

## 2023-11-14 NOTE — MBS/VFSS/FEES
Acute Care - Speech Language Pathology   Swallow Initial Evaluation Psychiatric  MODIFIED BARIUM SWALLOW STUDY     Patient Name: Yumiko Purdy  : 1965  MRN: [id number]  Today's Date: 2023             Admit Date: 2023    Visit Dx:     ICD-10-CM ICD-9-CM   1. Sustained monomorphic ventricular tachycardia  I47.29 427.1   2. Severe sepsis  A41.9 038.9    R65.20 995.92   3. Other cirrhosis of liver  K74.69 571.5   4. Atrial fibrillation with RVR  I48.91 427.31   5. Symptomatic anemia  D64.9 285.9   6. Congestive heart failure, unspecified HF chronicity, unspecified heart failure type  I50.9 428.0   7. Prolonged Q-T interval on ECG  R94.31 794.31     Patient Active Problem List   Diagnosis    Acute on chronic congestive heart failure    Cardiomyopathy    CKD (chronic kidney disease) stage 2, GFR 60-89 ml/min    Severe obesity (BMI 35.0-39.9) with comorbidity    Chronic anemia    Elevated bilirubin    Acute on chronic combined systolic and diastolic CHF (congestive heart failure)    Hyponatremia    Chronic atrial fibrillation    Cirrhosis    Ventricular tachycardia     Past Medical History:   Diagnosis Date    Anemia     CHF (congestive heart failure)     Chronic a-fib     stated by patient     Cirrhosis of liver     stated by patient     Diabetes mellitus      Past Surgical History:   Procedure Laterality Date    APPENDECTOMY      CARDIAC CATHETERIZATION N/A 11/10/2020    Procedure: Left Heart Cath;  Surgeon: Charlee Ken MD;  Location: [facility] CATH INVASIVE LOCATION;  Service: Cardiovascular;  Laterality: N/A;    CHOLECYSTECTOMY      TOE AMPUTATION Right     stated by patient.      Yumiko Purdy  presented to the radiology suite this am from PCU to participate in an instrumental MBS to objectively evaluate safety/efficacy of swallowing fnx, determine safest/least restrictive diet. She was NPO pending this evaluation.     Ms. Purdy presented to [facility] ED w/ acute onset palpitations, sob and generalized  weakness. She was admitted w/ ventricular tachycardia, new LBBB, afib. Upon admission to CCU, she was noted w/ aspiration event on clear liquids thus was referred for dysphagia evaluation. Per thi status she was felt to most benefit from instrumental MBS to objectively evaluate swallowing fnx, determine safest/least restrictive po diet. She did report recent dx/tx of PNA prior to this hospitalization. She also reported premorbid baseline slightly mastication weakness related to limited/poor dentition status.       Social History     Socioeconomic History    Marital status:    Tobacco Use    Smoking status: Never    Smokeless tobacco: Never   Vaping Use    Vaping Use: Never used   Substance and Sexual Activity    Alcohol use: Never    Drug use: Never    Sexual activity: Defer      Imaging:  Narrative & Impression   XR CHEST 1 VW-     CLINICAL INDICATION: post aspiration; I47.29-Other ventricular  tachycardia; A41.9-Sepsis, unspecified organism; R65.20-Severe sepsis  without septic shock; K74.69-Other cirrhosis of liver;  I48.91-Unspecified atrial fibrillation; D64.9-Anemia, unspecified;  I50.9-Heart failure, unspecified; R94.31-Abnormal electrocardiogram  (ECG) (EKG)        COMPARISON: 11/13/2023     TECHNIQUE: Single frontal view of the chest.     FINDINGS:     LUNGS: Lungs are adequately aerated.      HEART AND MEDIASTINUM: Heart and mediastinal contours are unremarkable        SKELETON: Bony and soft tissue structures are unremarkable.           IMPRESSION:  No radiographic evidence of acute cardiac or pulmonary disease.           This report was finalized on 11/14/2023 7:19 AM by Dr. Angelo Deleon MD.     Labs:  Sodium  139  11/13 2234  Potassium  3.4  11/13 2234  Chloride  103  11/13 2234  CO2  28.7  11/13 2234  BUN  22  11/13 2234  Creatinine  1.09  11/13 2234  Glucose  136  11/14 0533  Hemoglobin  7.9  11/13 2234  Hematocrit  26.2  11/13 2234  WBC  10.82  11/13 2234  Platelets  99  11/13  2234  PTT  36.2  11/13 0711  Protime  15.8  11/12 2307  INR  1.20  11/12 2307  ABO Type  A  11/12 2350  RH type  Positive  11/12 2350     Diet Orders (active) (From admission, onward)       Start     Ordered    11/13/23 1431  NPO Diet NPO Type: Strict NPO  Diet Effective Now         11/13/23 1430    11/13/23 1310  DIET MESSAGE Would like a diet sprite  Once        Comments: Would like a diet sprite    11/13/23 1309                  She was observed on O2 via NC w/o complications.     Risks and benefits of the procedure were explained w/ patient verbalizing understanding/agreement to participate. Proceed per protocol.     Patient was positioned upright and centered in a supportive stationary chair to accept multiple po presentations of solid cracker, puree, honey thick, nectar thick, and thin liquids via spoon, cup and straw, along w/ whole placebo pill in puree. Patient was able to self provide po trials.      All views are from the lateral plane.     Facial/oral structures were symmetrical upon observation w/o lingual deviation upon protrusion. Oral mucosa were moist, pink and clean. Secretions were clear, thin, and well controlled. OROM/RUBEN was wfl to imitate oral postures. She was noted w/ limited dentition. Gag was not assessed. Volitional cough was adequate in intensity, slightly congestive in quality, nonproductive. She ws noted w/ baseline sporadic cough before, during and after the evaluation, productive x1 prior to po presentations. Vocal quality was adequate in intensity, clear in quality w/ intelligible speech. Patient was a/a and cooperative to participate, oriented to person, place, and time, followed simple directives, and participated in simple conversational exchanges.     Upon po presentations, adequate bolus anticipation w/ good labial seal for bolus clearance via spoon bowl, cup rim stability and suction via straw. Bolus formation, manipulation, and control were wfl w/ rotary mastication pattern,  mildly increased oral prep time w/ solids. A-p transit was timely w/o significant oral residue. Tongue base retraction and linguavelar seal were adequate w/o significant premature spillage. No laryngeal penetration or aspiration evidenced before the swallow.     Pharyngeal swallow was timely w/ adequate hyolaryngeal elevation and epiglottic inversion. Intermittent shallow transient laryngeal penetratio occurred during the swallow w/ thin liquids via cup and straw, clearing upon completion of the swallow w/o significant residue. Pharyngeal contraction was adequate w/o significant residue. No other laryngeal penetration or aspiration evidenced during or after the swallow.     She was able to manipulate and swallow whole placebo pill in puree w/o difficulty.                Penetration-Aspiration Scale Scoring  Consistency: Teaspoon Bolus Cup Bolus Straw Bolus   Puree 1     Honey 1     Nectar 1  1   Thin 1 2 2, 2, 1   Solid 1       *Reference*      Full esophageal sweep revealed no obvious mucosal abnormalities. Motility appeared adequate w/o obvious retrograde flow noted.      Impression: Per this evaluation, Ms. Purdy presented w/ a trace mastication weakness, otherwise wfl oropharyngeal swallow w/o evidence of aspiration across this evaluation, only transient intermittent shallow laryngeal penetration of thin liquids via cup and straw during the swallow, clearing upon completion w/o significant residue. This is felt to be representative of her premorbid baseline swallowing fnx, she is felt to most benefit from least restrictive modified po diet of mechanical soft consistencies, whole foods, thin liquids.     SLP Recommendation and Plan  1. Mechanical soft consistencies, whole foods, thin liquids.   2. Medications whole in puree/thins.   3. KWABENA precautions.   4. Oral care protocol.   5. Upright and centered for all po intake.   SLP to sign off at this time.     D/w patient results and recommendations w/ verbal  understanding and agreement.     Communicated results and recommendations to MD.    Thank you for allowing me to participate in the care of your patient-  Shanika John M.A., CCC-SLP      EDUCATION  The patient has been educated in the following areas:   Dysphagia (Swallowing Impairment) Modified Diet Instruction.     Time Calculation:     Therapy Charges for Today       Code Description Service Date Service Provider Modifiers Qty    [id number] HC ST MOTION FLUORO EVAL SWALLOW 8 11/14/2023 Shanika John MA,CCC-SLP GN 1                 Shanika John MA,CCC-SLP  11/14/2023

## 2023-11-14 NOTE — PROGRESS NOTES
HEPARIN INFUSION  Yumiko Purdy is a  57 y.o. female set to receive heparin infusion when anti-Xa less than 1. On apixaban prior to admission.     Therapy for (VTE/Cardiac):   Cardiac - h/o A fib, may need cardioversion vs AICD  Patient Dosing Weight: 106 kg  Initial Bolus (Y/N):   Y  Any Bolus (Y/N):   Y        Signs or Symptoms of Bleeding: none    Cardiac or Other (Not VTE)   Initial Bolus: 60 units/kg (Max 4,000 units)  Initial rate: 12 units/kg/hr (Max 1,000 units/hr)   Anti Xa Rebolus Infusion Hold time Change infusion Dose (Units/kg/hr) Next Anti Xa or aPTT Level Due   < 0.11 50 Units/kg  (4000 Units Max) None Increase by  3 Units/kg/hr 6 hours   0.11- 0.19 25 Units/kg  (2000 Units Max) None Increase by  2 Units/kg/hr 6 hours   0.2 - 0.29 0 None Increase by  1 Units/kg/hr 6 hours   0.3 - 0.5 0 None No Change 6 hours (after 2 consecutive levels in range check q24h @0700)   0.51 - 0.6 0 None Decrease by  1 Units/kg/hr 6 hours   0.61 - 0.8 0 30 Minutes Decrease by  2 Units/kg/hr 6 hours   0.81 - 1 0 60 Minutes Decrease by  3 Units/kg/hr 6 hours   >1 0 Hold  After Anti Xa less than 0.5 decrease previous rate by  4 Units/kg/hr  Every 2 hours until Anti Xa  less than 0.5 then when infusion restarts in 6 hours         Recommend anti-Xa every 6 hours.          Date   Time   Anti-Xa Current Rate (Unit/kg/hr) Bolus   (Units) Rate Change   (Unit/kg/hr) New Rate (Unit/kg/hr) Next   Anti-Xa Comments  Pump Check Daily   11/13 0711 > 1.1 Hold  0 - Hold  1300 Initial anti-Xa over 1.1 is consistent with apixaban PTA. Discussed with Dr. Ken. Plan to begin heparin infusion per DOAC exposure nomogram when anti-Xa falls below 1 in case cardioversion, etc needed, so rechecking anti-Xa in 6 hrs. Discussed with Glenys in ED also.    11/13 1440 0.69 0 none +7.4 7.4 2200 Anti-Xa level amenable to starting heparin now. Discussed initial rate and programming weight for pump with Kati Boudreaux RN in CCU.    11/13 2330 0.45 7.4 - - 7.4  0500 Therapeutic x 1. Spoke with OLIVIA Triana. No s/s  of bleeding.    11/13 0508 0.34 7.4   7.4 1100 Therapeutic x 2. Spoke with OLIVIA Hamilton. No s/s of bleeding. Since the patient  was previously on eliquis and has gone from the upper limit of normal to the lower limit of normal in the past 6 hours I am going to have her checked again in 6 hours instead of waiting   over 24 hours for the next check just to err on the side of caution.                                                                                                                                                                                                 Pharmacy will continue to follow anti-Xa results and monitor for signs and symptoms of bleeding or thrombosis.

## 2023-11-14 NOTE — PROGRESS NOTES
HEPARIN INFUSION  Yumiko Purdy is a  57 y.o. female set to receive heparin infusion when anti-Xa less than 1. On apixaban prior to admission.     Therapy for (VTE/Cardiac):   Cardiac - h/o A fib, may need cardioversion vs AICD  Patient Dosing Weight: 106 kg  Initial Bolus (Y/N):   Y  Any Bolus (Y/N):   Y        Signs or Symptoms of Bleeding: none    Cardiac or Other (Not VTE)   Initial Bolus: 60 units/kg (Max 4,000 units)  Initial rate: 12 units/kg/hr (Max 1,000 units/hr)   Anti Xa Rebolus Infusion Hold time Change infusion Dose (Units/kg/hr) Next Anti Xa or aPTT Level Due   < 0.11 50 Units/kg  (4000 Units Max) None Increase by  3 Units/kg/hr 6 hours   0.11- 0.19 25 Units/kg  (2000 Units Max) None Increase by  2 Units/kg/hr 6 hours   0.2 - 0.29 0 None Increase by  1 Units/kg/hr 6 hours   0.3 - 0.5 0 None No Change 6 hours (after 2 consecutive levels in range check q24h @0700)   0.51 - 0.6 0 None Decrease by  1 Units/kg/hr 6 hours   0.61 - 0.8 0 30 Minutes Decrease by  2 Units/kg/hr 6 hours   0.81 - 1 0 60 Minutes Decrease by  3 Units/kg/hr 6 hours   >1 0 Hold  After Anti Xa less than 0.5 decrease previous rate by  4 Units/kg/hr  Every 2 hours until Anti Xa  less than 0.5 then when infusion restarts in 6 hours         Recommend anti-Xa every 6 hours.          Date   Time   Anti-Xa Current Rate (Unit/kg/hr) Bolus   (Units) Rate Change   (Unit/kg/hr) New Rate (Unit/kg/hr) Next   Anti-Xa Comments  Pump Check Daily   11/13 0711 > 1.1 Hold  0 - Hold  1300 Initial anti-Xa over 1.1 is consistent with apixaban PTA. Discussed with Dr. Ken. Plan to begin heparin infusion per DOAC exposure nomogram when anti-Xa falls below 1 in case cardioversion, etc needed, so rechecking anti-Xa in 6 hrs. Discussed with Glenys in ED also.    11/13 1440 0.69 0 none +7.4 7.4 2200 Anti-Xa level amenable to starting heparin now. Discussed initial rate and programming weight for pump with Ktai Boudreaux RN in CCU.    11/13 2330 0.45 7.4 - - 7.4  0500 Therapeutic x 1. Spoke with OLIVIA Triana. No s/s  of bleeding.    11/13 0508 0.34 7.4   7.4 1100 Therapeutic x 2. Spoke with OLIVIA Hamilton. No s/s of bleeding. Since the patient  was previously on eliquis and has gone from the upper limit of normal to the lower limit of normal in the past 6 hours I am going to have her checked again in 6 hours instead of waiting   over 24 hours for the next check just to err on the side of caution.    11/14 1113 0.31 7.4 - - 7.4 11/15 @ 0700 Therapeutic x 3. No s/sx bleeding reported.                                                                                                                                                                                      Pharmacy will continue to follow anti-Xa results and monitor for signs and symptoms of bleeding or thrombosis.    Thank you,    Dianelys Adam, PharmD  11/14/2023  11:44 EST

## 2023-11-14 NOTE — PLAN OF CARE
Goal Outcome Evaluation:              Outcome Evaluation: AxO4, RA, NSR. patient has had some spurts of her HR going into the 160s. MD aware. patient is upset about being NPO. marthat has been educated on why she is NPO. Patient is also refusing most of her turns. patient has been educated on the risk of not turning. patients bed alarm is set, call light in reach.

## 2023-11-14 NOTE — PROGRESS NOTES
[facility]  PROGRESS NOTE    Patient information:  Name: Yumiko Purdy  Age/Sex: 57 y.o. female  :  1965        PCP: Masha Davidson APRN  Attending: Tomás An MD  MRN:  [id number]  Visit Number:  [id number]    LOS:  LOS: 1 day     CODE STATUS:    Code Status and Medical Interventions:   Ordered at: 23 0558     Code Status (Patient has no pulse and is not breathing):    CPR (Attempt to Resuscitate)     Medical Interventions (Patient has pulse or is breathing):    Full Support       PROBLEM LIST:Principal Problem:    Ventricular tachycardia      Reason for Cardiology follow-up: Ventricular tachycardia     Subjective   ADMISSION INFORMATION:  Chief Complaint   Patient presents with    Rapid Heart Rate       HPI:  Yumiko Purdy is a 57 y.o. female with a past medical history significant for systolic CHF (EF 21-25% per last ECHO 2020), afib on Eliquis s/p ablation 10/2022, cirrhosis, insulin dependent type II DM, hypothyroidism, COPD, and what appears to be borderline stage IIIa-b CKD (creatinin ranges 1.2-1.5 and GFR ranges 42-50).     Patient presented to [facility] ([facility]) emergency room (ER) on 2023 with complaints of acute onset palpitations that occurred just prior to arrival to the ER. EMS reported they found her in SVT and gave her 6mg IV adenosine followed by 12mg adenosine along with a fluid bolus. When she arrived to the ED, initial EKG reported afib w/RVR, but upon further review on Zolls monitor, she appeared to be in Ventricular tachycardia. Thus she was loaded with IV amiodarone and subsequently converted to afib and has been going back and forth between sinus and afib since. Initial EKG showed intraventricular block, and repeat EKG's thereafter showed LBBB which was not present on prior EKGs before today. Labs showed elevated troponin 93 that trended down to 84 on repeat, BNP elevated at 2746, + 3.5,  mag 2.1, BUN 36 with Cr 1.46, Glucose 424, alk phos 127 and bilirubin 2.7.  TSH was 4.680 while free T4 was 1.78.   CT chest showed cardiomegaly, splenomegaly, minimal atelectasis at lung bases, and no edema, pleural effusion or pneumothorax. Patient was admitted for further workup and management.        Cardiology has been consulted for further evaluation and management ventricular tachycardia.      Primary Cardiologist has been Jad Maravilla MD (Cardiologist), and Samy Claude Elayi, MD (EP) and she was last seen in the office on 01/27/2023.   Contact number for EP: [phone].    The above number is no longer in service.  I did call [phone] and spoke with Eda.  She took a message and will have one of the 's to return my call to make this patient an appointment for evaluation for ablation and ICD placement. Awaiting their return call.       Patient is also followed in [place] [facility] with her last appointment being 09/29/2023.    Interval History:   Patient is in room  and was examined by Dr. Sahu.  Patient is lying in bed resting quietly.  No acute distress noted at this time.  Patient currently denies any chest pain or palpitations and reports her breathing has improved.  Patient reports she has been nonambulatory for a while secondary to chronic back pain, neuropathy, and generalized weakness.  Patient does report using a wheelchair to get around with.  Patient does agree to wear LifeVest as discussed.  Patient does express interest of reestablishing with Dr. Samy Claude Elayi, MD (EP) for further evaluation as warranted.  Patient does report compliancy with Eliquis at home.    ORDERS:   LifeVest order placed    Vital Signs  Temp:  [97.8 °F (36.6 °C)-98.4 °F (36.9 °C)] 98.1 °F (36.7 °C)  Heart Rate:  [] 114  Resp:  [11-25] 16  BP: ()/(31-75) 114/31  Flow (L/min):  [1.5-2] 1.5  Device (Oxygen Therapy): room air  Vital Signs (last 72 hrs)          11/11 0700  11/12 0659 11/12 0700  11/13 0659 11/13 0700  11/14 0659 11/14 0700 11/14 0915   Most Recent      Temp (°F)     97.8    97.8 -  98.4      98.1     98.1 (36.7) 11/14 0816    Heart Rate   84 -  192    90 -  104    98 -  114     114 11/14 0900    Resp     18    11 -  25    16 -  20     16 11/14 0800    BP   61/61 -  115/107    85/54 -  125/75    114/31 -  127/57     114/31 11/14 0900    SpO2 (%)   75 -  100    94 -  100    86 -  100     86 11/14 0900    Flow (L/min)     2    1.5 -  2       1.5 11/13 1319          BMI:Body mass index is 38.23 kg/m².    WEIGHT:      11/12/23  2243 11/13/23  1243 11/14/23  0400   Weight: 113 kg (250 lb) 106 kg (233 lb 0.4 oz) 104 kg (229 lb 11.5 oz)       DIET:NPO Diet NPO Type: Strict NPO    I&O:  Intake & Output (last 3 days)         11/11 0701  11/12 0700 11/12 0701  11/13 0700 11/13 0701  11/14 0700 11/14 0701  11/15 0700    P.O.   100 0    I.V. (mL/kg)  41.6 (0.4) 22.2 (0.2)     IV Piggyback  200      Total Intake(mL/kg)  241.6 (2.1) 122.2 (1.2) 0 (0)    Urine (mL/kg/hr)   1200 (0.5)     Total Output   1200     Net  +241.6 -1077.8 0                    Objective     I have seen and examined Ms. Purdy today with findings as scribed below.  Physical Exam:      General Appearance:    Alert, cooperative, in no acute distress.  BMI 38.23.  Tired appearing.   Head:    Normocephalic, without obvious abnormality, atraumatic.   Eyes:                          Conjunctivae and sclerae normal, no icterus, no pallor, corneas clear.   Neck:   No adenopathy, supple, trachea midline, no thyromegaly, no carotid bruit, no JVD.   Lungs:   Few rales noted in right base to auscultation, respirations regular, even and unlabored.    Heart:    Regular rhythm and normal rate, normal S1 and S2, no          murmur, no gallop, no rub, no click   Chest Wall:    No abnormalities observed.   Abdomen:     Normal bowel sounds, no masses, no organomegaly, soft nontender, nondistended, no guarding, no  rebound tenderness.   Extremities:   Moves all extremities well, no edema, no cyanosis, no           redness.   Pulses:   Pulses palpable and equal bilaterally.   Skin:   No bleeding, bruising or rash.   Neurologic:   Alert and Oriented x 3, Speech Clear & comprehensive.       Results review   Results Review:   Results from last 7 days   Lab Units 11/13/23 1319 11/13/23  0124 11/12/23  2307   CK TOTAL U/L  --   --  41   HSTROP T ng/L 84* 84* 93*     Lab Results   Component Value Date    PROBNP 2,746.0 (H) 11/12/2023    PROBNP 555.6 09/29/2023    PROBNP 630.5 08/17/2023     Results from last 7 days   Lab Units 11/13/23 2234 11/13/23 0711 11/12/23  2307   WBC 10*3/mm3 10.82* 13.11* 15.57*   HEMOGLOBIN g/dL 7.9* 8.9* 8.4*   PLATELETS 10*3/mm3 99* 118* 142     Results from last 7 days   Lab Units 11/13/23 2234 11/13/23 1319 11/13/23  0711 11/12/23  2307   SODIUM mmol/L 139 137 135* 132*   POTASSIUM mmol/L 3.4* 3.8 3.4* 3.5   CHLORIDE mmol/L 103 101 98 91*   CO2 mmol/L 28.7 23.9 26.7 26.7   BUN mg/dL 22* 29* 32* 36*   CREATININE mg/dL 1.09* 1.10* 1.32* 1.46*   CALCIUM mg/dL 8.6 8.8 8.8 8.9   GLUCOSE mg/dL 105* 221* 256* 424*   ALT (SGPT) U/L 18  --  18 21   AST (SGOT) U/L 26  --  20 21     Lab Results   Component Value Date    MG 2.4 11/13/2023    MG 2.5 11/13/2023    MG 2.1 11/12/2023     Estimated Creatinine Clearance: 69 mL/min (A) (by C-G formula based on SCr of 1.09 mg/dL (H)).    Lab Results   Component Value Date    HGBA1C 7.30 (H) 11/13/2023    HGBA1C 8.40 (H) 09/10/2020    HGBA1C 5.4 04/21/2016     Lab Results   Component Value Date    CHOL 137 09/11/2020    TRIG 80 09/11/2020    LDL 78 09/11/2020    HDL 43 09/11/2020     Lab Results   Component Value Date    ABSOLUTELUNG 34 09/29/2023    ABSOLUTELUNG 36 (A) 08/17/2023    ABSOLUTELUNG 37 (A) 07/17/2023     Lab Results   Component Value Date    INR 1.20 (H) 11/12/2023    INR 1.10 12/15/2022    INR 1.25 (H) 09/23/2020    INR 1.09 09/22/2020    INR 1.17 (H)  09/21/2020    INR 1.26 (H) 09/21/2020    INR 1.55 (H) 09/20/2020     Lab Results   Component Value Date    TSH 4.680 (H) 11/12/2023      Pain Management Panel  More data exists         Latest Ref Rng & Units 11/13/2023 9/12/2020   Pain Management Panel   Creatinine, Urine mg/dL  mg/dL - 46.3  46.3    Amphetamine, Urine Qual Negative Negative  -   Barbiturates Screen, Urine Negative Negative  -   Benzodiazepine Screen, Urine Negative Negative  -   Buprenorphine, Screen, Urine Negative Negative  -   Cocaine Screen, Urine Negative Negative  -   Fentanyl, Urine Negative Negative  -   Methadone Screen , Urine Negative Negative  -   Methamphetamine, Ur Negative Negative  -     Microbiology Results (last 10 days)       Procedure Component Value - Date/Time    Blood Culture - Blood, Hand, Right [[id number]]  (Normal) Collected: 11/13/23 0003    Lab Status: Preliminary result Specimen: Blood from Hand, Right Updated: 11/14/23 0015     Blood Culture No growth at 24 hours    Blood Culture - Blood, Arm, Right [[id number]]  (Normal) Collected: 11/12/23 2350    Lab Status: Preliminary result Specimen: Blood from Arm, Right Updated: 11/14/23 0015     Blood Culture No growth at 24 hours           Imaging Results (Last 24 Hours)       Procedure Component Value Units Date/Time    FL Video Swallow Single Contrast [[id number]] Resulted: 11/14/23 0912     Updated: 11/14/23 0912    XR Chest 1 View [[id number]] Collected: 11/14/23 0719     Updated: 11/14/23 0721    Narrative:      XR CHEST 1 VW-     CLINICAL INDICATION: post aspiration; I47.29-Other ventricular  tachycardia; A41.9-Sepsis, unspecified organism; R65.20-Severe sepsis  without septic shock; K74.69-Other cirrhosis of liver;  I48.91-Unspecified atrial fibrillation; D64.9-Anemia, unspecified;  I50.9-Heart failure, unspecified; R94.31-Abnormal electrocardiogram  (ECG) (EKG)        COMPARISON: 11/13/2023     TECHNIQUE: Single frontal view of the chest.     FINDINGS:     LUNGS: Lungs  are adequately aerated.      HEART AND MEDIASTINUM: Heart and mediastinal contours are unremarkable        SKELETON: Bony and soft tissue structures are unremarkable.             Impression:      No radiographic evidence of acute cardiac or pulmonary disease.           This report was finalized on 11/14/2023 7:19 AM by Dr. Angelo Deleon MD.             ECHO:  Results for orders placed during the hospital encounter of 11/12/23    Adult Transthoracic Echo Complete W/ Cont if Necessary Per Protocol    Interpretation Summary    Left ventricular systolic function is moderately decreased. Left ventricular ejection fraction appears to be 31 - 35%.    Left ventricular wall thickness is consistent with mild concentric hypertrophy.    Left ventricular diastolic function is consistent with (grade III w/high LAP) fixed restrictive pattern.    Mildly reduced right ventricular systolic function noted.    The left atrial cavity is moderately dilated.    The right atrial cavity is mild to moderately  dilated.    There is calcification of the aortic valve mainly affecting the left coronary cusp(s).    Dilated pulmonary artery.      STRESS TEST:  Results for orders placed during the hospital encounter of 10/15/20    Nuclear Medicine Cardiac Blood Pool [facility] At Rest    Interpretation Summary  EXAMINATION: NUCLEAR MEDICINE CARDIAC BLOOD POOL MU[place] AT REST-    CLINICAL INDICATION: Cardiomyopathy  TECHNIQUE: 21 mCi of Tc-99m autologous RBCs were injected IV after  in-vitro RBC labeling. Gated cardiac imaging was performed in the  anterior and left anterior oblique.  COMPARISON: None  FINDINGS: The left ventricle is normal in size with normal systolic  function. The calculated left ventricular ejection fraction (LVEF) = 38  %.  The right and left atria, aorta and pulmonary artery are normal. The  right ventricle is normal in size.    Impression  LVEF: 38%, at rest.    This report was finalized on 10/16/2020 11:57 AM by Dr. Marlo DALTON  MD Karolyn.       HEART CATH:  Results for orders placed during the hospital encounter of 11/10/20    Cardiac Catheterization/Vascular Study    Narrative  Procedure type:  Left heart cath, LV gram, bilateral selective cholangiogram    Indication:  Cardiomyopathy    Procedure:  After informed consent the patient was brought into the cardiac aspiration lab she was prepped and draped in the usual sterile manner the right radial area was incised with 2% Xylocaine however several attempts to engage into the right radial artery was not successful so the right radial artery access was abandoned and the right groin area was excised in 2% Xylocaine right femoral artery was accessed using modified standard technique and 5 Mal[place]ian side-port arterial sheath was placed and secured then over a guidewire a 5 [place]ian JL 4 catheter was used left main coronary artery with angiographic pressures were taken then the catheter was removed and over a guidewire a 5 [place] JR4 catheter was used and engagement of the right coronary arteries angioplasty was taken then the catheter was removed then over a guidewire 5 [place]ian pigtail catheter used aortic valve was done hand-injection LV gram was done then pullback was done then the catheter was removed groin sheath was removed and minx closure device applied with good hemostasis the patient was transported back to her room in stable condition.    Results:  The patient LVEDP was 17 mmHg with hand-injection showing preserved left ventricular systolic function wall motion EF 50-55% with no significant aortic gradient on pullback.    Cholangiogram:  This right dominant system.  Left main cardiac angiographically normal and bifurcates into left and descending and left circumflex arteries.  LAD was normal caliber gives several septal perforators and diagonal branches and wraps around the apex LAD about 30 to 40% mid stenosis.  Left circumflex artery was nondominant for posterior circulation gives rise  to several obtuse marginal branches left circumflex arteries branches angiographically normal.  The right coronary artery was a large artery was dominant for posterior circulation giving rise to acute marginal branches PDA and posterolateral branches the right coronary artery and its branches angiographically normal.    Conclusion:  Preserved LV systolic function ejection fraction 50-55% with elevated left ventricular end-diastolic pressure consistent with diastolic dysfunction coronary angiogram showed right dominant system with 30 to 40% mid LAD lesion otherwise coronaries arteries were normal.  Plan of care:  Medical management and secondary preventive measurements of coronary disease good lipid control blood pressure control healthy lifestyle patient is to follow-up with her primary care physician for further management.      [id number]            TELEMETRY:      with frequent PVCs with a BBB        I reviewed the patient's new clinical results.    ALLERGIES: Phenergan [promethazine]    Medication Review:   Current list of medications may not reflect those currently placed in orders that are not signed or are being held.     busPIRone, 10 mg, Oral, BID  erythromycin, 1 application , Both Eyes, Q6H  ferrous sulfate, 325 mg, Oral, BID  fluticasone, 2 spray, Nasal, Daily  furosemide, 80 mg, Intravenous, Once  insulin glargine, 20 Units, Subcutaneous, Daily  insulin regular, 3-14 Units, Subcutaneous, Q6H  levothyroxine, 50 mcg, Oral, Q AM  Lidocaine, 2 patch, Transdermal, Q24H  pantoprazole, 40 mg, Oral, Q AM  potassium chloride, 10 mEq, Intravenous, Q1H  pregabalin, 150 mg, Oral, BID  senna-docusate sodium, 2 tablet, Oral, BID  sodium chloride, 10 mL, Intravenous, Q12H  sodium chloride, 10 mL, Intravenous, Q12H      heparin, 7.4 Units/kg/hr (Dosing Weight), Last Rate: 7.4 Units/kg/hr (23 1700)  Pharmacy to Dose Heparin,         acetaminophen    acetaminophen    benzonatate    senna-docusate sodium  **AND** polyethylene glycol **AND** bisacodyl **AND** bisacodyl    Calcium Replacement - Follow Nurse / BPA Driven Protocol    dextrose    dextrose    glucagon (human recombinant)    Magnesium Standard Dose Replacement - Follow Nurse / BPA Driven Protocol    nitroglycerin    Pharmacy to Dose Heparin    Phosphorus Replacement - Follow Nurse / BPA Driven Protocol    Potassium Replacement - Follow Nurse / BPA Driven Protocol    [COMPLETED] Insert Peripheral IV **AND** sodium chloride    sodium chloride    sodium chloride    sodium chloride    sodium chloride    Assessment      Atrial fibrillation with rapid ventricle response, status post electrocardioversion in the ED, patient currently in sinus rhythm.  [id number] score of 3.  Dilated cardiomyopathy with LV ejection fraction of 31 to 35%.  Mild acute on chronic HFrEF  Acute non-ST elevation myocardial infarction (type I versus more likely type II from tachycardia and heart failure)  Type 2 diabetes mellitus.    Plan     Continue to optimize GDMT for cardiomyopathy.  Add carvedilol, valsartan, empagliflozin as tolerated  For her atrial fibrillation, given her prolonged QTc, and underlying advanced cardiomyopathy, there are no good therapeutic options with antiarrhythmic medications at this time  I agree that she may be a candidate for repeat attempt at A-fib ablation/pulmonary vein isolation.  Dr. Ken has discussed with Dr. Katz yesterday.  Continue with diuretic therapy as needed and tolerated.  Switch to p.o. diuretics in a.m.  Arrange LifeVest prior to discharge.    I have discussed the patients findings and recommendations with patient.    Thank you very much for asking us to be involved in this patient's care.  We will follow along with you.    Electronically signed by KENNY Finn, 11/14/23, 12:24 PM EST.   Electronically signed by Ramon Sahu MD, 11/14/23, 5:25 PM EST.          Please note that portions of this note were completed with a  voice recognition program.    Please note that portions of this note were copied and has been reviewed and is accurate as of 11/14/2023 .

## 2023-11-14 NOTE — PLAN OF CARE
Goal Outcome Evaluation:              Outcome Evaluation: was in afib rvr around lunch time, 1 time dose of lopressor and back to NSR. passed swallow eval, reg diet.

## 2023-11-14 NOTE — PROGRESS NOTES
[facility] HOSPITALIST PROGRESS NOTE     Patient Identification:  Name:  Yumiko Purdy  Age:  57 y.o.  Sex:  female  :  1965  MRN:  [id number]  Visit Number:  [id number]  ROOM: [address]     Primary Care Provider:  Masha Davidson APRN     Date of Admission: 2023    Length of stay in inpatient status:  1    Subjective     Chief Compliant:    Chief Complaint   Patient presents with    Rapid Heart Rate     History of Presenting Illness: The patient passed her swallow evaluation today and so far has done great with her diet.  She denies any choking.  She also denies shortness of air, coughing, chest pain, nausea, vomiting, and diarrhea.    Objective     Current Hospital Meds:  busPIRone, 10 mg, Oral, BID  erythromycin, 1 application , Both Eyes, Q6H  ferrous sulfate, 325 mg, Oral, BID  fluticasone, 2 spray, Nasal, Daily  furosemide, 80 mg, Intravenous, Once  insulin glargine, 20 Units, Subcutaneous, Daily  insulin regular, 3-14 Units, Subcutaneous, Q6H  levothyroxine, 50 mcg, Oral, Q AM  Lidocaine, 2 patch, Transdermal, Q24H  pantoprazole, 40 mg, Oral, Q AM  potassium chloride, 10 mEq, Intravenous, Q1H  pregabalin, 150 mg, Oral, BID  senna-docusate sodium, 2 tablet, Oral, BID  sodium chloride, 10 mL, Intravenous, Q12H  sodium chloride, 10 mL, Intravenous, Q12H    heparin, 7.4 Units/kg/hr (Dosing Weight), Last Rate: 7.4 Units/kg/hr (23 1700)  Pharmacy to Dose Heparin,       Current Antimicrobial Therapy:  Anti-Infectives (From admission, onward)      Ordered     Dose/Rate Route Frequency Start Stop    23  cefepime 2000 mg IVPB in 100 ml NS (VTB)        Ordering Provider: Juanita Box DO    2,000 mg  over 30 Minutes Intravenous Once 23 0104    23  doxycycline (VIBRAMYCIN) 100 mg in sodium chloride 0.9 % 100 mL IVPB-VTB        Ordering Provider: Juanita Box DO    100 mg  over 60 Minutes Intravenous Once 23 0125           Current Diuretic Therapy:  Diuretics (From admission, onward)      Ordered     Dose/Rate Route Frequency Start Stop    11/13/23 2147  furosemide (LASIX) injection 80 mg        Ordering Provider: Dave Bishop MD    80 mg Intravenous Once 11/13/23 5135            ----------------------------------------------------------------------------------------------------------------------  Vital Signs:  Temp:  [97.8 °F (36.6 °C)-98.4 °F (36.9 °C)] 98.1 °F (36.7 °C)  Heart Rate:  [] 98  Resp:  [11-25] 20  BP: ()/(32-75) 127/57  SpO2:  [94 %-100 %] 100 %  on  Flow (L/min):  [1.5-2] 1.5;   Device (Oxygen Therapy): room air  Body mass index is 38.23 kg/m².    Wt Readings from Last 3 Encounters:   11/14/23 104 kg (229 lb 11.5 oz)   10/15/23 113 kg (250 lb)   09/29/23 109 kg (240 lb)     Intake & Output (last 3 days)         11/11 0701 11/12 0700 11/12 0701 11/13 0700 11/13 0701 11/14 0700 11/14 0701  11/15 0700    P.O.   100     I.V. (mL/kg)  41.6 (0.4) 22.2 (0.2)     IV Piggyback  200      Total Intake(mL/kg)  241.6 (2.1) 122.2 (1.2)     Urine (mL/kg/hr)   1200 (0.5)     Total Output   1200     Net  +241.6 -1077.8                   NPO Diet NPO Type: Strict NPO  ----------------------------------------------------------------------------------------------------------------------  Physical Exam  Vitals and nursing note reviewed.   Constitutional:       General: She is awake. She is not in acute distress.     Appearance: She is well-developed. She is not ill-appearing, toxic-appearing or diaphoretic.   HENT:      Head: Normocephalic and atraumatic.      Right Ear: External ear normal.      Left Ear: External ear normal.   Eyes:      General: No scleral icterus.     Pupils: Pupils are equal, round, and reactive to light.   Cardiovascular:      Rate and Rhythm: Tachycardia present. Rhythm irregular.      Pulses: Normal pulses.   Pulmonary:      Effort: Pulmonary effort is normal. No respiratory distress.       Breath sounds: No wheezing or rales.   Musculoskeletal:         General: No swelling, deformity or signs of injury.   Skin:     Capillary Refill: Capillary refill takes less than 2 seconds.      Coloration: Skin is not jaundiced or pale.   Neurological:      Mental Status: She is alert and oriented to person, place, and time. Mental status is at baseline.      Cranial Nerves: No cranial nerve deficit.   Psychiatric:         Mood and Affect: Mood normal.         Behavior: Behavior normal. Behavior is cooperative.       ----------------------------------------------------------------------------------------------------------------------  Tele: A-fib with heart rate 140-150.  After receiving 5 mg IV metoprolol, the heart rates came down to .  I personally reviewed the telemetry strips.  ----------------------------------------------------------------------------------------------------------------------  LABS:    CBC and coagulation:  Results from last 7 days   Lab Units 11/13/23  2234 11/13/23  1319 11/13/23  1116 11/13/23  0711 11/13/23  0642 11/13/23  0309 11/12/23  2350 11/12/23  2307   LACTATE mmol/L  --  2.0 2.5*  --  2.8* 2.5* 3.7*  --    CRP mg/dL  --   --   --   --   --   --   --  0.88*   WBC 10*3/mm3 10.82*  --   --  13.11*  --   --   --  15.57*   HEMOGLOBIN g/dL 7.9*  --   --  8.9*  --   --   --  8.4*   HEMATOCRIT % 26.2*  --   --  29.6*  --   --   --  26.7*   MCV fL 114.9*  --   --  114.3*  --   --   --  113.6*   MCHC g/dL 30.2*  --   --  30.1*  --   --   --  31.5   PLATELETS 10*3/mm3 99*  --   --  118*  --   --   --  142   INR   --   --   --   --   --   --   --  1.20*     Renal and electrolytes:  Results from last 7 days   Lab Units 11/13/23  2234 11/13/23  1319 11/13/23  0711 11/12/23  2307   SODIUM mmol/L 139 137 135* 132*   POTASSIUM mmol/L 3.4* 3.8 3.4* 3.5   MAGNESIUM mg/dL 2.4 2.5  --  2.1   CHLORIDE mmol/L 103 101 98 91*   CO2 mmol/L 28.7 23.9 26.7 26.7   BUN mg/dL 22* 29* 32* 36*    CREATININE mg/dL 1.09* 1.10* 1.32* 1.46*   CALCIUM mg/dL 8.6 8.8 8.8 8.9   PHOSPHORUS mg/dL 3.5  --   --   --    GLUCOSE mg/dL 105* 221* 256* 424*   ANION GAP mmol/L 7.3 12.1 10.3 14.3     Estimated Creatinine Clearance: 69 mL/min (A) (by C-G formula based on SCr of 1.09 mg/dL (H)).    Liver and pancreatic function:  Results from last 7 days   Lab Units 11/13/23  2234 11/13/23  0711 11/12/23  2350 11/12/23  2307   ALBUMIN g/dL 3.2* 3.6  --  3.7   BILIRUBIN mg/dL 2.5* 2.4*  --  2.7*   ALK PHOS U/L 89 103  --  127*   AST (SGOT) U/L 26 20  --  21   ALT (SGPT) U/L 18 18  --  21   AMMONIA umol/L  --   --  15  --        Endocrine function:  Lab Results   Component Value Date    HGBA1C 7.30 (H) 11/13/2023     Point of care bedside glucose levels:  Results from last 7 days   Lab Units 11/14/23  0533 11/13/23  2308 11/13/23  1759 11/13/23  1155 11/13/23  0617   GLUCOSE mg/dL 136* 115 222* 222* 268*     Glucose levels from [facility]:  Results from last 7 days   Lab Units 11/13/23  2234 11/13/23  1319 11/13/23  0711 11/12/23  2307   GLUCOSE mg/dL 105* 221* 256* 424*     Lab Results   Component Value Date    TSH 4.680 (H) 11/12/2023    FREET4 1.78 (H) 11/13/2023     Cardiac:  Results from last 7 days   Lab Units 11/13/23  1319 11/13/23  0124 11/12/23  2307   CK TOTAL U/L  --   --  41   HSTROP T ng/L 84* 84* 93*   PROBNP pg/mL  --   --  2,746.0*       Cultures:  Lab Results   Component Value Date    COLORU Yellow 11/13/2023    CLARITYU Clear 11/13/2023    PHUR 5.5 11/13/2023    GLUCOSEU >=1000 mg/dL (3+) (A) 11/13/2023    KETONESU Negative 11/13/2023    BLOODU Moderate (2+) (A) 11/13/2023    NITRITEU Negative 11/13/2023    LEUKOCYTESUR Trace (A) 11/13/2023    BILIRUBINUR Negative 11/13/2023    UROBILINOGEN 0.2 E.U./dL 11/13/2023    RBCUA 6-10 (A) 11/13/2023    WBCUA 0-2 11/13/2023    BACTERIA Trace (A) 11/13/2023     Microbiology Results (last 10 days)       Procedure Component Value - Date/Time    Blood Culture - Blood, Hand,  Right [[id number]]  (Normal) Collected: 11/13/23 0003    Lab Status: Preliminary result Specimen: Blood from Hand, Right Updated: 11/14/23 0015     Blood Culture No growth at 24 hours    Blood Culture - Blood, Arm, Right [[id number]]  (Normal) Collected: 11/12/23 2350    Lab Status: Preliminary result Specimen: Blood from Arm, Right Updated: 11/14/23 0015     Blood Culture No growth at 24 hours          I have personally looked at the labs and they are summarized above.  ----------------------------------------------------------------------------------------------------------------------  Detailed radiology reports for the last 24 hours:    Imaging Results (Last 24 Hours)       Procedure Component Value Units Date/Time    XR Chest 1 View [[id number]] Collected: 11/14/23 0719     Updated: 11/14/23 0721    Narrative:      XR CHEST 1 VW-     CLINICAL INDICATION: post aspiration; I47.29-Other ventricular  tachycardia; A41.9-Sepsis, unspecified organism; R65.20-Severe sepsis  without septic shock; K74.69-Other cirrhosis of liver;  I48.91-Unspecified atrial fibrillation; D64.9-Anemia, unspecified;  I50.9-Heart failure, unspecified; R94.31-Abnormal electrocardiogram  (ECG) (EKG)        COMPARISON: 11/13/2023     TECHNIQUE: Single frontal view of the chest.     FINDINGS:     LUNGS: Lungs are adequately aerated.      HEART AND MEDIASTINUM: Heart and mediastinal contours are unremarkable        SKELETON: Bony and soft tissue structures are unremarkable.             Impression:      No radiographic evidence of acute cardiac or pulmonary disease.           This report was finalized on 11/14/2023 7:19 AM by Dr. Angelo Deleon MD.             I have personally looked at the radiology images and I have read the available final report.    Assessment & Plan      -Atrial fibrillation with RVR status post direct current cardioversion in the emergency room, now in sinus rhythm, on Eliquis at home for a [id number] score of 3   -Dilated  cardiomyopathy/systolic CHF (EF 21-25% in 2020 and now 31-35%) that was present on admission with left ejection fraction of 31-35%, suspect partly tachycardia induced, with an acute exacerbation on admission that has now improved with diuresis  -Hypotension present on admission, suspect due to cardiogenic shock as result of the A-fib with RVR, currently resolved  -Suspect type I versus type II non-ST elevation MI present on admission due to the atrial fibrillation with RVR causing cardiogenic shock and hypotension (cardiology thinks this is more likely type II than a type I non-ST elevation MI)  -Status post PVI on October 2022 with recurrent A-fib in the past  -History of nonobstructive coronary artery disease per cardiac catheterization 11/20/2020 with 30 to 40% LAD lesion  -History of insulin dependent diabetes mellitus type 2  -History of decompensated liver cirrhosis with ascites  -History of chronic macrocytic anemia, with hemoglobin currently at baseline  -History of CKD stage IIIa (creatinine ranges 1.2-1.5 and GFR ranges 42-50)     Cardiology has added Coreg, losartan, and Jardiance as tolerated; so far, the patient has tolerated these and the blood pressures have dropped to systolics in the mid 90s.  We will continue to monitor her electrolytes and labs closely on the goal-directed medical therapy for the heart failure.  Patient will need to follow-up in [place] to see about receiving an AICD.  She will be given a LifeVest prior to discharge to bridge her until she receives the AICD.  Her glucose levels have been at goal during the beginning of the day with and toward the middle and end of the day they are getting up into 300-400 range.  Therefore, I will increase the Lantus to 22 units and add 5 units of regular insulin at mealtime; I will continue with the same sliding scale regular insulin that is already written.  Dr. Sahu would like to switch the patient to oral diuretics tomorrow; he gave her a  dose of 60 mg IV Lasix this afternoon.  At home, she takes 4 mg of Bumex Monday through Saturday and then takes 2 mg of Bumex on Sunday.  We will repeat her blood work in the morning.  For now, we will continue with the heparin drip and we will ask cardiology if we can switch her back to her home Eliquis.  I have consulted PT and OT for help with disposition.  I have also ordered orthostatic vital signs for in the morning to make sure that she is not having any orthostasis as her blood pressures are running systolic of mid 90s to mid 100s.    VTE Prophylaxis:  Mechanical Order History:       None          Pharmalogical Order History:        Ordered     Dose Route Frequency Stop    11/13/23 1540  heparin [id number] units/250 mL (100 units/mL) in 0.45 % NaCl infusion  7.84 mL/hr         7.4 Units/kg/hr (Dosing Weight) IV Titrated --    11/13/23 0537  Pharmacy to Dose Heparin         -- XX Continuous PRN --                  The patient is high risk due to the following diagnoses/reasons: A-fib causing an acute heart failure exacerbation that then led to a non-ST elevation MI    Disposition: Patient wants to return home to live with her son and have home health; discharge may occur in the next 2 to 3 days.    Dave Bishop MD  [facility] Hospitalist  11/14/23  08:19 EST

## 2023-11-15 LAB
ANION GAP SERPL CALCULATED.3IONS-SCNC: 9.3 MMOL/L (ref 5–15)
APTT PPP: 49 SECONDS (ref 26.5–34.5)
BUN SERPL-MCNC: 18 MG/DL (ref 6–20)
BUN/CREAT SERPL: 15.4 (ref 7–25)
CALCIUM SPEC-SCNC: 8.8 MG/DL (ref 8.6–10.5)
CHLORIDE SERPL-SCNC: 100 MMOL/L (ref 98–107)
CO2 SERPL-SCNC: 25.7 MMOL/L (ref 22–29)
CREAT SERPL-MCNC: 1.17 MG/DL (ref 0.57–1)
D DIMER PPP FEU-MCNC: <0.27 MCGFEU/ML (ref 0–0.56)
DEPRECATED RDW RBC AUTO: 93.5 FL (ref 37–54)
EGFRCR SERPLBLD CKD-EPI 2021: 54.9 ML/MIN/1.73
ERYTHROCYTE [DISTWIDTH] IN BLOOD BY AUTOMATED COUNT: 25.5 % (ref 12.3–15.4)
FIBRINOGEN PPP-MCNC: 309 MG/DL (ref 173–524)
GLUCOSE BLDC GLUCOMTR-MCNC: 177 MG/DL (ref 70–130)
GLUCOSE BLDC GLUCOMTR-MCNC: 195 MG/DL (ref 70–130)
GLUCOSE BLDC GLUCOMTR-MCNC: 298 MG/DL (ref 70–130)
GLUCOSE BLDC GLUCOMTR-MCNC: 312 MG/DL (ref 70–130)
GLUCOSE BLDC GLUCOMTR-MCNC: 324 MG/DL (ref 70–130)
GLUCOSE BLDC GLUCOMTR-MCNC: 348 MG/DL (ref 70–130)
GLUCOSE BLDC GLUCOMTR-MCNC: 378 MG/DL (ref 70–130)
GLUCOSE SERPL-MCNC: 169 MG/DL (ref 65–99)
HCT VFR BLD AUTO: 26.9 % (ref 34–46.6)
HGB BLD-MCNC: 8.5 G/DL (ref 12–15.9)
INR PPP: 0.97 (ref 0.9–1.1)
MAGNESIUM SERPL-MCNC: 2.5 MG/DL (ref 1.6–2.6)
MCH RBC QN AUTO: 36.3 PG (ref 26.6–33)
MCHC RBC AUTO-ENTMCNC: 31.6 G/DL (ref 31.5–35.7)
MCV RBC AUTO: 115 FL (ref 79–97)
PHOSPHATE SERPL-MCNC: 3.9 MG/DL (ref 2.5–4.5)
PLATELET # BLD AUTO: 77 10*3/MM3 (ref 140–450)
PMV BLD AUTO: 10.8 FL (ref 6–12)
POTASSIUM SERPL-SCNC: 3.7 MMOL/L (ref 3.5–5.2)
PROTHROMBIN TIME: 13.4 SECONDS (ref 12.1–14.7)
RBC # BLD AUTO: 2.34 10*6/MM3 (ref 3.77–5.28)
SODIUM SERPL-SCNC: 135 MMOL/L (ref 136–145)
UFH PPP CHRO-ACNC: 0.21 IU/ML (ref 0.3–0.7)
UFH PPP CHRO-ACNC: <0.1 IU/ML (ref 0.3–0.7)
UFH PPP CHRO-ACNC: <0.1 IU/ML (ref 0.3–0.7)
WBC NRBC COR # BLD: 8.88 10*3/MM3 (ref 3.4–10.8)

## 2023-11-15 PROCEDURE — 99232 SBSQ HOSP IP/OBS MODERATE 35: CPT | Performed by: INTERNAL MEDICINE

## 2023-11-15 PROCEDURE — 63710000001 INSULIN REGULAR HUMAN PER 5 UNITS: Performed by: INTERNAL MEDICINE

## 2023-11-15 PROCEDURE — 82948 REAGENT STRIP/BLOOD GLUCOSE: CPT

## 2023-11-15 PROCEDURE — 25010000002 HEPARIN (PORCINE) PER 1000 UNITS: Performed by: INTERNAL MEDICINE

## 2023-11-15 PROCEDURE — 63710000001 INSULIN GLARGINE PER 5 UNITS: Performed by: INTERNAL MEDICINE

## 2023-11-15 PROCEDURE — 97162 PT EVAL MOD COMPLEX 30 MIN: CPT

## 2023-11-15 PROCEDURE — 85520 HEPARIN ASSAY: CPT

## 2023-11-15 PROCEDURE — 85027 COMPLETE CBC AUTOMATED: CPT | Performed by: INTERNAL MEDICINE

## 2023-11-15 PROCEDURE — 85379 FIBRIN DEGRADATION QUANT: CPT | Performed by: INTERNAL MEDICINE

## 2023-11-15 PROCEDURE — 85730 THROMBOPLASTIN TIME PARTIAL: CPT | Performed by: INTERNAL MEDICINE

## 2023-11-15 PROCEDURE — 85610 PROTHROMBIN TIME: CPT | Performed by: INTERNAL MEDICINE

## 2023-11-15 PROCEDURE — 97166 OT EVAL MOD COMPLEX 45 MIN: CPT

## 2023-11-15 PROCEDURE — 83735 ASSAY OF MAGNESIUM: CPT | Performed by: INTERNAL MEDICINE

## 2023-11-15 PROCEDURE — 85384 FIBRINOGEN ACTIVITY: CPT | Performed by: INTERNAL MEDICINE

## 2023-11-15 PROCEDURE — 84100 ASSAY OF PHOSPHORUS: CPT | Performed by: INTERNAL MEDICINE

## 2023-11-15 PROCEDURE — 25010000002 HEPARIN (PORCINE) 25000-0.45 UT/250ML-% SOLUTION: Performed by: INTERNAL MEDICINE

## 2023-11-15 PROCEDURE — 85520 HEPARIN ASSAY: CPT | Performed by: INTERNAL MEDICINE

## 2023-11-15 PROCEDURE — 80048 BASIC METABOLIC PNL TOTAL CA: CPT | Performed by: INTERNAL MEDICINE

## 2023-11-15 RX ORDER — HEPARIN SODIUM 5000 [USP'U]/ML
4000 INJECTION, SOLUTION INTRAVENOUS; SUBCUTANEOUS ONCE
Status: COMPLETED | OUTPATIENT
Start: 2023-11-15 | End: 2023-11-15

## 2023-11-15 RX ADMIN — PREGABALIN 150 MG: 75 CAPSULE ORAL at 09:42

## 2023-11-15 RX ADMIN — BUSPIRONE HYDROCHLORIDE 10 MG: 10 TABLET ORAL at 09:32

## 2023-11-15 RX ADMIN — INSULIN HUMAN 5 UNITS: 100 INJECTION, SOLUTION PARENTERAL at 09:33

## 2023-11-15 RX ADMIN — INSULIN HUMAN 10 UNITS: 100 INJECTION, SOLUTION PARENTERAL at 12:31

## 2023-11-15 RX ADMIN — Medication 10 ML: at 09:33

## 2023-11-15 RX ADMIN — HEPARIN SODIUM 4000 UNITS: 5000 INJECTION INTRAVENOUS; SUBCUTANEOUS at 09:42

## 2023-11-15 RX ADMIN — INSULIN HUMAN 5 UNITS: 100 INJECTION, SOLUTION PARENTERAL at 17:27

## 2023-11-15 RX ADMIN — ACETAMINOPHEN 500 MG: 500 TABLET ORAL at 06:16

## 2023-11-15 RX ADMIN — INSULIN HUMAN 3 UNITS: 100 INJECTION, SOLUTION PARENTERAL at 06:09

## 2023-11-15 RX ADMIN — Medication 10 ML: at 20:29

## 2023-11-15 RX ADMIN — EMPAGLIFLOZIN 10 MG: 10 TABLET, FILM COATED ORAL at 09:33

## 2023-11-15 RX ADMIN — FLUTICASONE PROPIONATE 2 SPRAY: 50 SPRAY, METERED NASAL at 11:12

## 2023-11-15 RX ADMIN — PANTOPRAZOLE SODIUM 40 MG: 40 TABLET, DELAYED RELEASE ORAL at 06:10

## 2023-11-15 RX ADMIN — FERROUS SULFATE TAB 325 MG (65 MG ELEMENTAL FE) 325 MG: 325 (65 FE) TAB at 09:32

## 2023-11-15 RX ADMIN — LIDOCAINE 2 PATCH: 4 PATCH TOPICAL at 09:32

## 2023-11-15 RX ADMIN — INSULIN HUMAN 12 UNITS: 100 INJECTION, SOLUTION PARENTERAL at 17:27

## 2023-11-15 RX ADMIN — INSULIN HUMAN 8 UNITS: 100 INJECTION, SOLUTION PARENTERAL at 00:19

## 2023-11-15 RX ADMIN — LEVOTHYROXINE SODIUM 50 MCG: 50 TABLET ORAL at 07:16

## 2023-11-15 RX ADMIN — MICONAZOLE NITRATE 1 APPLICATION: 2 POWDER TOPICAL at 12:31

## 2023-11-15 RX ADMIN — INSULIN GLARGINE 22 UNITS: 100 INJECTION, SOLUTION SUBCUTANEOUS at 09:33

## 2023-11-15 RX ADMIN — INSULIN HUMAN 10 UNITS: 100 INJECTION, SOLUTION PARENTERAL at 23:52

## 2023-11-15 RX ADMIN — INSULIN HUMAN 5 UNITS: 100 INJECTION, SOLUTION PARENTERAL at 12:31

## 2023-11-15 RX ADMIN — HEPARIN SODIUM 11.4 UNITS/KG/HR: 10000 INJECTION, SOLUTION INTRAVENOUS at 19:23

## 2023-11-15 RX ADMIN — FERROUS SULFATE TAB 325 MG (65 MG ELEMENTAL FE) 325 MG: 325 (65 FE) TAB at 20:28

## 2023-11-15 RX ADMIN — ACETAMINOPHEN 500 MG: 500 TABLET ORAL at 15:57

## 2023-11-15 RX ADMIN — DOCUSATE SODIUM 50 MG AND SENNOSIDES 8.6 MG 2 TABLET: 8.6; 5 TABLET, FILM COATED ORAL at 09:32

## 2023-11-15 RX ADMIN — MICONAZOLE NITRATE 1 APPLICATION: 2 POWDER TOPICAL at 20:28

## 2023-11-15 NOTE — DISCHARGE INSTR - APPOINTMENTS
FRIENDLY REMINDER: PATIENT HAS APPT WITH DR. MOTLEY, CARDIOLOGY IN [place] ON MONDAY--DEC 11@ 1:15PM--PH: [phone]

## 2023-11-15 NOTE — PHARMACY PATIENT ASSISTANCE
Pharmacy checked on cost of Jardiance. Per patient's insurance, there is no copay at our retail pharmacy.    Thank you,  Chyna Avilez, PharmD

## 2023-11-15 NOTE — THERAPY EVALUATION
Acute Care - Occupational Therapy Initial Evaluation   [place]     Patient Name: Yumiko Purdy  : 1965  MRN: [id number]  Today's Date: 11/15/2023             Admit Date: 2023       ICD-10-CM ICD-9-CM   1. Sustained monomorphic ventricular tachycardia  I47.29 427.1   2. Severe sepsis  A41.9 038.9    R65.20 995.92   3. Other cirrhosis of liver  K74.69 571.5   4. Atrial fibrillation with RVR  I48.91 427.31   5. Symptomatic anemia  D64.9 285.9   6. Congestive heart failure, unspecified HF chronicity, unspecified heart failure type  I50.9 428.0   7. Prolonged Q-T interval on ECG  R94.31 794.31     Patient Active Problem List   Diagnosis    Acute on chronic congestive heart failure    Cardiomyopathy    CKD (chronic kidney disease) stage 2, GFR 60-89 ml/min    Severe obesity (BMI 35.0-39.9) with comorbidity    Chronic anemia    Elevated bilirubin    Acute on chronic combined systolic and diastolic CHF (congestive heart failure)    Hyponatremia    Chronic atrial fibrillation    Cirrhosis    Ventricular tachycardia     Past Medical History:   Diagnosis Date    Anemia     CHF (congestive heart failure)     Chronic a-fib     stated by patient     Cirrhosis of liver     stated by patient     Diabetes mellitus      Past Surgical History:   Procedure Laterality Date    APPENDECTOMY      CARDIAC CATHETERIZATION N/A 11/10/2020    Procedure: Left Heart Cath;  Surgeon: Charlee Ken MD;  Location: [facility] INVASIVE LOCATION;  Service: Cardiovascular;  Laterality: N/A;    CHOLECYSTECTOMY      TOE AMPUTATION Right     stated by patient.          OT ASSESSMENT FLOWSHEET (last 12 hours)       OT Evaluation and Treatment       Row Name 11/15/23 1046                   OT Time and Intention    Subjective Information complains of;weakness;fatigue  -LM        Document Type evaluation  -LM        Mode of Treatment occupational therapy  -LM        Patient Effort good  -LM           General Information    Patient Profile  Reviewed yes  -LM        Prior Level of Function all household mobility;transfer;ADL's;min assist:  son assists, HH x 2 week  -LM        Equipment Currently Used at Home hospital bed;commode, 3-in-1;walker, rolling  -LM        Existing Precautions/Restrictions fall  -LM           Living Environment    Current Living Arrangements home  -LM        People in Home child(ferdinand), adult  -LM           Cognition    Affect/Mental Status (Cognition) WNL  -LM        Orientation Status (Cognition) oriented x 3  -LM           Range of Motion (ROM)    Range of Motion ROM is WFL  -LM           Strength Comprehensive (MMT)    General Manual Muscle Testing (MMT) Assessment upper extremity strength deficits identified  -LM        Comment, General Manual Muscle Testing (MMT) Assessment bilateral hand strength decreased  -LM           Sensory    Additional Documentation Sensory Assessment (Somatosensory) (Group)  -LM           Sensory Assessment (Somatosensory)    Sensory Assessment (Somatosensory) sensation intact  -LM           Activities of Daily Living    BADL Assessment/Intervention bathing;upper body dressing;lower body dressing;grooming;feeding;toileting  -LM           Bathing Assessment/Intervention    [place] Level (Bathing) minimum assist (75% patient effort);moderate assist (50% patient effort)  -LM           Upper Body Dressing Assessment/Training    [place] Level (Upper Body Dressing) minimum assist (75% patient effort)  -LM           Lower Body Dressing Assessment/Training    [place] Level (Lower Body Dressing) minimum assist (75% patient effort);moderate assist (50% patient effort)  -LM           Grooming Assessment/Training    [place] Level (Grooming) set up  -LM           Self-Feeding Assessment/Training    [place] Level (Feeding) set up  -LM           Toileting Assessment/Training    [place] Level (Toileting) minimum assist (75% patient effort);moderate assist (50% patient effort)  -LM            Transfer Assessment/Treatment    Transfers stand pivot/stand step transfer;toilet transfer  -LM           Toilet Transfer    Type (Toilet Transfer) stand pivot/stand step  -LM        [place] Level (Toilet Transfer) minimum assist (75% patient effort)  -LM        Assistive Device (Toilet Transfer) commode, 3-in-1  -LM           Motor Skills    Motor Skills functional endurance;coordination  -LM        Coordination fine motor deficit;gross motor deficit;bilateral;upper extremity  -LM        Functional Endurance F-  -LM           Wound 11/14/23 2102 Left lower leg Abrasion    Wound - Properties Group Placement Date: 11/14/23 -AF Placement Time: 2102 -AF Present on Original Admission: Y  -AF Side: Left  -AF Orientation: lower  -AF Location: leg  -AF Primary Wound Type: Abrasion  -AF    Retired Wound - Properties Group Placement Date: 11/14/23 -AF Placement Time: 2102 -AF Present on Original Admission: Y  -AF Side: Left  -AF Orientation: lower  -AF Location: leg  -AF Primary Wound Type: Abrasion  -AF    Retired Wound - Properties Group Date first assessed: 11/14/23 -AF Time first assessed: 2102 -AF Present on Original Admission: Y  -AF Side: Left  -AF Location: leg  -AF Primary Wound Type: Abrasion  -AF       Plan of Care Review    Plan of Care Reviewed With patient  -LM           Positioning and Restraints    Post Treatment Position bed  -LM        In Bed call light within reach;encouraged to call for assist;exit alarm on  -LM           Therapy Assessment/Plan (OT)    OT Diagnosis debility  -LM        Rehab Potential (OT) good, to achieve stated therapy goals  -LM        Criteria for Skilled Therapeutic Interventions Met (OT) yes;skilled treatment is necessary;meets criteria  -LM        Therapy Frequency (OT) other (see comments)  -LM        Problem List (OT) balance;mobility;strength  -LM        Activity Limitations Related to Problem List (OT) unable to transfer safely;BADLs not performed adequately  or safely  -LM        Planned Therapy Interventions (OT) activity tolerance training;adaptive equipment training;BADL retraining;functional balance retraining;ROM/therapeutic exercise;strengthening exercise;transfer/mobility retraining;patient/caregiver education/training  -LM           Therapy Plan Review/Discharge Plan (OT)    Anticipated Discharge Disposition (OT) home with assist;home with home health  -LM           OT Goals    Transfer Goal Selection (OT) transfer, OT goal 1  -LM        Activity Tolerance Goal Selection (OT) activity tolerance, OT goal 1  -LM           Transfer Goal 1 (OT)    Activity/Assistive Device (Transfer Goal 1, OT) transfers, all;toilet;commode, 3-in-1;walker, rolling  -LM        [place] Level/Cues Needed (Transfer Goal 1, OT) supervision required;contact guard required  -LM        Time Frame (Transfer Goal 1, OT) by discharge  -LM            Activity Tolerance Goal 1 (OT)    Activity Tolerance Goal 1 (OT) increase fxl activity tolerance to F to assist with badl and fxl mobility  -LM        Activity Level (Endurance Goal 1, OT) 15 min activity  -LM        Time Frame (Activity Tolerance Goal 1, OT) by discharge  -LM                  User Key  (r) = Recorded By, (t) = Taken By, (c) = Cosigned By      Initials Name Effective Dates    LM Donna Cifuentes OT 06/16/21 -     AF Kati Vasquez RN 06/08/23 -                            OT Recommendation and Plan  Planned Therapy Interventions (OT): activity tolerance training, adaptive equipment training, BADL retraining, functional balance retraining, ROM/therapeutic exercise, strengthening exercise, transfer/mobility retraining, patient/caregiver education/training  Therapy Frequency (OT): other (see comments)  Plan of Care Review  Plan of Care Reviewed With: patient  Plan of Care Reviewed With: patient        Time Calculation:     Therapy Charges for Today       Code Description Service Date Service Provider Modifiers Qty    [id number] HC OT  EVAL MOD COMPLEXITY 4 11/15/2023 Donna Cifuentes, OT GO 1                 Donna Cifuentes, CONCHITA  11/15/2023

## 2023-11-15 NOTE — CONSULTS
Current patient of [facility]. Last seen by Sonja Romo PA-C on 9/29/23.     Most recent EF = 31-35% (11/13/23)  Most recent ProBNP = 2,746 (11/12/23)  Most recent ReDS = 31% (11/14/23)      57 y.o. female with known past medical history of:  Chronic systolic & diastolic HF  TTE from November 2022 with visually estimated ejection fraction of 30% ±5% and noted abnormal systolic strain pattern as well as grade 3 diastolic dysfunction as well as abnormal TAPSE with abnormal right ventricular function  KHAI on 09/2020 with EF 21-25% with LV systolic function severely decreased and RV cavity mildly dilated with moderately reduced RV systolic function noted, moderate TVR, and aortic plaquing  Right heart cath on 12/22/2022 with elevated left and right heart pressures with report not available  ASCVD  LHC 11/10/20 with preserved LV systolic, EF 50-55% with elevated LV end diastolic pressure consistent with diastolic dysfunction and right dominant system with 30-40% mid LAD lesion.   LHC attempted on 12/20[facility] at [facility] with unsuccessful access due to small artery appearing occluded or near occluded radially on right  Peripheral Arterial disease  Atrial Flutter  CKD stage IIIb  Cirrhosis of the liver  Stage III CKD  Chronic hypoxemic respiratory failure  Morbid Obesity  Diffuse purpuric rash with prior w/u negative for vasculitis    CHF GOAL DIRECTED MEDICAL THERAPY FOR PATIENT ADDRESSED/ADJUSTED:      GDMT     Drug Class    Drug    Dose Last Dose Adjustment Additional Titration    Notes   ACEi/ARB/ARNI ACEI previously stopped due to renal fxn           Beta Blocker  Metoprolol Succinate   12.5 mg qhs Reduced by PCP       MRA Spirono previously stopped due to renal fxn           SGLT2i Farxiga 10mg qd Restarted by Nephro N/A     Secondaries Verquevo 10mg qd Titrate up to 10mg on 08/17/23.          Continue daily Bumex 4mg 5 days a week, 2mg 2 days a week as directed by Nephrology.  BMP evaluated today.     BMP, Mag, & ProBNP reviewed with patient with toleration of current medication regimen.       -Fluid restriction/Sodium restriction:   Requested 2000 ml restriction  Patient has been asked to weigh daily and was provided with a printed diuretic strategy.  1,500 mg Na restriction was discussed      Will ensure patient has referral for HF clinic follow up to be scheduled within 7 days of hospital discharge.    Electronically signed by,   Katalina Bedolla RN ,DNP, MSN, CHFN  11/15/23 10:06 EST

## 2023-11-15 NOTE — PLAN OF CARE
Goal Outcome Evaluation:   Pt is currently resting in bed. VSS with no complaints or S/S of distress. Will continue to follow plan of care.

## 2023-11-15 NOTE — NURSING NOTE
Pt received from PCU, A&O x 4 at this time. VSS. IV access maintained. Heparin infusing per order. Lung sounds diminished. Placed on telemetry monitor. Cath care completed, purewick changed. 1 L NC with an O2 of 93% or above. Will continue with plan of care.

## 2023-11-15 NOTE — PROGRESS NOTES
[facility]  PROGRESS NOTE    Patient information:  Name: Yumiko Purdy  Age/Sex: 57 y.o. female  :  1965        PCP: Masha Davidson APRN  Attending: Tomás An MD  MRN:  [id number]  Visit Number:  [id number]    LOS:  LOS: 2 days     CODE STATUS:    Code Status and Medical Interventions:   Ordered at: 23 0558     Code Status (Patient has no pulse and is not breathing):    CPR (Attempt to Resuscitate)     Medical Interventions (Patient has pulse or is breathing):    Full Support       PROBLEM LIST:Principal Problem:    Ventricular tachycardia      Reason for Cardiology follow-up: Ventricular tachycardia      Subjective   ADMISSION INFORMATION:  Chief Complaint   Patient presents with    Rapid Heart Rate       HPI:  Yumiko Purdy is a 57 y.o. female with a past medical history significant for systolic CHF (EF 21-25% per last ECHO 2020), afib on Eliquis s/p ablation 10/2022, cirrhosis, insulin dependent type II DM, hypothyroidism, COPD, and what appears to be borderline stage IIIa-b CKD (creatinin ranges 1.2-1.5 and GFR ranges 42-50).     Patient presented to [facility] ([facility] emergency room (ER) on 2023 with complaints of acute onset palpitations that occurred just prior to arrival to the ER. EMS reported they found her in SVT and gave her 6mg IV adenosine followed by 12mg adenosine along with a fluid bolus. When she arrived to the ED, initial EKG reported afib w/RVR, but upon further review on Zolls monitor, she appeared to be in Ventricular tachycardia. Thus she was loaded with IV amiodarone and subsequently converted to afib and has been going back and forth between sinus and afib since. Initial EKG showed intraventricular block, and repeat EKG's thereafter showed LBBB which was not present on prior EKGs before today. Labs showed elevated troponin 93 that trended down to 84 on repeat, BNP elevated at 2746, + 3.5,  mag 2.1, BUN 36 with Cr 1.46, Glucose 424, alk phos 127 and bilirubin 2.7.  TSH was 4.680 while free T4 was 1.78.   CT chest showed cardiomegaly, splenomegaly, minimal atelectasis at lung bases, and no edema, pleural effusion or pneumothorax. Patient was admitted for further workup and management.        Cardiology has been consulted for further evaluation and management ventricular tachycardia.      Primary Cardiologist has been Jad Maravilla MD (Cardiologist), and Samy Claude Elayi, MD (EP) and she was last seen in the office on 01/27/2023.   Contact number for EP: [phone].       EVENT TIMELINE:   11/14/2023: Ordered Life Vest  11/14/2023: The above number is no longer in service.  I did call [phone] and spoke with Eda. She took a message and will have one of the 's to return my call to make this patient an appointment for evaluation for ablation and ICD placement. Awaiting their return call.     11/15/2023: I called back to Dr. Jimenez's office at [phone] and spoke with Esthela.  She scheduled patient with Dr. Jimenez on 12/11/2023 at 1:15 PM.  I will take a note to  with this appoint on it for her to have as reference and new phone number ([phone]) since the one she gave us is not in service any longer.    Patient is also followed in [facility] with her last appointment being 09/29/2023.    Interval History:   Patient is in room 314 A and was examined by Dr. Sahu.   Telemetry reveals SB/SR 50-60s.  According to patient's I & O flow sheet she diuresed a -2319.7 mL x 1 unmeasured urine occurrence noted also.  Sodium 135, potassium 3.7, chloride 100, CO2 25.7, BUN 18, creatinine of 1.17.  Magnesium is 2.5.  Hemoglobin is 8.5 with a platelet count of 77.  Patient's last recorded blood pressure was 91/42. Patient is lying in bed resting quietly. No acute distress is noted at this time.  Patient denies any chest pain, palpitations and  reports her breathing is better. Patient reports she was able to sit up on the edge of  her bed with PT assistance today.    Vital Signs  Temp:  [97.8 °F (36.6 °C)-98.2 °F (36.8 °C)] 98 °F (36.7 °C)  Heart Rate:  [] 63  Resp:  [18-26] 18  BP: ()/(42-56) 92/52  Flow (L/min):  [0-1] 0  Device (Oxygen Therapy): room air  Vital Signs (last 72 hrs)         11/12 0700 11/13 0659 11/13 0700 11/14 0659 11/14 0700  11/15 0659 11/15 0700  11/15 0818   Most Recent      Temp (°F)   97.8    97.8 -  98.4    97.9 -  98.2      97.8     97.8 (36.6) 11/15 0713    Heart Rate 84 -  192    90 -  104    60 -  146      72     72 11/15 0713    Resp   18    11 - 25    16 -  26      18     18 11/15 0713    BP 61/61 -  115/107    85/54 -  125/75    81/48 -  127/57      91/42     91/42 11/15 0713    SpO2 (%) 75 -  100    94 -  100    86 -  100      98     98 11/15 0713    Flow (L/min)   2    1.5 -  2      1       1 11/14 2102          BMI:Body mass index is 38.52 kg/m².    WEIGHT:      11/14/23  0400 11/14/23  1902 11/15/23  0500   Weight: 104 kg (229 lb 11.5 oz) 105 kg (232 lb 6.4 oz) 105 kg (231 lb 7.7 oz)       DIET:Diet: Regular/House Diet; Texture: Soft to Chew (NDD 3); Soft to Chew: Whole Meat; Fluid Consistency: Thin (IDDSI 0)    I&O:  Intake & Output (last 3 days)         11/12 0701  11/13 0700 11/13 0701  11/14 0700 11/14 0701  11/15 0700 11/15 0701  11/16 0700    P.O.  100 600     I.V. (mL/kg) 41.6 (0.4) 22.2 (0.2) 180.3 (1.7)     IV Piggyback 200       Total Intake(mL/kg) 241.6 (2.1) 122.2 (1.2) 780.3 (7.4)     Urine (mL/kg/hr)  1200 (0.5) 3100 (1.2)     Stool   0     Total Output  1200 3100     Net +241.6 -1077.8 -2319.7             Urine Unmeasured Occurrence   1 x     Stool Unmeasured Occurrence   4 x             Objective     Physical Exam:      General Appearance:    Alert, cooperative, in no acute distress. BMI 38.52.   Head:    Normocephalic, without obvious abnormality, atraumatic.   Eyes:                           Conjunctivae and sclerae normal, no icterus, no pallor, corneas clear.   Neck:   No adenopathy, supple, trachea midline, no thyromegaly, no carotid bruit, no JVD.   Lungs:     Clear to auscultation, respirations regular, even and             unlabored.    Heart:    Regular rhythm and normal rate, normal S1 and S2, no          murmur, no gallop, no rub, no click   Chest Wall:    No abnormalities observed.   Abdomen:     Normal bowel sounds, no masses, no organomegaly, soft nontender, nondistended, no guarding, no rebound tenderness.   Extremities:   Moves all extremities well, no edema, no cyanosis, no           redness.   Pulses:   Pulses palpable and equal bilaterally.   Skin:   No bleeding, bruising or rash.   Neurologic:   Alert and Oriented x 3, Speech Clear & comprehensive.       Results review   Results Review:   Results from last 7 days   Lab Units 11/13/23  1319 11/13/23  0124 11/12/23  2307   CK TOTAL U/L  --   --  41   HSTROP T ng/L 84* 84* 93*     Lab Results   Component Value Date    PROBNP 2,746.0 (H) 11/12/2023    PROBNP 555.6 09/29/2023    PROBNP 630.5 08/17/2023     Results from last 7 days   Lab Units 11/15/23  0638 11/13/23  2234 11/13/23  0711 11/12/23  2307   WBC 10*3/mm3 8.88 10.82* 13.11* 15.57*   HEMOGLOBIN g/dL 8.5* 7.9* 8.9* 8.4*   PLATELETS 10*3/mm3 77* 99* 118* 142     Results from last 7 days   Lab Units 11/15/23  0638 11/14/23  1407 11/13/23  2234 11/13/23  1319 11/13/23  0711 11/12/23  2307   SODIUM mmol/L 135*  --  139 137 135* 132*   POTASSIUM mmol/L 3.7 4.2 3.4* 3.8 3.4* 3.5   CHLORIDE mmol/L 100  --  103 101 98 91*   CO2 mmol/L 25.7  --  28.7 23.9 26.7 26.7   BUN mg/dL 18  --  22* 29* 32* 36*   CREATININE mg/dL 1.17*  --  1.09* 1.10* 1.32* 1.46*   CALCIUM mg/dL 8.8  --  8.6 8.8 8.8 8.9   GLUCOSE mg/dL 169*  --  105* 221* 256* 424*   ALT (SGPT) U/L  --   --  18  --  18 21   AST (SGOT) U/L  --   --  26  --  20 21     Lab Results   Component Value Date    MG 2.5 11/15/2023    MG  2.4 11/13/2023    MG 2.5 11/13/2023     Estimated Creatinine Clearance: 64.6 mL/min (A) (by C-G formula based on SCr of 1.17 mg/dL (H)).    Lab Results   Component Value Date    HGBA1C 7.30 (H) 11/13/2023    HGBA1C 8.40 (H) 09/10/2020    HGBA1C 5.4 04/21/2016     Lab Results   Component Value Date    CHOL 137 09/11/2020    TRIG 80 09/11/2020    LDL 78 09/11/2020    HDL 43 09/11/2020     Lab Results   Component Value Date    ABSOLUTELUNG 31 11/14/2023    ABSOLUTELUNG 34 09/29/2023    ABSOLUTELUNG 36 (A) 08/17/2023     Lab Results   Component Value Date    INR 1.20 (H) 11/12/2023    INR 1.10 12/15/2022    INR 1.25 (H) 09/23/2020    INR 1.09 09/22/2020    INR 1.17 (H) 09/21/2020    INR 1.26 (H) 09/21/2020    INR 1.55 (H) 09/20/2020     Lab Results   Component Value Date    TSH 4.680 (H) 11/12/2023      Pain Management Panel  More data exists         Latest Ref Rng & Units 11/13/2023 9/12/2020   Pain Management Panel   Creatinine, Urine mg/dL  mg/dL - 46.3  46.3    Amphetamine, Urine Qual Negative Negative  -   Barbiturates Screen, Urine Negative Negative  -   Benzodiazepine Screen, Urine Negative Negative  -   Buprenorphine, Screen, Urine Negative Negative  -   Cocaine Screen, Urine Negative Negative  -   Fentanyl, Urine Negative Negative  -   Methadone Screen , Urine Negative Negative  -   Methamphetamine, Ur Negative Negative  -     Microbiology Results (last 10 days)       Procedure Component Value - Date/Time    Blood Culture - Blood, Hand, Right [[id number]]  (Normal) Collected: 11/13/23 0003    Lab Status: Preliminary result Specimen: Blood from Hand, Right Updated: 11/15/23 0015     Blood Culture No growth at 2 days    Blood Culture - Blood, Arm, Right [[id number]]  (Normal) Collected: 11/12/23 2350    Lab Status: Preliminary result Specimen: Blood from Arm, Right Updated: 11/15/23 0015     Blood Culture No growth at 2 days           Imaging Results (Last 24 Hours)       ** No results found for the last 24 hours.  **          ECHO:  Results for orders placed during the hospital encounter of 11/12/23    Adult Transthoracic Echo Complete W/ Cont if Necessary Per Protocol    Interpretation Summary    Left ventricular systolic function is moderately decreased. Left ventricular ejection fraction appears to be 31 - 35%.    Left ventricular wall thickness is consistent with mild concentric hypertrophy.    Left ventricular diastolic function is consistent with (grade III w/high LAP) fixed restrictive pattern.    Mildly reduced right ventricular systolic function noted.    The left atrial cavity is moderately dilated.    The right atrial cavity is mild to moderately  dilated.    There is calcification of the aortic valve mainly affecting the left coronary cusp(s).    Dilated pulmonary artery.      STRESS TEST:  Results for orders placed during the hospital encounter of 10/15/20    Nuclear Medicine Cardiac Blood Pool [facility] At Rest    Interpretation Summary  EXAMINATION: NUCLEAR MEDICINE CARDIAC BLOOD POOL [facility] AT REST-    CLINICAL INDICATION: Cardiomyopathy  TECHNIQUE: 21 mCi of Tc-99m autologous RBCs were injected IV after  in-vitro RBC labeling. Gated cardiac imaging was performed in the  anterior and left anterior oblique.  COMPARISON: None  FINDINGS: The left ventricle is normal in size with normal systolic  function. The calculated left ventricular ejection fraction (LVEF) = 38  %.  The right and left atria, aorta and pulmonary artery are normal. The  right ventricle is normal in size.    Impression  LVEF: 38%, at rest.    This report was finalized on 10/16/2020 11:57 AM by Dr. Marlo Parr MD.       HEART CATH:  Results for orders placed during the hospital encounter of 11/10/20    Cardiac Catheterization/Vascular Study    Narrative  Procedure type:  Left heart cath, LV gram, bilateral selective cholangiogram    Indication:  Cardiomyopathy    Procedure:  After informed consent the patient was brought into the cardiac aspiration  lab she was prepped and draped in the usual sterile manner the right radial area was incised with 2% Xylocaine however several attempts to engage into the right radial artery was not successful so the right radial artery access was abandoned and the right groin area was excised in 2% Xylocaine right femoral artery was accessed using modified standard technique and 5 [place] side-port arterial sheath was placed and secured then over a guidewire a 5 [place] JL 4 catheter was used left main coronary artery with angiographic pressures were taken then the catheter was removed and over a guidewire a 5 [place] JR4 catheter was used and engagement of the right coronary arteries angioplasty was taken then the catheter was removed then over a guidewire 5 [place] pigtail catheter used aortic valve was done hand-injection LV gram was done then pullback was done then the catheter was removed groin sheath was removed and minx closure device applied with good hemostasis the patient was transported back to her room in stable condition.    Results:  The patient LVEDP was 17 mmHg with hand-injection showing preserved left ventricular systolic function wall motion EF 50-55% with no significant aortic gradient on pullback.    Cholangiogram:  This right dominant system.  Left main cardiac angiographically normal and bifurcates into left and descending and left circumflex arteries.  LAD was normal caliber gives several septal perforators and diagonal branches and wraps around the apex LAD about 30 to 40% mid stenosis.  Left circumflex artery was nondominant for posterior circulation gives rise to several obtuse marginal branches left circumflex arteries branches angiographically normal.  The right coronary artery was a large artery was dominant for posterior circulation giving rise to acute marginal branches PDA and posterolateral branches the right coronary artery and its branches angiographically normal.    Conclusion:  Preserved LV  systolic function ejection fraction 50-55% with elevated left ventricular end-diastolic pressure consistent with diastolic dysfunction coronary angiogram showed right dominant system with 30 to 40% mid LAD lesion otherwise coronaries arteries were normal.  Plan of care:  Medical management and secondary preventive measurements of coronary disease good lipid control blood pressure control healthy lifestyle patient is to follow-up with her primary care physician for further management.      TELEMETRY:      SB/SR 50-60s        I reviewed the patient's new clinical results.    ALLERGIES: Phenergan [promethazine]    Medication Review:   Current list of medications may not reflect those currently placed in orders that are not signed or are being held.     busPIRone, 10 mg, Oral, BID  carvedilol, 3.125 mg, Oral, BID With Meals  empagliflozin, 10 mg, Oral, Daily  erythromycin, 1 application , Both Eyes, Q6H  ferrous sulfate, 325 mg, Oral, BID  fluticasone, 2 spray, Nasal, Daily  insulin glargine, 22 Units, Subcutaneous, Daily  insulin regular, 3-14 Units, Subcutaneous, Q6H  insulin regular, 5 Units, Subcutaneous, TID AC  levothyroxine, 50 mcg, Oral, Q AM  Lidocaine, 2 patch, Transdermal, Q24H  miconazole, 1 application , Topical, Q12H  pantoprazole, 40 mg, Oral, Q AM  pregabalin, 150 mg, Oral, BID  senna-docusate sodium, 2 tablet, Oral, BID  sodium chloride, 10 mL, Intravenous, Q12H  sodium chloride, 10 mL, Intravenous, Q12H  valsartan, 40 mg, Oral, Q24H      heparin, 10.4 Units/kg/hr (Dosing Weight), Last Rate: 10.4 Units/kg/hr (11/15/23 0943)  Pharmacy to Dose Heparin,         acetaminophen    acetaminophen    benzonatate    senna-docusate sodium **AND** polyethylene glycol **AND** bisacodyl **AND** bisacodyl    Calcium Replacement - Follow Nurse / BPA Driven Protocol    dextrose    dextrose    glucagon (human recombinant)    HYDROcodone-acetaminophen    Magnesium Standard Dose Replacement - Follow Nurse / BPA Driven  Protocol    nitroglycerin    Pharmacy to Dose Heparin    Phosphorus Replacement - Follow Nurse / BPA Driven Protocol    Potassium Replacement - Follow Nurse / BPA Driven Protocol    [COMPLETED] Insert Peripheral IV **AND** sodium chloride    sodium chloride    sodium chloride    sodium chloride    sodium chloride    Assessment      Atrial fibrillation with rapid ventricle response, status post electrocardioversion in the ED, patient currently in sinus rhythm.  [id number] score of 3.  Dilated cardiomyopathy with LV ejection fraction of 31 to 35%.  Mild acute on chronic HFrEF  Acute non-ST elevation myocardial infarction (type I versus more likely type II from tachycardia and heart failure)  Type 2 diabetes mellitus.    Plan     Patient apparently has not been able to tolerate GDMT medications due to baseline hypotension.  Switch to p.o. diuretics as patient appears to be euvolemic.  Arrange LifeVest prior to discharge.      I have discussed the patients findings and recommendations with patient.    Thank you very much for asking us to be involved in this patient's care.  We will follow along with you.      Electronically signed by KENNY Finn, 11/15/23, 1:55 PM EST.     Electronically signed by Ramon Sahu MD, 11/15/23, 4:11 PM EST.            Please note that portions of this note were completed with a voice recognition program.    Please note that portions of this note were copied and has been reviewed and is accurate as of 11/15/2023 .

## 2023-11-15 NOTE — PROGRESS NOTES
HEPARIN INFUSION  Yumiko Purdy is a  57 y.o. female set to receive heparin infusion when anti-Xa less than 1. On apixaban prior to admission.     Therapy for (VTE/Cardiac):   Cardiac - h/o A fib, may need cardioversion vs AICD  Patient Dosing Weight: 106 kg  Initial Bolus (Y/N):   Y  Any Bolus (Y/N):   Y        Signs or Symptoms of Bleeding: none    Cardiac or Other (Not VTE)   Initial Bolus: 60 units/kg (Max 4,000 units)  Initial rate: 12 units/kg/hr (Max 1,000 units/hr)   Anti Xa Rebolus Infusion Hold time Change infusion Dose (Units/kg/hr) Next Anti Xa or aPTT Level Due   < 0.11 50 Units/kg  (4000 Units Max) None Increase by  3 Units/kg/hr 6 hours   0.11- 0.19 25 Units/kg  (2000 Units Max) None Increase by  2 Units/kg/hr 6 hours   0.2 - 0.29 0 None Increase by  1 Units/kg/hr 6 hours   0.3 - 0.5 0 None No Change 6 hours (after 2 consecutive levels in range check q24h @0700)   0.51 - 0.6 0 None Decrease by  1 Units/kg/hr 6 hours   0.61 - 0.8 0 30 Minutes Decrease by  2 Units/kg/hr 6 hours   0.81 - 1 0 60 Minutes Decrease by  3 Units/kg/hr 6 hours   >1 0 Hold  After Anti Xa less than 0.5 decrease previous rate by  4 Units/kg/hr  Every 2 hours until Anti Xa  less than 0.5 then when infusion restarts in 6 hours         Recommend anti-Xa every 6 hours.          Date   Time   Anti-Xa Current Rate (Unit/kg/hr) Bolus   (Units) Rate Change   (Unit/kg/hr) New Rate (Unit/kg/hr) Next   Anti-Xa Comments  Pump Check Daily   11/13 0711 > 1.1 Hold  0 - Hold  1300 Initial anti-Xa over 1.1 is consistent with apixaban PTA. Discussed with Dr. Ken. Plan to begin heparin infusion per DOAC exposure nomogram when anti-Xa falls below 1 in case cardioversion, etc needed, so rechecking anti-Xa in 6 hrs. Discussed with Glenys in ED also.    11/13 1440 0.69 0 none +7.4 7.4 2200 Anti-Xa level amenable to starting heparin now. Discussed initial rate and programming weight for pump with Kati Boudreaux RN in CCU.    11/13 2330 0.45 7.4 - - 7.4  0500 Therapeutic x 1. Spoke with OLIVIA Triana. No s/s  of bleeding.    11/13 0508 0.34 7.4   7.4 1100 Therapeutic x 2. Spoke with OLIVIA Hamilton. No s/s of bleeding. Since the patient  was previously on eliquis and has gone from the upper limit of normal to the lower limit of normal in the past 6 hours I am going to have her checked again in 6 hours instead of waiting   over 24 hours for the next check just to err on the side of caution.    11/14 1113 0.31 7.4 - - 7.4 11/15 @ 0700 Therapeutic x 3. No s/sx bleeding reported.    11/15 0638 <0.10 7.4 4000 +3 10.4 1500 Spoke with Silvana BAKER. No S/S of bleeding.    11/15 1428 0.21 10.4 - +1 11.4 2130 Spoke with Silvana BAKER. No s/s of bleeding                                                                                                                                                               Pharmacy will continue to follow anti-Xa results and monitor for signs and symptoms of bleeding or thrombosis.    Blanca Mo PharmD

## 2023-11-15 NOTE — PLAN OF CARE
Goal Outcome Evaluation:   Daily Care Plan Summary: Heart Failure    Diuretic in use (IV or PO):   None         Daily weight (up or down):    loss: 8lbs      Output > Intake (yes/no):Yes      O2 Requirements (current, any change?):  1 liters/min via nasal cannula      Symptoms noted with Activity (Respiratory Tolerance, functional state):     shortness of air with activity       Anticipated Discharge Plans:     home at D/C

## 2023-11-15 NOTE — PLAN OF CARE
Goal Outcome Evaluation: Patient has been very pleasant today. Compliant with all medications and care as ordered. She remains on RA-1L/NC, saturations maintaining well above 90%. She has been setup for each meal, fair intake noted. Wound Care performed as ordered. She has been able to ambulate to [facility] [place], [place] with nursing staff, unsteady gait noted. Patient did have hypotension this AM, MD made aware. She also complained of pain this evening, PRN medication given as ordered. External remains in place, good output noted. She is currently resting in bed. Life Vest remains in place. Will continue with plan of care.     Daily Care Plan Summary: Heart Failure    Diuretic in use (IV or PO):   PO, held this AM due to hypotension.        Daily weight (up or down):    loss: 2lbs      Output > Intake (yes/no):Yes      O2 Requirements (current, any change?):  1 liters/min via nasal cannula      Symptoms noted with Activity (Respiratory Tolerance, functional state):     Weakness and SOB      Anticipated Discharge Plans:     Home with .

## 2023-11-15 NOTE — THERAPY EVALUATION
Acute Care - Physical Therapy Initial Evaluation   Isaías     Patient Name: Yumiko Purdy  : 1965  MRN: [id number]  Today's Date: 11/15/2023   Onset of Illness/Injury or Date of Surgery: 23  Visit Dx:     ICD-10-CM ICD-9-CM   1. Sustained monomorphic ventricular tachycardia  I47.29 427.1   2. Severe sepsis  A41.9 038.9    R65.20 995.92   3. Other cirrhosis of liver  K74.69 571.5   4. Atrial fibrillation with RVR  I48.91 427.31   5. Symptomatic anemia  D64.9 285.9   6. Congestive heart failure, unspecified HF chronicity, unspecified heart failure type  I50.9 428.0   7. Prolonged Q-T interval on ECG  R94.31 794.31     Patient Active Problem List   Diagnosis    Acute on chronic congestive heart failure    Cardiomyopathy    CKD (chronic kidney disease) stage 2, GFR 60-89 ml/min    Severe obesity (BMI 35.0-39.9) with comorbidity    Chronic anemia    Elevated bilirubin    Acute on chronic combined systolic and diastolic CHF (congestive heart failure)    Hyponatremia    Chronic atrial fibrillation    Cirrhosis    Ventricular tachycardia     Past Medical History:   Diagnosis Date    Anemia     CHF (congestive heart failure)     Chronic a-fib     stated by patient     Cirrhosis of liver     stated by patient     Diabetes mellitus      Past Surgical History:   Procedure Laterality Date    APPENDECTOMY      CARDIAC CATHETERIZATION N/A 11/10/2020    Procedure: Left Heart Cath;  Surgeon: Charlee Ken MD;  Location: [facility] INVASIVE LOCATION;  Service: Cardiovascular;  Laterality: N/A;    CHOLECYSTECTOMY      TOE AMPUTATION Right     stated by patient.      PT Assessment (last 12 hours)       PT Evaluation and Treatment       Row Name 11/15/23 0950          Physical Therapy Time and Intention    Subjective Information complains of;weakness;fatigue  -CT     Document Type evaluation  -CT     Mode of Treatment physical therapy  -CT     Patient Effort good  -CT     Symptoms Noted During/After Treatment fatigue   -CT     Comment Pt reports she lives at home with son who assists with care. Pt also reports HH caregiver 2x/wk. Pt is Min A for bed mobility and sit to stand at time of eval. Pt unable to ambulate this date.  -CT       Row Name 11/15/23 0950          General Information    Patient Profile Reviewed yes  -CT     Onset of Illness/Injury or Date of Surgery 11/12/23  -CT     Referring Physician Bishop  -CT     Patient Observations alert;cooperative;agree to therapy  -CT     Prior Level of Function min assist:;all household mobility;ADL's  -CT     Equipment Currently Used at Home commode, bedside;hospital bed;walker, rolling  -CT     Existing Precautions/Restrictions fall  -CT     Limitations/Impairments safety/cognitive  -CT     Equipment Issued to Patient gait belt  -CT     Risks Reviewed patient:  -CT     Benefits Reviewed patient:  -CT     Barriers to Rehab previous functional deficit  -CT       Row Name 11/15/23 0950          Living Environment    Current Living Arrangements home  -CT     People in Home child(ferdinand), adult  -CT       Row Name 11/15/23 0950          Cognition    Affect/Mental Status (Cognition) WNL  -CT     Orientation Status (Cognition) oriented x 4  -CT     Follows Commands (Cognition) WNL  -CT       Row Name 11/15/23 0950          Range of Motion Comprehensive    Comment, General Range of Motion BLE grossly WFL  -CT       Row Name 11/15/23 0950          Strength Comprehensive (MMT)    Comment, General Manual Muscle Testing (MMT) Assessment BLE grossly 3+/5  -CT       Row Name 11/15/23 0950          Bed Mobility    Bed Mobility bed mobility (all) activities  -CT     All Activities, [place] (Bed Mobility) minimum assist (75% patient effort)  -CT     Bed Mobility, Safety Issues decreased use of arms for pushing/pulling;decreased use of legs for bridging/pushing  -CT       Row Name 11/15/23 0950          Transfers    Transfers sit-stand transfer;stand-sit transfer  -CT       Row Name 11/15/23 0964           Sit-Stand Transfer    Sit-Stand [place] (Transfers) minimum assist (75% patient effort)  -CT     Assistive Device (Sit-Stand Transfers) walker, front-wheeled  -CT       Row Name 11/15/23 0950          Stand-Sit Transfer    Stand-Sit [place] (Transfers) minimum assist (75% patient effort)  -CT     Assistive Device (Stand-Sit Transfers) walker, front-wheeled  -CT       Row Name 11/15/23 0950          Gait/Stairs (Locomotion)    Comment, (Gait/Stairs) pt unable to ambulate this date  -CT       Row Name 11/15/23 0950          Balance    Balance Assessment sitting static balance;standing static balance  -CT     Static Sitting Balance set-up  -CT     Position, Sitting Balance sitting edge of bed  -CT     Static Standing Balance contact guard  -CT     Position/Device Used, Standing Balance walker, front-wheeled  -CT       Row Name 11/15/23 0968          Motor Skills    Motor Skills functional endurance;coordination  -CT       Row Name             Wound 11/14/23 2102 Left lower leg Abrasion    Wound - Properties Group Placement Date: 11/14/23  -AF Placement Time: 2102 -AF Present on Original Admission: Y  -AF Side: Left  -AF Orientation: lower  -AF Location: leg  -AF Primary Wound Type: Abrasion  -AF    Retired Wound - Properties Group Placement Date: 11/14/23  -AF Placement Time: 2102  -AF Present on Original Admission: Y  -AF Side: Left  -AF Orientation: lower  -AF Location: leg  -AF Primary Wound Type: Abrasion  -AF    Retired Wound - Properties Group Date first assessed: 11/14/23  -AF Time first assessed: 2102  -AF Present on Original Admission: Y  -AF Side: Left  -AF Location: leg  -AF Primary Wound Type: Abrasion  -AF      Row Name 11/15/23 0950          Coping    Observed Emotional State calm;cooperative;pleasant  -CT     Verbalized Emotional State acceptance  -CT       Row Name 11/15/23 0950          Plan of Care Review    Plan of Care Reviewed With patient  -CT       Row Name 11/15/23 0986        "   Positioning and Restraints    Pre-Treatment Position in bed  -CT     Post Treatment Position bed  -CT     In Bed sitting EOB;call light within reach;encouraged to call for assist  -CT       Row Name 11/15/23 0917          Therapy Assessment/Plan (PT)    Patient/Family Therapy Goals Statement (PT) Pt goals are to \"go home\"  -CT     Functional Level at Time of Evaluation (PT) Min A  -CT     PT Diagnosis (PT) impaired functional mobility  -CT     Rehab Potential (PT) good, to achieve stated therapy goals  -CT     Criteria for Skilled Interventions Met (PT) yes;skilled treatment is necessary  -CT     Therapy Frequency (PT) 2 times/wk  2-5 time/wk  -CT     Predicted Duration of Therapy Intervention (PT) length of stay  -CT       Row Name 11/15/23 0952          Therapy Plan Review/Discharge Plan (PT)    Therapy Plan Review (PT) evaluation/treatment results reviewed;care plan/treatment goals reviewed;risks/benefits reviewed;current/potential barriers reviewed;participants voiced agreement with care plan;participants included;patient  -CT       Row Name 11/15/23 0909          Physical Therapy Goals    Bed Mobility Goal Selection (PT) bed mobility, PT goal 1  -CT     Transfer Goal Selection (PT) transfer, PT goal 1  -CT     Gait Training Goal Selection (PT) gait training, PT goal 1  -CT       Row Name 11/15/23 0970          Bed Mobility Goal 1 (PT)    Activity/Assistive Device (Bed Mobility Goal 1, PT) bed mobility activities, all  -CT     [place] Level/Cues Needed (Bed Mobility Goal 1, PT) contact guard required  -CT     Time Frame (Bed Mobility Goal 1, PT) by discharge  -CT       Row Name 11/15/23 0986          Transfer Goal 1 (PT)    Activity/Assistive Device (Transfer Goal 1, PT) sit-to-stand/stand-to-sit;bed-to-chair/chair-to-bed  -CT     [place] Level/Cues Needed (Transfer Goal 1, PT) contact guard required  -CT     Time Frame (Transfer Goal 1, PT) by discharge  -CT       Row Name 11/15/23 0923          " Gait Training Goal 1 (PT)    Activity/Assistive Device (Gait Training Goal 1, PT) gait (walking locomotion);assistive device use  -CT     [place] Level (Gait Training Goal 1, PT) contact guard required  -CT     Distance (Gait Training Goal 1, PT) 10  -CT     Time Frame (Gait Training Goal 1, PT) by discharge  -CT               User Key  (r) = Recorded By, (t) = Taken By, (c) = Cosigned By      Initials Name Provider Type    CT Lynn Mueller, PARK Physical Therapist    Kati Ashford, OLIVIA Registered Nurse                    Physical Therapy Education       Title: PT OT SLP Therapies (In Progress)       Topic: Physical Therapy (In Progress)       Point: Mobility training (In Progress)       Learning Progress Summary             Patient Acceptance, E, NR by RD at 11/15/2023 0958                         Point: Home exercise program (In Progress)       Learning Progress Summary             Patient Acceptance, E, NR by RD at 11/15/2023 0958                         Point: Body mechanics (In Progress)       Learning Progress Summary             Patient Acceptance, E, NR by RD at 11/15/2023 0958                         Point: Precautions (In Progress)       Learning Progress Summary             Patient Acceptance, E, NR by RD at 11/15/2023 0958                                         User Key       Initials Effective Dates Name Provider Type Discipline     06/16/21 -  Silvana Melgar, OLIVIA Registered Nurse Nurse                  PT Recommendation and Plan  Anticipated Discharge Disposition (PT): home with 24/7 care  Planned Therapy Interventions (PT): balance training, bed mobility training, gait training, home exercise program, manual therapy techniques, motor coordination training, neuromuscular re-education, patient/family education, postural re-education, strengthening, stretching, transfer training  Therapy Frequency (PT): 2 times/wk (2-5 time/wk)  Plan of Care Reviewed With: patient       Time Calculation:    PT Charges        Row Name 11/15/23 1514             Time Calculation    PT Received On 11/15/23  -CT      PT Goal Re-Cert Due Date 11/29/23  -CT                User Key  (r) = Recorded By, (t) = Taken By, (c) = Cosigned By      Initials Name Provider Type    CT Lynn Mueller, PARK Physical Therapist                  Therapy Charges for Today       Code Description Service Date Service Provider Modifiers Qty    [id number] HC PT EVAL MOD COMPLEXITY 4 11/15/2023 Lynn Mueller, PT GP 1            PT G-Codes  AM-PAC 6 Clicks Score (PT): 8    Lynn Mueller, PT  11/15/2023

## 2023-11-15 NOTE — CASE MANAGEMENT/SOCIAL WORK
Discharge Planning Assessment   [place]     Patient Name: Yumiko Purdy  MRN: [id number]  Today's Date: 11/15/2023    Admit Date: 11/12/2023     Discharge Plan       Row Name 11/15/23 1622       Plan    Plan SS followed up with pt at bedside on this date. Pt lives at [address] [place] [address], [place] [address] in [facility] with son Angel and will return home at discharge. Pt utilizes [facility] for nursing and aides services. [facility] will need new HH order at discharge. PCP Masha Davidson. Pt utilizes (r) walker and wheelchair via unknown provider and home oxygen at 2 liters [facility].  Pt requesting a cushion for wheelchair at discharge. Pt has no POA or Living Will. Pt will need EMS to provide transportation at discharge. SS follow.                 SAMY Recio

## 2023-11-16 ENCOUNTER — APPOINTMENT (OUTPATIENT)
Dept: ULTRASOUND IMAGING | Facility: HOSPITAL | Age: 58
End: 2023-11-16
Payer: COMMERCIAL

## 2023-11-16 LAB
ANION GAP SERPL CALCULATED.3IONS-SCNC: 10 MMOL/L (ref 5–15)
APTT PPP: 33 SECONDS (ref 26.5–34.5)
BH BB BLOOD EXPIRATION DATE: NORMAL
BH BB BLOOD TYPE BARCODE: 6200
BH BB DISPENSE STATUS: NORMAL
BH BB PRODUCT CODE: NORMAL
BH BB UNIT NUMBER: NORMAL
BUN SERPL-MCNC: 16 MG/DL (ref 6–20)
BUN/CREAT SERPL: 12.5 (ref 7–25)
CALCIUM SPEC-SCNC: 8.5 MG/DL (ref 8.6–10.5)
CHLORIDE SERPL-SCNC: 101 MMOL/L (ref 98–107)
CO2 SERPL-SCNC: 25 MMOL/L (ref 22–29)
CREAT SERPL-MCNC: 1.28 MG/DL (ref 0.57–1)
CROSSMATCH INTERPRETATION: NORMAL
D DIMER PPP FEU-MCNC: 0.28 MCGFEU/ML (ref 0–0.56)
DEPRECATED RDW RBC AUTO: 92.9 FL (ref 37–54)
EGFRCR SERPLBLD CKD-EPI 2021: 49.3 ML/MIN/1.73
ERYTHROCYTE [DISTWIDTH] IN BLOOD BY AUTOMATED COUNT: 24.1 % (ref 12.3–15.4)
GLUCOSE BLDC GLUCOMTR-MCNC: 236 MG/DL (ref 70–130)
GLUCOSE BLDC GLUCOMTR-MCNC: 240 MG/DL (ref 70–130)
GLUCOSE BLDC GLUCOMTR-MCNC: 325 MG/DL (ref 70–130)
GLUCOSE BLDC GLUCOMTR-MCNC: 326 MG/DL (ref 70–130)
GLUCOSE BLDC GLUCOMTR-MCNC: 353 MG/DL (ref 70–130)
GLUCOSE BLDC GLUCOMTR-MCNC: 372 MG/DL (ref 70–130)
GLUCOSE SERPL-MCNC: 312 MG/DL (ref 65–99)
HCT VFR BLD AUTO: 27 % (ref 34–46.6)
HGB BLD-MCNC: 8 G/DL (ref 12–15.9)
MAGNESIUM SERPL-MCNC: 2.3 MG/DL (ref 1.6–2.6)
MCH RBC QN AUTO: 33.9 PG (ref 26.6–33)
MCHC RBC AUTO-ENTMCNC: 29.6 G/DL (ref 31.5–35.7)
MCV RBC AUTO: 114.4 FL (ref 79–97)
PHOSPHATE SERPL-MCNC: 3.7 MG/DL (ref 2.5–4.5)
PLATELET # BLD AUTO: 76 10*3/MM3 (ref 140–450)
PMV BLD AUTO: 11.5 FL (ref 6–12)
POTASSIUM SERPL-SCNC: 4.2 MMOL/L (ref 3.5–5.2)
RBC # BLD AUTO: 2.36 10*6/MM3 (ref 3.77–5.28)
SODIUM SERPL-SCNC: 136 MMOL/L (ref 136–145)
UNIT  ABO: NORMAL
UNIT  RH: NORMAL
WBC NRBC COR # BLD: 7.38 10*3/MM3 (ref 3.4–10.8)

## 2023-11-16 PROCEDURE — 99232 SBSQ HOSP IP/OBS MODERATE 35: CPT | Performed by: INTERNAL MEDICINE

## 2023-11-16 PROCEDURE — 85730 THROMBOPLASTIN TIME PARTIAL: CPT | Performed by: INTERNAL MEDICINE

## 2023-11-16 PROCEDURE — 97530 THERAPEUTIC ACTIVITIES: CPT

## 2023-11-16 PROCEDURE — 80048 BASIC METABOLIC PNL TOTAL CA: CPT | Performed by: INTERNAL MEDICINE

## 2023-11-16 PROCEDURE — 85379 FIBRIN DEGRADATION QUANT: CPT | Performed by: INTERNAL MEDICINE

## 2023-11-16 PROCEDURE — 84100 ASSAY OF PHOSPHORUS: CPT | Performed by: INTERNAL MEDICINE

## 2023-11-16 PROCEDURE — 76705 ECHO EXAM OF ABDOMEN: CPT | Performed by: RADIOLOGY

## 2023-11-16 PROCEDURE — 85027 COMPLETE CBC AUTOMATED: CPT | Performed by: INTERNAL MEDICINE

## 2023-11-16 PROCEDURE — 86022 PLATELET ANTIBODIES: CPT | Performed by: INTERNAL MEDICINE

## 2023-11-16 PROCEDURE — 76705 ECHO EXAM OF ABDOMEN: CPT

## 2023-11-16 PROCEDURE — 97116 GAIT TRAINING THERAPY: CPT

## 2023-11-16 PROCEDURE — 63710000001 INSULIN REGULAR HUMAN PER 5 UNITS: Performed by: INTERNAL MEDICINE

## 2023-11-16 PROCEDURE — 85362 FIBRIN DEGRADATION PRODUCTS: CPT | Performed by: INTERNAL MEDICINE

## 2023-11-16 PROCEDURE — 83735 ASSAY OF MAGNESIUM: CPT | Performed by: INTERNAL MEDICINE

## 2023-11-16 PROCEDURE — 82948 REAGENT STRIP/BLOOD GLUCOSE: CPT

## 2023-11-16 PROCEDURE — 97535 SELF CARE MNGMENT TRAINING: CPT

## 2023-11-16 PROCEDURE — 63710000001 INSULIN GLARGINE PER 5 UNITS: Performed by: INTERNAL MEDICINE

## 2023-11-16 PROCEDURE — 99231 SBSQ HOSP IP/OBS SF/LOW 25: CPT | Performed by: INTERNAL MEDICINE

## 2023-11-16 RX ORDER — LIDOCAINE 4 G/G
2 PATCH TOPICAL ONCE
Status: COMPLETED | OUTPATIENT
Start: 2023-11-16 | End: 2023-11-16

## 2023-11-16 RX ADMIN — INSULIN GLARGINE 22 UNITS: 100 INJECTION, SOLUTION SUBCUTANEOUS at 08:50

## 2023-11-16 RX ADMIN — Medication 10 ML: at 10:27

## 2023-11-16 RX ADMIN — INSULIN HUMAN 8 UNITS: 100 INJECTION, SOLUTION PARENTERAL at 08:51

## 2023-11-16 RX ADMIN — MICONAZOLE NITRATE 1 APPLICATION: 2 POWDER TOPICAL at 10:26

## 2023-11-16 RX ADMIN — BUSPIRONE HYDROCHLORIDE 10 MG: 10 TABLET ORAL at 21:02

## 2023-11-16 RX ADMIN — Medication 10 ML: at 21:02

## 2023-11-16 RX ADMIN — LIDOCAINE 2 PATCH: 4 PATCH TOPICAL at 00:58

## 2023-11-16 RX ADMIN — INSULIN HUMAN 10 UNITS: 100 INJECTION, SOLUTION PARENTERAL at 05:39

## 2023-11-16 RX ADMIN — ACETAMINOPHEN 500 MG: 500 TABLET ORAL at 21:02

## 2023-11-16 RX ADMIN — FERROUS SULFATE TAB 325 MG (65 MG ELEMENTAL FE) 325 MG: 325 (65 FE) TAB at 21:02

## 2023-11-16 RX ADMIN — BUSPIRONE HYDROCHLORIDE 10 MG: 10 TABLET ORAL at 08:49

## 2023-11-16 RX ADMIN — PREGABALIN 150 MG: 75 CAPSULE ORAL at 08:50

## 2023-11-16 RX ADMIN — LEVOTHYROXINE SODIUM 50 MCG: 50 TABLET ORAL at 05:39

## 2023-11-16 RX ADMIN — LIDOCAINE 2 PATCH: 4 PATCH TOPICAL at 12:56

## 2023-11-16 RX ADMIN — INSULIN HUMAN 8 UNITS: 100 INJECTION, SOLUTION PARENTERAL at 12:54

## 2023-11-16 RX ADMIN — FERROUS SULFATE TAB 325 MG (65 MG ELEMENTAL FE) 325 MG: 325 (65 FE) TAB at 08:50

## 2023-11-16 RX ADMIN — ERYTHROMYCIN 1 APPLICATION: 5 OINTMENT OPHTHALMIC at 17:55

## 2023-11-16 RX ADMIN — HYDROCODONE BITARTRATE AND ACETAMINOPHEN 1 TABLET: 5; 325 TABLET ORAL at 04:35

## 2023-11-16 RX ADMIN — INSULIN HUMAN 12 UNITS: 100 INJECTION, SOLUTION PARENTERAL at 17:55

## 2023-11-16 RX ADMIN — FLUTICASONE PROPIONATE 2 SPRAY: 50 SPRAY, METERED NASAL at 08:54

## 2023-11-16 RX ADMIN — Medication 10 ML: at 08:53

## 2023-11-16 RX ADMIN — MICONAZOLE NITRATE 1 APPLICATION: 2 POWDER TOPICAL at 21:03

## 2023-11-16 RX ADMIN — INSULIN HUMAN 8 UNITS: 100 INJECTION, SOLUTION PARENTERAL at 17:55

## 2023-11-16 RX ADMIN — PANTOPRAZOLE SODIUM 40 MG: 40 TABLET, DELAYED RELEASE ORAL at 05:39

## 2023-11-16 RX ADMIN — PREGABALIN 150 MG: 75 CAPSULE ORAL at 21:02

## 2023-11-16 RX ADMIN — INSULIN HUMAN 8 UNITS: 100 INJECTION, SOLUTION PARENTERAL at 12:53

## 2023-11-16 RX ADMIN — EMPAGLIFLOZIN 10 MG: 10 TABLET, FILM COATED ORAL at 08:50

## 2023-11-16 RX ADMIN — INSULIN HUMAN 5 UNITS: 100 INJECTION, SOLUTION PARENTERAL at 23:42

## 2023-11-16 NOTE — CONSULTS
Patient requested additional information on low-sodium diet. Literature provided.    LifeVest in place. [facility] has appointment in HF clinic on 11/21 and with cardiologist in [place] for evaluation for ICD in December.      Electronically signed by,   Katalina Bedolla RN ,DNP, MSN, CHFN  11/16/23 17:00 EST

## 2023-11-16 NOTE — PLAN OF CARE
Goal Outcome Evaluation: Patient has been very pleasant today. Compliant with all medications and care as ordered. She remains on RA, saturations maintaining well above 90%. External Cath remains in place, good output noted. She was able to ambulate with PT during shift, was also able to tolerate sitting in a chair at bedside for portion of shift. No distress noted at this time. Will continue with plan of care.     Daily Care Plan Summary: Heart Failure    Diuretic in use (IV or PO):  NA        Daily weight (up or down):    gain: 3 lbs      Output > Intake (yes/no):Yes      O2 Requirements (current, any change?): room air      Symptoms noted with Activity (Respiratory Tolerance, functional state):     SOA and weakness.      Anticipated Discharge Plans:     Home with HH.

## 2023-11-16 NOTE — PROGRESS NOTES
[facility] HOSPITALIST PROGRESS NOTE     Patient Identification:  Name:  Yumiko Purdy  Age:  57 y.o.  Sex:  female  :  1965  MRN:  [id number]  Visit Number:  [id number]  ROOM: [address] [place] [address]     Primary Care Provider:  Masha Davidson APRN     Date of Admission: 2023    Length of stay in inpatient status:  2    Subjective     Chief Compliant:    Chief Complaint   Patient presents with    Rapid Heart Rate     History of Presenting Illness: The patient was eating her dinner when I saw her today.  She denies chest pain and trouble breathing.  However, the patient did state that she was unable to walk today when she was evaluated by physical therapy.    Objective     Current Hospital Meds:  busPIRone, 10 mg, Oral, BID  carvedilol, 3.125 mg, Oral, BID With Meals  empagliflozin, 10 mg, Oral, Daily  erythromycin, 1 application , Both Eyes, Q6H  ferrous sulfate, 325 mg, Oral, BID  fluticasone, 2 spray, Nasal, Daily  insulin glargine, 22 Units, Subcutaneous, Daily  insulin regular, 3-14 Units, Subcutaneous, Q6H  insulin regular, 5 Units, Subcutaneous, TID AC  levothyroxine, 50 mcg, Oral, Q AM  Lidocaine, 2 patch, Transdermal, Q24H  miconazole, 1 application , Topical, Q12H  pantoprazole, 40 mg, Oral, Q AM  pregabalin, 150 mg, Oral, BID  senna-docusate sodium, 2 tablet, Oral, BID  sodium chloride, 10 mL, Intravenous, Q12H  sodium chloride, 10 mL, Intravenous, Q12H  valsartan, 40 mg, Oral, Q24H    heparin, 11.4 Units/kg/hr (Dosing Weight), Last Rate: 11.4 Units/kg/hr (11/15/23 1923)  Pharmacy to Dose Heparin,       Current Antimicrobial Therapy:  Anti-Infectives (From admission, onward)      Ordered     Dose/Rate Route Frequency Start Stop    23  cefepime 2000 mg IVPB in 100 ml NS (VTB)        Ordering Provider: Juanita Box DO    2,000 mg  over 30 Minutes Intravenous Once 23 2352 23 0104    23  doxycycline (VIBRAMYCIN) 100 mg in sodium chloride 0.9 % 100 mL  IVPB-VTB        Ordering Provider: Juanita Box DO    100 mg  over 60 Minutes Intravenous Once 11/12/23 2352 11/13/23 0125          Current Diuretic Therapy:  Diuretics (From admission, onward)      Ordered     Dose/Rate Route Frequency Start Stop    11/14/23 1728  furosemide (LASIX) injection 60 mg        Ordering Provider: Ramon Sahu MD    60 mg Intravenous Once 11/14/23 1815 11/14/23 1804          ----------------------------------------------------------------------------------------------------------------------  Vital Signs:  Temp:  [97.8 °F (36.6 °C)-98.3 °F (36.8 °C)] 98 °F (36.7 °C)  Heart Rate:  [60-78] 70  Resp:  [18-22] 18  BP: ()/(42-56) 94/42  SpO2:  [96 %-99 %] 96 %  on  Flow (L/min):  [0-1] 1;   Device (Oxygen Therapy): nasal cannula  Body mass index is 38.52 kg/m².    Wt Readings from Last 3 Encounters:   11/15/23 105 kg (231 lb 7.7 oz)   10/15/23 113 kg (250 lb)   09/29/23 109 kg (240 lb)     Intake & Output (last 3 days)         11/13 0701 11/14 0700 11/14 0701  11/15 0700 11/15 0701 11/16 0700    P.O. 100 600 480    I.V. (mL/kg) 22.2 (0.2) 180.3 (1.7)     IV Piggyback       Total Intake(mL/kg) 122.2 (1.2) 780.3 (7.4) 480 (4.5)    Urine (mL/kg/hr) 1200 (0.5) 3100 (1.2) 1150 (0.7)    Stool  0     Total Output 1200 3100 1150    Net -8917.8 -4164.7 -670           Urine Unmeasured Occurrence  1 x 2 x    Stool Unmeasured Occurrence  4 x 1 x          Diet: Regular/House Diet; Texture: Soft to Chew (NDD 3); Soft to Chew: Whole Meat; Fluid Consistency: Thin (IDDSI 0)  ----------------------------------------------------------------------------------------------------------------------  Physical Exam  Alert and oriented.  No crackles and no wheezing on lung exam.  No pedal edema.  Can follow all commands.  No facial droop.  No localizing neurological signs.  ----------------------------------------------------------------------------------------------------------------------  Tele: Normal  sinus rhythm heart 50s to 70s.  Personally reviewed the telemetry strips.  ----------------------------------------------------------------------------------------------------------------------  LABS:    CBC and coagulation:  Results from last 7 days   Lab Units 11/15/23  0638 11/13/23  2234 11/13/23  1319 11/13/23  1116 11/13/23  0711 11/13/23  0642 11/13/23  0309 11/12/23  2350 11/12/23  2307   LACTATE mmol/L  --   --  2.0 2.5*  --  2.8* 2.5* 3.7*  --    CRP mg/dL  --   --   --   --   --   --   --   --  0.88*   WBC 10*3/mm3 8.88 10.82*  --   --  13.11*  --   --   --  15.57*   HEMOGLOBIN g/dL 8.5* 7.9*  --   --  8.9*  --   --   --  8.4*   HEMATOCRIT % 26.9* 26.2*  --   --  29.6*  --   --   --  26.7*   MCV fL 115.0* 114.9*  --   --  114.3*  --   --   --  113.6*   MCHC g/dL 31.6 30.2*  --   --  30.1*  --   --   --  31.5   PLATELETS 10*3/mm3 77* 99*  --   --  118*  --   --   --  142   INR   --   --   --   --   --   --   --   --  1.20*     Renal and electrolytes:  Results from last 7 days   Lab Units 11/15/23  0638 11/14/23  1407 11/13/23  2234 11/13/23  1319 11/13/23  0711 11/12/23  2307   SODIUM mmol/L 135*  --  139 137 135* 132*   POTASSIUM mmol/L 3.7 4.2 3.4* 3.8 3.4* 3.5   MAGNESIUM mg/dL 2.5  --  2.4 2.5  --  2.1   CHLORIDE mmol/L 100  --  103 101 98 91*   CO2 mmol/L 25.7  --  28.7 23.9 26.7 26.7   BUN mg/dL 18  --  22* 29* 32* 36*   CREATININE mg/dL 1.17*  --  1.09* 1.10* 1.32* 1.46*   CALCIUM mg/dL 8.8  --  8.6 8.8 8.8 8.9   PHOSPHORUS mg/dL 3.9  --  3.5  --   --   --    GLUCOSE mg/dL 169*  --  105* 221* 256* 424*   ANION GAP mmol/L 9.3  --  7.3 12.1 10.3 14.3     Estimated Creatinine Clearance: 64.6 mL/min (A) (by C-G formula based on SCr of 1.17 mg/dL (H)).    Liver and pancreatic function:  Results from last 7 days   Lab Units 11/13/23  2234 11/13/23  0711 11/12/23  2350 11/12/23  2307   ALBUMIN g/dL 3.2* 3.6  --  3.7   BILIRUBIN mg/dL 2.5* 2.4*  --  2.7*   ALK PHOS U/L 89 103  --  127*   AST (SGOT) U/L 26  20  --  21   ALT (SGPT) U/L 18 18  --  21   AMMONIA umol/L  --   --  15  --        Endocrine function:  Lab Results   Component Value Date    HGBA1C 7.30 (H) 11/13/2023     Point of care bedside glucose levels:  Results from last 7 days   Lab Units 11/15/23  1946 11/15/23  1649 11/15/23  1101 11/15/23  0713 11/15/23  0534 11/15/23  0007 11/14/23  1909 11/14/23  1753 11/14/23  1129 11/14/23  0533 11/13/23  2308 11/13/23  1759 11/13/23  1155 11/13/23  0617   GLUCOSE mg/dL 348* 378* 312* 177* 195* 298* 359* 328* 233* 136* 115 222* 222* 268*     Glucose levels from the [facility]:  Results from last 7 days   Lab Units 11/15/23  0638 11/13/23  2234 11/13/23  1319 11/13/23  0711 11/12/23  2307   GLUCOSE mg/dL 169* 105* 221* 256* 424*     Lab Results   Component Value Date    TSH 4.680 (H) 11/12/2023    FREET4 1.78 (H) 11/13/2023       Cultures:  Microbiology Results (last 10 days)       Procedure Component Value - Date/Time    Blood Culture - Blood, Hand, Right [[id number]]  (Normal) Collected: 11/13/23 0003    Lab Status: Preliminary result Specimen: Blood from Hand, Right Updated: 11/15/23 0015     Blood Culture No growth at 2 days    Blood Culture - Blood, Arm, Right [[id number]]  (Normal) Collected: 11/12/23 2350    Lab Status: Preliminary result Specimen: Blood from Arm, Right Updated: 11/15/23 0015     Blood Culture No growth at 2 days          I have personally looked at the labs and they are summarized above.    Assessment & Plan      -Atrial fibrillation with RVR status post direct current cardioversion in the emergency room, now in sinus rhythm, on Eliquis at home for a [id number] score of 3   -Thrombocytopenia  -Dilated cardiomyopathy/systolic CHF (EF 21-25% in 2020 and now 31-35%) that was present on admission with left ejection fraction of 31-35%, suspect partly tachycardia induced, with an acute exacerbation on admission that has now improved with diuresis  -Hypotension present on admission, suspect due to  cardiogenic shock as result of the A-fib with RVR, currently resolved  -Suspect type I versus type II non-ST elevation MI present on admission due to the atrial fibrillation with RVR causing cardiogenic shock and hypotension (cardiology thinks this is more likely type II than a type I non-ST elevation MI)  -Status post PVI on October 2022 with recurrent A-fib in the past  -History of nonobstructive coronary artery disease per cardiac catheterization 11/20/2020 with 30 to 40% LAD lesion  -History of insulin dependent diabetes mellitus type 2  -History of decompensated liver cirrhosis with ascites  -History of chronic macrocytic anemia, with hemoglobin currently at baseline  -History of CKD stage IIIa (creatinine ranges 1.2-1.5 and GFR ranges 42-50)    As the platelets levels are going down, I will stop the heparin drip.  I will continue to trend the platelet level.  I have ordered the DIC labs protocol and ordered a peripheral smear for in the morning.  For now, since the patient is stable, I will not start an argatroban drip; if the platelets are the same or improved in the morning then I may start the home Eliquis back.  However, I will discuss all of this with cardiology when they come back on service in the morning.  So far, the patient has had blood pressures that continue to be with systolics in the 90 range and thus she has not been able to tolerate goal-directed medical treatment.  We will continue to monitor her heart rates and her blood pressures closely to see if she can start 1 or 2 these medications soon.  For now, I will place the Coreg and valsartan on hold.  I will increase the mealtime regular insulin to 8 units and continue with the same long-acting insulin and sliding scale insulin.  We will continue to monitor her glucose levels closely.  Patient will need to follow-up in [place] to see about receiving an AICD.  She will be given a LifeVest prior to discharge to bridge her until she receives the  AICD     VTE Prophylaxis:  Mechanical Order History:       None          Pharmalogical Order History:        Ordered     Dose Route Frequency Stop    11/15/23 0817  heparin (porcine) 5000 UNIT/ML injection 4,000 Units         4,000 Units IV Once 11/15/23 0942    11/13/23 1540  heparin 01889 units/250 mL (100 units/mL) in 0.45 % NaCl infusion  12.08 mL/hr         11.4 Units/kg/hr (Dosing Weight) IV Titrated --    11/13/23 0537  Pharmacy to Dose Heparin         -- XX Continuous PRN --                The patient is high risk due to the following diagnoses/reasons: A-fib causing an acute heart failure exacerbation that then led to a non-ST elevation MI     Disposition: Patient wants to return home to live with her son and have home health; discharge may occur in the next 2 to 3 days.    Dave Bishop MD  [place] Hospitalist  11/15/23  22:36 EST

## 2023-11-16 NOTE — PLAN OF CARE
Goal Outcome Evaluation:      Daily Care Plan Summary: Heart Failure    Diuretic in use (IV or PO):   N/A        Daily weight (up or down):    gain: 1lbs      Output > Intake (yes/no):Yes      O2 Requirements (current, any change?):  1 liters/min via nasal cannula      Symptoms noted with Activity (Respiratory Tolerance, functional state):     activity intolerance      Anticipated Discharge Plans:     home        Pt has been resting this shift. Heparin drip was D/C this shift. Pt has complained of pain this shift, medication was administered, see MAR. No signs and symptoms of acute distress noted. No complaints of chest pain or SOB reported.

## 2023-11-16 NOTE — PROGRESS NOTES
[facility]  PROGRESS NOTE    Patient information:  Name: Yumiko Purdy  Age/Sex: 57 y.o. female  :  1965        PCP: Masha Davidson APRN  Attending: Tomás An MD  MRN:  [id number]  Visit Number:  [id number]    LOS:  LOS: 3 days     CODE STATUS:    Code Status and Medical Interventions:   Ordered at: 23 0558     Code Status (Patient has no pulse and is not breathing):    CPR (Attempt to Resuscitate)     Medical Interventions (Patient has pulse or is breathing):    Full Support       PROBLEM LIST:Principal Problem:    Ventricular tachycardia      Reason for Cardiology follow-up: Ventricular tachycardia       Subjective   ADMISSION INFORMATION:  Chief Complaint   Patient presents with    Rapid Heart Rate       HPI:  HPI:  Yumiko Purdy is a 57 y.o. female with a past medical history significant for systolic CHF (EF 21-25% per last ECHO 2020), afib on Eliquis s/p ablation 10/2022, cirrhosis, insulin dependent type II DM, hypothyroidism, COPD, and what appears to be borderline stage IIIa-b CKD (creatinin ranges 1.2-1.5 and GFR ranges 42-50).     Patient presented to [facility] ([place] [facility]) emergency room (ER) on 2023 with complaints of acute onset palpitations that occurred just prior to arrival to the ER. EMS reported they found her in SVT and gave her 6mg IV adenosine followed by 12mg adenosine along with a fluid bolus. When she arrived to the ED, initial EKG reported afib w/RVR, but upon further review on Zolls monitor, she appeared to be in Ventricular tachycardia. Thus she was loaded with IV amiodarone and subsequently converted to afib and has been going back and forth between sinus and afib since. Initial EKG showed intraventricular block, and repeat EKG's thereafter showed LBBB which was not present on prior EKGs before today. Labs showed elevated troponin 93 that trended down to 84 on repeat, BNP elevated at 2746,  + 3.5, mag 2.1, BUN 36 with Cr 1.46, Glucose 424, alk phos 127 and bilirubin 2.7.  TSH was 4.680 while free T4 was 1.78.     CT chest showed cardiomegaly, splenomegaly, minimal atelectasis at lung bases, and no edema, pleural effusion or pneumothorax. Patient was admitted for further workup and management.        Cardiology was consulted for further evaluation and management ventricular tachycardia.      Primary Cardiologist has been Jad Maravilla MD (Cardiologist), and Samy Claude Elayi, MD (EP) and she was last seen in the office on 01/27/2023.   Contact number for EP: [phone].     Patient is also followed in [place] heart failure clinic with her last appointment being 09/29/2023.    EVENT TIMELINE:   11/14/2023: Ordered Life Vest  11/14/2023: The above number is no longer in service.  I did call [phone] and spoke with Eda. She took a message and will have one of the 's to return my call to make this patient an appointment for evaluation for ablation and ICD placement. Awaiting their return call.     11/15/2023: I called back to Dr. Jimenez's office at [phone] and spoke with Esthela.  She scheduled patient with Dr. Jimenez on 12/11/2023 at 1:15 PM.  I will take a note to  with this appoint on it for her to have as reference and new phone number ([phone]) since the one she gave us is not in service any longer.     Interval History:   Patient is in room 314 A and was examined by Dr. Sahu.  Patient is lying in bed resting quietly.  No acute distress noted at this time.  Patient reports that she does feel some better.  She denies any chest pain, shortness of breath, or palpitations.      Vital Signs  Temp:  [97.9 °F (36.6 °C)-98.3 °F (36.8 °C)] 98 °F (36.7 °C)  Heart Rate:  [65-71] 65  Resp:  [18] 18  BP: ()/(42-54) 104/45  Flow (L/min):  [0-1] 0  Device (Oxygen Therapy): room air  Vital Signs (last 72 hrs)         11/13 0700  11/14 0659 11/14  0700  11/15 0659 11/15 0700 11/16 0659 11/16 0700 11/16 1112   Most Recent      Temp (°F) 97.8 -  98.4    97.9 -  98.2    97.8 -  98.3      98     98 (36.7) 11/16 0700    Heart Rate 90 -  104    60 -  146    63 -  78      65     65 11/16 0700    Resp 11 -  25    16 -  26      18      18     18 11/16 0700    BP 85/54 -  125/75    81/48 -  127/57    91/42 -  104/54      104/45     104/45 11/16 0700    SpO2 (%) 94 -  100    86 -  100    94 -  98      98     98 11/16 0700    Flow (L/min) 1.5 -  2      1    0 -  1      0     0 11/16 0719          BMI:Body mass index is 39.06 kg/m².    WEIGHT:      11/14/23  1902 11/15/23  0500 11/16/23  0500   Weight: 105 kg (232 lb 6.4 oz) 105 kg (231 lb 7.7 oz) 106 kg (234 lb 11.2 oz)       DIET:Diet: Regular/House Diet; Texture: Soft to Chew (NDD 3); Soft to Chew: Whole Meat; Fluid Consistency: Thin (IDDSI 0)    I&O:  Intake & Output (last 3 days)         11/13 0701  11/14 0700 11/14 0701  11/15 0700 11/15 0701 11/16 0700 11/16 0701 11/17 0700    P.O. 100 600 480     I.V. (mL/kg) 22.2 (0.2) 180.3 (1.7)      IV Piggyback        Total Intake(mL/kg) 122.2 (1.2) 780.3 (7.4) 480 (4.5)     Urine (mL/kg/hr) 1200 (0.5) 3100 (1.2) 2200 (0.9)     Stool  0      Total Output 1200 3100 2200     Net -1077.8 -2319.7 -1720             Urine Unmeasured Occurrence  1 x 2 x     Stool Unmeasured Occurrence  4 x 1 x             Objective     I have seen and examined Ms. Purdy today.  Physical Exam:      General Appearance:    Alert, cooperative, in no acute distress. Tired appearing.    Head:    Normocephalic, without obvious abnormality, atraumatic.   Eyes:                          Conjunctivae and sclerae normal, no icterus, no pallor, corneas clear.   Neck:   No adenopathy, supple, trachea midline, no thyromegaly, no carotid bruit, no JVD.   Lungs:     Clear to auscultation, respirations regular, even and             unlabored.    Heart:    Regular rhythm and normal rate, normal S1 and S2, no           murmur, no gallop, no rub, no click   Chest Wall:    No abnormalities observed.   Abdomen:     Normal bowel sounds, no masses, no organomegaly, soft nontender, nondistended, no guarding, no rebound tenderness.   Extremities:   Moves all extremities well, no edema, no cyanosis, no           redness.   Pulses:   Pulses palpable and equal bilaterally.   Skin:   No bleeding, bruising or rash.   Neurologic:   Alert and Oriented x 3, Speech Clear & comprehensive.       Results review   Results Review:   Results from last 7 days   Lab Units 11/13/23  1319 11/13/23  0124 11/12/23  2307   CK TOTAL U/L  --   --  41   HSTROP T ng/L 84* 84* 93*     Lab Results   Component Value Date    PROBNP 2,746.0 (H) 11/12/2023    PROBNP 555.6 09/29/2023    PROBNP 630.5 08/17/2023     Results from last 7 days   Lab Units 11/16/23  0148 11/15/23  0638 11/13/23  2234 11/13/23  0711 11/12/23  2307   WBC 10*3/mm3 7.38 8.88 10.82* 13.11* 15.57*   HEMOGLOBIN g/dL 8.0* 8.5* 7.9* 8.9* 8.4*   PLATELETS 10*3/mm3 76* 77* 99* 118* 142     Results from last 7 days   Lab Units 11/16/23  0148 11/15/23  0638 11/14/23  1407 11/13/23  2234 11/13/23  1319 11/13/23  0711 11/12/23  2307   SODIUM mmol/L 136 135*  --  139 137 135* 132*   POTASSIUM mmol/L 4.2 3.7 4.2 3.4* 3.8 3.4* 3.5   CHLORIDE mmol/L 101 100  --  103 101 98 91*   CO2 mmol/L 25.0 25.7  --  28.7 23.9 26.7 26.7   BUN mg/dL 16 18  --  22* 29* 32* 36*   CREATININE mg/dL 1.28* 1.17*  --  1.09* 1.10* 1.32* 1.46*   CALCIUM mg/dL 8.5* 8.8  --  8.6 8.8 8.8 8.9   GLUCOSE mg/dL 312* 169*  --  105* 221* 256* 424*   ALT (SGPT) U/L  --   --   --  18  --  18 21   AST (SGOT) U/L  --   --   --  26  --  20 21     Lab Results   Component Value Date    MG 2.3 11/16/2023    MG 2.5 11/15/2023    MG 2.4 11/13/2023     Estimated Creatinine Clearance: 59.3 mL/min (A) (by C-G formula based on SCr of 1.28 mg/dL (H)).    Lab Results   Component Value Date    HGBA1C 7.30 (H) 11/13/2023    HGBA1C 8.40 (H) 09/10/2020     HGBA1C 5.4 04/21/2016     Lab Results   Component Value Date    CHOL 137 09/11/2020    TRIG 80 09/11/2020    LDL 78 09/11/2020    HDL 43 09/11/2020     Lab Results   Component Value Date    ABSOLUTELUNG 31 11/14/2023    ABSOLUTELUNG 34 09/29/2023    ABSOLUTELUNG 36 (A) 08/17/2023     Lab Results   Component Value Date    INR 0.97 11/15/2023    INR 1.20 (H) 11/12/2023    INR 1.10 12/15/2022    INR 1.25 (H) 09/23/2020    INR 1.09 09/22/2020    INR 1.17 (H) 09/21/2020    INR 1.26 (H) 09/21/2020     Lab Results   Component Value Date    TSH 4.680 (H) 11/12/2023      Pain Management Panel  More data exists         Latest Ref Rng & Units 11/13/2023 9/12/2020   Pain Management Panel   Creatinine, Urine mg/dL  mg/dL - 46.3  46.3    Amphetamine, Urine Qual Negative Negative  -   Barbiturates Screen, Urine Negative Negative  -   Benzodiazepine Screen, Urine Negative Negative  -   Buprenorphine, Screen, Urine Negative Negative  -   Cocaine Screen, Urine Negative Negative  -   Fentanyl, Urine Negative Negative  -   Methadone Screen , Urine Negative Negative  -   Methamphetamine, Ur Negative Negative  -     Microbiology Results (last 10 days)       Procedure Component Value - Date/Time    Blood Culture - Blood, Hand, Right [[id number]]  (Normal) Collected: 11/13/23 0003    Lab Status: Preliminary result Specimen: Blood from Hand, Right Updated: 11/16/23 0015     Blood Culture No growth at 3 days    Blood Culture - Blood, Arm, Right [[id number]]  (Normal) Collected: 11/12/23 2350    Lab Status: Preliminary result Specimen: Blood from Arm, Right Updated: 11/16/23 0015     Blood Culture No growth at 3 days           Imaging Results (Last 24 Hours)       ** No results found for the last 24 hours. **          ECHO:  Results for orders placed during the hospital encounter of 11/12/23    Adult Transthoracic Echo Complete W/ Cont if Necessary Per Protocol    Interpretation Summary    Left ventricular systolic function is moderately  decreased. Left ventricular ejection fraction appears to be 31 - 35%.    Left ventricular wall thickness is consistent with mild concentric hypertrophy.    Left ventricular diastolic function is consistent with (grade III w/high LAP) fixed restrictive pattern.    Mildly reduced right ventricular systolic function noted.    The left atrial cavity is moderately dilated.    The right atrial cavity is mild to moderately  dilated.    There is calcification of the aortic valve mainly affecting the left coronary cusp(s).    Dilated pulmonary artery.      STRESS TEST:  Results for orders placed during the hospital encounter of 10/15/20    Nuclear Medicine Cardiac Blood Pool [place] At Rest    Interpretation Summary  EXAMINATION: NUCLEAR MEDICINE CARDIAC BLOOD POOL [place] AT REST-    CLINICAL INDICATION: Cardiomyopathy  TECHNIQUE: 21 mCi of Tc-99m autologous RBCs were injected IV after  in-vitro RBC labeling. Gated cardiac imaging was performed in the  anterior and left anterior oblique.  COMPARISON: None  FINDINGS: The left ventricle is normal in size with normal systolic  function. The calculated left ventricular ejection fraction (LVEF) = 38  %.  The right and left atria, aorta and pulmonary artery are normal. The  right ventricle is normal in size.    Impression  LVEF: 38%, at rest.    This report was finalized on 10/16/2020 11:57 AM by Dr. Marlo Parr MD.       HEART CATH:  Results for orders placed during the hospital encounter of 11/10/20    Cardiac Catheterization/Vascular Study    Narrative  Procedure type:  Left heart cath, LV gram, bilateral selective cholangiogram    Indication:  Cardiomyopathy    Procedure:  After informed consent the patient was brought into the cardiac aspiration lab she was prepped and draped in the usual sterile manner the right radial area was incised with 2% Xylocaine however several attempts to engage into the right radial artery was not successful so the right radial artery access was  abandoned and the right groin area was excised in 2% Xylocaine right femoral artery was accessed using modified standard technique and 5 [place] side-port arterial sheath was placed and secured then over a guidewire a 5 [place] JL 4 catheter was used left main coronary artery with angiographic pressures were taken then the catheter was removed and over a guidewire a 5 [place] JR4 catheter was used and engagement of the right coronary arteries angioplasty was taken then the catheter was removed then over a guidewire 5 [place] pigtail catheter used aortic valve was done hand-injection LV gram was done then pullback was done then the catheter was removed groin sheath was removed and minx closure device applied with good hemostasis the patient was transported back to her room in stable condition.    Results:  The patient LVEDP was 17 mmHg with hand-injection showing preserved left ventricular systolic function wall motion EF 50-55% with no significant aortic gradient on pullback.    Cholangiogram:  This right dominant system.  Left main cardiac angiographically normal and bifurcates into left and descending and left circumflex arteries.  LAD was normal caliber gives several septal perforators and diagonal branches and wraps around the apex LAD about 30 to 40% mid stenosis.  Left circumflex artery was nondominant for posterior circulation gives rise to several obtuse marginal branches left circumflex arteries branches angiographically normal.  The right coronary artery was a large artery was dominant for posterior circulation giving rise to acute marginal branches PDA and posterolateral branches the right coronary artery and its branches angiographically normal.    Conclusion:  Preserved LV systolic function ejection fraction 50-55% with elevated left ventricular end-diastolic pressure consistent with diastolic dysfunction coronary angiogram showed right dominant system with 30 to 40% mid LAD lesion otherwise coronaries  arteries were normal.  Plan of care:  Medical management and secondary preventive measurements of coronary disease good lipid control blood pressure control healthy lifestyle patient is to follow-up with her primary care physician for further management.      TELEMETRY:      SR 60 -70s        I reviewed the patient's new clinical results.    ALLERGIES: Phenergan [promethazine]    Medication Review:   Current list of medications may not reflect those currently placed in orders that are not signed or are being held.     busPIRone, 10 mg, Oral, BID  [Held by provider] carvedilol, 3.125 mg, Oral, BID With Meals  empagliflozin, 10 mg, Oral, Daily  erythromycin, 1 application , Both Eyes, Q6H  ferrous sulfate, 325 mg, Oral, BID  fluticasone, 2 spray, Nasal, Daily  insulin glargine, 22 Units, Subcutaneous, Daily  insulin regular, 3-14 Units, Subcutaneous, Q6H  insulin regular, 8 Units, Subcutaneous, TID AC  levothyroxine, 50 mcg, Oral, Q AM  Lidocaine, 2 patch, Transdermal, Q24H  Lidocaine, 2 patch, Transdermal, Once  miconazole, 1 application , Topical, Q12H  pantoprazole, 40 mg, Oral, Q AM  Pharmacy Consult, , Does not apply, Once  pregabalin, 150 mg, Oral, BID  senna-docusate sodium, 2 tablet, Oral, BID  sodium chloride, 10 mL, Intravenous, Q12H  sodium chloride, 10 mL, Intravenous, Q12H  [Held by provider] valsartan, 40 mg, Oral, Q24H      Pharmacy Consult,         acetaminophen    acetaminophen    benzonatate    senna-docusate sodium **AND** polyethylene glycol **AND** bisacodyl **AND** bisacodyl    Calcium Replacement - Follow Nurse / BPA Driven Protocol    dextrose    dextrose    glucagon (human recombinant)    Magnesium Standard Dose Replacement - Follow Nurse / BPA Driven Protocol    nitroglycerin    Pharmacy Consult    Phosphorus Replacement - Follow Nurse / BPA Driven Protocol    Potassium Replacement - Follow Nurse / BPA Driven Protocol    [COMPLETED] Insert Peripheral IV **AND** sodium chloride    sodium  chloride    sodium chloride    sodium chloride    sodium chloride    Assessment      Atrial fibrillation with rapid ventricle response, status post electrocardioversion in the ED, patient currently in sinus rhythm.  [id number] score of 3.  Dilated cardiomyopathy with LV ejection fraction of 31 to 35%.  Mild acute on chronic HFrEF  Acute non-ST elevation myocardial infarction (type I versus more likely type II from tachycardia and heart failure)  Type 2 diabetes mellitus.  G    Plan     Since her blood pressure is better, we will go ahead and restart carvedilol if tolerated.  Continue with empagliflozin.  We will consider initiating ARB later.    I have discussed the patients findings and recommendations with patient.    Thank you very much for asking us to be involved in this patient's care.  We will follow along with you.    Electronically signed by KENNY Finn, 11/16/23, 11:16 AM EST.   Electronically signed by Ramon Sahu MD, 11/16/23, 10:16 PM EST.            Please note that portions of this note were completed with a voice recognition program.    Please note that portions of this note were copied and has been reviewed and is accurate as of 11/16/2023 .

## 2023-11-16 NOTE — THERAPY TREATMENT NOTE
Acute Care - Physical Therapy Treatment Note   Isaías     Patient Name: Yumiko Purdy  : 1965  MRN: [id number]  Today's Date: 2023   Onset of Illness/Injury or Date of Surgery: 23  Visit Dx:     ICD-10-CM ICD-9-CM   1. Severe sepsis  A41.9 038.9    R65.20 995.92   2. Other cirrhosis of liver  K74.69 571.5   3. Atrial fibrillation with RVR  I48.91 427.31   4. Symptomatic anemia  D64.9 285.9   5. Congestive heart failure, unspecified HF chronicity, unspecified heart failure type  I50.9 428.0   6. Sustained monomorphic ventricular tachycardia  I47.29 427.1   7. Prolonged Q-T interval on ECG  R94.31 794.31   8. Acute on chronic combined systolic and diastolic CHF (congestive heart failure)  I50.43 428.43     428.0   9. Chronic heart failure, unspecified heart failure type  I50.9 428.9     Patient Active Problem List   Diagnosis    Acute on chronic congestive heart failure    Cardiomyopathy    CKD (chronic kidney disease) stage 2, GFR 60-89 ml/min    Severe obesity (BMI 35.0-39.9) with comorbidity    Chronic anemia    Elevated bilirubin    Acute on chronic combined systolic and diastolic CHF (congestive heart failure)    Hyponatremia    Chronic atrial fibrillation    Cirrhosis    Ventricular tachycardia     Past Medical History:   Diagnosis Date    Anemia     CHF (congestive heart failure)     Chronic a-fib     stated by patient     Cirrhosis of liver     stated by patient     Diabetes mellitus      Past Surgical History:   Procedure Laterality Date    APPENDECTOMY      CARDIAC CATHETERIZATION N/A 11/10/2020    Procedure: Left Heart Cath;  Surgeon: Charlee Ken MD;  Location: [facility] INVASIVE LOCATION;  Service: Cardiovascular;  Laterality: N/A;    CHOLECYSTECTOMY      TOE AMPUTATION Right     stated by patient.      PT Assessment (last 12 hours)       PT Evaluation and Treatment       Row Name 23 1433          Physical Therapy Time and Intention    Subjective Information complains  of;weakness;fatigue  -CT     Document Type therapy note (daily note)  -CT     Mode of Treatment physical therapy  -CT     Patient Effort good  -CT       Row Name 11/16/23 1433          General Information    Patient Profile Reviewed yes  -CT     Equipment Currently Used at Home commode, bedside;hospital bed;walker, rolling  -CT     Existing Precautions/Restrictions fall  -CT     Limitations/Impairments safety/cognitive  -CT       Row Name 11/16/23 1433          Cognition    Affect/Mental Status (Cognition) WNL  -CT     Orientation Status (Cognition) oriented x 4  -CT     Follows Commands (Cognition) WNL  -CT       Row Name 11/16/23 1433          Bed Mobility    Bed Mobility bed mobility (all) activities  -CT     All Activities, [place] (Bed Mobility) minimum assist (75% patient effort)  -CT     Bed Mobility, Safety Issues decreased use of arms for pushing/pulling;decreased use of legs for bridging/pushing  -CT       Row Name 11/16/23 1433          Transfers    Transfers sit-stand transfer;stand-sit transfer  -CT       Row Name 11/16/23 1433          Sit-Stand Transfer    Sit-Stand [place] (Transfers) minimum assist (75% patient effort)  -CT     Assistive Device (Sit-Stand Transfers) walker, front-wheeled  -CT       Row Name 11/16/23 1433          Stand-Sit Transfer    Stand-Sit [place] (Transfers) minimum assist (75% patient effort)  -CT     Assistive Device (Stand-Sit Transfers) walker, front-wheeled  -CT       Row Name 11/16/23 1433          Gait/Stairs (Locomotion)    [place] Level (Gait) minimum assist (75% patient effort)  -CT     Assistive Device (Gait) walker, front-wheeled  -CT     Patient was able to Ambulate yes  -CT     Distance in Feet (Gait) 8  -CT     Pattern (Gait) swing-through  -CT     Deviations/Abnormal Patterns (Gait) gait speed decreased  -CT     Bilateral Gait Deviations forward flexed posture  -CT       Row Name             Wound 11/14/23 2102 Left lower leg Abrasion     Wound - Properties Group Placement Date: 11/14/23 -AF Placement Time: 2102 -AF Present on Original Admission: Y  -AF Side: Left  -AF Orientation: lower  -AF Location: leg  -AF Primary Wound Type: Abrasion  -AF    Retired Wound - Properties Group Placement Date: 11/14/23 -AF Placement Time: 2102 -AF Present on Original Admission: Y  -AF Side: Left  -AF Orientation: lower  -AF Location: leg  -AF Primary Wound Type: Abrasion  -AF    Retired Wound - Properties Group Date first assessed: 11/14/23 -AF Time first assessed: 2102 -AF Present on Original Admission: Y  -AF Side: Left  -AF Location: leg  -AF Primary Wound Type: Abrasion  -AF      Row Name 11/16/23 1433          Coping    Observed Emotional State calm;cooperative;pleasant  -CT     Verbalized Emotional State acceptance  -CT       Row Name 11/16/23 1433          Plan of Care Review    Plan of Care Reviewed With patient  -CT       Row Name 11/16/23 1433          Positioning and Restraints    Pre-Treatment Position in bed  -CT     Post Treatment Position chair  -CT     In Chair sitting;call light within reach;encouraged to call for assist;notified nsg  -CT       Row Name 11/16/23 1433          Therapy Assessment/Plan (PT)    Rehab Potential (PT) good, to achieve stated therapy goals  -CT     Criteria for Skilled Interventions Met (PT) yes;skilled treatment is necessary  -CT     Therapy Frequency (PT) 2 times/wk  2-5 time/wk  -CT               User Key  (r) = Recorded By, (t) = Taken By, (c) = Cosigned By      Initials Name Provider Type    CT Lynn Mueller PT Physical Therapist    Kati Ashford, OLIVIA Registered Nurse                    Physical Therapy Education       Title: PT OT SLP Therapies (In Progress)       Topic: Physical Therapy (In Progress)       Point: Mobility training (In Progress)       Learning Progress Summary             Patient Acceptance, E, NR by RD at 11/16/2023 1111    Acceptance, E, NR by RD at 11/15/2023 0958                          Point: Home exercise program (In Progress)       Learning Progress Summary             Patient Acceptance, E, NR by RD at 11/16/2023 1111    Acceptance, E, NR by RD at 11/15/2023 0958                         Point: Body mechanics (In Progress)       Learning Progress Summary             Patient Acceptance, E, NR by RD at 11/16/2023 1111    Acceptance, E, NR by RD at 11/15/2023 0958                         Point: Precautions (In Progress)       Learning Progress Summary             Patient Acceptance, E, NR by RD at 11/16/2023 1111    Acceptance, E, NR by RD at 11/15/2023 0958                                         User Key       Initials Effective Dates Name Provider Type Discipline    RD 06/16/21 -  Silvana Melgar, RN Registered Nurse Nurse                  PT Recommendation and Plan  Anticipated Discharge Disposition (PT): home with 24/7 care  Planned Therapy Interventions (PT): balance training, bed mobility training, gait training, home exercise program, manual therapy techniques, motor coordination training, neuromuscular re-education, patient/family education, postural re-education, strengthening, stretching, transfer training  Therapy Frequency (PT): 2 times/wk (2-5 time/wk)  Plan of Care Reviewed With: patient       Time Calculation:    PT Charges       Row Name 11/16/23 1436             Time Calculation    PT Received On 11/16/23  -CT         Time Calculation- PT    Total Timed Code Minutes- PT 33 minute(s)  -CT                User Key  (r) = Recorded By, (t) = Taken By, (c) = Cosigned By      Initials Name Provider Type    CT Lynn Mueller, PARK Physical Therapist                  Therapy Charges for Today       Code Description Service Date Service Provider Modifiers Qty    65608808018 HC PT EVAL MOD COMPLEXITY 4 11/15/2023 Lynn Mueller, PT GP 1    30704115207 HC GAIT TRAINING EA 15 MIN 11/16/2023 Lynn Mueller, PT GP 1    74894892286 HC PT THERAPEUTIC ACT EA 15 MIN 11/16/2023 Lynn Mueller, PT GP 1             PT G-Codes  AM-PAC 6 Clicks Score (PT): 14    Lynn Mueller, PT  11/16/2023

## 2023-11-16 NOTE — THERAPY TREATMENT NOTE
Acute Care - Occupational Therapy Treatment Note  [facility]     Patient Name: Yumiko Purdy  : 1965  MRN: [id number]  Today's Date: 2023  Onset of Illness/Injury or Date of Surgery: 23     Referring Physician: Dario    Admit Date: 2023       ICD-10-CM ICD-9-CM   1. Severe sepsis  A41.9 038.9    R65.20 995.92   2. Other cirrhosis of liver  K74.69 571.5   3. Atrial fibrillation with RVR  I48.91 427.31   4. Symptomatic anemia  D64.9 285.9   5. Congestive heart failure, unspecified HF chronicity, unspecified heart failure type  I50.9 428.0   6. Sustained monomorphic ventricular tachycardia  I47.29 427.1   7. Prolonged Q-T interval on ECG  R94.31 794.31   8. Acute on chronic combined systolic and diastolic CHF (congestive heart failure)  I50.43 428.43     428.0   9. Chronic heart failure, unspecified heart failure type  I50.9 428.9     Patient Active Problem List   Diagnosis    Acute on chronic congestive heart failure    Cardiomyopathy    CKD (chronic kidney disease) stage 2, GFR 60-89 ml/min    Severe obesity (BMI 35.0-39.9) with comorbidity    Chronic anemia    Elevated bilirubin    Acute on chronic combined systolic and diastolic CHF (congestive heart failure)    Hyponatremia    Chronic atrial fibrillation    Cirrhosis    Ventricular tachycardia     Past Medical History:   Diagnosis Date    Anemia     CHF (congestive heart failure)     Chronic a-fib     stated by patient     Cirrhosis of liver     stated by patient     Diabetes mellitus      Past Surgical History:   Procedure Laterality Date    APPENDECTOMY      CARDIAC CATHETERIZATION N/A 11/10/2020    Procedure: Left Heart Cath;  Surgeon: Charlee Ken MD;  Location: [facility] INVASIVE LOCATION;  Service: Cardiovascular;  Laterality: N/A;    CHOLECYSTECTOMY      TOE AMPUTATION Right     stated by patient.          OT ASSESSMENT FLOWSHEET (last 12 hours)       OT Evaluation and Treatment       Row Name 23 1234                    OT Time and Intention    Subjective Information complains of;weakness;fatigue  -LM        Document Type therapy note (daily note)  -LM        Mode of Treatment occupational therapy  -LM        Patient Effort good  -LM        Comment Patient seen this date for adl retraining/education and fxl mobility.  Patient completed fxl transfer with cga and rw.  Mod/max assist with bathing, dressing, toileting tasks while seated.  -LM           General Information    Existing Precautions/Restrictions fall  -LM           Cognition    Affect/Mental Status (Cognition) WNL  -LM           Wound 11/14/23 2102 Left lower leg Abrasion    Wound - Properties Group Placement Date: 11/14/23 -AF Placement Time: 2102 -AF Present on Original Admission: Y  -AF Side: Left  -AF Orientation: lower  -AF Location: leg  -AF Primary Wound Type: Abrasion  -AF    Retired Wound - Properties Group Placement Date: 11/14/23  -AF Placement Time: 2102 -AF Present on Original Admission: Y  -AF Side: Left  -AF Orientation: lower  -AF Location: leg  -AF Primary Wound Type: Abrasion  -AF    Retired Wound - Properties Group Date first assessed: 11/14/23  -AF Time first assessed: 2102 -AF Present on Original Admission: Y  -AF Side: Left  -AF Location: leg  -AF Primary Wound Type: Abrasion  -AF       Positioning and Restraints    Post Treatment Position chair  -LM        In Chair call light within reach;encouraged to call for assist;notified nsg  -LM                  User Key  (r) = Recorded By, (t) = Taken By, (c) = Cosigned By      Initials Name Effective Dates    LM Donna Cifuentes OT 06/16/21 -     AF Kati Vasquez RN 06/08/23 -                            OT Recommendation and Plan  Planned Therapy Interventions (OT): activity tolerance training, adaptive equipment training, BADL retraining, functional balance retraining, ROM/therapeutic exercise, strengthening exercise, transfer/mobility retraining, patient/caregiver education/training  Therapy Frequency  (OT): other (see comments)  Plan of Care Review  Plan of Care Reviewed With: patient  Plan of Care Reviewed With: patient        Time Calculation:     Therapy Charges for Today       Code Description Service Date Service Provider Modifiers Qty    38433379524  OT EVAL MOD COMPLEXITY 4 11/15/2023 Donna Cifuentes, OT GO 1    [id number]  OT SELF CARE/MGMT/TRAIN EA 15 MIN 11/16/2023 Donna Cifuentes OT GO 1                 Donna Cifuentes OT  11/16/2023

## 2023-11-17 LAB
ALBUMIN SERPL-MCNC: 3.4 G/DL (ref 3.5–5.2)
ALBUMIN/GLOB SERPL: 1.5 G/DL
ALP SERPL-CCNC: 87 U/L (ref 39–117)
ALT SERPL W P-5'-P-CCNC: 15 U/L (ref 1–33)
ANION GAP SERPL CALCULATED.3IONS-SCNC: 11 MMOL/L (ref 5–15)
ANISOCYTOSIS BLD QL: NORMAL
APTT PPP: 25 SECONDS (ref 26.5–34.5)
AST SERPL-CCNC: 18 U/L (ref 1–32)
BASOPHILS # BLD AUTO: 0.07 10*3/MM3 (ref 0–0.2)
BASOPHILS NFR BLD AUTO: 0.7 % (ref 0–1.5)
BILIRUB SERPL-MCNC: 1.9 MG/DL (ref 0–1.2)
BUN SERPL-MCNC: 16 MG/DL (ref 6–20)
BUN/CREAT SERPL: 13.1 (ref 7–25)
CALCIUM SPEC-SCNC: 8.8 MG/DL (ref 8.6–10.5)
CHLORIDE SERPL-SCNC: 103 MMOL/L (ref 98–107)
CO2 SERPL-SCNC: 23 MMOL/L (ref 22–29)
CREAT SERPL-MCNC: 1.22 MG/DL (ref 0.57–1)
DEPRECATED RDW RBC AUTO: 89.3 FL (ref 37–54)
EGFRCR SERPLBLD CKD-EPI 2021: 52.2 ML/MIN/1.73
EOSINOPHIL # BLD AUTO: 0.2 10*3/MM3 (ref 0–0.4)
EOSINOPHIL NFR BLD AUTO: 2 % (ref 0.3–6.2)
ERYTHROCYTE [DISTWIDTH] IN BLOOD BY AUTOMATED COUNT: 22.7 % (ref 12.3–15.4)
FERRITIN SERPL-MCNC: 2723 NG/ML (ref 13–150)
FOLATE SERPL-MCNC: 16.7 NG/ML (ref 4.78–24.2)
FSP PPP-MCNC: <5 UG/ML
GLOBULIN UR ELPH-MCNC: 2.3 GM/DL
GLUCOSE BLDC GLUCOMTR-MCNC: 231 MG/DL (ref 70–130)
GLUCOSE BLDC GLUCOMTR-MCNC: 274 MG/DL (ref 70–130)
GLUCOSE BLDC GLUCOMTR-MCNC: 279 MG/DL (ref 70–130)
GLUCOSE BLDC GLUCOMTR-MCNC: 312 MG/DL (ref 70–130)
GLUCOSE BLDC GLUCOMTR-MCNC: 359 MG/DL (ref 70–130)
GLUCOSE BLDC GLUCOMTR-MCNC: 431 MG/DL (ref 70–130)
GLUCOSE BLDC GLUCOMTR-MCNC: 461 MG/DL (ref 70–130)
GLUCOSE SERPL-MCNC: 245 MG/DL (ref 65–99)
HCT VFR BLD AUTO: 30.9 % (ref 34–46.6)
HGB BLD-MCNC: 8.7 G/DL (ref 12–15.9)
HYPOCHROMIA BLD QL: NORMAL
IMM GRANULOCYTES # BLD AUTO: 0.11 10*3/MM3 (ref 0–0.05)
IMM GRANULOCYTES NFR BLD AUTO: 1.1 % (ref 0–0.5)
IRON 24H UR-MRATE: 73 MCG/DL (ref 37–145)
IRON SATN MFR SERPL: 33 % (ref 20–50)
LYMPHOCYTES # BLD AUTO: 2.24 10*3/MM3 (ref 0.7–3.1)
LYMPHOCYTES NFR BLD AUTO: 22.6 % (ref 19.6–45.3)
MACROCYTES BLD QL SMEAR: NORMAL
MAGNESIUM SERPL-MCNC: 2.2 MG/DL (ref 1.6–2.6)
MCH RBC QN AUTO: 30.7 PG (ref 26.6–33)
MCHC RBC AUTO-ENTMCNC: 28.2 G/DL (ref 31.5–35.7)
MCV RBC AUTO: 109.2 FL (ref 79–97)
MONOCYTES # BLD AUTO: 0.28 10*3/MM3 (ref 0.1–0.9)
MONOCYTES NFR BLD AUTO: 2.8 % (ref 5–12)
NEUTROPHILS NFR BLD AUTO: 6.99 10*3/MM3 (ref 1.7–7)
NEUTROPHILS NFR BLD AUTO: 70.8 % (ref 42.7–76)
NRBC BLD AUTO-RTO: 0.3 /100 WBC (ref 0–0.2)
PF4 HEPARIN CMPLX IGG SERPL IA: 0.08 OD (ref 0–0.4)
PHOSPHATE SERPL-MCNC: 3.6 MG/DL (ref 2.5–4.5)
PLATELET # BLD AUTO: 72 10*3/MM3 (ref 140–450)
PMV BLD AUTO: 12 FL (ref 6–12)
POTASSIUM SERPL-SCNC: 4.6 MMOL/L (ref 3.5–5.2)
PROT SERPL-MCNC: 5.7 G/DL (ref 6–8.5)
RBC # BLD AUTO: 2.83 10*6/MM3 (ref 3.77–5.28)
REF LAB TEST METHOD: NORMAL
RETICS # AUTO: 0.5 10*6/MM3 (ref 0.02–0.13)
RETICS/RBC NFR AUTO: 17.67 % (ref 0.7–1.9)
SMALL PLATELETS BLD QL SMEAR: NORMAL
SODIUM SERPL-SCNC: 137 MMOL/L (ref 136–145)
TIBC SERPL-MCNC: 222 MCG/DL (ref 298–536)
TRANSFERRIN SERPL-MCNC: 149 MG/DL (ref 200–360)
VIT B12 BLD-MCNC: 1460 PG/ML (ref 211–946)
WBC NRBC COR # BLD AUTO: 9.89 10*3/MM3 (ref 3.4–10.8)

## 2023-11-17 PROCEDURE — 25010000002 FONDAPARINUX PER 0.5 MG: Performed by: INTERNAL MEDICINE

## 2023-11-17 PROCEDURE — 99232 SBSQ HOSP IP/OBS MODERATE 35: CPT | Performed by: INTERNAL MEDICINE

## 2023-11-17 PROCEDURE — 25010000002 ONDANSETRON PER 1 MG

## 2023-11-17 PROCEDURE — 85730 THROMBOPLASTIN TIME PARTIAL: CPT | Performed by: INTERNAL MEDICINE

## 2023-11-17 PROCEDURE — 85007 BL SMEAR W/DIFF WBC COUNT: CPT | Performed by: INTERNAL MEDICINE

## 2023-11-17 PROCEDURE — 83735 ASSAY OF MAGNESIUM: CPT | Performed by: INTERNAL MEDICINE

## 2023-11-17 PROCEDURE — 83540 ASSAY OF IRON: CPT | Performed by: INTERNAL MEDICINE

## 2023-11-17 PROCEDURE — 82607 VITAMIN B-12: CPT | Performed by: INTERNAL MEDICINE

## 2023-11-17 PROCEDURE — 63710000001 INSULIN REGULAR HUMAN PER 5 UNITS: Performed by: INTERNAL MEDICINE

## 2023-11-17 PROCEDURE — 84466 ASSAY OF TRANSFERRIN: CPT | Performed by: INTERNAL MEDICINE

## 2023-11-17 PROCEDURE — 82948 REAGENT STRIP/BLOOD GLUCOSE: CPT

## 2023-11-17 PROCEDURE — 63710000001 INSULIN GLARGINE PER 5 UNITS: Performed by: INTERNAL MEDICINE

## 2023-11-17 PROCEDURE — 63710000001 INSULIN LISPRO (HUMAN) PER 5 UNITS

## 2023-11-17 PROCEDURE — 84100 ASSAY OF PHOSPHORUS: CPT | Performed by: INTERNAL MEDICINE

## 2023-11-17 PROCEDURE — 80053 COMPREHEN METABOLIC PANEL: CPT | Performed by: INTERNAL MEDICINE

## 2023-11-17 PROCEDURE — 85045 AUTOMATED RETICULOCYTE COUNT: CPT | Performed by: INTERNAL MEDICINE

## 2023-11-17 PROCEDURE — 82728 ASSAY OF FERRITIN: CPT | Performed by: INTERNAL MEDICINE

## 2023-11-17 PROCEDURE — 82746 ASSAY OF FOLIC ACID SERUM: CPT | Performed by: INTERNAL MEDICINE

## 2023-11-17 PROCEDURE — 85025 COMPLETE CBC W/AUTO DIFF WBC: CPT | Performed by: INTERNAL MEDICINE

## 2023-11-17 RX ORDER — ONDANSETRON 2 MG/ML
4 INJECTION INTRAMUSCULAR; INTRAVENOUS EVERY 6 HOURS PRN
Status: DISCONTINUED | OUTPATIENT
Start: 2023-11-17 | End: 2023-11-21 | Stop reason: HOSPADM

## 2023-11-17 RX ORDER — HYDROCODONE BITARTRATE AND ACETAMINOPHEN 5; 325 MG/1; MG/1
1 TABLET ORAL ONCE
Status: COMPLETED | OUTPATIENT
Start: 2023-11-17 | End: 2023-11-17

## 2023-11-17 RX ORDER — INSULIN LISPRO 100 [IU]/ML
10 INJECTION, SOLUTION INTRAVENOUS; SUBCUTANEOUS ONCE
Status: COMPLETED | OUTPATIENT
Start: 2023-11-17 | End: 2023-11-17

## 2023-11-17 RX ORDER — FONDAPARINUX SODIUM 10 MG/.8ML
5 INJECTION SUBCUTANEOUS
Status: DISCONTINUED | OUTPATIENT
Start: 2023-11-17 | End: 2023-11-19

## 2023-11-17 RX ADMIN — INSULIN HUMAN 5 UNITS: 100 INJECTION, SOLUTION PARENTERAL at 05:44

## 2023-11-17 RX ADMIN — PANTOPRAZOLE SODIUM 40 MG: 40 TABLET, DELAYED RELEASE ORAL at 05:44

## 2023-11-17 RX ADMIN — ERYTHROMYCIN 1 APPLICATION: 5 OINTMENT OPHTHALMIC at 13:10

## 2023-11-17 RX ADMIN — INSULIN LISPRO 10 UNITS: 100 INJECTION, SOLUTION INTRAVENOUS; SUBCUTANEOUS at 20:51

## 2023-11-17 RX ADMIN — MICONAZOLE NITRATE 1 APPLICATION: 2 POWDER TOPICAL at 20:51

## 2023-11-17 RX ADMIN — FERROUS SULFATE TAB 325 MG (65 MG ELEMENTAL FE) 325 MG: 325 (65 FE) TAB at 20:51

## 2023-11-17 RX ADMIN — PREGABALIN 150 MG: 75 CAPSULE ORAL at 20:51

## 2023-11-17 RX ADMIN — LEVOTHYROXINE SODIUM 50 MCG: 50 TABLET ORAL at 05:44

## 2023-11-17 RX ADMIN — Medication 10 ML: at 09:13

## 2023-11-17 RX ADMIN — Medication 10 ML: at 20:52

## 2023-11-17 RX ADMIN — FLUTICASONE PROPIONATE 2 SPRAY: 50 SPRAY, METERED NASAL at 08:26

## 2023-11-17 RX ADMIN — BUSPIRONE HYDROCHLORIDE 10 MG: 10 TABLET ORAL at 20:51

## 2023-11-17 RX ADMIN — FERROUS SULFATE TAB 325 MG (65 MG ELEMENTAL FE) 325 MG: 325 (65 FE) TAB at 08:26

## 2023-11-17 RX ADMIN — MICONAZOLE NITRATE 1 APPLICATION: 2 POWDER TOPICAL at 08:27

## 2023-11-17 RX ADMIN — INSULIN HUMAN 10 UNITS: 100 INJECTION, SOLUTION PARENTERAL at 08:28

## 2023-11-17 RX ADMIN — PREGABALIN 150 MG: 75 CAPSULE ORAL at 08:26

## 2023-11-17 RX ADMIN — Medication 10 ML: at 20:51

## 2023-11-17 RX ADMIN — INSULIN HUMAN 12 UNITS: 100 INJECTION, SOLUTION PARENTERAL at 17:02

## 2023-11-17 RX ADMIN — INSULIN GLARGINE 30 UNITS: 100 INJECTION, SOLUTION SUBCUTANEOUS at 08:26

## 2023-11-17 RX ADMIN — ONDANSETRON 4 MG: 2 INJECTION INTRAMUSCULAR; INTRAVENOUS at 05:44

## 2023-11-17 RX ADMIN — INSULIN HUMAN 10 UNITS: 100 INJECTION, SOLUTION PARENTERAL at 11:13

## 2023-11-17 RX ADMIN — Medication 10 ML: at 08:27

## 2023-11-17 RX ADMIN — INSULIN GLARGINE 30 UNITS: 100 INJECTION, SOLUTION SUBCUTANEOUS at 22:32

## 2023-11-17 RX ADMIN — CARVEDILOL 3.12 MG: 6.25 TABLET, FILM COATED ORAL at 08:25

## 2023-11-17 RX ADMIN — EMPAGLIFLOZIN 10 MG: 10 TABLET, FILM COATED ORAL at 08:25

## 2023-11-17 RX ADMIN — ACETAMINOPHEN 500 MG: 500 TABLET ORAL at 20:51

## 2023-11-17 RX ADMIN — BUSPIRONE HYDROCHLORIDE 10 MG: 10 TABLET ORAL at 08:26

## 2023-11-17 RX ADMIN — INSULIN HUMAN 10 UNITS: 100 INJECTION, SOLUTION PARENTERAL at 17:01

## 2023-11-17 RX ADMIN — FONDAPARINUX SODIUM 5 MG: 10 INJECTION, SOLUTION SUBCUTANEOUS at 17:01

## 2023-11-17 RX ADMIN — INSULIN HUMAN 14 UNITS: 100 INJECTION, SOLUTION PARENTERAL at 11:15

## 2023-11-17 RX ADMIN — HYDROCODONE BITARTRATE AND ACETAMINOPHEN 1 TABLET: 5; 325 TABLET ORAL at 11:12

## 2023-11-17 RX ADMIN — ERYTHROMYCIN 1 APPLICATION: 5 OINTMENT OPHTHALMIC at 17:01

## 2023-11-17 RX ADMIN — LIDOCAINE 2 PATCH: 4 PATCH TOPICAL at 13:09

## 2023-11-17 NOTE — PROGRESS NOTES
[facility]  PROGRESS NOTE    Patient information:  Name: Yumiko Purdy  Age/Sex: 57 y.o. female  :  1965        PCP: Masha Davidson APRN  Attending: Tomás An MD  MRN:  [id number]  Visit Number:  [id number]    LOS:  LOS: 4 days     CODE STATUS:    Code Status and Medical Interventions:   Ordered at: 23 0558     Code Status (Patient has no pulse and is not breathing):    CPR (Attempt to Resuscitate)     Medical Interventions (Patient has pulse or is breathing):    Full Support       PROBLEM LIST:Principal Problem:    Ventricular tachycardia      Reason for Cardiology follow-up: Ventricular tachycardia       Subjective   ADMISSION INFORMATION:  Chief Complaint   Patient presents with    Rapid Heart Rate       HPI:  Yumiko Purdy is a 57 y.o. female with a past medical history significant for systolic CHF (EF 21-25% per last ECHO 2020), afib on Eliquis s/p ablation 10/2022, cirrhosis, insulin dependent type II DM, hypothyroidism, COPD, and what appears to be borderline stage IIIa-b CKD (creatinin ranges 1.2-1.5 and GFR ranges 42-50).     Patient presented to [facility] ([facility]) emergency room (ER) on 2023 with complaints of acute onset palpitations that occurred just prior to arrival to the ER. EMS reported they found her in SVT and gave her 6mg IV adenosine followed by 12mg adenosine along with a fluid bolus. When she arrived to the ED, initial EKG reported afib w/RVR, but upon further review on Zolls monitor, she appeared to be in Ventricular tachycardia. Thus she was loaded with IV amiodarone and subsequently converted to afib and has been going back and forth between sinus and afib since. Initial EKG showed intraventricular block, and repeat EKG's thereafter showed LBBB which was not present on prior EKGs before today. Labs showed elevated troponin 93 that trended down to 84 on repeat, BNP elevated at 2746, +  3.5, mag 2.1, BUN 36 with Cr 1.46, Glucose 424, alk phos 127 and bilirubin 2.7.  TSH was 4.680 while free T4 was 1.78.   CT chest showed cardiomegaly, splenomegaly, minimal atelectasis at lung bases, and no edema, pleural effusion or pneumothorax. Patient was admitted for further workup and management.        Cardiology has been consulted for further evaluation and management ventricular tachycardia.      Primary Cardiologist has been Jad Maravilla MD (Cardiologist), and Samy Claude Elayi, MD (EP) and she was last seen in the office on 01/27/2023.   Contact number for EP: [phone].     EVENT TIMELINE:   11/14/2023: Ordered Life Vest  11/14/2023: The above number is no longer in service.  I did call [phone] and spoke with Eda. She took a message and will have one of the 's to return my call to make this patient an appointment for evaluation for ablation and ICD placement. Awaiting their return call.     11/15/2023: I called back to Dr. Jimenez's office at [phone] and spoke with Esthela.  She scheduled patient with Dr. Jimenez on 12/11/2023 at 1:15 PM.  I will take a note to  with this appoint on it for her to have as reference and new phone number ([phone]) since the one she gave us is not in service any longer.     Patient is also followed in [place] heart failure clinic with her last appointment being 09/29/2023.     Interval History:   Patient is in room 314 A and was examined by Dr. Sahu.  Telemetry reveals SR 70s.  Creatinine is 1.22 which is slight improvement from 1.28.  Hemoglobin is 8.7.  Patient is lying in bed resting quietly.  No acute distress noted at this time.  Patient currently denies any chest pain, shortness of breath, or palpitations.    Vital Signs  Temp:  [97.3 °F (36.3 °C)-98 °F (36.7 °C)] 97.8 °F (36.6 °C)  Heart Rate:  [66-75] 75  Resp:  [18] 18  BP: (105-110)/(41-51) 108/41  Device (Oxygen Therapy): room air  Vital Signs (last 72  hrs)         11/14 0700  11/15 0659 11/15 0700 11/16 0659 11/16 0700 11/17 0659 11/17 0700 11/17 0744   Most Recent      Temp (°F) 97.9 -  98.2    97.8 -  98.3    97.3 -  98       97.8 (36.6) 11/17 0658    Heart Rate 60 -  146    63 -  78    65 -  75       75 11/17 0658    Resp 16 -  26      18      18       18 11/17 0658    BP 81/48 -  127/57    91/42 -  104/54    104/45 -  110/45       108/41 11/17 0658    SpO2 (%) 86 -  100    94 -  98    94 -  98       94 11/17 0658    Flow (L/min)   1    0 -  1      0       0 11/16 0719          BMI:Body mass index is 39.02 kg/m².    WEIGHT:      11/15/23  0500 11/16/23  0500 11/17/23  0500   Weight: 105 kg (231 lb 7.7 oz) 106 kg (234 lb 11.2 oz) 106 kg (234 lb 8 oz)       DIET:Diet: Regular/House Diet; Texture: Soft to Chew (NDD 3); Soft to Chew: Whole Meat; Fluid Consistency: Thin (IDDSI 0)    I&O:  Intake & Output (last 3 days)         11/14 0701  11/15 0700 11/15 0701  11/16 0700 11/16 0701  11/17 0700 11/17 0701  11/18 0700    P.O. 600 480 660     I.V. (mL/kg) 180.3 (1.7)       Total Intake(mL/kg) 780.3 (7.4) 480 (4.5) 660 (6.2)     Urine (mL/kg/hr) 3100 (1.2) 2200 (0.9) 2325 (0.9)     Stool 0       Total Output 3100 2200 2325     Net -2319.7 -1720 -1665             Urine Unmeasured Occurrence 1 x 2 x 1 x     Stool Unmeasured Occurrence 4 x 1 x              Objective     I have seen and examined Ms. Purdy today  Physical Exam:      General Appearance:    Alert, cooperative, in no acute distress.  Tired appearing, BMI 39.02.   Head:    Normocephalic, without obvious abnormality, atraumatic.   Eyes:                          Conjunctivae and sclerae normal, no icterus, no pallor, corneas clear.   Neck:   No adenopathy, supple, trachea midline, no thyromegaly, no carotid bruit, no JVD.   Lungs:     Clear to auscultation, respirations regular, even and             unlabored.    Heart:    Regular rhythm and normal rate, normal S1 and S2, no          murmur, no gallop, no  rub, no click   Chest Wall:    No abnormalities observed.   Abdomen:     Normal bowel sounds, no masses, no organomegaly, soft nontender, nondistended, no guarding, no rebound tenderness.   Extremities:   Moves all extremities well, no edema, no cyanosis, no           redness.   Pulses:   Pulses palpable and equal bilaterally.   Skin:   No bleeding, bruising or rash.   Neurologic:   Alert and Oriented x 3, Speech Clear & comprehensive.       Results review   Results Review:   Results from last 7 days   Lab Units 11/13/23  1319 11/13/23  0124 11/12/23  2307   CK TOTAL U/L  --   --  41   HSTROP T ng/L 84* 84* 93*     Lab Results   Component Value Date    PROBNP 2,746.0 (H) 11/12/2023    PROBNP 555.6 09/29/2023    PROBNP 630.5 08/17/2023     Results from last 7 days   Lab Units 11/17/23  0351 11/16/23  0148 11/15/23  0638 11/13/23  2234 11/13/23  0711 11/12/23  2307   WBC 10*3/mm3 9.89 7.38 8.88 10.82* 13.11* 15.57*   HEMOGLOBIN g/dL 8.7* 8.0* 8.5* 7.9* 8.9* 8.4*   PLATELETS 10*3/mm3 72* 76* 77* 99* 118* 142     Results from last 7 days   Lab Units 11/17/23  0351 11/16/23  0148 11/15/23  0638 11/14/23  1407 11/13/23  2234 11/13/23  1319 11/13/23  0711 11/12/23  2307   SODIUM mmol/L 137 136 135*  --  139 137 135* 132*   POTASSIUM mmol/L 4.6 4.2 3.7 4.2 3.4* 3.8 3.4* 3.5   CHLORIDE mmol/L 103 101 100  --  103 101 98 91*   CO2 mmol/L 23.0 25.0 25.7  --  28.7 23.9 26.7 26.7   BUN mg/dL 16 16 18  --  22* 29* 32* 36*   CREATININE mg/dL 1.22* 1.28* 1.17*  --  1.09* 1.10* 1.32* 1.46*   CALCIUM mg/dL 8.8 8.5* 8.8  --  8.6 8.8 8.8 8.9   GLUCOSE mg/dL 245* 312* 169*  --  105* 221* 256* 424*   ALT (SGPT) U/L 15  --   --   --  18  --  18 21   AST (SGOT) U/L 18  --   --   --  26  --  20 21     Lab Results   Component Value Date    MG 2.2 11/17/2023    MG 2.3 11/16/2023    MG 2.5 11/15/2023     Estimated Creatinine Clearance: 62.3 mL/min (A) (by C-G formula based on SCr of 1.22 mg/dL (H)).    Lab Results   Component Value Date     HGBA1C 7.30 (H) 11/13/2023    HGBA1C 8.40 (H) 09/10/2020    HGBA1C 5.4 04/21/2016     Lab Results   Component Value Date    CHOL 137 09/11/2020    TRIG 80 09/11/2020    LDL 78 09/11/2020    HDL 43 09/11/2020     Lab Results   Component Value Date    ABSOLUTELUNG 31 11/14/2023    ABSOLUTELUNG 34 09/29/2023    ABSOLUTELUNG 36 (A) 08/17/2023     Lab Results   Component Value Date    INR 0.97 11/15/2023    INR 1.20 (H) 11/12/2023    INR 1.10 12/15/2022    INR 1.25 (H) 09/23/2020    INR 1.09 09/22/2020    INR 1.17 (H) 09/21/2020    INR 1.26 (H) 09/21/2020     Lab Results   Component Value Date    TSH 4.680 (H) 11/12/2023      Pain Management Panel  More data exists         Latest Ref Rng & Units 11/13/2023 9/12/2020   Pain Management Panel   Creatinine, Urine mg/dL  mg/dL - 46.3  46.3    Amphetamine, Urine Qual Negative Negative  -   Barbiturates Screen, Urine Negative Negative  -   Benzodiazepine Screen, Urine Negative Negative  -   Buprenorphine, Screen, Urine Negative Negative  -   Cocaine Screen, Urine Negative Negative  -   Fentanyl, Urine Negative Negative  -   Methadone Screen , Urine Negative Negative  -   Methamphetamine, Ur Negative Negative  -     Microbiology Results (last 10 days)       Procedure Component Value - Date/Time    Blood Culture - Blood, Hand, Right [[id number]]  (Normal) Collected: 11/13/23 0003    Lab Status: Preliminary result Specimen: Blood from Hand, Right Updated: 11/17/23 0015     Blood Culture No growth at 4 days    Blood Culture - Blood, Arm, Right [[id number]]  (Normal) Collected: 11/12/23 2350    Lab Status: Preliminary result Specimen: Blood from Arm, Right Updated: 11/17/23 0015     Blood Culture No growth at 4 days           Imaging Results (Last 24 Hours)       Procedure Component Value Units Date/Time    US Liver [[id number]] Collected: 11/17/23 0802     Updated: 11/17/23 0845    Narrative:      EXAMINATION: US LIVER-      CLINICAL INDICATION: Obesity, thrombocytopenia, need to  evaluate for  cirrhosis; A41.9-Sepsis, unspecified organism; R65.20-Severe sepsis  without septic shock; K74.69-Other cirrhosis of liver;  I48.91-Unspecified atrial fibrillation; D64.9-Anemia, unspecified;  I50.9-Heart failure, unspecified; I47.29-Other ventricular tachycardia;  R94.31-Abnormal electrocardiogram (ECG) (EKG); I50.43-Acute on chronic  combined systolic         COMPARISON: None available.     FINDINGS:  Sonographic imaging of the liver.     Liver is enlarged at almost 20 cm.     Homogeneous.     No intrahepatic ductal dilatation or focal hepatic mass.     Hepatic flow is hepatopetal.       Impression:      Slight enlargement of the liver.        This report was finalized on 11/17/2023 8:42 AM by Dr. Angelo Deleon MD.             ECHO:  Results for orders placed during the hospital encounter of 11/12/23    Adult Transthoracic Echo Complete W/ Cont if Necessary Per Protocol    Interpretation Summary    Left ventricular systolic function is moderately decreased. Left ventricular ejection fraction appears to be 31 - 35%.    Left ventricular wall thickness is consistent with mild concentric hypertrophy.    Left ventricular diastolic function is consistent with (grade III w/high LAP) fixed restrictive pattern.    Mildly reduced right ventricular systolic function noted.    The left atrial cavity is moderately dilated.    The right atrial cavity is mild to moderately  dilated.    There is calcification of the aortic valve mainly affecting the left coronary cusp(s).    Dilated pulmonary artery.      STRESS TEST:  Results for orders placed during the hospital encounter of 10/15/20    Nuclear Medicine Cardiac Blood Pool [facility] At Rest    Interpretation Summary  EXAMINATION: NUCLEAR MEDICINE CARDIAC BLOOD POOL [facility] AT REST-    CLINICAL INDICATION: Cardiomyopathy  TECHNIQUE: 21 mCi of Tc-99m autologous RBCs were injected IV after  in-vitro RBC labeling. Gated cardiac imaging was performed in the  anterior and left  anterior oblique.  COMPARISON: None  FINDINGS: The left ventricle is normal in size with normal systolic  function. The calculated left ventricular ejection fraction (LVEF) = 38  %.  The right and left atria, aorta and pulmonary artery are normal. The  right ventricle is normal in size.    Impression  LVEF: 38%, at rest.    This report was finalized on 10/16/2020 11:57 AM by Dr. Marlo Parr MD.       HEART CATH:  Results for orders placed during the hospital encounter of 11/10/20    Cardiac Catheterization/Vascular Study    Narrative  Procedure type:  Left heart cath, LV gram, bilateral selective cholangiogram    Indication:  Cardiomyopathy    Procedure:  After informed consent the patient was brought into the cardiac aspiration lab she was prepped and draped in the usual sterile manner the right radial area was incised with 2% Xylocaine however several attempts to engage into the right radial artery was not successful so the right radial artery access was abandoned and the right groin area was excised in 2% Xylocaine right femoral artery was accessed using modified standard technique and 5 Algerian side-port arterial sheath was placed and secured then over a guidewire a 5 Algerian JL 4 catheter was used left main coronary artery with angiographic pressures were taken then the catheter was removed and over a guidewire a 5 Algerian JR4 catheter was used and engagement of the right coronary arteries angioplasty was taken then the catheter was removed then over a guidewire 5 [place]ian pigtail catheter used aortic valve was done hand-injection LV gram was done then pullback was done then the catheter was removed groin sheath was removed and minx closure device applied with good hemostasis the patient was transported back to her room in stable condition.    Results:  The patient LVEDP was 17 mmHg with hand-injection showing preserved left ventricular systolic function wall motion EF 50-55% with no significant aortic gradient  on pullback.    Cholangiogram:  This right dominant system.  Left main cardiac angiographically normal and bifurcates into left and descending and left circumflex arteries.  LAD was normal caliber gives several septal perforators and diagonal branches and wraps around the apex LAD about 30 to 40% mid stenosis.  Left circumflex artery was nondominant for posterior circulation gives rise to several obtuse marginal branches left circumflex arteries branches angiographically normal.  The right coronary artery was a large artery was dominant for posterior circulation giving rise to acute marginal branches PDA and posterolateral branches the right coronary artery and its branches angiographically normal.    Conclusion:  Preserved LV systolic function ejection fraction 50-55% with elevated left ventricular end-diastolic pressure consistent with diastolic dysfunction coronary angiogram showed right dominant system with 30 to 40% mid LAD lesion otherwise coronaries arteries were normal.  Plan of care:  Medical management and secondary preventive measurements of coronary disease good lipid control blood pressure control healthy lifestyle patient is to follow-up with her primary care physician for further management.    TELEMETRY:     SR 70s        I reviewed the patient's new clinical results.    ALLERGIES: Phenergan [promethazine]    Medication Review:   Current list of medications may not reflect those currently placed in orders that are not signed or are being held.     busPIRone, 10 mg, Oral, BID  carvedilol, 3.125 mg, Oral, BID With Meals  empagliflozin, 10 mg, Oral, Daily  erythromycin, 1 application , Both Eyes, Q6H  ferrous sulfate, 325 mg, Oral, BID  fluticasone, 2 spray, Nasal, Daily  insulin glargine, 30 Units, Subcutaneous, Daily  insulin regular, 10 Units, Subcutaneous, TID AC  insulin regular, 3-14 Units, Subcutaneous, Q6H  levothyroxine, 50 mcg, Oral, Q AM  Lidocaine, 2 patch, Transdermal, Q24H  miconazole, 1  application , Topical, Q12H  pantoprazole, 40 mg, Oral, Q AM  Pharmacy Consult, , Does not apply, Once  pregabalin, 150 mg, Oral, BID  senna-docusate sodium, 2 tablet, Oral, BID  sodium chloride, 10 mL, Intravenous, Q12H  sodium chloride, 10 mL, Intravenous, Q12H  [Held by provider] valsartan, 40 mg, Oral, Q24H      Pharmacy Consult,         [Held by provider] acetaminophen    acetaminophen    benzonatate    senna-docusate sodium **AND** polyethylene glycol **AND** bisacodyl **AND** bisacodyl    Calcium Replacement - Follow Nurse / BPA Driven Protocol    dextrose    dextrose    glucagon (human recombinant)    Magnesium Standard Dose Replacement - Follow Nurse / BPA Driven Protocol    nitroglycerin    ondansetron    Pharmacy Consult    Phosphorus Replacement - Follow Nurse / BPA Driven Protocol    Potassium Replacement - Follow Nurse / BPA Driven Protocol    [COMPLETED] Insert Peripheral IV **AND** sodium chloride    sodium chloride    sodium chloride    sodium chloride    sodium chloride    Assessment      Atrial fibrillation with rapid ventriclar response, status post electrocardioversion in the ED, patient currently in sinus rhythm.  [id number] score of 3.  Mild coronary artery disease on coronary angiography in November 2020  Dilated cardiomyopathy with LV ejection fraction of 31 to 35%.  Mild acute on chronic HFrEF  Acute non-ST elevation myocardial infarction (type I versus more likely type II from tachycardia and heart failure)  Type 2 diabetes mellitus.   Progressive thrombocytopenia possibly heparin-induced.  Acute kidney injury on chronic kidney disease probably hemodynamically mediated with a drop in her blood pressure couple of days ago.    Plan     Since patient has developed progressive thrombocytopenia, IV heparin has been discontinued.  We will add either Refludan or Arixtra for anticoagulation and consider switching to Eliquis when her platelet count stabilized and start to improve.  We will try to  reintroduce GDMT for her cardiomyopathy if her blood pressure remains stable.  We will start with carvedilol at low-dose and then add empagliflozin 10 mg daily.        I have discussed the patients findings and recommendations with patient.    Thank you very much for asking us to be involved in this patient's care.  We will follow along with you.    Electronically signed by KENNY Finn, 11/17/23, 11:58 AM EST.     Electronically signed by Ramon Sahu MD, 11/17/23, 2:33 PM EST.          Please note that portions of this note were completed with a voice recognition program.    Please note that portions of this note were copied and has been reviewed and is accurate as of 11/17/2023 .

## 2023-11-17 NOTE — PROGRESS NOTES
[facility] HOSPITALIST PROGRESS NOTE     Patient Identification:  Name:  Yumiko Purdy  Age:  57 y.o.  Sex:  female  :  1965  MRN:  [id number]  Visit Number:  [id number]  ROOM: [address] [place] [address]     Primary Care Provider:  Masha Davidson APRN     Date of Admission: 2023    Length of stay in inpatient status:  3    Subjective     Chief Compliant:    Chief Complaint   Patient presents with    Rapid Heart Rate     History of Presenting Illness: The patient is happy that she did so well with physical therapy today.  She was able to sit in a chair for 2 hours and was able to walk 8 feet, which she could not even ambulate yesterday.  The patient states she feels stronger and feels overall much better.  She denies chest pain, trouble breathing, nausea, vomiting, and diarrhea.    Objective     Current Hospital Meds:  busPIRone, 10 mg, Oral, BID  [Held by provider] carvedilol, 3.125 mg, Oral, BID With Meals  empagliflozin, 10 mg, Oral, Daily  erythromycin, 1 application , Both Eyes, Q6H  ferrous sulfate, 325 mg, Oral, BID  fluticasone, 2 spray, Nasal, Daily  insulin glargine, 22 Units, Subcutaneous, Daily  insulin regular, 3-14 Units, Subcutaneous, Q6H  insulin regular, 8 Units, Subcutaneous, TID AC  levothyroxine, 50 mcg, Oral, Q AM  Lidocaine, 2 patch, Transdermal, Q24H  miconazole, 1 application , Topical, Q12H  pantoprazole, 40 mg, Oral, Q AM  Pharmacy Consult, , Does not apply, Once  pregabalin, 150 mg, Oral, BID  senna-docusate sodium, 2 tablet, Oral, BID  sodium chloride, 10 mL, Intravenous, Q12H  sodium chloride, 10 mL, Intravenous, Q12H  [Held by provider] valsartan, 40 mg, Oral, Q24H    Pharmacy Consult,       Current Antimicrobial Therapy:  Anti-Infectives (From admission, onward)      Ordered     Dose/Rate Route Frequency Start Stop    23 7666  cefepime 2000 mg IVPB in 100 ml NS (VTB)        Ordering Provider: Juanita Box DO    2,000 mg  over 30 Minutes Intravenous Once  11/12/23 2352 11/13/23 0104    11/12/23 2336  doxycycline (VIBRAMYCIN) 100 mg in sodium chloride 0.9 % 100 mL IVPB-VTB        Ordering Provider: Juanita Box DO    100 mg  over 60 Minutes Intravenous Once 11/12/23 2352 11/13/23 0125          Current Diuretic Therapy:  Diuretics (From admission, onward)      Ordered     Dose/Rate Route Frequency Start Stop    11/14/23 1728  furosemide (LASIX) injection 60 mg        Ordering Provider: Ramon Sahu MD    60 mg Intravenous Once 11/14/23 1815 11/14/23 1804          ----------------------------------------------------------------------------------------------------------------------  Vital Signs:  Temp:  [97.3 °F (36.3 °C)-98.2 °F (36.8 °C)] 97.3 °F (36.3 °C)  Heart Rate:  [65-71] 70  Resp:  [18] 18  BP: ()/(45-54) 108/49  SpO2:  [94 %-98 %] 97 %  on  Flow (L/min):  [0-1] 0;   Device (Oxygen Therapy): room air  Body mass index is 39.06 kg/m².    Wt Readings from Last 3 Encounters:   11/16/23 106 kg (234 lb 11.2 oz)   10/15/23 113 kg (250 lb)   09/29/23 109 kg (240 lb)     Intake & Output (last 3 days)         11/14 0701  11/15 0700 11/15 0701  11/16 0700 11/16 0701 11/17 0700    P.O. 600 480     I.V. (mL/kg) 180.3 (1.7)      Total Intake(mL/kg) 780.3 (7.4) 480 (4.5)     Urine (mL/kg/hr) 3100 (1.2) 2200 (0.9) 1525 (1)    Stool 0      Total Output 3100 2200 1525    Net -2319.7 -1720 -1525           Urine Unmeasured Occurrence 1 x 2 x     Stool Unmeasured Occurrence 4 x 1 x           Diet: Regular/House Diet; Texture: Soft to Chew (NDD 3); Soft to Chew: Whole Meat; Fluid Consistency: Thin (IDDSI 0)  ----------------------------------------------------------------------------------------------------------------------  Physical Exam  Alert and oriented x3.  Regular rate and rhythm.  No murmurs.  Good air movement throughout all lung fields.  No pedal edema.  No respiratory  distress.  ----------------------------------------------------------------------------------------------------------------------  Tele:  NS with heart rates 60-70's.  I have personally reviewed/looked at the telemetry strips.   ----------------------------------------------------------------------------------------------------------------------  LABS:  11/16/2023 0148 D-dimer, Quantitative [[id number]]    Blood    Component Value Units   D-Dimer, Quantitative 0.28 MCGFEU/mL          11/16/2023 0148 Fibrin Split Products [[id number]]   Blood    Component Value   No component results          11/16/2023 0148 Heparin Induced PLT Antibody With / Rfx [[id number]]   Blood    Component Value   No component results          11/16/2023 0148 PERIPHERAL SMEAR, P&C LABS [[id number]]   Blood    Component Value   No component results        CBC and coagulation:  Results from last 7 days   Lab Units 11/16/23  0148 11/15/23  2159 11/15/23  0638 11/13/23  2234 11/13/23  1319 11/13/23  1116 11/13/23  0711 11/13/23  0642 11/13/23  0309 11/12/23  2350 11/12/23  2307   LACTATE mmol/L  --   --   --   --  2.0 2.5*  --  2.8* 2.5* 3.7*  --    CRP mg/dL  --   --   --   --   --   --   --   --   --   --  0.88*   WBC 10*3/mm3 7.38  --  8.88 10.82*  --   --  13.11*  --   --   --  15.57*   HEMOGLOBIN g/dL 8.0*  --  8.5* 7.9*  --   --  8.9*  --   --   --  8.4*   HEMATOCRIT % 27.0*  --  26.9* 26.2*  --   --  29.6*  --   --   --  26.7*   MCV fL 114.4*  --  115.0* 114.9*  --   --  114.3*  --   --   --  113.6*   MCHC g/dL 29.6*  --  31.6 30.2*  --   --  30.1*  --   --   --  31.5   PLATELETS 10*3/mm3 76*  --  77* 99*  --   --  118*  --   --   --  142   INR   --  0.97  --   --   --   --   --   --   --   --  1.20*   D DIMER QUANT MCGFEU/mL 0.28 <0.27  --   --   --   --   --   --   --   --   --      Renal and electrolytes:  Results from last 7 days   Lab Units 11/16/23  0148 11/15/23  0638 11/14/23  1407 11/13/23  2234 11/13/23  1319 11/13/23  0711  11/12/23  2307   SODIUM mmol/L 136 135*  --  139 137 135* 132*   POTASSIUM mmol/L 4.2 3.7 4.2 3.4* 3.8 3.4* 3.5   MAGNESIUM mg/dL 2.3 2.5  --  2.4 2.5  --  2.1   CHLORIDE mmol/L 101 100  --  103 101 98 91*   CO2 mmol/L 25.0 25.7  --  28.7 23.9 26.7 26.7   BUN mg/dL 16 18  --  22* 29* 32* 36*   CREATININE mg/dL 1.28* 1.17*  --  1.09* 1.10* 1.32* 1.46*   CALCIUM mg/dL 8.5* 8.8  --  8.6 8.8 8.8 8.9   PHOSPHORUS mg/dL 3.7 3.9  --  3.5  --   --   --    GLUCOSE mg/dL 312* 169*  --  105* 221* 256* 424*   ANION GAP mmol/L 10.0 9.3  --  7.3 12.1 10.3 14.3     Estimated Creatinine Clearance: 59.3 mL/min (A) (by C-G formula based on SCr of 1.28 mg/dL (H)).    Liver and pancreatic function:  Results from last 7 days   Lab Units 11/13/23  2234 11/13/23  0711 11/12/23  2350 11/12/23  2307   ALBUMIN g/dL 3.2* 3.6  --  3.7   BILIRUBIN mg/dL 2.5* 2.4*  --  2.7*   ALK PHOS U/L 89 103  --  127*   AST (SGOT) U/L 26 20  --  21   ALT (SGPT) U/L 18 18  --  21   AMMONIA umol/L  --   --  15  --      Endocrine function:  Lab Results   Component Value Date    HGBA1C 7.30 (H) 11/13/2023     Point of care bedside glucose levels:  Results from last 7 days   Lab Units 11/16/23  1701 11/16/23  1242 11/16/23  1211 11/16/23  0821 11/16/23  0536 11/15/23  2349 11/15/23  1946 11/15/23  1649 11/15/23  1101 11/15/23  0713 11/15/23  0534 11/15/23  0007 11/14/23  1909 11/14/23  1753   GLUCOSE mg/dL 353* 325* 372* 240* 326* 324* 348* 378* 312* 177* 195* 298* 359* 328*     Glucose levels from the CMP:  Results from last 7 days   Lab Units 11/16/23  0148 11/15/23  0638 11/13/23  2234 11/13/23  1319 11/13/23  0711 11/12/23  2307   GLUCOSE mg/dL 312* 169* 105* 221* 256* 424*     Lab Results   Component Value Date    TSH 4.680 (H) 11/12/2023    FREET4 1.78 (H) 11/13/2023       Cultures:  Microbiology Results (last 10 days)       Procedure Component Value - Date/Time    Blood Culture - Blood, Hand, Right [[id number]]  (Normal) Collected: 11/13/23 0003    Lab  Status: Preliminary result Specimen: Blood from Hand, Right Updated: 11/16/23 0015     Blood Culture No growth at 3 days    Blood Culture - Blood, Arm, Right [[id number]]  (Normal) Collected: 11/12/23 2350    Lab Status: Preliminary result Specimen: Blood from Arm, Right Updated: 11/16/23 0015     Blood Culture No growth at 3 days          I have personally looked at the labs and they are summarized above.  ----------------------------------------------------------------------------------------------------------------------  Detailed radiology reports for the last 24 hours:    Imaging Results (Last 24 Hours)       Procedure Component Value Units Date/Time    US Liver [[id number]] Resulted: 11/16/23 1708     Updated: 11/16/23 1708        The final radiology report is still pending.    Assessment & Plan      -Atrial fibrillation with RVR status post direct current cardioversion in the emergency room, now in sinus rhythm, on Eliquis at home for a [id number] score of 3   -Thrombocytopenia that developed during admission  -Dilated cardiomyopathy/systolic CHF (EF 21-25% in 2020 and now 31-35%) that was present on admission with left ejection fraction of 31-35%, suspect partly tachycardia induced, with an acute exacerbation on admission that has now improved with diuresis  -Hypotension present on admission, suspect due to cardiogenic shock as result of the A-fib with RVR, currently resolved  -Macrocytic anemia that was present on admission  -Suspect type I versus type II non-ST elevation MI present on admission due to the atrial fibrillation with RVR causing cardiogenic shock and hypotension (cardiology thinks this is more likely type II than a type I non-ST elevation MI)  -Status post PVI on October 2022 with recurrent A-fib in the past  -History of nonobstructive coronary artery disease per cardiac catheterization 11/20/2020 with 30 to 40% LAD lesion  -History of insulin dependent diabetes mellitus type 2  -History of  decompensated liver cirrhosis with ascites  -History of chronic macrocytic anemia, with hemoglobin currently at baseline  -History of CKD stage IIIa (creatinine ranges 1.2-1.5 and GFR ranges 42-50)    Peripheral blood smear is still pending; platelet count did not change much today.  We will repeat CBC in the morning.  I will still withhold argatroban or heparin at this time.  Liver ultrasound is still pending; this was ordered to see if there is any underlying cirrhosis that we do not know about that could be causing the thrombocytopenia.  I have ordered iron panel as well as folate level and vitamin B12 level as the patient has macrocytic anemia.  Cardiology has suggested restarting the Coreg first now that the blood pressures are better; therefore, I have held this medication and we will see how her blood pressures trend.  For the diabetes, I have increased the Lantus to 30 units and the mealtime regular insulin to 10 units.  Since the patient is feeling better, she may have started eating more and thus we will continue to monitor her glucose levels closely.  Patient will need to follow-up in [place] to see about receiving an AICD.  She will be given a LifeVest prior to discharge to bridge her until she receives the AICD.   Patient will continue to work with PT and OT.    VTE Prophylaxis:  Mechanical Order History:       None          Pharmalogical Order History:        Ordered     Dose Route Frequency Stop    11/15/23 2251  argatroban 250 mg in dextrose (D5W) 5 % 250 mL (1 mg/mL) infusion  7.56 mL/hr,   Status:  Discontinued         1.2 mcg/kg/min IV Titrated 11/15/23 2257    11/15/23 0817  heparin (porcine) 5000 UNIT/ML injection 4,000 Units         4,000 Units IV Once 11/15/23 0942    11/13/23 1540  heparin 99910 units/250 mL (100 units/mL) in 0.45 % NaCl infusion  12.08 mL/hr,   Status:  Discontinued         11.4 Units/kg/hr (Dosing Weight) IV Titrated 11/15/23 2248    11/13/23 0537  Pharmacy to Dose  Heparin  Status:  Discontinued         -- XX Continuous PRN 11/15/23 4209                The patient is high risk due to the following diagnoses/reasons: A-fib causing an acute heart failure exacerbation that then led to a non-ST elevation MI     Disposition: Patient wants to return home to live with her son and have home health; discharge may occur in the next 2 to 3 days.    Dave Bishop MD  [facility]  11/16/23  21:47 EST

## 2023-11-17 NOTE — CASE MANAGEMENT/SOCIAL WORK
Discharge Planning Assessment   [facility]     Patient Name: Yumiko Purdy  MRN: [id number]  Today's Date: 11/17/2023    Admit Date: 11/12/2023    Plan: SS spoke with pt at bedside.  Pt resides at home at [address] [place] [address] and plans to return home at discharge.  Pt currently utilizes [facility].  [facility] will need new referral with face to face at discharge.  SS will follow and assist with discharge needs.       Discharge Plan       Row Name 11/17/23 1639       Plan    Plan SS spoke with pt at bedside.  Pt resides at home at [address] [place] [address] and plans to return home at discharge.  Pt currently utilizes [facility].  [facility] will need new referral with face to face at discharge.  SS will follow and assist with discharge needs.                  Continued Care and Services - Admitted Since 11/12/2023    Coordination has not been started for this encounter.       Expected Discharge Date and Time       Expected Discharge Date Expected Discharge Time    Nov 18, 2023           Sonya Lindquist, MGW

## 2023-11-17 NOTE — CASE MANAGEMENT/SOCIAL WORK
Continued Stay Note  LATASHA Metz     Patient Name: Yumiko Purdy  MRN: [id number]  Today's Date: 11/17/2023    Admit Date: 11/12/2023    Plan: This CM called [employer] spoke with Cris to check on pt's request for  a new wheelchair cushion; per Cris they would need to verify that pt received the wheelchair through pt's insurance in order to get a new one covered; Cris relayed the cost is $75, and if this CM could send an order over they will see if they can get it covered under pt's insurance.  This CM messsaged attending via secure chat with request.    Merly Ojeda RN     none

## 2023-11-17 NOTE — PLAN OF CARE
Goal Outcome Evaluation:     Patient resting in bed. Patient complained of pain, PRN medication provided, see MAR. Patient complained of nausea, APRN aware, see orders, see MAR. No visible indicators of acute distress noted at this time. Will continue to follow plan of care this shift.    Daily Care Plan Summary: Heart Failure    Diuretic in use (IV or PO): N/A no active order        Daily weight (up or down):  N/A, no lbs lost or gained      Output > Intake (yes/no):Yes      O2 Requirements (current, any change?): room air    Symptoms noted with Activity (Respiratory Tolerance, functional state):     SOA at times and weakness      Anticipated Discharge Plans:     Home

## 2023-11-17 NOTE — PLAN OF CARE
Goal Outcome Evaluation:-  Progress: no change   Pt resting in bed, watching television. Pt has tolerated all interventions. No complaints/concerns. No acute distress noted. Will continue to follow the plan of care.         Daily Care Plan Summary: Heart Failure    Diuretic in use (IV or PO):   PO N/A; Not on diuretic        Daily weight (up or down):    loss: 3.2 oz      Output > Intake (yes/no):Yes      O2 Requirements (current, any change?): room air      Symptoms noted with Activity (Respiratory Tolerance, functional state):     SoA on exertion      Anticipated Discharge Plans:     Be able to follow HF regimen correctly and follow medications as ordered.

## 2023-11-18 LAB
ALBUMIN SERPL-MCNC: 3.3 G/DL (ref 3.5–5.2)
ALBUMIN/GLOB SERPL: 1.1 G/DL
ALP SERPL-CCNC: 77 U/L (ref 39–117)
ALT SERPL W P-5'-P-CCNC: 13 U/L (ref 1–33)
ANION GAP SERPL CALCULATED.3IONS-SCNC: 8.8 MMOL/L (ref 5–15)
ANION GAP SERPL CALCULATED.3IONS-SCNC: 9.9 MMOL/L (ref 5–15)
ANISOCYTOSIS BLD QL: NORMAL
APTT PPP: 38.6 SECONDS (ref 26.5–34.5)
AST SERPL-CCNC: 20 U/L (ref 1–32)
ATMOSPHERIC PRESS: 725 MMHG
BACTERIA SPEC AEROBE CULT: NORMAL
BACTERIA SPEC AEROBE CULT: NORMAL
BASE EXCESS BLDV CALC-SCNC: 3.8 MMOL/L (ref 0–2)
BASOPHILS # BLD AUTO: 0.09 10*3/MM3 (ref 0–0.2)
BASOPHILS NFR BLD AUTO: 1 % (ref 0–1.5)
BDY SITE: ABNORMAL
BILIRUB SERPL-MCNC: 2.3 MG/DL (ref 0–1.2)
BUN SERPL-MCNC: 18 MG/DL (ref 6–20)
BUN SERPL-MCNC: 24 MG/DL (ref 6–20)
BUN/CREAT SERPL: 14.4 (ref 7–25)
BUN/CREAT SERPL: 18.8 (ref 7–25)
CALCIUM SPEC-SCNC: 8.7 MG/DL (ref 8.6–10.5)
CALCIUM SPEC-SCNC: 8.9 MG/DL (ref 8.6–10.5)
CHLORIDE SERPL-SCNC: 100 MMOL/L (ref 98–107)
CHLORIDE SERPL-SCNC: 103 MMOL/L (ref 98–107)
CO2 BLDA-SCNC: 30.8 MMOL/L (ref 22–33)
CO2 SERPL-SCNC: 25.1 MMOL/L (ref 22–29)
CO2 SERPL-SCNC: 25.2 MMOL/L (ref 22–29)
COHGB MFR BLD: 4.6 % (ref 0–5)
CREAT SERPL-MCNC: 1.25 MG/DL (ref 0.57–1)
CREAT SERPL-MCNC: 1.28 MG/DL (ref 0.57–1)
D-LACTATE SERPL-SCNC: 1.8 MMOL/L (ref 0.5–2)
DEPRECATED RDW RBC AUTO: 92 FL (ref 37–54)
EGFRCR SERPLBLD CKD-EPI 2021: 49.3 ML/MIN/1.73
EGFRCR SERPLBLD CKD-EPI 2021: 50.7 ML/MIN/1.73
EOSINOPHIL # BLD AUTO: 0.15 10*3/MM3 (ref 0–0.4)
EOSINOPHIL NFR BLD AUTO: 1.7 % (ref 0.3–6.2)
ERYTHROCYTE [DISTWIDTH] IN BLOOD BY AUTOMATED COUNT: 23.6 % (ref 12.3–15.4)
GEN 5 2HR TROPONIN T REFLEX: 150 NG/L
GLOBULIN UR ELPH-MCNC: 2.9 GM/DL
GLUCOSE BLDC GLUCOMTR-MCNC: 225 MG/DL (ref 70–130)
GLUCOSE BLDC GLUCOMTR-MCNC: 243 MG/DL (ref 70–130)
GLUCOSE BLDC GLUCOMTR-MCNC: 263 MG/DL (ref 70–130)
GLUCOSE BLDC GLUCOMTR-MCNC: 273 MG/DL (ref 70–130)
GLUCOSE BLDC GLUCOMTR-MCNC: 301 MG/DL (ref 70–130)
GLUCOSE BLDC GLUCOMTR-MCNC: 328 MG/DL (ref 70–130)
GLUCOSE BLDC GLUCOMTR-MCNC: 576 MG/DL (ref 70–130)
GLUCOSE SERPL-MCNC: 183 MG/DL (ref 65–99)
GLUCOSE SERPL-MCNC: 351 MG/DL (ref 65–99)
HCO3 BLDV-SCNC: 29.3 MMOL/L (ref 22–28)
HCT VFR BLD AUTO: 27.6 % (ref 34–46.6)
HGB BLD-MCNC: 8 G/DL (ref 12–15.9)
HGB BLDA-MCNC: 8.8 G/DL (ref 13.5–17.5)
HYPOCHROMIA BLD QL: NORMAL
IMM GRANULOCYTES # BLD AUTO: 0.07 10*3/MM3 (ref 0–0.05)
IMM GRANULOCYTES NFR BLD AUTO: 0.8 % (ref 0–0.5)
INHALED O2 CONCENTRATION: 21 %
LYMPHOCYTES # BLD AUTO: 2.01 10*3/MM3 (ref 0.7–3.1)
LYMPHOCYTES NFR BLD AUTO: 22.2 % (ref 19.6–45.3)
Lab: ABNORMAL
MACROCYTES BLD QL SMEAR: NORMAL
MAGNESIUM SERPL-MCNC: 2.2 MG/DL (ref 1.6–2.6)
MAGNESIUM SERPL-MCNC: 2.3 MG/DL (ref 1.6–2.6)
MCH RBC QN AUTO: 33.1 PG (ref 26.6–33)
MCHC RBC AUTO-ENTMCNC: 29 G/DL (ref 31.5–35.7)
MCV RBC AUTO: 114 FL (ref 79–97)
METHGB BLD QL: 1 % (ref 0–3)
MODALITY: ABNORMAL
MONOCYTES # BLD AUTO: 0.35 10*3/MM3 (ref 0.1–0.9)
MONOCYTES NFR BLD AUTO: 3.9 % (ref 5–12)
NEUTROPHILS NFR BLD AUTO: 6.39 10*3/MM3 (ref 1.7–7)
NEUTROPHILS NFR BLD AUTO: 70.4 % (ref 42.7–76)
NOTIFIED BY: ABNORMAL
NOTIFIED WHO: ABNORMAL
NRBC BLD AUTO-RTO: 0.4 /100 WBC (ref 0–0.2)
OXYHGB MFR BLDV: 19.1 % (ref 45–75)
PCO2 BLDV: 48.5 MM HG (ref 41–51)
PH BLDV: 7.39 PH UNITS (ref 7.32–7.42)
PHOSPHATE SERPL-MCNC: 3.5 MG/DL (ref 2.5–4.5)
PLAT MORPH BLD: NORMAL
PLATELET # BLD AUTO: 82 10*3/MM3 (ref 140–450)
PMV BLD AUTO: 12.5 FL (ref 6–12)
PO2 BLDV: 16.4 MM HG (ref 27–53)
POLYCHROMASIA BLD QL SMEAR: NORMAL
POTASSIUM SERPL-SCNC: 4.8 MMOL/L (ref 3.5–5.2)
POTASSIUM SERPL-SCNC: 5 MMOL/L (ref 3.5–5.2)
PROT SERPL-MCNC: 6.2 G/DL (ref 6–8.5)
RBC # BLD AUTO: 2.42 10*6/MM3 (ref 3.77–5.28)
SAO2 % BLDCOV: 20.2 % (ref 45–75)
SODIUM SERPL-SCNC: 135 MMOL/L (ref 136–145)
SODIUM SERPL-SCNC: 137 MMOL/L (ref 136–145)
STOMATOCYTES BLD QL SMEAR: NORMAL
TROPONIN T DELTA: -2 NG/L
TROPONIN T SERPL HS-MCNC: 152 NG/L
WBC NRBC COR # BLD AUTO: 9.06 10*3/MM3 (ref 3.4–10.8)

## 2023-11-18 PROCEDURE — 93005 ELECTROCARDIOGRAM TRACING: CPT | Performed by: INTERNAL MEDICINE

## 2023-11-18 PROCEDURE — 83735 ASSAY OF MAGNESIUM: CPT | Performed by: INTERNAL MEDICINE

## 2023-11-18 PROCEDURE — 82820 HEMOGLOBIN-OXYGEN AFFINITY: CPT

## 2023-11-18 PROCEDURE — 84484 ASSAY OF TROPONIN QUANT: CPT | Performed by: INTERNAL MEDICINE

## 2023-11-18 PROCEDURE — 80053 COMPREHEN METABOLIC PANEL: CPT | Performed by: INTERNAL MEDICINE

## 2023-11-18 PROCEDURE — 85025 COMPLETE CBC W/AUTO DIFF WBC: CPT | Performed by: INTERNAL MEDICINE

## 2023-11-18 PROCEDURE — 85730 THROMBOPLASTIN TIME PARTIAL: CPT | Performed by: INTERNAL MEDICINE

## 2023-11-18 PROCEDURE — 82948 REAGENT STRIP/BLOOD GLUCOSE: CPT

## 2023-11-18 PROCEDURE — 25010000002 ONDANSETRON PER 1 MG

## 2023-11-18 PROCEDURE — 99232 SBSQ HOSP IP/OBS MODERATE 35: CPT | Performed by: INTERNAL MEDICINE

## 2023-11-18 PROCEDURE — 63710000001 INSULIN GLARGINE PER 5 UNITS: Performed by: INTERNAL MEDICINE

## 2023-11-18 PROCEDURE — 25010000002 FONDAPARINUX PER 0.5 MG: Performed by: INTERNAL MEDICINE

## 2023-11-18 PROCEDURE — 82805 BLOOD GASES W/O2 SATURATION: CPT

## 2023-11-18 PROCEDURE — 85007 BL SMEAR W/DIFF WBC COUNT: CPT | Performed by: INTERNAL MEDICINE

## 2023-11-18 PROCEDURE — 84100 ASSAY OF PHOSPHORUS: CPT | Performed by: INTERNAL MEDICINE

## 2023-11-18 PROCEDURE — 83605 ASSAY OF LACTIC ACID: CPT | Performed by: INTERNAL MEDICINE

## 2023-11-18 PROCEDURE — 63710000001 INSULIN LISPRO (HUMAN) PER 5 UNITS: Performed by: INTERNAL MEDICINE

## 2023-11-18 PROCEDURE — 63710000001 INSULIN REGULAR HUMAN PER 5 UNITS: Performed by: INTERNAL MEDICINE

## 2023-11-18 RX ORDER — ASPIRIN 81 MG/1
324 TABLET, CHEWABLE ORAL ONCE
Status: COMPLETED | OUTPATIENT
Start: 2023-11-18 | End: 2023-11-18

## 2023-11-18 RX ORDER — INSULIN LISPRO 100 [IU]/ML
14 INJECTION, SOLUTION INTRAVENOUS; SUBCUTANEOUS ONCE
Status: COMPLETED | OUTPATIENT
Start: 2023-11-18 | End: 2023-11-18

## 2023-11-18 RX ORDER — INSULIN LISPRO 100 [IU]/ML
10 INJECTION, SOLUTION INTRAVENOUS; SUBCUTANEOUS
Status: DISCONTINUED | OUTPATIENT
Start: 2023-11-18 | End: 2023-11-19

## 2023-11-18 RX ORDER — INSULIN LISPRO 100 [IU]/ML
3-14 INJECTION, SOLUTION INTRAVENOUS; SUBCUTANEOUS
Status: DISCONTINUED | OUTPATIENT
Start: 2023-11-18 | End: 2023-11-21 | Stop reason: HOSPADM

## 2023-11-18 RX ADMIN — INSULIN GLARGINE 30 UNITS: 100 INJECTION, SOLUTION SUBCUTANEOUS at 09:14

## 2023-11-18 RX ADMIN — INSULIN HUMAN 5 UNITS: 100 INJECTION, SOLUTION PARENTERAL at 00:04

## 2023-11-18 RX ADMIN — MICONAZOLE NITRATE 1 APPLICATION: 2 POWDER TOPICAL at 20:34

## 2023-11-18 RX ADMIN — PREGABALIN 150 MG: 75 CAPSULE ORAL at 09:13

## 2023-11-18 RX ADMIN — Medication 10 ML: at 09:15

## 2023-11-18 RX ADMIN — FERROUS SULFATE TAB 325 MG (65 MG ELEMENTAL FE) 325 MG: 325 (65 FE) TAB at 20:33

## 2023-11-18 RX ADMIN — INSULIN LISPRO 14 UNITS: 100 INJECTION, SOLUTION INTRAVENOUS; SUBCUTANEOUS at 13:38

## 2023-11-18 RX ADMIN — INSULIN HUMAN 8 UNITS: 100 INJECTION, SOLUTION PARENTERAL at 05:31

## 2023-11-18 RX ADMIN — PANTOPRAZOLE SODIUM 40 MG: 40 TABLET, DELAYED RELEASE ORAL at 05:31

## 2023-11-18 RX ADMIN — INSULIN GLARGINE 30 UNITS: 100 INJECTION, SOLUTION SUBCUTANEOUS at 20:33

## 2023-11-18 RX ADMIN — LEVOTHYROXINE SODIUM 50 MCG: 50 TABLET ORAL at 05:31

## 2023-11-18 RX ADMIN — ACETAMINOPHEN 500 MG: 500 TABLET ORAL at 20:33

## 2023-11-18 RX ADMIN — FLUTICASONE PROPIONATE 2 SPRAY: 50 SPRAY, METERED NASAL at 09:14

## 2023-11-18 RX ADMIN — FERROUS SULFATE TAB 325 MG (65 MG ELEMENTAL FE) 325 MG: 325 (65 FE) TAB at 09:14

## 2023-11-18 RX ADMIN — FONDAPARINUX SODIUM 5 MG: 10 INJECTION, SOLUTION SUBCUTANEOUS at 09:13

## 2023-11-18 RX ADMIN — DOCUSATE SODIUM 50 MG AND SENNOSIDES 8.6 MG 2 TABLET: 8.6; 5 TABLET, FILM COATED ORAL at 09:14

## 2023-11-18 RX ADMIN — PREGABALIN 150 MG: 75 CAPSULE ORAL at 20:33

## 2023-11-18 RX ADMIN — EMPAGLIFLOZIN 10 MG: 10 TABLET, FILM COATED ORAL at 09:14

## 2023-11-18 RX ADMIN — LIDOCAINE 2 PATCH: 4 PATCH TOPICAL at 09:14

## 2023-11-18 RX ADMIN — BUSPIRONE HYDROCHLORIDE 10 MG: 10 TABLET ORAL at 20:33

## 2023-11-18 RX ADMIN — INSULIN LISPRO 10 UNITS: 100 INJECTION, SOLUTION INTRAVENOUS; SUBCUTANEOUS at 20:34

## 2023-11-18 RX ADMIN — INSULIN LISPRO 10 UNITS: 100 INJECTION, SOLUTION INTRAVENOUS; SUBCUTANEOUS at 17:37

## 2023-11-18 RX ADMIN — ONDANSETRON 4 MG: 2 INJECTION INTRAMUSCULAR; INTRAVENOUS at 05:20

## 2023-11-18 RX ADMIN — Medication 10 ML: at 20:34

## 2023-11-18 RX ADMIN — INSULIN LISPRO 10 UNITS: 100 INJECTION, SOLUTION INTRAVENOUS; SUBCUTANEOUS at 17:36

## 2023-11-18 RX ADMIN — ASPIRIN 324 MG: 81 TABLET, CHEWABLE ORAL at 21:36

## 2023-11-18 RX ADMIN — INSULIN GLARGINE 5 UNITS: 100 INJECTION, SOLUTION SUBCUTANEOUS at 21:36

## 2023-11-18 RX ADMIN — INSULIN HUMAN 10 UNITS: 100 INJECTION, SOLUTION PARENTERAL at 09:14

## 2023-11-18 RX ADMIN — MICONAZOLE NITRATE 1 APPLICATION: 2 POWDER TOPICAL at 09:14

## 2023-11-18 RX ADMIN — BUSPIRONE HYDROCHLORIDE 10 MG: 10 TABLET ORAL at 09:14

## 2023-11-18 NOTE — PROGRESS NOTES
Psychiatric HOSPITALIST PROGRESS NOTE     Patient Identification:  Name:  Yumiko Purdy  Age:  57 y.o.  Sex:  female  :  1965  MRN:  [id number]  Visit Number:  [id number]  ROOM: [address] [place] [address]     Primary Care Provider:  Masha Davidson APRN     Date of Admission: 2023    Length of stay in inpatient status:  4    Subjective     Chief Compliant:    Chief Complaint   Patient presents with    Rapid Heart Rate     History of Presenting Illness:  The patient was alone in her room when I saw her.  She states that she is breathing okay and does not have chest pain.  She states that she feels even stronger today than she did yesterday but she has not been able to perform physical therapy yet because they have not been around.    Objective     Current Hospital Meds:  busPIRone, 10 mg, Oral, BID  carvedilol, 3.125 mg, Oral, BID With Meals  empagliflozin, 10 mg, Oral, Daily  erythromycin, 1 application , Both Eyes, Q6H  ferrous sulfate, 325 mg, Oral, BID  fluticasone, 2 spray, Nasal, Daily  fondaparinux, 5 mg, Subcutaneous, Q24H  insulin glargine, 30 Units, Subcutaneous, Daily  insulin regular, 10 Units, Subcutaneous, TID AC  insulin regular, 3-14 Units, Subcutaneous, Q6H  levothyroxine, 50 mcg, Oral, Q AM  Lidocaine, 2 patch, Transdermal, Q24H  miconazole, 1 application , Topical, Q12H  pantoprazole, 40 mg, Oral, Q AM  Pharmacy Consult, , Does not apply, Once  pregabalin, 150 mg, Oral, BID  senna-docusate sodium, 2 tablet, Oral, BID  sodium chloride, 10 mL, Intravenous, Q12H  sodium chloride, 10 mL, Intravenous, Q12H  [Held by provider] valsartan, 40 mg, Oral, Q24H    Pharmacy Consult,       Current Antimicrobial Therapy:  Anti-Infectives (From admission, onward)      Ordered     Dose/Rate Route Frequency Start Stop    23 2336  cefepime 2000 mg IVPB in 100 ml NS (VTB)        Ordering Provider: Juanita Box DO    2,000 mg  over 30 Minutes Intravenous Once 23 5013 23 0104     11/12/23 2336  doxycycline (VIBRAMYCIN) 100 mg in sodium chloride 0.9 % 100 mL IVPB-VTB        Ordering Provider: Juanita Box DO    100 mg  over 60 Minutes Intravenous Once 11/12/23 2352 11/13/23 0125          Current Diuretic Therapy:  Diuretics (From admission, onward)      Ordered     Dose/Rate Route Frequency Start Stop    11/14/23 1728  furosemide (LASIX) injection 60 mg        Ordering Provider: Ramon Sahu MD    60 mg Intravenous Once 11/14/23 1815 11/14/23 1804          ----------------------------------------------------------------------------------------------------------------------  Vital Signs:  Temp:  [97.5 °F (36.4 °C)-98.7 °F (37.1 °C)] 98.7 °F (37.1 °C)  Heart Rate:  [66-75] 71  Resp:  [18] 18  BP: (103-108)/(41-52) 105/52  SpO2:  [94 %-97 %] 97 %  on  Flow (L/min):  [1] 1;   Device (Oxygen Therapy): nasal cannula  Body mass index is 39.02 kg/m².    Wt Readings from Last 3 Encounters:   11/17/23 106 kg (234 lb 8 oz)   10/15/23 113 kg (250 lb)   09/29/23 109 kg (240 lb)     Intake & Output (last 3 days)         11/15 0701 11/16 0700 11/16 0701  11/17 0700 11/17 0701 11/18 0700    P.O. 480 660     I.V. (mL/kg)       Total Intake(mL/kg) 480 (4.5) 660 (6.2)     Urine (mL/kg/hr) 2200 (0.9) 2325 (0.9) 1000 (0.7)    Stool       Total Output 2200 2325 1000    Net -7557 -6667 -1000           Urine Unmeasured Occurrence 2 x 1 x 1 x    Stool Unmeasured Occurrence 1 x            Diet: Regular/House Diet; Texture: Soft to Chew (NDD 3); Soft to Chew: Whole Meat; Fluid Consistency: Thin (IDDSI 0)  ----------------------------------------------------------------------------------------------------------------------  Physical Exam; her exam is the same as yesterday.  Alert and oriented x3.  Regular rate and rhythm.  No murmurs.  Good air movement throughout all lung fields.  No pedal edema.  No respiratory distress.    ----------------------------------------------------------------------------------------------------------------------  Tele:  NS with heart rates 60-70's.  I have personally reviewed/looked at the telemetry strips.   ----------------------------------------------------------------------------------------------------------------------  LABS:    PERIPHERAL SMEAR 11/16/2023:  Macrocytic anemia.  No increase in schistocytes population.  Mild thrombocytopenia with no platelet clumping.  Normal WBC count and differential. Granulocytes show normal morphology and  no circulating blasts identified.       11/17/2023 0351 11/17/2023 1241 Vitamin B12 [[id number]]    (Abnormal)   Blood    Final result Component Value Units   Vitamin B-12 1,460 High  pg/mL          11/17/2023 0351 11/17/2023 1241 Folate [[id number]]    Blood    Final result Component Value Units   Folate 16.70 ng/mL          11/17/2023 0351 11/17/2023 0446 Iron Profile [[id number]]   (Abnormal)   Blood    Final result Component Value Units   Iron 73 mcg/dL   Iron Saturation (TSAT) 33 %   Transferrin 149 Low  mg/dL   TIBC 222 Low  mcg/dL          11/17/2023 0351 11/17/2023 0513 Ferritin [[id number]]    (Abnormal)   Blood    Final result Component Value Units   Ferritin 2,723.00 High  ng/mL          11/17/2023 0351 11/17/2023 0434 Reticulocytes [[id number]]   (Abnormal)   Blood    Final result Component Value Units   Reticulocyte % 17.67 High  %   Reticulocyte Absolute 0.5001 High  10*6/mm3        11/16/2023 0148 11/17/2023 1509 Fibrin Split Products [[id number]]    Blood    Final result Component Value Units   FDP <5 ug/mL              11/15/2023 2159 11/15/2023 2309 Fibrinogen [[id number]]   Blood    Final result Component Value Units   Fibrinogen 309 mg/dL          CBC and coagulation:  Results from last 7 days   Lab Units 11/17/23  0351 11/16/23  0148 11/15/23  2159 11/15/23  0638 11/13/23  2234 11/13/23  1319 11/13/23  1116 11/13/23  0711 11/13/23  0642  11/13/23  0309 11/12/23  2350 11/12/23  2307   LACTATE mmol/L  --   --   --   --   --  2.0 2.5*  --  2.8* 2.5* 3.7*  --    CRP mg/dL  --   --   --   --   --   --   --   --   --   --   --  0.88*   WBC 10*3/mm3 9.89 7.38  --  8.88 10.82*  --   --  13.11*  --   --   --  15.57*   HEMOGLOBIN g/dL 8.7* 8.0*  --  8.5* 7.9*  --   --  8.9*  --   --   --  8.4*   HEMATOCRIT % 30.9* 27.0*  --  26.9* 26.2*  --   --  29.6*  --   --   --  26.7*   MCV fL 109.2* 114.4*  --  115.0* 114.9*  --   --  114.3*  --   --   --  113.6*   MCHC g/dL 28.2* 29.6*  --  31.6 30.2*  --   --  30.1*  --   --   --  31.5   PLATELETS 10*3/mm3 72* 76*  --  77* 99*  --   --  118*  --   --   --  142   INR   --   --  0.97  --   --   --   --   --   --   --   --  1.20*   D DIMER QUANT MCGFEU/mL  --  0.28 <0.27  --   --   --   --   --   --   --   --   --      Renal and electrolytes:  Results from last 7 days   Lab Units 11/17/23  0351 11/16/23  0148 11/15/23  0638 11/14/23  1407 11/13/23  2234 11/13/23  1319 11/13/23  0711 11/12/23  2307   SODIUM mmol/L 137 136 135*  --  139 137 135* 132*   POTASSIUM mmol/L 4.6 4.2 3.7 4.2 3.4* 3.8 3.4* 3.5   MAGNESIUM mg/dL 2.2 2.3 2.5  --  2.4 2.5  --  2.1   CHLORIDE mmol/L 103 101 100  --  103 101 98 91*   CO2 mmol/L 23.0 25.0 25.7  --  28.7 23.9 26.7 26.7   BUN mg/dL 16 16 18  --  22* 29* 32* 36*   CREATININE mg/dL 1.22* 1.28* 1.17*  --  1.09* 1.10* 1.32* 1.46*   CALCIUM mg/dL 8.8 8.5* 8.8  --  8.6 8.8 8.8 8.9   PHOSPHORUS mg/dL 3.6 3.7 3.9  --  3.5  --   --   --    GLUCOSE mg/dL 245* 312* 169*  --  105* 221* 256* 424*   ANION GAP mmol/L 11.0 10.0 9.3  --  7.3 12.1 10.3 14.3     Estimated Creatinine Clearance: 62.3 mL/min (A) (by C-G formula based on SCr of 1.22 mg/dL (H)).    Liver and pancreatic function:  Results from last 7 days   Lab Units 11/17/23  0351 11/13/23  2234 11/13/23  0711 11/12/23  2350 11/12/23  2307   ALBUMIN g/dL 3.4* 3.2* 3.6  --  3.7   BILIRUBIN mg/dL 1.9* 2.5* 2.4*  --  2.7*   ALK PHOS U/L 87 89 103   --  127*   AST (SGOT) U/L 18 26 20  --  21   ALT (SGPT) U/L 15 18 18  --  21   AMMONIA umol/L  --   --   --  15  --        Endocrine function:  Lab Results   Component Value Date    HGBA1C 7.30 (H) 11/13/2023     Point of care bedside glucose levels:  Results from last 7 days   Lab Units 11/17/23  1948 11/17/23  1632 11/17/23  1104 11/17/23  0714 11/17/23  0659 11/17/23  0531 11/16/23  2336 11/16/23  1701 11/16/23  1242 11/16/23  1211 11/16/23  0821 11/16/23  0536 11/15/23  2349 11/15/23  1946   GLUCOSE mg/dL 431* 359* 461* 274* 279* 231* 236* 353* 325* 372* 240* 326* 324* 348*     Glucose levels from the CMP:  Results from last 7 days   Lab Units 11/17/23  0351 11/16/23  0148 11/15/23  0638 11/13/23  2234 11/13/23  1319 11/13/23  0711 11/12/23  2307   GLUCOSE mg/dL 245* 312* 169* 105* 221* 256* 424*     Cardiac:  Results from last 7 days   Lab Units 11/13/23  1319 11/13/23  0124 11/12/23  2307   CK TOTAL U/L  --   --  41   HSTROP T ng/L 84* 84* 93*   PROBNP pg/mL  --   --  2,746.0*       Cultures:  Microbiology Results (last 10 days)       Procedure Component Value - Date/Time    Blood Culture - Blood, Hand, Right [[id number]]  (Normal) Collected: 11/13/23 0003    Lab Status: Preliminary result Specimen: Blood from Hand, Right Updated: 11/17/23 0015     Blood Culture No growth at 4 days    Blood Culture - Blood, Arm, Right [[id number]]  (Normal) Collected: 11/12/23 2350    Lab Status: Preliminary result Specimen: Blood from Arm, Right Updated: 11/17/23 0015     Blood Culture No growth at 4 days          I have personally looked at the labs and they are summarized above.  ----------------------------------------------------------------------------------------------------------------------  Detailed radiology reports for the last 24 hours:    Imaging Results (Last 24 Hours)       Procedure Component Value Units Date/Time    US Liver [[id number]] Collected: 11/17/23 0802     Updated: 11/17/23 0845    Narrative:       EXAMINATION: US LIVER-      CLINICAL INDICATION: Obesity, thrombocytopenia, need to evaluate for  cirrhosis; A41.9-Sepsis, unspecified organism; R65.20-Severe sepsis  without septic shock; K74.69-Other cirrhosis of liver;  I48.91-Unspecified atrial fibrillation; D64.9-Anemia, unspecified;  I50.9-Heart failure, unspecified; I47.29-Other ventricular tachycardia;  R94.31-Abnormal electrocardiogram (ECG) (EKG); I50.43-Acute on chronic  combined systolic         COMPARISON: None available.     FINDINGS:  Sonographic imaging of the liver.     Liver is enlarged at almost 20 cm.     Homogeneous.     No intrahepatic ductal dilatation or focal hepatic mass.     Hepatic flow is hepatopetal.       Impression:      Slight enlargement of the liver.        This report was finalized on 11/17/2023 8:42 AM by Dr. Angelo Deleon MD.             I have personally looked at the radiology images and I have read the available final report.    Assessment & Plan      -Atrial fibrillation with RVR status post direct current cardioversion in the emergency room, now in sinus rhythm, on Eliquis at home for a [id number] score of 3   -Thrombocytopenia that developed during admission  -Dilated cardiomyopathy/systolic CHF (EF 21-25% in 2020 and now 31-35%) that was present on admission with left ejection fraction of 31-35%, suspect partly tachycardia induced, with an acute exacerbation on admission that has now improved with diuresis  -Hypotension present on admission, suspect due to cardiogenic shock as result of the A-fib with RVR, currently resolved  -Prolonged QTc that developed after admission, with normal potassium and magnesium levels  -Macrocytic anemia that was present on admission, with normal vitamin B12 and folate levels, suspect anemia of chronic disease +/- liver disease  -Suspect type I versus type II non-ST elevation MI present on admission due to the atrial fibrillation with RVR causing cardiogenic shock and hypotension  (cardiology thinks this is more likely type II than a type I non-ST elevation MI)  -Status post PVI on October 2022 with recurrent A-fib in the past  -History of nonobstructive coronary artery disease per cardiac catheterization 11/20/2020 with 30 to 40% LAD lesion  -History of insulin dependent diabetes mellitus type 2  -History of decompensated liver cirrhosis with ascites  -History of chronic macrocytic anemia, with hemoglobin currently at baseline  -History of CKD stage IIIa (creatinine ranges 1.2-1.5 and GFR ranges 42-50)    I have been trying to control the patient's glucose levels with her home insulin of Lantus and regular insulin.  However, this has been a difficult task.  She is normally on 58 units of Lantus twice a day and thus I will double the current dose of Lantus to 30 units twice a day and give her a dose now; until I know how she responds to this, I will keep the mealtime regular insulin and the sliding scale regular insulin the same.  I will ask pharmacy to see what other choices of insulin are available for this patient based on her insurance.  Dr. Sahu has restarted Coreg and thus we will continue to monitor her heart rates and blood pressures closely, as prior attempts at goal-directed medical therapy were not successful.  We will avoid QT prolonging agents and continue to monitor the electrolytes closely. In order to lessen the risk of worsening QTc, the goal potassium level is 4-4.5 and goal magnesium level is 2-2.2.   Not sure what is causing the macrocytic anemia but it does not appear at this time to be a vitamin deficiency.  This could be anemia of chronic disease mixed with another issue so that the anemia is more macrocytic than microcytic.  There is no obvious bleeding anywhere.  Arixtra was started as we were not sure if the patient had heparin-induced thrombocytopenia; the heparin-induced antibody appears to be negative but until I see the platelet levels increasing again I we will  continue with holding heparin.    VTE Prophylaxis:  Mechanical Order History:       None          Pharmalogical Order History:        Ordered     Dose Route Frequency Stop    11/17/23 1434  fondaparinux (ARIXTRA) injection 5 mg         5 mg SC Every 24 Hours Scheduled --    11/15/23 2251  argatroban 250 mg in dextrose (D5W) 5 % 250 mL (1 mg/mL) infusion  7.56 mL/hr,   Status:  Discontinued         1.2 mcg/kg/min IV Titrated 11/15/23 2257    11/15/23 0817  heparin (porcine) 5000 UNIT/ML injection 4,000 Units         4,000 Units IV Once 11/15/23 0942    11/13/23 1540  heparin 78625 units/250 mL (100 units/mL) in 0.45 % NaCl infusion  12.08 mL/hr,   Status:  Discontinued         11.4 Units/kg/hr (Dosing Weight) IV Titrated 11/15/23 2248    11/13/23 0537  Pharmacy to Dose Heparin  Status:  Discontinued         -- XX Continuous PRN 11/15/23 2254               Disposition: Patient would like to be discharged home with her son but timing is unknown at this time as the patient states she is not strong enough to go home yet.    Dave Bishop MD  [facility] Hospitalist  11/17/23  21:27 EST

## 2023-11-18 NOTE — PROGRESS NOTES
Patient Identification:  Name:  Yumiko Purdy  Age:  57 y.o.  Sex:  female  :  1965  MRN:  [id number]  Visit Number:  [id number]  Primary care provider:  Masha Davidson APRN    Reason for follow-up:  Paroxysmal atrial fibrillation and heart failure with reduced EF    Subjective     She has been admitted with A-fib with RVR and had electrical cardioversion in ED and remained in sinus rhythm since then.  She is on carvedilol and anticoagulated on Arixtra as she developed thrombocytopenia following IV heparin infusion.  She denied history of palpitations or chest discomfort.  Telemetry showed sinus rhythm with a ventricular rate  bpm.  BP is borderline.      Medication Review:   busPIRone, 10 mg, Oral, BID  carvedilol, 3.125 mg, Oral, BID With Meals  empagliflozin, 10 mg, Oral, Daily  erythromycin, 1 application , Both Eyes, Q6H  ferrous sulfate, 325 mg, Oral, BID  fluticasone, 2 spray, Nasal, Daily  fondaparinux, 5 mg, Subcutaneous, Q24H  insulin glargine, 30 Units, Subcutaneous, Q12H  insulin lispro, 10 Units, Subcutaneous, TID With Meals  insulin lispro, 3-14 Units, Subcutaneous, 4x Daily AC & at Bedtime  levothyroxine, 50 mcg, Oral, Q AM  Lidocaine, 2 patch, Transdermal, Q24H  miconazole, 1 application , Topical, Q12H  pantoprazole, 40 mg, Oral, Q AM  Pharmacy Consult, , Does not apply, Once  pregabalin, 150 mg, Oral, BID  senna-docusate sodium, 2 tablet, Oral, BID  sodium chloride, 10 mL, Intravenous, Q12H  sodium chloride, 10 mL, Intravenous, Q12H  [Held by provider] valsartan, 40 mg, Oral, Q24H      Pharmacy Consult,            Vital Sign Min/Max for last 24 hours  Temp  Min: 98 °F (36.7 °C)  Max: 99.4 °F (37.4 °C)   BP  Min: 96/58  Max: 114/51   Pulse  Min: 69  Max: 85   Resp  Min: 18  Max: 18   SpO2  Min: 93 %  Max: 97 %   No data recorded   Weight  Min: 108 kg (237 lb 11.2 oz)  Max: 108 kg (237 lb 11.2 oz)     Flowsheet Rows      Flowsheet Row First Filed Value   Admission Height  "165.1 cm (65\") Documented at 11/12/2023 2243   Admission Weight 113 kg (250 lb) Documented at 11/12/2023 2243            Objective       Physical Exam:         General Appearance:  HEENT:   Neck:     Alert, cooperative, in no acute distress  Grossly normal   No JVD    Lungs:   Clear to auscultation,respirations regular, No added sounds     Heart:  Regular rhythm and normal rate, normal S1 and S2, no        murmur, no gallop, no rub, no click    Chest Wall:  No abnormalities observed    Abdomen   Soft, nontender, no masses, no organomegaly and bowel sounds are heard.     Extremities: No pedal edema.    Pulses: Pulses palpable and equal bilaterally    Neurologic: No focal deficits         Telemetry:  Sinus rhythm.  Heart rate  bpm.      Labs  Results from last 7 days   Lab Units 11/18/23  0243 11/17/23  0351 11/16/23  0148 11/15/23  0638 11/13/23  2234 11/13/23  0711 11/12/23  2307   WBC 10*3/mm3 9.06 9.89 7.38 8.88 10.82* 13.11* 15.57*   HEMOGLOBIN g/dL 8.0* 8.7* 8.0* 8.5* 7.9* 8.9* 8.4*   HEMATOCRIT % 27.6* 30.9* 27.0* 26.9* 26.2* 29.6* 26.7*   PLATELETS 10*3/mm3 82* 72* 76* 77* 99* 118* 142     Results from last 7 days   Lab Units 11/18/23  1418 11/18/23  0243 11/17/23  0351 11/16/23  0148 11/15/23  0638 11/14/23  1407 11/13/23  2234 11/13/23  1319   SODIUM mmol/L 135* 137 137 136 135*  --  139 137   POTASSIUM mmol/L 5.0 4.8 4.6 4.2 3.7 4.2 3.4* 3.8   CHLORIDE mmol/L 100 103 103 101 100  --  103 101   CO2 mmol/L 25.1 25.2 23.0 25.0 25.7  --  28.7 23.9   BUN mg/dL 24* 18 16 16 18  --  22* 29*   CREATININE mg/dL 1.28* 1.25* 1.22* 1.28* 1.17*  --  1.09* 1.10*   CALCIUM mg/dL 8.7 8.9 8.8 8.5* 8.8  --  8.6 8.8   GLUCOSE mg/dL 351* 183* 245* 312* 169*  --  105* 221*     Results from last 7 days   Lab Units 11/18/23  0243 11/17/23  0351 11/13/23  2234 11/13/23  0711 11/12/23  2307   BILIRUBIN mg/dL 2.3* 1.9* 2.5* 2.4* 2.7*   ALK PHOS U/L 77 87 89 103 127*   AST (SGOT) U/L 20 18 26 20 21   ALT (SGPT) U/L 13 15 18 18 " 21     Results from last 7 days   Lab Units 11/18/23  1418 11/18/23  0243 11/17/23  0351 11/16/23  0148 11/15/23  0638 11/13/23  2234 11/13/23  1319   MAGNESIUM mg/dL 2.2 2.3 2.2 2.3 2.5 2.4 2.5     Results from last 7 days   Lab Units 11/15/23  2159 11/12/23  2307   INR  0.97 1.20*     Results from last 7 days   Lab Units 11/12/23  2307   CRP mg/dL 0.88*     Results from last 7 days   Lab Units 11/13/23  1319 11/13/23  0124 11/12/23  2307   CK TOTAL U/L  --   --  41   HSTROP T ng/L 84* 84* 93*           Assessment      1.  Paroxysmal atrial fibrillation.  S/p ECV.  In sinus rhythm.       S/p PVI ablation 10/2022  2.  Chronic heart failure with reduced EF.  Clinically compensated.  3.  Nonischemic dilated cardiomyopathy.  EF-35%.  4.  NSTEMI type II.  Due to tachyarrhythmia.  5.  Heparin-induced thrombocytopenia    Plan     1.  PAF-continue carvedilol.  Anticoagulated on fondaparinux until platelet count improves when Eliquis can be resumed.  2.  HFrEF-no evidence of fluid overload.  GDMT-continue carvedilol and empagliflozin.  Will optimize if BP remains stable and renal failure improves.      Kalyn Hess MD [facility]  11/18/23  14:57 EST

## 2023-11-18 NOTE — PLAN OF CARE
Goal Outcome Evaluation:     Patient resting in bed. Patient complained of pain, PRN medication provided, see MAR. Patient glucose elevated this shift, see orders, see MAR. Patient received bath. No visible indicators of acute distress noted. Will continue to follow plan of care this shift.      Daily Care Plan Summary: Heart Failure    Diuretic in use (IV or PO):  N/A        Daily weight (up or down):    gain: 3 lbs      Output > Intake (yes/no):Yes      O2 Requirements (current, any change?):  1 liters/min via nasal cannula PRN to maintain oxygen saturation >90%      Symptoms noted with Activity (Respiratory Tolerance, functional state):     SOA and weakness continues      Anticipated Discharge Plans:     Home

## 2023-11-18 NOTE — PROGRESS NOTES
[facility] HOSPITALIST PROGRESS NOTE     Patient Identification:  Name:  Yumiko Purdy  Age:  57 y.o.  Sex:  female  :  1965  MRN:  [id number]  Visit Number:  [id number]  ROOM: [address] [place] [address]     Primary Care Provider:  Masha Davidson APRN     Date of Admission: 2023    Length of stay in inpatient status:  5    Subjective     Chief Compliant:    Chief Complaint   Patient presents with    Rapid Heart Rate     History of Presenting Illness:  The patient could not sit more than 30 minutes today in a chair.  She states that she is too weak to go home as she cannot perform her activities of daily living.  She cannot see the markings on the insulin to dial up the pen and thus her son helps her with this.  She denies any chest pain and denies shortness of air; she also denies any black of bloody stools.  She denies any petechiae.  She only eats the food on her tray and does not have any extra snacks.    Objective     Current Hospital Meds:  busPIRone, 10 mg, Oral, BID  empagliflozin, 10 mg, Oral, Daily  erythromycin, 1 application , Both Eyes, Q6H  ferrous sulfate, 325 mg, Oral, BID  fluticasone, 2 spray, Nasal, Daily  fondaparinux, 5 mg, Subcutaneous, Q24H  [START ON 2023] insulin glargine, 35 Units, Subcutaneous, Q12H  insulin glargine, 5 Units, Subcutaneous, Once  insulin lispro, 10 Units, Subcutaneous, TID With Meals  insulin lispro, 3-14 Units, Subcutaneous, 4x Daily AC & at Bedtime  levothyroxine, 50 mcg, Oral, Q AM  Lidocaine, 2 patch, Transdermal, Q24H  metoprolol tartrate, 12.5 mg, Oral, Q12H  miconazole, 1 application , Topical, Q12H  pantoprazole, 40 mg, Oral, Q AM  Pharmacy Consult, , Does not apply, Once  pregabalin, 150 mg, Oral, BID  senna-docusate sodium, 2 tablet, Oral, BID  sodium chloride, 10 mL, Intravenous, Q12H  sodium chloride, 10 mL, Intravenous, Q12H  [Held by provider] valsartan, 40 mg, Oral, Q24H    Pharmacy Consult,       Current Antimicrobial  Therapy:  Anti-Infectives (From admission, onward)      Ordered     Dose/Rate Route Frequency Start Stop    11/12/23 2336  cefepime 2000 mg IVPB in 100 ml NS (VTB)        Ordering Provider: Juanita Box DO    2,000 mg  over 30 Minutes Intravenous Once 11/12/23 2352 11/13/23 0104    11/12/23 2336  doxycycline (VIBRAMYCIN) 100 mg in sodium chloride 0.9 % 100 mL IVPB-VTB        Ordering Provider: Juanita Box DO    100 mg  over 60 Minutes Intravenous Once 11/12/23 2352 11/13/23 0125          Current Diuretic Therapy:  Diuretics (From admission, onward)      Ordered     Dose/Rate Route Frequency Start Stop    11/14/23 1728  furosemide (LASIX) injection 60 mg        Ordering Provider: Ramon Sahu MD    60 mg Intravenous Once 11/14/23 1815 11/14/23 1804          ----------------------------------------------------------------------------------------------------------------------  Vital Signs:  Temp:  [99 °F (37.2 °C)-99.4 °F (37.4 °C)] 99.3 °F (37.4 °C)  Heart Rate:  [69-85] 76  Resp:  [18] 18  BP: ()/(47-58) 107/48  SpO2:  [93 %-97 %] 93 %  on  Flow (L/min):  [1] 1;   Device (Oxygen Therapy): room air  Body mass index is 39.56 kg/m².    Wt Readings from Last 3 Encounters:   11/18/23 108 kg (237 lb 11.2 oz)   10/15/23 113 kg (250 lb)   09/29/23 109 kg (240 lb)     Intake & Output (last 3 days)         11/16 0701 11/17 0700 11/17 0701 11/18 0700 11/18 0701 11/19 0700    P.O. 660 360 840    Total Intake(mL/kg) 660 (6.2) 360 (3.4) 840 (7.9)    Urine (mL/kg/hr) 2325 (0.9) 1900 (0.7) 1600 (1.1)    Total Output 2325 1900 1600    Net -7420 -7395 -292           Urine Unmeasured Occurrence 1 x 2 x           Diet: Regular/House Diet, Diabetic Diets; Consistent Carbohydrate; Texture: Soft to Chew (NDD 3); Soft to Chew: Whole Meat; Fluid Consistency: Thin (IDDSI 0)  ----------------------------------------------------------------------------------------------------------------------  Physical Exam; same as  yesterday.  She is alert, oriented, no acute distress, lungs are clear without any crackles and without any wheezes.  No edema is seen.  No slurred speech, no facial droop.  ----------------------------------------------------------------------------------------------------------------------  Tele:  NS in the 70's.  I have personally reviewed/looked at the telemetry strips.  ----------------------------------------------------------------------------------------------------------------------  LABS:    CBC and coagulation:  Results from last 7 days   Lab Units 11/18/23  1418 11/18/23  0243 11/17/23  0351 11/16/23  0148 11/15/23  2159 11/15/23  0638 11/13/23  2234 11/13/23  1319 11/13/23  1116 11/13/23  0711 11/13/23  0642 11/13/23  0309 11/12/23  2350 11/12/23  2307   LACTATE mmol/L 1.8  --   --   --   --   --   --  2.0 2.5*  --  2.8* 2.5* 3.7*  --    CRP mg/dL  --   --   --   --   --   --   --   --   --   --   --   --   --  0.88*   WBC 10*3/mm3  --  9.06 9.89 7.38  --  8.88 10.82*  --   --  13.11*  --   --   --  15.57*   HEMOGLOBIN g/dL  --  8.0* 8.7* 8.0*  --  8.5* 7.9*  --   --  8.9*  --   --   --  8.4*   HEMATOCRIT %  --  27.6* 30.9* 27.0*  --  26.9* 26.2*  --   --  29.6*  --   --   --  26.7*   MCV fL  --  114.0* 109.2* 114.4*  --  115.0* 114.9*  --   --  114.3*  --   --   --  113.6*   MCHC g/dL  --  29.0* 28.2* 29.6*  --  31.6 30.2*  --   --  30.1*  --   --   --  31.5   PLATELETS 10*3/mm3  --  82* 72* 76*  --  77* 99*  --   --  118*  --   --   --  142   INR   --   --   --   --  0.97  --   --   --   --   --   --   --   --  1.20*   D DIMER QUANT MCGFEU/mL  --   --   --  0.28 <0.27  --   --   --   --   --   --   --   --   --      Renal and electrolytes:  Results from last 7 days   Lab Units 11/18/23  1418 11/18/23  0243 11/17/23  0351 11/16/23  0148 11/15/23  0638 11/14/23  1407 11/13/23  2234 11/13/23  1319   SODIUM mmol/L 135* 137 137 136 135*  --  139 137   POTASSIUM mmol/L 5.0 4.8 4.6 4.2 3.7 4.2 3.4* 3.8    MAGNESIUM mg/dL 2.2 2.3 2.2 2.3 2.5  --  2.4 2.5   CHLORIDE mmol/L 100 103 103 101 100  --  103 101   CO2 mmol/L 25.1 25.2 23.0 25.0 25.7  --  28.7 23.9   BUN mg/dL 24* 18 16 16 18  --  22* 29*   CREATININE mg/dL 1.28* 1.25* 1.22* 1.28* 1.17*  --  1.09* 1.10*   CALCIUM mg/dL 8.7 8.9 8.8 8.5* 8.8  --  8.6 8.8   PHOSPHORUS mg/dL  --  3.5 3.6 3.7 3.9  --  3.5  --    GLUCOSE mg/dL 351* 183* 245* 312* 169*  --  105* 221*   ANION GAP mmol/L 9.9 8.8 11.0 10.0 9.3  --  7.3 12.1     Estimated Creatinine Clearance: 60 mL/min (A) (by C-G formula based on SCr of 1.28 mg/dL (H)).    Liver and pancreatic function:  Results from last 7 days   Lab Units 11/18/23  0243 11/17/23  0351 11/13/23  2234 11/13/23  0711 11/12/23  2350 11/12/23  2307   ALBUMIN g/dL 3.3* 3.4* 3.2* 3.6  --  3.7   BILIRUBIN mg/dL 2.3* 1.9* 2.5* 2.4*  --  2.7*   ALK PHOS U/L 77 87 89 103  --  127*   AST (SGOT) U/L 20 18 26 20  --  21   ALT (SGPT) U/L 13 15 18 18  --  21   AMMONIA umol/L  --   --   --   --  15  --        Endocrine function:  Lab Results   Component Value Date    HGBA1C 7.30 (H) 11/13/2023     Point of care bedside glucose levels:  Results from last 7 days   Lab Units 11/18/23  1923 11/18/23  1607 11/18/23  1154 11/18/23  0652 11/18/23  0522 11/17/23  2359 11/17/23  2228 11/17/23  1948 11/17/23  1632 11/17/23  1104 11/17/23  0714 11/17/23  0659 11/17/23  0531 11/16/23  2336   GLUCOSE mg/dL 301* 328* 576* 263* 273* 243* 312* 431* 359* 461* 274* 279* 231* 236*     Glucose levels from the CMP:  Results from last 7 days   Lab Units 11/18/23  1418 11/18/23  0243 11/17/23  0351 11/16/23  0148 11/15/23  0638 11/13/23  2234 11/13/23  1319 11/13/23  0711 11/12/23  2307   GLUCOSE mg/dL 351* 183* 245* 312* 169* 105* 221* 256* 424*     Lab Results   Component Value Date    TSH 4.680 (H) 11/12/2023    FREET4 1.78 (H) 11/13/2023     Cardiac:  Results from last 7 days   Lab Units 11/18/23  1632 11/18/23  1418 11/13/23  1319 11/13/23  0124 11/12/23  2307   CK  TOTAL U/L  --   --   --   --  41   HSTROP T ng/L 150* 152* 84* 84* 93*   PROBNP pg/mL  --   --   --   --  2,746.0*       Cultures:  Microbiology Results (last 10 days)       Procedure Component Value - Date/Time    Blood Culture - Blood, Hand, Right [[id number]]  (Normal) Collected: 11/13/23 0003    Lab Status: Final result Specimen: Blood from Hand, Right Updated: 11/18/23 0016     Blood Culture No growth at 5 days    Blood Culture - Blood, Arm, Right [[id number]]  (Normal) Collected: 11/12/23 2350    Lab Status: Final result Specimen: Blood from Arm, Right Updated: 11/18/23 0016     Blood Culture No growth at 5 days          I have personally looked at the labs and they are summarized above.    Assessment & Plan      -Atrial fibrillation with RVR status post direct current cardioversion in the emergency room, now in sinus rhythm, on Eliquis at home for a [id number] score of 3   -Thrombocytopenia that developed during admission, unknown etiology  -Dilated nonischemic cardiomyopathy/systolic CHF (EF 21-25% in 2020 and now 31-35%) that was present on admission with left ejection fraction of 31-35%, suspect partly tachycardia induced, with an acute exacerbation on admission that has now improved with diuresis  -Hypotension present on admission, suspect due to cardiogenic shock as result of the A-fib with RVR, currently resolved  -Prolonged QTc that developed after admission, with normal potassium and magnesium levels  -Macrocytic anemia that was present on admission, with normal vitamin B12 and folate levels, suspect anemia of chronic disease +/- liver disease  -Suspect type I versus type II non-ST elevation MI present on admission due to the atrial fibrillation with RVR causing cardiogenic shock and hypotension (cardiology thinks this is more likely type II than a type I non-ST elevation MI)  -Status post PVI on October 2022 with recurrent A-fib in the past  -History of nonobstructive coronary artery disease per  cardiac catheterization 11/20/2020 with 30 to 40% LAD lesion  -History of insulin dependent diabetes mellitus type 2  -History of decompensated liver cirrhosis with ascites  -History of chronic macrocytic anemia, with hemoglobin currently at baseline  -History of CKD stage IIIa (creatinine ranges 1.2-1.5 and GFR ranges 42-50)    The patient still has uncontrolled glucose levels.  We will see about trying to get more consistent carbohydrate trays brought to her room.  I am changing the regular insulin over to Humalog.  I doubled the Lantus amount yesterday; I will now increase it to 35 units twice a day.  I have added 10 units of Humalog with meals instead of the home regular insulin and I have given her a moderate dose sliding scale with Humalog instead of a sliding scale with regular insulin.  I will have pharmacy see if her insurance will cover Humalog or NovoLog pens.  Upon review of the patient's medicine administration record, the patient has only received 3 doses of the 3.125 mg Coreg, with the last dose being at 8:25 AM yesterday.  It looks like the last 3 doses have been held because of hypotension.  Therefore, I did change the patient to metoprolol 12.5 mg twice a day and we will see if she can tolerate this medication with less effects on the blood pressures.  I will continue to hold the losartan due to the borderline low blood pressures as well.  Also, the patient is too weak to go home at this time as she cannot do her activities of daily living; in addition, her son relies on others for transportation.  Thus, a safe discharge to home is not clear at this time, as she will benefit from more inpatient treatment and monitoring.  Will continue monitoring the troponin levels; the one ordered by Dr. Hess at 2 pm today seems to have doubled when compared to 5 days ago; the patient denied chest pain to me.    VTE Prophylaxis:  Mechanical Order History:       None          Pharmalogical Order History:         Ordered     Dose Route Frequency Stop    11/17/23 1434  fondaparinux (ARIXTRA) injection 5 mg         5 mg SC Every 24 Hours Scheduled --    11/15/23 2251  argatroban 250 mg in dextrose (D5W) 5 % 250 mL (1 mg/mL) infusion  7.56 mL/hr,   Status:  Discontinued         1.2 mcg/kg/min IV Titrated 11/15/23 2257    11/15/23 0817  heparin (porcine) 5000 UNIT/ML injection 4,000 Units         4,000 Units IV Once 11/15/23 0942    11/13/23 1540  heparin 72346 units/250 mL (100 units/mL) in 0.45 % NaCl infusion  12.08 mL/hr,   Status:  Discontinued         11.4 Units/kg/hr (Dosing Weight) IV Titrated 11/15/23 2248    11/13/23 0537  Pharmacy to Dose Heparin  Status:  Discontinued         -- XX Continuous PRN 11/15/23 2254                Disposition: Patient would like to be discharged home with her son but timing is unknown at this time as the patient states she is not strong enough to go home yet.       Dave Bishop MD  [facility]  11/18/23  21:02 EST

## 2023-11-18 NOTE — PLAN OF CARE
Goal Outcome Evaluation:  Plan of Care Reviewed With: family, patient  Progress: no change   Pt resting in bed, watching television. Pt has tolerated all interventions. No complaints/concerns. No acute distress noted. Will continue to follow the plan of care.         Daily Care Plan Summary: Heart Failure    Diuretic in use (IV or PO):   PO N/A        Daily weight (up or down):    gain: 0.3 lbs      Output > Intake (yes/no):Yes      O2 Requirements (current, any change?):  1 liters/min via nasal cannula      Symptoms noted with Activity (Respiratory Tolerance, functional state):     SoA on exertion      Anticipated Discharge Plans:     Be able to maintain heart failure with appropriate medications and use of O2.

## 2023-11-19 LAB
ABSOLUTE LUNG FLUID CONTENT: 35 % (ref 20–35)
ALBUMIN SERPL-MCNC: 3.5 G/DL (ref 3.5–5.2)
ALBUMIN/GLOB SERPL: 1.3 G/DL
ALP SERPL-CCNC: 82 U/L (ref 39–117)
ALT SERPL W P-5'-P-CCNC: 14 U/L (ref 1–33)
ANION GAP SERPL CALCULATED.3IONS-SCNC: 11.4 MMOL/L (ref 5–15)
ANISOCYTOSIS BLD QL: NORMAL
AST SERPL-CCNC: 18 U/L (ref 1–32)
BASOPHILS # BLD AUTO: 0.1 10*3/MM3 (ref 0–0.2)
BASOPHILS NFR BLD AUTO: 1.1 % (ref 0–1.5)
BILIRUB SERPL-MCNC: 2.3 MG/DL (ref 0–1.2)
BUN SERPL-MCNC: 25 MG/DL (ref 6–20)
BUN/CREAT SERPL: 17.1 (ref 7–25)
CALCIUM SPEC-SCNC: 8.9 MG/DL (ref 8.6–10.5)
CHLORIDE SERPL-SCNC: 102 MMOL/L (ref 98–107)
CO2 SERPL-SCNC: 25.6 MMOL/L (ref 22–29)
CREAT SERPL-MCNC: 1.46 MG/DL (ref 0.57–1)
DEPRECATED RDW RBC AUTO: 90.3 FL (ref 37–54)
EGFRCR SERPLBLD CKD-EPI 2021: 42.1 ML/MIN/1.73
EOSINOPHIL # BLD AUTO: 0.14 10*3/MM3 (ref 0–0.4)
EOSINOPHIL NFR BLD AUTO: 1.5 % (ref 0.3–6.2)
ERYTHROCYTE [DISTWIDTH] IN BLOOD BY AUTOMATED COUNT: 23.1 % (ref 12.3–15.4)
GLOBULIN UR ELPH-MCNC: 2.7 GM/DL
GLUCOSE BLDC GLUCOMTR-MCNC: 222 MG/DL (ref 70–130)
GLUCOSE BLDC GLUCOMTR-MCNC: 254 MG/DL (ref 70–130)
GLUCOSE BLDC GLUCOMTR-MCNC: 343 MG/DL (ref 70–130)
GLUCOSE BLDC GLUCOMTR-MCNC: 378 MG/DL (ref 70–130)
GLUCOSE SERPL-MCNC: 178 MG/DL (ref 65–99)
HCT VFR BLD AUTO: 29.7 % (ref 34–46.6)
HGB BLD-MCNC: 8.3 G/DL (ref 12–15.9)
HYPOCHROMIA BLD QL: NORMAL
IMM GRANULOCYTES # BLD AUTO: 0.09 10*3/MM3 (ref 0–0.05)
IMM GRANULOCYTES NFR BLD AUTO: 1 % (ref 0–0.5)
LYMPHOCYTES # BLD AUTO: 2.7 10*3/MM3 (ref 0.7–3.1)
LYMPHOCYTES NFR BLD AUTO: 29.2 % (ref 19.6–45.3)
MACROCYTES BLD QL SMEAR: NORMAL
MAGNESIUM SERPL-MCNC: 2.4 MG/DL (ref 1.6–2.6)
MCH RBC QN AUTO: 31.4 PG (ref 26.6–33)
MCHC RBC AUTO-ENTMCNC: 27.9 G/DL (ref 31.5–35.7)
MCV RBC AUTO: 112.5 FL (ref 79–97)
MONOCYTES # BLD AUTO: 0.37 10*3/MM3 (ref 0.1–0.9)
MONOCYTES NFR BLD AUTO: 4 % (ref 5–12)
NEUTROPHILS NFR BLD AUTO: 5.84 10*3/MM3 (ref 1.7–7)
NEUTROPHILS NFR BLD AUTO: 63.2 % (ref 42.7–76)
NRBC BLD AUTO-RTO: 0.8 /100 WBC (ref 0–0.2)
PHOSPHATE SERPL-MCNC: 4.5 MG/DL (ref 2.5–4.5)
PLATELET # BLD AUTO: 112 10*3/MM3 (ref 140–450)
PMV BLD AUTO: 12.1 FL (ref 6–12)
POLYCHROMASIA BLD QL SMEAR: NORMAL
POTASSIUM SERPL-SCNC: 4.6 MMOL/L (ref 3.5–5.2)
PROT SERPL-MCNC: 6.2 G/DL (ref 6–8.5)
QT INTERVAL: 448 MS
QTC INTERVAL: 500 MS
RBC # BLD AUTO: 2.64 10*6/MM3 (ref 3.77–5.28)
SMALL PLATELETS BLD QL SMEAR: NORMAL
SODIUM SERPL-SCNC: 139 MMOL/L (ref 136–145)
STOMATOCYTES BLD QL SMEAR: NORMAL
TROPONIN T SERPL HS-MCNC: 127 NG/L
WBC NRBC COR # BLD AUTO: 9.24 10*3/MM3 (ref 3.4–10.8)

## 2023-11-19 PROCEDURE — 84100 ASSAY OF PHOSPHORUS: CPT | Performed by: INTERNAL MEDICINE

## 2023-11-19 PROCEDURE — 94726 PLETHYSMOGRAPHY LUNG VOLUMES: CPT

## 2023-11-19 PROCEDURE — 85007 BL SMEAR W/DIFF WBC COUNT: CPT | Performed by: INTERNAL MEDICINE

## 2023-11-19 PROCEDURE — 83735 ASSAY OF MAGNESIUM: CPT | Performed by: INTERNAL MEDICINE

## 2023-11-19 PROCEDURE — 63710000001 INSULIN GLARGINE PER 5 UNITS: Performed by: INTERNAL MEDICINE

## 2023-11-19 PROCEDURE — 25010000002 FONDAPARINUX PER 0.5 MG: Performed by: INTERNAL MEDICINE

## 2023-11-19 PROCEDURE — 99232 SBSQ HOSP IP/OBS MODERATE 35: CPT | Performed by: INTERNAL MEDICINE

## 2023-11-19 PROCEDURE — 25010000002 MORPHINE PER 10 MG

## 2023-11-19 PROCEDURE — 80053 COMPREHEN METABOLIC PANEL: CPT | Performed by: INTERNAL MEDICINE

## 2023-11-19 PROCEDURE — 25010000002 ONDANSETRON PER 1 MG

## 2023-11-19 PROCEDURE — 82948 REAGENT STRIP/BLOOD GLUCOSE: CPT

## 2023-11-19 PROCEDURE — 82542 COL CHROMOTOGRAPHY QUAL/QUAN: CPT | Performed by: INTERNAL MEDICINE

## 2023-11-19 PROCEDURE — 84484 ASSAY OF TROPONIN QUANT: CPT | Performed by: INTERNAL MEDICINE

## 2023-11-19 PROCEDURE — 85025 COMPLETE CBC W/AUTO DIFF WBC: CPT | Performed by: INTERNAL MEDICINE

## 2023-11-19 PROCEDURE — 63710000001 INSULIN LISPRO (HUMAN) PER 5 UNITS: Performed by: INTERNAL MEDICINE

## 2023-11-19 RX ORDER — INSULIN LISPRO 100 [IU]/ML
13 INJECTION, SOLUTION INTRAVENOUS; SUBCUTANEOUS
Status: DISCONTINUED | OUTPATIENT
Start: 2023-11-19 | End: 2023-11-21 | Stop reason: HOSPADM

## 2023-11-19 RX ORDER — METOPROLOL SUCCINATE 25 MG/1
12.5 TABLET, EXTENDED RELEASE ORAL
Status: DISCONTINUED | OUTPATIENT
Start: 2023-11-20 | End: 2023-11-21 | Stop reason: HOSPADM

## 2023-11-19 RX ORDER — METOPROLOL SUCCINATE 25 MG/1
25 TABLET, EXTENDED RELEASE ORAL
Status: DISCONTINUED | OUTPATIENT
Start: 2023-11-19 | End: 2023-11-19

## 2023-11-19 RX ORDER — ASPIRIN 81 MG/1
81 TABLET ORAL DAILY
Status: DISCONTINUED | OUTPATIENT
Start: 2023-11-19 | End: 2023-11-21 | Stop reason: HOSPADM

## 2023-11-19 RX ORDER — BUMETANIDE 0.25 MG/ML
2 INJECTION INTRAMUSCULAR; INTRAVENOUS ONCE
Status: COMPLETED | OUTPATIENT
Start: 2023-11-19 | End: 2023-11-19

## 2023-11-19 RX ORDER — MORPHINE SULFATE 2 MG/ML
2 INJECTION, SOLUTION INTRAMUSCULAR; INTRAVENOUS ONCE
Status: COMPLETED | OUTPATIENT
Start: 2023-11-19 | End: 2023-11-19

## 2023-11-19 RX ADMIN — EMPAGLIFLOZIN 10 MG: 10 TABLET, FILM COATED ORAL at 09:07

## 2023-11-19 RX ADMIN — INSULIN GLARGINE 35 UNITS: 100 INJECTION, SOLUTION SUBCUTANEOUS at 20:32

## 2023-11-19 RX ADMIN — PANTOPRAZOLE SODIUM 40 MG: 40 TABLET, DELAYED RELEASE ORAL at 05:13

## 2023-11-19 RX ADMIN — Medication 10 ML: at 20:34

## 2023-11-19 RX ADMIN — Medication 10 ML: at 09:11

## 2023-11-19 RX ADMIN — INSULIN LISPRO 12 UNITS: 100 INJECTION, SOLUTION INTRAVENOUS; SUBCUTANEOUS at 11:54

## 2023-11-19 RX ADMIN — MICONAZOLE NITRATE 1 APPLICATION: 2 POWDER TOPICAL at 20:33

## 2023-11-19 RX ADMIN — PREGABALIN 150 MG: 75 CAPSULE ORAL at 09:07

## 2023-11-19 RX ADMIN — DOCUSATE SODIUM 50 MG AND SENNOSIDES 8.6 MG 2 TABLET: 8.6; 5 TABLET, FILM COATED ORAL at 20:32

## 2023-11-19 RX ADMIN — INSULIN LISPRO 13 UNITS: 100 INJECTION, SOLUTION INTRAVENOUS; SUBCUTANEOUS at 17:15

## 2023-11-19 RX ADMIN — MORPHINE SULFATE 2 MG: 2 INJECTION, SOLUTION INTRAMUSCULAR; INTRAVENOUS at 22:33

## 2023-11-19 RX ADMIN — BUMETANIDE 2 MG: 0.25 INJECTION, SOLUTION INTRAMUSCULAR; INTRAVENOUS at 10:12

## 2023-11-19 RX ADMIN — LIDOCAINE 2 PATCH: 4 PATCH TOPICAL at 09:07

## 2023-11-19 RX ADMIN — BUSPIRONE HYDROCHLORIDE 10 MG: 10 TABLET ORAL at 20:32

## 2023-11-19 RX ADMIN — INSULIN LISPRO 10 UNITS: 100 INJECTION, SOLUTION INTRAVENOUS; SUBCUTANEOUS at 20:33

## 2023-11-19 RX ADMIN — LEVOTHYROXINE SODIUM 50 MCG: 50 TABLET ORAL at 05:13

## 2023-11-19 RX ADMIN — FERROUS SULFATE TAB 325 MG (65 MG ELEMENTAL FE) 325 MG: 325 (65 FE) TAB at 22:31

## 2023-11-19 RX ADMIN — DOCUSATE SODIUM 50 MG AND SENNOSIDES 8.6 MG 2 TABLET: 8.6; 5 TABLET, FILM COATED ORAL at 09:07

## 2023-11-19 RX ADMIN — PREGABALIN 150 MG: 75 CAPSULE ORAL at 20:32

## 2023-11-19 RX ADMIN — Medication 10 ML: at 09:10

## 2023-11-19 RX ADMIN — ACETAMINOPHEN 500 MG: 500 TABLET ORAL at 20:32

## 2023-11-19 RX ADMIN — INSULIN GLARGINE 35 UNITS: 100 INJECTION, SOLUTION SUBCUTANEOUS at 09:10

## 2023-11-19 RX ADMIN — ONDANSETRON 4 MG: 2 INJECTION INTRAMUSCULAR; INTRAVENOUS at 02:28

## 2023-11-19 RX ADMIN — FLUTICASONE PROPIONATE 2 SPRAY: 50 SPRAY, METERED NASAL at 09:07

## 2023-11-19 RX ADMIN — INSULIN LISPRO 10 UNITS: 100 INJECTION, SOLUTION INTRAVENOUS; SUBCUTANEOUS at 07:48

## 2023-11-19 RX ADMIN — INSULIN LISPRO 5 UNITS: 100 INJECTION, SOLUTION INTRAVENOUS; SUBCUTANEOUS at 07:49

## 2023-11-19 RX ADMIN — BUSPIRONE HYDROCHLORIDE 10 MG: 10 TABLET ORAL at 09:07

## 2023-11-19 RX ADMIN — INSULIN LISPRO 10 UNITS: 100 INJECTION, SOLUTION INTRAVENOUS; SUBCUTANEOUS at 11:54

## 2023-11-19 RX ADMIN — ACETAMINOPHEN 500 MG: 500 TABLET ORAL at 07:49

## 2023-11-19 RX ADMIN — ERYTHROMYCIN 1 APPLICATION: 5 OINTMENT OPHTHALMIC at 17:14

## 2023-11-19 RX ADMIN — ERYTHROMYCIN 1 APPLICATION: 5 OINTMENT OPHTHALMIC at 10:13

## 2023-11-19 RX ADMIN — INSULIN LISPRO 8 UNITS: 100 INJECTION, SOLUTION INTRAVENOUS; SUBCUTANEOUS at 17:14

## 2023-11-19 RX ADMIN — ASPIRIN 81 MG: 81 TABLET, COATED ORAL at 17:15

## 2023-11-19 RX ADMIN — MICONAZOLE NITRATE 1 APPLICATION: 2 POWDER TOPICAL at 09:07

## 2023-11-19 RX ADMIN — FERROUS SULFATE TAB 325 MG (65 MG ELEMENTAL FE) 325 MG: 325 (65 FE) TAB at 09:07

## 2023-11-19 RX ADMIN — FONDAPARINUX SODIUM 5 MG: 10 INJECTION, SOLUTION SUBCUTANEOUS at 09:06

## 2023-11-19 NOTE — PROGRESS NOTES
[facility] HOSPITALIST PROGRESS NOTE     Patient Identification:  Name:  Yumiko Purdy  Age:  57 y.o.  Sex:  female  :  1965  MRN:  [id number]  Visit Number:  [id number]  ROOM: [address] [place] [address]     Primary Care Provider:  Masha Davidson APRN     Date of Admission: 2023    Length of stay in inpatient status:  6    Subjective     Chief Compliant:    Chief Complaint   Patient presents with    Rapid Heart Rate     History of Presenting Illness: The patient was sitting in bedside chair when I saw her.  She was eating her dinner.  Patient states that she transition to the chair when okay that she is still is not able to perform her activities of daily living.  Patient's son did come and visit the patient today but the patient did not inform anybody that he was there.  I attempted to call her son today but the only phone number listed is to the phone that she has in her hospital room.  She communicates to her son via [employer] because they have one phone.  Today, the patient does not have any shortness of air, chest pain, nausea, vomiting.  She thinks that the Bumex helped her breath better today.    Objective     Current Hospital Meds:  [START ON 2023] apixaban, 5 mg, Oral, Q12H  aspirin, 81 mg, Oral, Daily  busPIRone, 10 mg, Oral, BID  empagliflozin, 10 mg, Oral, Daily  erythromycin, 1 application , Both Eyes, Q6H  ferrous sulfate, 325 mg, Oral, BID  fluticasone, 2 spray, Nasal, Daily  insulin glargine, 35 Units, Subcutaneous, Q12H  insulin lispro, 10 Units, Subcutaneous, TID With Meals  insulin lispro, 3-14 Units, Subcutaneous, 4x Daily AC & at Bedtime  levothyroxine, 50 mcg, Oral, Q AM  Lidocaine, 2 patch, Transdermal, Q24H  metoprolol succinate XL, 25 mg, Oral, Q24H  miconazole, 1 application , Topical, Q12H  pantoprazole, 40 mg, Oral, Q AM  Pharmacy Consult, , Does not apply, Once  pregabalin, 150 mg, Oral, BID  senna-docusate sodium, 2 tablet, Oral, BID  sodium chloride,  10 mL, Intravenous, Q12H  sodium chloride, 10 mL, Intravenous, Q12H  [Held by provider] valsartan, 40 mg, Oral, Q24H    Pharmacy Consult,       Current Antimicrobial Therapy:  Anti-Infectives (From admission, onward)      Ordered     Dose/Rate Route Frequency Start Stop    11/12/23 2336  cefepime 2000 mg IVPB in 100 ml NS (VTB)        Ordering Provider: Juanita Box DO    2,000 mg  over 30 Minutes Intravenous Once 11/12/23 2352 11/13/23 0104    11/12/23 2336  doxycycline (VIBRAMYCIN) 100 mg in sodium chloride 0.9 % 100 mL IVPB-VTB        Ordering Provider: Juanita Box DO    100 mg  over 60 Minutes Intravenous Once 11/12/23 2352 11/13/23 0125          Current Diuretic Therapy:  Diuretics (From admission, onward)      Ordered     Dose/Rate Route Frequency Start Stop    11/19/23 0920  bumetanide (BUMEX) injection 2 mg        Ordering Provider: Kalyn Hess MD    2 mg Intravenous Once 11/19/23 1015 11/19/23 1012    11/14/23 1728  furosemide (LASIX) injection 60 mg        Ordering Provider: Ramon Sahu MD    60 mg Intravenous Once 11/14/23 1815 11/14/23 1804          ----------------------------------------------------------------------------------------------------------------------  Vital Signs:  Temp:  [98.1 °F (36.7 °C)-99.3 °F (37.4 °C)] 99.1 °F (37.3 °C)  Heart Rate:  [65-76] 74  Resp:  [18-20] 20  BP: (101-112)/(43-64) 110/46  SpO2:  [93 %-98 %] 96 %  on  Flow (L/min):  [1] 1;   Device (Oxygen Therapy): nasal cannula  Body mass index is 39.14 kg/m².    Wt Readings from Last 3 Encounters:   11/19/23 107 kg (235 lb 3.2 oz)   10/15/23 113 kg (250 lb)   09/29/23 109 kg (240 lb)     Intake & Output (last 3 days)         11/16 0701  11/17 0700 11/17 0701  11/18 0700 11/18 0701  11/19 0700 11/19 0701  11/20 0700    P.O.  120    Total Intake(mL/kg) 660 (6.2) 360 (3.4) 1080 (10.2) 120 (1.1)    Urine (mL/kg/hr) 2325 (0.9) 1900 (0.7) 2850 (1.1) 1200 (1.1)    Total Output 2325 1900 2850 1200    Net  -[phone] -[phone] -[phone]            Urine Unmeasured Occurrence 1 x 2 x 1 x           Diet: Regular/House Diet, Diabetic Diets; Consistent Carbohydrate; Texture: Soft to Chew (NDD 3); Soft to Chew: Whole Meat; Fluid Consistency: Thin (IDDSI 0)  ----------------------------------------------------------------------------------------------------------------------  Physical Exam  She is alert, oriented, no acute distress, lungs are clear without any crackles and without any wheezes.  No edema is seen.  No slurred speech, no facial droop.   ----------------------------------------------------------------------------------------------------------------------  Tele:  NS with heart rates 70's, oxygen sats dropped to the 80% range while asleep.  I have personally reviewed/looked at the telemetry strips.   ----------------------------------------------------------------------------------------------------------------------  LABS:    CBC and coagulation:  Results from last 7 days   Lab Units 11/19/23  0153 11/18/23  1418 11/18/23  0243 11/17/23  0351 11/16/23  0148 11/15/23  2159 11/15/23  0638 11/13/23  2234 11/13/23  1319 11/13/23  1116 11/13/23  0711 11/13/23  0642 11/13/23  0309 11/12/23  2350 11/12/23  2307   LACTATE mmol/L  --  1.8  --   --   --   --   --   --  2.0 2.5*  --  2.8* 2.5* 3.7*  --    CRP mg/dL  --   --   --   --   --   --   --   --   --   --   --   --   --   --  0.88*   WBC 10*3/mm3 9.24  --  9.06 9.89 7.38  --  8.88 10.82*  --   --  13.11*  --   --   --  15.57*   HEMOGLOBIN g/dL 8.3*  --  8.0* 8.7* 8.0*  --  8.5* 7.9*  --   --  8.9*  --   --   --  8.4*   HEMATOCRIT % 29.7*  --  27.6* 30.9* 27.0*  --  26.9* 26.2*  --   --  29.6*  --   --   --  26.7*   MCV fL 112.5*  --  114.0* 109.2* 114.4*  --  115.0* 114.9*  --   --  114.3*  --   --   --  113.6*   MCHC g/dL 27.9*  --  29.0* 28.2* 29.6*  --  31.6 30.2*  --   --  30.1*  --   --   --  31.5   PLATELETS 10*3/mm3 112*  --  82* 72* 76*  --  77* 99*  --   --  118*   --   --   --  142   INR   --   --   --   --   --  0.97  --   --   --   --   --   --   --   --  1.20*   D DIMER QUANT MCGFEU/mL  --   --   --   --  0.28 <0.27  --   --   --   --   --   --   --   --   --      Renal and electrolytes:  Results from last 7 days   Lab Units 11/19/23  0153 11/18/23  1418 11/18/23 0243 11/17/23  0351 11/16/23  0148 11/15/23  0638 11/14/23  1407 11/13/23  2234   SODIUM mmol/L 139 135* 137 137 136 135*  --  139   POTASSIUM mmol/L 4.6 5.0 4.8 4.6 4.2 3.7 4.2 3.4*   MAGNESIUM mg/dL 2.4 2.2 2.3 2.2 2.3 2.5  --  2.4   CHLORIDE mmol/L 102 100 103 103 101 100  --  103   CO2 mmol/L 25.6 25.1 25.2 23.0 25.0 25.7  --  28.7   BUN mg/dL 25* 24* 18 16 16 18  --  22*   CREATININE mg/dL 1.46* 1.28* 1.25* 1.22* 1.28* 1.17*  --  1.09*   CALCIUM mg/dL 8.9 8.7 8.9 8.8 8.5* 8.8  --  8.6   PHOSPHORUS mg/dL 4.5  --  3.5 3.6 3.7 3.9  --  3.5   GLUCOSE mg/dL 178* 351* 183* 245* 312* 169*  --  105*   ANION GAP mmol/L 11.4 9.9 8.8 11.0 10.0 9.3  --  7.3     Estimated Creatinine Clearance: 52.3 mL/min (A) (by C-G formula based on SCr of 1.46 mg/dL (H)).    Liver and pancreatic function:  Results from last 7 days   Lab Units 11/19/23  0153 11/18/23  0243 11/17/23  0351 11/13/23  2234 11/13/23  0711 11/12/23  2350 11/12/23  2307   ALBUMIN g/dL 3.5 3.3* 3.4* 3.2* 3.6  --  3.7   BILIRUBIN mg/dL 2.3* 2.3* 1.9* 2.5* 2.4*  --  2.7*   ALK PHOS U/L 82 77 87 89 103  --  127*   AST (SGOT) U/L 18 20 18 26 20  --  21   ALT (SGPT) U/L 14 13 15 18 18  --  21   AMMONIA umol/L  --   --   --   --   --  15  --      Endocrine function:  Point of care bedside glucose levels:  Results from last 7 days   Lab Units 11/19/23  1636 11/19/23  1130 11/19/23  0723 11/18/23  2250 11/18/23  1923 11/18/23  1607 11/18/23  1154 11/18/23  0652 11/18/23  0522 11/17/23  2359 11/17/23  2228 11/17/23  1948 11/17/23  1632 11/17/23  1104   GLUCOSE mg/dL 254* 378* 222* 225* 301* 328* 576* 263* 273* 243* 312* 431* 359* 461*     Glucose levels from the  CMP:  Results from last 7 days   Lab Units 11/19/23  0153 11/18/23  1418 11/18/23  0243 11/17/23  0351 11/16/23  0148 11/15/23  0638 11/13/23  2234 11/13/23  1319 11/13/23  0711 11/12/23  2307   GLUCOSE mg/dL 178* 351* 183* 245* 312* 169* 105* 221* 256* 424*     Cardiac:  Results from last 7 days   Lab Units 11/19/23  0153 11/18/23  1632 11/18/23  1418 11/13/23  1319 11/13/23  0124 11/12/23  2307   CK TOTAL U/L  --   --   --   --   --  41   HSTROP T ng/L 127* 150* 152* 84* 84* 93*   PROBNP pg/mL  --   --   --   --   --  2,746.0*       Cultures:  Microbiology Results (last 10 days)       Procedure Component Value - Date/Time    Blood Culture - Blood, Hand, Right [[id number]]  (Normal) Collected: 11/13/23 0003    Lab Status: Final result Specimen: Blood from Hand, Right Updated: 11/18/23 0016     Blood Culture No growth at 5 days    Blood Culture - Blood, Arm, Right [[id number]]  (Normal) Collected: 11/12/23 2350    Lab Status: Final result Specimen: Blood from Arm, Right Updated: 11/18/23 0016     Blood Culture No growth at 5 days          I have personally looked at the labs and they are summarized above.    Assessment & Plan      -Atrial fibrillation with RVR status post direct current cardioversion in the emergency room, now in sinus rhythm, on Eliquis at home for a [id number] score of 3   -Thrombocytopenia that developed during admission, unknown etiology  -Dilated nonischemic cardiomyopathy/systolic CHF (EF 21-25% in 2020 and now 31-35%) that was present on admission with left ejection fraction of 31-35%, suspect partly tachycardia induced, with an acute exacerbation on admission that has now improved with diuresis  -Hypotension present on admission, suspect due to cardiogenic shock as result of the A-fib with RVR, currently resolved  -Prolonged QTc that developed after admission, with normal potassium and magnesium levels  -Macrocytic anemia that was present on admission, with normal vitamin B12 and folate  levels, suspect anemia of chronic disease +/- liver disease  -Suspect type I versus type II non-ST elevation MI present on admission due to the atrial fibrillation with RVR causing cardiogenic shock and hypotension (cardiology thinks this is more likely type II than a type I non-ST elevation MI)  -Status post PVI on October 2022 with recurrent A-fib in the past  -History of nonobstructive coronary artery disease per cardiac catheterization 11/20/2020 with 30 to 40% LAD lesion  -History of insulin dependent diabetes mellitus type 2  -History of decompensated liver cirrhosis with ascites  -History of chronic macrocytic anemia, with hemoglobin currently at baseline  -History of CKD stage IIIa (creatinine ranges 1.2-1.5 and GFR ranges 42-50)    The 1 am glucose has finally hit below 200.  Will keep the current Lantus dose and increase the meal time Humalog to 13 units with each meal.  Will keep the sliding scale Humalog the same.  Blood pressures are at goal; will obtain orthostatic vital signs.  Dr. Hess gave a dose of Bumex today; with the Cr trending up, will monitor the Cr and electrolytes closely.  The last dose of Coreg was on 11/17/2023 and she has never received any metoprolol yet.  Thus, since the blood pressures are in the low range, will discontinue the metoprolol as the heart rates are in the 70s and dropping the blood pressure even more will make the kidney function worse.  I had tried to change the Coreg to metoprolol earlier for less hypotensive side effects, but the metoprolol was still not be able to be given.  She has not received valsartan was on 11/14/2023; this was stopped for the low blood pressures as well.  The platelet levels have improved; thus, Dr. Hess restarted the home Eliquis.  Will repeat the labs in the morning.    VTE Prophylaxis:  Mechanical Order History:       None          Pharmalogical Order History:        Ordered     Dose Route Frequency Stop    11/19/23 0955  apixaban  (ELIQUIS) tablet 5 mg         5 mg PO Every 12 Hours Scheduled --    11/17/23 1434  fondaparinux (ARIXTRA) injection 5 mg  Status:  Discontinued         5 mg SC Every 24 Hours Scheduled 11/19/23 0922    11/15/23 2251  argatroban 250 mg in dextrose (D5W) 5 % 250 mL (1 mg/mL) infusion  7.56 mL/hr,   Status:  Discontinued         1.2 mcg/kg/min IV Titrated 11/15/23 2257    11/15/23 0817  heparin (porcine) 5000 UNIT/ML injection 4,000 Units         4,000 Units IV Once 11/15/23 0942    11/13/23 1540  heparin 19338 units/250 mL (100 units/mL) in 0.45 % NaCl infusion  12.08 mL/hr,   Status:  Discontinued         11.4 Units/kg/hr (Dosing Weight) IV Titrated 11/15/23 2248    11/13/23 0537  Pharmacy to Dose Heparin  Status:  Discontinued         -- XX Continuous PRN 11/15/23 2254                Disposition: Patient would like to be discharged home with her son but timing is unknown at this time as the patient states she is not strong enough to go home yet.     Dave Bishop MD  [facility]  11/19/23  17:03 EST

## 2023-11-19 NOTE — PROGRESS NOTES
Patient Identification:  Name:  Yumiko Purdy  Age:  57 y.o.  Sex:  female  :  1965  MRN:  [id number]  Visit Number:  [id number]  Primary care provider:  Masha Davidson APRN    Reason for follow-up:  Paroxysmal atrial fibrillation and heart failure with reduced EF.  EF-31 to 35%.    Subjective     She has been admitted with paroxysmal A-fib with RVR.  S/p ECV.  Remained in sinus rhythm.  Has history of heart failure with reduced EF.  EF-31 to 35%.    Today, her main complaint was increasing shortness of breath and orthopnea.  Absolute lung fluid content was 35.  No history of chest pain or palpitations.  Telemetry showed sinus rhythm.  She had 1 brief episode of SVT last night.  BP stable.      Medication Review:   [START ON 2023] apixaban, 5 mg, Oral, Q12H  busPIRone, 10 mg, Oral, BID  empagliflozin, 10 mg, Oral, Daily  erythromycin, 1 application , Both Eyes, Q6H  ferrous sulfate, 325 mg, Oral, BID  fluticasone, 2 spray, Nasal, Daily  insulin glargine, 35 Units, Subcutaneous, Q12H  insulin lispro, 10 Units, Subcutaneous, TID With Meals  insulin lispro, 3-14 Units, Subcutaneous, 4x Daily AC & at Bedtime  levothyroxine, 50 mcg, Oral, Q AM  Lidocaine, 2 patch, Transdermal, Q24H  metoprolol tartrate, 12.5 mg, Oral, Q12H  miconazole, 1 application , Topical, Q12H  pantoprazole, 40 mg, Oral, Q AM  Pharmacy Consult, , Does not apply, Once  pregabalin, 150 mg, Oral, BID  senna-docusate sodium, 2 tablet, Oral, BID  sodium chloride, 10 mL, Intravenous, Q12H  sodium chloride, 10 mL, Intravenous, Q12H  [Held by provider] valsartan, 40 mg, Oral, Q24H      Pharmacy Consult,            Vital Sign Min/Max for last 24 hours  Temp  Min: 98.1 °F (36.7 °C)  Max: 99.3 °F (37.4 °C)   BP  Min: 101/43  Max: 112/64   Pulse  Min: 65  Max: 79   Resp  Min: 18  Max: 20   SpO2  Min: 93 %  Max: 98 %   No data recorded   Weight  Min: 107 kg (235 lb 3.2 oz)  Max: 107 kg (235 lb 3.2 oz)     Flowsheet Rows      Flowsheet  "Row First Filed Value   Admission Height 165.1 cm (65\") Documented at 11/12/2023 2243   Admission Weight 113 kg (250 lb) Documented at 11/12/2023 2243            Objective       Physical Exam:         General Appearance:  HEENT:   Neck:     Alert, cooperative, in no acute distress  Grossly normal   No JVD    Lungs:   Bilateral equal air entry.  Bilateral rales were heard.     Heart:  Regular rhythm and normal rate, normal S1 and S2, no        murmur, no gallop, no rub, no click    Chest Wall:  No abnormalities observed    Abdomen   Soft, nontender, no masses, no organomegaly and bowel sounds are heard.     Extremities: Trace bilateral ankle edema    Pulses: Pulses palpable and equal bilaterally    Neurologic: No focal deficits         Telemetry:  Sinus rhythm.  Had 1 brief episode of SVT last night.      Labs  Results from last 7 days   Lab Units 11/19/23  0153 11/18/23  0243 11/17/23  0351 11/16/23  0148 11/15/23  0638 11/13/23  2234 11/13/23  0711   WBC 10*3/mm3 9.24 9.06 9.89 7.38 8.88 10.82* 13.11*   HEMOGLOBIN g/dL 8.3* 8.0* 8.7* 8.0* 8.5* 7.9* 8.9*   HEMATOCRIT % 29.7* 27.6* 30.9* 27.0* 26.9* 26.2* 29.6*   PLATELETS 10*3/mm3 112* 82* 72* 76* 77* 99* 118*     Results from last 7 days   Lab Units 11/19/23  0153 11/18/23  1418 11/18/23  0243 11/17/23  0351 11/16/23  0148 11/15/23  0638 11/14/23  1407 11/13/23  2234   SODIUM mmol/L 139 135* 137 137 136 135*  --  139   POTASSIUM mmol/L 4.6 5.0 4.8 4.6 4.2 3.7 4.2 3.4*   CHLORIDE mmol/L 102 100 103 103 101 100  --  103   CO2 mmol/L 25.6 25.1 25.2 23.0 25.0 25.7  --  28.7   BUN mg/dL 25* 24* 18 16 16 18  --  22*   CREATININE mg/dL 1.46* 1.28* 1.25* 1.22* 1.28* 1.17*  --  1.09*   CALCIUM mg/dL 8.9 8.7 8.9 8.8 8.5* 8.8  --  8.6   GLUCOSE mg/dL 178* 351* 183* 245* 312* 169*  --  105*     Results from last 7 days   Lab Units 11/19/23  0153 11/18/23  0243 11/17/23  0351 11/13/23  2234 11/13/23  0711 11/12/23  2307   BILIRUBIN mg/dL 2.3* 2.3* 1.9* 2.5* 2.4* 2.7*   ALK PHOS " U/L 82 77 87 89 103 127*   AST (SGOT) U/L 18 20 18 26 20 21   ALT (SGPT) U/L 14 13 15 18 18 21     Results from last 7 days   Lab Units 11/19/23  0153 11/18/23  1418 11/18/23  0243 11/17/23  0351 11/16/23  0148 11/15/23  0638 11/13/23  2234   MAGNESIUM mg/dL 2.4 2.2 2.3 2.2 2.3 2.5 2.4     Results from last 7 days   Lab Units 11/15/23  2159 11/12/23  2307   INR  0.97 1.20*     Results from last 7 days   Lab Units 11/12/23  2307   CRP mg/dL 0.88*     Results from last 7 days   Lab Units 11/19/23  0153 11/18/23  1632 11/18/23  1418 11/13/23  0124 11/12/23  2307   CK TOTAL U/L  --   --   --   --  41   HSTROP T ng/L 127* 150* 152*   < > 93*    < > = values in this interval not displayed.           Assessment      1.  Paroxysmal atrial fibrillation.  S/p ECV.  In sinus rhythm.       S/p PVI ablation 10/2022.  2.  Acute on chronic heart failure with reduced EF.  3.  Nonischemic dilated cardiomyopathy.  EF 31 to 35%.  4.  NSTEMI type II.  Due to sustained tachyarrhythmia.  4.  Heparin-induced thrombocytopenia.  Improved.  Platelet count > 100.    Plan     1.  PAF-on metoprolol tartrate which was changed to succinate due to the presence of HFrEF.  Heparin-induced thrombocytopenia improved.  Fondaparinux is switched to Eliquis for anticoagulation for stroke prevention.    2.  HFrEF-patient has acute exacerbation.  Bumex 2 mg IV x1 given.  We will monitor I&O's, renal function and response.  GDMT-continue metoprolol succinate and empagliflozin.  If BP remains stable, will start hydralazine plus Isordil combination.  Not a candidate for ARNI or ACE inhibitor due to renal failure.      Kalyn Hess MD [facility]  11/19/23  15:52 EST

## 2023-11-19 NOTE — PLAN OF CARE
Goal Outcome Evaluation:     Patient resting in bed. Patient complained of pain, PRN medication provided, see MAR. Patient complained of nausea, PRN medication provided, see MAR. Trop trending, APRN aware, no new orders. No visible indicators of acute distress noted. Will continue to follow plan of care this shift.    Daily Care Plan Summary: Heart Failure    Diuretic in use (IV or PO):  N/A        Daily weight (up or down):    loss: 2 lbs      Output > Intake (yes/no):Yes      O2 Requirements (current, any change?):  1 liters/min via nasal cannula      Symptoms noted with Activity (Respiratory Tolerance, functional state):     Patient still requiring 1L NC at times for activity for oxygen saturation to remain >90%      Anticipated Discharge Plans:     Home or possible rehab

## 2023-11-19 NOTE — PLAN OF CARE
Goal Outcome Evaluation:    Pt is resting in a chair at bedside with no s/s of distress noted. VSS. IV access maintained. Life vest remained on patient throughout shift. Will continue with plan of care.     Daily Care Plan Summary: Heart Failure    Diuretic in use (IV or PO):   IV Bumex        Daily weight (up or down):    Down      Output > Intake (yes/no): Yes      O2 Requirements (current, any change?): 1 L NC      Symptoms noted with Activity (Respiratory Tolerance, functional state):    Some shortness of breath with ambulation but patient reports it is much better      Anticipated Discharge Plans:    Home when medically stable

## 2023-11-20 LAB
ABSOLUTE LUNG FLUID CONTENT: 36 % (ref 20–35)
ANION GAP SERPL CALCULATED.3IONS-SCNC: 10.2 MMOL/L (ref 5–15)
BUN SERPL-MCNC: 29 MG/DL (ref 6–20)
BUN/CREAT SERPL: 18.5 (ref 7–25)
CALCIUM SPEC-SCNC: 8.6 MG/DL (ref 8.6–10.5)
CHLORIDE SERPL-SCNC: 98 MMOL/L (ref 98–107)
CO2 SERPL-SCNC: 24.8 MMOL/L (ref 22–29)
CREAT SERPL-MCNC: 1.57 MG/DL (ref 0.57–1)
DEPRECATED RDW RBC AUTO: 86.7 FL (ref 37–54)
EGFRCR SERPLBLD CKD-EPI 2021: 38.6 ML/MIN/1.73
ERYTHROCYTE [DISTWIDTH] IN BLOOD BY AUTOMATED COUNT: 22.4 % (ref 12.3–15.4)
GLUCOSE BLDC GLUCOMTR-MCNC: 152 MG/DL (ref 70–130)
GLUCOSE BLDC GLUCOMTR-MCNC: 244 MG/DL (ref 70–130)
GLUCOSE BLDC GLUCOMTR-MCNC: 304 MG/DL (ref 70–130)
GLUCOSE BLDC GLUCOMTR-MCNC: 331 MG/DL (ref 70–130)
GLUCOSE BLDC GLUCOMTR-MCNC: 396 MG/DL (ref 70–130)
GLUCOSE SERPL-MCNC: 164 MG/DL (ref 65–99)
HCT VFR BLD AUTO: 29 % (ref 34–46.6)
HGB BLD-MCNC: 8.2 G/DL (ref 12–15.9)
MAGNESIUM SERPL-MCNC: 2.3 MG/DL (ref 1.6–2.6)
MCH RBC QN AUTO: 31.4 PG (ref 26.6–33)
MCHC RBC AUTO-ENTMCNC: 28.3 G/DL (ref 31.5–35.7)
MCV RBC AUTO: 111.1 FL (ref 79–97)
NT-PROBNP SERPL-MCNC: 2085 PG/ML (ref 0–900)
PHOSPHATE SERPL-MCNC: 4.7 MG/DL (ref 2.5–4.5)
PLATELET # BLD AUTO: 122 10*3/MM3 (ref 140–450)
PMV BLD AUTO: 11.4 FL (ref 6–12)
POTASSIUM SERPL-SCNC: 4.2 MMOL/L (ref 3.5–5.2)
RBC # BLD AUTO: 2.61 10*6/MM3 (ref 3.77–5.28)
SODIUM SERPL-SCNC: 133 MMOL/L (ref 136–145)
WBC NRBC COR # BLD AUTO: 8.44 10*3/MM3 (ref 3.4–10.8)

## 2023-11-20 PROCEDURE — 25010000002 ONDANSETRON PER 1 MG

## 2023-11-20 PROCEDURE — 85027 COMPLETE CBC AUTOMATED: CPT | Performed by: INTERNAL MEDICINE

## 2023-11-20 PROCEDURE — 82948 REAGENT STRIP/BLOOD GLUCOSE: CPT

## 2023-11-20 PROCEDURE — 99232 SBSQ HOSP IP/OBS MODERATE 35: CPT | Performed by: INTERNAL MEDICINE

## 2023-11-20 PROCEDURE — 99221 1ST HOSP IP/OBS SF/LOW 40: CPT | Performed by: NURSE PRACTITIONER

## 2023-11-20 PROCEDURE — 83880 ASSAY OF NATRIURETIC PEPTIDE: CPT | Performed by: INTERNAL MEDICINE

## 2023-11-20 PROCEDURE — 83735 ASSAY OF MAGNESIUM: CPT | Performed by: INTERNAL MEDICINE

## 2023-11-20 PROCEDURE — 63710000001 INSULIN GLARGINE PER 5 UNITS: Performed by: INTERNAL MEDICINE

## 2023-11-20 PROCEDURE — 99232 SBSQ HOSP IP/OBS MODERATE 35: CPT | Performed by: STUDENT IN AN ORGANIZED HEALTH CARE EDUCATION/TRAINING PROGRAM

## 2023-11-20 PROCEDURE — 80048 BASIC METABOLIC PNL TOTAL CA: CPT | Performed by: INTERNAL MEDICINE

## 2023-11-20 PROCEDURE — 63710000001 INSULIN GLARGINE PER 5 UNITS: Performed by: STUDENT IN AN ORGANIZED HEALTH CARE EDUCATION/TRAINING PROGRAM

## 2023-11-20 PROCEDURE — 94726 PLETHYSMOGRAPHY LUNG VOLUMES: CPT

## 2023-11-20 PROCEDURE — 84100 ASSAY OF PHOSPHORUS: CPT | Performed by: INTERNAL MEDICINE

## 2023-11-20 PROCEDURE — 63710000001 INSULIN LISPRO (HUMAN) PER 5 UNITS: Performed by: INTERNAL MEDICINE

## 2023-11-20 PROCEDURE — 97530 THERAPEUTIC ACTIVITIES: CPT

## 2023-11-20 PROCEDURE — 97116 GAIT TRAINING THERAPY: CPT

## 2023-11-20 RX ORDER — BUMETANIDE 1 MG/1
2 TABLET ORAL DAILY
Status: DISCONTINUED | OUTPATIENT
Start: 2023-11-20 | End: 2023-11-21 | Stop reason: HOSPADM

## 2023-11-20 RX ADMIN — ACETAMINOPHEN 500 MG: 500 TABLET ORAL at 23:16

## 2023-11-20 RX ADMIN — Medication 10 ML: at 20:32

## 2023-11-20 RX ADMIN — APIXABAN 5 MG: 5 TABLET, FILM COATED ORAL at 08:53

## 2023-11-20 RX ADMIN — ACETAMINOPHEN 500 MG: 500 TABLET ORAL at 12:05

## 2023-11-20 RX ADMIN — MICONAZOLE NITRATE 1 APPLICATION: 2 POWDER TOPICAL at 08:54

## 2023-11-20 RX ADMIN — PREGABALIN 150 MG: 75 CAPSULE ORAL at 20:31

## 2023-11-20 RX ADMIN — BUSPIRONE HYDROCHLORIDE 10 MG: 10 TABLET ORAL at 08:53

## 2023-11-20 RX ADMIN — MICONAZOLE NITRATE 1 APPLICATION: 2 POWDER TOPICAL at 20:32

## 2023-11-20 RX ADMIN — APIXABAN 5 MG: 5 TABLET, FILM COATED ORAL at 20:31

## 2023-11-20 RX ADMIN — BUMETANIDE 2 MG: 1 TABLET ORAL at 14:40

## 2023-11-20 RX ADMIN — INSULIN LISPRO 12 UNITS: 100 INJECTION, SOLUTION INTRAVENOUS; SUBCUTANEOUS at 17:43

## 2023-11-20 RX ADMIN — POLYETHYLENE GLYCOL (3350) 17 G: 17 POWDER, FOR SOLUTION ORAL at 05:42

## 2023-11-20 RX ADMIN — PANTOPRAZOLE SODIUM 40 MG: 40 TABLET, DELAYED RELEASE ORAL at 05:41

## 2023-11-20 RX ADMIN — INSULIN LISPRO 13 UNITS: 100 INJECTION, SOLUTION INTRAVENOUS; SUBCUTANEOUS at 08:52

## 2023-11-20 RX ADMIN — EMPAGLIFLOZIN 10 MG: 10 TABLET, FILM COATED ORAL at 08:53

## 2023-11-20 RX ADMIN — INSULIN LISPRO 10 UNITS: 100 INJECTION, SOLUTION INTRAVENOUS; SUBCUTANEOUS at 20:31

## 2023-11-20 RX ADMIN — ONDANSETRON 4 MG: 2 INJECTION INTRAMUSCULAR; INTRAVENOUS at 23:16

## 2023-11-20 RX ADMIN — Medication 10 ML: at 08:54

## 2023-11-20 RX ADMIN — ERYTHROMYCIN 1 APPLICATION: 5 OINTMENT OPHTHALMIC at 12:38

## 2023-11-20 RX ADMIN — FLUTICASONE PROPIONATE 2 SPRAY: 50 SPRAY, METERED NASAL at 08:54

## 2023-11-20 RX ADMIN — INSULIN GLARGINE 40 UNITS: 100 INJECTION, SOLUTION SUBCUTANEOUS at 20:31

## 2023-11-20 RX ADMIN — DOCUSATE SODIUM 50 MG AND SENNOSIDES 8.6 MG 2 TABLET: 8.6; 5 TABLET, FILM COATED ORAL at 08:53

## 2023-11-20 RX ADMIN — DOCUSATE SODIUM 50 MG AND SENNOSIDES 8.6 MG 2 TABLET: 8.6; 5 TABLET, FILM COATED ORAL at 20:31

## 2023-11-20 RX ADMIN — INSULIN LISPRO 13 UNITS: 100 INJECTION, SOLUTION INTRAVENOUS; SUBCUTANEOUS at 17:43

## 2023-11-20 RX ADMIN — INSULIN LISPRO 13 UNITS: 100 INJECTION, SOLUTION INTRAVENOUS; SUBCUTANEOUS at 12:37

## 2023-11-20 RX ADMIN — ASPIRIN 81 MG: 81 TABLET, COATED ORAL at 08:53

## 2023-11-20 RX ADMIN — FERROUS SULFATE TAB 325 MG (65 MG ELEMENTAL FE) 325 MG: 325 (65 FE) TAB at 20:31

## 2023-11-20 RX ADMIN — INSULIN LISPRO 10 UNITS: 100 INJECTION, SOLUTION INTRAVENOUS; SUBCUTANEOUS at 12:37

## 2023-11-20 RX ADMIN — FERROUS SULFATE TAB 325 MG (65 MG ELEMENTAL FE) 325 MG: 325 (65 FE) TAB at 08:53

## 2023-11-20 RX ADMIN — INSULIN GLARGINE 35 UNITS: 100 INJECTION, SOLUTION SUBCUTANEOUS at 08:52

## 2023-11-20 RX ADMIN — LEVOTHYROXINE SODIUM 50 MCG: 50 TABLET ORAL at 05:41

## 2023-11-20 RX ADMIN — PREGABALIN 150 MG: 75 CAPSULE ORAL at 08:53

## 2023-11-20 RX ADMIN — Medication 10 ML: at 20:31

## 2023-11-20 RX ADMIN — BUSPIRONE HYDROCHLORIDE 10 MG: 10 TABLET ORAL at 20:31

## 2023-11-20 RX ADMIN — INSULIN LISPRO 5 UNITS: 100 INJECTION, SOLUTION INTRAVENOUS; SUBCUTANEOUS at 08:52

## 2023-11-20 RX ADMIN — LIDOCAINE 2 PATCH: 4 PATCH TOPICAL at 08:53

## 2023-11-20 NOTE — CASE MANAGEMENT/SOCIAL WORK
Discharge Planning Assessment  [facility]     Patient Name: Yumiko Purdy  MRN: [id number]  Today's Date: 11/20/2023    Admit Date: 11/12/2023    Plan: SS spoke with pt in room on this date.  Pt sitting in bedside chair.  Pt resides at home with son and plans to return home at discharge.  Pt hoping to be able to return home prior to Thanksgiving.  Pt currently utilizes [facility].  [facility] will need new referral with face to face at discharge.  Pt has rolling walker, w/c and home 02 at 2 liters.  SS will follow and assist with discharge needs.       Discharge Plan       Row Name 11/20/23 1502       Plan    Plan SS spoke with pt in room on this date.  Pt sitting in bedside chair.  Pt resides at home with son and plans to return home at discharge.  Pt hoping to be able to return home prior to Thanksgiving.  Pt currently utilizes [facility].  [facility] will need new referral with face to face at discharge.  Pt has rolling walker, w/c and home 02 at 2 liters.  SS will follow and assist with discharge needs.                  Continued Care and Services - Admitted Since 11/12/2023    Coordination has not been started for this encounter.       Expected Discharge Date and Time       Expected Discharge Date Expected Discharge Time    Nov 18, 2023        Sonya Lindquist, MGW

## 2023-11-20 NOTE — NURSING NOTE
Daily Care Plan Summary: Heart Failure    Diuretic in use (IV or PO):   IV        Daily weight (up or down):   Down      Output > Intake (yes/no):Yes      O2 Requirements (current, any change?):  2 liters/min via nasal cannula      Symptoms noted with Activity (Respiratory Tolerance, functional state):     SOA with activity.      Anticipated Discharge Plans:     Home

## 2023-11-20 NOTE — PAYOR COMM NOTE
"[facility]  [place]  PHONE  [phone]  FAX  [phone]  NPI:  [id number]    CLINICAL UPDATE  AUTH#[id number]    Dio Berry (57 y.o. Female)       Date of Birth   1965    Social Security Number       Address   [address]  [place] [address]    Home Phone   [phone]    MRN   [id number]       Catholic   Taoism    Marital Status                               Admission Date   11/12/23    Admission Type   Emergency    Admitting Provider   Tomás An MD    Attending Provider   Yulia Mo DO    Department, Room/Bed   [facility] 3 [facility], 3314/1S       Discharge Date       Discharge Disposition       Discharge Destination                                 Attending Provider: Yulia Mo DO    Allergies: Phenergan [Promethazine]    Isolation: None   Infection: None   Code Status: CPR    Ht: 165.1 cm (65\")   Wt: 107 kg (236 lb 6.4 oz)    Admission Cmt: None   Principal Problem: Ventricular tachycardia [I47.20]                   Active Insurance as of 11/12/2023       Primary Coverage       Payor Plan Insurance Group Employer/Plan Group    [employer] [place] [employer] [place]ICA[place]        Payor Plan Address Payor Plan Phone Number Payor Plan Fax Number Effective Dates    [address] [address] [phone]  9/9/2020 - None Entered    [facility] [id number]         Subscriber Name Subscriber Birth Date Member ID       DIO BERRY 1965 [id number]                     Emergency Contacts        (Rel.) Home Phone Work Phone Mobile Phone    KATHY BERRY (Son) [phone] -- --    GurwinderBolivar (Other) -- -- [phone]              Current Facility-Administered Medications   Medication Dose Route Frequency Provider Last Rate Last Admin    [Held by provider] acetaminophen (TYLENOL) suppository 650 mg  650 mg Rectal Q6H PRN Dave Bishop MD        acetaminophen (TYLENOL) tablet 500 mg  500 mg Oral BID PRN Dave Bishop MD   500 mg at 11/19/23 2032    " apixaban (ELIQUIS) tablet 5 mg  5 mg Oral Q12H Kalyn Hess MD   5 mg at 11/20/23 0853    aspirin EC tablet 81 mg  81 mg Oral Daily Dave Bishop MD   81 mg at 11/20/23 0853    benzonatate (TESSALON) capsule 100 mg  100 mg Oral BID PRN Dave Bishop MD        sennosides-docusate (PERICOLACE) 8.6-50 MG per tablet 2 tablet  2 tablet Oral BID Dave Bishop MD   2 tablet at 11/20/23 0853    And    polyethylene glycol (MIRALAX) packet 17 g  17 g Oral Daily PRN Dave Bishop MD   17 g at 11/20/23 0542    And    bisacodyl (DULCOLAX) EC tablet 5 mg  5 mg Oral Daily PRN Dave Bishop MD        And    bisacodyl (DULCOLAX) suppository 10 mg  10 mg Rectal Daily PRN Dave Bishop MD        busPIRone (BUSPAR) tablet 10 mg  10 mg Oral BID Dave Bishop MD   10 mg at 11/20/23 0853    Calcium Replacement - Follow Nurse / BPA Driven Protocol   Does not apply PRN Dave Bishop MD        dextrose (D50W) (25 g/50 mL) IV injection 25 g  25 g Intravenous Q15 Min PRN Dave Bishop MD        dextrose (GLUTOSE) oral gel 15 g  15 g Oral Q15 Min PRN Dave Bishop MD        empagliflozin (JARDIANCE) tablet 10 mg  10 mg Oral Daily Dave Bishop MD   10 mg at 11/20/23 0853    erythromycin (ROMYCIN) ophthalmic ointment 1 application   1 application  Both Eyes Q6H Dave Bishop MD   1 application  at 11/19/23 1714    ferrous sulfate tablet 325 mg  325 mg Oral BID Dave Bishop MD   325 mg at 11/20/23 0853    fluticasone (FLONASE) 50 MCG/ACT nasal spray 2 spray  2 spray Nasal Daily Dave Bishop MD   2 spray at 11/20/23 0854    glucagon HCl (Diagnostic) injection 1 mg  1 mg Intramuscular Q15 Min PRN Dave Bishop MD        insulin glargine (LANTUS, SEMGLEE) injection 35 Units  35 Units Subcutaneous Q12H Dave Bishop MD   35 Units at 11/20/23 0852    Insulin Lispro (humaLOG) injection 13 Units  13 Units Subcutaneous TID With Meals Dave Bishop MD   13 Units at  11/20/23 0852    Insulin Lispro (humaLOG) injection 3-14 Units  3-14 Units Subcutaneous 4x Daily AC & at Bedtime Dave Bishop MD   5 Units at 11/20/23 0852    levothyroxine (SYNTHROID, LEVOTHROID) tablet 50 mcg  50 mcg Oral Q AM Dave Bishop MD   50 mcg at 11/20/23 0541    Lidocaine 4 % 2 patch  2 patch Transdermal Q24H Dave Bishop MD   2 patch at 11/20/23 0853    Magnesium Standard Dose Replacement - Follow Nurse / BPA Driven Protocol   Does not apply PRN Dave Bishop MD        metoprolol succinate XL (TOPROL-XL) 24 hr tablet 12.5 mg  12.5 mg Oral Q24H Dave Bishop MD        miconazole (MICOTIN) 2 % powder 1 application   1 application  Topical Q12H Dave Bishop MD   1 application  at 11/20/23 0854    nitroglycerin (NITROSTAT) SL tablet 0.4 mg  0.4 mg Sublingual Q5 Min PRN Dave Bishop MD        ondansetron (ZOFRAN) injection 4 mg  4 mg Intravenous Q6H PRN Hiren Strickland APRN   4 mg at 11/19/23 0228    pantoprazole (PROTONIX) EC tablet 40 mg  40 mg Oral Q AM Dave Bishop MD   40 mg at 11/20/23 0541    Pharmacy Consult   Does not apply Once Dave Bishop MD        Pharmacy Consult   Does not apply Continuous PRN Dave Bishop MD        Phosphorus Replacement - Follow Nurse / BPA Driven Protocol   Does not apply PRN Dave Bishop MD        Potassium Replacement - Follow Nurse / BPA Driven Protocol   Does not apply PRN Dave Bishop MD        pregabalin (LYRICA) capsule 150 mg  150 mg Oral BID Dave Bishop MD   150 mg at 11/20/23 0853    sodium chloride 0.9 % flush 10 mL  10 mL Intravenous PRN Dave Bishop MD        sodium chloride 0.9 % flush 10 mL  10 mL Intravenous Q12H Dave Bishop MD   10 mL at 11/19/23 2034    sodium chloride 0.9 % flush 10 mL  10 mL Intravenous PRN Dave Bishop MD        sodium chloride 0.9 % flush 10 mL  10 mL Intravenous Q12H Dave Bishpo MD   10 mL at 11/20/23 0854    sodium chloride 0.9 % flush  10 mL  10 mL Intravenous PRN Dave Bishop MD        sodium chloride 0.9 % infusion 40 mL  40 mL Intravenous PRN Dave Bishop MD        sodium chloride 0.9 % infusion 40 mL  40 mL Intravenous PRN Dave Bishop MD        [Held by provider] valsartan (DIOVAN) tablet 40 mg  40 mg Oral Q24H Dave Bishop MD   40 mg at 11/14/23 1408     Orders (last 24 hrs)        Start     Ordered    11/20/23 1000  ReDs Vest  Once         11/19/23 1702    11/20/23 0900  apixaban (ELIQUIS) tablet 5 mg  Every 12 Hours Scheduled         11/19/23 0922    11/20/23 0900  metoprolol succinate XL (TOPROL-XL) 24 hr tablet 12.5 mg  Every 24 Hours Scheduled         11/19/23 1710    11/20/23 0713  POC Glucose Once  PROCEDURE ONCE        Comments: Complete no more than 45 minutes prior to patient eating      11/20/23 0706    11/20/23 0600  BNP  Once         11/19/23 1702    11/20/23 0600  CBC (No Diff)  Morning Draw         11/19/23 1702    11/20/23 0600  Phosphorus  Morning Draw         11/19/23 1702    11/20/23 0600  Magnesium  Morning Draw         11/19/23 1702    11/20/23 0600  Basic Metabolic Panel  Morning Draw         11/19/23 1924    11/20/23 0042  POC Glucose Once  PROCEDURE ONCE        Comments: Complete no more than 45 minutes prior to patient eating      11/20/23 0035    11/20/23 0000  Inpatient Hematology & Oncology Consult  Once        Specialty:  Hematology and Oncology  Provider:  SOPHIA Walden MD    11/18/23 1728    11/19/23 2300  morphine injection 2 mg  Once         11/19/23 2202    11/19/23 1928  POC Glucose Once  PROCEDURE ONCE        Comments: Complete no more than 45 minutes prior to patient eating      11/19/23 1921    11/19/23 1800  aspirin EC tablet 81 mg  Daily         11/19/23 1701    11/19/23 1800  Insulin Lispro (humaLOG) injection 13 Units  3 Times Daily With Meals         11/19/23 1713    11/19/23 1715  Orthostatic Blood Pressure  Once         11/19/23 1715    11/19/23 1700  POC Glucose Once   PROCEDURE ONCE        Comments: Complete no more than 45 minutes prior to patient eating      11/19/23 1636    11/19/23 1645  metoprolol succinate XL (TOPROL-XL) 24 hr tablet 25 mg  Every 24 Hours Scheduled,   Status:  Discontinued         11/19/23 1557    11/19/23 1147  POC Glucose Once  PROCEDURE ONCE        Comments: Complete no more than 45 minutes prior to patient eating      11/19/23 1130    11/19/23 0900  insulin glargine (LANTUS, SEMGLEE) injection 35 Units  Every 12 Hours Scheduled         11/18/23 2053    11/18/23 2200  POC Glucose 4x Daily Before Meals & at Bedtime  4 Times Daily Before Meals & at Bedtime      Comments: Complete no more than 45 minutes prior to patient eating      11/18/23 2055 11/18/23 2100  metoprolol tartrate (LOPRESSOR) tablet 12.5 mg  Every 12 Hours Scheduled,   Status:  Discontinued         11/18/23 1726    11/18/23 1800  Insulin Lispro (humaLOG) injection 10 Units  3 Times Daily With Meals,   Status:  Discontinued         11/18/23 1326    11/18/23 1730  Insulin Lispro (humaLOG) injection 3-14 Units  4 Times Daily Before Meals & Nightly         11/18/23 1326    11/17/23 0203  ondansetron (ZOFRAN) injection 4 mg  Every 6 Hours PRN         11/17/23 0203    11/16/23 0800  Dietary Nutrition Supplements Boost Plus (Ensure Enlive, Ensure Plus); vanilla  Daily With Breakfast, Lunch & Dinner       11/15/23 2253    11/15/23 2345  Pharmacy Consult  Once         11/15/23 2251    11/15/23 2255  Pharmacy Consult  Continuous PRN         11/15/23 2255    11/15/23 0000  Ambulatory Referral to Heart Failure Clinic         11/15/23 1636    11/14/23 2100  miconazole (MICOTIN) 2 % powder 1 application   Every 12 Hours Scheduled         11/14/23 1758    11/14/23 1145  [Held by provider]  valsartan (DIOVAN) tablet 40 mg  Every 24 Hours Scheduled        (On hold since Wed 11/15/2023 at 2244 until manually unheld; held by Dave Bishop MDHold Reason: Abnormal Labs)    11/14/23 1045    11/14/23 1145   empagliflozin (JARDIANCE) tablet 10 mg  Daily         11/14/23 1045    11/14/23 0900  fluticasone (FLONASE) 50 MCG/ACT nasal spray 2 spray  Daily         11/13/23 2148 11/14/23 0900  Lidocaine 4 % 2 patch  Every 24 Hours Scheduled         11/13/23 2204 11/14/23 0600  levothyroxine (SYNTHROID, LEVOTHROID) tablet 50 mcg  Every Early Morning         11/13/23 2148 11/14/23 0600  pantoprazole (PROTONIX) EC tablet 40 mg  Every Early Morning         11/13/23 2148 11/14/23 0340  Calcium Replacement - Follow Nurse / BPA Driven Protocol  As Needed         11/14/23 0340 11/14/23 0340  Potassium Replacement - Follow Nurse / BPA Driven Protocol  As Needed         11/14/23 0340 11/14/23 0340  Magnesium Standard Dose Replacement - Follow Nurse / BPA Driven Protocol  As Needed         11/14/23 0340 11/14/23 0340  Phosphorus Replacement - Follow Nurse / BPA Driven Protocol  As Needed         11/14/23 0340 11/14/23 0045  sodium chloride 0.9 % flush 10 mL  Every 12 Hours Scheduled         11/13/23 2350 11/13/23 2350  sodium chloride 0.9 % flush 10 mL  As Needed         11/13/23 2350 11/13/23 2350  sodium chloride 0.9 % infusion 40 mL  As Needed         11/13/23 2350 11/13/23 2300  pregabalin (LYRICA) capsule 150 mg  2 Times Daily         11/13/23 2148 11/13/23 2300  busPIRone (BUSPAR) tablet 10 mg  2 Times Daily         11/13/23 2148 11/13/23 2300  erythromycin (ROMYCIN) ophthalmic ointment 1 application   Every 6 Hours         11/13/23 2148 11/13/23 2300  ferrous sulfate tablet 325 mg  2 Times Daily         11/13/23 2148 11/13/23 2204  [Held by provider]  acetaminophen (TYLENOL) suppository 650 mg  Every 6 Hours PRN        (On hold since Fri 11/17/2023 at 1101 until manually unheld; held by Dave Bishop MDHold Reason: Other (Comment Required)Hold Comments: No problems with swallowing)    11/13/23 2204 11/13/23 2148  acetaminophen (TYLENOL) tablet 500 mg  2 Times Daily PRN          11/13/23 2148 11/13/23 2148  benzonatate (TESSALON) capsule 100 mg  2 Times Daily PRN         11/13/23 2148 11/13/23 1800  POC Glucose Q6H  Every 6 Hours,   Status:  Canceled      Comments: Complete no more than 45 minutes prior to patient eating      11/13/23 1259    11/13/23 0900  sodium chloride 0.9 % flush 10 mL  Every 12 Hours Scheduled         11/13/23 0625    11/13/23 0900  sennosides-docusate (PERICOLACE) 8.6-50 MG per tablet 2 tablet  2 Times Daily        See Hyperspace for full Linked Orders Report.    11/13/23 0625    11/13/23 0800  Intake & Output  Every 4 Hours       11/13/23 0625    11/13/23 0700  Vital Signs Every Hour and Per Hospital Policy Based on Patient Condition  Every Hour       11/13/23 0625    11/13/23 0626  Daily Weights  Daily       11/13/23 0625    11/13/23 0626  Oral Care - Patient Not on NPPV & Not Intubated  Every Shift       11/13/23 0625    11/13/23 0625  nitroglycerin (NITROSTAT) SL tablet 0.4 mg  Every 5 Minutes PRN         11/13/23 0625    11/13/23 0625  sodium chloride 0.9 % flush 10 mL  As Needed         11/13/23 0625    11/13/23 0625  sodium chloride 0.9 % infusion 40 mL  As Needed         11/13/23 0625    11/13/23 0625  polyethylene glycol (MIRALAX) packet 17 g  Daily PRN        See Hyperspace for full Linked Orders Report.    11/13/23 0625    11/13/23 0625  bisacodyl (DULCOLAX) EC tablet 5 mg  Daily PRN        See Hyperspace for full Linked Orders Report.    11/13/23 0625    11/13/23 0625  bisacodyl (DULCOLAX) suppository 10 mg  Daily PRN        See Hyperspace for full Linked Orders Report.    11/13/23 0625    11/13/23 0551  dextrose (GLUTOSE) oral gel 15 g  Every 15 Minutes PRN         11/13/23 0552    11/13/23 0551  dextrose (D50W) (25 g/50 mL) IV injection 25 g  Every 15 Minutes PRN         11/13/23 0552    11/13/23 0551  glucagon HCl (Diagnostic) injection 1 mg  Every 15 Minutes PRN         11/13/23 0552    11/12/23 2241  sodium chloride 0.9 % flush 10 mL  As Needed         See Hyperspace for full Linked Orders Report.    11/12/23 2241    Unscheduled  Follow Hypoglycemia Standing Orders For Blood Glucose <70 & Notify Provider of Treatment  As Needed      Comments: Follow Hypoglycemia Orders As Outlined in Process Instructions (Open Order Report to View Full Instructions)  Notify Provider Any Time Hypoglycemia Treatment is Administered    11/13/23 0552    Unscheduled  Change Dressing to IV Site As Needed When Damp, Loose or Soiled  As Needed       11/13/23 2350    Unscheduled  Change Needleless Connectors  As Needed      Comments: Change Needleless Connectors When:  - Administration Set Changed  - Dressing Changed  - Removed For Any Reason  - Residual Blood or Debris Within Connector  - Prior to Drawing Blood Cultures  - Contamination of Connector  - After Administration of Blood or Blood Components    11/13/23 2350    Unscheduled  Insert New Peripheral IV  As Needed      Comments: Frequency Per Facility Policy    11/13/23 2350    --  azelastine (ASTELIN) 0.1 % nasal spray  2 Times Daily         11/12/23 2349    --  busPIRone (BUSPAR) 10 MG tablet  2 Times Daily         11/12/23 2353    --  erythromycin (ROMYCIN) 5 MG/GM ophthalmic ointment  Every 6 Hours         11/12/23 2353    --  fluticasone (FLONASE) 50 MCG/ACT nasal spray  Daily         11/12/23 2353    --  pregabalin (LYRICA) 150 MG capsule  2 Times Daily         11/12/23 2354    --  bumetanide (BUMEX) 2 MG tablet  See Admin Instructions         11/13/23 1015    --  bumetanide (BUMEX) 2 MG tablet  Every 7 Days         11/13/23 1015    --  nitroglycerin (NITROSTAT) 0.4 MG SL tablet  Every 5 Minutes PRN         11/13/23 1024    --  omeprazole (priLOSEC) 20 MG capsule  Daily         11/13/23 1024    --  benzonatate (TESSALON) 100 MG capsule  2 Times Daily PRN         11/13/23 1052    --  SCANNED - TELEMETRY           11/12/23 0000    --  SCANNED - TELEMETRY           11/12/23 0000    --  SCANNED - TELEMETRY           11/12/23  0000    --  SCANNED - TELEMETRY           23 0000    --  SCANNED - TELEMETRY           23 0000    --  SCANNED - TELEMETRY           23 0000    --  SCANNED - TELEMETRY           23 0000    --  SCANNED - TELEMETRY           23 0000    --  SCANNED - TELEMETRY           23 0000    --  SCANNED - TELEMETRY           23 0000    --  SCANNED - TELEMETRY           23 0000    --  SCANNED - TELEMETRY           23 0000    --  SCANNED - TELEMETRY           23 0000    --  SCANNED - TELEMETRY           23 0000    --  SCANNED - TELEMETRY           23 0000    --  SCANNED - TELEMETRY           23 0000    --  SCANNED - TELEMETRY           23 0000    --  SCANNED - TELEMETRY           23 0000                     Physician Progress Notes (last 72 hours)        Dave Bishop MD at 23 1702              [facility] HOSPITALIST PROGRESS NOTE     Patient Identification:  Name:  Yumiko Purdy  Age:  57 y.o.  Sex:  female  :  1965  MRN:  [id number]  Visit Number:  [id number]  ROOM: [address] [place] [address]     Primary Care Provider:  Masha Davidson APRN     Date of Admission: 2023    Length of stay in inpatient status:  6    Subjective     Chief Compliant:    Chief Complaint   Patient presents with    Rapid Heart Rate     History of Presenting Illness: The patient was sitting in bedside chair when I saw her.  She was eating her dinner.  Patient states that she transition to the chair when okay that she is still is not able to perform her activities of daily living.  Patient's son did come and visit the patient today but the patient did not inform anybody that he was there.  I attempted to call her son today but the only phone number listed is to the phone that she has in her hospital room.  She communicates to her son via [employer] Messenger because they have one phone.  Today, the patient does not have any shortness of air, chest  pain, nausea, vomiting.  She thinks that the Bumex helped her breath better today.    Objective     Current Hospital Meds:  [START ON 11/20/2023] apixaban, 5 mg, Oral, Q12H  aspirin, 81 mg, Oral, Daily  busPIRone, 10 mg, Oral, BID  empagliflozin, 10 mg, Oral, Daily  erythromycin, 1 application , Both Eyes, Q6H  ferrous sulfate, 325 mg, Oral, BID  fluticasone, 2 spray, Nasal, Daily  insulin glargine, 35 Units, Subcutaneous, Q12H  insulin lispro, 10 Units, Subcutaneous, TID With Meals  insulin lispro, 3-14 Units, Subcutaneous, 4x Daily AC & at Bedtime  levothyroxine, 50 mcg, Oral, Q AM  Lidocaine, 2 patch, Transdermal, Q24H  metoprolol succinate XL, 25 mg, Oral, Q24H  miconazole, 1 application , Topical, Q12H  pantoprazole, 40 mg, Oral, Q AM  Pharmacy Consult, , Does not apply, Once  pregabalin, 150 mg, Oral, BID  senna-docusate sodium, 2 tablet, Oral, BID  sodium chloride, 10 mL, Intravenous, Q12H  sodium chloride, 10 mL, Intravenous, Q12H  [Held by provider] valsartan, 40 mg, Oral, Q24H    Pharmacy Consult,       Current Antimicrobial Therapy:  Anti-Infectives (From admission, onward)      Ordered     Dose/Rate Route Frequency Start Stop    11/12/23 2336  cefepime 2000 mg IVPB in 100 ml NS (VTB)        Ordering Provider: Juanita Box DO    2,000 mg  over 30 Minutes Intravenous Once 11/12/23 2352 11/13/23 0104    11/12/23 2336  doxycycline (VIBRAMYCIN) 100 mg in sodium chloride 0.9 % 100 mL IVPB-VTB        Ordering Provider: Juanita Box DO    100 mg  over 60 Minutes Intravenous Once 11/12/23 2352 11/13/23 0125          Current Diuretic Therapy:  Diuretics (From admission, onward)      Ordered     Dose/Rate Route Frequency Start Stop    11/19/23 0920  bumetanide (BUMEX) injection 2 mg        Ordering Provider: Kalyn Hess MD    2 mg Intravenous Once 11/19/23 1015 11/19/23 1012    11/14/23 1728  furosemide (LASIX) injection 60 mg        Ordering Provider: Ramon Sahu MD    60 mg Intravenous Once  11/14/23 1815 11/14/23 1804          ----------------------------------------------------------------------------------------------------------------------  Vital Signs:  Temp:  [98.1 °F (36.7 °C)-99.3 °F (37.4 °C)] 99.1 °F (37.3 °C)  Heart Rate:  [65-76] 74  Resp:  [18-20] 20  BP: (101-112)/(43-64) 110/46  SpO2:  [93 %-98 %] 96 %  on  Flow (L/min):  [1] 1;   Device (Oxygen Therapy): nasal cannula  Body mass index is 39.14 kg/m².    Wt Readings from Last 3 Encounters:   11/19/23 107 kg (235 lb 3.2 oz)   10/15/23 113 kg (250 lb)   09/29/23 109 kg (240 lb)     Intake & Output (last 3 days)         11/16 0701  11/17 0700 11/17 0701  11/18 0700 11/18 0701  11/19 0700 11/19 0701  11/20 0700    P.O.  120    Total Intake(mL/kg) 660 (6.2) 360 (3.4) 1080 (10.2) 120 (1.1)    Urine (mL/kg/hr) 2325 (0.9) 1900 (0.7) 2850 (1.1) 1200 (1.1)    Total Output 2325 1900 2850 1200    Net -6451 -2409 -8790 -1080            Urine Unmeasured Occurrence 1 x 2 x 1 x           Diet: Regular/House Diet, Diabetic Diets; Consistent Carbohydrate; Texture: Soft to Chew (NDD 3); Soft to Chew: Whole Meat; Fluid Consistency: Thin (IDDSI 0)  ----------------------------------------------------------------------------------------------------------------------  Physical Exam  She is alert, oriented, no acute distress, lungs are clear without any crackles and without any wheezes.  No edema is seen.  No slurred speech, no facial droop.   ----------------------------------------------------------------------------------------------------------------------  Tele:  NS with heart rates 70's, oxygen sats dropped to the 80% range while asleep.  I have personally reviewed/looked at the telemetry strips.   ----------------------------------------------------------------------------------------------------------------------  LABS:    CBC and coagulation:  Results from last 7 days   Lab Units 11/19/23  0153 11/18/23  1418 11/18/23  0243 11/17/23  0507  11/16/23  0148 11/15/23  2159 11/15/23  0638 11/13/23  2234 11/13/23  1319 11/13/23  1116 11/13/23  0711 11/13/23  0642 11/13/23  0309 11/12/23  2350 11/12/23  2307   LACTATE mmol/L  --  1.8  --   --   --   --   --   --  2.0 2.5*  --  2.8* 2.5* 3.7*  --    CRP mg/dL  --   --   --   --   --   --   --   --   --   --   --   --   --   --  0.88*   WBC 10*3/mm3 9.24  --  9.06 9.89 7.38  --  8.88 10.82*  --   --  13.11*  --   --   --  15.57*   HEMOGLOBIN g/dL 8.3*  --  8.0* 8.7* 8.0*  --  8.5* 7.9*  --   --  8.9*  --   --   --  8.4*   HEMATOCRIT % 29.7*  --  27.6* 30.9* 27.0*  --  26.9* 26.2*  --   --  29.6*  --   --   --  26.7*   MCV fL 112.5*  --  114.0* 109.2* 114.4*  --  115.0* 114.9*  --   --  114.3*  --   --   --  113.6*   MCHC g/dL 27.9*  --  29.0* 28.2* 29.6*  --  31.6 30.2*  --   --  30.1*  --   --   --  31.5   PLATELETS 10*3/mm3 112*  --  82* 72* 76*  --  77* 99*  --   --  118*  --   --   --  142   INR   --   --   --   --   --  0.97  --   --   --   --   --   --   --   --  1.20*   D DIMER QUANT MCGFEU/mL  --   --   --   --  0.28 <0.27  --   --   --   --   --   --   --   --   --      Renal and electrolytes:  Results from last 7 days   Lab Units 11/19/23  0153 11/18/23  1418 11/18/23  0243 11/17/23  0351 11/16/23  0148 11/15/23  0638 11/14/23  1407 11/13/23  2234   SODIUM mmol/L 139 135* 137 137 136 135*  --  139   POTASSIUM mmol/L 4.6 5.0 4.8 4.6 4.2 3.7 4.2 3.4*   MAGNESIUM mg/dL 2.4 2.2 2.3 2.2 2.3 2.5  --  2.4   CHLORIDE mmol/L 102 100 103 103 101 100  --  103   CO2 mmol/L 25.6 25.1 25.2 23.0 25.0 25.7  --  28.7   BUN mg/dL 25* 24* 18 16 16 18  --  22*   CREATININE mg/dL 1.46* 1.28* 1.25* 1.22* 1.28* 1.17*  --  1.09*   CALCIUM mg/dL 8.9 8.7 8.9 8.8 8.5* 8.8  --  8.6   PHOSPHORUS mg/dL 4.5  --  3.5 3.6 3.7 3.9  --  3.5   GLUCOSE mg/dL 178* 351* 183* 245* 312* 169*  --  105*   ANION GAP mmol/L 11.4 9.9 8.8 11.0 10.0 9.3  --  7.3     Estimated Creatinine Clearance: 52.3 mL/min (A) (by C-G formula based on SCr of  1.46 mg/dL (H)).    Liver and pancreatic function:  Results from last 7 days   Lab Units 11/19/23  0153 11/18/23  0243 11/17/23  0351 11/13/23  2234 11/13/23  0711 11/12/23  2350 11/12/23  2307   ALBUMIN g/dL 3.5 3.3* 3.4* 3.2* 3.6  --  3.7   BILIRUBIN mg/dL 2.3* 2.3* 1.9* 2.5* 2.4*  --  2.7*   ALK PHOS U/L 82 77 87 89 103  --  127*   AST (SGOT) U/L 18 20 18 26 20  --  21   ALT (SGPT) U/L 14 13 15 18 18  --  21   AMMONIA umol/L  --   --   --   --   --  15  --      Endocrine function:  Point of care bedside glucose levels:  Results from last 7 days   Lab Units 11/19/23  1636 11/19/23  1130 11/19/23  0723 11/18/23  2250 11/18/23  1923 11/18/23  1607 11/18/23  1154 11/18/23  0652 11/18/23  0522 11/17/23  2359 11/17/23  2228 11/17/23  1948 11/17/23  1632 11/17/23  1104   GLUCOSE mg/dL 254* 378* 222* 225* 301* 328* 576* 263* 273* 243* 312* 431* 359* 461*     Glucose levels from the [facility]:  Results from last 7 days   Lab Units 11/19/23  0153 11/18/23  1418 11/18/23  0243 11/17/23  0351 11/16/23  0148 11/15/23  0638 11/13/23  2234 11/13/23  1319 11/13/23  0711 11/12/23  2307   GLUCOSE mg/dL 178* 351* 183* 245* 312* 169* 105* 221* 256* 424*     Cardiac:  Results from last 7 days   Lab Units 11/19/23  0153 11/18/23  1632 11/18/23  1418 11/13/23  1319 11/13/23  0124 11/12/23  2307   CK TOTAL U/L  --   --   --   --   --  41   HSTROP T ng/L 127* 150* 152* 84* 84* 93*   PROBNP pg/mL  --   --   --   --   --  2,746.0*       Cultures:  Microbiology Results (last 10 days)       Procedure Component Value - Date/Time    Blood Culture - Blood, Hand, Right [[id number]]  (Normal) Collected: 11/13/23 0003    Lab Status: Final result Specimen: Blood from Hand, Right Updated: 11/18/23 0016     Blood Culture No growth at 5 days    Blood Culture - Blood, Arm, Right [[id number]]  (Normal) Collected: 11/12/23 2350    Lab Status: Final result Specimen: Blood from Arm, Right Updated: 11/18/23 0016     Blood Culture No growth at 5 days          I have  personally looked at the labs and they are summarized above.    Assessment & Plan      -Atrial fibrillation with RVR status post direct current cardioversion in the emergency room, now in sinus rhythm, on Eliquis at home for a [id number] score of 3   -Thrombocytopenia that developed during admission, unknown etiology  -Dilated nonischemic cardiomyopathy/systolic CHF (EF 21-25% in 2020 and now 31-35%) that was present on admission with left ejection fraction of 31-35%, suspect partly tachycardia induced, with an acute exacerbation on admission that has now improved with diuresis  -Hypotension present on admission, suspect due to cardiogenic shock as result of the A-fib with RVR, currently resolved  -Prolonged QTc that developed after admission, with normal potassium and magnesium levels  -Macrocytic anemia that was present on admission, with normal vitamin B12 and folate levels, suspect anemia of chronic disease +/- liver disease  -Suspect type I versus type II non-ST elevation MI present on admission due to the atrial fibrillation with RVR causing cardiogenic shock and hypotension (cardiology thinks this is more likely type II than a type I non-ST elevation MI)  -Status post PVI on October 2022 with recurrent A-fib in the past  -History of nonobstructive coronary artery disease per cardiac catheterization 11/20/2020 with 30 to 40% LAD lesion  -History of insulin dependent diabetes mellitus type 2  -History of decompensated liver cirrhosis with ascites  -History of chronic macrocytic anemia, with hemoglobin currently at baseline  -History of CKD stage IIIa (creatinine ranges 1.2-1.5 and GFR ranges 42-50)    The 1 am glucose has finally hit below 200.  Will keep the current Lantus dose and increase the meal time Humalog to 13 units with each meal.  Will keep the sliding scale Humalog the same.  Blood pressures are at goal; will obtain orthostatic vital signs.  Dr. Hess gave a dose of Bumex today; with the Cr  trending up, will monitor the Cr and electrolytes closely.  The last dose of Coreg was on 11/17/2023 and she has never received any metoprolol yet.  Thus, since the blood pressures are in the low range, will discontinue the metoprolol as the heart rates are in the 70s and dropping the blood pressure even more will make the kidney function worse.  I had tried to change the Coreg to metoprolol earlier for less hypotensive side effects, but the metoprolol was still not be able to be given.  She has not received valsartan was on 11/14/2023; this was stopped for the low blood pressures as well.  The platelet levels have improved; thus, Dr. Hess restarted the home Eliquis.  Will repeat the labs in the morning.    VTE Prophylaxis:  Mechanical Order History:       None          Pharmalogical Order History:        Ordered     Dose Route Frequency Stop    11/19/23 0922  apixaban (ELIQUIS) tablet 5 mg         5 mg PO Every 12 Hours Scheduled --    11/17/23 1434  fondaparinux (ARIXTRA) injection 5 mg  Status:  Discontinued         5 mg SC Every 24 Hours Scheduled 11/19/23 0922    11/15/23 2251  argatroban 250 mg in dextrose (D5W) 5 % 250 mL (1 mg/mL) infusion  7.56 mL/hr,   Status:  Discontinued         1.2 mcg/kg/min IV Titrated 11/15/23 2257    11/15/23 0817  heparin (porcine) 5000 UNIT/ML injection 4,000 Units         4,000 Units IV Once 11/15/23 0942    11/13/23 1540  heparin 56377 units/250 mL (100 units/mL) in 0.45 % NaCl infusion  12.08 mL/hr,   Status:  Discontinued         11.4 Units/kg/hr (Dosing Weight) IV Titrated 11/15/23 2248    11/13/23 0537  Pharmacy to Dose Heparin  Status:  Discontinued         -- XX Continuous PRN 11/15/23 2254                Disposition: Patient would like to be discharged home with her son but timing is unknown at this time as the patient states she is not strong enough to go home yet.     Dave Bishop MD  [facility]  11/19/23  17:03 EST      Electronically  signed by Dave Bishop MD at 23 1923       Kalyn Hess MD at 23 9795            Patient Identification:  Name:  Yumiko Purdy  Age:  57 y.o.  Sex:  female  :  1965  MRN:  [id number]  Visit Number:  [id number]  Primary care provider:  Masha Davidson APRN    Reason for follow-up:  Paroxysmal atrial fibrillation and heart failure with reduced EF.  EF-31 to 35%.    Subjective     She has been admitted with paroxysmal A-fib with RVR.  S/p ECV.  Remained in sinus rhythm.  Has history of heart failure with reduced EF.  EF-31 to 35%.    Today, her main complaint was increasing shortness of breath and orthopnea.  Absolute lung fluid content was 35.  No history of chest pain or palpitations.  Telemetry showed sinus rhythm.  She had 1 brief episode of SVT last night.  BP stable.      Medication Review:   [START ON 2023] apixaban, 5 mg, Oral, Q12H  busPIRone, 10 mg, Oral, BID  empagliflozin, 10 mg, Oral, Daily  erythromycin, 1 application , Both Eyes, Q6H  ferrous sulfate, 325 mg, Oral, BID  fluticasone, 2 spray, Nasal, Daily  insulin glargine, 35 Units, Subcutaneous, Q12H  insulin lispro, 10 Units, Subcutaneous, TID With Meals  insulin lispro, 3-14 Units, Subcutaneous, 4x Daily AC & at Bedtime  levothyroxine, 50 mcg, Oral, Q AM  Lidocaine, 2 patch, Transdermal, Q24H  metoprolol tartrate, 12.5 mg, Oral, Q12H  miconazole, 1 application , Topical, Q12H  pantoprazole, 40 mg, Oral, Q AM  Pharmacy Consult, , Does not apply, Once  pregabalin, 150 mg, Oral, BID  senna-docusate sodium, 2 tablet, Oral, BID  sodium chloride, 10 mL, Intravenous, Q12H  sodium chloride, 10 mL, Intravenous, Q12H  [Held by provider] valsartan, 40 mg, Oral, Q24H      Pharmacy Consult,            Vital Sign Min/Max for last 24 hours  Temp  Min: 98.1 °F (36.7 °C)  Max: 99.3 °F (37.4 °C)   BP  Min: 101/43  Max: 112/64   Pulse  Min: 65  Max: 79   Resp  Min: 18  Max: 20   SpO2  Min: 93 %  Max: 98 %   No data recorded  "  Weight  Min: 107 kg (235 lb 3.2 oz)  Max: 107 kg (235 lb 3.2 oz)     Flowsheet Rows      Flowsheet Row First Filed Value   Admission Height 165.1 cm (65\") Documented at 11/12/2023 2243   Admission Weight 113 kg (250 lb) Documented at 11/12/2023 2243            Objective       Physical Exam:         General Appearance:  HEENT:   Neck:     Alert, cooperative, in no acute distress  Grossly normal   No JVD    Lungs:   Bilateral equal air entry.  Bilateral rales were heard.     Heart:  Regular rhythm and normal rate, normal S1 and S2, no        murmur, no gallop, no rub, no click    Chest Wall:  No abnormalities observed    Abdomen   Soft, nontender, no masses, no organomegaly and bowel sounds are heard.     Extremities: Trace bilateral ankle edema    Pulses: Pulses palpable and equal bilaterally    Neurologic: No focal deficits         Telemetry:  Sinus rhythm.  Had 1 brief episode of SVT last night.      Labs  Results from last 7 days   Lab Units 11/19/23  0153 11/18/23  0243 11/17/23  0351 11/16/23  0148 11/15/23  0638 11/13/23 2234 11/13/23  0711   WBC 10*3/mm3 9.24 9.06 9.89 7.38 8.88 10.82* 13.11*   HEMOGLOBIN g/dL 8.3* 8.0* 8.7* 8.0* 8.5* 7.9* 8.9*   HEMATOCRIT % 29.7* 27.6* 30.9* 27.0* 26.9* 26.2* 29.6*   PLATELETS 10*3/mm3 112* 82* 72* 76* 77* 99* 118*     Results from last 7 days   Lab Units 11/19/23  0153 11/18/23  1418 11/18/23  0243 11/17/23  0351 11/16/23  0148 11/15/23  0638 11/14/23  1407 11/13/23  2234   SODIUM mmol/L 139 135* 137 137 136 135*  --  139   POTASSIUM mmol/L 4.6 5.0 4.8 4.6 4.2 3.7 4.2 3.4*   CHLORIDE mmol/L 102 100 103 103 101 100  --  103   CO2 mmol/L 25.6 25.1 25.2 23.0 25.0 25.7  --  28.7   BUN mg/dL 25* 24* 18 16 16 18  --  22*   CREATININE mg/dL 1.46* 1.28* 1.25* 1.22* 1.28* 1.17*  --  1.09*   CALCIUM mg/dL 8.9 8.7 8.9 8.8 8.5* 8.8  --  8.6   GLUCOSE mg/dL 178* 351* 183* 245* 312* 169*  --  105*     Results from last 7 days   Lab Units 11/19/23  0153 11/18/23  0243 11/17/23  0351 " 11/13/23  2234 11/13/23  0711 11/12/23  2307   BILIRUBIN mg/dL 2.3* 2.3* 1.9* 2.5* 2.4* 2.7*   ALK PHOS U/L 82 77 87 89 103 127*   AST (SGOT) U/L 18 20 18 26 20 21   ALT (SGPT) U/L 14 13 15 18 18 21     Results from last 7 days   Lab Units 11/19/23  0153 11/18/23  1418 11/18/23  0243 11/17/23  0351 11/16/23  0148 11/15/23  0638 11/13/23  2234   MAGNESIUM mg/dL 2.4 2.2 2.3 2.2 2.3 2.5 2.4     Results from last 7 days   Lab Units 11/15/23  2159 11/12/23  2307   INR  0.97 1.20*     Results from last 7 days   Lab Units 11/12/23  2307   CRP mg/dL 0.88*     Results from last 7 days   Lab Units 11/19/23  0153 11/18/23  1632 11/18/23  1418 11/13/23  0124 11/12/23  2307   CK TOTAL U/L  --   --   --   --  41   HSTROP T ng/L 127* 150* 152*   < > 93*    < > = values in this interval not displayed.           Assessment      1.  Paroxysmal atrial fibrillation.  S/p ECV.  In sinus rhythm.       S/p PVI ablation 10/2022.  2.  Acute on chronic heart failure with reduced EF.  3.  Nonischemic dilated cardiomyopathy.  EF 31 to 35%.  4.  NSTEMI type II.  Due to sustained tachyarrhythmia.  4.  Heparin-induced thrombocytopenia.  Improved.  Platelet count > 100.    Plan     1.  PAF-on metoprolol tartrate which was changed to succinate due to the presence of HFrEF.  Heparin-induced thrombocytopenia improved.  Fondaparinux is switched to Eliquis for anticoagulation for stroke prevention.    2.  HFrEF-patient has acute exacerbation.  Bumex 2 mg IV x1 given.  We will monitor I&O's, renal function and response.  GDMT-continue metoprolol succinate and empagliflozin.  If BP remains stable, will start hydralazine plus Isordil combination.  Not a candidate for ARNI or ACE inhibitor due to renal failure.      Kalyn Hess MD [facility]  11/19/23  15:52 EST          Electronically signed by Kalyn Hess MD at 11/19/23 7939       Kalyn Hess MD at 11/18/23 8315            Patient Identification:  Name:  Yumiko Purdy  Age:  57 y.o.  Sex:   "female  :  1965  MRN:  [id number]  Visit Number:  [id number]  Primary care provider:  Masha Davidson APRN    Reason for follow-up:  Paroxysmal atrial fibrillation and heart failure with reduced EF    Subjective     She has been admitted with A-fib with RVR and had electrical cardioversion in ED and remained in sinus rhythm since then.  She is on carvedilol and anticoagulated on Arixtra as she developed thrombocytopenia following IV heparin infusion.  She denied history of palpitations or chest discomfort.  Telemetry showed sinus rhythm with a ventricular rate  bpm.  BP is borderline.      Medication Review:   busPIRone, 10 mg, Oral, BID  carvedilol, 3.125 mg, Oral, BID With Meals  empagliflozin, 10 mg, Oral, Daily  erythromycin, 1 application , Both Eyes, Q6H  ferrous sulfate, 325 mg, Oral, BID  fluticasone, 2 spray, Nasal, Daily  fondaparinux, 5 mg, Subcutaneous, Q24H  insulin glargine, 30 Units, Subcutaneous, Q12H  insulin lispro, 10 Units, Subcutaneous, TID With Meals  insulin lispro, 3-14 Units, Subcutaneous, 4x Daily AC & at Bedtime  levothyroxine, 50 mcg, Oral, Q AM  Lidocaine, 2 patch, Transdermal, Q24H  miconazole, 1 application , Topical, Q12H  pantoprazole, 40 mg, Oral, Q AM  Pharmacy Consult, , Does not apply, Once  pregabalin, 150 mg, Oral, BID  senna-docusate sodium, 2 tablet, Oral, BID  sodium chloride, 10 mL, Intravenous, Q12H  sodium chloride, 10 mL, Intravenous, Q12H  [Held by provider] valsartan, 40 mg, Oral, Q24H      Pharmacy Consult,            Vital Sign Min/Max for last 24 hours  Temp  Min: 98 °F (36.7 °C)  Max: 99.4 °F (37.4 °C)   BP  Min: 96/58  Max: 114/51   Pulse  Min: 69  Max: 85   Resp  Min: 18  Max: 18   SpO2  Min: 93 %  Max: 97 %   No data recorded   Weight  Min: 108 kg (237 lb 11.2 oz)  Max: 108 kg (237 lb 11.2 oz)     Flowsheet Rows      Flowsheet Row First Filed Value   Admission Height 165.1 cm (65\") Documented at 2023 2243   Admission Weight 113 kg " (250 lb) Documented at 11/12/2023 4127            Objective       Physical Exam:         General Appearance:  HEENT:   Neck:     Alert, cooperative, in no acute distress  Grossly normal   No JVD    Lungs:   Clear to auscultation,respirations regular, No added sounds     Heart:  Regular rhythm and normal rate, normal S1 and S2, no        murmur, no gallop, no rub, no click    Chest Wall:  No abnormalities observed    Abdomen   Soft, nontender, no masses, no organomegaly and bowel sounds are heard.     Extremities: No pedal edema.    Pulses: Pulses palpable and equal bilaterally    Neurologic: No focal deficits         Telemetry:  Sinus rhythm.  Heart rate  bpm.      Labs  Results from last 7 days   Lab Units 11/18/23  0243 11/17/23  0351 11/16/23  0148 11/15/23  0638 11/13/23  2234 11/13/23  0711 11/12/23  2307   WBC 10*3/mm3 9.06 9.89 7.38 8.88 10.82* 13.11* 15.57*   HEMOGLOBIN g/dL 8.0* 8.7* 8.0* 8.5* 7.9* 8.9* 8.4*   HEMATOCRIT % 27.6* 30.9* 27.0* 26.9* 26.2* 29.6* 26.7*   PLATELETS 10*3/mm3 82* 72* 76* 77* 99* 118* 142     Results from last 7 days   Lab Units 11/18/23  1418 11/18/23  0243 11/17/23  0351 11/16/23  0148 11/15/23  0638 11/14/23  1407 11/13/23  2234 11/13/23  1319   SODIUM mmol/L 135* 137 137 136 135*  --  139 137   POTASSIUM mmol/L 5.0 4.8 4.6 4.2 3.7 4.2 3.4* 3.8   CHLORIDE mmol/L 100 103 103 101 100  --  103 101   CO2 mmol/L 25.1 25.2 23.0 25.0 25.7  --  28.7 23.9   BUN mg/dL 24* 18 16 16 18  --  22* 29*   CREATININE mg/dL 1.28* 1.25* 1.22* 1.28* 1.17*  --  1.09* 1.10*   CALCIUM mg/dL 8.7 8.9 8.8 8.5* 8.8  --  8.6 8.8   GLUCOSE mg/dL 351* 183* 245* 312* 169*  --  105* 221*     Results from last 7 days   Lab Units 11/18/23  0243 11/17/23  0351 11/13/23  2234 11/13/23  0711 11/12/23  2307   BILIRUBIN mg/dL 2.3* 1.9* 2.5* 2.4* 2.7*   ALK PHOS U/L 77 87 89 103 127*   AST (SGOT) U/L 20 18 26 20 21   ALT (SGPT) U/L 13 15 18 18 21     Results from last 7 days   Lab Units 11/18/23  1418  23  0243 23  0351 23  0148 11/15/23  0638 23  2234 23  1319   MAGNESIUM mg/dL 2.2 2.3 2.2 2.3 2.5 2.4 2.5     Results from last 7 days   Lab Units 11/15/23  2159 23  2307   INR  0.97 1.20*     Results from last 7 days   Lab Units 23  2307   CRP mg/dL 0.88*     Results from last 7 days   Lab Units 23  1319 23  0124 23  2307   CK TOTAL U/L  --   --  41   HSTROP T ng/L 84* 84* 93*           Assessment      1.  Paroxysmal atrial fibrillation.  S/p ECV.  In sinus rhythm.       S/p PVI ablation 10/2022  2.  Chronic heart failure with reduced EF.  Clinically compensated.  3.  Nonischemic dilated cardiomyopathy.  EF-35%.  4.  NSTEMI type II.  Due to tachyarrhythmia.  5.  Heparin-induced thrombocytopenia    Plan     1.  PAF-continue carvedilol.  Anticoagulated on fondaparinux until platelet count improves when Eliquis can be resumed.  2.  HFrEF-no evidence of fluid overload.  GDMT-continue carvedilol and empagliflozin.  Will optimize if BP remains stable and renal failure improves.      Kalyn Hess MD [facility]  23  14:57 EST          Electronically signed by Kalyn Hess MD at 23 1646       Dave Bishop MD at 23 1327              [facility] HOSPITALIST PROGRESS NOTE     Patient Identification:  Name:  Yumiko Purdy  Age:  57 y.o.  Sex:  female  :  1965  MRN:  [id number]  Visit Number:  [id number]  ROOM: [address] [place] [address]     Primary Care Provider:  Masha Davidson APRN     Date of Admission: 2023    Length of stay in inpatient status:  5    Subjective     Chief Compliant:    Chief Complaint   Patient presents with    Rapid Heart Rate     History of Presenting Illness:  The patient could not sit more than 30 minutes today in a chair.  She states that she is too weak to go home as she cannot perform her activities of daily living.  She cannot see the markings on the insulin to dial up the pen and thus her son helps  her with this.  She denies any chest pain and denies shortness of air; she also denies any black of bloody stools.  She denies any petechiae.  She only eats the food on her tray and does not have any extra snacks.    Objective     Current Hospital Meds:  busPIRone, 10 mg, Oral, BID  empagliflozin, 10 mg, Oral, Daily  erythromycin, 1 application , Both Eyes, Q6H  ferrous sulfate, 325 mg, Oral, BID  fluticasone, 2 spray, Nasal, Daily  fondaparinux, 5 mg, Subcutaneous, Q24H  [START ON 11/19/2023] insulin glargine, 35 Units, Subcutaneous, Q12H  insulin glargine, 5 Units, Subcutaneous, Once  insulin lispro, 10 Units, Subcutaneous, TID With Meals  insulin lispro, 3-14 Units, Subcutaneous, 4x Daily AC & at Bedtime  levothyroxine, 50 mcg, Oral, Q AM  Lidocaine, 2 patch, Transdermal, Q24H  metoprolol tartrate, 12.5 mg, Oral, Q12H  miconazole, 1 application , Topical, Q12H  pantoprazole, 40 mg, Oral, Q AM  Pharmacy Consult, , Does not apply, Once  pregabalin, 150 mg, Oral, BID  senna-docusate sodium, 2 tablet, Oral, BID  sodium chloride, 10 mL, Intravenous, Q12H  sodium chloride, 10 mL, Intravenous, Q12H  [Held by provider] valsartan, 40 mg, Oral, Q24H    Pharmacy Consult,       Current Antimicrobial Therapy:  Anti-Infectives (From admission, onward)      Ordered     Dose/Rate Route Frequency Start Stop    11/12/23 2336  cefepime 2000 mg IVPB in 100 ml NS (VTB)        Ordering Provider: Juanita Box DO    2,000 mg  over 30 Minutes Intravenous Once 11/12/23 2352 11/13/23 0104    11/12/23 2336  doxycycline (VIBRAMYCIN) 100 mg in sodium chloride 0.9 % 100 mL IVPB-VTB        Ordering Provider: Juanita Box DO    100 mg  over 60 Minutes Intravenous Once 11/12/23 2352 11/13/23 0125          Current Diuretic Therapy:  Diuretics (From admission, onward)      Ordered     Dose/Rate Route Frequency Start Stop    11/14/23 1728  furosemide (LASIX) injection 60 mg        Ordering Provider: Ramon Sahu MD    60 mg Intravenous Once  11/14/23 1815 11/14/23 1804          ----------------------------------------------------------------------------------------------------------------------  Vital Signs:  Temp:  [99 °F (37.2 °C)-99.4 °F (37.4 °C)] 99.3 °F (37.4 °C)  Heart Rate:  [69-85] 76  Resp:  [18] 18  BP: ()/(47-58) 107/48  SpO2:  [93 %-97 %] 93 %  on  Flow (L/min):  [1] 1;   Device (Oxygen Therapy): room air  Body mass index is 39.56 kg/m².    Wt Readings from Last 3 Encounters:   11/18/23 108 kg (237 lb 11.2 oz)   10/15/23 113 kg (250 lb)   09/29/23 109 kg (240 lb)     Intake & Output (last 3 days)         11/16 0701  11/17 0700 11/17 0701  11/18 0700 11/18 0701  11/19 0700    P.O. 660 360 840    Total Intake(mL/kg) 660 (6.2) 360 (3.4) 840 (7.9)    Urine (mL/kg/hr) 2325 (0.9) 1900 (0.7) 1600 (1.1)    Total Output 2325 1900 1600    Net -0552 -9274 -919           Urine Unmeasured Occurrence 1 x 2 x           Diet: Regular/House Diet, Diabetic Diets; Consistent Carbohydrate; Texture: Soft to Chew (NDD 3); Soft to Chew: Whole Meat; Fluid Consistency: Thin (IDDSI 0)  ----------------------------------------------------------------------------------------------------------------------  Physical Exam; same as yesterday.  She is alert, oriented, no acute distress, lungs are clear without any crackles and without any wheezes.  No edema is seen.  No slurred speech, no facial droop.  ----------------------------------------------------------------------------------------------------------------------  Tele:  NS in the 70's.  I have personally reviewed/looked at the telemetry strips.  ----------------------------------------------------------------------------------------------------------------------  LABS:    CBC and coagulation:  Results from last 7 days   Lab Units 11/18/23  1418 11/18/23  0243 11/17/23  0351 11/16/23  0148 11/15/23  2159 11/15/23  0638 11/13/23  2234 11/13/23  1319 11/13/23  1116 11/13/23  0711 11/13/23  0642 11/13/23  1154  11/12/23  2350 11/12/23  2307   LACTATE mmol/L 1.8  --   --   --   --   --   --  2.0 2.5*  --  2.8* 2.5* 3.7*  --    CRP mg/dL  --   --   --   --   --   --   --   --   --   --   --   --   --  0.88*   WBC 10*3/mm3  --  9.06 9.89 7.38  --  8.88 10.82*  --   --  13.11*  --   --   --  15.57*   HEMOGLOBIN g/dL  --  8.0* 8.7* 8.0*  --  8.5* 7.9*  --   --  8.9*  --   --   --  8.4*   HEMATOCRIT %  --  27.6* 30.9* 27.0*  --  26.9* 26.2*  --   --  29.6*  --   --   --  26.7*   MCV fL  --  114.0* 109.2* 114.4*  --  115.0* 114.9*  --   --  114.3*  --   --   --  113.6*   MCHC g/dL  --  29.0* 28.2* 29.6*  --  31.6 30.2*  --   --  30.1*  --   --   --  31.5   PLATELETS 10*3/mm3  --  82* 72* 76*  --  77* 99*  --   --  118*  --   --   --  142   INR   --   --   --   --  0.97  --   --   --   --   --   --   --   --  1.20*   D DIMER QUANT MCGFEU/mL  --   --   --  0.28 <0.27  --   --   --   --   --   --   --   --   --      Renal and electrolytes:  Results from last 7 days   Lab Units 11/18/23  1418 11/18/23  0243 11/17/23  0351 11/16/23  0148 11/15/23  0638 11/14/23  1407 11/13/23  2234 11/13/23  1319   SODIUM mmol/L 135* 137 137 136 135*  --  139 137   POTASSIUM mmol/L 5.0 4.8 4.6 4.2 3.7 4.2 3.4* 3.8   MAGNESIUM mg/dL 2.2 2.3 2.2 2.3 2.5  --  2.4 2.5   CHLORIDE mmol/L 100 103 103 101 100  --  103 101   CO2 mmol/L 25.1 25.2 23.0 25.0 25.7  --  28.7 23.9   BUN mg/dL 24* 18 16 16 18  --  22* 29*   CREATININE mg/dL 1.28* 1.25* 1.22* 1.28* 1.17*  --  1.09* 1.10*   CALCIUM mg/dL 8.7 8.9 8.8 8.5* 8.8  --  8.6 8.8   PHOSPHORUS mg/dL  --  3.5 3.6 3.7 3.9  --  3.5  --    GLUCOSE mg/dL 351* 183* 245* 312* 169*  --  105* 221*   ANION GAP mmol/L 9.9 8.8 11.0 10.0 9.3  --  7.3 12.1     Estimated Creatinine Clearance: 60 mL/min (A) (by C-G formula based on SCr of 1.28 mg/dL (H)).    Liver and pancreatic function:  Results from last 7 days   Lab Units 11/18/23  0243 11/17/23  0351 11/13/23  2234 11/13/23  0711 11/12/23  2350 11/12/23  2307   ALBUMIN  g/dL 3.3* 3.4* 3.2* 3.6  --  3.7   BILIRUBIN mg/dL 2.3* 1.9* 2.5* 2.4*  --  2.7*   ALK PHOS U/L 77 87 89 103  --  127*   AST (SGOT) U/L 20 18 26 20  --  21   ALT (SGPT) U/L 13 15 18 18  --  21   AMMONIA umol/L  --   --   --   --  15  --        Endocrine function:  Lab Results   Component Value Date    HGBA1C 7.30 (H) 11/13/2023     Point of care bedside glucose levels:  Results from last 7 days   Lab Units 11/18/23  1923 11/18/23  1607 11/18/23  1154 11/18/23  0652 11/18/23  0522 11/17/23  2359 11/17/23  2228 11/17/23  1948 11/17/23  1632 11/17/23  1104 11/17/23  0714 11/17/23  0659 11/17/23  0531 11/16/23  2336   GLUCOSE mg/dL 301* 328* 576* 263* 273* 243* 312* 431* 359* 461* 274* 279* 231* 236*     Glucose levels from the [facility]:  Results from last 7 days   Lab Units 11/18/23  1418 11/18/23  0243 11/17/23  0351 11/16/23  0148 11/15/23  0638 11/13/23  2234 11/13/23  1319 11/13/23  0711 11/12/23  2307   GLUCOSE mg/dL 351* 183* 245* 312* 169* 105* 221* 256* 424*     Lab Results   Component Value Date    TSH 4.680 (H) 11/12/2023    FREET4 1.78 (H) 11/13/2023     Cardiac:  Results from last 7 days   Lab Units 11/18/23  1632 11/18/23  1418 11/13/23  1319 11/13/23  0124 11/12/23  2307   CK TOTAL U/L  --   --   --   --  41   HSTROP T ng/L 150* 152* 84* 84* 93*   PROBNP pg/mL  --   --   --   --  2,746.0*       Cultures:  Microbiology Results (last 10 days)       Procedure Component Value - Date/Time    Blood Culture - Blood, Hand, Right [[id number]]  (Normal) Collected: 11/13/23 0003    Lab Status: Final result Specimen: Blood from Hand, Right Updated: 11/18/23 0016     Blood Culture No growth at 5 days    Blood Culture - Blood, Arm, Right [[id number]]  (Normal) Collected: 11/12/23 2350    Lab Status: Final result Specimen: Blood from Arm, Right Updated: 11/18/23 0016     Blood Culture No growth at 5 days          I have personally looked at the labs and they are summarized above.    Assessment & Plan      -Atrial fibrillation  with RVR status post direct current cardioversion in the emergency room, now in sinus rhythm, on Eliquis at home for a [id number] score of 3   -Thrombocytopenia that developed during admission, unknown etiology  -Dilated nonischemic cardiomyopathy/systolic CHF (EF 21-25% in 2020 and now 31-35%) that was present on admission with left ejection fraction of 31-35%, suspect partly tachycardia induced, with an acute exacerbation on admission that has now improved with diuresis  -Hypotension present on admission, suspect due to cardiogenic shock as result of the A-fib with RVR, currently resolved  -Prolonged QTc that developed after admission, with normal potassium and magnesium levels  -Macrocytic anemia that was present on admission, with normal vitamin B12 and folate levels, suspect anemia of chronic disease +/- liver disease  -Suspect type I versus type II non-ST elevation MI present on admission due to the atrial fibrillation with RVR causing cardiogenic shock and hypotension (cardiology thinks this is more likely type II than a type I non-ST elevation MI)  -Status post PVI on October 2022 with recurrent A-fib in the past  -History of nonobstructive coronary artery disease per cardiac catheterization 11/20/2020 with 30 to 40% LAD lesion  -History of insulin dependent diabetes mellitus type 2  -History of decompensated liver cirrhosis with ascites  -History of chronic macrocytic anemia, with hemoglobin currently at baseline  -History of CKD stage IIIa (creatinine ranges 1.2-1.5 and GFR ranges 42-50)    The patient still has uncontrolled glucose levels.  We will see about trying to get more consistent carbohydrate trays brought to her room.  I am changing the regular insulin over to Humalog.  I doubled the Lantus amount yesterday; I will now increase it to 35 units twice a day.  I have added 10 units of Humalog with meals instead of the home regular insulin and I have given her a moderate dose sliding scale with  Humalog instead of a sliding scale with regular insulin.  I will have pharmacy see if her insurance will cover Humalog or NovoLog pens.  Upon review of the patient's medicine administration record, the patient has only received 3 doses of the 3.125 mg Coreg, with the last dose being at 8:25 AM yesterday.  It looks like the last 3 doses have been held because of hypotension.  Therefore, I did change the patient to metoprolol 12.5 mg twice a day and we will see if she can tolerate this medication with less effects on the blood pressures.  I will continue to hold the losartan due to the borderline low blood pressures as well.  Also, the patient is too weak to go home at this time as she cannot do her activities of daily living; in addition, her son relies on others for transportation.  Thus, a safe discharge to home is not clear at this time, as she will benefit from more inpatient treatment and monitoring.  Will continue monitoring the troponin levels; the one ordered by Dr. Hess at 2 pm today seems to have doubled when compared to 5 days ago; the patient denied chest pain to me.    VTE Prophylaxis:  Mechanical Order History:       None          Pharmalogical Order History:        Ordered     Dose Route Frequency Stop    11/17/23 1434  fondaparinux (ARIXTRA) injection 5 mg         5 mg SC Every 24 Hours Scheduled --    11/15/23 2251  argatroban 250 mg in dextrose (D5W) 5 % 250 mL (1 mg/mL) infusion  7.56 mL/hr,   Status:  Discontinued         1.2 mcg/kg/min IV Titrated 11/15/23 2257    11/15/23 0817  heparin (porcine) 5000 UNIT/ML injection 4,000 Units         4,000 Units IV Once 11/15/23 0942    11/13/23 1540  heparin 74289 units/250 mL (100 units/mL) in 0.45 % NaCl infusion  12.08 mL/hr,   Status:  Discontinued         11.4 Units/kg/hr (Dosing Weight) IV Titrated 11/15/23 2248    11/13/23 0537  Pharmacy to Dose Heparin  Status:  Discontinued         -- XX Continuous PRN 11/15/23 2254                Disposition:  Patient would like to be discharged home with her son but timing is unknown at this time as the patient states she is not strong enough to go home yet.       Dave Bishop MD  [facility]  23  21:02 EST      Electronically signed by Dave Bishop MD at 23       Dave Bishop MD at 23              [facility] HOSPITALIST PROGRESS NOTE     Patient Identification:  Name:  Yumiko Purdy  Age:  57 y.o.  Sex:  female  :  1965  MRN:  [id number]  Visit Number:  [id number]  ROOM: [address] [place] [address]     Primary Care Provider:  Masha Davidson APRN     Date of Admission: 2023    Length of stay in inpatient status:  4    Subjective     Chief Compliant:    Chief Complaint   Patient presents with    Rapid Heart Rate     History of Presenting Illness:  The patient was alone in her room when I saw her.  She states that she is breathing okay and does not have chest pain.  She states that she feels even stronger today than she did yesterday but she has not been able to perform physical therapy yet because they have not been around.    Objective     Current Hospital Meds:  busPIRone, 10 mg, Oral, BID  carvedilol, 3.125 mg, Oral, BID With Meals  empagliflozin, 10 mg, Oral, Daily  erythromycin, 1 application , Both Eyes, Q6H  ferrous sulfate, 325 mg, Oral, BID  fluticasone, 2 spray, Nasal, Daily  fondaparinux, 5 mg, Subcutaneous, Q24H  insulin glargine, 30 Units, Subcutaneous, Daily  insulin regular, 10 Units, Subcutaneous, TID AC  insulin regular, 3-14 Units, Subcutaneous, Q6H  levothyroxine, 50 mcg, Oral, Q AM  Lidocaine, 2 patch, Transdermal, Q24H  miconazole, 1 application , Topical, Q12H  pantoprazole, 40 mg, Oral, Q AM  Pharmacy Consult, , Does not apply, Once  pregabalin, 150 mg, Oral, BID  senna-docusate sodium, 2 tablet, Oral, BID  sodium chloride, 10 mL, Intravenous, Q12H  sodium chloride, 10 mL, Intravenous, Q12H  [Held by provider]  valsartan, 40 mg, Oral, Q24H    Pharmacy Consult,       Current Antimicrobial Therapy:  Anti-Infectives (From admission, onward)      Ordered     Dose/Rate Route Frequency Start Stop    11/12/23 2336  cefepime 2000 mg IVPB in 100 ml NS (VTB)        Ordering Provider: Juanita Box DO    2,000 mg  over 30 Minutes Intravenous Once 11/12/23 2352 11/13/23 0104    11/12/23 2336  doxycycline (VIBRAMYCIN) 100 mg in sodium chloride 0.9 % 100 mL IVPB-VTB        Ordering Provider: Juanita Box DO    100 mg  over 60 Minutes Intravenous Once 11/12/23 2352 11/13/23 0125          Current Diuretic Therapy:  Diuretics (From admission, onward)      Ordered     Dose/Rate Route Frequency Start Stop    11/14/23 1728  furosemide (LASIX) injection 60 mg        Ordering Provider: Ramon Sahu MD    60 mg Intravenous Once 11/14/23 1815 11/14/23 1804          ----------------------------------------------------------------------------------------------------------------------  Vital Signs:  Temp:  [97.5 °F (36.4 °C)-98.7 °F (37.1 °C)] 98.7 °F (37.1 °C)  Heart Rate:  [66-75] 71  Resp:  [18] 18  BP: (103-108)/(41-52) 105/52  SpO2:  [94 %-97 %] 97 %  on  Flow (L/min):  [1] 1;   Device (Oxygen Therapy): nasal cannula  Body mass index is 39.02 kg/m².    Wt Readings from Last 3 Encounters:   11/17/23 106 kg (234 lb 8 oz)   10/15/23 113 kg (250 lb)   09/29/23 109 kg (240 lb)     Intake & Output (last 3 days)         11/15 0701 11/16 0700 11/16 0701 11/17 0700 11/17 0701 11/18 0700    P.O. 480 660     I.V. (mL/kg)       Total Intake(mL/kg) 480 (4.5) 660 (6.2)     Urine (mL/kg/hr) 2200 (0.9) 2325 (0.9) 1000 (0.7)    Stool       Total Output 2200 2325 1000    Net -1720 -1665 -1000           Urine Unmeasured Occurrence 2 x 1 x 1 x    Stool Unmeasured Occurrence 1 x            Diet: Regular/House Diet; Texture: Soft to Chew (NDD 3); Soft to Chew: Whole Meat; Fluid Consistency: Thin (IDDSI  0)  ----------------------------------------------------------------------------------------------------------------------  Physical Exam; her exam is the same as yesterday.  Alert and oriented x3.  Regular rate and rhythm.  No murmurs.  Good air movement throughout all lung fields.  No pedal edema.  No respiratory distress.   ----------------------------------------------------------------------------------------------------------------------  Tele:  NS with heart rates 60-70's.  I have personally reviewed/looked at the telemetry strips.   ----------------------------------------------------------------------------------------------------------------------  LABS:    PERIPHERAL SMEAR 11/16/2023:  Macrocytic anemia.  No increase in schistocytes population.  Mild thrombocytopenia with no platelet clumping.  Normal WBC count and differential. Granulocytes show normal morphology and  no circulating blasts identified.       11/17/2023 0351 11/17/2023 1241 Vitamin B12 [[id number]]    (Abnormal)   Blood    Final result Component Value Units   Vitamin B-12 1,460 High  pg/mL          11/17/2023 0351 11/17/2023 1241 Folate [[id number]]    Blood    Final result Component Value Units   Folate 16.70 ng/mL          11/17/2023 0351 11/17/2023 0446 Iron Profile [[id number]]   (Abnormal)   Blood    Final result Component Value Units   Iron 73 mcg/dL   Iron Saturation (TSAT) 33 %   Transferrin 149 Low  mg/dL   TIBC 222 Low  mcg/dL          11/17/2023 0351 11/17/2023 0513 Ferritin [[id number]]    (Abnormal)   Blood    Final result Component Value Units   Ferritin 2,723.00 High  ng/mL          11/17/2023 0351 11/17/2023 0434 Reticulocytes [[id number]]   (Abnormal)   Blood    Final result Component Value Units   Reticulocyte % 17.67 High  %   Reticulocyte Absolute 0.5001 High  10*6/mm3        11/16/2023 0148 11/17/2023 1509 Fibrin Split Products [[id number]]    Blood    Final result Component Value Units   FDP <5 ug/mL              11/15/2023  2159 11/15/2023 2309 Fibrinogen [[id number]]   Blood    Final result Component Value Units   Fibrinogen 309 mg/dL          CBC and coagulation:  Results from last 7 days   Lab Units 11/17/23  0351 11/16/23  0148 11/15/23  2159 11/15/23  0638 11/13/23  2234 11/13/23  1319 11/13/23  1116 11/13/23  0711 11/13/23  0642 11/13/23  0309 11/12/23  2350 11/12/23  2307   LACTATE mmol/L  --   --   --   --   --  2.0 2.5*  --  2.8* 2.5* 3.7*  --    CRP mg/dL  --   --   --   --   --   --   --   --   --   --   --  0.88*   WBC 10*3/mm3 9.89 7.38  --  8.88 10.82*  --   --  13.11*  --   --   --  15.57*   HEMOGLOBIN g/dL 8.7* 8.0*  --  8.5* 7.9*  --   --  8.9*  --   --   --  8.4*   HEMATOCRIT % 30.9* 27.0*  --  26.9* 26.2*  --   --  29.6*  --   --   --  26.7*   MCV fL 109.2* 114.4*  --  115.0* 114.9*  --   --  114.3*  --   --   --  113.6*   MCHC g/dL 28.2* 29.6*  --  31.6 30.2*  --   --  30.1*  --   --   --  31.5   PLATELETS 10*3/mm3 72* 76*  --  77* 99*  --   --  118*  --   --   --  142   INR   --   --  0.97  --   --   --   --   --   --   --   --  1.20*   D DIMER QUANT MCGFEU/mL  --  0.28 <0.27  --   --   --   --   --   --   --   --   --      Renal and electrolytes:  Results from last 7 days   Lab Units 11/17/23  0351 11/16/23  0148 11/15/23  0638 11/14/23  1407 11/13/23  2234 11/13/23  1319 11/13/23  0711 11/12/23  2307   SODIUM mmol/L 137 136 135*  --  139 137 135* 132*   POTASSIUM mmol/L 4.6 4.2 3.7 4.2 3.4* 3.8 3.4* 3.5   MAGNESIUM mg/dL 2.2 2.3 2.5  --  2.4 2.5  --  2.1   CHLORIDE mmol/L 103 101 100  --  103 101 98 91*   CO2 mmol/L 23.0 25.0 25.7  --  28.7 23.9 26.7 26.7   BUN mg/dL 16 16 18  --  22* 29* 32* 36*   CREATININE mg/dL 1.22* 1.28* 1.17*  --  1.09* 1.10* 1.32* 1.46*   CALCIUM mg/dL 8.8 8.5* 8.8  --  8.6 8.8 8.8 8.9   PHOSPHORUS mg/dL 3.6 3.7 3.9  --  3.5  --   --   --    GLUCOSE mg/dL 245* 312* 169*  --  105* 221* 256* 424*   ANION GAP mmol/L 11.0 10.0 9.3  --  7.3 12.1 10.3 14.3     Estimated Creatinine Clearance:  62.3 mL/min (A) (by C-G formula based on SCr of 1.22 mg/dL (H)).    Liver and pancreatic function:  Results from last 7 days   Lab Units 11/17/23  0351 11/13/23  2234 11/13/23  0711 11/12/23  2350 11/12/23  2307   ALBUMIN g/dL 3.4* 3.2* 3.6  --  3.7   BILIRUBIN mg/dL 1.9* 2.5* 2.4*  --  2.7*   ALK PHOS U/L 87 89 103  --  127*   AST (SGOT) U/L 18 26 20  --  21   ALT (SGPT) U/L 15 18 18  --  21   AMMONIA umol/L  --   --   --  15  --        Endocrine function:  Lab Results   Component Value Date    HGBA1C 7.30 (H) 11/13/2023     Point of care bedside glucose levels:  Results from last 7 days   Lab Units 11/17/23  1948 11/17/23  1632 11/17/23  1104 11/17/23  0714 11/17/23  0659 11/17/23  0531 11/16/23  2336 11/16/23  1701 11/16/23  1242 11/16/23  1211 11/16/23  0821 11/16/23  0536 11/15/23  2349 11/15/23  1946   GLUCOSE mg/dL 431* 359* 461* 274* 279* 231* 236* 353* 325* 372* 240* 326* 324* 348*     Glucose levels from the CMP:  Results from last 7 days   Lab Units 11/17/23  0351 11/16/23  0148 11/15/23  0638 11/13/23  2234 11/13/23  1319 11/13/23  0711 11/12/23  2307   GLUCOSE mg/dL 245* 312* 169* 105* 221* 256* 424*     Cardiac:  Results from last 7 days   Lab Units 11/13/23  1319 11/13/23  0124 11/12/23  2307   CK TOTAL U/L  --   --  41   HSTROP T ng/L 84* 84* 93*   PROBNP pg/mL  --   --  2,746.0*       Cultures:  Microbiology Results (last 10 days)       Procedure Component Value - Date/Time    Blood Culture - Blood, Hand, Right [[id number]]  (Normal) Collected: 11/13/23 0003    Lab Status: Preliminary result Specimen: Blood from Hand, Right Updated: 11/17/23 0015     Blood Culture No growth at 4 days    Blood Culture - Blood, Arm, Right [[id number]]  (Normal) Collected: 11/12/23 2350    Lab Status: Preliminary result Specimen: Blood from Arm, Right Updated: 11/17/23 0015     Blood Culture No growth at 4 days          I have personally looked at the labs and they are summarized  above.  ----------------------------------------------------------------------------------------------------------------------  Detailed radiology reports for the last 24 hours:    Imaging Results (Last 24 Hours)       Procedure Component Value Units Date/Time    US Liver [[id number]] Collected: 11/17/23 0802     Updated: 11/17/23 0845    Narrative:      EXAMINATION: US LIVER-      CLINICAL INDICATION: Obesity, thrombocytopenia, need to evaluate for  cirrhosis; A41.9-Sepsis, unspecified organism; R65.20-Severe sepsis  without septic shock; K74.69-Other cirrhosis of liver;  I48.91-Unspecified atrial fibrillation; D64.9-Anemia, unspecified;  I50.9-Heart failure, unspecified; I47.29-Other ventricular tachycardia;  R94.31-Abnormal electrocardiogram (ECG) (EKG); I50.43-Acute on chronic  combined systolic         COMPARISON: None available.     FINDINGS:  Sonographic imaging of the liver.     Liver is enlarged at almost 20 cm.     Homogeneous.     No intrahepatic ductal dilatation or focal hepatic mass.     Hepatic flow is hepatopetal.       Impression:      Slight enlargement of the liver.        This report was finalized on 11/17/2023 8:42 AM by Dr. Angelo Deleon MD.             I have personally looked at the radiology images and I have read the available final report.    Assessment & Plan      -Atrial fibrillation with RVR status post direct current cardioversion in the emergency room, now in sinus rhythm, on Eliquis at home for a [id number] score of 3   -Thrombocytopenia that developed during admission  -Dilated cardiomyopathy/systolic CHF (EF 21-25% in 2020 and now 31-35%) that was present on admission with left ejection fraction of 31-35%, suspect partly tachycardia induced, with an acute exacerbation on admission that has now improved with diuresis  -Hypotension present on admission, suspect due to cardiogenic shock as result of the A-fib with RVR, currently resolved  -Prolonged QTc that developed after  admission, with normal potassium and magnesium levels  -Macrocytic anemia that was present on admission, with normal vitamin B12 and folate levels, suspect anemia of chronic disease +/- liver disease  -Suspect type I versus type II non-ST elevation MI present on admission due to the atrial fibrillation with RVR causing cardiogenic shock and hypotension (cardiology thinks this is more likely type II than a type I non-ST elevation MI)  -Status post PVI on October 2022 with recurrent A-fib in the past  -History of nonobstructive coronary artery disease per cardiac catheterization 11/20/2020 with 30 to 40% LAD lesion  -History of insulin dependent diabetes mellitus type 2  -History of decompensated liver cirrhosis with ascites  -History of chronic macrocytic anemia, with hemoglobin currently at baseline  -History of CKD stage IIIa (creatinine ranges 1.2-1.5 and GFR ranges 42-50)    I have been trying to control the patient's glucose levels with her home insulin of Lantus and regular insulin.  However, this has been a difficult task.  She is normally on 58 units of Lantus twice a day and thus I will double the current dose of Lantus to 30 units twice a day and give her a dose now; until I know how she responds to this, I will keep the mealtime regular insulin and the sliding scale regular insulin the same.  I will ask pharmacy to see what other choices of insulin are available for this patient based on her insurance.  Dr. Sahu has restarted Coreg and thus we will continue to monitor her heart rates and blood pressures closely, as prior attempts at goal-directed medical therapy were not successful.  We will avoid QT prolonging agents and continue to monitor the electrolytes closely. In order to lessen the risk of worsening QTc, the goal potassium level is 4-4.5 and goal magnesium level is 2-2.2.   Not sure what is causing the macrocytic anemia but it does not appear at this time to be a vitamin deficiency.  This could be  anemia of chronic disease mixed with another issue so that the anemia is more macrocytic than microcytic.  There is no obvious bleeding anywhere.  Arixtra was started as we were not sure if the patient had heparin-induced thrombocytopenia; the heparin-induced antibody appears to be negative but until I see the platelet levels increasing again I we will continue with holding heparin.    VTE Prophylaxis:  Mechanical Order History:       None          Pharmalogical Order History:        Ordered     Dose Route Frequency Stop    11/17/23 1434  fondaparinux (ARIXTRA) injection 5 mg         5 mg SC Every 24 Hours Scheduled --    11/15/23 2251  argatroban 250 mg in dextrose (D5W) 5 % 250 mL (1 mg/mL) infusion  7.56 mL/hr,   Status:  Discontinued         1.2 mcg/kg/min IV Titrated 11/15/23 2257    11/15/23 0817  heparin (porcine) 5000 UNIT/ML injection 4,000 Units         4,000 Units IV Once 11/15/23 0942    11/13/23 1540  heparin 42850 units/250 mL (100 units/mL) in 0.45 % NaCl infusion  12.08 mL/hr,   Status:  Discontinued         11.4 Units/kg/hr (Dosing Weight) IV Titrated 11/15/23 2248    11/13/23 0537  Pharmacy to Dose Heparin  Status:  Discontinued         -- XX Continuous PRN 11/15/23 2254               Disposition: Patient would like to be discharged home with her son but timing is unknown at this time as the patient states she is not strong enough to go home yet.    Dave Bishop MD  [facility]  11/17/23  21:27 EST      Electronically signed by Dave Bishop MD at 11/17/23 2202       Consult Notes (last 72 hours)  Notes from 11/17/23 1015 through 11/20/23 1015   No notes of this type exist for this encounter.

## 2023-11-20 NOTE — PLAN OF CARE
Goal Outcome Evaluation:    Pt is resting in bed with no s/s of distress noted. VSS. IV access maintained. Increased urine output this shift. Will continue with plan of care.       Daily Care Plan Summary: Heart Failure    Diuretic in use (IV or PO):   PO Bumex        Daily weight (up or down):    UP      Output > Intake (yes/no): Yes      O2 Requirements (current, any change?): 1 L NC      Symptoms noted with Activity (Respiratory Tolerance, functional state):      Pt tolerated activity well today      Anticipated Discharge Plans:        Home when medically stable

## 2023-11-20 NOTE — THERAPY TREATMENT NOTE
Acute Care - Physical Therapy Treatment Note   [place]     Patient Name: Yumiko Purdy  : 1965  MRN: [id number]  Today's Date: 2023   Onset of Illness/Injury or Date of Surgery: 23  Visit Dx:     ICD-10-CM ICD-9-CM   1. Severe sepsis  A41.9 038.9    R65.20 995.92   2. Other cirrhosis of liver  K74.69 571.5   3. Atrial fibrillation with RVR  I48.91 427.31   4. Symptomatic anemia  D64.9 285.9   5. Congestive heart failure, unspecified HF chronicity, unspecified heart failure type  I50.9 428.0   6. Sustained monomorphic ventricular tachycardia  I47.29 427.1   7. Prolonged Q-T interval on ECG  R94.31 794.31   8. Acute on chronic combined systolic and diastolic CHF (congestive heart failure)  I50.43 428.43     428.0   9. Chronic heart failure, unspecified heart failure type  I50.9 428.9     Patient Active Problem List   Diagnosis    Acute on chronic congestive heart failure    Cardiomyopathy    CKD (chronic kidney disease) stage 2, GFR 60-89 ml/min    Severe obesity (BMI 35.0-39.9) with comorbidity    Chronic anemia    Elevated bilirubin    Acute on chronic combined systolic and diastolic CHF (congestive heart failure)    Hyponatremia    Chronic atrial fibrillation    Cirrhosis    Ventricular tachycardia     Past Medical History:   Diagnosis Date    Anemia     CHF (congestive heart failure)     Chronic a-fib     stated by patient     Cirrhosis of liver     stated by patient     Diabetes mellitus      Past Surgical History:   Procedure Laterality Date    APPENDECTOMY      CARDIAC CATHETERIZATION N/A 11/10/2020    Procedure: Left Heart Cath;  Surgeon: Charlee Ken MD;  Location: [facility] INVASIVE LOCATION;  Service: Cardiovascular;  Laterality: N/A;    CHOLECYSTECTOMY      TOE AMPUTATION Right     stated by patient.      PT Assessment (last 12 hours)       PT Evaluation and Treatment       Row Name 23 1507          Physical Therapy Time and Intention    Subjective Information complains  of;weakness  -CT     Document Type therapy note (daily note)  -CT     Mode of Treatment physical therapy  -CT     Patient Effort good  -CT     Comment Pt ambulated to hallway with use of RW this date  -CT       Row Name 11/20/23 1507          General Information    Patient Profile Reviewed yes  -CT     Equipment Currently Used at Home commode, bedside;hospital bed;walker, rolling  -CT     Existing Precautions/Restrictions fall  -CT     Limitations/Impairments safety/cognitive  -CT       Row Name 11/20/23 1507          Cognition    Affect/Mental Status (Cognition) WNL  -CT     Orientation Status (Cognition) oriented x 4  -CT     Follows Commands (Cognition) WNL  -CT       Row Name 11/20/23 1507          Bed Mobility    Bed Mobility bed mobility (all) activities  -CT     All Activities, [place] (Bed Mobility) contact guard  -CT     Bed Mobility, Safety Issues decreased use of arms for pushing/pulling;decreased use of legs for bridging/pushing  -CT       Row Name 11/20/23 1507          Transfers    Transfers sit-stand transfer;stand-sit transfer  -CT       Row Name 11/20/23 1507          Sit-Stand Transfer    Sit-Stand [place] (Transfers) contact guard  -CT     Assistive Device (Sit-Stand Transfers) walker, front-wheeled  -CT       Row Name 11/20/23 1507          Stand-Sit Transfer    Stand-Sit [place] (Transfers) contact guard  -CT     Assistive Device (Stand-Sit Transfers) walker, front-wheeled  -CT       Row Name 11/20/23 1507          Gait/Stairs (Locomotion)    [place] Level (Gait) contact guard  -CT     Assistive Device (Gait) walker, front-wheeled  -CT     Patient was able to Ambulate yes  -CT     Distance in Feet (Gait) 50  -CT     Pattern (Gait) swing-through  -CT     Deviations/Abnormal Patterns (Gait) gait speed decreased  -CT     Bilateral Gait Deviations forward flexed posture  -CT       Row Name             Wound 11/14/23 2102 Left lower leg Abrasion    Wound - Properties Group  Placement Date: 11/14/23 -AF Placement Time: 2102 -AF Present on Original Admission: Y  -AF Side: Left  -AF Orientation: lower  -AF Location: leg  -AF Primary Wound Type: Abrasion  -AF    Retired Wound - Properties Group Placement Date: 11/14/23 -AF Placement Time: 2102 -AF Present on Original Admission: Y  -AF Side: Left  -AF Orientation: lower  -AF Location: leg  -AF Primary Wound Type: Abrasion  -AF    Retired Wound - Properties Group Date first assessed: 11/14/23 -AF Time first assessed: 2102 -AF Present on Original Admission: Y  -AF Side: Left  -AF Location: leg  -AF Primary Wound Type: Abrasion  -AF      Row Name 11/20/23 1507          Coping    Observed Emotional State calm;cooperative;pleasant  -CT     Verbalized Emotional State acceptance  -CT       Row Name 11/20/23 1507          Plan of Care Review    Plan of Care Reviewed With patient  -CT       Row Name 11/20/23 1507          Positioning and Restraints    Pre-Treatment Position in bed  -CT     Post Treatment Position chair  -CT     In Chair sitting;call light within reach;encouraged to call for assist;notified nsg  -CT       Row Name 11/20/23 1507          Therapy Assessment/Plan (PT)    Rehab Potential (PT) good, to achieve stated therapy goals  -CT     Criteria for Skilled Interventions Met (PT) yes;skilled treatment is necessary  -CT     Therapy Frequency (PT) 2 times/wk  2-5 time/wk  -CT               User Key  (r) = Recorded By, (t) = Taken By, (c) = Cosigned By      Initials Name Provider Type    CT Lynn Mueller, PT Physical Therapist    Kati Ashford, RN Registered Nurse                    Physical Therapy Education       Title: PT OT SLP Therapies (Done)       Topic: Physical Therapy (Done)       Point: Mobility training (Done)       Learning Progress Summary             Patient Acceptance, E,TB, VU,NR by EB at 11/19/2023 2346    Acceptance, E, NR by KLAIRSSA at 11/16/2023 1111    Acceptance, E, NR by RD at 11/15/2023 0958                          Point: Home exercise program (Done)       Learning Progress Summary             Patient Acceptance, E,TB, VU,NR by EB at 11/19/2023 2346    Acceptance, E, NR by RD at 11/16/2023 1111    Acceptance, E, NR by RD at 11/15/2023 0958                         Point: Body mechanics (Done)       Learning Progress Summary             Patient Acceptance, E,TB, VU,NR by EB at 11/19/2023 2346    Acceptance, E, NR by RD at 11/16/2023 1111    Acceptance, E, NR by RD at 11/15/2023 0958                         Point: Precautions (Done)       Learning Progress Summary             Patient Acceptance, E,TB, VU,NR by EB at 11/19/2023 2346    Acceptance, E, NR by RD at 11/16/2023 1111    Acceptance, E, NR by RD at 11/15/2023 0958                                         User Key       Initials Effective Dates Name Provider Type Discipline    RD 06/16/21 -  Silvana Melgar, RN Registered Nurse Nurse     02/24/21 -  Tomasa Zamarripa RN Registered Nurse Nurse                  PT Recommendation and Plan  Anticipated Discharge Disposition (PT): home with 24/7 care  Planned Therapy Interventions (PT): balance training, bed mobility training, gait training, home exercise program, manual therapy techniques, motor coordination training, neuromuscular re-education, patient/family education, postural re-education, strengthening, stretching, transfer training  Therapy Frequency (PT): 2 times/wk (2-5 time/wk)  Plan of Care Reviewed With: patient       Time Calculation:    PT Charges       Row Name 11/20/23 1508             Time Calculation    PT Received On 11/20/23  -CT         Time Calculation- PT    Total Timed Code Minutes- PT 25 minute(s)  -CT                User Key  (r) = Recorded By, (t) = Taken By, (c) = Cosigned By      Initials Name Provider Type    CT Lynn Mueller, PT Physical Therapist                  Therapy Charges for Today       Code Description Service Date Service Provider Modifiers Qty    66485388179 HC GAIT TRAINING EA 15  MIN 11/20/2023 Lynn Mueller, PT GP 1    26453951179 HC PT THERAPEUTIC ACT EA 15 MIN 11/20/2023 Lynn Mueller, PT GP 1            PT G-Codes  AM-PAC 6 Clicks Score (PT): 17    Lynn Mueller, PT  11/20/2023

## 2023-11-20 NOTE — PLAN OF CARE
Goal Outcome Evaluation:  Plan of Care Reviewed With: patient        Progress: no change  Outcome Evaluation: PT is asleep in bed at this time. PT has complained of pain in Right foot. PRN medication given. PT had concerns with constipation, Miralax to be given this AM with  morning med pass per PT request. No other complaints or discomfort noted. VSS. Afebrile. Will Continue to Monitor PT.         Phase 1 - Admission/Acute - Current (Heart Failure Pathway)  Output is greater than intake.  Outcome: Met  Patient's oxygen saturation maintained above 90% unless otherwise specified.  Outcome: Met  Patient reports improvement in symptoms since arrival.  Outcome: Met  An increase-progression in activity with patient's baseline in mind. Patient performing daily AMPAC-6 goal.  Outcome: Met  Initiate Guideline Directed Medical Therapy (ex.ACE, ARBs, ARNI, Beta Blockers, SGLT2 Inhibitors).  Outcome: Met

## 2023-11-20 NOTE — PROGRESS NOTES
[facility]  PROGRESS NOTE    Patient information:  Name: Yumiko Purdy  Age/Sex: 57 y.o. female  :  1965        PCP: Masha Davidson APRN  Attending: Tomás An MD  MRN:  [id number]  Visit Number:  [id number]    LOS:  LOS: 7 days     CODE STATUS:    Code Status and Medical Interventions:   Ordered at: 23 0558     Code Status (Patient has no pulse and is not breathing):    CPR (Attempt to Resuscitate)     Medical Interventions (Patient has pulse or is breathing):    Full Support       PROBLEM LIST:Principal Problem:    Ventricular tachycardia      Reason for Cardiology follow-up: Ventricular tachycardia with Paroxysmal atrial fibrillation and heart failure with reduced EF.  EF-31 to 35%.     Subjective   ADMISSION INFORMATION:  Chief Complaint   Patient presents with    Rapid Heart Rate       HPI:  Yumiko Purdy is a 57 y.o. female with a past medical history significant for systolic CHF (EF 21-25% per last ECHO 2020), afib on Eliquis s/p ablation 10/2022, cirrhosis, insulin dependent type II DM, hypothyroidism, COPD, and what appears to be borderline stage IIIa-b CKD (creatinin ranges 1.2-1.5 and GFR ranges 42-50).     Patient presented to [facility] emergency room (ER) on 2023 with complaints of acute onset palpitations that occurred just prior to arrival to the ER. EMS reported they found her in SVT and gave her 6mg IV adenosine followed by 12mg adenosine along with a fluid bolus. When she arrived to the ED, initial EKG reported afib w/RVR, but upon further review on Zolls monitor, she appeared to be in Ventricular tachycardia.     Cardiology has been consulted for further evaluation and management ventricular tachycardia.      Primary Cardiologist has been Jad Maravilla MD (Cardiologist), and Samy Claude Elayi, MD (EP) and she was last seen in the office on 2023.   Contact number for EP:  [phone].     EVENT TIMELINE:   11/14/2023: Ordered Life Vest  11/14/2023: The above number is no longer in service.  I did call [phone] and spoke with Eda. She took a message and will have one of the 's to return my call to make this patient an appointment for evaluation for ablation and ICD placement. Awaiting their return call.     11/15/2023: I called back to Dr. Jimenez's office at [phone] and spoke with Esthela. She scheduled patient with Dr. Jimenez on 12/11/2023 at 1:15 PM.  I will take a note to  with this appoint on it for her to have as reference and new phone number ([phone]) since the one she gave us is not in service any longer.     Patient is also followed in [place] heart failure clinic with her last appointment being 09/29/2023.    Interval History:   Patient is in room 314 A and was examined by Dr. Sahu.  Telemetry reveals SR 60s with a BBB.  According to patient's I & O flow chart she diuresed a -2870 mL overnight.  Sodium is 133, potassium 4.2, chloride 98, CO2 24.8, BUN is 29, and a creatinine 1.57 which is a bump up from 1.46.  Hemoglobin is 8.2 and platelet count is 122.  Patient is lying in bed resting quietly.  No acute distress noted at this time.  Patient currently denies any chest pain, or palpitations.  She reports her breathing has improved. X 1 adult male is at bedside today. Another 600 ml UOP noted in suction container in room.    ORDERS:   Bumex 2 mg PO daily    Vital Signs  Temp:  [97.7 °F (36.5 °C)-98.9 °F (37.2 °C)] 98.1 °F (36.7 °C)  Heart Rate:  [65-76] 72  Resp:  [18-20] 20  BP: ()/(43-60) 114/60  Flow (L/min):  [1-2] 1  Device (Oxygen Therapy): nasal cannula  Vital Signs (last 72 hrs)         11/17 0700  11/18 0659 11/18 0700  11/19 0659 11/19 0700  11/20 0659 11/20 0700  11/20 0800   Most Recent      Temp (°F) 98 -  99.4    98.3 -  99.3    98 -  99.1      97.7     97.7 (36.5) 11/20 0705    Heart Rate 69 -  85    65 -  79     69 -  76      72     72 11/20 0705    Resp   18    18 -  20      20      18     18 11/20 0705    BP 96/58 -  110/47    105/46 -  114/51    91/45 -  126/46      104/44     104/44 11/20 0705    SpO2 (%) 93 -  97    93 -  98    94 -  99      94     94 11/20 0705    Flow (L/min)   1      1    1 -  2       2 11/19 2032          BMI:Body mass index is 39.34 kg/m².    WEIGHT:      11/18/23  0500 11/19/23  0500 11/20/23  0500   Weight: 108 kg (237 lb 11.2 oz) 107 kg (235 lb 3.2 oz) 107 kg (236 lb 6.4 oz)       DIET:Diet: Regular/House Diet, Diabetic Diets; Consistent Carbohydrate; Texture: Soft to Chew (NDD 3); Soft to Chew: Whole Meat; Fluid Consistency: Thin (IDDSI 0)    I&O:  Intake & Output (last 3 days)         11/17 0701 11/18 0700 11/18 0701 11/19 0700 11/19 0701 11/20 0700 11/20 0701  11/21 0700    P.O. 360 0926 203 6536    I.V. (mL/kg)   0 (0)     Total Intake(mL/kg) 360 (3.4) 1080 (10.2) 780 (7.4) 1670 (15.8)    Urine (mL/kg/hr) 1900 (0.7) 2850 (1.1) 3650 (1.4) 900 (1)    Total Output 1900 2850 3650 900    Net -1540 -1770 -2870 +770            Urine Unmeasured Occurrence 2 x 1 x              Objective     I have seen and examined Ms. Purdy today.  Physical Exam:      General Appearance:    Alert, cooperative, in no acute distress. BMI 39.34.   Head:    Normocephalic, without obvious abnormality, atraumatic.   Eyes:                          Conjunctivae and sclerae normal, no icterus, no pallor, corneas clear.   Neck:   No adenopathy, supple, trachea midline, no thyromegaly, no carotid bruit, no JVD.   Lungs:   Relatively clear to auscultation, respirations regular, even and unlabored.    Heart:    Regular rhythm and normal rate, normal S1 and S2, no          murmur, no gallop, no rub, no click   Chest Wall:    No abnormalities observed.   Abdomen:     Normal bowel sounds, no masses, no organomegaly, soft nontender, nondistended, no guarding, no rebound tenderness.   Extremities:   Moves all extremities  well, + 1 edema, no cyanosis, no     redness.   Pulses:   Pulses palpable and equal bilaterally.   Skin:   No bleeding, bruising or rash. Scab noted to LLE that is black in color with mild erythema surrounding it at about 1/2 inch wide.    Neurologic:   Alert and Oriented x 3, Speech Clear & comprehensive.       Results review   Results Review:   Results from last 7 days   Lab Units 11/19/23  0153 11/18/23  1632 11/18/23  1418   HSTROP T ng/L 127* 150* 152*     Lab Results   Component Value Date    PROBNP 2,085.0 (H) 11/20/2023    PROBNP 2,746.0 (H) 11/12/2023    PROBNP 555.6 09/29/2023     Results from last 7 days   Lab Units 11/20/23  0147 11/19/23  0153 11/18/23  0243 11/17/23  0351 11/16/23  0148 11/15/23  0638 11/13/23  2234   WBC 10*3/mm3 8.44 9.24 9.06 9.89 7.38 8.88 10.82*   HEMOGLOBIN g/dL 8.2* 8.3* 8.0* 8.7* 8.0* 8.5* 7.9*   PLATELETS 10*3/mm3 122* 112* 82* 72* 76* 77* 99*     Results from last 7 days   Lab Units 11/20/23  0147 11/19/23  0153 11/18/23  1418 11/18/23  0243 11/17/23  0351 11/16/23  0148 11/15/23  0638 11/14/23  1407 11/13/23  2234   SODIUM mmol/L 133* 139 135* 137 137 136 135*  --  139   POTASSIUM mmol/L 4.2 4.6 5.0 4.8 4.6 4.2 3.7   < > 3.4*   CHLORIDE mmol/L 98 102 100 103 103 101 100  --  103   CO2 mmol/L 24.8 25.6 25.1 25.2 23.0 25.0 25.7  --  28.7   BUN mg/dL 29* 25* 24* 18 16 16 18  --  22*   CREATININE mg/dL 1.57* 1.46* 1.28* 1.25* 1.22* 1.28* 1.17*  --  1.09*   CALCIUM mg/dL 8.6 8.9 8.7 8.9 8.8 8.5* 8.8  --  8.6   GLUCOSE mg/dL 164* 178* 351* 183* 245* 312* 169*  --  105*   ALT (SGPT) U/L  --  14  --  13 15  --   --   --  18   AST (SGOT) U/L  --  18  --  20 18  --   --   --  26    < > = values in this interval not displayed.     Lab Results   Component Value Date    MG 2.3 11/20/2023    MG 2.4 11/19/2023    MG 2.2 11/18/2023     Estimated Creatinine Clearance: 48.6 mL/min (A) (by C-G formula based on SCr of 1.57 mg/dL (H)).    Lab Results   Component Value Date    HGBA1C 7.30 (H)  11/13/2023    HGBA1C 8.40 (H) 09/10/2020    HGBA1C 5.4 04/21/2016     Lab Results   Component Value Date    CHOL 137 09/11/2020    TRIG 80 09/11/2020    LDL 78 09/11/2020    HDL 43 09/11/2020     Lab Results   Component Value Date    ABSOLUTELUNG 35 11/19/2023    ABSOLUTELUNG 31 11/14/2023    ABSOLUTELUNG 34 09/29/2023     Lab Results   Component Value Date    INR 0.97 11/15/2023    INR 1.20 (H) 11/12/2023    INR 1.10 12/15/2022    INR 1.25 (H) 09/23/2020    INR 1.09 09/22/2020    INR 1.17 (H) 09/21/2020    INR 1.26 (H) 09/21/2020     Lab Results   Component Value Date    TSH 4.680 (H) 11/12/2023      Pain Management Panel  More data exists         Latest Ref Rng & Units 11/13/2023 9/12/2020   Pain Management Panel   Creatinine, Urine mg/dL  mg/dL - 46.3  46.3    Amphetamine, Urine Qual Negative Negative  -   Barbiturates Screen, Urine Negative Negative  -   Benzodiazepine Screen, Urine Negative Negative  -   Buprenorphine, Screen, Urine Negative Negative  -   Cocaine Screen, Urine Negative Negative  -   Fentanyl, Urine Negative Negative  -   Methadone Screen , Urine Negative Negative  -   Methamphetamine, Ur Negative Negative  -     Microbiology Results (last 10 days)       Procedure Component Value - Date/Time    Blood Culture - Blood, Hand, Right [[id number]]  (Normal) Collected: 11/13/23 0003    Lab Status: Final result Specimen: Blood from Hand, Right Updated: 11/18/23 0016     Blood Culture No growth at 5 days    Blood Culture - Blood, Arm, Right [[id number]]  (Normal) Collected: 11/12/23 2350    Lab Status: Final result Specimen: Blood from Arm, Right Updated: 11/18/23 0016     Blood Culture No growth at 5 days           Imaging Results (Last 24 Hours)       ** No results found for the last 24 hours. **          ECHO:  Results for orders placed during the hospital encounter of 11/12/23    Adult Transthoracic Echo Complete W/ Cont if Necessary Per Protocol    Interpretation Summary    Left ventricular systolic  function is moderately decreased. Left ventricular ejection fraction appears to be 31 - 35%.    Left ventricular wall thickness is consistent with mild concentric hypertrophy.    Left ventricular diastolic function is consistent with (grade III w/high LAP) fixed restrictive pattern.    Mildly reduced right ventricular systolic function noted.    The left atrial cavity is moderately dilated.    The right atrial cavity is mild to moderately  dilated.    There is calcification of the aortic valve mainly affecting the left coronary cusp(s).    Dilated pulmonary artery.      STRESS TEST:  Results for orders placed during the hospital encounter of 10/15/20    Nuclear Medicine Cardiac Blood Pool Muga At Rest    Interpretation Summary  EXAMINATION: NUCLEAR MEDICINE CARDIAC BLOOD POOL MUGA AT REST-    CLINICAL INDICATION: Cardiomyopathy  TECHNIQUE: 21 mCi of Tc-99m autologous RBCs were injected IV after  in-vitro RBC labeling. Gated cardiac imaging was performed in the  anterior and left anterior oblique.  COMPARISON: None  FINDINGS: The left ventricle is normal in size with normal systolic  function. The calculated left ventricular ejection fraction (LVEF) = 38  %.  The right and left atria, aorta and pulmonary artery are normal. The  right ventricle is normal in size.    Impression  LVEF: 38%, at rest.    This report was finalized on 10/16/2020 11:57 AM by Dr. Marlo Parr MD.       HEART CATH:  Results for orders placed during the hospital encounter of 11/10/20    Cardiac Catheterization/Vascular Study    Narrative  Procedure type:  Left heart cath, LV gram, bilateral selective cholangiogram    Indication:  Cardiomyopathy    Procedure:  After informed consent the patient was brought into the cardiac aspiration lab she was prepped and draped in the usual sterile manner the right radial area was incised with 2% Xylocaine however several attempts to engage into the right radial artery was not successful so the right  radial artery access was abandoned and the right groin area was excised in 2% Xylocaine right femoral artery was accessed using modified standard technique and 5 Vincentian side-port arterial sheath was placed and secured then over a guidewire a 5 Vincentian JL 4 catheter was used left main coronary artery with angiographic pressures were taken then the catheter was removed and over a guidewire a 5 Vincentian JR4 catheter was used and engagement of the right coronary arteries angioplasty was taken then the catheter was removed then over a guidewire 5 Vincentian pigtail catheter used aortic valve was done hand-injection LV gram was done then pullback was done then the catheter was removed groin sheath was removed and minx closure device applied with good hemostasis the patient was transported back to her room in stable condition.    Results:  The patient LVEDP was 17 mmHg with hand-injection showing preserved left ventricular systolic function wall motion EF 50-55% with no significant aortic gradient on pullback.    Cholangiogram:  This right dominant system.  Left main cardiac angiographically normal and bifurcates into left and descending and left circumflex arteries.  LAD was normal caliber gives several septal perforators and diagonal branches and wraps around the apex LAD about 30 to 40% mid stenosis.  Left circumflex artery was nondominant for posterior circulation gives rise to several obtuse marginal branches left circumflex arteries branches angiographically normal.  The right coronary artery was a large artery was dominant for posterior circulation giving rise to acute marginal branches PDA and posterolateral branches the right coronary artery and its branches angiographically normal.    Conclusion:  Preserved LV systolic function ejection fraction 50-55% with elevated left ventricular end-diastolic pressure consistent with diastolic dysfunction coronary angiogram showed right dominant system with 30 to 40% mid LAD lesion  otherwise coronaries arteries were normal.  Plan of care:  Medical management and secondary preventive measurements of coronary disease good lipid control blood pressure control healthy lifestyle patient is to follow-up with her primary care physician for further management.      TELEMETRY:      SR 60s with BBB        I reviewed the patient's new clinical results.    ALLERGIES: Phenergan [promethazine]    Medication Review:   Current list of medications may not reflect those currently placed in orders that are not signed or are being held.     apixaban, 5 mg, Oral, Q12H  aspirin, 81 mg, Oral, Daily  bumetanide, 2 mg, Oral, Daily  busPIRone, 10 mg, Oral, BID  empagliflozin, 10 mg, Oral, Daily  erythromycin, 1 application , Both Eyes, Q6H  ferrous sulfate, 325 mg, Oral, BID  fluticasone, 2 spray, Nasal, Daily  insulin glargine, 35 Units, Subcutaneous, Q12H  insulin lispro, 13 Units, Subcutaneous, TID With Meals  insulin lispro, 3-14 Units, Subcutaneous, 4x Daily AC & at Bedtime  levothyroxine, 50 mcg, Oral, Q AM  Lidocaine, 2 patch, Transdermal, Q24H  metoprolol succinate XL, 12.5 mg, Oral, Q24H  miconazole, 1 application , Topical, Q12H  pantoprazole, 40 mg, Oral, Q AM  Pharmacy Consult, , Does not apply, Once  pregabalin, 150 mg, Oral, BID  senna-docusate sodium, 2 tablet, Oral, BID  sodium chloride, 10 mL, Intravenous, Q12H  sodium chloride, 10 mL, Intravenous, Q12H  [Held by provider] valsartan, 40 mg, Oral, Q24H      Pharmacy Consult,         [Held by provider] acetaminophen    acetaminophen    benzonatate    senna-docusate sodium **AND** polyethylene glycol **AND** bisacodyl **AND** bisacodyl    Calcium Replacement - Follow Nurse / BPA Driven Protocol    dextrose    dextrose    glucagon (human recombinant)    Magnesium Standard Dose Replacement - Follow Nurse / BPA Driven Protocol    nitroglycerin    ondansetron    Pharmacy Consult    Phosphorus Replacement - Follow Nurse / BPA Driven Protocol    Potassium  Replacement - Follow Nurse / BPA Driven Protocol    [COMPLETED] Insert Peripheral IV **AND** sodium chloride    sodium chloride    sodium chloride    sodium chloride    sodium chloride    Assessment      Acute on chronic HFrEF, improved.    Advanced nonischemic dilated cardiomyopathy with LV ejection fraction of 31 to 35%.  Mild nonobstructive coronary artery disease on recent coronary angiography.  Acute non-ST elevation myocardial infarction definitely type II from heart failure.    Plan     Switch to p.o. diuretics with Bumex 2 mg daily for now for next to 3 days and then switch to 1 mg daily after that  Continue with metoprolol succinate as tolerated  Patient has not been able to tolerate ARB recent behavior due to baseline low blood pressure.  Hence will hold off on this  Continue with empagliflozin as tolerated.    I have discussed the patients findings and recommendations with patient.    Thank you very much for asking us to be involved in this patient's care.  We will follow along with you.    Electronically signed by KENNY Finn, 11/20/23, 3:21 PM EST.     Electronically signed by Ramon Sahu MD, 11/20/23, 4:39 PM EST.          Please note that portions of this note were completed with a voice recognition program.    Please note that portions of this note were copied and has been reviewed and is accurate as of 11/20/2023 .

## 2023-11-20 NOTE — CONSULTS
"Date:  11/20/23    Referring Provider: Dr. Dave Bishop MD    Reason for Consultation: Thrombocytopenia    Patient Care Team:  Masha Davidson APRN as PCP - General (Family Medicine)  Jad Maravilla MD as Consulting Physician (Cardiology)  Cyrus Jimenez MD as Consulting Physician (Cardiology)    Chief complaint: Generalized weakness    History of present illness:  Yumiko Purdy is a 57 y.o. year-old female seen today at the request of Dr. Dave Bishop MD for evaluation and treatment of thrombocytopenia. Patient initially presented and was admitted on 11/13/2023 with reports of rapid heart rate, shortness of breath. She was found to be in SVT per EMS and given Adenosine 6 mg IV followed with 12 mg. She was found to be in ventricular tachycardia on arrival to ED. She was admitted for further evaluation and workup. She has a history of systolic congestive heart failure with ejection fraction ~20%. She also has a history of cirrhosis. At time of examination today, she is sitting up in bed eating breakfast. She continues to have generalized weakness and does not feel ready to go home. She has LifeVest in place. She states that they have been \"adjusting\" her heart medications in hopes of her being discharged home to follow up with outpatient cardiology in [place]. She denies easy bruising, obvious bleeding from any source. She is otherwise without specific complaints.     History  Past Medical History:   Diagnosis Date    Anemia     CHF (congestive heart failure)     Chronic a-fib     stated by patient     Cirrhosis of liver     stated by patient     Diabetes mellitus        Past Surgical History:   Procedure Laterality Date    APPENDECTOMY      CARDIAC CATHETERIZATION N/A 11/10/2020    Procedure: Left Heart Cath;  Surgeon: Charlee Ken MD;  Location: [facility] CATH INVASIVE LOCATION;  Service: Cardiovascular;  Laterality: N/A;    CHOLECYSTECTOMY      TOE AMPUTATION Right     stated by patient.  "       History reviewed. No pertinent family history.    Social History     Tobacco Use    Smoking status: Never    Smokeless tobacco: Never   Vaping Use    Vaping Use: Never used   Substance Use Topics    Alcohol use: Never    Drug use: Never       Allergies:  Phenergan [promethazine]    Outpatient Medications:  Medications Prior to Admission   Medication Sig Dispense Refill Last Dose    acetaminophen (TYLENOL) 500 MG tablet Take 1 tablet by mouth 2 (Two) Times a Day As Needed for Mild Pain.   Past Week    amiodarone (PACERONE) 200 MG tablet Take 1 tablet by mouth Daily.   2023    apixaban (ELIQUIS) 5 MG tablet tablet Take 1 tablet by mouth Every 12 (Twelve) Hours. Indications: Atrial Fibrillation 60 tablet 3 2023    azelastine (ASTELIN) 0.1 % nasal spray 2 sprays into the nostril(s) as directed by provider 2 (Two) Times a Day. Use in each nostril as directed   Past Week    benzonatate (TESSALON) 100 MG capsule Take 1 capsule by mouth 2 (Two) Times a Day As Needed for Cough.   2023    bumetanide (BUMEX) 2 MG tablet Take 2 tablets by mouth See Admin Instructions. 4 mg QD monday through Saturday and 2 mg QD on Sundays   2023    bumetanide (BUMEX) 2 MG tablet Take 1 tablet by mouth Every 7 (Seven) Days. Sundays   2023    busPIRone (BUSPAR) 10 MG tablet Take 1 tablet by mouth 2 (Two) Times a Day.   2023    [] doxycycline (VIBRAMYCIN) 100 MG capsule Take 1 capsule by mouth 2 (Two) Times a Day.   2023    erythromycin (ROMYCIN) 5 MG/GM ophthalmic ointment Administer 1 application  to both eyes Every 6 (Six) Hours.   2023    ferrous sulfate 325 (65 FE) MG tablet Take 1 tablet by mouth 2 (Two) Times a Day.   2023    fluticasone (FLONASE) 50 MCG/ACT nasal spray 2 sprays into the nostril(s) as directed by provider Daily.   2023    Insulin Glargine (LANTUS SOLOSTAR) 100 UNIT/ML injection pen Inject 58 Units under the skin into the appropriate area as directed 2  (Two) Times a Day.   2023    Insulin Regular Human, Conc, (HumuLIN R U-500 KwikPen) 500 UNIT/ML solution pen-injector CONCENTRATED injection Inject 40 Units under the skin into the appropriate area as directed 3 (Three) Times a Day Before Meals.   2023    levothyroxine (SYNTHROID, LEVOTHROID) 50 MCG tablet Take 1 tablet by mouth Daily.   2023    omeprazole (priLOSEC) 20 MG capsule Take 1 capsule by mouth Daily.   2023    pregabalin (LYRICA) 150 MG capsule Take 1 capsule by mouth 2 (Two) Times a Day.   2023    [] Vericiguat 10 MG tablet Take 1 tablet by mouth Daily for 90 days. 90 tablet 1 2023    nitroglycerin (NITROSTAT) 0.4 MG SL tablet Place 1 tablet under the tongue Every 5 (Five) Minutes As Needed for Chest Pain. Take no more than 3 doses in 15 minutes.   Unknown       Inpatient Medications:  Scheduled Meds:  apixaban, 5 mg, Oral, Q12H  aspirin, 81 mg, Oral, Daily  bumetanide, 2 mg, Oral, Daily  busPIRone, 10 mg, Oral, BID  empagliflozin, 10 mg, Oral, Daily  erythromycin, 1 application , Both Eyes, Q6H  ferrous sulfate, 325 mg, Oral, BID  fluticasone, 2 spray, Nasal, Daily  insulin glargine, 35 Units, Subcutaneous, Q12H  insulin lispro, 13 Units, Subcutaneous, TID With Meals  insulin lispro, 3-14 Units, Subcutaneous, 4x Daily AC & at Bedtime  levothyroxine, 50 mcg, Oral, Q AM  Lidocaine, 2 patch, Transdermal, Q24H  metoprolol succinate XL, 12.5 mg, Oral, Q24H  miconazole, 1 application , Topical, Q12H  pantoprazole, 40 mg, Oral, Q AM  Pharmacy Consult, , Does not apply, Once  pregabalin, 150 mg, Oral, BID  senna-docusate sodium, 2 tablet, Oral, BID  sodium chloride, 10 mL, Intravenous, Q12H  sodium chloride, 10 mL, Intravenous, Q12H  [Held by provider] valsartan, 40 mg, Oral, Q24H        Continuous Infusions:  Pharmacy Consult,         PRN Meds:    [Held by provider] acetaminophen    acetaminophen    benzonatate    senna-docusate sodium **AND** polyethylene glycol  **AND** bisacodyl **AND** bisacodyl    Calcium Replacement - Follow Nurse / BPA Driven Protocol    dextrose    dextrose    glucagon (human recombinant)    Magnesium Standard Dose Replacement - Follow Nurse / BPA Driven Protocol    nitroglycerin    ondansetron    Pharmacy Consult    Phosphorus Replacement - Follow Nurse / BPA Driven Protocol    Potassium Replacement - Follow Nurse / BPA Driven Protocol    [COMPLETED] Insert Peripheral IV **AND** sodium chloride    sodium chloride    sodium chloride    sodium chloride    sodium chloride    Review of Systems  A comprehensive 14 point review of systems was performed.  Significant findings as mentioned above.  All other systems reviewed and are negative.       Physical Exam:  Vital Signs   Patient Vitals for the past 24 hrs:   BP Temp Temp src Pulse Resp SpO2 Weight   11/20/23 1440 114/60 -- -- 72 -- -- --   11/20/23 1217 98/43 98.1 °F (36.7 °C) Oral 65 20 90 % --   11/20/23 0705 104/44 97.7 °F (36.5 °C) Oral 72 18 94 % --   11/20/23 0500 -- -- -- -- -- -- 107 kg (236 lb 6.4 oz)   11/20/23 0303 126/46 98 °F (36.7 °C) Oral 76 20 98 % --   11/19/23 2306 123/45 98.4 °F (36.9 °C) Oral 70 20 98 % --   11/19/23 1920 117/51 -- -- 71 20 98 % --   11/19/23 1915 91/45 98.9 °F (37.2 °C) Oral 71 20 99 % --       General:  Awake, alert and oriented, in no distress  HEENT:  Pupils are equal, round and reactive to light and accommodation  Neck:  No JVD, thyromegaly or lymphadenopathy  CV:  Regular rate and rhythm, no murmurs, rubs or gallops  Resp:  Lungs are clear to auscultation bilaterally  Abd:  Soft, non-tender, non-distended, bowel sounds present, no organomegaly  Ext:  No clubbing, cyanosis or edema  Lymph:  No cervical, supraclavicular, axillary, inguinal or femoral adenopathy  Neuro:  MS as above, CN II-XII intact, grossly non-focal exam      Labs / Studies:  Lab Results   Component Value Date    WBC 8.44 11/20/2023    HGB 8.2 (L) 11/20/2023    HCT 29.0 (L) 11/20/2023    MCV  111.1 (H) 11/20/2023    RDW 22.4 (H) 11/20/2023     (L) 11/20/2023    NEUTRORELPCT 63.2 11/19/2023    LYMPHORELPCT 29.2 11/19/2023    MONORELPCT 4.0 (L) 11/19/2023    EOSRELPCT 1.5 11/19/2023    BASORELPCT 1.1 11/19/2023    NEUTROABS 5.84 11/19/2023    LYMPHSABS 2.70 11/19/2023       Lab Results   Component Value Date     (L) 11/20/2023    K 4.2 11/20/2023    CO2 24.8 11/20/2023    CL 98 11/20/2023    BUN 29 (H) 11/20/2023    CREATININE 1.57 (H) 11/20/2023    EGFRIFNONA 49 (L) 11/10/2020    GLUCOSE 164 (H) 11/20/2023    CALCIUM 8.6 11/20/2023    ALKPHOS 82 11/19/2023    AST 18 11/19/2023    ALT 14 11/19/2023    BILITOT 2.3 (H) 11/19/2023    ALBUMIN 3.5 11/19/2023    PROTEINTOT 6.2 11/19/2023    MG 2.3 11/20/2023    PHOS 4.7 (H) 11/20/2023       Imaging Results (Last 72 Hours)       ** No results found for the last 72 hours. **              Assessment & Plan:  Yumiko Purdy is a 57 y.o. year-old female presenting with:    1. Thrombocytopenia  - Previous available CBC's were reviewed for comparison of trend and patient has had intermittent thrombocytopenia since at least September 2020.   - Appears that platelet count has ranged from 72,000-142,000 during this admission.  - She has a history of cirrhosis of the liver which is likely contributing.  - HIT-antibody was negative.   - Primary team checked vitamin B12, folate which were replete.  - Peripheral smear revealed macrocytic anemia with no increase in schistocyte population and mild thrombocytopenia without platelet clumping.  - CBC today with WBC 8.44, Hg 8.2, Hct 29.0 and platelet count 122,000, which is improved and stable.  - Thrombocytopenia is likely multifactorial and secondary to patient's history of cirrhosis as well as history of systolic congestive heart failure with current exacerbation. This could cause some thrombocytopenia secondary to fluid overload.   - Would recommend transfusional support for platelet count < 50,000 in the presence  of active bleeding or surgical procedure. Otherwise, would recommend transfusional support for platelet count 10-20,000.    2. Atrial fibrillation  3. Dilated non-ischemic cardiomyopathy/systolic CHF with EF 31-35%  - The current reasons for the patient's hospitalization.  - Currently wearing LifeVest.  - Ongoing management per primary team and cardiology.        Thank you for taking such good care of Ms. Purdy.  We will sign off.             Marta Luna, APRN  11/20/23  15:53 EST        A total of 45 minutes were spent helping to coordinate this patient’s care today; ~30 minutes of this time were spent face-to-face with the patient in her hospital room this morning, reviewing her interim medical history and counseling on the current treatment and follow up plans. All questions were answered to her satisfaction.

## 2023-11-21 ENCOUNTER — READMISSION MANAGEMENT (OUTPATIENT)
Dept: CALL CENTER | Facility: HOSPITAL | Age: 58
End: 2023-11-21
Payer: COMMERCIAL

## 2023-11-21 VITALS
TEMPERATURE: 98.6 F | OXYGEN SATURATION: 94 % | DIASTOLIC BLOOD PRESSURE: 52 MMHG | RESPIRATION RATE: 20 BRPM | SYSTOLIC BLOOD PRESSURE: 102 MMHG | WEIGHT: 239.7 LBS | BODY MASS INDEX: 39.94 KG/M2 | HEIGHT: 65 IN | HEART RATE: 74 BPM

## 2023-11-21 LAB
ANION GAP SERPL CALCULATED.3IONS-SCNC: 10.9 MMOL/L (ref 5–15)
BUN SERPL-MCNC: 34 MG/DL (ref 6–20)
BUN/CREAT SERPL: 22.5 (ref 7–25)
CALCIUM SPEC-SCNC: 8.9 MG/DL (ref 8.6–10.5)
CHLORIDE SERPL-SCNC: 98 MMOL/L (ref 98–107)
CO2 SERPL-SCNC: 28.1 MMOL/L (ref 22–29)
CREAT SERPL-MCNC: 1.51 MG/DL (ref 0.57–1)
DEPRECATED RDW RBC AUTO: 85.2 FL (ref 37–54)
EGFRCR SERPLBLD CKD-EPI 2021: 40.4 ML/MIN/1.73
ERYTHROCYTE [DISTWIDTH] IN BLOOD BY AUTOMATED COUNT: 22.4 % (ref 12.3–15.4)
GLUCOSE BLDC GLUCOMTR-MCNC: 226 MG/DL (ref 70–130)
GLUCOSE BLDC GLUCOMTR-MCNC: 281 MG/DL (ref 70–130)
GLUCOSE BLDC GLUCOMTR-MCNC: 325 MG/DL (ref 70–130)
GLUCOSE SERPL-MCNC: 217 MG/DL (ref 65–99)
HCT VFR BLD AUTO: 30.8 % (ref 34–46.6)
HGB BLD-MCNC: 8.4 G/DL (ref 12–15.9)
MAGNESIUM SERPL-MCNC: 2.2 MG/DL (ref 1.6–2.6)
MCH RBC QN AUTO: 30.1 PG (ref 26.6–33)
MCHC RBC AUTO-ENTMCNC: 27.3 G/DL (ref 31.5–35.7)
MCV RBC AUTO: 110.4 FL (ref 79–97)
PLATELET # BLD AUTO: 151 10*3/MM3 (ref 140–450)
PMV BLD AUTO: 10.9 FL (ref 6–12)
POTASSIUM SERPL-SCNC: 4.2 MMOL/L (ref 3.5–5.2)
RBC # BLD AUTO: 2.79 10*6/MM3 (ref 3.77–5.28)
SODIUM SERPL-SCNC: 137 MMOL/L (ref 136–145)
WBC NRBC COR # BLD AUTO: 8.33 10*3/MM3 (ref 3.4–10.8)

## 2023-11-21 PROCEDURE — 97116 GAIT TRAINING THERAPY: CPT

## 2023-11-21 PROCEDURE — 63710000001 INSULIN GLARGINE PER 5 UNITS: Performed by: STUDENT IN AN ORGANIZED HEALTH CARE EDUCATION/TRAINING PROGRAM

## 2023-11-21 PROCEDURE — 83735 ASSAY OF MAGNESIUM: CPT | Performed by: STUDENT IN AN ORGANIZED HEALTH CARE EDUCATION/TRAINING PROGRAM

## 2023-11-21 PROCEDURE — 85027 COMPLETE CBC AUTOMATED: CPT | Performed by: STUDENT IN AN ORGANIZED HEALTH CARE EDUCATION/TRAINING PROGRAM

## 2023-11-21 PROCEDURE — 80048 BASIC METABOLIC PNL TOTAL CA: CPT | Performed by: STUDENT IN AN ORGANIZED HEALTH CARE EDUCATION/TRAINING PROGRAM

## 2023-11-21 PROCEDURE — 63710000001 INSULIN LISPRO (HUMAN) PER 5 UNITS: Performed by: INTERNAL MEDICINE

## 2023-11-21 PROCEDURE — 97530 THERAPEUTIC ACTIVITIES: CPT

## 2023-11-21 PROCEDURE — 82948 REAGENT STRIP/BLOOD GLUCOSE: CPT

## 2023-11-21 PROCEDURE — 99232 SBSQ HOSP IP/OBS MODERATE 35: CPT | Performed by: INTERNAL MEDICINE

## 2023-11-21 RX ORDER — BUMETANIDE 1 MG/1
TABLET ORAL
Qty: 16 TABLET | Refills: 0 | Status: SHIPPED | OUTPATIENT
Start: 2023-11-22 | End: 2023-12-07

## 2023-11-21 RX ORDER — METOPROLOL SUCCINATE 25 MG/1
12.5 TABLET, EXTENDED RELEASE ORAL
Qty: 15 TABLET | Refills: 0 | Status: SHIPPED | OUTPATIENT
Start: 2023-11-22 | End: 2023-12-22

## 2023-11-21 RX ORDER — INSULIN LISPRO 100 [IU]/ML
15 INJECTION, SOLUTION INTRAVENOUS; SUBCUTANEOUS
Qty: 15 ML | Refills: 1 | Status: SHIPPED | OUTPATIENT
Start: 2023-11-21

## 2023-11-21 RX ORDER — ASPIRIN 81 MG/1
81 TABLET ORAL DAILY
Qty: 30 TABLET | Refills: 0 | Status: SHIPPED | OUTPATIENT
Start: 2023-11-22 | End: 2023-12-22

## 2023-11-21 RX ADMIN — INSULIN GLARGINE 40 UNITS: 100 INJECTION, SOLUTION SUBCUTANEOUS at 09:07

## 2023-11-21 RX ADMIN — INSULIN LISPRO 13 UNITS: 100 INJECTION, SOLUTION INTRAVENOUS; SUBCUTANEOUS at 17:19

## 2023-11-21 RX ADMIN — PANTOPRAZOLE SODIUM 40 MG: 40 TABLET, DELAYED RELEASE ORAL at 06:49

## 2023-11-21 RX ADMIN — ASPIRIN 81 MG: 81 TABLET, COATED ORAL at 09:06

## 2023-11-21 RX ADMIN — PREGABALIN 150 MG: 75 CAPSULE ORAL at 09:06

## 2023-11-21 RX ADMIN — INSULIN LISPRO 13 UNITS: 100 INJECTION, SOLUTION INTRAVENOUS; SUBCUTANEOUS at 09:07

## 2023-11-21 RX ADMIN — INSULIN LISPRO 4 UNITS: 100 INJECTION, SOLUTION INTRAVENOUS; SUBCUTANEOUS at 09:06

## 2023-11-21 RX ADMIN — INSULIN LISPRO 13 UNITS: 100 INJECTION, SOLUTION INTRAVENOUS; SUBCUTANEOUS at 12:11

## 2023-11-21 RX ADMIN — EMPAGLIFLOZIN 10 MG: 10 TABLET, FILM COATED ORAL at 09:06

## 2023-11-21 RX ADMIN — FLUTICASONE PROPIONATE 2 SPRAY: 50 SPRAY, METERED NASAL at 09:08

## 2023-11-21 RX ADMIN — APIXABAN 5 MG: 5 TABLET, FILM COATED ORAL at 09:06

## 2023-11-21 RX ADMIN — Medication 10 ML: at 09:08

## 2023-11-21 RX ADMIN — ERYTHROMYCIN 1 APPLICATION: 5 OINTMENT OPHTHALMIC at 17:20

## 2023-11-21 RX ADMIN — BUSPIRONE HYDROCHLORIDE 10 MG: 10 TABLET ORAL at 09:06

## 2023-11-21 RX ADMIN — MICONAZOLE NITRATE 1 APPLICATION: 2 POWDER TOPICAL at 09:08

## 2023-11-21 RX ADMIN — ERYTHROMYCIN 1 APPLICATION: 5 OINTMENT OPHTHALMIC at 12:12

## 2023-11-21 RX ADMIN — FERROUS SULFATE TAB 325 MG (65 MG ELEMENTAL FE) 325 MG: 325 (65 FE) TAB at 09:06

## 2023-11-21 RX ADMIN — LIDOCAINE 2 PATCH: 4 PATCH TOPICAL at 09:11

## 2023-11-21 RX ADMIN — LEVOTHYROXINE SODIUM 50 MCG: 50 TABLET ORAL at 06:49

## 2023-11-21 RX ADMIN — INSULIN LISPRO 8 UNITS: 100 INJECTION, SOLUTION INTRAVENOUS; SUBCUTANEOUS at 12:12

## 2023-11-21 RX ADMIN — DOCUSATE SODIUM 50 MG AND SENNOSIDES 8.6 MG 2 TABLET: 8.6; 5 TABLET, FILM COATED ORAL at 09:06

## 2023-11-21 RX ADMIN — INSULIN LISPRO 10 UNITS: 100 INJECTION, SOLUTION INTRAVENOUS; SUBCUTANEOUS at 17:19

## 2023-11-21 NOTE — PROGRESS NOTES
[facility]  PROGRESS NOTE    Patient information:  Name: Yumiko Purdy  Age/Sex: 57 y.o. female  :  1965        PCP: Masha Davidson APRN  Attending: Tomás An MD  MRN:  [id number]  Visit Number:  [id number]    LOS:  LOS: 8 days     CODE STATUS:    Code Status and Medical Interventions:   Ordered at: 23 0558     Code Status (Patient has no pulse and is not breathing):    CPR (Attempt to Resuscitate)     Medical Interventions (Patient has pulse or is breathing):    Full Support       PROBLEM LIST:Principal Problem:    Ventricular tachycardia      Reason for Cardiology follow-up: Ventricular tachycardia with Paroxysmal atrial fibrillation and heart failure with reduced EF.  EF-31 to 35%.      Subjective   ADMISSION INFORMATION:  Chief Complaint   Patient presents with    Rapid Heart Rate       HPI:  Yumiko Purdy is a 57 y.o. female with a past medical history significant for systolic CHF (EF 21-25% per last ECHO 2020), afib on Eliquis s/p ablation 10/2022, cirrhosis, insulin dependent type II DM, hypothyroidism, COPD, and what appears to be borderline stage IIIa-b CKD (creatinin ranges 1.2-1.5 and GFR ranges 42-50).     Patient presented to [facility] ([facility]) emergency room (ER) on 2023 with complaints of acute onset palpitations that occurred just prior to arrival to the ER. EMS reported they found her in SVT and gave her 6mg IV adenosine followed by 12mg adenosine along with a fluid bolus. When she arrived to the ED, initial EKG reported afib w/RVR, but upon further review on Zolls monitor, she appeared to be in Ventricular tachycardia.      Cardiology has been consulted for further evaluation and management ventricular tachycardia.      Primary Cardiologist has been Jad Maravilla MD (Cardiologist), and Samy Claude Elayi, MD (EP) and she was last seen in the office on 2023.   Contact number for  "EP: [phone].     EVENT TIMELINE:   11/14/2023: Ordered Life Vest  11/14/2023: The above number is no longer in service.  I did call [phone] and spoke with Eda. She took a message and will have one of the 's to return my call to make this patient an appointment for evaluation for ablation and ICD placement. Awaiting their return call.     11/15/2023: I called back to Dr. Jimenez's office at [phone] and spoke with Esthela. She scheduled patient with Dr. Jimenez on 12/11/2023 at 1:15 PM.  I will take a note to  with this appoint on it for her to have as reference and new phone number ([phone]) since the one she gave us is not in service any longer.     Patient is also followed in [facility] with her last appointment being 09/29/2023.    Interval History:   Patient is in room 314 A and was examined by Dr. Sahu.  Patient is lying in bed resting quietly.  No acute distress noted at this time.  Patient currently denies any chest pain, shortness of breath, or palpitations.  Patient reports, \"I am feeling better\".     ORDERED:  H[facility] f/u in 1 week  F/U with General Cards in 2 weeks    Vital Signs  Temp:  [98 °F (36.7 °C)-98.7 °F (37.1 °C)] 98.7 °F (37.1 °C)  Heart Rate:  [64-84] 64  Resp:  [18-20] 20  BP: ()/(41-60) 96/41  Device (Oxygen Therapy): room air  Vital Signs (last 72 hrs)         11/18 0700  11/19 0659 11/19 0700  11/20 0659 11/20 0700  11/21 0659 11/21 0700 11/21 1149   Most Recent      Temp (°F) 98.3 -  99.3    98 -  99.1    97.7 -  98.4      98.7     98.7 (37.1) 11/21 0736    Heart Rate 65 -  79    69 -  76    65 -  84      64     64 11/21 0736    Resp 18 -  20      20    18 -  20      20     20 11/21 0736    /46 -  114/51    91/45 -  126/46    94/43 -  114/60      96/41     96/41 11/21 0736    SpO2 (%) 93 -  98    94 -  99    90 -  98      97     97 11/21 0736    Flow (L/min)   1    1 -  2      1       1 11/20 0745          BMI:Body " mass index is 39.89 kg/m².    WEIGHT:      11/19/23  0500 11/20/23  0500 11/21/23  0550   Weight: 107 kg (235 lb 3.2 oz) 107 kg (236 lb 6.4 oz) 109 kg (239 lb 11.2 oz)       DIET:Diet: Regular/House Diet, Diabetic Diets; Consistent Carbohydrate; Texture: Soft to Chew (NDD 3); Soft to Chew: Whole Meat; Fluid Consistency: Thin (IDDSI 0)    I&O:  Intake & Output (last 3 days)         11/18 0701 11/19 0700 11/19 0701 11/20 0700 11/20 0701 11/21 0700 11/21 0701 11/22 0700    P.O. 9076 850 6443 480    I.V. (mL/kg)  0 (0)      Total Intake(mL/kg) 1080 (10.2) 780 (7.4) 3010 (28.4) 480 (4.5)    Urine (mL/kg/hr) 2850 (1.1) 3650 (1.4) 2850 (1.1) 625 (1.2)    Stool   0     Total Output 2850 3650 2850 625    Net -1770 -2870 +160 -145            Urine Unmeasured Occurrence 1 x  4 x 1 x    Stool Unmeasured Occurrence   2 x             Objective     Physical Exam:      General Appearance:    Alert, cooperative, in no acute distress.   Head:    Normocephalic, without obvious abnormality, atraumatic.   Eyes:                          Conjunctivae and sclerae normal, no icterus, no pallor, corneas clear.   Neck:   No adenopathy, supple, trachea midline, no thyromegaly, no carotid bruit, no JVD.   Lungs:     Clear to auscultation, respirations regular, even and             unlabored.    Heart:    Regular rhythm and normal rate, normal S1 and S2, no          murmur, no gallop, no rub, no click.  LifeVest in use.    Chest Wall:    No abnormalities observed.   Abdomen:     Normal bowel sounds, no masses, no organomegaly, soft nontender, nondistended, no guarding, no rebound tenderness.   Extremities:   Moves all extremities well, no edema, no cyanosis, no           redness.   Pulses:   Pulses palpable and equal bilaterally.   Skin:   No bleeding, bruising or rash.   Neurologic:   Alert and Oriented x 3, Speech Clear & comprehensive.       Results review   Results Review:   Results from last 7 days   Lab Units 11/19/23  0152  11/18/23  1632 11/18/23  1418   HSTROP T ng/L 127* 150* 152*     Lab Results   Component Value Date    PROBNP 2,085.0 (H) 11/20/2023    PROBNP 2,746.0 (H) 11/12/2023    PROBNP 555.6 09/29/2023     Results from last 7 days   Lab Units 11/21/23  0230 11/20/23  0147 11/19/23  0153 11/18/23  0243 11/17/23  0351 11/16/23  0148 11/15/23  0638   WBC 10*3/mm3 8.33 8.44 9.24 9.06 9.89 7.38 8.88   HEMOGLOBIN g/dL 8.4* 8.2* 8.3* 8.0* 8.7* 8.0* 8.5*   PLATELETS 10*3/mm3 151 122* 112* 82* 72* 76* 77*     Results from last 7 days   Lab Units 11/21/23  0230 11/20/23  0147 11/19/23  0153 11/18/23  1418 11/18/23  0243 11/17/23  0351 11/16/23  0148   SODIUM mmol/L 137 133* 139 135* 137 137 136   POTASSIUM mmol/L 4.2 4.2 4.6 5.0 4.8 4.6 4.2   CHLORIDE mmol/L 98 98 102 100 103 103 101   CO2 mmol/L 28.1 24.8 25.6 25.1 25.2 23.0 25.0   BUN mg/dL 34* 29* 25* 24* 18 16 16   CREATININE mg/dL 1.51* 1.57* 1.46* 1.28* 1.25* 1.22* 1.28*   CALCIUM mg/dL 8.9 8.6 8.9 8.7 8.9 8.8 8.5*   GLUCOSE mg/dL 217* 164* 178* 351* 183* 245* 312*   ALT (SGPT) U/L  --   --  14  --  13 15  --    AST (SGOT) U/L  --   --  18 --  20 18  --      Lab Results   Component Value Date    MG 2.2 11/21/2023    MG 2.3 11/20/2023    MG 2.4 11/19/2023     Estimated Creatinine Clearance: 51.1 mL/min (A) (by C-G formula based on SCr of 1.51 mg/dL (H)).    Lab Results   Component Value Date    HGBA1C 7.30 (H) 11/13/2023    HGBA1C 8.40 (H) 09/10/2020    HGBA1C 5.4 04/21/2016     Lab Results   Component Value Date    CHOL 137 09/11/2020    TRIG 80 09/11/2020    LDL 78 09/11/2020    HDL 43 09/11/2020     Lab Results   Component Value Date    ABSOLUTELUNG 36 (A) 11/20/2023    ABSOLUTELUNG 35 11/19/2023    ABSOLUTELUNG 31 11/14/2023     Lab Results   Component Value Date    INR 0.97 11/15/2023    INR 1.20 (H) 11/12/2023    INR 1.10 12/15/2022    INR 1.25 (H) 09/23/2020    INR 1.09 09/22/2020    INR 1.17 (H) 09/21/2020    INR 1.26 (H) 09/21/2020     Lab Results   Component Value Date     TSH 4.680 (H) 11/12/2023      Pain Management Panel  More data exists         Latest Ref Rng & Units 11/13/2023 9/12/2020   Pain Management Panel   Creatinine, Urine mg/dL  mg/dL - 46.3  46.3    Amphetamine, Urine Qual Negative Negative  -   Barbiturates Screen, Urine Negative Negative  -   Benzodiazepine Screen, Urine Negative Negative  -   Buprenorphine, Screen, Urine Negative Negative  -   Cocaine Screen, Urine Negative Negative  -   Fentanyl, Urine Negative Negative  -   Methadone Screen , Urine Negative Negative  -   Methamphetamine, Ur Negative Negative  -     Microbiology Results (last 10 days)       Procedure Component Value - Date/Time    Blood Culture - Blood, Hand, Right [[id number]]  (Normal) Collected: 11/13/23 0003    Lab Status: Final result Specimen: Blood from Hand, Right Updated: 11/18/23 0016     Blood Culture No growth at 5 days    Blood Culture - Blood, Arm, Right [[id number]]  (Normal) Collected: 11/12/23 2350    Lab Status: Final result Specimen: Blood from Arm, Right Updated: 11/18/23 0016     Blood Culture No growth at 5 days           Imaging Results (Last 24 Hours)       ** No results found for the last 24 hours. **          ECHO:  Results for orders placed during the hospital encounter of 11/12/23    Adult Transthoracic Echo Complete W/ Cont if Necessary Per Protocol    Interpretation Summary    Left ventricular systolic function is moderately decreased. Left ventricular ejection fraction appears to be 31 - 35%.    Left ventricular wall thickness is consistent with mild concentric hypertrophy.    Left ventricular diastolic function is consistent with (grade III w/high LAP) fixed restrictive pattern.    Mildly reduced right ventricular systolic function noted.    The left atrial cavity is moderately dilated.    The right atrial cavity is mild to moderately  dilated.    There is calcification of the aortic valve mainly affecting the left coronary cusp(s).    Dilated pulmonary  artery.      STRESS TEST:  Results for orders placed during the hospital encounter of 10/15/20    Nuclear Medicine Cardiac Blood Pool M[place] At Rest    Interpretation Summary  EXAMINATION: NUCLEAR MEDICINE CARDIAC BLOOD POOL MUGA AT REST-    CLINICAL INDICATION: Cardiomyopathy  TECHNIQUE: 21 mCi of Tc-99m autologous RBCs were injected IV after  in-vitro RBC labeling. Gated cardiac imaging was performed in the  anterior and left anterior oblique.  COMPARISON: None  FINDINGS: The left ventricle is normal in size with normal systolic  function. The calculated left ventricular ejection fraction (LVEF) = 38  %.  The right and left atria, aorta and pulmonary artery are normal. The  right ventricle is normal in size.    Impression  LVEF: 38%, at rest.    This report was finalized on 10/16/2020 11:57 AM by Dr. Marlo Parr MD.       HEART CATH:  Results for orders placed during the hospital encounter of 11/10/20    Cardiac Catheterization/Vascular Study    Narrative  Procedure type:  Left heart cath, LV gram, bilateral selective cholangiogram    Indication:  Cardiomyopathy    Procedure:  After informed consent the patient was brought into the cardiac aspiration lab she was prepped and draped in the usual sterile manner the right radial area was incised with 2% Xylocaine however several attempts to engage into the right radial artery was not successful so the right radial artery access was abandoned and the right groin area was excised in 2% Xylocaine right femoral artery was accessed using modified standard technique and 5 Filipino side-port arterial sheath was placed and secured then over a guidewire a 5 Filipino JL 4 catheter was used left main coronary artery with angiographic pressures were taken then the catheter was removed and over a guidewire a 5 Filipino JR4 catheter was used and engagement of the right coronary arteries angioplasty was taken then the catheter was removed then over a guidewire 5 Filipino pigtail catheter  used aortic valve was done hand-injection LV gram was done then pullback was done then the catheter was removed groin sheath was removed and minx closure device applied with good hemostasis the patient was transported back to her room in stable condition.    Results:  The patient LVEDP was 17 mmHg with hand-injection showing preserved left ventricular systolic function wall motion EF 50-55% with no significant aortic gradient on pullback.    Cholangiogram:  This right dominant system.  Left main cardiac angiographically normal and bifurcates into left and descending and left circumflex arteries.  LAD was normal caliber gives several septal perforators and diagonal branches and wraps around the apex LAD about 30 to 40% mid stenosis.  Left circumflex artery was nondominant for posterior circulation gives rise to several obtuse marginal branches left circumflex arteries branches angiographically normal.  The right coronary artery was a large artery was dominant for posterior circulation giving rise to acute marginal branches PDA and posterolateral branches the right coronary artery and its branches angiographically normal.    Conclusion:  Preserved LV systolic function ejection fraction 50-55% with elevated left ventricular end-diastolic pressure consistent with diastolic dysfunction coronary angiogram showed right dominant system with 30 to 40% mid LAD lesion otherwise coronaries arteries were normal.  Plan of care:  Medical management and secondary preventive measurements of coronary disease good lipid control blood pressure control healthy lifestyle patient is to follow-up with her primary care physician for further management.      TELEMETRY:      SR 60s with BBB        I reviewed the patient's new clinical results.    ALLERGIES: Phenergan [promethazine]    Medication Review:   Current list of medications may not reflect those currently placed in orders that are not signed or are being held.     apixaban, 5 mg,  Oral, Q12H  aspirin, 81 mg, Oral, Daily  bumetanide, 2 mg, Oral, Daily  busPIRone, 10 mg, Oral, BID  empagliflozin, 10 mg, Oral, Daily  erythromycin, 1 application , Both Eyes, Q6H  ferrous sulfate, 325 mg, Oral, BID  fluticasone, 2 spray, Nasal, Daily  insulin glargine, 40 Units, Subcutaneous, Q12H  insulin lispro, 13 Units, Subcutaneous, TID With Meals  insulin lispro, 3-14 Units, Subcutaneous, 4x Daily AC & at Bedtime  levothyroxine, 50 mcg, Oral, Q AM  Lidocaine, 2 patch, Transdermal, Q24H  metoprolol succinate XL, 12.5 mg, Oral, Q24H  miconazole, 1 application , Topical, Q12H  pantoprazole, 40 mg, Oral, Q AM  Pharmacy Consult, , Does not apply, Once  pregabalin, 150 mg, Oral, BID  senna-docusate sodium, 2 tablet, Oral, BID  sodium chloride, 10 mL, Intravenous, Q12H  sodium chloride, 10 mL, Intravenous, Q12H  [Held by provider] valsartan, 40 mg, Oral, Q24H      Pharmacy Consult,         [Held by provider] acetaminophen    acetaminophen    benzonatate    senna-docusate sodium **AND** polyethylene glycol **AND** bisacodyl **AND** bisacodyl    Calcium Replacement - Follow Nurse / BPA Driven Protocol    dextrose    dextrose    glucagon (human recombinant)    Magnesium Standard Dose Replacement - Follow Nurse / BPA Driven Protocol    nitroglycerin    ondansetron    Pharmacy Consult    Phosphorus Replacement - Follow Nurse / BPA Driven Protocol    Potassium Replacement - Follow Nurse / BPA Driven Protocol    [COMPLETED] Insert Peripheral IV **AND** sodium chloride    sodium chloride    sodium chloride    sodium chloride    sodium chloride    Assessment      Acute HFrEF, resolved.  Advanced cardiomyopathy, appears compensated.  Chronic kidney disease (stage IIIa/B)    Plan     Continue with low-dose beta-blocker and empagliflozin at current doses for now  Use diuretics as needed  Not able to tolerate ARB/Entresto due to baseline low blood pressure  Patient otherwise appears stable for discharge home from cardiac  standpoint.  She was instructed again about the salt restricted diet and the foods to avoid.  She was also instructed about the importance of taking her medications regularly.  Follow-up in heart failure clinic in a week and in our office in a couple of weeks.  Continue with the LifeVest.    I have discussed the patients findings and recommendations with patient.    Thank you very much for asking us to be involved in this patient's care.  We will follow along with you.    Electronically signed by KENNY Finn, 11/21/23, 11:55 AM EST.     Electronically signed by Ramon Sahu MD, 11/27/23, 6:07 PM EST.                    Please note that portions of this note were completed with a voice recognition program.    Please note that portions of this note were copied and has been reviewed and is accurate as of 11/21/2023 .

## 2023-11-21 NOTE — PROGRESS NOTES
[facility] HOSPITALIST PROGRESS NOTE     Patient Identification:  Name:  Yumiko Purdy  Age:  57 y.o.  Sex:  female  :  1965  MRN:  [id number]  Visit Number:  [id number]  ROOM: [facility]/     Primary Care Provider:  Masha Davidson APRN    Length of stay in inpatient status:  7    Subjective     Chief Compliant:    Chief Complaint   Patient presents with    Rapid Heart Rate       History of Presenting Illness:    Patient seen and examined today, no family present at bedside.  She denied any shortness of breath, chest pain, or worsening of edema.  No acute events noted overnight.  She very much wants to go home for the Thanksgiving holiday if possible.    Objective     Current Hospital Meds:apixaban, 5 mg, Oral, Q12H  aspirin, 81 mg, Oral, Daily  bumetanide, 2 mg, Oral, Daily  busPIRone, 10 mg, Oral, BID  empagliflozin, 10 mg, Oral, Daily  erythromycin, 1 application , Both Eyes, Q6H  ferrous sulfate, 325 mg, Oral, BID  fluticasone, 2 spray, Nasal, Daily  insulin glargine, 40 Units, Subcutaneous, Q12H  insulin lispro, 13 Units, Subcutaneous, TID With Meals  insulin lispro, 3-14 Units, Subcutaneous, 4x Daily AC & at Bedtime  levothyroxine, 50 mcg, Oral, Q AM  Lidocaine, 2 patch, Transdermal, Q24H  metoprolol succinate XL, 12.5 mg, Oral, Q24H  miconazole, 1 application , Topical, Q12H  pantoprazole, 40 mg, Oral, Q AM  Pharmacy Consult, , Does not apply, Once  pregabalin, 150 mg, Oral, BID  senna-docusate sodium, 2 tablet, Oral, BID  sodium chloride, 10 mL, Intravenous, Q12H  sodium chloride, 10 mL, Intravenous, Q12H  [Held by provider] valsartan, 40 mg, Oral, Q24H    Pharmacy Consult,         Current Antimicrobial Therapy:  Anti-Infectives (From admission, onward)      Ordered     Dose/Rate Route Frequency Start Stop    23 7756  cefepime 2000 mg IVPB in 100 ml NS (VTB)        Ordering Provider: Juanita Box DO    2,000 mg  over 30 Minutes Intravenous Once 23 2333 23 010     11/12/23 2336  doxycycline (VIBRAMYCIN) 100 mg in sodium chloride 0.9 % 100 mL IVPB-VTB        Ordering Provider: Juanita Box DO    100 mg  over 60 Minutes Intravenous Once 11/12/23 2352 11/13/23 0125          Current Diuretic Therapy:  Diuretics (From admission, onward)      Ordered     Dose/Rate Route Frequency Start Stop    11/20/23 1350  bumetanide (BUMEX) tablet 2 mg        Ordering Provider: Anastasiia Mclaughlin APRN    2 mg Oral Daily 11/20/23 1445      11/19/23 0920  bumetanide (BUMEX) injection 2 mg        Ordering Provider: Kalyn Hess MD    2 mg Intravenous Once 11/19/23 1015 11/19/23 1012    11/14/23 1728  furosemide (LASIX) injection 60 mg        Ordering Provider: Ramon Sahu MD    60 mg Intravenous Once 11/14/23 1815 11/14/23 1804          ----------------------------------------------------------------------------------------------------------------------  Vital Signs:  Temp:  [97.7 °F (36.5 °C)-98.4 °F (36.9 °C)] 98 °F (36.7 °C)  Heart Rate:  [65-76] 74  Resp:  [18-20] 18  BP: ()/(42-60) 105/42  SpO2:  [90 %-98 %] 97 %  on  Flow (L/min):  [1-2] 1;   Device (Oxygen Therapy): room air  Body mass index is 39.34 kg/m².    Wt Readings from Last 3 Encounters:   11/20/23 107 kg (236 lb 6.4 oz)   10/15/23 113 kg (250 lb)   09/29/23 109 kg (240 lb)     Intake & Output (last 3 days)         11/18 0701  11/19 0700 11/19 0701 11/20 0700 11/20 0701  11/21 0700    P.O. 9235 375 3744    I.V. (mL/kg)  0 (0)     Total Intake(mL/kg) 1080 (10.2) 780 (7.4) 2150 (20.3)    Urine (mL/kg/hr) 2850 (1.1) 3650 (1.4) 1900 (1.4)    Total Output 2850 3650 1900    Net -1770 -2870 +250           Urine Unmeasured Occurrence 1 x            Diet: Regular/House Diet, Diabetic Diets; Consistent Carbohydrate; Texture: Soft to Chew (NDD 3); Soft to Chew: Whole Meat; Fluid Consistency: Thin (IDDSI  0)  ----------------------------------------------------------------------------------------------------------------------  Physical exam:   Constitutional:  Well-developed and well-nourished.  No acute distress.      HENT:  Head:  Normocephalic and atraumatic.    Cardiovascular:  Normal rate, irregularly irregular rhythm   Pulmonary/Chest:  No respiratory distress, breath sounds diminished but clear in anterior and lateral lung fields bilaterally   Musculoskeletal:  No deformity.    Neurological: Awake, alert, no focal deficit on gross examination. No slurred speech or facial droop.   Skin:  Skin is warm and dry.  Peripheral vascular:  No cyanosis  Edited by: Yulia Mo DO at 11/20/2023 2013  ----------------------------------------------------------------------------------------------------------------------  Results from last 7 days   Lab Units 11/20/23  0147 11/19/23  0153 11/18/23  1418 11/18/23  0243 11/16/23  0148 11/15/23  1519   LACTATE mmol/L  --   --  1.8  --   --   --    WBC 10*3/mm3 8.44 9.24  --  9.06   < >  --    HEMOGLOBIN g/dL 8.2* 8.3*  --  8.0*   < >  --    HEMATOCRIT % 29.0* 29.7*  --  27.6*   < >  --    MCV fL 111.1* 112.5*  --  114.0*   < >  --    MCHC g/dL 28.3* 27.9*  --  29.0*   < >  --    PLATELETS 10*3/mm3 122* 112*  --  82*   < >  --    INR   --   --   --   --   --  0.97    < > = values in this interval not displayed.         Results from last 7 days   Lab Units 11/20/23  0147 11/19/23  0153 11/18/23  1418 11/18/23  0243 11/17/23  0351   SODIUM mmol/L 133* 139 135* 137 137   POTASSIUM mmol/L 4.2 4.6 5.0 4.8 4.6   MAGNESIUM mg/dL 2.3 2.4 2.2 2.3 2.2   CHLORIDE mmol/L 98 102 100 103 103   CO2 mmol/L 24.8 25.6 25.1 25.2 23.0   BUN mg/dL 29* 25* 24* 18 16   CREATININE mg/dL 1.57* 1.46* 1.28* 1.25* 1.22*   CALCIUM mg/dL 8.6 8.9 8.7 8.9 8.8   PHOSPHORUS mg/dL 4.7* 4.5  --  3.5 3.6   GLUCOSE mg/dL 164* 178* 351* 183* 245*   ALBUMIN g/dL  --  3.5  --  3.3* 3.4*   BILIRUBIN mg/dL  --  2.3*   "--  2.3* 1.9*   ALK PHOS U/L  --  82  --  77 87   AST (SGOT) U/L  --  18  --  20 18   ALT (SGPT) U/L  --  14  --  13 15   Estimated Creatinine Clearance: 48.6 mL/min (A) (by C-G formula based on SCr of 1.57 mg/dL (H)).  No results found for: \"AMMONIA\"  Results from last 7 days   Lab Units 11/19/23  0153 11/18/23  1632 11/18/23  1418   HSTROP T ng/L 127* 150* 152*     Results from last 7 days   Lab Units 11/20/23  0147   PROBNP pg/mL 2,085.0*         Glucose   Date/Time Value Ref Range Status   11/20/2023 1950 304 (H) 70 - 130 mg/dL Final   11/20/2023 1709 396 (H) 70 - 130 mg/dL Final   11/20/2023 1232 331 (H) 70 - 130 mg/dL Final   11/20/2023 0706 244 (H) 70 - 130 mg/dL Final   11/20/2023 0035 152 (H) 70 - 130 mg/dL Final   11/19/2023 1921 343 (H) 70 - 130 mg/dL Final   11/19/2023 1636 254 (H) 70 - 130 mg/dL Final   11/19/2023 1130 378 (H) 70 - 130 mg/dL Final     Lab Results   Component Value Date    TSH 4.680 (H) 11/12/2023    FREET4 1.78 (H) 11/13/2023     No results found for: \"PREGTESTUR\", \"PREGSERUM\", \"HCG\", \"HCGQUANT\"  Pain Management Panel  More data exists         Latest Ref Rng & Units 11/13/2023 9/12/2020   Pain Management Panel   Creatinine, Urine mg/dL  mg/dL - 46.3  46.3    Amphetamine, Urine Qual Negative Negative  -   Barbiturates Screen, Urine Negative Negative  -   Benzodiazepine Screen, Urine Negative Negative  -   Buprenorphine, Screen, Urine Negative Negative  -   Cocaine Screen, Urine Negative Negative  -   Fentanyl, Urine Negative Negative  -   Methadone Screen , Urine Negative Negative  -   Methamphetamine, Ur Negative Negative  -     Brief Urine Lab Results  (Last result in the past 365 days)        Color   Clarity   Blood   Leuk Est   Nitrite   Protein   CREAT   Urine HCG        11/13/23 0234 Yellow   Clear   Moderate (2+)   Trace   Negative   Negative                 No results found for: \"BLOODCX\"  No results found for: \"URINECX\"  No results found for: \"WOUNDCX\"  No results found for: " "\"STOOLCX\"  No results found for: \"RESPCX\"  No results found for: \"AFBCX\"  Results from last 7 days   Lab Units 11/18/23  1418   LACTATE mmol/L 1.8       I have personally looked at the labs and they are summarized above.  ----------------------------------------------------------------------------------------------------------------------  Detailed radiology reports for the last 24 hours:  Imaging Results (Last 24 Hours)       ** No results found for the last 24 hours. **          Assessment & Plan      #Atrial fibrillation with RVR status post direct current cardioversion in the emergency room, now in sinus rhythm, on Eliquis at home for a DMR2KD2-LMCo score of 3   #Thrombocytopenia that developed during admission, unknown etiology  #Dilated nonischemic cardiomyopathy/systolic CHF (EF 21-25% in 2020 and now 31-35%) that was present on admission with left ejection fraction of 31-35%, suspect partly tachycardia induced, with an acute exacerbation on admission that has now improved with diuresis  #Hypotension present on admission, suspect due to cardiogenic shock as result of the A-fib with RVR, currently resolved  #Prolonged QTc that developed after admission, with normal potassium and magnesium levels  #Macrocytic anemia that was present on admission, with normal vitamin B12 and folate levels, suspect anemia of chronic disease +/- liver disease  #Suspect type I versus type II non-ST elevation MI present on admission due to the atrial fibrillation with RVR causing cardiogenic shock and hypotension (cardiology thinks this is more likely type II than a type I non-ST elevation MI)  #Status post PVI on October 2022 with recurrent A-fib in the past  #History of nonobstructive coronary artery disease per cardiac catheterization 11/20/2020 with 30 to 40% LAD lesion  #History of insulin dependent diabetes mellitus type 2  #History of decompensated liver cirrhosis with ascites  #History of chronic macrocytic anemia, with " hemoglobin currently at baseline  #History of CKD stage IIIa (creatinine ranges 1.2-1.5 and GFR ranges 42-50)    -Creatinine has trended up to 1.57 today after diuresis.  Diuresis being managed by cardiology, appreciate assistance.  Bumex switched to p.o. with plan for 2 mg daily for the next 2 days and then 1 mg daily after that.  Metoprolol was discontinued yesterday as her blood pressure has been too soft for her to receive it, but metoprolol succinate was reordered again today by cardiology.  Her blood pressure was again too low to receive it.  Her blood pressure has also been too low to receive valsartan, which has been now discontinued.  Eliquis restarted, continue same.  Blood sugars had improved, but today were again in the 300-3 90 range.  Increase Lantus to 40 units q12h. Continue humalog 13 units TID with meals and sliding scale insulin. Hypoglycemia protocol prn. Obtain chemistry panel in a.m. to monitor glucose, electrolytes, and renal function.      Edited by: Yulia Mo DO at 11/20/2023 2013    VTE Prophylaxis:   Mechanical Order History:       None          Pharmalogical Order History:        Ordered     Dose Route Frequency Stop    11/19/23 0922  apixaban (ELIQUIS) tablet 5 mg         5 mg PO Every 12 Hours Scheduled --    11/17/23 1434  fondaparinux (ARIXTRA) injection 5 mg  Status:  Discontinued         5 mg SC Every 24 Hours Scheduled 11/19/23 0922    11/15/23 2251  argatroban 250 mg in dextrose (D5W) 5 % 250 mL (1 mg/mL) infusion  7.56 mL/hr,   Status:  Discontinued         1.2 mcg/kg/min IV Titrated 11/15/23 2257    11/15/23 0817  heparin (porcine) 5000 UNIT/ML injection 4,000 Units         4,000 Units IV Once 11/15/23 0942    11/13/23 1540  heparin 48877 units/250 mL (100 units/mL) in 0.45 % NaCl infusion  12.08 mL/hr,   Status:  Discontinued         11.4 Units/kg/hr (Dosing Weight) IV Titrated 11/15/23 2248    11/13/23 0537  Pharmacy to Dose Heparin  Status:  Discontinued         -- XX  Continuous PRN 11/15/23 3804                    Dispo: Possibly home at discharge pending medical stability    Yulia Mo DO  [facility] Hospitalist  11/20/23  20:13 EST

## 2023-11-21 NOTE — THERAPY TREATMENT NOTE
Acute Care - Physical Therapy Treatment Note   [place]     Patient Name: Yumiko Purdy  : 1965  MRN: [id number]  Today's Date: 2023   Onset of Illness/Injury or Date of Surgery: 23  Visit Dx:     ICD-10-CM ICD-9-CM   1. Very poor mobility  Z74.09 V49.89   2. Severe sepsis  A41.9 038.9    R65.20 995.92   3. Other cirrhosis of liver  K74.69 571.5   4. Atrial fibrillation with RVR  I48.91 427.31   5. Symptomatic anemia  D64.9 285.9   6. Congestive heart failure, unspecified HF chronicity, unspecified heart failure type  I50.9 428.0   7. Sustained monomorphic ventricular tachycardia  I47.29 427.1   8. Prolonged Q-T interval on ECG  R94.31 794.31   9. Acute on chronic combined systolic and diastolic CHF (congestive heart failure)  I50.43 428.43     428.0   10. Chronic heart failure, unspecified heart failure type  I50.9 428.9   11. Cardiomyopathy, unspecified type  I42.9 425.4     Patient Active Problem List   Diagnosis    Acute on chronic congestive heart failure    Cardiomyopathy    CKD (chronic kidney disease) stage 2, GFR 60-89 ml/min    Severe obesity (BMI 35.0-39.9) with comorbidity    Chronic anemia    Elevated bilirubin    Acute on chronic combined systolic and diastolic CHF (congestive heart failure)    Hyponatremia    Chronic atrial fibrillation    Cirrhosis    Ventricular tachycardia     Past Medical History:   Diagnosis Date    Anemia     CHF (congestive heart failure)     Chronic a-fib     stated by patient     Cirrhosis of liver     stated by patient     Diabetes mellitus      Past Surgical History:   Procedure Laterality Date    APPENDECTOMY      CARDIAC CATHETERIZATION N/A 11/10/2020    Procedure: Left Heart Cath;  Surgeon: Charlee Ken MD;  Location: [facility] INVASIVE LOCATION;  Service: Cardiovascular;  Laterality: N/A;    CHOLECYSTECTOMY      TOE AMPUTATION Right     stated by patient.      PT Assessment (last 12 hours)       PT Evaluation and Treatment       Row Name  11/21/23 1257          Physical Therapy Time and Intention    Subjective Information complains of;weakness  -CT     Document Type therapy note (daily note)  -CT     Mode of Treatment physical therapy  -CT     Patient Effort good  -CT     Comment Pt continues to increase ambulation distance  -CT       Row Name 11/21/23 1257          General Information    Patient Profile Reviewed yes  -CT     Equipment Currently Used at Home commode, bedside;hospital bed;walker, rolling  -CT     Existing Precautions/Restrictions fall  -CT     Limitations/Impairments safety/cognitive  -CT       Row Name 11/21/23 1257          Cognition    Affect/Mental Status (Cognition) WNL  -CT     Orientation Status (Cognition) oriented x 4  -CT     Follows Commands (Cognition) WNL  -CT       Row Name 11/21/23 1257          Bed Mobility    Bed Mobility bed mobility (all) activities  -CT     All Activities, [place] (Bed Mobility) contact guard  -CT     Bed Mobility, Safety Issues decreased use of arms for pushing/pulling;decreased use of legs for bridging/pushing  -CT       Row Name 11/21/23 1257          Transfers    Transfers sit-stand transfer;stand-sit transfer  -CT       Row Name 11/21/23 1257          Sit-Stand Transfer    Sit-Stand [place] (Transfers) contact guard  -CT     Assistive Device (Sit-Stand Transfers) walker, front-wheeled  -CT       Row Name 11/21/23 1257          Stand-Sit Transfer    Stand-Sit [place] (Transfers) contact guard  -CT     Assistive Device (Stand-Sit Transfers) walker, front-wheeled  -CT       Row Name 11/21/23 1257          Gait/Stairs (Locomotion)    [place] Level (Gait) contact guard  -CT     Assistive Device (Gait) walker, front-wheeled  -CT     Patient was able to Ambulate yes  -CT     Distance in Feet (Gait) 70  -CT     Pattern (Gait) swing-through  -CT     Deviations/Abnormal Patterns (Gait) gait speed decreased  -CT     Bilateral Gait Deviations forward flexed posture  -CT       Row Name              Wound 11/14/23 2102 Left lower leg Abrasion    Wound - Properties Group Placement Date: 11/14/23  -AF Placement Time: 2102 -AF Present on Original Admission: Y  -AF Side: Left  -AF Orientation: lower  -AF Location: leg  -AF Primary Wound Type: Abrasion  -AF    Retired Wound - Properties Group Placement Date: 11/14/23  -AF Placement Time: 2102 -AF Present on Original Admission: Y  -AF Side: Left  -AF Orientation: lower  -AF Location: leg  -AF Primary Wound Type: Abrasion  -AF    Retired Wound - Properties Group Date first assessed: 11/14/23  -AF Time first assessed: 2102 -AF Present on Original Admission: Y  -AF Side: Left  -AF Location: leg  -AF Primary Wound Type: Abrasion  -AF      Row Name 11/21/23 1257          Coping    Observed Emotional State calm;cooperative;pleasant  -CT     Verbalized Emotional State acceptance  -CT       Row Name 11/21/23 1257          Plan of Care Review    Plan of Care Reviewed With patient  -CT       Row Name 11/21/23 1257          Positioning and Restraints    Pre-Treatment Position in bed  -CT     Post Treatment Position chair  -CT     In Chair sitting;call light within reach;encouraged to call for assist  -CT       Row Name 11/21/23 1257          Therapy Assessment/Plan (PT)    Rehab Potential (PT) good, to achieve stated therapy goals  -CT     Criteria for Skilled Interventions Met (PT) yes;skilled treatment is necessary  -CT     Therapy Frequency (PT) 2 times/wk  2-5 time/wk  -CT               User Key  (r) = Recorded By, (t) = Taken By, (c) = Cosigned By      Initials Name Provider Type    CT Lynn Mueller PT Physical Therapist    Kati Ashford, RN Registered Nurse                    Physical Therapy Education       Title: PT OT SLP Therapies (Done)       Topic: Physical Therapy (Done)       Point: Mobility training (Done)       Learning Progress Summary             Patient Acceptance, E,TB, VU,NR by EB at 11/19/2023 2346    Acceptance, E, NR by KLARISSA at 11/16/2023  1111    Acceptance, E, NR by RD at 11/15/2023 0958                         Point: Home exercise program (Done)       Learning Progress Summary             Patient Acceptance, E,TB, VU,NR by EB at 11/19/2023 2346    Acceptance, E, NR by RD at 11/16/2023 1111    Acceptance, E, NR by RD at 11/15/2023 0958                         Point: Body mechanics (Done)       Learning Progress Summary             Patient Acceptance, E,TB, VU,NR by EB at 11/19/2023 2346    Acceptance, E, NR by RD at 11/16/2023 1111    Acceptance, E, NR by RD at 11/15/2023 0958                         Point: Precautions (Done)       Learning Progress Summary             Patient Acceptance, E,TB, VU,NR by EB at 11/19/2023 2346    Acceptance, E, NR by RD at 11/16/2023 1111    Acceptance, E, NR by RD at 11/15/2023 0958                                         User Key       Initials Effective Dates Name Provider Type Discipline     06/16/21 -  Silavna Melgar, RN Registered Nurse Nurse     02/24/21 -  Tomasa Zamarripa, RN Registered Nurse Nurse                  PT Recommendation and Plan  Anticipated Discharge Disposition (PT): home with 24/7 care  Planned Therapy Interventions (PT): balance training, bed mobility training, gait training, home exercise program, manual therapy techniques, motor coordination training, neuromuscular re-education, patient/family education, postural re-education, strengthening, stretching, transfer training  Therapy Frequency (PT): 2 times/wk (2-5 time/wk)  Plan of Care Reviewed With: patient       Time Calculation:    PT Charges       Row Name 11/21/23 1259             Time Calculation    PT Received On 11/21/23  -CT         Time Calculation- PT    Total Timed Code Minutes- PT 32 minute(s)  -CT                User Key  (r) = Recorded By, (t) = Taken By, (c) = Cosigned By      Initials Name Provider Type    CT Lynn Mueller PT Physical Therapist                  Therapy Charges for Today       Code Description Service Date  Service Provider Modifiers Qty    72005051488 HC GAIT TRAINING EA 15 MIN 11/20/2023 Lynn Mueller, PT GP 1    67829086160 HC PT THERAPEUTIC ACT EA 15 MIN 11/20/2023 Lynn Mueller, PT GP 1    49877705948 HC GAIT TRAINING EA 15 MIN 11/21/2023 Lynn Mueller, PT GP 1    61162447326 HC PT THERAPEUTIC ACT EA 15 MIN 11/21/2023 Lynn Mueller, PT GP 1            PT G-Codes  AM-PAC 6 Clicks Score (PT): 17    Lynn Mueller, PT  11/21/2023

## 2023-11-21 NOTE — PLAN OF CARE
Goal Outcome Evaluation:        Problem: Respiratory Compromise (Heart Failure)  Goal: Effective Oxygenation and Ventilation  Outcome: Ongoing, Progressing  Intervention: Promote Airway Secretion Clearance  Description: Assess the effectiveness of pulmonary hygiene and ability to perform airway-clearance techniques.  Promote early mobility or ambulation; match activity to ability and tolerance.  Encourage deep breathing and lung expansion therapy to prevent atelectasis; adjust treatment to patient’s response.  Initiate cough-enhancement and airway-clearance techniques with instruction (e.g., active cycle breathing, positive expiratory pressure, suction).  Consider pharmacologic therapy that may improve mucus clearance, cough response and air flow.  Consider inspiratory muscle training to decrease the risk of pulmonary complications.  Recent Flowsheet Documentation  Taken 11/20/2023 2316 by Lorrie Carey RN  Cough And Deep Breathing: done independently per patient     Problem: Cardiac Output Decreased (Heart Failure)  Goal: Optimal Cardiac Output  Outcome: Ongoing, Progressing

## 2023-11-21 NOTE — CASE MANAGEMENT/SOCIAL WORK
Continued Stay Note  LATASHA Metz     Patient Name: Yumiko Purdy  MRN: [id number]  Today's Date: 11/21/2023    Admit Date: 11/12/2023    Plan: Messaged attending with request for WC cushion order; attending provided; faxed to [facility] to check if it'll be covered under pt's insurance with this CM's phone number for f/u.  This CM followed up with Cris at [facility] whom, confirmed order was received.  Per Cris, will take maybe a day or so to find out if pt's insurance will cover then, they will set up delivery to pt's home.  This CM notified Cris with [facility] that pt is discharging today; per Cris she will follow up with pt regarding insurance determination for WC cushion.  This CM relayed the aforementioned to pt via room phone.  Merly Ojeda RN

## 2023-11-21 NOTE — DISCHARGE PLACEMENT REQUEST
"Dio Berry (57 y.o. Female)       Date of Birth   1965    Social Security Number       Address   [address]  [place] [address]    Home Phone   [phone]    MRN   [id number]       [facility]    Marital Status                               Admission Date   23    Admission Type   Emergency    Admitting Provider   Tomás An MD    Attending Provider   Yulia Mo DO    Department, Room/Bed   [address], 3314/1S       Discharge Date       Discharge Disposition       Discharge Destination                                 Attending Provider: Yulia Mo DO    Allergies: Phenergan [Promethazine]    Isolation: None   Infection: None   Code Status: CPR    Ht: 165.1 cm (65\")   Wt: 109 kg (239 lb 11.2 oz)    Admission Cmt: None   Principal Problem: Ventricular tachycardia [I47.20]                   Active Insurance as of 2023       Primary Coverage       Payor Plan Insurance Group Employer/Plan Group    [employer] [place] [employer] [place]        Payor Plan Address Payor Plan Phone Number Payor Plan Fax Number Effective Dates    [address] [address] [phone]  2020 - None Entered    [facility] [id number]         Subscriber Name Subscriber Birth Date Member ID       DIO BERRY 1965 [id number]                     Emergency Contacts        (Rel.) Home Phone Work Phone Mobile Phone    KATHY BERRY (Son) [phone] -- --    Bolivar Berry (Other) -- -- [phone]          [address] [address]  Dept. Phone:  [phone]  Dept. Fax:  [phone] Date Ordered: 2023         Patient:  Dio Berry MRN:  [id number]   [address]   [place] [address] :  1965  SSN:    Phone: [phone] Sex:  F     Weight: 109 kg (239 lb 11.2 oz)         Ht Readings from Last 1 Encounters:   23 165.1 cm (65\")         Wheelchair Accessories       (Order ID: [id number])  " "  Diagnosis:  Chronic heart failure, unspecified heart failure type (I50.9 [ICD-10-CM] 428.9 [ICD-9-CM])  Very poor mobility (Z74.09 [ICD-10-CM] V49.89 [ICD-9-CM])  Cardiomyopathy, unspecified type (I42.9 [ICD-10-CM] 425.4 [ICD-9-CM])   Quantity:  1     Wheelchair accessories:  Gen Use W/C Cushion >22\" Gel or Foam  Length of Need (99 Months = Lifetime): 99 Months = Lifetime        Authorizing Provider's Phone: [phone]  Authorizing Provider:Yulia Mo DO  Authorizing Provider's NPI: [id number]  Order Entered By: Yulia Mo DO 2023 11:13 AM     Electronically signed by: Yulia Mo DO 2023 11:13 AM          History & Physical        Tomás An MD at 23 0559          Hospitalist History and Physical        Patient Identification  Name: Yumiko Purdy  Age/Sex: 57 y.o. female  :  1965        MRN: [id number]  Visit Number: [id number]  Admit Date: 2023   PCP: Masha Davidson APRN          Chief complaint short of breath, heart racing    History of Present Illness:  Patient is a 57 y.o. female with history of systolic CHF (EF 21-25% per last ECHO 2020), afib on eliquis, cirrhosis, insulin dependent type II DM, hypothyroidism, and what appears to be borderline stage IIIa-b CKD (creatinin ranges 1.2-1.5 and GFR ranges 42-50), who presents with reports of acute onset palpitations that occurred just prior to arrival to the ED. Palpitations were accompanied by dyspnea and generalized weakness, along with pain in her right neck and shoulder. EMS reported they found her in SVT and gave her 6mg IV adenosine followed by 12mg adenosine along with a fluid bolus. When she arrived to the ED, initial EKG reported afib w/RVR, but upon further review on [employer] monitor, she appeared to be in Ventricular tachycardia. Thus she was loaded with IV amiodarone and subsequently converted to afib and has been going back and forth between sinus and afib since. Initial " EKG showed intraventricular block, and repeat EKG's thereafter showed LBBB which was not present on prior EKGs before today. Labs showed elevated troponin 93 that trended down to 84 on repeat, BNP elevated at 2746, + 3.5, mag 2.1, BUN 36 with Cr 1.46, Glucose 424, alk phos 127 and bilirubin 2.7.  TSH was 4.680 while free T4 was 1.78. CRP was 0.88, lactate 3.7 with repeat down to 2.5, WBC was 15, hemoglobin stable at 8.4, platelets 142, .6. UA showed >1000 glucose but negative ketones and no signs of obvious infection. CT chest showed cardiomegaly, splenomegaly, minimal atelectasis at lung bases, and no edema, pleural effusion or pneumothorax. Patient is being admitted to the CCU for further workup and management.      Review of Systems  Review of Systems   Constitutional:  Positive for activity change and fatigue. Negative for appetite change, chills, diaphoresis, fever and unexpected weight change.   HENT:  Negative for congestion, postnasal drip, rhinorrhea, sinus pressure, sinus pain and sore throat.    Eyes:  Negative for photophobia, pain, discharge, redness, itching and visual disturbance.   Respiratory:  Positive for shortness of breath. Negative for cough and wheezing.    Cardiovascular:  Positive for palpitations and leg swelling (chronic). Negative for chest pain.   Gastrointestinal:  Positive for abdominal distention and blood in stool (2 weeks ago, states guaiac test at outside hospital negative, has since resolved). Negative for abdominal pain, constipation, diarrhea, nausea and vomiting.   Genitourinary:  Negative for difficulty urinating, dysuria, flank pain, frequency and hematuria.   Musculoskeletal:  Negative for arthralgias, back pain, joint swelling, myalgias, neck pain and neck stiffness.   Skin:  Negative for color change, pallor, rash and wound.   Neurological:  Positive for weakness (generalized). Negative for dizziness, tremors, seizures, syncope, light-headedness, numbness and  headaches.   Hematological:  Negative for adenopathy. Does not bruise/bleed easily.   Psychiatric/Behavioral:  Negative for agitation, behavioral problems and confusion.        History  Past Medical History:   Diagnosis Date    Anemia     CHF (congestive heart failure)     Chronic a-fib     stated by patient     Cirrhosis of liver     stated by patient     Diabetes mellitus      Past Surgical History:   Procedure Laterality Date    APPENDECTOMY      CARDIAC CATHETERIZATION N/A 11/10/2020    Procedure: Left Heart Cath;  Surgeon: Charlee Ken MD;  Location: [facility] CATH INVASIVE LOC[facility];  Service: Cardiovascular;  Laterality: N/A;    CHOLECYSTECTOMY      TOE AMPUTATION Right     stated by patient.      No family history on file.  Social History     Tobacco Use    Smoking status: Never    Smokeless tobacco: Never   Substance Use Topics    Alcohol use: Never    Drug use: Never     (Not in a hospital admission)    Allergies:  Phenergan [promethazine]    Objective    Vital Signs  Temp:  [97.8 °F (36.6 °C)] 97.8 °F (36.6 °C)  Heart Rate:  [] 91  Resp:  [18] 18  BP: ()/(43-76) 97/55  Body mass index is 41.6 kg/m².    Physical Exam:  Physical Exam  Constitutional:       Comments: Middle aged female appears older than stated age, no acute distress but appears both acutely and chronically ill    HENT:      Right Ear: External ear normal.      Left Ear: External ear normal.      Nose: Nose normal.      Mouth/Throat:      Mouth: Mucous membranes are moist.      Pharynx: Oropharynx is clear.   Eyes:      Extraocular Movements: Extraocular movements intact.      Conjunctiva/sclera: Conjunctivae normal.      Pupils: Pupils are equal, round, and reactive to light.   Cardiovascular:      Rate and Rhythm: Tachycardia present. Rhythm irregular.      Pulses: Normal pulses.      Heart sounds: Normal heart sounds. No murmur heard.  Pulmonary:      Effort: Pulmonary effort is normal. No respiratory distress.      Breath  "sounds: Normal breath sounds. No wheezing or rales.   Abdominal:      Tenderness: There is no abdominal tenderness.      Comments: Scaphoid, distended, ascites noted left pannus   Musculoskeletal:         General: Normal range of motion.      Cervical back: Normal range of motion and neck supple.      Right lower leg: Edema (1+ pitting) present.      Left lower leg: Edema (1+ pitting) present.   Skin:     General: Skin is warm and dry.      Capillary Refill: Capillary refill takes 2 to 3 seconds.      Coloration: Skin is not jaundiced.      Findings: No bruising.   Neurological:      General: No focal deficit present.      Mental Status: She is oriented to person, place, and time.   Psychiatric:         Mood and Affect: Mood normal.         Behavior: Behavior normal.           Results Review:       Lab Results:  Results from last 7 days   Lab Units 11/12/23  2307   WBC 10*3/mm3 15.57*   HEMOGLOBIN g/dL 8.4*   PLATELETS 10*3/mm3 142     Results from last 7 days   Lab Units 11/12/23  2307   CRP mg/dL 0.88*     Results from last 7 days   Lab Units 11/12/23  2307   SODIUM mmol/L 132*   POTASSIUM mmol/L 3.5   CHLORIDE mmol/L 91*   CO2 mmol/L 26.7   BUN mg/dL 36*   CREATININE mg/dL 1.46*   CALCIUM mg/dL 8.9   GLUCOSE mg/dL 424*     Results from last 7 days   Lab Units 11/12/23  2307   MAGNESIUM mg/dL 2.1     No results found for: \"HGBA1C\"  Results from last 7 days   Lab Units 11/12/23  2307   BILIRUBIN mg/dL 2.7*   ALK PHOS U/L 127*   AST (SGOT) U/L 21   ALT (SGPT) U/L 21     Results from last 7 days   Lab Units 11/13/23  0124 11/12/23  2307   CK TOTAL U/L  --  41   HSTROP T ng/L 84* 93*         Results from last 7 days   Lab Units 11/12/23  2307   INR  1.20*           I have reviewed the patient's laboratory results.    Imaging:  Imaging Results (Last 72 Hours)       Procedure Component Value Units Date/Time    CT Chest Without Contrast Diagnostic [[id number]] Collected: 11/13/23 0418     Updated: 11/13/23 0424    " Narrative:      PROCEDURE: CT of the chest performed without IV contrast on November 13, 2023. The examination was performed with 3 mm axial imaging and 3 mm  sagittal and coronal reconstruction images. Total DLP = 1046. The  examination was performed according to as low as reasonably achievable  dose protocol.     HISTORY: Cardiomegaly. CHF. Possible pneumonia. Possible edema.     FINDINGS:     Mild degenerative disc disease throughout the thoracic spine and upper  lumbar spine.  Well-circumscribed areas of relative lucency identified in the lower  thoracic spine and upper lumbar spine levels possibly due to underlying  hemangiomas within the vertebral bodies.  These are seen on image 73, series 5 and image 70, series 5. No chronic  compression fracture deformity.  No acute fracture or dislocation.  Very slight levoconvex curve at the lumbar spine.  Enlarged heart size.  No acute process seen involving the left thyroid lobe.  Small nodule in the right thyroid lobe.  No thoracic aortic aneurysm.  Coronary arterial vascular calcifications.  No pericardial effusion.  Mild bronchial inflammation.  Minimal atelectasis at the lung bases.  No pleural effusion or pneumothorax.  No pneumomediastinum.  No endobronchial lesion.  Cholecystectomy clips in the right upper quadrant.  Splenomegaly. The spleen measures 16.3 cm in length.  Heterogeneous attenuation to the liver without features of probable  fatty infiltration. There is some mild nodularity to the surface of the  liver suggestive of underlying hepatocellular disease.  No free fluid or free air in the upper abdomen.       Impression:         1.  Enlarged heart size.  2.  Splenomegaly.  3.  Minimal atelectasis at the lung bases.  4.  No edema, pleural effusion, or pneumothorax.  5.  Cholecystectomy.  6.  Very small right thyroid lobe nodule.        This report was finalized on 11/13/2023 4:22 AM by Braydon Johnson MD.       XR Chest 1 View [[id number]] Collected:  11/13/23 0323     Updated: 11/13/23 0326    Narrative:      PROCEDURE: Portable chest x-ray examination performed on November 13, 2023. Single view. Upright position.     HISTORY: Chest pain. Tachycardia.     FINDINGS:     Slight prominence of the heart size  Mild central pulmonary vascular congestion.  No edema, pneumonia, pleural effusion, or pneumothorax.       Impression:         1.  Mild central pulmonary vascular congestion.  2.  No edema.  3.  No pneumonia.  4.  No pleural effusion. No pneumothorax.  5.  Slight prominence to the heart size.     This report was finalized on 11/13/2023 3:24 AM by Braydon Johnson MD.               I have personally reviewed the patient's radiologic imaging.        EKG:   Atrial fibrillation with rapid ventricular response, , QTc 430  Left axis deviation  Nonspecific intraventricular block  Possible Anterolateral infarct (cited on or before 10-SEP-2020)  Abnormal ECG  When compared with ECG of 16-SEP-2020 11:22,  Significant changes have occurred      Repeat:     Sinus rhythm with short KS, HR 87, QTc 534  Left bundle branch block  Abnormal ECG  When compared with ECG of 12-NOV-2023 23:32, (Unconfirmed)  Sinus rhythm has replaced Atrial fibrillation        I have personally reviewed the above EKG's. LBBB appears new.       Assessment & Plan    - Ventricular tachycardia, new LBBB, in setting of known severe systolic CHF with EF in the 20s: converted from vtach to afib with IV amiodarone. Reportedly on amio drip for brief period, then stopped by ED provider. Troponin mildly elevated with downward trend. Denies chest pain but does note neck and shoulder soreness that could be angina equivalent symptoms. Cardiology notified; Dr Ken recommends starting IV heparin infusion and consulting interventional cardiology for consideration of AICD. Will admit to CCU. Keep NPO. Repeat EKG now. Await further recommendations from cardiology.  - Type II DM, insulin dependent, with  "hyperglycemia: monitor accuchecks, cover with SSI, adjust scale as needed, review home insulin regimen once reconciled by pharmacy.  - CKD, stage IIIa-b: creatinine appears within baseline range. Continue to monitor for now.  - Anemia, chronic, macrocytic: hemoglobin within baseline range at time of admission. Patient reports rectal bleeding a couple weeks ago, but guaiac test performed at outside hospital at that time was negative and bleeding has since resolved. Repeat labs pending, continue to monitor closely.  - Cirrhosis with ascites: review home meds once reconciled by pharmacy.    DVT Prophylaxis: IV heparin infusion    Estimated Length of Stay > 2midnights    I discussed the patient's findings, assessment and plan with the patient and ED provider Pierre \"Buddy\" BRITTANY Fields    Patient is high risk due to ventricular tachycardia, new left bundle branch block    Tomás An MD  11/13/23  05:59 EST      Electronically signed by Tomás An MD at 11/13/23 0621       "

## 2023-11-21 NOTE — DISCHARGE PLACEMENT REQUEST
"Dio Berry (57 y.o. Female)       Date of Birth   1965    Social Security Number       Address   [address]  [place] [address]    Home Phone   [phone]    MRN   [id number]       Anglican   Caodaism    Marital Status                               Admission Date   11/12/23    Admission Type   Emergency    Admitting Provider   Tomás An MD    Attending Provider   Yulia Mo DO    Department, Room/Bed   [address], 3314/1S       Discharge Date       [place] [facility] [place]                                 Attending Provider: Yulia Mo DO    Allergies: Phenergan [Promethazine]    Isolation: None   Infection: None   Code Status: CPR    Ht: 165.1 cm (65\")   Wt: 109 kg (239 lb 11.2 oz)    Admission Cmt: None   Principal Problem: Ventricular tachycardia [I47.20]                   Active Insurance as of 11/12/2023       Primary Coverage       Payor Plan Insurance Group Employer/Plan Group    [employer] [place] [employer]ICA[place]        Payor Plan Address Payor Plan Phone Number Payor Plan Fax Number Effective Dates    [address] [address] [phone]  9/9/2020 - None Entered    [facility] [address]         Subscriber Name Subscriber Birth Date Member ID       DIO BERRY 1965 [id number]                     Emergency Contacts        (Rel.) Home Phone Work Phone Mobile Phone    KATHY BERRY (Son) [phone] -- --    GurwinderBolivar (Other) -- -- [phone]              Emergency Contact Information       Name Relation Home Work Mobile    KATHY BERRY Son [phone]      Bolivar Berry Other   [phone]          Insurance Information                  [facility]/WELLCARE MEDICAID Phone: [phone]    Subscriber: Dio Berry Subscriber#: [id number]    Group#: -- Precert#: [id number]          Problem List             Codes Noted - Resolved       Hospital    * (Principal) Ventricular " tachycardia ICD-10-CM: I47.20  ICD-9-CM: 427.1 2023 - Present       Non-Hospital    Cirrhosis ICD-10-CM: K74.60  ICD-9-CM: 571.5 6/15/2023 - Present    Chronic atrial fibrillation ICD-10-CM: I48.20  ICD-9-CM: 427.31 2022 - Present    Hyponatremia ICD-10-CM: E87.1  ICD-9-CM: 276.1 2020 - Present    Acute on chronic combined systolic and diastolic CHF (congestive heart failure) ICD-10-CM: I50.43  ICD-9-CM: 428.43, 428.0 10/27/2020 - Present    Chronic anemia ICD-10-CM: D64.9  ICD-9-CM: 285.9 10/21/2020 - Present    Elevated bilirubin ICD-10-CM: R17  ICD-9-CM: 277.4 10/21/2020 - Present    Cardiomyopathy ICD-10-CM: I42.9  ICD-9-CM: 425.4 10/14/2020 - Present    CKD (chronic kidney disease) stage 2, GFR 60-89 ml/min ICD-10-CM: N18.2  ICD-9-CM: 585.2 10/14/2020 - Present    Severe obesity (BMI 35.0-39.9) with comorbidity ICD-10-CM: E66.01  ICD-9-CM: 278.01 10/14/2020 - Present    Acute on chronic congestive heart failure ICD-10-CM: I50.9  ICD-9-CM: 428.0 9/10/2020 - Present        History & Physical        Tomás An MD at 23 0559          Hospitalist History and Physical        Patient Identification  Name: Yumiko Purdy  Age/Sex: 57 y.o. female  :  1965        MRN: [id number]  Visit Number: [id number]  Admit Date: 2023   PCP: Masha Davidson APRN          Chief complaint short of breath, heart racing    History of Present Illness:  Patient is a 57 y.o. female with history of systolic CHF (EF 21-25% per last ECHO 2020), afib on eliquis, cirrhosis, insulin dependent type II DM, hypothyroidism, and what appears to be borderline stage IIIa-b CKD (creatinin ranges 1.2-1.5 and GFR ranges 42-50), who presents with reports of acute onset palpitations that occurred just prior to arrival to the ED. Palpitations were accompanied by dyspnea and generalized weakness, along with pain in her right neck and shoulder. EMS reported they found her in SVT and gave her 6mg IV  adenosine followed by 12mg adenosine along with a fluid bolus. When she arrived to the ED, initial EKG reported afib w/RVR, but upon further review on [employer] monitor, she appeared to be in Ventricular tachycardia. Thus she was loaded with IV amiodarone and subsequently converted to afib and has been going back and forth between sinus and afib since. Initial EKG showed intraventricular block, and repeat EKG's thereafter showed LBBB which was not present on prior EKGs before today. Labs showed elevated troponin 93 that trended down to 84 on repeat, BNP elevated at 2746, + 3.5, mag 2.1, BUN 36 with Cr 1.46, Glucose 424, alk phos 127 and bilirubin 2.7.  TSH was 4.680 while free T4 was 1.78. CRP was 0.88, lactate 3.7 with repeat down to 2.5, WBC was 15, hemoglobin stable at 8.4, platelets 142, .6. UA showed >1000 glucose but negative ketones and no signs of obvious infection. CT chest showed cardiomegaly, splenomegaly, minimal atelectasis at lung bases, and no edema, pleural effusion or pneumothorax. Patient is being admitted to the CCU for further workup and management.      Review of Systems  Review of Systems   Constitutional:  Positive for activity change and fatigue. Negative for appetite change, chills, diaphoresis, fever and unexpected weight change.   HENT:  Negative for congestion, postnasal drip, rhinorrhea, sinus pressure, sinus pain and sore throat.    Eyes:  Negative for photophobia, pain, discharge, redness, itching and visual disturbance.   Respiratory:  Positive for shortness of breath. Negative for cough and wheezing.    Cardiovascular:  Positive for palpitations and leg swelling (chronic). Negative for chest pain.   Gastrointestinal:  Positive for abdominal distention and blood in stool (2 weeks ago, states guaiac test at outside hospital negative, has since resolved). Negative for abdominal pain, constipation, diarrhea, nausea and vomiting.   Genitourinary:  Negative for difficulty urinating,  dysuria, flank pain, frequency and hematuria.   Musculoskeletal:  Negative for arthralgias, back pain, joint swelling, myalgias, neck pain and neck stiffness.   Skin:  Negative for color change, pallor, rash and wound.   Neurological:  Positive for weakness (generalized). Negative for dizziness, tremors, seizures, syncope, light-headedness, numbness and headaches.   Hematological:  Negative for adenopathy. Does not bruise/bleed easily.   Psychiatric/Behavioral:  Negative for agitation, behavioral problems and confusion.        History  Past Medical History:   Diagnosis Date    Anemia     CHF (congestive heart failure)     Chronic a-fib     stated by patient     Cirrhosis of liver     stated by patient     Diabetes mellitus      Past Surgical History:   Procedure Laterality Date    APPENDECTOMY      CARDIAC CATHETERIZATION N/A 11/10/2020    Procedure: Left Heart Cath;  Surgeon: Charlee Ken MD;  Location: [facility] CATH INVASIVE [facility];  Service: Cardiovascular;  Laterality: N/A;    CHOLECYSTECTOMY      TOE AMPUTATION Right     stated by patient.      No family history on file.  Social History     Tobacco Use    Smoking status: Never    Smokeless tobacco: Never   Substance Use Topics    Alcohol use: Never    Drug use: Never     (Not in a hospital admission)    Allergies:  Phenergan [promethazine]    Objective    Vital Signs  Temp:  [97.8 °F (36.6 °C)] 97.8 °F (36.6 °C)  Heart Rate:  [] 91  Resp:  [18] 18  BP: ()/(43-76) 97/55  Body mass index is 41.6 kg/m².    Physical Exam:  Physical Exam  Constitutional:       Comments: Middle aged female appears older than stated age, no acute distress but appears both acutely and chronically ill    HENT:      Right Ear: External ear normal.      Left Ear: External ear normal.      Nose: Nose normal.      Mouth/Throat:      Mouth: Mucous membranes are moist.      Pharynx: Oropharynx is clear.   Eyes:      Extraocular Movements: Extraocular movements intact.       "Conjunctiva/sclera: Conjunctivae normal.      Pupils: Pupils are equal, round, and reactive to light.   Cardiovascular:      Rate and Rhythm: Tachycardia present. Rhythm irregular.      Pulses: Normal pulses.      Heart sounds: Normal heart sounds. No murmur heard.  Pulmonary:      Effort: Pulmonary effort is normal. No respiratory distress.      Breath sounds: Normal breath sounds. No wheezing or rales.   Abdominal:      Tenderness: There is no abdominal tenderness.      Comments: Scaphoid, distended, ascites noted left pannus   Musculoskeletal:         General: Normal range of motion.      Cervical back: Normal range of motion and neck supple.      Right lower leg: Edema (1+ pitting) present.      Left lower leg: Edema (1+ pitting) present.   Skin:     General: Skin is warm and dry.      Capillary Refill: Capillary refill takes 2 to 3 seconds.      Coloration: Skin is not jaundiced.      Findings: No bruising.   Neurological:      General: No focal deficit present.      Mental Status: She is oriented to person, place, and time.   Psychiatric:         Mood and Affect: Mood normal.         Behavior: Behavior normal.           Results Review:       Lab Results:  Results from last 7 days   Lab Units 11/12/23  2307   WBC 10*3/mm3 15.57*   HEMOGLOBIN g/dL 8.4*   PLATELETS 10*3/mm3 142     Results from last 7 days   Lab Units 11/12/23  2307   CRP mg/dL 0.88*     Results from last 7 days   Lab Units 11/12/23 2307   SODIUM mmol/L 132*   POTASSIUM mmol/L 3.5   CHLORIDE mmol/L 91*   CO2 mmol/L 26.7   BUN mg/dL 36*   CREATININE mg/dL 1.46*   CALCIUM mg/dL 8.9   GLUCOSE mg/dL 424*     Results from last 7 days   Lab Units 11/12/23  2307   MAGNESIUM mg/dL 2.1     No results found for: \"HGBA1C\"  Results from last 7 days   Lab Units 11/12/23  2307   BILIRUBIN mg/dL 2.7*   ALK PHOS U/L 127*   AST (SGOT) U/L 21   ALT (SGPT) U/L 21     Results from last 7 days   Lab Units 11/13/23  0124 11/12/23 2307   CK TOTAL U/L  --  41   HSTROP " T ng/L 84* 93*         Results from last 7 days   Lab Units 11/12/23  2307   INR  1.20*           I have reviewed the patient's laboratory results.    Imaging:  Imaging Results (Last 72 Hours)       Procedure Component Value Units Date/Time    CT Chest Without Contrast Diagnostic [[id number]] Collected: 11/13/23 0418     Updated: 11/13/23 0424    Narrative:      PROCEDURE: CT of the chest performed without IV contrast on November 13, 2023. The examination was performed with 3 mm axial imaging and 3 mm  sagittal and coronal reconstruction images. Total DLP = 1046. The  examination was performed according to as low as reasonably achievable  dose protocol.     HISTORY: Cardiomegaly. CHF. Possible pneumonia. Possible edema.     FINDINGS:     Mild degenerative disc disease throughout the thoracic spine and upper  lumbar spine.  Well-circumscribed areas of relative lucency identified in the lower  thoracic spine and upper lumbar spine levels possibly due to underlying  hemangiomas within the vertebral bodies.  These are seen on image 73, series 5 and image 70, series 5. No chronic  compression fracture deformity.  No acute fracture or dislocation.  Very slight levoconvex curve at the lumbar spine.  Enlarged heart size.  No acute process seen involving the left thyroid lobe.  Small nodule in the right thyroid lobe.  No thoracic aortic aneurysm.  Coronary arterial vascular calcifications.  No pericardial effusion.  Mild bronchial inflammation.  Minimal atelectasis at the lung bases.  No pleural effusion or pneumothorax.  No pneumomediastinum.  No endobronchial lesion.  Cholecystectomy clips in the right upper quadrant.  Splenomegaly. The spleen measures 16.3 cm in length.  Heterogeneous attenuation to the liver without features of probable  fatty infiltration. There is some mild nodularity to the surface of the  liver suggestive of underlying hepatocellular disease.  No free fluid or free air in the upper abdomen.        Impression:         1.  Enlarged heart size.  2.  Splenomegaly.  3.  Minimal atelectasis at the lung bases.  4.  No edema, pleural effusion, or pneumothorax.  5.  Cholecystectomy.  6.  Very small right thyroid lobe nodule.        This report was finalized on 11/13/2023 4:22 AM by Braydon Johnson MD.       XR Chest 1 View [[id number]] Collected: 11/13/23 0323     Updated: 11/13/23 0326    Narrative:      PROCEDURE: Portable chest x-ray examination performed on November 13, 2023. Single view. Upright position.     HISTORY: Chest pain. Tachycardia.     FINDINGS:     Slight prominence of the heart size  Mild central pulmonary vascular congestion.  No edema, pneumonia, pleural effusion, or pneumothorax.       Impression:         1.  Mild central pulmonary vascular congestion.  2.  No edema.  3.  No pneumonia.  4.  No pleural effusion. No pneumothorax.  5.  Slight prominence to the heart size.     This report was finalized on 11/13/2023 3:24 AM by Braydon Johnson MD.               I have personally reviewed the patient's radiologic imaging.        EKG:   Atrial fibrillation with rapid ventricular response, , QTc 430  Left axis deviation  Nonspecific intraventricular block  Possible Anterolateral infarct (cited on or before 10-SEP-2020)  Abnormal ECG  When compared with ECG of 16-SEP-2020 11:22,  Significant changes have occurred      Repeat:     Sinus rhythm with short [place], HR 87, QTc 534  Left bundle branch block  Abnormal ECG  When compared with ECG of 12-NOV-2023 23:32, (Unconfirmed)  Sinus rhythm has replaced Atrial fibrillation        I have personally reviewed the above EKG's. LBBB appears new.       Assessment & Plan    - Ventricular tachycardia, new LBBB, in setting of known severe systolic CHF with EF in the 20s: converted from vtach to afib with IV amiodarone. Reportedly on amio drip for brief period, then stopped by ED provider. Troponin mildly elevated with downward trend. Denies chest pain but does note  "neck and shoulder soreness that could be angina equivalent symptoms. Cardiology notified; Dr Ken recommends starting IV heparin infusion and consulting interventional cardiology for consideration of AICD. Will admit to CCU. Keep NPO. Repeat EKG now. Await further recommendations from cardiology.  - Type II DM, insulin dependent, with hyperglycemia: monitor accuchecks, cover with SSI, adjust scale as needed, review home insulin regimen once reconciled by pharmacy.  - CKD, stage IIIa-b: creatinine appears within baseline range. Continue to monitor for now.  - Anemia, chronic, macrocytic: hemoglobin within baseline range at time of admission. Patient reports rectal bleeding a couple weeks ago, but guaiac test performed at outside hospital at that time was negative and bleeding has since resolved. Repeat labs pending, continue to monitor closely.  - Cirrhosis with ascites: review home meds once reconciled by pharmacy.    DVT Prophylaxis: IV heparin infusion    Estimated Length of Stay > 2midnights    I discussed the patient's findings, assessment and plan with the patient and ED provider Pierre \"Buddy\" BRITTANY Fields    Patient is high risk due to ventricular tachycardia, new left bundle branch block    Tomás An MD  11/13/23  05:59 EST      Electronically signed by Tomás An MD at 11/13/23 0621       Vital Signs (last day)       Date/Time Temp Temp src Pulse Resp BP Patient Position SpO2    11/21/23 1149 97.5 (36.4) Oral 74 20 93/51 Sitting 95    11/21/23 0736 98.7 (37.1) Oral 64 20 96/41 Lying 97    11/21/23 0326 98.2 (36.8) Oral 68 20 102/47 Sitting 97    11/20/23 2316 98.4 (36.9) Oral 84 20 94/43 Sitting 98    11/20/23 1935 98 (36.7) Oral 74 18 105/42 Lying 97    11/20/23 1440 -- -- 72 -- 114/60 Lying --    11/20/23 1217 98.1 (36.7) Oral 65 20 98/43 Lying 90    11/20/23 0705 97.7 (36.5) Oral 72 18 104/44 Lying 94    11/20/23 0303 98 (36.7) Oral 76 20 126/46 Lying 98          Lines, Drains & " Airways       Active LDAs       Name Placement date Placement time Site Days    Peripheral IV 11/18/23 0348 Posterior;Right Forearm 11/18/23 0348  Forearm  3    External Urinary Catheter 11/15/23  1900  --  5                  Lab Results (most recent)       Procedure Component Value Units Date/Time    POC Glucose Once [[id number]]  (Abnormal) Collected: 11/21/23 1643    Specimen: Blood Updated: 11/21/23 1649     Glucose 325 mg/dL     POC Glucose Once [[id number]]  (Abnormal) Collected: 11/21/23 1152    Specimen: Blood Updated: 11/21/23 1158     Glucose 281 mg/dL     Basic Metabolic Panel [[id number]]  (Abnormal) Collected: 11/21/23 0230    Specimen: Blood Updated: 11/21/23 0258     Glucose 217 mg/dL      BUN 34 mg/dL      Creatinine 1.51 mg/dL      Sodium 137 mmol/L      Potassium 4.2 mmol/L      Chloride 98 mmol/L      CO2 28.1 mmol/L      Calcium 8.9 mg/dL      BUN/Creatinine Ratio 22.5     Anion Gap 10.9 mmol/L      eGFR 40.4 mL/min/1.73     Narrative:      GFR Normal >60  Chronic Kidney Disease <60  Kidney Failure <15      Magnesium [[id number]]  (Normal) Collected: 11/21/23 0230    Specimen: Blood Updated: 11/21/23 0258     Magnesium 2.2 mg/dL     CBC (No Diff) [[id number]]  (Abnormal) Collected: 11/21/23 0230    Specimen: Blood Updated: 11/21/23 0249     WBC 8.33 10*3/mm3      RBC 2.79 10*6/mm3      Hemoglobin 8.4 g/dL      Hematocrit 30.8 %      .4 fL      MCH 30.1 pg      MCHC 27.3 g/dL      RDW 22.4 %      RDW-SD 85.2 fl      MPV 10.9 fL      Platelets 151 10*3/mm3     Phosphorus [[id number]]  (Abnormal) Collected: 11/20/23 0147    Specimen: Blood Updated: 11/20/23 0217     Phosphorus 4.7 mg/dL     Magnesium [[id number]]  (Normal) Collected: 11/20/23 0147    Specimen: Blood Updated: 11/20/23 0217     Magnesium 2.3 mg/dL     Basic Metabolic Panel [[id number]]  (Abnormal) Collected: 11/20/23 0147    Specimen: Blood Updated: 11/20/23 0217     Glucose 164 mg/dL      BUN 29 mg/dL      Creatinine 1.57 mg/dL       Sodium 133 mmol/L      Potassium 4.2 mmol/L      Comment: Slight hemolysis detected by analyzer. Result may be falsely elevated.        Chloride 98 mmol/L      CO2 24.8 mmol/L      Calcium 8.6 mg/dL      BUN/Creatinine Ratio 18.5     Anion Gap 10.2 mmol/L      eGFR 38.6 mL/min/1.73     Narrative:      GFR Normal >60  Chronic Kidney Disease <60  Kidney Failure <15      BNP [[id number]]  (Abnormal) Collected: 11/20/23 0147    Specimen: Blood Updated: 11/20/23 0213     proBNP 2,085.0 pg/mL     Narrative:      This assay is used as an aid in the diagnosis of individuals suspected of having heart failure. It can be used as an aid in the diagnosis of acute decompensated heart failure (ADHF) in patients presenting with signs and symptoms of ADHF to the emergency department (ED). In addition, NT-proBNP of <300 pg/mL indicates ADHF is not likely.    Age Range Result Interpretation  NT-proBNP Concentration (pg/mL:      <50             Positive            >450                   Gray                 300-450                    Negative             <300    50-75           Positive            >900                  Gray                300-900                  Negative            <300      >75             Positive            >1800                  Gray                300-1800                  Negative            <300    CBC (No Diff) [[id number]]  (Abnormal) Collected: 11/20/23 0147    Specimen: Blood Updated: 11/20/23 0204     WBC 8.44 10*3/mm3      RBC 2.61 10*6/mm3      Hemoglobin 8.2 g/dL      Hematocrit 29.0 %      .1 fL      MCH 31.4 pg      MCHC 28.3 g/dL      RDW 22.4 %      RDW-SD 86.7 fl      MPV 11.4 fL      Platelets 122 10*3/mm3     CBC & Differential [[id number]]  (Abnormal) Collected: 11/19/23 0153    Specimen: Blood Updated: 11/19/23 0341    Narrative:      The following orders were created for panel order CBC & Differential.  Procedure                               Abnormality         Status                      ---------                               -----------         ------                     CBC Auto Differential[[id number]]        Abnormal            Final result               Scan Slide[[id number]]                                       Final result                 Please view results for these tests on the individual orders.    CBC Auto Differential [[id number]]  (Abnormal) Collected: 11/19/23 0153    Specimen: Blood Updated: 11/19/23 0341     WBC 9.24 10*3/mm3      RBC 2.64 10*6/mm3      Hemoglobin 8.3 g/dL      Hematocrit 29.7 %      .5 fL      MCH 31.4 pg      MCHC 27.9 g/dL      RDW 23.1 %      RDW-SD 90.3 fl      MPV 12.1 fL      Platelets 112 10*3/mm3      Neutrophil % 63.2 %      Lymphocyte % 29.2 %      Monocyte % 4.0 %      Eosinophil % 1.5 %      Basophil % 1.1 %      Immature Grans % 1.0 %      Neutrophils, Absolute 5.84 10*3/mm3      Lymphocytes, Absolute 2.70 10*3/mm3      Monocytes, Absolute 0.37 10*3/mm3      Eosinophils, Absolute 0.14 10*3/mm3      Basophils, Absolute 0.10 10*3/mm3      Immature Grans, Absolute 0.09 10*3/mm3      nRBC 0.8 /100 WBC     Scan Slide [[id number]] Collected: 11/19/23 0153    Specimen: Blood Updated: 11/19/23 0341     Anisocytosis Large/3+     Hypochromia Slight/1+     Macrocytes Slight/1+     Polychromasia Mod/2+     Stomatocytes Slight/1+     Platelet Estimate Decreased    Phosphorus [[id number]]  (Normal) Collected: 11/19/23 0153    Specimen: Blood Updated: 11/19/23 0324     Phosphorus 4.5 mg/dL     High Sensitivity Troponin T [[id number]]  (Abnormal) Collected: 11/19/23 0153    Specimen: Blood Updated: 11/19/23 0324     HS Troponin T 127 ng/L     Narrative:      High Sensitive Troponin T Reference Range:  <14.0 ng/L- Negative Female for AMI  <22.0 ng/L- Negative Male for AMI  >=14 - Abnormal Female indicating possible myocardial injury.  >=22 - Abnormal Male indicating possible myocardial injury.   Clinicians would have to utilize clinical acumen, EKG, Troponin, and  serial changes to determine if it is an Acute Myocardial Infarction or myocardial injury due to an underlying chronic condition.         Comprehensive Metabolic Panel [[id number]]  (Abnormal) Collected: 11/19/23 0153    Specimen: Blood Updated: 11/19/23 0322     Glucose 178 mg/dL      BUN 25 mg/dL      Creatinine 1.46 mg/dL      Sodium 139 mmol/L      Potassium 4.6 mmol/L      Chloride 102 mmol/L      CO2 25.6 mmol/L      Calcium 8.9 mg/dL      Total Protein 6.2 g/dL      Albumin 3.5 g/dL      ALT (SGPT) 14 U/L      AST (SGOT) 18 U/L      Alkaline Phosphatase 82 U/L      Total Bilirubin 2.3 mg/dL      Globulin 2.7 gm/dL      A/G Ratio 1.3 g/dL      BUN/Creatinine Ratio 17.1     Anion Gap 11.4 mmol/L      eGFR 42.1 mL/min/1.73     Narrative:      GFR Normal >60  Chronic Kidney Disease <60  Kidney Failure <15      Serotonin Release Assay [[id number]] Collected: 11/19/23 0153    Specimen: Blood Updated: 11/19/23 0250    High Sensitivity Troponin T 2Hr [[id number]]  (Abnormal) Collected: 11/18/23 1632    Specimen: Blood Updated: 11/18/23 1740     HS Troponin T 150 ng/L      Troponin T Delta -2 ng/L     Narrative:      High Sensitive Troponin T Reference Range:  <14.0 ng/L- Negative Female for AMI  <22.0 ng/L- Negative Male for AMI  >=14 - Abnormal Female indicating possible myocardial injury.  >=22 - Abnormal Male indicating possible myocardial injury.   Clinicians would have to utilize clinical acumen, EKG, Troponin, and serial changes to determine if it is an Acute Myocardial Infarction or myocardial injury due to an underlying chronic condition.         High Sensitivity Troponin T [[id number]]  (Abnormal) Collected: 11/18/23 1418    Specimen: Blood Updated: 11/18/23 1512     HS Troponin T 152 ng/L     Narrative:      High Sensitive Troponin T Reference Range:  <14.0 ng/L- Negative Female for AMI  <22.0 ng/L- Negative Male for AMI  >=14 - Abnormal Female indicating possible myocardial injury.  >=22 - Abnormal Male  indicating possible myocardial injury.   Clinicians would have to utilize clinical acumen, EKG, Troponin, and serial changes to determine if it is an Acute Myocardial Infarction or myocardial injury due to an underlying chronic condition.         Lactic Acid, Plasma [[id number]]  (Normal) Collected: 11/18/23 1418    Specimen: Blood Updated: 11/18/23 1451     Lactate 1.8 mmol/L     Blood Gas, Venous With Co-Ox [[id number]]  (Abnormal) Collected: 11/18/23 1420    Specimen: Venous Blood Updated: 11/18/23 1426     Site Lab     pH, Venous 7.390 pH Units      pCO2, Venous 48.5 mm Hg      pO2, Venous 16.4 mm Hg      Comment: 84 Value below reference range        HCO3, Venous 29.3 mmol/L      Base Excess, Venous 3.8 mmol/L      O2 Saturation, Venous 20.2 %      Hemoglobin, Blood Gas 8.8 g/dL      Comment: 84 Value below reference range        CO2 Content 30.8 mmol/L      Barometric Pressure for Blood Gas 725 mmHg      Modality Room Air     FIO2 21 %      Notified Who Dr Bishop     Notified By [id number]     Collected by [id number]     Comment: Meter: [id number]     :  [id number]        Oxyhemoglobin Venous 19.1 %      Comment: 84 Value below reference range        Carboxyhemoglobin Venous 4.6 %      Methemoglobin Venous 1.0 %     aPTT [[id number]]  (Abnormal) Collected: 11/18/23 0243    Specimen: Blood Updated: 11/18/23 0353     PTT 38.6 seconds     Narrative:      PTT Heparin Therapeutic Range:  59 - 95 seconds      Comprehensive Metabolic Panel [[id number]]  (Abnormal) Collected: 11/18/23 0243    Specimen: Blood Updated: 11/18/23 0341     Glucose 183 mg/dL      BUN 18 mg/dL      Creatinine 1.25 mg/dL      Sodium 137 mmol/L      Potassium 4.8 mmol/L      Comment: Slight hemolysis detected by analyzer. Result may be falsely elevated.        Chloride 103 mmol/L      CO2 25.2 mmol/L      Calcium 8.9 mg/dL      Total Protein 6.2 g/dL      Albumin 3.3 g/dL      ALT (SGPT) 13 U/L      AST (SGOT) 20 U/L      Alkaline Phosphatase  77 U/L      Total Bilirubin 2.3 mg/dL      Globulin 2.9 gm/dL      A/G Ratio 1.1 g/dL      BUN/Creatinine Ratio 14.4     Anion Gap 8.8 mmol/L      eGFR 50.7 mL/min/1.73     Narrative:      GFR Normal >60  Chronic Kidney Disease <60  Kidney Failure <15      CBC & Differential [[id number]]  (Abnormal) Collected: 11/18/23 0243    Specimen: Blood Updated: 11/18/23 0339    Narrative:      The following orders were created for panel order CBC & Differential.  Procedure                               Abnormality         Status                     ---------                               -----------         ------                     CBC Auto Differential[[id number]]        Abnormal            Final result               Scan Slide[[id number]]                                       Final result                 Please view results for these tests on the individual orders.    CBC Auto Differential [[id number]]  (Abnormal) Collected: 11/18/23 0243    Specimen: Blood Updated: 11/18/23 0339     WBC 9.06 10*3/mm3      RBC 2.42 10*6/mm3      Hemoglobin 8.0 g/dL      Hematocrit 27.6 %      .0 fL      MCH 33.1 pg      MCHC 29.0 g/dL      RDW 23.6 %      RDW-SD 92.0 fl      MPV 12.5 fL      Platelets 82 10*3/mm3      Neutrophil % 70.4 %      Lymphocyte % 22.2 %      Monocyte % 3.9 %      Eosinophil % 1.7 %      Basophil % 1.0 %      Immature Grans % 0.8 %      Neutrophils, Absolute 6.39 10*3/mm3      Lymphocytes, Absolute 2.01 10*3/mm3      Monocytes, Absolute 0.35 10*3/mm3      Eosinophils, Absolute 0.15 10*3/mm3      Basophils, Absolute 0.09 10*3/mm3      Immature Grans, Absolute 0.07 10*3/mm3      nRBC 0.4 /100 WBC     Scan Slide [[id number]] Collected: 11/18/23 0243    Specimen: Blood Updated: 11/18/23 0339     Anisocytosis Mod/2+     Hypochromia Mod/2+     Macrocytes Mod/2+     Polychromasia Slight/1+     Stomatocytes Slight/1+     Platelet Morphology Normal    Blood Culture - Blood, Arm, Right [[id number]]  (Normal) Collected:  23 2350    Specimen: Blood from Arm, Right Updated: 23     Blood Culture No growth at 5 days    Blood Culture - Blood, Hand, Right [[id number]]  (Normal) Collected: 23 0003    Specimen: Blood from Hand, Right Updated: 23     Blood Culture No growth at 5 days    Heparin Induced PLT Antibody With / Rfx [[id number]] Collected: 23    Specimen: Blood Updated: 23 1612     Heparin Induced Plt Ab 0.084 OD     Narrative:      Performed at:  [address]  [address]  [id number]  : Carlitos Reid MD, Phone:  [phone]    [facility] [[id number]] Collected: 23    Specimen: Blood Updated: 23 151     Reference Lab Report --     [facility]  [address] [place] [address]  Phone: [phone] or [phone]  Fax: [phone]  Joe Elise M.D., Medical Director    PATIENT NAME                                 LABORATORY NO.  [id number]   DIO BERRY                              [id number]  [id number]  [facility]                          AGE                SEX     SSN            CLIENT REF #  [id number]       1965   F       [id number]    [id number]  [address] [place] [address]                               REQUESTING M.D.       ATTENDING MAlexandreD..        COPY TO..  QIAN FERRELL MELANIE    DATE COLLECTED        DATE RECEIVED          DATE REPORTED  2023    DIAGNOSIS:  PERIPHERAL SMEAR  Macrocytic anemia.  No increase in schistocytes population.  Mild thrombocytopenia with no platelet clumping.  Normal WBC count and differential. Granulocytes show normal morphology and  no circulating blasts identified.    CLINICAL HISTORY:  Ventricular tachycardia    CLINICAL LABORATORY DATA  WBC        7.38 x10/3/-L    RDW         24.1%  HGB        8.0g/dl          PLT         76 x10/3/-L  HCT         27.0%  MCV        114.4fL    PERIPHERAL SMEAR MICROSCOPIC DESCRIPTION:  Harvey stained smears are reviewed microscopically. See diagnosis for details.    Professional interpretation rendered by Selina Ahuja M.D., MPH at [facility] [employer] [facility], [address] [place] [address].    GROSS DESCRIPTION:  RECEIVED 2 STAINED AND 1 UNSTAINED SLIDES - MG 11/16/2023    REVIEWED, DIAGNOSED AND ELECTRONICALLY  SIGNED BY:    Selina Ahuja M.D., MPH  CPT CODES:        38765      [employer] [[id number]] Collected: 11/16/23 0148    Specimen: Blood Updated: 11/17/23 1509     FDP <5 ug/mL     Narrative:      Performed at:  [address]  [id number]  : Carlitos Reid MD, Phone:  [phone]    Folate [[id number]]  (Normal) Collected: 11/17/23 0351    Specimen: Blood Updated: 11/17/23 1241     Folate 16.70 ng/mL     Narrative:      Results may be falsely increased if patient taking Biotin.      Vitamin B12 [[id number]]  (Abnormal) Collected: 11/17/23 0351    Specimen: Blood Updated: 11/17/23 1241     Vitamin B-12 1,460 pg/mL     Narrative:      Results may be falsely increased if patient taking Biotin.      Ferritin [[id number]]  (Abnormal) Collected: 11/17/23 0351    Specimen: Blood Updated: 11/17/23 0513     Ferritin 2,723.00 ng/mL     Narrative:      Results may be falsely decreased if patient taking Biotin.      Iron Profile [[id number]]  (Abnormal) Collected: 11/17/23 0351    Specimen: Blood Updated: 11/17/23 0446     Iron 73 mcg/dL      Iron Saturation (TSAT) 33 %      Transferrin 149 mg/dL      TIBC 222 mcg/dL     Reticulocytes [[id number]]  (Abnormal) Collected: 11/17/23 0351    Specimen: Blood Updated: 11/17/23 0434     Reticulocyte % 17.67 %      Reticulocyte Absolute 0.5001 10*6/mm3     aPTT [[id number]]  (Abnormal) Collected: 11/17/23 0351    Specimen: Blood Updated: 11/17/23 0418     PTT 25.0 seconds     Narrative:      PTT Heparin Therapeutic Range:  59 - 95  "seconds      D-dimer, Quantitative [[id number]]  (Normal) Collected: 11/16/23 0148    Specimen: Blood Updated: 11/16/23 0300     D-Dimer, Quantitative 0.28 MCGFEU/mL     Narrative:      According to the assay 's published package insert, a normal (<0.50 MCGFEU/mL) D-dimer result in conjunction with a non-high clinical probability assessment, excludes deep vein thrombosis (DVT) and pulmonary embolism (PE) with high sensitivity.    D-dimer values increase with age and this can make VTE exclusion of an older population difficult. To address this, the [employer], based on best available evidence and recent guidelines, recommends that clinicians use age-adjusted D-dimer thresholds in patients greater than 50 years of age with: a) a low probability of PE who do not meet all Pulmonary Embolism Rule Out Criteria, or b) in those with intermediate probability of PE.   The formula for an age-adjusted D-dimer cut-off is \"age/100\".  For example, a 60 year old patient would have an age-adjusted cut-off of 0.60 MCGFEU/mL and an 80 year old 0.80 MCGFEU/mL.    Fibrinogen [[id number]]  (Normal) Collected: 11/15/23 2159    Specimen: Blood Updated: 11/15/23 2309     Fibrinogen 309 mg/dL     D-dimer, Quantitative [[id number]]  (Normal) Collected: 11/15/23 2159    Specimen: Blood Updated: 11/15/23 2309     D-Dimer, Quantitative <0.27 MCGFEU/mL     Narrative:      According to the assay 's published package insert, a normal (<0.50 MCGFEU/mL) D-dimer result in conjunction with a non-high clinical probability assessment, excludes deep vein thrombosis (DVT) and pulmonary embolism (PE) with high sensitivity.    D-dimer values increase with age and this can make VTE exclusion of an older population difficult. To address this, the [employer], based on best available evidence and recent guidelines, recommends that clinicians use age-adjusted D-dimer thresholds in patients greater than " "50 years of age with: a) a low probability of PE who do not meet all Pulmonary Embolism Rule Out Criteria, or b) in those with intermediate probability of PE.   The formula for an age-adjusted D-dimer cut-off is \"age/100\".  For example, a 60 year old patient would have an age-adjusted cut-off of 0.60 MCGFEU/mL and an 80 year old 0.80 MCGFEU/mL.    Protime-INR [[id number]]  (Normal) Collected: 11/15/23 2159    Specimen: Blood Updated: 11/15/23 2309     Protime 13.4 Seconds      INR 0.97    Narrative:      Suggested INR therapeutic range for stable oral anticoagulant therapy:    Low Intensity therapy:   1.5-2.0  Moderate Intensity therapy:   2.0-3.0  High Intensity therapy:   2.5-4.0    Heparin Anti-Xa [[id number]]  (Abnormal) Collected: 11/15/23 2159    Specimen: Blood Updated: 11/15/23 2216     Heparin Anti-Xa (UFH) <0.10 IU/ml     Heparin Anti-Xa [[id number]]  (Abnormal) Collected: 11/15/23 1428    Specimen: Blood Updated: 11/15/23 1446     Heparin Anti-Xa (UFH) 0.21 IU/ml     Potassium [[id number]]  (Normal) Collected: 11/14/23 1407    Specimen: Blood Updated: 11/14/23 1450     Potassium 4.2 mmol/L      Comment: Slight hemolysis detected by analyzer. Result may be falsely elevated.       Blood Gas, Venous With Co-Ox [[id number]]  (Abnormal) Collected: 11/14/23 0424    Specimen: Venous Blood Updated: 11/14/23 0431     Site Lab     pH, Venous 7.432 pH Units      pCO2, Venous 45.8 mm Hg      pO2, Venous 41.1 mm Hg      HCO3, Venous 30.5 mmol/L      Comment: 83 Value above reference range        Base Excess, Venous 5.5 mmol/L      O2 Saturation, Venous 74.3 %      Hemoglobin, Blood Gas 9.0 g/dL      Comment: 84 Value below reference range        CO2 Content 31.9 mmol/L      Barometric Pressure for Blood Gas 735 mmHg      Modality Room Air     FIO2 21 %      Ventilator Mode NA     Collected by [id number]     Comment: Meter: [id number]     :  [id number]        Oxyhemoglobin Venous 70.5 %      Carboxyhemoglobin Venous " 5.0 %      Methemoglobin Venous 0.1 %     Manual Differential [[id number]]  (Abnormal) Collected: 11/13/23 2234    Specimen: Blood Updated: 11/13/23 2328     Neutrophil % 71.0 %      Lymphocyte % 22.0 %      Monocyte % 2.0 %      Bands %  2.0 %      Metamyelocyte % 2.0 %      Myelocyte % 1.0 %      Neutrophils Absolute 7.90 10*3/mm3      Lymphocytes Absolute 2.38 10*3/mm3      Monocytes Absolute 0.22 10*3/mm3      nRBC 2.0 /100 WBC      Anisocytosis Large/3+     Hypochromia Mod/2+     Macrocytes Mod/2+     Polychromasia Slight/1+     Stomatocytes Slight/1+     Platelet Morphology Normal    STAT Lactic Acid, Reflex [[id number]]  (Normal) Collected: 11/13/23 1319    Specimen: Blood Updated: 11/13/23 1407     Lactate 2.0 mmol/L     Hemoglobin A1c [[id number]]  (Abnormal) Collected: 11/13/23 0711    Specimen: Blood from Hand, Right Updated: 11/13/23 1404     Hemoglobin A1C 7.30 %     Narrative:      Hemoglobin A1C Ranges:    Increased Risk for Diabetes  5.7% to 6.4%  Diabetes                     >= 6.5%  Diabetic Goal                < 7.0%    STAT Lactic Acid, Reflex [[id number]]  (Abnormal) Collected: 11/13/23 1116    Specimen: Blood from Arm, Right Updated: 11/13/23 1157     Lactate 2.5 mmol/L     Manual Differential [[id number]]  (Abnormal) Collected: 11/13/23 0711    Specimen: Blood from Hand, Right Updated: 11/13/23 0734     Neutrophil % 62.0 %      Lymphocyte % 22.0 %      Monocyte % 6.0 %      Eosinophil % 2.0 %      Basophil % 1.0 %      Bands %  5.0 %      Metamyelocyte % 2.0 %      Neutrophils Absolute 8.78 10*3/mm3      Lymphocytes Absolute 2.88 10*3/mm3      Monocytes Absolute 0.79 10*3/mm3      Eosinophils Absolute 0.26 10*3/mm3      Basophils Absolute 0.13 10*3/mm3      nRBC 5.0 /100 WBC      Anisocytosis Large/3+     Dacrocytes Slight/1+     Hypochromia Mod/2+     Macrocytes Large/3+     Polychromasia Slight/1+     Stomatocytes Slight/1+     Platelet Estimate Decreased     Large Platelets Slight/1+    T4, Free  [[id number]]  (Abnormal) Collected: 11/13/23 0124    Specimen: Blood from Hand, Right Updated: 11/13/23 0555     Free T4 1.78 ng/dL     Narrative:      Results may be falsely increased if patient taking Biotin.      Urinalysis, Microscopic Only - Urine, Clean Catch [[id number]]  (Abnormal) Collected: 11/13/23 0234    Specimen: Urine, Clean Catch Updated: 11/13/23 0256     RBC, UA 6-10 /HPF      WBC, UA 0-2 /HPF      Bacteria, UA Trace /HPF      Squamous Epithelial Cells, UA 7-12 /HPF      Transitional Epithelial Cells, UA 0-2 /HPF      Yeast, UA Moderate/2+ Budding Yeast /HPF      Hyaline Casts, UA None Seen /LPF      Methodology Manual Light Microscopy    Urine Drug Screen - Urine, Clean Catch [[id number]]  (Normal) Collected: 11/13/23 0234    Specimen: Urine, Clean Catch Updated: 11/13/23 0253     THC, Screen, Urine Negative     Phencyclidine (PCP), Urine Negative     Cocaine Screen, Urine Negative     Methamphetamine, Ur Negative     Opiate Screen Negative     Amphetamine Screen, Urine Negative     Benzodiazepine Screen, Urine Negative     Tricyclic Antidepressants Screen Negative     Methadone Screen, Urine Negative     Barbiturates Screen, Urine Negative     Oxycodone Screen, Urine Negative     Buprenorphine, Screen, Urine Negative    Narrative:      Cutoff For Drugs Screened:    Amphetamines               500 ng/ml  Barbiturates               200 ng/ml  Benzodiazepines            150 ng/ml  Cocaine                    150 ng/ml  Methadone                  200 ng/ml  Opiates                    100 ng/ml  Phencyclidine               25 ng/ml  THC                         50 ng/ml  Methamphetamine            500 ng/ml  Tricyclic Antidepressants  300 ng/ml  Oxycodone                  100 ng/ml  Buprenorphine               10 ng/ml    The normal value for all drugs tested is negative. This report includes unconfirmed screening results, with the cutoff values listed, to be used for medical treatment purposes only.   Unconfirmed results must not be used for non-medical purposes such as employment or legal testing.  Clinical consideration should be applied to any drug of abuse test, particularly when unconfirmed results are used.      Fentanyl, Urine - Urine, Clean Catch [[id number]]  (Normal) Collected: 11/13/23 0234    Specimen: Urine, Clean Catch Updated: 11/13/23 0252     Fentanyl, Urine Negative    Narrative:      Negative Threshold:      Fentanyl 5 ng/mL     The normal value for the drug tested is negative. This report includes final unconfirmed screening results to be used for medical treatment purposes only. Unconfirmed results must not be used for non-medical purposes such as employment or legal testing. Clinical consideration should be applied to any drug of abuse test, particularly when unconfirmed results are used.           Urinalysis With Microscopic If Indicated (No Culture) - Urine, Clean Catch [[id number]]  (Abnormal) Collected: 11/13/23 0234    Specimen: Urine, Clean Catch Updated: 11/13/23 0247     Color, UA Yellow     Appearance, UA Clear     pH, UA 5.5     Specific Gravity, UA 1.018     Glucose, UA >=1000 mg/dL (3+)     Ketones, UA Negative     Bilirubin, UA Negative     Blood, UA Moderate (2+)     Protein, UA Negative     Leuk Esterase, UA Trace     Nitrite, UA Negative     Urobilinogen, UA 0.2 E.U./dL    High Sensitivity Troponin T 2Hr [[id number]]  (Abnormal) Collected: 11/13/23 0124    Specimen: Blood from Hand, Right Updated: 11/13/23 0156     HS Troponin T 84 ng/L      Troponin T Delta -9 ng/L     Narrative:      High Sensitive Troponin T Reference Range:  <14.0 ng/L- Negative Female for AMI  <22.0 ng/L- Negative Male for AMI  >=14 - Abnormal Female indicating possible myocardial injury.  >=22 - Abnormal Male indicating possible myocardial injury.   Clinicians would have to utilize clinical acumen, EKG, Troponin, and serial changes to determine if it is an Acute Myocardial Infarction or myocardial injury  due to an underlying chronic condition.         Lactic Acid, Plasma [[id number]]  (Abnormal) Collected: 11/12/23 2350    Specimen: Blood from Arm, Right Updated: 11/13/23 0039     Lactate 3.7 mmol/L     Ammonia [[id number]]  (Normal) Collected: 11/12/23 2350    Specimen: Blood from Arm, Right Updated: 11/13/23 0035     Ammonia 15 umol/L     [place] Draw [[id number]] Collected: 11/12/23 2307    Specimen: Blood from Arm, Right Updated: 11/13/23 0015    Narrative:      The following orders were created for panel order [place] Draw.  Procedure                               Abnormality         Status                     ---------                               -----------         ------                     Green Top (Gel)[[id number]]                                  Final result               Lavender Top[[id number]]                                     Final result               Gold Top - SST[[id number]]                                   Final result               Light Blue Top[[id number]]                                   Final result                 Please view results for these tests on the individual orders.    Lavender Top [[id number]] Collected: 11/12/23 2307    Specimen: Blood from Arm, Right Updated: 11/13/23 0015     Extra Tube hold for add-on     Comment: Auto resulted       Gold Top - SST [[id number]] Collected: 11/12/23 2307    Specimen: Blood from Arm, Right Updated: 11/13/23 0015     Extra Tube Hold for add-ons.     Comment: Auto resulted.       Green Top (Gel) [[id number]] Collected: 11/12/23 2307    Specimen: Blood from Arm, Right Updated: 11/13/23 0015     Extra Tube Hold for add-ons.     Comment: Auto resulted.       Light Blue Top [[id number]] Collected: 11/12/23 2307    Specimen: Blood from Arm, Right Updated: 11/13/23 0015     Extra Tube Hold for add-ons.     Comment: Auto resulted       Single High Sensitivity Troponin T [[id number]]  (Abnormal) Collected: 11/12/23 2307    Specimen: Blood from Arm, Right  Updated: 11/12/23 2344     HS Troponin T 93 ng/L     Narrative:      High Sensitive Troponin T Reference Range:  <14.0 ng/L- Negative Female for AMI  <22.0 ng/L- Negative Male for AMI  >=14 - Abnormal Female indicating possible myocardial injury.  >=22 - Abnormal Male indicating possible myocardial injury.   Clinicians would have to utilize clinical acumen, EKG, Troponin, and serial changes to determine if it is an Acute Myocardial Infarction or myocardial injury due to an underlying chronic condition.         BNP [[id number]]  (Abnormal) Collected: 11/12/23 2307    Specimen: Blood from Arm, Right Updated: 11/12/23 2340     proBNP 2,746.0 pg/mL     Narrative:      This assay is used as an aid in the diagnosis of individuals suspected of having heart failure. It can be used as an aid in the diagnosis of acute decompensated heart failure (ADHF) in patients presenting with signs and symptoms of ADHF to the emergency department (ED). In addition, NT-proBNP of <300 pg/mL indicates ADHF is not likely.    Age Range Result Interpretation  NT-proBNP Concentration (pg/mL:      <50             Positive            >450                   Gray                 300-450                    Negative             <300    50-75           Positive            >900                  Gray                300-900                  Negative            <300      >75             Positive            >1800                  Gray                300-1800                  Negative            <300    TSH [[id number]]  (Abnormal) Collected: 11/12/23 2307    Specimen: Blood from Arm, Right Updated: 11/12/23 2340     TSH 4.680 uIU/mL     C-reactive Protein [[id number]]  (Abnormal) Collected: 11/12/23 2307    Specimen: Blood from Arm, Right Updated: 11/12/23 2334     C-Reactive Protein 0.88 mg/dL     Ethanol [[id number]] Collected: 11/12/23 2307    Specimen: Blood from Arm, Right Updated: 11/12/23 2334     Ethanol <10 mg/dL      Ethanol % <0.010 %     Narrative:       >/= 80.0 legally intoxicated    CK [[id number]]  (Normal) Collected: 23    Specimen: Blood from Arm, Right Updated: 23     Creatine Kinase 41 U/L     Protime-INR [[id number]]  (Abnormal) Collected: 23    Specimen: Blood from Arm, Right Updated: 23 232     Protime 15.8 Seconds      INR 1.20    Narrative:      Suggested INR therapeutic range for stable oral anticoagulant therapy:    Low Intensity therapy:   1.5-2.0  Moderate Intensity therapy:   2.0-3.0  High Intensity therapy:   2.5-4.0          Operative/Procedure Notes (most recent note)    No notes of this type exist for this encounter.          Physician Progress Notes (most recent note)        Anastasiia Mclaughlin APRN at 23 1149              [facility]  PROGRESS NOTE    Patient information:  Name: Yumiko Purdy  Age/Sex: 57 y.o. female  :  1965        PCP: Masha Davidson APRN  Attending: Tomás An MD  MRN:  [id number]  Visit Number:  [id number]    LOS:  LOS: 8 days     CODE STATUS:    Code Status and Medical Interventions:   Ordered at: 23 0558     Code Status (Patient has no pulse and is not breathing):    CPR (Attempt to Resuscitate)     Medical Interventions (Patient has pulse or is breathing):    Full Support       PROBLEM LIST:Principal Problem:    Ventricular tachycardia      Reason for Cardiology follow-up: Ventricular tachycardia with Paroxysmal atrial fibrillation and heart failure with reduced EF.  EF-31 to 35%.      Subjective   ADMISSION INFORMATION:  Chief Complaint   Patient presents with    Rapid Heart Rate       HPI:  Yumiko Purdy is a 57 y.o. female with a past medical history significant for systolic CHF (EF 21-25% per last ECHO 2020), afib on Eliquis s/p ablation 10/2022, cirrhosis, insulin dependent type II DM, hypothyroidism, COPD, and what appears to be borderline stage IIIa-b CKD (creatinin ranges 1.2-1.5  "and GFR ranges 42-50).     Patient presented to [facility] emergency room (ER) on 11/12/2023 with complaints of acute onset palpitations that occurred just prior to arrival to the ER. EMS reported they found her in SVT and gave her 6mg IV adenosine followed by 12mg adenosine along with a fluid bolus. When she arrived to the ED, initial EKG reported afib w/RVR, but upon further review on Zolls monitor, she appeared to be in Ventricular tachycardia.      Cardiology has been consulted for further evaluation and management ventricular tachycardia.      Primary Cardiologist has been Jad Maravilla MD (Cardiologist), and Samy Claude Elayi, MD (EP) and she was last seen in the office on 01/27/2023.   Contact number for EP: [phone].     EVENT TIMELINE:   11/14/2023: Ordered Life Vest  11/14/2023: The above number is no longer in service.  I did call [phone] and spoke with Eda. She took a message and will have one of the 's to return my call to make this patient an appointment for evaluation for ablation and ICD placement. Awaiting their return call.     11/15/2023: I called back to Dr. Jimenez's office at [phone] and spoke with Esthela. She scheduled patient with Dr. Jimenez on 12/11/2023 at 1:15 PM.  I will take a note to  with this appoint on it for her to have as reference and new phone number ([phone]) since the one she gave us is not in service any longer.     Patient is also followed in [facility] with her last appointment being 09/29/2023.    Interval History:   Patient is in room 314 A and was examined by Dr. Sahu.  Patient is lying in bed resting quietly.  No acute distress noted at this time.  Patient currently denies any chest pain, shortness of breath, or palpitations.  Patient reports, \"I am feeling better\".     ORDERED:  [facility] f/u in 1 week  F/U with General Cards in 2 weeks    Vital Signs  Temp:  [98 °F (36.7 °C)-98.7 " °F (37.1 °C)] 98.7 °F (37.1 °C)  Heart Rate:  [64-84] 64  Resp:  [18-20] 20  BP: ()/(41-60) 96/41  Device (Oxygen Therapy): room air  Vital Signs (last 72 hrs)         11/18 0700 11/19 0659 11/19 0700  11/20 0659 11/20 0700  11/21 0659 11/21 0700  11/21 1149   Most Recent      Temp (°F) 98.3 -  99.3    98 -  99.1    97.7 -  98.4      98.7     98.7 (37.1) 11/21 0736    Heart Rate 65 -  79    69 -  76    65 -  84      64     64 11/21 0736    Resp 18 -  20      20    18 -  20      20     20 11/21 0736    /46 -  114/51    91/45 -  126/46    94/43 -  114/60      96/41     96/41 11/21 0736    SpO2 (%) 93 -  98    94 -  99    90 -  98      97     97 11/21 0736    Flow (L/min)   1    1 -  2      1       1 11/20 0745          BMI:Body mass index is 39.89 kg/m².    WEIGHT:      11/19/23  0500 11/20/23  0500 11/21/23  0550   Weight: 107 kg (235 lb 3.2 oz) 107 kg (236 lb 6.4 oz) 109 kg (239 lb 11.2 oz)       DIET:Diet: Regular/House Diet, Diabetic Diets; Consistent Carbohydrate; Texture: Soft to Chew (NDD 3); Soft to Chew: Whole Meat; Fluid Consistency: Thin (IDDSI 0)    I&O:  Intake & Output (last 3 days)         11/18 0701 11/19 0700 11/19 0701  11/20 0700 11/20 0701 11/21 0700 11/21 0701 11/22 0700    P.O. 3124 056 4502 480    I.V. (mL/kg)  0 (0)      Total Intake(mL/kg) 1080 (10.2) 780 (7.4) 3010 (28.4) 480 (4.5)    Urine (mL/kg/hr) 2850 (1.1) 3650 (1.4) 2850 (1.1) 625 (1.2)    Stool   0     Total Output 2850 3650 2850 625    Net -1770 -2870 +160 -145            Urine Unmeasured Occurrence 1 x  4 x 1 x    Stool Unmeasured Occurrence   2 x             Objective     Physical Exam:      General Appearance:    Alert, cooperative, in no acute distress.   Head:    Normocephalic, without obvious abnormality, atraumatic.   Eyes:                          Conjunctivae and sclerae normal, no icterus, no pallor, corneas clear.   Neck:   No adenopathy, supple, trachea midline, no thyromegaly, no carotid bruit, no JVD.    Lungs:     Clear to auscultation, respirations regular, even and             unlabored.    Heart:    Regular rhythm and normal rate, normal S1 and S2, no          murmur, no gallop, no rub, no click.  LifeVest in use.    Chest Wall:    No abnormalities observed.   Abdomen:     Normal bowel sounds, no masses, no organomegaly, soft nontender, nondistended, no guarding, no rebound tenderness.   Extremities:   Moves all extremities well, no edema, no cyanosis, no           redness.   Pulses:   Pulses palpable and equal bilaterally.   Skin:   No bleeding, bruising or rash.   Neurologic:   Alert and Oriented x 3, Speech Clear & comprehensive.       Results review   Results Review:   Results from last 7 days   Lab Units 11/19/23  0153 11/18/23  1632 11/18/23  1418   HSTROP T ng/L 127* 150* 152*     Lab Results   Component Value Date    PROBNP 2,085.0 (H) 11/20/2023    PROBNP 2,746.0 (H) 11/12/2023    PROBNP 555.6 09/29/2023     Results from last 7 days   Lab Units 11/21/23  0230 11/20/23  0147 11/19/23  0153 11/18/23  0243 11/17/23  0351 11/16/23  0148 11/15/23  0638   WBC 10*3/mm3 8.33 8.44 9.24 9.06 9.89 7.38 8.88   HEMOGLOBIN g/dL 8.4* 8.2* 8.3* 8.0* 8.7* 8.0* 8.5*   PLATELETS 10*3/mm3 151 122* 112* 82* 72* 76* 77*     Results from last 7 days   Lab Units 11/21/23  0230 11/20/23  0147 11/19/23  0153 11/18/23  1418 11/18/23  0243 11/17/23  0351 11/16/23  0148   SODIUM mmol/L 137 133* 139 135* 137 137 136   POTASSIUM mmol/L 4.2 4.2 4.6 5.0 4.8 4.6 4.2   CHLORIDE mmol/L 98 98 102 100 103 103 101   CO2 mmol/L 28.1 24.8 25.6 25.1 25.2 23.0 25.0   BUN mg/dL 34* 29* 25* 24* 18 16 16   CREATININE mg/dL 1.51* 1.57* 1.46* 1.28* 1.25* 1.22* 1.28*   CALCIUM mg/dL 8.9 8.6 8.9 8.7 8.9 8.8 8.5*   GLUCOSE mg/dL 217* 164* 178* 351* 183* 245* 312*   ALT (SGPT) U/L  --   --  14  --  13 15  --    AST (SGOT) U/L  --   --  18  --  20 18  --      Lab Results   Component Value Date    MG 2.2 11/21/2023    MG 2.3 11/20/2023    MG 2.4  11/19/2023     Estimated Creatinine Clearance: 51.1 mL/min (A) (by C-G formula based on SCr of 1.51 mg/dL (H)).    Lab Results   Component Value Date    HGBA1C 7.30 (H) 11/13/2023    HGBA1C 8.40 (H) 09/10/2020    HGBA1C 5.4 04/21/2016     Lab Results   Component Value Date    CHOL 137 09/11/2020    TRIG 80 09/11/2020    LDL 78 09/11/2020    HDL 43 09/11/2020     Lab Results   Component Value Date    ABSOLUTELUNG 36 (A) 11/20/2023    ABSOLUTELUNG 35 11/19/2023    ABSOLUTELUNG 31 11/14/2023     Lab Results   Component Value Date    INR 0.97 11/15/2023    INR 1.20 (H) 11/12/2023    INR 1.10 12/15/2022    INR 1.25 (H) 09/23/2020    INR 1.09 09/22/2020    INR 1.17 (H) 09/21/2020    INR 1.26 (H) 09/21/2020     Lab Results   Component Value Date    TSH 4.680 (H) 11/12/2023      Pain Management Panel  More data exists         Latest Ref Rng & Units 11/13/2023 9/12/2020   Pain Management Panel   Creatinine, Urine mg/dL  mg/dL - 46.3  46.3    Amphetamine, Urine Qual Negative Negative  -   Barbiturates Screen, Urine Negative Negative  -   Benzodiazepine Screen, Urine Negative Negative  -   Buprenorphine, Screen, Urine Negative Negative  -   Cocaine Screen, Urine Negative Negative  -   Fentanyl, Urine Negative Negative  -   Methadone Screen , Urine Negative Negative  -   Methamphetamine, Ur Negative Negative  -     Microbiology Results (last 10 days)       Procedure Component Value - Date/Time    Blood Culture - Blood, Hand, Right [[id number]]  (Normal) Collected: 11/13/23 0003    Lab Status: Final result Specimen: Blood from Hand, Right Updated: 11/18/23 0016     Blood Culture No growth at 5 days    Blood Culture - Blood, Arm, Right [[id number]]  (Normal) Collected: 11/12/23 2350    Lab Status: Final result Specimen: Blood from Arm, Right Updated: 11/18/23 0016     Blood Culture No growth at 5 days           Imaging Results (Last 24 Hours)       ** No results found for the last 24 hours. **          ECHO:  Results for orders  placed during the hospital encounter of 11/12/23    Adult Transthoracic Echo Complete W/ Cont if Necessary Per Protocol    Interpretation Summary    Left ventricular systolic function is moderately decreased. Left ventricular ejection fraction appears to be 31 - 35%.    Left ventricular wall thickness is consistent with mild concentric hypertrophy.    Left ventricular diastolic function is consistent with (grade III w/high LAP) fixed restrictive pattern.    Mildly reduced right ventricular systolic function noted.    The left atrial cavity is moderately dilated.    The right atrial cavity is mild to moderately  dilated.    There is calcification of the aortic valve mainly affecting the left coronary cusp(s).    Dilated pulmonary artery.      STRESS TEST:  Results for orders placed during the hospital encounter of 10/15/20    Nuclear Medicine Cardiac Blood Pool Muga At Rest    Interpretation Summary  EXAMINATION: NUCLEAR MEDICINE CARDIAC BLOOD POOL MUGA AT REST-    CLINICAL INDICATION: Cardiomyopathy  TECHNIQUE: 21 mCi of Tc-99m autologous RBCs were injected IV after  in-vitro RBC labeling. Gated cardiac imaging was performed in the  anterior and left anterior oblique.  COMPARISON: None  FINDINGS: The left ventricle is normal in size with normal systolic  function. The calculated left ventricular ejection fraction (LVEF) = 38  %.  The right and left atria, aorta and pulmonary artery are normal. The  right ventricle is normal in size.    Impression  LVEF: 38%, at rest.    This report was finalized on 10/16/2020 11:57 AM by Dr. Marlo Parr MD.       HEART CATH:  Results for orders placed during the hospital encounter of 11/10/20    Cardiac Catheterization/Vascular Study    Narrative  Procedure type:  Left heart cath, LV gram, bilateral selective cholangiogram    Indication:  Cardiomyopathy    Procedure:  After informed consent the patient was brought into the cardiac aspiration lab she was prepped and draped in the  usual sterile manner the right radial area was incised with 2% Xylocaine however several attempts to engage into the right radial artery was not successful so the right radial artery access was abandoned and the right groin area was excised in 2% Xylocaine right femoral artery was accessed using modified standard technique and 5 Nigerien side-port arterial sheath was placed and secured then over a guidewire a 5 Nigerien JL 4 catheter was used left main coronary artery with angiographic pressures were taken then the catheter was removed and over a guidewire a 5 Nigerien JR4 catheter was used and engagement of the right coronary arteries angioplasty was taken then the catheter was removed then over a guidewire 5 Nigerien pigtail catheter used aortic valve was done hand-injection LV gram was done then pullback was done then the catheter was removed groin sheath was removed and minx closure device applied with good hemostasis the patient was transported back to her room in stable condition.    Results:  The patient LVEDP was 17 mmHg with hand-injection showing preserved left ventricular systolic function wall motion EF 50-55% with no significant aortic gradient on pullback.    Cholangiogram:  This right dominant system.  Left main cardiac angiographically normal and bifurcates into left and descending and left circumflex arteries.  LAD was normal caliber gives several septal perforators and diagonal branches and wraps around the apex LAD about 30 to 40% mid stenosis.  Left circumflex artery was nondominant for posterior circulation gives rise to several obtuse marginal branches left circumflex arteries branches angiographically normal.  The right coronary artery was a large artery was dominant for posterior circulation giving rise to acute marginal branches PDA and posterolateral branches the right coronary artery and its branches angiographically normal.    Conclusion:  Preserved LV systolic function ejection fraction 50-55%  with elevated left ventricular end-diastolic pressure consistent with diastolic dysfunction coronary angiogram showed right dominant system with 30 to 40% mid LAD lesion otherwise coronaries arteries were normal.  Plan of care:  Medical management and secondary preventive measurements of coronary disease good lipid control blood pressure control healthy lifestyle patient is to follow-up with her primary care physician for further management.      TELEMETRY:      SR 60s with BBB        I reviewed the patient's new clinical results.    ALLERGIES: Phenergan [promethazine]    Medication Review:   Current list of medications may not reflect those currently placed in orders that are not signed or are being held.     apixaban, 5 mg, Oral, Q12H  aspirin, 81 mg, Oral, Daily  bumetanide, 2 mg, Oral, Daily  busPIRone, 10 mg, Oral, BID  empagliflozin, 10 mg, Oral, Daily  erythromycin, 1 application , Both Eyes, Q6H  ferrous sulfate, 325 mg, Oral, BID  fluticasone, 2 spray, Nasal, Daily  insulin glargine, 40 Units, Subcutaneous, Q12H  insulin lispro, 13 Units, Subcutaneous, TID With Meals  insulin lispro, 3-14 Units, Subcutaneous, 4x Daily AC & at Bedtime  levothyroxine, 50 mcg, Oral, Q AM  Lidocaine, 2 patch, Transdermal, Q24H  metoprolol succinate XL, 12.5 mg, Oral, Q24H  miconazole, 1 application , Topical, Q12H  pantoprazole, 40 mg, Oral, Q AM  Pharmacy Consult, , Does not apply, Once  pregabalin, 150 mg, Oral, BID  senna-docusate sodium, 2 tablet, Oral, BID  sodium chloride, 10 mL, Intravenous, Q12H  sodium chloride, 10 mL, Intravenous, Q12H  [Held by provider] valsartan, 40 mg, Oral, Q24H      Pharmacy Consult,         [Held by provider] acetaminophen    acetaminophen    benzonatate    senna-docusate sodium **AND** polyethylene glycol **AND** bisacodyl **AND** bisacodyl    Calcium Replacement - Follow Nurse / BPA Driven Protocol    dextrose    dextrose    glucagon (human recombinant)    Magnesium Standard Dose Replacement  - Follow Nurse / BPA Driven Protocol    nitroglycerin    ondansetron    Pharmacy Consult    Phosphorus Replacement - Follow Nurse / BPA Driven Protocol    Potassium Replacement - Follow Nurse / BPA Driven Protocol    [COMPLETED] Insert Peripheral IV **AND** sodium chloride    sodium chloride    sodium chloride    sodium chloride    sodium chloride    Assessment                Plan             I have discussed the patients findings and recommendations with patient.    Thank you very much for asking us to be involved in this patient's care.  We will follow along with you.    Electronically signed by KENNY Finn, 11/21/23, 11:55 AM EST.                     Please note that portions of this note were completed with a voice recognition program.    Please note that portions of this note were copied and has been reviewed and is accurate as of 11/21/2023 .       Electronically signed by Anastasiia Mclaughlin APRN at 11/21/23 1155          Consult Notes (most recent note)        Marta Luna APRN at 11/20/23 1553        Consult Orders    1. Inpatient Hematology & Oncology Consult [[id number]] ordered by Dave Bishop MD at 11/18/23 1728                 Date:  11/20/23    Referring Provider: Dr. Dave Bishop MD    Reason for Consultation: Thrombocytopenia    Patient Care Team:  Masha Davidson APRN as PCP - General (Family Medicine)  Jad Maravilla MD as Consulting Physician (Cardiology)  Cyrus Jimenez MD as Consulting Physician (Cardiology)    Chief complaint: Generalized weakness    History of present illness:  Yumiko Purdy is a 57 y.o. year-old female seen today at the request of Dr. Dave Bishop MD for evaluation and treatment of thrombocytopenia. Patient initially presented and was admitted on 11/13/2023 with reports of rapid heart rate, shortness of breath. She was found to be in SVT per EMS and given Adenosine 6 mg IV followed with 12 mg. She was found to be in ventricular  "tachycardia on arrival to ED. She was admitted for further evaluation and workup. She has a history of systolic congestive heart failure with ejection fraction ~20%. She also has a history of cirrhosis. At time of examination today, she is sitting up in bed eating breakfast. She continues to have generalized weakness and does not feel ready to go home. She has LifeVest in place. She states that they have been \"adjusting\" her heart medications in hopes of her being discharged home to follow up with outpatient cardiology in [place]. She denies easy bruising, obvious bleeding from any source. She is otherwise without specific complaints.     History  Past Medical History:   Diagnosis Date    Anemia     CHF (congestive heart failure)     Chronic a-fib     stated by patient     Cirrhosis of liver     stated by patient     Diabetes mellitus        Past Surgical History:   Procedure Laterality Date    APPENDECTOMY      CARDIAC CATHETERIZATION N/A 11/10/2020    Procedure: Left Heart Cath;  Surgeon: Charlee Ken MD;  Location: [facility] CATH INVASIVE LOCATION;  Service: Cardiovascular;  Laterality: N/A;    CHOLECYSTECTOMY      TOE AMPUTATION Right     stated by patient.        History reviewed. No pertinent family history.    Social History     Tobacco Use    Smoking status: Never    Smokeless tobacco: Never   Vaping Use    Vaping Use: Never used   Substance Use Topics    Alcohol use: Never    Drug use: Never       Allergies:  Phenergan [promethazine]    Outpatient Medications:  Medications Prior to Admission   Medication Sig Dispense Refill Last Dose    acetaminophen (TYLENOL) 500 MG tablet Take 1 tablet by mouth 2 (Two) Times a Day As Needed for Mild Pain.   Past Week    amiodarone (PACERONE) 200 MG tablet Take 1 tablet by mouth Daily.   11/12/2023    apixaban (ELIQUIS) 5 MG tablet tablet Take 1 tablet by mouth Every 12 (Twelve) Hours. Indications: Atrial Fibrillation 60 tablet 3 11/12/2023    azelastine (ASTELIN) 0.1 % " nasal spray 2 sprays into the nostril(s) as directed by provider 2 (Two) Times a Day. Use in each nostril as directed   Past Week    benzonatate (TESSALON) 100 MG capsule Take 1 capsule by mouth 2 (Two) Times a Day As Needed for Cough.   2023    bumetanide (BUMEX) 2 MG tablet Take 2 tablets by mouth See Admin Instructions. 4 mg QD monday through Saturday and 2 mg QD on Sundays   2023    bumetanide (BUMEX) 2 MG tablet Take 1 tablet by mouth Every 7 (Seven) Days. Sundays   2023    busPIRone (BUSPAR) 10 MG tablet Take 1 tablet by mouth 2 (Two) Times a Day.   2023    [] doxycycline (VIBRAMYCIN) 100 MG capsule Take 1 capsule by mouth 2 (Two) Times a Day.   2023    erythromycin (ROMYCIN) 5 MG/GM ophthalmic ointment Administer 1 application  to both eyes Every 6 (Six) Hours.   2023    ferrous sulfate 325 (65 FE) MG tablet Take 1 tablet by mouth 2 (Two) Times a Day.   2023    fluticasone (FLONASE) 50 MCG/ACT nasal spray 2 sprays into the nostril(s) as directed by provider Daily.   2023    Insulin Glargine (LANTUS SOLOSTAR) 100 UNIT/ML injection pen Inject 58 Units under the skin into the appropriate area as directed 2 (Two) Times a Day.   2023    Insulin Regular Human, Conc, (HumuLIN R U-500 KwikPen) 500 UNIT/ML solution pen-injector CONCENTRATED injection Inject 40 Units under the skin into the appropriate area as directed 3 (Three) Times a Day Before Meals.   2023    levothyroxine (SYNTHROID, LEVOTHROID) 50 MCG tablet Take 1 tablet by mouth Daily.   2023    omeprazole (priLOSEC) 20 MG capsule Take 1 capsule by mouth Daily.   2023    pregabalin (LYRICA) 150 MG capsule Take 1 capsule by mouth 2 (Two) Times a Day.   2023    [] Vericiguat 10 MG tablet Take 1 tablet by mouth Daily for 90 days. 90 tablet 1 2023    nitroglycerin (NITROSTAT) 0.4 MG SL tablet Place 1 tablet under the tongue Every 5 (Five) Minutes As Needed for Chest  Pain. Take no more than 3 doses in 15 minutes.   Unknown       Inpatient Medications:  Scheduled Meds:  apixaban, 5 mg, Oral, Q12H  aspirin, 81 mg, Oral, Daily  bumetanide, 2 mg, Oral, Daily  busPIRone, 10 mg, Oral, BID  empagliflozin, 10 mg, Oral, Daily  erythromycin, 1 application , Both Eyes, Q6H  ferrous sulfate, 325 mg, Oral, BID  fluticasone, 2 spray, Nasal, Daily  insulin glargine, 35 Units, Subcutaneous, Q12H  insulin lispro, 13 Units, Subcutaneous, TID With Meals  insulin lispro, 3-14 Units, Subcutaneous, 4x Daily AC & at Bedtime  levothyroxine, 50 mcg, Oral, Q AM  Lidocaine, 2 patch, Transdermal, Q24H  metoprolol succinate XL, 12.5 mg, Oral, Q24H  miconazole, 1 application , Topical, Q12H  pantoprazole, 40 mg, Oral, Q AM  Pharmacy Consult, , Does not apply, Once  pregabalin, 150 mg, Oral, BID  senna-docusate sodium, 2 tablet, Oral, BID  sodium chloride, 10 mL, Intravenous, Q12H  sodium chloride, 10 mL, Intravenous, Q12H  [Held by provider] valsartan, 40 mg, Oral, Q24H        Continuous Infusions:  Pharmacy Consult,         PRN Meds:    [Held by provider] acetaminophen    acetaminophen    benzonatate    senna-docusate sodium **AND** polyethylene glycol **AND** bisacodyl **AND** bisacodyl    Calcium Replacement - Follow Nurse / BPA Driven Protocol    dextrose    dextrose    glucagon (human recombinant)    Magnesium Standard Dose Replacement - Follow Nurse / BPA Driven Protocol    nitroglycerin    ondansetron    Pharmacy Consult    Phosphorus Replacement - Follow Nurse / BPA Driven Protocol    Potassium Replacement - Follow Nurse / BPA Driven Protocol    [COMPLETED] Insert Peripheral IV **AND** sodium chloride    sodium chloride    sodium chloride    sodium chloride    sodium chloride    Review of Systems  A comprehensive 14 point review of systems was performed.  Significant findings as mentioned above.  All other systems reviewed and are negative.       Physical Exam:  Vital Signs   Patient Vitals for the  past 24 hrs:   BP Temp Temp src Pulse Resp SpO2 Weight   11/20/23 1440 114/60 -- -- 72 -- -- --   11/20/23 1217 98/43 98.1 °F (36.7 °C) Oral 65 20 90 % --   11/20/23 0705 104/44 97.7 °F (36.5 °C) Oral 72 18 94 % --   11/20/23 0500 -- -- -- -- -- -- 107 kg (236 lb 6.4 oz)   11/20/23 0303 126/46 98 °F (36.7 °C) Oral 76 20 98 % --   11/19/23 2306 123/45 98.4 °F (36.9 °C) Oral 70 20 98 % --   11/19/23 1920 117/51 -- -- 71 20 98 % --   11/19/23 1915 91/45 98.9 °F (37.2 °C) Oral 71 20 99 % --       General:  Awake, alert and oriented, in no distress  HEENT:  Pupils are equal, round and reactive to light and accommodation  Neck:  No JVD, thyromegaly or lymphadenopathy  CV:  Regular rate and rhythm, no murmurs, rubs or gallops  Resp:  Lungs are clear to auscultation bilaterally  Abd:  Soft, non-tender, non-distended, bowel sounds present, no organomegaly  Ext:  No clubbing, cyanosis or edema  Lymph:  No cervical, supraclavicular, axillary, inguinal or femoral adenopathy  Neuro:  MS as above, CN II-XII intact, grossly non-focal exam      Labs / Studies:  Lab Results   Component Value Date    WBC 8.44 11/20/2023    HGB 8.2 (L) 11/20/2023    HCT 29.0 (L) 11/20/2023    .1 (H) 11/20/2023    RDW 22.4 (H) 11/20/2023     (L) 11/20/2023    NEUTRORELPCT 63.2 11/19/2023    LYMPHORELPCT 29.2 11/19/2023    MONORELPCT 4.0 (L) 11/19/2023    EOSRELPCT 1.5 11/19/2023    BASORELPCT 1.1 11/19/2023    NEUTROABS 5.84 11/19/2023    LYMPHSABS 2.70 11/19/2023       Lab Results   Component Value Date     (L) 11/20/2023    K 4.2 11/20/2023    CO2 24.8 11/20/2023    CL 98 11/20/2023    BUN 29 (H) 11/20/2023    CREATININE 1.57 (H) 11/20/2023    EGFRIFNONA 49 (L) 11/10/2020    GLUCOSE 164 (H) 11/20/2023    CALCIUM 8.6 11/20/2023    ALKPHOS 82 11/19/2023    AST 18 11/19/2023    ALT 14 11/19/2023    BILITOT 2.3 (H) 11/19/2023    ALBUMIN 3.5 11/19/2023    PROTEINTOT 6.2 11/19/2023    MG 2.3 11/20/2023    PHOS 4.7 (H) 11/20/2023        Imaging Results (Last 72 Hours)       ** No results found for the last 72 hours. **              Assessment & Plan:  Yumiko Purdy is a 57 y.o. year-old female presenting with:    1. Thrombocytopenia  - Previous available CBC's were reviewed for comparison of trend and patient has had intermittent thrombocytopenia since at least September 2020.   - Appears that platelet count has ranged from 72,000-142,000 during this admission.  - She has a history of cirrhosis of the liver which is likely contributing.  - HIT-antibody was negative.   - Primary team checked vitamin B12, folate which were replete.  - Peripheral smear revealed macrocytic anemia with no increase in schistocyte population and mild thrombocytopenia without platelet clumping.  - CBC today with WBC 8.44, Hg 8.2, Hct 29.0 and platelet count 122,000, which is improved and stable.  - Thrombocytopenia is likely multifactorial and secondary to patient's history of cirrhosis as well as history of systolic congestive heart failure with current exacerbation. This could cause some thrombocytopenia secondary to fluid overload.   - Would recommend transfusional support for platelet count < 50,000 in the presence of active bleeding or surgical procedure. Otherwise, would recommend transfusional support for platelet count 10-20,000.    2. Atrial fibrillation  3. Dilated non-ischemic cardiomyopathy/systolic CHF with EF 31-35%  - The current reasons for the patient's hospitalization.  - Currently wearing LifeVest.  - Ongoing management per primary team and cardiology.        Thank you for taking such good care of Ms. Purdy.  We will sign off.             Marta Luna, APRN  11/20/23  15:53 EST        A total of 45 minutes were spent helping to coordinate this patient’s care today; ~30 minutes of this time were spent face-to-face with the patient in her hospital room this morning, reviewing her interim medical history and counseling on the current treatment  and follow up plans. All questions were answered to her satisfaction.         Electronically signed by Marta Luna APRN at 23 1616          Physical Therapy Notes (most recent note)        Lynn Mueller, PT at 23 1300  Version 1 of 1         Acute Care - Physical Therapy Treatment Note  [facility]ATAS[facility] Metz     Patient Name: Yumiko Purdy  : 1965  MRN: [id number]  Today's Date: 2023   Onset of Illness/Injury or Date of Surgery: 23  Visit Dx:     ICD-10-CM ICD-9-CM   1. Very poor mobility  Z74.09 V49.89   2. Severe sepsis  A41.9 038.9    R65.20 995.92   3. Other cirrhosis of liver  K74.69 571.5   4. Atrial fibrillation with RVR  I48.91 427.31   5. Symptomatic anemia  D64.9 285.9   6. Congestive heart failure, unspecified HF chronicity, unspecified heart failure type  I50.9 428.0   7. Sustained monomorphic ventricular tachycardia  I47.29 427.1   8. Prolonged Q-T interval on ECG  R94.31 794.31   9. Acute on chronic combined systolic and diastolic CHF (congestive heart failure)  I50.43 428.43     428.0   10. Chronic heart failure, unspecified heart failure type  I50.9 428.9   11. Cardiomyopathy, unspecified type  I42.9 425.4     Patient Active Problem List   Diagnosis    Acute on chronic congestive heart failure    Cardiomyopathy    CKD (chronic kidney disease) stage 2, GFR 60-89 ml/min    Severe obesity (BMI 35.0-39.9) with comorbidity    Chronic anemia    Elevated bilirubin    Acute on chronic combined systolic and diastolic CHF (congestive heart failure)    Hyponatremia    Chronic atrial fibrillation    Cirrhosis    Ventricular tachycardia     Past Medical History:   Diagnosis Date    Anemia     CHF (congestive heart failure)     Chronic a-fib     stated by patient     Cirrhosis of liver     stated by patient     Diabetes mellitus      Past Surgical History:   Procedure Laterality Date    APPENDECTOMY      CARDIAC CATHETERIZATION N/A 11/10/2020    Procedure: Left Heart Cath;  Surgeon:  Charlee Ken MD;  Location:  [facility];  Service: Cardiovascular;  Laterality: N/A;    CHOLECYSTECTOMY      TOE AMPUTATION Right     stated by patient.      PT Assessment (last 12 hours)       PT Evaluation and Treatment       Row Name 11/21/23 1257          Physical Therapy Time and Intention    Subjective Information complains of;weakness  -CT     Document Type therapy note (daily note)  -CT     Mode of Treatment physical therapy  -CT     Patient Effort good  -CT     Comment Pt continues to increase ambulation distance  -CT       Row Name 11/21/23 1257          General Information    Patient Profile Reviewed yes  -CT     Equipment Currently Used at Home commode, bedside;hospital bed;walker, rolling  -CT     Existing Precautions/Restrictions fall  -CT     Limitations/Impairments safety/cognitive  -CT       Row Name 11/21/23 1257          Cognition    Affect/Mental Status (Cognition) WNL  -CT     Orientation Status (Cognition) oriented x 4  -CT     Follows Commands (Cognition) WNL  -CT       Row Name 11/21/23 1257          Bed Mobility    Bed Mobility bed mobility (all) activities  -CT     All Activities, [place] (Bed Mobility) contact guard  -CT     Bed Mobility, Safety Issues decreased use of arms for pushing/pulling;decreased use of legs for bridging/pushing  -CT       Row Name 11/21/23 1257          Transfers    Transfers sit-stand transfer;stand-sit transfer  -CT       Row Name 11/21/23 1257          Sit-Stand Transfer    Sit-Stand [place] (Transfers) contact guard  -CT     Assistive Device (Sit-Stand Transfers) walker, front-wheeled  -CT       Row Name 11/21/23 1257          Stand-Sit Transfer    Stand-Sit [place] (Transfers) contact guard  -CT     Assistive Device (Stand-Sit Transfers) walker, front-wheeled  -CT       Row Name 11/21/23 1257          Gait/Stairs (Locomotion)    [place] Level (Gait) contact guard  -CT     Assistive Device (Gait) walker, front-wheeled   -CT     Patient was able to Ambulate yes  -CT     Distance in Feet (Gait) 70  -CT     Pattern (Gait) swing-through  -CT     Deviations/Abnormal Patterns (Gait) gait speed decreased  -CT     Bilateral Gait Deviations forward flexed posture  -CT       Row Name             Wound 11/14/23 2102 Left lower leg Abrasion    Wound - Properties Group Placement Date: 11/14/23  -AF Placement Time: 2102 -AF Present on Original Admission: Y  -AF Side: Left  -AF Orientation: lower  -AF Location: leg  -AF Primary Wound Type: Abrasion  -AF    Retired Wound - Properties Group Placement Date: 11/14/23  -AF Placement Time: 2102 -AF Present on Original Admission: Y  -AF Side: Left  -AF Orientation: lower  -AF Location: leg  -AF Primary Wound Type: Abrasion  -AF    Retired Wound - Properties Group Date first assessed: 11/14/23  -AF Time first assessed: 2102 -AF Present on Original Admission: Y  -AF Side: Left  -AF Location: leg  -AF Primary Wound Type: Abrasion  -AF      Row Name 11/21/23 1257          Coping    Observed Emotional State calm;cooperative;pleasant  -CT     Verbalized Emotional State acceptance  -CT       Row Name 11/21/23 1257          Plan of Care Review    Plan of Care Reviewed With patient  -CT       Row Name 11/21/23 1257          Positioning and Restraints    Pre-Treatment Position in bed  -CT     Post Treatment Position chair  -CT     In Chair sitting;call light within reach;encouraged to call for assist  -CT       Row Name 11/21/23 1257          Therapy Assessment/Plan (PT)    Rehab Potential (PT) good, to achieve stated therapy goals  -CT     Criteria for Skilled Interventions Met (PT) yes;skilled treatment is necessary  -CT     Therapy Frequency (PT) 2 times/wk  2-5 time/wk  -CT               User Key  (r) = Recorded By, (t) = Taken By, (c) = Cosigned By      Initials Name Provider Type    CT Lynn Mueller, PARK Physical Therapist    Kati Ashford, RN Registered Nurse                    Physical Therapy  Education       Title: PT OT SLP Therapies (Done)       Topic: Physical Therapy (Done)       Point: Mobility training (Done)       Learning Progress Summary             Patient Acceptance, E,TB, VU,NR by EB at 11/19/2023 2346    Acceptance, E, NR by RD at 11/16/2023 1111    Acceptance, E, NR by RD at 11/15/2023 0958                         Point: Home exercise program (Done)       Learning Progress Summary             Patient Acceptance, E,TB, VU,NR by EB at 11/19/2023 2346    Acceptance, E, NR by RD at 11/16/2023 1111    Acceptance, E, NR by RD at 11/15/2023 0958                         Point: Body mechanics (Done)       Learning Progress Summary             Patient Acceptance, E,TB, VU,NR by EB at 11/19/2023 2346    Acceptance, E, NR by RD at 11/16/2023 1111    Acceptance, E, NR by RD at 11/15/2023 0958                         Point: Precautions (Done)       Learning Progress Summary             Patient Acceptance, E,TB, VU,NR by EB at 11/19/2023 2346    Acceptance, E, NR by RD at 11/16/2023 1111    Acceptance, E, NR by RD at 11/15/2023 0958                                         User Key       Initials Effective Dates Name Provider Type Discipline     06/16/21 -  Silvana Melgar, RN Registered Nurse Nurse     02/24/21 -  Tomasa Zamarripa, RN Registered Nurse Nurse                  PT Recommendation and Plan  Anticipated Discharge Disposition (PT): home with 24/7 care  Planned Therapy Interventions (PT): balance training, bed mobility training, gait training, home exercise program, manual therapy techniques, motor coordination training, neuromuscular re-education, patient/family education, postural re-education, strengthening, stretching, transfer training  Therapy Frequency (PT): 2 times/wk (2-5 time/wk)  Plan of Care Reviewed With: patient       Time Calculation:    PT Charges       Row Name 11/21/23 1259             Time Calculation    PT Received On 11/21/23  -CT         Time Calculation- PT    Total Timed  Code Minutes- PT 32 minute(s)  -CT                User Key  (r) = Recorded By, (t) = Taken By, (c) = Cosigned By      Initials Name Provider Type    CT Lynn Mueller, PT Physical Therapist                  Therapy Charges for Today       Code Description Service Date Service Provider Modifiers Qty    73368681913 HC GAIT TRAINING EA 15 MIN 2023 Lynn Mueller, PT GP 1    54676933601 HC PT THERAPEUTIC ACT EA 15 MIN 2023 Lynn Mueller, PT GP 1    58505326080 HC GAIT TRAINING EA 15 MIN 2023 Lynn Mueller, PT GP 1    37712476449 HC PT THERAPEUTIC ACT EA 15 MIN 2023 Lynn Mueller, PT GP 1            PT G-Codes  AM-PAC 6 Clicks Score (PT): 17    Lynn Mueller PT  2023      Electronically signed by Lynn Mueller, PT at 23 1300          Occupational Therapy Notes (most recent note)        Donna Cifuentes, OT at 23 1242          Acute Care - Occupational Therapy Treatment Note  LATASHA Metz     Patient Name: Yumiko Purdy  : 1965  MRN: [id number]  Today's Date: 2023  Onset of Illness/Injury or Date of Surgery: 23     Referring Physician: Dario    Admit Date: 2023       ICD-10-CM ICD-9-CM   1. Severe sepsis  A41.9 038.9    R65.20 995.92   2. Other cirrhosis of liver  K74.69 571.5   3. Atrial fibrillation with RVR  I48.91 427.31   4. Symptomatic anemia  D64.9 285.9   5. Congestive heart failure, unspecified HF chronicity, unspecified heart failure type  I50.9 428.0   6. Sustained monomorphic ventricular tachycardia  I47.29 427.1   7. Prolonged Q-T interval on ECG  R94.31 794.31   8. Acute on chronic combined systolic and diastolic CHF (congestive heart failure)  I50.43 428.43     428.0   9. Chronic heart failure, unspecified heart failure type  I50.9 428.9     Patient Active Problem List   Diagnosis    Acute on chronic congestive heart failure    Cardiomyopathy    CKD (chronic kidney disease) stage 2, GFR 60-89 ml/min    Severe obesity (BMI  35.0-39.9) with comorbidity    Chronic anemia    Elevated bilirubin    Acute on chronic combined systolic and diastolic CHF (congestive heart failure)    Hyponatremia    Chronic atrial fibrillation    Cirrhosis    Ventricular tachycardia     Past Medical History:   Diagnosis Date    Anemia     CHF (congestive heart failure)     Chronic a-fib     stated by patient     Cirrhosis of liver     stated by patient     Diabetes mellitus      Past Surgical History:   Procedure Laterality Date    APPENDECTOMY      CARDIAC CATHETERIZATION N/A 11/10/2020    Procedure: Left Heart Cath;  Surgeon: Charlee Ken MD;  Location:  [facility];  Service: Cardiovascular;  Laterality: N/A;    CHOLECYSTECTOMY      TOE AMPUTATION Right     stated by patient.          OT ASSESSMENT FLOWSHEET (last 12 hours)       OT Evaluation and Treatment       Row Name 11/16/23 1239                   OT Time and Intention    Subjective Information complains of;weakness;fatigue  -LM        Document Type therapy note (daily note)  -LM        Mode of Treatment occupational therapy  -LM        Patient Effort good  -LM        Comment Patient seen this date for adl retraining/education and fxl mobility.  Patient completed fxl transfer with cga and rw.  Mod/max assist with bathing, dressing, toileting tasks while seated.  -LM           General Information    Existing Precautions/Restrictions fall  -LM           Cognition    Affect/Mental Status (Cognition) WNL  -LM           Wound 11/14/23 2102 Left lower leg Abrasion    Wound - Properties Group Placement Date: 11/14/23  -AF Placement Time: 2102 -AF Present on Original Admission: Y  -AF Side: Left  -AF Orientation: lower  -AF Location: leg  -AF Primary Wound Type: Abrasion  -AF    Retired Wound - Properties Group Placement Date: 11/14/23  -AF Placement Time: 2102 -AF Present on Original Admission: Y  -AF Side: Left  -AF Orientation: lower  -AF Location: leg  -AF Primary Wound Type: Abrasion   -AF    Retired Wound - Properties Group Date first assessed: 11/14/23  -AF Time first assessed: 2102  -AF Present on Original Admission: Y  -AF Side: Left  -AF Location: leg  -AF Primary Wound Type: Abrasion  -AF       Positioning and Restraints    Post Treatment Position chair  -LM        In Chair call light within reach;encouraged to call for assist;notified nsg  -LM                  User Key  (r) = Recorded By, (t) = Taken By, (c) = Cosigned By      Initials Name Effective Dates     Donna Cifuentes OT 06/16/21 -     AF Kati Vasquez RN 06/08/23 -                            OT Recommendation and Plan  Planned Therapy Interventions (OT): activity tolerance training, adaptive equipment training, BADL retraining, functional balance retraining, ROM/therapeutic exercise, strengthening exercise, transfer/mobility retraining, patient/caregiver education/training  Therapy Frequency (OT): other (see comments)  Plan of Care Review  Plan of Care Reviewed With: patient  Plan of Care Reviewed With: patient        Time Calculation:     Therapy Charges for Today       Code Description Service Date Service Provider Modifiers Qty    12388729892  OT EVAL MOD COMPLEXITY 4 11/15/2023 Donna Cifuentes OT GO 1    17148232344  OT SELF CARE/MGMT/TRAIN EA 15 MIN 11/16/2023 Donna Cifuentes OT GO 1                 Donna Cifuentes OT  11/16/2023    Electronically signed by Donna Cifuentes OT at 11/16/23 1243       ADL Documentation (most recent)      Flowsheet Row Most Recent Value   Transferring 3 - assistive equipment and person   Toileting 3 - assistive equipment and person   Bathing 2 - assistive person   Dressing 2 - assistive person   Eating 0 - independent   Communication 0 - understands/communicates without difficulty   Swallowing 0 - swallows foods/liquids without difficulty   Equipment Currently Used at Home wheelchair, walker, rolling          [address] [address]  Phone:   [phone]  Fax:  [phone] Date: 2023      Ambulatory Referral to Home Health     Patient:  Yumiko Purdy MRN:  [id number]   [address] [place] [address] :  1965  SSN:    Phone: [phone] Sex:  F      INSURANCE PAYOR PLAN GROUP # SUBSCRIBER ID   Primary:    [facility] [id number]   [id number]      Referring Provider Information:  ANSON BROWNE Phone: [phone] Fax: [phone]       Referral Information:   # Visits:  999 Referral Type: Home Health [42]   Urgency:  Routine Referral Reason: Specialty Services Required   Start Date: 2023 End Date:  To be determined by Insurer   Diagnosis: Atrial fibrillation with RVR (I48.91 [ICD-10-CM] 427.31 [ICD-9-CM])  Very poor mobility (Z74.09 [ICD-10-CM] V49.89 [ICD-9-CM])  Acute on chronic congestive heart failure, unspecified heart failure type (I50.9 [ICD-10-CM] 428.0 [ICD-9-CM])  Type 2 diabetes mellitus with hyperglycemia, with long-term current use of insulin (E11.65,Z79.4 [ICD-10-CM] 250.00,790.29,V58.67 [ICD-9-CM])  Ventricular tachycardia (I47.20 [ICD-10-CM] 427.1 [ICD-9-CM])      Refer to Dept:   Refer to Provider:   Refer to Provider Phone:   Refer to Facility:       Face to Face Visit Date: 2023  Follow-up provider for Plan of Care? I treated the patient in an acute care facility and will not continue treatment after discharge.  Follow-up provider: LENNY LEACH [[id number]]  Reason/Clinical Findings: Heart failure, type II DM with hyperglycemia with long term use of insulin, difficulty ambulating  Describe mobility limitations that make leaving home difficult: dyspnea on exertion, generalized weakness, physical debility  Nursing/Therapeutic Services Requested: Skilled Nursing  Nursing/Therapeutic Services Requested: Dietician  Skilled nursing orders: O2 instruction  Skilled nursing orders: CHF management  Skilled nursing orders: Medication education  Skilled nursing orders: Cardiopulmonary  assessments  Dietician orders: Diet modification (recommend heart healthy (2g sodium) and consistent carbohydrate diet)  Dietician orders: Diet instruction  Frequency: 1 Week 1     This document serves as a request of services and does not constitute Insurance authorization or approval of services.  To determine eligibility, please contact the members Insurance carrier to verify and review coverage.     If you have medical questions regarding this request for services. Please contact [address] at [phone] during normal business hours.        Authorizing Provider:Yulia Mo DO  Authorizing Provider's NPI: [id number]  Order Entered By: Yulia Mo DO 11/21/2023  4:48 PM     Electronically signed by: Yulia Mo DO 11/21/2023  4:48 PM    Discharge Summary    No notes of this type exist for this encounter.       Discharge Order (From admission, onward)       Start     Ordered    11/21/23 1628  Discharge patient  Once        Expected Discharge Date: 11/21/23   Discharge Disposition: Home-Health Care [facility]   Physician of Record for Attribution - Please select from Treatment Team: QIAN FERRELL [[id number]]   Review needed by CMO to determine Physician of Record: No      Question Answer Comment   Physician of Record for Attribution - Please select from Treatment Team QIAN FERRELL    Review needed by CMO to determine Physician of Record No        11/21/23 1754

## 2023-11-21 NOTE — CASE MANAGEMENT/SOCIAL WORK
Discharge Planning Assessment   [facility]     Patient Name: Yumiko Purdy  MRN: [id number]  Today's Date: 11/21/2023    Admit Date: 11/12/2023           Discharge Plan       Row Name 11/21/23 1713       Plan    Final Discharge Disposition Code 06 - home with home health care    Final Note Pt to be discharged home with continued [facility].  SS notified on call RN at [facility] per Pam.  RN at [facility] provided report to [facility]                  Continued Care and Services - Admitted Since 11/12/2023       Home Medical Care Coordination complete.      Service Provider Request Status Selected Services Address Phone Fax Patient Preferred    [facility]  Selected Home Health Services [address] [place], [place] [id number] [phone] --                  Expected Discharge Date and Time       Expected Discharge Date Expected Discharge Time    Nov 21, 2023               SAMY Doran

## 2023-11-22 NOTE — OUTREACH NOTE
Prep Survey      Flowsheet Row Responses   [facility] facility patient discharged from? [facility]   Is LACE score < 7 ? No   Eligibility Readm Mgmt   Discharge diagnosis Ventricular tachycardia   Does the patient have one of the following disease processes/diagnoses(primary or secondary)? Other   Does the patient have Home health ordered? Yes   What is the Home health agency?  [facility]   Is there a DME ordered? Yes   What DME was ordered? Wheelchair accessories--SEMS   Medication alerts for this patient see AVS   Prep survey completed? Yes            Ave GILLETTE - Registered Nurse

## 2023-11-22 NOTE — PAYOR COMM NOTE
"CONTACT:  CISCO LUGO RN  UTILIZATION MANAGEMENT DEPT.   [facility]   [address] [facility], [address]   PHONE:  [phone]   FAX: [phone]       DC NOTIFICATION      Dio Berry (57 y.o. Female)       Date of Birth   1965    Social Security Number       Address   [address]  [facility] [address]    Home Phone   [phone]    MRN   [id number]       [facility]aeism   [facility]    Marital Status                               Admission Date   11/12/23    Admission Type   Emergency    Admitting Provider   Tomás An MD    Attending Provider       Department, Room/Bed   [facility] SOUTH, 3314/1S       Discharge Date   11/21/2023    Discharge Disposition   Home-Health Care [facility]    Discharge Destination   Home                              Attending Provider: (none)   Allergies: Phenergan [Promethazine]    Isolation: None   Infection: None   Code Status: Prior    Ht: 165.1 cm (65\")   Wt: 109 kg (239 lb 11.2 oz)    Admission Cmt: None   Principal Problem: Ventricular tachycardia [I47.20]                   Active Insurance as of 11/12/2023       Primary Coverage       Payor Plan Insurance Group Employer/Plan Group    [employer] [place] [employer] [place]        Payor Plan Address Payor Plan Phone Number Payor Plan Fax Number Effective Dates    [address] [address] [phone]  9/9/2020 - None Entered    [facility] [address]         Subscriber Name Subscriber Birth Date Member ID       DIO BERRY 1965 [id number]                     Emergency Contacts        (Rel.) Home Phone Work Phone Mobile Phone    KATHY BERRY (Son) [phone] -- --    GurwinderBolivar (Other) -- -- [phone]              Discharge Summary    No notes of this type exist for this encounter.       "

## 2023-11-24 LAB
SRA .2 IU/ML UFH SER-ACNC: <1 % (ref 0–20)
SRA 100IU/ML UFH SER-ACNC: <1 % (ref 0–20)
SRA UFH SER-IMP: NORMAL

## 2023-11-30 ENCOUNTER — READMISSION MANAGEMENT (OUTPATIENT)
Dept: CALL CENTER | Facility: HOSPITAL | Age: 58
End: 2023-11-30
Payer: COMMERCIAL

## 2023-11-30 NOTE — OUTREACH NOTE
Medical Week 2 Survey      Flowsheet Row Responses   [facility] facility patient discharged from? [facility]   Does the patient have one of the following disease processes/diagnoses(primary or secondary)? Other   Week 2 attempt successful? No   Unsuccessful attempts Attempt 1            Shelli CHAVEZ - Registered Nurse

## 2023-12-02 NOTE — DISCHARGE SUMMARY
[facility] HOSPITALISTS DISCHARGE SUMMARY    Patient Identification:  Name:  Yumiko Purdy  Age:  57 y.o.  Sex:  female  :  1965  MRN:  [id number]  Visit Number:  [id number]    Date of Admission: 2023  Date of Discharge:  2023     PCP: Masha Davidson APRN    DISCHARGE DIAGNOSIS   #Atrial fibrillation with RVR   #Thrombocytopenia, unknown etiology  #Dilated nonischemic cardiomyopathy, combined systolic/diastolic CHF with an acute exacerbation  #Hypotension  #Prolonged QTc   #Macrocytic anemia, suspect anemia of chronic disease +/- liver disease  #Suspect type II non-ST elevation MI   #Status post PVI on 2022 with recurrent A-fib in the past  #History of nonobstructive coronary artery disease per cardiac catheterization 2020 with 30 to 40% LAD lesion  #History of insulin dependent diabetes mellitus type 2  #History of decompensated liver cirrhosis with ascites  #History of chronic macrocytic anemia, with hemoglobin currently at baseline  #History of CKD stage IIIa (creatinine ranges 1.2-1.5 and GFR ranges 42-50)    CONSULTS   Cardiology   Oncology    HOSPITAL COURSE  Patient is a 57 y.o. female presented to [facility] complaining of palpitations.  Please see the admitting history and physical for further details.      En route to the ED EMS had given patient IV adenosine for SVT. On arrival to the ED the initial EKG reported atrial fibrillation with RVR, but on the Zoll monitor she appeared to be in ventricular tachycardia. Per further Cardiology review of the EKGs they felt it was more likely atrial fibrillation with RVR in the setting of left bundle branch block. She was treated with IV amiodarone and also required electrical cardioversion in the ED, with subsequent conversion to sinus rhythm. She had hypotension while in atrial fibrillation with RVR which resolved after she was converted to sinus rhythm. An echocardiogram showed LV EF 31-35% and  left ventricular diastolic function consistent with a fixed restrictive pattern (grade III w/high LAP). She received diuresis for acute exacerbation of heart failure. She also was fitted for a LifeVest. Due to both upward trend of creatinine and low blood pressures there was a limited ability to titrate up medications for heart failure. She developed thrombocytopenia of unclear etiology and Hematology/Oncology was consulted for recommendations. It appears she has had intermittent thrombocytopenia since at least 2020 and has a history of cirrhosis which was likely contributing. HIT antibody was negative and vitamin B12 and folate were WNL. The thrombocytopenia was felt most likely to be multifactorial secondary to cirrhosis and heart failure (due to fluid overload). Supportive care was recommended and patient's thrombocytopenia gradually improved. Patient's blood glucose was not well controlled during admission requiring titration up of her insulin doses for improved control. On day of discharge patient reported feeling well and denied any chest pain, shortness of breath, or palpitations. She was discharged home in stable condition with plan for close outpatient Cardiology follow up.    Body mass index is 39.89 kg/m².  Wt Readings from Last 3 Encounters:   11/21/23 109 kg (239 lb 11.2 oz)   10/15/23 113 kg (250 lb)   09/29/23 109 kg (240 lb)       PHYSICAL EXAM:   Constitutional:  Well-developed and well-nourished.  No acute distress.      HENT:  Head:  Normocephalic and atraumatic.  Cardiovascular:  Normal rate, regular rhythm  Pulmonary/Chest:  Normal rate and effort. Breath sounds clear to auscultation bilaterally in anterior and lateral lung fields  Musculoskeletal:  No deformity.    Neurological: Awake, alert, no focal deficit on gross examination. No slurred speech or facial droop.   Skin:  Skin is warm and dry.   Peripheral vascular:  No cyanosis, no edema.    DISCHARGE DISPOSITION   Stable    DISCHARGE  MEDICATIONS:     Discharge Medications        New Medications        Instructions Start Date   Aspirin Low Dose 81 MG EC tablet  Generic drug: aspirin   81 mg, Oral, Daily      HumaLOG KwikPen 100 UNIT/ML solution pen-injector  Generic drug: Insulin Lispro (1 Unit Dial)   15 Units, Subcutaneous, 3 Times Daily With Meals      Jardiance 10 MG tablet tablet  Generic drug: empagliflozin   10 mg, Oral, Daily      metoprolol succinate XL 25 MG 24 hr tablet  Commonly known as: TOPROL-XL   Take 1/2 tablet by mouth Daily for 30 days. Do not take if blood pressure is less than 100 on the top number.             Changes to Medications        Instructions Start Date   bumetanide 1 MG tablet  Commonly known as: BUMEX  What changed:   medication strength  See the new instructions.  Another medication with the same name was removed. Continue taking this medication, and follow the directions you see here.   Take 2 tablets by mouth Daily for 1 day, THEN 1 tablet Daily for 14 days.   Start Date: November 22, 2023     Lantus SoloStar 100 UNIT/ML injection pen  Generic drug: Insulin Glargine  What changed: how much to take   45 Units, Subcutaneous, 2 Times Daily             Continue These Medications        Instructions Start Date   acetaminophen 500 MG tablet  Commonly known as: TYLENOL   500 mg, Oral, 2 Times Daily PRN      apixaban 5 MG tablet tablet  Commonly known as: ELIQUIS   5 mg, Oral, Every 12 Hours Scheduled      benzonatate 100 MG capsule  Commonly known as: TESSALON   100 mg, Oral, 2 Times Daily PRN      busPIRone 10 MG tablet  Commonly known as: BUSPAR   10 mg, Oral, 2 Times Daily      erythromycin 5 MG/GM ophthalmic ointment  Commonly known as: ROMYCIN   1 application , Both Eyes, Every 6 Hours      ferrous sulfate 325 (65 FE) MG tablet   325 mg, Oral, 2 Times Daily      fluticasone 50 MCG/ACT nasal spray  Commonly known as: FLONASE   2 sprays, Nasal, Daily      levothyroxine 50 MCG tablet  Commonly known as: SYNTHROID,  LEVOTHROID   50 mcg, Oral, Daily      nitroglycerin 0.4 MG SL tablet  Commonly known as: NITROSTAT   0.4 mg, Sublingual, Every 5 Minutes PRN, Take no more than 3 doses in 15 minutes.      omeprazole 20 MG capsule  Commonly known as: priLOSEC   20 mg, Oral, Daily      pregabalin 150 MG capsule  Commonly known as: LYRICA   150 mg, Oral, 2 Times Daily             Stop These Medications      amiodarone 200 MG tablet  Commonly known as: PACERONE     azelastine 0.1 % nasal spray  Commonly known as: ASTELIN     doxycycline 100 MG capsule  Commonly known as: VIBRAMYCIN     HumuLIN R U-500 KwikPen 500 UNIT/ML solution pen-injector CONCENTRATED injection  Generic drug: Insulin Regular Human (Conc)     Verquvo 10 MG tablet  Generic drug: Vericiguat              Diet Instructions       Diet: Cardiac Diets; Low Sodium (2g)      Discharge Diet: Cardiac Diets    Cardiac Diet: Low Sodium (2g)    Texture: Regular Texture (IDDSI 7)    Fluid Consistency: Thin (IDDSI 0)          Activity Instructions       Measure Blood Pressure      Up WIth Assist            Additional Instructions for the Follow-ups that You Need to Schedule       Ambulatory Referral to Home Health   As directed      Face to Face Visit Date: 11/21/2023   Follow-up provider for Plan of Care?: I treated the patient in an acute care facility and will not continue treatment after discharge.   Follow-up provider: LENNY LEACH [[id number]]   Reason/Clinical Findings: Heart failure, type II DM with hyperglycemia with long term use of insulin, difficulty ambulating   Describe mobility limitations that make leaving home difficult: dyspnea on exertion, generalized weakness, physical debility   Nursing/Therapeutic Services Requested: Skilled Nursing Dietician   Skilled nursing orders: O2 instruction CHF management Medication education Cardiopulmonary assessments   Dietician orders: Diet modification (recommend heart healthy (2g sodium) and consistent carbohydrate diet) Diet  instruction   Frequency: 1 Week 1        Discharge Follow-up with PCP   As directed       Currently Documented PCP:    Dixie Ghosh APRN    PCP Phone Number:    [phone]     Follow Up Details: within 1-2 weeks for adjustment of insulin doses        Discharge Follow-up with Specialty: Keep appointment with Electrophysiology at [facility] on 12/11/23.   As directed      Specialty: Keep appointment with Electrophysiology at [facility] on 12/11/23.               Contact information for follow-up providers       [facility] Follow up in 1 week(s).    Specialty: Cardiology  Contact information:  [address] [place] [id number] [phone]             Dallin Castellanos PA-C Follow up in 2 week(s).    Specialties: Physician Assistant, Cardiology  Why: PATIENT WILL BE NOTIFIED ABOUT APPT ON THURS 11/22 AM  Contact information:  [address] [facility] [address]  [phone]               Dixie Ghosh APRN Follow up in 2 day(s).    Specialty: Family Medicine  Why: within 1-2 weeks for adjustment of insulin doses: PATIENT WILL BE NOTIFIED ABOUT APPT ON THURS 11/22 AM  Contact information:  [address] [place] [address] [address] OTERO [address] [place] [id number]  [phone]               Dixie Ghosh APRN .    Specialty: Family Medicine  Contact information:  [address] [place] [address] [address]TERO [address] [place] [address]  [phone]               Dixie Ghosh APRN Follow up in 1 week(s).    Specialty: Family Medicine  Why: pt  has  an  apointment  with  dixie ghosh  for  november 27  aat 1 pm  and  dallin castellanos  for dec  7  at 12 noon  and  heart  failure  for  dec  8  at 1:30  Contact information:  [address] [place] [address] [address]TERO [address] [place] [address]  [phone]                       Contact information for after-discharge care       Home Medical Care       [facility] .    Service: Home Health Services  Contact information:  [address] [place] [address] [address] Mohamud Lodijacqueline  [phone]  [phone]                                    CODE STATUS  Code Status and Medical Interventions:   Ordered at: 11/13/23 0558     Code Status (Patient has no pulse and is not breathing):    CPR (Attempt to Resuscitate)     Medical Interventions (Patient has pulse or is breathing):    Full Support       Yulia Mo DO  [facility]  12/02/23  13:35 EST    Please note that this discharge summary required more than 30 minutes to complete.

## 2023-12-05 ENCOUNTER — READMISSION MANAGEMENT (OUTPATIENT)
Dept: CALL CENTER | Facility: HOSPITAL | Age: 58
End: 2023-12-05
Payer: COMMERCIAL

## 2023-12-05 NOTE — OUTREACH NOTE
Medical Week 2 Survey      Flowsheet Row Responses   [facility] facility patient discharged from? [facility]   Does the patient have one of the following disease processes/diagnoses(primary or secondary)? Other   Week 2 attempt successful? No   Unsuccessful attempts Attempt 2            Rochelle CORTES - Registered Nurse

## 2023-12-07 ENCOUNTER — TELEPHONE (OUTPATIENT)
Dept: CARDIOLOGY | Facility: CLINIC | Age: 58
End: 2023-12-07
Payer: COMMERCIAL

## 2023-12-07 ENCOUNTER — OFFICE VISIT (OUTPATIENT)
Dept: CARDIOLOGY | Facility: CLINIC | Age: 58
End: 2023-12-07
Payer: COMMERCIAL

## 2023-12-07 VITALS
HEIGHT: 65 IN | DIASTOLIC BLOOD PRESSURE: 60 MMHG | BODY MASS INDEX: 39.89 KG/M2 | SYSTOLIC BLOOD PRESSURE: 107 MMHG | HEART RATE: 77 BPM | RESPIRATION RATE: 16 BRPM | OXYGEN SATURATION: 99 %

## 2023-12-07 DIAGNOSIS — R60.0 EDEMA LEG: ICD-10-CM

## 2023-12-07 DIAGNOSIS — I50.43 ACUTE ON CHRONIC COMBINED SYSTOLIC AND DIASTOLIC CHF (CONGESTIVE HEART FAILURE): ICD-10-CM

## 2023-12-07 DIAGNOSIS — I42.0 DILATED CARDIOMYOPATHY: Primary | Chronic | ICD-10-CM

## 2023-12-07 DIAGNOSIS — I25.10 CORONARY ARTERY DISEASE INVOLVING NATIVE CORONARY ARTERY OF NATIVE HEART WITHOUT ANGINA PECTORIS: Chronic | ICD-10-CM

## 2023-12-07 PROBLEM — I48.0 PAROXYSMAL ATRIAL FIBRILLATION: Status: ACTIVE | Noted: 2022-11-17

## 2023-12-07 PROBLEM — I50.9 ACUTE ON CHRONIC CONGESTIVE HEART FAILURE: Status: RESOLVED | Noted: 2020-09-10 | Resolved: 2023-12-07

## 2023-12-07 PROBLEM — I47.20 VENTRICULAR TACHYCARDIA: Status: RESOLVED | Noted: 2023-11-13 | Resolved: 2023-12-07

## 2023-12-07 PROBLEM — I48.0 PAROXYSMAL ATRIAL FIBRILLATION: Chronic | Status: ACTIVE | Noted: 2022-11-17

## 2023-12-07 RX ORDER — BUMETANIDE 1 MG/1
TABLET ORAL
Start: 2023-12-07

## 2023-12-07 NOTE — TELEPHONE ENCOUNTER
----- Message from KENNY Johnson sent at 12/7/2023  2:49 PM EST -----  Regarding: Recent labs from PCP  Would you please see if her PCP has a recent lipid panel?

## 2023-12-07 NOTE — TELEPHONE ENCOUNTER
Called PCP-the lady I spoke with was having computer issues-she took our fax # and was going to fax it over if they had one

## 2023-12-07 NOTE — PROGRESS NOTES
"Chief Complaint  Follow-up (Recent hosp stay s/p A fib) and Med Management (List provided)    Subjective          Yumiko Purdy presents to [facility] CARDIOLOGY for follow up.    History of Present Illness  Ms. Purdy presents with her son for follow-up after having been hospitalized 11/12/2023 through 11/21/2023 due to atrial fibrillation with RVR, thrombocytopenia, dilated nonischemic cardiomyopathy, and hypertension.    While hospitalized she required IV amiodarone and electrical cardioversion with conversion to sinus rhythm.    She checks her heart rate at home with a pulse oximeter and finds it to be in 70s.  Home health checks her BP and it reportedly runs 90s-120s systolic  SOA slightly better.  She follows with the HF clinic and has an appointment tomorrow.    She does have some lower extremity edema.  She reports that when it worsens, she gets into her hospital bed to elevate her legs.  She denies palpitations or the sensation of her heart racing.  She has not had any chest pain at home.    She has not had labs drawn since her discharge.  Tobacco Use: Low Risk  (12/7/2023)    Patient History     Smoking Tobacco Use: Never     Smokeless Tobacco Use: Never     Passive Exposure: Not on file       Objective     Vital Signs:   /60   Pulse 77   Resp 16   Ht 165.1 cm (65\")   SpO2 99%   BMI 39.89 kg/m²       Physical Exam  Vitals and nursing note reviewed. Exam conducted with a chaperone present (Her son accompanies her today).   Constitutional:       General: She is not in acute distress.     Appearance: She is morbidly obese. She is ill-appearing (Chronically).   HENT:      Head: Normocephalic and atraumatic.   Neck:      Vascular: No carotid bruit.   Cardiovascular:      Rate and Rhythm: Normal rate and regular rhythm.      Heart sounds: No murmur heard.  Pulmonary:      Effort: Pulmonary effort is normal.      Breath sounds: Normal breath sounds.   Musculoskeletal:      Right " lower le+ Pitting Edema present.      Left lower le+ Pitting Edema present.      Comments: In wheelchair   Skin:     General: Skin is warm and dry.      Coloration: Skin is pale.   Neurological:      General: No focal deficit present.   Psychiatric:         Mood and Affect: Mood normal.          Result Review :   The following data was reviewed by: KENNY Johnson on 2023:  Common labs          2023    01:53 2023    01:47 2023    02:30   Common Labs   Glucose 178  164  217    BUN 25  29  34    Creatinine 1.46  1.57  1.51    Sodium 139  133  137    Potassium 4.6  4.2  4.2    Chloride 102  98  98    Calcium 8.9  8.6  8.9    Albumin 3.5      Total Bilirubin 2.3      Alkaline Phosphatase 82      AST (SGOT) 18      ALT (SGPT) 14      WBC 9.24  8.44  8.33    Hemoglobin 8.3  8.2  8.4    Hematocrit 29.7  29.0  30.8    Platelets 112  122  151        Data reviewed : Cardiology studies as detailed below      Last Cardiac Cath  Results for orders placed during the hospital encounter of 11/10/20    Cardiac Catheterization/Vascular Study    Narrative  Procedure type:  Left heart cath, LV gram, bilateral selective cholangiogram    Indication:  Cardiomyopathy    Procedure:  After informed consent the patient was brought into the cardiac aspiration lab she was prepped and draped in the usual sterile manner the right radial area was incised with 2% Xylocaine however several attempts to engage into the right radial artery was not successful so the right radial artery access was abandoned and the right groin area was excised in 2% Xylocaine right femoral artery was accessed using modified standard technique and 5 Ivorian side-port arterial sheath was placed and secured then over a guidewire a 5 Ivorian JL 4 catheter was used left main coronary artery with angiographic pressures were taken then the catheter was removed and over a guidewire a 5 Ivorian JR4 catheter was used and engagement of the right  coronary arteries angioplasty was taken then the catheter was removed then over a guidewire 5 Afghan pigtail catheter used aortic valve was done hand-injection LV gram was done then pullback was done then the catheter was removed groin sheath was removed and minx closure device applied with good hemostasis the patient was transported back to her room in stable condition.    Results:  The patient LVEDP was 17 mmHg with hand-injection showing preserved left ventricular systolic function wall motion EF 50-55% with no significant aortic gradient on pullback.    Cholangiogram:  This right dominant system.  Left main cardiac angiographically normal and bifurcates into left and descending and left circumflex arteries.  LAD was normal caliber gives several septal perforators and diagonal branches and wraps around the apex LAD about 30 to 40% mid stenosis.  Left circumflex artery was nondominant for posterior circulation gives rise to several obtuse marginal branches left circumflex arteries branches angiographically normal.  The right coronary artery was a large artery was dominant for posterior circulation giving rise to acute marginal branches PDA and posterolateral branches the right coronary artery and its branches angiographically normal.    Conclusion:  Preserved LV systolic function ejection fraction 50-55% with elevated left ventricular end-diastolic pressure consistent with diastolic dysfunction coronary angiogram showed right dominant system with 30 to 40% mid LAD lesion otherwise coronaries arteries were normal.  Plan of care:  Medical management and secondary preventive measurements of coronary disease good lipid control blood pressure control healthy lifestyle patient is to follow-up with her primary care physician for further management.      Last Stress test  Results for orders placed during the hospital encounter of 10/15/20    Nuclear Medicine Cardiac Blood Pool Muga At Rest    Interpretation  Summary  EXAMINATION: NUCLEAR MEDICINE CARDIAC BLOOD POOL MUGA AT REST-    CLINICAL INDICATION: Cardiomyopathy  TECHNIQUE: 21 mCi of Tc-99m autologous RBCs were injected IV after  in-vitro RBC labeling. Gated cardiac imaging was performed in the  anterior and left anterior oblique.  COMPARISON: None  FINDINGS: The left ventricle is normal in size with normal systolic  function. The calculated left ventricular ejection fraction (LVEF) = 38  %.  The right and left atria, aorta and pulmonary artery are normal. The  right ventricle is normal in size.    Impression  LVEF: 38%, at rest.    This report was finalized on 10/16/2020 11:57 AM by Dr. Marlo Parr MD.       Last Echo  Results for orders placed during the hospital encounter of 11/12/23    Adult Transthoracic Echo Complete W/ Cont if Necessary Per Protocol    Interpretation Summary    Left ventricular systolic function is moderately decreased. Left ventricular ejection fraction appears to be 31 - 35%.    Left ventricular wall thickness is consistent with mild concentric hypertrophy.    Left ventricular diastolic function is consistent with (grade III w/high LAP) fixed restrictive pattern.    Mildly reduced right ventricular systolic function noted.    The left atrial cavity is moderately dilated.    The right atrial cavity is mild to moderately  dilated.    There is calcification of the aortic valve mainly affecting the left coronary cusp(s).    Dilated pulmonary artery.             Current Outpatient Medications   Medication Sig Dispense Refill    acetaminophen (TYLENOL) 500 MG tablet Take 1 tablet by mouth 2 (Two) Times a Day As Needed for Mild Pain.      apixaban (ELIQUIS) 5 MG tablet tablet Take 1 tablet by mouth Every 12 (Twelve) Hours. Indications: Atrial Fibrillation 60 tablet 3    aspirin 81 MG EC tablet Take 1 tablet by mouth Daily for 30 days. 30 tablet 0    benzonatate (TESSALON) 100 MG capsule Take 1 capsule by mouth 2 (Two) Times a Day As Needed  for Cough.      bumetanide (BUMEX) 1 MG tablet Take 1 tablets by mouth Daily      busPIRone (BUSPAR) 10 MG tablet Take 1 tablet by mouth 2 (Two) Times a Day.      empagliflozin (JARDIANCE) 10 MG tablet tablet Take 1 tablet by mouth Daily for 30 days. 30 tablet 0    erythromycin (ROMYCIN) 5 MG/GM ophthalmic ointment Administer 1 application  to both eyes Every 6 (Six) Hours.      ferrous sulfate 325 (65 FE) MG tablet Take 1 tablet by mouth 2 (Two) Times a Day.      fluticasone (FLONASE) 50 MCG/ACT nasal spray 2 sprays into the nostril(s) as directed by provider Daily.      Insulin Glargine (LANTUS SOLOSTAR) 100 UNIT/ML injection pen Inject 45 Units under the skin into the appropriate area as directed 2 (Two) Times a Day. 30 mL 0    Insulin Lispro, 1 Unit Dial, (HUMALOG) 100 UNIT/ML solution pen-injector Inject 15 Units under the skin into the appropriate area as directed 3 (Three) Times a Day With Meals. 15 mL 1    levothyroxine (SYNTHROID, LEVOTHROID) 50 MCG tablet Take 1 tablet by mouth Daily.      metoprolol succinate XL (TOPROL-XL) 25 MG 24 hr tablet Take 1/2 tablet by mouth Daily for 30 days. Do not take if blood pressure is less than 100 on the top number. 15 tablet 0    nitroglycerin (NITROSTAT) 0.4 MG SL tablet Place 1 tablet under the tongue Every 5 (Five) Minutes As Needed for Chest Pain. Take no more than 3 doses in 15 minutes.      omeprazole (priLOSEC) 20 MG capsule Take 1 capsule by mouth Daily.      pregabalin (LYRICA) 150 MG capsule Take 1 capsule by mouth 2 (Two) Times a Day.      Vortioxetine HBr (Trintellix) 5 MG tablet tablet Take 1 tablet by mouth Daily With Breakfast.       No current facility-administered medications for this visit.            Assessment and Plan    Problem List Items Addressed This Visit       Dilated cardiomyopathy - Primary (Chronic)    Overview     11/12/[id number] 31 to 35%, mild LVH, grade 3 diastolic dysfunction with high LAP, dilated left and right atria,  calcification of the left coronary cusp of the aortic valve, dilated pulmonary artery         Acute on chronic combined systolic and diastolic CHF (congestive heart failure) (Chronic)    Overview     11/12/20231951-[id number] 31 to 35%, mild LVH, grade 3 diastolic dysfunction with high LAP, moderately dilated left and right atrial cavities, dilated pulmonary artery, calcification of the left coronary cusp of the aortic valve         Relevant Medications    bumetanide (BUMEX) 1 MG tablet    Coronary artery disease involving native coronary artery of native heart without angina pectoris (Chronic)    Overview     11/10/2020-[facility]-30 to 40% stenosis of the mid LAD          Other Visit Diagnoses       Edema leg        Relevant Medications    bumetanide (BUMEX) 1 MG tablet          Diagnoses and all orders for this visit:    1. Dilated cardiomyopathy (Primary)    2. Acute on chronic combined systolic and diastolic CHF (congestive heart failure)  -     bumetanide (BUMEX) 1 MG tablet; Take 1 tablets by mouth Daily    3. Coronary artery disease involving native coronary artery of native heart without angina pectoris    4. Edema leg  -     bumetanide (BUMEX) 1 MG tablet; Take 1 tablets by mouth Daily        [id number] Score: 4 (12/7/2023  2:14 PM)  Patient reports that she does not need refills at this time.  Plan to continue heart failure medicines-Jardiance and metoprolol.  Will see heart failure clinic tomorrow.  Would benefit from ARNI/ARB/ACE and/or spironolactone if creatinine improving and blood pressure at an acceptable level..    Continue aspirin for nonobstructive coronary artery disease.  Likely needs statin, but will obtain most recent lipid panel from PCP.    Continue Eliquis for anticoagulation.      Follow Up     Return in about 3 months (around 3/7/2024).    Patient was given instructions and counseling regarding her condition or for health maintenance advice. Please see specific information pulled into the AVS if  appropriate.         Electronically signed by KENNY Johnson, 12/07/23, 2:50 PM EST.    Dictated Utilizing Dragon Dictation: Part of this note may be an electronic transcription/translation of spoken language to printed text using the Dragon Dictation System

## 2023-12-08 ENCOUNTER — HOSPITAL ENCOUNTER (OUTPATIENT)
Dept: CARDIOLOGY | Facility: HOSPITAL | Age: 58
Discharge: HOME | End: 2023-12-08
Admitting: PHYSICIAN ASSISTANT
Payer: COMMERCIAL

## 2023-12-08 VITALS
HEIGHT: 65 IN | HEART RATE: 73 BPM | OXYGEN SATURATION: 99 % | BODY MASS INDEX: 39.82 KG/M2 | SYSTOLIC BLOOD PRESSURE: 110 MMHG | WEIGHT: 239 LBS | DIASTOLIC BLOOD PRESSURE: 56 MMHG

## 2023-12-08 DIAGNOSIS — I42.0 DILATED CARDIOMYOPATHY: Primary | Chronic | ICD-10-CM

## 2023-12-08 LAB
ABSOLUTE LUNG FLUID CONTENT: 37 % (ref 20–35)
ANION GAP SERPL CALCULATED.3IONS-SCNC: 10.1 MMOL/L (ref 5–15)
BUN SERPL-MCNC: 23 MG/DL (ref 6–20)
BUN/CREAT SERPL: 18.7 (ref 7–25)
CALCIUM SPEC-SCNC: 9.2 MG/DL (ref 8.6–10.5)
CHLORIDE SERPL-SCNC: 99 MMOL/L (ref 98–107)
CO2 SERPL-SCNC: 28.9 MMOL/L (ref 22–29)
CREAT SERPL-MCNC: 1.23 MG/DL (ref 0.57–1)
EGFRCR SERPLBLD CKD-EPI 2021: 51.4 ML/MIN/1.73
GLUCOSE SERPL-MCNC: 227 MG/DL (ref 65–99)
MAGNESIUM SERPL-MCNC: 2.1 MG/DL (ref 1.6–2.6)
NT-PROBNP SERPL-MCNC: 928.3 PG/ML (ref 0–900)
POTASSIUM SERPL-SCNC: 4.2 MMOL/L (ref 3.5–5.2)
SODIUM SERPL-SCNC: 138 MMOL/L (ref 136–145)

## 2023-12-08 PROCEDURE — 80048 BASIC METABOLIC PNL TOTAL CA: CPT | Performed by: PHYSICIAN ASSISTANT

## 2023-12-08 PROCEDURE — 83880 ASSAY OF NATRIURETIC PEPTIDE: CPT | Performed by: PHYSICIAN ASSISTANT

## 2023-12-08 PROCEDURE — 83735 ASSAY OF MAGNESIUM: CPT | Performed by: PHYSICIAN ASSISTANT

## 2023-12-08 PROCEDURE — 94726 PLETHYSMOGRAPHY LUNG VOLUMES: CPT | Performed by: PHYSICIAN ASSISTANT

## 2023-12-08 RX ORDER — METOPROLOL SUCCINATE 25 MG/1
12.5 TABLET, EXTENDED RELEASE ORAL
Qty: 15 TABLET | Refills: 2 | Status: SHIPPED | OUTPATIENT
Start: 2023-12-08 | End: 2024-01-07

## 2023-12-08 NOTE — PROGRESS NOTES
[facility]  ABDIFATAH Galarza Melanie Lind, APRN  [address] [place] [address] [address]  [place] [address]    Thank you for asking me to see Yumiko Purdy for congestive heart failure.    HPI:     This is a 58 y.o. female with known past medical history of:  Chronic systolic & diastolic HF  TTE 11/13/23 with EF 31-35% and grade III diastolic dysfunction as well as moderately decreased systolic fxn and mildly reduced RVSP.    TTE from November 2022 with visually estimated ejection fraction of 30% ±5% and noted abnormal systolic strain pattern as well as grade 3 diastolic dysfunction as well as abnormal TAPSE with abnormal right ventricular function  KHAI on 09/2020 with EF 21-25% with LV systolic function severely decreased and RV cavity mildly dilated with moderately reduced RV systolic function noted, moderate TVR, and aortic plaquing  Right heart cath on 12/22/2022 with elevated left and right heart pressures with report not available  ASCVD  LHC 11/10/20 with preserved LV systolic, EF 50-55% with elevated LV end diastolic pressure consistent with diastolic dysfunction and right dominant system with 30-40% mid LAD lesion.   LHC attempted on 12/2022 at [facility] with unsuccessful access due to small artery appearing occluded or near occluded radially on right  Peripheral Arterial disease  Atrial Flutter  CKD stage IIIb  Cirrhosis of the liver  Stage III CKD  Chronic hypoxemic respiratory failure  Morbid Obesity  Diffuse purpuric rash with prior w/u negative for vasculitis    Yumiko Purdy presents for today for HF clinic evaluation.  The patient is typically seen by Masha Davidson APRN.  Patient's primary cardiologist is Dr. Jad Meredith.      Last known EF 21-25% by KHAI and 50-55% by LHC in 11/2020.    Last known hospitalization and/or ED visit: Hospitalized in November 2023 with afib with RVR and NSTEMI in addition to hypotension and  thrombocytopenia  Accompanied by: Son      12/08/23 visit data/details regarding:   Dyspnea: Improving, Patient is WC bound and mostly just exerts herself with transfers and such; This remains unchanged on today's visit.   Lower extremity swelling: Bilateral lower extremity swelling improved today  Abdominal swelling: Abdominal swelling is improving.    Home weight: Not currently monitoring due to inability to stand  Home BP: SBP has been in the 100s-110s with less drops at home  Home heart rate: 60s  Daily activities of living: Performing with assistance  HF zone: Yellow  Pillows/lying flat: Sleeps in hospital bed.  Mobility assistance devices:  WC at home, bedside commode.    Mrs. Purdy is chest pain free and without shortness of breath. She reports she is doing well since discharge with current regimen.   She reports she was transitioned from [facility] to [facility] on discharge.   She sees EP Monday.   She is not wearing lifevest during evaluation and reports she has not been wearing it due to the heavy battery.       Specialists:   Cardiology: [facility] with EP & General        Review of Systems - Review of Systems   Constitutional: Negative for chills, decreased appetite and malaise/fatigue.   HENT:  Negative for congestion, ear discharge and ear pain.    Eyes:  Negative for blurred vision, discharge and double vision.   Cardiovascular:  Negative for dyspnea on exertion and leg swelling.   Respiratory:  Negative for cough, hemoptysis and shortness of breath.    Endocrine: Negative for cold intolerance and heat intolerance.   Hematologic/Lymphatic: Negative for adenopathy and bleeding problem.   Skin:  Negative for color change, dry skin and nail changes.   Musculoskeletal:  Negative for arthritis, muscle weakness and myalgias.   Gastrointestinal:  Negative for bloating and abdominal pain.   Genitourinary:  Negative for bladder incontinence, decreased libido and dysuria.   Neurological:  Negative  for brief paralysis, difficulty with concentration and dizziness.   Psychiatric/Behavioral:  Negative for altered mental status, depression and hallucinations.         Very pleasant   Allergic/Immunologic: Negative for environmental allergies and HIV exposure.         All other systems were reviewed and were negative.    Patient Active Problem List   Diagnosis    Dilated cardiomyopathy    CKD (chronic kidney disease) stage 2, GFR 60-89 ml/min    Severe obesity (BMI 35.0-39.9) with comorbidity    Chronic anemia    Elevated bilirubin    Acute on chronic combined systolic and diastolic CHF (congestive heart failure)    Hyponatremia    Paroxysmal atrial fibrillation    Cirrhosis    Coronary artery disease involving native coronary artery of native heart without angina pectoris       family history includes Heart attack in her brother and father.     reports that she has never smoked. She has never used smokeless tobacco. She reports that she does not drink alcohol and does not use drugs.    Allergies   Allergen Reactions    Phenergan [Promethazine]          Current Outpatient Medications:     acetaminophen (TYLENOL) 500 MG tablet, Take 1 tablet by mouth 2 (Two) Times a Day As Needed for Mild Pain., Disp: , Rfl:     apixaban (ELIQUIS) 5 MG tablet tablet, Take 1 tablet by mouth Every 12 (Twelve) Hours. Indications: Atrial Fibrillation, Disp: 60 tablet, Rfl: 3    aspirin 81 MG EC tablet, Take 1 tablet by mouth Daily for 30 days., Disp: 30 tablet, Rfl: 0    benzonatate (TESSALON) 100 MG capsule, Take 1 capsule by mouth 2 (Two) Times a Day As Needed for Cough., Disp: , Rfl:     bumetanide (BUMEX) 1 MG tablet, Take 1 tablets by mouth Daily, Disp: , Rfl:     busPIRone (BUSPAR) 10 MG tablet, Take 1 tablet by mouth 2 (Two) Times a Day., Disp: , Rfl:     empagliflozin (JARDIANCE) 10 MG tablet tablet, Take 1 tablet by mouth Daily for 30 days., Disp: 30 tablet, Rfl: 0    erythromycin (ROMYCIN) 5 MG/GM ophthalmic ointment,  "Administer 1 application  to both eyes Every 6 (Six) Hours., Disp: , Rfl:     ferrous sulfate 325 (65 FE) MG tablet, Take 1 tablet by mouth 2 (Two) Times a Day., Disp: , Rfl:     fluticasone (FLONASE) 50 MCG/ACT nasal spray, 2 sprays into the nostril(s) as directed by provider Daily., Disp: , Rfl:     Insulin Glargine (LANTUS SOLOSTAR) 100 UNIT/ML injection pen, Inject 45 Units under the skin into the appropriate area as directed 2 (Two) Times a Day., Disp: 30 mL, Rfl: 0    Insulin Lispro, 1 Unit Dial, (HUMALOG) 100 UNIT/ML solution pen-injector, Inject 15 Units under the skin into the appropriate area as directed 3 (Three) Times a Day With Meals., Disp: 15 mL, Rfl: 1    levothyroxine (SYNTHROID, LEVOTHROID) 50 MCG tablet, Take 1 tablet by mouth Daily., Disp: , Rfl:     nitroglycerin (NITROSTAT) 0.4 MG SL tablet, Place 1 tablet under the tongue Every 5 (Five) Minutes As Needed for Chest Pain. Take no more than 3 doses in 15 minutes., Disp: , Rfl:     omeprazole (priLOSEC) 20 MG capsule, Take 1 capsule by mouth Daily., Disp: , Rfl:     pregabalin (LYRICA) 150 MG capsule, Take 1 capsule by mouth 2 (Two) Times a Day., Disp: , Rfl:     Vortioxetine HBr (Trintellix) 5 MG tablet tablet, Take 1 tablet by mouth Daily With Breakfast., Disp: , Rfl:     metoprolol succinate XL (TOPROL-XL) 25 MG 24 hr tablet, Take 1/2 tablet by mouth Daily for 30 days. Do not take if blood pressure is less than 100 on the top number. (Patient not taking: Reported on 12/8/2023), Disp: 15 tablet, Rfl: 0      Physical Exam:  I have reviewed the patient's current vital signs as documented in the patient's EMR.     Vitals:    12/08/23 1349   BP: 110/56   BP Location: Right arm   Patient Position: Sitting   Cuff Size: Adult   Pulse: 73   SpO2: 99%   Weight: 108 kg (239 lb)   Height: 165.1 cm (65\")       Physical Exam  Vitals and nursing note reviewed.   Constitutional:       Appearance: Normal appearance.   HENT:      Head: Normocephalic and " atraumatic.   Eyes:      General: Lids are normal.   Cardiovascular:      Rate and Rhythm: Normal rate and regular rhythm.      Heart sounds: S1 normal and S2 normal.   Pulmonary:      Effort: No tachypnea or bradypnea.      Breath sounds: No decreased breath sounds, wheezing, rhonchi or rales.   Chest:      Chest wall: No mass or lacerations.   Abdominal:      General: Abdomen is protuberant. Bowel sounds are normal.      Palpations: Abdomen is soft.      Tenderness: There is no abdominal tenderness.      Comments: Less distended than on prior visits.     Musculoskeletal:      Right lower le+ Edema present.      Left lower le+ Edema present.      Right foot: No swelling.      Left foot: No swelling.   Skin:     General: Skin is warm and moist.   Neurological:      Mental Status: She is alert and oriented to person, place, and time.   Psychiatric:         Attention and Perception: Attention normal.         Mood and Affect: Mood normal.        JVP: Volume/Pulsation: Normal.        DATA REVIEWED:     EKG. I personally reviewed and interpreted the EKG.    Last EKG at [facility] not available for viewing.      ---------------------------------------------------  TTE/KHAI:    TRANSTHORACIC ECHOCARDIOGRAPHY REPORT    Demographics    Patient Name:          JAMAL WHARTON      :            1965    Medical Record Number: [id number]          Age:            56 year(s)    Corporate ID Number:   [id number]          Gender          Female    Account Number:        [id number]    Sonographer:           Hayden Chaudhry,     Height:         65 inches                           [facility]    Referring Physician:   ANDREAS HERNANDEZ     Weight:         240 pounds    Interpreting           MATHEUS IRELAND   BMI:            39.94 kg/m^2    Physician:             MD    Date of Service:       2022          Blood Pressure: 113/40 mmHg    Room Number:           320   Type of Study:    TTE procedure: EC Echo Complete, ECHO  COMPLETE (DOPPLER / COLOR) W OR WO    CONTRAST.    Patient Status: Routine IP    [facility]    Contrast Medium: Optison. Amount - 3 ml    Impression:    Indication:Hypertensive heart disease with heart failure I11.0    Mild left ventricular hypertrophy. Visually estimated ejection fraction    30% +/- 5%. Abnormal left ventricular systolic function; abnormal systolic    strain pattern. Restrictive filling pattern consistent with severely    increased LV filling pressure (Grade III Diastolic dysfunction).    Dilated right ventricle. Abnormal TAPSE; abnormal right ventricular    function. Abnormal right atrial size.    Mild mitral stenosis. Peak/Mean 6/2mmHg    Moderate tricuspid (2+) regurgitation.    No masses or thrombi are seen.   Measurements Summary:    LVEDd: 4.99 cm         LVESd: 4.08 cm          IVSEd: 0.79 cm    AO Root:2.97 cm        LVPWd: 1.04 cm    Contractility Score    Global Left Ventricular Hypokinesis was noted.    LV regional wall motion: (0-Not visualized 1-Normal 2-Hypokinesis    3-Akinesis 4-Dyskinesis 5-Aneurysm)    Left Ventricle    Peak E-wave: 1.22   Peak A-wave: 0.2 m/s    E/A ratio: 5.99    m/s                 Volume [id number]  LV length: 8.47 cm    ml    Volume ulwocaoz15.87 ml    LVOT diameter: 2.05 cm    Normal sized left ventricle.    Mild left ventricular hypertrophy.    Visually estimated ejection fraction 30% +/- 5%.    Abnormal left ventricular systolic function; abnormal systolic strain    pattern.    Restrictive filling pattern consistent with severely increased LV filling    pressure (Grade III Diastolic dysfunction).    No left ventricular masses or thrombi.    Right Ventricle    Diastolic dimension: 4.72     RV systolic pressure: 22.15 mmHg    cm    Dilated right ventricle.    Abnormal TAPSE; abnormal right ventricular function.    Left Atrium    LA dimension: 4.64 cm               LA volume:95.38 ml    LA/Aorta:  1.56    Abnormal left atrial volume index 45ml/m2.    Intact atrial septum.    No atrial mass or thrombus.    Right Atrium    Abnormal right atrial size.    Intact atrial septum.    No atrial mass or thrombus.    Mitral Valve    Deceleration time:     Area PHT: 2.04 cm^2  Mean velocity:    248.96 msec            P1/2t: 107.68 msec   0.57 m/s    Area (continuity):   Mean gradient:    1.73 cm^2            1.89 mmHg    Peak gradient:    5.97 mmHg    Thickened mitral valve leaflets.    Mild mitral regurgitation.    Mild mitral stenosis. Peak/Mean 6/2mmHg    Echogenic density noted on mitral valve chordae. Seen on prior exam    10/16/2022    Aortic Valve    LVOT VTI: 18.22 cm    Mildly thickend free edges of the aortic valve leaflets.    No aortic regurgitation.    No aortic stenosis.    No masses or vegetations seen.    Tricuspid Valve    TR velocity: 2.19 m/s     TR gradient: 19.75482 mmHg    Estimated RAP: 3 mmHg     RVSP: 22.17 mmHg    Structurally normal tricuspid valve.    Moderate tricuspid (2+) regurgitation.    RVSP likely underestimated due to RV systolic function.    No tricuspid stenosis.    No masses or vegetations seen.    Pulmonic Valve    Acceleration time: 119.95 msec          PASP: 22.15 mmHg    Structurally normal pulmonic valve.    Mild pulmonic regurgitation (1+).    No pulmonic stenosis.    No masses or vegetations seen.   Great Vessels   Aorta    Aortic Root: 2.97 cm    Ascending Aorta: 2.76 cm    LVOT Diameter: 2.05 cm   Visualized aorta is normal.   Normal aortic root.   No evidence of dissection.   IVC is not well visualized.   Unable to obtain adequate subcostal view.    Pericardium / Pleura   No pericardial effusion.    Other    No masses or thrombi.    No intracardiac shunt.    ----------------------------------------------------------------    Electronically signed by MATHEUS IRELAND MD(Interpreting    Physician) on 11/17/2022 17:38       Results for orders placed during the hospital  encounter of 11/12/23    Adult Transthoracic Echo Complete W/ Cont if Necessary Per Protocol    Interpretation Summary    Left ventricular systolic function is moderately decreased. Left ventricular ejection fraction appears to be 31 - 35%.    Left ventricular wall thickness is consistent with mild concentric hypertrophy.    Left ventricular diastolic function is consistent with (grade III w/high LAP) fixed restrictive pattern.    Mildly reduced right ventricular systolic function noted.    The left atrial cavity is moderately dilated.    The right atrial cavity is mild to moderately  dilated.    There is calcification of the aortic valve mainly affecting the left coronary cusp(s).    Dilated pulmonary artery.    RHC report:      CARDIAC CATH - RIGHT HEART CATH    Anatomical Region Laterality Modality   Heart -- Other     Narrative    Cardiac Diagnostic Report    Demographics    Patient Name       JAMAL WHARTON      Date of Birth          1965    Patient #          [id number]          Accession #            [id number]    Visit #            [id number]          Medical Record #    Physician          MATHEUS IRELAND   Date of Study          12/22/2022                       MD    Referring                              Interventional    Physician                              physician    Procedure   Procedure Type    Diagnostic procedure: RHC with Cardiomems, RIGHT HEART CATH   Indications: Angina.    Conclusions   Procedure Description   Using ultrasound and micropuncture, access to right brachial vein obtained   and 7F sheath inserted. 7F PA catheter used for RHC.   I attempted right radial access for LHC but unsuccessful due to very small   artery which appears occluded or near-occluded.   Procedure Summary   Elevated left and right heart filling pressures.   Elevated pulmonary pressures.   Borderline-low Ramos CO/CI.    Procedure Data   Procedure Date   Date: 12/22/2022Start: 15:32End: 16:09   Entry Locations      - Retrograde Percutaneous access was performed through the Right Axiliary.       A 7 Fr sheath was inserted. Hemostasis was successfully obtained using       Manual Compression.       Entry Comments: brachial vein.   Procedure Medications     - Fentanyl I.V. 25 mcg.     - Versed Sheath 1 mg.     - Oxygen NC 6 l/min.   Diagnostic Catheters     - A7 FR MON [place] [id number] used for:Arch.   Contrast Material     - None0 ml   Fluoroscopy Time: Total: 2:48 minutes.   Fluoroscopy Dose: Total: 155 mGy.    Medical History    Risk Factors    The patient risk factors include:obesity, hypercholesterolemia, treated    and uncontrolled hypertension, diabetes mellitus, last creatinine: 2    mg/dl, creatinine clearance: 69.72 ml/min and dyslipidemia.    Admission Data    Hemodynamics    Condition: Baseline    O2 Consumption: Estimated: 297.50Heart Rate: 41 bpm   Oxygen Saturation   +--------+-----+----+----------------------+---+---------------------------+   !Location!pCO2 !pO2 !% Saturation          !Hgb!O2 Content                 !   +--------+-----+----+----------------------+---+---------------------------+   !PA      !     !    !51                    !   !                           !   +--------+-----+----+----------------------+---+---------------------------+   !RA      !     !    !58                    !   !                           !   +--------+-----+----+----------------------+---+---------------------------+   !AO      !     !    !94                    !   !                           !   +--------+-----+----+----------------------+---+---------------------------+   Pressures (mmHg)   +-----+--------------------------------------------------------------------+   !Site !Pressure                                                            !   +-----+--------------------------------------------------------------------+   !RA   !28/28 (25)                                                          !    +-----+--------------------------------------------------------------------+   !RV   !81/10 ,27                                                           !   +-----+--------------------------------------------------------------------+   !PA   !73/32 (43)                                                          !   +-----+--------------------------------------------------------------------+   !PCW  !68/66 (45)                                                          !   +-----+--------------------------------------------------------------------+   !PCW  !78/59 (45)                                                          !   +-----+--------------------------------------------------------------------+   !PCW  !23/36 (27)                                                          !   +-----+--------------------------------------------------------------------+   Cardiac Output   +------+-------------------------+----------------------------+------------+   !Method!CO (l/min)               !CI (l/min/m2)               !SV (ml)     !   +------+-------------------------+----------------------------+------------+   !Ramos  !5.35                     !2.2                         !131.67      !   +------+-------------------------+----------------------------+------------+   Shunts   Oxygen Values    O2 Capacity 129.2 O2 Consumption 297.5    Flows (l/min)    Qs 6.4 Qe/Qp 1.2    Qp 5.35 Qp/Qs 0.84    Qe 6.4   Vascular Resistance (dynes x sec x cm-5)   +-------------------------------------+----+---+----+----+---------+-------+   !CO method                            !TSVR!SVR!TPVR!PVR !TPVR/TSVR!PVR/SVR!   +-------------------------------------+----+---+----+----+---------+-------+   !Ramos                                 !    !   !8.03!2.94!         !       !   +-------------------------------------+----+---+----+----+---------+-------+   !Qp or Qs                             !    !   !8.03!2.94!         !       !    +-------------------------------------+----+---+----+----+---------+-------+    Signatures    ----------------------------------------------------------------    Electronically signed by MATHEUS IRELAND MD(Physician) on    12/22/2022 16:19    ----------------------------------------------------------------        -----------------------------------------------------  CXR/Imaging:   Imaging Results (Most Recent)       None            I personally reviewed and interpreted the CXR.      -----------------------------------------------------  CT:   US Liver    Result Date: 11/17/2023  Slight enlargement of the liver.   This report was finalized on 11/17/2023 8:42 AM by Dr. Angelo Deleon MD.      FL Video Swallow Single Contrast    Result Date: 11/14/2023  No evidence of aspiration.  For additional information please see the report provided by the speech therapy service  This report was finalized on 11/14/2023 9:47 AM by Dr. Angelo Deleon MD.      XR Chest 1 View    Result Date: 11/14/2023  No radiographic evidence of acute cardiac or pulmonary disease.    This report was finalized on 11/14/2023 7:19 AM by Dr. Angelo Deleon MD.      CT Chest Without Contrast Diagnostic    Result Date: 11/13/2023   1.  Enlarged heart size. 2.  Splenomegaly. 3.  Minimal atelectasis at the lung bases. 4.  No edema, pleural effusion, or pneumothorax. 5.  Cholecystectomy. 6.  Very small right thyroid lobe nodule.   This report was finalized on 11/13/2023 4:22 AM by Braydon Johnson MD.      XR Chest 1 View    Result Date: 11/13/2023   1.  Mild central pulmonary vascular congestion. 2.  No edema. 3.  No pneumonia. 4.  No pleural effusion. No pneumothorax. 5.  Slight prominence to the heart size.  This report was finalized on 11/13/2023 3:24 AM by Braydon Johnson MD.     I personally reviewed the images of the CT scan.  My personal interpretation is below.      ----------------------------------------------------    PFTs:    No PFTS available.       --------------------------------------------------------------------------------------------------    Laboratory evaluations:    Lab Results   Component Value Date    GLUCOSE 217 (H) 11/21/2023    BUN 34 (H) 11/21/2023    CREATININE 1.51 (H) 11/21/2023    EGFRIFNONA 49 (L) 11/10/2020    BCR 22.5 11/21/2023    K 4.2 11/21/2023    CO2 28.1 11/21/2023    CALCIUM 8.9 11/21/2023    ALBUMIN 3.5 11/19/2023    LABIL2 1.3 (L) 06/09/2016    AST 18 11/19/2023    ALT 14 11/19/2023     Lab Results   Component Value Date    WBC 8.33 11/21/2023    HGB 8.4 (L) 11/21/2023    HCT 30.8 (L) 11/21/2023    .4 (H) 11/21/2023     11/21/2023     Lab Results   Component Value Date    CHOL 137 09/11/2020    CHLPL 103 04/21/2016    TRIG 80 09/11/2020    HDL 43 09/11/2020    LDL 78 09/11/2020     Lab Results   Component Value Date    TSH 4.680 (H) 11/12/2023     Lab Results   Component Value Date    HGBA1C 7.30 (H) 11/13/2023     Lab Results   Component Value Date    ALT 14 11/19/2023     Lab Results   Component Value Date    HGBA1C 7.30 (H) 11/13/2023    HGBA1C 8.40 (H) 09/10/2020    HGBA1C 5.4 04/21/2016     Lab Results   Component Value Date    MICROALBUR 3.2 09/12/2020    CREATININE 1.51 (H) 11/21/2023     Lab Results   Component Value Date    IRON 73 11/17/2023    TIBC 222 (L) 11/17/2023    FERRITIN 2,723.00 (H) 11/17/2023     Lab Results   Component Value Date    INR 0.97 11/15/2023    INR 1.20 (H) 11/12/2023    INR 1.10 12/15/2022    PROTIME 13.4 11/15/2023    PROTIME 15.8 (H) 11/12/2023    PROTIME 11.6 12/15/2022        Lab Results   Component Value Date    ABSOLUTELUNG 37 (A) 12/08/2023    ABSOLUTELUNG 36 (A) 11/20/2023    ABSOLUTELUNG 35 11/19/2023       PAH RISK ASSESSMENT:      1. Dilated cardiomyopathy          ORDERS PLACED TODAY:  Orders Placed This Encounter   Procedures    Basic Metabolic Panel    Magnesium    proBNP    Basic Metabolic Panel    Magnesium    proBNP    ReDs Vest        Diagnoses and all orders  for this visit:    1. Dilated cardiomyopathy (Primary)  Overview:  11/12/[id number] 31 to 35%, mild LVH, grade 3 diastolic dysfunction with high LAP, dilated left and right atria, calcification of the left coronary cusp of the aortic valve, dilated pulmonary artery    Orders:  -     Basic Metabolic Panel; Future  -     Magnesium; Future  -     proBNP; Future  -     Basic Metabolic Panel; Standing  -     Basic Metabolic Panel  -     Magnesium; Standing  -     Magnesium  -     proBNP; Standing  -     proBNP  -     ReDs Vest             MEDS ORDERED TODAY:    No orders of the defined types were placed in this encounter.       ---------------------------------------------------------------------------------------------------------------------------          ASSESSMENT/PLAN:      Diagnosis Plan   1. Dilated cardiomyopathy  Basic Metabolic Panel    Magnesium    proBNP    Basic Metabolic Panel    Basic Metabolic Panel    Magnesium    Magnesium    proBNP    proBNP    ReDs Vest          not acutely decompensated chronic moderate systolic and diastolic heart failure. CHF. Etiology: Unknown/Idiopathic. LVEF 30-35%.     NYHA stage Stage D: Presence of advanced heart disease with continued heart failure symptoms requiring aggressive medical therapyFC-Class IV: Unable to carry on any physical activity without discomfort. Symptoms of heart failureat rest. If any physical activity is undertaken, discomfort increases.     Today, Patient is approaching euvolemia and with  Moderate perfusion. The patient's hemodynamics are currently acceptable. HR is: normal and is at goal. BP/MAP was reviewed and there is notroom for medication up-titration.  Clinical trajectory was assessed and hasimproved.     CHF GOAL DIRECTED MEDICAL THERAPY FOR PATIENT ADDRESSED/ADJUSTED:     GDMT    Drug Class   Drug   Dose Last Dose Adjustment Additional Titration   Notes   ACEi/ARB/ARNI ACEI previously stopped due to renal fxn       Beta Blocker   Metoprolol Succinate   12.5 mg qhs Reduced by PCP     MRA Spirono previously stopped due to renal fxn       SGLT2i Jardiance 10mg qd Transitioned from Farxiga to Jardiance in Hospitalization in 11/2023 N/A    Secondaries Verquevo 10mg qd Restart       Secondary management:     -CHF Specific BB:   Toprol XL 12.5mg (previously decreased in early 02/2023) is being tolerated well with dizziness resolved and fewer lower blood pressures.    Continue monitoring as she is also on amiodarone for her Afib.   Hold parameters reviewed.    Counseled on lifevest compliance.      -ACE/ARB/ARNi:   ACEi recently held during [facility] hospitalization.     -MRA:   The patient is FC-NYHA Class IV and MRA is indicated.   Spironolactone was previously stopped on regimen due to renal function.        -SGLT2 inhibitor therapy:    Farxiga transitioned to Jardiance during hospitalization in 2023.  Tolerating jardiance well, will continue at present.    SGLT2i Restarted by  Nephro in September 2023; will continue to monitor for  symptoms.        Secondary pharmacological therapy in HFreF:   EF is less than 45% @ 30-35%.   Maximized on GDMT as tolerated thus far (see chart above)  Recent hospitalization or IV diuresis includes hospitalizations within the last year at [facility], [facility], and [facility] with acute heart failure in addition to afib with RVR.    Given HFrEF with optimally treated with primary therapies for HFrEF and vasodilator therapy, will continue Verquevo 10mg      -Diuretic regimen:   ReDS Vest reading for 12/08/23 is 34; continue monitoring  Continue current Bumex dosing at 1mg daily.   She has been on higher doses in the past but appears very well compensated at present.    BMP, Mag, & ProBNP reviewed with patient with toleration of current medication regimen.      -Fluid restriction/Sodium restriction:   Requested 2000 ml restriction  Patient has been asked to weigh daily and was provided with a printed diuretic  strategy.  1,500 mg Na restriction was discussed.    -Acute vs. Chronic underlying conditions other than HF addressed during visit:     Paroxysmal Atrial fibrillation/Flutter, currently SR:   Amiodarone stopped during her recent hosptialization.  She has EP f/u on 12/12/23 in [place].    Continue Eliquis 5mg qd with CHW1ES2-DPLv at least 4.    Continue Toprol dosing at present.      CKD IIIb:   Monitor BMP. Reviewed today with creatinine appearing 1.5 with baseline previously 1.9-2.2 per review of OSH labs.    Continues to be within baseline.      Debility:   Continue home health with PT/OT.   Multiple types of DME at home.     Iron/Anemia in CHF:  Prior Iron labs from [facility] with Iron 31, ferritin 1219.30, and TIBC 234 with iron saturation 13%.    Continue BID ferrous sulfate.          Identifiable barriers to Heart Failure Self-care:   Medical Barriers:  Debility  Social Barriers: Transport limitations      --------------------------------------------------------------------------------          BMI cannot be calculated due to outdated height or weight values with patient unable to stand. She has self reported weight of 240.               >45 minutes out of 60 minutes face to face spent counseling patient extensively on dietary Na+ intake, importance of activity, weight monitoring, compliance with medications in addition to importance of titration with goal directed medical therapy and follow up appointments.            This document has been electronically signed by Sonja Romo PA-C  December 8, 2023 14:03 EST      Dictated Utilizing Dragon Dictation: Part of this note may be an electronic transcription/translation of spoken language to printed text using the Dragon Dictation System.    Follow-up appointment and medication changes provided in hand delivered After Visit Summary as well as reviewed in the room.

## 2023-12-08 NOTE — PROGRESS NOTES
Heart Failure Clinic    Date: 12/08/23     Vitals:    12/08/23 1349   BP: 110/56   Pulse: 73   SpO2: 99%        Method of arrival: Other wheelchair    Weighing self daily: No    Monitoring Heart Failure Zones: Yes    Today's HF Zone: Yellow     Taking medications as prescribed: Has questions regarding medications    Edema Yes    Shortness of Air: No    Number of pillows used at night:hospital bed    Educational Materials given:  avs                                                                         ReDS Value: 37  36-41 Possible Hypervolemic Status      Chris Aviles RN 12/08/23 13:50 EST

## 2023-12-10 NOTE — ADDENDUM NOTE
Encounter addended by: Neida Isabel [facility] on: 12/10/2023 1:51 PM   Actions taken: Social Care Checklist section edited

## 2023-12-13 ENCOUNTER — READMISSION MANAGEMENT (OUTPATIENT)
Dept: CALL CENTER | Facility: HOSPITAL | Age: 58
End: 2023-12-13
Payer: COMMERCIAL

## 2023-12-13 NOTE — OUTREACH NOTE
Medical Week 3 Survey      Flowsheet Row Responses   [facility] patient discharged from? [facility]   Does the patient have one of the following disease processes/diagnoses(primary or secondary)? Other   Week 3 attempt successful? Yes   Call start time 1343   Call end time 1346   Discharge diagnosis Ventricular tachycardia   Meds reviewed with patient/caregiver? Yes   Is the patient having any side effects they believe may be caused by any medication additions or changes? No   Does the patient have all medications ordered at discharge? Yes   Is the patient taking all medications as directed (includes completed medication regime)? Yes   Does the patient have a primary care provider?  Yes   Does the patient have an appointment with their PCP within 7 days of discharge? Yes   Has the patient kept scheduled appointments due by today? Yes   What is the Home health [facility]   Has home health visited the patient within 72 hours of discharge? Yes   What DME was ordered? Wheelchair accessories--SEMS   Has all DME been delivered? Yes   DME comments received cushion for W/C   Psychosocial issues? No   Did the patient receive a copy of their discharge instructions? Yes   Nursing interventions Reviewed instructions with patient   What is the patient's perception of their health status since discharge? Improving   Is the patient/caregiver able to teach back signs and symptoms related to disease process for when to call PCP? Yes   Is the patient/caregiver able to teach back signs and symptoms related to disease process for when to call 911? Yes   Is the patient/caregiver able to teach back the hierarchy of who to call/visit for symptoms/problems? PCP, Specialist, Home health nurse, Urgent Care, ED, 911 Yes   If the patient is a current smoker, are they able to teach back resources for cessation? Not a smoker   Additional teach back comments states mari TORRES's   Week 3 Call Completed? Yes   Graduated  Yes   Is the patient interested in additional calls from an ambulatory ? No   Would this patient benefit from a Referral to [facility] Social Work? No   Call end time [phone]            Shelli CHAVEZ - Registered Nurse

## 2023-12-15 ENCOUNTER — SPECIALTY PHARMACY (OUTPATIENT)
Dept: CARDIOLOGY | Facility: HOSPITAL | Age: 58
End: 2023-12-15
Payer: COMMERCIAL

## 2024-01-22 ENCOUNTER — HOSPITAL ENCOUNTER (OUTPATIENT)
Dept: CARDIOLOGY | Facility: HOSPITAL | Age: 59
Discharge: HOME | End: 2024-01-22
Admitting: PHYSICIAN ASSISTANT
Payer: COMMERCIAL

## 2024-01-22 VITALS
HEIGHT: 65 IN | BODY MASS INDEX: 39.82 KG/M2 | HEART RATE: 82 BPM | SYSTOLIC BLOOD PRESSURE: 114 MMHG | DIASTOLIC BLOOD PRESSURE: 58 MMHG | WEIGHT: 239 LBS

## 2024-01-22 DIAGNOSIS — J06.9 UPPER RESPIRATORY TRACT INFECTION, UNSPECIFIED TYPE: ICD-10-CM

## 2024-01-22 DIAGNOSIS — I50.42 CHRONIC COMBINED SYSTOLIC AND DIASTOLIC CHF (CONGESTIVE HEART FAILURE): ICD-10-CM

## 2024-01-22 DIAGNOSIS — I42.0 DILATED CARDIOMYOPATHY: Chronic | ICD-10-CM

## 2024-01-22 DIAGNOSIS — N18.2 CKD (CHRONIC KIDNEY DISEASE) STAGE 2, GFR 60-89 ML/MIN: Primary | ICD-10-CM

## 2024-01-22 LAB
ABSOLUTE LUNG FLUID CONTENT: 37 % (ref 20–35)
ANION GAP SERPL CALCULATED.3IONS-SCNC: 11.7 MMOL/L (ref 5–15)
B PARAPERT DNA SPEC QL NAA+PROBE: NOT DETECTED
B PERT DNA SPEC QL NAA+PROBE: NOT DETECTED
BUN SERPL-MCNC: 27 MG/DL (ref 6–20)
BUN/CREAT SERPL: 22 (ref 7–25)
C PNEUM DNA NPH QL NAA+NON-PROBE: NOT DETECTED
CALCIUM SPEC-SCNC: 9.2 MG/DL (ref 8.6–10.5)
CHLORIDE SERPL-SCNC: 97 MMOL/L (ref 98–107)
CO2 SERPL-SCNC: 26.3 MMOL/L (ref 22–29)
CREAT SERPL-MCNC: 1.23 MG/DL (ref 0.57–1)
EGFRCR SERPLBLD CKD-EPI 2021: 51.4 ML/MIN/1.73
FLUAV SUBTYP SPEC NAA+PROBE: NOT DETECTED
FLUBV RNA ISLT QL NAA+PROBE: NOT DETECTED
GLUCOSE SERPL-MCNC: 335 MG/DL (ref 65–99)
HADV DNA SPEC NAA+PROBE: NOT DETECTED
HCOV 229E RNA SPEC QL NAA+PROBE: NOT DETECTED
HCOV HKU1 RNA SPEC QL NAA+PROBE: NOT DETECTED
HCOV NL63 RNA SPEC QL NAA+PROBE: NOT DETECTED
HCOV OC43 RNA SPEC QL NAA+PROBE: NOT DETECTED
HMPV RNA NPH QL NAA+NON-PROBE: NOT DETECTED
HPIV1 RNA ISLT QL NAA+PROBE: NOT DETECTED
HPIV2 RNA SPEC QL NAA+PROBE: NOT DETECTED
HPIV3 RNA NPH QL NAA+PROBE: NOT DETECTED
HPIV4 P GENE NPH QL NAA+PROBE: NOT DETECTED
M PNEUMO IGG SER IA-ACNC: NOT DETECTED
MAGNESIUM SERPL-MCNC: 2.4 MG/DL (ref 1.6–2.6)
NT-PROBNP SERPL-MCNC: 606.7 PG/ML (ref 0–900)
POTASSIUM SERPL-SCNC: 4.6 MMOL/L (ref 3.5–5.2)
RHINOVIRUS RNA SPEC NAA+PROBE: NOT DETECTED
RSV RNA NPH QL NAA+NON-PROBE: NOT DETECTED
SARS-COV-2 RNA NPH QL NAA+NON-PROBE: NOT DETECTED
SODIUM SERPL-SCNC: 135 MMOL/L (ref 136–145)

## 2024-01-22 PROCEDURE — 83735 ASSAY OF MAGNESIUM: CPT | Performed by: PHYSICIAN ASSISTANT

## 2024-01-22 PROCEDURE — 99215 OFFICE O/P EST HI 40 MIN: CPT | Performed by: PHYSICIAN ASSISTANT

## 2024-01-22 PROCEDURE — 94726 PLETHYSMOGRAPHY LUNG VOLUMES: CPT | Performed by: PHYSICIAN ASSISTANT

## 2024-01-22 PROCEDURE — 83880 ASSAY OF NATRIURETIC PEPTIDE: CPT | Performed by: PHYSICIAN ASSISTANT

## 2024-01-22 PROCEDURE — 0202U NFCT DS 22 TRGT SARS-COV-2: CPT | Performed by: PHYSICIAN ASSISTANT

## 2024-01-22 PROCEDURE — 80048 BASIC METABOLIC PNL TOTAL CA: CPT | Performed by: PHYSICIAN ASSISTANT

## 2024-01-22 RX ORDER — CEFDINIR 300 MG/1
300 CAPSULE ORAL 2 TIMES DAILY
Qty: 14 CAPSULE | Refills: 0 | Status: SHIPPED | OUTPATIENT
Start: 2024-01-22 | End: 2024-01-29

## 2024-01-22 RX ORDER — ASPIRIN 81 MG/1
81 TABLET, COATED ORAL DAILY
COMMUNITY

## 2024-01-22 RX ORDER — GUAIFENESIN 600 MG/1
1200 TABLET, EXTENDED RELEASE ORAL 2 TIMES DAILY
Qty: 40 TABLET | Refills: 0 | Status: SHIPPED | OUTPATIENT
Start: 2024-01-22 | End: 2024-02-01

## 2024-01-22 RX ORDER — HYDROCODONE BITARTRATE AND ACETAMINOPHEN 7.5; 325 MG/1; MG/1
1 TABLET ORAL EVERY 12 HOURS PRN
COMMUNITY
Start: 2024-01-19

## 2024-01-22 NOTE — PROGRESS NOTES
" Heart Failure Clinic  Pharmacist Note     Yumiko Purdy is a 58 y.o. female seen in the Heart Failure Clinic for HFrEF. The patient was recently hospitalized for an acute exacerbation among other issues and upon discharge her amiodarone, farxiga and verquvo were discontinued and she was started on jardiance, toprol and decreased bumex to 1mg daily.     Yumiko Purdy reports a fair understanding of medications. She reports that she feels bad today and has numbness in her hands and a \"head-sore.\" She reports taking Bumex 1mg daily and reports that her swelling is improving with some residual in her legs and feet. She reports a little bit of SOB and she cannot weigh herself at home.   She reports that she takes her BP ever so often and reports that it is good around 120's.  She reports that she cannot split her Toprol in half, as her hands will not allow her to and therefore she never takes the Toprol at all. Upon askign about urinary symptoms, she reports that her PCP wanted her to do a urinalysis  and she is going to complete that this week. She denies any bleeding issues with Eliquis and ASA.       Medication Use:   Hx of med intolerances: Farxiga (UTI) -but Nephro started back in September; cannot split Toprol tablets to make 12.5mg dose- may restart at 25mg when possible?  Retail Rx Management: Uses [facility]/ Uses our pharmacy for verquvo, toprol and jardiance     Past Medical History:   Diagnosis Date    Anemia     CHF (congestive heart failure)     Chronic a-fib     stated by patient     Cirrhosis of liver     stated by patient     Diabetes mellitus     Ventricular tachycardia      ALLERGIES: Phenergan [promethazine]  Current Outpatient Medications   Medication Sig Dispense Refill    acetaminophen (TYLENOL) 500 MG tablet Take 1 tablet by mouth 2 (Two) Times a Day As Needed for Mild Pain.      apixaban (ELIQUIS) 5 MG tablet tablet Take 1 tablet by mouth Every 12 (Twelve) Hours. Indications: Atrial Fibrillation 60 " tablet 3    Aspirin Low Dose 81 MG EC tablet Take 1 tablet by mouth Daily.      bumetanide (BUMEX) 1 MG tablet Take 1 tablets by mouth Daily      busPIRone (BUSPAR) 10 MG tablet Take 1 tablet by mouth 2 (Two) Times a Day.      empagliflozin (JARDIANCE) 10 MG tablet tablet Take 1 tablet by mouth Daily for 30 days. 30 tablet 6    ferrous sulfate 325 (65 FE) MG tablet Take 1 tablet by mouth 2 (Two) Times a Day.      fluticasone (FLONASE) 50 MCG/ACT nasal spray 2 sprays into the nostril(s) as directed by provider Daily.      HYDROcodone-acetaminophen (NORCO) 7.5-325 MG per tablet Take 1 tablet by mouth Every 12 (Twelve) Hours As Needed.      Insulin Glargine (LANTUS SOLOSTAR) 100 UNIT/ML injection pen Inject 45 Units under the skin into the appropriate area as directed 2 (Two) Times a Day. 30 mL 0    Insulin Lispro, 1 Unit Dial, (HUMALOG) 100 UNIT/ML solution pen-injector Inject 15 Units under the skin into the appropriate area as directed 3 (Three) Times a Day With Meals. 15 mL 1    levothyroxine (SYNTHROID, LEVOTHROID) 50 MCG tablet Take 1 tablet by mouth Daily.      metoprolol succinate XL (TOPROL-XL) 25 MG 24 hr tablet Take 1/2 tablet by mouth Daily for 30 days. Do not take if blood pressure is less than 100 on the top number. 15 tablet 2    nitroglycerin (NITROSTAT) 0.4 MG SL tablet Place 1 tablet under the tongue Every 5 (Five) Minutes As Needed for Chest Pain. Take no more than 3 doses in 15 minutes.      omeprazole (priLOSEC) 20 MG capsule Take 1 capsule by mouth Daily.      pregabalin (LYRICA) 150 MG capsule Take 1 capsule by mouth 2 (Two) Times a Day.      Vericiguat 10 MG tablet Take 1 tablet by mouth Daily. 30 tablet 2    Vortioxetine HBr (Trintellix) 5 MG tablet tablet Take 1 tablet by mouth Daily With Breakfast.      cefdinir (OMNICEF) 300 MG capsule Take 1 capsule by mouth 2 (Two) Times a Day for 7 days. 14 capsule 0    erythromycin (ROMYCIN) 5 MG/GM ophthalmic ointment Administer 1 application  to both  "eyes Every 6 (Six) Hours. (Patient not taking: Reported on 1/22/2024)      guaiFENesin (Mucinex) 600 MG 12 hr tablet Take 2 tablets by mouth 2 (Two) Times a Day for 10 days. 40 tablet 0     No current facility-administered medications for this encounter.       Vaccination History:   Pneumonia: Needs, declines  Annual Influenza: Needs, declines      Objective  Vitals:    01/22/24 1516   BP: 114/58   BP Location: Left arm   Patient Position: Sitting   Cuff Size: Adult   Pulse: 82   Weight: 108 kg (239 lb)   Height: 165.1 cm (65\")         Wt Readings from Last 3 Encounters:   01/22/24 108 kg (239 lb)   12/08/23 108 kg (239 lb)   11/21/23 109 kg (239 lb 11.2 oz)     Lab Results   Component Value Date    GLUCOSE 335 (H) 01/22/2024    BUN 27 (H) 01/22/2024    CREATININE 1.23 (H) 01/22/2024    EGFRIFNONA 49 (L) 11/10/2020    BCR 22.0 01/22/2024    K 4.6 01/22/2024    CO2 26.3 01/22/2024    CALCIUM 9.2 01/22/2024    ALBUMIN 3.5 11/19/2023    LABIL2 1.3 (L) 06/09/2016    AST 18 11/19/2023    ALT 14 11/19/2023     Lab Results   Component Value Date    WBC 8.33 11/21/2023    HGB 8.4 (L) 11/21/2023    HCT 30.8 (L) 11/21/2023    .4 (H) 11/21/2023     11/21/2023     Lab Results   Component Value Date    CKTOTAL 41 11/12/2023    TROPONINT 127 (C) 11/19/2023     Lab Results   Component Value Date    PROBNP 606.7 01/22/2024     Results for orders placed during the hospital encounter of 11/12/23    Adult Transthoracic Echo Complete W/ Cont if Necessary Per Protocol    Interpretation Summary    Left ventricular systolic function is moderately decreased. Left ventricular ejection fraction appears to be 31 - 35%.    Left ventricular wall thickness is consistent with mild concentric hypertrophy.    Left ventricular diastolic function is consistent with (grade III w/high LAP) fixed restrictive pattern.    Mildly reduced right ventricular systolic function noted.    The left atrial cavity is moderately dilated.    The right " atrial cavity is mild to moderately  dilated.    There is calcification of the aortic valve mainly affecting the left coronary cusp(s).    Dilated pulmonary artery.         GDMT    Drug Class   Drug   Dose Last Dose Adjustment Additional Titration   Notes   ACEi/ARB/ARNI     Was previously on lisinopril, stopped d/t renal function    Beta Blocker     Patient cannot split tab and therefore stopped taking Toprol 12.5   MRA     Was previously on spironolactone 50mg BID Nov-Dec 22, stopped d/t renal function   SGLT2i Jardiance 10mg Changed from Farxiga in hospital 11/21/23 N/A Stopped 8/17/23 due to UTI    Verquvo 10 mg QD 12/8/23 restart         Drug Therapy Problems    1. Edema  2. Non-adherence with Toprol      Recommendations:     Continue current doses.   Taking off of her list for now, restart is BP and HR allows.       Patient was educated on heart failure medications and the importance of medication adherence. All questions were addressed and patient expressed some understanding. Would benefit from additional education at each visit.    Thank you for allowing me to participate in the care of your patient,    Kelli Soto [facility]  01/22/24  16:17 EST

## 2024-01-22 NOTE — PROGRESS NOTES
[facility]  ABDIFATAH Galarza Melanie Lind, APRN  [address] [place] [address] [address]  [place] [address]    Thank you for asking me to see Yumiko Purdy for congestive heart failure.    HPI:     This is a 58 y.o. female with known past medical history of:  Chronic systolic & diastolic HF  TTE 11/13/23 with EF 31-35% and grade III diastolic dysfunction as well as moderately decreased systolic fxn and mildly reduced RVSP.    TTE from November 2022 with visually estimated ejection fraction of 30% ±5% and noted abnormal systolic strain pattern as well as grade 3 diastolic dysfunction as well as abnormal TAPSE with abnormal right ventricular function  KHAI on 09/2020 with EF 21-25% with LV systolic function severely decreased and RV cavity mildly dilated with moderately reduced RV systolic function noted, moderate TVR, and aortic plaquing  Right heart cath on 12/22/2022 with elevated left and right heart pressures with report not available  ASCVD  LHC 11/10/20 with preserved LV systolic, EF 50-55% with elevated LV end diastolic pressure consistent with diastolic dysfunction and right dominant system with 30-40% mid LAD lesion.   LHC attempted on 12/2022 at [facility] with unsuccessful access due to small artery appearing occluded or near occluded radially on right  Peripheral Arterial disease  Atrial Flutter  CKD stage IIIb  Cirrhosis of the liver  Stage III CKD  Chronic hypoxemic respiratory failure  Morbid Obesity  Diffuse purpuric rash with prior w/u negative for vasculitis    Yumiko Purdy presents for today for HF clinic evaluation.  The patient is typically seen by Masha Davidson APRN.  Patient's primary cardiologist is Dr. Jad Meredith.      Last known EF 21-25% by KHAI and 50-55% by LHC in 11/2020.    Last known hospitalization and/or ED visit: Hospitalized in November 2023 with afib with RVR and NSTEMI in addition to hypotension and  thrombocytopenia  Accompanied by: Son      01/22/24 visit data/details regarding:   Dyspnea: Improving, Patient is WC bound and mostly just exerts herself with transfers and such; This remains unchanged on today's visit.   Lower extremity swelling: Bilateral lower extremity swelling improved today  Abdominal swelling: Abdominal swelling is improving.    Home weight: Not currently monitoring due to inability to stand  Home BP: SBP has been in the 100s-110s with less drops at home  Home heart rate: 60s  Daily activities of living: Performing with assistance  HF zone: Yellow  Pillows/lying flat: Sleeps in hospital bed.  Mobility assistance devices:  WC at home, bedside commode.    Mrs. Purdy reports fatigue today.  She denies chest pain.   She reports she is breathing well from heart failure standpoint but has been having productive cough, chills, and sputum production the past few days. She reports it initially started with some sinus drainage and she feels very fatigued.        Specialists:   Cardiology: [facility] with EP & General        Review of Systems - Review of Systems   Constitutional: Positive for malaise/fatigue. Negative for chills and decreased appetite.   HENT:  Negative for congestion, ear discharge and ear pain.    Eyes:  Negative for blurred vision, discharge and double vision.   Cardiovascular:  Positive for dyspnea on exertion. Negative for leg swelling.   Respiratory:  Negative for cough, hemoptysis and shortness of breath.    Endocrine: Negative for cold intolerance and heat intolerance.   Hematologic/Lymphatic: Negative for adenopathy and bleeding problem.   Skin:  Negative for color change, dry skin and nail changes.   Musculoskeletal:  Negative for arthritis, muscle weakness and myalgias.   Gastrointestinal:  Negative for bloating and abdominal pain.   Genitourinary:  Negative for bladder incontinence, decreased libido and dysuria.   Neurological:  Negative for brief paralysis,  difficulty with concentration and dizziness.   Psychiatric/Behavioral:  Negative for altered mental status, depression and hallucinations.         Very pleasant   Allergic/Immunologic: Negative for environmental allergies and HIV exposure.         All other systems were reviewed and were negative.    Patient Active Problem List   Diagnosis    Dilated cardiomyopathy    CKD (chronic kidney disease) stage 2, GFR 60-89 ml/min    Severe obesity (BMI 35.0-39.9) with comorbidity    Chronic anemia    Elevated bilirubin    Acute on chronic combined systolic and diastolic CHF (congestive heart failure)    Hyponatremia    Paroxysmal atrial fibrillation    Cirrhosis    Coronary artery disease involving native coronary artery of native heart without angina pectoris       family history includes Heart attack in her brother and father.     reports that she has never smoked. She has never used smokeless tobacco. She reports that she does not drink alcohol and does not use drugs.    Allergies   Allergen Reactions    Phenergan [Promethazine]          Current Outpatient Medications:     acetaminophen (TYLENOL) 500 MG tablet, Take 1 tablet by mouth 2 (Two) Times a Day As Needed for Mild Pain., Disp: , Rfl:     apixaban (ELIQUIS) 5 MG tablet tablet, Take 1 tablet by mouth Every 12 (Twelve) Hours. Indications: Atrial Fibrillation, Disp: 60 tablet, Rfl: 3    Aspirin Low Dose 81 MG EC tablet, Take 1 tablet by mouth Daily., Disp: , Rfl:     bumetanide (BUMEX) 1 MG tablet, Take 1 tablets by mouth Daily, Disp: , Rfl:     busPIRone (BUSPAR) 10 MG tablet, Take 1 tablet by mouth 2 (Two) Times a Day., Disp: , Rfl:     empagliflozin (JARDIANCE) 10 MG tablet tablet, Take 1 tablet by mouth Daily for 30 days., Disp: 30 tablet, Rfl: 6    ferrous sulfate 325 (65 FE) MG tablet, Take 1 tablet by mouth 2 (Two) Times a Day., Disp: , Rfl:     fluticasone (FLONASE) 50 MCG/ACT nasal spray, 2 sprays into the nostril(s) as directed by provider Daily., Disp: ,  "Rfl:     HYDROcodone-acetaminophen (NORCO) 7.5-325 MG per tablet, Take 1 tablet by mouth Every 12 (Twelve) Hours As Needed., Disp: , Rfl:     Insulin Glargine (LANTUS SOLOSTAR) 100 UNIT/ML injection pen, Inject 45 Units under the skin into the appropriate area as directed 2 (Two) Times a Day., Disp: 30 mL, Rfl: 0    Insulin Lispro, 1 Unit Dial, (HUMALOG) 100 UNIT/ML solution pen-injector, Inject 15 Units under the skin into the appropriate area as directed 3 (Three) Times a Day With Meals., Disp: 15 mL, Rfl: 1    levothyroxine (SYNTHROID, LEVOTHROID) 50 MCG tablet, Take 1 tablet by mouth Daily., Disp: , Rfl:     nitroglycerin (NITROSTAT) 0.4 MG SL tablet, Place 1 tablet under the tongue Every 5 (Five) Minutes As Needed for Chest Pain. Take no more than 3 doses in 15 minutes., Disp: , Rfl:     omeprazole (priLOSEC) 20 MG capsule, Take 1 capsule by mouth Daily., Disp: , Rfl:     pregabalin (LYRICA) 150 MG capsule, Take 1 capsule by mouth 2 (Two) Times a Day., Disp: , Rfl:     Vericiguat 10 MG tablet, Take 1 tablet by mouth Daily., Disp: 30 tablet, Rfl: 2    Vortioxetine HBr (Trintellix) 5 MG tablet tablet, Take 1 tablet by mouth Daily With Breakfast., Disp: , Rfl:     cefdinir (OMNICEF) 300 MG capsule, Take 1 capsule by mouth 2 (Two) Times a Day for 7 days., Disp: 14 capsule, Rfl: 0    erythromycin (ROMYCIN) 5 MG/GM ophthalmic ointment, Administer 1 application  to both eyes Every 6 (Six) Hours. (Patient not taking: Reported on 1/22/2024), Disp: , Rfl:     guaiFENesin (Mucinex) 600 MG 12 hr tablet, Take 2 tablets by mouth 2 (Two) Times a Day for 10 days., Disp: 40 tablet, Rfl: 0      Physical Exam:  I have reviewed the patient's current vital signs as documented in the patient's EMR.     Vitals:    01/22/24 1516   BP: 114/58   BP Location: Left arm   Patient Position: Sitting   Cuff Size: Adult   Pulse: 82   Weight: 108 kg (239 lb)   Height: 165.1 cm (65\")         Physical Exam  Vitals and nursing note reviewed. "   Constitutional:       Appearance: Normal appearance.   HENT:      Head: Normocephalic and atraumatic.   Eyes:      General: Lids are normal.   Cardiovascular:      Rate and Rhythm: Normal rate and regular rhythm.      Heart sounds: S1 normal and S2 normal.   Pulmonary:      Effort: No tachypnea or bradypnea.      Breath sounds: No decreased breath sounds, wheezing, rhonchi or rales.   Chest:      Chest wall: No mass or lacerations.   Abdominal:      General: Abdomen is protuberant. Bowel sounds are normal.      Palpations: Abdomen is soft.      Tenderness: There is no abdominal tenderness.      Comments: Less distended than on prior visits.     Musculoskeletal:      Right lower le+ Edema present.      Left lower le+ Edema present.      Right foot: No swelling.      Left foot: No swelling.   Skin:     General: Skin is warm and moist.   Neurological:      Mental Status: She is alert and oriented to person, place, and time.   Psychiatric:         Attention and Perception: Attention normal.         Mood and Affect: Mood normal.          JVP: Volume/Pulsation: Normal.        DATA REVIEWED:     EKG. I personally reviewed and interpreted the EKG.    Last EKG at [facility] not available for viewing.      ---------------------------------------------------  TTE/KHAI:    TRANSTHORACIC ECHOCARDIOGRAPHY REPORT    Demographics    Patient Name:          JAMAL WHARTON      :            1965    Medical Record Number: [id number]          Age:            56 year(s)    Corporate ID Number:   [id number]          Gender          Female    Account Number:        [id number]    Sonographer:           Hayden Chaudhry,     Height:         65 inches                           [facility]    Referring Physician:   ANDREAS HERNANDEZ     Weight:         240 pounds    Interpreting           MATHEUS IRELAND   BMI:            39.94 kg/m^2    Physician:             MD    Date of Service:       2022          Blood Pressure: 113/40 mmHg     Room Number:           320   Type of Study:    TTE procedure: EC Echo Complete, ECHO COMPLETE (DOPPLER / COLOR) W OR WO    CONTRAST.    Patient Status: Routine IP    Study Location: [facility] Quality: Adequate visualization    Contrast Medium: Optison. Amount - 3 ml    Impression:    Indication:Hypertensive heart disease with heart failure I11.0    Mild left ventricular hypertrophy. Visually estimated ejection fraction    30% +/- 5%. Abnormal left ventricular systolic function; abnormal systolic    strain pattern. Restrictive filling pattern consistent with severely    increased LV filling pressure (Grade III Diastolic dysfunction).    Dilated right ventricle. Abnormal TAPSE; abnormal right ventricular    function. Abnormal right atrial size.    Mild mitral stenosis. Peak/Mean 6/2mmHg    Moderate tricuspid (2+) regurgitation.    No masses or thrombi are seen.   Measurements Summary:    LVEDd: 4.99 cm         LVESd: 4.08 cm          IVSEd: 0.79 cm    AO Root:2.97 cm        LVPWd: 1.04 cm    Contractility Score    Global Left Ventricular Hypokinesis was noted.    LV regional wall motion: (0-Not visualized 1-Normal 2-Hypokinesis    3-Akinesis 4-Dyskinesis 5-Aneurysm)    Left Ventricle    Peak E-wave: 1.22   Peak A-wave: 0.2 m/s    E/A ratio: 5.99    m/s                 Volume gpmenwtcm689.55  LV length: 8.47 cm    ml    Volume hpzgpezg21.87 ml    LVOT diameter: 2.05 cm    Normal sized left ventricle.    Mild left ventricular hypertrophy.    Visually estimated ejection fraction 30% +/- 5%.    Abnormal left ventricular systolic function; abnormal systolic strain    pattern.    Restrictive filling pattern consistent with severely increased LV filling    pressure (Grade III Diastolic dysfunction).    No left ventricular masses or thrombi.    Right Ventricle    Diastolic dimension: 4.72     RV systolic pressure: 22.15 mmHg    cm    Dilated right ventricle.    Abnormal TAPSE; abnormal right ventricular function.     Left Atrium    LA dimension: 4.64 cm               LA volume:95.38 ml    LA/Aorta: 1.56    Abnormal left atrial volume index 45ml/m2.    Intact atrial septum.    No atrial mass or thrombus.    Right Atrium    Abnormal right atrial size.    Intact atrial septum.    No atrial mass or thrombus.    Mitral Valve    Deceleration time:     Area PHT: 2.04 cm^2  Mean velocity:    248.96 msec            P1/2t: 107.68 msec   0.57 m/s    Area (continuity):   Mean gradient:    1.73 cm^2            1.89 mmHg    Peak gradient:    5.97 mmHg    Thickened mitral valve leaflets.    Mild mitral regurgitation.    Mild mitral stenosis. Peak/Mean 6/2mmHg    Echogenic density noted on mitral valve chordae. Seen on prior exam    10/16/2022    Aortic Valve    LVOT VTI: 18.22 cm    Mildly thickend free edges of the aortic valve leaflets.    No aortic regurgitation.    No aortic stenosis.    No masses or vegetations seen.    Tricuspid Valve    TR velocity: 2.19 m/s     TR gradient: 19.62530 mmHg    Estimated RAP: 3 mmHg     RVSP: 22.17 mmHg    Structurally normal tricuspid valve.    Moderate tricuspid (2+) regurgitation.    RVSP likely underestimated due to RV systolic function.    No tricuspid stenosis.    No masses or vegetations seen.    Pulmonic Valve    Acceleration time: 119.95 msec          PASP: 22.15 mmHg    Structurally normal pulmonic valve.    Mild pulmonic regurgitation (1+).    No pulmonic stenosis.    No masses or vegetations seen.   Great Vessels   Aorta    Aortic Root: 2.97 cm    Ascending Aorta: 2.76 cm    LVOT Diameter: 2.05 cm   Visualized aorta is normal.   Normal aortic root.   No evidence of dissection.   IVC is not well visualized.   Unable to obtain adequate subcostal view.    Pericardium / Pleura   No pericardial effusion.    Other    No masses or thrombi.    No intracardiac shunt.    ----------------------------------------------------------------    Electronically signed by MATHEUS IRELAND MD(Interpreting     Physician) on 11/17/2022 17:38       Results for orders placed during the hospital encounter of 11/12/23    Adult Transthoracic Echo Complete W/ Cont if Necessary Per Protocol    Interpretation Summary    Left ventricular systolic function is moderately decreased. Left ventricular ejection fraction appears to be 31 - 35%.    Left ventricular wall thickness is consistent with mild concentric hypertrophy.    Left ventricular diastolic function is consistent with (grade III w/high LAP) fixed restrictive pattern.    Mildly reduced right ventricular systolic function noted.    The left atrial cavity is moderately dilated.    The right atrial cavity is mild to moderately  dilated.    There is calcification of the aortic valve mainly affecting the left coronary cusp(s).    Dilated pulmonary artery.    RHC report:      CARDIAC CATH - RIGHT HEART CATH    Anatomical Region Laterality Modality   Heart -- Other     Narrative    Cardiac Diagnostic Report    Demographics    Patient Name       JAMAL WHARTON      Date of Birth          1965    Patient #          [id number]          Accession #            [id number]    Visit #            [id number]          Medical Record #    Physician          MATHEUS IRELAND   Date of Study          12/22/2022                       MD    Referring                              Interventional    Physician                              physician    Procedure   Procedure Type    Diagnostic procedure: RHC with Cardiomems, RIGHT HEART CATH   Indications: Angina.    Conclusions   Procedure Description   Using ultrasound and micropuncture, access to right brachial vein obtained   and 7F sheath inserted. 7F PA catheter used for RHC.   I attempted right radial access for LHC but unsuccessful due to very small   artery which appears occluded or near-occluded.   Procedure Summary   Elevated left and right heart filling pressures.   Elevated pulmonary pressures.   Borderline-low Ramos CO/CI.    Procedure  Data   Procedure Date   Date: 12/22/2022Start: 15:32End: 16:09   Entry Locations     - Retrograde Percutaneous access was performed through the Right Axiliary.       A 7 Fr sheath was inserted. Hemostasis was successfully obtained using       Manual Compression.       Entry Comments: brachial vein.   Procedure Medications     - Fentanyl I.V. 25 mcg.     - Versed Sheath 1 mg.     - Oxygen NC 6 l/min.   Diagnostic Catheters     - A7 FR MON [place] 065F8kgb used for:Arch.   Contrast Material     - None0 ml   Fluoroscopy Time: Total: 2:48 minutes.   Fluoroscopy Dose: Total: 155 mGy.    Medical History    Risk Factors    The patient risk factors include:obesity, hypercholesterolemia, treated    and uncontrolled hypertension, diabetes mellitus, last creatinine: 2    mg/dl, creatinine clearance: 69.72 ml/min and dyslipidemia.    Admission Data    Hemodynamics    Condition: Baseline    O2 Consumption: Estimated: 297.50Heart Rate: 41 bpm   Oxygen Saturation   +--------+-----+----+----------------------+---+---------------------------+   !Location!pCO2 !pO2 !% Saturation          !Hgb!O2 Content                 !   +--------+-----+----+----------------------+---+---------------------------+   !PA      !     !    !51                    !   !                           !   +--------+-----+----+----------------------+---+---------------------------+   !RA      !     !    !58                    !   !                           !   +--------+-----+----+----------------------+---+---------------------------+   !AO      !     !    !94                    !   !                           !   +--------+-----+----+----------------------+---+---------------------------+   Pressures (mmHg)   +-----+--------------------------------------------------------------------+   !Site !Pressure                                                            !   +-----+--------------------------------------------------------------------+   !RA   !28/28  (25)                                                          !   +-----+--------------------------------------------------------------------+   !RV   !81/10 ,27                                                           !   +-----+--------------------------------------------------------------------+   !PA   !73/32 (43)                                                          !   +-----+--------------------------------------------------------------------+   !PCW  !68/66 (45)                                                          !   +-----+--------------------------------------------------------------------+   !PCW  !78/59 (45)                                                          !   +-----+--------------------------------------------------------------------+   !PCW  !23/36 (27)                                                          !   +-----+--------------------------------------------------------------------+   Cardiac Output   +------+-------------------------+----------------------------+------------+   !Method!CO (l/min)               !CI (l/min/m2)               !SV (ml)     !   +------+-------------------------+----------------------------+------------+   !Ramos  !5.35                     !2.2                         !131.67      !   +------+-------------------------+----------------------------+------------+   Shunts   Oxygen Values    O2 Capacity 129.2 O2 Consumption 297.5    Flows (l/min)    Qs 6.4 Qe/Qp 1.2    Qp 5.35 Qp/Qs 0.84    Qe 6.4   Vascular Resistance (dynes x sec x cm-5)   +-------------------------------------+----+---+----+----+---------+-------+   !CO method                            !TSVR!SVR!TPVR!PVR !TPVR/TSVR!PVR/SVR!   +-------------------------------------+----+---+----+----+---------+-------+   !Ramos                                 !    !   !8.03!2.94!         !       !   +-------------------------------------+----+---+----+----+---------+-------+   !Qp or Qs                              !    !   !8.03!2.94!         !       !   +-------------------------------------+----+---+----+----+---------+-------+    Signatures    ----------------------------------------------------------------    Electronically signed by MATHEUS IRELAND MD(Physician) on    12/22/2022 16:19    ----------------------------------------------------------------        -----------------------------------------------------  CXR/Imaging:   Imaging Results (Most Recent)       None            I personally reviewed and interpreted the CXR.      -----------------------------------------------------  CT:   No radiology results for the last 30 days.  I personally reviewed the images of the CT scan.  My personal interpretation is below.      ----------------------------------------------------    PFTs:    No PFTS available.      --------------------------------------------------------------------------------------------------    Laboratory evaluations:    Lab Results   Component Value Date    GLUCOSE 335 (H) 01/22/2024    BUN 27 (H) 01/22/2024    CREATININE 1.23 (H) 01/22/2024    EGFRIFNONA 49 (L) 11/10/2020    BCR 22.0 01/22/2024    K 4.6 01/22/2024    CO2 26.3 01/22/2024    CALCIUM 9.2 01/22/2024    ALBUMIN 3.5 11/19/2023    LABIL2 1.3 (L) 06/09/2016    AST 18 11/19/2023    ALT 14 11/19/2023     Lab Results   Component Value Date    WBC 8.33 11/21/2023    HGB 8.4 (L) 11/21/2023    HCT 30.8 (L) 11/21/2023    .4 (H) 11/21/2023     11/21/2023     Lab Results   Component Value Date    CHOL 137 09/11/2020    CHLPL 103 04/21/2016    TRIG 80 09/11/2020    HDL 43 09/11/2020    LDL 78 09/11/2020     Lab Results   Component Value Date    TSH 4.680 (H) 11/12/2023     Lab Results   Component Value Date    HGBA1C 7.30 (H) 11/13/2023     Lab Results   Component Value Date    ALT 14 11/19/2023     Lab Results   Component Value Date    HGBA1C 7.30 (H) 11/13/2023    HGBA1C 8.40 (H) 09/10/2020    HGBA1C 5.4 04/21/2016     Lab  Results   Component Value Date    MICROALBUR 3.2 09/12/2020    CREATININE 1.23 (H) 01/22/2024     Lab Results   Component Value Date    IRON 73 11/17/2023    TIBC 222 (L) 11/17/2023    FERRITIN 2,723.00 (H) 11/17/2023     Lab Results   Component Value Date    INR 0.97 11/15/2023    INR 1.20 (H) 11/12/2023    INR 1.10 12/15/2022    PROTIME 13.4 11/15/2023    PROTIME 15.8 (H) 11/12/2023    PROTIME 11.6 12/15/2022        Lab Results   Component Value Date    ABSOLUTELUNG 37 (A) 01/22/2024    ABSOLUTELUNG 37 (A) 12/08/2023    ABSOLUTELUNG 36 (A) 11/20/2023       PAH RISK ASSESSMENT:      1. CKD (chronic kidney disease) stage 2, GFR 60-89 ml/min    2. Chronic combined systolic and diastolic CHF (congestive heart failure)    3. Dilated cardiomyopathy    4. Upper respiratory tract infection, unspecified type          ORDERS PLACED TODAY:  Orders Placed This Encounter   Procedures    Respiratory Panel PCR w/COVID-19(SARS-CoV-2) DINA/YANET/CHRISTOPH/PAD/COR/PLACIDO In-House, NP Swab in UTM/VTM, 2 HR TAT - Swab, Nasopharynx    Basic Metabolic Panel    proBNP    Magnesium    Basic Metabolic Panel    proBNP    Magnesium    ReDs Vest        Diagnoses and all orders for this visit:    1. CKD (chronic kidney disease) stage 2, GFR 60-89 ml/min (Primary)  -     Basic Metabolic Panel; Future  -     Basic Metabolic Panel; Standing  -     Basic Metabolic Panel  -     ReDs Vest    2. Chronic combined systolic and diastolic CHF (congestive heart failure)  Overview:  11/12/20239612-NER-ZPPT 31 to 35%, mild LVH, grade 3 diastolic dysfunction with high LAP, moderately dilated left and right atrial cavities, dilated pulmonary artery, calcification of the left coronary cusp of the aortic valve    Orders:  -     Basic Metabolic Panel; Future  -     proBNP; Future  -     Magnesium; Future  -     Basic Metabolic Panel; Standing  -     Basic Metabolic Panel  -     proBNP; Standing  -     proBNP  -     Magnesium; Standing  -     Magnesium  -     ReDs Vest    3.  Dilated cardiomyopathy  Overview:  11/12/20238339-EEF-PRDQ 31 to 35%, mild LVH, grade 3 diastolic dysfunction with high LAP, dilated left and right atria, calcification of the left coronary cusp of the aortic valve, dilated pulmonary artery    Orders:  -     Basic Metabolic Panel; Future  -     Basic Metabolic Panel; Standing  -     Basic Metabolic Panel    4. Upper respiratory tract infection, unspecified type    Other orders  -     cefdinir (OMNICEF) 300 MG capsule; Take 1 capsule by mouth 2 (Two) Times a Day for 7 days.  Dispense: 14 capsule; Refill: 0  -     Respiratory Panel PCR w/COVID-19(SARS-CoV-2) DINA/YANET/CHRISTOPH/PAD/COR/PLACIDO In-House, NP Swab in UTM/VTM, 2 HR TAT - Swab, Nasopharynx; Standing  -     Respiratory Panel PCR w/COVID-19(SARS-CoV-2) DINA/YANET/CHRISTOPH/PAD/COR/PLACIDO In-House, NP Swab in UTM/VTM, 2 HR TAT - Swab, Nasopharynx  -     guaiFENesin (Mucinex) 600 MG 12 hr tablet; Take 2 tablets by mouth 2 (Two) Times a Day for 10 days.  Dispense: 40 tablet; Refill: 0             MEDS ORDERED TODAY:    New Medications Ordered This Visit   Medications    cefdinir (OMNICEF) 300 MG capsule     Sig: Take 1 capsule by mouth 2 (Two) Times a Day for 7 days.     Dispense:  14 capsule     Refill:  0    guaiFENesin (Mucinex) 600 MG 12 hr tablet     Sig: Take 2 tablets by mouth 2 (Two) Times a Day for 10 days.     Dispense:  40 tablet     Refill:  0        ---------------------------------------------------------------------------------------------------------------------------          ASSESSMENT/PLAN:      Diagnosis Plan   1. CKD (chronic kidney disease) stage 2, GFR 60-89 ml/min  Basic Metabolic Panel    Basic Metabolic Panel    Basic Metabolic Panel    ReDs Vest      2. Chronic combined systolic and diastolic CHF (congestive heart failure)  Basic Metabolic Panel    proBNP    Magnesium    Basic Metabolic Panel    Basic Metabolic Panel    proBNP    proBNP    Magnesium    Magnesium    ReDs Vest      3. Dilated cardiomyopathy  Basic  Metabolic Panel    Basic Metabolic Panel    Basic Metabolic Panel      4. Upper respiratory tract infection, unspecified type            not acutely decompensated chronic moderate systolic and diastolic heart failure. CHF. Etiology: Unknown/Idiopathic. LVEF 30-35%.     NYHA stage Stage D: Presence of advanced heart disease with continued heart failure symptoms requiring aggressive medical therapyFC-Class IV: Unable to carry on any physical activity without discomfort. Symptoms of heart failureat rest. If any physical activity is undertaken, discomfort increases.     Today, Patient is approaching euvolemia and with  Moderate perfusion. The patient's hemodynamics are currently acceptable. HR is: normal and is at goal. BP/MAP was reviewed and there is notroom for medication up-titration.  Clinical trajectory was assessed and hasimproved.     CHF GOAL DIRECTED MEDICAL THERAPY FOR PATIENT ADDRESSED/ADJUSTED:     GDMT    Drug Class   Drug   Dose Last Dose Adjustment Additional Titration   Notes   ACEi/ARB/ARNI ACEI previously stopped due to renal fxn       Beta Blocker  Metoprolol Succinate   12.5 mg qhs Reduced by PCP     MRA Spirono previously stopped due to renal fxn       SGLT2i Farxiga 10mg qd Transitioned from Farxiga to Jardiance in Hospitalization in 11/2023 N/A    Secondaries Verquevo 10mg qd Restart       Secondary management:     -CHF Specific BB:   Toprol XL 12.5mg. She reports she does have difficulty breaking in half at times.    Continue monitoring as she is also on amiodarone for her Afib.   Hold parameters reviewed.    Counseled on lifevest compliance.      -ACE/ARB/ARNi:   ACEi recently held during [facility] hospitalization.     -MRA:   The patient is FC-NYHA Class IV and MRA is indicated.   Spironolactone was previously stopped on regimen due to renal function.        -SGLT2 inhibitor therapy:    Farxiga transitioned to Jardiance during hospitalization in 2023.  Tolerating jardiance well, will continue at  present.    SGLT2i Restarted by  Nephro in September 2023; will continue to monitor for  symptoms.        Secondary pharmacological therapy in HFreF:   EF is less than 45% @ 30-35%.   Maximized on GDMT as tolerated thus far (see chart above)  Recent hospitalization or IV diuresis includes hospitalizations within the last year at [facility], [facility], and [facility] with acute heart failure in addition to afib with RVR.    Given HFrEF with optimally treated with primary therapies for HFrEF and vasodilator therapy, will continue Verquevo 10mg      -Diuretic regimen:   ReDS Vest reading for 01/22/24 is 37; continue monitoring  Continue daily Bumex.   BMP, Mag, & ProBNP reviewed with patient with toleration of current medication regimen.      -Fluid restriction/Sodium restriction:   Requested 2000 ml restriction  Patient has been asked to weigh daily and was provided with a printed diuretic strategy.  1,500 mg Na restriction was discussed.    -Acute vs. Chronic underlying conditions other than HF addressed during visit:     URI:   Viral respiratory swab negative.   Omnicef sent to pharmacy for 7 day course.   Patient reports she does not tolerate steroids well and wishes to only utilize them if she does not improve with abx.   Daily flonase adivsed.     Paroxysmal Atrial fibrillation/Flutter, currently SR:   Amiodarone stopped during her recent hosptialization.  She has EP upcoming in February 2024.    Continue Eliquis 5mg qd with [id number] at least 4.    Continue Toprol dosing at present.      CKD IIIb:   Monitor BMP. Reviewed today with creatinine appearing 1.23 with baseline previously 1.9-2.2 per review of OSH labs.        Debility:   Continue home health with PT/OT.   Multiple types of DME at home.     Iron/Anemia in CHF:  Prior Iron labs from [facility] with Iron 31, ferritin 1219.30, and TIBC 234 with iron saturation 13%.    Continue BID ferrous sulfate.          Identifiable barriers to Heart Failure  Self-care:   Medical Barriers:  Debility  Social Barriers: Transport limitations      --------------------------------------------------------------------------------          BMI cannot be calculated due to outdated height or weight values with patient unable to stand. She has self reported weight of 240.               >45 minutes out of 60 minutes face to face spent counseling patient extensively on dietary Na+ intake, importance of activity, weight monitoring, compliance with medications in addition to importance of titration with goal directed medical therapy and follow up appointments.            This document has been electronically signed by Sonja Romo PA-C  January 22, 2024 16:44 EST      Dictated Utilizing Dragon Dictation: Part of this note may be an electronic transcription/translation of spoken language to printed text using the Dragon Dictation System.    Follow-up appointment and medication changes provided in hand delivered After Visit Summary as well as reviewed in the room.

## 2024-01-22 NOTE — PROGRESS NOTES
Heart Failure Clinic    Date: 01/22/24     Vitals:    01/22/24 1516   BP: 114/58   Pulse: 82    Weight 239    Method of arrival: Other wheelchair    Weighing self daily: No    Monitoring Heart Failure Zones: No    Today's HF Zone: Yellow     Taking medications as prescribed: Yes    Edema Yes improved    Shortness of Air: Yes- but better    Number of pillows used at night:hospital bed    Educational Materials given:  avs                                                                         ReDS Value: 37  36-41 Possible Hypervolemic Status      Chris Aviles RN 01/22/24 15:18 EST

## 2024-01-26 RX ORDER — DOXYCYCLINE HYCLATE 100 MG
100 TABLET ORAL 2 TIMES DAILY
Qty: 20 TABLET | Refills: 0 | Status: SHIPPED | OUTPATIENT
Start: 2024-01-26

## 2024-01-26 RX ORDER — PREDNISONE 20 MG/1
40 TABLET ORAL DAILY
Qty: 10 TABLET | Refills: 0 | Status: SHIPPED | OUTPATIENT
Start: 2024-01-26 | End: 2024-01-31

## 2024-01-31 ENCOUNTER — APPOINTMENT (OUTPATIENT)
Dept: GENERAL RADIOLOGY | Facility: HOSPITAL | Age: 59
DRG: 309 | End: 2024-01-31
Payer: COMMERCIAL

## 2024-01-31 ENCOUNTER — HOSPITAL ENCOUNTER (INPATIENT)
Facility: HOSPITAL | Age: 59
LOS: 4 days | Discharge: HOME HEALTH | DRG: 309 | End: 2024-02-04
Attending: STUDENT IN AN ORGANIZED HEALTH CARE EDUCATION/TRAINING PROGRAM | Admitting: HOSPITALIST
Payer: COMMERCIAL

## 2024-01-31 DIAGNOSIS — I50.43 ACUTE ON CHRONIC COMBINED SYSTOLIC AND DIASTOLIC CHF (CONGESTIVE HEART FAILURE): Chronic | ICD-10-CM

## 2024-01-31 DIAGNOSIS — Z79.4 TYPE 2 DIABETES MELLITUS WITH HYPERGLYCEMIA, WITH LONG-TERM CURRENT USE OF INSULIN: ICD-10-CM

## 2024-01-31 DIAGNOSIS — E11.65 TYPE 2 DIABETES MELLITUS WITH HYPERGLYCEMIA, WITH LONG-TERM CURRENT USE OF INSULIN: ICD-10-CM

## 2024-01-31 DIAGNOSIS — I42.9 CARDIOMYOPATHY, UNSPECIFIED TYPE: ICD-10-CM

## 2024-01-31 DIAGNOSIS — I48.91 ATRIAL FIBRILLATION, UNSPECIFIED TYPE: Primary | ICD-10-CM

## 2024-01-31 DIAGNOSIS — K74.60 CIRRHOSIS OF LIVER WITHOUT ASCITES, UNSPECIFIED HEPATIC CIRRHOSIS TYPE: ICD-10-CM

## 2024-01-31 LAB
ALBUMIN SERPL-MCNC: 4.3 G/DL (ref 3.5–5.2)
ALBUMIN/GLOB SERPL: 1.4 G/DL
ALP SERPL-CCNC: 107 U/L (ref 39–117)
ALT SERPL W P-5'-P-CCNC: 40 U/L (ref 1–33)
ANION GAP SERPL CALCULATED.3IONS-SCNC: 13.5 MMOL/L (ref 5–15)
ANISOCYTOSIS BLD QL: NORMAL
AST SERPL-CCNC: 31 U/L (ref 1–32)
BASOPHILS # BLD AUTO: 0.09 10*3/MM3 (ref 0–0.2)
BASOPHILS NFR BLD AUTO: 0.7 % (ref 0–1.5)
BILIRUB CONJ SERPL-MCNC: 0.6 MG/DL (ref 0–0.3)
BILIRUB SERPL-MCNC: 3.4 MG/DL (ref 0–1.2)
BILIRUB UR QL STRIP: NEGATIVE
BUN SERPL-MCNC: 41 MG/DL (ref 6–20)
BUN/CREAT SERPL: 32.3 (ref 7–25)
CALCIUM SPEC-SCNC: 9.5 MG/DL (ref 8.6–10.5)
CHLORIDE SERPL-SCNC: 93 MMOL/L (ref 98–107)
CLARITY UR: CLEAR
CO2 SERPL-SCNC: 25.5 MMOL/L (ref 22–29)
COLOR UR: YELLOW
CREAT SERPL-MCNC: 1.27 MG/DL (ref 0.57–1)
CRP SERPL-MCNC: 0.33 MG/DL (ref 0–0.5)
D-LACTATE SERPL-SCNC: 1.6 MMOL/L (ref 0.5–2)
D-LACTATE SERPL-SCNC: 2.9 MMOL/L (ref 0.5–2)
DEPRECATED RDW RBC AUTO: 72.5 FL (ref 37–54)
EGFRCR SERPLBLD CKD-EPI 2021: 49.4 ML/MIN/1.73
EOSINOPHIL # BLD AUTO: 0.08 10*3/MM3 (ref 0–0.4)
EOSINOPHIL NFR BLD AUTO: 0.6 % (ref 0.3–6.2)
ERYTHROCYTE [DISTWIDTH] IN BLOOD BY AUTOMATED COUNT: 19.5 % (ref 12.3–15.4)
GEN 5 2HR TROPONIN T REFLEX: 96 NG/L
GLOBULIN UR ELPH-MCNC: 3 GM/DL
GLUCOSE BLDC GLUCOMTR-MCNC: 360 MG/DL (ref 70–130)
GLUCOSE SERPL-MCNC: 382 MG/DL (ref 65–99)
GLUCOSE UR STRIP-MCNC: ABNORMAL MG/DL
HCT VFR BLD AUTO: 39.1 % (ref 34–46.6)
HGB BLD-MCNC: 11.7 G/DL (ref 12–15.9)
HGB UR QL STRIP.AUTO: NEGATIVE
HOLD SPECIMEN: NORMAL
HOLD SPECIMEN: NORMAL
HYPOCHROMIA BLD QL: NORMAL
IMM GRANULOCYTES # BLD AUTO: 0.17 10*3/MM3 (ref 0–0.05)
IMM GRANULOCYTES NFR BLD AUTO: 1.2 % (ref 0–0.5)
INR PPP: 1.13 (ref 0.9–1.1)
KETONES UR QL STRIP: NEGATIVE
LEUKOCYTE ESTERASE UR QL STRIP.AUTO: NEGATIVE
LYMPHOCYTES # BLD AUTO: 2.97 10*3/MM3 (ref 0.7–3.1)
LYMPHOCYTES NFR BLD AUTO: 21.7 % (ref 19.6–45.3)
MACROCYTES BLD QL SMEAR: NORMAL
MAGNESIUM SERPL-MCNC: 2.3 MG/DL (ref 1.6–2.6)
MCH RBC QN AUTO: 30.7 PG (ref 26.6–33)
MCHC RBC AUTO-ENTMCNC: 29.9 G/DL (ref 31.5–35.7)
MCV RBC AUTO: 102.6 FL (ref 79–97)
MONOCYTES # BLD AUTO: 0.61 10*3/MM3 (ref 0.1–0.9)
MONOCYTES NFR BLD AUTO: 4.5 % (ref 5–12)
NEUTROPHILS NFR BLD AUTO: 71.3 % (ref 42.7–76)
NEUTROPHILS NFR BLD AUTO: 9.76 10*3/MM3 (ref 1.7–7)
NITRITE UR QL STRIP: NEGATIVE
NRBC BLD AUTO-RTO: 0 /100 WBC (ref 0–0.2)
NT-PROBNP SERPL-MCNC: 4500 PG/ML (ref 0–900)
PH UR STRIP.AUTO: 5.5 [PH] (ref 5–8)
PLAT MORPH BLD: NORMAL
PLATELET # BLD AUTO: 162 10*3/MM3 (ref 140–450)
PMV BLD AUTO: 10.2 FL (ref 6–12)
POTASSIUM SERPL-SCNC: 4.6 MMOL/L (ref 3.5–5.2)
PROCALCITONIN SERPL-MCNC: 0.42 NG/ML (ref 0–0.25)
PROT SERPL-MCNC: 7.3 G/DL (ref 6–8.5)
PROT UR QL STRIP: NEGATIVE
PROTHROMBIN TIME: 15.1 SECONDS (ref 12.1–14.7)
QT INTERVAL: 398 MS
QTC INTERVAL: 486 MS
RBC # BLD AUTO: 3.81 10*6/MM3 (ref 3.77–5.28)
SODIUM SERPL-SCNC: 132 MMOL/L (ref 136–145)
SP GR UR STRIP: 1.02 (ref 1–1.03)
STOMATOCYTES BLD QL SMEAR: NORMAL
TROPONIN T DELTA: -16 NG/L
TROPONIN T SERPL HS-MCNC: 101 NG/L
TROPONIN T SERPL HS-MCNC: 112 NG/L
TSH SERPL DL<=0.05 MIU/L-ACNC: 2.96 UIU/ML (ref 0.27–4.2)
UROBILINOGEN UR QL STRIP: ABNORMAL
WBC NRBC COR # BLD AUTO: 13.68 10*3/MM3 (ref 3.4–10.8)
WHOLE BLOOD HOLD COAG: NORMAL
WHOLE BLOOD HOLD SPECIMEN: NORMAL

## 2024-01-31 PROCEDURE — 63710000001 INSULIN GLARGINE PER 5 UNITS: Performed by: HOSPITALIST

## 2024-01-31 PROCEDURE — 86140 C-REACTIVE PROTEIN: CPT | Performed by: HOSPITALIST

## 2024-01-31 PROCEDURE — 25010000002 AMIODARONE IN DEXTROSE 5% 360-4.14 MG/200ML-% SOLUTION: Performed by: STUDENT IN AN ORGANIZED HEALTH CARE EDUCATION/TRAINING PROGRAM

## 2024-01-31 PROCEDURE — 25810000003 SODIUM CHLORIDE 0.9 % SOLUTION: Performed by: STUDENT IN AN ORGANIZED HEALTH CARE EDUCATION/TRAINING PROGRAM

## 2024-01-31 PROCEDURE — 83735 ASSAY OF MAGNESIUM: CPT | Performed by: STUDENT IN AN ORGANIZED HEALTH CARE EDUCATION/TRAINING PROGRAM

## 2024-01-31 PROCEDURE — 5A2204Z RESTORATION OF CARDIAC RHYTHM, SINGLE: ICD-10-PCS | Performed by: STUDENT IN AN ORGANIZED HEALTH CARE EDUCATION/TRAINING PROGRAM

## 2024-01-31 PROCEDURE — 92960 CARDIOVERSION ELECTRIC EXT: CPT

## 2024-01-31 PROCEDURE — 87147 CULTURE TYPE IMMUNOLOGIC: CPT | Performed by: HOSPITALIST

## 2024-01-31 PROCEDURE — 93005 ELECTROCARDIOGRAM TRACING: CPT | Performed by: STUDENT IN AN ORGANIZED HEALTH CARE EDUCATION/TRAINING PROGRAM

## 2024-01-31 PROCEDURE — 82948 REAGENT STRIP/BLOOD GLUCOSE: CPT

## 2024-01-31 PROCEDURE — 71045 X-RAY EXAM CHEST 1 VIEW: CPT | Performed by: RADIOLOGY

## 2024-01-31 PROCEDURE — 99223 1ST HOSP IP/OBS HIGH 75: CPT | Performed by: HOSPITALIST

## 2024-01-31 PROCEDURE — 85610 PROTHROMBIN TIME: CPT | Performed by: STUDENT IN AN ORGANIZED HEALTH CARE EDUCATION/TRAINING PROGRAM

## 2024-01-31 PROCEDURE — 87040 BLOOD CULTURE FOR BACTERIA: CPT | Performed by: HOSPITALIST

## 2024-01-31 PROCEDURE — 87154 CUL TYP ID BLD PTHGN 6+ TRGT: CPT | Performed by: HOSPITALIST

## 2024-01-31 PROCEDURE — 84484 ASSAY OF TROPONIN QUANT: CPT | Performed by: STUDENT IN AN ORGANIZED HEALTH CARE EDUCATION/TRAINING PROGRAM

## 2024-01-31 PROCEDURE — 63710000001 INSULIN LISPRO (HUMAN) PER 5 UNITS: Performed by: HOSPITALIST

## 2024-01-31 PROCEDURE — 82248 BILIRUBIN DIRECT: CPT | Performed by: STUDENT IN AN ORGANIZED HEALTH CARE EDUCATION/TRAINING PROGRAM

## 2024-01-31 PROCEDURE — 80050 GENERAL HEALTH PANEL: CPT | Performed by: STUDENT IN AN ORGANIZED HEALTH CARE EDUCATION/TRAINING PROGRAM

## 2024-01-31 PROCEDURE — 84145 PROCALCITONIN (PCT): CPT | Performed by: HOSPITALIST

## 2024-01-31 PROCEDURE — 25010000002 AMIODARONE IN DEXTROSE 5% 150-4.21 MG/100ML-% SOLUTION: Performed by: STUDENT IN AN ORGANIZED HEALTH CARE EDUCATION/TRAINING PROGRAM

## 2024-01-31 PROCEDURE — 85007 BL SMEAR W/DIFF WBC COUNT: CPT | Performed by: STUDENT IN AN ORGANIZED HEALTH CARE EDUCATION/TRAINING PROGRAM

## 2024-01-31 PROCEDURE — 94799 UNLISTED PULMONARY SVC/PX: CPT

## 2024-01-31 PROCEDURE — 84484 ASSAY OF TROPONIN QUANT: CPT | Performed by: HOSPITALIST

## 2024-01-31 PROCEDURE — 99285 EMERGENCY DEPT VISIT HI MDM: CPT

## 2024-01-31 PROCEDURE — 71045 X-RAY EXAM CHEST 1 VIEW: CPT

## 2024-01-31 PROCEDURE — 81003 URINALYSIS AUTO W/O SCOPE: CPT | Performed by: HOSPITALIST

## 2024-01-31 PROCEDURE — 36415 COLL VENOUS BLD VENIPUNCTURE: CPT

## 2024-01-31 PROCEDURE — 83880 ASSAY OF NATRIURETIC PEPTIDE: CPT | Performed by: HOSPITALIST

## 2024-01-31 PROCEDURE — 83605 ASSAY OF LACTIC ACID: CPT | Performed by: HOSPITALIST

## 2024-01-31 RX ORDER — BISACODYL 10 MG
10 SUPPOSITORY, RECTAL RECTAL DAILY PRN
Status: DISCONTINUED | OUTPATIENT
Start: 2024-01-31 | End: 2024-02-04 | Stop reason: HOSPADM

## 2024-01-31 RX ORDER — DEXTROSE MONOHYDRATE 25 G/50ML
25 INJECTION, SOLUTION INTRAVENOUS
Status: DISCONTINUED | OUTPATIENT
Start: 2024-01-31 | End: 2024-02-04 | Stop reason: HOSPADM

## 2024-01-31 RX ORDER — NICOTINE POLACRILEX 4 MG
15 LOZENGE BUCCAL
Status: DISCONTINUED | OUTPATIENT
Start: 2024-01-31 | End: 2024-02-04 | Stop reason: HOSPADM

## 2024-01-31 RX ORDER — AMIODARONE HYDROCHLORIDE 200 MG/1
200 TABLET ORAL EVERY 12 HOURS
Status: DISCONTINUED | OUTPATIENT
Start: 2024-02-08 | End: 2024-02-02

## 2024-01-31 RX ORDER — FLUTICASONE PROPIONATE 50 MCG
2 SPRAY, SUSPENSION (ML) NASAL DAILY
Status: DISCONTINUED | OUTPATIENT
Start: 2024-02-01 | End: 2024-02-04 | Stop reason: HOSPADM

## 2024-01-31 RX ORDER — CEFDINIR 300 MG/1
300 CAPSULE ORAL 2 TIMES DAILY
Status: ON HOLD | COMMUNITY
End: 2024-02-01

## 2024-01-31 RX ORDER — GLUCAGON 1 MG/ML
1 KIT INJECTION
Status: DISCONTINUED | OUTPATIENT
Start: 2024-01-31 | End: 2024-02-04 | Stop reason: HOSPADM

## 2024-01-31 RX ORDER — NITROFURANTOIN 25; 75 MG/1; MG/1
100 CAPSULE ORAL 2 TIMES DAILY
COMMUNITY
Start: 2024-01-30 | End: 2024-02-06

## 2024-01-31 RX ORDER — BUMETANIDE 2 MG/1
2 TABLET ORAL DAILY
COMMUNITY
End: 2024-02-19 | Stop reason: SDUPTHER

## 2024-01-31 RX ORDER — AMIODARONE HYDROCHLORIDE 200 MG/1
200 TABLET ORAL DAILY
Status: DISCONTINUED | OUTPATIENT
Start: 2024-02-22 | End: 2024-02-02

## 2024-01-31 RX ORDER — METOPROLOL SUCCINATE 25 MG/1
12.5 TABLET, EXTENDED RELEASE ORAL DAILY
Status: ON HOLD | COMMUNITY
End: 2024-02-01

## 2024-01-31 RX ORDER — POLYETHYLENE GLYCOL 3350 17 G/17G
17 POWDER, FOR SOLUTION ORAL DAILY PRN
Status: DISCONTINUED | OUTPATIENT
Start: 2024-01-31 | End: 2024-02-04 | Stop reason: HOSPADM

## 2024-01-31 RX ORDER — BISACODYL 5 MG/1
5 TABLET, DELAYED RELEASE ORAL DAILY PRN
Status: DISCONTINUED | OUTPATIENT
Start: 2024-01-31 | End: 2024-02-04 | Stop reason: HOSPADM

## 2024-01-31 RX ORDER — SODIUM CHLORIDE 9 MG/ML
40 INJECTION, SOLUTION INTRAVENOUS AS NEEDED
Status: DISCONTINUED | OUTPATIENT
Start: 2024-01-31 | End: 2024-02-04 | Stop reason: HOSPADM

## 2024-01-31 RX ORDER — NITROGLYCERIN 0.4 MG/1
0.4 TABLET SUBLINGUAL
Status: DISCONTINUED | OUTPATIENT
Start: 2024-01-31 | End: 2024-02-04 | Stop reason: HOSPADM

## 2024-01-31 RX ORDER — SODIUM CHLORIDE 0.9 % (FLUSH) 0.9 %
10 SYRINGE (ML) INJECTION AS NEEDED
Status: DISCONTINUED | OUTPATIENT
Start: 2024-01-31 | End: 2024-02-04 | Stop reason: HOSPADM

## 2024-01-31 RX ORDER — INSULIN LISPRO 100 [IU]/ML
3-14 INJECTION, SOLUTION INTRAVENOUS; SUBCUTANEOUS
Status: DISCONTINUED | OUTPATIENT
Start: 2024-01-31 | End: 2024-02-02

## 2024-01-31 RX ORDER — AMOXICILLIN 250 MG
2 CAPSULE ORAL 2 TIMES DAILY
Status: DISCONTINUED | OUTPATIENT
Start: 2024-01-31 | End: 2024-02-04 | Stop reason: HOSPADM

## 2024-01-31 RX ORDER — AMIODARONE HYDROCHLORIDE 200 MG/1
200 TABLET ORAL EVERY 8 HOURS
Status: DISCONTINUED | OUTPATIENT
Start: 2024-02-02 | End: 2024-02-02

## 2024-01-31 RX ORDER — SODIUM CHLORIDE 0.9 % (FLUSH) 0.9 %
10 SYRINGE (ML) INJECTION EVERY 12 HOURS SCHEDULED
Status: DISCONTINUED | OUTPATIENT
Start: 2024-01-31 | End: 2024-02-04 | Stop reason: HOSPADM

## 2024-01-31 RX ORDER — AMIODARONE HYDROCHLORIDE 200 MG/1
200 TABLET ORAL ONCE
Status: COMPLETED | OUTPATIENT
Start: 2024-02-01 | End: 2024-02-01

## 2024-01-31 RX ADMIN — AMIODARONE HYDROCHLORIDE 1 MG/MIN: 1.8 INJECTION, SOLUTION INTRAVENOUS at 16:39

## 2024-01-31 RX ADMIN — Medication 10 ML: at 21:40

## 2024-01-31 RX ADMIN — AMIODARONE HYDROCHLORIDE 0.5 MG/MIN: 1.8 INJECTION, SOLUTION INTRAVENOUS at 23:09

## 2024-01-31 RX ADMIN — INSULIN LISPRO 12 UNITS: 100 INJECTION, SOLUTION INTRAVENOUS; SUBCUTANEOUS at 21:31

## 2024-01-31 RX ADMIN — AMIODARONE HYDROCHLORIDE 150 MG: 1.5 INJECTION, SOLUTION INTRAVENOUS at 16:28

## 2024-01-31 RX ADMIN — SODIUM CHLORIDE 1000 ML: 9 INJECTION, SOLUTION INTRAVENOUS at 16:44

## 2024-01-31 RX ADMIN — INSULIN GLARGINE 30 UNITS: 100 INJECTION, SOLUTION SUBCUTANEOUS at 21:31

## 2024-01-31 RX ADMIN — APIXABAN 5 MG: 5 TABLET, FILM COATED ORAL at 21:39

## 2024-01-31 NOTE — ED PROVIDER NOTES
Subjective   History of Present Illness  Patient is a 58-year-old female with history significant for atrial fibrillation, cardiomyopathy type 2 diabetes mellitus who comes to the ER with complaints that her LifeVest has been trying to fire for the past 2 days.  Patient states she is felt unwell for the past few days.  Was on antibiotics for sinus congestion but was recently switched yesterday by her PCP for different antibiotics urinary tract infection.  She states she feels unwell but denies any chest pain/pressure or tightness.  She is alert and orient x 3.  She says LifeVest has been beeping trying to shock her so she took it off.      Review of Systems   Constitutional:  Positive for fatigue. Negative for chills and fever.   HENT:  Negative for ear pain, sinus pain and sore throat.    Respiratory:  Negative for cough, chest tightness, shortness of breath and wheezing.    Cardiovascular:  Negative for chest pain, palpitations and leg swelling.   Gastrointestinal:  Negative for abdominal pain, constipation, diarrhea, nausea and vomiting.   Genitourinary:  Negative for dysuria, hematuria and urgency.   Musculoskeletal:  Negative for arthralgias and myalgias.   Neurological:  Positive for weakness. Negative for dizziness, syncope and light-headedness.   Psychiatric/Behavioral:  Negative for confusion.        Past Medical History:   Diagnosis Date    Anemia     CHF (congestive heart failure)     Chronic a-fib     stated by patient     Cirrhosis of liver     stated by patient     Diabetes mellitus     Ventricular tachycardia        Allergies   Allergen Reactions    Phenergan [Promethazine]        Past Surgical History:   Procedure Laterality Date    APPENDECTOMY      CARDIAC CATHETERIZATION N/A 11/10/2020    Procedure: Left Heart Cath;  Surgeon: Charlee Ken MD;  Location: [facility] CATH INVASIVE LOCATION;  Service: Cardiovascular;  Laterality: N/A;    CHOLECYSTECTOMY      TOE AMPUTATION Right     stated by patient.         Family History   Problem Relation Age of Onset    Heart attack Father     Heart attack Brother        Social History     Socioeconomic History    Marital status:    Tobacco Use    Smoking status: Never    Smokeless tobacco: Never   Vaping Use    Vaping Use: Never used   Substance and Sexual Activity    Alcohol use: Never    Drug use: Never    Sexual activity: Defer           Objective   Physical Exam  Vitals and nursing note reviewed. Exam conducted with a chaperone present.   Constitutional:       Appearance: Normal appearance. She is obese.   HENT:      Head: Normocephalic and atraumatic.      Nose: Nose normal.      Mouth/Throat:      Mouth: Mucous membranes are moist.      Pharynx: Oropharynx is clear.   Eyes:      Extraocular Movements: Extraocular movements intact.      Conjunctiva/sclera: Conjunctivae normal.      Pupils: Pupils are equal, round, and reactive to light.   Cardiovascular:      Rate and Rhythm: Tachycardia present. Rhythm irregular.      Pulses: Normal pulses.      Heart sounds: Normal heart sounds.   Pulmonary:      Effort: Pulmonary effort is normal.      Breath sounds: Normal breath sounds.   Abdominal:      General: Bowel sounds are normal.      Palpations: Abdomen is soft.   Musculoskeletal:         General: Normal range of motion.      Cervical back: Normal range of motion and neck supple.      Right lower leg: No edema.      Left lower leg: No edema.   Skin:     General: Skin is warm and dry.      Capillary Refill: Capillary refill takes less than 2 seconds.   Neurological:      General: No focal deficit present.      Mental Status: She is alert and oriented to person, place, and time.   Psychiatric:         Mood and Affect: Mood normal.         Behavior: Behavior normal.         Electrical Cardioversion    Date/Time: 1/31/2024 4:36 PM    Performed by: Corey Fields DO  Authorized by: Corey Fields DO    Consent:     Consent obtained:  Verbal and  emergent situation    Consent given by:  Patient    Risks, benefits, and alternatives were discussed: yes      Risks discussed:  Cutaneous burn, death, induced arrhythmia and pain    Alternatives discussed:  No treatment, anti-coagulation medication, rate-control medication, delayed treatment, alternative treatment and observation  Universal protocol:     Procedure explained and questions answered to patient or proxy's satisfaction: yes      Relevant documents present and verified: yes      Test results available: yes      Imaging studies available: yes      Required blood products, implants, devices, and special equipment available: yes      Site/side marked: yes      Immediately prior to procedure, a time out was called: yes      Patient identity confirmed:  Verbally with patient  Pre-procedure details:     Cardioversion basis:  Emergent    Rhythm:  Ventricular tachycardia    Electrode placement:  Anterior-posterior  Attempt one:     Cardioversion mode:  Synchronous    Waveform:  Biphasic    Shock (Joules):  150    Shock outcome:  Conversion to normal sinus rhythm  Post-procedure details:     Patient status:  Awake    Procedure completion:  Tolerated well, no immediate complications             ED Course  ED Course as of 01/31/24 1815 Wed Jan 31, 2024   1627 ECG 12 Lead Chest Pain  Wide-complex tachycardia, rate 197, QTc 480, A-fib with RVR with aberrancy [CW]   1627 ECG 12 Lead Chest Pain  Wide-complex tachycardia, rate 106, QTc 478 A-fib with aberrancy [CW]   1640 ECG 12 Lead Chest Pain  Post cardioversion normal sinus rhythm, rate 90, QTc 46, no acute ST or T wave changes [CW]      ED Course User Index  [CW] Corey Fields, DO                                             Medical Decision Making  --On arrival, patient in a wide-complex tachyarrhythmia with rates in the 180s.  EKG appeared to be A-fib with aberrancy.  Confirmed with cardiology  --Patient became hypotensive during amiodarone loading  bolus, synchronized cardioversion at 150 J with conversion to normal sinus rhythm  --Labs WBC 13 K, T. bili 3.4-likely due to cirrhosis  --Amiodarone bolus followed by gtt.  --Patient has remained in normal sinus rhythm after synchronized cardioversion, normotensive, continue amiodarone gtt., discussed with medicine, will admit    Problems Addressed:  Atrial fibrillation, unspecified type: complicated acute illness or injury  Cardiomyopathy, unspecified type: complicated acute illness or injury    Amount and/or Complexity of Data Reviewed  Labs: ordered.  Radiology: ordered.  ECG/medicine tests: ordered. Decision-making details documented in ED Course.    Risk  Prescription drug management.        Final diagnoses:   Atrial fibrillation, unspecified type   Cardiomyopathy, unspecified type       ED Disposition  ED Disposition       ED Disposition   Decision to Admit    Condition   --    Comment   Level of Care: Progressive Care [20]   Diagnosis: Atrial fibrillation with RVR [[id number]]   Certification: I Certify That Inpatient Hospital Services Are Medically Necessary For Greater Than 2 Midnights                 No follow-up provider specified.       Medication List      No changes were made to your prescriptions during this visit.            Corey Fields DO  01/31/24 1814

## 2024-01-31 NOTE — ED NOTES
Pt removed from life vest at this time per provider verbal order and placed on Zolls monitoring with d-fib pads.

## 2024-01-31 NOTE — ED NOTES
In room at this time with provider preparing pt for cardioversion, pt verbalizes understanding of procedure.

## 2024-01-31 NOTE — CASE MANAGEMENT/SOCIAL WORK
Discharge Planning Assessment   Isaías     Patient Name: Yumiko Purdy  MRN: [id number]  Today's Date: 1/31/2024    Admit Date: 1/31/2024    Plan: Spoke with patient at bedside she lives at home with son Kleber and will return at discharge. Patient has no POA and Living Will. Patient uses a bsc, shower chair, walker, wheelchair and oxygen from [facility]. Patient has [facility] for home care agency. Patients PCP Masha Davidson and pharmacy [facility]. Patient family will transport home at discharge.   Discharge Needs Assessment       Row Name 01/31/24 1652       Living Environment    People in Home child(ferdinand), adult    Name(s) of People in Home Kleber Purdy son [phone]    Potentially Unsafe Housing Conditions none    In the past 12 months has the electric, gas, oil, or water company threatened to shut off services in your home? No    Primary Care Provided by self;child(ferdinand)    Provides Primary Care For no one    Family Caregiver if Needed child(ferdinand), adult    Family Caregiver Names Bolivar Purdy daughter [phone]    Quality of Family Relationships helpful;involved;supportive    Able to Return to Prior Arrangements yes       Resource/Environmental Concerns    Resource/Environmental Concerns none    Transportation Concerns none       Transportation Needs    In the past 12 months, has lack of transportation kept you from medical appointments or from getting medications? no    In the past 12 months, has lack of transportation kept you from meetings, work, or from getting things needed for daily living? No       Food Insecurity    Within the past 12 months, you worried that your food would run out before you got the money to buy more. Never true    Within the past 12 months, the food you bought just didn't last and you didn't have money to get more. Never true       Transition Planning    Patient/Family Anticipates Transition to home    Patient/Family Anticipated Services at Transition none    Transportation  Anticipated family or friend will provide       Discharge Needs Assessment    Readmission Within the Last 30 Days no previous admission in last 30 days    Current Outpatient/Agency/Support Group homecare agency    Equipment Currently Used at Home commode;shower chair;walker, standard;wheelchair    Concerns to be Addressed discharge planning    Anticipated Changes Related to Illness none    Equipment Needed After Discharge none                   Discharge Plan       Row Name 01/31/24 1658       Plan    Plan Spoke with patient at bedside she lives at home with son Kleber and will return at discharge. Patient has no POA and Living Will. Patient uses a bsc, shower chair, walker, wheelchair and oxygen from [facility]. Patient has [facility] for home care agency. Patients PCP Masha Davidson and pharmacy [facility]. Patient family will transport home at discharge.    Patient/Family in Agreement with Plan yes                  Continued Care and Services - Admitted Since 1/31/2024    Coordination has not been started for this encounter.       Selected Continued Care - Episodes Includes continued care and service providers with selected services from the active episodes listed below      Heart Failure Episode start date: 12/8/2023   There are no active outsourced providers for this episode.                 Selected Continued Care - Prior Encounters Includes continued care and service providers with selected services from prior encounters from 11/2/2023 to 1/31/2024      Discharged on 11/21/2023 Admission date: 11/12/2023 - Discharge disposition: Home-Health Care Svc      Home Medical Care       Service Provider Selected Services Address Phone Fax Patient Preferred    [facility] [address] [place] [address] [place] [address] [phone] --                     Dona Mcmanus

## 2024-02-01 LAB
GLUCOSE BLDC GLUCOMTR-MCNC: 248 MG/DL (ref 70–130)
GLUCOSE BLDC GLUCOMTR-MCNC: 260 MG/DL (ref 70–130)
GLUCOSE BLDC GLUCOMTR-MCNC: 276 MG/DL (ref 70–130)
GLUCOSE BLDC GLUCOMTR-MCNC: 353 MG/DL (ref 70–130)
GLUCOSE BLDC GLUCOMTR-MCNC: 368 MG/DL (ref 70–130)
QT INTERVAL: 272 MS
QT INTERVAL: 272 MS
QT INTERVAL: 464 MS
QT INTERVAL: 494 MS
QTC INTERVAL: 478 MS
QTC INTERVAL: 480 MS
QTC INTERVAL: 515 MS
QTC INTERVAL: 529 MS

## 2024-02-01 PROCEDURE — P9047 ALBUMIN (HUMAN), 25%, 50ML: HCPCS | Performed by: HOSPITALIST

## 2024-02-01 PROCEDURE — 99232 SBSQ HOSP IP/OBS MODERATE 35: CPT | Performed by: STUDENT IN AN ORGANIZED HEALTH CARE EDUCATION/TRAINING PROGRAM

## 2024-02-01 PROCEDURE — 63710000001 INSULIN LISPRO (HUMAN) PER 5 UNITS: Performed by: HOSPITALIST

## 2024-02-01 PROCEDURE — 25010000002 AMIODARONE IN DEXTROSE 5% 360-4.14 MG/200ML-% SOLUTION: Performed by: STUDENT IN AN ORGANIZED HEALTH CARE EDUCATION/TRAINING PROGRAM

## 2024-02-01 PROCEDURE — 94799 UNLISTED PULMONARY SVC/PX: CPT

## 2024-02-01 PROCEDURE — 93005 ELECTROCARDIOGRAM TRACING: CPT | Performed by: STUDENT IN AN ORGANIZED HEALTH CARE EDUCATION/TRAINING PROGRAM

## 2024-02-01 PROCEDURE — 25010000002 ALBUMIN HUMAN 25% PER 50 ML: Performed by: HOSPITALIST

## 2024-02-01 PROCEDURE — 63710000001 INSULIN GLARGINE PER 5 UNITS: Performed by: HOSPITALIST

## 2024-02-01 PROCEDURE — 63710000001 ONDANSETRON ODT 4 MG TABLET DISPERSIBLE: Performed by: STUDENT IN AN ORGANIZED HEALTH CARE EDUCATION/TRAINING PROGRAM

## 2024-02-01 PROCEDURE — 82948 REAGENT STRIP/BLOOD GLUCOSE: CPT

## 2024-02-01 RX ORDER — GUAIFENESIN 600 MG/1
1200 TABLET, EXTENDED RELEASE ORAL 2 TIMES DAILY
Status: COMPLETED | OUTPATIENT
Start: 2024-02-01 | End: 2024-02-02

## 2024-02-01 RX ORDER — PANTOPRAZOLE SODIUM 40 MG/1
40 TABLET, DELAYED RELEASE ORAL
Status: CANCELLED | OUTPATIENT
Start: 2024-02-02

## 2024-02-01 RX ORDER — ACETAMINOPHEN 500 MG
500 TABLET ORAL 2 TIMES DAILY PRN
Status: DISCONTINUED | OUTPATIENT
Start: 2024-02-01 | End: 2024-02-04 | Stop reason: HOSPADM

## 2024-02-01 RX ORDER — BUMETANIDE 1 MG/1
2 TABLET ORAL 2 TIMES DAILY
Status: CANCELLED | OUTPATIENT
Start: 2024-02-01

## 2024-02-01 RX ORDER — BUSPIRONE HYDROCHLORIDE 10 MG/1
10 TABLET ORAL 2 TIMES DAILY
Status: CANCELLED | OUTPATIENT
Start: 2024-02-01

## 2024-02-01 RX ORDER — ALBUMIN (HUMAN) 12.5 G/50ML
25 SOLUTION INTRAVENOUS
Qty: 200 ML | Refills: 0 | Status: COMPLETED | OUTPATIENT
Start: 2024-02-01 | End: 2024-02-01

## 2024-02-01 RX ORDER — PANTOPRAZOLE SODIUM 40 MG/1
40 TABLET, DELAYED RELEASE ORAL
Status: DISCONTINUED | OUTPATIENT
Start: 2024-02-02 | End: 2024-02-04 | Stop reason: HOSPADM

## 2024-02-01 RX ORDER — FLUTICASONE PROPIONATE 50 MCG
2 SPRAY, SUSPENSION (ML) NASAL DAILY PRN
Status: CANCELLED | OUTPATIENT
Start: 2024-02-01

## 2024-02-01 RX ORDER — NITROFURANTOIN 25; 75 MG/1; MG/1
100 CAPSULE ORAL 2 TIMES DAILY
Status: CANCELLED | OUTPATIENT
Start: 2024-02-01 | End: 2024-02-07

## 2024-02-01 RX ORDER — FERROUS SULFATE 325(65) MG
325 TABLET ORAL DAILY
Status: DISCONTINUED | OUTPATIENT
Start: 2024-02-02 | End: 2024-02-04 | Stop reason: HOSPADM

## 2024-02-01 RX ORDER — ASPIRIN 81 MG/1
81 TABLET ORAL DAILY
Status: CANCELLED | OUTPATIENT
Start: 2024-02-01

## 2024-02-01 RX ORDER — BUMETANIDE 1 MG/1
2 TABLET ORAL WEEKLY
Status: CANCELLED | OUTPATIENT
Start: 2024-02-01

## 2024-02-01 RX ORDER — FLUTICASONE PROPIONATE 50 MCG
2 SPRAY, SUSPENSION (ML) NASAL DAILY PRN
Status: DISCONTINUED | OUTPATIENT
Start: 2024-02-01 | End: 2024-02-04 | Stop reason: HOSPADM

## 2024-02-01 RX ORDER — LEVOTHYROXINE SODIUM 0.05 MG/1
50 TABLET ORAL DAILY
Status: CANCELLED | OUTPATIENT
Start: 2024-02-01

## 2024-02-01 RX ORDER — BUMETANIDE 2 MG/1
2 TABLET ORAL WEEKLY
COMMUNITY
End: 2024-02-19

## 2024-02-01 RX ORDER — NITROFURANTOIN 25; 75 MG/1; MG/1
100 CAPSULE ORAL 2 TIMES DAILY
Status: DISCONTINUED | OUTPATIENT
Start: 2024-02-01 | End: 2024-02-04 | Stop reason: HOSPADM

## 2024-02-01 RX ORDER — ACETAMINOPHEN 500 MG
500 TABLET ORAL 2 TIMES DAILY PRN
Status: CANCELLED | OUTPATIENT
Start: 2024-02-01

## 2024-02-01 RX ORDER — HYDROCODONE BITARTRATE AND ACETAMINOPHEN 7.5; 325 MG/1; MG/1
1 TABLET ORAL EVERY 12 HOURS PRN
Status: DISCONTINUED | OUTPATIENT
Start: 2024-02-01 | End: 2024-02-04 | Stop reason: HOSPADM

## 2024-02-01 RX ORDER — BUMETANIDE 1 MG/1
2 TABLET ORAL
Status: DISCONTINUED | OUTPATIENT
Start: 2024-02-01 | End: 2024-02-04 | Stop reason: HOSPADM

## 2024-02-01 RX ORDER — BUSPIRONE HYDROCHLORIDE 10 MG/1
10 TABLET ORAL 2 TIMES DAILY
Status: DISCONTINUED | OUTPATIENT
Start: 2024-02-01 | End: 2024-02-04 | Stop reason: HOSPADM

## 2024-02-01 RX ORDER — FERROUS SULFATE 325(65) MG
325 TABLET ORAL DAILY
Status: CANCELLED | OUTPATIENT
Start: 2024-02-01

## 2024-02-01 RX ORDER — INSULIN HUMAN 500 [IU]/ML
40 INJECTION, SOLUTION SUBCUTANEOUS
Status: ON HOLD | COMMUNITY
End: 2024-02-01

## 2024-02-01 RX ORDER — METOPROLOL SUCCINATE 25 MG/1
25 TABLET, EXTENDED RELEASE ORAL
Status: DISCONTINUED | OUTPATIENT
Start: 2024-02-01 | End: 2024-02-04 | Stop reason: HOSPADM

## 2024-02-01 RX ORDER — NITROFURANTOIN 25; 75 MG/1; MG/1
100 CAPSULE ORAL 2 TIMES DAILY
Status: CANCELLED | OUTPATIENT
Start: 2024-02-01 | End: 2024-02-06

## 2024-02-01 RX ORDER — GUAIFENESIN 600 MG/1
1200 TABLET, EXTENDED RELEASE ORAL 2 TIMES DAILY
Status: CANCELLED | OUTPATIENT
Start: 2024-02-01 | End: 2024-02-02

## 2024-02-01 RX ORDER — LEVOTHYROXINE SODIUM 0.05 MG/1
50 TABLET ORAL DAILY
Status: DISCONTINUED | OUTPATIENT
Start: 2024-02-02 | End: 2024-02-04 | Stop reason: HOSPADM

## 2024-02-01 RX ORDER — VALSARTAN 80 MG/1
40 TABLET ORAL
Status: DISCONTINUED | OUTPATIENT
Start: 2024-02-01 | End: 2024-02-04 | Stop reason: HOSPADM

## 2024-02-01 RX ORDER — BUMETANIDE 1 MG/1
2 TABLET ORAL
Status: DISCONTINUED | OUTPATIENT
Start: 2024-02-04 | End: 2024-02-04 | Stop reason: HOSPADM

## 2024-02-01 RX ORDER — ACETAMINOPHEN 325 MG/1
325 TABLET ORAL EVERY 6 HOURS PRN
Status: DISCONTINUED | OUTPATIENT
Start: 2024-02-01 | End: 2024-02-04 | Stop reason: HOSPADM

## 2024-02-01 RX ORDER — INSULIN LISPRO 100 [IU]/ML
15 INJECTION, SOLUTION INTRAVENOUS; SUBCUTANEOUS
Status: CANCELLED | OUTPATIENT
Start: 2024-02-01

## 2024-02-01 RX ORDER — HYDROCODONE BITARTRATE AND ACETAMINOPHEN 7.5; 325 MG/1; MG/1
1 TABLET ORAL 2 TIMES DAILY
Status: DISCONTINUED | OUTPATIENT
Start: 2024-02-01 | End: 2024-02-01

## 2024-02-01 RX ORDER — FLUTICASONE PROPIONATE 50 MCG
2 SPRAY, SUSPENSION (ML) NASAL DAILY PRN
COMMUNITY

## 2024-02-01 RX ORDER — ONDANSETRON 4 MG/1
4 TABLET, ORALLY DISINTEGRATING ORAL ONCE
Status: COMPLETED | OUTPATIENT
Start: 2024-02-01 | End: 2024-02-01

## 2024-02-01 RX ORDER — BUMETANIDE 1 MG/1
2 TABLET ORAL DAILY
Status: DISCONTINUED | OUTPATIENT
Start: 2024-02-01 | End: 2024-02-01

## 2024-02-01 RX ADMIN — INSULIN LISPRO 12 UNITS: 100 INJECTION, SOLUTION INTRAVENOUS; SUBCUTANEOUS at 21:17

## 2024-02-01 RX ADMIN — NITROFURANTOIN MONOHYDRATE/MACROCRYSTALLINE 100 MG: 25; 75 CAPSULE ORAL at 21:16

## 2024-02-01 RX ADMIN — Medication 10 ML: at 21:17

## 2024-02-01 RX ADMIN — APIXABAN 5 MG: 5 TABLET, FILM COATED ORAL at 21:17

## 2024-02-01 RX ADMIN — ACETAMINOPHEN 325 MG: 325 TABLET ORAL at 18:31

## 2024-02-01 RX ADMIN — INSULIN LISPRO 12 UNITS: 100 INJECTION, SOLUTION INTRAVENOUS; SUBCUTANEOUS at 17:11

## 2024-02-01 RX ADMIN — ACETAMINOPHEN 325 MG: 325 TABLET ORAL at 06:13

## 2024-02-01 RX ADMIN — GUAIFENESIN 1200 MG: 600 TABLET, EXTENDED RELEASE ORAL at 21:16

## 2024-02-01 RX ADMIN — INSULIN GLARGINE 30 UNITS: 100 INJECTION, SOLUTION SUBCUTANEOUS at 08:26

## 2024-02-01 RX ADMIN — INSULIN LISPRO 8 UNITS: 100 INJECTION, SOLUTION INTRAVENOUS; SUBCUTANEOUS at 11:51

## 2024-02-01 RX ADMIN — ACETAMINOPHEN 325 MG: 325 TABLET ORAL at 00:33

## 2024-02-01 RX ADMIN — Medication 10 ML: at 08:27

## 2024-02-01 RX ADMIN — BUSPIRONE HYDROCHLORIDE 10 MG: 10 TABLET ORAL at 21:16

## 2024-02-01 RX ADMIN — ALBUMIN HUMAN 25 G: 0.25 SOLUTION INTRAVENOUS at 02:47

## 2024-02-01 RX ADMIN — AMIODARONE HYDROCHLORIDE 0.5 MG/MIN: 1.8 INJECTION, SOLUTION INTRAVENOUS at 11:51

## 2024-02-01 RX ADMIN — APIXABAN 5 MG: 5 TABLET, FILM COATED ORAL at 08:26

## 2024-02-01 RX ADMIN — INSULIN GLARGINE 30 UNITS: 100 INJECTION, SOLUTION SUBCUTANEOUS at 21:17

## 2024-02-01 RX ADMIN — FLUTICASONE PROPIONATE 2 SPRAY: 50 SPRAY, METERED NASAL at 00:21

## 2024-02-01 RX ADMIN — HYDROCODONE BITARTRATE AND ACETAMINOPHEN 1 TABLET: 7.5; 325 TABLET ORAL at 11:54

## 2024-02-01 RX ADMIN — INSULIN LISPRO 5 UNITS: 100 INJECTION, SOLUTION INTRAVENOUS; SUBCUTANEOUS at 08:26

## 2024-02-01 RX ADMIN — ONDANSETRON 4 MG: 4 TABLET, ORALLY DISINTEGRATING ORAL at 12:58

## 2024-02-01 RX ADMIN — AMIODARONE HYDROCHLORIDE 200 MG: 200 TABLET ORAL at 17:01

## 2024-02-01 RX ADMIN — ALBUMIN HUMAN 25 G: 0.25 SOLUTION INTRAVENOUS at 02:18

## 2024-02-01 NOTE — CASE MANAGEMENT/SOCIAL WORK
Discharge Planning Assessment   Isaías     Patient Name: Yumiko Purdy  MRN: [id number]  Today's Date: 2/1/2024    Admit Date: 1/31/2024     Discharge Plan       Row Name 02/01/24 1247       Plan    Plan ED CM completed initial consult. SS received consult for Patient has Home Health, Discharge Planning. SS spoke with pt at bedside. Pt lives with son at [address] [place] [address] ([facility]) and plans to return back home at discharge. Pt utilizes [facility] for nursing and aide services. [facility] will need new order at discharge. Pt PCP Masha Davidson. Pt utilizes BSC, shower chair, walker wheelchair and home oxygen at 2 liters PRN via [facility]. Pt does not have POA/living will. Pt family to provide transportation at discharge. Pt states she has a cardiologist appointment at [facility] in [place] on February 8th. SS to follow              SAMY Gong

## 2024-02-01 NOTE — PAYOR COMM NOTE
"[facility]  NPI:[id number]    Utilization Review  Contact: Sanyd Powell RN  Phone: [phone]  Fax:[phone]    INITIATE INPATIENT AUTHORIZATION  Dio Berry (58 y.o. Female)       Date of Birth   1965    Social Security Number       Address   [address]  [place] [address]    Home Phone   [phone]    MRN   [id number]       [facility]    Marital Status                               Admission Date   24    Admission Type   Emergency    Admitting Provider   Tomás An MD    Attending Provider   Tomás An MD    Department, Room/Bed   [facility] PROGRESS CARE, P212/S2       Discharge Date       Discharge Disposition       Discharge Destination                                 Attending Provider: Tomás An MD    Allergies: Phenergan [Promethazine]    Isolation: None   Infection: None   Code Status: CPR    Ht: 165.1 cm (65\")   Wt: 104 kg (229 lb 15 oz)    Admission Cmt: None   Principal Problem: Atrial fibrillation with RVR [I48.91]                   Active Insurance as of 2024       Primary Coverage       Payor Plan Insurance Group Employer/Plan Group    [employer] [place] [employer] [place]        Payor Plan Address Payor Plan Phone Number Payor Plan Fax Number Effective Dates    [address] [address] [phone]  2020 - None Entered    [facility] [address]         Subscriber Name Subscriber Birth Date Member ID       DIO BERRY 1965 [id number]                     Emergency Contacts        (Rel.) Home Phone Work Phone Mobile Phone    KATHY BERRY (Son) [phone] -- --    GurwinderBolivar (Other) -- -- [phone]                 History & Physical        Tomás An MD at 24          Hospitalist History and Physical        Patient Identification  Name: Dio Berry  Age/Sex: 58 y.o. female  :  1965        MRN: [id number]  Visit Number: [id number]  Admit Date: " 1/31/2024   PCP: Masha Davidson APRN          Chief complaint heart racing    History of Present Illness:  Patient is a 58 y.o. female with history of systolic and diastolic CHF (EF 31-35%, grade III diastolic dysfunction per ECHO 11/2023), chronic afib, cirrhosis, insulin dependent type II DM, stage IIIa CKD (cr 1.2, GFR upper 40s-low 50s), Vtach and anemia. She reports she had a LifeVest placed about 2 months ago and is scheduled to see a cardiologist next week to discuss AICD placement. She presents today with complaints of palpitations that started earlier today. She informed ED provider that her LifeVest has been beeping and trying to shock her for the last 2 days, so she took it off. Upon further questioning, she reports last week her PCP started her on an antibiotic for a sinus infection which helped with her symptoms, but in the interim she developed dysuria and cloudy, foul smelling urine and presented back to her PCP yesterday, at which time he obtained a urine sample for UA and told the patient she had a UTI. Her PCP then changed her antibiotic to nitrofurantoin for the UTI. She thinks she has had 3 doses of this antibiotic thus far. In the ED, patient's HR was 184 upon arrival. EKG showed wide complex QRS tachycardia, felt to be afib w/aberrancy which was confirmed by cardiology. BP was initially stable. She was started on IV amiodarone; during loading bolus she became hypotensive, requiring synchronized cardioversion at 150 J with subsequent conversion to sinus rhythm. She has maintained sinus rhythm on amiodarone infusion since then and BP has remained stable. Labs showed elevated troponin 112 with fairly flat trend since then overall, with repeat 96 followed by 101, and looking back she had higher troponin levels in Nov 2023. BNP is 4500. K+ and mag are normal. Creatinine is stable at 1.27, glucose 382, ALT 40, total bili 3.4 but direct only 0.6. TSH is normal at 2.960. WBC is 13, procal 0.42,  lactate 2.9, and CRP 0.33. UA is pending. CXR showed mild cardiomegaly but no acute findings. Patient has been admitted to the PCU for further workup and management.     Review of Systems  Review of Systems   Constitutional:  Negative for chills, fatigue and fever.   HENT:  Negative for postnasal drip, rhinorrhea, sinus pressure, sinus pain and sore throat.    Respiratory:  Negative for cough, shortness of breath and wheezing.    Cardiovascular:  Positive for chest pain (with palpitations earlier today, now resolved), palpitations and leg swelling (chronic, no worse than baseline).   Gastrointestinal:  Positive for abdominal pain (lower abdominal). Negative for abdominal distention, constipation, diarrhea, nausea and vomiting.   Genitourinary:  Positive for dysuria. Negative for flank pain, frequency and hematuria.   Musculoskeletal:  Negative for arthralgias and back pain.   Skin:  Negative for color change, pallor, rash and wound.   Neurological:  Negative for dizziness, seizures, weakness, light-headedness, numbness and headaches.   Hematological:  Negative for adenopathy. Does not bruise/bleed easily.   Psychiatric/Behavioral:  Negative for agitation, behavioral problems and confusion.        History  Past Medical History:   Diagnosis Date    Anemia     CHF (congestive heart failure)     Chronic a-fib     stated by patient     Cirrhosis of liver     stated by patient     Diabetes mellitus     Ventricular tachycardia      Past Surgical History:   Procedure Laterality Date    APPENDECTOMY      CARDIAC CATHETERIZATION N/A 11/10/2020    Procedure: Left Heart Cath;  Surgeon: Charlee Ken MD;  Location: [facility] CATH INVASIVE LOCATION;  Service: Cardiovascular;  Laterality: N/A;    CHOLECYSTECTOMY      TOE AMPUTATION Right     stated by patient.      Family History   Problem Relation Age of Onset    Heart attack Father     Heart attack Brother      Social History     Tobacco Use    Smoking status: Never    Smokeless  tobacco: Never   Vaping Use    Vaping Use: Never used   Substance Use Topics    Alcohol use: Never    Drug use: Never     Medications Prior to Admission   Medication Sig Dispense Refill Last Dose    apixaban (ELIQUIS) 5 MG tablet tablet Take 1 tablet by mouth Every 12 (Twelve) Hours. Indications: Atrial Fibrillation 60 tablet 3     Aspirin Low Dose 81 MG EC tablet Take 1 tablet by mouth Daily.       bumetanide (BUMEX) 2 MG tablet Take 1 tablet by mouth 2 (Two) Times a Day. ON SUNDAYS, TAKE ONE TABLET ONCE DAILY.       busPIRone (BUSPAR) 10 MG tablet Take 1 tablet by mouth 2 (Two) Times a Day.       cefdinir (OMNICEF) 300 MG capsule Take 1 capsule by mouth 2 (Two) Times a Day.       doxycycline (VIBRAMYICN) 100 MG tablet Take 1 tablet by mouth 2 (Two) Times a Day. 20 tablet 0     empagliflozin (JARDIANCE) 10 MG tablet tablet Take 1 tablet by mouth Daily for 30 days. 30 tablet 6     ferrous sulfate 325 (65 FE) MG tablet Take 1 tablet by mouth Daily.       fluticasone (FLONASE) 50 MCG/ACT nasal spray 2 sprays into the nostril(s) as directed by provider Daily.       guaiFENesin (Mucinex) 600 MG 12 hr tablet Take 2 tablets by mouth 2 (Two) Times a Day for 10 days. 40 tablet 0     HYDROcodone-acetaminophen (NORCO) 7.5-325 MG per tablet Take 1 tablet by mouth 2 (Two) Times a Day.       Insulin Glargine (LANTUS SOLOSTAR) 100 UNIT/ML injection pen Inject 45 Units under the skin into the appropriate area as directed 2 (Two) Times a Day. 30 mL 0     Insulin Lispro, 1 Unit Dial, (HUMALOG) 100 UNIT/ML solution pen-injector Inject 15 Units under the skin into the appropriate area as directed 3 (Three) Times a Day With Meals. 15 mL 1     levothyroxine (SYNTHROID, LEVOTHROID) 50 MCG tablet Take 1 tablet by mouth Daily.       metoprolol succinate XL (TOPROL-XL) 25 MG 24 hr tablet Take 0.5 tablets by mouth Daily.       nitrofurantoin, macrocrystal-monohydrate, (MACROBID) 100 MG capsule Take 1 capsule by mouth 2 (Two) Times a Day.        nitroglycerin (NITROSTAT) 0.4 MG SL tablet Place 1 tablet under the tongue Every 5 (Five) Minutes As Needed for Chest Pain. Take no more than 3 doses in 15 minutes.       omeprazole (priLOSEC) 20 MG capsule Take 1 capsule by mouth Daily.       predniSONE (DELTASONE) 20 MG tablet Take 2 tablets by mouth Daily for 5 days. 10 tablet 0     Vericiguat 10 MG tablet Take 1 tablet by mouth Daily. 30 tablet 2     Vortioxetine HBr (Trintellix) 5 MG tablet tablet Take 1 tablet by mouth Daily With Breakfast.       acetaminophen (TYLENOL) 500 MG tablet Take 1 tablet by mouth 2 (Two) Times a Day As Needed for Mild Pain.        Allergies:  Phenergan [promethazine]    Objective    Vital Signs  Temp:  [98.3 °F (36.8 °C)] 98.3 °F (36.8 °C)  Heart Rate:  [] 73  Resp:  [16] 16  BP: ()/(53-82) 119/61  Body mass index is 39.77 kg/m².    Physical Exam:  Physical Exam  Constitutional:       General: She is not in acute distress.     Appearance: She is obese. She is ill-appearing (chronically so).   HENT:      Head: Normocephalic and atraumatic.      Right Ear: External ear normal.      Left Ear: External ear normal.      Nose: Nose normal.      Mouth/Throat:      Mouth: Mucous membranes are moist.      Pharynx: Oropharynx is clear.   Eyes:      Extraocular Movements: Extraocular movements intact.      Conjunctiva/sclera: Conjunctivae normal.      Pupils: Pupils are equal, round, and reactive to light.   Cardiovascular:      Rate and Rhythm: Normal rate and regular rhythm.      Pulses: Normal pulses.      Heart sounds: Normal heart sounds.   Pulmonary:      Effort: Pulmonary effort is normal. No respiratory distress.      Breath sounds: Normal breath sounds. No wheezing or rales.   Abdominal:      General: Bowel sounds are normal. There is no distension.      Palpations: Abdomen is soft.      Tenderness: There is abdominal tenderness (suprapubic region).      Comments: Some pitting edema in pannus   Musculoskeletal:          "General: Normal range of motion.      Cervical back: Normal range of motion and neck supple. No tenderness.      Right lower leg: Edema (trace) present.      Left lower leg: Edema (trace) present.   Lymphadenopathy:      Cervical: No cervical adenopathy.   Skin:     General: Skin is warm and dry.      Capillary Refill: Capillary refill takes less than 2 seconds.      Coloration: Skin is not jaundiced.      Findings: No bruising or lesion.   Neurological:      General: No focal deficit present.      Mental Status: She is alert and oriented to person, place, and time.   Psychiatric:         Mood and Affect: Mood normal.         Behavior: Behavior normal.           Results Review:       Lab Results:  Results from last 7 days   Lab Units 01/31/24  1626   WBC 10*3/mm3 13.68*   HEMOGLOBIN g/dL 11.7*   PLATELETS 10*3/mm3 162     Results from last 7 days   Lab Units 01/31/24  1918   CRP mg/dL 0.33     Results from last 7 days   Lab Units 01/31/24  1626   SODIUM mmol/L 132*   POTASSIUM mmol/L 4.6   CHLORIDE mmol/L 93*   CO2 mmol/L 25.5   BUN mg/dL 41*   CREATININE mg/dL 1.27*   CALCIUM mg/dL 9.5   GLUCOSE mg/dL 382*     Results from last 7 days   Lab Units 01/31/24  1626   MAGNESIUM mg/dL 2.3     No results found for: \"HGBA1C\"  Results from last 7 days   Lab Units 01/31/24  1626   BILIRUBIN mg/dL 3.4*   ALK PHOS U/L 107   AST (SGOT) U/L 31   ALT (SGPT) U/L 40*     Results from last 7 days   Lab Units 01/31/24  1918 01/31/24  1842 01/31/24  1626   HSTROP T ng/L 101* 96* 112*         Results from last 7 days   Lab Units 01/31/24  1626   INR  1.13*           I have reviewed the patient's laboratory results.    Imaging:  Imaging Results (Last 72 Hours)       Procedure Component Value Units Date/Time    XR Chest 1 View [[id number]] Collected: 01/31/24 1713     Updated: 01/31/24 1715    Narrative:      EXAM:    XR Chest, 1 View     EXAM DATE:    1/31/2024 4:34 PM     CLINICAL HISTORY:    Chest Pain Protocol     TECHNIQUE:    " Frontal view of the chest.     COMPARISON:    11/14/2023     FINDINGS:    Lungs and pleural spaces:  Unremarkable as visualized.  No  consolidation.  No pneumothorax.    Heart:  Cardiomegaly.    Mediastinum:  Unremarkable as visualized.    Bones/joints:  Unremarkable as visualized.    Tubes, lines and devices:  Defibrillator pad left chest.       Impression:      1.  Mild cardiomegaly.  2.  No acute cardiopulmonary findings.        This report was finalized on 1/31/2024 5:13 PM by Dr. Marlo Parr MD.               I have personally reviewed the patient's radiologic imaging.        EKG:   Critical Test Result: High HR , Arrhythmia  Wide QRS tachycardia, , QTc 478  Left axis deviation  Nonspecific intraventricular block  Cannot rule out Anterior infarct , age undetermined  T wave abnormality, consider lateral ischemia  Abnormal ECG  When compared with ECG of 31-JAN-2024 16:19, (Unconfirmed)  No significant change was found    I have personally reviewed the patient's EKG.        Assessment & Plan    - Atrial fibrillation with RVR: became hemodynamically stable with amiodarone bolus, but converted to sinus rhythm after electrical cardioversion. Now maintaining sinus rhythm on amiodarone infusion. K+ and mag normal, TSH normal. Troponin elevated but flat trend and appears chronically elevated. EKG not felt to show any evidence of acute ischemia per cardiology review. Patient was treated for sinus infection last week and is now on nitrofurantoin for UTI. Underling infectious process such as this could have triggered uncontrolled afib. Will check UA now. Cardiology consulted for further assistance in management. Continue home eliquis. Review other home meds once reconciled by pharmacy.  - SIRS, present on admission with tachycardia, leukocytosis and lactic acidosis: granted, many of these abnormalities could be explained by afib w/RVR. However, patient also reports recently diagnosed UTI for which she was started  on nitrofurantoin yesterday. Will check UA now and if UTI confirmed, would technically meet criteria for severe sepsis. Blood cultures ordered. Continue to monitor closely.  - Stage IIIa CKD: creatinine appears to be within baseline range. Continue to monitor.  - Chronic combined systolic and diastolic CHF: BNP elevated but suspect secondary to uncontrolled afib, as clinically does not appear grossly fluid overloaded. Review home meds once reconciled by pharmacy. Await further recommendations from cardiology. Supposed to meet with cardiology next week to discuss AICD placement.   - Insulin dependent type II DM: glucose 382. Check A1c. Monitor accuchecks. Cover with SSI. Reduce listed home dose of lantus to try to avoid hypoglycemic episode, can increase dose moving forward if needed.     DVT Prophylaxis: anticoagulated on eliquis    Estimated Length of Stay >2 midnights    I discussed the patient's findings, assessment and plan with the patient and nursing staff in the PCU.    Patient is high risk due to afib w/RVR, SIRS    Tomás An MD  01/31/24  20:20 EST      Electronically signed by Tomás An MD at 01/31/24 2045          Emergency Department Notes        Swati Jackman, RN at 01/31/24 1901       Summary:Patient care handoff                 Bedside report received from OLIVIA Mckeon. Patient updated to plan of care. Bed is in lowest locked position with call light in reach. No acute distress noted. Care continues per provider's orders.    Electronically signed by Swati Jackman RN at 01/31/24 1915       Linda Cifuentes, RN at 01/31/24 1755          Patient provided with diet tray at this time    Electronically signed by Linda Cifuentes RN at 01/31/24 1755       Ifeanyi Davidson PCT at 01/31/24 1635       Summary:EKG: Cardioversion                 EKG performed post cardioversion, given to Dr. Fields at bedside @1635.     Electronically signed by Ifeanyi Davidson PCT at 01/31/24  1643       Mary Ann Gant RN at 01/31/24 1632          In room at this time with provider preparing pt for cardioversion, pt verbalizes understanding of procedure.    Electronically signed by Mary Ann Gant RN at 01/31/24 1642       Corey Fields DO at 01/31/24 1626        Procedure Orders    1. Electrical Cardioversion [[id number]] ordered by Corey Fields DO                 Subjective   History of Present Illness  Patient is a 58-year-old female with history significant for atrial fibrillation, cardiomyopathy type 2 diabetes mellitus who comes to the ER with complaints that her LifeVest has been trying to fire for the past 2 days.  Patient states she is felt unwell for the past few days.  Was on antibiotics for sinus congestion but was recently switched yesterday by her PCP for different antibiotics urinary tract infection.  She states she feels unwell but denies any chest pain/pressure or tightness.  She is alert and orient x 3.  She says LifeVest has been beeping trying to shock her so she took it off.      Review of Systems   Constitutional:  Positive for fatigue. Negative for chills and fever.   HENT:  Negative for ear pain, sinus pain and sore throat.    Respiratory:  Negative for cough, chest tightness, shortness of breath and wheezing.    Cardiovascular:  Negative for chest pain, palpitations and leg swelling.   Gastrointestinal:  Negative for abdominal pain, constipation, diarrhea, nausea and vomiting.   Genitourinary:  Negative for dysuria, hematuria and urgency.   Musculoskeletal:  Negative for arthralgias and myalgias.   Neurological:  Positive for weakness. Negative for dizziness, syncope and light-headedness.   Psychiatric/Behavioral:  Negative for confusion.        Past Medical History:   Diagnosis Date    Anemia     CHF (congestive heart failure)     Chronic a-fib     stated by patient     Cirrhosis of liver     stated by patient     Diabetes mellitus     Ventricular  tachycardia        Allergies   Allergen Reactions    Phenergan [Promethazine]        Past Surgical History:   Procedure Laterality Date    APPENDECTOMY      CARDIAC CATHETERIZATION N/A 11/10/2020    Procedure: Left Heart Cath;  Surgeon: Charlee Ken MD;  Location: [facility] CATH INVASIVE LOCATION;  Service: Cardiovascular;  Laterality: N/A;    CHOLECYSTECTOMY      TOE AMPUTATION Right     stated by patient.        Family History   Problem Relation Age of Onset    Heart attack Father     Heart attack Brother        Social History     Socioeconomic History    Marital status:    Tobacco Use    Smoking status: Never    Smokeless tobacco: Never   Vaping Use    Vaping Use: Never used   Substance and Sexual Activity    Alcohol use: Never    Drug use: Never    Sexual activity: Defer           Objective   Physical Exam  Vitals and nursing note reviewed. Exam conducted with a chaperone present.   Constitutional:       Appearance: Normal appearance. She is obese.   HENT:      Head: Normocephalic and atraumatic.      Nose: Nose normal.      Mouth/Throat:      Mouth: Mucous membranes are moist.      Pharynx: Oropharynx is clear.   Eyes:      Extraocular Movements: Extraocular movements intact.      Conjunctiva/sclera: Conjunctivae normal.      Pupils: Pupils are equal, round, and reactive to light.   Cardiovascular:      Rate and Rhythm: Tachycardia present. Rhythm irregular.      Pulses: Normal pulses.      Heart sounds: Normal heart sounds.   Pulmonary:      Effort: Pulmonary effort is normal.      Breath sounds: Normal breath sounds.   Abdominal:      General: Bowel sounds are normal.      Palpations: Abdomen is soft.   Musculoskeletal:         General: Normal range of motion.      Cervical back: Normal range of motion and neck supple.      Right lower leg: No edema.      Left lower leg: No edema.   Skin:     General: Skin is warm and dry.      Capillary Refill: Capillary refill takes less than 2 seconds.    Neurological:      General: No focal deficit present.      Mental Status: She is alert and oriented to person, place, and time.   Psychiatric:         Mood and Affect: Mood normal.         Behavior: Behavior normal.         Electrical Cardioversion    Date/Time: 1/31/2024 4:36 PM    Performed by: Corey Fields DO  Authorized by: Corey Fields DO    Consent:     Consent obtained:  Verbal and emergent situation    Consent given by:  Patient    Risks, benefits, and alternatives were discussed: yes      Risks discussed:  Cutaneous burn, death, induced arrhythmia and pain    Alternatives discussed:  No treatment, anti-coagulation medication, rate-control medication, delayed treatment, alternative treatment and observation  Universal protocol:     Procedure explained and questions answered to patient or proxy's satisfaction: yes      Relevant documents present and verified: yes      Test results available: yes      Imaging studies available: yes      Required blood products, implants, devices, and special equipment available: yes      Site/side marked: yes      Immediately prior to procedure, a time out was called: yes      Patient identity confirmed:  Verbally with patient  Pre-procedure details:     Cardioversion basis:  Emergent    Rhythm:  Ventricular tachycardia    Electrode placement:  Anterior-posterior  Attempt one:     Cardioversion mode:  Synchronous    Waveform:  Biphasic    Shock (Joules):  150    Shock outcome:  Conversion to normal sinus rhythm  Post-procedure details:     Patient status:  Awake    Procedure completion:  Tolerated well, no immediate complications            ED Course  ED Course as of 01/31/24 1815 Wed Jan 31, 2024   1627 ECG 12 Lead Chest Pain  Wide-complex tachycardia, rate 197, QTc 480, A-fib with RVR with aberrancy [CW]   1627 ECG 12 Lead Chest Pain  Wide-complex tachycardia, rate 106, QTc 478 A-fib with aberrancy [CW]   1640 ECG 12 Lead Chest Pain  Post  cardioversion normal sinus rhythm, rate 90, QTc 46, no acute ST or T wave changes [CW]      ED Course User Index  [CW] Corey Fields DO                                             Medical Decision Making  --On arrival, patient in a wide-complex tachyarrhythmia with rates in the 180s.  EKG appeared to be A-fib with aberrancy.  Confirmed with cardiology  --Patient became hypotensive during amiodarone loading bolus, synchronized cardioversion at 150 J with conversion to normal sinus rhythm  --Labs WBC 13 K, T. bili 3.4-likely due to cirrhosis  --Amiodarone bolus followed by gtt.  --Patient has remained in normal sinus rhythm after synchronized cardioversion, normotensive, continue amiodarone gtt., discussed with medicine, will admit    Problems Addressed:  Atrial fibrillation, unspecified type: complicated acute illness or injury  Cardiomyopathy, unspecified type: complicated acute illness or injury    Amount and/or Complexity of Data Reviewed  Labs: ordered.  Radiology: ordered.  ECG/medicine tests: ordered. Decision-making details documented in ED Course.    Risk  Prescription drug management.        Final diagnoses:   Atrial fibrillation, unspecified type   Cardiomyopathy, unspecified type       ED Disposition  ED Disposition       ED Disposition   Decision to Admit    Condition   --    Comment   Level of Care: Progressive Care [20]   Diagnosis: Atrial fibrillation with RVR [899767]   Certification: I Certify That Inpatient Hospital Services Are Medically Necessary For Greater Than 2 Midnights                 No follow-up provider specified.       Medication List      No changes were made to your prescriptions during this visit.            Corey Fields DO  01/31/24 1815      Electronically signed by Corey Fields DO at 01/31/24 1815       Ifeanyi Davidson PCT at 01/31/24 1629       Summary:EKG: Drug Monitoring                 EKG performed, given to Dr. Fields @7250.      Electronically signed by Ifeanyi Davidson PCT at 24 1642       Mary Ann Gant, RN at 24 1622          Pt removed from life vest at this time per provider verbal order and placed on Zolls monitoring with d-fib pads.    Electronically signed by Mary Ann Gant, RN at 24 1623       Ifeanyi Davidson PCT at 24 1619       Summary:EKG: Chest Pain                 EKG performed, given to Dr. Fields at bedside @1619.     Electronically signed by Ifeanyi Davidson PCT at 24 1641       Facility-Administered Medications as of 2024   Medication Dose Route Frequency Provider Last Rate Last Admin    acetaminophen (TYLENOL) tablet 325 mg  325 mg Oral Q6H PRN Tomás An MD   325 mg at 24 0613    [COMPLETED] albumin human 25 % IV SOLN 25 g  25 g Intravenous Q30 Min Tomás An MD   25 g at 24 0247    [COMPLETED] amiodarone 150 mg in 100 mL D5W (loading dose)  150 mg Intravenous Once Corey Fields DO   Stopped at 24 1639    Followed by    [] amiodarone 360 mg in 200 mL D5W infusion  1 mg/min Intravenous Continuous Yulia Mo DO 33.3 mL/hr at 24 1901 1 mg/min at 24 1901    Followed by    amiodarone 360 mg in 200 mL D5W infusion  0.5 mg/min Intravenous Continuous Yulia Mo DO 16.67 mL/hr at 24 2309 0.5 mg/min at 24 2309    Followed by    amiodarone (PACERONE) tablet 200 mg  200 mg Oral Once Yulia Mo DO        Followed by    [START ON 2024] amiodarone (PACERONE) tablet 200 mg  200 mg Oral Q8H Yulia Mo DO        Followed by    [START ON 2024] amiodarone (PACERONE) tablet 200 mg  200 mg Oral Q12H Yulia Mo DO        Followed by    [START ON 2024] amiodarone (PACERONE) tablet 200 mg  200 mg Oral Daily Yulia Mo DO        apixaban (ELIQUIS) tablet 5 mg  5 mg Oral Q12H Tomás An MD   5 mg at 243    sennosides-docusate (PERICOLACE) 8.6-50 MG per  tablet 2 tablet  2 tablet Oral BID Yulia Mo DO        And    polyethylene glycol (MIRALAX) packet 17 g  17 g Oral Daily PRN Yulia Mo DO        And    bisacodyl (DULCOLAX) EC tablet 5 mg  5 mg Oral Daily PRN Yulia Mo DO        And    bisacodyl (DULCOLAX) suppository 10 mg  10 mg Rectal Daily PRN Yulia Mo DO        dextrose (D50W) (25 g/50 mL) IV injection 25 g  25 g Intravenous Q15 Min PRN Tomás An MD        dextrose (GLUTOSE) oral gel 15 g  15 g Oral Q15 Min PRN Tomás An MD        fluticasone (FLONASE) 50 MCG/ACT nasal spray 2 spray  2 spray Each Nare Daily Tmoás An MD   2 spray at 02/01/24 0021    glucagon HCl (Diagnostic) injection 1 mg  1 mg Intramuscular Q15 Min PRN Tomás An MD        insulin glargine (LANTUS, SEMGLEE) injection 30 Units  30 Units Subcutaneous Q12H Tomás An MD   30 Units at 01/31/24 2131    Insulin Lispro (humaLOG) injection 3-14 Units  3-14 Units Subcutaneous 4x Daily AC & at Bedtime Tomás An MD   12 Units at 01/31/24 2131    nitroglycerin (NITROSTAT) SL tablet 0.4 mg  0.4 mg Sublingual Q5 Min PRN Yulia Mo DO        [COMPLETED] sodium chloride 0.9 % bolus 1,000 mL  1,000 mL Intravenous Once Corey Fields DO   Stopped at 01/31/24 1755    sodium chloride 0.9 % flush 10 mL  10 mL Intravenous PRN Yulia Mo DO        sodium chloride 0.9 % flush 10 mL  10 mL Intravenous Q12H Yulia Mo DO   10 mL at 01/31/24 2140    sodium chloride 0.9 % flush 10 mL  10 mL Intravenous PRN Yulia Mo DO        sodium chloride 0.9 % infusion 40 mL  40 mL Intravenous PRN Yulia Mo DO

## 2024-02-01 NOTE — CONSULTS
[facility]  CONSULT  NOTE      Patient information:  Date of Admit: 1/31/2024  Date of Consult: 02/01/24  Hospitalist/Referring MD:Tomás nA MD  PCP: Masha Davidson APRN  MRN:  [id number]  Visit Number:  [id number]    LOS: 1  CODE STATUS:  Code Status and Medical Interventions:   Ordered at: 01/31/24 2019     Code Status (Patient has no pulse and is not breathing):    CPR (Attempt to Resuscitate)     Medical Interventions (Patient has pulse or is breathing):    Full Support       PROBLEM LIST: Principal Problem:    Atrial fibrillation with RVR        General Cardiology Consulting Physician: Dr. Ramon Sahu MD, FACC    Assessment      Paroxysmal atrial flutter/atrial fibrillation with rapid ventricle response with aberrant conduction, s/p PVI in October 2022 with recurrence, electrocardioversion in the ED.  Currently in sinus rhythm.  FRY4AC2-SOHl score of at least 3 for female gender, heart failure and type 2 diabetes mellitus.  Patient is on Eliquis for stroke prevention.  Acute non-ST elevation myocardial infarction (probably type II due to tachycardia)  Advanced dilated cardiomyopathy (nonischemic) with LV ejection fraction of 30 to 35%, could be tachycardia mediated.  Nonobstructive coronary artery disease on coronary angiography on 11/10/2020 with a 30 to 40% stenosis in the LAD.  Type 2 diabetes mellitus.  Reported cirrhosis of the liver ([place])  Chronic kidney disease (stage IIIa)    Recommendations     For her atrial fibrillation, the choice of antiarrhythmic therapy is limited due to multiple comorbidities including advanced cardiomyopathy, chronic kidney disease and cirrhosis of the liver.  Will treat her with a beta-blocker such as metoprolol succinate that would be helpful for her cardiomyopathy as well.  Continue to optimize GDMT for cardiomyopathy.  Will consider EP referral again for consideration of RF ablation due to recurrent  atrial flutter/fibrillation with rapid ventricle response.    Reason for Cardiology consultation: Wide-complex tachycardia and A-fib with aberrancy    Subjective Data   ADMISSION INFORMATION:  Chief Complaint   Patient presents with    Rapid Heart Rate     History of Present Illness    Yumiko Purdy is a 58 y.o. female with a past medical history significant for systolic and diastolic CHF (EF 31-35%, grade III diastolic dysfunction per ECHO 11/2023), chronic afib, cirrhosis, insulin dependent type II DM, stage IIIa CKD (cr 1.2, GFR upper 40s-low 50s), Vtach and anemia.     She reports she had a LifeVest placed about 2 months ago and is scheduled to see a cardiologist next week to discuss AICD placement. She presented with complaints of palpitations that started 2 days ago. She informed ER provider that her LifeVest has been beeping and trying to shock her for the last 2 days, so she took it off. Upon further questioning, she reported last week her PCP started her on an antibiotic for a sinus infection which helped with her symptoms, but in the interim she developed dysuria and cloudy, foul smelling urine and presented back to her PCP yesterday, at which time he obtained a urine sample for UA and told the patient she had a UTI. Her PCP then changed her antibiotic to nitrofurantoin for the UTI. She thinks she has had 3 doses of this antibiotic thus far.     In the ER, patient's HR was 184 upon arrival. EKG showed wide complex QRS tachycardia, felt to be afib w/aberrancy which was confirmed by cardiology. BP was initially stable. She was started on IV amiodarone; during loading bolus she became hypotensive, requiring synchronized cardioversion at 150 J with subsequent conversion to sinus rhythm. She has maintained sinus rhythm on amiodarone infusion since then and BP has remained stable. Labs showed elevated troponin 112 with fairly flat trend since then overall, with repeat 96 followed by 101, and looking back she  had higher troponin levels in Nov 2023. BNP is 4500. K+ and mag are normal. Creatinine is stable at 1.27, glucose 382, ALT 40, total bili 3.4 but direct only 0.6. TSH is normal at 2.960. WBC is 13, procal 0.42, lactate 2.9, and CRP 0.33. UA is pending. CXR showed mild cardiomegaly but no acute findings. Patient was admitted to the PCU for further workup and management.      Cardiology has been consulted for further evaluation and management Wide-complex tachycardia and A-fib with aberrancy      Primary Cardiologist has been KENNY Lawler and she was last seen in the office on 12/7/2023.     She was also seen by [facility] heart failure clinic and was last seen on 1/22/2024.    Patient was in room  when she was seen and examined by Dr. Sahu.  Patient is lying in bed resting quietly.  No acute distress noted at this time.  Patient confirms HPI as mentioned above.  Patient reports she does feel better with her heart rate at this time.  Still feels some shortness of breath but denies chest pain.  Patient also reports posterior lobe headache that she has had for couple days that she feels is related to her sinus infection.  Patient's oxygen saturation is 99% on O2 at 2 L via nasal cannula.  Patient reports that she does have a appointment with the cardiologist in [facility] on 2/8/2024 to be considered for an ICD placement.      Known medications given enroute vis EMS and in the ER:         Cardiac risk factors:diabetes mellitus, hypercholesterolemia, hypertension, and Obesity      Last Echo: Results for orders placed during the hospital encounter of 11/12/23    Adult Transthoracic Echo Complete W/ Cont if Necessary Per Protocol    Interpretation Summary    Left ventricular systolic function is moderately decreased. Left ventricular ejection fraction appears to be 31 - 35%.    Left ventricular wall thickness is consistent with mild concentric hypertrophy.    Left ventricular diastolic  function is consistent with (grade III w/high LAP) fixed restrictive pattern.    Mildly reduced right ventricular systolic function noted.    The left atrial cavity is moderately dilated.    The right atrial cavity is mild to moderately  dilated.    There is calcification of the aortic valve mainly affecting the left coronary cusp(s).    Dilated pulmonary artery.         Last Stress: Results for orders placed during the hospital encounter of 10/15/20    Nuclear Medicine Cardiac Blood Pool Muga At Rest    Interpretation Summary  EXAMINATION: NUCLEAR MEDICINE CARDIAC BLOOD POOL MUGA AT REST-    CLINICAL INDICATION: Cardiomyopathy  TECHNIQUE: 21 mCi of Tc-99m autologous RBCs were injected IV after  in-vitro RBC labeling. Gated cardiac imaging was performed in the  anterior and left anterior oblique.  COMPARISON: None  FINDINGS: The left ventricle is normal in size with normal systolic  function. The calculated left ventricular ejection fraction (LVEF) = 38  %.  The right and left atria, aorta and pulmonary artery are normal. The  right ventricle is normal in size.    Impression  LVEF: 38%, at rest.    This report was finalized on 10/16/2020 11:57 AM by Dr. Marlo Parr MD.        Last Cath: Results for orders placed during the hospital encounter of 11/10/20    Cardiac Catheterization/Vascular Study    Narrative  Procedure type:  Left heart cath, LV gram, bilateral selective cholangiogram    Indication:  Cardiomyopathy    Procedure:  After informed consent the patient was brought into the cardiac aspiration lab she was prepped and draped in the usual sterile manner the right radial area was incised with 2% Xylocaine however several attempts to engage into the right radial artery was not successful so the right radial artery access was abandoned and the right groin area was excised in 2% Xylocaine right femoral artery was accessed using modified standard technique and 5 Thai side-port arterial sheath was placed and  secured then over a guidewire a 5 [place] JL 4 catheter was used left main coronary artery with angiographic pressures were taken then the catheter was removed and over a guidewire a 5 [place] JR4 catheter was used and engagement of the right coronary arteries angioplasty was taken then the catheter was removed then over a guidewire 5 [place] pigtail catheter used aortic valve was done hand-injection LV gram was done then pullback was done then the catheter was removed groin sheath was removed and minx closure device applied with good hemostasis the patient was transported back to her room in stable condition.    Results:  The patient LVEDP was 17 mmHg with hand-injection showing preserved left ventricular systolic function wall motion EF 50-55% with no significant aortic gradient on pullback.    Cholangiogram:  This right dominant system.  Left main cardiac angiographically normal and bifurcates into left and descending and left circumflex arteries.  LAD was normal caliber gives several septal perforators and diagonal branches and wraps around the apex LAD about 30 to 40% mid stenosis.  Left circumflex artery was nondominant for posterior circulation gives rise to several obtuse marginal branches left circumflex arteries branches angiographically normal.  The right coronary artery was a large artery was dominant for posterior circulation giving rise to acute marginal branches PDA and posterolateral branches the right coronary artery and its branches angiographically normal.    Conclusion:  Preserved LV systolic function ejection fraction 50-55% with elevated left ventricular end-diastolic pressure consistent with diastolic dysfunction coronary angiogram showed right dominant system with 30 to 40% mid LAD lesion otherwise coronaries arteries were normal.  Plan of care:  Medical management and secondary preventive measurements of coronary disease good lipid control blood pressure control healthy lifestyle patient is to  follow-up with her primary care physician for further management.                            Past Medical History:   Diagnosis Date    Anemia     CHF (congestive heart failure)     Chronic a-fib     stated by patient     Cirrhosis of liver     stated by patient     Diabetes mellitus     Ventricular tachycardia      Past Surgical History:   Procedure Laterality Date    APPENDECTOMY      CARDIAC CATHETERIZATION N/A 11/10/2020    Procedure: Left Heart Cath;  Surgeon: Charlee Ken MD;  Location:  [facility];  Service: Cardiovascular;  Laterality: N/A;    CHOLECYSTECTOMY      TOE AMPUTATION Right     stated by patient.      Family History   Problem Relation Age of Onset    Heart attack Father     Heart attack Brother      Social History     Tobacco Use    Smoking status: Never    Smokeless tobacco: Never   Vaping Use    Vaping Use: Never used   Substance Use Topics    Alcohol use: Never    Drug use: Never       ALLERGIES: Phenergan [promethazine]    Medications listed below are reported home medications pulling from within the system:  Medications Prior to Admission   Medication Sig Dispense Refill Last Dose    apixaban (ELIQUIS) 5 MG tablet tablet Take 1 tablet by mouth Every 12 (Twelve) Hours. Indications: Atrial Fibrillation 60 tablet 3     Aspirin Low Dose 81 MG EC tablet Take 1 tablet by mouth Daily.       bumetanide (BUMEX) 2 MG tablet Take 1 tablet by mouth 2 (Two) Times a Day. ON SUNDAYS, TAKE ONE TABLET ONCE DAILY.       busPIRone (BUSPAR) 10 MG tablet Take 1 tablet by mouth 2 (Two) Times a Day.       cefdinir (OMNICEF) 300 MG capsule Take 1 capsule by mouth 2 (Two) Times a Day.       doxycycline (VIBRAMYICN) 100 MG tablet Take 1 tablet by mouth 2 (Two) Times a Day. 20 tablet 0     empagliflozin (JARDIANCE) 10 MG tablet tablet Take 1 tablet by mouth Daily for 30 days. 30 tablet 6     ferrous sulfate 325 (65 FE) MG tablet Take 1 tablet by mouth Daily.       fluticasone (FLONASE) 50 MCG/ACT  nasal spray 2 sprays into the nostril(s) as directed by provider Daily.       guaiFENesin (Mucinex) 600 MG 12 hr tablet Take 2 tablets by mouth 2 (Two) Times a Day for 10 days. 40 tablet 0     HYDROcodone-acetaminophen (NORCO) 7.5-325 MG per tablet Take 1 tablet by mouth 2 (Two) Times a Day.       Insulin Glargine (LANTUS SOLOSTAR) 100 UNIT/ML injection pen Inject 45 Units under the skin into the appropriate area as directed 2 (Two) Times a Day. 30 mL 0     Insulin Lispro, 1 Unit Dial, (HUMALOG) 100 UNIT/ML solution pen-injector Inject 15 Units under the skin into the appropriate area as directed 3 (Three) Times a Day With Meals. 15 mL 1     levothyroxine (SYNTHROID, LEVOTHROID) 50 MCG tablet Take 1 tablet by mouth Daily.       metoprolol succinate XL (TOPROL-XL) 25 MG 24 hr tablet Take 0.5 tablets by mouth Daily.       nitrofurantoin, macrocrystal-monohydrate, (MACROBID) 100 MG capsule Take 1 capsule by mouth 2 (Two) Times a Day.       nitroglycerin (NITROSTAT) 0.4 MG SL tablet Place 1 tablet under the tongue Every 5 (Five) Minutes As Needed for Chest Pain. Take no more than 3 doses in 15 minutes.       omeprazole (priLOSEC) 20 MG capsule Take 1 capsule by mouth Daily.       [] predniSONE (DELTASONE) 20 MG tablet Take 2 tablets by mouth Daily for 5 days. 10 tablet 0     Vericiguat 10 MG tablet Take 1 tablet by mouth Daily. 30 tablet 2     Vortioxetine HBr (Trintellix) 5 MG tablet tablet Take 1 tablet by mouth Daily With Breakfast.       acetaminophen (TYLENOL) 500 MG tablet Take 1 tablet by mouth 2 (Two) Times a Day As Needed for Mild Pain.          Review of Systems   Constitutional:  Negative for activity change, appetite change and diaphoresis.   HENT:  Positive for sinus pressure. Negative for facial swelling and trouble swallowing.    Eyes:  Negative for visual disturbance.   Respiratory:  Positive for shortness of breath.    Cardiovascular:  Positive for palpitations. Negative for chest pain and leg  swelling.   Gastrointestinal:  Negative for blood in stool, nausea and vomiting.   Endocrine: Negative.    Genitourinary:  Positive for dysuria. Negative for hematuria.   Musculoskeletal:  Negative for gait problem.   Skin:  Negative for color change.   Neurological:  Positive for headaches. Negative for dizziness, syncope, speech difficulty, weakness and light-headedness.   Psychiatric/Behavioral:  Negative for agitation and behavioral problems.        Objective Data      Vital Signs  Temp:  [97.9 °F (36.6 °C)-98.4 °F (36.9 °C)] 97.9 °F (36.6 °C)  Heart Rate:  [] 66  Resp:  [16-25] 20  BP: ()/() 111/46  Flow (L/min):  [1] 1  Device (Oxygen Therapy): room air  Vital Signs (last 72 hrs)         01/29 0700 01/30 0659 01/30 0700 01/31 0659 01/31 0700 02/01 0659 02/01 0700 02/01 0911   Most Recent      Temp (°F)     98.3 -  98.4      97.9     97.9 (36.6) 02/01 0800    Heart Rate     64 -  184      66     66 02/01 0700    Resp     16 -  25      20     20 02/01 0700    BP     93/43 -  138/80      111/46     111/46 02/01 0700    SpO2 (%)     91 -  100    95 -  97     97 02/01 0757    Flow (L/min)       1      1     1 02/01 0700          BMI:  Body mass index is 38.26 kg/m².    WEIGHT:      01/31/24 2009 02/01/24  0400   Weight: 104 kg (229 lb 15 oz) 104 kg (229 lb 15 oz)       DIET:  Diet: Cardiac Diets, Renal Diets; Healthy Heart (2-3 Na+); Low Sodium (2-3g); Texture: Regular Texture (IDDSI 7); Fluid Consistency: Thin (IDDSI 0)    I&O:  Intake & Output (last 3 days)         01/29 0701 01/30 0700 01/30 0701 01/31 0700 01/31 0701 02/01 0700 02/01 0701 02/02 0700    P.O.   600 210    I.V. (mL/kg)   588.8 (5.7) 45.9 (0.4)    IV Piggyback   1000     Total Intake(mL/kg)   2188.8 (21) 255.9 (2.5)    Urine (mL/kg/hr)   1200 450 (2)    Stool   0     Total Output   1200 450    Net   +988.8 -194.1            Stool Unmeasured Occurrence   0 x             Physical Exam  Constitutional:       Appearance:  Normal appearance.      Comments: BMI 38.26.   HENT:      Head: Normocephalic and atraumatic.      Nose: Nose normal.      Mouth/Throat:      Mouth: Mucous membranes are moist.      Pharynx: Oropharynx is clear.   Eyes:      Conjunctiva/sclera: Conjunctivae normal.   Cardiovascular:      Rate and Rhythm: Normal rate and regular rhythm.      Pulses: Normal pulses.           Radial pulses are 1+ on the right side and 1+ on the left side.        Dorsalis pedis pulses are 1+ on the right side and 1+ on the left side.        Posterior tibial pulses are 1+ on the right side and 1+ on the left side.      Heart sounds: Normal heart sounds.   Pulmonary:      Effort: Pulmonary effort is normal.      Comments: Few crackles to the right base  Abdominal:      General: Bowel sounds are normal.      Palpations: Abdomen is soft.      Tenderness: There is abdominal tenderness.      Comments: Mild tenderness noted in right upper quadrant with palpation   Musculoskeletal:         General: Normal range of motion.      Cervical back: Normal range of motion.      Right lower leg: No edema.      Left lower leg: Edema present.   Skin:     General: Skin is warm and dry.   Neurological:      General: No focal deficit present.      Mental Status: She is alert and oriented to person, place, and time.   Psychiatric:         Mood and Affect: Mood normal.         Behavior: Behavior normal.       Results review   Results Review:    I have reviewed the patient's new clinical results.    Results from last 7 days   Lab Units 01/31/24  1918 01/31/24  1842 01/31/24  1626   HSTROP T ng/L 101* 96* 112*     Lab Results   Component Value Date    PROBNP 4,500.0 (H) 01/31/2024    PROBNP 606.7 01/22/2024    PROBNP 928.3 (H) 12/08/2023     Results from last 7 days   Lab Units 01/31/24  1626   WBC 10*3/mm3 13.68*   HEMOGLOBIN g/dL 11.7*   PLATELETS 10*3/mm3 162     Results from last 7 days   Lab Units 01/31/24  1626   SODIUM mmol/L 132*   POTASSIUM mmol/L 4.6  "  CHLORIDE mmol/L 93*   CO2 mmol/L 25.5   BUN mg/dL 41*   CREATININE mg/dL 1.27*   CALCIUM mg/dL 9.5   GLUCOSE mg/dL 382*   ALT (SGPT) U/L 40*   AST (SGOT) U/L 31     Lab Results   Component Value Date    MG 2.3 01/31/2024    MG 2.4 01/22/2024    MG 2.1 12/08/2023     Lab Results   Component Value Date    CHOL 137 09/11/2020    TRIG 80 09/11/2020    HDL 43 09/11/2020    LDL 78 09/11/2020     Estimated Creatinine Clearance: 58.5 mL/min (A) (by C-G formula based on SCr of 1.27 mg/dL (H)).  Lab Results   Component Value Date    HGBA1C 7.30 (H) 11/13/2023     Lab Results   Component Value Date    INR 1.13 (H) 01/31/2024    INR 0.97 11/15/2023    INR 1.20 (H) 11/12/2023    INR 1.10 12/15/2022    INR 1.25 (H) 09/23/2020    INR 1.09 09/22/2020    INR 1.17 (H) 09/21/2020     Lab Results   Component Value Date    LABHEPA <0.10 (L) 11/15/2023    LABHEPA 0.21 (L) 11/15/2023    LABHEPA <0.10 (L) 11/15/2023    LABHEPA 0.31 11/14/2023         Lab Results   Component Value Date    TSH 2.960 01/31/2024      No results found for: \"URICACID\"  Pain Management Panel  More data exists         Latest Ref Rng & Units 11/13/2023 9/12/2020   Pain Management Panel   Creatinine, Urine mg/dL  mg/dL - 46.3  46.3    Amphetamine, Urine Qual Negative Negative  -   Barbiturates Screen, Urine Negative Negative  -   Benzodiazepine Screen, Urine Negative Negative  -   Buprenorphine, Screen, Urine Negative Negative  -   Cocaine Screen, Urine Negative Negative  -   Fentanyl, Urine Negative Negative  -   Methadone Screen , Urine Negative Negative  -   Methamphetamine, Ur Negative Negative  -     Microbiology Results (last 10 days)       Procedure Component Value - Date/Time    Respiratory Panel PCR w/COVID-19(SARS-CoV-2) DINA/YANET/CHRISTOPH/[facility]/PLACIDO In-House, NP Swab in UT/VTM, 2 HR TAT - Swab, Nasopharynx [[id number]]  (Normal) Collected: 01/22/24 1538    Lab Status: Final result Specimen: Swab from Nasopharynx Updated: 01/22/24 1634     ADENOVIRUS, PCR Not " "Detected     Coronavirus 229E Not Detected     Coronavirus HKU1 Not Detected     Coronavirus NL63 Not Detected     Coronavirus OC43 Not Detected     COVID19 Not Detected     Human Metapneumovirus Not Detected     Human Rhinovirus/Enterovirus Not Detected     Influenza A PCR Not Detected     Influenza B PCR Not Detected     Parainfluenza Virus 1 Not Detected     Parainfluenza Virus 2 Not Detected     Parainfluenza Virus 3 Not Detected     Parainfluenza Virus 4 Not Detected     RSV, PCR Not Detected     Bordetella pertussis pcr Not Detected     Bordetella parapertussis PCR Not Detected     Chlamydophila pneumoniae PCR Not Detected     Mycoplasma pneumo by PCR Not Detected    Narrative:      In the setting of a positive respiratory panel with a viral infection PLUS a negative procalcitonin without other underlying concern for bacterial infection, consider observing off antibiotics or discontinuation of antibiotics and continue supportive care. If the respiratory panel is positive for atypical bacterial infection (Bordetella pertussis, Chlamydophila pneumoniae, or Mycoplasma pneumoniae), consider antibiotic de-escalation to target atypical bacterial infection.           No results found for: \"BLOODCX\"      EC2024                  ECG/EMG Results (last 24 hours)       Procedure Component Value Units Date/Time    ECG 12 Lead Chest Pain [[id number]] Collected: 24 1619     Updated: 24 1621     QT Interval 272 ms      QTC Interval 480 ms     Narrative:      Test Reason : Chest Pain  Blood Pressure :   */*   mmHG  Vent. Rate : 187 BPM     Atrial Rate : 187 BPM     P-R Int :   * ms          QRS Dur : 132 ms      QT Int : 272 ms       P-R-T Axes :   * -52 148 degrees     QTc Int : 480 ms    ** Critical Test Result: High HR , Arrhythmia  Wide QRS tachycardia  Left axis deviation  Nonspecific intraventricular block  Inferior infarct , age undetermined  Possible Anterolateral infarct , " age undetermined  Abnormal ECG  When compared with ECG of 18-NOV-2023 15:29,  Wide QRS tachycardia has replaced Sinus rhythm  Vent. rate has increased  BPM    Referred By: ILIA           Confirmed By:     ECG 12 Lead Rhythm Change [[id number]] Collected: 01/31/24 1625     Updated: 01/31/24 1715     QT Interval 272 ms      QTC Interval 478 ms     Narrative:      Test Reason : Rhythm Change  Blood Pressure :   */*   mmHG  Vent. Rate : 186 BPM     Atrial Rate : 187 BPM     P-R Int :   * ms          QRS Dur : 132 ms      QT Int : 272 ms       P-R-T Axes :   * -48 156 degrees     QTc Int : 478 ms    ** Critical Test Result: High HR , Arrhythmia  Wide QRS tachycardia  Left axis deviation  Nonspecific intraventricular block  Cannot rule out Anterior infarct , age undetermined  T wave abnormality, consider lateral ischemia  Abnormal ECG  When compared with ECG of 31-JAN-2024 16:19, (Unconfirmed)  No significant change was found    Referred By: ILIA           Confirmed By:     ECG 12 Lead Drug Monitoring; Amiodarone [[id number]] Collected: 01/31/24 1635     Updated: 01/31/24 2334     QT Interval 398 ms      QTC Interval 486 ms     Narrative:      Test Reason : Drug Monitoring  Blood Pressure :   */*   mmHG  Vent. Rate :  90 BPM     Atrial Rate :  90 BPM     P-R Int : 180 ms          QRS Dur : 142 ms      QT Int : 398 ms       P-R-T Axes :  64 -34 105 degrees     QTc Int : 486 ms    Normal sinus rhythm  Left axis deviation  Nonspecific intraventricular block  Nonspecific T wave abnormality  Abnormal ECG  When compared with ECG of 31-JAN-2024 16:25, (Unconfirmed)  Sinus rhythm has replaced Wide QRS tachycardia  Vent. rate has decreased BY  96 BPM  Confirmed by Ramon Sahu ([id number]) on 1/31/2024 11:31:50 PM    Referred By: ILIA           Confirmed By: Ramon Sahu    ECG 12 Lead Drug Monitoring; Amiodarone [[id number]] Collected: 02/01/24 0609     Updated: 02/01/24 0610     QT Interval 494 ms      QTC Interval 529  ms     Narrative:      Test Reason : Drug Monitoring  Blood Pressure :   */*   mmHG  Vent. Rate :  69 BPM     Atrial Rate :  69 BPM     P-R Int : 200 ms          QRS Dur : 140 ms      QT Int : 494 ms       P-R-T Axes :  53 -43  52 degrees     QTc Int : 529 ms    Normal sinus rhythm  Left axis deviation  Nonspecific intraventricular block  Cannot rule out Anterior infarct , age undetermined  Abnormal ECG  When compared with ECG of 31-JAN-2024 16:35,  T wave amplitude has decreased in Anterior leads  Nonspecific T wave abnormality has replaced inverted T waves in Lateral leads    Referred By: ILIA           Confirmed By:     SCANNED - TELEMETRY   [[id number]] Resulted: 01/31/24     Updated: 02/01/24 0826    SCANNED - TELEMETRY   [[id number]] Resulted: 01/31/24     Updated: 02/01/24 0826    SCANNED - TELEMETRY   [[id number]] Resulted: 01/31/24     Updated: 02/01/24 0859            TELEMETRY:   SR 80s        RADIOLOGY STUDIES:  Imaging Results (Last 72 Hours)       Procedure Component Value Units Date/Time    XR Chest 1 View [[id number]] Collected: 01/31/24 1713     Updated: 01/31/24 1715    Narrative:      EXAM:    XR Chest, 1 View     EXAM DATE:    1/31/2024 4:34 PM     CLINICAL HISTORY:    Chest Pain Protocol     TECHNIQUE:    Frontal view of the chest.     COMPARISON:    11/14/2023     FINDINGS:    Lungs and pleural spaces:  Unremarkable as visualized.  No  consolidation.  No pneumothorax.    Heart:  Cardiomegaly.    Mediastinum:  Unremarkable as visualized.    Bones/joints:  Unremarkable as visualized.    Tubes, lines and devices:  Defibrillator pad left chest.       Impression:      1.  Mild cardiomegaly.  2.  No acute cardiopulmonary findings.        This report was finalized on 1/31/2024 5:13 PM by Dr. Marlo Parr MD.               ALLERGIES: Phenergan [promethazine]    CURRENT MEDICATIONS:  Current list of medications may not reflect those currently placed in orders that are not signed or are being held.      amiodarone, 200 mg, Oral, Once   Followed by  [START ON 2/2/2024] amiodarone, 200 mg, Oral, Q8H   Followed by  [START ON 2/8/2024] amiodarone, 200 mg, Oral, Q12H   Followed by  [START ON 2/22/2024] amiodarone, 200 mg, Oral, Daily  apixaban, 5 mg, Oral, Q12H  fluticasone, 2 spray, Each Nare, Daily  insulin glargine, 30 Units, Subcutaneous, Q12H  insulin lispro, 3-14 Units, Subcutaneous, 4x Daily AC & at Bedtime  senna-docusate sodium, 2 tablet, Oral, BID  sodium chloride, 10 mL, Intravenous, Q12H      amiodarone, 0.5 mg/min, Last Rate: 0.5 mg/min (01/31/24 2309)        acetaminophen    senna-docusate sodium **AND** polyethylene glycol **AND** bisacodyl **AND** bisacodyl    dextrose    dextrose    glucagon (human recombinant)    nitroglycerin    sodium chloride    sodium chloride    sodium chloride    Thank you very much for asking us to be involved in this patient's care.  We will follow along with you. Please do not hesitate to call for any questions or concerns.     I have discussed the patients findings and recommendations with patient.    Electronically signed by KENNY Finn, 02/01/24, 1:26 PM EST.     Electronically signed by Ramon Sahu MD, 02/01/24, 5:18 PM EST.          Please note that portions of this note were copied and has been reviewed and is accurate as of 2/1/2024 .      Please note that portions of this note were completed with a voice recognition program.

## 2024-02-01 NOTE — PLAN OF CARE
Problem: Dysrhythmia  Goal: Normalized Cardiac Rhythm  Outcome: Ongoing, Progressing   Goal Outcome Evaluation:  Plan of Care Reviewed With: patient   Remains in sinus rhythm.     Progress: no change

## 2024-02-01 NOTE — PLAN OF CARE
Problem: Behavioral Health Comorbidity  Goal: Maintenance of Behavioral Health Symptom Control  Outcome: Ongoing, Progressing  Intervention: Maintain Behavioral Health Symptom Control  Recent Flowsheet Documentation  Taken 2/1/2024 1500 by Renetta John RN  Medication Review/Management: medications reviewed  Taken 2/1/2024 1300 by Renetta John RN  Medication Review/Management: medications reviewed  Taken 2/1/2024 1100 by Renetta John RN  Medication Review/Management: medications reviewed  Taken 2/1/2024 0900 by Renetta John RN  Medication Review/Management: medications reviewed  Taken 2/1/2024 0700 by Renetta John RN  Medication Review/Management: medications reviewed     Problem: Diabetes Comorbidity  Goal: Blood Glucose Level Within Targeted Range  Outcome: Ongoing, Progressing     Problem: Hypertension Comorbidity  Goal: Blood Pressure in Desired Range  Outcome: Ongoing, Progressing  Intervention: Maintain Blood Pressure Management  Recent Flowsheet Documentation  Taken 2/1/2024 1500 by Renetta John RN  Medication Review/Management: medications reviewed  Taken 2/1/2024 1300 by Renetta John RN  Medication Review/Management: medications reviewed  Taken 2/1/2024 1100 by Renetta John RN  Medication Review/Management: medications reviewed  Taken 2/1/2024 0900 by Renetta John RN  Medication Review/Management: medications reviewed  Taken 2/1/2024 0700 by Renetta John RN  Medication Review/Management: medications reviewed     Problem: Pain Chronic (Persistent) (Comorbidity Management)  Goal: Acceptable Pain Control and Functional Ability  Outcome: Ongoing, Progressing  Intervention: Manage Persistent Pain  Recent Flowsheet Documentation  Taken 2/1/2024 1500 by Renetta John RN  Medication Review/Management: medications reviewed  Taken 2/1/2024 1300 by Renetta John RN  Medication Review/Management: medications reviewed  Taken 2/1/2024 1100 by Renetta John RN  Medication Review/Management:  medications reviewed  Taken 2/1/2024 0900 by Renetta John RN  Medication Review/Management: medications reviewed  Taken 2/1/2024 0700 by Renetta John RN  Medication Review/Management: medications reviewed  Intervention: Optimize Psychosocial Wellbeing  Recent Flowsheet Documentation  Taken 2/1/2024 1445 by Renetta Jonh RN  Diversional Activities: television  Family/Support System Care: self-care encouraged  Taken 2/1/2024 0826 by Renetta John RN  Supportive Measures:   active listening utilized   verbalization of feelings encouraged  Diversional Activities: television  Family/Support System Care:   self-care encouraged   support provided     Problem: Adult Inpatient Plan of Care  Goal: Plan of Care Review  Outcome: Ongoing, Progressing  Flowsheets  Taken 2/1/2024 1644 by Renetta John RN  Progress: improving  Outcome Evaluation: VSS. Patient is on 2L NC. Patient remains in NSR. Amiodarone gtt transitioned to tablet. Pain medications started at this time. Call light within reach, bed in the lowest position.  Taken 2/1/2024 0425 by Hillary Mack RN  Plan of Care Reviewed With: patient  Goal: Patient-Specific Goal (Individualized)  Outcome: Ongoing, Progressing  Goal: Absence of Hospital-Acquired Illness or Injury  Outcome: Ongoing, Progressing  Intervention: Identify and Manage Fall Risk  Recent Flowsheet Documentation  Taken 2/1/2024 1500 by Renetta John RN  Safety Promotion/Fall Prevention: safety round/check completed  Taken 2/1/2024 1300 by Renetta John RN  Safety Promotion/Fall Prevention: safety round/check completed  Taken 2/1/2024 1100 by Renetta John RN  Safety Promotion/Fall Prevention: safety round/check completed  Taken 2/1/2024 0900 by Renetta John RN  Safety Promotion/Fall Prevention: safety round/check completed  Taken 2/1/2024 0700 by Renetta John RN  Safety Promotion/Fall Prevention: safety round/check completed  Intervention: Prevent Skin Injury  Recent Flowsheet  Documentation  Taken 2/1/2024 0826 by Renetta John RN  Skin Protection:   adhesive use limited   tubing/devices free from skin contact   incontinence pads utilized  Intervention: Prevent and Manage VTE (Venous Thromboembolism) Risk  Recent Flowsheet Documentation  Taken 2/1/2024 1445 by Renetta John RN  VTE Prevention/Management: (eliquis) other (see comments)  Taken 2/1/2024 0826 by Renetta John RN  VTE Prevention/Management: (Eliquis) other (see comments)  Intervention: Prevent Infection  Recent Flowsheet Documentation  Taken 2/1/2024 1500 by Renetta John RN  Infection Prevention:   rest/sleep promoted   single patient room provided  Taken 2/1/2024 1300 by Renetta John RN  Infection Prevention:   rest/sleep promoted   single patient room provided  Taken 2/1/2024 1100 by Renetta John RN  Infection Prevention:   rest/sleep promoted   single patient room provided  Taken 2/1/2024 0900 by Renetta John RN  Infection Prevention:   rest/sleep promoted   single patient room provided  Taken 2/1/2024 0700 by Renetta John RN  Infection Prevention:   rest/sleep promoted   single patient room provided  Goal: Optimal Comfort and Wellbeing  Outcome: Ongoing, Progressing  Intervention: Provide Person-Centered Care  Recent Flowsheet Documentation  Taken 2/1/2024 1445 by Renetta John RN  Trust Relationship/Rapport:   care explained   choices provided   emotional support provided   empathic listening provided   questions answered   questions encouraged   reassurance provided   thoughts/feelings acknowledged  Taken 2/1/2024 0826 by Renetta John RN  Trust Relationship/Rapport:   care explained   choices provided   emotional support provided   empathic listening provided   questions answered   questions encouraged   reassurance provided   thoughts/feelings acknowledged  Goal: Readiness for Transition of Care  Outcome: Ongoing, Progressing     Problem: Dysrhythmia  Goal: Normalized Cardiac Rhythm  Outcome: Ongoing,  Progressing  Intervention: Monitor and Manage Cardiac Rhythm Effect  Recent Flowsheet Documentation  Taken 2/1/2024 1445 by Renetta John, RN  VTE Prevention/Management: (eliquis) other (see comments)  Taken 2/1/2024 0826 by Renetta John, RN  VTE Prevention/Management: (Eliquis) other (see comments)   Goal Outcome Evaluation:           Progress: improving  Outcome Evaluation: VSS. Patient is on 2L NC. Patient remains in NSR. Amiodarone gtt transitioned to tablet. Pain medications started at this time. Call light within reach, bed in the lowest position.

## 2024-02-01 NOTE — H&P
Hospitalist History and Physical        Patient Identification  Name: Yumiko Purdy  Age/Sex: 58 y.o. female  :  1965        MRN: [id number]  Visit Number: [id number]  Admit Date: 2024   PCP: Masha Davidson APRN          Chief complaint heart racing    History of Present Illness:  Patient is a 58 y.o. female with history of systolic and diastolic CHF (EF 31-35%, grade III diastolic dysfunction per ECHO 2023), chronic afib, cirrhosis, insulin dependent type II DM, stage IIIa CKD (cr 1.2, GFR upper 40s-low 50s), Vtach and anemia. She reports she had a LifeVest placed about 2 months ago and is scheduled to see a cardiologist next week to discuss AICD placement. She presents today with complaints of palpitations that started earlier today. She informed ED provider that her LifeVest has been beeping and trying to shock her for the last 2 days, so she took it off. Upon further questioning, she reports last week her PCP started her on an antibiotic for a sinus infection which helped with her symptoms, but in the interim she developed dysuria and cloudy, foul smelling urine and presented back to her PCP yesterday, at which time he obtained a urine sample for UA and told the patient she had a UTI. Her PCP then changed her antibiotic to nitrofurantoin for the UTI. She thinks she has had 3 doses of this antibiotic thus far. In the ED, patient's HR was 184 upon arrival. EKG showed wide complex QRS tachycardia, felt to be afib w/aberrancy which was confirmed by cardiology. BP was initially stable. She was started on IV amiodarone; during loading bolus she became hypotensive, requiring synchronized cardioversion at 150 J with subsequent conversion to sinus rhythm. She has maintained sinus rhythm on amiodarone infusion since then and BP has remained stable. Labs showed elevated troponin 112 with fairly flat trend since then overall, with repeat 96 followed by 101, and looking back she had higher troponin  levels in Nov 2023. BNP is 4500. K+ and mag are normal. Creatinine is stable at 1.27, glucose 382, ALT 40, total bili 3.4 but direct only 0.6. TSH is normal at 2.960. WBC is 13, procal 0.42, lactate 2.9, and CRP 0.33. UA is pending. CXR showed mild cardiomegaly but no acute findings. Patient has been admitted to the PCU for further workup and management.     Review of Systems  Review of Systems   Constitutional:  Negative for chills, fatigue and fever.   HENT:  Negative for postnasal drip, rhinorrhea, sinus pressure, sinus pain and sore throat.    Respiratory:  Negative for cough, shortness of breath and wheezing.    Cardiovascular:  Positive for chest pain (with palpitations earlier today, now resolved), palpitations and leg swelling (chronic, no worse than baseline).   Gastrointestinal:  Positive for abdominal pain (lower abdominal). Negative for abdominal distention, constipation, diarrhea, nausea and vomiting.   Genitourinary:  Positive for dysuria. Negative for flank pain, frequency and hematuria.   Musculoskeletal:  Negative for arthralgias and back pain.   Skin:  Negative for color change, pallor, rash and wound.   Neurological:  Negative for dizziness, seizures, weakness, light-headedness, numbness and headaches.   Hematological:  Negative for adenopathy. Does not bruise/bleed easily.   Psychiatric/Behavioral:  Negative for agitation, behavioral problems and confusion.        History  Past Medical History:   Diagnosis Date    Anemia     CHF (congestive heart failure)     Chronic a-fib     stated by patient     Cirrhosis of liver     stated by patient     Diabetes mellitus     Ventricular tachycardia      Past Surgical History:   Procedure Laterality Date    APPENDECTOMY      CARDIAC CATHETERIZATION N/A 11/10/2020    Procedure: Left Heart Cath;  Surgeon: Charlee Ken MD;  Location: [facility] CATH INVASIVE LOCATION;  Service: Cardiovascular;  Laterality: N/A;    CHOLECYSTECTOMY      TOE AMPUTATION Right      stated by patient.      Family History   Problem Relation Age of Onset    Heart attack Father     Heart attack Brother      Social History     Tobacco Use    Smoking status: Never    Smokeless tobacco: Never   Vaping Use    Vaping Use: Never used   Substance Use Topics    Alcohol use: Never    Drug use: Never     Medications Prior to Admission   Medication Sig Dispense Refill Last Dose    apixaban (ELIQUIS) 5 MG tablet tablet Take 1 tablet by mouth Every 12 (Twelve) Hours. Indications: Atrial Fibrillation 60 tablet 3     Aspirin Low Dose 81 MG EC tablet Take 1 tablet by mouth Daily.       bumetanide (BUMEX) 2 MG tablet Take 1 tablet by mouth 2 (Two) Times a Day. ON SUNDAYS, TAKE ONE TABLET ONCE DAILY.       busPIRone (BUSPAR) 10 MG tablet Take 1 tablet by mouth 2 (Two) Times a Day.       cefdinir (OMNICEF) 300 MG capsule Take 1 capsule by mouth 2 (Two) Times a Day.       doxycycline (VIBRAMYICN) 100 MG tablet Take 1 tablet by mouth 2 (Two) Times a Day. 20 tablet 0     empagliflozin (JARDIANCE) 10 MG tablet tablet Take 1 tablet by mouth Daily for 30 days. 30 tablet 6     ferrous sulfate 325 (65 FE) MG tablet Take 1 tablet by mouth Daily.       fluticasone (FLONASE) 50 MCG/ACT nasal spray 2 sprays into the nostril(s) as directed by provider Daily.       guaiFENesin (Mucinex) 600 MG 12 hr tablet Take 2 tablets by mouth 2 (Two) Times a Day for 10 days. 40 tablet 0     HYDROcodone-acetaminophen (NORCO) 7.5-325 MG per tablet Take 1 tablet by mouth 2 (Two) Times a Day.       Insulin Glargine (LANTUS SOLOSTAR) 100 UNIT/ML injection pen Inject 45 Units under the skin into the appropriate area as directed 2 (Two) Times a Day. 30 mL 0     Insulin Lispro, 1 Unit Dial, (HUMALOG) 100 UNIT/ML solution pen-injector Inject 15 Units under the skin into the appropriate area as directed 3 (Three) Times a Day With Meals. 15 mL 1     levothyroxine (SYNTHROID, LEVOTHROID) 50 MCG tablet Take 1 tablet by mouth Daily.       metoprolol  succinate XL (TOPROL-XL) 25 MG 24 hr tablet Take 0.5 tablets by mouth Daily.       nitrofurantoin, macrocrystal-monohydrate, (MACROBID) 100 MG capsule Take 1 capsule by mouth 2 (Two) Times a Day.       nitroglycerin (NITROSTAT) 0.4 MG SL tablet Place 1 tablet under the tongue Every 5 (Five) Minutes As Needed for Chest Pain. Take no more than 3 doses in 15 minutes.       omeprazole (priLOSEC) 20 MG capsule Take 1 capsule by mouth Daily.       predniSONE (DELTASONE) 20 MG tablet Take 2 tablets by mouth Daily for 5 days. 10 tablet 0     Vericiguat 10 MG tablet Take 1 tablet by mouth Daily. 30 tablet 2     Vortioxetine HBr (Trintellix) 5 MG tablet tablet Take 1 tablet by mouth Daily With Breakfast.       acetaminophen (TYLENOL) 500 MG tablet Take 1 tablet by mouth 2 (Two) Times a Day As Needed for Mild Pain.        Allergies:  Phenergan [promethazine]    Objective     Vital Signs  Temp:  [98.3 °F (36.8 °C)] 98.3 °F (36.8 °C)  Heart Rate:  [] 73  Resp:  [16] 16  BP: ()/(53-82) 119/61  Body mass index is 39.77 kg/m².    Physical Exam:  Physical Exam  Constitutional:       General: She is not in acute distress.     Appearance: She is obese. She is ill-appearing (chronically so).   HENT:      Head: Normocephalic and atraumatic.      Right Ear: External ear normal.      Left Ear: External ear normal.      Nose: Nose normal.      Mouth/Throat:      Mouth: Mucous membranes are moist.      Pharynx: Oropharynx is clear.   Eyes:      Extraocular Movements: Extraocular movements intact.      Conjunctiva/sclera: Conjunctivae normal.      Pupils: Pupils are equal, round, and reactive to light.   Cardiovascular:      Rate and Rhythm: Normal rate and regular rhythm.      Pulses: Normal pulses.      Heart sounds: Normal heart sounds.   Pulmonary:      Effort: Pulmonary effort is normal. No respiratory distress.      Breath sounds: Normal breath sounds. No wheezing or rales.   Abdominal:      General: Bowel sounds are  "normal. There is no distension.      Palpations: Abdomen is soft.      Tenderness: There is abdominal tenderness (suprapubic region).      Comments: Some pitting edema in pannus   Musculoskeletal:         General: Normal range of motion.      Cervical back: Normal range of motion and neck supple. No tenderness.      Right lower leg: Edema (trace) present.      Left lower leg: Edema (trace) present.   Lymphadenopathy:      Cervical: No cervical adenopathy.   Skin:     General: Skin is warm and dry.      Capillary Refill: Capillary refill takes less than 2 seconds.      Coloration: Skin is not jaundiced.      Findings: No bruising or lesion.   Neurological:      General: No focal deficit present.      Mental Status: She is alert and oriented to person, place, and time.   Psychiatric:         Mood and Affect: Mood normal.         Behavior: Behavior normal.           Results Review:       Lab Results:  Results from last 7 days   Lab Units 01/31/24  1626   WBC 10*3/mm3 13.68*   HEMOGLOBIN g/dL 11.7*   PLATELETS 10*3/mm3 162     Results from last 7 days   Lab Units 01/31/24  1918   CRP mg/dL 0.33     Results from last 7 days   Lab Units 01/31/24  1626   SODIUM mmol/L 132*   POTASSIUM mmol/L 4.6   CHLORIDE mmol/L 93*   CO2 mmol/L 25.5   BUN mg/dL 41*   CREATININE mg/dL 1.27*   CALCIUM mg/dL 9.5   GLUCOSE mg/dL 382*     Results from last 7 days   Lab Units 01/31/24  1626   MAGNESIUM mg/dL 2.3     No results found for: \"HGBA1C\"  Results from last 7 days   Lab Units 01/31/24  1626   BILIRUBIN mg/dL 3.4*   ALK PHOS U/L 107   AST (SGOT) U/L 31   ALT (SGPT) U/L 40*     Results from last 7 days   Lab Units 01/31/24  1918 01/31/24  1842 01/31/24  1626   HSTROP T ng/L 101* 96* 112*         Results from last 7 days   Lab Units 01/31/24  1626   INR  1.13*           I have reviewed the patient's laboratory results.    Imaging:  Imaging Results (Last 72 Hours)       Procedure Component Value Units Date/Time    XR Chest 1 View " [[id number]] Collected: 01/31/24 1713     Updated: 01/31/24 1715    Narrative:      EXAM:    XR Chest, 1 View     EXAM DATE:    1/31/2024 4:34 PM     CLINICAL HISTORY:    Chest Pain Protocol     TECHNIQUE:    Frontal view of the chest.     COMPARISON:    11/14/2023     FINDINGS:    Lungs and pleural spaces:  Unremarkable as visualized.  No  consolidation.  No pneumothorax.    Heart:  Cardiomegaly.    Mediastinum:  Unremarkable as visualized.    Bones/joints:  Unremarkable as visualized.    Tubes, lines and devices:  Defibrillator pad left chest.       Impression:      1.  Mild cardiomegaly.  2.  No acute cardiopulmonary findings.        This report was finalized on 1/31/2024 5:13 PM by Dr. Marlo Parr MD.               I have personally reviewed the patient's radiologic imaging.        EKG:   Critical Test Result: High HR , Arrhythmia  Wide QRS tachycardia, , QTc 478  Left axis deviation  Nonspecific intraventricular block  Cannot rule out Anterior infarct , age undetermined  T wave abnormality, consider lateral ischemia  Abnormal ECG  When compared with ECG of 31-JAN-2024 16:19, (Unconfirmed)  No significant change was found    I have personally reviewed the patient's EKG.        Assessment & Plan     - Atrial fibrillation with RVR: became hemodynamically stable with amiodarone bolus, but converted to sinus rhythm after electrical cardioversion. Now maintaining sinus rhythm on amiodarone infusion. K+ and mag normal, TSH normal. Troponin elevated but flat trend and appears chronically elevated. EKG not felt to show any evidence of acute ischemia per cardiology review. Patient was treated for sinus infection last week and is now on nitrofurantoin for UTI. Underling infectious process such as this could have triggered uncontrolled afib. Will check UA now. Cardiology consulted for further assistance in management. Continue home eliquis. Review other home meds once reconciled by pharmacy.  - SIRS, present on  admission with tachycardia, leukocytosis and lactic acidosis: granted, many of these abnormalities could be explained by afib w/RVR. However, patient also reports recently diagnosed UTI for which she was started on nitrofurantoin yesterday. Will check UA now and if UTI confirmed, would technically meet criteria for severe sepsis. Blood cultures ordered. Continue to monitor closely.  - Stage IIIa CKD: creatinine appears to be within baseline range. Continue to monitor.  - Chronic combined systolic and diastolic CHF: BNP elevated but suspect secondary to uncontrolled afib, as clinically does not appear grossly fluid overloaded. Review home meds once reconciled by pharmacy. Await further recommendations from cardiology. Supposed to meet with cardiology next week to discuss AICD placement.   - Insulin dependent type II DM: glucose 382. Check A1c. Monitor accuchecks. Cover with SSI. Reduce listed home dose of lantus to try to avoid hypoglycemic episode, can increase dose moving forward if needed.     DVT Prophylaxis: anticoagulated on eliquis    Estimated Length of Stay >2 midnights    I discussed the patient's findings, assessment and plan with the patient and nursing staff in the PCU.    Patient is high risk due to afib w/RVR, SIRS    Tomás An MD  01/31/24  20:20 EST

## 2024-02-01 NOTE — ED NOTES
Bedside report received from OLIVIA Mckeon. Patient updated to plan of care. Bed is in lowest locked position with call light in reach. No acute distress noted. Care continues per provider's orders.

## 2024-02-02 LAB
ANION GAP SERPL CALCULATED.3IONS-SCNC: 12.5 MMOL/L (ref 5–15)
ANISOCYTOSIS BLD QL: NORMAL
BACTERIA BLD CULT: ABNORMAL
BASOPHILS # BLD AUTO: 0.09 10*3/MM3 (ref 0–0.2)
BASOPHILS NFR BLD AUTO: 0.8 % (ref 0–1.5)
BOTTLE TYPE: ABNORMAL
BUN SERPL-MCNC: 32 MG/DL (ref 6–20)
BUN/CREAT SERPL: 26.2 (ref 7–25)
CALCIUM SPEC-SCNC: 8.9 MG/DL (ref 8.6–10.5)
CHLORIDE SERPL-SCNC: 100 MMOL/L (ref 98–107)
CO2 SERPL-SCNC: 23.5 MMOL/L (ref 22–29)
CREAT SERPL-MCNC: 1.22 MG/DL (ref 0.57–1)
DEPRECATED RDW RBC AUTO: 72.8 FL (ref 37–54)
EGFRCR SERPLBLD CKD-EPI 2021: 51.9 ML/MIN/1.73
EOSINOPHIL # BLD AUTO: 0.25 10*3/MM3 (ref 0–0.4)
EOSINOPHIL NFR BLD AUTO: 2.1 % (ref 0.3–6.2)
ERYTHROCYTE [DISTWIDTH] IN BLOOD BY AUTOMATED COUNT: 20.3 % (ref 12.3–15.4)
GLUCOSE BLDC GLUCOMTR-MCNC: 221 MG/DL (ref 70–130)
GLUCOSE BLDC GLUCOMTR-MCNC: 375 MG/DL (ref 70–130)
GLUCOSE BLDC GLUCOMTR-MCNC: 416 MG/DL (ref 70–130)
GLUCOSE BLDC GLUCOMTR-MCNC: 457 MG/DL (ref 70–130)
GLUCOSE BLDC GLUCOMTR-MCNC: 538 MG/DL (ref 70–130)
GLUCOSE SERPL-MCNC: 222 MG/DL (ref 65–99)
HCT VFR BLD AUTO: 31.7 % (ref 34–46.6)
HGB BLD-MCNC: 9.5 G/DL (ref 12–15.9)
HYPOCHROMIA BLD QL: NORMAL
IMM GRANULOCYTES # BLD AUTO: 0.15 10*3/MM3 (ref 0–0.05)
IMM GRANULOCYTES NFR BLD AUTO: 1.3 % (ref 0–0.5)
LYMPHOCYTES # BLD AUTO: 2.3 10*3/MM3 (ref 0.7–3.1)
LYMPHOCYTES NFR BLD AUTO: 19.3 % (ref 19.6–45.3)
MACROCYTES BLD QL SMEAR: NORMAL
MCH RBC QN AUTO: 31.4 PG (ref 26.6–33)
MCHC RBC AUTO-ENTMCNC: 30 G/DL (ref 31.5–35.7)
MCV RBC AUTO: 104.6 FL (ref 79–97)
MONOCYTES # BLD AUTO: 0.54 10*3/MM3 (ref 0.1–0.9)
MONOCYTES NFR BLD AUTO: 4.5 % (ref 5–12)
NEUTROPHILS NFR BLD AUTO: 72 % (ref 42.7–76)
NEUTROPHILS NFR BLD AUTO: 8.6 10*3/MM3 (ref 1.7–7)
NRBC BLD AUTO-RTO: 0.7 /100 WBC (ref 0–0.2)
PLATELET # BLD AUTO: 121 10*3/MM3 (ref 140–450)
PMV BLD AUTO: 11 FL (ref 6–12)
POLYCHROMASIA BLD QL SMEAR: NORMAL
POTASSIUM SERPL-SCNC: 4.3 MMOL/L (ref 3.5–5.2)
QT INTERVAL: 518 MS
QT INTERVAL: 572 MS
QTC INTERVAL: 567 MS
QTC INTERVAL: 575 MS
RBC # BLD AUTO: 3.03 10*6/MM3 (ref 3.77–5.28)
SMALL PLATELETS BLD QL SMEAR: NORMAL
SODIUM SERPL-SCNC: 136 MMOL/L (ref 136–145)
STOMATOCYTES BLD QL SMEAR: NORMAL
WBC NRBC COR # BLD AUTO: 11.93 10*3/MM3 (ref 3.4–10.8)

## 2024-02-02 PROCEDURE — 63710000001 INSULIN GLARGINE PER 5 UNITS: Performed by: STUDENT IN AN ORGANIZED HEALTH CARE EDUCATION/TRAINING PROGRAM

## 2024-02-02 PROCEDURE — 63710000001 INSULIN GLARGINE PER 5 UNITS: Performed by: HOSPITALIST

## 2024-02-02 PROCEDURE — 80048 BASIC METABOLIC PNL TOTAL CA: CPT | Performed by: STUDENT IN AN ORGANIZED HEALTH CARE EDUCATION/TRAINING PROGRAM

## 2024-02-02 PROCEDURE — 63710000001 INSULIN LISPRO (HUMAN) PER 5 UNITS: Performed by: STUDENT IN AN ORGANIZED HEALTH CARE EDUCATION/TRAINING PROGRAM

## 2024-02-02 PROCEDURE — 85007 BL SMEAR W/DIFF WBC COUNT: CPT | Performed by: STUDENT IN AN ORGANIZED HEALTH CARE EDUCATION/TRAINING PROGRAM

## 2024-02-02 PROCEDURE — 99231 SBSQ HOSP IP/OBS SF/LOW 25: CPT | Performed by: STUDENT IN AN ORGANIZED HEALTH CARE EDUCATION/TRAINING PROGRAM

## 2024-02-02 PROCEDURE — 93005 ELECTROCARDIOGRAM TRACING: CPT | Performed by: STUDENT IN AN ORGANIZED HEALTH CARE EDUCATION/TRAINING PROGRAM

## 2024-02-02 PROCEDURE — 97162 PT EVAL MOD COMPLEX 30 MIN: CPT

## 2024-02-02 PROCEDURE — 82948 REAGENT STRIP/BLOOD GLUCOSE: CPT

## 2024-02-02 PROCEDURE — 63710000001 INSULIN LISPRO (HUMAN) PER 5 UNITS: Performed by: HOSPITALIST

## 2024-02-02 PROCEDURE — 85025 COMPLETE CBC W/AUTO DIFF WBC: CPT | Performed by: STUDENT IN AN ORGANIZED HEALTH CARE EDUCATION/TRAINING PROGRAM

## 2024-02-02 RX ORDER — BISMUTH SUBSALICYLATE 262 MG/1
524 TABLET, CHEWABLE ORAL EVERY 6 HOURS PRN
Status: DISCONTINUED | OUTPATIENT
Start: 2024-02-02 | End: 2024-02-04 | Stop reason: HOSPADM

## 2024-02-02 RX ORDER — INSULIN LISPRO 100 [IU]/ML
4-24 INJECTION, SOLUTION INTRAVENOUS; SUBCUTANEOUS
Status: DISCONTINUED | OUTPATIENT
Start: 2024-02-02 | End: 2024-02-04 | Stop reason: HOSPADM

## 2024-02-02 RX ADMIN — INSULIN GLARGINE 45 UNITS: 100 INJECTION, SOLUTION SUBCUTANEOUS at 20:38

## 2024-02-02 RX ADMIN — EMPAGLIFLOZIN 10 MG: 10 TABLET, FILM COATED ORAL at 08:24

## 2024-02-02 RX ADMIN — INSULIN LISPRO 12 UNITS: 100 INJECTION, SOLUTION INTRAVENOUS; SUBCUTANEOUS at 11:12

## 2024-02-02 RX ADMIN — PANTOPRAZOLE SODIUM 40 MG: 40 TABLET, DELAYED RELEASE ORAL at 06:38

## 2024-02-02 RX ADMIN — INSULIN LISPRO 14 UNITS: 100 INJECTION, SOLUTION INTRAVENOUS; SUBCUTANEOUS at 17:12

## 2024-02-02 RX ADMIN — APIXABAN 5 MG: 5 TABLET, FILM COATED ORAL at 08:22

## 2024-02-02 RX ADMIN — Medication 10 ML: at 08:25

## 2024-02-02 RX ADMIN — NITROFURANTOIN MONOHYDRATE/MACROCRYSTALLINE 100 MG: 25; 75 CAPSULE ORAL at 08:22

## 2024-02-02 RX ADMIN — FERROUS SULFATE TAB 325 MG (65 MG ELEMENTAL FE) 325 MG: 325 (65 FE) TAB at 08:22

## 2024-02-02 RX ADMIN — AMIODARONE HYDROCHLORIDE 200 MG: 200 TABLET ORAL at 00:40

## 2024-02-02 RX ADMIN — GUAIFENESIN 1200 MG: 600 TABLET, EXTENDED RELEASE ORAL at 08:23

## 2024-02-02 RX ADMIN — BUSPIRONE HYDROCHLORIDE 10 MG: 10 TABLET ORAL at 20:40

## 2024-02-02 RX ADMIN — Medication 10 ML: at 20:39

## 2024-02-02 RX ADMIN — METOPROLOL SUCCINATE 25 MG: 25 TABLET, EXTENDED RELEASE ORAL at 08:23

## 2024-02-02 RX ADMIN — BUSPIRONE HYDROCHLORIDE 10 MG: 10 TABLET ORAL at 08:22

## 2024-02-02 RX ADMIN — INSULIN LISPRO 5 UNITS: 100 INJECTION, SOLUTION INTRAVENOUS; SUBCUTANEOUS at 06:43

## 2024-02-02 RX ADMIN — DOCUSATE SODIUM 50 MG AND SENNOSIDES 8.6 MG 2 TABLET: 8.6; 5 TABLET, FILM COATED ORAL at 20:38

## 2024-02-02 RX ADMIN — INSULIN LISPRO 24 UNITS: 100 INJECTION, SOLUTION INTRAVENOUS; SUBCUTANEOUS at 20:38

## 2024-02-02 RX ADMIN — BISMUTH SUBSALICYLATE 524 MG: 262 TABLET, CHEWABLE ORAL at 14:09

## 2024-02-02 RX ADMIN — APIXABAN 5 MG: 5 TABLET, FILM COATED ORAL at 20:38

## 2024-02-02 RX ADMIN — LEVOTHYROXINE SODIUM 50 MCG: 50 TABLET ORAL at 08:23

## 2024-02-02 RX ADMIN — VALSARTAN 40 MG: 80 TABLET ORAL at 08:23

## 2024-02-02 RX ADMIN — HYDROCODONE BITARTRATE AND ACETAMINOPHEN 1 TABLET: 7.5; 325 TABLET ORAL at 09:27

## 2024-02-02 RX ADMIN — BUMETANIDE 2 MG: 1 TABLET ORAL at 08:24

## 2024-02-02 RX ADMIN — FLUTICASONE PROPIONATE 2 SPRAY: 50 SPRAY, METERED NASAL at 08:25

## 2024-02-02 RX ADMIN — BUMETANIDE 2 MG: 1 TABLET ORAL at 17:12

## 2024-02-02 RX ADMIN — INSULIN GLARGINE 30 UNITS: 100 INJECTION, SOLUTION SUBCUTANEOUS at 08:25

## 2024-02-02 RX ADMIN — NITROFURANTOIN MONOHYDRATE/MACROCRYSTALLINE 100 MG: 25; 75 CAPSULE ORAL at 20:38

## 2024-02-02 NOTE — PROGRESS NOTES
[facility]  PROGRESS NOTE    Patient information:  Name: Yumiko Purdy  Age/Sex: 58 y.o. female  :  1965        PCP: Masha Davidson APRN  Attending: Tomás An MD  MRN:  [id number]  Visit Number:  [id number]    LOS:  LOS: 2 days     CODE STATUS:    Code Status and Medical Interventions:   Ordered at: 24     Code Status (Patient has no pulse and is not breathing):    CPR (Attempt to Resuscitate)     Medical Interventions (Patient has pulse or is breathing):    Full Support       PROBLEM LIST:Principal Problem:    Atrial fibrillation with RVR      Reason for Cardiology follow-up:  Wide-complex tachycardia and A-fib with aberrancy    Subjective   ADMISSION INFORMATION:  Chief Complaint   Patient presents with    Rapid Heart Rate       HPI:  Yumiko Purdy is a 58 y.o. female with a past medical history significant for systolic and diastolic CHF (EF 31-35%, grade III diastolic dysfunction per ECHO 2023), chronic afib s/p ablation 10/15753, cirrhosis, insulin dependent type II DM, stage IIIa CKD (cr 1.2, GFR upper 40s-low 50s), Vtach and anemia.      She reports she had a LifeVest placed about 2 months ago and is scheduled to see a cardiologist next week to discuss AICD placement. She presented with complaints of palpitations that started 2 days ago. She informed ER provider that her LifeVest has been beeping and trying to shock her for the last 2 days, so she took it off.     In the ER, patient's HR was 184 upon arrival. EKG showed wide complex QRS tachycardia, felt to be afib w/aberrancy which was confirmed by cardiology. She was started on IV amiodarone; during loading bolus she became hypotensive, requiring synchronized cardioversion at 150 J with subsequent conversion to sinus rhythm. She has maintained sinus rhythm on amiodarone infusion since then and BP has remained stable.     Cardiology has been consulted for further  evaluation and management for wide-complex tachycardia and A-fib with aberrancy       Primary Cardiologist has been KENNY Lawler and she was last seen in the office on 12/7/2023.      Dr. Jimenez's office at [phone]: [place], [place].    She was also seen by Psychiatric heart failure clinic and was last seen on 1/22/2024.    EVENT TIMELINE:   02/02/2024: DC amiodarone secondary to QTc prolongation    Interval History:   Telemetry reveals SR 70s with a BBB.  AM EKG reveals a QTc interval of 567 ms and a QRS duration of 134 ms.  This is increased from her previous EKG with a QTc interval was 515 ms and the QRS was 134 ms gradually increased since admission.  Patient has been transitioned to p.o. amiodarone on 02/01/2024.  According to her I & O flow sheet she did diurese and -1403.1 mL overnight.  Her a.m. labs reveals a sodium 136, potassium 4.3, chloride 100, CO2 23.5, BUN of 32, and a creatinine of 1.22.  Hemoglobin is 9.5 which is a drop from 11.7, and platelet count is 121 which is a drop from 162.    Patient was in room  when she was seen and examined.  Patient is lying in bed resting quietly.  No acute distress noted at this time.  Patient currently denies any chest pain, shortness of breath, or palpitations.  Reports she does feel some better today. Patient reports she has an appointment with Cardiology at [facility] in [place]: Dr. Jimenez.  She also reports she has had a previous ablation x 1 in the past.    ORDERS:   DC amiodarone 2/2 to prolonged QTc interval    Vital Signs  Temp:  [98 °F (36.7 °C)-98.8 °F (37.1 °C)] 98.4 °F (36.9 °C)  Heart Rate:  [62-82] 62  Resp:  [16-27] 20  BP: ()/(40-72) 105/58  Flow (L/min):  [2] 2  Device (Oxygen Therapy): nasal cannula  Vital Signs (last 72 hrs)         01/30 0700  01/31 0659 01/31 0700  02/01 0659 02/01 0700 02/02 0659 02/02 0700  02/02 0833   Most Recent      Temp (°F)   98.3 -  98.4    97.9 -  98.8      98.4     98.4 (36.9)  02/02 0800    Heart Rate   64 -  184    66 -  85    72 -  73     72 02/02 0823    Resp   16 -  25    16 -  27    20 -  24     20 02/02 0800    BP   93/43 -  138/80    90/50 -  123/61    100/46 -  115/61     115/61 02/02 0823    SpO2 (%)   91 -  100    89 -  100    96 -  100     100 02/02 0800    Flow (L/min)     1    1 -  2      2     2 02/02 0800          BMI:Body mass index is 38.78 kg/m².    WEIGHT:      01/31/24 2009 02/01/24 0400 02/02/24 0400   Weight: 104 kg (229 lb 15 oz) 104 kg (229 lb 15 oz) 106 kg (233 lb 0.4 oz)       DIET:Diet: Cardiac Diets, Renal Diets, Diabetic Diets; Healthy Heart (2-3 Na+); Consistent Carbohydrate; Low Sodium (2-3g); Texture: Regular Texture (IDDSI 7); Fluid Consistency: Thin (IDDSI 0)    I&O:  Intake & Output (last 3 days)         01/30 0701 01/31 0700 01/31 0701 02/01 0700 02/01 0701 02/02 0700 02/02 0701 02/03 0700    P.O.  600 1010     I.V. (mL/kg)  588.8 (5.7) 186.9 (1.8)     IV Piggyback  1000      Total Intake(mL/kg)  2188.8 (21) 1196.9 (11.3)     Urine (mL/kg/hr)  1200 2600 (1) 650 (4)    Stool  0 0     Total Output  1200 2600 650    Net  +988.8 -1403.1 -650            Stool Unmeasured Occurrence  0 x 0 x              Objective   I have seen and examined Ms. Purdy today.  Physical Exam:      General Appearance:    Alert, cooperative, in no acute distress.  Tired appearing.   Head:    Normocephalic, without obvious abnormality, atraumatic.   Eyes:                          Conjunctivae and sclerae normal, no icterus, no pallor, corneas clear.   Neck:   No adenopathy, supple, trachea midline, no thyromegaly, no carotid bruit, no JVD.   Lungs:     Clear to auscultation, respirations regular, even and             unlabored.    Heart:    Regular rhythm and normal rate, normal S1 and S2, no          murmur, no gallop, no rub, no click   Chest Wall:    No abnormalities observed.   Abdomen:     Normal bowel sounds, no masses, no organomegaly, soft nontender, nondistended, no  guarding, no rebound tenderness.   Extremities:   Moves all extremities well, no edema, no cyanosis, no           redness.  Contractures noted in hands.   Pulses:   Pulses palpable and equal bilaterally.   Skin:   No bleeding, bruising or rash.   Neurologic:   Alert and Oriented x 3, Speech Clear & comprehensive.       Results review   Results Review:   Results from last 7 days   Lab Units 01/31/24  1918 01/31/24  1842 01/31/24  1626   HSTROP T ng/L 101* 96* 112*     Lab Results   Component Value Date    PROBNP 4,500.0 (H) 01/31/2024    PROBNP 606.7 01/22/2024    PROBNP 928.3 (H) 12/08/2023     Results from last 7 days   Lab Units 02/02/24  0049 01/31/24  1626   WBC 10*3/mm3 11.93* 13.68*   HEMOGLOBIN g/dL 9.5* 11.7*   PLATELETS 10*3/mm3 121* 162     Results from last 7 days   Lab Units 02/02/24  0049 01/31/24  1626   SODIUM mmol/L 136 132*   POTASSIUM mmol/L 4.3 4.6   CHLORIDE mmol/L 100 93*   CO2 mmol/L 23.5 25.5   BUN mg/dL 32* 41*   CREATININE mg/dL 1.22* 1.27*   CALCIUM mg/dL 8.9 9.5   GLUCOSE mg/dL 222* 382*   ALT (SGPT) U/L  --  40*   AST (SGOT) U/L  --  31     Lab Results   Component Value Date    MG 2.3 01/31/2024    MG 2.4 01/22/2024    MG 2.1 12/08/2023     Estimated Creatinine Clearance: 61.5 mL/min (A) (by C-G formula based on SCr of 1.22 mg/dL (H)).    Lab Results   Component Value Date    HGBA1C 7.30 (H) 11/13/2023    HGBA1C 8.40 (H) 09/10/2020    HGBA1C 5.4 04/21/2016     Lab Results   Component Value Date    CHOL 137 09/11/2020    TRIG 80 09/11/2020    LDL 78 09/11/2020    HDL 43 09/11/2020     Lab Results   Component Value Date    ABSOLUTELUNG 37 (A) 01/22/2024    ABSOLUTELUNG 37 (A) 12/08/2023    ABSOLUTELUNG 36 (A) 11/20/2023     Lab Results   Component Value Date    INR 1.13 (H) 01/31/2024    INR 0.97 11/15/2023    INR 1.20 (H) 11/12/2023    INR 1.10 12/15/2022    INR 1.25 (H) 09/23/2020    INR 1.09 09/22/2020    INR 1.17 (H) 09/21/2020     Lab Results   Component Value Date    LABHEPA <0.10 (L)  11/15/2023    LABHEPA 0.21 (L) 11/15/2023    LABHEPA <0.10 (L) 11/15/2023    LABHEPA 0.31 11/14/2023       Lab Results   Component Value Date    TSH 2.960 01/31/2024      Pain Management Panel  More data exists         Latest Ref Rng & Units 11/13/2023 9/12/2020   Pain Management Panel   Creatinine, Urine mg/dL  mg/dL - 46.3  46.3    Amphetamine, Urine Qual Negative Negative  -   Barbiturates Screen, Urine Negative Negative  -   Benzodiazepine Screen, Urine Negative Negative  -   Buprenorphine, Screen, Urine Negative Negative  -   Cocaine Screen, Urine Negative Negative  -   Fentanyl, Urine Negative Negative  -   Methadone Screen , Urine Negative Negative  -   Methamphetamine, Ur Negative Negative  -     Microbiology Results (last 10 days)       Procedure Component Value - Date/Time    Blood Culture - Blood, Arm, Right [[id number]]  (Abnormal) Collected: 01/31/24 2105    Lab Status: Preliminary result Specimen: Blood from Arm, Right Updated: 02/02/24 0711     Blood Culture Abnormal Stain     Gram Stain Anaerobic Bottle Gram positive cocci in clusters    Blood Culture ID, PCR - Blood, Arm, Right [[id number]]  (Abnormal) Collected: 01/31/24 2105    Lab Status: Final result Specimen: Blood from Arm, Right Updated: 02/02/24 0711     BCID, PCR Staph spp, not aureus or lugdunensis. Identification by BCID2 PCR.     BOTTLE TYPE Anaerobic Bottle    Blood Culture - Blood, Arm, Left [[id number]]  (Normal) Collected: 01/31/24 2054    Lab Status: Preliminary result Specimen: Blood from Arm, Left Updated: 02/01/24 2131     Blood Culture No growth at 24 hours           Imaging Results (Last 24 Hours)       ** No results found for the last 24 hours. **          ECHO:  Results for orders placed during the hospital encounter of 11/12/23    Adult Transthoracic Echo Complete W/ Cont if Necessary Per Protocol    Interpretation Summary    Left ventricular systolic function is moderately decreased. Left ventricular ejection fraction appears  to be 31 - 35%.    Left ventricular wall thickness is consistent with mild concentric hypertrophy.    Left ventricular diastolic function is consistent with (grade III w/high LAP) fixed restrictive pattern.    Mildly reduced right ventricular systolic function noted.    The left atrial cavity is moderately dilated.    The right atrial cavity is mild to moderately  dilated.    There is calcification of the aortic valve mainly affecting the left coronary cusp(s).    Dilated pulmonary artery.      STRESS TEST:  Results for orders placed during the hospital encounter of 10/15/20    Nuclear Medicine Cardiac Blood Pool Muga At Rest    Interpretation Summary  EXAMINATION: NUCLEAR MEDICINE CARDIAC BLOOD POOL MUGA AT REST-    CLINICAL INDICATION: Cardiomyopathy  TECHNIQUE: 21 mCi of Tc-99m autologous RBCs were injected IV after  in-vitro RBC labeling. Gated cardiac imaging was performed in the  anterior and left anterior oblique.  COMPARISON: None  FINDINGS: The left ventricle is normal in size with normal systolic  function. The calculated left ventricular ejection fraction (LVEF) = 38  %.  The right and left atria, aorta and pulmonary artery are normal. The  right ventricle is normal in size.    Impression  LVEF: 38%, at rest.    This report was finalized on 10/16/2020 11:57 AM by Dr. Marlo Parr MD.       HEART CATH:  Results for orders placed during the hospital encounter of 11/10/20    Cardiac Catheterization/Vascular Study    Narrative  Procedure type:  Left heart cath, LV gram, bilateral selective cholangiogram    Indication:  Cardiomyopathy    Procedure:  After informed consent the patient was brought into the cardiac aspiration lab she was prepped and draped in the usual sterile manner the right radial area was incised with 2% Xylocaine however several attempts to engage into the right radial artery was not successful so the right radial artery access was abandoned and the right groin area was excised in 2%  Xylocaine right femoral artery was accessed using modified standard technique and 5 [place] side-port arterial sheath was placed and secured then over a guidewire a 5 [place] JL 4 catheter was used left main coronary artery with angiographic pressures were taken then the catheter was removed and over a guidewire a 5 [place] JR4 catheter was used and engagement of the right coronary arteries angioplasty was taken then the catheter was removed then over a guidewire 5 [place] pigtail catheter used aortic valve was done hand-injection LV gram was done then pullback was done then the catheter was removed groin sheath was removed and minx closure device applied with good hemostasis the patient was transported back to her room in stable condition.    Results:  The patient LVEDP was 17 mmHg with hand-injection showing preserved left ventricular systolic function wall motion EF 50-55% with no significant aortic gradient on pullback.    Cholangiogram:  This right dominant system.  Left main cardiac angiographically normal and bifurcates into left and descending and left circumflex arteries.  LAD was normal caliber gives several septal perforators and diagonal branches and wraps around the apex LAD about 30 to 40% mid stenosis.  Left circumflex artery was nondominant for posterior circulation gives rise to several obtuse marginal branches left circumflex arteries branches angiographically normal.  The right coronary artery was a large artery was dominant for posterior circulation giving rise to acute marginal branches PDA and posterolateral branches the right coronary artery and its branches angiographically normal.    Conclusion:  Preserved LV systolic function ejection fraction 50-55% with elevated left ventricular end-diastolic pressure consistent with diastolic dysfunction coronary angiogram showed right dominant system with 30 to 40% mid LAD lesion otherwise coronaries arteries were normal.  Plan of care:  Medical management  and secondary preventive measurements of coronary disease good lipid control blood pressure control healthy lifestyle patient is to follow-up with her primary care physician for further management.      QTc interval flow sheet:        [id number]      TELEMETRY:     SR 70s with a BBB        I reviewed the patient's new clinical results.    ALLERGIES: Phenergan [promethazine]    Medication Review:   Current list of medications may not reflect those currently placed in orders that are not signed or are being held.     apixaban, 5 mg, Oral, Q12H  bumetanide, 2 mg, Oral, 2 times per day on   [START ON 2024] bumetanide, 2 mg, Oral, Every   busPIRone, 10 mg, Oral, BID  empagliflozin, 10 mg, Oral, Daily  ferrous sulfate, 325 mg, Oral, Daily  fluticasone, 2 spray, Each Nare, Daily  insulin glargine, 30 Units, Subcutaneous, Q12H  insulin lispro, 3-14 Units, Subcutaneous, 4x Daily AC & at Bedtime  levothyroxine, 50 mcg, Oral, Daily  metoprolol succinate XL, 25 mg, Oral, Q24H  nitrofurantoin (macrocrystal-monohydrate), 100 mg, Oral, BID  pantoprazole, 40 mg, Oral, Q AM  senna-docusate sodium, 2 tablet, Oral, BID  sodium chloride, 10 mL, Intravenous, Q12H  valsartan, 40 mg, Oral, Q24H           acetaminophen    acetaminophen    senna-docusate sodium **AND** polyethylene glycol **AND** bisacodyl **AND** bisacodyl    dextrose    dextrose    fluticasone    glucagon (human recombinant)    HYDROcodone-acetaminophen    nitroglycerin    sodium chloride    sodium chloride    sodium chloride    Assessment      Paroxysmal atrial flutter/atrial fibrillation with rapid ventricle response with aberrant conduction, s/p PVI in 2022 with recurrence, electrocardioversion in the ED.  Currently in sinus rhythm.  [id number] score of at least 3 for female gender, heart failure and type 2 diabetes mellitus.  Patient is on Eliquis for stroke prevention.  Acute non-ST elevation  myocardial infarction (probably type II due to tachycardia)  Advanced dilated cardiomyopathy (nonischemic) with LV ejection fraction of 30 to 35%, could be tachycardia mediated.  Nonobstructive coronary artery disease on coronary angiography on 11/10/2020 with a 30 to 40% stenosis in the LAD.  Type 2 diabetes mellitus.  Reported cirrhosis of the liver ([place])  Chronic kidney disease (stage IIIa)      Plan     For her atrial fibrillation, the choices for antiarrhythmic therapy are very limited due to multiple comorbidities including cardiomyopathy, chronic kidney disease and cirrhosis of liver.  Continue with metoprolol succinate.  For her cardiomyopathy, continue with metoprolol succinate, valsartan and empagliflozin.  If her kidney function continues to improve and blood pressure remained stable, we can add spironolactone as well.  Patient is scheduled to see her electrophysiologist at [facility] for addressing her recurrent atrial fibrillation to see if she would be a candidate for repeat RF ablation as she is not a good candidate for antiarrhythmic therapy due to multiple comorbidities.  I encouraged her to keep her appointment which is coming up in the next week or so.  Since her cardiac status appears to be stable, we will see her as needed.        I have discussed the patients findings and recommendations with patient.    Thank you very much for asking us to be involved in this patient's care.  We will follow along with you.    Electronically signed by KENNY Finn, 02/02/24, 12:23 PM EST.     Electronically signed by Ramon Sahu MD, 02/02/24, 3:31 PM EST.          Please note that portions of this note were completed with a voice recognition program.    Please note that portions of this note were copied and has been reviewed and is accurate as of 2/2/2024 .

## 2024-02-02 NOTE — THERAPY EVALUATION
Acute Care - Physical Therapy Initial Evaluation   Isaías     Patient Name: Yumiko Purdy  : 1965  MRN: [id number]  Today's Date: 2024   Onset of Illness/Injury or Date of Surgery: 24 (admission date)  Visit Dx:     ICD-10-CM ICD-9-CM   1. Atrial fibrillation, unspecified type  I48.91 427.31   2. Cardiomyopathy, unspecified type  I42.9 425.4     Patient Active Problem List   Diagnosis    Dilated cardiomyopathy    CKD (chronic kidney disease) stage 2, GFR 60-89 ml/min    Severe obesity (BMI 35.0-39.9) with comorbidity    Chronic anemia    Elevated bilirubin    Acute on chronic combined systolic and diastolic CHF (congestive heart failure)    Hyponatremia    Paroxysmal atrial fibrillation    Cirrhosis    Coronary artery disease involving native coronary artery of native heart without angina pectoris    Atrial fibrillation with RVR     Past Medical History:   Diagnosis Date    Anemia     CHF (congestive heart failure)     Chronic a-fib     stated by patient     Cirrhosis of liver     stated by patient     Diabetes mellitus     Ventricular tachycardia      Past Surgical History:   Procedure Laterality Date    APPENDECTOMY      CARDIAC CATHETERIZATION N/A 11/10/2020    Procedure: Left Heart Cath;  Surgeon: Charlee Ken MD;  Location: [facility] INVASIVE LOCATION;  Service: Cardiovascular;  Laterality: N/A;    CHOLECYSTECTOMY      TOE AMPUTATION Right     stated by patient.      PT Assessment (last 12 hours)       PT Evaluation and Treatment       Row Name 24 1413          Physical Therapy Time and Intention    Document Type evaluation  -KH     Mode of Treatment physical therapy  -KH     Patient Effort fair  -KH     Comment Pt seen for evaluation this date, uses RW with ambulation, has W/C, lives with son who assist as well as home health. Pt reports son always with pt. No falls at home. Pt feeling fatigued, requested to wait to perform other activites for eval, able to sit upright in bed sup  to sit with UE assist, CGA/Linda to get completely upright.  -       Row Name 02/02/24 1413          General Information    Patient Profile Reviewed yes  -     Onset of Illness/Injury or Date of Surgery 01/31/24  admission date  -     Patient Observations alert;cooperative  -     Patient/Family/Caregiver Comments/Observations Pt feeling fatigued/sick with stomach issues  -     General Observations of Patient Pt seen supine in hospital bed, pleasant and cooperative with therapy.  -     Existing Precautions/Restrictions fall  -       Row Name 02/02/24 1413          Living Environment    Current Living Arrangements home  -     People in Home child(ferdinand), adult  -       Row Name 02/02/24 1413          Range of Motion Comprehensive    Comment, General Range of Motion Hip abd PROM stiff, grossly 50% or so.  -       Row Name 02/02/24 1413          Strength Comprehensive (MMT)    Comment, General Manual Muscle Testing (MMT) Assessment Gross MMT: DF L <2, PF grossly functional, hip abd 2 to 3/5, hip add 4+ to 5, knee ext grossly 4+, knee flex 4, hip abd 4  -       Row Name 02/02/24 1413          Bed Mobility    Bed Mobility supine-sit  -     Supine-Sit [place] (Bed Mobility) standby assist;contact guard;minimum assist (75% patient effort)  Pt able to utilize RUE to assist in sitting upright in bed, CGA/Linda given to assist pt with total upright sitting  -     Comment, (Bed Mobility) Pt reports 2 people have to assist pt getting out of bed  -       Row Name 02/02/24 1413          Gait/Stairs (Locomotion)    Comment, (Gait/Stairs) Per nsg pt has unsteady gait however is [place] [facility].  -       Row Name 02/02/24 1413          Plan of Care Review    Outcome Evaluation Pt seen for evaluation this date, pleasant and cooperative with therapy. Pt may benefit from therapeutic intervention to work on general functional mobility  -       Row Name 02/02/24 1413          Positioning and  Restraints    Pre-Treatment Position in bed  -KH     Post Treatment Position bed  -KH     In Bed supine;call light within reach;exit alarm on  -       Row Name 02/02/24 1413          Therapy Assessment/Plan (PT)    PT Diagnosis (PT) impaired/decreased functional mobility  -     Rehab Potential (PT) --  fair/good  -     Criteria for Skilled Interventions Met (PT) yes  -     Therapy Frequency (PT) 2 times/wk  2-5x/week, PRN, as available  -     Predicted Duration of Therapy Intervention (PT) length of stay, until D/C  -       Row Name 02/02/24 1413          Physical Therapy Goals    Bed Mobility Goal Selection (PT) bed mobility, PT goal 1  -     Gait Training Goal Selection (PT) gait training, PT goal 1  -       Row Name 02/02/24 1413          Bed Mobility Goal 1 (PT)    Activity/Assistive Device (Bed Mobility Goal 1, PT) supine to sit  -     [place] Level/Cues Needed (Bed Mobility Goal 1, PT) independent  -KH     Time Frame (Bed Mobility Goal 1, PT) long term goal (LTG)  -       Row Name 02/02/24 1413          Gait Training Goal 1 (PT)    Activity/Assistive Device (Gait Training Goal 1, PT) gait (walking locomotion);assistive device use;walker, rolling  -     [place] Level (Gait Training Goal 1, PT) modified independence  -KH     Distance (Gait Training Goal 1, PT) 50'  -     Time Frame (Gait Training Goal 1, PT) long term goal (LTG)  -               User Key  (r) = Recorded By, (t) = Taken By, (c) = Cosigned By      Initials Name Provider Type    Georgie Self, PARK Physical Therapist                    Physical Therapy Education       Title: PT OT SLP Therapies (Not Started)       Topic: Physical Therapy (Not Started)       Point: Mobility training (Not Started)       Learner Progress:  Not documented in this visit.              Point: Home exercise program (Not Started)       Learner Progress:  Not documented in this visit.              Point: Body mechanics (Not Started)        Learner Progress:  Not documented in this visit.              Point: Precautions (Not Started)       Learner Progress:  Not documented in this visit.                                  PT Recommendation and Plan  Anticipated Discharge Disposition (PT): home with supervision, home with assist, home with home health  Planned Therapy Interventions (PT): bed mobility training, gait training, transfer training, strengthening (add intervention PRN, as appropriate)  Therapy Frequency (PT): 2 times/wk (2-5x/week, PRN, as available)  Outcome Evaluation: Pt seen for evaluation this date, pleasant and cooperative with therapy. Pt may benefit from therapeutic intervention to work on general functional mobility       Time Calculation:    PT Charges       Row Name 02/02/24 1453             Time Calculation    Start Time 1415  -KH      PT Received On 02/02/24  -      PT Goal Re-Cert Due Date 02/16/24  -                User Key  (r) = Recorded By, (t) = Taken By, (c) = Cosigned By      Initials Name Provider Type    Georgie Self, PT Physical Therapist                  Therapy Charges for Today       Code Description Service Date Service Provider Modifiers Qty    [id number] HC PT EVAL MOD COMPLEXITY 4 2/2/2024 Georgie Perez, PT GP 1            PT G-Codes  AM-PAC 6 Clicks Score (PT): 17    Georgie Perez PT  2/2/2024

## 2024-02-02 NOTE — PLAN OF CARE
Problem: Behavioral Health Comorbidity  Goal: Maintenance of Behavioral Health Symptom Control  Outcome: Ongoing, Progressing  Intervention: Maintain Behavioral Health Symptom Control  Recent Flowsheet Documentation  Taken 2/2/2024 1500 by Renetta John RN  Medication Review/Management: medications reviewed  Taken 2/2/2024 1300 by Renetta John RN  Medication Review/Management: medications reviewed  Taken 2/2/2024 1100 by Renetta John RN  Medication Review/Management: medications reviewed  Taken 2/2/2024 0900 by Renetta John RN  Medication Review/Management: medications reviewed  Taken 2/2/2024 0700 by Renetta John RN  Medication Review/Management: medications reviewed     Problem: Diabetes Comorbidity  Goal: Blood Glucose Level Within Targeted Range  Outcome: Ongoing, Progressing  Intervention: Monitor and Manage Glycemia  Recent Flowsheet Documentation  Taken 2/2/2024 0830 by Renetta John RN  Glycemic Management: blood glucose monitored     Problem: Hypertension Comorbidity  Goal: Blood Pressure in Desired Range  Outcome: Ongoing, Progressing  Intervention: Maintain Blood Pressure Management  Recent Flowsheet Documentation  Taken 2/2/2024 1500 by Renetta John RN  Medication Review/Management: medications reviewed  Taken 2/2/2024 1300 by Renetta John RN  Medication Review/Management: medications reviewed  Taken 2/2/2024 1100 by Renetta John RN  Medication Review/Management: medications reviewed  Taken 2/2/2024 0900 by Renetta John RN  Medication Review/Management: medications reviewed  Taken 2/2/2024 0700 by Renetta John RN  Medication Review/Management: medications reviewed     Problem: Pain Chronic (Persistent) (Comorbidity Management)  Goal: Acceptable Pain Control and Functional Ability  Outcome: Ongoing, Progressing  Intervention: Manage Persistent Pain  Recent Flowsheet Documentation  Taken 2/2/2024 1500 by Renetta John RN  Medication Review/Management: medications reviewed  Taken  2/2/2024 1300 by Renetta John RN  Medication Review/Management: medications reviewed  Taken 2/2/2024 1100 by Renetta John RN  Medication Review/Management: medications reviewed  Taken 2/2/2024 0900 by Renetta John RN  Medication Review/Management: medications reviewed  Taken 2/2/2024 0700 by Renetta John RN  Medication Review/Management: medications reviewed  Intervention: Develop Pain Management Plan  Recent Flowsheet Documentation  Taken 2/2/2024 0927 by Renetta John RN  Pain Management Interventions: see MAR  Intervention: Optimize Psychosocial Wellbeing  Recent Flowsheet Documentation  Taken 2/2/2024 1410 by Renetta John RN  Diversional Activities: television  Family/Support System Care:   self-care encouraged   support provided  Taken 2/2/2024 0830 by Renetta John RN  Supportive Measures:   active listening utilized   verbalization of feelings encouraged  Diversional Activities: television  Family/Support System Care:   self-care encouraged   support provided     Problem: Adult Inpatient Plan of Care  Goal: Plan of Care Review  Outcome: Ongoing, Progressing  Flowsheets  Taken 2/2/2024 1604 by Renetta John RN  Outcome Evaluation: VSS. Patient is on 2L NC. Amiodarone discontinued. Patient has been up to chair and BSC. Patient has complaints of N/V. PRN pepto given. No other complaints at this time. Call light within reach, bed in the lowest position.  Taken 2/1/2024 0425 by Hillary Mcak RN  Plan of Care Reviewed With: patient  Goal: Patient-Specific Goal (Individualized)  Outcome: Ongoing, Progressing  Goal: Absence of Hospital-Acquired Illness or Injury  Outcome: Ongoing, Progressing  Intervention: Identify and Manage Fall Risk  Recent Flowsheet Documentation  Taken 2/2/2024 1500 by Renetta John RN  Safety Promotion/Fall Prevention: safety round/check completed  Taken 2/2/2024 1300 by Renetta John RN  Safety Promotion/Fall Prevention: safety round/check completed  Taken 2/2/2024 1100 by  John, Renetta, RN  Safety Promotion/Fall Prevention: safety round/check completed  Taken 2/2/2024 0900 by Renetta John RN  Safety Promotion/Fall Prevention: safety round/check completed  Taken 2/2/2024 0830 by eRnetta John RN  Safety Promotion/Fall Prevention: safety round/check completed  Taken 2/2/2024 0700 by Renetta John RN  Safety Promotion/Fall Prevention: safety round/check completed  Intervention: Prevent Skin Injury  Recent Flowsheet Documentation  Taken 2/2/2024 1410 by Renetta John RN  Skin Protection:   adhesive use limited   tubing/devices free from skin contact  Taken 2/2/2024 0830 by Renetta John RN  Skin Protection:   adhesive use limited   tubing/devices free from skin contact   incontinence pads utilized  Intervention: Prevent and Manage VTE (Venous Thromboembolism) Risk  Recent Flowsheet Documentation  Taken 2/2/2024 1410 by Renetta John RN  VTE Prevention/Management: (Eliquis) other (see comments)  Taken 2/2/2024 0830 by Renetta John RN  VTE Prevention/Management: (Eliquis) other (see comments)  Intervention: Prevent Infection  Recent Flowsheet Documentation  Taken 2/2/2024 1500 by Renetta John RN  Infection Prevention:   single patient room provided   rest/sleep promoted  Taken 2/2/2024 1300 by Renetta John RN  Infection Prevention:   rest/sleep promoted   single patient room provided  Taken 2/2/2024 1100 by Renetta John RN  Infection Prevention:   rest/sleep promoted   single patient room provided  Taken 2/2/2024 0900 by Renetta John RN  Infection Prevention:   rest/sleep promoted   single patient room provided  Taken 2/2/2024 0700 by Renetta John RN  Infection Prevention:   single patient room provided   rest/sleep promoted  Goal: Optimal Comfort and Wellbeing  Outcome: Ongoing, Progressing  Intervention: Monitor Pain and Promote Comfort  Recent Flowsheet Documentation  Taken 2/2/2024 0927 by Renetta John RN  Pain Management Interventions: see MAR  Intervention:  Provide Person-Centered Care  Recent Flowsheet Documentation  Taken 2/2/2024 1410 by Renetta John RN  Trust Relationship/Rapport:   care explained   choices provided   emotional support provided   empathic listening provided   questions encouraged   reassurance provided   thoughts/feelings acknowledged   questions answered  Taken 2/2/2024 0830 by Renetta John RN  Trust Relationship/Rapport:   care explained   choices provided   emotional support provided   empathic listening provided   questions answered   questions encouraged   reassurance provided   thoughts/feelings acknowledged  Goal: Readiness for Transition of Care  Outcome: Ongoing, Progressing     Problem: Dysrhythmia  Goal: Normalized Cardiac Rhythm  Outcome: Ongoing, Progressing  Intervention: Monitor and Manage Cardiac Rhythm Effect  Recent Flowsheet Documentation  Taken 2/2/2024 1410 by Renetta John RN  VTE Prevention/Management: (Eliquis) other (see comments)  Taken 2/2/2024 0830 by Renetta John RN  VTE Prevention/Management: (Eliquis) other (see comments)     Problem: Fall Injury Risk  Goal: Absence of Fall and Fall-Related Injury  Outcome: Ongoing, Progressing  Intervention: Identify and Manage Contributors  Recent Flowsheet Documentation  Taken 2/2/2024 1500 by Renetta John RN  Medication Review/Management: medications reviewed  Taken 2/2/2024 1300 by Renetta John RN  Medication Review/Management: medications reviewed  Taken 2/2/2024 1100 by Renetta John RN  Medication Review/Management: medications reviewed  Taken 2/2/2024 0900 by Renetta John RN  Medication Review/Management: medications reviewed  Taken 2/2/2024 0700 by Renetta John RN  Medication Review/Management: medications reviewed  Intervention: Promote Injury-Free Environment  Recent Flowsheet Documentation  Taken 2/2/2024 1500 by Renetta John RN  Safety Promotion/Fall Prevention: safety round/check completed  Taken 2/2/2024 1300 by Renetta John, OLIVIA  Safety  Promotion/Fall Prevention: safety round/check completed  Taken 2/2/2024 1100 by Renetta John RN  Safety Promotion/Fall Prevention: safety round/check completed  Taken 2/2/2024 0900 by Renetta John RN  Safety Promotion/Fall Prevention: safety round/check completed  Taken 2/2/2024 0830 by Renetta John RN  Safety Promotion/Fall Prevention: safety round/check completed  Taken 2/2/2024 0700 by Renetta John RN  Safety Promotion/Fall Prevention: safety round/check completed     Problem: Skin Injury Risk Increased  Goal: Skin Health and Integrity  Outcome: Ongoing, Progressing  Intervention: Optimize Skin Protection  Recent Flowsheet Documentation  Taken 2/2/2024 1410 by Renetta John RN  Pressure Reduction Techniques: frequent weight shift encouraged  Pressure Reduction Devices: pressure-redistributing mattress utilized  Skin Protection:   adhesive use limited   tubing/devices free from skin contact  Taken 2/2/2024 0830 by Renetta John RN  Pressure Reduction Techniques:   frequent weight shift encouraged   weight shift assistance provided  Pressure Reduction Devices: pressure-redistributing mattress utilized  Skin Protection:   adhesive use limited   tubing/devices free from skin contact   incontinence pads utilized   Goal Outcome Evaluation:           Progress: improving  Outcome Evaluation: VSS. Patient is on 2L NC. Amiodarone discontinued. Patient has been up to chair and BSC. Patient has complaints of N/V. PRN pepto given. No other complaints at this time. Call light within reach, bed in the lowest position.

## 2024-02-02 NOTE — CASE MANAGEMENT/SOCIAL WORK
Discharge Planning Assessment  [facility]     Patient Name: Yumiko Purdy  MRN: [id number]  Today's Date: 2/2/2024    Admit Date: 1/31/2024       Discharge Plan       Row Name 02/02/24 1707       Plan    Plan Pt lives with son at [address] [place] [address] ([facility]) and plans to return back home at discharge. Pt utilizes [facility] for nursing and aide services. [facility] will need new order at discharge. Pt PCP Masha Davidson. Pt utilizes BSC, shower chair, walker wheelchair and home oxygen at 2 liters PRN via [employer]. Pt does not have POA/living will. Pt family to provide transportation at discharge. SS to follow          SAMY Gong

## 2024-02-02 NOTE — PROGRESS NOTES
[facility] HOSPITALIST PROGRESS NOTE     Patient Identification:  Name:  Yumiko Purdy  Age:  58 y.o.  Sex:  female  :  1965  MRN:  [id number]  Visit Number:  [id number]  ROOM: [place] [address]     Primary Care Provider:  Masha Davidson APRN    Length of stay in inpatient status:  1    Subjective     Chief Compliant:    Chief Complaint   Patient presents with    Rapid Heart Rate       History of Presenting Illness:    Patient seen and examined this morning. She reported having some shortness of breath which is worse with exertion. Denied chest pain or palpitations today. She says that she is feeling better than yesterday. No acute events noted overnight. She complained of some nausea to nursing staff today.     Objective     Current Hospital Meds:[START ON 2024] amiodarone, 200 mg, Oral, Q8H   Followed by  [START ON 2024] amiodarone, 200 mg, Oral, Q12H   Followed by  [START ON 2024] amiodarone, 200 mg, Oral, Daily  apixaban, 5 mg, Oral, Q12H  fluticasone, 2 spray, Each Nare, Daily  HYDROcodone-acetaminophen, 1 tablet, Oral, BID  insulin glargine, 30 Units, Subcutaneous, Q12H  insulin lispro, 3-14 Units, Subcutaneous, 4x Daily AC & at Bedtime  metoprolol succinate XL, 25 mg, Oral, Q24H  senna-docusate sodium, 2 tablet, Oral, BID  sodium chloride, 10 mL, Intravenous, Q12H  valsartan, 40 mg, Oral, Q24H         Current Antimicrobial Therapy:  Anti-Infectives (From admission, onward)      None          Current Diuretic Therapy:  Diuretics (From admission, onward)      None          ----------------------------------------------------------------------------------------------------------------------  Vital Signs:  Temp:  [97.9 °F (36.6 °C)-98.4 °F (36.9 °C)] 98.4 °F (36.9 °C)  Heart Rate:  [] 75  Resp:  [16-25] 16  BP: ()/() 113/59  SpO2:  [90 %-100 %] 99 %  on  Flow (L/min):  [1-2] 2;   Device (Oxygen Therapy): nasal cannula  Body mass index is 38.26 kg/m².    Wt  Readings from Last 3 Encounters:   02/01/24 104 kg (229 lb 15 oz)   01/22/24 108 kg (239 lb)   12/08/23 108 kg (239 lb)     Intake & Output (last 3 days)         01/30 0701 01/31 0700 01/31 0701  02/01 0700 02/01 0701 02/02 0700    P.O.  600 1010    I.V. (mL/kg)  588.8 (5.7) 186.9 (1.8)    IV Piggyback  1000     Total Intake(mL/kg)  2188.8 (21) 1196.9 (11.5)    Urine (mL/kg/hr)  1200 1500 (1.1)    Stool  0 0    Total Output  1200 1500    Net  +988.8 -303.1           Stool Unmeasured Occurrence  0 x 0 x          Diet: Cardiac Diets, Renal Diets; Healthy Heart (2-3 Na+); Low Sodium (2-3g); Texture: Regular Texture (IDDSI 7); Fluid Consistency: Thin (IDDSI 0)  ----------------------------------------------------------------------------------------------------------------------  Physical exam:   Constitutional:  Well-developed and well-nourished.  No acute distress.     HENT:  Head:  Normocephalic and atraumatic.   Cardiovascular:  Normal rate, regular rhythm   Pulmonary/Chest:  No respiratory distress, breath sounds diminished but clear in anterior lung fields bilaterally  Musculoskeletal:  No deformity.     Neurological: Awake, alert, no focal deficit on gross examination. No slurred speech or facial droop.   Skin:  Skin is warm and dry.   Peripheral vascular:  No cyanosis  Psychiatric: Appropriate mood and affect    ----------------------------------------------------------------------------------------------------------------------  Results from last 7 days   Lab Units 01/31/24 2054 01/31/24 1918 01/31/24  1626   CRP mg/dL  --  0.33  --    LACTATE mmol/L 1.6 2.9*  --    WBC 10*3/mm3  --   --  13.68*   HEMOGLOBIN g/dL  --   --  11.7*   HEMATOCRIT %  --   --  39.1   MCV fL  --   --  102.6*   MCHC g/dL  --   --  29.9*   PLATELETS 10*3/mm3  --   --  162   INR   --   --  1.13*         Results from last 7 days   Lab Units 01/31/24  1626   SODIUM mmol/L 132*   POTASSIUM mmol/L 4.6   MAGNESIUM mg/dL 2.3   CHLORIDE  "mmol/L 93*   CO2 mmol/L 25.5   BUN mg/dL 41*   CREATININE mg/dL 1.27*   CALCIUM mg/dL 9.5   GLUCOSE mg/dL 382*   ALBUMIN g/dL 4.3   BILIRUBIN mg/dL 3.4*   ALK PHOS U/L 107   AST (SGOT) U/L 31   ALT (SGPT) U/L 40*   Estimated Creatinine Clearance: 58.5 mL/min (A) (by C-G formula based on SCr of 1.27 mg/dL (H)).  No results found for: \"AMMONIA\"  Results from last 7 days   Lab Units 01/31/24 1918 01/31/24  1842 01/31/24  1626   HSTROP T ng/L 101* 96* 112*     Results from last 7 days   Lab Units 01/31/24 1918   PROBNP pg/mL 4,500.0*         Glucose   Date/Time Value Ref Range Status   02/01/2024 1701 368 (H) 70 - 130 mg/dL Final   02/01/2024 1107 276 (H) 70 - 130 mg/dL Final   02/01/2024 0800 248 (H) 70 - 130 mg/dL Final   02/01/2024 0535 260 (H) 70 - 130 mg/dL Final   01/31/2024 2123 360 (H) 70 - 130 mg/dL Final     Lab Results   Component Value Date    TSH 2.960 01/31/2024    FREET4 1.78 (H) 11/13/2023     No results found for: \"PREGTESTUR\", \"PREGSERUM\", \"HCG\", \"HCGQUANT\"  Pain Management Panel  More data exists         Latest Ref Rng & Units 11/13/2023 9/12/2020   Pain Management Panel   Creatinine, Urine mg/dL  mg/dL - 46.3  46.3    Amphetamine, Urine Qual Negative Negative  -   Barbiturates Screen, Urine Negative Negative  -   Benzodiazepine Screen, Urine Negative Negative  -   Buprenorphine, Screen, Urine Negative Negative  -   Cocaine Screen, Urine Negative Negative  -   Fentanyl, Urine Negative Negative  -   Methadone Screen , Urine Negative Negative  -   Methamphetamine, Ur Negative Negative  -     Brief Urine Lab Results  (Last result in the past 365 days)        Color   Clarity   Blood   Leuk Est   Nitrite   Protein   CREAT   Urine HCG        01/31/24 2140 Yellow   Clear   Negative   Negative   Negative   Negative                   Results from last 7 days   Lab Units 01/31/24 2054 01/31/24  1918 01/31/24  1626   PROCALCITONIN ng/mL  --   --  0.42*   LACTATE mmol/L 1.6 2.9*  --    CRP mg/dL  --  0.33  --  "       I have personally looked at the labs and they are summarized above.  ----------------------------------------------------------------------------------------------------------------------  Detailed radiology reports for the last 24 hours:  Imaging Results (Last 24 Hours)       ** No results found for the last 24 hours. **          Assessment & Plan      #recurrent paroxysmal atrial fibrillation with RVR and aberrant conduction  - Required cardioversion in the ED. Remains in sinus rhythm at this time.   - Cardiology consulted, appreciate assistance.   - Anticoagulated with eliquis as [id number] is at least 3.   - She received IV amiodarone in the ED and this was continued overnight. This has now been transitioned to oral amiodarone. Antiarrhythmic choices limited due to multiple comorbidities (advanced cardiomyopathy, CKD, cirrhosis). Cardiology adjusting beta blocker (metoprolol succinate). They are considering referral to EP for possible ablation.  - Continue telemetry monitoring    #Prolonged Qtc  - Qtc was >500 on EKG early this a.m.  - Repeat EKG showed slight improvement but QT still >500. Avoid qt prolonging agents. Treat electrolyte abnormalities and monitor with repeat labs. (K+ and mag level both appropriate). Monitor on telemetry.    #SIRS criteria suspected due to atrial fibrillation with rvr  - She was on antibiotic for UTI prior to admission but UA yesterday was only significant for >1000 glucose.  - Follow up blood cultures    #CKD stage IIIa   - Creatinine at baseline  - avoid nephrotoxins, repeat bmp in a.m.    #Chronic combined systolic and diastolic CHF  - Does not appear grossly fluid overloaded. Continue home bumex. Follow up on recommendations from Cardiology. Continue home jardiance.    #Insulin dependent type II DM, uncontrolled  - basal insulin restarted at slightly decreased dose from home to prevent causing hypoglycemia. Titrate up as needed for improved glucose control. Cover with  sliding scale insulin and monitor with accuchecks. Hypoglycemia protocol prn.    VTE Prophylaxis:   Mechanical Order History:       None          Pharmalogical Order History:        Ordered     Dose Route Frequency Stop    01/31/24 2030  apixaban (ELIQUIS) tablet 5 mg         5 mg PO Every 12 Hours Scheduled --                    Dispo: pending clinical progress    Yulia Mo DO  [facility]  02/01/24  19:36 EST

## 2024-02-02 NOTE — PLAN OF CARE
Goal Outcome Evaluation:              Outcome Evaluation: patient currently has no complaints at this time. patient remains AxO4, 2LNC, patients pressure has been soft throughout the shift. bed alarm set. call light with in reach.

## 2024-02-02 NOTE — PLAN OF CARE
Goal Outcome Evaluation:              Outcome Evaluation: Pt seen for evaluation this date, pleasant and cooperative with therapy. Pt may benefit from therapeutic intervention to work on general functional mobility      Anticipated Discharge Disposition (PT): home with supervision, home with assist, home with home health

## 2024-02-03 LAB
ANION GAP SERPL CALCULATED.3IONS-SCNC: 10.4 MMOL/L (ref 5–15)
ANISOCYTOSIS BLD QL: NORMAL
BACTERIA SPEC AEROBE CULT: ABNORMAL
BASOPHILS # BLD AUTO: 0.09 10*3/MM3 (ref 0–0.2)
BASOPHILS NFR BLD AUTO: 1 % (ref 0–1.5)
BUN SERPL-MCNC: 35 MG/DL (ref 6–20)
BUN/CREAT SERPL: 25.2 (ref 7–25)
CALCIUM SPEC-SCNC: 8.8 MG/DL (ref 8.6–10.5)
CHLORIDE SERPL-SCNC: 100 MMOL/L (ref 98–107)
CO2 SERPL-SCNC: 28.6 MMOL/L (ref 22–29)
CREAT SERPL-MCNC: 1.39 MG/DL (ref 0.57–1)
DEPRECATED RDW RBC AUTO: 73.2 FL (ref 37–54)
EGFRCR SERPLBLD CKD-EPI 2021: 44.4 ML/MIN/1.73
EOSINOPHIL # BLD AUTO: 0.33 10*3/MM3 (ref 0–0.4)
EOSINOPHIL NFR BLD AUTO: 3.5 % (ref 0.3–6.2)
ERYTHROCYTE [DISTWIDTH] IN BLOOD BY AUTOMATED COUNT: 20.1 % (ref 12.3–15.4)
GLUCOSE BLDC GLUCOMTR-MCNC: 184 MG/DL (ref 70–130)
GLUCOSE BLDC GLUCOMTR-MCNC: 254 MG/DL (ref 70–130)
GLUCOSE BLDC GLUCOMTR-MCNC: 259 MG/DL (ref 70–130)
GLUCOSE BLDC GLUCOMTR-MCNC: 260 MG/DL (ref 70–130)
GLUCOSE BLDC GLUCOMTR-MCNC: 270 MG/DL (ref 70–130)
GLUCOSE BLDC GLUCOMTR-MCNC: 338 MG/DL (ref 70–130)
GLUCOSE SERPL-MCNC: 146 MG/DL (ref 65–99)
GRAM STN SPEC: ABNORMAL
HCT VFR BLD AUTO: 30.5 % (ref 34–46.6)
HGB BLD-MCNC: 8.9 G/DL (ref 12–15.9)
HYPOCHROMIA BLD QL: NORMAL
IMM GRANULOCYTES # BLD AUTO: 0.15 10*3/MM3 (ref 0–0.05)
IMM GRANULOCYTES NFR BLD AUTO: 1.6 % (ref 0–0.5)
ISOLATED FROM: ABNORMAL
LYMPHOCYTES # BLD AUTO: 2.64 10*3/MM3 (ref 0.7–3.1)
LYMPHOCYTES NFR BLD AUTO: 28.3 % (ref 19.6–45.3)
MCH RBC QN AUTO: 30.8 PG (ref 26.6–33)
MCHC RBC AUTO-ENTMCNC: 29.2 G/DL (ref 31.5–35.7)
MCV RBC AUTO: 105.5 FL (ref 79–97)
MONOCYTES # BLD AUTO: 0.56 10*3/MM3 (ref 0.1–0.9)
MONOCYTES NFR BLD AUTO: 6 % (ref 5–12)
NEUTROPHILS NFR BLD AUTO: 5.56 10*3/MM3 (ref 1.7–7)
NEUTROPHILS NFR BLD AUTO: 59.6 % (ref 42.7–76)
NRBC BLD AUTO-RTO: 0.6 /100 WBC (ref 0–0.2)
PLAT MORPH BLD: NORMAL
PLATELET # BLD AUTO: 135 10*3/MM3 (ref 140–450)
PMV BLD AUTO: 10.4 FL (ref 6–12)
POLYCHROMASIA BLD QL SMEAR: NORMAL
POTASSIUM SERPL-SCNC: 3.8 MMOL/L (ref 3.5–5.2)
QT INTERVAL: 584 MS
QT INTERVAL: 604 MS
QTC INTERVAL: 563 MS
QTC INTERVAL: 577 MS
RBC # BLD AUTO: 2.89 10*6/MM3 (ref 3.77–5.28)
SODIUM SERPL-SCNC: 139 MMOL/L (ref 136–145)
STOMATOCYTES BLD QL SMEAR: NORMAL
WBC NRBC COR # BLD AUTO: 9.33 10*3/MM3 (ref 3.4–10.8)

## 2024-02-03 PROCEDURE — 93005 ELECTROCARDIOGRAM TRACING: CPT | Performed by: STUDENT IN AN ORGANIZED HEALTH CARE EDUCATION/TRAINING PROGRAM

## 2024-02-03 PROCEDURE — 99232 SBSQ HOSP IP/OBS MODERATE 35: CPT | Performed by: STUDENT IN AN ORGANIZED HEALTH CARE EDUCATION/TRAINING PROGRAM

## 2024-02-03 PROCEDURE — 63710000001 INSULIN GLARGINE PER 5 UNITS: Performed by: STUDENT IN AN ORGANIZED HEALTH CARE EDUCATION/TRAINING PROGRAM

## 2024-02-03 PROCEDURE — 80048 BASIC METABOLIC PNL TOTAL CA: CPT | Performed by: STUDENT IN AN ORGANIZED HEALTH CARE EDUCATION/TRAINING PROGRAM

## 2024-02-03 PROCEDURE — 93005 ELECTROCARDIOGRAM TRACING: CPT | Performed by: HOSPITALIST

## 2024-02-03 PROCEDURE — 82948 REAGENT STRIP/BLOOD GLUCOSE: CPT

## 2024-02-03 PROCEDURE — 85025 COMPLETE CBC W/AUTO DIFF WBC: CPT | Performed by: STUDENT IN AN ORGANIZED HEALTH CARE EDUCATION/TRAINING PROGRAM

## 2024-02-03 PROCEDURE — 85007 BL SMEAR W/DIFF WBC COUNT: CPT | Performed by: STUDENT IN AN ORGANIZED HEALTH CARE EDUCATION/TRAINING PROGRAM

## 2024-02-03 PROCEDURE — 63710000001 INSULIN LISPRO (HUMAN) PER 5 UNITS: Performed by: STUDENT IN AN ORGANIZED HEALTH CARE EDUCATION/TRAINING PROGRAM

## 2024-02-03 RX ADMIN — NITROFURANTOIN MONOHYDRATE/MACROCRYSTALLINE 100 MG: 25; 75 CAPSULE ORAL at 20:34

## 2024-02-03 RX ADMIN — INSULIN GLARGINE 45 UNITS: 100 INJECTION, SOLUTION SUBCUTANEOUS at 20:34

## 2024-02-03 RX ADMIN — DOCUSATE SODIUM 50 MG AND SENNOSIDES 8.6 MG 2 TABLET: 8.6; 5 TABLET, FILM COATED ORAL at 20:34

## 2024-02-03 RX ADMIN — LEVOTHYROXINE SODIUM 50 MCG: 50 TABLET ORAL at 08:49

## 2024-02-03 RX ADMIN — VALSARTAN 40 MG: 80 TABLET ORAL at 08:50

## 2024-02-03 RX ADMIN — BUMETANIDE 2 MG: 1 TABLET ORAL at 18:07

## 2024-02-03 RX ADMIN — BUSPIRONE HYDROCHLORIDE 10 MG: 10 TABLET ORAL at 08:50

## 2024-02-03 RX ADMIN — INSULIN LISPRO 12 UNITS: 100 INJECTION, SOLUTION INTRAVENOUS; SUBCUTANEOUS at 20:34

## 2024-02-03 RX ADMIN — BUMETANIDE 2 MG: 1 TABLET ORAL at 08:48

## 2024-02-03 RX ADMIN — APIXABAN 5 MG: 5 TABLET, FILM COATED ORAL at 20:34

## 2024-02-03 RX ADMIN — METOPROLOL SUCCINATE 25 MG: 25 TABLET, EXTENDED RELEASE ORAL at 08:49

## 2024-02-03 RX ADMIN — ACETAMINOPHEN 500 MG: 500 TABLET ORAL at 20:34

## 2024-02-03 RX ADMIN — FERROUS SULFATE TAB 325 MG (65 MG ELEMENTAL FE) 325 MG: 325 (65 FE) TAB at 08:48

## 2024-02-03 RX ADMIN — NITROFURANTOIN MONOHYDRATE/MACROCRYSTALLINE 100 MG: 25; 75 CAPSULE ORAL at 08:48

## 2024-02-03 RX ADMIN — DOCUSATE SODIUM 50 MG AND SENNOSIDES 8.6 MG 2 TABLET: 8.6; 5 TABLET, FILM COATED ORAL at 08:49

## 2024-02-03 RX ADMIN — INSULIN GLARGINE 45 UNITS: 100 INJECTION, SOLUTION SUBCUTANEOUS at 08:48

## 2024-02-03 RX ADMIN — INSULIN LISPRO 4 UNITS: 100 INJECTION, SOLUTION INTRAVENOUS; SUBCUTANEOUS at 08:48

## 2024-02-03 RX ADMIN — ACETAMINOPHEN 500 MG: 500 TABLET ORAL at 00:57

## 2024-02-03 RX ADMIN — Medication 10 ML: at 08:54

## 2024-02-03 RX ADMIN — EMPAGLIFLOZIN 10 MG: 10 TABLET, FILM COATED ORAL at 08:49

## 2024-02-03 RX ADMIN — INSULIN LISPRO 12 UNITS: 100 INJECTION, SOLUTION INTRAVENOUS; SUBCUTANEOUS at 11:52

## 2024-02-03 RX ADMIN — Medication 10 ML: at 20:35

## 2024-02-03 RX ADMIN — BUSPIRONE HYDROCHLORIDE 10 MG: 10 TABLET ORAL at 20:34

## 2024-02-03 RX ADMIN — INSULIN LISPRO 12 UNITS: 100 INJECTION, SOLUTION INTRAVENOUS; SUBCUTANEOUS at 16:56

## 2024-02-03 RX ADMIN — APIXABAN 5 MG: 5 TABLET, FILM COATED ORAL at 08:49

## 2024-02-03 RX ADMIN — PANTOPRAZOLE SODIUM 40 MG: 40 TABLET, DELAYED RELEASE ORAL at 06:29

## 2024-02-03 NOTE — PROGRESS NOTES
[facility] HOSPITALIST PROGRESS NOTE     Patient Identification:  Name:  Yumiko Purdy  Age:  58 y.o.  Sex:  female  :  1965  MRN:  [id number]  Visit Number:  [id number]  ROOM: [place] [address]     Primary Care Provider:  Masha Davidson APRN    Length of stay in inpatient status:  3    Subjective     Chief Compliant:    Chief Complaint   Patient presents with    Rapid Heart Rate       History of Presenting Illness:    Patient seen and examined this morning with her a.m. nurse present. Patient's main complaint today is feeling numbness in her bilateral hands and feet that is worse than usual. She reports that when she was woken up this morning for an EKG she felt sweaty and had numbness of her hands and feet. She denies any aggravating/alleviating factors to the symptoms. She denies any new weakness. She reports having numbness in her hands and feet chronically due to diabetes but not this bad. She denies any shortness of breath, palpitations, or chest pain.    Objective     Current Hospital Meds:apixaban, 5 mg, Oral, Q12H  bumetanide, 2 mg, Oral, 2 times per day on   [START ON 2024] bumetanide, 2 mg, Oral, Every   busPIRone, 10 mg, Oral, BID  empagliflozin, 10 mg, Oral, Daily  ferrous sulfate, 325 mg, Oral, Daily  fluticasone, 2 spray, Each Nare, Daily  insulin glargine, 45 Units, Subcutaneous, Q12H  insulin lispro, 4-24 Units, Subcutaneous, 4x Daily AC & at Bedtime  levothyroxine, 50 mcg, Oral, Daily  metoprolol succinate XL, 25 mg, Oral, Q24H  nitrofurantoin (macrocrystal-monohydrate), 100 mg, Oral, BID  pantoprazole, 40 mg, Oral, Q AM  senna-docusate sodium, 2 tablet, Oral, BID  sodium chloride, 10 mL, Intravenous, Q12H  valsartan, 40 mg, Oral, Q24H         Current Antimicrobial Therapy:  Anti-Infectives (From admission, onward)      Ordered     Dose/Rate Route Frequency Start Stop    24  nitrofurantoin  (macrocrystal-monohydrate) (MACROBID) capsule 100 mg        Ordering Provider: Yulia Mo DO    100 mg Oral 2 Times Daily 02/01/24 2100 02/06/24 2059          Current Diuretic Therapy:  Diuretics (From admission, onward)      Ordered     Dose/Rate Route Frequency Start Stop    02/01/24 1959  bumetanide (BUMEX) tablet 2 mg        Ordering Provider: Yulia Mo DO    2 mg Oral Every Sunday 02/04/24 0900 02/01/24 1949  bumetanide (BUMEX) tablet 2 mg        Ordering Provider: Yulia Mo DO    2 mg Oral 2 times per day on Monday Tuesday Wednesday Thursday Friday Saturday 02/01/24 2000            ----------------------------------------------------------------------------------------------------------------------  Vital Signs:  Temp:  [97.3 °F (36.3 °C)-98.6 °F (37 °C)] 97.3 °F (36.3 °C)  Heart Rate:  [53-64] 61  Resp:  [11-27] 27  BP: ()/(46-68) 121/50  SpO2:  [90 %-100 %] 90 %  on  Flow (L/min):  [2] 2;   Device (Oxygen Therapy): nasal cannula  Body mass index is 39.14 kg/m².    Wt Readings from Last 3 Encounters:   02/03/24 107 kg (235 lb 3.7 oz)   01/22/24 108 kg (239 lb)   12/08/23 108 kg (239 lb)     Intake & Output (last 3 days)         01/31 0701  02/01 0700 02/01 0701  02/02 0700 02/02 0701 02/03 0700 02/03 0701 02/04 0700    P.O. 600 1010 860 472    I.V. (mL/kg) 588.8 (5.7) 186.9 (1.8)      IV Piggyback 1000       Total Intake(mL/kg) 2188.8 (21) 1196.9 (11.3) 860 (8) 472 (4.4)    Urine (mL/kg/hr) 1200 2600 (1) 5150 (2) 1300 (1.1)    Stool 0 0 0 0    Total Output 1200 2600 5150 1300    Net +988.8 -1403.1 -4290 -828            Urine Unmeasured Occurrence   1 x 1 x    Stool Unmeasured Occurrence 0 x 0 x 1 x 1 x          Diet: Cardiac Diets, Renal Diets, Diabetic Diets; Healthy Heart (2-3 Na+); Consistent Carbohydrate; Low Sodium (2-3g); Texture: Regular Texture (IDDSI 7); Fluid Consistency: Thin (IDDSI  0)  ----------------------------------------------------------------------------------------------------------------------  Physical exam:   Constitutional:  Well-developed and well-nourished. Chronically ill appearing. No acute distress.      HENT:  Head:  Normocephalic and atraumatic.    Cardiovascular:  Normal rate, regular rhythm   Pulmonary/Chest:  No respiratory distress, breath sounds clear to auscultation bilaterally  Musculoskeletal:  No deformity.     Neurological: Awake, alert, no focal deficit on gross examination. No slurred speech or facial droop. Strength is symmetric 4/5 in upper and lower extremities bilaterally.  Skin:  Skin is warm and dry.   Peripheral vascular:  No cyanosis  Psychiatric: Slightly anxious  Edited by: Yulia Mo DO at 2/3/2024 1823  ----------------------------------------------------------------------------------------------------------------------  Results from last 7 days   Lab Units 02/03/24  0247 02/02/24  0049 01/31/24 2054 01/31/24  1918 01/31/24  1626   CRP mg/dL  --   --   --  0.33  --    LACTATE mmol/L  --   --  1.6 2.9*  --    WBC 10*3/mm3 9.33 11.93*  --   --  13.68*   HEMOGLOBIN g/dL 8.9* 9.5*  --   --  11.7*   HEMATOCRIT % 30.5* 31.7*  --   --  39.1   MCV fL 105.5* 104.6*  --   --  102.6*   MCHC g/dL 29.2* 30.0*  --   --  29.9*   PLATELETS 10*3/mm3 135* 121*  --   --  162   INR   --   --   --   --  1.13*         Results from last 7 days   Lab Units 02/03/24  0247 02/02/24  0049 01/31/24  1626   SODIUM mmol/L 139 136 132*   POTASSIUM mmol/L 3.8 4.3 4.6   MAGNESIUM mg/dL  --   --  2.3   CHLORIDE mmol/L 100 100 93*   CO2 mmol/L 28.6 23.5 25.5   BUN mg/dL 35* 32* 41*   CREATININE mg/dL 1.39* 1.22* 1.27*   CALCIUM mg/dL 8.8 8.9 9.5   GLUCOSE mg/dL 146* 222* 382*   ALBUMIN g/dL  --   --  4.3   BILIRUBIN mg/dL  --   --  3.4*   ALK PHOS U/L  --   --  107   AST (SGOT) U/L  --   --  31   ALT (SGPT) U/L  --   --  40*   Estimated Creatinine Clearance: 54.3 mL/min (A) (by  "C-G formula based on SCr of 1.39 mg/dL (H)).  No results found for: \"AMMONIA\"  Results from last 7 days   Lab Units 01/31/24 1918 01/31/24 1842 01/31/24  1626   HSTROP T ng/L 101* 96* 112*     Results from last 7 days   Lab Units 01/31/24 1918   PROBNP pg/mL 4,500.0*         Glucose   Date/Time Value Ref Range Status   02/03/2024 1633 260 (H) 70 - 130 mg/dL Final   02/03/2024 1147 270 (H) 70 - 130 mg/dL Final   02/03/2024 0853 338 (H) 70 - 130 mg/dL Final   02/03/2024 0646 184 (H) 70 - 130 mg/dL Final   02/03/2024 0001 259 (H) 70 - 130 mg/dL Final   02/02/2024 2013 538 (C) 70 - 130 mg/dL Final   02/02/2024 1851 457 (C) 70 - 130 mg/dL Final   02/02/2024 1655 416 (C) 70 - 130 mg/dL Final     Lab Results   Component Value Date    TSH 2.960 01/31/2024    FREET4 1.78 (H) 11/13/2023       Pain Management Panel  More data exists         Latest Ref Rng & Units 11/13/2023 9/12/2020   Pain Management Panel   Creatinine, Urine mg/dL  mg/dL - 46.3  46.3    Amphetamine, Urine Qual Negative Negative  -   Barbiturates Screen, Urine Negative Negative  -   Benzodiazepine Screen, Urine Negative Negative  -   Buprenorphine, Screen, Urine Negative Negative  -   Cocaine Screen, Urine Negative Negative  -   Fentanyl, Urine Negative Negative  -   Methadone Screen , Urine Negative Negative  -   Methamphetamine, Ur Negative Negative  -     Brief Urine Lab Results  (Last result in the past 365 days)        Color   Clarity   Blood   Leuk Est   Nitrite   Protein   CREAT   Urine HCG        01/31/24 2140 Yellow   Clear   Negative   Negative   Negative   Negative                 Blood Culture   Date Value Ref Range Status   01/31/2024 Staphylococcus, coagulase negative (C)  Final   01/31/2024 No growth at 2 days  Preliminary       Results from last 7 days   Lab Units 01/31/24 2054 01/31/24 1918 01/31/24  1626   PROCALCITONIN ng/mL  --   --  0.42*   LACTATE mmol/L 1.6 2.9*  --    CRP mg/dL  --  0.33  --        I have personally looked at the " labs and they are summarized above.  ----------------------------------------------------------------------------------------------------------------------  Detailed radiology reports for the last 24 hours:  Imaging Results (Last 24 Hours)       ** No results found for the last 24 hours. **          Assessment & Plan    #recurrent paroxysmal atrial fibrillation with RVR and aberrant conduction  - Required cardioversion in the ED. Remains in sinus rhythm at this time.   - Cardiology consulted, appreciate assistance.   - Anticoagulated with eliquis as [id number] is at least 3.   - She received IV amiodarone in the ED and this was continued after admission. Amiodarone now discontinued. Antiarrhythmic choices are very limited due to multiple comorbidities (advanced cardiomyopathy, CKD, cirrhosis). Continue metoprolol. Plan for follow up with EP after discharge for possible ablation. Heart rate is WNL at this time.  - Continue telemetry monitoring  - Cardiology has now signed off, appreciate assistance. Plan for close outpatient follow up.      #Prolonged Qtc  - Qtc was >500 on EKG early this a.m.  - Repeat EKG today continues to show QT >500. Avoid qt prolonging agents. I reviewed her medication list again today and do not see any offending agents. Treat electrolyte abnormalities and monitor with repeat labs. (K+ and mag levels have both been appropriate). Monitor on telemetry.     #SIRS criteria suspected due to atrial fibrillation with rvr  - She was on antibiotic for UTI prior to admission but UA this admission was only significant for >1000 glucose.  - Follow up blood cultures     #CKD stage IIIa   - Creatinine at baseline  - avoid nephrotoxins, repeat bmp in a.m.     #Chronic combined systolic and diastolic CHF  - Does not appear grossly fluid overloaded. Continue home bumex. Follow up on recommendations from Cardiology. Continue metoprolol succinate, valsartan, and jardiance. If renal function improves and blood  pressure allows she may be started on spironolactone as well per Cardiology, but renal function has not improved enough to tolerate this today.     #Insulin dependent type II DM, uncontrolled  - basal insulin was increased back to home dose of 45 units BID due to persistent hyperglycemia. Titrate up as needed for improved glucose control. Cover with high dose sliding scale insulin and monitor with accuchecks. Hypoglycemia protocol prn. Glucose is still elevated today in the 200-300 range but much improved from 400-500.     #symmetrical paresthesia of extremities  - No neurologic deficit noted on exam. This occurred after blood glucose decreased significantly so I suspect this may have been the cause. Will monitor closely for worsening symptoms.  Edited by: Yulia Mo DO at 2/3/2024 1424    VTE Prophylaxis:   Mechanical Order History:       None          Pharmalogical Order History:        Ordered     Dose Route Frequency Stop    01/31/24 2030  apixaban (ELIQUIS) tablet 5 mg         5 mg PO Every 12 Hours Scheduled --                    Dispo:  likely home with home health at discharge pending clinical improvement, likely within 24 hours.    Yulia Mo DO  [facility]  02/03/24  18:23 EST

## 2024-02-03 NOTE — PLAN OF CARE
Problem: Behavioral Health Comorbidity  Goal: Maintenance of Behavioral Health Symptom Control  Outcome: Ongoing, Progressing  Intervention: Maintain Behavioral Health Symptom Control  Recent Flowsheet Documentation  Taken 2/3/2024 1500 by Renetta John RN  Medication Review/Management: medications reviewed  Taken 2/3/2024 1300 by Renetta John RN  Medication Review/Management: medications reviewed  Taken 2/3/2024 1100 by Renetta John RN  Medication Review/Management: medications reviewed  Taken 2/3/2024 0900 by Renetta John RN  Medication Review/Management: medications reviewed  Taken 2/3/2024 0700 by Renetta John RN  Medication Review/Management: medications reviewed     Problem: Diabetes Comorbidity  Goal: Blood Glucose Level Within Targeted Range  Outcome: Ongoing, Progressing     Problem: Hypertension Comorbidity  Goal: Blood Pressure in Desired Range  Outcome: Ongoing, Progressing  Intervention: Maintain Blood Pressure Management  Recent Flowsheet Documentation  Taken 2/3/2024 1500 by Renetta John RN  Medication Review/Management: medications reviewed  Taken 2/3/2024 1300 by Renetta John RN  Medication Review/Management: medications reviewed  Taken 2/3/2024 1100 by Renetta John RN  Medication Review/Management: medications reviewed  Taken 2/3/2024 0900 by Renetta John RN  Medication Review/Management: medications reviewed  Taken 2/3/2024 0700 by Renetta John RN  Medication Review/Management: medications reviewed     Problem: Pain Chronic (Persistent) (Comorbidity Management)  Goal: Acceptable Pain Control and Functional Ability  Outcome: Ongoing, Progressing  Intervention: Manage Persistent Pain  Recent Flowsheet Documentation  Taken 2/3/2024 1500 by Renetta John RN  Medication Review/Management: medications reviewed  Taken 2/3/2024 1300 by Renetta John RN  Medication Review/Management: medications reviewed  Taken 2/3/2024 1100 by Renetta John RN  Medication Review/Management:  medications reviewed  Taken 2/3/2024 0900 by Renetta John RN  Medication Review/Management: medications reviewed  Taken 2/3/2024 0700 by Renetta John RN  Medication Review/Management: medications reviewed  Intervention: Optimize Psychosocial Wellbeing  Recent Flowsheet Documentation  Taken 2/3/2024 0845 by Renetta John RN  Diversional Activities: television  Family/Support System Care:   self-care encouraged   presence promoted     Problem: Adult Inpatient Plan of Care  Goal: Plan of Care Review  Outcome: Ongoing, Progressing  Flowsheets  Taken 2/3/2024 1600 by Renetta John RN  Progress: improving  Taken 2/3/2024 0314 by Brea Guzman RN  Outcome Evaluation: patient AxO4, patient on 2L NC. patients BS is now controlled. PRN pain meds were given. patient has no complaints at this time. bed alarm set, call light in reach, bed in lowest position.  Taken 2/1/2024 0425 by Hillary Mack RN  Plan of Care Reviewed With: patient  Goal: Patient-Specific Goal (Individualized)  Outcome: Ongoing, Progressing  Goal: Absence of Hospital-Acquired Illness or Injury  Outcome: Ongoing, Progressing  Intervention: Identify and Manage Fall Risk  Recent Flowsheet Documentation  Taken 2/3/2024 1500 by Renetta John RN  Safety Promotion/Fall Prevention: safety round/check completed  Taken 2/3/2024 1300 by Renetta John RN  Safety Promotion/Fall Prevention: safety round/check completed  Taken 2/3/2024 1100 by Renetta John RN  Safety Promotion/Fall Prevention: safety round/check completed  Taken 2/3/2024 0900 by Renetta John RN  Safety Promotion/Fall Prevention: safety round/check completed  Taken 2/3/2024 0700 by Renetta John RN  Safety Promotion/Fall Prevention: safety round/check completed  Intervention: Prevent and Manage VTE (Venous Thromboembolism) Risk  Recent Flowsheet Documentation  Taken 2/3/2024 0845 by Renetta John RN  VTE Prevention/Management: (Eliquis) other (see comments)  Intervention: Prevent  Infection  Recent Flowsheet Documentation  Taken 2/3/2024 1500 by Renetta John RN  Infection Prevention:   rest/sleep promoted   single patient room provided  Taken 2/3/2024 1300 by Renetta John RN  Infection Prevention:   rest/sleep promoted   single patient room provided  Taken 2/3/2024 1100 by Renetta John RN  Infection Prevention:   rest/sleep promoted   single patient room provided  Taken 2/3/2024 0900 by Renetta John RN  Infection Prevention:   rest/sleep promoted   single patient room provided  Taken 2/3/2024 0700 by Renetta John RN  Infection Prevention:   rest/sleep promoted   single patient room provided  Goal: Optimal Comfort and Wellbeing  Outcome: Ongoing, Progressing  Intervention: Provide Person-Centered Care  Recent Flowsheet Documentation  Taken 2/3/2024 0845 by Renetta John RN  Trust Relationship/Rapport:   care explained   choices provided   emotional support provided   empathic listening provided   questions answered   questions encouraged   thoughts/feelings acknowledged   reassurance provided  Goal: Readiness for Transition of Care  Outcome: Ongoing, Progressing     Problem: Dysrhythmia  Goal: Normalized Cardiac Rhythm  Outcome: Ongoing, Progressing  Intervention: Monitor and Manage Cardiac Rhythm Effect  Recent Flowsheet Documentation  Taken 2/3/2024 0845 by Renetta John RN  VTE Prevention/Management: (Eliquis) other (see comments)     Problem: Fall Injury Risk  Goal: Absence of Fall and Fall-Related Injury  Outcome: Ongoing, Progressing  Intervention: Identify and Manage Contributors  Recent Flowsheet Documentation  Taken 2/3/2024 1500 by Renetta John RN  Medication Review/Management: medications reviewed  Taken 2/3/2024 1300 by Renetta John RN  Medication Review/Management: medications reviewed  Taken 2/3/2024 1100 by Renetta John RN  Medication Review/Management: medications reviewed  Taken 2/3/2024 0900 by Renetta John RN  Medication Review/Management: medications  reviewed  Taken 2/3/2024 0700 by Renetta John RN  Medication Review/Management: medications reviewed  Intervention: Promote Injury-Free Environment  Recent Flowsheet Documentation  Taken 2/3/2024 1500 by Renetta John RN  Safety Promotion/Fall Prevention: safety round/check completed  Taken 2/3/2024 1300 by Renetta John RN  Safety Promotion/Fall Prevention: safety round/check completed  Taken 2/3/2024 1100 by Renetta John RN  Safety Promotion/Fall Prevention: safety round/check completed  Taken 2/3/2024 0900 by Renetta John RN  Safety Promotion/Fall Prevention: safety round/check completed  Taken 2/3/2024 0700 by Renetta John RN  Safety Promotion/Fall Prevention: safety round/check completed     Problem: Skin Injury Risk Increased  Goal: Skin Health and Integrity  Outcome: Ongoing, Progressing  Intervention: Optimize Skin Protection  Recent Flowsheet Documentation  Taken 2/3/2024 0845 by Renetta John RN  Pressure Reduction Techniques: frequent weight shift encouraged  Pressure Reduction Devices: pressure-redistributing mattress utilized   Goal Outcome Evaluation:           Progress: improving  Outcome Evaluation: VSS. Patient is on 2L NC. Amiodarone discontinued. Patient has been up to chair and BSC. Patient has complaints of N/V. PRN pepto given. No other complaints at this time. Call light within reach, bed in the lowest position.

## 2024-02-03 NOTE — PLAN OF CARE
Goal Outcome Evaluation:              Outcome Evaluation: patient AxO4, patient on 2L NC. patients BS is now controlled. PRN pain meds were given. patient has no complaints at this time. bed alarm set, call light in reach, bed in lowest position.

## 2024-02-03 NOTE — PROGRESS NOTES
[facility] HOSPITALIST PROGRESS NOTE     Patient Identification:  Name:  Yumiko Purdy  Age:  58 y.o.  Sex:  female  :  1965  MRN:  [id number]  Visit Number:  [id number]  ROOM: [place] [id number]     Primary Care Provider:  Masha Davidson APRN    Length of stay in inpatient status:  2    Subjective     Chief Compliant:    Chief Complaint   Patient presents with    Rapid Heart Rate       History of Presenting Illness:    Patient seen and examined today, no family present at bedside. She reported shortness of breath was still present but improved. No acute events noted overnight. This afternoon her glucose has been elevated >400.    Objective     Current Hospital Meds:apixaban, 5 mg, Oral, Q12H  bumetanide, 2 mg, Oral, 2 times per day on   [START ON 2024] bumetanide, 2 mg, Oral, Every   busPIRone, 10 mg, Oral, BID  empagliflozin, 10 mg, Oral, Daily  ferrous sulfate, 325 mg, Oral, Daily  fluticasone, 2 spray, Each Nare, Daily  insulin glargine, 45 Units, Subcutaneous, Q12H  insulin lispro, 4-24 Units, Subcutaneous, 4x Daily AC & at Bedtime  levothyroxine, 50 mcg, Oral, Daily  metoprolol succinate XL, 25 mg, Oral, Q24H  nitrofurantoin (macrocrystal-monohydrate), 100 mg, Oral, BID  pantoprazole, 40 mg, Oral, Q AM  senna-docusate sodium, 2 tablet, Oral, BID  sodium chloride, 10 mL, Intravenous, Q12H  valsartan, 40 mg, Oral, Q24H         Current Antimicrobial Therapy:  Anti-Infectives (From admission, onward)      Ordered     Dose/Rate Route Frequency Start Stop    24  nitrofurantoin (macrocrystal-monohydrate) (MACROBID) capsule 100 mg        Ordering Provider: Yulia Mo DO    100 mg Oral 2 Times Daily 24 2100 24          Current Diuretic Therapy:  Diuretics (From admission, onward)      Ordered     Dose/Rate Route Frequency Start Stop    24  bumetanide (BUMEX) tablet 2 mg        Ordering Provider:  Yulia Mo DO    2 mg Oral Every Sunday 02/04/24 0900 02/01/24 1949  bumetanide (BUMEX) tablet 2 mg        Ordering Provider: Yulia Mo DO    2 mg Oral 2 times per day on Monday Tuesday Wednesday Thursday Friday Saturday 02/01/24 2000            ----------------------------------------------------------------------------------------------------------------------  Vital Signs:  Temp:  [98 °F (36.7 °C)-98.8 °F (37.1 °C)] 98.3 °F (36.8 °C)  Heart Rate:  [56-82] 61  Resp:  [16-27] 20  BP: ()/(40-69) 112/64  SpO2:  [89 %-100 %] 100 %  on  Flow (L/min):  [2] 2;   Device (Oxygen Therapy): nasal cannula  Body mass index is 38.78 kg/m².    Wt Readings from Last 3 Encounters:   02/02/24 106 kg (233 lb 0.4 oz)   01/22/24 108 kg (239 lb)   12/08/23 108 kg (239 lb)     Intake & Output (last 3 days)         01/31 0701  02/01 0700 02/01 0701  02/02 0700 02/02 0701  02/03 0700    P.O. 600 1010 860    I.V. (mL/kg) 588.8 (5.7) 186.9 (1.8)     IV Piggyback 1000      Total Intake(mL/kg) 2188.8 (21) 1196.9 (11.3) 860 (8.1)    Urine (mL/kg/hr) 1200 2600 (1) 3850 (2.9)    Stool 0 0 0    Total Output 1200 2600 3850    Net +988.8 -1403.1 -2990           Urine Unmeasured Occurrence   1 x    Stool Unmeasured Occurrence 0 x 0 x 1 x          Diet: Cardiac Diets, Renal Diets, Diabetic Diets; Healthy Heart (2-3 Na+); Consistent Carbohydrate; Low Sodium (2-3g); Texture: Regular Texture (IDDSI 7); Fluid Consistency: Thin (IDDSI 0)  ----------------------------------------------------------------------------------------------------------------------  Physical exam:   Constitutional:  Well-developed and well-nourished.  No acute distress.     HENT:  Head:  Normocephalic and atraumatic.    Cardiovascular:  Normal rate, regular rhythm   Pulmonary/Chest:  No respiratory distress, breath sounds clear to auscultation in anterior/lateral lung fields  Musculoskeletal:  No deformity.     Neurological: Awake, alert, no focal deficit on  "gross examination. No slurred speech or facial droop.   Skin:  Skin is warm and dry.   Peripheral vascular:  No cyanosis  Psychiatric: Appropriate mood and affect  Edited by: Yulia Mo DO at 2/2/2024 1931  ----------------------------------------------------------------------------------------------------------------------  Results from last 7 days   Lab Units 02/02/24 0049 01/31/24 2054 01/31/24 1918 01/31/24  1626   CRP mg/dL  --   --  0.33  --    LACTATE mmol/L  --  1.6 2.9*  --    WBC 10*3/mm3 11.93*  --   --  13.68*   HEMOGLOBIN g/dL 9.5*  --   --  11.7*   HEMATOCRIT % 31.7*  --   --  39.1   MCV fL 104.6*  --   --  102.6*   MCHC g/dL 30.0*  --   --  29.9*   PLATELETS 10*3/mm3 121*  --   --  162   INR   --   --   --  1.13*         Results from last 7 days   Lab Units 02/02/24 0049 01/31/24  1626   SODIUM mmol/L 136 132*   POTASSIUM mmol/L 4.3 4.6   MAGNESIUM mg/dL  --  2.3   CHLORIDE mmol/L 100 93*   CO2 mmol/L 23.5 25.5   BUN mg/dL 32* 41*   CREATININE mg/dL 1.22* 1.27*   CALCIUM mg/dL 8.9 9.5   GLUCOSE mg/dL 222* 382*   ALBUMIN g/dL  --  4.3   BILIRUBIN mg/dL  --  3.4*   ALK PHOS U/L  --  107   AST (SGOT) U/L  --  31   ALT (SGPT) U/L  --  40*   Estimated Creatinine Clearance: 61.5 mL/min (A) (by C-G formula based on SCr of 1.22 mg/dL (H)).  No results found for: \"AMMONIA\"  Results from last 7 days   Lab Units 01/31/24  1918 01/31/24  1842 01/31/24  1626   HSTROP T ng/L 101* 96* 112*     Results from last 7 days   Lab Units 01/31/24  1918   PROBNP pg/mL 4,500.0*         Glucose   Date/Time Value Ref Range Status   02/02/2024 1851 457 (C) 70 - 130 mg/dL Final   02/02/2024 1655 416 (C) 70 - 130 mg/dL Final   02/02/2024 1102 375 (H) 70 - 130 mg/dL Final   02/02/2024 0639 221 (H) 70 - 130 mg/dL Final   02/01/2024 2046 353 (H) 70 - 130 mg/dL Final   02/01/2024 1701 368 (H) 70 - 130 mg/dL Final   02/01/2024 1107 276 (H) 70 - 130 mg/dL Final   02/01/2024 0800 248 (H) 70 - 130 mg/dL Final     Lab Results " "  Component Value Date    TSH 2.960 01/31/2024    FREET4 1.78 (H) 11/13/2023     No results found for: \"PREGTESTUR\", \"PREGSERUM\", \"HCG\", \"HCGQUANT\"  Pain Management Panel  More data exists         Latest Ref Rng & Units 11/13/2023 9/12/2020   Pain Management Panel   Creatinine, Urine mg/dL  mg/dL - 46.3  46.3    Amphetamine, Urine Qual Negative Negative  -   Barbiturates Screen, Urine Negative Negative  -   Benzodiazepine Screen, Urine Negative Negative  -   Buprenorphine, Screen, Urine Negative Negative  -   Cocaine Screen, Urine Negative Negative  -   Fentanyl, Urine Negative Negative  -   Methadone Screen , Urine Negative Negative  -   Methamphetamine, Ur Negative Negative  -     Brief Urine Lab Results  (Last result in the past 365 days)        Color   Clarity   Blood   Leuk Est   Nitrite   Protein   CREAT   Urine HCG        01/31/24 2140 Yellow   Clear   Negative   Negative   Negative   Negative                 Blood Culture   Date Value Ref Range Status   01/31/2024 Abnormal Stain (C)  Preliminary   01/31/2024 No growth at 24 hours  Preliminary     No results found for: \"URINECX\"  No results found for: \"WOUNDCX\"  No results found for: \"STOOLCX\"  No results found for: \"RESPCX\"  No results found for: \"AFBCX\"  Results from last 7 days   Lab Units 01/31/24 2054 01/31/24  1918 01/31/24  1626   PROCALCITONIN ng/mL  --   --  0.42*   LACTATE mmol/L 1.6 2.9*  --    CRP mg/dL  --  0.33  --        I have personally looked at the labs and they are summarized above.  ----------------------------------------------------------------------------------------------------------------------  Detailed radiology reports for the last 24 hours:  Imaging Results (Last 24 Hours)       ** No results found for the last 24 hours. **          Assessment & Plan    #recurrent paroxysmal atrial fibrillation with RVR and aberrant conduction  - Required cardioversion in the ED. Remains in sinus rhythm at this time.   - Cardiology consulted, " appreciate assistance.   - Anticoagulated with eliquis as [id number] is at least 3.   - She received IV amiodarone in the ED and this was continued after admission. Amiodarone now discontinued. Antiarrhythmic choices limited due to multiple comorbidities (advanced cardiomyopathy, CKD, cirrhosis). Continue metoprolol. Plan for follow up with EP after discharge for possible ablation.  - Continue telemetry monitoring     #Prolonged Qtc  - Qtc was >500 on EKG early this a.m.  - Repeat EKG today shows QT still >500. Avoid qt prolonging agents. Treat electrolyte abnormalities and monitor with repeat labs. (K+ and mag level both appropriate). Monitor on telemetry.     #SIRS criteria suspected due to atrial fibrillation with rvr  - She was on antibiotic for UTI prior to admission but UA this admission was only significant for >1000 glucose.  - Follow up blood cultures     #CKD stage IIIa   - Creatinine at baseline  - avoid nephrotoxins, repeat bmp in a.m.     #Chronic combined systolic and diastolic CHF  - Does not appear grossly fluid overloaded. Continue home bumex. Follow up on recommendations from Cardiology. Continue metoprolol succinate, valsartan, and jardiance. If renal function improves and blood pressure allows she may be started on spironolactone as well per Cardiology note today.     #Insulin dependent type II DM, uncontrolled  - basal insulin increased back to home dose of 45 units BID due to persistent hyperglycemia. Titrate up as needed for improved glucose control. Cover with sliding scale insulin (increased to high dose sliding scale this evening due to glucose persistently >400) and monitor with accuchecks. Hypoglycemia protocol prn.  Edited by: Yulia Mo DO at 2/2/2024 1931    VTE Prophylaxis:   Mechanical Order History:       None          Pharmalogical Order History:        Ordered     Dose Route Frequency Stop    01/31/24 2030  apixaban (ELIQUIS) tablet 5 mg         5 mg PO Every 12 Hours  Scheduled --                    Dispo:  likely home at discharge pending clinical improvement, possibly within 24 hours     Yulia Mo DO  [facility] Hospitalist  02/02/24  19:31 EST

## 2024-02-04 ENCOUNTER — READMISSION MANAGEMENT (OUTPATIENT)
Dept: CALL CENTER | Facility: HOSPITAL | Age: 59
End: 2024-02-04
Payer: COMMERCIAL

## 2024-02-04 VITALS
HEART RATE: 59 BPM | BODY MASS INDEX: 39.3 KG/M2 | SYSTOLIC BLOOD PRESSURE: 117 MMHG | RESPIRATION RATE: 12 BRPM | HEIGHT: 65 IN | WEIGHT: 235.89 LBS | TEMPERATURE: 98.5 F | OXYGEN SATURATION: 96 % | DIASTOLIC BLOOD PRESSURE: 40 MMHG

## 2024-02-04 LAB
ANION GAP SERPL CALCULATED.3IONS-SCNC: 12.3 MMOL/L (ref 5–15)
BUN SERPL-MCNC: 32 MG/DL (ref 6–20)
BUN/CREAT SERPL: 25.6 (ref 7–25)
CALCIUM SPEC-SCNC: 8.8 MG/DL (ref 8.6–10.5)
CHLORIDE SERPL-SCNC: 99 MMOL/L (ref 98–107)
CO2 SERPL-SCNC: 27.7 MMOL/L (ref 22–29)
CREAT SERPL-MCNC: 1.25 MG/DL (ref 0.57–1)
DEPRECATED RDW RBC AUTO: 77.2 FL (ref 37–54)
EGFRCR SERPLBLD CKD-EPI 2021: 50.4 ML/MIN/1.73
ERYTHROCYTE [DISTWIDTH] IN BLOOD BY AUTOMATED COUNT: 20.4 % (ref 12.3–15.4)
GLUCOSE BLDC GLUCOMTR-MCNC: 179 MG/DL (ref 70–130)
GLUCOSE BLDC GLUCOMTR-MCNC: 301 MG/DL (ref 70–130)
GLUCOSE SERPL-MCNC: 124 MG/DL (ref 65–99)
HCT VFR BLD AUTO: 31.9 % (ref 34–46.6)
HGB BLD-MCNC: 9 G/DL (ref 12–15.9)
MCH RBC QN AUTO: 30.1 PG (ref 26.6–33)
MCHC RBC AUTO-ENTMCNC: 28.2 G/DL (ref 31.5–35.7)
MCV RBC AUTO: 106.7 FL (ref 79–97)
PLATELET # BLD AUTO: 144 10*3/MM3 (ref 140–450)
PMV BLD AUTO: 10.6 FL (ref 6–12)
POTASSIUM SERPL-SCNC: 3.9 MMOL/L (ref 3.5–5.2)
QT INTERVAL: 518 MS
QTC INTERVAL: 512 MS
RBC # BLD AUTO: 2.99 10*6/MM3 (ref 3.77–5.28)
SODIUM SERPL-SCNC: 139 MMOL/L (ref 136–145)
WBC NRBC COR # BLD AUTO: 8.97 10*3/MM3 (ref 3.4–10.8)

## 2024-02-04 PROCEDURE — 80048 BASIC METABOLIC PNL TOTAL CA: CPT | Performed by: STUDENT IN AN ORGANIZED HEALTH CARE EDUCATION/TRAINING PROGRAM

## 2024-02-04 PROCEDURE — 85027 COMPLETE CBC AUTOMATED: CPT | Performed by: STUDENT IN AN ORGANIZED HEALTH CARE EDUCATION/TRAINING PROGRAM

## 2024-02-04 PROCEDURE — 63710000001 INSULIN GLARGINE PER 5 UNITS: Performed by: STUDENT IN AN ORGANIZED HEALTH CARE EDUCATION/TRAINING PROGRAM

## 2024-02-04 PROCEDURE — 93005 ELECTROCARDIOGRAM TRACING: CPT | Performed by: STUDENT IN AN ORGANIZED HEALTH CARE EDUCATION/TRAINING PROGRAM

## 2024-02-04 PROCEDURE — 63710000001 INSULIN LISPRO (HUMAN) PER 5 UNITS: Performed by: STUDENT IN AN ORGANIZED HEALTH CARE EDUCATION/TRAINING PROGRAM

## 2024-02-04 PROCEDURE — 82948 REAGENT STRIP/BLOOD GLUCOSE: CPT

## 2024-02-04 RX ORDER — VALSARTAN 40 MG/1
40 TABLET ORAL
Qty: 30 TABLET | Refills: 0 | Status: SHIPPED | OUTPATIENT
Start: 2024-02-05 | End: 2024-03-06

## 2024-02-04 RX ORDER — METOPROLOL SUCCINATE 25 MG/1
25 TABLET, EXTENDED RELEASE ORAL
Qty: 30 TABLET | Refills: 0 | Status: SHIPPED | OUTPATIENT
Start: 2024-02-05 | End: 2024-02-04 | Stop reason: SDUPTHER

## 2024-02-04 RX ORDER — METOPROLOL SUCCINATE 25 MG/1
25 TABLET, EXTENDED RELEASE ORAL
Qty: 30 TABLET | Refills: 0 | Status: SHIPPED | OUTPATIENT
Start: 2024-02-05 | End: 2024-03-06

## 2024-02-04 RX ADMIN — Medication 10 ML: at 08:35

## 2024-02-04 RX ADMIN — VALSARTAN 40 MG: 80 TABLET ORAL at 08:35

## 2024-02-04 RX ADMIN — EMPAGLIFLOZIN 10 MG: 10 TABLET, FILM COATED ORAL at 08:35

## 2024-02-04 RX ADMIN — INSULIN LISPRO 16 UNITS: 100 INJECTION, SOLUTION INTRAVENOUS; SUBCUTANEOUS at 12:11

## 2024-02-04 RX ADMIN — INSULIN GLARGINE 45 UNITS: 100 INJECTION, SOLUTION SUBCUTANEOUS at 08:31

## 2024-02-04 RX ADMIN — DOCUSATE SODIUM 50 MG AND SENNOSIDES 8.6 MG 2 TABLET: 8.6; 5 TABLET, FILM COATED ORAL at 08:34

## 2024-02-04 RX ADMIN — BUSPIRONE HYDROCHLORIDE 10 MG: 10 TABLET ORAL at 08:35

## 2024-02-04 RX ADMIN — INSULIN LISPRO 4 UNITS: 100 INJECTION, SOLUTION INTRAVENOUS; SUBCUTANEOUS at 08:32

## 2024-02-04 RX ADMIN — PANTOPRAZOLE SODIUM 40 MG: 40 TABLET, DELAYED RELEASE ORAL at 06:15

## 2024-02-04 RX ADMIN — APIXABAN 5 MG: 5 TABLET, FILM COATED ORAL at 08:34

## 2024-02-04 RX ADMIN — NITROFURANTOIN MONOHYDRATE/MACROCRYSTALLINE 100 MG: 25; 75 CAPSULE ORAL at 08:34

## 2024-02-04 RX ADMIN — LEVOTHYROXINE SODIUM 50 MCG: 50 TABLET ORAL at 08:35

## 2024-02-04 RX ADMIN — METOPROLOL SUCCINATE 25 MG: 25 TABLET, EXTENDED RELEASE ORAL at 08:34

## 2024-02-04 RX ADMIN — BUMETANIDE 2 MG: 1 TABLET ORAL at 08:34

## 2024-02-04 RX ADMIN — FERROUS SULFATE TAB 325 MG (65 MG ELEMENTAL FE) 325 MG: 325 (65 FE) TAB at 08:35

## 2024-02-04 NOTE — PLAN OF CARE
Problem: Behavioral Health Comorbidity  Goal: Maintenance of Behavioral Health Symptom Control  Outcome: Met     Problem: Diabetes Comorbidity  Goal: Blood Glucose Level Within Targeted Range  Outcome: Met     Problem: Hypertension Comorbidity  Goal: Blood Pressure in Desired Range  Outcome: Met     Problem: Pain Chronic (Persistent) (Comorbidity Management)  Goal: Acceptable Pain Control and Functional Ability  Outcome: Met     Problem: Adult Inpatient Plan of Care  Goal: Plan of Care Review  Outcome: Met  Goal: Patient-Specific Goal (Individualized)  Outcome: Met  Goal: Absence of Hospital-Acquired Illness or Injury  Outcome: Met  Goal: Optimal Comfort and Wellbeing  Outcome: Met  Goal: Readiness for Transition of Care  Outcome: Met     Problem: Dysrhythmia  Goal: Normalized Cardiac Rhythm  Outcome: Met     Problem: Fall Injury Risk  Goal: Absence of Fall and Fall-Related Injury  Outcome: Met     Problem: Skin Injury Risk Increased  Goal: Skin Health and Integrity  Outcome: Met   Goal Outcome Evaluation:

## 2024-02-04 NOTE — OUTREACH NOTE
Prep Survey      Flowsheet Row Responses   [facility]ism facility patient discharged from? [facility]   Is LACE score < 7 ? No   Eligibility Readm Mgmt   Discharge diagnosis Atrial fibrillation with RVR   Does the patient have one of the following disease processes/diagnoses(primary or secondary)? Other   Does the patient have Home health ordered? Yes   What is the Home health agency?  [facility]    Is there a DME ordered? No   Prep survey completed? Yes            Heather CHAVEZ - Registered Nurse

## 2024-02-04 NOTE — PLAN OF CARE
Goal Outcome Evaluation:              Outcome Evaluation: Patient AxO4, patient on 2L NC. patient currently has no complaints at this time. bed in lowest position, call light in reach, bed alarm set.

## 2024-02-05 ENCOUNTER — TELEPHONE (OUTPATIENT)
Dept: TELEMETRY | Facility: HOSPITAL | Age: 59
End: 2024-02-05
Payer: COMMERCIAL

## 2024-02-05 LAB — BACTERIA SPEC AEROBE CULT: NORMAL

## 2024-02-05 NOTE — CASE MANAGEMENT/SOCIAL WORK
Discharge Planning Assessment   [facility]     Patient Name: Yumiko Purdy  MRN: [id number]  Today's Date: 2/5/2024    Admit Date: 1/31/2024       Discharge Plan       Row Name 02/05/24 0844       Plan    Final Discharge Disposition Code 06 - home with home health care    Final Note Pt was dischared home with home health services on 2/4/24. Pt utilized [facility]. SS faxed new  referral and clinical to [facility] [phone]. SS to follow.    11:16: SS contacted [facility] per Mariam who states they received referral and pt is scheduled to be seen today. No other needs identified.                  Continued Care and Services - Discharged on 2/4/2024 Admission date: 1/31/2024 - Discharge disposition: Home-Health Care c      Home Medical Care       Service Provider Request Status Selected Services Address Phone Fax Patient Preferred    [facility] Pending - No Request Sent N/A [address] [place] [id number] [phone] --               SAMY Gong

## 2024-02-05 NOTE — DISCHARGE PLACEMENT REQUEST
"Dio Berry (58 y.o. Female)       Date of Birth   1965    Social Security Number       Address   [address] [address]    Home Phone   [phone]    MRN   [id number]       [facility]   [facility]    Marital Status                               Admission Date   24    Admission Type   Emergency    Admitting Provider   Tomás An MD    Attending Provider       Department, Room/Bed   [facility], P212/S2       Discharge Date   2024    Discharge Disposition   Home-Health Care [facility] [place] [facility]    Discharge Destination                                 Attending Provider: (none)   Allergies: Phenergan [Promethazine]    Isolation: None   Infection: None   Code Status: Prior    Ht: 165.1 cm (65\")   Wt: 107 kg (235 lb 14.3 oz)    Admission Cmt: None   Principal Problem: Atrial fibrillation with RVR [I48.91]                   Active Insurance as of 2024       Primary Coverage       Payor Plan Insurance Group Employer/Plan Group    [employer] [place] [employer] [place]        Payor Plan Address Payor Plan Phone Number Payor Plan Fax Number Effective Dates    [address] [address] [phone]  2020 - None Entered    [facility] [address]         Subscriber Name Subscriber Birth Date Member ID       DIO BERRY 1965 [id number]                     Emergency Contacts        (Rel.) Home Phone Work Phone Mobile Phone    KATHY BERRY (Son) [phone] -- --    GurwinderBolivar (Other) -- -- [phone]              Insurance Information                  [facility]/WELLCARE MEDICAID Phone: [phone]    Subscriber: Gurwinder Dio Subscriber#: [id number]    Group#: -- Precert#: [id number]             History & Physical        Tomás An MD at 24          Hospitalist History and Physical        Patient Identification  Name: Dio Berry  Age/Sex: 58 y.o. female  :  1965        MRN: [id number]  Visit Number: " [id number]  Admit Date: 1/31/2024   PCP: Masha Davidson APRN          Chief complaint heart racing    History of Present Illness:  Patient is a 58 y.o. female with history of systolic and diastolic CHF (EF 31-35%, grade III diastolic dysfunction per ECHO 11/2023), chronic afib, cirrhosis, insulin dependent type II DM, stage IIIa CKD (cr 1.2, GFR upper 40s-low 50s), Vtach and anemia. She reports she had a LifeVest placed about 2 months ago and is scheduled to see a cardiologist next week to discuss AICD placement. She presents today with complaints of palpitations that started earlier today. She informed ED provider that her LifeVest has been beeping and trying to shock her for the last 2 days, so she took it off. Upon further questioning, she reports last week her PCP started her on an antibiotic for a sinus infection which helped with her symptoms, but in the interim she developed dysuria and cloudy, foul smelling urine and presented back to her PCP yesterday, at which time he obtained a urine sample for UA and told the patient she had a UTI. Her PCP then changed her antibiotic to nitrofurantoin for the UTI. She thinks she has had 3 doses of this antibiotic thus far. In the ED, patient's HR was 184 upon arrival. EKG showed wide complex QRS tachycardia, felt to be afib w/aberrancy which was confirmed by cardiology. BP was initially stable. She was started on IV amiodarone; during loading bolus she became hypotensive, requiring synchronized cardioversion at 150 J with subsequent conversion to sinus rhythm. She has maintained sinus rhythm on amiodarone infusion since then and BP has remained stable. Labs showed elevated troponin 112 with fairly flat trend since then overall, with repeat 96 followed by 101, and looking back she had higher troponin levels in Nov 2023. BNP is 4500. K+ and mag are normal. Creatinine is stable at 1.27, glucose 382, ALT 40, total bili 3.4 but direct only 0.6. TSH is normal at  2.960. WBC is 13, procal 0.42, lactate 2.9, and CRP 0.33. UA is pending. CXR showed mild cardiomegaly but no acute findings. Patient has been admitted to the PCU for further workup and management.     Review of Systems  Review of Systems   Constitutional:  Negative for chills, fatigue and fever.   HENT:  Negative for postnasal drip, rhinorrhea, sinus pressure, sinus pain and sore throat.    Respiratory:  Negative for cough, shortness of breath and wheezing.    Cardiovascular:  Positive for chest pain (with palpitations earlier today, now resolved), palpitations and leg swelling (chronic, no worse than baseline).   Gastrointestinal:  Positive for abdominal pain (lower abdominal). Negative for abdominal distention, constipation, diarrhea, nausea and vomiting.   Genitourinary:  Positive for dysuria. Negative for flank pain, frequency and hematuria.   Musculoskeletal:  Negative for arthralgias and back pain.   Skin:  Negative for color change, pallor, rash and wound.   Neurological:  Negative for dizziness, seizures, weakness, light-headedness, numbness and headaches.   Hematological:  Negative for adenopathy. Does not bruise/bleed easily.   Psychiatric/Behavioral:  Negative for agitation, behavioral problems and confusion.        History  Past Medical History:   Diagnosis Date    Anemia     CHF (congestive heart failure)     Chronic a-fib     stated by patient     Cirrhosis of liver     stated by patient     Diabetes mellitus     Ventricular tachycardia      Past Surgical History:   Procedure Laterality Date    APPENDECTOMY      CARDIAC CATHETERIZATION N/A 11/10/2020    Procedure: Left Heart Cath;  Surgeon: Charlee Ken MD;  Location: [facility] CATH INVASIVE LOC[facility];  Service: Cardiovascular;  Laterality: N/A;    CHOLECYSTECTOMY      TOE AMPUTATION Right     stated by patient.      Family History   Problem Relation Age of Onset    Heart attack Father     Heart attack Brother      Social History     Tobacco Use     Smoking status: Never    Smokeless tobacco: Never   Vaping Use    Vaping Use: Never used   Substance Use Topics    Alcohol use: Never    Drug use: Never     Medications Prior to Admission   Medication Sig Dispense Refill Last Dose    apixaban (ELIQUIS) 5 MG tablet tablet Take 1 tablet by mouth Every 12 (Twelve) Hours. Indications: Atrial Fibrillation 60 tablet 3     Aspirin Low Dose 81 MG EC tablet Take 1 tablet by mouth Daily.       bumetanide (BUMEX) 2 MG tablet Take 1 tablet by mouth 2 (Two) Times a Day. ON SUNDAYS, TAKE ONE TABLET ONCE DAILY.       busPIRone (BUSPAR) 10 MG tablet Take 1 tablet by mouth 2 (Two) Times a Day.       cefdinir (OMNICEF) 300 MG capsule Take 1 capsule by mouth 2 (Two) Times a Day.       doxycycline (VIBRAMYICN) 100 MG tablet Take 1 tablet by mouth 2 (Two) Times a Day. 20 tablet 0     empagliflozin (JARDIANCE) 10 MG tablet tablet Take 1 tablet by mouth Daily for 30 days. 30 tablet 6     ferrous sulfate 325 (65 FE) MG tablet Take 1 tablet by mouth Daily.       fluticasone (FLONASE) 50 MCG/ACT nasal spray 2 sprays into the nostril(s) as directed by provider Daily.       guaiFENesin (Mucinex) 600 MG 12 hr tablet Take 2 tablets by mouth 2 (Two) Times a Day for 10 days. 40 tablet 0     HYDROcodone-acetaminophen (NORCO) 7.5-325 MG per tablet Take 1 tablet by mouth 2 (Two) Times a Day.       Insulin Glargine (LANTUS SOLOSTAR) 100 UNIT/ML injection pen Inject 45 Units under the skin into the appropriate area as directed 2 (Two) Times a Day. 30 mL 0     Insulin Lispro, 1 Unit Dial, (HUMALOG) 100 UNIT/ML solution pen-injector Inject 15 Units under the skin into the appropriate area as directed 3 (Three) Times a Day With Meals. 15 mL 1     levothyroxine (SYNTHROID, LEVOTHROID) 50 MCG tablet Take 1 tablet by mouth Daily.       metoprolol succinate XL (TOPROL-XL) 25 MG 24 hr tablet Take 0.5 tablets by mouth Daily.       nitrofurantoin, macrocrystal-monohydrate, (MACROBID) 100 MG capsule Take 1  capsule by mouth 2 (Two) Times a Day.       nitroglycerin (NITROSTAT) 0.4 MG SL tablet Place 1 tablet under the tongue Every 5 (Five) Minutes As Needed for Chest Pain. Take no more than 3 doses in 15 minutes.       omeprazole (priLOSEC) 20 MG capsule Take 1 capsule by mouth Daily.       predniSONE (DELTASONE) 20 MG tablet Take 2 tablets by mouth Daily for 5 days. 10 tablet 0     Vericiguat 10 MG tablet Take 1 tablet by mouth Daily. 30 tablet 2     Vortioxetine HBr (Trintellix) 5 MG tablet tablet Take 1 tablet by mouth Daily With Breakfast.       acetaminophen (TYLENOL) 500 MG tablet Take 1 tablet by mouth 2 (Two) Times a Day As Needed for Mild Pain.        Allergies:  Phenergan [promethazine]    Objective    Vital Signs  Temp:  [98.3 °F (36.8 °C)] 98.3 °F (36.8 °C)  Heart Rate:  [] 73  Resp:  [16] 16  BP: ()/(53-82) 119/61  Body mass index is 39.77 kg/m².    Physical Exam:  Physical Exam  Constitutional:       General: She is not in acute distress.     Appearance: She is obese. She is ill-appearing (chronically so).   HENT:      Head: Normocephalic and atraumatic.      Right Ear: External ear normal.      Left Ear: External ear normal.      Nose: Nose normal.      Mouth/Throat:      Mouth: Mucous membranes are moist.      Pharynx: Oropharynx is clear.   Eyes:      Extraocular Movements: Extraocular movements intact.      Conjunctiva/sclera: Conjunctivae normal.      Pupils: Pupils are equal, round, and reactive to light.   Cardiovascular:      Rate and Rhythm: Normal rate and regular rhythm.      Pulses: Normal pulses.      Heart sounds: Normal heart sounds.   Pulmonary:      Effort: Pulmonary effort is normal. No respiratory distress.      Breath sounds: Normal breath sounds. No wheezing or rales.   Abdominal:      General: Bowel sounds are normal. There is no distension.      Palpations: Abdomen is soft.      Tenderness: There is abdominal tenderness (suprapubic region).      Comments: Some pitting  "edema in pannus   Musculoskeletal:         General: Normal range of motion.      Cervical back: Normal range of motion and neck supple. No tenderness.      Right lower leg: Edema (trace) present.      Left lower leg: Edema (trace) present.   Lymphadenopathy:      Cervical: No cervical adenopathy.   Skin:     General: Skin is warm and dry.      Capillary Refill: Capillary refill takes less than 2 seconds.      Coloration: Skin is not jaundiced.      Findings: No bruising or lesion.   Neurological:      General: No focal deficit present.      Mental Status: She is alert and oriented to person, place, and time.   Psychiatric:         Mood and Affect: Mood normal.         Behavior: Behavior normal.           Results Review:       Lab Results:  Results from last 7 days   Lab Units 01/31/24  1626   WBC 10*3/mm3 13.68*   HEMOGLOBIN g/dL 11.7*   PLATELETS 10*3/mm3 162     Results from last 7 days   Lab Units 01/31/24  1918   CRP mg/dL 0.33     Results from last 7 days   Lab Units 01/31/24  1626   SODIUM mmol/L 132*   POTASSIUM mmol/L 4.6   CHLORIDE mmol/L 93*   CO2 mmol/L 25.5   BUN mg/dL 41*   CREATININE mg/dL 1.27*   CALCIUM mg/dL 9.5   GLUCOSE mg/dL 382*     Results from last 7 days   Lab Units 01/31/24  1626   MAGNESIUM mg/dL 2.3     No results found for: \"HGBA1C\"  Results from last 7 days   Lab Units 01/31/24  1626   BILIRUBIN mg/dL 3.4*   ALK PHOS U/L 107   AST (SGOT) U/L 31   ALT (SGPT) U/L 40*     Results from last 7 days   Lab Units 01/31/24  1918 01/31/24  1842 01/31/24  1626   HSTROP T ng/L 101* 96* 112*         Results from last 7 days   Lab Units 01/31/24  1626   INR  1.13*           I have reviewed the patient's laboratory results.    Imaging:  Imaging Results (Last 72 Hours)       Procedure Component Value Units Date/Time    XR Chest 1 View [[id number]] Collected: 01/31/24 1713     Updated: 01/31/24 1715    Narrative:      EXAM:    XR Chest, 1 View     EXAM DATE:    1/31/2024 4:34 PM     CLINICAL HISTORY:    " Chest Pain Protocol     TECHNIQUE:    Frontal view of the chest.     COMPARISON:    11/14/2023     FINDINGS:    Lungs and pleural spaces:  Unremarkable as visualized.  No  consolidation.  No pneumothorax.    Heart:  Cardiomegaly.    Mediastinum:  Unremarkable as visualized.    Bones/joints:  Unremarkable as visualized.    Tubes, lines and devices:  Defibrillator pad left chest.       Impression:      1.  Mild cardiomegaly.  2.  No acute cardiopulmonary findings.        This report was finalized on 1/31/2024 5:13 PM by Dr. Marlo Parr MD.               I have personally reviewed the patient's radiologic imaging.        EKG:   Critical Test Result: High HR , Arrhythmia  Wide QRS tachycardia, , QTc 478  Left axis deviation  Nonspecific intraventricular block  Cannot rule out Anterior infarct , age undetermined  T wave abnormality, consider lateral ischemia  Abnormal ECG  When compared with ECG of 31-JAN-2024 16:19, (Unconfirmed)  No significant change was found    I have personally reviewed the patient's EKG.        Assessment & Plan    - Atrial fibrillation with RVR: became hemodynamically stable with amiodarone bolus, but converted to sinus rhythm after electrical cardioversion. Now maintaining sinus rhythm on amiodarone infusion. K+ and mag normal, TSH normal. Troponin elevated but flat trend and appears chronically elevated. EKG not felt to show any evidence of acute ischemia per cardiology review. Patient was treated for sinus infection last week and is now on nitrofurantoin for UTI. Underling infectious process such as this could have triggered uncontrolled afib. Will check UA now. Cardiology consulted for further assistance in management. Continue home eliquis. Review other home meds once reconciled by pharmacy.  - SIRS, present on admission with tachycardia, leukocytosis and lactic acidosis: granted, many of these abnormalities could be explained by afib w/RVR. However, patient also reports recently  diagnosed UTI for which she was started on nitrofurantoin yesterday. Will check UA now and if UTI confirmed, would technically meet criteria for severe sepsis. Blood cultures ordered. Continue to monitor closely.  - Stage IIIa CKD: creatinine appears to be within baseline range. Continue to monitor.  - Chronic combined systolic and diastolic CHF: BNP elevated but suspect secondary to uncontrolled afib, as clinically does not appear grossly fluid overloaded. Review home meds once reconciled by pharmacy. Await further recommendations from cardiology. Supposed to meet with cardiology next week to discuss AICD placement.   - Insulin dependent type II DM: glucose 382. Check A1c. Monitor accuchecks. Cover with SSI. Reduce listed home dose of lantus to try to avoid hypoglycemic episode, can increase dose moving forward if needed.     DVT Prophylaxis: anticoagulated on eliquis    Estimated Length of Stay >2 midnights    I discussed the patient's findings, assessment and plan with the patient and nursing staff in the PCU.    Patient is high risk due to afib w/RVR, SIRS    Tomás An MD  01/31/24  20:20 EST      Electronically signed by Tomás An MD at 01/31/24 2045       Vital Signs (last day) before discharge       Date/Time Temp Temp src Pulse Resp BP Patient Position SpO2    02/04/24 1200 98.5 (36.9) Oral -- -- -- -- --    02/04/24 0900 -- -- 59 12 117/40 Lying --    02/04/24 0834 -- -- 59 -- 123/55 -- --    02/04/24 0800 97.9 (36.6) Oral 62 25 123/55 Lying 96    02/04/24 0700 -- -- 55 12 128/62 Lying 100    02/04/24 0600 -- -- 57 19 117/60 -- 99    02/04/24 0500 -- -- 55 19 111/44 -- 100    02/04/24 0400 98.1 (36.7) Axillary 54 18 120/61 -- 100    02/04/24 0300 -- -- 55 19 107/51 -- 100    02/04/24 0200 -- -- 61 21 135/96 -- --    02/04/24 0100 -- -- 54 12 114/53 -- 100    02/04/24 0000 97.8 (36.6) Oral 56 19 108/47 -- 100    02/03/24 2300 -- -- 57 21 92/47 -- 100    02/03/24 2200 -- -- 61 12  124/48 -- 100    02/03/24 2100 -- -- 60 22 114/56 -- 94    02/03/24 2000 98.5 (36.9) Oral 59 20 106/51 -- 95    02/03/24 1900 -- -- 60 24 116/52 -- 87    02/03/24 1807 -- -- 61 -- 121/50 -- --    02/03/24 1800 -- -- 62 19 121/50 -- 95    02/03/24 1700 -- -- 61 27 115/61 -- 90    02/03/24 1600 97.3 (36.3) Oral 58 -- 113/46 -- 96    02/03/24 1501 -- -- -- -- -- -- 97    02/03/24 1500 -- -- 59 18 122/56 -- --    02/03/24 1400 -- -- 62 18 118/55 -- 99    02/03/24 1300 -- -- 62 20 113/48 -- 91    02/03/24 1206 -- -- -- -- -- -- 90    02/03/24 1200 97.5 (36.4) Oral 62 -- 130/57 -- --    02/03/24 1100 -- -- 59 -- 120/58 -- 100    02/03/24 1000 -- -- 64 20 120/52 -- 92    02/03/24 0900 -- -- 60 18 121/62 -- 100    02/03/24 0848 -- -- 61 -- 121/67 -- --    02/03/24 0800 97.5 (36.4) Oral 60 20 113/51 -- 100    02/03/24 0700 -- -- 55 18 112/47 -- 100    02/03/24 0600 -- -- 54 17 110/55 -- 100    02/03/24 0500 -- -- 55 19 96/58 -- 99    02/03/24 0400 98.5 (36.9) Axillary 53 11 117/62 -- 100    02/03/24 0300 -- -- 55 20 122/65 -- 100    02/03/24 0200 -- -- 55 15 121/67 -- 100    02/03/24 0100 -- -- 55 22 109/57 -- 100    02/03/24 0000 98.6 (37) Axillary 55 25 88/61 -- 93          No current facility-administered medications for this encounter.     Current Outpatient Medications   Medication Sig Dispense Refill    acetaminophen (TYLENOL) 500 MG tablet Take 1 tablet by mouth 2 (Two) Times a Day As Needed for Mild Pain.      apixaban (ELIQUIS) 5 MG tablet tablet Take 1 tablet by mouth Every 12 (Twelve) Hours. Indications: Atrial Fibrillation 60 tablet 3    Aspirin Low Dose 81 MG EC tablet Take 1 tablet by mouth Daily.      bumetanide (BUMEX) 2 MG tablet Take 1 tablet by mouth 2 (Two) Times a Day. ON SUNDAYS, TAKE ONE TABLET ONCE DAILY.      busPIRone (BUSPAR) 10 MG tablet Take 1 tablet by mouth 2 (Two) Times a Day.      empagliflozin (JARDIANCE) 10 MG tablet tablet Take 1 tablet by mouth Daily for 30 days. 30 tablet 6    ferrous  sulfate 325 (65 FE) MG tablet Take 1 tablet by mouth Daily.      fluticasone (FLONASE) 50 MCG/ACT nasal spray 2 sprays into the nostril(s) as directed by provider Daily As Needed for Rhinitis or Allergies.      HYDROcodone-acetaminophen (NORCO) 7.5-325 MG per tablet Take 1 tablet by mouth 2 (Two) Times a Day.      Insulin Glargine (LANTUS SOLOSTAR) 100 UNIT/ML injection pen Inject 45 Units under the skin into the appropriate area as directed 2 (Two) Times a Day. 30 mL 0    Insulin Lispro, 1 Unit Dial, (HUMALOG) 100 UNIT/ML solution pen-injector Inject 15 Units under the skin into the appropriate area as directed 3 (Three) Times a Day With Meals. 15 mL 1    levothyroxine (SYNTHROID, LEVOTHROID) 50 MCG tablet Take 1 tablet by mouth Daily.      metoprolol succinate XL (TOPROL-XL) 25 MG 24 hr tablet Take 1 tablet by mouth Daily for 30 days. 30 tablet 0    nitrofurantoin, macrocrystal-monohydrate, (MACROBID) 100 MG capsule Take 1 capsule by mouth 2 (Two) Times a Day.      omeprazole (priLOSEC) 20 MG capsule Take 1 capsule by mouth Daily.      valsartan (DIOVAN) 40 MG tablet Take 1 tablet by mouth Daily for 30 days. 30 tablet 0    Vericiguat 10 MG tablet Take 1 tablet by mouth Daily. 30 tablet 2    Vortioxetine HBr (Trintellix) 5 MG tablet tablet Take 1 tablet by mouth Daily With Breakfast.      bumetanide (BUMEX) 2 MG tablet Take 1 tablet by mouth 1 (One) Time Per Week.       Operative/Procedure Notes (most recent note)    No notes of this type exist for this encounter.          Physician Progress Notes (most recent note)        Yulia Mo DO at 24 1823              [facility]     Patient Identification:  Name:  Yumiko Purdy  Age:  58 y.o.  Sex:  female  :  1965  MRN:  [id number]  Visit Number:  [id number]  ROOM: [place] [address]     Primary Care Provider:  Masha Davidson APRN    Length of stay in inpatient status:  3    Subjective     Chief Compliant:    Chief  Complaint   Patient presents with    Rapid Heart Rate       History of Presenting Illness:    Patient seen and examined this morning with her a.m. nurse present. Patient's main complaint today is feeling numbness in her bilateral hands and feet that is worse than usual. She reports that when she was woken up this morning for an EKG she felt sweaty and had numbness of her hands and feet. She denies any aggravating/alleviating factors to the symptoms. She denies any new weakness. She reports having numbness in her hands and feet chronically due to diabetes but not this bad. She denies any shortness of breath, palpitations, or chest pain.    Objective     Current Hospital Meds:apixaban, 5 mg, Oral, Q12H  bumetanide, 2 mg, Oral, 2 times per day on Monday Tuesday Wednesday Thursday Friday Saturday  [START ON 2/4/2024] bumetanide, 2 mg, Oral, Every Sunday  busPIRone, 10 mg, Oral, BID  empagliflozin, 10 mg, Oral, Daily  ferrous sulfate, 325 mg, Oral, Daily  fluticasone, 2 spray, Each Nare, Daily  insulin glargine, 45 Units, Subcutaneous, Q12H  insulin lispro, 4-24 Units, Subcutaneous, 4x Daily AC & at Bedtime  levothyroxine, 50 mcg, Oral, Daily  metoprolol succinate XL, 25 mg, Oral, Q24H  nitrofurantoin (macrocrystal-monohydrate), 100 mg, Oral, BID  pantoprazole, 40 mg, Oral, Q AM  senna-docusate sodium, 2 tablet, Oral, BID  sodium chloride, 10 mL, Intravenous, Q12H  valsartan, 40 mg, Oral, Q24H         Current Antimicrobial Therapy:  Anti-Infectives (From admission, onward)      Ordered     Dose/Rate Route Frequency Start Stop    02/01/24 1949  nitrofurantoin (macrocrystal-monohydrate) (MACROBID) capsule 100 mg        Ordering Provider: Yulia Mo DO    100 mg Oral 2 Times Daily 02/01/24 2100 02/06/24 2059          Current Diuretic Therapy:  Diuretics (From admission, onward)      Ordered     Dose/Rate Route Frequency Start Stop    02/01/24 1959  bumetanide (BUMEX) tablet 2 mg        Ordering Provider: Chepe  DO Yulia    2 mg Oral Every Sunday 02/04/24 0900 02/01/24 1949  bumetanide (BUMEX) tablet 2 mg        Ordering Provider: Yulia Mo DO    2 mg Oral 2 times per day on Monday Tuesday Wednesday Thursday Friday Saturday 02/01/24 2000            ----------------------------------------------------------------------------------------------------------------------  Vital Signs:  Temp:  [97.3 °F (36.3 °C)-98.6 °F (37 °C)] 97.3 °F (36.3 °C)  Heart Rate:  [53-64] 61  Resp:  [11-27] 27  BP: ()/(46-68) 121/50  SpO2:  [90 %-100 %] 90 %  on  Flow (L/min):  [2] 2;   Device (Oxygen Therapy): nasal cannula  Body mass index is 39.14 kg/m².    Wt Readings from Last 3 Encounters:   02/03/24 107 kg (235 lb 3.7 oz)   01/22/24 108 kg (239 lb)   12/08/23 108 kg (239 lb)     Intake & Output (last 3 days)         01/31 0701 02/01 0700 02/01 0701 02/02 0700 02/02 0701 02/03 0700 02/03 0701  02/04 0700    P.O. 600 1010 860 472    I.V. (mL/kg) 588.8 (5.7) 186.9 (1.8)      IV Piggyback 1000       Total Intake(mL/kg) 2188.8 (21) 1196.9 (11.3) 860 (8) 472 (4.4)    Urine (mL/kg/hr) 1200 2600 (1) 5150 (2) 1300 (1.1)    Stool 0 0 0 0    Total Output 1200 2600 5150 1300    Net +988.8 -1403.1 -4290 -828            Urine Unmeasured Occurrence   1 x 1 x    Stool Unmeasured Occurrence 0 x 0 x 1 x 1 x          Diet: Cardiac Diets, Renal Diets, Diabetic Diets; Healthy Heart (2-3 Na+); Consistent Carbohydrate; Low Sodium (2-3g); Texture: Regular Texture (IDDSI 7); Fluid Consistency: Thin (IDDSI 0)  ----------------------------------------------------------------------------------------------------------------------  Physical exam:   Constitutional:  Well-developed and well-nourished. Chronically ill appearing. No acute distress.      HENT:  Head:  Normocephalic and atraumatic.    Cardiovascular:  Normal rate, regular rhythm   Pulmonary/Chest:  No respiratory distress, breath sounds clear to auscultation bilaterally  Musculoskeletal:   "No deformity.     Neurological: Awake, alert, no focal deficit on gross examination. No slurred speech or facial droop. Strength is symmetric 4/5 in upper and lower extremities bilaterally.  Skin:  Skin is warm and dry.   Peripheral vascular:  No cyanosis  Psychiatric: Slightly anxious  Edited by: Yulia Mo DO at 2/3/2024 1823  ----------------------------------------------------------------------------------------------------------------------  Results from last 7 days   Lab Units 02/03/24 0247 02/02/24 0049 01/31/24 2054 01/31/24 1918 01/31/24  1626   CRP mg/dL  --   --   --  0.33  --    LACTATE mmol/L  --   --  1.6 2.9*  --    WBC 10*3/mm3 9.33 11.93*  --   --  13.68*   HEMOGLOBIN g/dL 8.9* 9.5*  --   --  11.7*   HEMATOCRIT % 30.5* 31.7*  --   --  39.1   MCV fL 105.5* 104.6*  --   --  102.6*   MCHC g/dL 29.2* 30.0*  --   --  29.9*   PLATELETS 10*3/mm3 135* 121*  --   --  162   INR   --   --   --   --  1.13*         Results from last 7 days   Lab Units 02/03/24 0247 02/02/24 0049 01/31/24  1626   SODIUM mmol/L 139 136 132*   POTASSIUM mmol/L 3.8 4.3 4.6   MAGNESIUM mg/dL  --   --  2.3   CHLORIDE mmol/L 100 100 93*   CO2 mmol/L 28.6 23.5 25.5   BUN mg/dL 35* 32* 41*   CREATININE mg/dL 1.39* 1.22* 1.27*   CALCIUM mg/dL 8.8 8.9 9.5   GLUCOSE mg/dL 146* 222* 382*   ALBUMIN g/dL  --   --  4.3   BILIRUBIN mg/dL  --   --  3.4*   ALK PHOS U/L  --   --  107   AST (SGOT) U/L  --   --  31   ALT (SGPT) U/L  --   --  40*   Estimated Creatinine Clearance: 54.3 mL/min (A) (by C-G formula based on SCr of 1.39 mg/dL (H)).  No results found for: \"AMMONIA\"  Results from last 7 days   Lab Units 01/31/24 1918 01/31/24  1842 01/31/24  1626   HSTROP T ng/L 101* 96* 112*     Results from last 7 days   Lab Units 01/31/24 1918   PROBNP pg/mL 4,500.0*         Glucose   Date/Time Value Ref Range Status   02/03/2024 1633 260 (H) 70 - 130 mg/dL Final   02/03/2024 1147 270 (H) 70 - 130 mg/dL Final   02/03/2024 0853 338 (H) 70 - " 130 mg/dL Final   02/03/2024 0646 184 (H) 70 - 130 mg/dL Final   02/03/2024 0001 259 (H) 70 - 130 mg/dL Final   02/02/2024 2013 538 (C) 70 - 130 mg/dL Final   02/02/2024 1851 457 (C) 70 - 130 mg/dL Final   02/02/2024 1655 416 (C) 70 - 130 mg/dL Final     Lab Results   Component Value Date    TSH 2.960 01/31/2024    FREET4 1.78 (H) 11/13/2023       Pain Management Panel  More data exists         Latest Ref Rng & Units 11/13/2023 9/12/2020   Pain Management Panel   Creatinine, Urine mg/dL  mg/dL - 46.3  46.3    Amphetamine, Urine Qual Negative Negative  -   Barbiturates Screen, Urine Negative Negative  -   Benzodiazepine Screen, Urine Negative Negative  -   Buprenorphine, Screen, Urine Negative Negative  -   Cocaine Screen, Urine Negative Negative  -   Fentanyl, Urine Negative Negative  -   Methadone Screen , Urine Negative Negative  -   Methamphetamine, Ur Negative Negative  -     Brief Urine Lab Results  (Last result in the past 365 days)        Color   Clarity   Blood   Leuk Est   Nitrite   Protein   CREAT   Urine HCG        01/31/24 2140 Yellow   Clear   Negative   Negative   Negative   Negative                 Blood Culture   Date Value Ref Range Status   01/31/2024 Staphylococcus, coagulase negative (C)  Final   01/31/2024 No growth at 2 days  Preliminary       Results from last 7 days   Lab Units 01/31/24 2054 01/31/24  1918 01/31/24  1626   PROCALCITONIN ng/mL  --   --  0.42*   LACTATE mmol/L 1.6 2.9*  --    CRP mg/dL  --  0.33  --        I have personally looked at the labs and they are summarized above.  ----------------------------------------------------------------------------------------------------------------------  Detailed radiology reports for the last 24 hours:  Imaging Results (Last 24 Hours)       ** No results found for the last 24 hours. **          Assessment & Plan    #recurrent paroxysmal atrial fibrillation with RVR and aberrant conduction  - Required cardioversion in the ED. Remains in  sinus rhythm at this time.   - Cardiology consulted, appreciate assistance.   - Anticoagulated with eliquis as [id number] is at least 3.   - She received IV amiodarone in the ED and this was continued after admission. Amiodarone now discontinued. Antiarrhythmic choices are very limited due to multiple comorbidities (advanced cardiomyopathy, CKD, cirrhosis). Continue metoprolol. Plan for follow up with EP after discharge for possible ablation. Heart rate is WNL at this time.  - Continue telemetry monitoring  - Cardiology has now signed off, appreciate assistance. Plan for close outpatient follow up.      #Prolonged Qtc  - Qtc was >500 on EKG early this a.m.  - Repeat EKG today continues to show QT >500. Avoid qt prolonging agents. I reviewed her medication list again today and do not see any offending agents. Treat electrolyte abnormalities and monitor with repeat labs. (K+ and mag levels have both been appropriate). Monitor on telemetry.     #SIRS criteria suspected due to atrial fibrillation with rvr  - She was on antibiotic for UTI prior to admission but UA this admission was only significant for >1000 glucose.  - Follow up blood cultures     #CKD stage IIIa   - Creatinine at baseline  - avoid nephrotoxins, repeat bmp in a.m.     #Chronic combined systolic and diastolic CHF  - Does not appear grossly fluid overloaded. Continue home bumex. Follow up on recommendations from Cardiology. Continue metoprolol succinate, valsartan, and jardiance. If renal function improves and blood pressure allows she may be started on spironolactone as well per Cardiology, but renal function has not improved enough to tolerate this today.     #Insulin dependent type II DM, uncontrolled  - basal insulin was increased back to home dose of 45 units BID due to persistent hyperglycemia. Titrate up as needed for improved glucose control. Cover with high dose sliding scale insulin and monitor with accuchecks. Hypoglycemia protocol prn.  Glucose is still elevated today in the 200-300 range but much improved from 400-500.     #symmetrical paresthesia of extremities  - No neurologic deficit noted on exam. This occurred after blood glucose decreased significantly so I suspect this may have been the cause. Will monitor closely for worsening symptoms.  Edited by: Yulia Mo DO at 2/3/2024 1814    VTE Prophylaxis:   Mechanical Order History:       None          Pharmalogical Order History:        Ordered     Dose Route Frequency Stop    01/31/24 2030  apixaban (ELIQUIS) tablet 5 mg         5 mg PO Every 12 Hours Scheduled --                    Dispo:  likely home with home health at discharge pending clinical improvement, likely within 24 hours.    Yulia Mo DO  [facility] Hospitalist  02/03/24  18:23 EST     Electronically signed by Yulia Mo DO at 02/03/24 1843          Consult Notes (most recent note)        Ramon Sahu MD at 02/01/24 0910        Consult Orders    1. Inpatient Cardiology Consult [[id number]] ordered by Yulia Mo DO at 01/31/24 1742                     [facility]  CONSULT  NOTE      Patient information:  Date of Admit: 1/31/2024  Date of Consult: 02/01/24  Hospitalist/Referring MD:Tomás An MD  PCP: Masha Davidson APRN  MRN:  [id number]  Visit Number:  [id number]    LOS: 1  CODE STATUS:  Code Status and Medical Interventions:   Ordered at: 01/31/24 2019     Code Status (Patient has no pulse and is not breathing):    CPR (Attempt to Resuscitate)     Medical Interventions (Patient has pulse or is breathing):    Full Support       PROBLEM LIST: Principal Problem:    Atrial fibrillation with RVR        General Cardiology Consulting Physician: Dr. Ramon Sahu MD, [facility]    Assessment      Paroxysmal atrial flutter/atrial fibrillation with rapid ventricle response with aberrant conduction, s/p PVI in October 2022 with recurrence,  electrocardioversion in the ED.  Currently in sinus rhythm.  [id number] score of at least 3 for female gender, heart failure and type 2 diabetes mellitus.  Patient is on Eliquis for stroke prevention.  Acute non-ST elevation myocardial infarction (probably type II due to tachycardia)  Advanced dilated cardiomyopathy (nonischemic) with LV ejection fraction of 30 to 35%, could be tachycardia mediated.  Nonobstructive coronary artery disease on coronary angiography on 11/10/2020 with a 30 to 40% stenosis in the LAD.  Type 2 diabetes mellitus.  Reported cirrhosis of the liver ([place])  Chronic kidney disease (stage IIIa)    Recommendations     For her atrial fibrillation, the choice of antiarrhythmic therapy is limited due to multiple comorbidities including advanced cardiomyopathy, chronic kidney disease and cirrhosis of the liver.  Will treat her with a beta-blocker such as metoprolol succinate that would be helpful for her cardiomyopathy as well.  Continue to optimize GDMT for cardiomyopathy.  Will consider EP referral again for consideration of RF ablation due to recurrent atrial flutter/fibrillation with rapid ventricle response.    Reason for Cardiology consultation: Wide-complex tachycardia and A-fib with aberrancy    Subjective Data   ADMISSION INFORMATION:  Chief Complaint   Patient presents with    Rapid Heart Rate     History of Present Illness    Yumiko Purdy is a 58 y.o. female with a past medical history significant for systolic and diastolic CHF (EF 31-35%, grade III diastolic dysfunction per ECHO 11/2023), chronic afib, cirrhosis, insulin dependent type II DM, stage IIIa CKD (cr 1.2, GFR upper 40s-low 50s), Vtach and anemia.     She reports she had a LifeVest placed about 2 months ago and is scheduled to see a cardiologist next week to discuss AICD placement. She presented with complaints of palpitations that started 2 days ago. She informed ER provider that her LifeVest has been beeping and trying to  shock her for the last 2 days, so she took it off. Upon further questioning, she reported last week her PCP started her on an antibiotic for a sinus infection which helped with her symptoms, but in the interim she developed dysuria and cloudy, foul smelling urine and presented back to her PCP yesterday, at which time he obtained a urine sample for UA and told the patient she had a UTI. Her PCP then changed her antibiotic to nitrofurantoin for the UTI. She thinks she has had 3 doses of this antibiotic thus far.     In the ER, patient's HR was 184 upon arrival. EKG showed wide complex QRS tachycardia, felt to be afib w/aberrancy which was confirmed by cardiology. BP was initially stable. She was started on IV amiodarone; during loading bolus she became hypotensive, requiring synchronized cardioversion at 150 J with subsequent conversion to sinus rhythm. She has maintained sinus rhythm on amiodarone infusion since then and BP has remained stable. Labs showed elevated troponin 112 with fairly flat trend since then overall, with repeat 96 followed by 101, and looking back she had higher troponin levels in Nov 2023. BNP is 4500. K+ and mag are normal. Creatinine is stable at 1.27, glucose 382, ALT 40, total bili 3.4 but direct only 0.6. TSH is normal at 2.960. WBC is 13, procal 0.42, lactate 2.9, and CRP 0.33. UA is pending. CXR showed mild cardiomegaly but no acute findings. Patient was admitted to the PCU for further workup and management.      Cardiology has been consulted for further evaluation and management Wide-complex tachycardia and A-fib with aberrancy      Primary Cardiologist has been KENNY Lawler and she was last seen in the office on 12/7/2023.     She was also seen by [facility] heart failure clinic and was last seen on 1/22/2024.    Patient was in room  when she was seen and examined by Dr. Sahu.  Patient is lying in bed resting quietly.  No acute distress noted at this time.   Patient confirms HPI as mentioned above.  Patient reports she does feel better with her heart rate at this time.  Still feels some shortness of breath but denies chest pain.  Patient also reports posterior lobe headache that she has had for couple days that she feels is related to her sinus infection.  Patient's oxygen saturation is 99% on O2 at 2 L via nasal cannula.  Patient reports that she does have a appointment with the cardiologist in [facility] on 2/8/2024 to be considered for an ICD placement.      Known medications given enroute vis EMS and in the ER:         Cardiac risk factors:diabetes mellitus, hypercholesterolemia, hypertension, and Obesity      Last Echo: Results for orders placed during the hospital encounter of 11/12/23    Adult Transthoracic Echo Complete W/ Cont if Necessary Per Protocol    Interpretation Summary    Left ventricular systolic function is moderately decreased. Left ventricular ejection fraction appears to be 31 - 35%.    Left ventricular wall thickness is consistent with mild concentric hypertrophy.    Left ventricular diastolic function is consistent with (grade III w/high LAP) fixed restrictive pattern.    Mildly reduced right ventricular systolic function noted.    The left atrial cavity is moderately dilated.    The right atrial cavity is mild to moderately  dilated.    There is calcification of the aortic valve mainly affecting the left coronary cusp(s).    Dilated pulmonary artery.         Last Stress: Results for orders placed during the hospital encounter of 10/15/20    Nuclear Medicine Cardiac Blood Pool Muga At Rest    Interpretation Summary  EXAMINATION: NUCLEAR MEDICINE CARDIAC BLOOD POOL MUGA AT REST-    CLINICAL INDICATION: Cardiomyopathy  TECHNIQUE: 21 mCi of Tc-99m autologous RBCs were injected IV after  in-vitro RBC labeling. Gated cardiac imaging was performed in the  anterior and left anterior oblique.  COMPARISON: None  FINDINGS: The left ventricle is  normal in size with normal systolic  function. The calculated left ventricular ejection fraction (LVEF) = 38  %.  The right and left atria, aorta and pulmonary artery are normal. The  right ventricle is normal in size.    Impression  LVEF: 38%, at rest.    This report was finalized on 10/16/2020 11:57 AM by Dr. Marlo Parr MD.        Last Cath: Results for orders placed during the hospital encounter of 11/10/20    Cardiac Catheterization/Vascular Study    Narrative  Procedure type:  Left heart cath, LV gram, bilateral selective cholangiogram    Indication:  Cardiomyopathy    Procedure:  After informed consent the patient was brought into the cardiac aspiration lab she was prepped and draped in the usual sterile manner the right radial area was incised with 2% Xylocaine however several attempts to engage into the right radial artery was not successful so the right radial artery access was abandoned and the right groin area was excised in 2% Xylocaine right femoral artery was accessed using modified standard technique and 5 [place] side-port arterial sheath was placed and secured then over a guidewire a 5 [place] JL 4 catheter was used left main coronary artery with angiographic pressures were taken then the catheter was removed and over a guidewire a 5 [place] JR4 catheter was used and engagement of the right coronary arteries angioplasty was taken then the catheter was removed then over a guidewire 5 [place] pigtail catheter used aortic valve was done hand-injection LV gram was done then pullback was done then the catheter was removed groin sheath was removed and minx closure device applied with good hemostasis the patient was transported back to her room in stable condition.    Results:  The patient LVEDP was 17 mmHg with hand-injection showing preserved left ventricular systolic function wall motion EF 50-55% with no significant aortic gradient on pullback.    Cholangiogram:  This right dominant system.  Left main  cardiac angiographically normal and bifurcates into left and descending and left circumflex arteries.  LAD was normal caliber gives several septal perforators and diagonal branches and wraps around the apex LAD about 30 to 40% mid stenosis.  Left circumflex artery was nondominant for posterior circulation gives rise to several obtuse marginal branches left circumflex arteries branches angiographically normal.  The right coronary artery was a large artery was dominant for posterior circulation giving rise to acute marginal branches PDA and posterolateral branches the right coronary artery and its branches angiographically normal.    Conclusion:  Preserved LV systolic function ejection fraction 50-55% with elevated left ventricular end-diastolic pressure consistent with diastolic dysfunction coronary angiogram showed right dominant system with 30 to 40% mid LAD lesion otherwise coronaries arteries were normal.  Plan of care:  Medical management and secondary preventive measurements of coronary disease good lipid control blood pressure control healthy lifestyle patient is to follow-up with her primary care physician for further management.                            Past Medical History:   Diagnosis Date    Anemia     CHF (congestive heart failure)     Chronic a-fib     stated by patient     Cirrhosis of liver     stated by patient     Diabetes mellitus     Ventricular tachycardia      Past Surgical History:   Procedure Laterality Date    APPENDECTOMY      CARDIAC CATHETERIZATION N/A 11/10/2020    Procedure: Left Heart Cath;  Surgeon: Charlee Ken MD;  Location: [facility] CATH INVASIVE LOCATION;  Service: Cardiovascular;  Laterality: N/A;    CHOLECYSTECTOMY      TOE AMPUTATION Right     stated by patient.      Family History   Problem Relation Age of Onset    Heart attack Father     Heart attack Brother      Social History     Tobacco Use    Smoking status: Never    Smokeless tobacco: Never   Vaping Use    Vaping Use:  Never used   Substance Use Topics    Alcohol use: Never    Drug use: Never       ALLERGIES: Phenergan [promethazine]    Medications listed below are reported home medications pulling from within the system:  Medications Prior to Admission   Medication Sig Dispense Refill Last Dose    apixaban (ELIQUIS) 5 MG tablet tablet Take 1 tablet by mouth Every 12 (Twelve) Hours. Indications: Atrial Fibrillation 60 tablet 3     Aspirin Low Dose 81 MG EC tablet Take 1 tablet by mouth Daily.       bumetanide (BUMEX) 2 MG tablet Take 1 tablet by mouth 2 (Two) Times a Day. ON SUNDAYS, TAKE ONE TABLET ONCE DAILY.       busPIRone (BUSPAR) 10 MG tablet Take 1 tablet by mouth 2 (Two) Times a Day.       cefdinir (OMNICEF) 300 MG capsule Take 1 capsule by mouth 2 (Two) Times a Day.       doxycycline (VIBRAMYICN) 100 MG tablet Take 1 tablet by mouth 2 (Two) Times a Day. 20 tablet 0     empagliflozin (JARDIANCE) 10 MG tablet tablet Take 1 tablet by mouth Daily for 30 days. 30 tablet 6     ferrous sulfate 325 (65 FE) MG tablet Take 1 tablet by mouth Daily.       fluticasone (FLONASE) 50 MCG/ACT nasal spray 2 sprays into the nostril(s) as directed by provider Daily.       guaiFENesin (Mucinex) 600 MG 12 hr tablet Take 2 tablets by mouth 2 (Two) Times a Day for 10 days. 40 tablet 0     HYDROcodone-acetaminophen (NORCO) 7.5-325 MG per tablet Take 1 tablet by mouth 2 (Two) Times a Day.       Insulin Glargine (LANTUS SOLOSTAR) 100 UNIT/ML injection pen Inject 45 Units under the skin into the appropriate area as directed 2 (Two) Times a Day. 30 mL 0     Insulin Lispro, 1 Unit Dial, (HUMALOG) 100 UNIT/ML solution pen-injector Inject 15 Units under the skin into the appropriate area as directed 3 (Three) Times a Day With Meals. 15 mL 1     levothyroxine (SYNTHROID, LEVOTHROID) 50 MCG tablet Take 1 tablet by mouth Daily.       metoprolol succinate XL (TOPROL-XL) 25 MG 24 hr tablet Take 0.5 tablets by mouth Daily.       nitrofurantoin,  macrocrystal-monohydrate, (MACROBID) 100 MG capsule Take 1 capsule by mouth 2 (Two) Times a Day.       nitroglycerin (NITROSTAT) 0.4 MG SL tablet Place 1 tablet under the tongue Every 5 (Five) Minutes As Needed for Chest Pain. Take no more than 3 doses in 15 minutes.       omeprazole (priLOSEC) 20 MG capsule Take 1 capsule by mouth Daily.       [] predniSONE (DELTASONE) 20 MG tablet Take 2 tablets by mouth Daily for 5 days. 10 tablet 0     Vericiguat 10 MG tablet Take 1 tablet by mouth Daily. 30 tablet 2     Vortioxetine HBr (Trintellix) 5 MG tablet tablet Take 1 tablet by mouth Daily With Breakfast.       acetaminophen (TYLENOL) 500 MG tablet Take 1 tablet by mouth 2 (Two) Times a Day As Needed for Mild Pain.          Review of Systems   Constitutional:  Negative for activity change, appetite change and diaphoresis.   HENT:  Positive for sinus pressure. Negative for facial swelling and trouble swallowing.    Eyes:  Negative for visual disturbance.   Respiratory:  Positive for shortness of breath.    Cardiovascular:  Positive for palpitations. Negative for chest pain and leg swelling.   Gastrointestinal:  Negative for blood in stool, nausea and vomiting.   Endocrine: Negative.    Genitourinary:  Positive for dysuria. Negative for hematuria.   Musculoskeletal:  Negative for gait problem.   Skin:  Negative for color change.   Neurological:  Positive for headaches. Negative for dizziness, syncope, speech difficulty, weakness and light-headedness.   Psychiatric/Behavioral:  Negative for agitation and behavioral problems.        Objective Data      Vital Signs  Temp:  [97.9 °F (36.6 °C)-98.4 °F (36.9 °C)] 97.9 °F (36.6 °C)  Heart Rate:  [] 66  Resp:  [16-25] 20  BP: ()/() 111/46  Flow (L/min):  [1] 1  Device (Oxygen Therapy): room air  Vital Signs (last 72 hrs)          0700   0659  0700   0659  0700   0659  0700   0911   Most Recent      Temp (°F)     98.3  -  98.4      97.9     97.9 (36.6) 02/01 0800    Heart Rate     64 -  184      66     66 02/01 0700    Resp     16 -  25      20     20 02/01 0700    BP     93/43 -  138/80      111/46     111/46 02/01 0700    SpO2 (%)     91 -  100    95 -  97     97 02/01 0757    Flow (L/min)       1      1     1 02/01 0700          BMI:  Body mass index is 38.26 kg/m².    WEIGHT:      01/31/24 2009 02/01/24  0400   Weight: 104 kg (229 lb 15 oz) 104 kg (229 lb 15 oz)       DIET:  Diet: Cardiac Diets, Renal Diets; Healthy Heart (2-3 Na+); Low Sodium (2-3g); Texture: Regular Texture (IDDSI 7); Fluid Consistency: Thin (IDDSI 0)    I&O:  Intake & Output (last 3 days)         01/29 0701 01/30 0700 01/30 0701 01/31 0700 01/31 0701 02/01 0700 02/01 0701 02/02 0700    P.O.   600 210    I.V. (mL/kg)   588.8 (5.7) 45.9 (0.4)    IV Piggyback   1000     Total Intake(mL/kg)   2188.8 (21) 255.9 (2.5)    Urine (mL/kg/hr)   1200 450 (2)    Stool   0     Total Output   1200 450    Net   +988.8 -194.1            Stool Unmeasured Occurrence   0 x             Physical Exam  Constitutional:       Appearance: Normal appearance.      Comments: BMI 38.26.   HENT:      Head: Normocephalic and atraumatic.      Nose: Nose normal.      Mouth/Throat:      Mouth: Mucous membranes are moist.      Pharynx: Oropharynx is clear.   Eyes:      Conjunctiva/sclera: Conjunctivae normal.   Cardiovascular:      Rate and Rhythm: Normal rate and regular rhythm.      Pulses: Normal pulses.           Radial pulses are 1+ on the right side and 1+ on the left side.        Dorsalis pedis pulses are 1+ on the right side and 1+ on the left side.        Posterior tibial pulses are 1+ on the right side and 1+ on the left side.      Heart sounds: Normal heart sounds.   Pulmonary:      Effort: Pulmonary effort is normal.      Comments: Few crackles to the right base  Abdominal:      General: Bowel sounds are normal.      Palpations: Abdomen is soft.      Tenderness: There is  abdominal tenderness.      Comments: Mild tenderness noted in right upper quadrant with palpation   Musculoskeletal:         General: Normal range of motion.      Cervical back: Normal range of motion.      Right lower leg: No edema.      Left lower leg: Edema present.   Skin:     General: Skin is warm and dry.   Neurological:      General: No focal deficit present.      Mental Status: She is alert and oriented to person, place, and time.   Psychiatric:         Mood and Affect: Mood normal.         Behavior: Behavior normal.       Results review   Results Review:    I have reviewed the patient's new clinical results.    Results from last 7 days   Lab Units 01/31/24  1918 01/31/24  1842 01/31/24  1626   HSTROP T ng/L 101* 96* 112*     Lab Results   Component Value Date    PROBNP 4,500.0 (H) 01/31/2024    PROBNP 606.7 01/22/2024    PROBNP 928.3 (H) 12/08/2023     Results from last 7 days   Lab Units 01/31/24  1626   WBC 10*3/mm3 13.68*   HEMOGLOBIN g/dL 11.7*   PLATELETS 10*3/mm3 162     Results from last 7 days   Lab Units 01/31/24  1626   SODIUM mmol/L 132*   POTASSIUM mmol/L 4.6   CHLORIDE mmol/L 93*   CO2 mmol/L 25.5   BUN mg/dL 41*   CREATININE mg/dL 1.27*   CALCIUM mg/dL 9.5   GLUCOSE mg/dL 382*   ALT (SGPT) U/L 40*   AST (SGOT) U/L 31     Lab Results   Component Value Date    MG 2.3 01/31/2024    MG 2.4 01/22/2024    MG 2.1 12/08/2023     Lab Results   Component Value Date    CHOL 137 09/11/2020    TRIG 80 09/11/2020    HDL 43 09/11/2020    LDL 78 09/11/2020     Estimated Creatinine Clearance: 58.5 mL/min (A) (by C-G formula based on SCr of 1.27 mg/dL (H)).  Lab Results   Component Value Date    HGBA1C 7.30 (H) 11/13/2023     Lab Results   Component Value Date    INR 1.13 (H) 01/31/2024    INR 0.97 11/15/2023    INR 1.20 (H) 11/12/2023    INR 1.10 12/15/2022    INR 1.25 (H) 09/23/2020    INR 1.09 09/22/2020    INR 1.17 (H) 09/21/2020     Lab Results   Component Value Date    LABHEPA <0.10 (L) 11/15/2023     "LABHEPA 0.21 (L) 11/15/2023    LABHEPA <0.10 (L) 11/15/2023    LABHEPA 0.31 11/14/2023         Lab Results   Component Value Date    TSH 2.960 01/31/2024      No results found for: \"URICACID\"  Pain Management Panel  More data exists         Latest Ref Rng & Units 11/13/2023 9/12/2020   Pain Management Panel   Creatinine, Urine mg/dL  mg/dL - 46.3  46.3    Amphetamine, Urine Qual Negative Negative  -   Barbiturates Screen, Urine Negative Negative  -   Benzodiazepine Screen, Urine Negative Negative  -   Buprenorphine, Screen, Urine Negative Negative  -   Cocaine Screen, Urine Negative Negative  -   Fentanyl, Urine Negative Negative  -   Methadone Screen , Urine Negative Negative  -   Methamphetamine, Ur Negative Negative  -     Microbiology Results (last 10 days)       Procedure Component Value - Date/Time    Respiratory Panel PCR w/COVID-19(SARS-CoV-2) DINA/YANET/CHRISTOPH/PAD/COR/PLACIDO In-House, NP Swab in UTM/VTM, 2 HR TAT - Swab, Nasopharynx [[id number]]  (Normal) Collected: 01/22/24 1538    Lab Status: Final result Specimen: Swab from Nasopharynx Updated: 01/22/24 1634     ADENOVIRUS, PCR Not Detected     Coronavirus 229E Not Detected     Coronavirus HKU1 Not Detected     Coronavirus NL63 Not Detected     Coronavirus OC43 Not Detected     COVID19 Not Detected     Human Metapneumovirus Not Detected     Human Rhinovirus/Enterovirus Not Detected     Influenza A PCR Not Detected     Influenza B PCR Not Detected     Parainfluenza Virus 1 Not Detected     Parainfluenza Virus 2 Not Detected     Parainfluenza Virus 3 Not Detected     Parainfluenza Virus 4 Not Detected     RSV, PCR Not Detected     Bordetella pertussis pcr Not Detected     Bordetella parapertussis PCR Not Detected     Chlamydophila pneumoniae PCR Not Detected     Mycoplasma pneumo by PCR Not Detected    Narrative:      In the setting of a positive respiratory panel with a viral infection PLUS a negative procalcitonin without other underlying concern for bacterial " "infection, consider observing off antibiotics or discontinuation of antibiotics and continue supportive care. If the respiratory panel is positive for atypical bacterial infection (Bordetella pertussis, Chlamydophila pneumoniae, or Mycoplasma pneumoniae), consider antibiotic de-escalation to target atypical bacterial infection.           No results found for: \"BLOODCX\"      EC2024                  ECG/EMG Results (last 24 hours)       Procedure Component Value Units Date/Time    ECG 12 Lead Chest Pain [[id number]] Collected: 24 1619     Updated: 24 1621     QT Interval 272 ms      QTC Interval 480 ms     Narrative:      Test Reason : Chest Pain  Blood Pressure :   */*   mmHG  Vent. Rate : 187 BPM     Atrial Rate : 187 BPM     P-R Int :   * ms          QRS Dur : 132 ms      QT Int : 272 ms       P-R-T Axes :   * -52 148 degrees     QTc Int : 480 ms    ** Critical Test Result: High HR , Arrhythmia  Wide QRS tachycardia  Left axis deviation  Nonspecific intraventricular block  Inferior infarct , age undetermined  Possible Anterolateral infarct , age undetermined  Abnormal ECG  When compared with ECG of 2023 15:29,  Wide QRS tachycardia has replaced Sinus rhythm  Vent. rate has increased  BPM    Referred By: ILIA           Confirmed By:     ECG 12 Lead Rhythm Change [[id number]] Collected: 24 1625     Updated: 24 1715     QT Interval 272 ms      QTC Interval 478 ms     Narrative:      Test Reason : Rhythm Change  Blood Pressure :   */*   mmHG  Vent. Rate : 186 BPM     Atrial Rate : 187 BPM     P-R Int :   * ms          QRS Dur : 132 ms      QT Int : 272 ms       P-R-T Axes :   * -48 156 degrees     QTc Int : 478 ms    ** Critical Test Result: High HR , Arrhythmia  Wide QRS tachycardia  Left axis deviation  Nonspecific intraventricular block  Cannot rule out Anterior infarct , age undetermined  T wave abnormality, consider lateral ischemia  Abnormal " ECG  When compared with ECG of 31-JAN-2024 16:19, (Unconfirmed)  No significant change was found    Referred By: ILIA           Confirmed By:     ECG 12 Lead Drug Monitoring; Amiodarone [[id number]] Collected: 01/31/24 1635     Updated: 01/31/24 2334     QT Interval 398 ms      QTC Interval 486 ms     Narrative:      Test Reason : Drug Monitoring  Blood Pressure :   */*   mmHG  Vent. Rate :  90 BPM     Atrial Rate :  90 BPM     P-R Int : 180 ms          QRS Dur : 142 ms      QT Int : 398 ms       P-R-T Axes :  64 -34 105 degrees     QTc Int : 486 ms    Normal sinus rhythm  Left axis deviation  Nonspecific intraventricular block  Nonspecific T wave abnormality  Abnormal ECG  When compared with ECG of 31-JAN-2024 16:25, (Unconfirmed)  Sinus rhythm has replaced Wide QRS tachycardia  Vent. rate has decreased BY  96 BPM  Confirmed by Ramon Sahu ([id number]) on 1/31/2024 11:31:50 PM    Referred By: ILIA           Confirmed By: Ramon Sahu    ECG 12 Lead Drug Monitoring; Amiodarone [[id number]] Collected: 02/01/24 0609     Updated: 02/01/24 0610     QT Interval 494 ms      QTC Interval 529 ms     Narrative:      Test Reason : Drug Monitoring  Blood Pressure :   */*   mmHG  Vent. Rate :  69 BPM     Atrial Rate :  69 BPM     P-R Int : 200 ms          QRS Dur : 140 ms      QT Int : 494 ms       P-R-T Axes :  53 -43  52 degrees     QTc Int : 529 ms    Normal sinus rhythm  Left axis deviation  Nonspecific intraventricular block  Cannot rule out Anterior infarct , age undetermined  Abnormal ECG  When compared with ECG of 31-JAN-2024 16:35,  T wave amplitude has decreased in Anterior leads  Nonspecific T wave abnormality has replaced inverted T waves in Lateral leads    Referred By: ILIA           Confirmed By:     SCANNED - TELEMETRY   [[id number]] Resulted: 01/31/24     Updated: 02/01/24 0826    SCANNED - TELEMETRY   [[id number]] Resulted: 01/31/24     Updated: 02/01/24 0826    SCANNED - TELEMETRY   [[id number]] Resulted:  01/31/24     Updated: 02/01/24 0859            TELEMETRY:   SR 80s        RADIOLOGY STUDIES:  Imaging Results (Last 72 Hours)       Procedure Component Value Units Date/Time    XR Chest 1 View [[id number]] Collected: 01/31/24 1713     Updated: 01/31/24 1715    Narrative:      EXAM:    XR Chest, 1 View     EXAM DATE:    1/31/2024 4:34 PM     CLINICAL HISTORY:    Chest Pain Protocol     TECHNIQUE:    Frontal view of the chest.     COMPARISON:    11/14/2023     FINDINGS:    Lungs and pleural spaces:  Unremarkable as visualized.  No  consolidation.  No pneumothorax.    Heart:  Cardiomegaly.    Mediastinum:  Unremarkable as visualized.    Bones/joints:  Unremarkable as visualized.    Tubes, lines and devices:  Defibrillator pad left chest.       Impression:      1.  Mild cardiomegaly.  2.  No acute cardiopulmonary findings.        This report was finalized on 1/31/2024 5:13 PM by Dr. Marlo Parr MD.               ALLERGIES: Phenergan [promethazine]    CURRENT MEDICATIONS:  Current list of medications may not reflect those currently placed in orders that are not signed or are being held.     amiodarone, 200 mg, Oral, Once   Followed by  [START ON 2/2/2024] amiodarone, 200 mg, Oral, Q8H   Followed by  [START ON 2/8/2024] amiodarone, 200 mg, Oral, Q12H   Followed by  [START ON 2/22/2024] amiodarone, 200 mg, Oral, Daily  apixaban, 5 mg, Oral, Q12H  fluticasone, 2 spray, Each Nare, Daily  insulin glargine, 30 Units, Subcutaneous, Q12H  insulin lispro, 3-14 Units, Subcutaneous, 4x Daily AC & at Bedtime  senna-docusate sodium, 2 tablet, Oral, BID  sodium chloride, 10 mL, Intravenous, Q12H      amiodarone, 0.5 mg/min, Last Rate: 0.5 mg/min (01/31/24 2309)        acetaminophen    senna-docusate sodium **AND** polyethylene glycol **AND** bisacodyl **AND** bisacodyl    dextrose    dextrose    glucagon (human recombinant)    nitroglycerin    sodium chloride    sodium chloride    sodium chloride    Thank you very much for asking us  to be involved in this patient's care.  We will follow along with you. Please do not hesitate to call for any questions or concerns.     I have discussed the patients findings and recommendations with patient.    Electronically signed by KENNY Finn, 02/01/24, 1:26 PM EST.     Electronically signed by Ramon Sahu MD, 02/01/24, 5:18 PM EST.          Please note that portions of this note were copied and has been reviewed and is accurate as of 2/1/2024 .      Please note that portions of this note were completed with a voice recognition program.     Electronically signed by Ramon Sahu MD at 02/01/24 1719       Nutrition Notes (most recent note)    No notes exist for this encounter.          Physical Therapy Notes (most recent note)        Georgie Perez PT at 02/02/24 5642  Version 1 of 1         Goal Outcome Evaluation:              Outcome Evaluation: Pt seen for evaluation this date, pleasant and cooperative with therapy. Pt may benefit from therapeutic intervention to work on general functional mobility      Anticipated Discharge Disposition (PT): home with supervision, home with assist, home with home health                          Electronically signed by Georgie Perez PT at 02/02/24 5915       Occupational Therapy Notes (most recent note)    No notes exist for this encounter.       Speech Language Pathology Notes (most recent note)    No notes exist for this encounter.       Discharge Summary    No notes of this type exist for this encounter.       Discharge Order (From admission, onward)       Start     Ordered    02/04/24 1142  Discharge patient  Once        Expected Discharge Date: 02/04/24   [place] [facility] [place]   Physician of Record for Attribution - Please select from Treatment Team: ANSON BROWNE [[id number]]   Review needed by CMO to determine Physician of Record: No      Question Answer Comment   Physician of Record for Attribution - Please select from  Treatment Team ANSON BROWNE    Review needed by CMO to determine Physician of Record No        24 1149                  [facility]  [address] [facility] [facility] [address] [facility]  [place] [address]  Phone:  [phone]  Fax:  [phone] Date: 2024      Ambulatory Referral to Home Health     Patient:  Yumiko Purdy MRN:  [id number]   [address]   [place] [address] :  1965  SSN:    Phone: [phone] Sex:  F      INS[employer]ANCE [employer] # SUBSCRIBER ID   Primary:    [facility] [id number]   [id number]      Referring Provider Information:  ANSON BROWNE Phone: [phone] Fax: [phone]       Referral Information:   # Visits:  999 Referral Type: Home Health [42]   Urgency:  Routine Referral Reason: Specialty Services Required   Start Date: 2024 End Date:  To be determined by Insurer   Diagnosis: Atrial fibrillation, unspecified type (I48.91 [ICD-10-CM] 427.31 [ICD-9-CM])  Cardiomyopathy, unspecified type (I42.9 [ICD-10-CM] 425.4 [ICD-9-CM])  Acute on chronic combined systolic and diastolic CHF (congestive heart failure) (I50.43 [ICD-10-CM] 428.43,428.0 [ICD-9-CM])  Cirrhosis of liver without ascites, unspecified hepatic cirrhosis type (K74.60 [ICD-10-CM] 571.5 [ICD-9-CM])      Refer to Dept:   Refer to Provider:   Refer to Provider Phone:   Refer to Facility:       Face to Face Visit Date: 2024  Follow-up provider for Plan of Care? I treated the patient in an acute care facility and will not continue treatment after discharge.  Follow-up provider: LENNY LEACH [[id number]]  Reason/Clinical Findings: heart failure, atrial fibrillation, DM type II with hyperglycemia on long term insulin therapy  Describe mobility limitations that make leaving home difficult: dyspnea on exertion, physical debility  Nursing/Therapeutic Services Requested: Skilled Nursing  Nursing/Therapeutic Services Requested: Physical Therapy  Nursing/Therapeutic Services Requested:  Dietician  Skilled nursing orders: CHF management  Skilled nursing orders: Medication education  Skilled nursing orders: Cardiopulmonary assessments  PT orders: Strengthening  PT orders: Home safety assessment  Dietician orders: Diet modification (diabetic diet)  Frequency: 1 Week 1     This document serves as a request of services and does not constitute Insurance authorization or approval of services.  To determine eligibility, please contact the members Insurance carrier to verify and review coverage.     If you have medical questions regarding this request for services. Please contact [facility] at [phone] during normal business hours.        Authorizing Provider:Yulia Mo DO  Authorizing Provider's NPI: [id number]  Order Entered By: Yulia Mo DO 2/4/2024 11:50 AM     Electronically signed by: Yulia Mo DO 2/4/2024 11:50 AM

## 2024-02-06 ENCOUNTER — READMISSION MANAGEMENT (OUTPATIENT)
Dept: CALL CENTER | Facility: HOSPITAL | Age: 59
End: 2024-02-06
Payer: COMMERCIAL

## 2024-02-06 ENCOUNTER — SPECIALTY PHARMACY (OUTPATIENT)
Dept: PHARMACY | Facility: HOSPITAL | Age: 59
End: 2024-02-06
Payer: COMMERCIAL

## 2024-02-06 NOTE — OUTREACH NOTE
Medical Week 1 Survey      Flowsheet Row Responses   [facility] patient discharged from? [facility]   Does the patient have one of the following disease processes/diagnoses(primary or secondary)? Other   Week 1 attempt successful? Yes   Call start time 1541   Call end time 1547   Discharge diagnosis Paroxysmal atrial flutter/atrial fibrillation with RVR, acute non-STEMI, advanced dialted cardiomyopathy, nonobstructive CAD, type 2DM, CKD, reported cirrhosis of the liver   Person spoke with today (if not patient) and relationship Patient   Meds reviewed with patient/caregiver? Yes   Does the patient have all medications ordered at discharge? Yes   Is the patient taking all medications as directed (includes completed medication regime)? Yes   Does the patient have a primary care provider?  Yes   Does the patient have an appointment with their PCP within 7 days of discharge? Greater than 7 days   Comments regarding PCP Follow up with Masha Davidson NP. Patient reports appt Monday   Nursing Interventions Verified appointment date/time/provider   Has the patient kept scheduled appointments due by today? N/A   Comments [facility] 2/19/24  130pm   What is the Home health agency?  [facility]   Has home health visited the patient within 72 hours of discharge? Yes   Psychosocial issues? No   Comments Patient reports that she can walk a few steps with walker, wheelchair for longer distances.   Did the patient receive a copy of their discharge instructions? Yes   Nursing interventions Reviewed instructions with patient   What is the patient's perception of their health status since discharge? Improving   Is the patient/caregiver able to teach back signs and symptoms related to disease process for when to call PCP? Yes   Is the patient/caregiver able to teach back signs and symptoms related to disease process for when to call 911? Yes   Is the patient/caregiver able to teach back the hierarchy of who to call/visit for  symptoms/problems? PCP, Specialist, Home health nurse, Urgent Care, ED, 911 Yes   If the patient is a current smoker, are they able to teach back resources for cessation? Not a smoker   Week 1 call completed? Yes   Would this patient benefit from a Referral to Amb Social Work? No   Is the patient interested in additional calls from an ambulatory ? No   Call end time [id number]            GINA ALANIZ - Registered Nurse

## 2024-02-13 ENCOUNTER — READMISSION MANAGEMENT (OUTPATIENT)
Dept: CALL CENTER | Facility: HOSPITAL | Age: 59
End: 2024-02-13
Payer: COMMERCIAL

## 2024-02-19 ENCOUNTER — HOSPITAL ENCOUNTER (OUTPATIENT)
Dept: CARDIOLOGY | Facility: HOSPITAL | Age: 59
Discharge: HOME | End: 2024-02-19
Admitting: PHYSICIAN ASSISTANT
Payer: COMMERCIAL

## 2024-02-19 VITALS
WEIGHT: 235 LBS | SYSTOLIC BLOOD PRESSURE: 130 MMHG | BODY MASS INDEX: 39.15 KG/M2 | OXYGEN SATURATION: 99 % | DIASTOLIC BLOOD PRESSURE: 66 MMHG | HEART RATE: 75 BPM | HEIGHT: 65 IN

## 2024-02-19 DIAGNOSIS — R53.1 WEAKNESS: ICD-10-CM

## 2024-02-19 DIAGNOSIS — N18.2 CKD (CHRONIC KIDNEY DISEASE) STAGE 2, GFR 60-89 ML/MIN: ICD-10-CM

## 2024-02-19 DIAGNOSIS — I50.22 CHRONIC HFREF (HEART FAILURE WITH REDUCED EJECTION FRACTION): Primary | ICD-10-CM

## 2024-02-19 LAB
ABSOLUTE LUNG FLUID CONTENT: 44 % (ref 20–35)
ANION GAP SERPL CALCULATED.3IONS-SCNC: 10.9 MMOL/L (ref 5–15)
B PARAPERT DNA SPEC QL NAA+PROBE: NOT DETECTED
B PERT DNA SPEC QL NAA+PROBE: NOT DETECTED
BUN SERPL-MCNC: 29 MG/DL (ref 6–20)
BUN/CREAT SERPL: 27.4 (ref 7–25)
C PNEUM DNA NPH QL NAA+NON-PROBE: NOT DETECTED
CALCIUM SPEC-SCNC: 9.3 MG/DL (ref 8.6–10.5)
CHLORIDE SERPL-SCNC: 97 MMOL/L (ref 98–107)
CO2 SERPL-SCNC: 29.1 MMOL/L (ref 22–29)
CREAT SERPL-MCNC: 1.06 MG/DL (ref 0.57–1)
DEPRECATED RDW RBC AUTO: 76.1 FL (ref 37–54)
EGFRCR SERPLBLD CKD-EPI 2021: 61.4 ML/MIN/1.73
ERYTHROCYTE [DISTWIDTH] IN BLOOD BY AUTOMATED COUNT: 21.3 % (ref 12.3–15.4)
FLUAV SUBTYP SPEC NAA+PROBE: NOT DETECTED
FLUBV RNA ISLT QL NAA+PROBE: NOT DETECTED
GLUCOSE SERPL-MCNC: 325 MG/DL (ref 65–99)
HADV DNA SPEC NAA+PROBE: NOT DETECTED
HCOV 229E RNA SPEC QL NAA+PROBE: NOT DETECTED
HCOV HKU1 RNA SPEC QL NAA+PROBE: NOT DETECTED
HCOV NL63 RNA SPEC QL NAA+PROBE: NOT DETECTED
HCOV OC43 RNA SPEC QL NAA+PROBE: NOT DETECTED
HCT VFR BLD AUTO: 33 % (ref 34–46.6)
HGB BLD-MCNC: 9.7 G/DL (ref 12–15.9)
HMPV RNA NPH QL NAA+NON-PROBE: NOT DETECTED
HPIV1 RNA ISLT QL NAA+PROBE: NOT DETECTED
HPIV2 RNA SPEC QL NAA+PROBE: NOT DETECTED
HPIV3 RNA NPH QL NAA+PROBE: NOT DETECTED
HPIV4 P GENE NPH QL NAA+PROBE: NOT DETECTED
M PNEUMO IGG SER IA-ACNC: NOT DETECTED
MAGNESIUM SERPL-MCNC: 2.2 MG/DL (ref 1.6–2.6)
MCH RBC QN AUTO: 31.2 PG (ref 26.6–33)
MCHC RBC AUTO-ENTMCNC: 29.4 G/DL (ref 31.5–35.7)
MCV RBC AUTO: 106.1 FL (ref 79–97)
NT-PROBNP SERPL-MCNC: 801.5 PG/ML (ref 0–900)
PLATELET # BLD AUTO: 128 10*3/MM3 (ref 140–450)
PMV BLD AUTO: 10.6 FL (ref 6–12)
POTASSIUM SERPL-SCNC: 3.5 MMOL/L (ref 3.5–5.2)
RBC # BLD AUTO: 3.11 10*6/MM3 (ref 3.77–5.28)
RHINOVIRUS RNA SPEC NAA+PROBE: NOT DETECTED
RSV RNA NPH QL NAA+NON-PROBE: NOT DETECTED
SARS-COV-2 RNA NPH QL NAA+NON-PROBE: NOT DETECTED
SODIUM SERPL-SCNC: 137 MMOL/L (ref 136–145)
WBC NRBC COR # BLD AUTO: 6.21 10*3/MM3 (ref 3.4–10.8)

## 2024-02-19 PROCEDURE — 99215 OFFICE O/P EST HI 40 MIN: CPT | Performed by: PHYSICIAN ASSISTANT

## 2024-02-19 PROCEDURE — 80048 BASIC METABOLIC PNL TOTAL CA: CPT | Performed by: PHYSICIAN ASSISTANT

## 2024-02-19 PROCEDURE — 83880 ASSAY OF NATRIURETIC PEPTIDE: CPT | Performed by: PHYSICIAN ASSISTANT

## 2024-02-19 PROCEDURE — 83735 ASSAY OF MAGNESIUM: CPT | Performed by: PHYSICIAN ASSISTANT

## 2024-02-19 PROCEDURE — 93292 WCD DEVICE INTERROGATE: CPT | Performed by: PHYSICIAN ASSISTANT

## 2024-02-19 PROCEDURE — 85027 COMPLETE CBC AUTOMATED: CPT | Performed by: PHYSICIAN ASSISTANT

## 2024-02-19 PROCEDURE — 0202U NFCT DS 22 TRGT SARS-COV-2: CPT | Performed by: PHYSICIAN ASSISTANT

## 2024-02-19 PROCEDURE — 94726 PLETHYSMOGRAPHY LUNG VOLUMES: CPT | Performed by: PHYSICIAN ASSISTANT

## 2024-02-19 RX ORDER — BUMETANIDE 2 MG/1
2 TABLET ORAL DAILY
Qty: 45 TABLET | Refills: 2 | Status: SHIPPED | OUTPATIENT
Start: 2024-02-19 | End: 2024-03-20

## 2024-02-19 NOTE — PROGRESS NOTES
Heart Failure Clinic    Date: 02/19/24     Vitals:    02/19/24 1027   BP: 130/66   Pulse: 75   SpO2: 99%    Weight 235     Method of arrival: Other wheelchair    Weighing self daily: No    Monitoring Heart Failure Zones: Most days    Today's HF Zone: Yellow     Taking medications as prescribed: Yes    Edema Yes    Shortness of Air: Yes    Number of pillows used at night:hospital bed    Educational Materials given:  avs                                                                         ReDS Value: 44  Over 41 Hypervolemic Status      Chris Aviles RN 02/19/24 10:31 EST

## 2024-02-19 NOTE — PROGRESS NOTES
[facility]  ABDIFATAH Galarza Melanie Lind, APRN  [address] [place] [address] DR OTERO [address] [place] [address],  [place] [address]    Thank you for asking me to see Yumiko Purdy for congestive heart failure.    HPI:     This is a 58 y.o. female with known past medical history of:  Chronic systolic & diastolic HF  TTE from 02/08/23 @ [facility] with EF ~50%  TTE 11/13/23 with EF 31-35% and grade III diastolic dysfunction as well as moderately decreased systolic fxn and mildly reduced RVSP.    TTE from November 2022 with visually estimated ejection fraction of 30% ±5% and noted abnormal systolic strain pattern as well as grade 3 diastolic dysfunction as well as abnormal TAPSE with abnormal right ventricular function  KHAI on 09/2020 with EF 21-25% with LV systolic function severely decreased and RV cavity mildly dilated with moderately reduced RV systolic function noted, moderate TVR, and aortic plaquing  Right heart cath on 12/22/2022 with elevated left and right heart pressures with report not available  ASCVD  LHC 11/10/20 with preserved LV systolic, EF 50-55% with elevated LV end diastolic pressure consistent with diastolic dysfunction and right dominant system with 30-40% mid LAD lesion.   LHC attempted on 12/2022 at [facility] with unsuccessful access due to small artery appearing occluded or near occluded radially on right  Peripheral Arterial disease  Atrial Flutter  CKD stage IIIb  Cirrhosis of the liver  Stage III CKD  Chronic hypoxemic respiratory failure  Morbid Obesity  Diffuse purpuric rash with prior w/u negative for vasculitis    Yumiko Purdy presents for today for HF clinic evaluation.  The patient is typically seen by Masha Davidson APRN.  Patient's primary cardiologist is Dr. Jad Meredith.      Last known EF~ 55% by TTE from [facility] in Feb 2024  Last known hospitalization and/or ED visit: Hospitalized in November 2023 with afib with RVR and NSTEMI in addition to  hypotension and thrombocytopenia  Accompanied by: Son      02/19/24 visit data/details regarding:   Dyspnea: Improving, Patient is WC bound and mostly just exerts herself with transfers and such; This remains unchanged on today's visit.   Lower extremity swelling: Bilateral lower extremity swelling improved today  Abdominal swelling: Abdominal swelling is improving.    Home weight: Not currently monitoring due to inability to stand  Home BP: SBP has been in the 100s-110s with less drops at home  Home heart rate: 60s  Daily activities of living: Performing with assistance  HF zone: Yellow  Pillows/lying flat: Sleeps in hospital bed.  Mobility assistance devices:  WC at home, bedside commode.    Mrs. Purdy reports weakness. She reports she feels numb intermittently and has for 5-6 days.. She was evaluated last evening in the ED of [facility].  Bloodwork and CXR were obtained and reviewed in addition to UA.  Work-up was mostly unremarkable.  She reports home glucoses were running 300s-500s but are now down into the 100s.    She reports she feels more weak than usual in her bilateral legs when transferring from WC to bed.    Her son Angel accompanies her.   She reports more fatigue.    She had echocardiogram with [facility] cardiology with EF now >50%. We discuss discontinuing lifevest.        Specialists:   Cardiology: [facility] with EP & General        Review of Systems - Review of Systems   Constitutional: Positive for malaise/fatigue. Negative for chills and decreased appetite.   HENT:  Negative for congestion, ear discharge and ear pain.    Eyes:  Negative for blurred vision, discharge and double vision.   Cardiovascular:  Positive for dyspnea on exertion. Negative for leg swelling.   Respiratory:  Negative for cough, hemoptysis and shortness of breath.    Endocrine: Negative for cold intolerance and heat intolerance.   Hematologic/Lymphatic: Negative for adenopathy and bleeding problem.    Skin:  Negative for color change, dry skin and nail changes.   Musculoskeletal:  Negative for arthritis, muscle weakness and myalgias.   Gastrointestinal:  Negative for bloating and abdominal pain.   Genitourinary:  Negative for bladder incontinence, decreased libido and dysuria.   Neurological:  Negative for brief paralysis, difficulty with concentration and dizziness.   Psychiatric/Behavioral:  Negative for altered mental status, depression and hallucinations.         Very pleasant   Allergic/Immunologic: Negative for environmental allergies and HIV exposure.         All other systems were reviewed and were negative.    Patient Active Problem List   Diagnosis    Dilated cardiomyopathy    CKD (chronic kidney disease) stage 2, GFR 60-89 ml/min    Severe obesity (BMI 35.0-39.9) with comorbidity    Chronic anemia    Elevated bilirubin    Acute on chronic combined systolic and diastolic CHF (congestive heart failure)    Hyponatremia    Paroxysmal atrial fibrillation    Cirrhosis    Coronary artery disease involving native coronary artery of native heart without angina pectoris    Atrial fibrillation with RVR       family history includes Heart attack in her brother and father.     reports that she has never smoked. She has never used smokeless tobacco. She reports that she does not drink alcohol and does not use drugs.    Allergies   Allergen Reactions    Phenergan [Promethazine]          Current Outpatient Medications:     acetaminophen (TYLENOL) 500 MG tablet, Take 1 tablet by mouth 2 (Two) Times a Day As Needed for Mild Pain., Disp: , Rfl:     apixaban (ELIQUIS) 5 MG tablet tablet, Take 1 tablet by mouth Every 12 (Twelve) Hours. Indications: Atrial Fibrillation, Disp: 60 tablet, Rfl: 3    Aspirin Low Dose 81 MG EC tablet, Take 1 tablet by mouth Daily., Disp: , Rfl:     bumetanide (BUMEX) 2 MG tablet, Take 1 tablet by mouth Daily. ON SUNDAYS, TAKE ONE TABLET ONCE DAILY., Disp: , Rfl:     busPIRone (BUSPAR) 10 MG  "tablet, Take 1 tablet by mouth 2 (Two) Times a Day., Disp: , Rfl:     empagliflozin (JARDIANCE) 10 MG tablet tablet, Take 1 tablet by mouth Daily for 30 days., Disp: 30 tablet, Rfl: 6    ferrous sulfate 325 (65 FE) MG tablet, Take 1 tablet by mouth Daily., Disp: , Rfl:     fluticasone (FLONASE) 50 MCG/ACT nasal spray, 2 sprays into the nostril(s) as directed by provider Daily As Needed for Rhinitis or Allergies., Disp: , Rfl:     HYDROcodone-acetaminophen (NORCO) 7.5-325 MG per tablet, Take 1 tablet by mouth 2 (Two) Times a Day., Disp: , Rfl:     Insulin Glargine (LANTUS SOLOSTAR) 100 UNIT/ML injection pen, Inject 45 Units under the skin into the appropriate area as directed 2 (Two) Times a Day., Disp: 30 mL, Rfl: 0    Insulin Lispro, 1 Unit Dial, (HUMALOG) 100 UNIT/ML solution pen-injector, Inject 15 Units under the skin into the appropriate area as directed 3 (Three) Times a Day With Meals., Disp: 15 mL, Rfl: 1    levothyroxine (SYNTHROID, LEVOTHROID) 50 MCG tablet, Take 1 tablet by mouth Daily., Disp: , Rfl:     metoprolol succinate XL (TOPROL-XL) 25 MG 24 hr tablet, Take 1 tablet by mouth Daily for 30 days., Disp: 30 tablet, Rfl: 0    omeprazole (priLOSEC) 20 MG capsule, Take 1 capsule by mouth Daily., Disp: , Rfl:     valsartan (DIOVAN) 40 MG tablet, Take 1 tablet by mouth Daily for 30 days., Disp: 30 tablet, Rfl: 0    Vericiguat 10 MG tablet, Take 1 tablet by mouth Daily., Disp: 30 tablet, Rfl: 2    Vortioxetine HBr (Trintellix) 5 MG tablet tablet, Take 1 tablet by mouth Daily With Breakfast., Disp: , Rfl:       Physical Exam:  I have reviewed the patient's current vital signs as documented in the patient's EMR.     Vitals:    02/19/24 1027   BP: 130/66   BP Location: Left arm   Patient Position: Sitting   Cuff Size: Adult   Pulse: 75   SpO2: 99%   Weight: 107 kg (235 lb)   Height: 165.1 cm (65\")         Physical Exam  Vitals and nursing note reviewed.   Constitutional:       Appearance: Normal appearance. "   HENT:      Head: Normocephalic and atraumatic.   Eyes:      General: Lids are normal.   Cardiovascular:      Rate and Rhythm: Normal rate and regular rhythm.      Heart sounds: S1 normal and S2 normal.   Pulmonary:      Effort: No tachypnea or bradypnea.      Breath sounds: No decreased breath sounds, wheezing, rhonchi or rales.   Chest:      Chest wall: No mass or lacerations.   Abdominal:      General: Abdomen is protuberant. Bowel sounds are normal.      Palpations: Abdomen is soft.      Tenderness: There is no abdominal tenderness.      Comments: Less distended than on prior visits.     Musculoskeletal:      Right lower le+ Edema present.      Left lower le+ Edema present.      Right foot: No swelling.      Left foot: No swelling.   Skin:     General: Skin is warm and moist.   Neurological:      Mental Status: She is alert and oriented to person, place, and time.      Comments: Equal bilateral  strength.     Psychiatric:         Attention and Perception: Attention normal.         Mood and Affect: Mood normal.          JVP: Volume/Pulsation: Normal.        DATA REVIEWED:     EKG. I personally reviewed and interpreted the EKG.    Last EKG at [facility] not available for viewing.      ---------------------------------------------------  TTE/KHAI:    TRANSTHORACIC ECHOCARDIOGRAPHY REPORT    Demographics    Patient Name:          JAMAL WHARTON      :            1965    Medical Record Number: [id number]          Age:            56 year(s)    Corporate ID Number:   [id number]          Gender          Female    Account Number:        [id number]    Sonographer:           Hayden Chaudhry,     Height:         65 inches                           [facility]    Referring Physician:   ANDREAS HERNANDEZ     Weight:         240 pounds    Interpreting           MATHEUS IRELAND   BMI:            39.94 kg/m^2    Physician:             MD    Date of Service:       2022          Blood Pressure: 113/40 mmHg    Room  Number:           320   Type of Study:    TTE procedure: EC Echo Complete, ECHO COMPLETE (DOPPLER / COLOR) W OR WO    CONTRAST.    Patient Status: Routine IP    [facility]    Contrast Medium: Optison. Amount - 3 ml    Impression:    Indication:Hypertensive heart disease with heart failure I11.0    Mild left ventricular hypertrophy. Visually estimated ejection fraction    30% +/- 5%. Abnormal left ventricular systolic function; abnormal systolic    strain pattern. Restrictive filling pattern consistent with severely    increased LV filling pressure (Grade III Diastolic dysfunction).    Dilated right ventricle. Abnormal TAPSE; abnormal right ventricular    function. Abnormal right atrial size.    Mild mitral stenosis. Peak/Mean 6/2mmHg    Moderate tricuspid (2+) regurgitation.    No masses or thrombi are seen.   Measurements Summary:    LVEDd: 4.99 cm         LVESd: 4.08 cm          IVSEd: 0.79 cm    AO Root:2.97 cm        LVPWd: 1.04 cm    Contractility Score    Global Left Ventricular Hypokinesis was noted.    LV regional wall motion: (0-Not visualized 1-Normal 2-Hypokinesis    3-Akinesis 4-Dyskinesis 5-Aneurysm)    Left Ventricle    Peak E-wave: 1.22   Peak A-wave: 0.2 m/s    E/A ratio: 5.99    m/s                 Volume [id number]  LV length: 8.47 cm    ml    Volume pmwzymfu01.87 ml    LVOT diameter: 2.05 cm    Normal sized left ventricle.    Mild left ventricular hypertrophy.    Visually estimated ejection fraction 30% +/- 5%.    Abnormal left ventricular systolic function; abnormal systolic strain    pattern.    Restrictive filling pattern consistent with severely increased LV filling    pressure (Grade III Diastolic dysfunction).    No left ventricular masses or thrombi.    Right Ventricle    Diastolic dimension: 4.72     RV systolic pressure: 22.15 mmHg    cm    Dilated right ventricle.    Abnormal TAPSE; abnormal right ventricular function.    Left  Atrium    LA dimension: 4.64 cm               LA volume:95.38 ml    LA/Aorta: 1.56    Abnormal left atrial volume index 45ml/m2.    Intact atrial septum.    No atrial mass or thrombus.    Right Atrium    Abnormal right atrial size.    Intact atrial septum.    No atrial mass or thrombus.    Mitral Valve    Deceleration time:     Area PHT: 2.04 cm^2  Mean velocity:    248.96 msec            P1/2t: 107.68 msec   0.57 m/s    Area (continuity):   Mean gradient:    1.73 cm^2            1.89 mmHg    Peak gradient:    5.97 mmHg    Thickened mitral valve leaflets.    Mild mitral regurgitation.    Mild mitral stenosis. Peak/Mean 6/2mmHg    Echogenic density noted on mitral valve chordae. Seen on prior exam    10/16/2022    Aortic Valve    LVOT VTI: 18.22 cm    Mildly thickend free edges of the aortic valve leaflets.    No aortic regurgitation.    No aortic stenosis.    No masses or vegetations seen.    Tricuspid Valve    TR velocity: 2.19 m/s     TR gradient: 19.48751 mmHg    Estimated RAP: 3 mmHg     RVSP: 22.17 mmHg    Structurally normal tricuspid valve.    Moderate tricuspid (2+) regurgitation.    RVSP likely underestimated due to RV systolic function.    No tricuspid stenosis.    No masses or vegetations seen.    Pulmonic Valve    Acceleration time: 119.95 msec          PASP: 22.15 mmHg    Structurally normal pulmonic valve.    Mild pulmonic regurgitation (1+).    No pulmonic stenosis.    No masses or vegetations seen.   Great Vessels   Aorta    Aortic Root: 2.97 cm    Ascending Aorta: 2.76 cm    LVOT Diameter: 2.05 cm   Visualized aorta is normal.   Normal aortic root.   No evidence of dissection.   IVC is not well visualized.   Unable to obtain adequate subcostal view.    Pericardium / Pleura   No pericardial effusion.    Other    No masses or thrombi.    No intracardiac shunt.    ----------------------------------------------------------------    Electronically signed by MATHEUS IRELAND MD(Interpreting    Physician)  on 11/17/2022 17:38       Results for orders placed during the hospital encounter of 11/12/23    Adult Transthoracic Echo Complete W/ Cont if Necessary Per Protocol    Interpretation Summary    Left ventricular systolic function is moderately decreased. Left ventricular ejection fraction appears to be 31 - 35%.    Left ventricular wall thickness is consistent with mild concentric hypertrophy.    Left ventricular diastolic function is consistent with (grade III w/high LAP) fixed restrictive pattern.    Mildly reduced right ventricular systolic function noted.    The left atrial cavity is moderately dilated.    The right atrial cavity is mild to moderately  dilated.    There is calcification of the aortic valve mainly affecting the left coronary cusp(s).    Dilated pulmonary artery.    RHC report:      CARDIAC CATH - RIGHT HEART CATH    Anatomical Region Laterality Modality   Heart -- Other     Narrative    Cardiac Diagnostic Report    Demographics    Patient Name       JAMAL WHARTON      Date of Birth          1965    Patient #          [id number]          Accession #            [id number]    Visit #            [id number]          Medical Record #    Physician          MATHEUS IRELAND   Date of Study          12/22/2022                       MD    Referring                              Interventional    Physician                              physician    Procedure   Procedure Type    Diagnostic procedure: RHC with Cardiomems, RIGHT HEART CATH   Indications: Angina.    Conclusions   Procedure Description   Using ultrasound and micropuncture, access to right brachial vein obtained   and 7F sheath inserted. 7F PA catheter used for RHC.   I attempted right radial access for LHC but unsuccessful due to very small   artery which appears occluded or near-occluded.   Procedure Summary   Elevated left and right heart filling pressures.   Elevated pulmonary pressures.   Borderline-low Ramos CO/CI.    Procedure Data    Procedure Date   Date: 12/22/2022Start: 15:32End: 16:09   Entry Locations     - Retrograde Percutaneous access was performed through the Right Axiliary.       A 7 Fr sheath was inserted. Hemostasis was successfully obtained using       Manual Compression.       Entry Comments: brachial vein.   Procedure Medications     - Fentanyl I.V. 25 mcg.     - Versed Sheath 1 mg.     - Oxygen NC 6 l/min.   Diagnostic Catheters     - A7 FR MON [place]-Misael [id number] used for:Arch.   Contrast Material     - None0 ml   Fluoroscopy Time: Total: 2:48 minutes.   Fluoroscopy Dose: Total: 155 mGy.    Medical History    Risk Factors    The patient risk factors include:obesity, hypercholesterolemia, treated    and uncontrolled hypertension, diabetes mellitus, last creatinine: 2    mg/dl, creatinine clearance: 69.72 ml/min and dyslipidemia.    Admission Data    Hemodynamics    Condition: Baseline    O2 Consumption: Estimated: 297.50Heart Rate: 41 bpm   Oxygen Saturation   +--------+-----+----+----------------------+---+---------------------------+   !Location!pCO2 !pO2 !% Saturation          !Hgb!O2 Content                 !   +--------+-----+----+----------------------+---+---------------------------+   !PA      !     !    !51                    !   !                           !   +--------+-----+----+----------------------+---+---------------------------+   !RA      !     !    !58                    !   !                           !   +--------+-----+----+----------------------+---+---------------------------+   !AO      !     !    !94                    !   !                           !   +--------+-----+----+----------------------+---+---------------------------+   Pressures (mmHg)   +-----+--------------------------------------------------------------------+   !Site !Pressure                                                            !   +-----+--------------------------------------------------------------------+   !RA   !28/28 (25)                                                           !   +-----+--------------------------------------------------------------------+   !RV   !81/10 ,27                                                           !   +-----+--------------------------------------------------------------------+   !PA   !73/32 (43)                                                          !   +-----+--------------------------------------------------------------------+   !PCW  !68/66 (45)                                                          !   +-----+--------------------------------------------------------------------+   !PCW  !78/59 (45)                                                          !   +-----+--------------------------------------------------------------------+   !PCW  !23/36 (27)                                                          !   +-----+--------------------------------------------------------------------+   Cardiac Output   +------+-------------------------+----------------------------+------------+   !Method!CO (l/min)               !CI (l/min/m2)               !SV (ml)     !   +------+-------------------------+----------------------------+------------+   !Ramos  !5.35                     !2.2                         !131.67      !   +------+-------------------------+----------------------------+------------+   Shunts   Oxygen Values    O2 Capacity 129.2 O2 Consumption 297.5    Flows (l/min)    Qs 6.4 Qe/Qp 1.2    Qp 5.35 Qp/Qs 0.84    Qe 6.4   Vascular Resistance (dynes x sec x cm-5)   +-------------------------------------+----+---+----+----+---------+-------+   !CO method                            !TSVR!SVR!TPVR!PVR !TPVR/TSVR!PVR/SVR!   +-------------------------------------+----+---+----+----+---------+-------+   !Ramos                                 !    !   !8.03!2.94!         !       !   +-------------------------------------+----+---+----+----+---------+-------+   !Qp or Qs                              !    !   !8.03!2.94!         !       !   +-------------------------------------+----+---+----+----+---------+-------+    Signatures    ----------------------------------------------------------------    Electronically signed by MATHEUS IRELAND MD(Physician) on    12/22/2022 16:19    ----------------------------------------------------------------        -----------------------------------------------------  CXR/Imaging:   Imaging Results (Most Recent)       None            I personally reviewed and interpreted the CXR.      -----------------------------------------------------  CT:   XR Chest 1 View    Result Date: 1/31/2024  1.  Mild cardiomegaly. 2.  No acute cardiopulmonary findings.   This report was finalized on 1/31/2024 5:13 PM by Dr. Marlo Parr MD.     I personally reviewed the images of the CT scan.  My personal interpretation is below.      ----------------------------------------------------    PFTs:    No PFTS available.      --------------------------------------------------------------------------------------------------    Laboratory evaluations:    Lab Results   Component Value Date    GLUCOSE 124 (H) 02/04/2024    BUN 32 (H) 02/04/2024    CREATININE 1.25 (H) 02/04/2024    EGFRIFNONA 49 (L) 11/10/2020    BCR 25.6 (H) 02/04/2024    K 3.9 02/04/2024    CO2 27.7 02/04/2024    CALCIUM 8.8 02/04/2024    ALBUMIN 4.3 01/31/2024    LABIL2 1.3 (L) 06/09/2016    AST 31 01/31/2024    ALT 40 (H) 01/31/2024     Lab Results   Component Value Date    WBC 8.97 02/04/2024    HGB 9.0 (L) 02/04/2024    HCT 31.9 (L) 02/04/2024    .7 (H) 02/04/2024     02/04/2024     Lab Results   Component Value Date    CHOL 137 09/11/2020    CHLPL 103 04/21/2016    TRIG 80 09/11/2020    HDL 43 09/11/2020    LDL 78 09/11/2020     Lab Results   Component Value Date    TSH 2.960 01/31/2024     Lab Results   Component Value Date    HGBA1C 7.30 (H) 11/13/2023     Lab Results   Component Value Date    ALT 40 (H)  01/31/2024     Lab Results   Component Value Date    HGBA1C 7.30 (H) 11/13/2023    HGBA1C 8.40 (H) 09/10/2020    HGBA1C 5.4 04/21/2016     Lab Results   Component Value Date    MICROALBUR 3.2 09/12/2020    CREATININE 1.25 (H) 02/04/2024     Lab Results   Component Value Date    IRON 73 11/17/2023    TIBC 222 (L) 11/17/2023    FERRITIN 2,723.00 (H) 11/17/2023     Lab Results   Component Value Date    INR 1.13 (H) 01/31/2024    INR 0.97 11/15/2023    INR 1.20 (H) 11/12/2023    PROTIME 15.1 (H) 01/31/2024    PROTIME 13.4 11/15/2023    PROTIME 15.8 (H) 11/12/2023        Lab Results   Component Value Date    ABSOLUTELUNG 37 (A) 01/22/2024    ABSOLUTELUNG 37 (A) 12/08/2023    ABSOLUTELUNG 36 (A) 11/20/2023       PAH RISK ASSESSMENT:      1. Chronic HFrEF (heart failure with reduced ejection fraction)    2. Weakness          ORDERS PLACED TODAY:  Orders Placed This Encounter   Procedures    ReDs Vest    Basic Metabolic Panel    proBNP    Magnesium    Basic Metabolic Panel    proBNP    Magnesium    CBC (No Diff)    CBC (No Diff)        Diagnoses and all orders for this visit:    1. Chronic HFrEF (heart failure with reduced ejection fraction) (Primary)  -     Basic Metabolic Panel; Future  -     proBNP; Future  -     Magnesium; Future  -     ReDs Vest  -     Basic Metabolic Panel; Standing  -     Basic Metabolic Panel  -     proBNP; Standing  -     proBNP  -     Magnesium; Standing  -     Magnesium    2. Weakness  -     CBC (No Diff); Future  -     CBC (No Diff); Standing  -     CBC (No Diff)             MEDS ORDERED TODAY:    No orders of the defined types were placed in this encounter.       ---------------------------------------------------------------------------------------------------------------------------          ASSESSMENT/PLAN:      Diagnosis Plan   1. Chronic HFrEF (heart failure with reduced ejection fraction)  Basic Metabolic Panel    proBNP    Magnesium    ReDs Vest    Basic Metabolic Panel    Basic  Metabolic Panel    proBNP    proBNP    Magnesium    Magnesium      2. Weakness  CBC (No Diff)    CBC (No Diff)    CBC (No Diff)          not acutely decompensated chronic moderate systolic and diastolic heart failure. CHF. Etiology: Unknown/Idiopathic. LVEF ~50%.     NYHA stage Stage D: Presence of advanced heart disease with continued heart failure symptoms requiring aggressive medical therapyFC-Class IV: Unable to carry on any physical activity without discomfort. Symptoms of heart failureat rest. If any physical activity is undertaken, discomfort increases.     Today, Patient is approaching euvolemia and with  Moderate perfusion. The patient's hemodynamics are currently acceptable. HR is: normal and is at goal. BP/MAP was reviewed and there is notroom for medication up-titration.  Clinical trajectory was assessed and hasimproved.     CHF GOAL DIRECTED MEDICAL THERAPY FOR PATIENT ADDRESSED/ADJUSTED:     GDMT    Drug Class   Drug   Dose Last Dose Adjustment Additional Titration   Notes   ACEi/ARB/ARNI ACEI previously stopped due to renal fxn       Beta Blocker  Metoprolol Succinate   12.5 mg qhs Reduced by PCP     MRA Spirono previously stopped due to renal fxn       SGLT2i Farxiga 10mg qd Transitioned from Farxiga to Jardiance in Hospitalization in 11/2023 N/A    Secondaries Verquevo 10mg qd Restart       Secondary management:     -CHF Specific BB:   Toprol XL 12.5mg. She reports she does have difficulty breaking in half at times.    Continue monitoring as she is also on amiodarone for her Afib.   Hold parameters reviewed.    Counseled on lifevest compliance.      -ACE/ARB/ARNi:   ACEi recently held during [facility] hospitalization.     -MRA:   The patient is FC-NYHA Class IV and MRA is indicated.   Spironolactone was previously stopped on regimen due to renal function.        -SGLT2 inhibitor therapy:    Farxiga transitioned to Jardiance during hospitalization in 2023.  Tolerating jardiance well, will continue at  present.    SGLT2i Restarted by  Nephro in September 2023; will continue to monitor for  symptoms.        Secondary pharmacological therapy in HFreF:   EF is less than 45% @ 30-35%.   Maximized on GDMT as tolerated thus far (see chart above)  Recent hospitalization or IV diuresis includes hospitalizations within the last year at [facility], [facility], and [facility] with acute heart failure in addition to afib with RVR.    Given HFrEF with optimally treated with primary therapies for HFrEF and vasodilator therapy, will continue Verquevo 10mg      -Diuretic regimen:   ReDS Vest reading for 02/19/24 is 44; continue monitoring  4mg of Bumex x 3 days then back to 2mg daily.    BMP, Mag, & ProBNP reviewed with patient with toleration of current medication regimen.      -Fluid restriction/Sodium restriction:   Requested 2000 ml restriction  Patient has been asked to weigh daily and was provided with a printed diuretic strategy.  1,500 mg Na restriction was discussed.    -Acute vs. Chronic underlying conditions other than HF addressed during visit:     Weakness:   CT head ordered.   UA & labs without acute change.   Patient reports recent Flu A exposure.  Swab performed in office.       Paroxysmal Atrial fibrillation/Flutter, currently SR:   Amiodarone stopped during her recent hosptialization.  She has EP upcoming in February 2024.    Continue Eliquis 5mg qd with [id number] at least 4.    Continue Toprol dosing at present.      CKD IIIb:   Monitor BMP. Reviewed today with creatinine appearing 1.23 with baseline previously 1.9-2.2 per review of OSH labs.        Debility:   Continue home health with PT/OT.   Multiple types of DME at home.     Iron/Anemia in CHF:  Prior Iron labs from [facility] with Iron 31, ferritin 1219.30, and TIBC 234 with iron saturation 13%.    Continue BID ferrous sulfate.          Identifiable barriers to Heart Failure Self-care:   Medical Barriers:  Debility  Social Barriers: Transport  limitations      --------------------------------------------------------------------------------          BMI cannot be calculated due to outdated height or weight values with patient unable to stand. She has self reported weight of 240.               >45 minutes out of 60 minutes face to face spent counseling patient extensively on dietary Na+ intake, importance of activity, weight monitoring, compliance with medications in addition to importance of titration with goal directed medical therapy and follow up appointments.  10 of 60 minutes were spent reviewing lifevest report and discussing with patient.              This document has been electronically signed by Sonja Romo PA-C  February 19, 2024 10:49 EST      Dictated Utilizing Dragon Dictation: Part of this note may be an electronic transcription/translation of spoken language to printed text using the Dragon Dictation System.    Follow-up appointment and medication changes provided in hand delivered After Visit Summary as well as reviewed in the room.

## 2024-02-19 NOTE — PROGRESS NOTES
Heart Failure Clinic  Pharmacist Note     Yumiko Purdy is a 58 y.o. female seen in the Heart Failure Clinic for HFrEF. The patient was recently hospitalized from 1/31-2/4/24 for Afib with RVR, prolonged Qtc and CHF. She was discharged home with new medications Valsartan and Toprol. Patient reports at appointment this morning, that she went to the [facility] last night with some numbness all over and just feeling off. She reports that her blood sugar was 500 last night at the hospital. She also reports feeling that way this morning and that she almost called an ambulance this morning.     Yumiko Purdy reports a poor understanding of medications. She reports adherence to her medications, except for the Toprol. In the past she had been on a 1/2 tablet and could not split the tablets and therefore could not take it. She now reports that her BP cuff has not been working and therefore she does not take the Toprol daily. She reports that she does take the Valsartan daily though since discharge. She reports some SOB and swelling in her stomach and legs. She reports that she has been taking Bumex 2mg daily and sometimes will take an additional tablet later in the day as well. She reports doing that maybe three times a week. She is concerned about the numbness in her body that she has been experiencing for the past few days. She reports that her sugar was 400 at bedtime last night, but was in the 160's this morning.     She denies any bleeding issues with Eliquis and ASA.       Medication Use:   Hx of med intolerances: Farxiga (UTI) -but Nephro started back in September; cannot split Toprol tablets to make 12.5mg dose- may restart at 25mg when possible?  Retail Rx Management: Uses [facility]/ Uses our pharmacy for verquvo, toprol and jardiance     Past Medical History:   Diagnosis Date    Anemia     CHF (congestive heart failure)     Chronic a-fib     stated by patient     Cirrhosis of liver     stated by patient      Diabetes mellitus     Ventricular tachycardia      ALLERGIES: Phenergan [promethazine]  Current Outpatient Medications   Medication Sig Dispense Refill    acetaminophen (TYLENOL) 500 MG tablet Take 1 tablet by mouth 2 (Two) Times a Day As Needed for Mild Pain.      apixaban (ELIQUIS) 5 MG tablet tablet Take 1 tablet by mouth Every 12 (Twelve) Hours. Indications: Atrial Fibrillation 60 tablet 3    Aspirin Low Dose 81 MG EC tablet Take 1 tablet by mouth Daily.      bumetanide (BUMEX) 2 MG tablet Take 1 tablet by mouth Daily for 30 days. Two tablets daily until 02/22/24 then down to 1 tablet daily with extra as needed. 45 tablet 2    busPIRone (BUSPAR) 10 MG tablet Take 1 tablet by mouth 2 (Two) Times a Day.      empagliflozin (JARDIANCE) 10 MG tablet tablet Take 1 tablet by mouth Daily for 30 days. 30 tablet 6    ferrous sulfate 325 (65 FE) MG tablet Take 1 tablet by mouth Daily.      HYDROcodone-acetaminophen (NORCO) 7.5-325 MG per tablet Take 1 tablet by mouth 2 (Two) Times a Day.      Insulin Glargine (LANTUS SOLOSTAR) 100 UNIT/ML injection pen Inject 45 Units under the skin into the appropriate area as directed 2 (Two) Times a Day. 30 mL 0    Insulin Lispro, 1 Unit Dial, (HUMALOG) 100 UNIT/ML solution pen-injector Inject 15 Units under the skin into the appropriate area as directed 3 (Three) Times a Day With Meals. 15 mL 1    levothyroxine (SYNTHROID, LEVOTHROID) 50 MCG tablet Take 1 tablet by mouth Daily.      metoprolol succinate XL (TOPROL-XL) 25 MG 24 hr tablet Take 1 tablet by mouth Daily for 30 days. 30 tablet 0    omeprazole (priLOSEC) 20 MG capsule Take 1 capsule by mouth Daily.      valsartan (DIOVAN) 40 MG tablet Take 1 tablet by mouth Daily for 30 days. 30 tablet 0    Vericiguat 10 MG tablet Take 1 tablet by mouth Daily. 30 tablet 2    Vortioxetine HBr (Trintellix) 5 MG tablet tablet Take 1 tablet by mouth Daily With Breakfast.      fluticasone (FLONASE) 50 MCG/ACT nasal spray 2 sprays into the  "nostril(s) as directed by provider Daily As Needed for Rhinitis or Allergies. (Patient not taking: Reported on 2/19/2024)       No current facility-administered medications for this encounter.       Vaccination History:   Pneumonia: Needs, declines  Annual Influenza: Needs, declines      Objective  Vitals:    02/19/24 1027   BP: 130/66   BP Location: Left arm   Patient Position: Sitting   Cuff Size: Adult   Pulse: 75   SpO2: 99%   Weight: 107 kg (235 lb)   Height: 165.1 cm (65\")           Wt Readings from Last 3 Encounters:   02/19/24 107 kg (235 lb)   02/04/24 107 kg (235 lb 14.3 oz)   01/22/24 108 kg (239 lb)     Lab Results   Component Value Date    GLUCOSE 325 (H) 02/19/2024    BUN 29 (H) 02/19/2024    CREATININE 1.06 (H) 02/19/2024    EGFRIFNONA 49 (L) 11/10/2020    BCR 27.4 (H) 02/19/2024    K 3.5 02/19/2024    CO2 29.1 (H) 02/19/2024    CALCIUM 9.3 02/19/2024    ALBUMIN 4.3 01/31/2024    LABIL2 1.3 (L) 06/09/2016    AST 31 01/31/2024    ALT 40 (H) 01/31/2024     Lab Results   Component Value Date    WBC 6.21 02/19/2024    HGB 9.7 (L) 02/19/2024    HCT 33.0 (L) 02/19/2024    .1 (H) 02/19/2024     (L) 02/19/2024     Lab Results   Component Value Date    CKTOTAL 41 11/12/2023    TROPONINT 101 (C) 01/31/2024     Lab Results   Component Value Date    PROBNP 801.5 02/19/2024     Results for orders placed during the hospital encounter of 11/12/23    Adult Transthoracic Echo Complete W/ Cont if Necessary Per Protocol    Interpretation Summary    Left ventricular systolic function is moderately decreased. Left ventricular ejection fraction appears to be 31 - 35%.    Left ventricular wall thickness is consistent with mild concentric hypertrophy.    Left ventricular diastolic function is consistent with (grade III w/high LAP) fixed restrictive pattern.    Mildly reduced right ventricular systolic function noted.    The left atrial cavity is moderately dilated.    The right atrial cavity is mild to " moderately  dilated.    There is calcification of the aortic valve mainly affecting the left coronary cusp(s).    Dilated pulmonary artery.         GDMT    Drug Class   Drug   Dose Last Dose Adjustment Additional Titration   Notes   ACEi/ARB/ARNI Valsartan 40mg 2/4/24  Was previously on lisinopril, stopped d/t renal function    Beta Blocker Toprol 25mg 2/4/24??-not been adherent     MRA     Was previously on spironolactone 50mg BID Nov-Dec 22, stopped d/t renal function   SGLT2i Jardiance 10mg Changed from Farxiga in hospital 11/21/23 N/A Stopped 8/17/23 due to UTI    Verquvo 10 mg QD 12/8/23 restart         Drug Therapy Problems    1. Edema  2. Non-adherence with Toprol  3. Numbness and hyperglycemia issues      Recommendations:     Recommend an increase in dose for a couple of days.   I gave the patient a new BP cuff to be able to check her BP daily and encouraged her to take her Toprol dose daily.   Made Sonja aware. She called and got information and labs from [facility] about her visit last night as well. Could increase Jardiance to 25mg dose to help with her diabetes.      Patient was educated on heart failure medications and the importance of medication adherence. All questions were addressed and patient expressed some understanding. Would benefit from additional education at each visit.    Thank you for allowing me to participate in the care of your patient,    Kelli Charles, [facility]  02/19/24  11:59 EST

## 2024-02-21 ENCOUNTER — READMISSION MANAGEMENT (OUTPATIENT)
Dept: CALL CENTER | Facility: HOSPITAL | Age: 59
End: 2024-02-21
Payer: COMMERCIAL

## 2024-02-21 NOTE — OUTREACH NOTE
"Medical Week 3 Survey      Flowsheet Row Responses   [facility] patient discharged from? [facility]   Does the patient have one of the following disease processes/diagnoses(primary or secondary)? Other   Week 3 attempt successful? Yes   Call start time 1308   Call end time 1311   Discharge diagnosis Paroxysmal atrial flutter/atrial fibrillation with RVR, acute non-STEMI, advanced dialted cardiomyopathy, nonobstructive CAD, type 2DM, CKD, reported cirrhosis of the liver   Is the patient taking all medications as directed (includes completed medication regime)? Yes   Medication comments States the HF clinic increased her water pill.   Does the patient have a primary care provider?  Yes   Has the patient kept scheduled appointments due by today? Yes   Comments HF on Monday.   What is the Home health agency?  [facility]   Has home health visited the patient within 72 hours of discharge? Yes   Psychosocial issues? No   What is the patient's perception of their health status since discharge? Same   Is the patient/caregiver able to teach back signs and symptoms related to disease process for when to call PCP? Yes   Is the patient/caregiver able to teach back signs and symptoms related to disease process for when to call 911? Yes   Is the patient/caregiver able to teach back the hierarchy of who to call/visit for symptoms/problems? PCP, Specialist, Home health nurse, Urgent Care, ED, 911 Yes   If the patient is a current smoker, are they able to teach back resources for cessation? Not a smoker   Additional teach back comments States \"I just don't feel well\"   Has some numbness and some swelling.  If symptoms worsen, she will go to ED.  States the HF did a \"swap test\" on her and she is waiting to hear results on that.   Week 3 Call Completed? Yes   Call end time 1311            Ida JACINTO - Licensed Nurse  "

## 2024-02-26 NOTE — ADDENDUM NOTE
Encounter addended by: Kelli Soto [facility] on: 2/26/2024 2:36 PM   Actions taken: Social Care Target section edited

## 2024-03-07 ENCOUNTER — SPECIALTY PHARMACY (OUTPATIENT)
Dept: PHARMACY | Facility: HOSPITAL | Age: 59
End: 2024-03-07
Payer: COMMERCIAL

## 2024-03-07 RX ORDER — VALSARTAN 40 MG/1
40 TABLET ORAL
Qty: 30 TABLET | Refills: 0 | Status: SHIPPED | OUTPATIENT
Start: 2024-03-07 | End: 2024-04-06

## 2024-03-07 RX ORDER — VERICIGUAT 10 MG/1
10 TABLET, FILM COATED ORAL DAILY
Qty: 30 TABLET | Refills: 2 | Status: SHIPPED | OUTPATIENT
Start: 2024-03-07 | End: 2024-06-05

## 2024-03-11 ENCOUNTER — HOSPITAL ENCOUNTER (EMERGENCY)
Facility: HOSPITAL | Age: 59
Discharge: HOME | End: 2024-03-12
Attending: STUDENT IN AN ORGANIZED HEALTH CARE EDUCATION/TRAINING PROGRAM | Admitting: STUDENT IN AN ORGANIZED HEALTH CARE EDUCATION/TRAINING PROGRAM
Payer: COMMERCIAL

## 2024-03-11 DIAGNOSIS — E11.65 TYPE 2 DIABETES MELLITUS WITH HYPERGLYCEMIA, UNSPECIFIED WHETHER LONG TERM INSULIN USE: ICD-10-CM

## 2024-03-11 DIAGNOSIS — I48.91 ATRIAL FIBRILLATION WITH RVR: Primary | ICD-10-CM

## 2024-03-11 PROCEDURE — 99284 EMERGENCY DEPT VISIT MOD MDM: CPT

## 2024-03-11 PROCEDURE — 36415 COLL VENOUS BLD VENIPUNCTURE: CPT

## 2024-03-12 ENCOUNTER — APPOINTMENT (OUTPATIENT)
Dept: GENERAL RADIOLOGY | Facility: HOSPITAL | Age: 59
End: 2024-03-12
Payer: COMMERCIAL

## 2024-03-12 VITALS
WEIGHT: 250 LBS | RESPIRATION RATE: 16 BRPM | HEART RATE: 68 BPM | TEMPERATURE: 97.9 F | SYSTOLIC BLOOD PRESSURE: 95 MMHG | OXYGEN SATURATION: 95 % | HEIGHT: 65 IN | DIASTOLIC BLOOD PRESSURE: 55 MMHG | BODY MASS INDEX: 41.65 KG/M2

## 2024-03-12 LAB
ALBUMIN SERPL-MCNC: 4 G/DL (ref 3.5–5.2)
ALBUMIN/GLOB SERPL: 1.3 G/DL
ALP SERPL-CCNC: 110 U/L (ref 39–117)
ALT SERPL W P-5'-P-CCNC: 26 U/L (ref 1–33)
ANION GAP SERPL CALCULATED.3IONS-SCNC: 14.6 MMOL/L (ref 5–15)
ANISOCYTOSIS BLD QL: NORMAL
AST SERPL-CCNC: 23 U/L (ref 1–32)
BASOPHILS # BLD AUTO: 0.09 10*3/MM3 (ref 0–0.2)
BASOPHILS NFR BLD AUTO: 1.1 % (ref 0–1.5)
BILIRUB SERPL-MCNC: 3.6 MG/DL (ref 0–1.2)
BUN SERPL-MCNC: 34 MG/DL (ref 6–20)
BUN/CREAT SERPL: 26.8 (ref 7–25)
CALCIUM SPEC-SCNC: 9.2 MG/DL (ref 8.6–10.5)
CHLORIDE SERPL-SCNC: 92 MMOL/L (ref 98–107)
CO2 SERPL-SCNC: 26.4 MMOL/L (ref 22–29)
CREAT SERPL-MCNC: 1.27 MG/DL (ref 0.57–1)
DEPRECATED RDW RBC AUTO: 81.3 FL (ref 37–54)
EGFRCR SERPLBLD CKD-EPI 2021: 49.4 ML/MIN/1.73
EOSINOPHIL # BLD AUTO: 0.11 10*3/MM3 (ref 0–0.4)
EOSINOPHIL NFR BLD AUTO: 1.3 % (ref 0.3–6.2)
ERYTHROCYTE [DISTWIDTH] IN BLOOD BY AUTOMATED COUNT: 21.9 % (ref 12.3–15.4)
GLOBULIN UR ELPH-MCNC: 3 GM/DL
GLUCOSE SERPL-MCNC: 524 MG/DL (ref 65–99)
HCT VFR BLD AUTO: 32.6 % (ref 34–46.6)
HGB BLD-MCNC: 9.7 G/DL (ref 12–15.9)
HYPOCHROMIA BLD QL: NORMAL
IMM GRANULOCYTES # BLD AUTO: 0.1 10*3/MM3 (ref 0–0.05)
IMM GRANULOCYTES NFR BLD AUTO: 1.2 % (ref 0–0.5)
LYMPHOCYTES # BLD AUTO: 1.56 10*3/MM3 (ref 0.7–3.1)
LYMPHOCYTES NFR BLD AUTO: 18.5 % (ref 19.6–45.3)
MACROCYTES BLD QL SMEAR: NORMAL
MAGNESIUM SERPL-MCNC: 2.4 MG/DL (ref 1.6–2.6)
MCH RBC QN AUTO: 31.3 PG (ref 26.6–33)
MCHC RBC AUTO-ENTMCNC: 29.8 G/DL (ref 31.5–35.7)
MCV RBC AUTO: 105.2 FL (ref 79–97)
MONOCYTES # BLD AUTO: 0.37 10*3/MM3 (ref 0.1–0.9)
MONOCYTES NFR BLD AUTO: 4.4 % (ref 5–12)
NEUTROPHILS NFR BLD AUTO: 6.22 10*3/MM3 (ref 1.7–7)
NEUTROPHILS NFR BLD AUTO: 73.5 % (ref 42.7–76)
NRBC BLD AUTO-RTO: 0.6 /100 WBC (ref 0–0.2)
PLATELET # BLD AUTO: 118 10*3/MM3 (ref 140–450)
PMV BLD AUTO: 10.9 FL (ref 6–12)
POLYCHROMASIA BLD QL SMEAR: NORMAL
POTASSIUM SERPL-SCNC: 3.9 MMOL/L (ref 3.5–5.2)
PROT SERPL-MCNC: 7 G/DL (ref 6–8.5)
QT INTERVAL: 452 MS
QTC INTERVAL: 531 MS
RBC # BLD AUTO: 3.1 10*6/MM3 (ref 3.77–5.28)
SMALL PLATELETS BLD QL SMEAR: NORMAL
SODIUM SERPL-SCNC: 133 MMOL/L (ref 136–145)
STOMATOCYTES BLD QL SMEAR: NORMAL
WBC NRBC COR # BLD AUTO: 8.45 10*3/MM3 (ref 3.4–10.8)

## 2024-03-12 PROCEDURE — 71045 X-RAY EXAM CHEST 1 VIEW: CPT | Performed by: RADIOLOGY

## 2024-03-12 PROCEDURE — 93005 ELECTROCARDIOGRAM TRACING: CPT | Performed by: STUDENT IN AN ORGANIZED HEALTH CARE EDUCATION/TRAINING PROGRAM

## 2024-03-12 PROCEDURE — 83735 ASSAY OF MAGNESIUM: CPT | Performed by: STUDENT IN AN ORGANIZED HEALTH CARE EDUCATION/TRAINING PROGRAM

## 2024-03-12 PROCEDURE — 71045 X-RAY EXAM CHEST 1 VIEW: CPT

## 2024-03-12 PROCEDURE — 85025 COMPLETE CBC W/AUTO DIFF WBC: CPT | Performed by: STUDENT IN AN ORGANIZED HEALTH CARE EDUCATION/TRAINING PROGRAM

## 2024-03-12 PROCEDURE — 80053 COMPREHEN METABOLIC PANEL: CPT | Performed by: STUDENT IN AN ORGANIZED HEALTH CARE EDUCATION/TRAINING PROGRAM

## 2024-03-12 PROCEDURE — 85007 BL SMEAR W/DIFF WBC COUNT: CPT | Performed by: STUDENT IN AN ORGANIZED HEALTH CARE EDUCATION/TRAINING PROGRAM

## 2024-03-12 PROCEDURE — 63710000001 INSULIN REGULAR HUMAN PER 5 UNITS: Performed by: STUDENT IN AN ORGANIZED HEALTH CARE EDUCATION/TRAINING PROGRAM

## 2024-03-12 PROCEDURE — 36415 COLL VENOUS BLD VENIPUNCTURE: CPT

## 2024-03-12 RX ORDER — HYDROCODONE BITARTRATE AND ACETAMINOPHEN 7.5; 325 MG/1; MG/1
1 TABLET ORAL ONCE
Status: COMPLETED | OUTPATIENT
Start: 2024-03-12 | End: 2024-03-12

## 2024-03-12 RX ADMIN — INSULIN HUMAN 10 UNITS: 100 INJECTION, SOLUTION PARENTERAL at 01:49

## 2024-03-12 RX ADMIN — HYDROCODONE BITARTRATE AND ACETAMINOPHEN 1 TABLET: 7.5; 325 TABLET ORAL at 02:32

## 2024-03-12 NOTE — ED PROVIDER NOTES
Subjective   History of Present Illness  58-year-old female past medical history of CHF, cirrhosis, diabetes, A-fib on Eliquis, anemia presents to the ED via EMS for rapid heart rate.  Patient states that her heart rate got fast all of a sudden and she got short of breath and had chest pain.  EMS saw the patient in the field and called to ask if they can shock the patient.  Patient did have stable vitals at that time but she wished to be cardioverted there so it was performed.  Initial EKG was concerning for ventricular tachycardia.  Cardioversion was successful and she is back in sinus rhythm.  Patient is now asymptomatic.  She states she has not been having any increased symptoms recently.  No fevers, chills, recent illness.  On review of patient's chart, she did have a similar episode last month which was thought to possibly be ventricular tachycardia but after evaluation by cardiology was felt to be A-fib with RVR and aberrancy.    History provided by:  Patient and EMS personnel      Review of Systems    Past Medical History:   Diagnosis Date    Anemia     CHF (congestive heart failure)     Chronic a-fib     stated by patient     Cirrhosis of liver     stated by patient     Diabetes mellitus     Ventricular tachycardia        Allergies   Allergen Reactions    Phenergan [Promethazine]        Past Surgical History:   Procedure Laterality Date    APPENDECTOMY      CARDIAC CATHETERIZATION N/A 11/10/2020    Procedure: Left Heart Cath;  Surgeon: Charlee Ken MD;  Location: [facility] INVASIVE LOCATION;  Service: Cardiovascular;  Laterality: N/A;    CHOLECYSTECTOMY      TOE AMPUTATION Right     stated by patient.        Family History   Problem Relation Age of Onset    Heart attack Father     Heart attack Brother        Social History     Socioeconomic History    Marital status:    Tobacco Use    Smoking status: Never    Smokeless tobacco: Never   Vaping Use    Vaping status: Never Used   Substance and Sexual  Activity    Alcohol use: Never    Drug use: Never    Sexual activity: Defer           Objective   Physical Exam  Constitutional:       General: She is not in acute distress.     Appearance: She is obese. She is not toxic-appearing.   HENT:      Head: Normocephalic and atraumatic.   Eyes:      Conjunctiva/sclera: Conjunctivae normal.   Cardiovascular:      Rate and Rhythm: Normal rate and regular rhythm.      Pulses: Normal pulses.      Heart sounds: Normal heart sounds.   Pulmonary:      Effort: Pulmonary effort is normal.      Breath sounds: Normal breath sounds.   Abdominal:      General: Abdomen is flat.      Palpations: Abdomen is soft.      Tenderness: There is no abdominal tenderness.   Musculoskeletal:      Right lower leg: Edema present.      Left lower leg: Edema present.   Neurological:      General: No focal deficit present.      Mental Status: She is alert and oriented to person, place, and time. Mental status is at baseline.         Procedures           ED Course  ED Course as of 03/12/24 0246   Mon Mar 11, 2024   2358 EKG obtained and independently interpreted as normal sinus rhythm with nonspecific intraventricular block and prolonged QTc of 531  Electronically signed by Quinten Torres MD, 03/11/24, 11:58 PM EDT.   [AS]      ED Course User Index  [AS] Quinten Torres MD                                             Medical Decision Making  58-year-old female presents to the ED for rapid heart rate, chest pain, shortness of breath which have all resolved following cardioversion in the field.  Patient with normal vitals on arrival.  Initial blood pressure of 85/47 believe was a bad read as she was 110/53 on my evaluation without any intervention.  EKG showed sinus rhythm with interventricular block and slightly prolonged QTc.  Patient's QTc was also prolonged during last visit.  Patient's glucose was significantly elevated and she was given insulin.  Patient remained asymptomatic throughout time in the  ED.  No other significant abnormalities on lab work.  Discussed admission versus discharge with the patient and she would prefer to be discharged.  She states that she will call her cardiologist in the morning.  She is stable for discharge at this time and asymptomatic.  Discharged in no acute distress.  Given strict return precautions if symptoms recur.    Problems Addressed:  Atrial fibrillation with RVR: complicated acute illness or injury  Type 2 diabetes mellitus with hyperglycemia, unspecified whether long term insulin use: complicated acute illness or injury    Amount and/or Complexity of Data Reviewed  Labs: ordered.  Radiology: ordered.  ECG/medicine tests: ordered.    Risk  OTC drugs.  Prescription drug management.        Final diagnoses:   Atrial fibrillation with RVR   Type 2 diabetes mellitus with hyperglycemia, unspecified whether long term insulin use       ED Disposition  ED Disposition       ED Disposition   Discharge    Condition   Stable    Comment   --               Masha Davidson, APR[address] [place]TER[place] [address] [place] [address]  [phone]    Schedule an appointment as soon as possible for a visit       [facility] EMERGENCY DEPARTMENT  [address] [place] [id number] [phone]    If symptoms worsen         Medication List      No changes were made to your prescriptions during this visit.            Quinten Torres MD  03/12/24 8813

## 2024-03-19 ENCOUNTER — HOSPITAL ENCOUNTER (OUTPATIENT)
Dept: CARDIOLOGY | Facility: HOSPITAL | Age: 59
Discharge: HOME | End: 2024-03-19
Admitting: PHYSICIAN ASSISTANT
Payer: COMMERCIAL

## 2024-03-19 VITALS
HEART RATE: 65 BPM | WEIGHT: 230 LBS | SYSTOLIC BLOOD PRESSURE: 100 MMHG | BODY MASS INDEX: 38.27 KG/M2 | DIASTOLIC BLOOD PRESSURE: 43 MMHG | OXYGEN SATURATION: 98 %

## 2024-03-19 DIAGNOSIS — I50.42 CHRONIC COMBINED SYSTOLIC AND DIASTOLIC CHF (CONGESTIVE HEART FAILURE): Primary | ICD-10-CM

## 2024-03-19 DIAGNOSIS — R25.1 OCCASIONAL TREMORS: ICD-10-CM

## 2024-03-19 LAB
ABSOLUTE LUNG FLUID CONTENT: 48 % (ref 20–35)
ANION GAP SERPL CALCULATED.3IONS-SCNC: 11.1 MMOL/L (ref 5–15)
BUN SERPL-MCNC: 25 MG/DL (ref 6–20)
BUN/CREAT SERPL: 17.7 (ref 7–25)
CALCIUM SPEC-SCNC: 9.1 MG/DL (ref 8.6–10.5)
CHLORIDE SERPL-SCNC: 97 MMOL/L (ref 98–107)
CO2 SERPL-SCNC: 25.9 MMOL/L (ref 22–29)
CREAT SERPL-MCNC: 1.41 MG/DL (ref 0.57–1)
EGFRCR SERPLBLD CKD-EPI 2021: 43.6 ML/MIN/1.73
GLUCOSE SERPL-MCNC: 321 MG/DL (ref 65–99)
MAGNESIUM SERPL-MCNC: 2.3 MG/DL (ref 1.6–2.6)
NT-PROBNP SERPL-MCNC: 2024 PG/ML (ref 0–900)
POTASSIUM SERPL-SCNC: 4 MMOL/L (ref 3.5–5.2)
SODIUM SERPL-SCNC: 134 MMOL/L (ref 136–145)

## 2024-03-19 PROCEDURE — 83880 ASSAY OF NATRIURETIC PEPTIDE: CPT | Performed by: PHYSICIAN ASSISTANT

## 2024-03-19 PROCEDURE — 94726 PLETHYSMOGRAPHY LUNG VOLUMES: CPT

## 2024-03-19 PROCEDURE — 80048 BASIC METABOLIC PNL TOTAL CA: CPT | Performed by: PHYSICIAN ASSISTANT

## 2024-03-19 PROCEDURE — 83735 ASSAY OF MAGNESIUM: CPT | Performed by: PHYSICIAN ASSISTANT

## 2024-03-19 RX ORDER — AMIODARONE HYDROCHLORIDE 100 MG/1
300 TABLET ORAL DAILY
COMMUNITY
Start: 2024-03-19 | End: 2024-04-18

## 2024-03-19 NOTE — PROGRESS NOTES
[facility]  ABDIFATAH Galarza [place], APRN  [address] [place] [address] [address]  [place] [address]    Thank you for asking me to see Yumiko Purdy for congestive heart failure.    HPI:     This is a 58 y.o. female with known past medical history of:  Chronic systolic & diastolic HF  TTE from 02/08/23 @ [facility] with EF ~50%  TTE 11/13/23 with EF 31-35% and grade III diastolic dysfunction as well as moderately decreased systolic fxn and mildly reduced RVSP.    TTE from November 2022 with visually estimated ejection fraction of 30% ±5% and noted abnormal systolic strain pattern as well as grade 3 diastolic dysfunction as well as abnormal TAPSE with abnormal right ventricular function  KHAI on 09/2020 with EF 21-25% with LV systolic function severely decreased and RV cavity mildly dilated with moderately reduced RV systolic function noted, moderate TVR, and aortic plaquing  Right heart cath on 12/22/2022 with elevated left and right heart pressures with report not available  ASCVD  LHC 11/10/20 with preserved LV systolic, EF 50-55% with elevated LV end diastolic pressure consistent with diastolic dysfunction and right dominant system with 30-40% mid LAD lesion.   LHC attempted on 12/2022 at [facility] with unsuccessful access due to small artery appearing occluded or near occluded radially on right  Peripheral Arterial disease  Atrial Flutter  Multiple starts and stops of amiodarone  Most recent in March 2024 on March 13 @ [facility] following concern for VTach with EP feeling strips were Afib with RVR with LBBB (per chart review) with conversion to NSR and amiodarone restart.    CKD stage IIIb  Cirrhosis of the liver  Stage III CKD  Chronic hypoxemic respiratory failure  Morbid Obesity  Diffuse purpuric rash with prior w/u negative for vasculitis    Yumiko Purdy presents for today for HF clinic evaluation.  The patient is typically seen by Masha Davidson APRN.   "Patient's primary cardiologist is Dr. Jad Meredith.      Last known EF~ 55% by TTE from [facility] in Feb 2024  Last known hospitalization and/or ED visit: Hospitalized in @[facility] with amiodarone restarted by EP due to afib with RVR at 400mg TID for 4 days followed by 300mg daily following.     Accompanied by: Son      03/19/24 visit data/details regarding:   Dyspnea: Improving, Patient is WC bound and mostly just exerts herself with transfers and such; This remains unchanged on today's visit.   Lower extremity swelling: Bilateral lower extremity swelling improved today  Abdominal swelling: Abdominal swelling is improving.    Home weight: Not currently monitoring due to inability to stand  Home BP: SBP has been in the 100s-110s with less drops at home  Home heart rate: 60s  Daily activities of living: Performing with assistance  HF zone: Yellow  Pillows/lying flat: Sleeps in hospital bed.  Mobility assistance devices:  WC at home, bedside commode.    Mrs. Purdy is chest pain free.  She reports DC from [facility] on 03/14 after \"being shocked' for her afib and being stated back on amiodarone.  DC summary and reports reviewed as available.  She reports she has improved with palpitations since her dc from [facility].   She has been taking Amiodarone 100mg TID rather than 300mg once daily. Instructed her to take once daily.           Specialists:   Cardiology: [facility] with EP & General        Review of Systems - Review of Systems   Constitutional: Negative for chills, decreased appetite and malaise/fatigue.   HENT:  Negative for congestion, ear discharge and ear pain.    Eyes:  Negative for blurred vision, discharge and double vision.   Cardiovascular:  Positive for dyspnea on exertion. Negative for leg swelling.   Respiratory:  Negative for cough, hemoptysis and shortness of breath.    Endocrine: Negative for cold intolerance and heat intolerance.   Hematologic/Lymphatic: Negative for adenopathy " and bleeding problem.   Skin:  Negative for color change, dry skin and nail changes.   Musculoskeletal:  Negative for arthritis, muscle weakness and myalgias.   Gastrointestinal:  Negative for bloating and abdominal pain.   Genitourinary:  Negative for bladder incontinence, decreased libido and dysuria.   Neurological:  Negative for brief paralysis, difficulty with concentration and dizziness.   Psychiatric/Behavioral:  Negative for altered mental status, depression and hallucinations.         Very pleasant   Allergic/Immunologic: Negative for environmental allergies and HIV exposure.         All other systems were reviewed and were negative.    Patient Active Problem List   Diagnosis    Dilated cardiomyopathy    CKD (chronic kidney disease) stage 2, GFR 60-89 ml/min    Severe obesity (BMI 35.0-39.9) with comorbidity    Chronic anemia    Elevated bilirubin    Acute on chronic combined systolic and diastolic CHF (congestive heart failure)    Hyponatremia    Paroxysmal atrial fibrillation    Cirrhosis    Coronary artery disease involving native coronary artery of native heart without angina pectoris    Atrial fibrillation with RVR       family history includes Heart attack in her brother and father.     reports that she has never smoked. She has never used smokeless tobacco. She reports that she does not drink alcohol and does not use drugs.    Allergies   Allergen Reactions    Phenergan [Promethazine]          Current Outpatient Medications:     acetaminophen (TYLENOL) 500 MG tablet, Take 1 tablet by mouth 2 (Two) Times a Day As Needed for Mild Pain., Disp: , Rfl:     amiodarone (PACERONE) 100 MG tablet, Take 3 tablets by mouth Daily., Disp: , Rfl:     apixaban (ELIQUIS) 5 MG tablet tablet, Take 1 tablet by mouth Every 12 (Twelve) Hours. Indications: Atrial Fibrillation, Disp: 60 tablet, Rfl: 3    Aspirin Low Dose 81 MG EC tablet, Take 1 tablet by mouth Daily., Disp: , Rfl:     bumetanide (BUMEX) 2 MG tablet, Take 1  tablet by mouth Daily for 30 days. Two tablets daily until 02/22/24 then down to 1 tablet daily with extra as needed. (Patient taking differently: Take 1 tablet by mouth Daily. Taking 2 mg BID Mon-Sat then 2 mg daily on Sunday), Disp: 45 tablet, Rfl: 2    busPIRone (BUSPAR) 10 MG tablet, Take 1 tablet by mouth 2 (Two) Times a Day., Disp: , Rfl:     empagliflozin (JARDIANCE) 10 MG tablet tablet, Take 1 tablet by mouth Daily for 30 days., Disp: 30 tablet, Rfl: 6    ferrous sulfate 325 (65 FE) MG tablet, Take 1 tablet by mouth Daily., Disp: , Rfl:     HYDROcodone-acetaminophen (NORCO) 7.5-325 MG per tablet, Take 1 tablet by mouth 2 (Two) Times a Day., Disp: , Rfl:     Insulin Glargine (LANTUS SOLOSTAR) 100 UNIT/ML injection pen, Inject 45 Units under the skin into the appropriate area as directed 2 (Two) Times a Day., Disp: 30 mL, Rfl: 0    Insulin Lispro, 1 Unit Dial, (HUMALOG) 100 UNIT/ML solution pen-injector, Inject 15 Units under the skin into the appropriate area as directed 3 (Three) Times a Day With Meals., Disp: 15 mL, Rfl: 1    levothyroxine (SYNTHROID, LEVOTHROID) 50 MCG tablet, Take 1 tablet by mouth Daily., Disp: , Rfl:     metoprolol succinate XL (TOPROL-XL) 25 MG 24 hr tablet, Take 1 tablet by mouth Daily for 30 days., Disp: 30 tablet, Rfl: 0    omeprazole (priLOSEC) 20 MG capsule, Take 1 capsule by mouth Daily., Disp: , Rfl:     valsartan (DIOVAN) 40 MG tablet, Take 1 tablet by mouth Daily for 30 days., Disp: 30 tablet, Rfl: 0    Vericiguat (Verquvo) 10 MG tablet, Take 1 tablet by mouth Daily., Disp: 30 tablet, Rfl: 2    Vortioxetine HBr (Trintellix) 5 MG tablet tablet, Take 1 tablet by mouth Daily With Breakfast., Disp: , Rfl:     fluticasone (FLONASE) 50 MCG/ACT nasal spray, 2 sprays into the nostril(s) as directed by provider Daily As Needed for Rhinitis or Allergies. (Patient not taking: Reported on 2/19/2024), Disp: , Rfl:       Physical Exam:  I have reviewed the patient's current vital signs as  documented in the patient's EMR.     Vitals:    24 1304   BP: 100/43   BP Location: Left arm   Patient Position: Sitting   Cuff Size: Adult   Pulse: 65   SpO2: 98%   Weight: 104 kg (230 lb)           Physical Exam  Vitals and nursing note reviewed.   Constitutional:       Appearance: Normal appearance.   HENT:      Head: Normocephalic and atraumatic.   Eyes:      General: Lids are normal.   Cardiovascular:      Rate and Rhythm: Normal rate and regular rhythm.      Heart sounds: S1 normal and S2 normal.   Pulmonary:      Effort: No tachypnea or bradypnea.      Breath sounds: No decreased breath sounds, wheezing, rhonchi or rales.   Chest:      Chest wall: No mass or lacerations.   Abdominal:      General: Abdomen is protuberant. Bowel sounds are normal.      Palpations: Abdomen is soft.      Tenderness: There is no abdominal tenderness.      Comments: Less distended than on prior visits.     Musculoskeletal:      Right lower le+ Edema present.      Left lower le+ Edema present.      Right foot: No swelling.      Left foot: No swelling.   Skin:     General: Skin is warm and moist.   Neurological:      Mental Status: She is alert and oriented to person, place, and time.      Comments: Equal bilateral  strength.     Psychiatric:         Attention and Perception: Attention normal.         Mood and Affect: Mood normal.          JVP: Volume/Pulsation: Normal.        DATA REVIEWED:     EKG. I personally reviewed and interpreted the EKG.    Last EKG at [facility] not available for viewing.      ---------------------------------------------------  TTE/KHAI:    TRANSTHORACIC ECHOCARDIOGRAPHY REPORT    Demographics    Patient Name:          JAMAL WHARTON      :            1965    Medical Record Number: [id number]          Age:            56 year(s)    Corporate ID Number:   [id number]          Gender          Female    Account Number:        [id number]    Sonographer:           Hayden Chaudhry      Height:         65 inches                           [facility]    Referring Physician:   ANDREAS HERNANDEZ     Weight:         240 pounds    Interpreting           MATHEUS IRELAND   BMI:            39.94 kg/m^2    Physician:             MD    Date of Service:       11/17/2022          Blood Pressure: 113/40 mmHg    Room Number:           320   Type of Study:    TTE procedure: EC Echo Complete, ECHO COMPLETE (DOPPLER / COLOR) W OR WO    CONTRAST.    Patient Status: Routine IP    Study Location: [facility] Quality: Adequate visualization    Contrast Medium: Optison. Amount - 3 ml    Impression:    Indication:Hypertensive heart disease with heart failure I11.0    Mild left ventricular hypertrophy. Visually estimated ejection fraction    30% +/- 5%. Abnormal left ventricular systolic function; abnormal systolic    strain pattern. Restrictive filling pattern consistent with severely    increased LV filling pressure (Grade III Diastolic dysfunction).    Dilated right ventricle. Abnormal TAPSE; abnormal right ventricular    function. Abnormal right atrial size.    Mild mitral stenosis. Peak/Mean 6/2mmHg    Moderate tricuspid (2+) regurgitation.    No masses or thrombi are seen.   Measurements Summary:    LVEDd: 4.99 cm         LVESd: 4.08 cm          IVSEd: 0.79 cm    AO Root:2.97 cm        LVPWd: 1.04 cm    Contractility Score    Global Left Ventricular Hypokinesis was noted.    LV regional wall motion: (0-Not visualized 1-Normal 2-Hypokinesis    3-Akinesis 4-Dyskinesis 5-Aneurysm)    Left Ventricle    Peak E-wave: 1.22   Peak A-wave: 0.2 m/s    E/A ratio: 5.99    m/s                 Volume [id number]  LV length: 8.47 cm    ml    Volume todzzimd64.87 ml    LVOT diameter: 2.05 cm    Normal sized left ventricle.    Mild left ventricular hypertrophy.    Visually estimated ejection fraction 30% +/- 5%.    Abnormal left ventricular systolic function; abnormal systolic strain    pattern.    Restrictive filling pattern  consistent with severely increased LV filling    pressure (Grade III Diastolic dysfunction).    No left ventricular masses or thrombi.    Right Ventricle    Diastolic dimension: 4.72     RV systolic pressure: 22.15 mmHg    cm    Dilated right ventricle.    Abnormal TAPSE; abnormal right ventricular function.    Left Atrium    LA dimension: 4.64 cm               LA volume:95.38 ml    LA/Aorta: 1.56    Abnormal left atrial volume index 45ml/m2.    Intact atrial septum.    No atrial mass or thrombus.    Right Atrium    Abnormal right atrial size.    Intact atrial septum.    No atrial mass or thrombus.    Mitral Valve    Deceleration time:     Area PHT: 2.04 cm^2  Mean velocity:    248.96 msec            P1/2t: 107.68 msec   0.57 m/s    Area (continuity):   Mean gradient:    1.73 cm^2            1.89 mmHg    Peak gradient:    5.97 mmHg    Thickened mitral valve leaflets.    Mild mitral regurgitation.    Mild mitral stenosis. Peak/Mean 6/2mmHg    Echogenic density noted on mitral valve chordae. Seen on prior exam    10/16/2022    Aortic Valve    LVOT VTI: 18.22 cm    Mildly thickend free edges of the aortic valve leaflets.    No aortic regurgitation.    No aortic stenosis.    No masses or vegetations seen.    Tricuspid Valve    TR velocity: 2.19 m/s     TR gradient: 19.59746 mmHg    Estimated RAP: 3 mmHg     RVSP: 22.17 mmHg    Structurally normal tricuspid valve.    Moderate tricuspid (2+) regurgitation.    RVSP likely underestimated due to RV systolic function.    No tricuspid stenosis.    No masses or vegetations seen.    Pulmonic Valve    Acceleration time: 119.95 msec          PASP: 22.15 mmHg    Structurally normal pulmonic valve.    Mild pulmonic regurgitation (1+).    No pulmonic stenosis.    No masses or vegetations seen.   Great Vessels   Aorta    Aortic Root: 2.97 cm    Ascending Aorta: 2.76 cm    LVOT Diameter: 2.05 cm   Visualized aorta is normal.   Normal aortic root.   No evidence of dissection.   IVC is  not well visualized.   Unable to obtain adequate subcostal view.    Pericardium / Pleura   No pericardial effusion.    Other    No masses or thrombi.    No intracardiac shunt.    ----------------------------------------------------------------    Electronically signed by MATHEUS IRELAND MD(Interpreting    Physician) on 11/17/2022 17:38       Results for orders placed during the hospital encounter of 11/12/23    Adult Transthoracic Echo Complete W/ Cont if Necessary Per Protocol    Interpretation Summary    Left ventricular systolic function is moderately decreased. Left ventricular ejection fraction appears to be 31 - 35%.    Left ventricular wall thickness is consistent with mild concentric hypertrophy.    Left ventricular diastolic function is consistent with (grade III w/high LAP) fixed restrictive pattern.    Mildly reduced right ventricular systolic function noted.    The left atrial cavity is moderately dilated.    The right atrial cavity is mild to moderately  dilated.    There is calcification of the aortic valve mainly affecting the left coronary cusp(s).    Dilated pulmonary artery.    RHC report:      CARDIAC CATH - RIGHT HEART CATH    Anatomical Region Laterality Modality   Heart -- Other     Narrative    Cardiac Diagnostic Report    Demographics    Patient Name       JAMAL WHARTON      Date of Birth          1965    Patient #          [id number]          Accession #            [id number]    Visit #            [id number]          Medical Record #    Physician          MATHEUS IRELAND   Date of Study          12/22/2022                       MD    Referring                              Interventional    Physician                              physician    Procedure   Procedure Type    Diagnostic procedure: RHC with Cardiomems, RIGHT HEART CATH   Indications: Angina.    Conclusions   Procedure Description   Using ultrasound and micropuncture, access to right brachial vein obtained   and 7F sheath  inserted. 7F PA catheter used for RHC.   I attempted right radial access for LHC but unsuccessful due to very small   artery which appears occluded or near-occluded.   Procedure Summary   Elevated left and right heart filling pressures.   Elevated pulmonary pressures.   Borderline-low Ramos CO/CI.    Procedure Data   Procedure Date   Date: 12/22/2022Start: 15:32End: 16:09   Entry Locations     - Retrograde Percutaneous access was performed through the Right Axiliary.       A 7 Fr sheath was inserted. Hemostasis was successfully obtained using       Manual Compression.       Entry Comments: brachial vein.   Procedure Medications     - Fentanyl I.V. 25 mcg.     - Versed Sheath 1 mg.     - Oxygen NC 6 l/min.   Diagnostic Catheters     - A7 FR MON [place] [id number] used for:Arch.   Contrast Material     - None0 ml   Fluoroscopy Time: Total: 2:48 minutes.   Fluoroscopy Dose: Total: 155 mGy.    Medical History    Risk Factors    The patient risk factors include:obesity, hypercholesterolemia, treated    and uncontrolled hypertension, diabetes mellitus, last creatinine: 2    mg/dl, creatinine clearance: 69.72 ml/min and dyslipidemia.    Admission Data    Hemodynamics    Condition: Baseline    O2 Consumption: Estimated: 297.50Heart Rate: 41 bpm   Oxygen Saturation   +--------+-----+----+----------------------+---+---------------------------+   !Location!pCO2 !pO2 !% Saturation          !Hgb!O2 Content                 !   +--------+-----+----+----------------------+---+---------------------------+   !PA      !     !    !51                    !   !                           !   +--------+-----+----+----------------------+---+---------------------------+   !RA      !     !    !58                    !   !                           !   +--------+-----+----+----------------------+---+---------------------------+   !AO      !     !    !94                    !   !                           !    +--------+-----+----+----------------------+---+---------------------------+   Pressures (mmHg)   +-----+--------------------------------------------------------------------+   !Site !Pressure                                                            !   +-----+--------------------------------------------------------------------+   !RA   !28/28 (25)                                                          !   +-----+--------------------------------------------------------------------+   !RV   !81/10 ,27                                                           !   +-----+--------------------------------------------------------------------+   !PA   !73/32 (43)                                                          !   +-----+--------------------------------------------------------------------+   !PCW  !68/66 (45)                                                          !   +-----+--------------------------------------------------------------------+   !PCW  !78/59 (45)                                                          !   +-----+--------------------------------------------------------------------+   !PCW  !23/36 (27)                                                          !   +-----+--------------------------------------------------------------------+   Cardiac Output   +------+-------------------------+----------------------------+------------+   !Method!CO (l/min)               !CI (l/min/m2)               !SV (ml)     !   +------+-------------------------+----------------------------+------------+   !Ramos  !5.35                     !2.2                         !131.67      !   +------+-------------------------+----------------------------+------------+   Shunts   Oxygen Values    O2 Capacity 129.2 O2 Consumption 297.5    Flows (l/min)    Qs 6.4 Qe/Qp 1.2    Qp 5.35 Qp/Qs 0.84    Qe 6.4   Vascular Resistance (dynes x sec x cm-5)    +-------------------------------------+----+---+----+----+---------+-------+   !CO method                            !TSVR!SVR!TPVR!PVR !TPVR/TSVR!PVR/SVR!   +-------------------------------------+----+---+----+----+---------+-------+   !Ramos                                 !    !   !8.03!2.94!         !       !   +-------------------------------------+----+---+----+----+---------+-------+   !Qp or Qs                             !    !   !8.03!2.94!         !       !   +-------------------------------------+----+---+----+----+---------+-------+    Signatures    ----------------------------------------------------------------    Electronically signed by MATHEUS IRELAND MD(Physician) on    12/22/2022 16:19    ----------------------------------------------------------------        -----------------------------------------------------  CXR/Imaging:   Imaging Results (Most Recent)       None            I personally reviewed and interpreted the CXR.      -----------------------------------------------------  CT:   XR chest AP portable    Result Date: 3/13/2024  Opacity  as above, probably secondary to pneumonia. Follow-up to complete resolution recommended. Images reviewed, interpreted, and dictated by Dr. ROSA Castillo. Transcribed by Teresa Mckenzie PA-C.    XR Chest 1 View    Result Date: 3/12/2024  No acute cardiopulmonary process.   This report was finalized on 3/12/2024 12:23 AM by Alex Pallas, DO.     I personally reviewed the images of the CT scan.  My personal interpretation is below.      ----------------------------------------------------    PFTs:    No PFTS available.      --------------------------------------------------------------------------------------------------    Laboratory evaluations:    Lab Results   Component Value Date    GLUCOSE 321 (H) 03/19/2024    BUN 25 (H) 03/19/2024    CREATININE 1.41 (H) 03/19/2024    EGFRIFNONA 49 (L) 11/10/2020    BCR 17.7 03/19/2024    K 4.0  03/19/2024    CO2 25.9 03/19/2024    CALCIUM 9.1 03/19/2024    ALBUMIN 4.0 03/12/2024    LABIL2 1.3 (L) 06/09/2016    AST 23 03/12/2024    ALT 26 03/12/2024     Lab Results   Component Value Date    WBC 8.45 03/12/2024    HGB 9.7 (L) 03/12/2024    HCT 32.6 (L) 03/12/2024    .2 (H) 03/12/2024     (L) 03/12/2024     Lab Results   Component Value Date    CHOL 137 09/11/2020    CHLPL 103 04/21/2016    TRIG 80 09/11/2020    HDL 43 09/11/2020    LDL 78 09/11/2020     Lab Results   Component Value Date    TSH 2.960 01/31/2024     Lab Results   Component Value Date    HGBA1C 7.30 (H) 11/13/2023     Lab Results   Component Value Date    ALT 26 03/12/2024     Lab Results   Component Value Date    HGBA1C 7.30 (H) 11/13/2023    HGBA1C 8.40 (H) 09/10/2020    HGBA1C 5.4 04/21/2016     Lab Results   Component Value Date    MICROALBUR 3.2 09/12/2020    CREATININE 1.41 (H) 03/19/2024     Lab Results   Component Value Date    IRON 73 11/17/2023    TIBC 222 (L) 11/17/2023    FERRITIN 2,723.00 (H) 11/17/2023     Lab Results   Component Value Date    INR 0.97 03/13/2024    INR 1.13 (H) 01/31/2024    INR 0.97 11/15/2023    PROTIME 10.5 03/13/2024    PROTIME 15.1 (H) 01/31/2024    PROTIME 13.4 11/15/2023        Lab Results   Component Value Date    ABSOLUTELUNG 48 (A) 03/19/2024    ABSOLUTELUNG 44 (A) 02/19/2024    ABSOLUTELUNG 37 (A) 01/22/2024       PAH RISK ASSESSMENT:      1. Chronic combined systolic and diastolic CHF (congestive heart failure)    2. Occasional tremors          ORDERS PLACED TODAY:  Orders Placed This Encounter   Procedures    ReDs Vest    Magnesium    proBNP    Basic Metabolic Panel    Magnesium    proBNP    Basic Metabolic Panel    Ambulatory Referral to Neurology        Diagnoses and all orders for this visit:    1. Chronic combined systolic and diastolic CHF (congestive heart failure) (Primary)  Overview:  11/12/20234663-OBP-ABHN 31 to 35%, mild LVH, grade 3 diastolic dysfunction with high LAP,  moderately dilated left and right atrial cavities, dilated pulmonary artery, calcification of the left coronary cusp of the aortic valve    Orders:  -     Magnesium; Future  -     proBNP; Future  -     Basic Metabolic Panel; Future  -     Magnesium; Standing  -     Magnesium  -     proBNP; Standing  -     proBNP  -     Basic Metabolic Panel; Standing  -     Basic Metabolic Panel  -     ReDs Vest    2. Occasional tremors  -     Ambulatory Referral to Neurology             MEDS ORDERED TODAY:    No orders of the defined types were placed in this encounter.       ---------------------------------------------------------------------------------------------------------------------------          ASSESSMENT/PLAN:      Diagnosis Plan   1. Chronic combined systolic and diastolic CHF (congestive heart failure)  Magnesium    proBNP    Basic Metabolic Panel    Magnesium    Magnesium    proBNP    proBNP    Basic Metabolic Panel    Basic Metabolic Panel    ReDs Vest      2. Occasional tremors  Ambulatory Referral to Neurology          not acutely decompensated chronic moderate systolic and diastolic heart failure. CHF. Etiology: Unknown/Idiopathic. LVEF ~50%.     NYHA stage Stage D: Presence of advanced heart disease with continued heart failure symptoms requiring aggressive medical therapyFC-Class IV: Unable to carry on any physical activity without discomfort. Symptoms of heart failureat rest. If any physical activity is undertaken, discomfort increases.     Today, Patient is approaching euvolemia and with  Moderate perfusion. The patient's hemodynamics are currently acceptable. HR is: normal and is at goal. BP/MAP was reviewed and there is notroom for medication up-titration.  Clinical trajectory was assessed and hasimproved.     CHF GOAL DIRECTED MEDICAL THERAPY FOR PATIENT ADDRESSED/ADJUSTED:     GDMT    Drug Class   Drug   Dose Last Dose Adjustment Additional Titration   Notes   ACEi/ARB/ARNI ACEI previously stopped due to  renal fxn       Beta Blocker  Metoprolol Succinate   25 mg qhs      MRA Spirono previously stopped due to renal fxn       SGLT2i Jardiance 10mg qd Transitioned from Farxiga to Jardiance in Hospitalization in 11/2023 N/A    Secondaries Verquevo 10mg qd Restart       Secondary management:     -CHF Specific BB:   Toprol XL 25 mg qhs.   Continue monitoring as she is also on amiodarone for her Afib.   Hold parameters reviewed.    Counseled on lifevest compliance.      -ACE/ARB/ARNi:   ACEi recently held during [facility] hospitalization.     -MRA:   The patient is FC-NYHA Class IV and MRA is indicated.   Spironolactone was previously stopped on regimen due to renal function.        -SGLT2 inhibitor therapy:    Farxiga transitioned to Jardiance during hospitalization in 2023.  Tolerating jardiance well, will continue at present.    SGLT2i Restarted by  Nephro in September 2023; will continue to monitor for  symptoms.        Secondary pharmacological therapy in HFreF:   EF previously is less than 45% @ 30-35% now with improvement.   Maximized on GDMT as tolerated thus far (see chart above)  Recent hospitalization or IV diuresis includes hospitalizations within the last year at [facility], [facility], and [facility] with acute heart failure in addition to afib with RVR.    Given HFrEF with optimally treated with primary therapies for HFrEF and vasodilator therapy, will continue Verquevo 10mg      -Diuretic regimen:   ReDS Vest reading for 03/19/24 is 48; continue monitoring  Continue Bumex as previously directed by Nephrology.    BMP, Mag, & ProBNP reviewed with patient with toleration of current medication regimen.      -Fluid restriction/Sodium restriction:   Requested 2000 ml restriction  Patient has been asked to weigh daily and was provided with a printed diuretic strategy.  1,500 mg Na restriction was discussed.    -Acute vs. Chronic underlying conditions other than HF addressed during visit:          Paroxysmal Atrial  fibrillation/Flutter, currently SR:   Amiodarone restarted again by EP in March of 2024 @ [facility] due to concern for Vtach @ OSH and Afib with RVR with LBBB cardioverted to NSR by EP.    Of note and importance of thorough coming through the chart has revealed that attempts to stop amiodarone have been made multiple times over the past few years given her cirrhosis; however, patient ultimately ends up back in A-fib with RVR as well as having concerns for V. tach and ultimately restarted on amiodarone.  Discharge summary from most recent event when this occurred reviewed.  Continue Eliquis 5mg qd with [id number] at least 4.    Continue Toprol dosing at present.      CKD IIIb:   Creatinine within baseline. Continue f/u.        Debility:   Continue home health with PT/OT.   Multiple types of DME at home.     Iron/Anemia in CHF:  Recheck iron panel in future visit.  11/2023 panel reviewed with TSAT within acceptable limits.   Continue BID ferrous sulfate.          Identifiable barriers to Heart Failure Self-care:   Medical Barriers:  Debility  Social Barriers: Transport limitations      --------------------------------------------------------------------------------          BMI cannot be calculated due to outdated height or weight values with patient unable to stand. She has self reported weight of 240.               >45 minutes out of 60 minutes face to face spent counseling patient extensively on dietary Na+ intake, importance of activity, weight monitoring, compliance with medications in addition to importance of titration with goal directed medical therapy and follow up appointments.  10 of 60 minutes were spent reviewing lifevest report and discussing with patient.              This document has been electronically signed by Sonja Romo PA-C  March 19, 2024 14:33 EDT      Dictated Utilizing Dragon Dictation: Part of this note may be an electronic transcription/translation of spoken language to printed  text using the Dragon Dictation System.    Follow-up appointment and medication changes provided in hand delivered After Visit Summary as well as reviewed in the room.

## 2024-03-19 NOTE — PROGRESS NOTES
Heart Failure Clinic  Pharmacist Note     Yumiko Purdy is a 58 y.o. female seen in the Heart Failure Clinic for HFrEF. The patient had a recent hospital admission on 3/13/24 for atrial fibrillation. She was discharged on amiodarone 400 mg TID x 4 days then 300 mg daily thereafter as well as cefuroxime 500 mg BID x 5 days. Patient states that the directions on her amiodarone are confusing and that she is taking 100 mg TID instead of 300 mg daily.    Yumiko Purdy reports a poor understanding of medications. She reports adherence to her medications stating that her son helps her with them. She does report some shortness of breath and swelling in legs/feet. She is currently taking Bumex 2 mg BID Monday-Saturday then 2 mg once daily on Sunday. She reports that home health has been checking her BP but is unsure exactly what it has been lately. BP in clinic today is 100/43 with HR of 65. She denies any episodes of dizziness/lightheadedness.        Medication Use:   Hx of med intolerances: Farxiga (UTI) -but Nephro started back in September; cannot split Toprol tablets to make 12.5mg dose- may restart at 25mg when possible?  Retail Rx Management: Uses [facility]/ Uses our pharmacy for verquvo, toprol and jardiance     Past Medical History:   Diagnosis Date    Anemia     CHF (congestive heart failure)     Chronic a-fib     stated by patient     Cirrhosis of liver     stated by patient     Diabetes mellitus     Ventricular tachycardia      ALLERGIES: Phenergan [promethazine]  Current Outpatient Medications   Medication Sig Dispense Refill    acetaminophen (TYLENOL) 500 MG tablet Take 1 tablet by mouth 2 (Two) Times a Day As Needed for Mild Pain.      apixaban (ELIQUIS) 5 MG tablet tablet Take 1 tablet by mouth Every 12 (Twelve) Hours. Indications: Atrial Fibrillation 60 tablet 3    Aspirin Low Dose 81 MG EC tablet Take 1 tablet by mouth Daily.      bumetanide (BUMEX) 2 MG tablet Take 1 tablet by mouth Daily for 30 days. Two  tablets daily until 02/22/24 then down to 1 tablet daily with extra as needed. 45 tablet 2    busPIRone (BUSPAR) 10 MG tablet Take 1 tablet by mouth 2 (Two) Times a Day.      empagliflozin (JARDIANCE) 10 MG tablet tablet Take 1 tablet by mouth Daily for 30 days. 30 tablet 6    ferrous sulfate 325 (65 FE) MG tablet Take 1 tablet by mouth Daily.      fluticasone (FLONASE) 50 MCG/ACT nasal spray 2 sprays into the nostril(s) as directed by provider Daily As Needed for Rhinitis or Allergies. (Patient not taking: Reported on 2/19/2024)      HYDROcodone-acetaminophen (NORCO) 7.5-325 MG per tablet Take 1 tablet by mouth 2 (Two) Times a Day.      Insulin Glargine (LANTUS SOLOSTAR) 100 UNIT/ML injection pen Inject 45 Units under the skin into the appropriate area as directed 2 (Two) Times a Day. 30 mL 0    Insulin Lispro, 1 Unit Dial, (HUMALOG) 100 UNIT/ML solution pen-injector Inject 15 Units under the skin into the appropriate area as directed 3 (Three) Times a Day With Meals. 15 mL 1    levothyroxine (SYNTHROID, LEVOTHROID) 50 MCG tablet Take 1 tablet by mouth Daily.      metoprolol succinate XL (TOPROL-XL) 25 MG 24 hr tablet Take 1 tablet by mouth Daily for 30 days. 30 tablet 0    omeprazole (priLOSEC) 20 MG capsule Take 1 capsule by mouth Daily.      valsartan (DIOVAN) 40 MG tablet Take 1 tablet by mouth Daily for 30 days. 30 tablet 0    Vericiguat (Verquvo) 10 MG tablet Take 1 tablet by mouth Daily. 30 tablet 2    Vortioxetine HBr (Trintellix) 5 MG tablet tablet Take 1 tablet by mouth Daily With Breakfast.       No current facility-administered medications for this encounter.       Vaccination History:   Pneumonia: Declined 3/19/24  Annual Influenza: Declined  Shingles: Declined 3/19/24      Objective  There were no vitals filed for this visit.          Wt Readings from Last 3 Encounters:   03/11/24 113 kg (250 lb)   02/19/24 107 kg (235 lb)   02/04/24 107 kg (235 lb 14.3 oz)     Lab Results   Component Value Date     GLUCOSE 524 (C) 03/12/2024    BUN 34 (H) 03/12/2024    CREATININE 1.27 (H) 03/12/2024    EGFRIFNONA 49 (L) 11/10/2020    BCR 26.8 (H) 03/12/2024    K 4.3 03/13/2024    CO2 26.4 03/12/2024    CALCIUM 9.2 03/12/2024    ALBUMIN 4.0 03/12/2024    LABIL2 1.3 (L) 06/09/2016    AST 23 03/12/2024    ALT 26 03/12/2024     Lab Results   Component Value Date    WBC 8.45 03/12/2024    HGB 9.7 (L) 03/12/2024    HCT 32.6 (L) 03/12/2024    .2 (H) 03/12/2024     (L) 03/12/2024     Lab Results   Component Value Date    CKTOTAL 49 03/13/2024    TROPONINT 101 (C) 01/31/2024     Lab Results   Component Value Date    PROBNP 801.5 02/19/2024     Results for orders placed during the hospital encounter of 11/12/23    Adult Transthoracic Echo Complete W/ Cont if Necessary Per Protocol    Interpretation Summary    Left ventricular systolic function is moderately decreased. Left ventricular ejection fraction appears to be 31 - 35%.    Left ventricular wall thickness is consistent with mild concentric hypertrophy.    Left ventricular diastolic function is consistent with (grade III w/high LAP) fixed restrictive pattern.    Mildly reduced right ventricular systolic function noted.    The left atrial cavity is moderately dilated.    The right atrial cavity is mild to moderately  dilated.    There is calcification of the aortic valve mainly affecting the left coronary cusp(s).    Dilated pulmonary artery.         GDMT    Drug Class   Drug   Dose Last Dose Adjustment Additional Titration   Notes   ACEi/ARB/ARNI Valsartan 40mg 2/4/24  Was previously on lisinopril, stopped d/t renal function    Beta Blocker Toprol 25mg 2/4/24??-not been adherent     MRA     Was previously on spironolactone 50mg BID Nov-Dec 22, stopped d/t renal function   SGLT2i Jardiance 10mg Changed from Farxiga in hospital 11/21/23 N/A Stopped 8/17/23 due to UTI    Verquvo 10 mg QD 12/8/23 restart         Drug Therapy Problems    GDMT  Vaccinations  Amiodarone  directions for use: Patient currently taking amiodarone 100 mg TID.      Recommendations:     No new recommendations at this time.  Recommended [id number] and Shingrix. Patient declined at this time.  Instructed patient to take amiodarone as written as 300 mg daily. Patient verbalized understanding dosing instructions.      Patient was educated on heart failure medications and the importance of medication adherence. All questions were addressed and patient expressed some understanding. Would benefit from additional education at each visit.    Thank you for allowing me to participate in the care of your patient,    Yolis Valerio, AdenD  03/19/24  13:01 EDT

## 2024-03-26 ENCOUNTER — HOSPITAL ENCOUNTER (INPATIENT)
Facility: HOSPITAL | Age: 59
LOS: 4 days | Discharge: HOME HEALTH | DRG: 280 | End: 2024-03-30
Attending: STUDENT IN AN ORGANIZED HEALTH CARE EDUCATION/TRAINING PROGRAM | Admitting: HOSPITALIST
Payer: COMMERCIAL

## 2024-03-26 ENCOUNTER — APPOINTMENT (OUTPATIENT)
Dept: GENERAL RADIOLOGY | Facility: HOSPITAL | Age: 59
DRG: 280 | End: 2024-03-26
Payer: COMMERCIAL

## 2024-03-26 DIAGNOSIS — I50.43 ACUTE ON CHRONIC COMBINED SYSTOLIC AND DIASTOLIC CHF (CONGESTIVE HEART FAILURE): Chronic | ICD-10-CM

## 2024-03-26 DIAGNOSIS — E66.01 SEVERE OBESITY (BMI 35.0-39.9) WITH COMORBIDITY: ICD-10-CM

## 2024-03-26 DIAGNOSIS — I48.91 ATRIAL FIBRILLATION WITH RVR: ICD-10-CM

## 2024-03-26 DIAGNOSIS — R07.9 CHEST PAIN, UNSPECIFIED TYPE: ICD-10-CM

## 2024-03-26 DIAGNOSIS — I47.20 VENTRICULAR TACHYCARDIA: ICD-10-CM

## 2024-03-26 DIAGNOSIS — I47.20 SUSTAINED VENTRICULAR TACHYCARDIA: Primary | ICD-10-CM

## 2024-03-26 LAB
ALBUMIN SERPL-MCNC: 3.9 G/DL (ref 3.5–5.2)
ALBUMIN/GLOB SERPL: 1.3 G/DL
ALP SERPL-CCNC: 105 U/L (ref 39–117)
ALT SERPL W P-5'-P-CCNC: 26 U/L (ref 1–33)
ANION GAP SERPL CALCULATED.3IONS-SCNC: 15.3 MMOL/L (ref 5–15)
ANISOCYTOSIS BLD QL: NORMAL
AST SERPL-CCNC: 24 U/L (ref 1–32)
BASOPHILS # BLD AUTO: 0.09 10*3/MM3 (ref 0–0.2)
BASOPHILS NFR BLD AUTO: 1 % (ref 0–1.5)
BILIRUB SERPL-MCNC: 3.6 MG/DL (ref 0–1.2)
BUN SERPL-MCNC: 29 MG/DL (ref 6–20)
BUN/CREAT SERPL: 21.6 (ref 7–25)
CALCIUM SPEC-SCNC: 8.6 MG/DL (ref 8.6–10.5)
CHLORIDE SERPL-SCNC: 99 MMOL/L (ref 98–107)
CO2 SERPL-SCNC: 23.7 MMOL/L (ref 22–29)
CREAT SERPL-MCNC: 1.34 MG/DL (ref 0.57–1)
DACRYOCYTES BLD QL SMEAR: NORMAL
DEPRECATED RDW RBC AUTO: 85.5 FL (ref 37–54)
EGFRCR SERPLBLD CKD-EPI 2021: 46.3 ML/MIN/1.73
EOSINOPHIL # BLD AUTO: 0.17 10*3/MM3 (ref 0–0.4)
EOSINOPHIL NFR BLD AUTO: 2 % (ref 0.3–6.2)
ERYTHROCYTE [DISTWIDTH] IN BLOOD BY AUTOMATED COUNT: 21.6 % (ref 12.3–15.4)
GLOBULIN UR ELPH-MCNC: 3 GM/DL
GLUCOSE SERPL-MCNC: 360 MG/DL (ref 65–99)
HCT VFR BLD AUTO: 33.4 % (ref 34–46.6)
HGB BLD-MCNC: 9.8 G/DL (ref 12–15.9)
HOLD SPECIMEN: NORMAL
HOLD SPECIMEN: NORMAL
HYPOCHROMIA BLD QL: NORMAL
IMM GRANULOCYTES # BLD AUTO: 0.07 10*3/MM3 (ref 0–0.05)
IMM GRANULOCYTES NFR BLD AUTO: 0.8 % (ref 0–0.5)
LYMPHOCYTES # BLD AUTO: 1.3 10*3/MM3 (ref 0.7–3.1)
LYMPHOCYTES NFR BLD AUTO: 15.1 % (ref 19.6–45.3)
MACROCYTES BLD QL SMEAR: NORMAL
MCH RBC QN AUTO: 32 PG (ref 26.6–33)
MCHC RBC AUTO-ENTMCNC: 29.3 G/DL (ref 31.5–35.7)
MCV RBC AUTO: 109.2 FL (ref 79–97)
MONOCYTES # BLD AUTO: 0.34 10*3/MM3 (ref 0.1–0.9)
MONOCYTES NFR BLD AUTO: 4 % (ref 5–12)
NEUTROPHILS NFR BLD AUTO: 6.62 10*3/MM3 (ref 1.7–7)
NEUTROPHILS NFR BLD AUTO: 77.1 % (ref 42.7–76)
NRBC BLD AUTO-RTO: 0 /100 WBC (ref 0–0.2)
PLATELET # BLD AUTO: 133 10*3/MM3 (ref 140–450)
PMV BLD AUTO: 10.2 FL (ref 6–12)
POLYCHROMASIA BLD QL SMEAR: NORMAL
POTASSIUM SERPL-SCNC: 3.8 MMOL/L (ref 3.5–5.2)
PROT SERPL-MCNC: 6.9 G/DL (ref 6–8.5)
RBC # BLD AUTO: 3.06 10*6/MM3 (ref 3.77–5.28)
SMALL PLATELETS BLD QL SMEAR: NORMAL
SODIUM SERPL-SCNC: 138 MMOL/L (ref 136–145)
TROPONIN T SERPL HS-MCNC: 92 NG/L
WBC NRBC COR # BLD AUTO: 8.59 10*3/MM3 (ref 3.4–10.8)
WHOLE BLOOD HOLD COAG: NORMAL
WHOLE BLOOD HOLD SPECIMEN: NORMAL

## 2024-03-26 PROCEDURE — 85025 COMPLETE CBC W/AUTO DIFF WBC: CPT | Performed by: STUDENT IN AN ORGANIZED HEALTH CARE EDUCATION/TRAINING PROGRAM

## 2024-03-26 PROCEDURE — 93005 ELECTROCARDIOGRAM TRACING: CPT | Performed by: STUDENT IN AN ORGANIZED HEALTH CARE EDUCATION/TRAINING PROGRAM

## 2024-03-26 PROCEDURE — 71045 X-RAY EXAM CHEST 1 VIEW: CPT

## 2024-03-26 PROCEDURE — 84145 PROCALCITONIN (PCT): CPT | Performed by: HOSPITALIST

## 2024-03-26 PROCEDURE — 83735 ASSAY OF MAGNESIUM: CPT | Performed by: HOSPITALIST

## 2024-03-26 PROCEDURE — 36415 COLL VENOUS BLD VENIPUNCTURE: CPT

## 2024-03-26 PROCEDURE — 71045 X-RAY EXAM CHEST 1 VIEW: CPT | Performed by: RADIOLOGY

## 2024-03-26 PROCEDURE — 25010000002 MIDAZOLAM PER 1 MG: Performed by: STUDENT IN AN ORGANIZED HEALTH CARE EDUCATION/TRAINING PROGRAM

## 2024-03-26 PROCEDURE — 84484 ASSAY OF TROPONIN QUANT: CPT | Performed by: STUDENT IN AN ORGANIZED HEALTH CARE EDUCATION/TRAINING PROGRAM

## 2024-03-26 PROCEDURE — 83036 HEMOGLOBIN GLYCOSYLATED A1C: CPT | Performed by: HOSPITALIST

## 2024-03-26 PROCEDURE — 25010000002 MAGNESIUM SULFATE PER 500 MG OF MAGNESIUM: Performed by: STUDENT IN AN ORGANIZED HEALTH CARE EDUCATION/TRAINING PROGRAM

## 2024-03-26 PROCEDURE — 99285 EMERGENCY DEPT VISIT HI MDM: CPT

## 2024-03-26 PROCEDURE — 85007 BL SMEAR W/DIFF WBC COUNT: CPT | Performed by: STUDENT IN AN ORGANIZED HEALTH CARE EDUCATION/TRAINING PROGRAM

## 2024-03-26 PROCEDURE — 25010000002 AMIODARONE PER 30 MG: Performed by: STUDENT IN AN ORGANIZED HEALTH CARE EDUCATION/TRAINING PROGRAM

## 2024-03-26 PROCEDURE — 25010000002 MIDAZOLAM PER 1 MG

## 2024-03-26 PROCEDURE — 83880 ASSAY OF NATRIURETIC PEPTIDE: CPT | Performed by: HOSPITALIST

## 2024-03-26 PROCEDURE — 92960 CARDIOVERSION ELECTRIC EXT: CPT

## 2024-03-26 PROCEDURE — 5A2204Z RESTORATION OF CARDIAC RHYTHM, SINGLE: ICD-10-PCS | Performed by: STUDENT IN AN ORGANIZED HEALTH CARE EDUCATION/TRAINING PROGRAM

## 2024-03-26 PROCEDURE — 80053 COMPREHEN METABOLIC PANEL: CPT | Performed by: STUDENT IN AN ORGANIZED HEALTH CARE EDUCATION/TRAINING PROGRAM

## 2024-03-26 RX ORDER — MIDAZOLAM HYDROCHLORIDE 1 MG/ML
INJECTION INTRAMUSCULAR; INTRAVENOUS
Status: COMPLETED | OUTPATIENT
Start: 2024-03-26 | End: 2024-03-26

## 2024-03-26 RX ORDER — MIDAZOLAM HYDROCHLORIDE 1 MG/ML
INJECTION INTRAMUSCULAR; INTRAVENOUS
Status: COMPLETED
Start: 2024-03-26 | End: 2024-03-26

## 2024-03-26 RX ORDER — MAGNESIUM SULFATE HEPTAHYDRATE 500 MG/ML
INJECTION, SOLUTION INTRAMUSCULAR; INTRAVENOUS
Status: COMPLETED | OUTPATIENT
Start: 2024-03-26 | End: 2024-03-26

## 2024-03-26 RX ORDER — ASPIRIN 81 MG/1
324 TABLET, CHEWABLE ORAL ONCE
Status: COMPLETED | OUTPATIENT
Start: 2024-03-26 | End: 2024-03-26

## 2024-03-26 RX ORDER — AMIODARONE HYDROCHLORIDE 150 MG/3ML
INJECTION, SOLUTION INTRAVENOUS
Status: COMPLETED | OUTPATIENT
Start: 2024-03-26 | End: 2024-03-26

## 2024-03-26 RX ORDER — SODIUM CHLORIDE 0.9 % (FLUSH) 0.9 %
10 SYRINGE (ML) INJECTION AS NEEDED
Status: DISCONTINUED | OUTPATIENT
Start: 2024-03-26 | End: 2024-03-30 | Stop reason: HOSPADM

## 2024-03-26 RX ADMIN — MIDAZOLAM HYDROCHLORIDE: 1 INJECTION, SOLUTION INTRAMUSCULAR; INTRAVENOUS at 23:38

## 2024-03-26 RX ADMIN — AMIODARONE HYDROCHLORIDE 150 MG: 50 INJECTION, SOLUTION INTRAVENOUS at 21:35

## 2024-03-26 RX ADMIN — MAGNESIUM SULFATE HEPTAHYDRATE 1 G: 500 INJECTION, SOLUTION INTRAMUSCULAR; INTRAVENOUS at 21:32

## 2024-03-26 RX ADMIN — ASPIRIN 324 MG: 81 TABLET, CHEWABLE ORAL at 22:40

## 2024-03-26 RX ADMIN — MIDAZOLAM HYDROCHLORIDE 2 MG: 1 INJECTION, SOLUTION INTRAMUSCULAR; INTRAVENOUS at 21:39

## 2024-03-26 RX ADMIN — MIDAZOLAM HYDROCHLORIDE: 1 INJECTION, SOLUTION INTRAMUSCULAR; INTRAVENOUS at 21:43

## 2024-03-26 RX ADMIN — MIDAZOLAM HYDROCHLORIDE 2 MG: 1 INJECTION, SOLUTION INTRAMUSCULAR; INTRAVENOUS at 21:43

## 2024-03-27 LAB
ALBUMIN SERPL-MCNC: 3.9 G/DL (ref 3.5–5.2)
ALBUMIN/GLOB SERPL: 1.4 G/DL
ALP SERPL-CCNC: 100 U/L (ref 39–117)
ALT SERPL W P-5'-P-CCNC: 25 U/L (ref 1–33)
ANION GAP SERPL CALCULATED.3IONS-SCNC: 14.9 MMOL/L (ref 5–15)
ANISOCYTOSIS BLD QL: NORMAL
AST SERPL-CCNC: 23 U/L (ref 1–32)
BACTERIA UR QL AUTO: ABNORMAL /HPF
BASOPHILS # BLD AUTO: 0.08 10*3/MM3 (ref 0–0.2)
BASOPHILS NFR BLD AUTO: 1 % (ref 0–1.5)
BILIRUB SERPL-MCNC: 2.9 MG/DL (ref 0–1.2)
BILIRUB UR QL STRIP: NEGATIVE
BUN SERPL-MCNC: 29 MG/DL (ref 6–20)
BUN/CREAT SERPL: 21 (ref 7–25)
CALCIUM SPEC-SCNC: 8.7 MG/DL (ref 8.6–10.5)
CHLORIDE SERPL-SCNC: 99 MMOL/L (ref 98–107)
CLARITY UR: ABNORMAL
CO2 SERPL-SCNC: 25.1 MMOL/L (ref 22–29)
COLOR UR: YELLOW
CREAT SERPL-MCNC: 1.38 MG/DL (ref 0.57–1)
CRP SERPL-MCNC: 0.82 MG/DL (ref 0–0.5)
D-LACTATE SERPL-SCNC: 2 MMOL/L (ref 0.5–2)
D-LACTATE SERPL-SCNC: 2.5 MMOL/L (ref 0.5–2)
DEPRECATED RDW RBC AUTO: 83.8 FL (ref 37–54)
EGFRCR SERPLBLD CKD-EPI 2021: 44.7 ML/MIN/1.73
EOSINOPHIL # BLD AUTO: 0.13 10*3/MM3 (ref 0–0.4)
EOSINOPHIL NFR BLD AUTO: 1.7 % (ref 0.3–6.2)
ERYTHROCYTE [DISTWIDTH] IN BLOOD BY AUTOMATED COUNT: 21.4 % (ref 12.3–15.4)
GEN 5 2HR TROPONIN T REFLEX: 100 NG/L
GLOBULIN UR ELPH-MCNC: 2.8 GM/DL
GLUCOSE BLDC GLUCOMTR-MCNC: 140 MG/DL (ref 70–130)
GLUCOSE BLDC GLUCOMTR-MCNC: 156 MG/DL (ref 70–130)
GLUCOSE BLDC GLUCOMTR-MCNC: 181 MG/DL (ref 70–130)
GLUCOSE BLDC GLUCOMTR-MCNC: 198 MG/DL (ref 70–130)
GLUCOSE BLDC GLUCOMTR-MCNC: 315 MG/DL (ref 70–130)
GLUCOSE BLDC GLUCOMTR-MCNC: 374 MG/DL (ref 70–130)
GLUCOSE SERPL-MCNC: 370 MG/DL (ref 65–99)
GLUCOSE UR STRIP-MCNC: ABNORMAL MG/DL
HBA1C MFR BLD: 7.7 % (ref 4.8–5.6)
HCT VFR BLD AUTO: 32 % (ref 34–46.6)
HGB BLD-MCNC: 9.4 G/DL (ref 12–15.9)
HGB UR QL STRIP.AUTO: NEGATIVE
HYALINE CASTS UR QL AUTO: ABNORMAL /LPF
HYPOCHROMIA BLD QL: NORMAL
IMM GRANULOCYTES # BLD AUTO: 0.04 10*3/MM3 (ref 0–0.05)
IMM GRANULOCYTES NFR BLD AUTO: 0.5 % (ref 0–0.5)
KETONES UR QL STRIP: NEGATIVE
LEUKOCYTE ESTERASE UR QL STRIP.AUTO: ABNORMAL
LYMPHOCYTES # BLD AUTO: 1.43 10*3/MM3 (ref 0.7–3.1)
LYMPHOCYTES NFR BLD AUTO: 18.4 % (ref 19.6–45.3)
MACROCYTES BLD QL SMEAR: NORMAL
MAGNESIUM SERPL-MCNC: 2.5 MG/DL (ref 1.6–2.6)
MCH RBC QN AUTO: 31.6 PG (ref 26.6–33)
MCHC RBC AUTO-ENTMCNC: 29.4 G/DL (ref 31.5–35.7)
MCV RBC AUTO: 107.7 FL (ref 79–97)
MONOCYTES # BLD AUTO: 0.22 10*3/MM3 (ref 0.1–0.9)
MONOCYTES NFR BLD AUTO: 2.8 % (ref 5–12)
NEUTROPHILS NFR BLD AUTO: 5.88 10*3/MM3 (ref 1.7–7)
NEUTROPHILS NFR BLD AUTO: 75.6 % (ref 42.7–76)
NITRITE UR QL STRIP: NEGATIVE
NRBC BLD AUTO-RTO: 0.3 /100 WBC (ref 0–0.2)
NT-PROBNP SERPL-MCNC: 1318 PG/ML (ref 0–900)
PH UR STRIP.AUTO: <=5 [PH] (ref 5–8)
PLAT MORPH BLD: NORMAL
PLATELET # BLD AUTO: 131 10*3/MM3 (ref 140–450)
PMV BLD AUTO: 10.4 FL (ref 6–12)
POLYCHROMASIA BLD QL SMEAR: NORMAL
POTASSIUM SERPL-SCNC: 3.8 MMOL/L (ref 3.5–5.2)
PROCALCITONIN SERPL-MCNC: 0.26 NG/ML (ref 0–0.25)
PROT SERPL-MCNC: 6.7 G/DL (ref 6–8.5)
PROT UR QL STRIP: NEGATIVE
QT INTERVAL: 354 MS
QT INTERVAL: 524 MS
QTC INTERVAL: 514 MS
QTC INTERVAL: 557 MS
RBC # BLD AUTO: 2.97 10*6/MM3 (ref 3.77–5.28)
RBC # UR STRIP: ABNORMAL /HPF
REF LAB TEST METHOD: ABNORMAL
SODIUM SERPL-SCNC: 139 MMOL/L (ref 136–145)
SP GR UR STRIP: 1.02 (ref 1–1.03)
SQUAMOUS #/AREA URNS HPF: ABNORMAL /HPF
TROPONIN T DELTA: 8 NG/L
TROPONIN T SERPL HS-MCNC: 102 NG/L
UROBILINOGEN UR QL STRIP: ABNORMAL
WBC # UR STRIP: ABNORMAL /HPF
WBC NRBC COR # BLD AUTO: 7.78 10*3/MM3 (ref 3.4–10.8)

## 2024-03-27 PROCEDURE — 87086 URINE CULTURE/COLONY COUNT: CPT | Performed by: HOSPITALIST

## 2024-03-27 PROCEDURE — 83605 ASSAY OF LACTIC ACID: CPT | Performed by: HOSPITALIST

## 2024-03-27 PROCEDURE — 93005 ELECTROCARDIOGRAM TRACING: CPT | Performed by: HOSPITALIST

## 2024-03-27 PROCEDURE — 82948 REAGENT STRIP/BLOOD GLUCOSE: CPT

## 2024-03-27 PROCEDURE — 36415 COLL VENOUS BLD VENIPUNCTURE: CPT | Performed by: HOSPITALIST

## 2024-03-27 PROCEDURE — 85025 COMPLETE CBC W/AUTO DIFF WBC: CPT | Performed by: HOSPITALIST

## 2024-03-27 PROCEDURE — 81001 URINALYSIS AUTO W/SCOPE: CPT | Performed by: HOSPITALIST

## 2024-03-27 PROCEDURE — 63710000001 INSULIN GLARGINE PER 5 UNITS: Performed by: HOSPITALIST

## 2024-03-27 PROCEDURE — 25810000003 SODIUM CHLORIDE 0.9 % SOLUTION: Performed by: HOSPITALIST

## 2024-03-27 PROCEDURE — 80053 COMPREHEN METABOLIC PANEL: CPT | Performed by: HOSPITALIST

## 2024-03-27 PROCEDURE — 87088 URINE BACTERIA CULTURE: CPT | Performed by: HOSPITALIST

## 2024-03-27 PROCEDURE — 86140 C-REACTIVE PROTEIN: CPT | Performed by: HOSPITALIST

## 2024-03-27 PROCEDURE — 87186 SC STD MICRODIL/AGAR DIL: CPT | Performed by: HOSPITALIST

## 2024-03-27 PROCEDURE — 25010000002 ALBUMIN HUMAN 25% PER 50 ML: Performed by: HOSPITALIST

## 2024-03-27 PROCEDURE — P9047 ALBUMIN (HUMAN), 25%, 50ML: HCPCS | Performed by: HOSPITALIST

## 2024-03-27 PROCEDURE — 85007 BL SMEAR W/DIFF WBC COUNT: CPT | Performed by: HOSPITALIST

## 2024-03-27 PROCEDURE — 63710000001 ONDANSETRON ODT 4 MG TABLET DISPERSIBLE: Performed by: HOSPITALIST

## 2024-03-27 PROCEDURE — 63710000001 INSULIN LISPRO (HUMAN) PER 5 UNITS: Performed by: ANESTHESIOLOGY

## 2024-03-27 PROCEDURE — 63710000001 INSULIN LISPRO (HUMAN) PER 5 UNITS: Performed by: HOSPITALIST

## 2024-03-27 PROCEDURE — 84484 ASSAY OF TROPONIN QUANT: CPT | Performed by: HOSPITALIST

## 2024-03-27 PROCEDURE — 25010000002 PHENYLEPHRINE 10 MG/ML SOLUTION: Performed by: HOSPITALIST

## 2024-03-27 PROCEDURE — 99223 1ST HOSP IP/OBS HIGH 75: CPT | Performed by: HOSPITALIST

## 2024-03-27 RX ORDER — ONDANSETRON 4 MG/1
4 TABLET, ORALLY DISINTEGRATING ORAL EVERY 8 HOURS PRN
Status: DISCONTINUED | OUTPATIENT
Start: 2024-03-27 | End: 2024-03-30 | Stop reason: HOSPADM

## 2024-03-27 RX ORDER — INSULIN LISPRO 100 [IU]/ML
3-14 INJECTION, SOLUTION INTRAVENOUS; SUBCUTANEOUS
Status: DISCONTINUED | OUTPATIENT
Start: 2024-03-27 | End: 2024-03-27

## 2024-03-27 RX ORDER — INSULIN LISPRO 100 [IU]/ML
3-14 INJECTION, SOLUTION INTRAVENOUS; SUBCUTANEOUS
Status: DISCONTINUED | OUTPATIENT
Start: 2024-03-27 | End: 2024-03-28

## 2024-03-27 RX ORDER — ASPIRIN 81 MG/1
81 TABLET ORAL DAILY
Status: DISCONTINUED | OUTPATIENT
Start: 2024-03-27 | End: 2024-03-30 | Stop reason: HOSPADM

## 2024-03-27 RX ORDER — DEXTROSE MONOHYDRATE 25 G/50ML
25 INJECTION, SOLUTION INTRAVENOUS
Status: DISCONTINUED | OUTPATIENT
Start: 2024-03-27 | End: 2024-03-30 | Stop reason: HOSPADM

## 2024-03-27 RX ORDER — POLYETHYLENE GLYCOL 3350 17 G/17G
17 POWDER, FOR SOLUTION ORAL DAILY PRN
Status: DISCONTINUED | OUTPATIENT
Start: 2024-03-27 | End: 2024-03-30 | Stop reason: HOSPADM

## 2024-03-27 RX ORDER — GLUCAGON 1 MG/ML
1 KIT INJECTION
Status: DISCONTINUED | OUTPATIENT
Start: 2024-03-27 | End: 2024-03-30 | Stop reason: HOSPADM

## 2024-03-27 RX ORDER — BISACODYL 5 MG/1
5 TABLET, DELAYED RELEASE ORAL DAILY PRN
Status: DISCONTINUED | OUTPATIENT
Start: 2024-03-27 | End: 2024-03-30 | Stop reason: HOSPADM

## 2024-03-27 RX ORDER — SODIUM CHLORIDE 0.9 % (FLUSH) 0.9 %
10 SYRINGE (ML) INJECTION AS NEEDED
Status: DISCONTINUED | OUTPATIENT
Start: 2024-03-27 | End: 2024-03-30 | Stop reason: HOSPADM

## 2024-03-27 RX ORDER — VALSARTAN 80 MG/1
40 TABLET ORAL
Status: CANCELLED | OUTPATIENT
Start: 2024-03-27

## 2024-03-27 RX ORDER — SODIUM CHLORIDE 0.9 % (FLUSH) 0.9 %
10 SYRINGE (ML) INJECTION EVERY 12 HOURS SCHEDULED
Status: DISCONTINUED | OUTPATIENT
Start: 2024-03-27 | End: 2024-03-30 | Stop reason: HOSPADM

## 2024-03-27 RX ORDER — POLYETHYLENE GLYCOL 3350 17 G/17G
17 POWDER, FOR SOLUTION ORAL DAILY
Status: DISCONTINUED | OUTPATIENT
Start: 2024-03-27 | End: 2024-03-30 | Stop reason: HOSPADM

## 2024-03-27 RX ORDER — PHENYLEPHRINE HCL IN 0.9% NACL 0.5 MG/5ML
.5-3 SYRINGE (ML) INTRAVENOUS
Status: DISCONTINUED | OUTPATIENT
Start: 2024-03-27 | End: 2024-03-30 | Stop reason: HOSPADM

## 2024-03-27 RX ORDER — AMOXICILLIN 250 MG
2 CAPSULE ORAL 2 TIMES DAILY
Status: DISCONTINUED | OUTPATIENT
Start: 2024-03-27 | End: 2024-03-30 | Stop reason: HOSPADM

## 2024-03-27 RX ORDER — SODIUM CHLORIDE 9 MG/ML
40 INJECTION, SOLUTION INTRAVENOUS AS NEEDED
Status: DISCONTINUED | OUTPATIENT
Start: 2024-03-27 | End: 2024-03-30 | Stop reason: HOSPADM

## 2024-03-27 RX ORDER — AMIODARONE HYDROCHLORIDE 200 MG/1
400 TABLET ORAL DAILY
Status: ON HOLD | COMMUNITY
End: 2024-03-27

## 2024-03-27 RX ORDER — BUSPIRONE HYDROCHLORIDE 10 MG/1
10 TABLET ORAL 2 TIMES DAILY
Status: DISCONTINUED | OUTPATIENT
Start: 2024-03-27 | End: 2024-03-30 | Stop reason: HOSPADM

## 2024-03-27 RX ORDER — CEFUROXIME AXETIL 500 MG/1
500 TABLET ORAL 2 TIMES DAILY
Status: ON HOLD | COMMUNITY
End: 2024-03-27

## 2024-03-27 RX ORDER — FERROUS SULFATE 325(65) MG
325 TABLET ORAL DAILY
Status: DISCONTINUED | OUTPATIENT
Start: 2024-03-27 | End: 2024-03-30 | Stop reason: HOSPADM

## 2024-03-27 RX ORDER — NICOTINE POLACRILEX 4 MG
15 LOZENGE BUCCAL
Status: DISCONTINUED | OUTPATIENT
Start: 2024-03-27 | End: 2024-03-30 | Stop reason: HOSPADM

## 2024-03-27 RX ORDER — HYDROCODONE BITARTRATE AND ACETAMINOPHEN 7.5; 325 MG/1; MG/1
1 TABLET ORAL 2 TIMES DAILY
Status: DISCONTINUED | OUTPATIENT
Start: 2024-03-27 | End: 2024-03-30 | Stop reason: HOSPADM

## 2024-03-27 RX ORDER — AMIODARONE HYDROCHLORIDE 200 MG/1
300 TABLET ORAL DAILY
Status: DISCONTINUED | OUTPATIENT
Start: 2024-03-27 | End: 2024-03-29

## 2024-03-27 RX ORDER — ALBUMIN (HUMAN) 12.5 G/50ML
50 SOLUTION INTRAVENOUS ONCE
Status: COMPLETED | OUTPATIENT
Start: 2024-03-27 | End: 2024-03-27

## 2024-03-27 RX ORDER — BUMETANIDE 2 MG/1
2 TABLET ORAL TAKE AS DIRECTED
COMMUNITY
End: 2024-03-30 | Stop reason: HOSPADM

## 2024-03-27 RX ORDER — NITROGLYCERIN 0.4 MG/1
0.4 TABLET SUBLINGUAL
Status: DISCONTINUED | OUTPATIENT
Start: 2024-03-27 | End: 2024-03-30 | Stop reason: HOSPADM

## 2024-03-27 RX ORDER — LEVOTHYROXINE SODIUM 0.05 MG/1
50 TABLET ORAL DAILY
Status: DISCONTINUED | OUTPATIENT
Start: 2024-03-27 | End: 2024-03-30 | Stop reason: HOSPADM

## 2024-03-27 RX ORDER — BISACODYL 10 MG
10 SUPPOSITORY, RECTAL RECTAL DAILY PRN
Status: DISCONTINUED | OUTPATIENT
Start: 2024-03-27 | End: 2024-03-30 | Stop reason: HOSPADM

## 2024-03-27 RX ORDER — ONDANSETRON 4 MG/1
4 TABLET, FILM COATED ORAL EVERY 8 HOURS PRN
COMMUNITY

## 2024-03-27 RX ORDER — BUMETANIDE 2 MG/1
2 TABLET ORAL TAKE AS DIRECTED
Status: ON HOLD | COMMUNITY
End: 2024-03-30

## 2024-03-27 RX ORDER — POLYETHYLENE GLYCOL 3350
17 POWDER (GRAM) MISCELLANEOUS DAILY
COMMUNITY

## 2024-03-27 RX ORDER — CAPSAICIN 8 %
4 KIT TOPICAL
Status: ON HOLD | COMMUNITY
End: 2024-03-27

## 2024-03-27 RX ORDER — PANTOPRAZOLE SODIUM 40 MG/1
40 TABLET, DELAYED RELEASE ORAL
Status: DISCONTINUED | OUTPATIENT
Start: 2024-03-28 | End: 2024-03-30 | Stop reason: HOSPADM

## 2024-03-27 RX ADMIN — HYDROCODONE BITARTRATE AND ACETAMINOPHEN 1 TABLET: 7.5; 325 TABLET ORAL at 20:25

## 2024-03-27 RX ADMIN — INSULIN GLARGINE 35 UNITS: 100 INJECTION, SOLUTION SUBCUTANEOUS at 09:21

## 2024-03-27 RX ADMIN — Medication 10 ML: at 09:22

## 2024-03-27 RX ADMIN — ASPIRIN 81 MG: 81 TABLET, COATED ORAL at 09:22

## 2024-03-27 RX ADMIN — BUSPIRONE HYDROCHLORIDE 10 MG: 10 TABLET ORAL at 20:25

## 2024-03-27 RX ADMIN — INSULIN LISPRO 12 UNITS: 100 INJECTION, SOLUTION INTRAVENOUS; SUBCUTANEOUS at 01:14

## 2024-03-27 RX ADMIN — DOCUSATE SODIUM 50 MG AND SENNOSIDES 8.6 MG 2 TABLET: 8.6; 5 TABLET, FILM COATED ORAL at 09:22

## 2024-03-27 RX ADMIN — FERROUS SULFATE TAB 325 MG (65 MG ELEMENTAL FE) 325 MG: 325 (65 FE) TAB at 14:11

## 2024-03-27 RX ADMIN — INSULIN GLARGINE 10 UNITS: 100 INJECTION, SOLUTION SUBCUTANEOUS at 04:02

## 2024-03-27 RX ADMIN — INSULIN GLARGINE 35 UNITS: 100 INJECTION, SOLUTION SUBCUTANEOUS at 20:33

## 2024-03-27 RX ADMIN — ALBUMIN (HUMAN) 50 G: 0.25 INJECTION, SOLUTION INTRAVENOUS at 02:28

## 2024-03-27 RX ADMIN — Medication 10 ML: at 20:26

## 2024-03-27 RX ADMIN — APIXABAN 5 MG: 5 TABLET, FILM COATED ORAL at 02:25

## 2024-03-27 RX ADMIN — APIXABAN 5 MG: 5 TABLET, FILM COATED ORAL at 20:25

## 2024-03-27 RX ADMIN — Medication 10 ML: at 20:25

## 2024-03-27 RX ADMIN — HYDROCODONE BITARTRATE AND ACETAMINOPHEN 1 TABLET: 7.5; 325 TABLET ORAL at 14:11

## 2024-03-27 RX ADMIN — SODIUM CHLORIDE 250 ML: 9 INJECTION, SOLUTION INTRAVENOUS at 03:38

## 2024-03-27 RX ADMIN — INSULIN GLARGINE 25 UNITS: 100 INJECTION, SOLUTION SUBCUTANEOUS at 01:15

## 2024-03-27 RX ADMIN — PHENYLEPHRINE HYDROCHLORIDE 0.5 MCG/KG/MIN: 10 INJECTION INTRAVENOUS at 04:13

## 2024-03-27 RX ADMIN — Medication 10 ML: at 02:25

## 2024-03-27 RX ADMIN — APIXABAN 5 MG: 5 TABLET, FILM COATED ORAL at 09:22

## 2024-03-27 RX ADMIN — INSULIN LISPRO 10 UNITS: 100 INJECTION, SOLUTION INTRAVENOUS; SUBCUTANEOUS at 03:43

## 2024-03-27 RX ADMIN — BUSPIRONE HYDROCHLORIDE 10 MG: 10 TABLET ORAL at 14:11

## 2024-03-27 RX ADMIN — EMPAGLIFLOZIN 10 MG: 10 TABLET, FILM COATED ORAL at 14:11

## 2024-03-27 RX ADMIN — INSULIN LISPRO 3 UNITS: 100 INJECTION, SOLUTION INTRAVENOUS; SUBCUTANEOUS at 20:33

## 2024-03-27 RX ADMIN — INSULIN LISPRO 3 UNITS: 100 INJECTION, SOLUTION INTRAVENOUS; SUBCUTANEOUS at 17:04

## 2024-03-27 RX ADMIN — ONDANSETRON 4 MG: 4 TABLET, ORALLY DISINTEGRATING ORAL at 14:11

## 2024-03-27 RX ADMIN — LEVOTHYROXINE SODIUM 50 MCG: 50 TABLET ORAL at 14:11

## 2024-03-27 NOTE — ED PROVIDER NOTES
Subjective   History of Present Illness  58-year-old female with past medical history of CHF, atrial fibrillation requiring anticoagulation, cirrhosis, diabetes presents to the ER due to concerns for chest pain and palpitations.  EMS noted concerns for initial findings consistent with SVT.  However, no improvement status post adenosine 6 mg and 12 mg IV.  On arrival, concerns for sustained ventricular tachycardia.  Status guarded.      Review of Systems   Respiratory:  Positive for shortness of breath.    Cardiovascular:  Positive for chest pain and palpitations.   All other systems reviewed and are negative.      Past Medical History:   Diagnosis Date    Anemia     CHF (congestive heart failure)     Chronic a-fib     stated by patient     Cirrhosis of liver     stated by patient     Diabetes mellitus     Ventricular tachycardia        Allergies   Allergen Reactions    Phenergan [Promethazine]        Past Surgical History:   Procedure Laterality Date    APPENDECTOMY      CARDIAC CATHETERIZATION N/A 11/10/2020    Procedure: Left Heart Cath;  Surgeon: Charlee Ken MD;  Location: [facility] CATH INVASIVE LOCATION;  Service: Cardiovascular;  Laterality: N/A;    CHOLECYSTECTOMY      TOE AMPUTATION Right     stated by patient.        Family History   Problem Relation Age of Onset    Heart attack Father     Heart attack Brother        Social History     Socioeconomic History    Marital status:    Tobacco Use    Smoking status: Never    Smokeless tobacco: Never   Vaping Use    Vaping status: Never Used   Substance and Sexual Activity    Alcohol use: Never    Drug use: Never    Sexual activity: Defer           Objective   Physical Exam  Constitutional:       General: She is not in acute distress.     Appearance: Normal appearance. She is not ill-appearing.   HENT:      Head: Normocephalic and atraumatic.      Right Ear: External ear normal.      Left Ear: External ear normal.      Nose: Nose normal.      Mouth/Throat:       Mouth: Mucous membranes are moist.   Eyes:      Extraocular Movements: Extraocular movements intact.      Pupils: Pupils are equal, round, and reactive to light.   Cardiovascular:      Rate and Rhythm: Regular rhythm. Tachycardia present.      Heart sounds: No murmur heard.  Pulmonary:      Effort: Pulmonary effort is normal. No respiratory distress.      Breath sounds: Normal breath sounds. No wheezing.   Abdominal:      General: Bowel sounds are normal.      Palpations: Abdomen is soft.      Tenderness: There is no abdominal tenderness.   Musculoskeletal:         General: No deformity or signs of injury. Normal range of motion.      Cervical back: Normal range of motion and neck supple.   Skin:     General: Skin is warm and dry.      Findings: No erythema.   Neurological:      General: No focal deficit present.      Mental Status: She is alert and oriented to person, place, and time. Mental status is at baseline.      Cranial Nerves: No cranial nerve deficit.   Psychiatric:         Mood and Affect: Mood normal.         Behavior: Behavior normal.         Thought Content: Thought content normal.         Electrical Cardioversion    Date/Time: 3/26/2024 11:54 PM    Performed by: Rahul Alberto DO  Authorized by: Rahul Alberto DO    Consent:     Consent obtained:  Emergent situation and verbal    Consent given by:  Healthcare agent and patient    Risks, benefits, and alternatives were discussed: yes      Risks discussed:  Cutaneous burn, death, induced arrhythmia and pain    Alternatives discussed:  No treatment, rate-control medication, alternative treatment, referral, observation, delayed treatment and anti-coagulation medication  Universal protocol:     Patient identity confirmed:  Arm band, verbally with patient and hospital-assigned identification number  Pre-procedure details:     Cardioversion basis:  Emergent    Rhythm:  Ventricular tachycardia    Electrode placement:  Anterior-posterior  Attempt one:      Cardioversion mode:  Synchronous    Waveform:  Monophasic    Shock (Joules):  120    Shock outcome:  Conversion to normal sinus rhythm  Post-procedure details:     Patient status:  Awake    Procedure completion:  Tolerated well, no immediate complications             ED Course  ED Course as of 03/26/24 2355   Tue Mar 26, 2024   2233 Initial EKG noted concerns for wide-complex ventricular tachycardia.  149 bpm.  No obvious acute ST elevation.  QTc is 557.  Electronically signed by Rahul Alberto DO, 03/26/24, 10:33 PM EDT.   [SF]   2233 Repeat EKG status post electrocardioversion notes sinus rhythm. [SF]   2234   67 bpm.  Left bundle branch block noted.  QTc 509.  Electronically signed by Rahul Alberto DO, 03/26/24, 10:34 PM EDT.   [SF]      ED Course User Index  [SF] Rahul Alberto DO                                 XR Chest 1 View    Result Date: 3/26/2024  Impression:  Findings compatible with pulmonary vascular congestion  This report was finalized on 3/26/2024 11:02 PM by Antonio Davey MD.         Results for orders placed or performed during the hospital encounter of 03/26/24   Comprehensive Metabolic Panel    Specimen: Arm, Left; Blood   Result Value Ref Range    Glucose 360 (H) 65 - 99 mg/dL    BUN 29 (H) 6 - 20 mg/dL    Creatinine 1.34 (H) 0.57 - 1.00 mg/dL    Sodium 138 136 - 145 mmol/L    Potassium 3.8 3.5 - 5.2 mmol/L    Chloride 99 98 - 107 mmol/L    CO2 23.7 22.0 - 29.0 mmol/L    Calcium 8.6 8.6 - 10.5 mg/dL    Total Protein 6.9 6.0 - 8.5 g/dL    Albumin 3.9 3.5 - 5.2 g/dL    ALT (SGPT) 26 1 - 33 U/L    AST (SGOT) 24 1 - 32 U/L    Alkaline Phosphatase 105 39 - 117 U/L    Total Bilirubin 3.6 (H) 0.0 - 1.2 mg/dL    Globulin 3.0 gm/dL    A/G Ratio 1.3 g/dL    BUN/Creatinine Ratio 21.6 7.0 - 25.0    Anion Gap 15.3 (H) 5.0 - 15.0 mmol/L    eGFR 46.3 (L) >60.0 mL/min/1.73   High Sensitivity Troponin T    Specimen: Arm, Left; Blood   Result Value Ref Range    HS Troponin T 92 (C) <14 ng/L   CBC  Auto Differential    Specimen: Arm, Left; Blood   Result Value Ref Range    WBC 8.59 3.40 - 10.80 10*3/mm3    RBC 3.06 (L) 3.77 - 5.28 10*6/mm3    Hemoglobin 9.8 (L) 12.0 - 15.9 g/dL    Hematocrit 33.4 (L) 34.0 - 46.6 %    .2 (H) 79.0 - 97.0 fL    MCH 32.0 26.6 - 33.0 pg    MCHC 29.3 (L) 31.5 - 35.7 g/dL    RDW 21.6 (H) 12.3 - 15.4 %    RDW-SD 85.5 (H) 37.0 - 54.0 fl    MPV 10.2 6.0 - 12.0 fL    Platelets 133 (L) 140 - 450 10*3/mm3    Neutrophil % 77.1 (H) 42.7 - 76.0 %    Lymphocyte % 15.1 (L) 19.6 - 45.3 %    Monocyte % 4.0 (L) 5.0 - 12.0 %    Eosinophil % 2.0 0.3 - 6.2 %    Basophil % 1.0 0.0 - 1.5 %    Immature Grans % 0.8 (H) 0.0 - 0.5 %    Neutrophils, Absolute 6.62 1.70 - 7.00 10*3/mm3    Lymphocytes, Absolute 1.30 0.70 - 3.10 10*3/mm3    Monocytes, Absolute 0.34 0.10 - 0.90 10*3/mm3    Eosinophils, Absolute 0.17 0.00 - 0.40 10*3/mm3    Basophils, Absolute 0.09 0.00 - 0.20 10*3/mm3    Immature Grans, Absolute 0.07 (H) 0.00 - 0.05 10*3/mm3    nRBC 0.0 0.0 - 0.2 /100 WBC   Scan Slide    Specimen: Arm, Left; Blood   Result Value Ref Range    Anisocytosis Large/3+ None Seen    Dacrocytes Slight/1+ None Seen    Hypochromia Slight/1+ None Seen    Macrocytes Slight/1+ None Seen    Polychromasia Slight/1+ None Seen    Platelet Estimate Decreased Normal   ECG 12 Lead Chest Pain   Result Value Ref Range    QT Interval 354 ms    QTC Interval 557 ms   Green Top (Gel)   Result Value Ref Range    Extra Tube Hold for add-ons.    Lavender Top   Result Value Ref Range    Extra Tube hold for add-on    Gold Top - SST   Result Value Ref Range    Extra Tube Hold for add-ons.    Light Blue Top   Result Value Ref Range    Extra Tube Hold for add-ons.                  Medical Decision Making  Laboratory workup listed.  Concerns for sustained V. tach.  Ventricular tachycardia was resistant to magnesium IV and amiodarone IV.  Patient required electrical cardioversion at 120 J.  Versed used for anxiolytic.  Conversion to sinus  rhythm with left bundle branch block noted.  See EKG.  Recommend admission for further work up and treatment.  Hospitalist team consulted and made aware of the patient.  Consults and orders placed per hospitalist request.  Patient was agreeable to admission plan.  Vitals stable on admission.    Problems Addressed:  Chest pain, unspecified type: complicated acute illness or injury  Sustained ventricular tachycardia: complicated acute illness or injury    Amount and/or Complexity of Data Reviewed  Labs: ordered. Decision-making details documented in ED Course.  Radiology: ordered. Decision-making details documented in ED Course.  ECG/medicine tests: ordered.    Risk  OTC drugs.  Prescription drug management.        Final diagnoses:   Sustained ventricular tachycardia   Chest pain, unspecified type       ED Disposition  ED Disposition       ED Disposition   Decision to Admit    Condition   --    Comment   --               No follow-up provider specified.       Medication List      No changes were made to your prescriptions during this visit.            Rahul Alberto,   03/26/24 [id number]

## 2024-03-27 NOTE — CASE MANAGEMENT/SOCIAL WORK
Discharge Planning Assessment   Isaías     Patient Name: Yumiko Purdy  MRN: [id number]  Today's Date: 3/27/2024    Admit Date: 3/26/2024    Plan: CM spoke with Pt at bedside. Pt lives with Angel her son in [facility] and plans on returning at d/c. Pt has [facility]. Pt has HB, walker, and w/c from unknown provider. Pt has oxygen 2lnc continuous from [facility]. PCP is Masha COX and gets prescriptions filled at [facility] in [place]. Pt has [employer] [facility] [place] denies any trouble with coverage. Family will provide transportation at d/c. CM will follow.   Discharge Needs Assessment       Row Name 03/27/24 1433       Living Environment    People in Home child(ferdinand), adult    Name(s) of People in Home Sam Purdy    Current Living Arrangements home    Potentially Unsafe Housing Conditions none    In the past 12 months has the electric, gas, oil, or water company threatened to shut off services in your home? No    Primary Care Provided by child(ferdinand)  and home health    Provides Primary Care For no one    Family Caregiver if Needed child(ferdinand), adult    Able to Return to Prior Arrangements yes       Resource/Environmental Concerns    Resource/Environmental Concerns none    Transportation Concerns none       Transportation Needs    In the past 12 months, has lack of transportation kept you from medical appointments or from getting medications? no    In the past 12 months, has lack of transportation kept you from meetings, work, or from getting things needed for daily living? No       Food Insecurity    Within the past 12 months, you worried that your food would run out before you got the money to buy more. Never true    Within the past 12 months, the food you bought just didn't last and you didn't have money to get more. Never true       Transition Planning    Patient/Family Anticipates Transition to home with family    Patient/Family Anticipated Services at Transition none    Transportation  Anticipated family or friend will provide       Discharge Needs Assessment    Equipment Currently Used at Home oxygen;commode;bath bench;glucometer;wheelchair;walker, rolling    Concerns to be Addressed no discharge needs identified    Anticipated Changes Related to Illness none    Equipment Needed After Discharge none    Provided Post Acute Provider List? N/A    Provided Post Acute Provider Quality & Resource List? N/A                   Discharge Plan       Row Name 03/27/24 1600       Plan    Plan CM spoke with Pt at bedside. Pt lives with Angel her son in [facility] and plans on returning at d/c. Pt has [facility]. Pt has HB, walker, and w/c from unknown provider. Pt has oxygen 2lnc continuous from [facility]. PCP is Masha COX and gets prescriptions filled at [facility] in [place]. [facility] [place] denies any trouble with coverage. Family will provide transportation at d/c. CM will follow.                         Valerie Acevedo RN

## 2024-03-27 NOTE — ED NOTES
Pt taken to CCU via stretcher by tech and RN with zoll monitor in use. ID band in place. 20 gauge IV R AC patent. Pt stable at time of departure from ER.

## 2024-03-27 NOTE — PROGRESS NOTES
Patient seen and examined assisted by OLIVIA Street.  Patient is awake and alert she reports she feels much better, admitted for cardiac arrhythmia requiring DC cardioversion to the emergency room,    -Unstable wide-complex cardiac arrhythmia?  A-fib with RVR in the setting of LBBB versus V. tach status post cardioversion  -Hypertension likely secondary to above  -Obesity with a BMI of 30.5  -Diabetes type 2 with hyperglycemia  -History of CKD stage III yea  -History of paroxysmal atrial fibrillation  -Combined systolic and diastolic heart failure currently appears to be not in acute decompensation.  -History of hypothyroidism  -Chronic anticoagulation    Continue Damien-Synephrine and taper as tolerated, watch volume status and signs of cardiac decompensation, follow-up 2D echo, cardiology consult requested.  Monitor electrolytes closely, Accu-Chek and sliding scale, avoid nephrotoxic medications.  Will discuss with cardiology if patient needs to be a candidate for ICD.

## 2024-03-27 NOTE — CONSULTS
Consults  Date of Admit: 3/26/2024  Date of Consult: 03/27/24  Provider, No Known  Yumiko Purdy  1965    Consulting Physician: Norris Muse MD    Cardiology consultation    Reason for consultation: Ventricular tachycardia      History of Present Illness    Subjective     Chief Complaint   Patient presents with    Rapid Heart Rate       Yumiko Purdy is a 58 y.o. female with ASCVD, chronic atrial fibrillation with recent RVR and DCCV 03/13/2024 status post multiple PVI ablations with the last being October 2022, acute on chronic systolic and diastolic heart failure, peripheral arterial disease status post great toe amputation, chronic hypoxemic respiratory failure, chronic kidney disease stage III, cirrhosis, insulin-dependent type 2 diabetes mellitus, and obesity.    Most of the history is taken from the chart as the patient reports that her memory is poor.  She has a very long history of atrial fibrillation with RVR and left bundle branch block.  This has frequently been confused with ventricular tachycardia and readings revised after EP review.  She follows in our heart failure clinic with her last visit 03/19/2024.  She also sees electrophysiology and general cardiology at [facility].  Her last visit with  was 03/22/2024 and he recommended that she stay on 300 mg of amiodarone daily for 2 months and then decrease to 200 mg daily.  At that time he noted that she was not a candidate for further ablations.    Her ejection fraction in November 2023 was reported as 30 to 35% with grade 3 diastolic dysfunction.  Limited echo performed at [facility] in February 2024 revealed an EF of proximately 50%.    She reports that she was at home and felt her heart rate began to race.  She used a pulse oximeter and found it to be in the 150s and so she called for EMS.  EMS reportedly felt as though she was in SVT and gave her adenosine without response.  In the emergency room they felt as though  she was in wide-complex tachycardia and she was hypotensive with a blood pressure of 90/60.  They gave 1 g of magnesium and started IV amiodarone without any response and so they proceeded with cardioversion.  She continued to be hypotensive and so she was admitted for further evaluation and management.    Admission labs and studies significant for high-sensitivity troponin -102.  proBNP 1318 which is down from 2024 on 03/19/2024.  CBC consistent with chronic anemia with a hemoglobin of 9.8 and platelets of 133.  BMP significant for BUN of 29, creatinine of 1.34, glucose 360.  Magnesium was 2.5.  A1c 7.7.  Bilirubin 3.6 and then down to 2.9.  Chest x-ray showed pulmonary vascular congestion.  EKGs pictured below.    At the time of my visit today she is awake and hungry.  She reports that her shortness of air is at baseline.  She no longer feels as though her heart is racing.  She denies chest pain.      Cardiac risk factors:arteriosclerotic heart disease, diabetes mellitus, hypertension, Sedentary life style, and Obesity    Last Echo: 02/08/2024  Limited echo revealed EF of 50% ±5% and no pericardial effusion.  Also noted hypokinesis of the mid anteroseptal, mid inferoseptal, mid inferior, apical septal, basal anteroseptal, and basal inferior segments      Last Stress:   Results for orders placed during the hospital encounter of 10/15/20    Nuclear Medicine Cardiac Blood Pool [facility] At Rest    Interpretation Summary  EXAMINATION: NUCLEAR MEDICINE CARDIAC BLOOD POOL MUGA AT REST-    CLINICAL INDICATION: Cardiomyopathy  TECHNIQUE: 21 mCi of Tc-99m autologous RBCs were injected IV after  in-vitro RBC labeling. Gated cardiac imaging was performed in the  anterior and left anterior oblique.  COMPARISON: None  FINDINGS: The left ventricle is normal in size with normal systolic  function. The calculated left ventricular ejection fraction (LVEF) = 38  %.  The right and left atria, aorta and pulmonary artery are normal.  The  right ventricle is normal in size.    Impression  LVEF: 38%, at rest.    This report was finalized on 10/16/2020 11:57 AM by Dr. Marlo Parr MD.      Last Cath:  Results for orders placed during the hospital encounter of 11/10/20    Cardiac Catheterization/Vascular Study    Narrative  Procedure type:  Left heart cath, LV gram, bilateral selective cholangiogram    Indication:  Cardiomyopathy    Procedure:  After informed consent the patient was brought into the cardiac aspiration lab she was prepped and draped in the usual sterile manner the right radial area was incised with 2% Xylocaine however several attempts to engage into the right radial artery was not successful so the right radial artery access was abandoned and the right groin area was excised in 2% Xylocaine right femoral artery was accessed using modified standard technique and 5 Citizen of the [place] side-port arterial sheath was placed and secured then over a guidewire a 5 Citizen of the [place] JL 4 catheter was used left main coronary artery with angiographic pressures were taken then the catheter was removed and over a guidewire a 5 Citizen of the [place] JR4 catheter was used and engagement of the right coronary arteries angioplasty was taken then the catheter was removed then over a guidewire 5 Citizen of the [place] pigtail catheter used aortic valve was done hand-injection LV gram was done then pullback was done then the catheter was removed groin sheath was removed and minx closure device applied with good hemostasis the patient was transported back to her room in stable condition.    Results:  The patient LVEDP was 17 mmHg with hand-injection showing preserved left ventricular systolic function wall motion EF 50-55% with no significant aortic gradient on pullback.    Cholangiogram:  This right dominant system.  Left main cardiac angiographically normal and bifurcates into left and descending and left circumflex arteries.  LAD was normal caliber gives several septal perforators and diagonal  branches and wraps around the apex LAD about 30 to 40% mid stenosis.  Left circumflex artery was nondominant for posterior circulation gives rise to several obtuse marginal branches left circumflex arteries branches angiographically normal.  The right coronary artery was a large artery was dominant for posterior circulation giving rise to acute marginal branches PDA and posterolateral branches the right coronary artery and its branches angiographically normal.    Conclusion:  Preserved LV systolic function ejection fraction 50-55% with elevated left ventricular end-diastolic pressure consistent with diastolic dysfunction coronary angiogram showed right dominant system with 30 to 40% mid LAD lesion otherwise coronaries arteries were normal.  Plan of care:  Medical management and secondary preventive measurements of coronary disease good lipid control blood pressure control healthy lifestyle patient is to follow-up with her primary care physician for further management.    Right heart catheterization 12/22/2022  Using ultrasound and micropuncture, access to right brachial vein obtained   and 7F sheath inserted. 7F PA catheter used for RHC.   I attempted right radial access for LHC but unsuccessful due to very small   artery which appears occluded or near-occluded.   Procedure Summary   Elevated left and right heart filling pressures.   Elevated pulmonary pressures.   Borderline-low Ramos CO/CI.    Procedure Data   Procedure Date   Date: 12/22/2022Start: 15:32End: 16:09   !Location!pCO2 !pO2 !% Saturation          !Hgb!O2 Content                 !   +--------+-----+----+----------------------+---+---------------------------+   !PA     !     !    !51                    !   !                           !   +--------+-----+----+----------------------+---+---------------------------+   !RA     !     !    !58                    !   !                           !    +--------+-----+----+----------------------+---+---------------------------+   !AO     !     !    !94                    !   !                           !   +--------+-----+----+----------------------+---+---------------------------+   Pressures (mmHg)   +-----+--------------------------------------------------------------------+   !Site !Pressure                                                            !   +-----+--------------------------------------------------------------------+   !RA  !28/28 (25)                                                          !   +-----+--------------------------------------------------------------------+   !RV  !81/10 ,27                                                           !   +-----+--------------------------------------------------------------------+   !PA  !73/32 (43)                                                          !   +-----+--------------------------------------------------------------------+   !PCW !68/66 (45)                                                          !   +-----+--------------------------------------------------------------------+   !PCW !78/59 (45)                                                          !   +-----+--------------------------------------------------------------------+   !PCW !23/36 (27)                                                          !   +-----+--------------------------------------------------------------------+   Cardiac Output   +------+-------------------------+----------------------------+------------+   !Method!CO (l/min)               !CI (l/min/m2)               !SV (ml)     !   +------+-------------------------+----------------------------+------------+   !Ramos !5.35                     !2.2                         !131.67      !   +------+-------------------------+----------------------------+------------+   Shunts   Oxygen Values    O2 Capacity 129.2 O2 Consumption 297.5    Flows (l/min)    Qs 6.4 Qe/Qp 1.2     Qp 5.35 Qp/Qs 0.84    Qe 6.4   Vascular Resistance (dynes x sec x cm-5)   +-------------------------------------+----+---+----+----+---------+-------+   !CO method                            !TSVR!SVR!TPVR!PVR !TPVR/TSVR!PVR/SVR!   +-------------------------------------+----+---+----+----+---------+-------+   !Ramos                                !    !   !8.03!2.94!         !       !   +-------------------------------------+----+---+----+----+---------+-------+   !Qp or Qs                             !    !   !8.03!2.94!         !       !   Past Medical History:   Diagnosis Date    Anemia     CHF (congestive heart failure)     Chronic a-fib     stated by patient     Cirrhosis of liver     stated by patient     Diabetes mellitus     Ventricular tachycardia      Past Surgical History:   Procedure Laterality Date    APPENDECTOMY      CARDIAC CATHETERIZATION N/A 11/10/2020    Procedure: Left Heart Cath;  Surgeon: Charlee Ken MD;  Location: [facility] CATH INVASIVE LOCATION;  Service: Cardiovascular;  Laterality: N/A;    CHOLECYSTECTOMY      TOE AMPUTATION Right     stated by patient.      Family History   Problem Relation Age of Onset    Heart attack Father     Heart attack Brother      Social History     Tobacco Use    Smoking status: Never    Smokeless tobacco: Never   Vaping Use    Vaping status: Never Used   Substance Use Topics    Alcohol use: Never    Drug use: Never     Medications Prior to Admission   Medication Sig Dispense Refill Last Dose    amiodarone (PACERONE) 100 MG tablet Take 3 tablets by mouth Daily.   3/26/2024    apixaban (ELIQUIS) 5 MG tablet tablet Take 1 tablet by mouth Every 12 (Twelve) Hours. Indications: Atrial Fibrillation 60 tablet 3 3/26/2024    Aspirin Low Dose 81 MG EC tablet Take 1 tablet by mouth Daily.   3/26/2024    bumetanide (BUMEX) 2 MG tablet Take 1 tablet by mouth Take As Directed.   3/26/2024    busPIRone (BUSPAR) 10 MG tablet Take 1 tablet by mouth 2 (Two) Times a Day.    3/26/2024    empagliflozin (JARDIANCE) 10 MG tablet tablet Take 1 tablet by mouth Daily for 30 days. 30 tablet 6 3/26/2024    ferrous sulfate 325 (65 FE) MG tablet Take 1 tablet by mouth Daily.   3/26/2024    HYDROcodone-acetaminophen (NORCO) 7.5-325 MG per tablet Take 1 tablet by mouth 2 (Two) Times a Day.   3/26/2024    Insulin Glargine (LANTUS SOLOSTAR) 100 UNIT/ML injection pen Inject 45 Units under the skin into the appropriate area as directed 2 (Two) Times a Day. 30 mL 0 3/26/2024    Insulin Lispro, 1 Unit Dial, (HUMALOG) 100 UNIT/ML solution pen-injector Inject 15 Units under the skin into the appropriate area as directed 3 (Three) Times a Day With Meals. 15 mL 1 3/26/2024    levothyroxine (SYNTHROID, LEVOTHROID) 50 MCG tablet Take 1 tablet by mouth Daily.   3/26/2024    omeprazole (priLOSEC) 20 MG capsule Take 1 capsule by mouth Daily.   3/26/2024    ondansetron (ZOFRAN) 4 MG tablet Take 1 tablet by mouth Every 8 (Eight) Hours As Needed for Nausea or Vomiting.   Past Week    Polyethylene Glycol 3350 powder Take 17 g by mouth Daily.   3/26/2024    valsartan (DIOVAN) 40 MG tablet Take 1 tablet by mouth Daily for 30 days. 30 tablet 0 3/26/2024    Vericiguat (Verquvo) 10 MG tablet Take 1 tablet by mouth Daily. 30 tablet 2 3/26/2024    Vortioxetine HBr (Trintellix) 5 MG tablet tablet Take 1 tablet by mouth Daily With Breakfast.   3/26/2024    bumetanide (BUMEX) 2 MG tablet Take 1 tablet by mouth Take As Directed.   3/24/2024     Allergies:  Phenergan [promethazine]    Review of Systems   Respiratory:  Positive for shortness of breath.    Cardiovascular:  Positive for palpitations and leg swelling. Negative for chest pain.   Neurological:  Positive for weakness. Negative for dizziness.          Objective      Vital Signs  Temp:  [97.7 °F (36.5 °C)-98.2 °F (36.8 °C)] 98.1 °F (36.7 °C)  Heart Rate:  [] 57  Resp:  [17-20] 20  BP: ()/() 118/48  Body mass index is 38.52 kg/m².    Intake/Output  Summary (Last 24 hours) at 3/27/2024 1521  Last data filed at 3/27/2024 0556  Gross per 24 hour   Intake 477.04 ml   Output 600 ml   Net -122.96 ml       Physical Exam  Vitals and nursing note reviewed. Exam conducted with a chaperone present (RN at bedside).   Constitutional:       Appearance: She is obese. She is ill-appearing (Chronically).      Interventions: Nasal cannula in place.      Comments: Appears older than stated age   HENT:      Head: Normocephalic and atraumatic.   Eyes:      Conjunctiva/sclera: Conjunctivae normal.   Cardiovascular:      Rate and Rhythm: Regular rhythm. Bradycardia present.      Heart sounds: No murmur heard.  Pulmonary:      Effort: Pulmonary effort is normal.      Breath sounds: Normal breath sounds.   Abdominal:      General: There is distension.   Musculoskeletal:      Right lower leg: Edema present.      Left lower leg: Edema present.      Comments: Right great toe surgically absent   Skin:     General: Skin is warm and dry.      Comments: 2 and 3 mm black spots on the dorsum of the second toe of her left foot   Neurological:      General: No focal deficit present.      Mental Status: She is alert.   Psychiatric:         Mood and Affect: Mood normal.         Telemetry: Sinus bradycardia    Results Review:   I reviewed the patient's new clinical results.  Results from last 7 days   Lab Units 03/27/24  0158 03/26/24  2347 03/26/24  2154   HSTROP T ng/L 102* 100* 92*     Results from last 7 days   Lab Units 03/27/24  0158 03/26/24  2154   WBC 10*3/mm3 7.78 8.59   HEMOGLOBIN g/dL 9.4* 9.8*   PLATELETS 10*3/mm3 131* 133*     Results from last 7 days   Lab Units 03/27/24  0158 03/26/24  2154   SODIUM mmol/L 139 138   POTASSIUM mmol/L 3.8 3.8   CHLORIDE mmol/L 99 99   CO2 mmol/L 25.1 23.7   BUN mg/dL 29* 29*   CREATININE mg/dL 1.38* 1.34*   CALCIUM mg/dL 8.7 8.6   GLUCOSE mg/dL 370* 360*   ALT (SGPT) U/L 25 26   AST (SGOT) U/L 23 24     Lab Results   Component Value Date    INR 0.97  03/13/2024    INR 1.13 (H) 01/31/2024    INR 0.97 11/15/2023    INR 1.20 (H) 11/12/2023    INR 1.10 12/15/2022    INR 1.25 (H) 09/23/2020    INR 1.09 09/22/2020     Lab Results   Component Value Date    MG 2.5 03/26/2024    MG 2.3 03/19/2024    MG 2.6 (H) 03/13/2024     Lab Results   Component Value Date    TSH 2.960 01/31/2024    CHLPL 103 04/21/2016    TRIG 80 09/11/2020    HDL 43 09/11/2020    LDL 78 09/11/2020      Lab Results   Component Value Date    PROBNP 1,318.0 (H) 03/26/2024    PROBNP 2,024.0 (H) 03/19/2024    PROBNP 801.5 02/19/2024        EKG:         Imaging Results (Last 72 Hours)       Procedure Component Value Units Date/Time    XR Chest 1 View [[id number]] Collected: 03/26/24 2302     Updated: 03/26/24 2304    Narrative:      Single view of the chest     Indications: Chest pain     Findings:     AP view of the chest is compared to the prior study dated 3/12/2024.     The cardiac silhouette is enlarged.  Osseous structures are normal.   Pulmonary vascular markings are prominent.  Bilateral perihilar  interstitial prominence is present.  No focal infiltrates or effusions.       Impression:      Impression:     Findings compatible with pulmonary vascular congestion      This report was finalized on 3/26/2024 11:02 PM by Antonio Davey MD.                Assessment -cardiac:  1.  V. tach versus A-fib with RVR and left bundle branch block  2.  Acute on chronic systolic and diastolic CHF  3.  Hypotension          Plan:  1.  Currently in sinus bradycardia with first-degree AV block.  Would resume home amiodarone at 300 mg daily.  Continue anticoagulation with Eliquis.  2.  Repeat echocardiogram pending.  Home meds include SGLT2, ARB, and Verquvo.  Unable to use beta-blocker secondary to bradycardia.  Holding valsartan due to hypotension.  Not initiating MRA secondary to same.  3.  Currently requiring Damien-Synephrine drip    Thank you very much for asking us to be involved in this patient's care.  We will  follow along with you.    Further decision making based on Dr. Muse's assessment and recommendations.        Electronically signed by KENNY Johnson, 03/27/24, 3:53 PM EDT.    .Electronically signed by Norris Muse MD, 03/27/24, 7:20 PM EDT.

## 2024-03-27 NOTE — H&P
Hospitalist History and Physical        Patient Identification  Name: Yumiko Purdy  Age/Sex: 58 y.o. female  :  1965        MRN: [id number]  Visit Number: [id number]  Admit Date: 3/26/2024   PCP: Masha Davidson APRN          Chief complaint heart racing, short of breath    History of Present Illness:  Patient is a 58 y.o. female with history of anemia, combined systolic (EF31-35%) and grade III diastolic CHF, chronic afib, cirrhosis, insulin dependent Type II DM, and ventricular tachycardia, who was recently admitted to [facility] from 3/13- for PAF with RVR s/p direct cardioversion. She was started on amiodarone and instructed to follow up with cardiology in the outpatient setting. She presented back to the ED this evening with complaints of palpitations and shortness of breath. Upon further questioning, she denies chest pain/pressure but does repot increased lower extremity edema from baseline. She also reports some dysuria.     Per EMS report, she appeared to be in SVT in the field and so was given IV adenosine but did not respond. When she arrived to the ED, she appeared to be in more of a wide complex tachycardia concerning for vtach. BP was 90/60. She received 1g IV mag as well as IV amiodarone but gain did not respond, so she was emergently cardioverted. Now she is back in sinus rhythm. BP was initially better but has since dropped to 80s/30s so she was ordered 50g IV albumin as well as a 250cc IV NS bolus. She was admitted to the CCU for further management. Troponin was 92 with reflex up to 100 followed by 102. These levels are not significantly changed from recent labs in Epic. BNP is 1318 ( on 3/19/24). Anion gap 15.3, BUN 29, Cr 1.34 (baseline range). LFTs showed chronically elevated total bilirubin. Hemoglobin and platelets stable. Patient reports dyspnea persists but is improved since undergoing cardioversion.     Review of Systems  Review of Systems   Constitutional:   Positive for activity change and fatigue. Negative for appetite change, chills and diaphoresis.   HENT:  Negative for congestion, postnasal drip, rhinorrhea, sinus pressure, sinus pain and sore throat.    Eyes:  Negative for photophobia and visual disturbance.   Respiratory:  Positive for shortness of breath. Negative for cough and wheezing.    Cardiovascular:  Positive for palpitations and leg swelling. Negative for chest pain.   Gastrointestinal:  Negative for abdominal distention, abdominal pain, constipation, diarrhea, nausea and vomiting.   Genitourinary:  Positive for dysuria. Negative for difficulty urinating, flank pain, frequency and hematuria.   Musculoskeletal:  Negative for arthralgias, back pain, joint swelling and myalgias.   Skin:  Negative for color change, pallor, rash and wound.   Neurological:  Positive for weakness (generlized). Negative for dizziness, seizures, syncope, light-headedness, numbness and headaches.   Hematological:  Negative for adenopathy. Does not bruise/bleed easily.   Psychiatric/Behavioral:  Negative for agitation, behavioral problems and confusion.        History  Past Medical History:   Diagnosis Date    Anemia     CHF (congestive heart failure)     Chronic a-fib     stated by patient     Cirrhosis of liver     stated by patient     Diabetes mellitus     Ventricular tachycardia      Past Surgical History:   Procedure Laterality Date    APPENDECTOMY      CARDIAC CATHETERIZATION N/A 11/10/2020    Procedure: Left Heart Cath;  Surgeon: Charlee Ken MD;  Location: [facility] INVASIVE LOCATION;  Service: Cardiovascular;  Laterality: N/A;    CHOLECYSTECTOMY      TOE AMPUTATION Right     stated by patient.      Family History   Problem Relation Age of Onset    Heart attack Father     Heart attack Brother      Social History     Tobacco Use    Smoking status: Never    Smokeless tobacco: Never   Vaping Use    Vaping status: Never Used   Substance Use Topics    Alcohol use: Never     Drug use: Never     Medications Prior to Admission   Medication Sig Dispense Refill Last Dose    amiodarone (PACERONE) 100 MG tablet Take 3 tablets by mouth Daily.       amiodarone (PACERONE) 200 MG tablet Take 2 tablets by mouth Daily.       apixaban (ELIQUIS) 5 MG tablet tablet Take 1 tablet by mouth Every 12 (Twelve) Hours. Indications: Atrial Fibrillation 60 tablet 3     Aspirin Low Dose 81 MG EC tablet Take 1 tablet by mouth Daily.       busPIRone (BUSPAR) 10 MG tablet Take 1 tablet by mouth 2 (Two) Times a Day.       capsaicin-cleansing gel (Qutenza, 4 Patch,) 8 % kit topical system Apply 4 patches topically to the appropriate area as directed Every 3 (Three) Months.       cefuroxime (CEFTIN) 500 MG tablet Take 1 tablet by mouth 2 (Two) Times a Day.       empagliflozin (JARDIANCE) 10 MG tablet tablet Take 1 tablet by mouth Daily for 30 days. 30 tablet 6     ferrous sulfate 325 (65 FE) MG tablet Take 1 tablet by mouth Daily.       HYDROcodone-acetaminophen (NORCO) 7.5-325 MG per tablet Take 1 tablet by mouth 2 (Two) Times a Day.       Insulin Glargine (LANTUS SOLOSTAR) 100 UNIT/ML injection pen Inject 45 Units under the skin into the appropriate area as directed 2 (Two) Times a Day. 30 mL 0     Insulin Lispro, 1 Unit Dial, (HUMALOG) 100 UNIT/ML solution pen-injector Inject 15 Units under the skin into the appropriate area as directed 3 (Three) Times a Day With Meals. (Patient taking differently: Inject 40 Units under the skin into the appropriate area as directed 3 (Three) Times a Day With Meals.) 15 mL 1     levothyroxine (SYNTHROID, LEVOTHROID) 50 MCG tablet Take 1 tablet by mouth Daily.       omeprazole (priLOSEC) 20 MG capsule Take 1 capsule by mouth Daily.       ondansetron (ZOFRAN) 4 MG tablet Take 1 tablet by mouth Every 6 (Six) Hours As Needed for Nausea or Vomiting.       Polyethylene Glycol 3350 powder Use 238 g Take As Directed.       valsartan (DIOVAN) 40 MG tablet Take 1 tablet by mouth Daily for  30 days. 30 tablet 0     Vericiguat (Verquvo) 10 MG tablet Take 1 tablet by mouth Daily. 30 tablet 2     Vortioxetine HBr (Trintellix) 5 MG tablet tablet Take 1 tablet by mouth Daily With Breakfast.       acetaminophen (TYLENOL) 500 MG tablet Take 1 tablet by mouth 2 (Two) Times a Day As Needed for Mild Pain.       bumetanide (BUMEX) 2 MG tablet Take 1 tablet by mouth Daily for 30 days. Two tablets daily until 02/22/24 then down to 1 tablet daily with extra as needed. (Patient taking differently: Take 1 tablet by mouth Daily. Taking 2 mg BID Mon-Sat then 2 mg daily on Sunday) 45 tablet 2      Allergies:  Phenergan [promethazine]    Objective     Vital Signs  Temp:  [97.7 °F (36.5 °C)-98 °F (36.7 °C)] 97.7 °F (36.5 °C)  Heart Rate:  [] 58  Resp:  [17-18] 18  BP: ()/(39-70) 86/39  Body mass index is 38.52 kg/m².    Physical Exam:  Physical Exam  Constitutional:       General: She is not in acute distress.     Appearance: She is obese. She is ill-appearing.   HENT:      Head: Normocephalic and atraumatic.      Right Ear: External ear normal.      Left Ear: External ear normal.      Nose: Nose normal.      Mouth/Throat:      Mouth: Mucous membranes are moist.      Pharynx: Oropharynx is clear.   Eyes:      Extraocular Movements: Extraocular movements intact.      Conjunctiva/sclera: Conjunctivae normal.      Pupils: Pupils are equal, round, and reactive to light.   Cardiovascular:      Rate and Rhythm: Normal rate and regular rhythm.      Pulses: Normal pulses.      Heart sounds: Normal heart sounds. No murmur heard.  Pulmonary:      Effort: Pulmonary effort is normal.      Breath sounds: Rales (bilateral bases) present.   Abdominal:      General: Abdomen is flat. Bowel sounds are normal. There is no distension.      Palpations: Abdomen is soft.      Tenderness: There is no abdominal tenderness.   Musculoskeletal:         General: Normal range of motion.      Cervical back: Normal range of motion and neck  supple. No tenderness.      Right lower leg: Edema (2+ pitting) present.      Left lower leg: Left lower leg edema: 2+ pitting.   Lymphadenopathy:      Cervical: No cervical adenopathy.   Skin:     General: Skin is warm and dry.      Capillary Refill: Capillary refill takes less than 2 seconds.      Coloration: Skin is not jaundiced.      Findings: No bruising or lesion.   Neurological:      General: No focal deficit present.      Mental Status: She is alert and oriented to person, place, and time.   Psychiatric:         Mood and Affect: Mood normal.         Behavior: Behavior normal.         Results Review:       Lab Results:  Results from last 7 days   Lab Units 03/27/24  0158 03/26/24  2154   WBC 10*3/mm3 7.78 8.59   HEMOGLOBIN g/dL 9.4* 9.8*   PLATELETS 10*3/mm3 131* 133*         Results from last 7 days   Lab Units 03/27/24  0158 03/26/24  2154   SODIUM mmol/L 139 138   POTASSIUM mmol/L 3.8 3.8   CHLORIDE mmol/L 99 99   CO2 mmol/L 25.1 23.7   BUN mg/dL 29* 29*   CREATININE mg/dL 1.38* 1.34*   CALCIUM mg/dL 8.7 8.6   GLUCOSE mg/dL 370* 360*     Results from last 7 days   Lab Units 03/26/24  2347   MAGNESIUM mg/dL 2.5     Hemoglobin A1C   Date Value Ref Range Status   03/26/2024 7.70 (H) 4.80 - 5.60 % Final     Results from last 7 days   Lab Units 03/27/24  0158 03/26/24  2154   BILIRUBIN mg/dL 2.9* 3.6*   ALK PHOS U/L 100 105   AST (SGOT) U/L 23 24   ALT (SGPT) U/L 25 26     Results from last 7 days   Lab Units 03/27/24  0158 03/26/24  2347 03/26/24  2154   HSTROP T ng/L 102* 100* 92*                   I have reviewed the patient's laboratory results.    Imaging:  Imaging Results (Last 72 Hours)       Procedure Component Value Units Date/Time    XR Chest 1 View [[id number]] Collected: 03/26/24 2302     Updated: 03/26/24 2304    Narrative:      Single view of the chest     Indications: Chest pain     Findings:     AP view of the chest is compared to the prior study dated 3/12/2024.     The cardiac silhouette is  enlarged.  Osseous structures are normal.   Pulmonary vascular markings are prominent.  Bilateral perihilar  interstitial prominence is present.  No focal infiltrates or effusions.       Impression:      Impression:     Findings compatible with pulmonary vascular congestion      This report was finalized on 3/26/2024 11:02 PM by Antonio Davey MD.               I have personally reviewed the patient's radiologic imaging.    EKG:  Critical Test Result: High HR  Wide QRS tachycardia, , QTc 557  Left axis deviation  Nonspecific intraventricular block  Possible Anterolateral infarct , age undetermined  Abnormal ECG  When compared with ECG of 11-MAR-2024 23:51,  Wide QRS tachycardia has replaced Sinus rhythm  Vent. rate has increased BY  66 BPM    I have personally reviewed the patient's EKG.    Assessment & Plan     - Ventricular tachycardia vs afib w/RVR with aberrancy: patient has chronic LBBB vs chronic nonspecific intraventricular block which skews interpretation. Did not respond to IV amiodarone so required DC cardioversion. Now back to sinus rhythm. Update ECHO later this morning. Await further recommendations from cardiology. In the mean time, K+ 3.8, mag 2.5 so will refrain from electrolyte replacement at this time. Continue home amiodarone.   - Acute on chronic systolic and diastolic CHF secondary to above: suspect will improve simply with cardioversion and return to sinus rhythm. Hold diuretics for now given borderline BP. Continue to monitor.   - Hypotension: BP dropped after arrival to CCU. Given 250cc IV fluid bolus and 50g IV albumin. Continue to monitor. If remains hypotensive, may have to start vasopressor.  - Dysuria: check UA.    - Stage IIIa-b CKD: creatinine appears to be within baseline range. Continue to monitor for now.   - Type II DM insulin dependent with hyperglycemia: lantus initially decreased to prevent hypoglycemia, but glucose remains elevated so will increase lantus dose by 10  units. SSI ordered as well.     DVT Prophylaxis: anticoagulated on eliquis    Estimated Length of Stay >2 midnights    I discussed the patient's findings, assessment and plan with the patient and nursing staff in the CCU.    Patient is high risk due to VTach vs afib with RVR with aberrancy, acute on chronic systolic and diastolic CHF, hypotension    Tomás An MD  03/27/24  03:35 EDT

## 2024-03-27 NOTE — PLAN OF CARE
Problem: Skin Injury Risk Increased  Goal: Skin Health and Integrity  Outcome: Ongoing, Progressing  Intervention: Optimize Skin Protection  Recent Flowsheet Documentation  Taken 3/27/2024 0600 by Thomas Soto RN  Head of Bed (HOB) Positioning: HOB elevated  Taken 3/27/2024 0400 by Thomas Soto RN  Head of Bed (HOB) Positioning: HOB elevated  Taken 3/27/2024 0200 by Thomas Soto RN  Head of Bed (HOB) Positioning: HOB elevated     Problem: Adult Inpatient Plan of Care  Goal: Plan of Care Review  Outcome: Ongoing, Progressing  Flowsheets (Taken 3/27/2024 0617)  Progress: no change  Plan of Care Reviewed With: patient  Outcome Evaluation: PT recieved from the ER hypotensive requirig sylvia CARBALLO  Goal: Patient-Specific Goal (Individualized)  Outcome: Ongoing, Progressing  Goal: Absence of Hospital-Acquired Illness or Injury  Outcome: Ongoing, Progressing  Intervention: Identify and Manage Fall Risk  Recent Flowsheet Documentation  Taken 3/27/2024 0600 by Thomas Soto RN  Safety Promotion/Fall Prevention:   safety round/check completed   fall prevention program maintained  Taken 3/27/2024 0500 by Thomas Soto RN  Safety Promotion/Fall Prevention:   safety round/check completed   fall prevention program maintained  Taken 3/27/2024 0400 by Thomas Soto RN  Safety Promotion/Fall Prevention: safety round/check completed  Taken 3/27/2024 0300 by Thomas Soto RN  Safety Promotion/Fall Prevention:   safety round/check completed   fall prevention program maintained  Taken 3/27/2024 0200 by Thomas Soto RN  Safety Promotion/Fall Prevention:   safety round/check completed   fall prevention program maintained  Intervention: Prevent Skin Injury  Recent Flowsheet Documentation  Taken 3/27/2024 0600 by Thomas Soto RN  Body Position:   turned   right  Taken 3/27/2024 0400 by Thomas Soto RN  Body Position:   turned   left  Taken  3/27/2024 0200 by Thomas Soto, RN  Body Position:   turned   right  Intervention: Prevent and Manage VTE (Venous Thromboembolism) Risk  Recent Flowsheet Documentation  Taken 3/27/2024 0200 by Thomas Soto, RN  Activity Management: bedrest  VTE Prevention/Management: (see mar) other (see comments)  Goal: Optimal Comfort and Wellbeing  Outcome: Ongoing, Progressing  Goal: Readiness for Transition of Care  Outcome: Ongoing, Progressing     Problem: Diabetes Comorbidity  Goal: Blood Glucose Level Within Targeted Range  Outcome: Ongoing, Progressing     Problem: Heart Failure Comorbidity  Goal: Maintenance of Heart Failure Symptom Control  Outcome: Ongoing, Progressing  Intervention: Maintain Heart Failure-Management  Recent Flowsheet Documentation  Taken 3/27/2024 0600 by Thomas Soto, RN  Medication Review/Management: medications reviewed  Taken 3/27/2024 0500 by Thomas Soto, RN  Medication Review/Management: medications reviewed  Taken 3/27/2024 0400 by Thomas Soto, RN  Medication Review/Management: medications reviewed  Taken 3/27/2024 0300 by Thomas Soto, RN  Medication Review/Management: medications reviewed  Taken 3/27/2024 0200 by Thomas Soto, RN  Medication Review/Management: medications reviewed   Goal Outcome Evaluation:  Plan of Care Reviewed With: patient        Progress: no change  Outcome Evaluation: PT recieved from the ER hypotensive requirig sylvia CARBALLO

## 2024-03-27 NOTE — PLAN OF CARE
Problem: Skin Injury Risk Increased  Goal: Skin Health and Integrity  Outcome: Ongoing, Not Progressing  Intervention: Optimize Skin Protection  Description: Perform a full pressure injury risk assessment, as indicated by screening, upon admission to care unit.  Reassess skin (injury risk, full inspection) frequently (e.g., scheduled interval, with change in condition) to provide optimal early detection and prevention.  Maintain adequate tissue perfusion (e.g., encourage fluid balance; avoid crossing legs, constrictive clothing or devices) to promote tissue oxygenation.  Maintain head of bed at lowest degree of elevation tolerated, considering medical condition and other restrictions.  Avoid positioning onto an area that remains reddened.  Minimize incontinence and moisture (e.g., toileting schedule; moisture-wicking pad, diaper or incontinence collection device; skin moisture barrier).  Cleanse skin promptly and gently when soiled utilizing a pH-balanced cleanser.  Relieve and redistribute pressure (e.g., scheduled position changes, weight shifts, use of support surface, medical device repositioning, protective dressing application, use of positioning device, microclimate control, use of pressure-injury-monitor  Encourage increased activity, such as sitting in a chair at the bedside or early mobilization, when able to tolerate.  Recent Flowsheet Documentation  Taken 3/27/2024 1800 by Jad Fields RN  Head of Bed (HOB) Positioning: HOB at 30-45 degrees  Taken 3/27/2024 1600 by Jad Fields RN  Head of Bed (HOB) Positioning: HOB at 30-45 degrees  Taken 3/27/2024 1400 by Jad Fields RN  Head of Bed (HOB) Positioning: HOB at 30-45 degrees  Taken 3/27/2024 1200 by Jad Fields RN  Head of Bed (HOB) Positioning: HOB at 30-45 degrees  Taken 3/27/2024 1000 by Jad Fields RN  Head of Bed (HOB) Positioning: HOB at 30-45 degrees  Taken 3/27/2024 0800 by Jad Fields RN  Head of Bed  (HOB) Positioning: HOB at 30-45 degrees   Goal Outcome Evaluation:  Plan of Care Reviewed With: patient        Progress: no change  Outcome Evaluation: pt remains stable this shift and has a diet, cardiology is going to see again tomorrow

## 2024-03-27 NOTE — PROGRESS NOTES
THC Physician - Brief Progress Note  PERMANENT  03/27/2024 02:47    [facility] [place] - CCU - [facility] - [place] [place] [facility]    DIO BERRY    Date of Service 03/27/2024 02:47    HPI/Events of Note Psychiatric hospital Provider Assessment Note    Patient is a 58-year-old female with a history of CHF, cirrhosis, diabetes, atrial fibrillation on Eliquis, anemia.  She has a significant recent past medical history of presenting on 03/11/2024 with rapid heart rate that required cardioversion in the   field.  At the time, the notes indicate she had had a previous episode the previous month.  There was a question about whether it was ventricular tachycardia or atrial fibrillation with rapid ventricular response and aberrancy.  She was seen in the   emergency department and had an initial hypotension which resolved spontaneously.  EKG showed sinus rhythm with intraventricular block and a slightly prolonged QTC.  This was similar to her previous visit.  She was also hyperglycemic but asymptomatic.    He was discharged to home with plans to follow up with her cardiologist, which she did 1 week later on 03/19 24.     The cardiology note demonstrates multiple echocardiograms, the last being 11/13/2023 with an EF of 31-35% and grade 3 diastolic dysfunction.  She also had a right heart catheterization on 12/02/2022 which showed elevated right and left heart pressures   but there was not a report available.  She had a left heart catheterization on 11/10/2020 which showed preserved LV systolic function with a right-dominant system and a 30-40% mid LAD lesion.  For some reason a repeat left heart catheterization was   attempted on 12/22 but was unsuccessful due to a small artery.  She also has multiple episodes of atrial flutter with multiple starts and stops of amiodarone.  Most recently in March of 2024.  Of note the cardiology note states that she is   wheelchair-bound and her primary exertion is transfers in and  out of the wheelchair in bed.  Her heart failure was being addressed after that clinic visit as NYHA stage D    Today she presented with chest pain and palpitations.  EMS noted concerns for initial findings consistent with SVT but there was no improvement with both 6 and 12 mg doses of adenosine.  On arrival the patient was concerned to have sustained V-tach.  She   was cardioverted into sinus rhythm but was hypotensive post cardioversion.  A physical examination was unrevealing except for the tachycardia.  Chest x-ray was consistent with pulmonary vascular congestion.  Labs were notable for an elevated BUN   creatinine 29/1.34 otherwise chemistries were normal except for a glucose of 360.  Troponin was elevated.  H&H slowed slight anemia at 9.8/33.4 with a normal white count, low platelet count of 1 3 3 K and a normal differential.  The patient was admitted   to the intensive care unit.      Patient has received or written for albumin, amiodarone, Eliquis, ASA, Lantus, lispro, it does not appear the patient is on Levophed at this time.  Latest vital signs in the intensive care unit at 2:49 a.m. show T 97.7°, HR 58, RR 18, BP 86/39.  Oxygen   saturation 100%.  Glucose remains elevated at 370.    On video assessment the patient is breathing comfortably on nasal cannula sleeping.  Current vital signs are BP 80/35/respiratory rate 19, oxygen saturation 100%, rate rate of 55.  She has albumin being infused currently extensively for the hypotension.    It does not appear that she is on Levophed.        Assessment and Plan:  58-year-old female with debilitating medical conditions and being wheelchair bound, who has had multiple episodes of cardiac events over the last couple of months, with CHF but her main problem at this point is a arrhythmias.  There   was no mention in the chart of whether or not the patient has ever been seen by an electrophysiologic specialist, which seems at this point to be her most pressing need  since there is concern that there is ventricular arrhythmias versus atrial   fibrillation with aberrancy and wide complexes, this absolutely needs to be sorted out, because with the patient's EF she may not qualify on heart failure criteria for AICD but she may need it for her rhythms.    Otherwise the patient's current treatment is appropriate, she is getting DVT and stress ulcer prophylaxis, she is getting adequately treated for her hypotension in her heart rate currently is well controlled.  She may need an increase in her insulin   treatment as her glucose remains over 300.  Because of this I have changed her fingersticks to q.4 hr.  The most pressing need starting tomorrow we will be for specialist cardiology consultations.      Y  Video Assessment performed  Y Most recent labs reviewed  Y Vital Signs reviewed  Y Best Practices addressed:                 VTE prophylaxis:Y                 SUP (when indicated):Y                 Current Glucose:360                      Please notify bedside physician when present or [email or web address] if glc > 180 X 2                 Sepsis guidelines:N/A                 Lung protective strategy: N/A                 Targeted Temperature Management:N/A    Y : Spoke with bedside RN  Y : Orders written      Contact [email or web address] for any needs if bedside physician is not present.      Interventions Major-Hyperglycemia - active titration of insulin therapy, Shock - evaluation and management        Electronically Signed by: Tiffany Juarez) on 03/27/2024 03:09

## 2024-03-27 NOTE — PAYOR COMM NOTE
"Dio Berry (58 y.o. Female)       Date of Birth   1965    Social Security Number       Address   [address]  [place] [address]    Home Phone   [phone]    MRN   [id number]       [facility]    Marital Status                               Admission Date   3/26/24    Admission Type   Emergency    Admitting Provider   Tomás An MD    Attending Provider   Halle Keita MD    Department, Room/Bed   [facility] CRITICAL CARE, [facility]/       Discharge Date       Discharge Disposition       Discharge Destination                                 Attending Provider: Halle Keita MD    Allergies: Phenergan [Promethazine]    Isolation: None   Infection: None   Code Status: CPR    Ht: 165.1 cm (65\")   Wt: 105 kg (231 lb 7.7 oz)    Admission Cmt: None   Principal Problem: None                  Active Insurance as of 3/26/2024       Primary Coverage       Payor Plan Insurance Group Employer/Plan Group    [employer] [place] [employer] [place]ICA[place]        Payor Plan Address Payor Plan Phone Number Payor Plan Fax Number Effective Dates    [address] [address] [phone]  2020 - None Entered    [facility] [id number]         Subscriber Name Subscriber Birth Date Member ID       DIO BERRY 1965 [id number]                     Emergency Contacts        (Rel.) Home Phone Work Phone Mobile Phone    KATHY BERRY (Son) [phone] -- --    Bolivar Berry (Other) -- -- [phone]                 History & Physical        Tomás An MD at [phone]          Hospitalist History and Physical        Patient Identification  Name: Dio Berry  Age/Sex: 58 y.o. female  :  1965        MRN: [id number]  Visit Number: [id number]  Admit Date: 3/26/2024   PCP: Masha Davidson APRN          Chief complaint heart racing, short of breath    History of Present Illness:  Patient is a 58 y.o. female with history of anemia, combined systolic " (EF31-35%) and grade III diastolic CHF, chronic afib, cirrhosis, insulin dependent Type II DM, and ventricular tachycardia, who was recently admitted to [facility] from 3/13-14/24 for PAF with RVR s/p direct cardioversion. She was started on amiodarone and instructed to follow up with cardiology in the outpatient setting. She presented back to the ED this evening with complaints of palpitations and shortness of breath. Upon further questioning, she denies chest pain/pressure but does repot increased lower extremity edema from baseline. She also reports some dysuria.     Per EMS report, she appeared to be in SVT in the field and so was given IV adenosine but did not respond. When she arrived to the ED, she appeared to be in more of a wide complex tachycardia concerning for vtach. BP was 90/60. She received 1g IV mag as well as IV amiodarone but gain did not respond, so she was emergently cardioverted. Now she is back in sinus rhythm. BP was initially better but has since dropped to 80s/30s so she was ordered 50g IV albumin as well as a 250cc IV NS bolus. She was admitted to the CCU for further management. Troponin was 92 with reflex up to 100 followed by 102. These levels are not significantly changed from recent labs in Epic. BNP is 1318 (2024 on 3/19/24). Anion gap 15.3, BUN 29, Cr 1.34 (baseline range). LFTs showed chronically elevated total bilirubin. Hemoglobin and platelets stable. Patient reports dyspnea persists but is improved since undergoing cardioversion.     Review of Systems  Review of Systems   Constitutional:  Positive for activity change and fatigue. Negative for appetite change, chills and diaphoresis.   HENT:  Negative for congestion, postnasal drip, rhinorrhea, sinus pressure, sinus pain and sore throat.    Eyes:  Negative for photophobia and visual disturbance.   Respiratory:  Positive for shortness of breath. Negative for cough and wheezing.    Cardiovascular:  Positive for palpitations  and leg swelling. Negative for chest pain.   Gastrointestinal:  Negative for abdominal distention, abdominal pain, constipation, diarrhea, nausea and vomiting.   Genitourinary:  Positive for dysuria. Negative for difficulty urinating, flank pain, frequency and hematuria.   Musculoskeletal:  Negative for arthralgias, back pain, joint swelling and myalgias.   Skin:  Negative for color change, pallor, rash and wound.   Neurological:  Positive for weakness (generlized). Negative for dizziness, seizures, syncope, light-headedness, numbness and headaches.   Hematological:  Negative for adenopathy. Does not bruise/bleed easily.   Psychiatric/Behavioral:  Negative for agitation, behavioral problems and confusion.        History  Past Medical History:   Diagnosis Date    Anemia     CHF (congestive heart failure)     Chronic a-fib     stated by patient     Cirrhosis of liver     stated by patient     Diabetes mellitus     Ventricular tachycardia      Past Surgical History:   Procedure Laterality Date    APPENDECTOMY      CARDIAC CATHETERIZATION N/A 11/10/2020    Procedure: Left Heart Cath;  Surgeon: Charlee Ken MD;  Location: [facility] CATH INVASIVE LOCATION;  Service: Cardiovascular;  Laterality: N/A;    CHOLECYSTECTOMY      TOE AMPUTATION Right     stated by patient.      Family History   Problem Relation Age of Onset    Heart attack Father     Heart attack Brother      Social History     Tobacco Use    Smoking status: Never    Smokeless tobacco: Never   Vaping Use    Vaping status: Never Used   Substance Use Topics    Alcohol use: Never    Drug use: Never     Medications Prior to Admission   Medication Sig Dispense Refill Last Dose    amiodarone (PACERONE) 100 MG tablet Take 3 tablets by mouth Daily.       amiodarone (PACERONE) 200 MG tablet Take 2 tablets by mouth Daily.       apixaban (ELIQUIS) 5 MG tablet tablet Take 1 tablet by mouth Every 12 (Twelve) Hours. Indications: Atrial Fibrillation 60 tablet 3     Aspirin Low  Dose 81 MG EC tablet Take 1 tablet by mouth Daily.       busPIRone (BUSPAR) 10 MG tablet Take 1 tablet by mouth 2 (Two) Times a Day.       capsaicin-cleansing gel (Qutenza, 4 Patch,) 8 % kit topical system Apply 4 patches topically to the appropriate area as directed Every 3 (Three) Months.       cefuroxime (CEFTIN) 500 MG tablet Take 1 tablet by mouth 2 (Two) Times a Day.       empagliflozin (JARDIANCE) 10 MG tablet tablet Take 1 tablet by mouth Daily for 30 days. 30 tablet 6     ferrous sulfate 325 (65 FE) MG tablet Take 1 tablet by mouth Daily.       HYDROcodone-acetaminophen (NORCO) 7.5-325 MG per tablet Take 1 tablet by mouth 2 (Two) Times a Day.       Insulin Glargine (LANTUS SOLOSTAR) 100 UNIT/ML injection pen Inject 45 Units under the skin into the appropriate area as directed 2 (Two) Times a Day. 30 mL 0     Insulin Lispro, 1 Unit Dial, (HUMALOG) 100 UNIT/ML solution pen-injector Inject 15 Units under the skin into the appropriate area as directed 3 (Three) Times a Day With Meals. (Patient taking differently: Inject 40 Units under the skin into the appropriate area as directed 3 (Three) Times a Day With Meals.) 15 mL 1     levothyroxine (SYNTHROID, LEVOTHROID) 50 MCG tablet Take 1 tablet by mouth Daily.       omeprazole (priLOSEC) 20 MG capsule Take 1 capsule by mouth Daily.       ondansetron (ZOFRAN) 4 MG tablet Take 1 tablet by mouth Every 6 (Six) Hours As Needed for Nausea or Vomiting.       Polyethylene Glycol 3350 powder Use 238 g Take As Directed.       valsartan (DIOVAN) 40 MG tablet Take 1 tablet by mouth Daily for 30 days. 30 tablet 0     Vericiguat (Verquvo) 10 MG tablet Take 1 tablet by mouth Daily. 30 tablet 2     Vortioxetine HBr (Trintellix) 5 MG tablet tablet Take 1 tablet by mouth Daily With Breakfast.       acetaminophen (TYLENOL) 500 MG tablet Take 1 tablet by mouth 2 (Two) Times a Day As Needed for Mild Pain.       bumetanide (BUMEX) 2 MG tablet Take 1 tablet by mouth Daily for 30 days.  Two tablets daily until 02/22/24 then down to 1 tablet daily with extra as needed. (Patient taking differently: Take 1 tablet by mouth Daily. Taking 2 mg BID Mon-Sat then 2 mg daily on Sunday) 45 tablet 2      Allergies:  Phenergan [promethazine]    Objective     Vital Signs  Temp:  [97.7 °F (36.5 °C)-98 °F (36.7 °C)] 97.7 °F (36.5 °C)  Heart Rate:  [] 58  Resp:  [17-18] 18  BP: ()/(39-70) 86/39  Body mass index is 38.52 kg/m².    Physical Exam:  Physical Exam  Constitutional:       General: She is not in acute distress.     Appearance: She is obese. She is ill-appearing.   HENT:      Head: Normocephalic and atraumatic.      Right Ear: External ear normal.      Left Ear: External ear normal.      Nose: Nose normal.      Mouth/Throat:      Mouth: Mucous membranes are moist.      Pharynx: Oropharynx is clear.   Eyes:      Extraocular Movements: Extraocular movements intact.      Conjunctiva/sclera: Conjunctivae normal.      Pupils: Pupils are equal, round, and reactive to light.   Cardiovascular:      Rate and Rhythm: Normal rate and regular rhythm.      Pulses: Normal pulses.      Heart sounds: Normal heart sounds. No murmur heard.  Pulmonary:      Effort: Pulmonary effort is normal.      Breath sounds: Rales (bilateral bases) present.   Abdominal:      General: Abdomen is flat. Bowel sounds are normal. There is no distension.      Palpations: Abdomen is soft.      Tenderness: There is no abdominal tenderness.   Musculoskeletal:         General: Normal range of motion.      Cervical back: Normal range of motion and neck supple. No tenderness.      Right lower leg: Edema (2+ pitting) present.      Left lower leg: Left lower leg edema: 2+ pitting.   Lymphadenopathy:      Cervical: No cervical adenopathy.   Skin:     General: Skin is warm and dry.      Capillary Refill: Capillary refill takes less than 2 seconds.      Coloration: Skin is not jaundiced.      Findings: No bruising or lesion.   Neurological:       General: No focal deficit present.      Mental Status: She is alert and oriented to person, place, and time.   Psychiatric:         Mood and Affect: Mood normal.         Behavior: Behavior normal.         Results Review:       Lab Results:      I have personally reviewed the patient's radiologic imaging.    EKG:  Critical Test Result: High HR  Wide QRS tachycardia, , QTc 557  Left axis deviation  Nonspecific intraventricular block  Possible Anterolateral infarct , age undetermined  Abnormal ECG  When compared with ECG of 11-MAR-2024 23:51,  Wide QRS tachycardia has replaced Sinus rhythm  Vent. rate has increased BY  66 BPM    I have personally reviewed the patient's EKG.    Assessment & Plan     - Ventricular tachycardia vs afib w/RVR with aberrancy: patient has chronic LBBB vs chronic nonspecific intraventricular block which skews interpretation. Did not respond to IV amiodarone so required DC cardioversion. Now back to sinus rhythm. Update ECHO later this morning. Await further recommendations from cardiology. In the mean time, K+ 3.8, mag 2.5 so will refrain from electrolyte replacement at this time. Continue home amiodarone.   - Acute on chronic systolic and diastolic CHF secondary to above: suspect will improve simply with cardioversion and return to sinus rhythm. Hold diuretics for now given borderline BP. Continue to monitor.   - Hypotension: BP dropped after arrival to CCU. Given 250cc IV fluid bolus and 50g IV albumin. Continue to monitor. If remains hypotensive, may have to start vasopressor.  - Dysuria: check UA.    - Stage IIIa-b CKD: creatinine appears to be within baseline range. Continue to monitor for now.   - Type II DM insulin dependent with hyperglycemia: lantus initially decreased to prevent hypoglycemia, but glucose remains elevated so will increase lantus dose by 10 units. SSI ordered as well.     DVT Prophylaxis: anticoagulated on eliquis    Estimated Length of Stay >2 midnights    I  discussed the patient's findings, assessment and plan with the patient and nursing staff in the CCU.    Patient is high risk due to VTach vs afib with RVR with aberrancy, acute on chronic systolic and diastolic CHF, hypotension    Tomás An MD  03/27/24  03:35 EDT      Electronically signed by Tomás An MD at 03/27/24 0429          Emergency Department Notes          Rahul Alberto DO at 03/26/24 2399        Procedure Orders    1. Electrical Cardioversion [[id number]] ordered by Rahul Alberto DO                 Subjective   History of Present Illness  58-year-old female with past medical history of CHF, atrial fibrillation requiring anticoagulation, cirrhosis, diabetes presents to the ER due to concerns for chest pain and palpitations.  EMS noted concerns for initial findings consistent with SVT.  However, no improvement status post adenosine 6 mg and 12 mg IV.  On arrival, concerns for sustained ventricular tachycardia.  Status guarded.      Review of Systems   Respiratory:  Positive for shortness of breath.    Cardiovascular:  Positive for chest pain and palpitations.   All other systems reviewed and are negative.            Objective   Physical Exam  Constitutional:       General: She is not in acute distress.     Appearance: Normal appearance. She is not ill-appearing.   HENT:      Head: Normocephalic and atraumatic.      Right Ear: External ear normal.      Left Ear: External ear normal.      Nose: Nose normal.      Mouth/Throat:      Mouth: Mucous membranes are moist.   Eyes:      Extraocular Movements: Extraocular movements intact.      Pupils: Pupils are equal, round, and reactive to light.   Cardiovascular:      Rate and Rhythm: Regular rhythm. Tachycardia present.      Heart sounds: No murmur heard.  Pulmonary:      Effort: Pulmonary effort is normal. No respiratory distress.      Breath sounds: Normal breath sounds. No wheezing.   Abdominal:      General: Bowel sounds are  normal.      Palpations: Abdomen is soft.      Tenderness: There is no abdominal tenderness.   Musculoskeletal:         General: No deformity or signs of injury. Normal range of motion.      Cervical back: Normal range of motion and neck supple.   Skin:     General: Skin is warm and dry.      Findings: No erythema.   Neurological:      General: No focal deficit present.      Mental Status: She is alert and oriented to person, place, and time. Mental status is at baseline.      Cranial Nerves: No cranial nerve deficit.   Psychiatric:         Mood and Affect: Mood normal.         Behavior: Behavior normal.         Thought Content: Thought content normal.         Electrical Cardioversion    Date/Time: 3/26/2024 11:54 PM    Performed by: Rahul Alberto DO  Authorized by: Rahul Alberto DO    Consent:     Consent obtained:  Emergent situation and verbal    Consent given by:  Healthcare agent and patient    Risks, benefits, and alternatives were discussed: yes      Risks discussed:  Cutaneous burn, death, induced arrhythmia and pain    Alternatives discussed:  No treatment, rate-control medication, alternative treatment, referral, observation, delayed treatment and anti-coagulation medication  Universal protocol:     Patient identity confirmed:  Arm band, verbally with patient and hospital-assigned identification number  Pre-procedure details:     Cardioversion basis:  Emergent    Rhythm:  Ventricular tachycardia    Electrode placement:  Anterior-posterior  Attempt one:     Cardioversion mode:  Synchronous    Waveform:  Monophasic    Shock (Joules):  120    Shock outcome:  Conversion to normal sinus rhythm  Post-procedure details:     Patient status:  Awake    Procedure completion:  Tolerated well, no immediate complications            ED Course  ED Course as of 03/26/24 1852   Tue Mar 26, 2024   2233 Initial EKG noted concerns for wide-complex ventricular tachycardia.  149 bpm.  No obvious acute ST elevation.  QTc is  557.  Electronically signed by Rahul Alberto DO, 03/26/24, 10:33 PM EDT.   [SF]   2233 Repeat EKG status post electrocardioversion notes sinus rhythm. [SF]   2234   67 bpm.  Left bundle branch block noted.  QTc 509.  Electronically signed by Rahul Alberto DO, 03/26/24, 10:34 PM EDT.   [SF]      ED Course User Index  [SF] Rahul Alberto DO           Medical Decision Making  Laboratory workup listed.  Concerns for sustained V. tach.  Ventricular tachycardia was resistant to magnesium IV and amiodarone IV.  Patient required electrical cardioversion at 120 J.  Versed used for anxiolytic.  Conversion to sinus rhythm with left bundle branch block noted.  See EKG.  Recommend admission for further work up and treatment.  Hospitalist team consulted and made aware of the patient.  Consults and orders placed per hospitalist request.  Patient was agreeable to admission plan.  Vitals stable on admission.    Problems Addressed:  Chest pain, unspecified type: complicated acute illness or injury  Sustained ventricular tachycardia: complicated acute illness or injury    Amount and/or Complexity of Data Reviewed  Labs: ordered. Decision-making details documented in ED Course.  Radiology: ordered. Decision-making details documented in ED Course.  ECG/medicine tests: ordered.    Risk  OTC drugs.  Prescription drug management.        Final diagnoses:   Sustained ventricular tachycardia   Chest pain, unspecified type       ED Disposition  ED Disposition       ED Disposition   Decision to Admit    Condition   --    Comment   --               No follow-up provider specified.       Medication List      No changes were made to your prescriptions during this visit.            Rahul Alberto DO  03/26/24 8802      Electronically signed by Rahul Alberto DO at 03/26/24 2357       Vital Signs (last day)       Date/Time Temp Temp src Pulse Resp BP Patient Position SpO2    03/27/24 1315 -- -- 59 -- 142/66 -- --    03/27/24 0600 -- -- 58  18 139/52 -- 100    03/27/24 0500 -- -- 58 18 135/57 -- 100    03/27/24 0413 -- -- 58 -- 93/40 -- --    03/27/24 0400 98.1 (36.7) -- 56 -- 84/34 -- 100    03/27/24 0300 -- -- 55 18 80/35 -- 100    03/27/24 0249 97.7 (36.5) Oral 58 18 -- -- 100    03/27/24 0200 97.7 (36.5) -- 58 18 86/39 -- 100    03/27/24 0057 -- -- 60 -- -- -- 100    03/27/24 0033 -- -- 60 -- 126/44 -- 100    03/27/24 0030 -- -- 59 -- 116/56 -- 100    03/27/24 0021 -- -- 59 -- 117/40 -- 100    03/27/24 0009 -- -- 59 -- 98/59 -- 100    03/26/24 2357 -- -- 62 -- 100/55 -- 100    03/26/24 2345 -- -- 61 -- 103/48 -- --    03/26/24 2147 -- -- 69 17 120/54 -- 96    03/26/24 2139 98 (36.7) -- 147 17 107/70 -- 96          Intake & Output (last day)         03/26 0701  03/27 0700 03/27 0701  03/28 0700    I.V. (mL/kg) 27 (0.3)     IV Piggyback 450     Total Intake(mL/kg) 477 (4.5)     Urine (mL/kg/hr) 600     Total Output 600     Net -123                 Medication Administration Report for Yumiko Purdy as of 3/26/24 through 3/27/24     Medications 03/26/24 03/27/24     amiodarone (PACERONE) tablet 300 mg  Dose: 300 mg  Freq: Daily Route: PO  Start: 03/27/24 0900 End: 04/19/24 0859     Admin Instructions:   Avoid grapefruit juice while taking this medication.       0823-Hold [C]              apixaban (ELIQUIS) tablet 5 mg  Dose: 5 mg  Freq: Every 12 Hours Scheduled Route: PO  Indications Comment: Atrial Fibrillation  Start: 03/27/24 0245     Admin Instructions:   Tablet may be crushed and suspended in 60 mL of water or D5W and immediately delivered via NG tube.       0225-Given     0922-Given     2100            aspirin EC tablet 81 mg  Dose: 81 mg  Freq: Daily Route: PO  Start: 03/27/24 0900     Admin Instructions:   Do not crush or chew the capsules or tablets. The drug may not work as designed if the capsule or tablet is crushed or chewed. Swallow whole.  Do not exceed 4 grams of aspirin in a 24 hr period.    If given for pain, use the following pain  scale:   Mild Pain = Pain Score of 1-3, CPOT 1-2  Moderate Pain = Pain Score of 4-6, CPOT 3-4  Severe Pain = Pain Score of 7-10, CPOT 5-8       0922-Given               sennosides-docusate (PERICOLACE) 8.6-50 MG per tablet 2 tablet  Dose: 2 tablet  Freq: 2 Times Daily Route: PO  Start: 03/27/24 0900     Admin Instructions:   HOLD MEDICATION IF PATIENT HAS HAD BOWEL MOVEMENT. Start bowel management regimen if patient has not had a bowel movement after 12 hours.       0922-Given     2100             And   polyethylene glycol (MIRALAX) packet 17 g  Dose: 17 g  Freq: Daily PRN Route: PO  PRN Reason: Constipation  PRN Comment: Use if senna-docusate is ineffective  Start: 03/27/24 0153     Admin Instructions:   Use if no bowel movement after 12 hours. Mix in 6-8 ounces of water.  Use 4-8 ounces of water, tea, or juice for each 17 gram dose.          And   bisacodyl (DULCOLAX) EC tablet 5 mg  Dose: 5 mg  Freq: Daily PRN Route: PO  PRN Reason: Constipation  PRN Comment: Use if polyethylene glycol is ineffective  Start: 03/27/24 0153     Admin Instructions:   Use if no bowel movement after 12 hours.  Swallow whole. Do not crush, split, or chew tablet.          And   bisacodyl (DULCOLAX) suppository 10 mg  Dose: 10 mg  Freq: Daily PRN Route: RE  PRN Reason: Constipation  PRN Comment: Use if bisacodyl oral is ineffective  Start: 03/27/24 0153     Admin Instructions:   Use if no bowel movement after 12 hours.  Hold for diarrhea          busPIRone (BUSPAR) tablet 10 mg  Dose: 10 mg  Freq: 2 Times Daily Route: PO  Start: 03/27/24 1300     Admin Instructions:   Caution: Look alike/sound alike drug alert. Take with food.  Avoid grapefruit juice.       1300     2100             dextrose (D50W) (25 g/50 mL) IV injection 25 g  Dose: 25 g  Freq: Every 15 Minutes PRN Route: IV  PRN Reason: Low Blood Sugar  PRN Comment: Blood Sugar Less Than 70  Start: 03/27/24 0002     Admin Instructions:   Blood sugar less than 70; patient has IV  access - Unresponsive, NPO or Unable To Safely Swallow          dextrose (GLUTOSE) oral gel 15 g  Dose: 15 g  Freq: Every 15 Minutes PRN Route: PO  PRN Reason: Low Blood Sugar  PRN Comment: Blood sugar less than 70  Start: 03/27/24 0002     Admin Instructions:   BS<70, Patient Alert, Is not NPO, Can safely swallow.          empagliflozin (JARDIANCE) tablet 10 mg  Dose: 10 mg  Freq: Daily Route: PO  Start: 03/27/24 1300 End: 05/04/24 0859       1300              ferrous sulfate tablet 325 mg  Dose: 325 mg  Freq: Daily Route: PO  Start: 03/27/24 1300     Admin Instructions:   Swallow whole. Do not crush, split, or chew. Take with food if GI upset occurs.       1300              glucagon HCl (Diagnostic) injection 1 mg  Dose: 1 mg  Freq: Every 15 Minutes PRN Route: IM  PRN Reason: Low Blood Sugar  PRN Comment: Blood Glucose Less Than 70  Start: 03/27/24 0002     Admin Instructions:   Blood Glucose Less Than 70 - Patient Without IV Access - Unresponsive, NPO or Unable To Safely Swallow  Reconstitute powder for injection by adding 1 mL of -supplied sterile diluent or sterile water for injection to a vial containing 1 mg of the drug, to provide solutions containing 1 mg/mL. Shake vial gently to dissolve.          HYDROcodone-acetaminophen (NORCO) 7.5-325 MG per tablet 1 tablet  Dose: 1 tablet  Freq: 2 Times Daily Route: PO  Start: 03/27/24 1315     Admin Instructions:   Based on patient request - if ordered for moderate or severe pain, provider allows for administration of a medication prescribed for a lower pain scale.  [LIZZETTE]    Do not exceed 4 grams of acetaminophen in a 24 hr period. Max dose of 2gm for AST/ALT greater than 120 units/L        If given for pain, use the following pain scale:   Mild Pain = Pain Score of 1-3, CPOT 1-2  Moderate Pain = Pain Score of 4-6, CPOT 3-4  Severe Pain = Pain Score of 7-10, CPOT 5-8       1315     2100             insulin glargine (LANTUS, SEMGLEE) injection 35  Units  Dose: 35 Units  Freq: Every 12 Hours Scheduled Route: SC  Start: 03/27/24 0900     Admin Instructions:   Do not hold basal insulin without an order. Consider requesting a dose edit, if needed.         0921-Given     2100             Insulin Lispro (humaLOG) injection 3-14 Units  Dose: 3-14 Units  Freq: Every 4 Hours Scheduled Route: SC  Start: 03/27/24 0400     Admin Instructions:   Correction Insulin - Moderate-High Dose (Total Insulin Dose 60-80 units/day, Patient Taking Insulin at Home)    Blood Glucose 150-199 mg/dL - 3 units  Blood Glucose 200-249 mg/dL - 5 units  Blood Glucose 250-299 mg/dL - 8 units  Blood Glucose 300-349 mg/dL - 10 units  Blood Glucose 350-400 mg/dL - 12 units  Blood Glucose Greater Than 400 mg/dL - 14 units & Call Provider   Caution: Look alike/sound alike drug alert       0343-Given     (0909)-Not Given     (1108)-Not Given     1600     2000          levothyroxine (SYNTHROID, LEVOTHROID) tablet 50 mcg  Dose: 50 mcg  Freq: Daily Route: PO  Start: 03/27/24 1300     Admin Instructions:   Take on empty stomach.       1300              nitroglycerin (NITROSTAT) SL tablet 0.4 mg  Dose: 0.4 mg  Freq: Every 5 Minutes PRN Route: SL  PRN Reason: Chest Pain  PRN Comment: Only if SBP Greater Than 100  Start: 03/27/24 0153     Admin Instructions:   If Pain Unrelieved After 3 Doses Notify MD  May administer up to 3 doses per episode.          ondansetron ODT (ZOFRAN-ODT) disintegrating tablet 4 mg  Dose: 4 mg  Freq: Every 8 Hours PRN Route: PO  PRN Reasons: Vomiting,Nausea  Start: 03/27/24 1228     Admin Instructions:   Place on tongue and allow to dissolve.          phenylephrine (EDUARDO-SYNEPHRINE) 50 mg in 250 mL NS infusion  Rate: 15.75-94.5 mL/hr Dose: 0.5-3 mcg/kg/min  Weight Dosing Info: 105 kg  Freq: Titrated Route: IV  Start: 03/27/24 0500     Admin Instructions:   Initiate infusion at 0.5 mcg/kg/min & titrate up or down by 0.3 - 0.6 mcg/kg/min every 5 minutes to use the lowest dose  possible to maintain MAP greater than or equal To 65 mm Hg.  Maximum Dose = 3 mcg/kg/min.  Contact provider if unable to maintain MAP greater than or equal To 65 mm Hg on maximum dose or if MAP is greater Than 95 mm Hg. Once MAP target achieved, obtain vitals a minimum of every 30 minutes.  Protect from light. Central line preferred, if unavailable use large bore IV access with frequent nurse monitoring of IV site.       0413-New Bag     1315-Stopped             polyethylene glycol (MIRALAX) packet 17 g  Dose: 17 g  Freq: Daily Route: PO  Start: 03/27/24 1400     Admin Instructions:   Use 4-8 ounces of water, tea, or juice for each 17 gram dose.       1400              sodium chloride 0.9 % flush 10 mL  Dose: 10 mL  Freq: As Needed Route: IV  PRN Reason: Line Care  PRN Comment: After Medication Administration or Blood Draw  Start: 03/27/24 0254          sodium chloride 0.9 % flush 10 mL  Dose: 10 mL  Freq: Every 12 Hours Scheduled Route: IV  Start: 03/27/24 0900       0922-Given     2100             sodium chloride 0.9 % flush 10 mL  Dose: 10 mL  Freq: As Needed Route: IV  PRN Reason: Line Care  Start: 03/27/24 0153          sodium chloride 0.9 % flush 10 mL  Dose: 10 mL  Freq: Every 12 Hours Scheduled Route: IV  Start: 03/27/24 0245       0225-Given     0922-Given     2100            sodium chloride 0.9 % flush 10 mL  Dose: 10 mL  Freq: As Needed Route: IV  PRN Reason: Line Care  Start: 03/26/24 2147          sodium chloride 0.9 % infusion 40 mL  Dose: 40 mL  Freq: As Needed Route: IV  PRN Reason: Line Care  Start: 03/27/24 0254     Admin Instructions:   Following administration of an IV intermittent medication, flush line with 40mL NS at 100mL/hr.          sodium chloride 0.9 % infusion 40 mL  Dose: 40 mL  Freq: As Needed Route: IV  PRN Reason: Line Care  Start: 03/27/24 0153     Admin Instructions:   Following administration of an IV intermittent medication, flush line with 40mL NS at 100mL/hr.          Future  Medications  Medications 03/26/24 03/27/24      pantoprazole (PROTONIX) EC tablet 40 mg  Dose: 40 mg  Freq: Every Early Morning Route: PO  Start: 03/28/24 1300     Admin Instructions:   Swallow whole; do not crush, split, or chew.          Completed Medications  Medications 03/26/24 03/27/24      albumin human 25 % IV SOLN 50 g  Dose: 50 g  Freq: Once Route: IV  Indications of Use: Crystalloid Refractory Shock  Start: 03/27/24 0315 End: 03/27/24 0258 0228-New Bag              amiodarone (CORDARONE) injection  Freq: Code / Trauma / Sedation Medication Route: IV  Start: 03/26/24 2135 End: 03/26/24 2135 2135-Given               aspirin chewable tablet 324 mg  Dose: 324 mg  Freq: Once Route: PO  Indications Comment: chest pain  Start: 03/26/24 2204 End: 03/26/24 2240     Admin Instructions:   Herbal/drug interaction: Avoid use with ginkgo biloba. Based on patient request - if ordered for moderate or severe pain, provider allows for administration of a medication prescribed for a lower pain scale.  Do not exceed 4 grams of aspirin in a 24 hr period.    If given for pain, use the following pain scale:   Mild Pain = Pain Score of 1-3, CPOT 1-2  Moderate Pain = Pain Score of 4-6, CPOT 3-4  Severe Pain = Pain Score of 7-10, CPOT 5-8      2240-Given               insulin glargine (LANTUS, SEMGLEE) injection 10 Units  Dose: 10 Units  Freq: Once Route: SC  Start: 03/27/24 0430 End: 03/27/24 0402     Admin Instructions:   Do not hold basal insulin without an order. Consider requesting a dose edit, if needed.         0402-Given              magnesium sulfate injection  Freq: Code / Trauma / Sedation Medication  Start: 03/26/24 2132 End: 03/26/24 2132 2132-Given               midazolam (VERSED) 2 MG/2ML injection  - ADS Override Pull  Start: 03/26/24 2142 End: 03/26/24 2143     Admin Instructions:   Created by cabinet override        2143-Given [C]               midazolam (VERSED) injection  Freq: Code / Trauma /  Sedation Medication Route: IV  Start: 03/26/24 2139 End: 03/26/24 2338 2139-Given     2143-Given     2338-Given [C]             sodium chloride 0.9 % bolus 250 mL  Dose: 250 mL  Freq: Once Route: IV  Start: 03/27/24 0430 End: 03/27/24 0353       0338-New Bag              Discontinued Medications  Medications 03/26/24 03/27/24      insulin glargine (LANTUS, SEMGLEE) injection 25 Units  Dose: 25 Units  Freq: Every 12 Hours Scheduled Route: SC  Start: 03/27/24 0020 End: 03/27/24 0342     Admin Instructions:   Do not hold basal insulin without an order. Consider requesting a dose edit, if needed.         0115-Given              Insulin Lispro (humaLOG) injection 3-14 Units  Dose: 3-14 Units  Freq: 4 Times Daily Before Meals & Nightly Route: SC  Start: 03/27/24 0020 End: 03/27/24 0305     Admin Instructions:   Correction Insulin - Moderate-High Dose (Total Insulin Dose 60-80 units/day, Patient Taking Insulin at Home)    Blood Glucose 150-199 mg/dL - 3 units  Blood Glucose 200-249 mg/dL - 5 units  Blood Glucose 250-299 mg/dL - 8 units  Blood Glucose 300-349 mg/dL - 10 units  Blood Glucose 350-400 mg/dL - 12 units  Blood Glucose Greater Than 400 mg/dL - 14 units & Call Provider   Caution: Look alike/sound alike drug alert       0114-Given                          Lab Results (all)       Procedure Component Value Units Date/Time    POC Glucose Once [[id number]]  (Abnormal) Collected: 03/27/24 1107    Specimen: Blood Updated: 03/27/24 1123     Glucose 156 mg/dL     POC Glucose Once [[id number]]  (Abnormal) Collected: 03/27/24 0908    Specimen: Blood Updated: 03/27/24 0914     Glucose 140 mg/dL     Urinalysis, Microscopic Only - Urine, Clean Catch [[id number]]  (Abnormal) Collected: 03/27/24 0554    Specimen: Urine, Clean Catch Updated: 03/27/24 0703     RBC, UA 0-2 /HPF      WBC, UA Too Numerous to Count /HPF      Bacteria, UA 2+ /HPF      Squamous Epithelial Cells, UA 0-2 /HPF      Hyaline Casts, UA None Seen /LPF       "Methodology Automated Microscopy    Urine Culture - Urine, Urine, Clean Catch [[id number]] Collected: 03/27/24 0554    Specimen: Urine, Clean Catch Updated: 03/27/24 0703    Urinalysis With Culture If Indicated - Urine, Clean Catch [[id number]]  (Abnormal) Collected: 03/27/24 0554    Specimen: Urine, Clean Catch Updated: 03/27/24 0648     Color, UA Yellow     Appearance, UA Hazy     pH, UA <=5.0     Specific Gravity, UA 1.018     Glucose, UA >=1000 mg/dL (3+)     Ketones, UA Negative     Bilirubin, UA Negative     Blood, UA Negative     Protein, UA Negative     Leuk Esterase, UA Moderate (2+)     Nitrite, UA Negative     Urobilinogen, UA 0.2 E.U./dL    Narrative:      In absence of clinical symptoms, the presence of pyuria, bacteria, and/or nitrites on the urinalysis result does not correlate with infection.    STAT Lactic Acid, Reflex [[id number]]  (Normal) Collected: 03/27/24 0459    Specimen: Blood Updated: 03/27/24 0520     Lactate 2.0 mmol/L     Procalcitonin [[id number]]  (Abnormal) Collected: 03/26/24 2154    Specimen: Blood from Arm, Left Updated: 03/27/24 0500     Procalcitonin 0.26 ng/mL     Narrative:      As a Marker for Sepsis (Non-Neonates):    1. <0.5 ng/mL represents a low risk of severe sepsis and/or septic shock.  2. >2 ng/mL represents a high risk of severe sepsis and/or septic shock.    As a Marker for Lower Respiratory Tract Infections that require antibiotic therapy:    PCT on Admission    Antibiotic Therapy       6-12 Hrs later    >0.5                Strongly Recommended  >0.25 - <0.5        Recommended   0.1 - 0.25          Discouraged              Remeasure/reassess PCT  <0.1                Strongly Discouraged     Remeasure/reassess PCT    As 28 day mortality risk marker: \"Change in Procalcitonin Result\" (>80% or <=80%) if Day 0 (or Day 1) and Day 4 values are available. Refer to http://[email or web address]-pct-[email or web address]    Change in PCT <=80%  A decrease of PCT levels below or equal to 80% defines " a positive change in PCT test result representing a higher risk for 28-day all-cause mortality of patients diagnosed with severe sepsis for septic shock.    Change in PCT >80%  A decrease of PCT levels of more than 80% defines a negative change in PCT result representing a lower risk for 28-day all-cause mortality of patients diagnosed with severe sepsis or septic shock.       C-reactive Protein [[id number]]  (Abnormal) Collected: 03/27/24 0158    Specimen: Blood Updated: 03/27/24 0414     C-Reactive Protein 0.82 mg/dL     Lactic Acid, Plasma [[id number]]  (Abnormal) Collected: 03/27/24 0158    Specimen: Blood Updated: 03/27/24 0413     Lactate 2.5 mmol/L     POC Glucose Once [[id number]]  (Abnormal) Collected: 03/27/24 0337    Specimen: Blood Updated: 03/27/24 0345     Glucose 315 mg/dL     Scan Slide [[id number]] Collected: 03/27/24 0158    Specimen: Blood Updated: 03/27/24 0242     Anisocytosis Large/3+     Hypochromia Slight/1+     Macrocytes Slight/1+     Polychromasia Slight/1+     Platelet Morphology Normal    Comprehensive Metabolic Panel [[id number]]  (Abnormal) Collected: 03/27/24 0158    Specimen: Blood Updated: 03/27/24 0233     Glucose 370 mg/dL      BUN 29 mg/dL      Creatinine 1.38 mg/dL      Sodium 139 mmol/L      Potassium 3.8 mmol/L      Chloride 99 mmol/L      CO2 25.1 mmol/L      Calcium 8.7 mg/dL      Total Protein 6.7 g/dL      Albumin 3.9 g/dL      ALT (SGPT) 25 U/L      AST (SGOT) 23 U/L      Alkaline Phosphatase 100 U/L      Total Bilirubin 2.9 mg/dL      Globulin 2.8 gm/dL      A/G Ratio 1.4 g/dL      BUN/Creatinine Ratio 21.0     Anion Gap 14.9 mmol/L      eGFR 44.7 mL/min/1.73     Narrative:      GFR Normal >60  Chronic Kidney Disease <60  Kidney Failure <15      High Sensitivity Troponin T [[id number]]  (Abnormal) Collected: 03/27/24 0158    Specimen: Blood Updated: 03/27/24 0227     HS Troponin T 102 ng/L     Narrative:      High Sensitive Troponin T Reference Range:  <14.0 ng/L- Negative  Female for AMI  <22.0 ng/L- Negative Male for AMI  >=14 - Abnormal Female indicating possible myocardial injury.  >=22 - Abnormal Male indicating possible myocardial injury.   Clinicians would have to utilize clinical acumen, EKG, Troponin, and serial changes to determine if it is an Acute Myocardial Infarction or myocardial injury due to an underlying chronic condition.         CBC Auto Differential [[id number]]  (Abnormal) Collected: 03/27/24 0158    Specimen: Blood Updated: 03/27/24 0216     WBC 7.78 10*3/mm3      RBC 2.97 10*6/mm3      Hemoglobin 9.4 g/dL      Hematocrit 32.0 %      .7 fL      MCH 31.6 pg      MCHC 29.4 g/dL      RDW 21.4 %      RDW-SD 83.8 fl      MPV 10.4 fL      Platelets 131 10*3/mm3      Neutrophil % 75.6 %      Lymphocyte % 18.4 %      Monocyte % 2.8 %      Eosinophil % 1.7 %      Basophil % 1.0 %      Immature Grans % 0.5 %      Neutrophils, Absolute 5.88 10*3/mm3      Lymphocytes, Absolute 1.43 10*3/mm3      Monocytes, Absolute 0.22 10*3/mm3      Eosinophils, Absolute 0.13 10*3/mm3      Basophils, Absolute 0.08 10*3/mm3      Immature Grans, Absolute 0.04 10*3/mm3      nRBC 0.3 /100 WBC     BNP [[id number]]  (Abnormal) Collected: 03/26/24 2347    Specimen: Blood from Arm, Left Updated: 03/27/24 0128     proBNP 1,318.0 pg/mL     Narrative:      This assay is used as an aid in the diagnosis of individuals suspected of having heart failure. It can be used as an aid in the diagnosis of acute decompensated heart failure (ADHF) in patients presenting with signs and symptoms of ADHF to the emergency department (ED). In addition, NT-proBNP of <300 pg/mL indicates ADHF is not likely.    Age Range Result Interpretation  NT-proBNP Concentration (pg/mL:      <50             Positive            >450                   Gray                 300-450                    Negative             <300    50-75           Positive            >900                  Gray                300-900                   Negative            <300      >75             Positive            >1800                  Gray                300-1800                  Negative            <300    Magnesium [[id number]]  (Normal) Collected: 03/26/24 2347    Specimen: Blood from Arm, Left Updated: 03/27/24 0128     Magnesium 2.5 mg/dL     POC Glucose Once [[id number]]  (Abnormal) Collected: 03/27/24 0101    Specimen: Blood Updated: 03/27/24 0106     Glucose 374 mg/dL     Hemoglobin A1c [[id number]]  (Abnormal) Collected: 03/26/24 2154    Specimen: Blood from Arm, Left Updated: 03/27/24 0023     Hemoglobin A1C 7.70 %     Narrative:      Hemoglobin A1C Ranges:    Increased Risk for Diabetes  5.7% to 6.4%  Diabetes                     >= 6.5%  Diabetic Goal                < 7.0%    High Sensitivity Troponin T 2Hr [[id number]]  (Abnormal) Collected: 03/26/24 2347    Specimen: Blood from Arm, Left Updated: 03/27/24 0011     HS Troponin T 100 ng/L      Troponin T Delta 8 ng/L     Narrative:      High Sensitive Troponin T Reference Range:  <14.0 ng/L- Negative Female for AMI  <22.0 ng/L- Negative Male for AMI  >=14 - Abnormal Female indicating possible myocardial injury.  >=22 - Abnormal Male indicating possible myocardial injury.   Clinicians would have to utilize clinical acumen, EKG, Troponin, and serial changes to determine if it is an Acute Myocardial Infarction or myocardial injury due to an underlying chronic condition.         CBC & Differential [[id number]]  (Abnormal) Collected: 03/26/24 2154    Specimen: Blood from Arm, Left Updated: 03/26/24 2236    Scan Slide [[id number]] Collected: 03/26/24 2154    Specimen: Blood from Arm, Left Updated: 03/26/24 2236     Anisocytosis Large/3+     Dacrocytes Slight/1+     Hypochromia Slight/1+     Macrocytes Slight/1+     Polychromasia Slight/1+     Platelet Estimate Decreased    CBC Auto Differential [[id number]]  (Abnormal) Collected: 03/26/24 2154    Specimen: Blood from Arm, Left Updated: 03/26/24 2232     WBC  8.59 10*3/mm3      RBC 3.06 10*6/mm3      Hemoglobin 9.8 g/dL      Hematocrit 33.4 %      .2 fL      MCH 32.0 pg      MCHC 29.3 g/dL      RDW 21.6 %      RDW-SD 85.5 fl      MPV 10.2 fL      Platelets 133 10*3/mm3      Neutrophil % 77.1 %      Lymphocyte % 15.1 %      Monocyte % 4.0 %      Eosinophil % 2.0 %      Basophil % 1.0 %      Immature Grans % 0.8 %      Neutrophils, Absolute 6.62 10*3/mm3      Lymphocytes, Absolute 1.30 10*3/mm3      Monocytes, Absolute 0.34 10*3/mm3      Eosinophils, Absolute 0.17 10*3/mm3      Basophils, Absolute 0.09 10*3/mm3      Immature Grans, Absolute 0.07 10*3/mm3      nRBC 0.0 /100 WBC     High Sensitivity Troponin T [[id number]]  (Abnormal) Collected: 03/26/24 2154    Specimen: Blood from Arm, Left Updated: 03/26/24 2231     HS Troponin T 92 ng/L     Narrative:      High Sensitive Troponin T Reference Range:  <14.0 ng/L- Negative Female for AMI  <22.0 ng/L- Negative Male for AMI  >=14 - Abnormal Female indicating possible myocardial injury.  >=22 - Abnormal Male indicating possible myocardial injury.   Clinicians would have to utilize clinical acumen, EKG, Troponin, and serial changes to determine if it is an Acute Myocardial Infarction or myocardial injury due to an underlying chronic condition.         Comprehensive Metabolic Panel [[id number]]  (Abnormal) Collected: 03/26/24 2154    Specimen: Blood from Arm, Left Updated: 03/26/24 2219     Glucose 360 mg/dL      BUN 29 mg/dL      Creatinine 1.34 mg/dL      Sodium 138 mmol/L      Potassium 3.8 mmol/L      Comment: Slight hemolysis detected by analyzer. Result may be falsely elevated.        Chloride 99 mmol/L      CO2 23.7 mmol/L      Calcium 8.6 mg/dL      Total Protein 6.9 g/dL      Albumin 3.9 g/dL      ALT (SGPT) 26 U/L      AST (SGOT) 24 U/L      Alkaline Phosphatase 105 U/L      Total Bilirubin 3.6 mg/dL      Globulin 3.0 gm/dL      A/G Ratio 1.3 g/dL      BUN/Creatinine Ratio 21.6     Anion Gap 15.3 mmol/L      eGFR  46.3 mL/min/1.73     Narrative:      GFR Normal >60  Chronic Kidney Disease <60  Kidney Failure <15            Imaging Results (All)       Procedure Component Value Units Date/Time    XR Chest 1 View [[id number]] Collected: 03/26/24 2302     Updated: 03/26/24 2304    Narrative:      Single view of the chest     Indications: Chest pain     Findings:     AP view of the chest is compared to the prior study dated 3/12/2024.     The cardiac silhouette is enlarged.  Osseous structures are normal.   Pulmonary vascular markings are prominent.  Bilateral perihilar  interstitial prominence is present.  No focal infiltrates or effusions.       Impression:      Impression:     Findings compatible with pulmonary vascular congestion      This report was finalized on 3/26/2024 11:02 PM by Antonio Davey MD.             Orders (all)        Start     Ordered    03/27/24 1321  Diet: Cardiac, Diabetic; Healthy Heart (2-3 Na+); Consistent Carbohydrate; Fluid Consistency: Thin (IDDSI 0)  Diet Effective Now         03/27/24 1320    03/27/24 0400  Intake & Output  Every 4 Hours       03/27/24 0153    03/27/24 0254  Inpatient Diabetes Educator Consult  Once        Provider:  (Not yet assigned)    03/27/24 0253    03/27/24 0200  Vital Signs Every Hour and Per Hospital Policy Based on Patient Condition  Every Hour       03/27/24 0153    03/27/24 0154  Continuous Cardiac Monitoring  Continuous        Comments: Follow Standing Orders As Outlined in Process Instructions (Open Order Report to View Full Instructions)    03/27/24 0153    03/27/24 0154  Continuous Pulse Oximetry  Continuous         03/27/24 0153    03/27/24 0154  Height & Weight  Once         03/27/24 0153    03/27/24 0154  Daily Weights  Daily       03/27/24 0153    03/27/24 0154  Target Arousal Level RASS 0 to -1  Continuous         03/27/24 0153    03/27/24 0154  Use Mobility Guidelines for Advancement of Activity  Continuous         03/27/24 0153    03/27/24 0154  VTE  Prophylaxis Not Indicated: Other: Patient Currently Anticoagulated / Receiving Prophylaxis  Once         03/27/24 0153    03/27/24 0154  NPO Diet NPO Type: Sips with Meds  Diet Effective Now,   Status:  Canceled         03/27/24 0153    03/27/24 0154  Inpatient Cardiology Consult  Once        Specialty:  Cardiology  Provider:  Cindy Kearney APRN    03/27/24 0153    03/27/24 0153  Oral Care - Patient Not on NPPV & Not Intubated  Every Shift       03/27/24 0153 03/26/24 2357  Inpatient Admission  Once         03/26/24 2358 03/26/24 2357  Code Status and Medical Interventions:  Continuous         03/26/24 2358 03/26/24 2355  Electrical Cardioversion  Once        Comments: This order was created via procedure documentation    03/26/24 2354                    Physician Progress Notes (all)        Halle Keita MD at 03/27/24 1333          Patient seen and examined assisted by OLIVIA Street.  Patient is awake and alert she reports she feels much better, admitted for cardiac arrhythmia requiring DC cardioversion to the emergency room,    -Unstable wide-complex cardiac arrhythmia?  A-fib with RVR in the setting of LBBB versus V. tach status post cardioversion  -Hypertension likely secondary to above  -Obesity with a BMI of 30.5  -Diabetes type 2 with hyperglycemia  -History of CKD stage III yea  -History of paroxysmal atrial fibrillation  -Combined systolic and diastolic heart failure currently appears to be not in acute decompensation.  -History of hypothyroidism  -Chronic anticoagulation    Continue Damien-Synephrine and taper as tolerated, watch volume status and signs of cardiac decompensation, follow-up 2D echo, cardiology consult requested.  Monitor electrolytes closely, Accu-Chek and sliding scale, avoid nephrotoxic medications.  Will discuss with cardiology if patient needs to be a candidate for ICD.      Electronically signed by Halle Keita MD at 03/27/24 1336       Progress Notes signed by  Brandon Juarez MD at 03/27/24 0309           [facility] Physician - Brief Progress Note  PERMANENT  03/27/2024 02:47    [facility] [place] CCU - [facility] - [place], [place] (S)    DIO BERRY    Date of Service 03/27/2024 02:47    HPI/Events of Note [facility] Provider Assessment Note    Patient is a 58-year-old female with a history of CHF, cirrhosis, diabetes, atrial fibrillation on Eliquis, anemia.  She has a significant recent past medical history of presenting on 03/11/2024 with rapid heart rate that required cardioversion in the   field.  At the time, the notes indicate she had had a previous episode the previous month.  There was a question about whether it was ventricular tachycardia or atrial fibrillation with rapid ventricular response and aberrancy.  She was seen in the   emergency department and had an initial hypotension which resolved spontaneously.  EKG showed sinus rhythm with intraventricular block and a slightly prolonged QTC.  This was similar to her previous visit.  She was also hyperglycemic but asymptomatic.    He was discharged to home with plans to follow up with her cardiologist, which she did 1 week later on 03/19 24.     The cardiology note demonstrates multiple echocardiograms, the last being 11/13/2023 with an EF of 31-35% and grade 3 diastolic dysfunction.  She also had a right heart catheterization on 12/02/2022 which showed elevated right and left heart pressures   but there was not a report available.  She had a left heart catheterization on 11/10/2020 which showed preserved LV systolic function with a right-dominant system and a 30-40% mid LAD lesion.  For some reason a repeat left heart catheterization was   attempted on 12/22 but was unsuccessful due to a small artery.  She also has multiple episodes of atrial flutter with multiple starts and stops of amiodarone.  Most recently in March of 2024.  Of note the cardiology note states that she is   wheelchair-bound  and her primary exertion is transfers in and out of the wheelchair in bed.  Her heart failure was being addressed after that clinic visit as NYHA stage D    Today she presented with chest pain and palpitations.  EMS noted concerns for initial findings consistent with SVT but there was no improvement with both 6 and 12 mg doses of adenosine.  On arrival the patient was concerned to have sustained V-tach.  She   was cardioverted into sinus rhythm but was hypotensive post cardioversion.  A physical examination was unrevealing except for the tachycardia.  Chest x-ray was consistent with pulmonary vascular congestion.  Labs were notable for an elevated BUN   creatinine 29/1.34 otherwise chemistries were normal except for a glucose of 360.  Troponin was elevated.  H&H slowed slight anemia at 9.8/33.4 with a normal white count, low platelet count of 1 3 3 K and a normal differential.  The patient was admitted   to the intensive care unit.      Patient has received or written for albumin, amiodarone, Eliquis, ASA, Lantus, lispro, it does not appear the patient is on Levophed at this time.  Latest vital signs in the intensive care unit at 2:49 a.m. show T 97.7°, HR 58, RR 18, BP 86/39.  Oxygen   saturation 100%.  Glucose remains elevated at 370.    On video assessment the patient is breathing comfortably on nasal cannula sleeping.  Current vital signs are BP 80/35/respiratory rate 19, oxygen saturation 100%, rate rate of 55.  She has albumin being infused currently extensively for the hypotension.    It does not appear that she is on Levophed.        Assessment and Plan:  58-year-old female with debilitating medical conditions and being wheelchair bound, who has had multiple episodes of cardiac events over the last couple of months, with CHF but her main problem at this point is a arrhythmias.  There   was no mention in the chart of whether or not the patient has ever been seen by an electrophysiologic specialist, which  seems at this point to be her most pressing need since there is concern that there is ventricular arrhythmias versus atrial   fibrillation with aberrancy and wide complexes, this absolutely needs to be sorted out, because with the patient's EF she may not qualify on heart failure criteria for AICD but she may need it for her rhythms.    Otherwise the patient's current treatment is appropriate, she is getting DVT and stress ulcer prophylaxis, she is getting adequately treated for her hypotension in her heart rate currently is well controlled.  She may need an increase in her insulin   treatment as her glucose remains over 300.  Because of this I have changed her fingersticks to q.4 hr.  The most pressing need starting tomorrow we will be for specialist cardiology consultations.      Y  Video Assessment performed  Y Most recent labs reviewed  Y Vital Signs reviewed  Y Best Practices addressed:                 VTE prophylaxis:Y                 SUP (when indicated):Y                 Current Glucose:360                      Please notify bedside physician when present or [facility] if glc > 180 X 2                 Sepsis guidelines:N/A                 Lung protective strategy: N/A                 Targeted Temperature Management:N/A    Y : Spoke with bedside RN  Y : Orders written      [facility] any needs if bedside physician is not present.      Interventions Major-Hyperglycemia - active titration of insulin therapy, Shock - evaluation and management        Electronically Signed by: Tiffany Jaurez) on 03/27/2024 03:09    Electronically signed by Brandon Juarez MD at 03/27/24 3931

## 2024-03-28 ENCOUNTER — APPOINTMENT (OUTPATIENT)
Dept: CARDIOLOGY | Facility: HOSPITAL | Age: 59
DRG: 280 | End: 2024-03-28
Payer: COMMERCIAL

## 2024-03-28 LAB
ABSOLUTE LUNG FLUID CONTENT: 38 % (ref 20–35)
ALBUMIN SERPL-MCNC: 3.6 G/DL (ref 3.5–5.2)
ALBUMIN/GLOB SERPL: 1.1 G/DL
ALP SERPL-CCNC: 81 U/L (ref 39–117)
ALT SERPL W P-5'-P-CCNC: 28 U/L (ref 1–33)
ANION GAP SERPL CALCULATED.3IONS-SCNC: 12.8 MMOL/L (ref 5–15)
ANISOCYTOSIS BLD QL: NORMAL
AST SERPL-CCNC: 52 U/L (ref 1–32)
BASOPHILS # BLD AUTO: 0.12 10*3/MM3 (ref 0–0.2)
BASOPHILS NFR BLD AUTO: 0.9 % (ref 0–1.5)
BH CV ECHO MEAS - ACS: 1.9 CM
BH CV ECHO MEAS - AO MAX PG: 13.4 MMHG
BH CV ECHO MEAS - AO MEAN PG: 6 MMHG
BH CV ECHO MEAS - AO ROOT DIAM: 3.3 CM
BH CV ECHO MEAS - AO V2 MAX: 183 CM/SEC
BH CV ECHO MEAS - AO V2 VTI: 45.3 CM
BH CV ECHO MEAS - AVA(I,D): 1.7 CM2
BH CV ECHO MEAS - EDV(CUBED): 167.7 ML
BH CV ECHO MEAS - EDV(MOD-SP4): 102 ML
BH CV ECHO MEAS - EF(MOD-SP4): 64.4 %
BH CV ECHO MEAS - ESV(CUBED): 45.5 ML
BH CV ECHO MEAS - ESV(MOD-SP4): 36.3 ML
BH CV ECHO MEAS - FS: 35.3 %
BH CV ECHO MEAS - IVS/LVPW: 0.89 CM
BH CV ECHO MEAS - IVSD: 1.04 CM
BH CV ECHO MEAS - LA DIMENSION: 4.4 CM
BH CV ECHO MEAS - LAT PEAK E' VEL: 9.9 CM/SEC
BH CV ECHO MEAS - LV DIASTOLIC VOL/BSA (35-75): 48.5 CM2
BH CV ECHO MEAS - LV MASS(C)D: 246.8 GRAMS
BH CV ECHO MEAS - LV MAX PG: 3.6 MMHG
BH CV ECHO MEAS - LV MEAN PG: 2 MMHG
BH CV ECHO MEAS - LV SYSTOLIC VOL/BSA (12-30): 17.3 CM2
BH CV ECHO MEAS - LV V1 MAX: 95.4 CM/SEC
BH CV ECHO MEAS - LV V1 VTI: 22.2 CM
BH CV ECHO MEAS - LVIDD: 5.5 CM
BH CV ECHO MEAS - LVIDS: 3.6 CM
BH CV ECHO MEAS - LVOT AREA: 3.5 CM2
BH CV ECHO MEAS - LVOT DIAM: 2.1 CM
BH CV ECHO MEAS - LVPWD: 1.18 CM
BH CV ECHO MEAS - MED PEAK E' VEL: 6.2 CM/SEC
BH CV ECHO MEAS - MV A MAX VEL: 47.4 CM/SEC
BH CV ECHO MEAS - MV E MAX VEL: 137 CM/SEC
BH CV ECHO MEAS - MV E/A: 2.9
BH CV ECHO MEAS - MV MAX PG: 7.7 MMHG
BH CV ECHO MEAS - MV MEAN PG: 2 MMHG
BH CV ECHO MEAS - MV V2 VTI: 41 CM
BH CV ECHO MEAS - MVA(VTI): 1.88 CM2
BH CV ECHO MEAS - PA ACC TIME: 0.11 SEC
BH CV ECHO MEAS - RAP SYSTOLE: 10 MMHG
BH CV ECHO MEAS - RVSP: 36.4 MMHG
BH CV ECHO MEAS - SI(MOD-SP4): 31.2 ML/M2
BH CV ECHO MEAS - SV(LVOT): 76.9 ML
BH CV ECHO MEAS - SV(MOD-SP4): 65.7 ML
BH CV ECHO MEAS - TAPSE (>1.6): 2.09 CM
BH CV ECHO MEAS - TR MAX PG: 26.4 MMHG
BH CV ECHO MEAS - TR MAX VEL: 257 CM/SEC
BH CV ECHO MEASUREMENTS AVERAGE E/E' RATIO: 17.02
BILIRUB SERPL-MCNC: 3.9 MG/DL (ref 0–1.2)
BUN SERPL-MCNC: 28 MG/DL (ref 6–20)
BUN/CREAT SERPL: 21.2 (ref 7–25)
CALCIUM SPEC-SCNC: 8.9 MG/DL (ref 8.6–10.5)
CHLORIDE SERPL-SCNC: 104 MMOL/L (ref 98–107)
CO2 SERPL-SCNC: 19.2 MMOL/L (ref 22–29)
CREAT SERPL-MCNC: 1.32 MG/DL (ref 0.57–1)
DEPRECATED RDW RBC AUTO: 80.6 FL (ref 37–54)
EGFRCR SERPLBLD CKD-EPI 2021: 47.2 ML/MIN/1.73
EOSINOPHIL # BLD AUTO: 0.29 10*3/MM3 (ref 0–0.4)
EOSINOPHIL NFR BLD AUTO: 2.3 % (ref 0.3–6.2)
ERYTHROCYTE [DISTWIDTH] IN BLOOD BY AUTOMATED COUNT: 21.2 % (ref 12.3–15.4)
GLOBULIN UR ELPH-MCNC: 3.2 GM/DL
GLUCOSE BLDC GLUCOMTR-MCNC: 173 MG/DL (ref 70–130)
GLUCOSE BLDC GLUCOMTR-MCNC: 227 MG/DL (ref 70–130)
GLUCOSE BLDC GLUCOMTR-MCNC: 233 MG/DL (ref 70–130)
GLUCOSE BLDC GLUCOMTR-MCNC: 350 MG/DL (ref 70–130)
GLUCOSE BLDC GLUCOMTR-MCNC: 397 MG/DL (ref 70–130)
GLUCOSE SERPL-MCNC: 173 MG/DL (ref 65–99)
HCT VFR BLD AUTO: 32.6 % (ref 34–46.6)
HGB BLD-MCNC: 9.8 G/DL (ref 12–15.9)
HYPOCHROMIA BLD QL: NORMAL
IMM GRANULOCYTES # BLD AUTO: 0.11 10*3/MM3 (ref 0–0.05)
IMM GRANULOCYTES NFR BLD AUTO: 0.9 % (ref 0–0.5)
LEFT ATRIUM VOLUME INDEX: 19.6 ML/M2
LYMPHOCYTES # BLD AUTO: 2.96 10*3/MM3 (ref 0.7–3.1)
LYMPHOCYTES NFR BLD AUTO: 23.2 % (ref 19.6–45.3)
MACROCYTES BLD QL SMEAR: NORMAL
MAGNESIUM SERPL-MCNC: 2.6 MG/DL (ref 1.6–2.6)
MCH RBC QN AUTO: 31.6 PG (ref 26.6–33)
MCHC RBC AUTO-ENTMCNC: 30.1 G/DL (ref 31.5–35.7)
MCV RBC AUTO: 105.2 FL (ref 79–97)
MONOCYTES # BLD AUTO: 0.82 10*3/MM3 (ref 0.1–0.9)
MONOCYTES NFR BLD AUTO: 6.4 % (ref 5–12)
NEUTROPHILS NFR BLD AUTO: 66.3 % (ref 42.7–76)
NEUTROPHILS NFR BLD AUTO: 8.46 10*3/MM3 (ref 1.7–7)
NRBC BLD AUTO-RTO: 0 /100 WBC (ref 0–0.2)
PLATELET # BLD AUTO: 158 10*3/MM3 (ref 140–450)
PMV BLD AUTO: 10.6 FL (ref 6–12)
POTASSIUM SERPL-SCNC: 4.7 MMOL/L (ref 3.5–5.2)
PROT SERPL-MCNC: 6.8 G/DL (ref 6–8.5)
QT INTERVAL: 482 MS
QTC INTERVAL: 509 MS
RBC # BLD AUTO: 3.1 10*6/MM3 (ref 3.77–5.28)
SMALL PLATELETS BLD QL SMEAR: ADEQUATE
SODIUM SERPL-SCNC: 136 MMOL/L (ref 136–145)
WBC NRBC COR # BLD AUTO: 12.76 10*3/MM3 (ref 3.4–10.8)

## 2024-03-28 PROCEDURE — 94761 N-INVAS EAR/PLS OXIMETRY MLT: CPT

## 2024-03-28 PROCEDURE — 94726 PLETHYSMOGRAPHY LUNG VOLUMES: CPT

## 2024-03-28 PROCEDURE — 63710000001 INSULIN GLARGINE PER 5 UNITS: Performed by: HOSPITALIST

## 2024-03-28 PROCEDURE — 93306 TTE W/DOPPLER COMPLETE: CPT

## 2024-03-28 PROCEDURE — 63710000001 ONDANSETRON ODT 4 MG TABLET DISPERSIBLE: Performed by: HOSPITALIST

## 2024-03-28 PROCEDURE — 85025 COMPLETE CBC W/AUTO DIFF WBC: CPT | Performed by: HOSPITALIST

## 2024-03-28 PROCEDURE — 83735 ASSAY OF MAGNESIUM: CPT | Performed by: HOSPITALIST

## 2024-03-28 PROCEDURE — 85007 BL SMEAR W/DIFF WBC COUNT: CPT | Performed by: HOSPITALIST

## 2024-03-28 PROCEDURE — 63710000001 INSULIN LISPRO (HUMAN) PER 5 UNITS: Performed by: NURSE PRACTITIONER

## 2024-03-28 PROCEDURE — 93306 TTE W/DOPPLER COMPLETE: CPT | Performed by: INTERNAL MEDICINE

## 2024-03-28 PROCEDURE — 80053 COMPREHEN METABOLIC PANEL: CPT | Performed by: HOSPITALIST

## 2024-03-28 PROCEDURE — 94799 UNLISTED PULMONARY SVC/PX: CPT

## 2024-03-28 PROCEDURE — 99232 SBSQ HOSP IP/OBS MODERATE 35: CPT | Performed by: HOSPITALIST

## 2024-03-28 PROCEDURE — 25010000002 FUROSEMIDE PER 20 MG: Performed by: INTERNAL MEDICINE

## 2024-03-28 PROCEDURE — 82948 REAGENT STRIP/BLOOD GLUCOSE: CPT

## 2024-03-28 RX ORDER — ACETAMINOPHEN 325 MG/1
650 TABLET ORAL EVERY 6 HOURS PRN
Status: DISCONTINUED | OUTPATIENT
Start: 2024-03-28 | End: 2024-03-30 | Stop reason: HOSPADM

## 2024-03-28 RX ORDER — INSULIN LISPRO 100 [IU]/ML
3-14 INJECTION, SOLUTION INTRAVENOUS; SUBCUTANEOUS
Status: DISCONTINUED | OUTPATIENT
Start: 2024-03-28 | End: 2024-03-30 | Stop reason: HOSPADM

## 2024-03-28 RX ORDER — FUROSEMIDE 10 MG/ML
20 INJECTION INTRAMUSCULAR; INTRAVENOUS ONCE
Status: COMPLETED | OUTPATIENT
Start: 2024-03-28 | End: 2024-03-28

## 2024-03-28 RX ADMIN — BUSPIRONE HYDROCHLORIDE 10 MG: 10 TABLET ORAL at 20:58

## 2024-03-28 RX ADMIN — POLYETHYLENE GLYCOL (3350) 17 G: 17 POWDER, FOR SOLUTION ORAL at 17:34

## 2024-03-28 RX ADMIN — Medication 10 ML: at 08:16

## 2024-03-28 RX ADMIN — FERROUS SULFATE TAB 325 MG (65 MG ELEMENTAL FE) 325 MG: 325 (65 FE) TAB at 08:14

## 2024-03-28 RX ADMIN — INSULIN GLARGINE 35 UNITS: 100 INJECTION, SOLUTION SUBCUTANEOUS at 08:15

## 2024-03-28 RX ADMIN — ONDANSETRON 4 MG: 4 TABLET, ORALLY DISINTEGRATING ORAL at 10:19

## 2024-03-28 RX ADMIN — INSULIN LISPRO 5 UNITS: 100 INJECTION, SOLUTION INTRAVENOUS; SUBCUTANEOUS at 08:15

## 2024-03-28 RX ADMIN — INSULIN LISPRO 12 UNITS: 100 INJECTION, SOLUTION INTRAVENOUS; SUBCUTANEOUS at 17:35

## 2024-03-28 RX ADMIN — MICONAZOLE NITRATE 1 APPLICATION: 2 POWDER TOPICAL at 01:40

## 2024-03-28 RX ADMIN — Medication 10 ML: at 20:59

## 2024-03-28 RX ADMIN — HYDROCODONE BITARTRATE AND ACETAMINOPHEN 1 TABLET: 7.5; 325 TABLET ORAL at 08:14

## 2024-03-28 RX ADMIN — ASPIRIN 81 MG: 81 TABLET, COATED ORAL at 08:14

## 2024-03-28 RX ADMIN — BUSPIRONE HYDROCHLORIDE 10 MG: 10 TABLET ORAL at 08:14

## 2024-03-28 RX ADMIN — INSULIN LISPRO 5 UNITS: 100 INJECTION, SOLUTION INTRAVENOUS; SUBCUTANEOUS at 20:59

## 2024-03-28 RX ADMIN — DOCUSATE SODIUM 50 MG AND SENNOSIDES 8.6 MG 2 TABLET: 8.6; 5 TABLET, FILM COATED ORAL at 08:14

## 2024-03-28 RX ADMIN — EMPAGLIFLOZIN 10 MG: 10 TABLET, FILM COATED ORAL at 08:14

## 2024-03-28 RX ADMIN — POLYETHYLENE GLYCOL 3350 17 G: 17 POWDER, FOR SOLUTION ORAL at 08:15

## 2024-03-28 RX ADMIN — ACETAMINOPHEN 650 MG: 325 TABLET ORAL at 01:40

## 2024-03-28 RX ADMIN — PANTOPRAZOLE SODIUM 40 MG: 40 TABLET, DELAYED RELEASE ORAL at 13:28

## 2024-03-28 RX ADMIN — DOCUSATE SODIUM 50 MG AND SENNOSIDES 8.6 MG 2 TABLET: 8.6; 5 TABLET, FILM COATED ORAL at 20:58

## 2024-03-28 RX ADMIN — APIXABAN 5 MG: 5 TABLET, FILM COATED ORAL at 20:58

## 2024-03-28 RX ADMIN — APIXABAN 5 MG: 5 TABLET, FILM COATED ORAL at 08:14

## 2024-03-28 RX ADMIN — HYDROCODONE BITARTRATE AND ACETAMINOPHEN 1 TABLET: 7.5; 325 TABLET ORAL at 20:58

## 2024-03-28 RX ADMIN — INSULIN GLARGINE 35 UNITS: 100 INJECTION, SOLUTION SUBCUTANEOUS at 20:59

## 2024-03-28 RX ADMIN — FUROSEMIDE 20 MG: 10 INJECTION, SOLUTION INTRAMUSCULAR; INTRAVENOUS at 13:27

## 2024-03-28 RX ADMIN — ONDANSETRON 4 MG: 4 TABLET, ORALLY DISINTEGRATING ORAL at 21:01

## 2024-03-28 RX ADMIN — LEVOTHYROXINE SODIUM 50 MCG: 50 TABLET ORAL at 08:14

## 2024-03-28 RX ADMIN — INSULIN LISPRO 12 UNITS: 100 INJECTION, SOLUTION INTRAVENOUS; SUBCUTANEOUS at 11:47

## 2024-03-28 NOTE — PLAN OF CARE
Problem: Skin Injury Risk Increased  Goal: Skin Health and Integrity  Outcome: Ongoing, Progressing  Intervention: Optimize Skin Protection  Recent Flowsheet Documentation  Taken 3/28/2024 0400 by Thomas Soto RN  Head of Bed (HOB) Positioning: HOB elevated  Taken 3/28/2024 0200 by Thomas Soto RN  Head of Bed (HOB) Positioning: HOB elevated  Taken 3/28/2024 0000 by Thomas Soto RN  Head of Bed (HOB) Positioning: HOB elevated  Taken 3/27/2024 2200 by Thomas Soto RN  Head of Bed (HOB) Positioning: HOB elevated  Taken 3/27/2024 2000 by Thomas Soto RN  Head of Bed (HOB) Positioning: HOB elevated     Problem: Adult Inpatient Plan of Care  Goal: Plan of Care Review  Outcome: Ongoing, Progressing  Flowsheets (Taken 3/28/2024 0621)  Progress: no change  Plan of Care Reviewed With: patient  Outcome Evaluation: Pt hypotensive this shift. Damien infusing. VSS. WCTM  Goal: Patient-Specific Goal (Individualized)  Outcome: Ongoing, Progressing  Goal: Absence of Hospital-Acquired Illness or Injury  Outcome: Ongoing, Progressing  Intervention: Identify and Manage Fall Risk  Recent Flowsheet Documentation  Taken 3/28/2024 0500 by Thomas Soto RN  Safety Promotion/Fall Prevention: safety round/check completed  Taken 3/28/2024 0400 by Thomas Soto RN  Safety Promotion/Fall Prevention:   safety round/check completed   fall prevention program maintained  Taken 3/28/2024 0300 by Thomas Soto RN  Safety Promotion/Fall Prevention:   safety round/check completed   fall prevention program maintained  Taken 3/28/2024 0200 by Thomas Soto RN  Safety Promotion/Fall Prevention:   safety round/check completed   fall prevention program maintained  Taken 3/28/2024 0100 by Thomas Soto, RN  Safety Promotion/Fall Prevention:   safety round/check completed   fall prevention program maintained  Taken 3/28/2024 0000 by Thomas Soto, OLIVIA  Safety  Promotion/Fall Prevention:   safety round/check completed   fall prevention program maintained  Taken 3/27/2024 2300 by Thomas Soto RN  Safety Promotion/Fall Prevention:   safety round/check completed   fall prevention program maintained  Taken 3/27/2024 2200 by Thomas Soto RN  Safety Promotion/Fall Prevention:   safety round/check completed   fall prevention program maintained  Taken 3/27/2024 2100 by Thomas Soto RN  Safety Promotion/Fall Prevention:   safety round/check completed   fall prevention program maintained  Taken 3/27/2024 2000 by Thomas Soto RN  Safety Promotion/Fall Prevention:   safety round/check completed   fall prevention program maintained  Taken 3/27/2024 1900 by Thomas Soto RN  Safety Promotion/Fall Prevention:   safety round/check completed   fall prevention program maintained  Intervention: Prevent Skin Injury  Recent Flowsheet Documentation  Taken 3/28/2024 0400 by Thomas Soto RN  Body Position:   turned   left  Taken 3/28/2024 0200 by Thomas Soto RN  Body Position:   turned   right  Taken 3/28/2024 0000 by Thomas Soto RN  Body Position:   turned   left  Taken 3/27/2024 2200 by Thomas Soto RN  Body Position:   turned   right  Taken 3/27/2024 2000 by Thomas Soto RN  Body Position:   turned   left  Intervention: Prevent and Manage VTE (Venous Thromboembolism) Risk  Recent Flowsheet Documentation  Taken 3/28/2024 0200 by Thomas Soto RN  VTE Prevention/Management: (see mar) other (see comments)  Taken 3/27/2024 2000 by Thomas Soto RN  VTE Prevention/Management: (see Mar) other (see comments)  Range of Motion: ROM (range of motion) performed  Goal: Optimal Comfort and Wellbeing  Outcome: Ongoing, Progressing  Intervention: Provide Person-Centered Care  Recent Flowsheet Documentation  Taken 3/28/2024 0200 by Thomas Soto RN  Trust Relationship/Rapport:   care explained    choices provided   emotional support provided   empathic listening provided   questions encouraged   reassurance provided   thoughts/feelings acknowledged  Goal: Readiness for Transition of Care  Outcome: Ongoing, Progressing     Problem: Diabetes Comorbidity  Goal: Blood Glucose Level Within Targeted Range  Outcome: Ongoing, Progressing     Problem: Heart Failure Comorbidity  Goal: Maintenance of Heart Failure Symptom Control  Outcome: Ongoing, Progressing  Intervention: Maintain Heart Failure-Management  Recent Flowsheet Documentation  Taken 3/28/2024 0500 by Thomas Soto, RN  Medication Review/Management: medications reviewed  Taken 3/28/2024 0400 by Thomas Soto RN  Medication Review/Management: medications reviewed  Taken 3/28/2024 0300 by Thomas Soto, RN  Medication Review/Management: medications reviewed  Taken 3/28/2024 0200 by Thomas Soto RN  Medication Review/Management: medications reviewed  Taken 3/28/2024 0100 by Thomas Soto, RN  Medication Review/Management: medications reviewed  Taken 3/28/2024 0000 by Thomas Soto, RN  Medication Review/Management: medications reviewed  Taken 3/27/2024 2300 by Thomas Soto, RN  Medication Review/Management: medications reviewed  Taken 3/27/2024 2200 by Thomas Soto, RN  Medication Review/Management: medications reviewed  Taken 3/27/2024 2100 by Thomas Soto, RN  Medication Review/Management: medications reviewed  Taken 3/27/2024 2000 by Thomas Soto, RN  Medication Review/Management: medications reviewed  Taken 3/27/2024 1900 by Thomas Soto, RN  Medication Review/Management: medications reviewed     Problem: Fall Injury Risk  Goal: Absence of Fall and Fall-Related Injury  Outcome: Ongoing, Progressing  Intervention: Identify and Manage Contributors  Recent Flowsheet Documentation  Taken 3/28/2024 0500 by Thomas Soto, RN  Medication Review/Management: medications  reviewed  Taken 3/28/2024 0400 by Thomas Soto, RN  Medication Review/Management: medications reviewed  Taken 3/28/2024 0300 by Thomas Soto, RN  Medication Review/Management: medications reviewed  Taken 3/28/2024 0200 by Thomas Soto, RN  Medication Review/Management: medications reviewed  Taken 3/28/2024 0100 by Thomas Soto, RN  Medication Review/Management: medications reviewed  Taken 3/28/2024 0000 by Thomas Soto, RN  Medication Review/Management: medications reviewed  Taken 3/27/2024 2300 by Thomas Soto, RN  Medication Review/Management: medications reviewed  Taken 3/27/2024 2200 by Thomas Soto, RN  Medication Review/Management: medications reviewed  Taken 3/27/2024 2100 by Thomas Soto, RN  Medication Review/Management: medications reviewed  Taken 3/27/2024 2000 by Thomas Soto, RN  Medication Review/Management: medications reviewed  Taken 3/27/2024 1900 by Thomas Soto, RN  Medication Review/Management: medications reviewed  Intervention: Promote Injury-Free Environment  Recent Flowsheet Documentation  Taken 3/28/2024 0500 by Thomas Soto, RN  Safety Promotion/Fall Prevention: safety round/check completed  Taken 3/28/2024 0400 by Thomas Soto, RN  Safety Promotion/Fall Prevention:   safety round/check completed   fall prevention program maintained  Taken 3/28/2024 0300 by Thomas Soto, RN  Safety Promotion/Fall Prevention:   safety round/check completed   fall prevention program maintained  Taken 3/28/2024 0200 by Thomas Soto, RN  Safety Promotion/Fall Prevention:   safety round/check completed   fall prevention program maintained  Taken 3/28/2024 0100 by Thomas Soto, RN  Safety Promotion/Fall Prevention:   safety round/check completed   fall prevention program maintained  Taken 3/28/2024 0000 by Thomas Soto, RN  Safety Promotion/Fall Prevention:   safety round/check  completed   fall prevention program maintained  Taken 3/27/2024 2300 by Thomas Soto, RN  Safety Promotion/Fall Prevention:   safety round/check completed   fall prevention program maintained  Taken 3/27/2024 2200 by Thomas Soto, RN  Safety Promotion/Fall Prevention:   safety round/check completed   fall prevention program maintained  Taken 3/27/2024 2100 by Thomas Soto, RN  Safety Promotion/Fall Prevention:   safety round/check completed   fall prevention program maintained  Taken 3/27/2024 2000 by Thomas Soto, RN  Safety Promotion/Fall Prevention:   safety round/check completed   fall prevention program maintained  Taken 3/27/2024 1900 by Thomas Soto, RN  Safety Promotion/Fall Prevention:   safety round/check completed   fall prevention program maintained   Goal Outcome Evaluation:  Plan of Care Reviewed With: patient        Progress: no change  Outcome Evaluation: Pt hypotensive this shift. Damien infusing. VSS. WCTM

## 2024-03-28 NOTE — CASE MANAGEMENT/SOCIAL WORK
Discharge Planning Assessment  LATASHA Metz     Patient Name: Yumiko Purdy  MRN: [id number]  Today's Date: 3/28/2024    Admit Date: 3/26/2024       Discharge Plan       Row Name 03/28/24 0910       Plan    Plan CM completed initial consult. Pt utilizes [facility]. SS contacted [facility] per Natalia who states pt receives aide and nursing services and will need new home health order at d/c. SS will continue to follow and assist with discharge planning.                  Continued Care and Services - Admitted Since 3/26/2024       Home Medical Care       Service Provider Request Status Selected Services Address Phone Fax Patient Preferred    [facility] Pending - No Request Sent N/A [address] [place] [address] [facility] [address] [phone] --               SAMY Gong

## 2024-03-28 NOTE — PROGRESS NOTES
Nurse Practitioner - Brief Progress Note  PERMANENT  03/28/2024 02:04    [facility] - [facility] - CCU - [address] - [place], [place] ([facility])    JAMALTICODIO    Date of Service 03/28/2024 02:04    HPI/Events of Note [facility] Provider Intervention Note    Blood glucose 173. On accuchecks mod-high dose SSI q4h and Lantus 35 units subq q12h. On diabetic diet.     Accuchecks SSI changed to AC/HS.       __N___   Video Assessment performed    D/W bedside RN. Contact [facility] for any needs if bedside physician is not present.      Interventions Intermediate-Communication with other healthcare providers and/or family, Hyperglycemia - evaluation and treatment        Electronically Signed by: Milli Nelson (ANP,CCRN) on 03/28/2024 02:14

## 2024-03-28 NOTE — PROGRESS NOTES
[facility]     Patient Identification:  Name:  Yumiko Purdy  Age:  58 y.o.  Sex:  female  :  1965  MRN:  [id number]  Visit Number:  [id number]  Primary Care Provider:  Masha Davidson APRN    Length of stay:  2    Subjective: Patient seen and examined assisted by OLIVIA Street.  Patient is in bed awake and alert conversant, she does not appear to be in volume overload, good urine output, blood pressure is low but she is in bed and active patient was ambulated with improvement of her blood pressure, she continues to maintain sinus rhythm.  Per cardiology report limited 2D echo performed at [facility] in 2024 revealed EF of 50 percent.    Chief Complaint: Hypertension cardiac arrhythmia/tachycardia  ----------------------------------------------------------------------------------------------------------------------  Current Hospital Meds:  amiodarone, 300 mg, Oral, Daily  apixaban, 5 mg, Oral, Q12H  aspirin, 81 mg, Oral, Daily  busPIRone, 10 mg, Oral, BID  empagliflozin, 10 mg, Oral, Daily  ferrous sulfate, 325 mg, Oral, Daily  HYDROcodone-acetaminophen, 1 tablet, Oral, BID  insulin glargine, 35 Units, Subcutaneous, Q12H  insulin lispro, 3-14 Units, Subcutaneous, 4x Daily With Meals & Nightly  levothyroxine, 50 mcg, Oral, Daily  pantoprazole, 40 mg, Oral, Q AM  polyethylene glycol, 17 g, Oral, Daily  senna-docusate sodium, 2 tablet, Oral, BID  sodium chloride, 10 mL, Intravenous, Q12H  sodium chloride, 10 mL, Intravenous, Q12H      phenylephrine, 0.5-3 mcg/kg/min, Last Rate: Stopped (24 0745)      ----------------------------------------------------------------------------------------------------------------------  Vital Signs:  Temp:  [98 °F (36.7 °C)-98.2 °F (36.8 °C)] 98.2 °F (36.8 °C)  Heart Rate:  [53-66] 60  Resp:  [11-20] 18  BP: ()/(37-99) 102/56       Tele: Sinus rhythm 60 bpm      24  2342 24  0249   Weight:  104 kg (229 lb 4.5 oz) 105 kg (231 lb 7.7 oz)     Body mass index is 38.52 kg/m².    Intake/Output Summary (Last 24 hours) at 3/28/2024 0931  Last data filed at 3/28/2024 0929  Gross per 24 hour   Intake 541 ml   Output 1800 ml   Net -1259 ml     Diet: Cardiac, Diabetic; Healthy Heart (2-3 Na+); Consistent Carbohydrate; Fluid Consistency: Thin (IDDSI 0)  ----------------------------------------------------------------------------------------------------------------------  Physical exam:  General: Comfortable,awake, alert, oriented to self, place, and time, well-developed and well-nourished.  No respiratory distress.    Skin:  Skin is warm and dry. No rash noted. No pallor.    HENT:  Head:  Normocephalic and atraumatic.  Mouth:  Moist mucous membranes.    Eyes:  Conjunctivae and EOM are normal.  Pupils are equal, round, and reactive to light.  No scleral icterus.    Neck:  Neck supple.  No JVD present.    Pulmonary/Chest:  No respiratory distress, no wheezes, no crackles, with normal breath sounds and good air movement.  Cardiovascular:  Normal rate, regular rhythm and normal heart sounds with no murmur.  Abdominal:  Soft.  Bowel sounds are normal.  No distension and no tenderness.   Extremities: Normal edema, wrinkling of the skin, right big toe amputated.  Good pedal pulse bilateral.    Neurological:  Motor strength equal no obvious deficit, sensory grossly intact.   No cranial nerve deficit.  No tongue deviation.  No facial droop.  No slurred speech.    Genitourinary: External urinary catheter  Back:  ----------------------------------------------------------------------------------------------------------------------  ----------------------------------------------------------------------------------------------------------------------  Results from last 7 days   Lab Units 03/27/24  0158 03/26/24  3577 03/26/24  8014   HSTROP T ng/L 102* 100* 92*     Results from last 7 days   Lab Units 03/28/24  0031  "03/27/24  0459 03/27/24  0158 03/26/24  2154   CRP mg/dL  --   --  0.82*  --    LACTATE mmol/L  --  2.0 2.5*  --    WBC 10*3/mm3 12.76*  --  7.78 8.59   HEMOGLOBIN g/dL 9.8*  --  9.4* 9.8*   HEMATOCRIT % 32.6*  --  32.0* 33.4*   MCV fL 105.2*  --  107.7* 109.2*   MCHC g/dL 30.1*  --  29.4* 29.3*   PLATELETS 10*3/mm3 158  --  131* 133*         Results from last 7 days   Lab Units 03/28/24  0031 03/27/24 0158 03/26/24  2347 03/26/24  2154   SODIUM mmol/L 136 139  --  138   POTASSIUM mmol/L 4.7 3.8  --  3.8   MAGNESIUM mg/dL 2.6  --  2.5  --    CHLORIDE mmol/L 104 99  --  99   CO2 mmol/L 19.2* 25.1  --  23.7   BUN mg/dL 28* 29*  --  29*   CREATININE mg/dL 1.32* 1.38*  --  1.34*   CALCIUM mg/dL 8.9 8.7  --  8.6   GLUCOSE mg/dL 173* 370*  --  360*   ALBUMIN g/dL 3.6 3.9  --  3.9   BILIRUBIN mg/dL 3.9* 2.9*  --  3.6*   ALK PHOS U/L 81 100  --  105   AST (SGOT) U/L 52* 23  --  24   ALT (SGPT) U/L 28 25  --  26   Estimated Creatinine Clearance: 56.6 mL/min (A) (by C-G formula based on SCr of 1.32 mg/dL (H)).    No results found for: \"AMMONIA\"      No results found for: \"BLOODCX\"  No results found for: \"URINECX\"  No results found for: \"WOUNDCX\"  No results found for: \"STOOLCX\"    I have personally looked at the labs and they are summarized above.  ----------------------------------------------------------------------------------------------------------------------  Imaging Results (Last 24 Hours)       ** No results found for the last 24 hours. **          ----------------------------------------------------------------------------------------------------------------------  Assessment and Plan:  -Unstable wide-complex tachycardia/arrhythmia?  A-fib with RVR with LBBB versus V. tach status post cardioversion  -Cardiogenic shock improved  -CKD stage IIIa  -Chronic anticoagulation  -History of combined systolic and diastolic heart failure latest limited echo from outlying facility EF is 50%  -History of " hypothyroidism  -Obesity with BMI of 38.5    Out of bed to chair today ambulate, avoid nephrotoxic medication, awaiting cardiology follow-up/recommendation for arrhythmia, help appreciated.  PT OT    Disposition pending      Halle Keita MD  03/28/24  09:31 EDT

## 2024-03-28 NOTE — PLAN OF CARE
Problem: Skin Injury Risk Increased  Goal: Skin Health and Integrity  Outcome: Ongoing, Not Progressing  Intervention: Optimize Skin Protection  Description: Perform a full pressure injury risk assessment, as indicated by screening, upon admission to care unit.  Reassess skin (injury risk, full inspection) frequently (e.g., scheduled interval, with change in condition) to provide optimal early detection and prevention.  Maintain adequate tissue perfusion (e.g., encourage fluid balance; avoid crossing legs, constrictive clothing or devices) to promote tissue oxygenation.  Maintain head of bed at lowest degree of elevation tolerated, considering medical condition and other restrictions.  Avoid positioning onto an area that remains reddened.  Minimize incontinence and moisture (e.g., toileting schedule; moisture-wicking pad, diaper or incontinence collection device; skin moisture barrier).  Cleanse skin promptly and gently when soiled utilizing a pH-balanced cleanser.  Relieve and redistribute pressure (e.g., scheduled position changes, weight shifts, use of support surface, medical device repositioning, protective dressing application, use of positioning device, microclimate control, use of pressure-injury-monitor  Encourage increased activity, such as sitting in a chair at the bedside or early mobilization, when able to tolerate.  Recent Flowsheet Documentation  Taken 3/28/2024 1800 by Jad Fields RN  Head of Bed (HOB) Positioning: HOB at 30-45 degrees  Taken 3/28/2024 1600 by Jad Fields RN  Head of Bed (HOB) Positioning: HOB at 30-45 degrees  Taken 3/28/2024 1200 by Jad Fields RN  Head of Bed (HOB) Positioning: HOB at 30-45 degrees  Taken 3/28/2024 1000 by Jad Fields RN  Head of Bed (HOB) Positioning: HOB at 30-45 degrees  Taken 3/28/2024 0800 by Jad Fields RN  Head of Bed (HOB) Positioning: HOB at 30-45 degrees     Problem: Adult Inpatient Plan of Care  Goal: Plan of Care  Review  Outcome: Ongoing, Progressing  Flowsheets (Taken 3/28/2024 1842)  Progress: no change  Plan of Care Reviewed With: patient  Outcome Evaluation: pt remains stable off pressors up out bed for several hours.  Goal: Patient-Specific Goal (Individualized)  Outcome: Ongoing, Not Progressing  Goal: Absence of Hospital-Acquired Illness or Injury  Outcome: Ongoing, Not Progressing  Intervention: Identify and Manage Fall Risk  Description: Perform standard risk assessment on admission using a validated tool or comprehensive approach appropriate to the patient; reassess fall risk frequently, with change in status or transfer to another level of care.  Communicate fall injury risk to interprofessional healthcare team.  Determine need for increased observation, equipment and environmental modification, such as low bed, signage and supportive, nonskid footwear.  Adjust safety measures to individual developmental age, stage and identified risk factors.  Reinforce the importance of safety and physical activity with patient and family.  Perform regular intentional rounding to assess need for position change, pain assessment and personal needs, including assistance with toileting.  Recent Flowsheet Documentation  Taken 3/28/2024 1800 by Jad Fields, RN  Safety Promotion/Fall Prevention:   safety round/check completed   fall prevention program maintained  Taken 3/28/2024 1700 by Jad Fields, RN  Safety Promotion/Fall Prevention:   safety round/check completed   fall prevention program maintained  Taken 3/28/2024 1600 by Jad Fields, RN  Safety Promotion/Fall Prevention:   safety round/check completed   fall prevention program maintained  Taken 3/28/2024 1500 by Jad Fields, RN  Safety Promotion/Fall Prevention:   safety round/check completed   fall prevention program maintained  Taken 3/28/2024 1300 by Jad Fields, RN  Safety Promotion/Fall Prevention:   safety round/check completed   fall  prevention program maintained  Taken 3/28/2024 1200 by Jad Fields RN  Safety Promotion/Fall Prevention:   safety round/check completed   fall prevention program maintained  Taken 3/28/2024 1100 by Jad Fields RN  Safety Promotion/Fall Prevention:   safety round/check completed   fall prevention program maintained  Taken 3/28/2024 1000 by Jad Fields RN  Safety Promotion/Fall Prevention:   safety round/check completed   fall prevention program maintained  Taken 3/28/2024 0900 by Jad Fields RN  Safety Promotion/Fall Prevention:   safety round/check completed   fall prevention program maintained  Taken 3/28/2024 0800 by Jad Fields RN  Safety Promotion/Fall Prevention:   safety round/check completed   fall prevention program maintained  Taken 3/28/2024 0700 by Jad Fields RN  Safety Promotion/Fall Prevention:   safety round/check completed   fall prevention program maintained  Intervention: Prevent Skin Injury  Description: Perform a screening for skin injury risk, such as pressure or moisture associated skin damage on admission and at regular intervals throughout hospital stay.  Keep all areas of skin (especially folds) clean and dry.  Maintain adequate skin hydration.  Relieve and redistribute pressure and protect bony prominences; implement measures based on patient-specific risk factors.  Match turning and repositioning schedule to clinical condition.  Encourage weight shift frequently; assist with reposition if unable to complete independently.  Float heels off bed; avoid pressure on the Achilles tendon.  Keep skin free from extended contact with medical devices.  Encourage functional activity and mobility, as early as tolerated.  Use aids (e.g., slide boards, mechanical lift) during transfer.  Recent Flowsheet Documentation  Taken 3/28/2024 1800 by Jad Fields RN  Body Position:   right   side-lying  Taken 3/28/2024 1600 by Jad Fields RN  Body Position:    left   side-lying  Taken 3/28/2024 1200 by Jad Fields, RN  Body Position:   left   side-lying  Taken 3/28/2024 1000 by Jad Fields RN  Body Position:   right   side-lying  Taken 3/28/2024 0800 by Jad Fields, RN  Body Position:   left   side-lying  Intervention: Prevent and Manage VTE (Venous Thromboembolism) Risk  Description: Assess for VTE (venous thromboembolism) risk.  Encourage and assist with early ambulation.  Initiate and maintain compression or other therapy, as indicated, based on identified risk in accordance with organizational protocol and provider order.  Encourage both active and passive leg exercises while in bed, if unable to ambulate.  Recent Flowsheet Documentation  Taken 3/28/2024 1500 by Jad Fields RN  Activity Management: bedrest  Range of Motion: active ROM (range of motion) encouraged  Taken 3/28/2024 0800 by Jad Fields, RN  Activity Management: bedrest  Range of Motion: active ROM (range of motion) encouraged  Goal: Optimal Comfort and Wellbeing  Outcome: Ongoing, Not Progressing  Intervention: Provide Person-Centered Care  Description: Use a family-focused approach to care.  Develop trust and rapport by proactively providing information, encouraging questions, addressing concerns and offering reassurance.  Acknowledge emotional response to hospitalization.  Recognize and utilize personal coping strategies.  Honor spiritual and cultural preferences.  Recent Flowsheet Documentation  Taken 3/28/2024 0800 by Jad Fields, RN  Trust Relationship/Rapport:   care explained   reassurance provided  Goal: Readiness for Transition of Care  Outcome: Ongoing, Not Progressing   Goal Outcome Evaluation:  Plan of Care Reviewed With: patient        Progress: no change  Outcome Evaluation: pt remains stable off pressors up out bed for several hours.

## 2024-03-28 NOTE — CONSULTS
Consult received for HF Education.    Current patient of [facility]. Last seen by Sonja Romo PA-C on 3/19/24    Most recent EF 31-35% (11/13/23) - repeat ordered today with result pending  Most recent ProBNP 1318 (3/26/24)  Most recent ReDS 48% (3/19/24) - repeat ordered today with result pending    Has appointment scheduled for 4/18/24, but will ensure clinic knows of this admission and will try to get appointment within 7 days of this hospital discharge.    Notes from 3/19/24 visit noted below    This is a 58 y.o. female with known past medical history of:  Chronic systolic & diastolic HF  TTE from 02/08/23 @ [facility] with EF ~50%  TTE 11/13/23 with EF 31-35% and grade III diastolic dysfunction as well as moderately decreased systolic fxn and mildly reduced RVSP.    TTE from November 2022 with visually estimated ejection fraction of 30% ±5% and noted abnormal systolic strain pattern as well as grade 3 diastolic dysfunction as well as abnormal TAPSE with abnormal right ventricular function  [facility] on 09/2020 with EF 21-25% with LV systolic function severely decreased and RV cavity mildly dilated with moderately reduced RV systolic function noted, moderate TVR, and aortic plaquing  Right heart cath on 12/22/2022 with elevated left and right heart pressures with report not available  ASCVD  LHC 11/10/20 with preserved LV systolic, EF 50-55% with elevated LV end diastolic pressure consistent with diastolic dysfunction and right dominant system with 30-40% mid LAD lesion.   LHC attempted on 12/2022 at [facility] with unsuccessful access due to small artery appearing occluded or near occluded radially on right  Peripheral Arterial disease  Atrial Flutter  Multiple starts and stops of amiodarone  Most recent in March 2024 on March 13 @ [facility] following concern for VTach with EP feeling strips were Afib with RVR with LBBB (per chart review) with conversion to NSR and amiodarone restart.    CKD  stage IIIb  Cirrhosis of the liver  Stage III CKD  Chronic hypoxemic respiratory failure  Morbid Obesity  Diffuse purpuric rash with prior w/u negative for vasculitis    NYHA stage Stage D: Presence of advanced heart disease with continued heart failure symptoms requiring aggressive medical therapyFC-Class IV: Unable to carry on any physical activity without discomfort. Symptoms of heart failureat rest. If any physical activity is undertaken, discomfort increases.      Today, Patient is approaching euvolemia and with  Moderate perfusion. The patient's hemodynamics are currently acceptable. HR is: normal and is at goal. BP/MAP was reviewed and there is notroom for medication up-titration.  Clinical trajectory was assessed and hasimproved.   CHF GOAL DIRECTED MEDICAL THERAPY FOR PATIENT ADDRESSED/ADJUSTED:      GDMT     Drug Class    Drug    Dose Last Dose Adjustment Additional Titration    Notes   ACEi/ARB/ARNI ACEI previously stopped due to renal fxn           Beta Blocker  Metoprolol Succinate   25 mg qhs         MRA Spirono previously stopped due to renal fxn           SGLT2i Jardiance 10mg qd Transitioned from Farxiga to Jardiance in Hospitalization in 11/2023 N/A     Secondaries Verquevo 10mg qd Restart           Electronically signed by,   Katalina Bedolla, RN ,DNP, MSN, CHFN  03/28/24 14:37 EDT

## 2024-03-28 NOTE — PROGRESS NOTES
Nurse Practitioner - Brief Progress Note  PERMANENT  03/27/2024 21:14    [facility] [place] [facility] [place] - CCU - [address] - [place], [place] ([facility])    JAMALTICODIO    Date of Service 03/27/2024 21:14    HPI/Events of Note [facility] Provider Intervention Note    RN reports pt has redness/moisture in her groin area and is asking for Nystatin powder PRN. Order sent.      ___N__   Video Assessment performed    Contact [facility] for any needs if bedside physician is not present.      Interventions Intermediate-Communication with other healthcare providers and/or family        Electronically Signed by: Milli Nelson (ANP,CCRN) on 03/27/2024 21:16

## 2024-03-28 NOTE — PROGRESS NOTES
Nurse Practitioner - Brief Progress Note  PERMANENT  03/28/2024 01:19    [facility] [place] [facility] - [facility] [place] ([facility])    DIO BERRY    Date of Service 03/28/2024 01:19    HPI/Events of Note [facility] Provider Intervention Note    Tylenol PRN headache ordered.       ___N__   Video Assessment performed    Contact [facility] for any needs if bedside physician is not present.      Interventions Intermediate-Communication with other healthcare providers and/or family        Electronically Signed by: Milli Nelson (ANP,CCRN) on 03/28/2024 01:21

## 2024-03-28 NOTE — CASE MANAGEMENT/SOCIAL WORK
Discharge Planning Assessment   [place]     Patient Name: Yumiko Purdy  MRN: [id number]  Today's Date: 3/28/2024    Admit Date: 3/26/2024    Plan: CM  informed Daisy Castañeda with [employer] of need for life vest at d/c.   Discharge Needs Assessment    No documentation.                  Discharge Plan       Row Name 03/28/24 1405       Plan    Plan CM  informed Daisy Castañeda with [employer] of need for life vest at d/c.      Row Name 03/28/24 1402                               Valerie Acevedo RN

## 2024-03-28 NOTE — PROGRESS NOTES
[facility]  PROGRESS NOTE    Patient information:  Name: Yumiko Purdy  Age/Sex: 58 y.o. female  :  1965        PCP: Masha Davidson APRN  Attending: Tomás An MD  MRN:  [id number]  Visit Number:  [id number]    LOS:  LOS: 2 days     CODE STATUS:    Code Status and Medical Interventions:   Ordered at: 24 9934     Code Status (Patient has no pulse and is not breathing):    CPR (Attempt to Resuscitate)     Medical Interventions (Patient has pulse or is breathing):    Full Support       PROBLEM LIST:Active Problems:    Ventricular tachycardia      Reason for Cardiology follow-up: Ventricular tachycardia     Subjective   ADMISSION INFORMATION:  Chief Complaint   Patient presents with    Rapid Heart Rate       HPI:  Yumiko Purdy is a 58 y.o. female with ASCVD, chronic atrial fibrillation with recent RVR and DCCV 2024 status post multiple PVI ablations with the last being 2022, acute on chronic systolic and diastolic heart failure, peripheral arterial disease status post great toe amputation, chronic hypoxemic respiratory failure, chronic kidney disease stage III, cirrhosis, insulin-dependent type 2 diabetes mellitus, and obesity.     Most of the history is taken from the chart as the patient reports that her memory is poor.  She has a very long history of atrial fibrillation with RVR and left bundle branch block.  This has frequently been confused with ventricular tachycardia and readings revised after EP review.  She follows in our heart failure clinic with her last visit 2024.  She also sees electrophysiology and general cardiology at [facility].  Her last visit with  was 2024 and he recommended that she stay on 300 mg of amiodarone daily for 2 months and then decrease to 200 mg daily.  At that time he noted that she was not a candidate for further ablations.    Primary cardiologist been Dr. Tesfaye  in [facility]    EVENT TIMELINE:   02/08/2024: Limited echo revealed EF of 50% ±5% and no pericardial effusion.  Also noted hypokinesis of the mid anteroseptal, mid inferoseptal, mid inferior, apical septal, basal anteroseptal, and basal inferior segments. Her ejection fraction in November 2023 was reported as 30 to 35% with grade 3 diastolic dysfunction    Interval History:   Patient was in room CCU 4 when she was seen and examined.  Patient is sitting in a chair at bedside resting quietly.  No acute distress noted at this time.  Patient currently denies any chest pain or palpitations but does report she feels a little bit short of breath.  Patient reports that she has had 2 previous ablations done at [facility] under Dr. Tesfaye.  She reports the last time she saw him he told her she had too much scarring for any more ablations to be done.  She reports she has been back on amiodarone for approximately 1 month now.  She states that she did not have a pacemaker placed because her EF did improve up to 50%.    Vital Signs  Temp:  [98 °F (36.7 °C)-98.2 °F (36.8 °C)] 98.2 °F (36.8 °C)  Heart Rate:  [53-66] 60  Resp:  [11-20] 18  BP: ()/(37-99) 102/56  Flow (L/min):  [2] 2  Device (Oxygen Therapy): nasal cannula  Vital Signs (last 72 hrs)         03/25 0700  03/26 0659 03/26 0700  03/27 0659 03/27 0700  03/28 0659 03/28 0700  03/28 0928   Most Recent      Temp (°F)   97.7 -  98.1    98 -  98.2       98.2 (36.8) 03/28 0400    Heart Rate   55 -  147    53 -  62    56 -  66     60 03/28 0830    Resp   17 -  18    11 -  20       18 03/28 0500    BP   80/35 -  139/52    73/65 -  155/63    78/67 -  127/51     102/56 03/28 0830    SpO2 (%)   96 -  100    90 -  100    78 -  99     96 03/28 0830    Flow (L/min)     2      2      2     2 03/28 0800          BMI:Body mass index is 38.52 kg/m².    WEIGHT:      03/26/24  2342 03/27/24  0249   Weight: 104 kg (229 lb 4.5 oz) 105 kg (231 lb 7.7 oz)       DIET:Diet:  Cardiac, Diabetic; Healthy Heart (2-3 Na+); Consistent Carbohydrate; Fluid Consistency: Thin (IDDSI 0)    I&O:  Intake & Output (last 3 days)         03/25 0701 03/26 0700 03/26 0701 03/27 0700 03/27 0701  03/28 0700 03/28 0701 03/29 0700    I.V. (mL/kg)  27 (0.3) 187 (1.8)     IV Piggyback  450      Total Intake(mL/kg)  477 (4.5) 187 (1.8)     Urine (mL/kg/hr)  600 1800 (0.7)     Total Output  600 1800     Net  -123 -1613             Urine Unmeasured Occurrence   2 x           Objective     I have seen and examined Ms. Purdy today.  Physical Exam:      General Appearance:    Alert, cooperative, in no acute distress.   Head:    Normocephalic, without obvious abnormality, atraumatic.   Eyes:                          Conjunctivae and sclerae normal, no icterus, no pallor, corneas clear.   Neck:   No adenopathy, supple, trachea midline, no thyromegaly, no carotid bruit, no JVD.   Lungs:     Clear to auscultation, respirations regular, even and             unlabored.    Heart:    Regular rhythm and normal rate, normal S1 and S2, no          murmur, no gallop, no rub, no click   Chest Wall:    No abnormalities observed.   Abdomen:     Normal bowel sounds, no masses, no organomegaly, soft nontender, nondistended, no guarding, no rebound tenderness.   Extremities:   Moves all extremities well, no edema, no cyanosis, no           redness.  Right great toe surgically absent.   Pulses:   Pulses palpable and equal bilaterally.   Skin:   No bleeding, bruising or rash. 2 and 3 mm black spots on the dorsum of the second toe of her left foot     Neurologic:   Alert and Oriented x 3, Speech Clear & comprehensive.       Results review   Results Review:   Results from last 7 days   Lab Units 03/27/24  0158 03/26/24  2347 03/26/24  2154   HSTROP T ng/L 102* 100* 92*     Lab Results   Component Value Date    PROBNP 1,318.0 (H) 03/26/2024    PROBNP 2,024.0 (H) 03/19/2024    PROBNP 801.5 02/19/2024     Results from last 7 days   Lab  Units 03/28/24  0031 03/27/24  0158 03/26/24  2154   WBC 10*3/mm3 12.76* 7.78 8.59   HEMOGLOBIN g/dL 9.8* 9.4* 9.8*   PLATELETS 10*3/mm3 158 131* 133*     Results from last 7 days   Lab Units 03/28/24  0031 03/27/24  0158 03/26/24  2154   SODIUM mmol/L 136 139 138   POTASSIUM mmol/L 4.7 3.8 3.8   CHLORIDE mmol/L 104 99 99   CO2 mmol/L 19.2* 25.1 23.7   BUN mg/dL 28* 29* 29*   CREATININE mg/dL 1.32* 1.38* 1.34*   CALCIUM mg/dL 8.9 8.7 8.6   GLUCOSE mg/dL 173* 370* 360*   ALT (SGPT) U/L 28 25 26   AST (SGOT) U/L 52* 23 24     Lab Results   Component Value Date    MG 2.6 03/28/2024    MG 2.5 03/26/2024    MG 2.3 03/19/2024     Estimated Creatinine Clearance: 56.6 mL/min (A) (by C-G formula based on SCr of 1.32 mg/dL (H)).    Lab Results   Component Value Date    HGBA1C 7.70 (H) 03/26/2024    HGBA1C 7.30 (H) 11/13/2023    HGBA1C 8.40 (H) 09/10/2020     Lab Results   Component Value Date    CHOL 137 09/11/2020    TRIG 80 09/11/2020    LDL 78 09/11/2020    HDL 43 09/11/2020     Lab Results   Component Value Date    ABSOLUTELUNG 48 (A) 03/19/2024    ABSOLUTELUNG 44 (A) 02/19/2024    ABSOLUTELUNG 37 (A) 01/22/2024     Lab Results   Component Value Date    INR 0.97 03/13/2024    INR 1.13 (H) 01/31/2024    INR 0.97 11/15/2023    INR 1.20 (H) 11/12/2023    INR 1.10 12/15/2022    INR 1.25 (H) 09/23/2020    INR 1.09 09/22/2020     Lab Results   Component Value Date    LABHEPA <0.10 (L) 11/15/2023    LABHEPA 0.21 (L) 11/15/2023    LABHEPA <0.10 (L) 11/15/2023    LABHEPA 0.31 11/14/2023       Lab Results   Component Value Date    TSH 2.960 01/31/2024      Pain Management Panel  More data exists         Latest Ref Rng & Units 11/13/2023 9/12/2020   Pain Management Panel   Creatinine, Urine mg/dL  mg/dL - 46.3  46.3    Amphetamine, Urine Qual Negative Negative  -   Barbiturates Screen, Urine Negative Negative  -   Benzodiazepine Screen, Urine Negative Negative  -   Buprenorphine, Screen, Urine Negative Negative  -   Cocaine Screen,  Urine Negative Negative  -   Fentanyl, Urine Negative Negative  -   Methadone Screen , Urine Negative Negative  -   Methamphetamine, Ur Negative Negative  -     Microbiology Results (last 10 days)       ** No results found for the last 240 hours. **           Imaging Results (Last 24 Hours)       ** No results found for the last 24 hours. **          ECHO:  Results for orders placed during the hospital encounter of 11/12/23    Adult Transthoracic Echo Complete W/ Cont if Necessary Per Protocol    Interpretation Summary    Left ventricular systolic function is moderately decreased. Left ventricular ejection fraction appears to be 31 - 35%.    Left ventricular wall thickness is consistent with mild concentric hypertrophy.    Left ventricular diastolic function is consistent with (grade III w/high LAP) fixed restrictive pattern.    Mildly reduced right ventricular systolic function noted.    The left atrial cavity is moderately dilated.    The right atrial cavity is mild to moderately  dilated.    There is calcification of the aortic valve mainly affecting the left coronary cusp(s).    Dilated pulmonary artery.      STRESS TEST:  Results for orders placed during the hospital encounter of 10/15/20    Nuclear Medicine Cardiac Blood Pool [facility] At Rest    Interpretation Summary  EXAMINATION: NUCLEAR MEDICINE CARDIAC BLOOD POOL MUGA AT REST-    CLINICAL INDICATION: Cardiomyopathy  TECHNIQUE: 21 mCi of Tc-99m autologous RBCs were injected IV after  in-vitro RBC labeling. Gated cardiac imaging was performed in the  anterior and left anterior oblique.  COMPARISON: None  FINDINGS: The left ventricle is normal in size with normal systolic  function. The calculated left ventricular ejection fraction (LVEF) = 38  %.  The right and left atria, aorta and pulmonary artery are normal. The  right ventricle is normal in size.    Impression  LVEF: 38%, at rest.    This report was finalized on 10/16/2020 11:57 AM by Dr. Marlo DALTON  MD Karolyn.       HEART CATH:  Results for orders placed during the hospital encounter of 11/10/20    Cardiac Catheterization/Vascular Study    Narrative  Procedure type:  Left heart cath, LV gram, bilateral selective cholangiogram    Indication:  Cardiomyopathy    Procedure:  After informed consent the patient was brought into the cardiac aspiration lab she was prepped and draped in the usual sterile manner the right radial area was incised with 2% Xylocaine however several attempts to engage into the right radial artery was not successful so the right radial artery access was abandoned and the right groin area was excised in 2% Xylocaine right femoral artery was accessed using modified standard technique and 5 Mauritian side-port arterial sheath was placed and secured then over a guidewire a 5 Mauritian JL 4 catheter was used left main coronary artery with angiographic pressures were taken then the catheter was removed and over a guidewire a 5 Mauritian JR4 catheter was used and engagement of the right coronary arteries angioplasty was taken then the catheter was removed then over a guidewire 5 Mauritian pigtail catheter used aortic valve was done hand-injection LV gram was done then pullback was done then the catheter was removed groin sheath was removed and minx closure device applied with good hemostasis the patient was transported back to her room in stable condition.    Results:  The patient LVEDP was 17 mmHg with hand-injection showing preserved left ventricular systolic function wall motion EF 50-55% with no significant aortic gradient on pullback.    Cholangiogram:  This right dominant system.  Left main cardiac angiographically normal and bifurcates into left and descending and left circumflex arteries.  LAD was normal caliber gives several septal perforators and diagonal branches and wraps around the apex LAD about 30 to 40% mid stenosis.  Left circumflex artery was nondominant for posterior circulation gives rise  to several obtuse marginal branches left circumflex arteries branches angiographically normal.  The right coronary artery was a large artery was dominant for posterior circulation giving rise to acute marginal branches PDA and posterolateral branches the right coronary artery and its branches angiographically normal.    Conclusion:  Preserved LV systolic function ejection fraction 50-55% with elevated left ventricular end-diastolic pressure consistent with diastolic dysfunction coronary angiogram showed right dominant system with 30 to 40% mid LAD lesion otherwise coronaries arteries were normal.  Plan of care:  Medical management and secondary preventive measurements of coronary disease good lipid control blood pressure control healthy lifestyle patient is to follow-up with her primary care physician for further management.      TELEMETRY:            I reviewed the patient's new clinical results.    ALLERGIES: Phenergan [promethazine]    Medication Review:   Current list of medications may not reflect those currently placed in orders that are not signed or are being held.     amiodarone, 300 mg, Oral, Daily  apixaban, 5 mg, Oral, Q12H  aspirin, 81 mg, Oral, Daily  busPIRone, 10 mg, Oral, BID  empagliflozin, 10 mg, Oral, Daily  ferrous sulfate, 325 mg, Oral, Daily  HYDROcodone-acetaminophen, 1 tablet, Oral, BID  insulin glargine, 35 Units, Subcutaneous, Q12H  insulin lispro, 3-14 Units, Subcutaneous, 4x Daily With Meals & Nightly  levothyroxine, 50 mcg, Oral, Daily  pantoprazole, 40 mg, Oral, Q AM  polyethylene glycol, 17 g, Oral, Daily  senna-docusate sodium, 2 tablet, Oral, BID  sodium chloride, 10 mL, Intravenous, Q12H  sodium chloride, 10 mL, Intravenous, Q12H      phenylephrine, 0.5-3 mcg/kg/min, Last Rate: Stopped (03/28/24 1696)        acetaminophen    senna-docusate sodium **AND** polyethylene glycol **AND** bisacodyl **AND** bisacodyl    dextrose    dextrose    glucagon (human recombinant)    miconazole     nitroglycerin    ondansetron ODT    sodium chloride    sodium chloride    sodium chloride    sodium chloride    sodium chloride    Assessment      Possible supraventricular tachycardia with baseline bundle branch block/IVCD  Paroxysmal atrial fibrillation with rapid ventricular response with aberrant conduction, s/p PVI in October 2022 with recurrence, s/p electrocardioversion in the ED on 1/31/2024.  [id number] score of 3.  Acute non-ST elevation myocardial infarction (chronically elevated high sensitive troponins around .  Acute on chronic HFrEF.  Advanced cardiomyopathy (possibly nonischemic) with LV ejection fraction of about 30 to 35% which could be tachycardia mediated.  Nonobstructive coronary artery disease noted on coronary angiography on 11/10/2020 with 30 to 40% stenosis in the LAD.  Reported cirrhosis of liver.  ([place])  Chronic kidney disease (stage IIIa).    Plan     For her paroxysmal atrial flutter/fibrillation, continue with amiodarone for now and monitor the LFTs closely.  Not a good candidate for Tikosyn due to fluctuating kidney function with chronic kidney disease.  For her acute on chronic HFrEF, IV diuretics as needed and tolerated.  Due to the kidney function closely.  For her cardiomyopathy, continue to optimize GDMT as tolerated by blood pressure.  Will consider referral to electrophysiology services if she has recurrent supraventricular arrhythmias or evidence of V. tach.  Arrange LifeVest prior to discharge and consider ICD in the future if the LVEF does not improve more than 35% on optimal medical therapy.        I have discussed the patients findings and recommendations with patient.    Thank you very much for asking us to be involved in this patient's care.  We will follow along with you.    Electronically signed by KENNY Finn, 03/28/24, 12:01 PM EDT.     Electronically signed by Ramon Sahu MD, 03/28/24, 1:01 PM EDT.              Please note that portions of  this note were completed with a voice recognition program.    Please note that portions of this note were copied and has been reviewed and is accurate as of 3/28/2024 .

## 2024-03-29 LAB
ANION GAP SERPL CALCULATED.3IONS-SCNC: 11.5 MMOL/L (ref 5–15)
ANISOCYTOSIS BLD QL: NORMAL
BACTERIA SPEC AEROBE CULT: ABNORMAL
BASOPHILS # BLD AUTO: 0.07 10*3/MM3 (ref 0–0.2)
BASOPHILS NFR BLD AUTO: 1.1 % (ref 0–1.5)
BUN SERPL-MCNC: 34 MG/DL (ref 6–20)
BUN/CREAT SERPL: 24.5 (ref 7–25)
CALCIUM SPEC-SCNC: 8.9 MG/DL (ref 8.6–10.5)
CHLORIDE SERPL-SCNC: 103 MMOL/L (ref 98–107)
CO2 SERPL-SCNC: 23.5 MMOL/L (ref 22–29)
CREAT SERPL-MCNC: 1.39 MG/DL (ref 0.57–1)
DEPRECATED RDW RBC AUTO: 80.4 FL (ref 37–54)
EGFRCR SERPLBLD CKD-EPI 2021: 44.4 ML/MIN/1.73
EOSINOPHIL # BLD AUTO: 0.23 10*3/MM3 (ref 0–0.4)
EOSINOPHIL NFR BLD AUTO: 3.7 % (ref 0.3–6.2)
ERYTHROCYTE [DISTWIDTH] IN BLOOD BY AUTOMATED COUNT: 20.6 % (ref 12.3–15.4)
GLUCOSE BLDC GLUCOMTR-MCNC: 103 MG/DL (ref 70–130)
GLUCOSE BLDC GLUCOMTR-MCNC: 190 MG/DL (ref 70–130)
GLUCOSE BLDC GLUCOMTR-MCNC: 257 MG/DL (ref 70–130)
GLUCOSE BLDC GLUCOMTR-MCNC: 290 MG/DL (ref 70–130)
GLUCOSE BLDC GLUCOMTR-MCNC: 352 MG/DL (ref 70–130)
GLUCOSE SERPL-MCNC: 135 MG/DL (ref 65–99)
HCT VFR BLD AUTO: 28.4 % (ref 34–46.6)
HGB BLD-MCNC: 8.2 G/DL (ref 12–15.9)
HYPOCHROMIA BLD QL: NORMAL
IMM GRANULOCYTES # BLD AUTO: 0.03 10*3/MM3 (ref 0–0.05)
IMM GRANULOCYTES NFR BLD AUTO: 0.5 % (ref 0–0.5)
LYMPHOCYTES # BLD AUTO: 2.1 10*3/MM3 (ref 0.7–3.1)
LYMPHOCYTES NFR BLD AUTO: 34 % (ref 19.6–45.3)
MACROCYTES BLD QL SMEAR: NORMAL
MAGNESIUM SERPL-MCNC: 2.4 MG/DL (ref 1.6–2.6)
MCH RBC QN AUTO: 31.1 PG (ref 26.6–33)
MCHC RBC AUTO-ENTMCNC: 28.9 G/DL (ref 31.5–35.7)
MCV RBC AUTO: 107.6 FL (ref 79–97)
MONOCYTES # BLD AUTO: 0.4 10*3/MM3 (ref 0.1–0.9)
MONOCYTES NFR BLD AUTO: 6.5 % (ref 5–12)
NEUTROPHILS NFR BLD AUTO: 3.35 10*3/MM3 (ref 1.7–7)
NEUTROPHILS NFR BLD AUTO: 54.2 % (ref 42.7–76)
NRBC BLD AUTO-RTO: 0 /100 WBC (ref 0–0.2)
PLATELET # BLD AUTO: 125 10*3/MM3 (ref 140–450)
PMV BLD AUTO: 10.4 FL (ref 6–12)
POTASSIUM SERPL-SCNC: 4.3 MMOL/L (ref 3.5–5.2)
RBC # BLD AUTO: 2.64 10*6/MM3 (ref 3.77–5.28)
SMALL PLATELETS BLD QL SMEAR: NORMAL
SODIUM SERPL-SCNC: 138 MMOL/L (ref 136–145)
STOMATOCYTES BLD QL SMEAR: NORMAL
WBC NRBC COR # BLD AUTO: 6.18 10*3/MM3 (ref 3.4–10.8)

## 2024-03-29 PROCEDURE — 63710000001 INSULIN GLARGINE PER 5 UNITS: Performed by: HOSPITALIST

## 2024-03-29 PROCEDURE — 97162 PT EVAL MOD COMPLEX 30 MIN: CPT

## 2024-03-29 PROCEDURE — 85025 COMPLETE CBC W/AUTO DIFF WBC: CPT | Performed by: HOSPITALIST

## 2024-03-29 PROCEDURE — 83735 ASSAY OF MAGNESIUM: CPT | Performed by: HOSPITALIST

## 2024-03-29 PROCEDURE — 97166 OT EVAL MOD COMPLEX 45 MIN: CPT

## 2024-03-29 PROCEDURE — 80048 BASIC METABOLIC PNL TOTAL CA: CPT | Performed by: HOSPITALIST

## 2024-03-29 PROCEDURE — 63710000001 INSULIN LISPRO (HUMAN) PER 5 UNITS: Performed by: HOSPITALIST

## 2024-03-29 PROCEDURE — 63710000001 INSULIN LISPRO (HUMAN) PER 5 UNITS: Performed by: NURSE PRACTITIONER

## 2024-03-29 PROCEDURE — 85007 BL SMEAR W/DIFF WBC COUNT: CPT | Performed by: HOSPITALIST

## 2024-03-29 PROCEDURE — 99232 SBSQ HOSP IP/OBS MODERATE 35: CPT | Performed by: HOSPITALIST

## 2024-03-29 PROCEDURE — 82948 REAGENT STRIP/BLOOD GLUCOSE: CPT

## 2024-03-29 PROCEDURE — 97535 SELF CARE MNGMENT TRAINING: CPT

## 2024-03-29 RX ORDER — AMIODARONE HYDROCHLORIDE 200 MG/1
100 TABLET ORAL DAILY
Status: DISCONTINUED | OUTPATIENT
Start: 2024-03-29 | End: 2024-03-30 | Stop reason: HOSPADM

## 2024-03-29 RX ADMIN — HYDROCODONE BITARTRATE AND ACETAMINOPHEN 1 TABLET: 7.5; 325 TABLET ORAL at 08:11

## 2024-03-29 RX ADMIN — POLYETHYLENE GLYCOL 3350 17 G: 17 POWDER, FOR SOLUTION ORAL at 08:02

## 2024-03-29 RX ADMIN — APIXABAN 5 MG: 5 TABLET, FILM COATED ORAL at 08:01

## 2024-03-29 RX ADMIN — EMPAGLIFLOZIN 10 MG: 10 TABLET, FILM COATED ORAL at 08:01

## 2024-03-29 RX ADMIN — INSULIN LISPRO 12 UNITS: 100 INJECTION, SOLUTION INTRAVENOUS; SUBCUTANEOUS at 17:46

## 2024-03-29 RX ADMIN — PANTOPRAZOLE SODIUM 40 MG: 40 TABLET, DELAYED RELEASE ORAL at 05:32

## 2024-03-29 RX ADMIN — INSULIN LISPRO 8 UNITS: 100 INJECTION, SOLUTION INTRAVENOUS; SUBCUTANEOUS at 20:44

## 2024-03-29 RX ADMIN — Medication 10 ML: at 20:45

## 2024-03-29 RX ADMIN — APIXABAN 5 MG: 5 TABLET, FILM COATED ORAL at 20:45

## 2024-03-29 RX ADMIN — HYDROCODONE BITARTRATE AND ACETAMINOPHEN 1 TABLET: 7.5; 325 TABLET ORAL at 20:44

## 2024-03-29 RX ADMIN — ASPIRIN 81 MG: 81 TABLET, COATED ORAL at 08:01

## 2024-03-29 RX ADMIN — INSULIN GLARGINE 35 UNITS: 100 INJECTION, SOLUTION SUBCUTANEOUS at 08:02

## 2024-03-29 RX ADMIN — INSULIN LISPRO 8 UNITS: 100 INJECTION, SOLUTION INTRAVENOUS; SUBCUTANEOUS at 11:00

## 2024-03-29 RX ADMIN — INSULIN LISPRO 3 UNITS: 100 INJECTION, SOLUTION INTRAVENOUS; SUBCUTANEOUS at 08:02

## 2024-03-29 RX ADMIN — INSULIN GLARGINE 35 UNITS: 100 INJECTION, SOLUTION SUBCUTANEOUS at 20:44

## 2024-03-29 RX ADMIN — DOCUSATE SODIUM 50 MG AND SENNOSIDES 8.6 MG 2 TABLET: 8.6; 5 TABLET, FILM COATED ORAL at 08:01

## 2024-03-29 RX ADMIN — AMIODARONE HYDROCHLORIDE 100 MG: 200 TABLET ORAL at 10:46

## 2024-03-29 RX ADMIN — DOCUSATE SODIUM 50 MG AND SENNOSIDES 8.6 MG 2 TABLET: 8.6; 5 TABLET, FILM COATED ORAL at 20:44

## 2024-03-29 RX ADMIN — LEVOTHYROXINE SODIUM 50 MCG: 50 TABLET ORAL at 08:01

## 2024-03-29 RX ADMIN — BUSPIRONE HYDROCHLORIDE 10 MG: 10 TABLET ORAL at 20:44

## 2024-03-29 RX ADMIN — FERROUS SULFATE TAB 325 MG (65 MG ELEMENTAL FE) 325 MG: 325 (65 FE) TAB at 08:01

## 2024-03-29 RX ADMIN — BUSPIRONE HYDROCHLORIDE 10 MG: 10 TABLET ORAL at 08:01

## 2024-03-29 NOTE — THERAPY EVALUATION
Acute Care - Physical Therapy Initial Evaluation   Isaías     Patient Name: Yumiko Purdy  : 1965  MRN: [id number]  Today's Date: 3/29/2024   Onset of Illness/Injury or Date of Surgery: 24  Visit Dx:     ICD-10-CM ICD-9-CM   1. Sustained ventricular tachycardia  I47.20 427.1   2. Chest pain, unspecified type  R07.9 786.50   3. Acute on chronic combined systolic and diastolic CHF (congestive heart failure)  I50.43 428.43     428.0     Patient Active Problem List   Diagnosis    Dilated cardiomyopathy    CKD (chronic kidney disease) stage 2, GFR 60-89 ml/min    Severe obesity (BMI 35.0-39.9) with comorbidity    Chronic anemia    Elevated bilirubin    Acute on chronic combined systolic and diastolic CHF (congestive heart failure)    Hyponatremia    Paroxysmal atrial fibrillation    Cirrhosis    Coronary artery disease involving native coronary artery of native heart without angina pectoris    Atrial fibrillation with RVR    Ventricular tachycardia     Past Medical History:   Diagnosis Date    Anemia     CHF (congestive heart failure)     Chronic a-fib     stated by patient     Cirrhosis of liver     stated by patient     Diabetes mellitus     Ventricular tachycardia      Past Surgical History:   Procedure Laterality Date    APPENDECTOMY      CARDIAC CATHETERIZATION N/A 11/10/2020    Procedure: Left Heart Cath;  Surgeon: Charlee Ken MD;  Location: [facility] INVASIVE LOCATION;  Service: Cardiovascular;  Laterality: N/A;    CHOLECYSTECTOMY      TOE AMPUTATION Right     stated by patient.      PT Assessment (Last 12 Hours)       PT Evaluation and Treatment       Row Name 24 1055          Physical Therapy Time and Intention    Subjective Information no complaints  -CS     Document Type evaluation  -CS     Mode of Treatment physical therapy  -CS     Patient Effort adequate  -CS     Comment Pt seen bedside for evaluation this AM. Pt reports using a walker for household mobility and w/c for  [facility]. Pt agreed to evaluation.  -       Row Name 03/29/24 1055          General Information    Patient Profile Reviewed yes  -CS     Onset of Illness/Injury or Date of Surgery 03/26/24  -     Referring Physician Oculam  -     Patient Observations alert;cooperative;agree to therapy  -CS     General Observations of Patient Pt sitting in bedside chair, all CCU lines intact  -CS     Risks Reviewed patient:;LOB;nausea/vomiting;dizziness;increased discomfort;change in vital signs;increased drainage;lines disloged  -CS     Benefits Reviewed patient:;improve function;increase independence;increase strength;increase balance;decrease pain;decrease risk of DVT;improve skin integrity;increase knowledge  -       Row Name 03/29/24 1055          Pain    Pre/Posttreatment Pain Comment no c/o  -       Row Name 03/29/24 1055          Cognition    Orientation Status (Cognition) oriented x 4  -       Row Name 03/29/24 1055          Range of Motion (ROM)    Range of Motion bilateral lower extremities;ROM is WFL  -       Row Name 03/29/24 1055          Strength Comprehensive (MMT)    Comment, General Manual Muscle Testing (MMT) Assessment (B)LE WFL  -       Row Name 03/29/24 1055          Bed Mobility    Bed Mobility bed mobility (all) activities  -     All Activities, [place] (Bed Mobility) contact guard;minimum assist (75% patient effort)  -     Assistive Device (Bed Mobility) bed rails;draw sheet;head of bed elevated  -       Row Name 03/29/24 1055          Transfers    Transfers sit-stand transfer;stand-sit transfer  -       Row Name 03/29/24 1055          Sit-Stand Transfer    Sit-Stand [place] (Transfers) contact guard;minimum assist (75% patient effort)  -     Assistive Device (Sit-Stand Transfers) walker, front-wheeled  -       Row Name 03/29/24 1055          Stand-Sit Transfer    Stand-Sit [place] (Transfers) contact guard;minimum assist (75% patient effort)  -     Assistive  Device (Stand-Sit Transfers) walker, front-wheeled  -CS       Row Name 03/29/24 1055          Gait/Stairs (Locomotion)    Gait/Stairs Locomotion gait/ambulation assistive device;gait/ambulation independence;distance ambulated  -     [place] Level (Gait) contact guard  -CS     Assistive Device (Gait) walker, front-wheeled  -CS     Patient was able to Ambulate yes  -CS     Distance in Feet (Gait) 25  -CS     Pattern (Gait) step-to;step-through  -CS     Deviations/Abnormal Patterns (Gait) gait speed decreased;stride length decreased;von decreased  -CS     Comment, (Gait/Stairs) slow cautious gait, no LOB  -CS       Row Name 03/29/24 1055          Plan of Care Review    Plan of Care Reviewed With patient  -CS     Progress no change  -CS     Outcome Evaluation Pt presents w/ decreased bed mobility, transfers, and gait. Pt would benefit from skilled PT. Anticipate d/c home w/ family support.  -CS       Row Name 03/29/24 1055          Positioning and Restraints    Pre-Treatment Position in bed  -CS     Post Treatment Position bed  -CS     In Bed supine;call light within reach;encouraged to call for assist;exit alarm on  -CS       Row Name 03/29/24 1054          Therapy Assessment/Plan (PT)    Functional Level at Time of Evaluation (PT) CGA/min(A)  -CS     PT Diagnosis (PT) impaired functional mobility  -CS     Rehab Potential (PT) good, to achieve stated therapy goals  -CS     Criteria for Skilled Interventions Met (PT) yes;meets criteria;skilled treatment is necessary  -CS     Therapy Frequency (PT) 2 times/wk  2-5x/week  -CS     Predicted Duration of Therapy Intervention (PT) while in house  -CS     Problem List (PT) problems related to;balance;mobility;strength  -CS     Activity Limitations Related to Problem List (PT) unable to ambulate safely;unable to transfer safely  -CS     Comment, Therapy Assessment/Plan (PT) Pt presents w/ decreased bed mobility, transfers, and gait. Pt would benefit from skilled PT.  Anticipate d/c home w/ family support.  -CS       Row Name 03/29/24 1055          PT Evaluation Complexity    History, PT Evaluation Complexity 3 or more personal factors and/or comorbidities  -CS     Examination of Body Systems (PT Eval Complexity) total of 4 or more elements  -CS     Clinical Presentation (PT Evaluation Complexity) evolving  -CS     Clinical Decision Making (PT Evaluation Complexity) moderate complexity  -CS     Overall Complexity (PT Evaluation Complexity) moderate complexity  -CS       Row Name 03/29/24 1055          Therapy Plan Review/Discharge Plan (PT)    Therapy Plan Review (PT) evaluation/treatment results reviewed;care plan/treatment goals reviewed;risks/benefits reviewed;current/potential barriers reviewed;participants voiced agreement with care plan;participants included;patient  -CS       Row Name 03/29/24 1055          Physical Therapy Goals    Bed Mobility Goal Selection (PT) bed mobility, PT goal 1  -CS     Transfer Goal Selection (PT) transfer, PT goal 1  -CS     Gait Training Goal Selection (PT) gait training, PT goal 1  -CS       Row Name 03/29/24 1055          Bed Mobility Goal 1 (PT)    Activity/Assistive Device (Bed Mobility Goal 1, PT) bed mobility activities, all  -CS     [place] Level/Cues Needed (Bed Mobility Goal 1, PT) standby assist  -CS     Time Frame (Bed Mobility Goal 1, PT) long term goal (LTG);by discharge  -CS       Row Name 03/29/24 1055          Transfer Goal 1 (PT)    Activity/Assistive Device (Transfer Goal 1, PT) transfers, all  -CS     [place] Level/Cues Needed (Transfer Goal 1, PT) standby assist  -CS     Time Frame (Transfer Goal 1, PT) long term goal (LTG);by discharge  -CS       Row Name 03/29/24 1055          Gait Training Goal 1 (PT)    Activity/Assistive Device (Gait Training Goal 1, PT) gait (walking locomotion);assistive device use  -CS     [place] Level (Gait Training Goal 1, PT) standby assist  -CS     Distance (Gait Training Goal  1, PT) 50  -CS     Time Frame (Gait Training Goal 1, PT) long term goal (LTG);by discharge  -               User Key  (r) = Recorded By, (t) = Taken By, (c) = Cosigned By      Initials Name Provider Type    CS Jossue Purvis, PT Physical Therapist                    Physical Therapy Education       Title: PT OT SLP Therapies (Done)       Topic: Physical Therapy (Done)       Point: Mobility training (Done)       Learning Progress Summary             Patient Acceptance, E,TB, VU by  at 3/29/2024 1058                         Point: Home exercise program (Done)       Learning Progress Summary             Patient Acceptance, E,TB, VU by CS at 3/29/2024 1058                         Point: Body mechanics (Done)       Learning Progress Summary             Patient Acceptance, E,TB, VU by  at 3/29/2024 1058                         Point: Precautions (Done)       Learning Progress Summary             Patient Acceptance, E,TB, VU by  at 3/29/2024 1058                                         User Key       Initials Effective Dates Name Provider Type Discipline     05/31/23 -  Jossue Purvis, PT Physical Therapist PT                  PT Recommendation and Plan  Anticipated Discharge Disposition (PT): home, home with assist  Planned Therapy Interventions (PT): balance training, bed mobility training, gait training, home exercise program, neuromuscular re-education, patient/family education, strengthening, transfer training  Therapy Frequency (PT): 2 times/wk (2-5x/week)  Plan of Care Reviewed With: patient  Progress: no change  Outcome Evaluation: Pt presents w/ decreased bed mobility, transfers, and gait. Pt would benefit from skilled PT. Anticipate d/c home w/ family support.       Time Calculation:    PT Charges       Row Name 03/29/24 1058             Time Calculation    Start Time 0955  -      PT Received On 03/29/24  -      PT Goal Re-Cert Due Date 04/12/24  -                User Key  (r) = Recorded By, (t) =  Taken By, (c) = Cosigned By      Initials Name Provider Type    CS Jossue uPrvis, PT Physical Therapist                  Therapy Charges for Today       Code Description Service Date Service Provider Modifiers Qty    21270768145 HC PT EVAL MOD COMPLEXITY 4 3/29/2024 Jossue Purvis, PT GP 1            PT G-Codes  AM-PAC 6 Clicks Score (PT): 11    Jossue Purvis, PT  3/29/2024

## 2024-03-29 NOTE — PLAN OF CARE
Problem: Skin Injury Risk Increased  Goal: Skin Health and Integrity  Outcome: Ongoing, Progressing  Intervention: Optimize Skin Protection  Recent Flowsheet Documentation  Taken 3/29/2024 1600 by Michaela Mccain RN  Head of Bed (HOB) Positioning: HOB at 30-45 degrees  Taken 3/29/2024 1400 by Michaela Mccain RN  Head of Bed (HOB) Positioning: HOB at 30-45 degrees  Taken 3/29/2024 0800 by Michaela Mccain RN  Head of Bed (HOB) Positioning: HOB at 30-45 degrees     Problem: Adult Inpatient Plan of Care  Goal: Plan of Care Review  Outcome: Ongoing, Progressing  Goal: Patient-Specific Goal (Individualized)  Outcome: Ongoing, Progressing  Goal: Absence of Hospital-Acquired Illness or Injury  Outcome: Ongoing, Progressing  Intervention: Identify and Manage Fall Risk  Recent Flowsheet Documentation  Taken 3/29/2024 1700 by Michaela Mccain RN  Safety Promotion/Fall Prevention: safety round/check completed  Taken 3/29/2024 1500 by Michaela Mccain RN  Safety Promotion/Fall Prevention: safety round/check completed  Taken 3/29/2024 1400 by Michaela Mccain RN  Safety Promotion/Fall Prevention: safety round/check completed  Taken 3/29/2024 1300 by Michaela Mccain RN  Safety Promotion/Fall Prevention: safety round/check completed  Taken 3/29/2024 1200 by Michaela Mccain RN  Safety Promotion/Fall Prevention: safety round/check completed  Taken 3/29/2024 1100 by Michaela Mccain RN  Safety Promotion/Fall Prevention: safety round/check completed  Taken 3/29/2024 1000 by Michaela Mccain RN  Safety Promotion/Fall Prevention: safety round/check completed  Taken 3/29/2024 0900 by Michaela Mccain RN  Safety Promotion/Fall Prevention: safety round/check completed  Taken 3/29/2024 0800 by Michaela Mccain RN  Safety Promotion/Fall Prevention: safety round/check completed  Taken 3/29/2024 0700 by Michaela Mccain RN  Safety Promotion/Fall Prevention: safety round/check completed  Intervention: Prevent Skin  Body Location Override (Optional - Billing Will Still Be Based On Selected Body Map Location If Applicable): left shin "Injury  Recent Flowsheet Documentation  Taken 3/29/2024 1600 by Michaela Mccain RN  Body Position: (pt. states \"maybe later\") patient/family refused  Taken 3/29/2024 1400 by Michaela Mccain RN  Body Position:   right   side-lying  Taken 3/29/2024 1200 by Michaela Mccain RN  Body Position: (up in recliner) other (see comments)  Taken 3/29/2024 1000 by Michaela Mccain RN  Body Position: (sitting up in recliner) other (see comments)  Taken 3/29/2024 0800 by Michaela Mccain RN  Body Position:   left   side-lying  Intervention: Prevent and Manage VTE (Venous Thromboembolism) Risk  Recent Flowsheet Documentation  Taken 3/29/2024 1200 by Michaela Mccain RN  Activity Management: up in chair  Taken 3/29/2024 0900 by Michaela Mccain RN  Activity Management: up in chair  Goal: Optimal Comfort and Wellbeing  Outcome: Ongoing, Progressing  Goal: Readiness for Transition of Care  Outcome: Ongoing, Progressing     Problem: Diabetes Comorbidity  Goal: Blood Glucose Level Within Targeted Range  Outcome: Ongoing, Progressing     Problem: Heart Failure Comorbidity  Goal: Maintenance of Heart Failure Symptom Control  Outcome: Ongoing, Progressing     Problem: Fall Injury Risk  Goal: Absence of Fall and Fall-Related Injury  Outcome: Ongoing, Progressing  Intervention: Promote Injury-Free Environment  Recent Flowsheet Documentation  Taken 3/29/2024 1700 by Michaela Mccain RN  Safety Promotion/Fall Prevention: safety round/check completed  Taken 3/29/2024 1500 by Michaela Mccain RN  Safety Promotion/Fall Prevention: safety round/check completed  Taken 3/29/2024 1400 by Michaela Mccain RN  Safety Promotion/Fall Prevention: safety round/check completed  Taken 3/29/2024 1300 by Michaela Mccain RN  Safety Promotion/Fall Prevention: safety round/check completed  Taken 3/29/2024 1200 by Michaela Mccain RN  Safety Promotion/Fall Prevention: safety round/check completed  Taken 3/29/2024 1100 by Michaela Mccain RN  Safety " Detail Level: Detailed Promotion/Fall Prevention: safety round/check completed  Taken 3/29/2024 1000 by Michaela Mccain, RN  Safety Promotion/Fall Prevention: safety round/check completed  Taken 3/29/2024 0900 by Michaela Mccain RN  Safety Promotion/Fall Prevention: safety round/check completed  Taken 3/29/2024 0800 by Michaela Mccain RN  Safety Promotion/Fall Prevention: safety round/check completed  Taken 3/29/2024 0700 by Michaela Mccain RN  Safety Promotion/Fall Prevention: safety round/check completed   Goal Outcome Evaluation:      Pt. Transferred to recliner, bedside toilet and walked in room with PT. Amio dose decreased for AM dose per cardiology.                                         Was A Bandage Applied: Yes Punch Size In Mm: 4 Size Of Lesion In Cm (Optional): 0 Depth Of Punch Biopsy: dermis Biopsy Type: H and E Anesthesia Type: 1% lidocaine with 1:100,000 epinephrine and a 1:10 solution of 8.4% sodium bicarbonate Anesthesia Volume In Cc (Will Not Render If 0): 0.5 Hemostasis: None Epidermal Sutures: 4-0 Prolene Wound Care: Petrolatum Dressing: bandage Suture Removal: 14 days Patient Will Remove Sutures At Home?: No Lab: 6 Lab Facility: 3 Consent: Written consent was obtained and risks were reviewed including but not limited to scarring, infection, bleeding, scabbing, incomplete removal, nerve damage and allergy to anesthesia. Post-Care Instructions: I reviewed with the patient in detail post-care instructions. Patient is to keep the biopsy site dry overnight, and then apply bacitracin twice daily until healed. Patient may apply hydrogen peroxide soaks to remove any crusting. Home Suture Removal Text: Patient was provided a home suture removal kit and will remove their sutures at home.  If they have any questions or difficulties they will call the office. Notification Instructions: Patient will be notified of biopsy results. However, patient instructed to call the office if not contacted within 2 weeks. Billing Type: Third-Party Bill Information: Selecting Yes will display possible errors in your note based on the variables you have selected. This validation is only offered as a suggestion for you. PLEASE NOTE THAT THE VALIDATION TEXT WILL BE REMOVED WHEN YOU FINALIZE YOUR NOTE. IF YOU WANT TO FAX A PRELIMINARY NOTE YOU WILL NEED TO TOGGLE THIS TO 'NO' IF YOU DO NOT WANT IT IN YOUR FAXED NOTE. Body Location Override (Optional - Billing Will Still Be Based On Selected Body Map Location If Applicable): left thigh

## 2024-03-29 NOTE — PLAN OF CARE
Goal Outcome Evaluation:  Plan of Care Reviewed With: patient        Progress: no change  Outcome Evaluation: Pt presents w/ decreased bed mobility, transfers, and gait. Pt would benefit from skilled PT. Anticipate d/c home w/ family support.      Anticipated Discharge Disposition (PT): home, home with assist

## 2024-03-29 NOTE — PROGRESS NOTES
[facility]     Patient Identification:  Name:  Yumiko Purdy  Age:  58 y.o.  Sex:  female  :  1965  MRN:  [id number]  Visit Number:  [id number]  Primary Care Provider:  Masha Davidson APRN    Length of stay:  3    Subjective: Patient seen and examined, patient is in bed she is not orthopneic, comfortable, she is maintaining sinus rhythm, 2D echo shows normal ejection fraction.  Cardiology help appreciated.    Chief Complaint: Cardiac arrhythmia  ----------------------------------------------------------------------------------------------------------------------  Current Hospital Meds:  amiodarone, 300 mg, Oral, Daily  apixaban, 5 mg, Oral, Q12H  aspirin, 81 mg, Oral, Daily  busPIRone, 10 mg, Oral, BID  empagliflozin, 10 mg, Oral, Daily  ferrous sulfate, 325 mg, Oral, Daily  HYDROcodone-acetaminophen, 1 tablet, Oral, BID  insulin glargine, 35 Units, Subcutaneous, Q12H  insulin lispro, 3-14 Units, Subcutaneous, 4x Daily With Meals & Nightly  levothyroxine, 50 mcg, Oral, Daily  pantoprazole, 40 mg, Oral, Q AM  polyethylene glycol, 17 g, Oral, Daily  senna-docusate sodium, 2 tablet, Oral, BID  sodium chloride, 10 mL, Intravenous, Q12H  sodium chloride, 10 mL, Intravenous, Q12H      phenylephrine, 0.5-3 mcg/kg/min, Last Rate: Stopped (24 0745)      ----------------------------------------------------------------------------------------------------------------------  Vital Signs:  Temp:  [97.8 °F (36.6 °C)-98.2 °F (36.8 °C)] 97.8 °F (36.6 °C)  Heart Rate:  [51-64] 59  Resp:  [18] 18  BP: ()/(38-64) 118/60       Tele: Gerry rhythm 59 bpm      24  2342 24  0249 24  0535   Weight: 104 kg (229 lb 4.5 oz) 105 kg (231 lb 7.7 oz) 100 kg (221 lb 1.9 oz)     Body mass index is 36.8 kg/m².    Intake/Output Summary (Last 24 hours) at 3/29/2024 0913  Last data filed at 3/29/2024 0850  Gross per 24 hour   Intake 1286 ml   Output 1400 ml   Net  -114 ml     Diet: Cardiac, Diabetic; Healthy Heart (2-3 Na+); Consistent Carbohydrate; Fluid Consistency: Thin (IDDSI 0)  ----------------------------------------------------------------------------------------------------------------------  Physical exam:  General: Obese in bed conversant, comfortable,awake, alert, oriented to self, place, and time, well-developed and well-nourished.  No respiratory distress.    Skin:  Skin is warm and dry. No rash noted. No pallor.    HENT:  Head:  Normocephalic and atraumatic.  Mouth:  Moist mucous membranes.    Eyes:  Conjunctivae and EOM are normal.  Pupils are equal, round, and reactive to light.  No scleral icterus.    Neck:  Neck supple.  No JVD present.  No hepatojugular refluxPulmonary/Chest:  No respiratory distress, no wheezes, no crackles, with normal breath sounds and good air movement.  Cardiovascular: Faint systolic murmur loudest left midclavicular line normal rate, regular rhythm   Abdominal: Obese soft.  Bowel sounds are normal.  No distension and no tenderness.   Extremities:  No edema, no tenderness, and no deformity.  No red or swollen joints anywhere.  Strong pulses in all 4 extremities with no clubbing, no cyanosis, no edema.  Neurological:  Motor strength equal no obvious deficit, sensory grossly intact.   No cranial nerve deficit.  No tongue deviation.  No facial droop.  No slurred speech.    Genitourinary: External urinary catheter  Back:  ----------------------------------------------------------------------------------------------------------------------  ----------------------------------------------------------------------------------------------------------------------  Results from last 7 days   Lab Units 03/27/24  0158 03/26/24  2347 03/26/24 2154   HSTROP T ng/L 102* 100* 92*     Results from last 7 days   Lab Units 03/29/24  0016 03/28/24  0031 03/27/24  0459 03/27/24  0158   CRP mg/dL  --   --   --  0.82*   LACTATE mmol/L  --   --  2.0 2.5*  "  WBC 10*3/mm3 6.18 12.76*  --  7.78   HEMOGLOBIN g/dL 8.2* 9.8*  --  9.4*   HEMATOCRIT % 28.4* 32.6*  --  32.0*   MCV fL 107.6* 105.2*  --  107.7*   MCHC g/dL 28.9* 30.1*  --  29.4*   PLATELETS 10*3/mm3 125* 158  --  131*         Results from last 7 days   Lab Units 03/29/24  0016 03/28/24  0031 03/27/24  0158 03/26/24  2347 03/26/24  2154   SODIUM mmol/L 138 136 139  --  138   POTASSIUM mmol/L 4.3 4.7 3.8  --  3.8   MAGNESIUM mg/dL 2.4 2.6  --  2.5  --    CHLORIDE mmol/L 103 104 99  --  99   CO2 mmol/L 23.5 19.2* 25.1  --  23.7   BUN mg/dL 34* 28* 29*  --  29*   CREATININE mg/dL 1.39* 1.32* 1.38*  --  1.34*   CALCIUM mg/dL 8.9 8.9 8.7  --  8.6   GLUCOSE mg/dL 135* 173* 370*  --  360*   ALBUMIN g/dL  --  3.6 3.9  --  3.9   BILIRUBIN mg/dL  --  3.9* 2.9*  --  3.6*   ALK PHOS U/L  --  81 100  --  105   AST (SGOT) U/L  --  52* 23  --  24   ALT (SGPT) U/L  --  28 25  --  26   Estimated Creatinine Clearance: 52.3 mL/min (A) (by C-G formula based on SCr of 1.39 mg/dL (H)).    No results found for: \"AMMONIA\"      No results found for: \"BLOODCX\"  Urine Culture   Date Value Ref Range Status   03/27/2024 >100,000 CFU/mL Escherichia coli (A)  Preliminary     No results found for: \"WOUNDCX\"  No results found for: \"STOOLCX\"    I have personally looked at the labs and they are summarized above.  ----------------------------------------------------------------------------------------------------------------------  Imaging Results (Last 24 Hours)       ** No results found for the last 24 hours. **          ----------------------------------------------------------------------------------------------------------------------  Assessment and Plan:  -Wide-complex tachycardia/arrhythmia?  V. tach versus A-fib with aberrant conduction RVR status post cardioversion now sinus rhythm  -History of cardiomyopathy recovered EF on this admission his 2D echo  -Acute cystitis with E. Coli  -CKd stage III  -History of hypothyroidism  -Obesity " with BMI of 38.5  -Chronic anticoagulation  -Cardiogenic shock from arrhythmia resolved    Continue ambulation, PT OT, Omnicef 3 mg twice daily for 5 days, answered to PCU.  Cardiology help appreciated.  Continue amiodarone    Disposition pending      Halle Keita MD  03/29/24  09:13 EDT

## 2024-03-29 NOTE — CASE MANAGEMENT/SOCIAL WORK
Discharge Planning Assessment   Isaías     Patient Name: Yumiko Purdy  MRN: [id number]  Today's Date: 3/29/2024    Admit Date: 3/26/2024     Discharge Plan       Row Name 03/29/24 1648       Plan    Plan ED CM completed initial consult. SS spoke with pt at bedside regarding discharge planning. Pt declines any short term rehab and wants to return home at discharge. Pt lives with son in [facility]. Pt utilizes [facility] for aide and nursing services and will need new home health order at d/c. Pt PCP Masha Oliva. Pt utilizes HB, walker, and w/c from unknown provider. [facility] Pt's family to provide transportation at discharge. SS to follow.           Home Medical Care       Service Provider Request Status Selected Services Address Phone Fax Patient Preferred    [facility] - No Request Sent N/A [address] [place] [address] [address] [facility] [phone] --             SAMY Gong

## 2024-03-29 NOTE — THERAPY EVALUATION
Acute Care - Occupational Therapy Initial Evaluation   Isaías     Patient Name: Yumiko Purdy  : 1965  MRN: [id number]  Today's Date: 3/29/2024  Onset of Illness/Injury or Date of Surgery: 24     Referring Physician: Neela    Admit Date: 3/26/2024       ICD-10-CM ICD-9-CM   1. Sustained ventricular tachycardia  I47.20 427.1   2. Chest pain, unspecified type  R07.9 786.50   3. Acute on chronic combined systolic and diastolic CHF (congestive heart failure)  I50.43 428.43     428.0     Patient Active Problem List   Diagnosis    Dilated cardiomyopathy    CKD (chronic kidney disease) stage 2, GFR 60-89 ml/min    Severe obesity (BMI 35.0-39.9) with comorbidity    Chronic anemia    Elevated bilirubin    Acute on chronic combined systolic and diastolic CHF (congestive heart failure)    Hyponatremia    Paroxysmal atrial fibrillation    Cirrhosis    Coronary artery disease involving native coronary artery of native heart without angina pectoris    Atrial fibrillation with RVR    Ventricular tachycardia     Past Medical History:   Diagnosis Date    Anemia     CHF (congestive heart failure)     Chronic a-fib     stated by patient     Cirrhosis of liver     stated by patient     Diabetes mellitus     Ventricular tachycardia      Past Surgical History:   Procedure Laterality Date    APPENDECTOMY      CARDIAC CATHETERIZATION N/A 11/10/2020    Procedure: Left Heart Cath;  Surgeon: Charlee Ken MD;  Location: Merged with [facility] INVASIVE LOCATION;  Service: Cardiovascular;  Laterality: N/A;    CHOLECYSTECTOMY      TOE AMPUTATION Right     stated by patient.          OT ASSESSMENT FLOWSHEET (Last 12 Hours)       OT Evaluation and Treatment       Row Name 24 1225                   OT Time and Intention    Document Type evaluation  -KR        Mode of Treatment occupational therapy  -KR        Patient Effort adequate  -KR           General Information    General Observations of Patient alert/cooperative  -KR         Equipment Currently Used at Home commode, bedside;hospital bed;oxygen;walker, rolling;wheelchair  -KR           Living Environment    Current Living Arrangements home  -KR        People in Home child(ferdinand), adult  -KR        Primary Care Provided by self;child(ferdinand)  -KR           Cognition    Affect/Mental Status (Cognition) WFL  -KR        Orientation Status (Cognition) oriented x 3  -KR        Follows Commands (Cognition) WFL  -KR           Range of Motion Comprehensive    Comment, General Range of Motion BUE WFL  -KR           Strength Comprehensive (MMT)    Comment, General Manual Muscle Testing (MMT) Assessment BUE 3/5  -KR           Activities of Daily Living    BADL Assessment/Intervention bathing;upper body dressing;lower body dressing;grooming;feeding;toileting  -KR           Bathing Assessment/Intervention    [place] Level (Bathing) bathing skills;minimum assist (75% patient effort)  -KR           Upper Body Dressing Assessment/Training    [place] Level (Upper Body Dressing) upper body dressing skills;set up  -KR           Lower Body Dressing Assessment/Training    [place] Level (Lower Body Dressing) lower body dressing skills;minimum assist (75% patient effort)  -KR           Grooming Assessment/Training    [place] Level (Grooming) grooming skills;set up  -KR           Self-Feeding Assessment/Training    [place] Level (Feeding) feeding skills;set up  -KR           Toileting Assessment/Training    [place] Level (Toileting) toileting skills;minimum assist (75% patient effort)  -KR           Motor Skills    Motor Skills --  Pt presents with bilateral UE tremors noted throughout fine motor activity. Tremors are reported occasional and mild interference with ADLs at this time.  -KR           Plan of Care Review    Plan of Care Reviewed With patient  -KR        Progress improving  -KR           Therapy Assessment/Plan (OT)    Planned Therapy Interventions (OT) adaptive equipment  training  -KR        Comment, Therapy Assessment/Plan (OT) Pt presents with general debility and mild UE tremors noted/reported. Pt reports occasional difficulty with feeding/grooming activity due to these tremors. Adaptive material provided to assist with utensils/grooming items. Built up foam issued, demonstrated, explained at this time. Pt able to use appropriately and reported comprehension of purpose. Spill proof cup issued for use in room and home to prevent spills and increase fxl performance drink to mouth. OT to monitor and provide assist as needed with items.  -KR           Therapy Plan Review/Discharge Plan (OT)    Anticipated Discharge Disposition (OT) home with assist  -KR           OT Goals    Activity Tolerance Goal Selection (OT) activity tolerance, OT goal 1  -KR            Activity Tolerance Goal 1 (OT)    Activity Tolerance Goal 1 (OT) Increase to enhance performance of self care tasks  -KR        Activity Level (Endurance Goal 1, OT) 15 min activity  -KR        Time Frame (Activity Tolerance Goal 1, OT) by discharge  -KR           Patient Education Goal (OT)    Activity (Patient Education Goal, OT) AE/DME training as needed to enhance independence with self care tasks  -KR        [place]/Cues/Accuracy (Memory Goal 2, OT) verbalizes understanding;demonstrates adequately  -KR        Time Frame (Patient Education Goal, OT) by discharge  -KR                  User Key  (r) = Recorded By, (t) = Taken By, (c) = Cosigned By      Initials Name Effective Dates    KR Marlo Lindsey, OT 06/16/21 -                            OT Recommendation and Plan  Planned Therapy Interventions (OT): adaptive equipment training  Plan of Care Review  Plan of Care Reviewed With: patient  Progress: improving  Plan of Care Reviewed With: patient        Time Calculation:     Therapy Charges for Today       Code Description Service Date Service Provider Modifiers Qty    93012803569  OT EVAL MOD COMPLEXITY 4 3/29/2024  Marlo Lindsey, OT GO 1    [id number] HC OT SELF CARE/MGMT/TRAIN EA 15 MIN 3/29/2024 Marlo Lindsey, OT GO 2                 Marlo Lindsey OT  3/29/2024

## 2024-03-29 NOTE — PLAN OF CARE
Goal Outcome Evaluation:  Plan of Care Reviewed With: patient        Progress: improving         Problem: Skin Injury Risk Increased  Goal: Skin Health and Integrity  Outcome: Ongoing, Progressing  Intervention: Optimize Skin Protection  Recent Flowsheet Documentation  Taken 3/29/2024 0400 by Dina Watson RN  Head of Bed (HOB) Positioning: HOB elevated  Taken 3/29/2024 0200 by Dina Watson RN  Head of Bed (HOB) Positioning: HOB elevated  Taken 3/29/2024 0000 by Dina Watson RN  Head of Bed (HOB) Positioning: HOB elevated  Taken 3/28/2024 2200 by Dina Watson RN  Head of Bed (HOB) Positioning: HOB elevated  Taken 3/28/2024 2000 by Dina Watson RN  Head of Bed (HOB) Positioning: HOB elevated     Problem: Adult Inpatient Plan of Care  Goal: Plan of Care Review  Outcome: Ongoing, Progressing  Flowsheets (Taken 3/29/2024 0409)  Progress: improving  Plan of Care Reviewed With: patient  Goal: Patient-Specific Goal (Individualized)  Outcome: Ongoing, Progressing  Goal: Absence of Hospital-Acquired Illness or Injury  Outcome: Ongoing, Progressing  Intervention: Identify and Manage Fall Risk  Recent Flowsheet Documentation  Taken 3/29/2024 0400 by Dina Watson RN  Safety Promotion/Fall Prevention:   safety round/check completed   fall prevention program maintained  Taken 3/29/2024 0300 by Dina Watson RN  Safety Promotion/Fall Prevention:   safety round/check completed   fall prevention program maintained  Taken 3/29/2024 0200 by Dina Watson RN  Safety Promotion/Fall Prevention:   safety round/check completed   fall prevention program maintained  Taken 3/29/2024 0100 by Dina Watson RN  Safety Promotion/Fall Prevention:   safety round/check completed   fall prevention program maintained  Taken 3/29/2024 0000 by Dina Watson RN  Safety Promotion/Fall Prevention:   safety round/check completed   fall prevention program maintained  Taken 3/28/2024 2300 by Dina Watson RN  Safety Promotion/Fall  Prevention:   safety round/check completed   fall prevention program maintained  Taken 3/28/2024 2200 by Dina Watson RN  Safety Promotion/Fall Prevention:   safety round/check completed   fall prevention program maintained  Taken 3/28/2024 2100 by Dina Watson RN  Safety Promotion/Fall Prevention:   safety round/check completed   fall prevention program maintained  Taken 3/28/2024 2000 by Dina Watson RN  Safety Promotion/Fall Prevention:   safety round/check completed   fall prevention program maintained  Taken 3/28/2024 1900 by Dina Watson RN  Safety Promotion/Fall Prevention:   safety round/check completed   fall prevention program maintained  Intervention: Prevent Skin Injury  Recent Flowsheet Documentation  Taken 3/29/2024 0400 by Dina Watson RN  Body Position:   turned   left  Taken 3/29/2024 0200 by Dina Watson RN  Body Position:   turned   right  Taken 3/29/2024 0000 by Dina Watson RN  Body Position:   turned   left  Taken 3/28/2024 2200 by Dina Watson RN  Body Position:   turned   right  Taken 3/28/2024 2000 by Dina Watson RN  Body Position:   turned   left  Intervention: Prevent and Manage VTE (Venous Thromboembolism) Risk  Recent Flowsheet Documentation  Taken 3/28/2024 2000 by Dina Watson RN  VTE Prevention/Management: (see mar) other (see comments)  Goal: Optimal Comfort and Wellbeing  Outcome: Ongoing, Progressing  Intervention: Provide Person-Centered Care  Recent Flowsheet Documentation  Taken 3/29/2024 0130 by Dina Watson RN  Trust Relationship/Rapport:   care explained   choices provided   reassurance provided   thoughts/feelings acknowledged  Taken 3/28/2024 2000 by Dina Watson RN  Trust Relationship/Rapport:   care explained   choices provided   reassurance provided   thoughts/feelings acknowledged  Goal: Readiness for Transition of Care  Outcome: Ongoing, Progressing     Problem: Diabetes Comorbidity  Goal: Blood Glucose Level Within Targeted  Range  Outcome: Ongoing, Progressing     Problem: Fall Injury Risk  Goal: Absence of Fall and Fall-Related Injury  Outcome: Ongoing, Progressing  Intervention: Identify and Manage Contributors  Recent Flowsheet Documentation  Taken 3/29/2024 0400 by Dina Watson RN  Medication Review/Management: medications reviewed  Taken 3/29/2024 0300 by Dina Watson RN  Medication Review/Management: medications reviewed  Taken 3/29/2024 0200 by Dina Watson RN  Medication Review/Management: medications reviewed  Taken 3/29/2024 0100 by Dina Watson RN  Medication Review/Management: medications reviewed  Taken 3/29/2024 0000 by Dina Watson RN  Medication Review/Management: medications reviewed  Taken 3/28/2024 2300 by Dina Watson RN  Medication Review/Management: medications reviewed  Taken 3/28/2024 2200 by Dina Watson RN  Medication Review/Management: medications reviewed  Taken 3/28/2024 2100 by Dina Watson RN  Medication Review/Management: medications reviewed  Taken 3/28/2024 2000 by Dina Watson RN  Medication Review/Management: medications reviewed  Taken 3/28/2024 1900 by Dina Watson RN  Medication Review/Management: medications reviewed  Intervention: Promote Injury-Free Environment  Recent Flowsheet Documentation  Taken 3/29/2024 0400 by Dina Watson RN  Safety Promotion/Fall Prevention:   safety round/check completed   fall prevention program maintained  Taken 3/29/2024 0300 by Dina Watson RN  Safety Promotion/Fall Prevention:   safety round/check completed   fall prevention program maintained  Taken 3/29/2024 0200 by Dina Watson RN  Safety Promotion/Fall Prevention:   safety round/check completed   fall prevention program maintained  Taken 3/29/2024 0100 by Dina Watson RN  Safety Promotion/Fall Prevention:   safety round/check completed   fall prevention program maintained  Taken 3/29/2024 0000 by Dina Watson RN  Safety Promotion/Fall Prevention:   safety  round/check completed   fall prevention program maintained  Taken 3/28/2024 2300 by Dina Watson, RN  Safety Promotion/Fall Prevention:   safety round/check completed   fall prevention program maintained  Taken 3/28/2024 2200 by Dina Watson, RN  Safety Promotion/Fall Prevention:   safety round/check completed   fall prevention program maintained  Taken 3/28/2024 2100 by Dina Watson, RN  Safety Promotion/Fall Prevention:   safety round/check completed   fall prevention program maintained  Taken 3/28/2024 2000 by Dina Watson, RN  Safety Promotion/Fall Prevention:   safety round/check completed   fall prevention program maintained  Taken 3/28/2024 1900 by Dina Watson, RN  Safety Promotion/Fall Prevention:   safety round/check completed   fall prevention program maintained

## 2024-03-29 NOTE — PROGRESS NOTES
[facility]  PROGRESS NOTE    Patient information:  Name: Yumiko Purdy  Age/Sex: 58 y.o. female  :  1965        PCP: Masha Davidson APRN  Attending: Tomás An MD  MRN:  [id number]  Visit Number:  [id number]    LOS:  LOS: 3 days     CODE STATUS:    Code Status and Medical Interventions:   Ordered at: 24 0747     Code Status (Patient has no pulse and is not breathing):    CPR (Attempt to Resuscitate)     Medical Interventions (Patient has pulse or is breathing):    Full Support       PROBLEM LIST:Active Problems:    Ventricular tachycardia      Reason for Cardiology follow-up: Ventricular tachycardia     Subjective   ADMISSION INFORMATION:  Chief Complaint   Patient presents with    Rapid Heart Rate       HPI:  Yumiko Purdy is a 58 y.o. female with ASCVD, chronic atrial fibrillation with recent RVR and DCCV 2024 status post multiple PVI ablations with the last being 2022, acute on chronic systolic and diastolic heart failure, peripheral arterial disease status post great toe amputation, chronic hypoxemic respiratory failure, chronic kidney disease stage III, cirrhosis, insulin-dependent type 2 diabetes mellitus, and obesity.     Most of the history is taken from the chart as the patient reports that her memory is poor.  She has a very long history of atrial fibrillation with RVR and left bundle branch block.  This has frequently been confused with ventricular tachycardia and readings revised after EP review.  She follows in our heart failure clinic with her last visit 2024.  She also sees electrophysiology and general cardiology at [facility].  Her last visit with  was 2024 and he recommended that she stay on 300 mg of amiodarone daily for 2 months and then decrease to 200 mg daily.  At that time he noted that she was not a candidate for further ablations.     Primary cardiologist been Dr. Tesfaye  "in [facility]     EVENT TIMELINE:   02/08/2024: Limited echo revealed EF of 50% ±5% and no pericardial effusion.  Also noted hypokinesis of the mid anteroseptal, mid inferoseptal, mid inferior, apical septal, basal anteroseptal, and basal inferior segments. Her ejection fraction in November 2023 was reported as 30 to 35% with grade 3 diastolic dysfunction  03/28/2024: EF of 66 to 70%.  Left ventricular diastolic function is consistent with grade 2 diabetes/high LAP, moderate mitral annular calcification present with mild mitral valve regurgitation and stenosis present.  Please see full report attached below.  03/29/2024:      Interval History:   Telemetry reveals sinus bradycardia 50s w 1st degree Block and BBB.  According to patient's I & O flow chart she did diurese and -590 mL with x 3 other unmeasured urine occurrences noted.  A.m. labs reveals a sodium 138, potassium 4.3, chloride 103, CO2 23.5, BUN 34, and creatinine 1.39 versus 1.32.  Hemoglobin is 8.2 which is a slight drop from 9.8 and platelet count is 125 which is a slight drop from 158.  Echo obtained on 03/28/2024 revealed a EF of 66 to 70% which is showing improvement from the previously at 50% +/-.     Patient was in room CCU 4 when she was seen and examined.  Patient is sitting in chair at bedside resting quietly.  No acute distress noted at this time.  Patient reports, \"I feel some better\".    Patient's nurse, Michaela Mccain RN notified me that she had not been receiving the amiodarone secondary to sinus bradycardia. Dose of Amiodarone decreased to 100 mg PO Daily to be started today. Michaela is aware.     ORDERS:   Decrease dose of amiodarone  to 100 mg p.o. daily    Vital Signs  Temp:  [97.8 °F (36.6 °C)-98.2 °F (36.8 °C)] 98.1 °F (36.7 °C)  Heart Rate:  [51-64] 57  Resp:  [18] 18  BP: ()/(38-64) 118/60  Flow (L/min):  [2] 2  Device (Oxygen Therapy): nasal cannula  Vital Signs (last 72 hrs)         03/26 0700  03/27 0659 03/27 " 0700 03/28 0659 03/28 0700  03/29 0659 03/29 0700 03/29 0817   Most Recent      Temp (°F) 97.7 -  98.1    98 -  98.2    97.8 -  98.2       98.1 (36.7) 03/29 0000    Heart Rate 55 -  147    53 -  62    51 -  66      57     57 03/29 0801    Resp 17 -  18    11 -  20      18       18 03/28 1600    BP 80/35 -  139/52    73/65 -  155/63    78/67 -  135/60      118/60     118/60 03/29 0801    SpO2 (%) 96 -  100    90 -  100    78 -  100       100 03/29 0600    Flow (L/min)   2      2      2       2 03/29 0130          BMI:Body mass index is 36.8 kg/m².    WEIGHT:      03/26/24  2342 03/27/24  0249 03/29/24  0535   Weight: 104 kg (229 lb 4.5 oz) 105 kg (231 lb 7.7 oz) 100 kg (221 lb 1.9 oz)       DIET:Diet: Cardiac, Diabetic; Healthy Heart (2-3 Na+); Consistent Carbohydrate; Fluid Consistency: Thin (IDDSI 0)    I&O:  Intake & Output (last 3 days)         03/26 0701 03/27 0700 03/27 0701 03/28 0700 03/28 0701 03/29 0700 03/29 0701 03/30 0700    P.O.   810     I.V. (mL/kg) 27 (0.3) 187 (1.8)      IV Piggyback 450       Total Intake(mL/kg) 477 (4.5) 187 (1.8) 810 (8.1)     Urine (mL/kg/hr) 600 1800 (0.7) 1400 (0.6)     Total Output 600 1800 1400     Net -123 -1613 -590             Urine Unmeasured Occurrence  2 x 3 x              Objective     Physical Exam:      General Appearance:    Alert, cooperative, in no acute distress.  Tired appearing.    Head:    Normocephalic, without obvious abnormality, atraumatic.   Eyes:                          Conjunctivae and sclerae normal, no icterus, no pallor, corneas clear.   Neck:   No adenopathy, supple, trachea midline, no thyromegaly, no carotid bruit, no JVD.   Lungs:     Clear to auscultation, respirations regular, even and             unlabored.    Heart:    Regular rhythm and normal rate, normal S1 and S2, no          murmur, no gallop, no rub, no click   Chest Wall:    No abnormalities observed.   Abdomen:     Normal bowel sounds, no masses, no organomegaly, soft  nontender, nondistended, no guarding, no rebound tenderness.   Extremities:   Moves all extremities well, no edema, no cyanosis, no           redness.   Pulses:   Pulses palpable and equal bilaterally.   Skin:   No bleeding, bruising or rash. 2 and 3 mm black spots on the dorsum of the second toe of her left foot    Neurologic:   Alert and Oriented x 3, Speech Clear & comprehensive.       Results review   Results Review:   Results from last 7 days   Lab Units 03/27/24  0158 03/26/24  2347 03/26/24 2154   HSTROP T ng/L 102* 100* 92*     Lab Results   Component Value Date    PROBNP 1,318.0 (H) 03/26/2024    PROBNP 2,024.0 (H) 03/19/2024    PROBNP 801.5 02/19/2024     Results from last 7 days   Lab Units 03/29/24  0016 03/28/24  0031 03/27/24  0158 03/26/24 2154   WBC 10*3/mm3 6.18 12.76* 7.78 8.59   HEMOGLOBIN g/dL 8.2* 9.8* 9.4* 9.8*   PLATELETS 10*3/mm3 125* 158 131* 133*     Results from last 7 days   Lab Units 03/29/24  0016 03/28/24  0031 03/27/24  0158 03/26/24 2154   SODIUM mmol/L 138 136 139 138   POTASSIUM mmol/L 4.3 4.7 3.8 3.8   CHLORIDE mmol/L 103 104 99 99   CO2 mmol/L 23.5 19.2* 25.1 23.7   BUN mg/dL 34* 28* 29* 29*   CREATININE mg/dL 1.39* 1.32* 1.38* 1.34*   CALCIUM mg/dL 8.9 8.9 8.7 8.6   GLUCOSE mg/dL 135* 173* 370* 360*   ALT (SGPT) U/L  --  28 25 26   AST (SGOT) U/L  --  52* 23 24     Lab Results   Component Value Date    MG 2.4 03/29/2024    MG 2.6 03/28/2024    MG 2.5 03/26/2024     Estimated Creatinine Clearance: 52.3 mL/min (A) (by C-G formula based on SCr of 1.39 mg/dL (H)).    Lab Results   Component Value Date    HGBA1C 7.70 (H) 03/26/2024    HGBA1C 7.30 (H) 11/13/2023    HGBA1C 8.40 (H) 09/10/2020     Lab Results   Component Value Date    CHOL 137 09/11/2020    TRIG 80 09/11/2020    LDL 78 09/11/2020    HDL 43 09/11/2020     Lab Results   Component Value Date    ABSOLUTELUNG 38 (A) 03/28/2024    ABSOLUTELUNG 48 (A) 03/19/2024    ABSOLUTELUNG 44 (A) 02/19/2024     Lab Results   Component  Value Date    INR 0.97 03/13/2024    INR 1.13 (H) 01/31/2024    INR 0.97 11/15/2023    INR 1.20 (H) 11/12/2023    INR 1.10 12/15/2022    INR 1.25 (H) 09/23/2020    INR 1.09 09/22/2020     Lab Results   Component Value Date    LABHEPA <0.10 (L) 11/15/2023    LABHEPA 0.21 (L) 11/15/2023    LABHEPA <0.10 (L) 11/15/2023    LABHEPA 0.31 11/14/2023       Lab Results   Component Value Date    TSH 2.960 01/31/2024      Pain Management Panel  More data exists         Latest Ref Rng & Units 11/13/2023 9/12/2020   Pain Management Panel   Creatinine, Urine mg/dL  mg/dL - 46.3  46.3    Amphetamine, Urine Qual Negative Negative  -   Barbiturates Screen, Urine Negative Negative  -   Benzodiazepine Screen, Urine Negative Negative  -   Buprenorphine, Screen, Urine Negative Negative  -   Cocaine Screen, Urine Negative Negative  -   Fentanyl, Urine Negative Negative  -   Methadone Screen , Urine Negative Negative  -   Methamphetamine, Ur Negative Negative  -     Microbiology Results (last 10 days)       Procedure Component Value - Date/Time    Urine Culture - Urine, Urine, Clean Catch [[id number]]  (Abnormal) Collected: 03/27/24 0554    Lab Status: Preliminary result Specimen: Urine, Clean Catch Updated: 03/28/24 1125     Urine Culture >100,000 CFU/mL Escherichia coli    Narrative:      Colonization of the urinary tract without infection is common. Treatment is discouraged unless the patient is symptomatic, pregnant, or undergoing an invasive urologic procedure.           Imaging Results (Last 24 Hours)       ** No results found for the last 24 hours. **          ECHO:  Results for orders placed during the hospital encounter of 03/26/24    Adult Transthoracic Echo Complete W/ Cont if Necessary Per Protocol    Interpretation Summary    Normal left ventricular cavity size and wall thickness noted. All left ventricular wall segments contract normally.    Left ventricular ejection fraction appears to be 66 - 70%.    Left ventricular  diastolic function is consistent with (grade II w/high LAP) pseudonormalization.    The aortic valve is structurally normal with no regurgitation or stenosis present.    Moderate mitral annular calcification is present. There is mild calcification of the mitral valve anterior leaflet(s). Mild mitral valve regurgitation is present. Mild mitral valve stenosis is present.    There is no evidence of pericardial effusion. .      STRESS TEST:  Results for orders placed during the hospital encounter of 10/15/20    Nuclear Medicine Cardiac Blood Pool M[place] At Rest    Interpretation Summary  EXAMINATION: NUCLEAR MEDICINE CARDIAC BLOOD POOL MUGA AT REST-    CLINICAL INDICATION: Cardiomyopathy  TECHNIQUE: 21 mCi of Tc-99m autologous RBCs were injected IV after  in-vitro RBC labeling. Gated cardiac imaging was performed in the  anterior and left anterior oblique.  COMPARISON: None  FINDINGS: The left ventricle is normal in size with normal systolic  function. The calculated left ventricular ejection fraction (LVEF) = 38  %.  The right and left atria, aorta and pulmonary artery are normal. The  right ventricle is normal in size.    Impression  LVEF: 38%, at rest.    This report was finalized on 10/16/2020 11:57 AM by Dr. Marlo Parr MD.       HEART CATH:  Results for orders placed during the hospital encounter of 11/10/20    Cardiac Catheterization/Vascular Study    Narrative  Procedure type:  Left heart cath, LV gram, bilateral selective cholangiogram    Indication:  Cardiomyopathy    Procedure:  After informed consent the patient was brought into the cardiac aspiration lab she was prepped and draped in the usual sterile manner the right radial area was incised with 2% Xylocaine however several attempts to engage into the right radial artery was not successful so the right radial artery access was abandoned and the right groin area was excised in 2% Xylocaine right femoral artery was accessed using modified standard  technique and 5 [place] side-port arterial sheath was placed and secured then over a guidewire a 5 [place] JL 4 catheter was used left main coronary artery with angiographic pressures were taken then the catheter was removed and over a guidewire a 5 [place] JR4 catheter was used and engagement of the right coronary arteries angioplasty was taken then the catheter was removed then over a guidewire 5 [place] pigtail catheter used aortic valve was done hand-injection LV gram was done then pullback was done then the catheter was removed groin sheath was removed and minx closure device applied with good hemostasis the patient was transported back to her room in stable condition.    Results:  The patient LVEDP was 17 mmHg with hand-injection showing preserved left ventricular systolic function wall motion EF 50-55% with no significant aortic gradient on pullback.    Cholangiogram:  This right dominant system.  Left main cardiac angiographically normal and bifurcates into left and descending and left circumflex arteries.  LAD was normal caliber gives several septal perforators and diagonal branches and wraps around the apex LAD about 30 to 40% mid stenosis.  Left circumflex artery was nondominant for posterior circulation gives rise to several obtuse marginal branches left circumflex arteries branches angiographically normal.  The right coronary artery was a large artery was dominant for posterior circulation giving rise to acute marginal branches PDA and posterolateral branches the right coronary artery and its branches angiographically normal.    Conclusion:  Preserved LV systolic function ejection fraction 50-55% with elevated left ventricular end-diastolic pressure consistent with diastolic dysfunction coronary angiogram showed right dominant system with 30 to 40% mid LAD lesion otherwise coronaries arteries were normal.  Plan of care:  Medical management and secondary preventive measurements of coronary disease good lipid  control blood pressure control healthy lifestyle patient is to follow-up with her primary care physician for further management.      TELEMETRY:     Sinus bradycardia 50s w 1st degree Block and BBB        I reviewed the patient's new clinical results.    ALLERGIES: Phenergan [promethazine]    Medication Review:   Current list of medications may not reflect those currently placed in orders that are not signed or are being held.     amiodarone, 300 mg, Oral, Daily  apixaban, 5 mg, Oral, Q12H  aspirin, 81 mg, Oral, Daily  busPIRone, 10 mg, Oral, BID  empagliflozin, 10 mg, Oral, Daily  ferrous sulfate, 325 mg, Oral, Daily  HYDROcodone-acetaminophen, 1 tablet, Oral, BID  insulin glargine, 35 Units, Subcutaneous, Q12H  insulin lispro, 3-14 Units, Subcutaneous, 4x Daily With Meals & Nightly  levothyroxine, 50 mcg, Oral, Daily  pantoprazole, 40 mg, Oral, Q AM  polyethylene glycol, 17 g, Oral, Daily  senna-docusate sodium, 2 tablet, Oral, BID  sodium chloride, 10 mL, Intravenous, Q12H  sodium chloride, 10 mL, Intravenous, Q12H      phenylephrine, 0.5-3 mcg/kg/min, Last Rate: Stopped (03/28/24 0745)        acetaminophen    senna-docusate sodium **AND** polyethylene glycol **AND** bisacodyl **AND** bisacodyl    dextrose    dextrose    glucagon (human recombinant)    miconazole    nitroglycerin    ondansetron ODT    sodium chloride    sodium chloride    sodium chloride    sodium chloride    sodium chloride    Assessment    1.  Possible SVT with baseline bundle branch block/IVCD  2.  Paroxysmal atrial fibrillation with rapid ventricular sponsor and aberrant conduction status post PVI in October 2022 with recurrence status post DC cardioversion at the emergency room at 1/31/2024, [id number] score of 3  3.  Acute NSTEMI consistent with NSTEMI type II secondary to atrial fibrillation with RVR she patient has chronically elevated troponin around   4.  Acute on chronic HFrEF, secondary to atrial fibrillation with RVR,  improving  5.  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  Nonobstructive coronary artery disease per cardiac excision on 11/10/2022 with 30 to 40% stenosis of the LAD  7.  Liver cirrhosis [[place]]  8.  CKD stage IIIa            Plan   1.  Patient is currently maintaining sinus rhythm Down the dose of the amiodarone to 100 mg/day because of relative bradycardia  2.  Patient cardiac cath systolic function has normalized and this is consistent with tachycardia induced cardiomyopathy  3.  Anemia with dropping hemoglobin level but no evidence of acute blood loss at the moment  4.  Her blood pressure remains borderline we will continue current medications for now            I have discussed the patients findings and recommendations with patient.    Thank you very much for asking us to be involved in this patient's care.  We will follow along with you.    Electronically signed by KNENY Finn, 03/29/24, 11:42 AM EDT.   Electronically signed by Charlee Ken MD, 03/29/24, 12:03 PM EDT.                      Please note that portions of this note were completed with a voice recognition program.    Please note that portions of this note were copied and has been reviewed and is accurate as of 3/29/2024 .

## 2024-03-30 ENCOUNTER — READMISSION MANAGEMENT (OUTPATIENT)
Dept: CALL CENTER | Facility: HOSPITAL | Age: 59
End: 2024-03-30
Payer: COMMERCIAL

## 2024-03-30 VITALS
SYSTOLIC BLOOD PRESSURE: 129 MMHG | DIASTOLIC BLOOD PRESSURE: 49 MMHG | BODY MASS INDEX: 39.19 KG/M2 | RESPIRATION RATE: 21 BRPM | HEART RATE: 67 BPM | OXYGEN SATURATION: 98 % | TEMPERATURE: 97.4 F | WEIGHT: 235.23 LBS | HEIGHT: 65 IN

## 2024-03-30 LAB
GLUCOSE BLDC GLUCOMTR-MCNC: 157 MG/DL (ref 70–130)
GLUCOSE BLDC GLUCOMTR-MCNC: 167 MG/DL (ref 70–130)
GLUCOSE BLDC GLUCOMTR-MCNC: 234 MG/DL (ref 70–130)

## 2024-03-30 PROCEDURE — 63710000001 ONDANSETRON ODT 4 MG TABLET DISPERSIBLE: Performed by: HOSPITALIST

## 2024-03-30 PROCEDURE — 63710000001 INSULIN GLARGINE PER 5 UNITS: Performed by: HOSPITALIST

## 2024-03-30 PROCEDURE — 82948 REAGENT STRIP/BLOOD GLUCOSE: CPT

## 2024-03-30 PROCEDURE — 63710000001 INSULIN LISPRO (HUMAN) PER 5 UNITS: Performed by: HOSPITALIST

## 2024-03-30 PROCEDURE — 99239 HOSP IP/OBS DSCHRG MGMT >30: CPT | Performed by: HOSPITALIST

## 2024-03-30 RX ORDER — BUMETANIDE 2 MG/1
1 TABLET ORAL DAILY
Start: 2024-03-30

## 2024-03-30 RX ORDER — AMIODARONE HYDROCHLORIDE 100 MG/1
100 TABLET ORAL DAILY
Qty: 30 TABLET | Refills: 0 | Status: SHIPPED | OUTPATIENT
Start: 2024-03-30 | End: 2024-04-29

## 2024-03-30 RX ADMIN — ONDANSETRON 4 MG: 4 TABLET, ORALLY DISINTEGRATING ORAL at 06:06

## 2024-03-30 RX ADMIN — BUSPIRONE HYDROCHLORIDE 10 MG: 10 TABLET ORAL at 09:44

## 2024-03-30 RX ADMIN — APIXABAN 5 MG: 5 TABLET, FILM COATED ORAL at 09:44

## 2024-03-30 RX ADMIN — INSULIN GLARGINE 35 UNITS: 100 INJECTION, SOLUTION SUBCUTANEOUS at 08:23

## 2024-03-30 RX ADMIN — AMIODARONE HYDROCHLORIDE 100 MG: 200 TABLET ORAL at 08:23

## 2024-03-30 RX ADMIN — HYDROCODONE BITARTRATE AND ACETAMINOPHEN 1 TABLET: 7.5; 325 TABLET ORAL at 08:23

## 2024-03-30 RX ADMIN — FERROUS SULFATE TAB 325 MG (65 MG ELEMENTAL FE) 325 MG: 325 (65 FE) TAB at 09:44

## 2024-03-30 RX ADMIN — Medication 10 ML: at 08:24

## 2024-03-30 RX ADMIN — INSULIN LISPRO 5 UNITS: 100 INJECTION, SOLUTION INTRAVENOUS; SUBCUTANEOUS at 11:12

## 2024-03-30 RX ADMIN — PANTOPRAZOLE SODIUM 40 MG: 40 TABLET, DELAYED RELEASE ORAL at 06:05

## 2024-03-30 RX ADMIN — INSULIN LISPRO 3 UNITS: 100 INJECTION, SOLUTION INTRAVENOUS; SUBCUTANEOUS at 08:23

## 2024-03-30 RX ADMIN — ASPIRIN 81 MG: 81 TABLET, COATED ORAL at 09:44

## 2024-03-30 RX ADMIN — LEVOTHYROXINE SODIUM 50 MCG: 50 TABLET ORAL at 09:44

## 2024-03-30 RX ADMIN — EMPAGLIFLOZIN 10 MG: 10 TABLET, FILM COATED ORAL at 09:44

## 2024-03-30 NOTE — PLAN OF CARE
Goal Outcome Evaluation:           Progress: improving  Outcome Evaluation: Pt is A/Ox4. Pt is on 2L NC. VSS. Pt is resting with no requests at this time. Bed in lowest position, call light in reach, and bed alarm on.         Problem: Skin Injury Risk Increased  Goal: Skin Health and Integrity  Outcome: Ongoing, Progressing     Problem: Adult Inpatient Plan of Care  Goal: Plan of Care Review  Outcome: Ongoing, Progressing  Flowsheets  Taken 3/30/2024 0410 by Carolina Mo RN  Progress: improving  Outcome Evaluation: Pt is A/Ox4. Pt is on 2L NC. VSS. Pt is resting with no requests at this time. Bed in lowest position, call light in reach, and bed alarm on.  Taken 3/29/2024 1225 by Marlo Lindsey OT  Plan of Care Reviewed With: patient  Goal: Patient-Specific Goal (Individualized)  Outcome: Ongoing, Progressing  Goal: Absence of Hospital-Acquired Illness or Injury  Outcome: Ongoing, Progressing  Intervention: Identify and Manage Fall Risk  Recent Flowsheet Documentation  Taken 3/30/2024 0300 by Carolina Mo RN  Safety Promotion/Fall Prevention:   activity supervised   assistive device/personal items within reach   clutter free environment maintained   fall prevention program maintained   safety round/check completed  Taken 3/30/2024 0100 by Carolina Mo RN  Safety Promotion/Fall Prevention:   activity supervised   assistive device/personal items within reach   clutter free environment maintained   fall prevention program maintained   safety round/check completed  Intervention: Prevent and Manage VTE (Venous Thromboembolism) Risk  Recent Flowsheet Documentation  Taken 3/30/2024 0200 by Carolina Mo RN  VTE Prevention/Management: (see MAR) other (see comments)  Range of Motion: active ROM (range of motion) encouraged  Intervention: Prevent Infection  Recent Flowsheet Documentation  Taken 3/30/2024 0300 by Carolina Mo RN  Infection Prevention:   single patient room provided   rest/sleep promoted  Taken  3/30/2024 0100 by Carolina Mo RN  Infection Prevention:   rest/sleep promoted   single patient room provided  Goal: Optimal Comfort and Wellbeing  Outcome: Ongoing, Progressing  Intervention: Provide Person-Centered Care  Recent Flowsheet Documentation  Taken 3/30/2024 0200 by Carolina Mo RN  Trust Relationship/Rapport:   care explained   choices provided   emotional support provided   questions answered   thoughts/feelings acknowledged  Goal: Readiness for Transition of Care  Outcome: Ongoing, Progressing     Problem: Diabetes Comorbidity  Goal: Blood Glucose Level Within Targeted Range  Outcome: Ongoing, Progressing     Problem: Heart Failure Comorbidity  Goal: Maintenance of Heart Failure Symptom Control  Outcome: Ongoing, Progressing  Intervention: Maintain Heart Failure-Management  Recent Flowsheet Documentation  Taken 3/30/2024 0300 by Carolina Mo RN  Medication Review/Management: medications reviewed  Taken 3/30/2024 0100 by Carolina Mo RN  Medication Review/Management: medications reviewed     Problem: Fall Injury Risk  Goal: Absence of Fall and Fall-Related Injury  Outcome: Ongoing, Progressing  Intervention: Identify and Manage Contributors  Recent Flowsheet Documentation  Taken 3/30/2024 0300 by Carolina Mo RN  Medication Review/Management: medications reviewed  Taken 3/30/2024 0100 by Carolina Mo RN  Medication Review/Management: medications reviewed  Intervention: Promote Injury-Free Environment  Recent Flowsheet Documentation  Taken 3/30/2024 0300 by Carolina Mo RN  Safety Promotion/Fall Prevention:   activity supervised   assistive device/personal items within reach   clutter free environment maintained   fall prevention program maintained   safety round/check completed  Taken 3/30/2024 0100 by Carolina Mo RN  Safety Promotion/Fall Prevention:   activity supervised   assistive device/personal items within reach   clutter free environment maintained   fall prevention  program maintained   safety round/check completed

## 2024-03-30 NOTE — DISCHARGE SUMMARY
[facility] HOSPITALIST MEDICINE DISCHARGE SUMMARY    Patient Identification:  Name:  Yumiko Purdy  Age:  58 y.o.  Sex:  female  :  1965  MRN:  [id number]  Visit Number:  [id number]    Date of Admission: 3/26/2024  Date of Discharge: 2024  DISCHARGE DISPOSITION   Stable  PCP: Masha Davidson, KENNY    DISCHARGE DIAGNOSIS : Possible SVT with baseline bundle branch block, paroxysmal atrial fibrillation with rapid ventricular response with aberrant conduction status post PVI 2022, recurrent paroxysmal A-fib status post DC cardioversion, non-STEMI type II, acute on chronic heart failure with reduced ejection fraction now compensated, nonobstructive coronary disease 40% stenosis of LAD on 2022, history of liver cirrhosis from [place], CKD stage IIIa.  Obesity with BMI 38.5, chronic anticoagulation for stroke prophylaxis, cardiogenic shock secondary to arrhythmia now resolved.  Acute cystitis with E. coli.  History of hypothyroidism.    HOSPITAL COURSE  Patient is a 58 y.o. female presented to [facility] complaining of palpitation and shortness of breath.  Patient presented to the emergency room through EMS because of tachycardia palpitation, per EMS reports she appeared to be in SVT given adenosine with no improvement.  At the emergency room she was given IV milrinone and magnesium with no improvement of wide-complex tachycardia, she was hypotensive with systolic around 80s, emergent cardioversion was performed, 50 g of albumin was also given as well as 250 mL no saline bolus was given.  She was placed on Levophed drip and was admitted to ICU.  While in ICU on amiodarone drip she continues to maintain sinus rhythm, cardiology was consulted, further review of her heart racing and echo it was felt that the wide-complex tachycardia was possibly SVT with baseline left bundle branch block/intraventricular conduction block versus paroxysmal atrial fibrillation  with RVR with aberrant conduction.  Her troponin was elevated but is chronic, repeat 2D echo on this admission shows normal ejection fraction which is significant improvement from previous echo which is 30 to 35% EF.  It was felt that her cardiomyopathy was rate related.  She was placed back on amiodarone 100 mg and so far has been tolerating well, renal function has remained stable, her blood pressure has been stable but soft.  Cardiology plans to see the patient in the clinic in 2 weeks time for further adjustments of her cardiac medication/GDMT and A-fib.  Will be discharged today with home health and physical therapy.    VITAL SIGNS:      03/27/24  0249 03/29/24  0535 03/30/24  0400   Weight: 105 kg (231 lb 7.7 oz) 100 kg (221 lb 1.9 oz) 107 kg (235 lb 3.7 oz)     Body mass index is 39.14 kg/m².  Vitals:    03/30/24 1000   BP: 127/52   Pulse: 58   Resp: 20   Temp:    SpO2: 100%     PHYSICAL EXAM:  General: Comfortable,awake, alert, oriented to self, place, and time,   No respiratory distress.    Skin:  Skin is warm and dry. No rash noted. No pallor.    HENT:  Head:  Normocephalic and atraumatic.  Mouth:  Moist mucous membranes.    Eyes:  Conjunctivae and EOM are normal.  Pupils are equal, round, and reactive to light.  No scleral icterus.    Neck:  Neck supple.  No JVD present.  No hepatojugular reflux  Pulmonary/Chest:  No respiratory distress, no wheezes, no crackles, with normal breath sounds and good air movement.  Cardiovascular:  Normal rate, regular rhythm and normal heart sounds with no murmur.  Abdominal:  Soft.  Bowel sounds are normal.  No distension and no tenderness.  BS  Extremities:  No edema, no tenderness, and no deformity.  No red or swollen joints anywhere.  Strong pulses in all 4 extremities with no clubbing, no cyanosis, no edema.  Neurological:  Motor strength equal no obvious deficit, sensory grossly intact.   No cranial nerve deficit.  No tongue deviation.  No facial droop.  No slurred  speech.    Genitourinary: No Harrington catheter  Back:  ----------    DISCHARGE MEDICATIONS:     Discharge Medications        Changes to Medications        Instructions Start Date   bumetanide 2 MG tablet  Commonly known as: BUMEX  What changed:   how much to take  when to take this  Another medication with the same name was removed. Continue taking this medication, and follow the directions you see here.   1 mg, Oral, Daily             Continue These Medications        Instructions Start Date   amiodarone 100 MG tablet  Commonly known as: PACERONE   300 mg, Oral, Daily      apixaban 5 MG tablet tablet  Commonly known as: ELIQUIS   5 mg, Oral, Every 12 Hours Scheduled      Aspirin Low Dose 81 MG EC tablet  Generic drug: aspirin   81 mg, Oral, Daily      busPIRone 10 MG tablet  Commonly known as: BUSPAR   10 mg, Oral, 2 Times Daily      ferrous sulfate 325 (65 FE) MG tablet   325 mg, Oral, Daily      HumaLOG KwikPen 100 UNIT/ML solution pen-injector  Generic drug: Insulin Lispro (1 Unit Dial)   15 Units, Subcutaneous, 3 Times Daily With Meals      HYDROcodone-acetaminophen 7.5-325 MG per tablet  Commonly known as: NORCO   1 tablet, Oral, 2 Times Daily      Jardiance 10 MG tablet tablet  Generic drug: empagliflozin   10 mg, Oral, Daily      Lantus SoloStar 100 UNIT/ML injection pen  Generic drug: Insulin Glargine   45 Units, Subcutaneous, 2 Times Daily      levothyroxine 50 MCG tablet  Commonly known as: SYNTHROID, LEVOTHROID   50 mcg, Oral, Daily      omeprazole 20 MG capsule  Commonly known as: priLOSEC   20 mg, Oral, Daily      ondansetron 4 MG tablet  Commonly known as: ZOFRAN   4 mg, Oral, Every 8 Hours PRN      Polyethylene Glycol 3350 powder   17 g, Oral, Daily      Trintellix 5 MG tablet tablet  Generic drug: Vortioxetine HBr   5 mg, Oral, Daily With Breakfast             Stop These Medications      valsartan 40 MG tablet  Commonly known as: DIOVAN     Verquvo 10 MG tablet  Generic drug: Vericiguat                 Your Scheduled Appointments      Apr 18, 2024  2:00 PM  [facility]  [facility] ([place]) [address] [facility] [address] [phone]   Arrive 15 minutes prior to appointment.                Additional Instructions for the Follow-ups that You Need to Schedule       Ambulatory Referral to Home Health   As directed      Face to Face Visit Date: 3/30/2024   Follow-up provider for Plan of Care?: I treated the patient in an acute care facility and will not continue treatment after discharge.   Follow-up provider: LENNY DAVIDSON [[id number]]   Reason/Clinical Findings: Cardiomyopathy, cardiac arrhythmia, multiple changes on medication need supervision follow-up, PT OT   Describe mobility limitations that make leaving home difficult: Patient recently discharged for cardiac arrhythmia with cardiomyopathy, patient needs company to leave the house   Nursing/Therapeutic Services Requested: Skilled Nursing Physical Therapy Occupational Therapy   Skilled nursing orders: Medication education   PT orders: Therapeutic exercise Strengthening Gait Training   Weight Bearing Status: As Tolerated   Occupational orders: Energy conservation Strengthening Cognition Activities of daily living Home safety assessment   Frequency: 1 Week 1        Discharge Follow-up with PCP   As directed       Currently Documented PCP:    Lenny Davidson APRN    PCP Phone Number:    [phone]     Follow Up Details: KENNY Diaz (posthospitalization follow-up)        Discharge Follow-up with Specified Provider: Cardiology (Courtney Baires); 2 Weeks   As directed      To: Cardiology (Courtney Baires)   Follow Up: 2 Weeks   Follow Up Details: To discuss changing amiodarone for paroxysmal A-fib with RVR               Follow-up Information       [facility] .    Specialty: Cardiology  Contact information:  [address] [facility]  [address]  [phone]             Masha Davidson APRN .    Specialty: Family Medicine  Why: KENNY Diaz (posthospitalization follow-up)  Contact information:  [address] [place] [address] [address] [place] [id number] [phone]                                  Halle Keita MD  03/30/24  10:39 EDT    Please note that this discharge summary required more than 30 minutes to complete.

## 2024-03-30 NOTE — OUTREACH NOTE
Prep Survey      Flowsheet Row Responses   Religion facility patient discharged from? [facility]   Is LACE score < 7 ? No   Eligibility Readm Mgmt   Discharge diagnosis shortness of breath, tachycardia palpitation   Does the patient have one of the following disease processes/diagnoses(primary or secondary)? Other   Does the patient have Home health ordered? Yes   What is the Home health agency?  awaiting HH orders   Is there a DME ordered? No   Prep survey completed? Yes            Shelli CHAVEZ - Registered Nurse

## 2024-03-30 NOTE — PROGRESS NOTES
[facility]  PROGRESS NOTE    Patient information:  Name: Yumiko Purdy  Age/Sex: 58 y.o. female  :  1965        PCP: Masha Davidson APRN  Attending: Tomás An MD  MRN:  [id number]  Visit Number:  [id number]    LOS:  LOS: 4 days     CODE STATUS:    Code Status and Medical Interventions:   Ordered at: 24 4781     Code Status (Patient has no pulse and is not breathing):    CPR (Attempt to Resuscitate)     Medical Interventions (Patient has pulse or is breathing):    Full Support       PROBLEM LIST:Active Problems:    Ventricular tachycardia      Reason for Cardiology follow-up: Ventricular tachycardia     Subjective   ADMISSION INFORMATION:  Chief Complaint   Patient presents with    Rapid Heart Rate       HPI:  Yumiko Purdy is a 58 y.o. female with ASCVD, chronic atrial fibrillation with recent RVR and DCCV 2024 status post multiple PVI ablations with the last being 2022, acute on chronic systolic and diastolic heart failure, peripheral arterial disease status post great toe amputation, chronic hypoxemic respiratory failure, chronic kidney disease stage III, cirrhosis, insulin-dependent type 2 diabetes mellitus, and obesity.     Most of the history is taken from the chart as the patient reports that her memory is poor.  She has a very long history of atrial fibrillation with RVR and left bundle branch block.  This has frequently been confused with ventricular tachycardia and readings revised after EP review.  She follows in our heart failure clinic with her last visit 2024.  She also sees electrophysiology and general cardiology at [facility].  Her last visit with  was 2024 and he recommended that she stay on 300 mg of amiodarone daily for 2 months and then decrease to 200 mg daily.  At that time he noted that she was not a candidate for further ablations.     Primary cardiologist been   Tony in [facility]    Local primary cardiologist is KENNY Lawler.     Patient also follows with [facility] heart failure clinic.      EVENT TIMELINE:   02/08/2024: Limited echo revealed EF of 50% ±5% and no pericardial effusion.  Also noted hypokinesis of the mid anteroseptal, mid inferoseptal, mid inferior, apical septal, basal anteroseptal, and basal inferior segments. Her ejection fraction in November 2023 was reported as 30 to 35% with grade 3 diastolic dysfunction  03/28/2024: EF of 66 to 70%.  Left ventricular diastolic function is consistent with grade 2 diabetes/high LAP, moderate mitral annular calcification present with mild mitral valve regurgitation and stenosis present.  Please see full report attached below.  03/29/2024: Decrease dose of amiodarone 200 mg p.o. daily secondary to bradycardia    Interval History:   Telemetry reveals SB/SR 50s to 60s.    Patient was in room  when she was seen and examined.  Patient is lying in bed resting quietly.  No acute distress noted at this time.  Patient denies any chest pain, shortness of breath, or palpitations.  Patient is anticipating being discharged home today.  Discussed with patient to follow-up with heart failure clinic and primary cardiologist in the next couple weeks for her amiodarone to potentially be changed now that her EF has improved.  Patient verbalized understanding.    Vital Signs  Temp:  [97.4 °F (36.3 °C)-98.3 °F (36.8 °C)] 97.4 °F (36.3 °C)  Heart Rate:  [53-67] 54  Resp:  [13-21] 18  BP: ()/(42-77) 120/60  Flow (L/min):  [2] 2  Device (Oxygen Therapy): nasal cannula  Vital Signs (last 72 hrs)         03/27 0700  03/28 0659 03/28 0700 03/29 0659 03/29 0700 03/30 0659 03/30 0700 03/30 0818   Most Recent      Temp (°F) 98 -  98.2    97.8 -  98.2    97.8 -  98.3      97.4     97.4 (36.3) 03/30 0800    Heart Rate 53 -  62    51 -  66    53 -  67      54     54 03/30 0700    Resp 11 -  20      18    13 - 21       18     18 03/30 0700    BP 73/65 -  155/63    78/67 -  135/60    90/52 -  140/72      120/60     120/60 03/30 0700    SpO2 (%) 90 -  100    78 -  100    92 -  100      99     99 03/30 0700    Flow (L/min)   2      2      2      2     2 03/30 0700          BMI:Body mass index is 39.14 kg/m².    WEIGHT:      03/27/24  0249 03/29/24  0535 03/30/24  0400   Weight: 105 kg (231 lb 7.7 oz) 100 kg (221 lb 1.9 oz) 107 kg (235 lb 3.7 oz)       DIET:Diet: Cardiac, Diabetic; Healthy Heart (2-3 Na+); Consistent Carbohydrate; Fluid Consistency: Thin (IDDSI 0)    I&O:  Intake & Output (last 3 days)         03/27 0701  03/28 0700 03/28 0701  03/29 0700 03/29 0701  03/30 0700 03/30 0701 03/31 0700    P.O.  810 698 240    I.V. (mL/kg) 187 (1.8)  0 (0)     IV Piggyback        Total Intake(mL/kg) 187 (1.8) 810 (8.1) 698 (6.5) 240 (2.2)    Urine (mL/kg/hr) 1800 (0.7) 1400 (0.6) 1600 (0.6)     Emesis/NG output   0     Stool   0     Total Output 1800 1400 1600     Net -1613 -590 -902 +240            Urine Unmeasured Occurrence 2 x 3 x 4 x     Stool Unmeasured Occurrence   1 x              Objective     Physical Exam:      General Appearance:    Alert, cooperative, in no acute distress.   Head:    Normocephalic, without obvious abnormality, atraumatic.   Eyes:                          Conjunctivae and sclerae normal, no icterus, no pallor, corneas clear.   Neck:   No adenopathy, supple, trachea midline, no thyromegaly, no carotid bruit, no JVD.   Lungs:     Clear to auscultation, respirations regular, even and             unlabored.    Heart:    Regular rhythm and normal rate, normal S1 and S2, no          murmur, no gallop, no rub, no click   Chest Wall:    No abnormalities observed.   Abdomen:     Normal bowel sounds, no masses, no organomegaly, soft nontender, nondistended, no guarding, no rebound tenderness.   Extremities:   Moves all extremities well, no edema, no cyanosis, no           redness.   Pulses:   Pulses palpable and  equal bilaterally.   Skin:   No bleeding, bruising or rash.   Neurologic:   Alert and Oriented x 3, Speech Clear & comprehensive.       Results review   Results Review:   Results from last 7 days   Lab Units 03/27/24  0158 03/26/24  2347 03/26/24 2154   HSTROP T ng/L 102* 100* 92*     Lab Results   Component Value Date    PROBNP 1,318.0 (H) 03/26/2024    PROBNP 2,024.0 (H) 03/19/2024    PROBNP 801.5 02/19/2024     Results from last 7 days   Lab Units 03/29/24  0016 03/28/24  0031 03/27/24  0158 03/26/24 2154   WBC 10*3/mm3 6.18 12.76* 7.78 8.59   HEMOGLOBIN g/dL 8.2* 9.8* 9.4* 9.8*   PLATELETS 10*3/mm3 125* 158 131* 133*     Results from last 7 days   Lab Units 03/29/24  0016 03/28/24  0031 03/27/24  0158 03/26/24 2154   SODIUM mmol/L 138 136 139 138   POTASSIUM mmol/L 4.3 4.7 3.8 3.8   CHLORIDE mmol/L 103 104 99 99   CO2 mmol/L 23.5 19.2* 25.1 23.7   BUN mg/dL 34* 28* 29* 29*   CREATININE mg/dL 1.39* 1.32* 1.38* 1.34*   CALCIUM mg/dL 8.9 8.9 8.7 8.6   GLUCOSE mg/dL 135* 173* 370* 360*   ALT (SGPT) U/L  --  28 25 26   AST (SGOT) U/L  --  52* 23 24     Lab Results   Component Value Date    MG 2.4 03/29/2024    MG 2.6 03/28/2024    MG 2.5 03/26/2024     Estimated Creatinine Clearance: 54.3 mL/min (A) (by C-G formula based on SCr of 1.39 mg/dL (H)).    Lab Results   Component Value Date    HGBA1C 7.70 (H) 03/26/2024    HGBA1C 7.30 (H) 11/13/2023    HGBA1C 8.40 (H) 09/10/2020     Lab Results   Component Value Date    CHOL 137 09/11/2020    TRIG 80 09/11/2020    LDL 78 09/11/2020    HDL 43 09/11/2020     Lab Results   Component Value Date    ABSOLUTELUNG 38 (A) 03/28/2024    ABSOLUTELUNG 48 (A) 03/19/2024    ABSOLUTELUNG 44 (A) 02/19/2024     Lab Results   Component Value Date    INR 0.97 03/13/2024    INR 1.13 (H) 01/31/2024    INR 0.97 11/15/2023    INR 1.20 (H) 11/12/2023    INR 1.10 12/15/2022    INR 1.25 (H) 09/23/2020    INR 1.09 09/22/2020     Lab Results   Component Value Date    LABHEPA <0.10 (L) 11/15/2023     LABHEPA 0.21 (L) 11/15/2023    LABHEPA <0.10 (L) 11/15/2023    LABHEPA 0.31 11/14/2023       Lab Results   Component Value Date    TSH 2.960 01/31/2024      Pain Management Panel  More data exists         Latest Ref Rng & Units 11/13/2023 9/12/2020   Pain Management Panel   Creatinine, Urine mg/dL  mg/dL - 46.3  46.3    Amphetamine, Urine Qual Negative Negative  -   Barbiturates Screen, Urine Negative Negative  -   Benzodiazepine Screen, Urine Negative Negative  -   Buprenorphine, Screen, Urine Negative Negative  -   Cocaine Screen, Urine Negative Negative  -   Fentanyl, Urine Negative Negative  -   Methadone Screen , Urine Negative Negative  -   Methamphetamine, Ur Negative Negative  -     Microbiology Results (last 10 days)       Procedure Component Value - Date/Time    Urine Culture - Urine, Urine, Clean Catch [[id number]]  (Abnormal)  (Susceptibility) Collected: 03/27/24 0554    Lab Status: Final result Specimen: Urine, Clean Catch Updated: 03/29/24 1024     Urine Culture >100,000 CFU/mL Escherichia coli    Narrative:      Colonization of the urinary tract without infection is common. Treatment is discouraged unless the patient is symptomatic, pregnant, or undergoing an invasive urologic procedure.    Susceptibility        Escherichia coli      JANICE      Amoxicillin + Clavulanate Susceptible      Ampicillin Susceptible      Ampicillin + Sulbactam Susceptible      Cefazolin Susceptible      Cefepime Susceptible      Ceftazidime Susceptible      Ceftriaxone Susceptible      Gentamicin Susceptible      Levofloxacin Susceptible      Nitrofurantoin Susceptible      Piperacillin + Tazobactam Susceptible      Trimethoprim + Sulfamethoxazole Susceptible                                  Imaging Results (Last 24 Hours)       ** No results found for the last 24 hours. **          ECHO:  Results for orders placed during the hospital encounter of 03/26/24    Adult Transthoracic Echo Complete W/ Cont if Necessary Per  Protocol    Interpretation Summary    Normal left ventricular cavity size and wall thickness noted. All left ventricular wall segments contract normally.    Left ventricular ejection fraction appears to be 66 - 70%.    Left ventricular diastolic function is consistent with (grade II w/high LAP) pseudonormalization.    The aortic valve is structurally normal with no regurgitation or stenosis present.    Moderate mitral annular calcification is present. There is mild calcification of the mitral valve anterior leaflet(s). Mild mitral valve regurgitation is present. Mild mitral valve stenosis is present.    There is no evidence of pericardial effusion. .      STRESS TEST:  Results for orders placed during the hospital encounter of 10/15/20    Nuclear Medicine Cardiac Blood Pool [facility] At Rest    Interpretation Summary  EXAMINATION: NUCLEAR MEDICINE CARDIAC BLOOD POOL [facility] AT REST-    CLINICAL INDICATION: Cardiomyopathy  TECHNIQUE: 21 mCi of Tc-99m autologous RBCs were injected IV after  in-vitro RBC labeling. Gated cardiac imaging was performed in the  anterior and left anterior oblique.  COMPARISON: None  FINDINGS: The left ventricle is normal in size with normal systolic  function. The calculated left ventricular ejection fraction (LVEF) = 38  %.  The right and left atria, aorta and pulmonary artery are normal. The  right ventricle is normal in size.    Impression  LVEF: 38%, at rest.    This report was finalized on 10/16/2020 11:57 AM by Dr. Marlo Parr MD.       HEART CATH:  Results for orders placed during the hospital encounter of 11/10/20    Cardiac Catheterization/Vascular Study    Narrative  Procedure type:  Left heart cath, LV gram, bilateral selective cholangiogram    Indication:  Cardiomyopathy    Procedure:  After informed consent the patient was brought into the cardiac aspiration lab she was prepped and draped in the usual sterile manner the right radial area was incised with 2% Xylocaine however  several attempts to engage into the right radial artery was not successful so the right radial artery access was abandoned and the right groin area was excised in 2% Xylocaine right femoral artery was accessed using modified standard technique and 5 Yemeni side-port arterial sheath was placed and secured then over a guidewire a 5 Yemeni JL 4 catheter was used left main coronary artery with angiographic pressures were taken then the catheter was removed and over a guidewire a 5 Yemeni JR4 catheter was used and engagement of the right coronary arteries angioplasty was taken then the catheter was removed then over a guidewire 5 Yemeni pigtail catheter used aortic valve was done hand-injection LV gram was done then pullback was done then the catheter was removed groin sheath was removed and minx closure device applied with good hemostasis the patient was transported back to her room in stable condition.    Results:  The patient LVEDP was 17 mmHg with hand-injection showing preserved left ventricular systolic function wall motion EF 50-55% with no significant aortic gradient on pullback.    Cholangiogram:  This right dominant system.  Left main cardiac angiographically normal and bifurcates into left and descending and left circumflex arteries.  LAD was normal caliber gives several septal perforators and diagonal branches and wraps around the apex LAD about 30 to 40% mid stenosis.  Left circumflex artery was nondominant for posterior circulation gives rise to several obtuse marginal branches left circumflex arteries branches angiographically normal.  The right coronary artery was a large artery was dominant for posterior circulation giving rise to acute marginal branches PDA and posterolateral branches the right coronary artery and its branches angiographically normal.    Conclusion:  Preserved LV systolic function ejection fraction 50-55% with elevated left ventricular end-diastolic pressure consistent with diastolic  dysfunction coronary angiogram showed right dominant system with 30 to 40% mid LAD lesion otherwise coronaries arteries were normal.  Plan of care:  Medical management and secondary preventive measurements of coronary disease good lipid control blood pressure control healthy lifestyle patient is to follow-up with her primary care physician for further management.      TELEMETRY:      SB/SR 50 -60s            I reviewed the patient's new clinical results.    ALLERGIES: Phenergan [promethazine]    Medication Review:   Current list of medications may not reflect those currently placed in orders that are not signed or are being held.     amiodarone, 100 mg, Oral, Daily  apixaban, 5 mg, Oral, Q12H  aspirin, 81 mg, Oral, Daily  busPIRone, 10 mg, Oral, BID  empagliflozin, 10 mg, Oral, Daily  ferrous sulfate, 325 mg, Oral, Daily  HYDROcodone-acetaminophen, 1 tablet, Oral, BID  insulin glargine, 35 Units, Subcutaneous, Q12H  insulin lispro, 3-14 Units, Subcutaneous, 4x Daily With Meals & Nightly  levothyroxine, 50 mcg, Oral, Daily  pantoprazole, 40 mg, Oral, Q AM  polyethylene glycol, 17 g, Oral, Daily  senna-docusate sodium, 2 tablet, Oral, BID  sodium chloride, 10 mL, Intravenous, Q12H  sodium chloride, 10 mL, Intravenous, Q12H      phenylephrine, 0.5-3 mcg/kg/min, Last Rate: Stopped (03/28/24 0745)        acetaminophen    senna-docusate sodium **AND** polyethylene glycol **AND** bisacodyl **AND** bisacodyl    dextrose    dextrose    glucagon (human recombinant)    miconazole    nitroglycerin    ondansetron ODT    sodium chloride    sodium chloride    sodium chloride    sodium chloride    sodium chloride    Assessment    1. Possible SVT with baseline bundle branch block/IVCD  2.  Paroxysmal atrial fibrillation with rapid ventricular sponsor and aberrant conduction status post PVI in October 2022 with recurrence status post DC cardioversion at the emergency room at 1/31/2024, [id number] score of 3  3.  Acute NSTEMI  consistent with NSTEMI type II secondary to atrial fibrillation with RVR she patient has chronically elevated troponin around   4.  Acute on chronic HFrEF, secondary to atrial fibrillation with RVR, improving  5.  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  6.  Nonobstructive coronary artery disease per cardiac excision on 11/10/2022 with 30 to 40% stenosis of the LAD  7.  Liver cirrhosis [[place]]  8.  CKD stage IIIa      Plan   Patient remains SB/SR 50s to 60s with lower dose of amiodarone  EF has improved to 66 to 70%  Hemoglobin is 8.2 on last check 03/29/2024  Blood pressure remains soft intermittently, will continue current medications as prescribed.  Patient follow-up with heart failure clinic postdischarge  Patient follow-up with primary Cardiology post discharge to discuss options of changing p.o. amiodarone now her EF has improved.  Patient is stable enough for discharge from cardiac standpoint.       I have discussed the patients findings and recommendations with patient.    Thank you very much for asking us to be involved in this patient's care.  We will follow along with you.    I have discussed this patient with Dr. Muse and together, we have formed a plan of care for this patient as stated above in the recommendations.      Electronically signed by KENNY Finn, 03/30/24, 11:48 AM EDT.                   Please note that portions of this note were completed with a voice recognition program.    Please note that portions of this note were copied and has been reviewed and is accurate as of 3/30/2024 .       .Electronically signed by Norris Muse MD, 03/30/24, 1:55 PM EDT.

## 2024-03-31 NOTE — PAYOR COMM NOTE
"[facility]  BRIDGETT BROWNE  PHONE  [phone]  FAX  [phone]  NPI:  [id number]    PATIENT D/C 3/30/2024      Dio Berry (58 y.o. Female)       Date of Birth   1965    Social Security Number       Address   [address] [address]    Home Phone   [phone]    MRN   [id number]       [facility]   [facility]    Marital Status                               Admission Date   3/26/24    Admission Type   Emergency    Admitting Provider   Tomás An MD    Attending Provider       Department, Room/Bed   [facility] PROGRESS CARE, P204/S2       Discharge Date   3/30/2024    Discharge Disposition   Home-Health Care [facility]    Discharge Destination                                 Attending Provider: (none)   Allergies: Phenergan [Promethazine]    Isolation: None   Infection: None   Code Status: Prior    Ht: 165.1 cm (65\")   Wt: 107 kg (235 lb 3.7 oz)    Admission Cmt: None   Principal Problem: None                  Active Insurance as of 3/26/2024       Primary Coverage       Payor Plan Insurance Group Employer/Plan Group    [employer] [place] [employer] [place]ICA[place]        Payor Plan Address Payor Plan Phone Number Payor Plan Fax Number Effective Dates    [address] [address] [phone]  2020 - None Entered    [facility] [address]         Subscriber Name Subscriber Birth Date Member ID       DIO BERRY 1965 [id number]                     Emergency Contacts        (Rel.) Home Phone Work Phone Mobile Phone    KATHY BERRY (Son) [phone] -- --    Bolivar Berry (Other) -- -- [phone]                 Discharge Summary        Halle Keita MD at [address] [place] [address] HOSPITALIST MEDICINE DISCHARGE SUMMARY    Patient Identification:  Name:  Dio Berry  Age:  58 y.o.  Sex:  female  :  1965  MRN:  [id number]  Visit Number:  [id number]    Date of Admission: 3/26/2024  Date of Discharge: 2024  DISCHARGE " DISPOSITION   Stable  PCP: Masha Davidson APRN    DISCHARGE DIAGNOSIS : Possible SVT with baseline bundle branch block, paroxysmal atrial fibrillation with rapid ventricular response with aberrant conduction status post PVI October 2022, recurrent paroxysmal A-fib status post DC cardioversion, non-STEMI type II, acute on chronic heart failure with reduced ejection fraction now compensated, nonobstructive coronary disease 40% stenosis of LAD on November 2022, history of liver cirrhosis from [place], CKD stage IIIa.  Obesity with BMI 38.5, chronic anticoagulation for stroke prophylaxis, cardiogenic shock secondary to arrhythmia now resolved.  Acute cystitis with E. coli.  History of hypothyroidism.    HOSPITAL COURSE  Patient is a 58 y.o. female presented to [facility] complaining of palpitation and shortness of breath.  Patient presented to the emergency room through EMS because of tachycardia palpitation, per EMS reports she appeared to be in SVT given adenosine with no improvement.  At the emergency room she was given IV milrinone and magnesium with no improvement of wide-complex tachycardia, she was hypotensive with systolic around 80s, emergent cardioversion was performed, 50 g of albumin was also given as well as 250 mL no saline bolus was given.  She was placed on Levophed drip and was admitted to ICU.  While in ICU on amiodarone drip she continues to maintain sinus rhythm, cardiology was consulted, further review of her heart racing and echo it was felt that the wide-complex tachycardia was possibly SVT with baseline left bundle branch block/intraventricular conduction block versus paroxysmal atrial fibrillation with RVR with aberrant conduction.  Her troponin was elevated but is chronic, repeat 2D echo on this admission shows normal ejection fraction which is significant improvement from previous echo which is 30 to 35% EF.  It was felt that her cardiomyopathy was rate related.  She was placed  back on amiodarone 100 mg and so far has been tolerating well, renal function has remained stable, her blood pressure has been stable but soft.  Cardiology plans to see the patient in the clinic in 2 weeks time for further adjustments of her cardiac medication/GDMT and A-fib.  Will be discharged today with home health and physical therapy.    VITAL SIGNS:      03/27/24  0249 03/29/24  0535 03/30/24  0400   Weight: 105 kg (231 lb 7.7 oz) 100 kg (221 lb 1.9 oz) 107 kg (235 lb 3.7 oz)     Body mass index is 39.14 kg/m².  Vitals:    03/30/24 1000   BP: 127/52   Pulse: 58   Resp: 20   Temp:    SpO2: 100%     PHYSICAL EXAM:  General: Comfortable,awake, alert, oriented to self, place, and time,   No respiratory distress.    Skin:  Skin is warm and dry. No rash noted. No pallor.    HENT:  Head:  Normocephalic and atraumatic.  Mouth:  Moist mucous membranes.    Eyes:  Conjunctivae and EOM are normal.  Pupils are equal, round, and reactive to light.  No scleral icterus.    Neck:  Neck supple.  No JVD present.  No hepatojugular reflux  Pulmonary/Chest:  No respiratory distress, no wheezes, no crackles, with normal breath sounds and good air movement.  Cardiovascular:  Normal rate, regular rhythm and normal heart sounds with no murmur.  Abdominal:  Soft.  Bowel sounds are normal.  No distension and no tenderness.  BS  Extremities:  No edema, no tenderness, and no deformity.  No red or swollen joints anywhere.  Strong pulses in all 4 extremities with no clubbing, no cyanosis, no edema.  Neurological:  Motor strength equal no obvious deficit, sensory grossly intact.   No cranial nerve deficit.  No tongue deviation.  No facial droop.  No slurred speech.    Genitourinary: No Harrington catheter  Back:  ----------    DISCHARGE MEDICATIONS:     Discharge Medications        Changes to Medications        Instructions Start Date   bumetanide 2 MG tablet  Commonly known as: BUMEX  What changed:   how much to take  when to take this  Another  medication with the same name was removed. Continue taking this medication, and follow the directions you see here.   1 mg, Oral, Daily             Continue These Medications        Instructions Start Date   amiodarone 100 MG tablet  Commonly known as: PACERONE   300 mg, Oral, Daily      apixaban 5 MG tablet tablet  Commonly known as: ELIQUIS   5 mg, Oral, Every 12 Hours Scheduled      Aspirin Low Dose 81 MG EC tablet  Generic drug: aspirin   81 mg, Oral, Daily      busPIRone 10 MG tablet  Commonly known as: BUSPAR   10 mg, Oral, 2 Times Daily      ferrous sulfate 325 (65 FE) MG tablet   325 mg, Oral, Daily      HumaLOG KwikPen 100 UNIT/ML solution pen-injector  Generic drug: Insulin Lispro (1 Unit Dial)   15 Units, Subcutaneous, 3 Times Daily With Meals      HYDROcodone-acetaminophen 7.5-325 MG per tablet  Commonly known as: NORCO   1 tablet, Oral, 2 Times Daily      Jardiance 10 MG tablet tablet  Generic drug: empagliflozin   10 mg, Oral, Daily      Lantus SoloStar 100 UNIT/ML injection pen  Generic drug: Insulin Glargine   45 Units, Subcutaneous, 2 Times Daily      levothyroxine 50 MCG tablet  Commonly known as: SYNTHROID, LEVOTHROID   50 mcg, Oral, Daily      omeprazole 20 MG capsule  Commonly known as: priLOSEC   20 mg, Oral, Daily      ondansetron 4 MG tablet  Commonly known as: ZOFRAN   4 mg, Oral, Every 8 Hours PRN      Polyethylene Glycol 3350 powder   17 g, Oral, Daily      Trintellix 5 MG tablet tablet  Generic drug: Vortioxetine HBr   5 mg, Oral, Daily With Breakfast             Stop These Medications      valsartan 40 MG tablet  Commonly known as: DIOVAN     Verquvo 10 MG tablet  Generic drug: Vericiguat                Your Scheduled Appointments      Apr 18, 2024  2:00 PM  Follow Up with [facility] FAIL [facility]  [facility] ([place]) [address] [place] [id number] [phone]   Arrive 15 minutes prior to appointment.                Additional Instructions for the  Follow-ups that You Need to Schedule       Ambulatory Referral to Home Health   As directed      Face to Face Visit Date: 3/30/2024   Follow-up provider for Plan of Care?: I treated the patient in an acute care facility and will not continue treatment after discharge.   Follow-up provider: MASHA DAVIDSON [[id number]]   Reason/Clinical Findings: Cardiomyopathy, cardiac arrhythmia, multiple changes on medication need supervision follow-up, PT OT   Describe mobility limitations that make leaving home difficult: Patient recently discharged for cardiac arrhythmia with cardiomyopathy, patient needs company to leave the house   Nursing/Therapeutic Services Requested: Skilled Nursing Physical Therapy Occupational Therapy   Skilled nursing orders: Medication education   PT orders: Therapeutic exercise Strengthening Gait Training   Weight Bearing Status: As Tolerated   Occupational orders: Energy conservation Strengthening Cognition Activities of daily living Home safety assessment   Frequency: 1 Week 1        Discharge Follow-up with PCP   As directed       Currently Documented PCP:    Masha Davidson APRN    PCP Phone Number:    [phone]     Follow Up Details: KENNY Diaz (posthospitalization follow-up)        Discharge Follow-up with Specified Provider: Cardiology (Courtney Baires); 2 Weeks   As directed      To: Cardiology (Courtney Baires)   Follow Up: 2 Weeks   Follow Up Details: To discuss changing amiodarone for paroxysmal A-fib with RVR               Follow-up Information       [facility] .    Specialty: Cardiology  Contact information:  [address] [place] [id number] [phone]             Masha Davidson APRN .    Specialty: Family Medicine  Why: KENNY Diaz (posthospitalization follow-up)  Contact information:  [address] [place] [address] DR OTERO [address] [place] [id number] [phone]                                  Halle Keita  MD  03/30/24  10:39 EDT    Please note that this discharge summary required more than 30 minutes to complete.      Electronically signed by Halle Keita MD at 03/30/24 1116

## 2024-04-01 ENCOUNTER — TELEPHONE (OUTPATIENT)
Dept: TELEMETRY | Facility: HOSPITAL | Age: 59
End: 2024-04-01
Payer: COMMERCIAL

## 2024-04-01 NOTE — PROGRESS NOTES
"Enter Query Response Below      Query Response: The type of heart failure on admission was HFrEF, however later on patient recovered systolic function             If applicable, please update the problem list.     Patient: Yumiko Purdy        : 1965  Account: [id number]           Admit Date:         How to Respond to this query:       a. Click New Note     b. Answer query within the yellow box.                c. Update the Problem List, if applicable.      If you have any questions about this query contact me at: [email or web address]    Dr. Ken,     Cardiology consult note on 3/27 states \"Her ejection fraction in 2023 was reported as 30 to 35% with grade 3 diastolic dysfunction.  Limited echo performed at [facility] in 2024 revealed an EF of proximately 50%\".  Your progress note dated 3/29 states \"Acute on chronic HFrEF, secondary to atrial fibrillation with RVR, improving\" and \"Probably tachycardia induced cardiomyopathy EF of 30 to 35% which prompted tachycardia related as her repeat echocardiogram showed normal systolic function now\". ECHO on 3/27 shows \"Left ventricular ejection fraction appears to be 66 - 70\".     Please clarify the type of heart failure treated/monitored:    Heart failure with recovered ejection fraction  Other- specify_________  Unable to determine    By submitting this query, we are merely seeking further clarification of documentation to accurately reflect all conditions that you are monitoring, evaluating, treating or that extend the hospitalization or utilize additional resources of care. Please utilize your independent clinical judgment when addressing the question(s) above.     This query and your response, once completed, will be entered into the legal medical record.    Sincerely,  Mekhi Case RN  Clinical Documentation Integrity Program     "

## 2024-04-02 NOTE — DISCHARGE PLACEMENT REQUEST
[facility] CARE  [address] [facility] [place] [address]  Phone:  [phone]  Fax:  [phone] Date: Mar 30, 2024      Ambulatory Referral to Home Health     Patient:  Yumiko Purdy MRN:  [id number]   [address] [address] :  1965  SSN:    Phone: [phone] Sex:  F      INSURANCE PAYOR PLAN GROUP # SUBSCRIBER ID   Primary:    [facility] [id number]   [id number]      Referring Provider Information:  BYOD RAWLS Phone: [phone] Fax: [phone]       Referral Information:   # Visits:  999 Referral Type: Home Health [42]   Urgency:  Routine Referral Reason: Specialty Services Required   Start Date: Mar 30, 2024 End Date:  To be determined by Insurer   Diagnosis: Ventricular tachycardia (I47.20 [ICD-10-CM] 427.1 [ICD-9-CM])  Severe obesity (BMI 35.0-39.9) with comorbidity (E66.01 [ICD-10-CM] 278.01 [ICD-9-CM])  Atrial fibrillation with RVR (I48.91 [ICD-10-CM] 427.31 [ICD-9-CM])      Refer to Dept:   Refer to Provider:   Refer to Provider Phone:   Refer to Facility:       Face to Face Visit Date: 3/30/2024  Follow-up provider for Plan of Care? I treated the patient in an acute care facility and will not continue treatment after discharge.  Follow-up provider: LENNY LEACH [[id number]]  Reason/Clinical Findings: Cardiomyopathy, cardiac arrhythmia, multiple changes on medication need supervision follow-up, PT OT  Describe mobility limitations that make leaving home difficult: Patient recently discharged for cardiac arrhythmia with cardiomyopathy, patient needs company to leave the house  Nursing/Therapeutic Services Requested: Skilled Nursing  Nursing/Therapeutic Services Requested: Physical Therapy  Nursing/Therapeutic Services Requested: Occupational Therapy  Skilled nursing orders: Medication education  PT orders: Therapeutic exercise  PT orders: Strengthening  PT orders: Gait Training  Weight Bearing Status: As Tolerated  Occupational orders: Energy  "conservation  Occupational orders: Strengthening  Occupational orders: Cognition  Occupational orders: Activities of daily living  Occupational orders: Home safety assessment  Frequency: 1 Week 1     This document serves as a request of services and does not constitute Insurance authorization or approval of services.  To determine eligibility, please contact the members Insurance carrier to verify and review coverage.     If you have medical questions regarding this request for services. Please contact [facility] at [phone] during normal business hours.        Authorizing Provider:Halle Keita MD  Authorizing Provider's NPI: [id number]  Order Entered By: Halle Keita MD 3/30/2024 10:37 AM     Electronically signed by: Halle Keita MD 3/30/2024 10:37 AM       Yumiko Purdy (58 y.o. Female)       Date of Birth   1965    Social Security Number       Address   [address] [address]    Home Phone   [phone]    MRN   [id number]       [facility]    Marital Status                               Admission Date   3/26/24    Admission Type   Emergency    Admitting Provider   Tomás An MD    Attending Provider       Department, Room/Bed   [facility], P204/S2       Discharge Date   3/30/2024    Discharge Disposition   Home-Health Care [facility]    Discharge Destination                                 Attending Provider: (none)   Allergies: Phenergan [Promethazine]    Isolation: None   Infection: None   Code Status: Prior    Ht: 165.1 cm (65\")   Wt: 107 kg (235 lb 3.7 oz)    Admission Cmt: None   Principal Problem: None                  Active Insurance as of 3/26/2024       Primary Coverage       Payor Plan Insurance Group Employer/Plan Group    [employer] [place] [employer] [place]ICA[place]        Payor Plan Address Payor Plan Phone Number Payor Plan Fax Number Effective Dates    [address] [address] [phone]  " 2020 - None Entered    [facility] [id number]         Subscriber Name Subscriber Birth Date Member ID       DIO PURDY 1965 [id number]                     Emergency Contacts        (Rel.) Home Phone Work Phone Mobile Phone    KATHY PURDY (Son) [phone] -- --    Bolivar Purdy (Other) -- -- [phone]              Insurance Information                  [facility]/WELLC[employer] MEDICAID Phone: [phone]    Subscriber: Dio Purdy Subscriber#: [id number]    Group#: -- Precert#: [id number]             History & Physical        Tomás An MD at 24 0335          Hospitalist History and Physical        Patient Identification  Name: Dio Purdy  Age/Sex: 58 y.o. female  :  1965        MRN: [id number]  Visit Number: [id number]  Admit Date: 3/26/2024   PCP: Masha Davidson APRN          Chief complaint heart racing, short of breath    History of Present Illness:  Patient is a 58 y.o. female with history of anemia, combined systolic (EF31-35%) and grade III diastolic CHF, chronic afib, cirrhosis, insulin dependent Type II DM, and ventricular tachycardia, who was recently admitted to [facility] from 3/13- for PAF with RVR s/p direct cardioversion. She was started on amiodarone and instructed to follow up with cardiology in the outpatient setting. She presented back to the ED this evening with complaints of palpitations and shortness of breath. Upon further questioning, she denies chest pain/pressure but does repot increased lower extremity edema from baseline. She also reports some dysuria.     Per EMS report, she appeared to be in SVT in the field and so was given IV adenosine but did not respond. When she arrived to the ED, she appeared to be in more of a wide complex tachycardia concerning for vtach. BP was 90/60. She received 1g IV mag as well as IV amiodarone but gain did not respond, so she was emergently cardioverted. Now she is back in sinus  rhythm. BP was initially better but has since dropped to 80s/30s so she was ordered 50g IV albumin as well as a 250cc IV NS bolus. She was admitted to the CCU for further management. Troponin was 92 with reflex up to 100 followed by 102. These levels are not significantly changed from recent labs in Epic. BNP is 1318 (2024 on 3/19/24). Anion gap 15.3, BUN 29, Cr 1.34 (baseline range). LFTs showed chronically elevated total bilirubin. Hemoglobin and platelets stable. Patient reports dyspnea persists but is improved since undergoing cardioversion.     Review of Systems  Review of Systems   Constitutional:  Positive for activity change and fatigue. Negative for appetite change, chills and diaphoresis.   HENT:  Negative for congestion, postnasal drip, rhinorrhea, sinus pressure, sinus pain and sore throat.    Eyes:  Negative for photophobia and visual disturbance.   Respiratory:  Positive for shortness of breath. Negative for cough and wheezing.    Cardiovascular:  Positive for palpitations and leg swelling. Negative for chest pain.   Gastrointestinal:  Negative for abdominal distention, abdominal pain, constipation, diarrhea, nausea and vomiting.   Genitourinary:  Positive for dysuria. Negative for difficulty urinating, flank pain, frequency and hematuria.   Musculoskeletal:  Negative for arthralgias, back pain, joint swelling and myalgias.   Skin:  Negative for color change, pallor, rash and wound.   Neurological:  Positive for weakness (generlized). Negative for dizziness, seizures, syncope, light-headedness, numbness and headaches.   Hematological:  Negative for adenopathy. Does not bruise/bleed easily.   Psychiatric/Behavioral:  Negative for agitation, behavioral problems and confusion.        History  Past Medical History:   Diagnosis Date    Anemia     CHF (congestive heart failure)     Chronic a-fib     stated by patient     Cirrhosis of liver     stated by patient     Diabetes mellitus     Ventricular  tachycardia      Past Surgical History:   Procedure Laterality Date    APPENDECTOMY      CARDIAC CATHETERIZATION N/A 11/10/2020    Procedure: Left Heart Cath;  Surgeon: Charlee Ken MD;  Location: [facility] CATH INVASIVE LOCATION;  Service: Cardiovascular;  Laterality: N/A;    CHOLECYSTECTOMY      TOE AMPUTATION Right     stated by patient.      Family History   Problem Relation Age of Onset    Heart attack Father     Heart attack Brother      Social History     Tobacco Use    Smoking status: Never    Smokeless tobacco: Never   Vaping Use    Vaping status: Never Used   Substance Use Topics    Alcohol use: Never    Drug use: Never     Medications Prior to Admission   Medication Sig Dispense Refill Last Dose    amiodarone (PACERONE) 100 MG tablet Take 3 tablets by mouth Daily.       amiodarone (PACERONE) 200 MG tablet Take 2 tablets by mouth Daily.       apixaban (ELIQUIS) 5 MG tablet tablet Take 1 tablet by mouth Every 12 (Twelve) Hours. Indications: Atrial Fibrillation 60 tablet 3     Aspirin Low Dose 81 MG EC tablet Take 1 tablet by mouth Daily.       busPIRone (BUSPAR) 10 MG tablet Take 1 tablet by mouth 2 (Two) Times a Day.       capsaicin-cleansing gel (Qutenza, 4 Patch,) 8 % kit topical system Apply 4 patches topically to the appropriate area as directed Every 3 (Three) Months.       cefuroxime (CEFTIN) 500 MG tablet Take 1 tablet by mouth 2 (Two) Times a Day.       empagliflozin (JARDIANCE) 10 MG tablet tablet Take 1 tablet by mouth Daily for 30 days. 30 tablet 6     ferrous sulfate 325 (65 FE) MG tablet Take 1 tablet by mouth Daily.       HYDROcodone-acetaminophen (NORCO) 7.5-325 MG per tablet Take 1 tablet by mouth 2 (Two) Times a Day.       Insulin Glargine (LANTUS SOLOSTAR) 100 UNIT/ML injection pen Inject 45 Units under the skin into the appropriate area as directed 2 (Two) Times a Day. 30 mL 0     Insulin Lispro, 1 Unit Dial, (HUMALOG) 100 UNIT/ML solution pen-injector Inject 15 Units under the skin into  the appropriate area as directed 3 (Three) Times a Day With Meals. (Patient taking differently: Inject 40 Units under the skin into the appropriate area as directed 3 (Three) Times a Day With Meals.) 15 mL 1     levothyroxine (SYNTHROID, LEVOTHROID) 50 MCG tablet Take 1 tablet by mouth Daily.       omeprazole (priLOSEC) 20 MG capsule Take 1 capsule by mouth Daily.       ondansetron (ZOFRAN) 4 MG tablet Take 1 tablet by mouth Every 6 (Six) Hours As Needed for Nausea or Vomiting.       Polyethylene Glycol 3350 powder Use 238 g Take As Directed.       valsartan (DIOVAN) 40 MG tablet Take 1 tablet by mouth Daily for 30 days. 30 tablet 0     Vericiguat (Verquvo) 10 MG tablet Take 1 tablet by mouth Daily. 30 tablet 2     Vortioxetine HBr (Trintellix) 5 MG tablet tablet Take 1 tablet by mouth Daily With Breakfast.       acetaminophen (TYLENOL) 500 MG tablet Take 1 tablet by mouth 2 (Two) Times a Day As Needed for Mild Pain.       bumetanide (BUMEX) 2 MG tablet Take 1 tablet by mouth Daily for 30 days. Two tablets daily until 02/22/24 then down to 1 tablet daily with extra as needed. (Patient taking differently: Take 1 tablet by mouth Daily. Taking 2 mg BID Mon-Sat then 2 mg daily on Sunday) 45 tablet 2      Allergies:  Phenergan [promethazine]    Objective     Vital Signs  Temp:  [97.7 °F (36.5 °C)-98 °F (36.7 °C)] 97.7 °F (36.5 °C)  Heart Rate:  [] 58  Resp:  [17-18] 18  BP: ()/(39-70) 86/39  Body mass index is 38.52 kg/m².    Physical Exam:  Physical Exam  Constitutional:       General: She is not in acute distress.     Appearance: She is obese. She is ill-appearing.   HENT:      Head: Normocephalic and atraumatic.      Right Ear: External ear normal.      Left Ear: External ear normal.      Nose: Nose normal.      Mouth/Throat:      Mouth: Mucous membranes are moist.      Pharynx: Oropharynx is clear.   Eyes:      Extraocular Movements: Extraocular movements intact.      Conjunctiva/sclera: Conjunctivae  normal.      Pupils: Pupils are equal, round, and reactive to light.   Cardiovascular:      Rate and Rhythm: Normal rate and regular rhythm.      Pulses: Normal pulses.      Heart sounds: Normal heart sounds. No murmur heard.  Pulmonary:      Effort: Pulmonary effort is normal.      Breath sounds: Rales (bilateral bases) present.   Abdominal:      General: Abdomen is flat. Bowel sounds are normal. There is no distension.      Palpations: Abdomen is soft.      Tenderness: There is no abdominal tenderness.   Musculoskeletal:         General: Normal range of motion.      Cervical back: Normal range of motion and neck supple. No tenderness.      Right lower leg: Edema (2+ pitting) present.      Left lower leg: Left lower leg edema: 2+ pitting.   Lymphadenopathy:      Cervical: No cervical adenopathy.   Skin:     General: Skin is warm and dry.      Capillary Refill: Capillary refill takes less than 2 seconds.      Coloration: Skin is not jaundiced.      Findings: No bruising or lesion.   Neurological:      General: No focal deficit present.      Mental Status: She is alert and oriented to person, place, and time.   Psychiatric:         Mood and Affect: Mood normal.         Behavior: Behavior normal.         Results Review:       Lab Results:  Results from last 7 days   Lab Units 03/27/24  0158 03/26/24  2154   WBC 10*3/mm3 7.78 8.59   HEMOGLOBIN g/dL 9.4* 9.8*   PLATELETS 10*3/mm3 131* 133*         Results from last 7 days   Lab Units 03/27/24  0158 03/26/24  2154   SODIUM mmol/L 139 138   POTASSIUM mmol/L 3.8 3.8   CHLORIDE mmol/L 99 99   CO2 mmol/L 25.1 23.7   BUN mg/dL 29* 29*   CREATININE mg/dL 1.38* 1.34*   CALCIUM mg/dL 8.7 8.6   GLUCOSE mg/dL 370* 360*     Results from last 7 days   Lab Units 03/26/24  2347   MAGNESIUM mg/dL 2.5     Hemoglobin A1C   Date Value Ref Range Status   03/26/2024 7.70 (H) 4.80 - 5.60 % Final     Results from last 7 days   Lab Units 03/27/24  0158 03/26/24  2154   BILIRUBIN mg/dL 2.9*  3.6*   ALK PHOS U/L 100 105   AST (SGOT) U/L 23 24   ALT (SGPT) U/L 25 26     Results from last 7 days   Lab Units 03/27/24  0158 03/26/24  2347 03/26/24  2154   HSTROP T ng/L 102* 100* 92*                   I have reviewed the patient's laboratory results.    Imaging:  Imaging Results (Last 72 Hours)       Procedure Component Value Units Date/Time    XR Chest 1 View [[id number]] Collected: 03/26/24 2302     Updated: 03/26/24 2304    Narrative:      Single view of the chest     Indications: Chest pain     Findings:     AP view of the chest is compared to the prior study dated 3/12/2024.     The cardiac silhouette is enlarged.  Osseous structures are normal.   Pulmonary vascular markings are prominent.  Bilateral perihilar  interstitial prominence is present.  No focal infiltrates or effusions.       Impression:      Impression:     Findings compatible with pulmonary vascular congestion      This report was finalized on 3/26/2024 11:02 PM by Antonio Davey MD.               I have personally reviewed the patient's radiologic imaging.    EKG:  Critical Test Result: High HR  Wide QRS tachycardia, , QTc 557  Left axis deviation  Nonspecific intraventricular block  Possible Anterolateral infarct , age undetermined  Abnormal ECG  When compared with ECG of 11-MAR-2024 23:51,  Wide QRS tachycardia has replaced Sinus rhythm  Vent. rate has increased BY  66 BPM    I have personally reviewed the patient's EKG.    Assessment & Plan     - Ventricular tachycardia vs afib w/RVR with aberrancy: patient has chronic LBBB vs chronic nonspecific intraventricular block which skews interpretation. Did not respond to IV amiodarone so required DC cardioversion. Now back to sinus rhythm. Update ECHO later this morning. Await further recommendations from cardiology. In the mean time, K+ 3.8, mag 2.5 so will refrain from electrolyte replacement at this time. Continue home amiodarone.   - Acute on chronic systolic and diastolic CHF  secondary to above: suspect will improve simply with cardioversion and return to sinus rhythm. Hold diuretics for now given borderline BP. Continue to monitor.   - Hypotension: BP dropped after arrival to CCU. Given 250cc IV fluid bolus and 50g IV albumin. Continue to monitor. If remains hypotensive, may have to start vasopressor.  - Dysuria: check UA.    - Stage IIIa-b CKD: creatinine appears to be within baseline range. Continue to monitor for now.   - Type II DM insulin dependent with hyperglycemia: lantus initially decreased to prevent hypoglycemia, but glucose remains elevated so will increase lantus dose by 10 units. SSI ordered as well.     DVT Prophylaxis: anticoagulated on eliquis    Estimated Length of Stay >2 midnights    I discussed the patient's findings, assessment and plan with the patient and nursing staff in the CCU.    Patient is high risk due to VTach vs afib with RVR with aberrancy, acute on chronic systolic and diastolic CHF, hypotension    Tomás An MD  03/27/24  03:35 EDT      Electronically signed by Tomás An MD at 03/27/24 4126       No current facility-administered medications for this encounter.     Current Outpatient Medications   Medication Sig Dispense Refill    amiodarone (PACERONE) 100 MG tablet Take 1 tablet by mouth Daily for 30 days. 30 tablet 0    apixaban (ELIQUIS) 5 MG tablet tablet Take 1 tablet by mouth Every 12 (Twelve) Hours. Indications: Atrial Fibrillation 60 tablet 3    Aspirin Low Dose 81 MG EC tablet Take 1 tablet by mouth Daily.      bumetanide (BUMEX) 2 MG tablet Take 0.5 tablets by mouth Daily.      busPIRone (BUSPAR) 10 MG tablet Take 1 tablet by mouth 2 (Two) Times a Day.      empagliflozin (JARDIANCE) 10 MG tablet tablet Take 1 tablet by mouth Daily for 30 days. 30 tablet 6    ferrous sulfate 325 (65 FE) MG tablet Take 1 tablet by mouth Daily.      HYDROcodone-acetaminophen (NORCO) 7.5-325 MG per tablet Take 1 tablet by mouth 2 (Two)  "Times a Day.      Insulin Glargine (LANTUS SOLOSTAR) 100 UNIT/ML injection pen Inject 45 Units under the skin into the appropriate area as directed 2 (Two) Times a Day. 30 mL 0    Insulin Lispro, 1 Unit Dial, (HUMALOG) 100 UNIT/ML solution pen-injector Inject 15 Units under the skin into the appropriate area as directed 3 (Three) Times a Day With Meals. 15 mL 1    levothyroxine (SYNTHROID, LEVOTHROID) 50 MCG tablet Take 1 tablet by mouth Daily.      omeprazole (priLOSEC) 20 MG capsule Take 1 capsule by mouth Daily.      ondansetron (ZOFRAN) 4 MG tablet Take 1 tablet by mouth Every 8 (Eight) Hours As Needed for Nausea or Vomiting.      Polyethylene Glycol 3350 powder Take 17 g by mouth Daily.      Vortioxetine HBr (Trintellix) 5 MG tablet tablet Take 1 tablet by mouth Daily With Breakfast.          Physician Progress Notes (most recent note)        Charlee Ken MD at 24 1225            Enter Query Response Below      Query Response: The type of heart failure on admission was HFrEF, however later on patient recovered systolic function             If applicable, please update the problem list.     Patient: Yumiko Purdy        : 1965  Account: [id number]           Admit Date:         How to Respond to this query:       a. Click New Note     b. Answer query within the yellow box.                c. Update the Problem List, if applicable.      If you have any questions about this query contact me at: [email or web address] File    Dr. Ken,     Cardiology consult note on 3/27 states \"Her ejection fraction in 2023 was reported as 30 to 35% with grade 3 diastolic dysfunction.  Limited echo performed at [facility] in 2024 revealed an EF of proximately 50%\".  Your progress note dated 3/29 states \"Acute on chronic HFrEF, secondary to atrial fibrillation with RVR, improving\" and \"Probably tachycardia induced cardiomyopathy EF of 30 to 35% which prompted tachycardia related as her " "repeat echocardiogram showed normal systolic function now\". ECHO on 3/27 shows \"Left ventricular ejection fraction appears to be 66 - 70\".     Please clarify the type of heart failure treated/monitored:    Heart failure with recovered ejection fraction  Other- specify_________  Unable to determine    By submitting this query, we are merely seeking further clarification of documentation to accurately reflect all conditions that you are monitoring, evaluating, treating or that extend the hospitalization or utilize additional resources of care. Please utilize your independent clinical judgment when addressing the question(s) above.     This query and your response, once completed, will be entered into the legal medical record.    Sincerely,  Mekhi Case RN  Clinical Documentation Integrity Program       Electronically signed by Charlee Ken MD at 24 1137          Physical Therapy Notes (most recent note)        Jossue Purvis, PT at 24 1059  Version 1 of 1         Goal Outcome Evaluation:  Plan of Care Reviewed With: patient        Progress: no change  Outcome Evaluation: Pt presents w/ decreased bed mobility, transfers, and gait. Pt would benefit from skilled PT. Anticipate d/c home w/ family support.      Anticipated Discharge Disposition (PT): home, home with assist                          Electronically signed by Jossue Purvis PT at 24 1059          Occupational Therapy Notes (most recent note)        Marlo Lindsey, OT at 24 1235          Acute Care - Occupational Therapy Initial Evaluation  LATASHA Metz     Patient Name: Yumiko Purdy  : 1965  MRN: [id number]  Today's Date: 3/29/2024  Onset of Illness/Injury or Date of Surgery: 24     Referring Physician: Neela    Admit Date: 3/26/2024       ICD-10-CM ICD-9-CM   1. Sustained ventricular tachycardia  I47.20 427.1   2. Chest pain, unspecified type  R07.9 786.50   3. Acute on chronic combined systolic and diastolic CHF " (congestive heart failure)  I50.43 428.43     428.0     Patient Active Problem List   Diagnosis    Dilated cardiomyopathy    CKD (chronic kidney disease) stage 2, GFR 60-89 ml/min    Severe obesity (BMI 35.0-39.9) with comorbidity    Chronic anemia    Elevated bilirubin    Acute on chronic combined systolic and diastolic CHF (congestive heart failure)    Hyponatremia    Paroxysmal atrial fibrillation    Cirrhosis    Coronary artery disease involving native coronary artery of native heart without angina pectoris    Atrial fibrillation with RVR    Ventricular tachycardia     Past Medical History:   Diagnosis Date    Anemia     CHF (congestive heart failure)     Chronic a-fib     stated by patient     Cirrhosis of liver     stated by patient     Diabetes mellitus     Ventricular tachycardia      Past Surgical History:   Procedure Laterality Date    APPENDECTOMY      CARDIAC CATHETERIZATION N/A 11/10/2020    Procedure: Left Heart Cath;  Surgeon: Charlee Ken MD;  Location: [facility] CATH INVASIVE LOCATION;  Service: Cardiovascular;  Laterality: N/A;    CHOLECYSTECTOMY      TOE AMPUTATION Right     stated by patient.          OT ASSESSMENT FLOWSHEET (Last 12 Hours)       OT Evaluation and Treatment       Row Name 03/29/24 1225                   OT Time and Intention    Document Type evaluation  -KR        Mode of Treatment occupational therapy  -KR        Patient Effort adequate  -KR           General Information    General Observations of Patient alert/cooperative  -KR        Equipment Currently Used at Home commode, bedside;hospital bed;oxygen;walker, rolling;wheelchair  -KR           Living Environment    Current Living Arrangements home  -KR        People in Home child(ferdinand), adult  -KR        Primary Care Provided by self;child(ferdinand)  -KR           Cognition    Affect/Mental Status (Cognition) WFL  -KR        Orientation Status (Cognition) oriented x 3  -KR        Follows Commands (Cognition) WFL  -KR           Range  of Motion Comprehensive    Comment, General Range of Motion BUE WFL  -KR           Strength Comprehensive (MMT)    Comment, General Manual Muscle Testing (MMT) Assessment BUE 3/5  -KR           Activities of Daily Living    BADL Assessment/Intervention bathing;upper body dressing;lower body dressing;grooming;feeding;toileting  -KR           Bathing Assessment/Intervention    Jean Level (Bathing) bathing skills;minimum assist (75% patient effort)  -KR           Upper Body Dressing Assessment/Training    Jean Level (Upper Body Dressing) upper body dressing skills;set up  -KR           Lower Body Dressing Assessment/Training    Jean Level (Lower Body Dressing) lower body dressing skills;minimum assist (75% patient effort)  -KR           Grooming Assessment/Training    Jean Level (Grooming) grooming skills;set up  -KR           Self-Feeding Assessment/Training    Jean Level (Feeding) feeding skills;set up  -KR           Toileting Assessment/Training    Jean Level (Toileting) toileting skills;minimum assist (75% patient effort)  -KR           Motor Skills    Motor Skills --  Pt presents with bilateral UE tremors noted throughout fine motor activity. Tremors are reported occasional and mild interference with ADLs at this time.  -KR           Plan of Care Review    Plan of Care Reviewed With patient  -KR        Progress improving  -KR           Therapy Assessment/Plan (OT)    Planned Therapy Interventions (OT) adaptive equipment training  -KR        Comment, Therapy Assessment/Plan (OT) Pt presents with general debility and mild UE tremors noted/reported. Pt reports occasional difficulty with feeding/grooming activity due to these tremors. Adaptive material provided to assist with utensils/grooming items. Built up foam issued, demonstrated, explained at this time. Pt able to use appropriately and reported comprehension of purpose. Spill proof cup issued for use in room and  home to prevent spills and increase fxl performance drink to mouth. OT to monitor and provide assist as needed with items.  -KR           Therapy Plan Review/Discharge Plan (OT)    Anticipated Discharge Disposition (OT) home with assist  -KR           OT Goals    Activity Tolerance Goal Selection (OT) activity tolerance, OT goal 1  -KR            Activity Tolerance Goal 1 (OT)    Activity Tolerance Goal 1 (OT) Increase to enhance performance of self care tasks  -KR        Activity Level (Endurance Goal 1, OT) 15 min activity  -KR        Time Frame (Activity Tolerance Goal 1, OT) by discharge  -KR           Patient Education Goal (OT)    Activity (Patient Education Goal, OT) AE/DME training as needed to enhance independence with self care tasks  -KR        [place]/Cues/Accuracy (Memory Goal 2, OT) verbalizes understanding;demonstrates adequately  -KR        Time Frame (Patient Education Goal, OT) by discharge  -KR                  User Key  (r) = Recorded By, (t) = Taken By, (c) = Cosigned By      Initials Name Effective Dates    KR Marlo Lindsey OT 06/16/21 -                            OT Recommendation and Plan  Planned Therapy Interventions (OT): adaptive equipment training  Plan of Care Review  Plan of Care Reviewed With: patient  Progress: improving  Plan of Care Reviewed With: patient        Time Calculation:     Therapy Charges for Today       Code Description Service Date Service Provider Modifiers Qty    13253072171  OT EVAL MOD COMPLEXITY 4 3/29/2024 Marlo Lindsey OT GO 1    [id number]  OT SELF CARE/MGMT/TRAIN EA 15 MIN 3/29/2024 Marlo Lindsey OT GO 2                 Marlo Lindsey OT  3/29/2024    Electronically signed by Marlo Lindsey OT at 03/29/24 1235       Speech Language Pathology Notes (most recent note)    No notes exist for this encounter.       ADL Documentation (most recent)      Flowsheet Row Most Recent Value   Transferring 2 - assistive person   Toileting 3 - assistive equipment  and person   Bathing 2 - assistive person   Dressing 2 - assistive person   Eating 0 - independent   Communication 0 - understands/communicates without difficulty   Swallowing 0 - swallows foods/liquids without difficulty   Equipment Currently Used at Home oxygen, commode, bath bench, glucometer, wheelchair, walker, rolling               Discharge Summary        Halle Keita MD at [address] [place] [address] HOSPITALIST MEDICINE DISCHARGE SUMMARY    Patient Identification:  Name:  Yumiko Purdy  Age:  58 y.o.  Sex:  female  :  1965  MRN:  [id number]  Visit Number:  [id number]    Date of Admission: 3/26/2024  Date of Discharge: 2024  DISCHARGE DISPOSITION   Stable  PCP: Masha Davidson APRN    DISCHARGE DIAGNOSIS : Possible SVT with baseline bundle branch block, paroxysmal atrial fibrillation with rapid ventricular response with aberrant conduction status post PVI 2022, recurrent paroxysmal A-fib status post DC cardioversion, non-STEMI type II, acute on chronic heart failure with reduced ejection fraction now compensated, nonobstructive coronary disease 40% stenosis of LAD on 2022, history of liver cirrhosis from [place], CKD stage IIIa.  Obesity with BMI 38.5, chronic anticoagulation for stroke prophylaxis, cardiogenic shock secondary to arrhythmia now resolved.  Acute cystitis with E. coli.  History of hypothyroidism.    HOSPITAL COURSE  Patient is a 58 y.o. female presented to [facility] complaining of palpitation and shortness of breath.  Patient presented to the emergency room through EMS because of tachycardia palpitation, per EMS reports she appeared to be in SVT given adenosine with no improvement.  At the emergency room she was given IV milrinone and magnesium with no improvement of wide-complex tachycardia, she was hypotensive with systolic around 80s, emergent cardioversion was performed, 50 g of albumin was also given as well  as 250 mL no saline bolus was given.  She was placed on Levophed drip and was admitted to ICU.  While in ICU on amiodarone drip she continues to maintain sinus rhythm, cardiology was consulted, further review of her heart racing and echo it was felt that the wide-complex tachycardia was possibly SVT with baseline left bundle branch block/intraventricular conduction block versus paroxysmal atrial fibrillation with RVR with aberrant conduction.  Her troponin was elevated but is chronic, repeat 2D echo on this admission shows normal ejection fraction which is significant improvement from previous echo which is 30 to 35% EF.  It was felt that her cardiomyopathy was rate related.  She was placed back on amiodarone 100 mg and so far has been tolerating well, renal function has remained stable, her blood pressure has been stable but soft.  Cardiology plans to see the patient in the clinic in 2 weeks time for further adjustments of her cardiac medication/GDMT and A-fib.  Will be discharged today with home health and physical therapy.    VITAL SIGNS:      03/27/24  0249 03/29/24  0535 03/30/24  0400   Weight: 105 kg (231 lb 7.7 oz) 100 kg (221 lb 1.9 oz) 107 kg (235 lb 3.7 oz)     Body mass index is 39.14 kg/m².  Vitals:    03/30/24 1000   BP: 127/52   Pulse: 58   Resp: 20   Temp:    SpO2: 100%     PHYSICAL EXAM:  General: Comfortable,awake, alert, oriented to self, place, and time,   No respiratory distress.    Skin:  Skin is warm and dry. No rash noted. No pallor.    HENT:  Head:  Normocephalic and atraumatic.  Mouth:  Moist mucous membranes.    Eyes:  Conjunctivae and EOM are normal.  Pupils are equal, round, and reactive to light.  No scleral icterus.    Neck:  Neck supple.  No JVD present.  No hepatojugular reflux  Pulmonary/Chest:  No respiratory distress, no wheezes, no crackles, with normal breath sounds and good air movement.  Cardiovascular:  Normal rate, regular rhythm and normal heart sounds with no  murmur.  Abdominal:  Soft.  Bowel sounds are normal.  No distension and no tenderness.  BS  Extremities:  No edema, no tenderness, and no deformity.  No red or swollen joints anywhere.  Strong pulses in all 4 extremities with no clubbing, no cyanosis, no edema.  Neurological:  Motor strength equal no obvious deficit, sensory grossly intact.   No cranial nerve deficit.  No tongue deviation.  No facial droop.  No slurred speech.    Genitourinary: No Harrington catheter  Back:  ----------    DISCHARGE MEDICATIONS:     Discharge Medications        Changes to Medications        Instructions Start Date   bumetanide 2 MG tablet  Commonly known as: BUMEX  What changed:   how much to take  when to take this  Another medication with the same name was removed. Continue taking this medication, and follow the directions you see here.   1 mg, Oral, Daily             Continue These Medications        Instructions Start Date   amiodarone 100 MG tablet  Commonly known as: PACERONE   300 mg, Oral, Daily      apixaban 5 MG tablet tablet  Commonly known as: ELIQUIS   5 mg, Oral, Every 12 Hours Scheduled      Aspirin Low Dose 81 MG EC tablet  Generic drug: aspirin   81 mg, Oral, Daily      busPIRone 10 MG tablet  Commonly known as: BUSPAR   10 mg, Oral, 2 Times Daily      ferrous sulfate 325 (65 FE) MG tablet   325 mg, Oral, Daily      HumaLOG KwikPen 100 UNIT/ML solution pen-injector  Generic drug: Insulin Lispro (1 Unit Dial)   15 Units, Subcutaneous, 3 Times Daily With Meals      HYDROcodone-acetaminophen 7.5-325 MG per tablet  Commonly known as: NORCO   1 tablet, Oral, 2 Times Daily      Jardiance 10 MG tablet tablet  Generic drug: empagliflozin   10 mg, Oral, Daily      Lantus SoloStar 100 UNIT/ML injection pen  Generic drug: Insulin Glargine   45 Units, Subcutaneous, 2 Times Daily      levothyroxine 50 MCG tablet  Commonly known as: SYNTHROID, LEVOTHROID   50 mcg, Oral, Daily      omeprazole 20 MG capsule  Commonly known as: priLOSEC    20 mg, Oral, Daily      ondansetron 4 MG tablet  Commonly known as: ZOFRAN   4 mg, Oral, Every 8 Hours PRN      Polyethylene Glycol 3350 powder   17 g, Oral, Daily      Trintellix 5 MG tablet tablet  Generic drug: Vortioxetine HBr   5 mg, Oral, Daily With Breakfast             Stop These Medications      valsartan 40 MG tablet  Commonly known as: DIOVAN     Verquvo 10 MG tablet  Generic drug: Vericiguat                Your Scheduled Appointments      Apr 18, 2024  2:00 PM  Follow Up with [facility] FAIL [facility] ([place]) [address] [place] [id number] [phone]   Arrive 15 minutes prior to appointment.                Additional Instructions for the Follow-ups that You Need to Schedule       Ambulatory Referral to Home Health   As directed      Face to Face Visit Date: 3/30/2024   Follow-up provider for Plan of Care?: I treated the patient in an acute care facility and will not continue treatment after discharge.   Follow-up provider: LENNY DAVIDSON [[id number]]   Reason/Clinical Findings: Cardiomyopathy, cardiac arrhythmia, multiple changes on medication need supervision follow-up, PT OT   Describe mobility limitations that make leaving home difficult: Patient recently discharged for cardiac arrhythmia with cardiomyopathy, patient needs company to leave the house   Nursing/Therapeutic Services Requested: Skilled Nursing Physical Therapy Occupational Therapy   Skilled nursing orders: Medication education   PT orders: Therapeutic exercise Strengthening Gait Training   Weight Bearing Status: As Tolerated   Occupational orders: Energy conservation Strengthening Cognition Activities of daily living Home safety assessment   Frequency: 1 Week 1        Discharge Follow-up with PCP   As directed       Currently Documented PCP:    Lenny Davidson APRN    PCP Phone Number:    [phone]     Follow Up Details: KENNY Diaz (posthospitalization follow-up)         Discharge Follow-up with Specified Provider: Cardiology (Courtney Baires); 2 Weeks   As directed      To: Cardiology (Courtney Baires)   Follow Up: 2 Weeks   Follow Up Details: To discuss changing amiodarone for paroxysmal A-fib with RVR               Follow-up Information       [facility] .    Specialty: Cardiology  Contact information:  [address] [place] [id number] [phone]             Masha Davidson APRN .    Specialty: Family Medicine  Why: KENNY Diaz (posthospitalization follow-up)  Contact information:  [address] [place] [address] [address] [place] [id number] [phone]                                  Boyd Rawls MD  03/30/24  10:39 EDT    Please note that this discharge summary required more than 30 minutes to complete.      Electronically signed by Boyd Rawls MD at 03/30/24 1045       Discharge Order (From admission, onward)       Start     Ordered    03/30/24 1033  Discharge patient  Once        Expected Discharge Date: 03/30/24   Discharge Disposition: Home-Health Care [facility]   Physician of Record for Attribution - Please select from Treatment Team: BOYD RAWLS [[id number]]   Review needed by CMO to determine Physician of Record: No      Question Answer Comment   Physician of Record for Attribution - Please select from Treatment Team BOYD RAWLS    Review needed by CMO to determine Physician of Record No        03/30/24 1037

## 2024-04-03 ENCOUNTER — SPECIALTY PHARMACY (OUTPATIENT)
Dept: PHARMACY | Facility: HOSPITAL | Age: 59
End: 2024-04-03
Payer: COMMERCIAL

## 2024-04-04 ENCOUNTER — READMISSION MANAGEMENT (OUTPATIENT)
Dept: CALL CENTER | Facility: HOSPITAL | Age: 59
End: 2024-04-04
Payer: COMMERCIAL

## 2024-04-04 NOTE — OUTREACH NOTE
Medical Week 1 Survey      Flowsheet Row Responses   [facility] patient discharged from? [facility]   Does the patient have one of the following disease processes/diagnoses(primary or secondary)? Other   Week 1 attempt successful? Yes   Call start time 1336   Call end time 1343   Discharge diagnosis shortness of breath, tachycardia palpitation   Meds reviewed with patient/caregiver? Yes   Is the patient having any side effects they believe may be caused by any medication additions or changes? No   Does the patient have all medications ordered at discharge? Yes   Is the patient taking all medications as directed (includes completed medication regime)? Yes   Does the patient have a primary care provider?  Yes   Does the patient have an appointment with their PCP within 7 days of discharge? Greater than 7 days   Comments regarding PCP 4/8/24   Nursing Interventions Verified appointment date/time/provider   Has the patient kept scheduled appointments due by today? N/A   What is the Home health agency?  HH orders   Has home health visited the patient within 72 hours of discharge? Yes   Home health comments start of care appt on 4/5/24   DME comments home  oxygen, wears 2L prn, sats 98% on O2   Psychosocial issues? No   Did the patient receive a copy of their discharge instructions? Yes   Nursing interventions Reviewed instructions with patient   What is the patient's perception of their health status since discharge? Same   Is the patient/caregiver able to teach back the hierarchy of who to call/visit for symptoms/problems? PCP, Specialist, Home health nurse, Urgent Care, ED, 911 Yes   Week 1 call completed? Yes   Would this patient benefit from a Referral to Amb Social Work? No   Is the patient interested in additional calls from an ambulatory ? No   Call end time 1343            Alison CHAVEZ - Registered Nurse

## 2024-04-10 ENCOUNTER — SPECIALTY PHARMACY (OUTPATIENT)
Dept: PHARMACY | Facility: HOSPITAL | Age: 59
End: 2024-04-10
Payer: COMMERCIAL

## 2024-04-10 ENCOUNTER — OFFICE VISIT (OUTPATIENT)
Dept: CARDIOLOGY | Facility: CLINIC | Age: 59
End: 2024-04-10
Payer: COMMERCIAL

## 2024-04-10 ENCOUNTER — READMISSION MANAGEMENT (OUTPATIENT)
Dept: CALL CENTER | Facility: HOSPITAL | Age: 59
End: 2024-04-10
Payer: COMMERCIAL

## 2024-04-10 VITALS
DIASTOLIC BLOOD PRESSURE: 91 MMHG | HEART RATE: 88 BPM | WEIGHT: 235 LBS | SYSTOLIC BLOOD PRESSURE: 119 MMHG | BODY MASS INDEX: 39.15 KG/M2 | HEIGHT: 65 IN | OXYGEN SATURATION: 98 %

## 2024-04-10 DIAGNOSIS — I50.42 CHRONIC COMBINED SYSTOLIC AND DIASTOLIC CHF (CONGESTIVE HEART FAILURE): Primary | ICD-10-CM

## 2024-04-10 DIAGNOSIS — I47.10 PAROXYSMAL SVT (SUPRAVENTRICULAR TACHYCARDIA): ICD-10-CM

## 2024-04-10 DIAGNOSIS — I42.0 DILATED CARDIOMYOPATHY: ICD-10-CM

## 2024-04-10 DIAGNOSIS — G47.33 OSA (OBSTRUCTIVE SLEEP APNEA): ICD-10-CM

## 2024-04-10 DIAGNOSIS — I48.0 PAROXYSMAL ATRIAL FIBRILLATION: ICD-10-CM

## 2024-04-10 DIAGNOSIS — I25.10 CORONARY ARTERY DISEASE INVOLVING NATIVE CORONARY ARTERY OF NATIVE HEART WITHOUT ANGINA PECTORIS: ICD-10-CM

## 2024-04-10 RX ORDER — DIGOXIN 125 MCG
125 TABLET ORAL
COMMUNITY

## 2024-04-10 RX ORDER — FUROSEMIDE 20 MG/1
20 TABLET ORAL DAILY
COMMUNITY

## 2024-04-10 NOTE — OUTREACH NOTE
Medical Week 2 Survey      Flowsheet Row Responses   [facility]   Does the patient have one of the following disease processes/diagnoses(primary or secondary)? Other   Week 2 attempt successful? No   Unsuccessful attempts Attempt 1            Emilie CHAVEZ - Licensed Nurse

## 2024-04-10 NOTE — LETTER
April 10, 2024     KENNY Gonzales  [address] [place] [address] [address]  [facility] [address]    Patient: Yumiko Purdy   YOB: 1965   Date of Visit: 4/10/2024     Dear KENNY Gonzales:       Thank you for referring Yumiko Purdy to me for evaluation. Below are the relevant portions of my assessment and plan of care.    If you have questions, please do not hesitate to call me. I look forward to following Yumiko along with you.         Sincerely,        KENNY Lawler        CC: No Recipients    Courtney Baires APRN  04/10/24 1207  Sign when Signing Visit  Masha Davidson APRN  Yumiko Purdy  1965  04/10/2024    Patient Active Problem List   Diagnosis   • Dilated cardiomyopathy   • CKD (chronic kidney disease) stage 2, GFR 60-89 ml/min   • Severe obesity (BMI 35.0-39.9) with comorbidity   • Chronic anemia   • Elevated bilirubin   • Acute on chronic combined systolic and diastolic CHF (congestive heart failure)   • Hyponatremia   • Paroxysmal atrial fibrillation   • Cirrhosis   • Coronary artery disease involving native coronary artery of native heart without angina pectoris   • Atrial fibrillation with RVR   • Ventricular tachycardia       Dear Masha Davidson APRN:    Subjective    Chief Complaint   Patient presents with   • Hospital Follow Up Visit   • Med Management     Med list           History of Present Illness:    Yumiko Purdy is a 58 y.o. female with a past medical history of nonobstructive ASCVD noted on cardiac catheterization in 2020, paroxysmal atrial fibrillation with intermittent episodes of RVR and aberrant conduction, paroxysmal SVT, chronic systolic and diastolic heart failure, PAD, CKD stage III, liver cirrhosis, and diabetes mellitus type 2.  She presents today for hospital follow-up.  She has had multiple hospital admissions recently for atrial fibrillation with RVR and aberrant conduction as well as paroxysmal SVT.  She was discharged  from [facility] on 3/30/2024 after episodes of wide-complex tachycardia requiring electrical cardioversion.  She was placed on GDMT for heart failure.  EF had been as low as 31 to 35% and she did wear a LifeVest for some time.  However, most recent echo showed an LVEF of 66 to 70% with grade 2 LV diastolic dysfunction and mild mitral valve stenosis.  She was then admitted to [facility] in [place], [place] on 4/1/2024 for atrial fibrillation with RVR and wide-complex tachycardia.  Amiodarone was increased from 100 mg daily to 200 mg daily and digoxin was initiated.  GDMT for heart failure including beta-blocker and diuretic were held due to hypotension.  She reports she did have to go to [facility] yesterday for palpitations but reports she converted to sinus rhythm prior to discharge and was sent home.  She has had increased lower extremity edema and furosemide was prescribed.  She reports her blood pressure has been stable at home.  She did previously follow with Dr. Jimenez, EP at [facility] and Dr. Maravilla, general cardiologist at [facility]. However, she states she was told there was nothing else to do for her with PRN follow up. She denies any palpitations today. She has not yet filled her prescription for furosemide, plans to do that today. She has had increased edema in her feet and shortness of breath. She does have follow up with the heart failure clinic in a week. Of note, she reports she has never been tested for DEVAN but often awakes frequently throughout the night and has chronic fatigue.          Allergies   Allergen Reactions   • Phenergan [Promethazine] Other (See Comments)     Anxiety   :      Current Outpatient Medications:   •  amiodarone (PACERONE) 100 MG tablet, Take 1 tablet by mouth Daily for 30 days. (Patient taking differently: Take 2 tablets by mouth Daily.), Disp: 30 tablet, Rfl: 0  •  apixaban (ELIQUIS) 5 MG tablet tablet, Take 1  tablet by mouth Every 12 (Twelve) Hours. Indications: Atrial Fibrillation, Disp: 60 tablet, Rfl: 3  •  Aspirin Low Dose 81 MG EC tablet, Take 1 tablet by mouth Daily., Disp: , Rfl:   •  busPIRone (BUSPAR) 10 MG tablet, Take 1 tablet by mouth 2 (Two) Times a Day., Disp: , Rfl:   •  ferrous sulfate 325 (65 FE) MG tablet, Take 1 tablet by mouth Daily., Disp: , Rfl:   •  HYDROcodone-acetaminophen (NORCO) 7.5-325 MG per tablet, Take 1 tablet by mouth 2 (Two) Times a Day., Disp: , Rfl:   •  Insulin Glargine (LANTUS SOLOSTAR) 100 UNIT/ML injection pen, Inject 45 Units under the skin into the appropriate area as directed 2 (Two) Times a Day., Disp: 30 mL, Rfl: 0  •  Insulin Lispro, 1 Unit Dial, (HUMALOG) 100 UNIT/ML solution pen-injector, Inject 15 Units under the skin into the appropriate area as directed 3 (Three) Times a Day With Meals., Disp: 15 mL, Rfl: 1  •  levothyroxine (SYNTHROID, LEVOTHROID) 50 MCG tablet, Take 1 tablet by mouth Daily., Disp: , Rfl:   •  omeprazole (priLOSEC) 20 MG capsule, Take 1 capsule by mouth Daily., Disp: , Rfl:   •  ondansetron (ZOFRAN) 4 MG tablet, Take 1 tablet by mouth Every 8 (Eight) Hours As Needed for Nausea or Vomiting., Disp: , Rfl:   •  Polyethylene Glycol 3350 powder, Take 17 g by mouth Daily., Disp: , Rfl:   •  Vortioxetine HBr (Trintellix) 5 MG tablet tablet, Take 1 tablet by mouth Daily With Breakfast., Disp: , Rfl:   •  digoxin (LANOXIN) 125 MCG tablet, Take 1 tablet by mouth Daily., Disp: , Rfl:   •  furosemide (LASIX) 20 MG tablet, Take 1 tablet by mouth Daily., Disp: , Rfl:       The following portions of the patient's history were reviewed and updated as appropriate: allergies, current medications, past family history, past medical history, past social history, past surgical history and problem list.    Social History     Tobacco Use   • Smoking status: Never   • Smokeless tobacco: Never   Vaping Use   • Vaping status: Never Used   Substance Use Topics   • Alcohol use:  "Never   • Drug use: Never       Review of Systems   Constitutional: Positive for malaise/fatigue. Negative for decreased appetite.   Cardiovascular:  Positive for leg swelling. Negative for chest pain, dyspnea on exertion and palpitations.   Respiratory:  Negative for cough and shortness of breath.        Objective  Vitals:    04/10/24 1059   BP: 119/91   BP Location: Left arm   Patient Position: Sitting   Cuff Size: Adult   Pulse: 88   SpO2: 98%   Weight: 107 kg (235 lb)   Height: 165.1 cm (65\")     Body mass index is 39.11 kg/m².        Vitals reviewed.   Constitutional:       Appearance: Healthy appearance. Well-developed and not in distress.   HENT:      Head: Normocephalic and atraumatic.   Neck:      Vascular: No carotid bruit or JVD.   Pulmonary:      Effort: Pulmonary effort is normal.      Breath sounds: Normal breath sounds. No wheezing. No rales.   Cardiovascular:      Normal rate. Regular rhythm.      Murmurs: There is no murmur.      . No S3 and S4 gallop.   Edema:     Peripheral edema present.  Abdominal:      General: Bowel sounds are normal.      Palpations: Abdomen is soft.   Skin:     General: Skin is warm and dry.   Neurological:      Mental Status: Alert, oriented to person, place, and time and oriented to person, place and time.   Psychiatric:         Mood and Affect: Mood normal.         Behavior: Behavior normal.         Lab Results   Component Value Date     03/29/2024    K 4.3 03/29/2024     03/29/2024    CO2 23.5 03/29/2024    BUN 34 (H) 03/29/2024    CREATININE 1.39 (H) 03/29/2024    GLUCOSE 135 (H) 03/29/2024    CALCIUM 8.9 03/29/2024    AST 52 (H) 03/28/2024    ALT 28 03/28/2024    ALKPHOS 81 03/28/2024    LABIL2 1.3 (L) 06/09/2016     Lab Results   Component Value Date    WBC 6.18 03/29/2024    HGB 8.2 (L) 03/29/2024    HCT 28.4 (L) 03/29/2024     (L) 03/29/2024     Lab Results   Component Value Date    TSH 2.960 01/31/2024    CHLPL 103 04/21/2016    TRIG 80 " 09/11/2020    HDL 43 09/11/2020    LDL 78 09/11/2020          Results for orders placed during the hospital encounter of 03/26/24    Adult Transthoracic Echo Complete W/ Cont if Necessary Per Protocol    Interpretation Summary  •  Normal left ventricular cavity size and wall thickness noted. All left ventricular wall segments contract normally.  •  Left ventricular ejection fraction appears to be 66 - 70%.  •  Left ventricular diastolic function is consistent with (grade II w/high LAP) pseudonormalization.  •  The aortic valve is structurally normal with no regurgitation or stenosis present.  •  Moderate mitral annular calcification is present. There is mild calcification of the mitral valve anterior leaflet(s). Mild mitral valve regurgitation is present. Mild mitral valve stenosis is present.  •  There is no evidence of pericardial effusion. .     Results for orders placed during the hospital encounter of 10/15/20    Nuclear Medicine Cardiac Blood Pool [facility] At Rest    Interpretation Summary  EXAMINATION: NUCLEAR MEDICINE CARDIAC BLOOD POOL [facility] AT REST-    CLINICAL INDICATION: Cardiomyopathy  TECHNIQUE: 21 mCi of Tc-99m autologous RBCs were injected IV after  in-vitro RBC labeling. Gated cardiac imaging was performed in the  anterior and left anterior oblique.  COMPARISON: None  FINDINGS: The left ventricle is normal in size with normal systolic  function. The calculated left ventricular ejection fraction (LVEF) = 38  %.  The right and left atria, aorta and pulmonary artery are normal. The  right ventricle is normal in size.    Impression  LVEF: 38%, at rest.    This report was finalized on 10/16/2020 11:57 AM by Dr. Marlo Parr MD.       Results for orders placed during the hospital encounter of 11/10/20    Cardiac Catheterization/Vascular Study    Narrative  Procedure type:  Left heart cath, LV gram, bilateral selective cholangiogram    Indication:  Cardiomyopathy    Procedure:  After informed consent the  patient was brought into the cardiac aspiration lab she was prepped and draped in the usual sterile manner the right radial area was incised with 2% Xylocaine however several attempts to engage into the right radial artery was not successful so the right radial artery access was abandoned and the right groin area was excised in 2% Xylocaine right femoral artery was accessed using modified standard technique and 5 [place] side-port arterial sheath was placed and secured then over a guidewire a 5 [place] JL 4 catheter was used left main coronary artery with angiographic pressures were taken then the catheter was removed and over a guidewire a 5 [place] JR4 catheter was used and engagement of the right coronary arteries angioplasty was taken then the catheter was removed then over a guidewire 5 [place] pigtail catheter used aortic valve was done hand-injection LV gram was done then pullback was done then the catheter was removed groin sheath was removed and minx closure device applied with good hemostasis the patient was transported back to her room in stable condition.    Results:  The patient LVEDP was 17 mmHg with hand-injection showing preserved left ventricular systolic function wall motion EF 50-55% with no significant aortic gradient on pullback.    Cholangiogram:  This right dominant system.  Left main cardiac angiographically normal and bifurcates into left and descending and left circumflex arteries.  LAD was normal caliber gives several septal perforators and diagonal branches and wraps around the apex LAD about 30 to 40% mid stenosis.  Left circumflex artery was nondominant for posterior circulation gives rise to several obtuse marginal branches left circumflex arteries branches angiographically normal.  The right coronary artery was a large artery was dominant for posterior circulation giving rise to acute marginal branches PDA and posterolateral branches the right coronary artery and its branches  angiographically normal.    Conclusion:  Preserved LV systolic function ejection fraction 50-55% with elevated left ventricular end-diastolic pressure consistent with diastolic dysfunction coronary angiogram showed right dominant system with 30 to 40% mid LAD lesion otherwise coronaries arteries were normal.  Plan of care:  Medical management and secondary preventive measurements of coronary disease good lipid control blood pressure control healthy lifestyle patient is to follow-up with her primary care physician for further management.        Procedures      Assessment & Plan   Diagnosis Plan   1. Chronic combined systolic and diastolic CHF (congestive heart failure)  Comprehensive Metabolic Panel    Digoxin Level    CBC & Differential      2. Dilated cardiomyopathy  Comprehensive Metabolic Panel    Digoxin Level    CBC & Differential      3. Coronary artery disease involving native coronary artery of native heart without angina pectoris  Comprehensive Metabolic Panel    Digoxin Level    CBC & Differential      4. Paroxysmal atrial fibrillation  Comprehensive Metabolic Panel    Digoxin Level    CBC & Differential      5. Paroxysmal SVT (supraventricular tachycardia)  Comprehensive Metabolic Panel    Digoxin Level    CBC & Differential      6. DEVAN (obstructive sleep apnea)  Home Sleep Study                   Recommendations:    Chronic combined systolic and diastolic heart failure-will resume furosemide today.  Follow-up with the heart failure clinic next week.  Dilated cardiomyopathy-EF recovered.  Patient unable to tolerate GDMT at this time due to recent hypotension.  Plan to resume as BP can tolerate.  CAD-nonobstructive on cardiac catheterization.  Continue low-dose aspirin.  Paroxysmal atrial fibrillation-intermittent RVR and aberrant conduction.  Will continue with amiodarone and digoxin for now.  Although, may not be a candidate to continue long-term due to age and liver cirrhosis.  Will order a CMP, digoxin  level, and CBC.  I have recommended referral to EP for second opinion.  She is going to think about this.  Continue Eliquis.  DEVAN-symptoms suspicious for sleep apnea.  Will order home sleep study.  Follow-up in 3 weeks with Dr. Sahu or sooner if needed.        Return in about 3 weeks (around 5/1/2024) for Recheck.    As always, I appreciate very much the opportunity to participate in the cardiovascular care of your patients.      With Best Regards,    KENNY Lawler

## 2024-04-10 NOTE — PROGRESS NOTES
Masha Davidson APRN  Yumiko Purdy  1965  04/10/2024    Patient Active Problem List   Diagnosis    Dilated cardiomyopathy    CKD (chronic kidney disease) stage 2, GFR 60-89 ml/min    Severe obesity (BMI 35.0-39.9) with comorbidity    Chronic anemia    Elevated bilirubin    Acute on chronic combined systolic and diastolic CHF (congestive heart failure)    Hyponatremia    Paroxysmal atrial fibrillation    Cirrhosis    Coronary artery disease involving native coronary artery of native heart without angina pectoris    Atrial fibrillation with RVR    Ventricular tachycardia       Dear Masha Davidson APRN:    Subjective     Chief Complaint   Patient presents with    Hospital Follow Up Visit    Med Management     Med list           History of Present Illness:    Yumiko Purdy is a 58 y.o. female with a past medical history of nonobstructive ASCVD noted on cardiac catheterization in 2020, paroxysmal atrial fibrillation with intermittent episodes of RVR and aberrant conduction, paroxysmal SVT, chronic systolic and diastolic heart failure, PAD, CKD stage III, liver cirrhosis, and diabetes mellitus type 2.  She presents today for hospital follow-up.  She has had multiple hospital admissions recently for atrial fibrillation with RVR and aberrant conduction as well as paroxysmal SVT.  She was discharged from [facility] on 3/30/2024 after episodes of wide-complex tachycardia requiring electrical cardioversion.  She was placed on GDMT for heart failure.  EF had been as low as 31 to 35% and she did wear a LifeVest for some time.  However, most recent echo showed an LVEF of 66 to 70% with grade 2 LV diastolic dysfunction and mild mitral valve stenosis.  She was then admitted to [facility] in [place], [place] on 4/1/2024 for atrial fibrillation with RVR and wide-complex tachycardia.  Amiodarone was increased from 100 mg daily to 200 mg daily and digoxin was initiated.  GDMT for heart  failure including beta-blocker and diuretic were held due to hypotension.  She reports she did have to go to [facility] yesterday for palpitations but reports she converted to sinus rhythm prior to discharge and was sent home.  She has had increased lower extremity edema and furosemide was prescribed.  She reports her blood pressure has been stable at home.  She did previously follow with Dr. Jimenez, EP at [facility] and Dr. Maravilla, general cardiologist at [facility]. However, she states she was told there was nothing else to do for her with PRN follow up. She denies any palpitations today. She has not yet filled her prescription for furosemide, plans to do that today. She has had increased edema in her feet and shortness of breath. She does have follow up with the heart failure clinic in a week. Of note, she reports she has never been tested for DEVAN but often awakes frequently throughout the night and has chronic fatigue.          Allergies   Allergen Reactions    Phenergan [Promethazine] Other (See Comments)     Anxiety   :      Current Outpatient Medications:     amiodarone (PACERONE) 100 MG tablet, Take 1 tablet by mouth Daily for 30 days. (Patient taking differently: Take 2 tablets by mouth Daily.), Disp: 30 tablet, Rfl: 0    apixaban (ELIQUIS) 5 MG tablet tablet, Take 1 tablet by mouth Every 12 (Twelve) Hours. Indications: Atrial Fibrillation, Disp: 60 tablet, Rfl: 3    Aspirin Low Dose 81 MG EC tablet, Take 1 tablet by mouth Daily., Disp: , Rfl:     busPIRone (BUSPAR) 10 MG tablet, Take 1 tablet by mouth 2 (Two) Times a Day., Disp: , Rfl:     ferrous sulfate 325 (65 FE) MG tablet, Take 1 tablet by mouth Daily., Disp: , Rfl:     HYDROcodone-acetaminophen (NORCO) 7.5-325 MG per tablet, Take 1 tablet by mouth 2 (Two) Times a Day., Disp: , Rfl:     Insulin Glargine (LANTUS SOLOSTAR) 100 UNIT/ML injection pen, Inject 45 Units under the skin into the appropriate area as directed  "2 (Two) Times a Day., Disp: 30 mL, Rfl: 0    Insulin Lispro, 1 Unit Dial, (HUMALOG) 100 UNIT/ML solution pen-injector, Inject 15 Units under the skin into the appropriate area as directed 3 (Three) Times a Day With Meals., Disp: 15 mL, Rfl: 1    levothyroxine (SYNTHROID, LEVOTHROID) 50 MCG tablet, Take 1 tablet by mouth Daily., Disp: , Rfl:     omeprazole (priLOSEC) 20 MG capsule, Take 1 capsule by mouth Daily., Disp: , Rfl:     ondansetron (ZOFRAN) 4 MG tablet, Take 1 tablet by mouth Every 8 (Eight) Hours As Needed for Nausea or Vomiting., Disp: , Rfl:     Polyethylene Glycol 3350 powder, Take 17 g by mouth Daily., Disp: , Rfl:     Vortioxetine HBr (Trintellix) 5 MG tablet tablet, Take 1 tablet by mouth Daily With Breakfast., Disp: , Rfl:     digoxin (LANOXIN) 125 MCG tablet, Take 1 tablet by mouth Daily., Disp: , Rfl:     furosemide (LASIX) 20 MG tablet, Take 1 tablet by mouth Daily., Disp: , Rfl:       The following portions of the patient's history were reviewed and updated as appropriate: allergies, current medications, past family history, past medical history, past social history, past surgical history and problem list.    Social History     Tobacco Use    Smoking status: Never    Smokeless tobacco: Never   Vaping Use    Vaping status: Never Used   Substance Use Topics    Alcohol use: Never    Drug use: Never       Review of Systems   Constitutional: Positive for malaise/fatigue. Negative for decreased appetite.   Cardiovascular:  Positive for leg swelling. Negative for chest pain, dyspnea on exertion and palpitations.   Respiratory:  Negative for cough and shortness of breath.        Objective   Vitals:    04/10/24 1059   BP: 119/91   BP Location: Left arm   Patient Position: Sitting   Cuff Size: Adult   Pulse: 88   SpO2: 98%   Weight: 107 kg (235 lb)   Height: 165.1 cm (65\")     Body mass index is 39.11 kg/m².        Vitals reviewed.   Constitutional:       Appearance: Healthy appearance. Well-developed and " not in distress.   HENT:      Head: Normocephalic and atraumatic.   Neck:      Vascular: No carotid bruit or JVD.   Pulmonary:      Effort: Pulmonary effort is normal.      Breath sounds: Normal breath sounds. No wheezing. No rales.   Cardiovascular:      Normal rate. Regular rhythm.      Murmurs: There is no murmur.      . No S3 and S4 gallop.   Edema:     Peripheral edema present.  Abdominal:      General: Bowel sounds are normal.      Palpations: Abdomen is soft.   Skin:     General: Skin is warm and dry.   Neurological:      Mental Status: Alert, oriented to person, place, and time and oriented to person, place and time.   Psychiatric:         Mood and Affect: Mood normal.         Behavior: Behavior normal.         Lab Results   Component Value Date     03/29/2024    K 4.3 03/29/2024     03/29/2024    CO2 23.5 03/29/2024    BUN 34 (H) 03/29/2024    CREATININE 1.39 (H) 03/29/2024    GLUCOSE 135 (H) 03/29/2024    CALCIUM 8.9 03/29/2024    AST 52 (H) 03/28/2024    ALT 28 03/28/2024    ALKPHOS 81 03/28/2024    LABIL2 1.3 (L) 06/09/2016     Lab Results   Component Value Date    WBC 6.18 03/29/2024    HGB 8.2 (L) 03/29/2024    HCT 28.4 (L) 03/29/2024     (L) 03/29/2024     Lab Results   Component Value Date    TSH 2.960 01/31/2024    CHLPL 103 04/21/2016    TRIG 80 09/11/2020    HDL 43 09/11/2020    LDL 78 09/11/2020          Results for orders placed during the hospital encounter of 03/26/24    Adult Transthoracic Echo Complete W/ Cont if Necessary Per Protocol    Interpretation Summary    Normal left ventricular cavity size and wall thickness noted. All left ventricular wall segments contract normally.    Left ventricular ejection fraction appears to be 66 - 70%.    Left ventricular diastolic function is consistent with (grade II w/high LAP) pseudonormalization.    The aortic valve is structurally normal with no regurgitation or stenosis present.    Moderate mitral annular calcification is  present. There is mild calcification of the mitral valve anterior leaflet(s). Mild mitral valve regurgitation is present. Mild mitral valve stenosis is present.    There is no evidence of pericardial effusion. .     Results for orders placed during the hospital encounter of 10/15/20    Nuclear Medicine Cardiac Blood Pool [place] At Rest    Interpretation Summary  EXAMINATION: NUCLEAR MEDICINE CARDIAC BLOOD POOL [facility] AT REST-    CLINICAL INDICATION: Cardiomyopathy  TECHNIQUE: 21 mCi of Tc-99m autologous RBCs were injected IV after  in-vitro RBC labeling. Gated cardiac imaging was performed in the  anterior and left anterior oblique.  COMPARISON: None  FINDINGS: The left ventricle is normal in size with normal systolic  function. The calculated left ventricular ejection fraction (LVEF) = 38  %.  The right and left atria, aorta and pulmonary artery are normal. The  right ventricle is normal in size.    Impression  LVEF: 38%, at rest.    This report was finalized on 10/16/2020 11:57 AM by Dr. Marlo Parr MD.       Results for orders placed during the hospital encounter of 11/10/20    Cardiac Catheterization/Vascular Study    Narrative  Procedure type:  Left heart cath, LV gram, bilateral selective cholangiogram    Indication:  Cardiomyopathy    Procedure:  After informed consent the patient was brought into the cardiac aspiration lab she was prepped and draped in the usual sterile manner the right radial area was incised with 2% Xylocaine however several attempts to engage into the right radial artery was not successful so the right radial artery access was abandoned and the right groin area was excised in 2% Xylocaine right femoral artery was accessed using modified standard technique and 5 Citizen of [place] side-port arterial sheath was placed and secured then over a guidewire a 5 Citizen of [place] JL 4 catheter was used left main coronary artery with angiographic pressures were taken then the catheter was removed and over a guidewire a 5  [place] JR4 catheter was used and engagement of the right coronary arteries angioplasty was taken then the catheter was removed then over a guidewire 5 [place] pigtail catheter used aortic valve was done hand-injection LV gram was done then pullback was done then the catheter was removed groin sheath was removed and minx closure device applied with good hemostasis the patient was transported back to her room in stable condition.    Results:  The patient LVEDP was 17 mmHg with hand-injection showing preserved left ventricular systolic function wall motion EF 50-55% with no significant aortic gradient on pullback.    Cholangiogram:  This right dominant system.  Left main cardiac angiographically normal and bifurcates into left and descending and left circumflex arteries.  LAD was normal caliber gives several septal perforators and diagonal branches and wraps around the apex LAD about 30 to 40% mid stenosis.  Left circumflex artery was nondominant for posterior circulation gives rise to several obtuse marginal branches left circumflex arteries branches angiographically normal.  The right coronary artery was a large artery was dominant for posterior circulation giving rise to acute marginal branches PDA and posterolateral branches the right coronary artery and its branches angiographically normal.    Conclusion:  Preserved LV systolic function ejection fraction 50-55% with elevated left ventricular end-diastolic pressure consistent with diastolic dysfunction coronary angiogram showed right dominant system with 30 to 40% mid LAD lesion otherwise coronaries arteries were normal.  Plan of care:  Medical management and secondary preventive measurements of coronary disease good lipid control blood pressure control healthy lifestyle patient is to follow-up with her primary care physician for further management.        Procedures      Assessment & Plan    Diagnosis Plan   1. Chronic combined systolic and diastolic CHF  (congestive heart failure)  Comprehensive Metabolic Panel    Digoxin Level    CBC & Differential      2. Dilated cardiomyopathy  Comprehensive Metabolic Panel    Digoxin Level    CBC & Differential      3. Coronary artery disease involving native coronary artery of native heart without angina pectoris  Comprehensive Metabolic Panel    Digoxin Level    CBC & Differential      4. Paroxysmal atrial fibrillation  Comprehensive Metabolic Panel    Digoxin Level    CBC & Differential      5. Paroxysmal SVT (supraventricular tachycardia)  Comprehensive Metabolic Panel    Digoxin Level    CBC & Differential      6. DEVAN (obstructive sleep apnea)  Home Sleep Study                   Recommendations:    Chronic combined systolic and diastolic heart failure-will resume furosemide today.  Follow-up with the heart failure clinic next week.  Dilated cardiomyopathy-EF recovered.  Patient unable to tolerate GDMT at this time due to recent hypotension.  Plan to resume as BP can tolerate.  CAD-nonobstructive on cardiac catheterization.  Continue low-dose aspirin.  Paroxysmal atrial fibrillation-intermittent RVR and aberrant conduction.  Will continue with amiodarone and digoxin for now.  Although, may not be a candidate to continue long-term due to age and liver cirrhosis.  Will order a CMP, digoxin level, and CBC.  I have recommended referral to EP for second opinion.  She is going to think about this.  Continue Eliquis.  DEVAN-symptoms suspicious for sleep apnea.  Will order home sleep study.  Follow-up in 3 weeks with Dr. Sahu or sooner if needed.        Return in about 3 weeks (around 5/1/2024) for Recheck.    As always, I appreciate very much the opportunity to participate in the cardiovascular care of your patients.      With Best Regards,    KENNY Lawler

## 2024-04-13 ENCOUNTER — HOSPITAL ENCOUNTER (EMERGENCY)
Facility: HOSPITAL | Age: 59
Discharge: HOME | End: 2024-04-13
Attending: STUDENT IN AN ORGANIZED HEALTH CARE EDUCATION/TRAINING PROGRAM
Payer: COMMERCIAL

## 2024-04-13 ENCOUNTER — APPOINTMENT (OUTPATIENT)
Dept: GENERAL RADIOLOGY | Facility: HOSPITAL | Age: 59
End: 2024-04-13
Payer: COMMERCIAL

## 2024-04-13 VITALS
HEART RATE: 66 BPM | WEIGHT: 230 LBS | TEMPERATURE: 98.4 F | SYSTOLIC BLOOD PRESSURE: 129 MMHG | BODY MASS INDEX: 38.32 KG/M2 | RESPIRATION RATE: 18 BRPM | OXYGEN SATURATION: 100 % | HEIGHT: 65 IN | DIASTOLIC BLOOD PRESSURE: 56 MMHG

## 2024-04-13 DIAGNOSIS — I48.20 CHRONIC ATRIAL FIBRILLATION WITH RVR: Primary | ICD-10-CM

## 2024-04-13 LAB
ALBUMIN SERPL-MCNC: 3.9 G/DL (ref 3.5–5.2)
ALBUMIN/GLOB SERPL: 1.6 G/DL
ALP SERPL-CCNC: 97 U/L (ref 39–117)
ALT SERPL W P-5'-P-CCNC: 16 U/L (ref 1–33)
ANION GAP SERPL CALCULATED.3IONS-SCNC: 10.8 MMOL/L (ref 5–15)
ANISOCYTOSIS BLD QL: NORMAL
AST SERPL-CCNC: 17 U/L (ref 1–32)
BASOPHILS # BLD AUTO: 0.08 10*3/MM3 (ref 0–0.2)
BASOPHILS NFR BLD AUTO: 1.2 % (ref 0–1.5)
BILIRUB SERPL-MCNC: 3.9 MG/DL (ref 0–1.2)
BUN SERPL-MCNC: 21 MG/DL (ref 6–20)
BUN/CREAT SERPL: 22.6 (ref 7–25)
CALCIUM SPEC-SCNC: 9.1 MG/DL (ref 8.6–10.5)
CHLORIDE SERPL-SCNC: 98 MMOL/L (ref 98–107)
CO2 SERPL-SCNC: 24.2 MMOL/L (ref 22–29)
CREAT SERPL-MCNC: 0.93 MG/DL (ref 0.57–1)
DEPRECATED RDW RBC AUTO: 73.2 FL (ref 37–54)
DIGOXIN SERPL-MCNC: 1.7 NG/ML (ref 0.6–1.2)
EGFRCR SERPLBLD CKD-EPI 2021: 71.8 ML/MIN/1.73
EOSINOPHIL # BLD AUTO: 0.13 10*3/MM3 (ref 0–0.4)
EOSINOPHIL NFR BLD AUTO: 1.9 % (ref 0.3–6.2)
ERYTHROCYTE [DISTWIDTH] IN BLOOD BY AUTOMATED COUNT: 19.9 % (ref 12.3–15.4)
GEN 5 2HR TROPONIN T REFLEX: 45 NG/L
GLOBULIN UR ELPH-MCNC: 2.5 GM/DL
GLUCOSE SERPL-MCNC: 383 MG/DL (ref 65–99)
HCT VFR BLD AUTO: 29.6 % (ref 34–46.6)
HGB BLD-MCNC: 8.4 G/DL (ref 12–15.9)
HYPOCHROMIA BLD QL: NORMAL
IMM GRANULOCYTES # BLD AUTO: 0.04 10*3/MM3 (ref 0–0.05)
IMM GRANULOCYTES NFR BLD AUTO: 0.6 % (ref 0–0.5)
LYMPHOCYTES # BLD AUTO: 0.74 10*3/MM3 (ref 0.7–3.1)
LYMPHOCYTES NFR BLD AUTO: 10.9 % (ref 19.6–45.3)
MACROCYTES BLD QL SMEAR: NORMAL
MAGNESIUM SERPL-MCNC: 1.7 MG/DL (ref 1.6–2.6)
MCH RBC QN AUTO: 29.6 PG (ref 26.6–33)
MCHC RBC AUTO-ENTMCNC: 28.4 G/DL (ref 31.5–35.7)
MCV RBC AUTO: 104.2 FL (ref 79–97)
MONOCYTES # BLD AUTO: 0.23 10*3/MM3 (ref 0.1–0.9)
MONOCYTES NFR BLD AUTO: 3.4 % (ref 5–12)
NEUTROPHILS NFR BLD AUTO: 5.54 10*3/MM3 (ref 1.7–7)
NEUTROPHILS NFR BLD AUTO: 82 % (ref 42.7–76)
NRBC BLD AUTO-RTO: 0.3 /100 WBC (ref 0–0.2)
NT-PROBNP SERPL-MCNC: 3738 PG/ML (ref 0–900)
PLAT MORPH BLD: NORMAL
PLATELET # BLD AUTO: 146 10*3/MM3 (ref 140–450)
PMV BLD AUTO: 10.1 FL (ref 6–12)
POLYCHROMASIA BLD QL SMEAR: NORMAL
POTASSIUM SERPL-SCNC: 5.1 MMOL/L (ref 3.5–5.2)
PROT SERPL-MCNC: 6.4 G/DL (ref 6–8.5)
RBC # BLD AUTO: 2.84 10*6/MM3 (ref 3.77–5.28)
SODIUM SERPL-SCNC: 133 MMOL/L (ref 136–145)
TROPONIN T DELTA: 2 NG/L
TROPONIN T SERPL HS-MCNC: 43 NG/L
WBC NRBC COR # BLD AUTO: 6.76 10*3/MM3 (ref 3.4–10.8)

## 2024-04-13 PROCEDURE — 80162 ASSAY OF DIGOXIN TOTAL: CPT | Performed by: STUDENT IN AN ORGANIZED HEALTH CARE EDUCATION/TRAINING PROGRAM

## 2024-04-13 PROCEDURE — 83880 ASSAY OF NATRIURETIC PEPTIDE: CPT | Performed by: STUDENT IN AN ORGANIZED HEALTH CARE EDUCATION/TRAINING PROGRAM

## 2024-04-13 PROCEDURE — 99284 EMERGENCY DEPT VISIT MOD MDM: CPT

## 2024-04-13 PROCEDURE — 99283 EMERGENCY DEPT VISIT LOW MDM: CPT

## 2024-04-13 PROCEDURE — 84484 ASSAY OF TROPONIN QUANT: CPT | Performed by: STUDENT IN AN ORGANIZED HEALTH CARE EDUCATION/TRAINING PROGRAM

## 2024-04-13 PROCEDURE — 83735 ASSAY OF MAGNESIUM: CPT | Performed by: STUDENT IN AN ORGANIZED HEALTH CARE EDUCATION/TRAINING PROGRAM

## 2024-04-13 PROCEDURE — 80053 COMPREHEN METABOLIC PANEL: CPT | Performed by: STUDENT IN AN ORGANIZED HEALTH CARE EDUCATION/TRAINING PROGRAM

## 2024-04-13 PROCEDURE — 36415 COLL VENOUS BLD VENIPUNCTURE: CPT

## 2024-04-13 PROCEDURE — 93005 ELECTROCARDIOGRAM TRACING: CPT | Performed by: STUDENT IN AN ORGANIZED HEALTH CARE EDUCATION/TRAINING PROGRAM

## 2024-04-13 PROCEDURE — 85025 COMPLETE CBC W/AUTO DIFF WBC: CPT | Performed by: STUDENT IN AN ORGANIZED HEALTH CARE EDUCATION/TRAINING PROGRAM

## 2024-04-13 PROCEDURE — 71045 X-RAY EXAM CHEST 1 VIEW: CPT

## 2024-04-13 PROCEDURE — 71045 X-RAY EXAM CHEST 1 VIEW: CPT | Performed by: RADIOLOGY

## 2024-04-13 PROCEDURE — 85007 BL SMEAR W/DIFF WBC COUNT: CPT | Performed by: STUDENT IN AN ORGANIZED HEALTH CARE EDUCATION/TRAINING PROGRAM

## 2024-04-13 NOTE — DISCHARGE INSTRUCTIONS
Follow-up with your cardiologist for further management of recurrent atrial fibrillation.  Return to the ED if symptoms recur.

## 2024-04-13 NOTE — ED PROVIDER NOTES
Subjective   History of Present Illness  58-year-old female past medical history of CHF, A-fib, cirrhosis, diabetes, anemia, V. tach presents to the ED for tachycardia and shortness of breath and chest pain.  Patient states that she has had multiple episodes over the last month of A-fib with RVR and she usually gets shocked or gets medications and then goes back home.  Recently saw her cardiologist a couple days ago.  They increased her amiodarone dose and digoxin.  Per EMS, they were concerned for V. tach and her heart rate was in the 180s.  They shocked the patient and she is now normal sinus rhythm.  She endorses some slight shortness of breath still without other acute symptoms.  States she has been taking all of her medications.  No fevers or recent illness.    History provided by:  Patient and EMS personnel      Review of Systems    Past Medical History:   Diagnosis Date    Anemia     CHF (congestive heart failure)     Chronic a-fib     stated by patient     Cirrhosis of liver     stated by patient     Diabetes mellitus     Ventricular tachycardia        Allergies   Allergen Reactions    Phenergan [Promethazine] Other (See Comments)     Anxiety       Past Surgical History:   Procedure Laterality Date    APPENDECTOMY      CARDIAC CATHETERIZATION N/A 11/10/2020    Procedure: Left Heart Cath;  Surgeon: Charlee Ken MD;  Location: [facility] CATH INVASIVE LOCATION;  Service: Cardiovascular;  Laterality: N/A;    CHOLECYSTECTOMY      TOE AMPUTATION Right     stated by patient.        Family History   Problem Relation Age of Onset    Heart attack Father     Heart attack Brother        Social History     Socioeconomic History    Marital status:    Tobacco Use    Smoking status: Never    Smokeless tobacco: Never   Vaping Use    Vaping status: Never Used   Substance and Sexual Activity    Alcohol use: Never    Drug use: Never    Sexual activity: Defer           Objective   Physical Exam  Constitutional:        General: She is not in acute distress.     Appearance: She is obese. She is not ill-appearing.   HENT:      Head: Normocephalic and atraumatic.   Eyes:      Conjunctiva/sclera: Conjunctivae normal.   Cardiovascular:      Rate and Rhythm: Normal rate and regular rhythm.      Heart sounds: Normal heart sounds.   Pulmonary:      Effort: Pulmonary effort is normal.      Breath sounds: Normal breath sounds.   Abdominal:      General: Abdomen is flat.      Palpations: Abdomen is soft.      Tenderness: There is no abdominal tenderness.   Neurological:      General: No focal deficit present.      Mental Status: She is alert and oriented to person, place, and time. Mental status is at baseline.         Procedures           ED Course  ED Course as of 04/13/24 1557   Sat Apr 13, 2024   1101 EKG obtained on arrival and independently interpreted as sinus rhythm with left bundle branch block and first-degree AV block without acute ischemic findings or arrhythmia  Electronically signed by Quinten Torres MD, 04/13/24, 11:02 AM EDT.   [AS]   1555 Discussed patient with cardiology who stated that he believed patient was stable to be discharged and follow-up with Dr. Sahu  Electronically signed by Quinten Torres MD, 04/13/24, 3:57 PM EDT.   [AS]      ED Course User Index  [AS] Quinten Torres MD                                             Medical Decision Making  58-year-old female presents to the ED due to tachycardia.  EKG obtained on arrival showed normal sinus rhythm with left bundle branch block.  Likely that she had A-fib with RVR and aberrancy on initial strip seen by EMS.  She was well-appearing with normal vitals on my evaluation.  Labs with no significant findings, troponin was elevated but stable and decreased from prior.  She remained asymptomatic throughout time in the ED.  Discharged in no acute distress with cardiology follow-up for further management of atrial fibrillation.    Problems Addressed:  Chronic atrial  fibrillation with RVR: complicated acute illness or injury    Amount and/or Complexity of Data Reviewed  Independent Historian: EMS  Labs: ordered.  Radiology: ordered.  ECG/medicine tests: ordered and independent interpretation performed.        Final diagnoses:   Chronic atrial fibrillation with RVR       ED Disposition  ED Disposition       ED Disposition   Discharge    Condition   Stable    Comment   --               Masha Davidson, APRN  [address] [place] [address] DR OTERO [address] [place] [id number]  [phone]    Schedule an appointment as soon as possible for a visit       [facility] EMERGENCY DEPARTMENT  [address] [place] [id number] [phone]    If symptoms worsen         Medication List        Changed      amiodarone 100 MG tablet  Commonly known as: PACERONE  Take 1 tablet by mouth Daily for 30 days.  What changed: how much to take                 Quinten Torres MD  04/13/24 6748       Quinten Torres MD  04/13/24 4739

## 2024-04-15 LAB
QT INTERVAL: 418 MS
QTC INTERVAL: 473 MS

## 2024-04-17 ENCOUNTER — READMISSION MANAGEMENT (OUTPATIENT)
Dept: CALL CENTER | Facility: HOSPITAL | Age: 59
End: 2024-04-17
Payer: COMMERCIAL

## 2024-04-17 NOTE — OUTREACH NOTE
Medical Week 3 Survey      Flowsheet Row Responses   [facility] facility patient discharged from? [facility]   Does the patient have one of the following disease processes/diagnoses(primary or secondary)? Other  [Admitted to [facility] 4/1/24 then ER x1]   Week 3 attempt successful? No   Unsuccessful attempts Attempt 1   Discharge diagnosis shortness of breath, tachycardia palpitation            SOO MOORE - Registered Nurse

## 2024-04-18 ENCOUNTER — HOSPITAL ENCOUNTER (OUTPATIENT)
Dept: CARDIOLOGY | Facility: HOSPITAL | Age: 59
Discharge: HOME | End: 2024-04-18
Payer: COMMERCIAL

## 2024-04-18 VITALS
DIASTOLIC BLOOD PRESSURE: 58 MMHG | SYSTOLIC BLOOD PRESSURE: 122 MMHG | OXYGEN SATURATION: 98 % | HEART RATE: 68 BPM | BODY MASS INDEX: 38.27 KG/M2 | WEIGHT: 230 LBS

## 2024-04-18 DIAGNOSIS — I48.0 PAROXYSMAL ATRIAL FIBRILLATION: Chronic | ICD-10-CM

## 2024-04-18 DIAGNOSIS — R73.9 HYPERGLYCEMIA: ICD-10-CM

## 2024-04-18 DIAGNOSIS — I42.0 DILATED CARDIOMYOPATHY: Chronic | ICD-10-CM

## 2024-04-18 DIAGNOSIS — I50.43 ACUTE ON CHRONIC COMBINED SYSTOLIC AND DIASTOLIC CHF (CONGESTIVE HEART FAILURE): Primary | Chronic | ICD-10-CM

## 2024-04-18 LAB
ABSOLUTE LUNG FLUID CONTENT: 43 % (ref 20–35)
ANION GAP SERPL CALCULATED.3IONS-SCNC: 11 MMOL/L (ref 5–15)
BUN SERPL-MCNC: 22 MG/DL (ref 6–20)
BUN/CREAT SERPL: 21.8 (ref 7–25)
CALCIUM SPEC-SCNC: 9.2 MG/DL (ref 8.6–10.5)
CHLORIDE SERPL-SCNC: 93 MMOL/L (ref 98–107)
CO2 SERPL-SCNC: 29 MMOL/L (ref 22–29)
CREAT SERPL-MCNC: 1.01 MG/DL (ref 0.57–1)
DIGOXIN SERPL-MCNC: 1.3 NG/ML (ref 0.6–1.2)
EGFRCR SERPLBLD CKD-EPI 2021: 65.1 ML/MIN/1.73
GLUCOSE SERPL-MCNC: 439 MG/DL (ref 65–99)
MAGNESIUM SERPL-MCNC: 1.9 MG/DL (ref 1.6–2.6)
NT-PROBNP SERPL-MCNC: 1508 PG/ML (ref 0–900)
POTASSIUM SERPL-SCNC: 4.5 MMOL/L (ref 3.5–5.2)
SODIUM SERPL-SCNC: 133 MMOL/L (ref 136–145)

## 2024-04-18 PROCEDURE — 36415 COLL VENOUS BLD VENIPUNCTURE: CPT | Performed by: PHYSICIAN ASSISTANT

## 2024-04-18 PROCEDURE — 80048 BASIC METABOLIC PNL TOTAL CA: CPT | Performed by: PHYSICIAN ASSISTANT

## 2024-04-18 PROCEDURE — 83735 ASSAY OF MAGNESIUM: CPT | Performed by: PHYSICIAN ASSISTANT

## 2024-04-18 PROCEDURE — 80162 ASSAY OF DIGOXIN TOTAL: CPT | Performed by: PHYSICIAN ASSISTANT

## 2024-04-18 PROCEDURE — 83880 ASSAY OF NATRIURETIC PEPTIDE: CPT | Performed by: PHYSICIAN ASSISTANT

## 2024-04-18 PROCEDURE — 94726 PLETHYSMOGRAPHY LUNG VOLUMES: CPT | Performed by: PHYSICIAN ASSISTANT

## 2024-04-18 RX ORDER — BUMETANIDE 2 MG/1
2 TABLET ORAL DAILY
COMMUNITY
Start: 2024-04-15

## 2024-04-18 NOTE — PROGRESS NOTES
Heart Failure Clinic    Date: 04/18/24     Vitals:    04/18/24 1407   BP: 122/58   Pulse: 68   SpO2: 98%    Weight: 230- pt estimate, unable to stand for weight today.    Method of arrival: Other- Wheelchair    Weighing self daily: No    Monitoring Heart Failure Zones: No    Today's HF Zone: Yellow     Taking medications as prescribed: Yes    Edema Yes    Shortness of Air: Yes    Number of pillows used at night:<2 HOSP BED    Educational Materials given:  AVS                                                                         ReDS Value: 43  Over 41 Hypervolemic Status      Shari Perez RN 04/18/24 14:09 EDT

## 2024-04-18 NOTE — PROGRESS NOTES
Heart Failure Clinic  Pharmacist Note     Yumiko Purdy is a 58 y.o. female seen in the Heart Failure Clinic for HFrEF.     The patient had several recent hospital admissions and other appointments-   3/13/24 admission for atrial fibrillation  4/1/24 at [facility] for afib with RVR- amiodarone increased to 200mg and digoxin added on  4/10/24 cardiology appointment with Courtney- all GDMT stopped due to hypotension   4/13/24 ED visit for chest pain- amiodarone decreased to 100mg  Recent nephrology appointment- restarted Bumex at 2mg daily on Monday- Saturday      Patient's son is present with the patient and himself is not feeling well. He helps with her medications. They report that she has continued to take Amiodarone 200mg daily since her last ED visit. She has not been checking her BP at home and home-health has not been there this week due to other appointments that she has had, so she is not sure how her BP has been running.     She reports that her swelling is not improving with the recent Bumex addition and she is SOB. She overall is not feeling well.    Yumiko Purdy reports a poor understanding of medications.       Medication Use:   Hx of med intolerances: Farxiga (UTI) -but Nephro started back in September; cannot split Toprol tablets to make 12.5mg dose- may restart at 25mg when possible?  Retail Rx Management: Uses [facility]/ Uses our pharmacy for verquvo, toprol and jardiance -currently not on any on these as of 4/18/24    Past Medical History:   Diagnosis Date    Anemia     CHF (congestive heart failure)     Chronic a-fib     stated by patient     Cirrhosis of liver     stated by patient     Diabetes mellitus     Ventricular tachycardia      ALLERGIES: Phenergan [promethazine]  Current Outpatient Medications   Medication Sig Dispense Refill    amiodarone (PACERONE) 100 MG tablet Take 1 tablet by mouth Daily for 30 days. (Patient taking differently: Take 2 tablets by mouth Daily.) 30 tablet 0    apixaban  (ELIQUIS) 5 MG tablet tablet Take 1 tablet by mouth Every 12 (Twelve) Hours. Indications: Atrial Fibrillation 60 tablet 3    Aspirin Low Dose 81 MG EC tablet Take 1 tablet by mouth Daily.      bumetanide (BUMEX) 2 MG tablet Take 1 tablet by mouth Daily. Monday- Saturday; extra 2mg daily at lunch for 3-4 days      busPIRone (BUSPAR) 10 MG tablet Take 1 tablet by mouth 2 (Two) Times a Day.      digoxin (LANOXIN) 125 MCG tablet Take 1 tablet by mouth Daily.      ferrous sulfate 325 (65 FE) MG tablet Take 1 tablet by mouth Daily.      HYDROcodone-acetaminophen (NORCO) 7.5-325 MG per tablet Take 1 tablet by mouth 2 (Two) Times a Day.      Insulin Glargine (LANTUS SOLOSTAR) 100 UNIT/ML injection pen Inject 45 Units under the skin into the appropriate area as directed 2 (Two) Times a Day. 30 mL 0    Insulin Lispro, 1 Unit Dial, (HUMALOG) 100 UNIT/ML solution pen-injector Inject 15 Units under the skin into the appropriate area as directed 3 (Three) Times a Day With Meals. 15 mL 1    levothyroxine (SYNTHROID, LEVOTHROID) 50 MCG tablet Take 1 tablet by mouth Daily.      omeprazole (priLOSEC) 20 MG capsule Take 1 capsule by mouth Daily.      Polyethylene Glycol 3350 powder Take 17 g by mouth Daily.      Vortioxetine HBr (Trintellix) 5 MG tablet tablet Take 1 tablet by mouth Daily With Breakfast.      Vitamins A & D (magic barrier cream) Apply 1 Application topically to the appropriate area as directed 2 (Two) Times a Day As Needed (Apply to pressure ulcer) for up to 10 days. 100 g 2     No current facility-administered medications for this encounter.       Vaccination History:   Pneumonia: Declined 3/19/24  Annual Influenza: Declined  Shingles: Declined 3/19/24      Objective  Vitals:    04/18/24 1407   BP: 122/58   BP Location: Right arm   Patient Position: Sitting   Cuff Size: Large Adult   Pulse: 68   SpO2: 98%   Weight: 104 kg (230 lb)             Wt Readings from Last 3 Encounters:   04/18/24 104 kg (230 lb)   04/13/24  104 kg (230 lb)   04/10/24 107 kg (235 lb)     Lab Results   Component Value Date    GLUCOSE 439 (C) 04/18/2024    BUN 22 (H) 04/18/2024    CREATININE 1.01 (H) 04/18/2024    EGFRIFNONA 49 (L) 11/10/2020    BCR 21.8 04/18/2024    K 4.5 04/18/2024    CO2 29.0 04/18/2024    CALCIUM 9.2 04/18/2024    ALBUMIN 3.9 04/13/2024    LABIL2 1.3 (L) 06/09/2016    AST 17 04/13/2024    ALT 16 04/13/2024     Lab Results   Component Value Date    WBC 6.76 04/13/2024    HGB 8.4 (L) 04/13/2024    HCT 29.6 (L) 04/13/2024    .2 (H) 04/13/2024     04/13/2024     Lab Results   Component Value Date    CKTOTAL 66 04/01/2024    TROPONINT 45 (H) 04/13/2024     Lab Results   Component Value Date    PROBNP 1,508.0 (H) 04/18/2024     Results for orders placed during the hospital encounter of 03/26/24    Adult Transthoracic Echo Complete W/ Cont if Necessary Per Protocol    Interpretation Summary    Normal left ventricular cavity size and wall thickness noted. All left ventricular wall segments contract normally.    Left ventricular ejection fraction appears to be 66 - 70%.    Left ventricular diastolic function is consistent with (grade II w/high LAP) pseudonormalization.    The aortic valve is structurally normal with no regurgitation or stenosis present.    Moderate mitral annular calcification is present. There is mild calcification of the mitral valve anterior leaflet(s). Mild mitral valve regurgitation is present. Mild mitral valve stenosis is present.    There is no evidence of pericardial effusion. .         GDMT    Drug Class   Drug   Dose Last Dose Adjustment Additional Titration   Notes   ACEi/ARB/ARNI 2/4/24  Was previously on lisinopril, stopped d/t renal function    Beta Blocker 2/4/24??-not been adherent  Stopped due to recent hypotension    MRA      SGLT2i Changed from Farxiga in hospital 11/21/23 N/A Stopped due to hypotension    12/8/23 restart   Stopped due to hypotension          Drug Therapy  Problems    Amiodarone directions for use  Digoxin level- high at 1.3ng/ml  Not checking BP      Recommendations:     Sonja to continue Amiodarone 200mg daily. Notated on her AVS  2. Recommend a decrease in dose.   3. Recommended for patient to start checking BP so we can safely restart GDMT when the time comes.       Patient was educated on heart failure medications and the importance of medication adherence. All questions were addressed and patient expressed some understanding. Would benefit from additional education at each visit.    Thank you for allowing me to participate in the care of your patient,    Kelli Soto [facility]  04/18/24  15:46 EDT

## 2024-04-19 NOTE — PROGRESS NOTES
[facility]  ABDIFATAH Galarza, Masha Tucker, APRN  [address]  [address]  [place] [address]    Thank you for asking me to see Yumiko Gurwinder for congestive heart failure.    HPI:     This is a 58 y.o. female with known past medical history of:  Chronic systolic & diastolic HF  TTE from 03/27/24 with EF 66-70% and grade 2 diastolic dysfunction  TTE from 11/2023 with EF 31-35% with grade 3 diastolic dysfxn.   TTE from 02/08/23 @ [facility] with EF ~50%  TTE 11/13/23 with EF 31-35% and grade III diastolic dysfunction as well as moderately decreased systolic fxn and mildly reduced RVSP.    TTE from November 2022 with visually estimated ejection fraction of 30% ±5% and noted abnormal systolic strain pattern as well as grade 3 diastolic dysfunction as well as abnormal TAPSE with abnormal right ventricular function  KHAI on 09/2020 with EF 21-25% with LV systolic function severely decreased and RV cavity mildly dilated with moderately reduced RV systolic function noted, moderate TVR, and aortic plaquing  Right heart cath on 12/22/2022 with elevated left and right heart pressures with report not available  ASCVD  LHC 11/10/20 with preserved LV systolic, EF 50-55% with elevated LV end diastolic pressure consistent with diastolic dysfunction and right dominant system with 30-40% mid LAD lesion.   LHC attempted on 12/2022 at [facility] with unsuccessful access due to small artery appearing occluded or near occluded radially on right  Peripheral Arterial disease  Atrial Flutter  Multiple starts and stops of amiodarone  Most recent in March 2024 on March 13 @ [facility] following concern for VTach with EP feeling strips were Afib with RVR with LBBB (per chart review) with conversion to NSR and amiodarone restart.    CKD stage IIIb  Cirrhosis of the liver  Stage III CKD  Chronic hypoxemic respiratory failure  Morbid Obesity  Diffuse purpuric rash with prior w/u negative for  vasculitis    Yumiko Purdy presents for today for HF clinic evaluation.  The patient is typically seen by Masha Davidson APRN.  Patient's primary cardiologist is Dr. Jad Meredith.      Last known EF~ 55% by TTE from [facility] in Feb 2024  Last known hospitalization and/or ED visit: Hospitalized in @[facility] with amiodarone restarted by EP due to afib with RVR at 400mg TID for 4 days followed by 300mg daily following.     Hospitalized from March 26 through March 30 with possible SVT with baseline bundle branch block and paroxysmal atrial fibrillation with rapid ventricular response with aberrant conduction status post PVI October 2022  Admission to [facility] through April 3, 2024 with wide-complex tachycardia and persistent atrial fibrillation with amiodarone drip with transition to oral amiodarone with attempt to stop intermittent episodes of atrial fibrillation.  She was ultimately discharged on amiodarone with digoxin  Accompanied by: Son    04/18/24 visit data/details regarding:   Dyspnea: Improving, Patient is WC bound and mostly just exerts herself with transfers and such; This remains unchanged on today's visit.   Lower extremity swelling: Bilateral lower extremity swelling is worsening today  Abdominal swelling: Abdominal swelling is improving.    Home weight: Not currently monitoring due to inability to stand  Home BP: SBP has been in the 100s-110s with less drops at home  Home heart rate: 60s  Daily activities of living: Performing with assistance  HF zone: Yellow  Pillows/lying flat: Sleeps in hospital bed.  Mobility assistance devices:  WC at home, bedside commode.    Mrs. Purdy reports she was shocked in her driveway by EMS last week and taken to the ED with discharge home from ED.   She reports she is more swollen and short of breath.   She reports she did follow-up with EP Dr. Jimenez.  He recommended she continue Amiodarone 300mg daily for 2 months then go back to 200mg.  Following  this, she was hospitalized and started on digoxin with amiodarone 200mg daily.        Specialists:   Cardiology: [facility] with EP & General        Review of Systems - Review of Systems   Constitutional: Negative for chills, decreased appetite and malaise/fatigue.   HENT:  Negative for congestion, ear discharge and ear pain.    Eyes:  Negative for blurred vision, discharge and double vision.   Cardiovascular:  Positive for dyspnea on exertion. Negative for leg swelling.   Respiratory:  Negative for cough, hemoptysis and shortness of breath.    Endocrine: Negative for cold intolerance and heat intolerance.   Hematologic/Lymphatic: Negative for adenopathy and bleeding problem.   Skin:  Negative for color change, dry skin and nail changes.   Musculoskeletal:  Negative for arthritis, muscle weakness and myalgias.   Gastrointestinal:  Negative for bloating and abdominal pain.   Genitourinary:  Negative for bladder incontinence, decreased libido and dysuria.   Neurological:  Negative for brief paralysis, difficulty with concentration and dizziness.   Psychiatric/Behavioral:  Negative for altered mental status, depression and hallucinations.         Very pleasant   Allergic/Immunologic: Negative for environmental allergies and HIV exposure.         All other systems were reviewed and were negative.    Patient Active Problem List   Diagnosis    Dilated cardiomyopathy    CKD (chronic kidney disease) stage 2, GFR 60-89 ml/min    Severe obesity (BMI 35.0-39.9) with comorbidity    Chronic anemia    Elevated bilirubin    Acute on chronic combined systolic and diastolic CHF (congestive heart failure)    Hyponatremia    Paroxysmal atrial fibrillation    Cirrhosis    Coronary artery disease involving native coronary artery of native heart without angina pectoris    Atrial fibrillation with RVR    Ventricular tachycardia       family history includes Heart attack in her brother and father.     reports that she has  never smoked. She has never used smokeless tobacco. She reports that she does not drink alcohol and does not use drugs.    Allergies   Allergen Reactions    Phenergan [Promethazine] Other (See Comments)     Anxiety         Current Outpatient Medications:     amiodarone (PACERONE) 100 MG tablet, Take 1 tablet by mouth Daily for 30 days. (Patient taking differently: Take 2 tablets by mouth Daily.), Disp: 30 tablet, Rfl: 0    apixaban (ELIQUIS) 5 MG tablet tablet, Take 1 tablet by mouth Every 12 (Twelve) Hours. Indications: Atrial Fibrillation, Disp: 60 tablet, Rfl: 3    Aspirin Low Dose 81 MG EC tablet, Take 1 tablet by mouth Daily., Disp: , Rfl:     bumetanide (BUMEX) 2 MG tablet, Take 1 tablet by mouth Daily. Monday- Saturday; extra 2mg daily at lunch for 3-4 days, Disp: , Rfl:     busPIRone (BUSPAR) 10 MG tablet, Take 1 tablet by mouth 2 (Two) Times a Day., Disp: , Rfl:     digoxin (LANOXIN) 125 MCG tablet, Take 1 tablet by mouth Daily., Disp: , Rfl:     ferrous sulfate 325 (65 FE) MG tablet, Take 1 tablet by mouth Daily., Disp: , Rfl:     HYDROcodone-acetaminophen (NORCO) 7.5-325 MG per tablet, Take 1 tablet by mouth 2 (Two) Times a Day., Disp: , Rfl:     Insulin Glargine (LANTUS SOLOSTAR) 100 UNIT/ML injection pen, Inject 45 Units under the skin into the appropriate area as directed 2 (Two) Times a Day., Disp: 30 mL, Rfl: 0    Insulin Lispro, 1 Unit Dial, (HUMALOG) 100 UNIT/ML solution pen-injector, Inject 15 Units under the skin into the appropriate area as directed 3 (Three) Times a Day With Meals., Disp: 15 mL, Rfl: 1    levothyroxine (SYNTHROID, LEVOTHROID) 50 MCG tablet, Take 1 tablet by mouth Daily., Disp: , Rfl:     omeprazole (priLOSEC) 20 MG capsule, Take 1 capsule by mouth Daily., Disp: , Rfl:     Polyethylene Glycol 3350 powder, Take 17 g by mouth Daily., Disp: , Rfl:     Vortioxetine HBr (Trintellix) 5 MG tablet tablet, Take 1 tablet by mouth Daily With Breakfast., Disp: , Rfl:     Vitamins A & D  (magic barrier cream), Apply 1 Application topically to the appropriate area as directed 2 (Two) Times a Day As Needed (Apply to pressure ulcer) for up to 10 days., Disp: 100 g, Rfl: 2      Physical Exam:  I have reviewed the patient's current vital signs as documented in the patient's EMR.     Vitals:    24 1407   BP: 122/58   BP Location: Right arm   Patient Position: Sitting   Cuff Size: Large Adult   Pulse: 68   SpO2: 98%   Weight: 104 kg (230 lb)           Physical Exam  Vitals and nursing note reviewed.   Constitutional:       Appearance: Normal appearance.   HENT:      Head: Normocephalic and atraumatic.   Eyes:      General: Lids are normal.   Cardiovascular:      Rate and Rhythm: Normal rate and regular rhythm.      Heart sounds: S1 normal and S2 normal.   Pulmonary:      Effort: No tachypnea or bradypnea.      Breath sounds: No decreased breath sounds, wheezing, rhonchi or rales.   Chest:      Chest wall: No mass or lacerations.   Abdominal:      General: Abdomen is protuberant. Bowel sounds are normal.      Palpations: Abdomen is soft.      Tenderness: There is no abdominal tenderness.      Comments: Less distended than on prior visits.     Musculoskeletal:      Right lower le+ Edema present.      Left lower le+ Edema present.      Right foot: No swelling.      Left foot: No swelling.   Skin:     General: Skin is warm and moist.   Neurological:      Mental Status: She is alert and oriented to person, place, and time.      Comments: Equal bilateral  strength.     Psychiatric:         Attention and Perception: Attention normal.         Mood and Affect: Mood normal.          JVP: Volume/Pulsation: Normal.        DATA REVIEWED:     EKG. I personally reviewed and interpreted the EKG.    Last EKG at [facility] not available for viewing.      ---------------------------------------------------  TTE/KHAI:    TRANSTHORACIC ECHOCARDIOGRAPHY REPORT    Demographics    Patient Name:          JAMAL  DIO      :            1965    Medical Record Number: [id number]          Age:            56 year(s)    Corporate ID Number:   [id number]          Gender          Female    Account Number:        [id number]    Sonographer:           Hayden Chaudhry,     Height:         65 inches                           [facility]    Referring Physician:   ANDREAS HERNANDEZ     Weight:         240 pounds    Interpreting           MATHEUS IRELAND   BMI:            39.94 kg/m^2    Physician:             MD    Date of Service:       2022          Blood Pressure: 113/40 mmHg    Room Number:           320   Type of Study:    TTE procedure: EC Echo Complete, ECHO COMPLETE (DOPPLER / COLOR) W OR WO    CONTRAST.    Patient Status: Routine IP    Study Location: PortableTechnical Quality: Adequate visualization    Contrast Medium: Optison. Amount - 3 ml    Impression:    Indication:Hypertensive heart disease with heart failure I11.0    Mild left ventricular hypertrophy. Visually estimated ejection fraction    30% +/- 5%. Abnormal left ventricular systolic function; abnormal systolic    strain pattern. Restrictive filling pattern consistent with severely    increased LV filling pressure (Grade III Diastolic dysfunction).    Dilated right ventricle. Abnormal TAPSE; abnormal right ventricular    function. Abnormal right atrial size.    Mild mitral stenosis. Peak/Mean 6/2mmHg    Moderate tricuspid (2+) regurgitation.    No masses or thrombi are seen.   Measurements Summary:    LVEDd: 4.99 cm         LVESd: 4.08 cm          IVSEd: 0.79 cm    AO Root:2.97 cm        LVPWd: 1.04 cm    Contractility Score    Global Left Ventricular Hypokinesis was noted.    LV regional wall motion: (0-Not visualized 1-Normal 2-Hypokinesis    3-Akinesis 4-Dyskinesis 5-Aneurysm)    Left Ventricle    Peak E-wave: 1.22   Peak A-wave: 0.2 m/s    E/A ratio: 5.99    m/s                 Volume [id number]  LV length: 8.47 cm    ml    Volume [id number] ml     LVOT diameter: 2.05 cm    Normal sized left ventricle.    Mild left ventricular hypertrophy.    Visually estimated ejection fraction 30% +/- 5%.    Abnormal left ventricular systolic function; abnormal systolic strain    pattern.    Restrictive filling pattern consistent with severely increased LV filling    pressure (Grade III Diastolic dysfunction).    No left ventricular masses or thrombi.    Right Ventricle    Diastolic dimension: 4.72     RV systolic pressure: 22.15 mmHg    cm    Dilated right ventricle.    Abnormal TAPSE; abnormal right ventricular function.    Left Atrium    LA dimension: 4.64 cm               LA volume:95.38 ml    LA/Aorta: 1.56    Abnormal left atrial volume index 45ml/m2.    Intact atrial septum.    No atrial mass or thrombus.    Right Atrium    Abnormal right atrial size.    Intact atrial septum.    No atrial mass or thrombus.    Mitral Valve    Deceleration time:     Area PHT: 2.04 cm^2  Mean velocity:    248.96 msec            P1/2t: 107.68 msec   0.57 m/s    Area (continuity):   Mean gradient:    1.73 cm^2            1.89 mmHg    Peak gradient:    5.97 mmHg    Thickened mitral valve leaflets.    Mild mitral regurgitation.    Mild mitral stenosis. Peak/Mean 6/2mmHg    Echogenic density noted on mitral valve chordae. Seen on prior exam    10/16/2022    Aortic Valve    LVOT VTI: 18.22 cm    Mildly thickend free edges of the aortic valve leaflets.    No aortic regurgitation.    No aortic stenosis.    No masses or vegetations seen.    Tricuspid Valve    TR velocity: 2.19 m/s     TR gradient: 19.94029 mmHg    Estimated RAP: 3 mmHg     RVSP: 22.17 mmHg    Structurally normal tricuspid valve.    Moderate tricuspid (2+) regurgitation.    RVSP likely underestimated due to RV systolic function.    No tricuspid stenosis.    No masses or vegetations seen.    Pulmonic Valve    Acceleration time: 119.95 msec          PASP: 22.15 mmHg    Structurally normal pulmonic valve.    Mild pulmonic  regurgitation (1+).    No pulmonic stenosis.    No masses or vegetations seen.   Great Vessels   Aorta    Aortic Root: 2.97 cm    Ascending Aorta: 2.76 cm    LVOT Diameter: 2.05 cm   Visualized aorta is normal.   Normal aortic root.   No evidence of dissection.   IVC is not well visualized.   Unable to obtain adequate subcostal view.    Pericardium / Pleura   No pericardial effusion.    Other    No masses or thrombi.    No intracardiac shunt.    ----------------------------------------------------------------    Electronically signed by MATHEUS IRELAND MD(Interpreting    Physician) on 11/17/2022 17:38       Results for orders placed during the hospital encounter of 03/26/24    Adult Transthoracic Echo Complete W/ Cont if Necessary Per Protocol    Interpretation Summary    Normal left ventricular cavity size and wall thickness noted. All left ventricular wall segments contract normally.    Left ventricular ejection fraction appears to be 66 - 70%.    Left ventricular diastolic function is consistent with (grade II w/high LAP) pseudonormalization.    The aortic valve is structurally normal with no regurgitation or stenosis present.    Moderate mitral annular calcification is present. There is mild calcification of the mitral valve anterior leaflet(s). Mild mitral valve regurgitation is present. Mild mitral valve stenosis is present.    There is no evidence of pericardial effusion. .    RHC report:      CARDIAC CATH - RIGHT HEART CATH    Anatomical Region Laterality Modality   Heart -- Other     Narrative    Cardiac Diagnostic Report    Demographics    Patient Name       JAMAL WHARTON      Date of Birth          1965    Patient #          [id number]          Accession #            [id number]    Visit #            [id number]          Medical Record #    Physician          MATHEUS IRELAND   Date of Study          12/22/2022                       MD    Referring                              Interventional     Physician                              physician    Procedure   Procedure Type    Diagnostic procedure: RHC with Cardiomems, RIGHT HEART CATH   Indications: Angina.    Conclusions   Procedure Description   Using ultrasound and micropuncture, access to right brachial vein obtained   and 7F sheath inserted. 7F PA catheter used for RHC.   I attempted right radial access for LHC but unsuccessful due to very small   artery which appears occluded or near-occluded.   Procedure Summary   Elevated left and right heart filling pressures.   Elevated pulmonary pressures.   Borderline-low Ramos CO/CI.    Procedure Data   Procedure Date   Date: 12/22/2022Start: 15:32End: 16:09   Entry Locations     - Retrograde Percutaneous access was performed through the Right Axiliary.       A 7 Fr sheath was inserted. Hemostasis was successfully obtained using       Manual Compression.       Entry Comments: brachial vein.   Procedure Medications     - Fentanyl I.V. 25 mcg.     - Versed Sheath 1 mg.     - Oxygen NC 6 l/min.   Diagnostic Catheters     - A7 FR MON Donnellson-Misael [id number] used for:Arch.   Contrast Material     - None0 ml   Fluoroscopy Time: Total: 2:48 minutes.   Fluoroscopy Dose: Total: 155 mGy.    Medical History    Risk Factors    The patient risk factors include:obesity, hypercholesterolemia, treated    and uncontrolled hypertension, diabetes mellitus, last creatinine: 2    mg/dl, creatinine clearance: 69.72 ml/min and dyslipidemia.    Admission Data    Hemodynamics    Condition: Baseline    O2 Consumption: Estimated: 297.50Heart Rate: 41 bpm   Oxygen Saturation   +--------+-----+----+----------------------+---+---------------------------+   !Location!pCO2 !pO2 !% Saturation          !Hgb!O2 Content                 !   +--------+-----+----+----------------------+---+---------------------------+   !PA      !     !    !51                    !   !                           !    +--------+-----+----+----------------------+---+---------------------------+   !RA      !     !    !58                    !   !                           !   +--------+-----+----+----------------------+---+---------------------------+   !AO      !     !    !94                    !   !                           !   +--------+-----+----+----------------------+---+---------------------------+   Pressures (mmHg)   +-----+--------------------------------------------------------------------+   !Site !Pressure                                                            !   +-----+--------------------------------------------------------------------+   !RA   !28/28 (25)                                                          !   +-----+--------------------------------------------------------------------+   !RV   !81/10 ,27                                                           !   +-----+--------------------------------------------------------------------+   !PA   !73/32 (43)                                                          !   +-----+--------------------------------------------------------------------+   !PCW  !68/66 (45)                                                          !   +-----+--------------------------------------------------------------------+   !PCW  !78/59 (45)                                                          !   +-----+--------------------------------------------------------------------+   !PCW  !23/36 (27)                                                          !   +-----+--------------------------------------------------------------------+   Cardiac Output   +------+-------------------------+----------------------------+------------+   !Method!CO (l/min)               !CI (l/min/m2)               !SV (ml)     !   +------+-------------------------+----------------------------+------------+   !Ramos  !5.35                     !2.2                         !131.67      !    +------+-------------------------+----------------------------+------------+   Shunts   Oxygen Values    O2 Capacity 129.2 O2 Consumption 297.5    Flows (l/min)    Qs 6.4 Qe/Qp 1.2    Qp 5.35 Qp/Qs 0.84    Qe 6.4   Vascular Resistance (dynes x sec x cm-5)   +-------------------------------------+----+---+----+----+---------+-------+   !CO method                            !TSVR!SVR!TPVR!PVR !TPVR/TSVR!PVR/SVR!   +-------------------------------------+----+---+----+----+---------+-------+   !Ramos                                 !    !   !8.03!2.94!         !       !   +-------------------------------------+----+---+----+----+---------+-------+   !Qp or Qs                             !    !   !8.03!2.94!         !       !   +-------------------------------------+----+---+----+----+---------+-------+    Signatures    ----------------------------------------------------------------    Electronically signed by MATHEUS IRELAND MD(Physician) on    12/22/2022 16:19    ----------------------------------------------------------------        -----------------------------------------------------  CXR/Imaging:   Imaging Results (Most Recent)       None            I personally reviewed and interpreted the CXR.      -----------------------------------------------------  CT:   XR Chest 1 View    Result Date: 4/13/2024   1. Lungs clear 2. Cardiomegaly    This report was finalized on 4/13/2024 11:51 AM by Dr. Angelo Deleon MD.      XR chest AP portable    Result Date: 4/1/2024  No acute cardiopulmonary process. Images reviewed, interpreted, and dictated by Dr. Paresh Aguirre. Transcribed by Teresa Mckenzie PA-C.    XR Chest 1 View    Result Date: 3/26/2024  Impression:  Findings compatible with pulmonary vascular congestion  This report was finalized on 3/26/2024 11:02 PM by Antonio Davey MD.     I personally reviewed the images of the CT scan.  My personal interpretation is below.       ----------------------------------------------------    PFTs:    No PFTS available.      --------------------------------------------------------------------------------------------------    Laboratory evaluations:    Lab Results   Component Value Date    GLUCOSE 439 (C) 04/18/2024    BUN 22 (H) 04/18/2024    CREATININE 1.01 (H) 04/18/2024    EGFRIFNONA 49 (L) 11/10/2020    BCR 21.8 04/18/2024    K 4.5 04/18/2024    CO2 29.0 04/18/2024    CALCIUM 9.2 04/18/2024    ALBUMIN 3.9 04/13/2024    LABIL2 1.3 (L) 06/09/2016    AST 17 04/13/2024    ALT 16 04/13/2024     Lab Results   Component Value Date    WBC 6.76 04/13/2024    HGB 8.4 (L) 04/13/2024    HCT 29.6 (L) 04/13/2024    .2 (H) 04/13/2024     04/13/2024     Lab Results   Component Value Date    CHOL 137 09/11/2020    CHLPL 103 04/21/2016    TRIG 80 09/11/2020    HDL 43 09/11/2020    LDL 78 09/11/2020     Lab Results   Component Value Date    TSH 2.960 01/31/2024     Lab Results   Component Value Date    HGBA1C 7.70 (H) 03/26/2024     Lab Results   Component Value Date    ALT 16 04/13/2024     Lab Results   Component Value Date    HGBA1C 7.70 (H) 03/26/2024    HGBA1C 7.30 (H) 11/13/2023    HGBA1C 8.40 (H) 09/10/2020     Lab Results   Component Value Date    MICROALBUR 3.2 09/12/2020    CREATININE 1.01 (H) 04/18/2024     Lab Results   Component Value Date    IRON 73 11/17/2023    TIBC 222 (L) 11/17/2023    FERRITIN 1,791.30 (H) 04/01/2024     Lab Results   Component Value Date    INR 0.97 03/13/2024    INR 1.13 (H) 01/31/2024    INR 0.97 11/15/2023    PROTIME 10.5 03/13/2024    PROTIME 15.1 (H) 01/31/2024    PROTIME 13.4 11/15/2023        Lab Results   Component Value Date    ABSOLUTELUNG 43 (A) 04/18/2024    ABSOLUTELUNG 38 (A) 03/28/2024    ABSOLUTELUNG 48 (A) 03/19/2024       PAH RISK ASSESSMENT:      1. Acute on chronic combined systolic and diastolic CHF (congestive heart failure)    2. Paroxysmal atrial fibrillation    3. Hyperglycemia    4.  Dilated cardiomyopathy          ORDERS PLACED TODAY:  Orders Placed This Encounter   Procedures    ReDs Vest    Basic Metabolic Panel    Magnesium    proBNP    Basic Metabolic Panel    Magnesium    proBNP    Digoxin Level    Digoxin Level        Diagnoses and all orders for this visit:    1. Acute on chronic combined systolic and diastolic CHF (congestive heart failure) (Primary)  Overview:  11/12/[id number]-TFD-LDFH 31 to 35%, mild LVH, grade 3 diastolic dysfunction with high LAP, moderately dilated left and right atrial cavities, dilated pulmonary artery, calcification of the left coronary cusp of the aortic valve    Orders:  -     Basic Metabolic Panel; Future  -     Magnesium; Future  -     proBNP; Future  -     Basic Metabolic Panel; Standing  -     Basic Metabolic Panel  -     Magnesium; Standing  -     Magnesium  -     proBNP; Standing  -     proBNP  -     ReDs Vest    2. Paroxysmal atrial fibrillation  Overview:  11/12/2023-emergent cardioversion-sinus rhythm achieved    Orders:  -     Digoxin Level; Future  -     Cancel: Digoxin Level; Future  -     Digoxin Level; Standing  -     Digoxin Level    3. Hyperglycemia    4. Dilated cardiomyopathy  Overview:  11/12/20235842-QEM-WDTA 31 to 35%, mild LVH, grade 3 diastolic dysfunction with high LAP, dilated left and right atria, calcification of the left coronary cusp of the aortic valve, dilated pulmonary artery      Other orders  -     Vitamins A & D (magic barrier cream); Apply 1 Application topically to the appropriate area as directed 2 (Two) Times a Day As Needed (Apply to pressure ulcer) for up to 10 days.  Dispense: 100 g; Refill: 2             MEDS ORDERED TODAY:    New Medications Ordered This Visit   Medications    Vitamins A & D (magic barrier cream)     Sig: Apply 1 Application topically to the appropriate area as directed 2 (Two) Times a Day As Needed (Apply to pressure ulcer) for up to 10 days.     Dispense:  100 g     Refill:  2         ---------------------------------------------------------------------------------------------------------------------------          ASSESSMENT/PLAN:      Diagnosis Plan   1. Acute on chronic combined systolic and diastolic CHF (congestive heart failure)  Basic Metabolic Panel    Magnesium    proBNP    Basic Metabolic Panel    Basic Metabolic Panel    Magnesium    Magnesium    proBNP    proBNP    ReDs Vest      2. Paroxysmal atrial fibrillation  Digoxin Level    Digoxin Level    Digoxin Level      3. Hyperglycemia        4. Dilated cardiomyopathy            acute on chronic moderate systolic and diastolic heart failure. CHF. Etiology: Unknown/Idiopathic. LVEF ~50%.     NYHA stage Stage D: Presence of advanced heart disease with continued heart failure symptoms requiring aggressive medical therapyFC-Class IV: Unable to carry on any physical activity without discomfort. Symptoms of heart failureat rest. If any physical activity is undertaken, discomfort increases.     Today, Patient is approaching euvolemia and with  Moderate perfusion. The patient's hemodynamics are currently acceptable. HR is: normal and is at goal. BP/MAP was reviewed and there is notroom for medication up-titration.  Clinical trajectory was assessed and hasimproved.     CHF GOAL DIRECTED MEDICAL THERAPY FOR PATIENT ADDRESSED/ADJUSTED:     GDMT    Drug Class   Drug   Dose Last Dose Adjustment Additional Titration   Notes   ACEi/ARB/ARNI ACEI previously stopped due to renal fxn       Beta Blocker      MRA Spirono previously stopped due to renal fxn       SGLT2i Jardiance 10mg qd Transitioned from Farxiga to Jardiance in Hospitalization in 11/2023 N/A    Secondaries Verquevo 10mg qd Restart       Secondary management:     -CHF Specific BB:   EF improved. No BB on board at present with amiodarone & dg on board.      -ACE/ARB/ARNi:   ACEi recently held during [facility] hospitalization.     -MRA:   The patient is FC-NYHA Class IV and MRA is indicated.    Spironolactone was previously stopped on regimen due to renal function.        -SGLT2 inhibitor therapy:    Farxiga transitioned to Jardiance during hospitalization in 2023.  Tolerating jardiance well, will continue at present.    SGLT2i Restarted by  Nephro in September 2023; will continue to monitor for  symptoms.        Secondary pharmacological therapy in HFreF:   EF improved and Verquevo stopped during one of her recent hospitalizatoins.       -Diuretic regimen:   ReDS Vest reading for 04/19/24 is 43;   Bumex 2mg BID for 3 days then decrease back to Bumex 2mg on M-F    -Fluid restriction/Sodium restriction:   Requested 2000 ml restriction  Patient has been asked to weigh daily and was provided with a printed diuretic strategy.  1,500 mg Na restriction was discussed.    -Acute vs. Chronic underlying conditions other than HF addressed during visit:          Paroxysmal Atrial fibrillation/Flutter:   Continue Amiodarone 200mg qd.   Dig level checked & elevated with decrease to 125 mcg every other day as patient has CKD & Cirrhosis with increased risk for toxicity.    EP visit from 03/22/24 reviewed with multiple prior ablations and limited further options with weight loss and possibly GLP 1 recommended.       CKD IIIb:   Creatinine within baseline. Continue f/u.        Debility:   Continue home health with PT/OT.   Multiple types of DME at home.     Iron/Anemia in CHF:  Recheck iron panel in future visit.  11/2023 panel reviewed with TSAT within acceptable limits.   Continue BID ferrous sulfate.          Identifiable barriers to Heart Failure Self-care:   Medical Barriers:  Debility  Social Barriers: Transport limitations      --------------------------------------------------------------------------------          BMI cannot be calculated due to outdated height or weight values with patient unable to stand. She has self reported weight of 240.               >45 minutes out of 60 minutes face to face spent  counseling patient extensively on dietary Na+ intake, importance of activity, weight monitoring, compliance with medications in addition to importance of titration with goal directed medical therapy and follow up appointments.  10 of 60 minutes were spent reviewing lifevest report and discussing with patient.              This document has been electronically signed by Sonja Romo PA-C  April 19, 2024 10:40 EDT      Dictated Utilizing Dragon Dictation: Part of this note may be an electronic transcription/translation of spoken language to printed text using the Dragon Dictation System.    Follow-up appointment and medication changes provided in hand delivered After Visit Summary as well as reviewed in the room.

## 2024-05-02 NOTE — TELEPHONE ENCOUNTER
Called pt's pharmacy to see who prescribes her Amioderone due to us not having it on our med list. They stated Courtney Lim is who sent that in and her digoxin in and her Amodarone 200 mg QD.     Called pt and advised her. She stated that she no longer see's them and will be follow up here.

## 2024-05-03 RX ORDER — AMIODARONE HYDROCHLORIDE 200 MG/1
200 TABLET ORAL DAILY
Qty: 30 TABLET | Refills: 0 | Status: SHIPPED | OUTPATIENT
Start: 2024-05-03

## 2024-05-03 RX ORDER — DIGOXIN 125 MCG
125 TABLET ORAL EVERY OTHER DAY
Qty: 15 TABLET | Refills: 0 | Status: SHIPPED | OUTPATIENT
Start: 2024-05-03

## 2024-05-07 ENCOUNTER — TELEPHONE (OUTPATIENT)
Dept: CARDIOLOGY | Facility: CLINIC | Age: 59
End: 2024-05-07
Payer: COMMERCIAL

## 2024-05-23 ENCOUNTER — HOSPITAL ENCOUNTER (OUTPATIENT)
Dept: CARDIOLOGY | Facility: HOSPITAL | Age: 59
Discharge: HOME | End: 2024-05-23
Payer: COMMERCIAL

## 2024-05-23 VITALS
BODY MASS INDEX: 38.32 KG/M2 | WEIGHT: 230 LBS | HEIGHT: 65 IN | HEART RATE: 84 BPM | SYSTOLIC BLOOD PRESSURE: 112 MMHG | DIASTOLIC BLOOD PRESSURE: 63 MMHG | OXYGEN SATURATION: 98 %

## 2024-05-23 DIAGNOSIS — I48.0 PAROXYSMAL ATRIAL FIBRILLATION: Chronic | ICD-10-CM

## 2024-05-23 DIAGNOSIS — I42.0 DILATED CARDIOMYOPATHY: Chronic | ICD-10-CM

## 2024-05-23 DIAGNOSIS — I50.42 CHRONIC COMBINED SYSTOLIC AND DIASTOLIC HF (HEART FAILURE): Primary | ICD-10-CM

## 2024-05-23 DIAGNOSIS — I25.10 CORONARY ARTERY DISEASE INVOLVING NATIVE CORONARY ARTERY OF NATIVE HEART WITHOUT ANGINA PECTORIS: Chronic | ICD-10-CM

## 2024-05-23 DIAGNOSIS — N18.2 CKD (CHRONIC KIDNEY DISEASE) STAGE 2, GFR 60-89 ML/MIN: ICD-10-CM

## 2024-05-23 LAB
ABSOLUTE LUNG FLUID CONTENT: 38 % (ref 20–35)
ANION GAP SERPL CALCULATED.3IONS-SCNC: 11.7 MMOL/L (ref 5–15)
BUN SERPL-MCNC: 18 MG/DL (ref 6–20)
BUN/CREAT SERPL: 17.1 (ref 7–25)
CALCIUM SPEC-SCNC: 9.4 MG/DL (ref 8.6–10.5)
CHLORIDE SERPL-SCNC: 96 MMOL/L (ref 98–107)
CO2 SERPL-SCNC: 28.3 MMOL/L (ref 22–29)
CREAT SERPL-MCNC: 1.05 MG/DL (ref 0.57–1)
EGFRCR SERPLBLD CKD-EPI 2021: 62.1 ML/MIN/1.73
GLUCOSE BLDC GLUCOMTR-MCNC: 286 MG/DL (ref 70–130)
GLUCOSE SERPL-MCNC: 274 MG/DL (ref 65–99)
MAGNESIUM SERPL-MCNC: 1.8 MG/DL (ref 1.6–2.6)
NT-PROBNP SERPL-MCNC: 1118 PG/ML (ref 0–900)
POTASSIUM SERPL-SCNC: 3.6 MMOL/L (ref 3.5–5.2)
SODIUM SERPL-SCNC: 136 MMOL/L (ref 136–145)

## 2024-05-23 PROCEDURE — 36415 COLL VENOUS BLD VENIPUNCTURE: CPT | Performed by: PHYSICIAN ASSISTANT

## 2024-05-23 PROCEDURE — 82948 REAGENT STRIP/BLOOD GLUCOSE: CPT

## 2024-05-23 PROCEDURE — 80048 BASIC METABOLIC PNL TOTAL CA: CPT | Performed by: PHYSICIAN ASSISTANT

## 2024-05-23 PROCEDURE — 83880 ASSAY OF NATRIURETIC PEPTIDE: CPT | Performed by: PHYSICIAN ASSISTANT

## 2024-05-23 PROCEDURE — 94726 PLETHYSMOGRAPHY LUNG VOLUMES: CPT | Performed by: PHYSICIAN ASSISTANT

## 2024-05-23 PROCEDURE — 83735 ASSAY OF MAGNESIUM: CPT | Performed by: PHYSICIAN ASSISTANT

## 2024-05-23 RX ORDER — DIGOXIN 125 MCG
125 TABLET ORAL EVERY OTHER DAY
Qty: 15 TABLET | Refills: 1 | Status: SHIPPED | OUTPATIENT
Start: 2024-05-23

## 2024-05-23 RX ORDER — DILTIAZEM HYDROCHLORIDE 60 MG/1
60 CAPSULE, EXTENDED RELEASE ORAL 2 TIMES DAILY
COMMUNITY
Start: 2024-05-10 | End: 2024-06-09

## 2024-05-23 RX ORDER — ASPIRIN 81 MG/1
81 TABLET, COATED ORAL DAILY
Qty: 30 TABLET | Refills: 3 | Status: SHIPPED | OUTPATIENT
Start: 2024-05-23 | End: 2024-08-31

## 2024-05-23 NOTE — PROGRESS NOTES
Heart Failure Clinic    Date: 05/23/24     Vitals:    05/23/24 1321   BP: 112/63   Pulse: 84   SpO2: 98%    Weight 230    Method of arrival: Other wheelchaie    Weighing self daily: No    Monitoring Heart Failure Zones: Most days    Today's HF Zone: Yellow     Taking medications as prescribed: Yes    Edema Yes    Shortness of Air: Yes    Number of pillows used at night:Recliner-hospital bed    Educational Materials given:  avs                                                                         ReDS Value: 38  36-41 Possible Hypervolemic Status      Chris Aviles RN 05/23/24 13:22 EDT

## 2024-05-23 NOTE — PROGRESS NOTES
Heart Failure Clinic  Pharmacist Note     Yumiko Purdy is a 58 y.o. female seen in the Heart Failure Clinic for HFrEF.     The patient had several recent hospital admissions and other appointments-   3/13/24 admission for atrial fibrillation  4/1/24 at [facility] for afib with RVR- amiodarone increased to 200mg and digoxin added on  4/10/24 cardiology appointment with Courtney- all GDMT stopped due to hypotension   4/13/24 ED visit for chest pain- amiodarone decreased to 100mg  Recent nephrology appointment- restarted Bumex at 2mg daily on Monday- Saturday 5/9/24 [facility] hospitalization- started Diltiazem 60mg bid      Patient's son is present with the patient. He helps with her medications. They report that she has continued to take Amiodarone 200mg daily since her last ED visit and reports that they started Diltiazem during her last hospitalization. She has not been checking her BP at home but a home-health nurse comes twice a week. She reports that she remembers that it was as high as 164, but does not know if the other BP's were normal.     She reports that her swelling and SOB has worsened even though she has continued to take Bumex 2mg daily Monday through Saturday and is not takign anything on Sundays. She reports that she is not urinating well and feels like her kidneys are the problem.    Yumiko Purdy reports a poor understanding of medications.       Medication Use:   Hx of med intolerances: Farxiga (UTI) -but Nephro started back in September; cannot split Toprol tablets to make 12.5mg dose- may restart at 25mg when possible?  Retail Rx Management: Uses [facility]/ Uses our pharmacy for verquvo, toprol and jardiance -currently not on any on these as of 4/18/24    Past Medical History:   Diagnosis Date    Anemia     CHF (congestive heart failure)     Chronic a-fib     stated by patient     Cirrhosis of liver     stated by patient     Diabetes mellitus     Ventricular tachycardia      ALLERGIES: Phenergan  [promethazine]  Current Outpatient Medications   Medication Sig Dispense Refill    amiodarone (Pacerone) 200 MG tablet Take 1 tablet by mouth Daily. 30 tablet 0    apixaban (ELIQUIS) 5 MG tablet tablet Take 1 tablet by mouth Every 12 (Twelve) Hours. Indications: Atrial Fibrillation 60 tablet 3    Aspirin Low Dose 81 MG EC tablet Take 1 tablet by mouth Daily for 100 days. 30 tablet 3    bumetanide (BUMEX) 2 MG tablet Take 1 tablet by mouth Daily. Monday- Saturday; extra 2mg daily at lunch for 3-4 days      busPIRone (BUSPAR) 10 MG tablet Take 1 tablet by mouth 2 (Two) Times a Day.      digoxin (LANOXIN) 125 MCG tablet Take 1 tablet by mouth Every Other Day. 15 tablet 1    dilTIAZem SR (CARDIZEM SR) 60 MG 12 hr capsule Take 1 capsule by mouth 2 (Two) Times a Day.      ferrous sulfate 325 (65 FE) MG tablet Take 1 tablet by mouth Daily.      HYDROcodone-acetaminophen (NORCO) 7.5-325 MG per tablet Take 1 tablet by mouth 2 (Two) Times a Day.      Insulin Glargine (LANTUS SOLOSTAR) 100 UNIT/ML injection pen Inject 45 Units under the skin into the appropriate area as directed 2 (Two) Times a Day. 30 mL 0    Insulin Lispro, 1 Unit Dial, (HUMALOG) 100 UNIT/ML solution pen-injector Inject 15 Units under the skin into the appropriate area as directed 3 (Three) Times a Day With Meals. 15 mL 1    levothyroxine (SYNTHROID, LEVOTHROID) 50 MCG tablet Take 1 tablet by mouth Daily.      omeprazole (priLOSEC) 20 MG capsule Take 1 capsule by mouth Daily.      Polyethylene Glycol 3350 powder Take 17 g by mouth Daily. As needed      Vortioxetine HBr (Trintellix) 5 MG tablet tablet Take 1 tablet by mouth Daily With Breakfast.       No current facility-administered medications for this encounter.       Vaccination History:   Pneumonia: Declined 3/19/24  Annual Influenza: Declined  Shingles: Declined 3/19/24      Objective  Vitals:    05/23/24 1321   BP: 112/63   BP Location: Right arm   Patient Position: Sitting   Cuff Size: Adult   Pulse:  "84   SpO2: 98%   Weight: 104 kg (230 lb)   Height: 165.1 cm (65\")               Wt Readings from Last 3 Encounters:   05/23/24 104 kg (230 lb)   04/18/24 104 kg (230 lb)   04/13/24 104 kg (230 lb)     Lab Results   Component Value Date    GLUCOSE 274 (H) 05/23/2024    BUN 18 05/23/2024    CREATININE 1.05 (H) 05/23/2024    EGFRIFNONA 49 (L) 11/10/2020    BCR 17.1 05/23/2024    K 3.6 05/23/2024    CO2 28.3 05/23/2024    CALCIUM 9.4 05/23/2024    ALBUMIN 3.9 04/13/2024    LABIL2 1.3 (L) 06/09/2016    AST 17 04/13/2024    ALT 16 04/13/2024     Lab Results   Component Value Date    WBC 6.76 04/13/2024    HGB 8.4 (L) 04/13/2024    HCT 29.6 (L) 04/13/2024    .2 (H) 04/13/2024     04/13/2024     Lab Results   Component Value Date    CKTOTAL 66 04/01/2024    TROPONINT 45 (H) 04/13/2024     Lab Results   Component Value Date    PROBNP 1,118.0 (H) 05/23/2024     Results for orders placed during the hospital encounter of 03/26/24    Adult Transthoracic Echo Complete W/ Cont if Necessary Per Protocol    Interpretation Summary    Normal left ventricular cavity size and wall thickness noted. All left ventricular wall segments contract normally.    Left ventricular ejection fraction appears to be 66 - 70%.    Left ventricular diastolic function is consistent with (grade II w/high LAP) pseudonormalization.    The aortic valve is structurally normal with no regurgitation or stenosis present.    Moderate mitral annular calcification is present. There is mild calcification of the mitral valve anterior leaflet(s). Mild mitral valve regurgitation is present. Mild mitral valve stenosis is present.    There is no evidence of pericardial effusion. .         GDMT    Drug Class   Drug   Dose Last Dose Adjustment Additional Titration   Notes   ACEi/ARB/ARNI 2/4/24  Was previously on lisinopril, stopped d/t renal function    Beta Blocker 2/4/24??-not been adherent  Stopped due to recent hypotension    MRA      SGLT2i Changed from " Farxiga [facility] 11/21/23 N/A Stopped due to hypotension    12/8/23 restart   Stopped due to hypotension          Drug Therapy Problems    Drug-disease interaction- Diltiazem- Avoid in patients taking a beta-blocker or who have heart failure with reduced ejection fraction, sinus node dysfunction, or second- or third-degree atrioventricular block unless a functioning pacemaker has been placed.  Needs refills on Digoxin and ASA      Recommendations:     Made Sonja aware.    2. Sonja to send in       Patient was educated on heart failure medications and the importance of medication adherence. All questions were addressed and patient expressed some understanding. Would benefit from additional education at each visit.    Thank you for allowing me to participate in the care of your patient,    Kelli Soto, [facility]  05/23/24  15:41 EDT

## 2024-05-23 NOTE — PROGRESS NOTES
[facility]  ABDIFATAH Galarza, Masha Tucker, APRN  [address]  [address]  [place] [address]    Thank you for asking me to see Yumiko Gurwinder for congestive heart failure.    HPI:     This is a 58 y.o. female with known past medical history of:  Chronic systolic & diastolic HF  TTE from 03/27/24 with EF 66-70% and grade 2 diastolic dysfunction  TTE from 11/2023 with EF 31-35% with grade 3 diastolic dysfxn.   TTE from 02/08/23 @ [facility] with EF ~50%  TTE 11/13/23 with EF 31-35% and grade III diastolic dysfunction as well as moderately decreased systolic fxn and mildly reduced RVSP.    TTE from November 2022 with visually estimated ejection fraction of 30% ±5% and noted abnormal systolic strain pattern as well as grade 3 diastolic dysfunction as well as abnormal TAPSE with abnormal right ventricular function  KHAI on 09/2020 with EF 21-25% with LV systolic function severely decreased and RV cavity mildly dilated with moderately reduced RV systolic function noted, moderate TVR, and aortic plaquing  Right heart cath on 12/22/2022 with elevated left and right heart pressures with report not available  ASCVD  LHC 11/10/20 with preserved LV systolic, EF 50-55% with elevated LV end diastolic pressure consistent with diastolic dysfunction and right dominant system with 30-40% mid LAD lesion.   LHC attempted on 12/2022 at [facility] with unsuccessful access due to small artery appearing occluded or near occluded radially on right  Peripheral Arterial disease  Atrial Flutter  Multiple starts and stops of amiodarone  Most recent in March 2024 on March 13 @ [facility] following concern for VTach with EP feeling strips were Afib with RVR with LBBB (per chart review) with conversion to NSR and amiodarone restart.    CKD stage IIIb  Cirrhosis of the liver  Stage III CKD  Chronic hypoxemic respiratory failure  Morbid Obesity  Diffuse purpuric rash with prior w/u negative for  vasculitis    Yumiko Purdy presents for today for HF clinic evaluation.  The patient is typically seen by Masha Davidson APRN.  Patient's primary cardiologist is Dr. Jad Meredith.      Last known EF~ 55% by TTE from [facility] in Feb 2024  Last known hospitalization and/or ED visit: Hospitalized in @[facility] with amiodarone restarted by EP due to afib with RVR at 400mg TID for 4 days followed by 300mg daily following.     Hospitalized from March 26 through March 30 with possible SVT with baseline bundle branch block and paroxysmal atrial fibrillation with rapid ventricular response with aberrant conduction status post PVI October 2022  Admission to [facility] through April 3, 2024 with wide-complex tachycardia and persistent atrial fibrillation with amiodarone drip with transition to oral amiodarone with attempt to stop intermittent episodes of atrial fibrillation.  She was ultimately discharged on amiodarone with digoxin  Transferred from [facility] to [facility] secondary to cardiac arrhythmia with EP at [facility].  She was started on Cardizem and had transition from A-fib to sinus rhythm  Accompanied by: Son    05/23/2024 visit data/details regarding:   Dyspnea: Improving, Patient is WC bound and mostly just exerts herself with transfers and such; This remains unchanged on today's visit.   Lower extremity swelling: Bilateral lower extremity swelling is worsening today  Abdominal swelling: Abdominal swelling is improving.    Home weight: Not currently monitoring due to inability to stand  Home BP: SBP has been in the 100s-110s with less drops at home  Home heart rate: 60s  Daily activities of living: Performing with assistance  HF zone: Yellow  Pillows/lying flat: Sleeps in hospital bed.  Mobility assistance devices:  WC at home, bedside commode.    Mrs. Purdy was admitted for arrhythmia @ [facility] again since her last visit.    She reports she has not been feeling well,  "and feels like her \"kidneys aren't working\". She reports she is producing urine.  Her creatinine and eGFR are acceptable as well.   She reports she plans to see her PCP regarding concerns for possibly a UTI.        Specialists:   Cardiology: [facility] with EP & General        Review of Systems - Review of Systems   Constitutional: Negative for chills, decreased appetite and malaise/fatigue.   HENT:  Negative for congestion, ear discharge and ear pain.    Eyes:  Negative for blurred vision, discharge and double vision.   Cardiovascular:  Positive for dyspnea on exertion. Negative for leg swelling.   Respiratory:  Negative for cough, hemoptysis and shortness of breath.    Endocrine: Negative for cold intolerance and heat intolerance.   Hematologic/Lymphatic: Negative for adenopathy and bleeding problem.   Skin:  Negative for color change, dry skin and nail changes.   Musculoskeletal:  Negative for arthritis, muscle weakness and myalgias.   Gastrointestinal:  Negative for bloating and abdominal pain.   Genitourinary:  Negative for bladder incontinence, decreased libido and dysuria.   Neurological:  Negative for brief paralysis, difficulty with concentration and dizziness.   Psychiatric/Behavioral:  Negative for altered mental status, depression and hallucinations.         Very pleasant   Allergic/Immunologic: Negative for environmental allergies and HIV exposure.         All other systems were reviewed and were negative.    Patient Active Problem List   Diagnosis    Dilated cardiomyopathy    CKD (chronic kidney disease) stage 2, GFR 60-89 ml/min    Severe obesity (BMI 35.0-39.9) with comorbidity    Chronic anemia    Elevated bilirubin    Acute on chronic combined systolic and diastolic CHF (congestive heart failure)    Hyponatremia    Paroxysmal atrial fibrillation    Cirrhosis    Coronary artery disease involving native coronary artery of native heart without angina pectoris    Atrial fibrillation with " RVR    Ventricular tachycardia       family history includes Heart attack in her brother and father.     reports that she has never smoked. She has never used smokeless tobacco. She reports that she does not drink alcohol and does not use drugs.    Allergies   Allergen Reactions    Phenergan [Promethazine] Other (See Comments)     Anxiety         Current Outpatient Medications:     amiodarone (Pacerone) 200 MG tablet, Take 1 tablet by mouth Daily., Disp: 30 tablet, Rfl: 0    apixaban (ELIQUIS) 5 MG tablet tablet, Take 1 tablet by mouth Every 12 (Twelve) Hours. Indications: Atrial Fibrillation, Disp: 60 tablet, Rfl: 3    Aspirin Low Dose 81 MG EC tablet, Take 1 tablet by mouth Daily for 100 days., Disp: 30 tablet, Rfl: 3    bumetanide (BUMEX) 2 MG tablet, Take 1 tablet by mouth Daily. Monday- Saturday; extra 2mg daily at lunch for 3-4 days, Disp: , Rfl:     busPIRone (BUSPAR) 10 MG tablet, Take 1 tablet by mouth 2 (Two) Times a Day., Disp: , Rfl:     digoxin (LANOXIN) 125 MCG tablet, Take 1 tablet by mouth Every Other Day., Disp: 15 tablet, Rfl: 1    dilTIAZem SR (CARDIZEM SR) 60 MG 12 hr capsule, Take 1 capsule by mouth 2 (Two) Times a Day., Disp: , Rfl:     ferrous sulfate 325 (65 FE) MG tablet, Take 1 tablet by mouth Daily., Disp: , Rfl:     HYDROcodone-acetaminophen (NORCO) 7.5-325 MG per tablet, Take 1 tablet by mouth 2 (Two) Times a Day., Disp: , Rfl:     Insulin Glargine (LANTUS SOLOSTAR) 100 UNIT/ML injection pen, Inject 45 Units under the skin into the appropriate area as directed 2 (Two) Times a Day., Disp: 30 mL, Rfl: 0    Insulin Lispro, 1 Unit Dial, (HUMALOG) 100 UNIT/ML solution pen-injector, Inject 15 Units under the skin into the appropriate area as directed 3 (Three) Times a Day With Meals., Disp: 15 mL, Rfl: 1    levothyroxine (SYNTHROID, LEVOTHROID) 50 MCG tablet, Take 1 tablet by mouth Daily., Disp: , Rfl:     omeprazole (priLOSEC) 20 MG capsule, Take 1 capsule by mouth Daily., Disp: , Rfl:      "Polyethylene Glycol 3350 powder, Take 17 g by mouth Daily. As needed, Disp: , Rfl:     Vortioxetine HBr (Trintellix) 5 MG tablet tablet, Take 1 tablet by mouth Daily With Breakfast., Disp: , Rfl:       Physical Exam:  I have reviewed the patient's current vital signs as documented in the patient's EMR.     Vitals:    24 1321   BP: 112/63   BP Location: Right arm   Patient Position: Sitting   Cuff Size: Adult   Pulse: 84   SpO2: 98%   Weight: 104 kg (230 lb)   Height: 165.1 cm (65\")           Physical Exam  Vitals and nursing note reviewed.   Constitutional:       Appearance: Normal appearance.   HENT:      Head: Normocephalic and atraumatic.   Eyes:      General: Lids are normal.   Cardiovascular:      Rate and Rhythm: Normal rate and regular rhythm.      Heart sounds: S1 normal and S2 normal.   Pulmonary:      Effort: No tachypnea or bradypnea.      Breath sounds: No decreased breath sounds, wheezing, rhonchi or rales.   Chest:      Chest wall: No mass or lacerations.   Abdominal:      General: Abdomen is protuberant. Bowel sounds are normal.      Palpations: Abdomen is soft.      Tenderness: There is no abdominal tenderness.      Comments: Less distended than on prior visits.     Musculoskeletal:      Right lower le+ Edema present.      Left lower le+ Edema present.      Right foot: No swelling.      Left foot: No swelling.   Skin:     General: Skin is warm and moist.   Neurological:      Mental Status: She is alert and oriented to person, place, and time.      Comments: Equal bilateral  strength.     Psychiatric:         Attention and Perception: Attention normal.         Mood and Affect: Mood normal.          JVP: Volume/Pulsation: Normal.        DATA REVIEWED:     EKG. I personally reviewed and interpreted the EKG.    Last EKG at [facility] not available for viewing.      ---------------------------------------------------  TTE/KHAI:    TRANSTHORACIC ECHOCARDIOGRAPHY REPORT    Demographics    " Patient Name:          JAMAL WHARTON      :            1965    Medical Record Number: [id number]          Age:            56 year(s)    Corporate ID Number:   [id number]          Gender          Female    Account Number:        [id number]    Sonographer:           Hayden Chaudhry,     Height:         65 inches                           [facility]    Referring Physician:   ANDREAS HERNANDEZ     Weight:         240 pounds    Interpreting           MATHEUS IRELAND   BMI:            39.94 kg/m^2    Physician:             MD    Date of Service:       2022          Blood Pressure: 113/40 mmHg    Room Number:           320   Type of Study:    TTE procedure: EC Echo Complete, ECHO COMPLETE (DOPPLER / COLOR) W OR WO    CONTRAST.    Patient Status: Routine IP    Study Location: PortableTechnical Quality: Adequate visualization    Contrast Medium: Optison. Amount - 3 ml    Impression:    Indication:Hypertensive heart disease with heart failure I11.0    Mild left ventricular hypertrophy. Visually estimated ejection fraction    30% +/- 5%. Abnormal left ventricular systolic function; abnormal systolic    strain pattern. Restrictive filling pattern consistent with severely    increased LV filling pressure (Grade III Diastolic dysfunction).    Dilated right ventricle. Abnormal TAPSE; abnormal right ventricular    function. Abnormal right atrial size.    Mild mitral stenosis. Peak/Mean 6/2mmHg    Moderate tricuspid (2+) regurgitation.    No masses or thrombi are seen.   Measurements Summary:    LVEDd: 4.99 cm         LVESd: 4.08 cm          IVSEd: 0.79 cm    AO Root:2.97 cm        LVPWd: 1.04 cm    Contractility Score    Global Left Ventricular Hypokinesis was noted.    LV regional wall motion: (0-Not visualized 1-Normal 2-Hypokinesis    3-Akinesis 4-Dyskinesis 5-Aneurysm)    Left Ventricle    Peak E-wave: 1.22   Peak A-wave: 0.2 m/s    E/A ratio: 5.99    m/s                 Volume [id number]  LV length: 8.47 cm    ml     Volume [id number] ml    LVOT diameter: 2.05 cm    Normal sized left ventricle.    Mild left ventricular hypertrophy.    Visually estimated ejection fraction 30% +/- 5%.    Abnormal left ventricular systolic function; abnormal systolic strain    pattern.    Restrictive filling pattern consistent with severely increased LV filling    pressure (Grade III Diastolic dysfunction).    No left ventricular masses or thrombi.    Right Ventricle    Diastolic dimension: 4.72     RV systolic pressure: 22.15 mmHg    cm    Dilated right ventricle.    Abnormal TAPSE; abnormal right ventricular function.    Left Atrium    LA dimension: 4.64 cm               LA volume:95.38 ml    LA/Aorta: 1.56    Abnormal left atrial volume index 45ml/m2.    Intact atrial septum.    No atrial mass or thrombus.    Right Atrium    Abnormal right atrial size.    Intact atrial septum.    No atrial mass or thrombus.    Mitral Valve    Deceleration time:     Area PHT: 2.04 cm^2  Mean velocity:    248.96 msec            P1/2t: 107.68 msec   0.57 m/s    Area (continuity):   Mean gradient:    1.73 cm^2            1.89 mmHg    Peak gradient:    5.97 mmHg    Thickened mitral valve leaflets.    Mild mitral regurgitation.    Mild mitral stenosis. Peak/Mean 6/2mmHg    Echogenic density noted on mitral valve chordae. Seen on prior exam    10/16/2022    Aortic Valve    LVOT VTI: 18.22 cm    Mildly thickend free edges of the aortic valve leaflets.    No aortic regurgitation.    No aortic stenosis.    No masses or vegetations seen.    Tricuspid Valve    TR velocity: 2.19 m/s     TR gradient: 19.83652 mmHg    Estimated RAP: 3 mmHg     RVSP: 22.17 mmHg    Structurally normal tricuspid valve.    Moderate tricuspid (2+) regurgitation.    RVSP likely underestimated due to RV systolic function.    No tricuspid stenosis.    No masses or vegetations seen.    Pulmonic Valve    Acceleration time: 119.95 msec          PASP: 22.15 mmHg    Structurally normal pulmonic  valve.    Mild pulmonic regurgitation (1+).    No pulmonic stenosis.    No masses or vegetations seen.   Great Vessels   Aorta    Aortic Root: 2.97 cm    Ascending Aorta: 2.76 cm    LVOT Diameter: 2.05 cm   Visualized aorta is normal.   Normal aortic root.   No evidence of dissection.   IVC is not well visualized.   Unable to obtain adequate subcostal view.    Pericardium / Pleura   No pericardial effusion.    Other    No masses or thrombi.    No intracardiac shunt.    ----------------------------------------------------------------    Electronically signed by MATHEUS IRELAND MD(Interpreting    Physician) on 11/17/2022 17:38       Results for orders placed during the hospital encounter of 03/26/24    Adult Transthoracic Echo Complete W/ Cont if Necessary Per Protocol    Interpretation Summary    Normal left ventricular cavity size and wall thickness noted. All left ventricular wall segments contract normally.    Left ventricular ejection fraction appears to be 66 - 70%.    Left ventricular diastolic function is consistent with (grade II w/high LAP) pseudonormalization.    The aortic valve is structurally normal with no regurgitation or stenosis present.    Moderate mitral annular calcification is present. There is mild calcification of the mitral valve anterior leaflet(s). Mild mitral valve regurgitation is present. Mild mitral valve stenosis is present.    There is no evidence of pericardial effusion. .    RHC report:      CARDIAC CATH - RIGHT HEART CATH    Anatomical Region Laterality Modality   Heart -- Other     Narrative    Cardiac Diagnostic Report    Demographics    Patient Name       JAMAL WHARTON      Date of Birth          1965    Patient #          [id number]          Accession #            [id number]    Visit #            [id number]          Medical Record #    Physician          MATHEUS IRELAND   Date of Study          12/22/2022                       MD    Referring                               Interventional    Physician                              physician    Procedure   Procedure Type    Diagnostic procedure: RHC with Cardiomems, RIGHT HEART CATH   Indications: Angina.    Conclusions   Procedure Description   Using ultrasound and micropuncture, access to right brachial vein obtained   and 7F sheath inserted. 7F PA catheter used for RHC.   I attempted right radial access for LHC but unsuccessful due to very small   artery which appears occluded or near-occluded.   Procedure Summary   Elevated left and right heart filling pressures.   Elevated pulmonary pressures.   Borderline-low Ramos CO/CI.    Procedure Data   Procedure Date   Date: 12/22/2022Start: 15:32End: 16:09   Entry Locations     - Retrograde Percutaneous access was performed through the Right Axiliary.       A 7 Fr sheath was inserted. Hemostasis was successfully obtained using       Manual Compression.       Entry Comments: brachial vein.   Procedure Medications     - Fentanyl I.V. 25 mcg.     - Versed Sheath 1 mg.     - Oxygen NC 6 l/min.   Diagnostic Catheters     - A7 FR MON Fessenden-Misael [id number] used for:Arch.   Contrast Material     - None0 ml   Fluoroscopy Time: Total: 2:48 minutes.   Fluoroscopy Dose: Total: 155 mGy.    Medical History    Risk Factors    The patient risk factors include:obesity, hypercholesterolemia, treated    and uncontrolled hypertension, diabetes mellitus, last creatinine: 2    mg/dl, creatinine clearance: 69.72 ml/min and dyslipidemia.    Admission Data    Hemodynamics    Condition: Baseline    O2 Consumption: Estimated: 297.50Heart Rate: 41 bpm   Oxygen Saturation   +--------+-----+----+----------------------+---+---------------------------+   !Location!pCO2 !pO2 !% Saturation          !Hgb!O2 Content                 !   +--------+-----+----+----------------------+---+---------------------------+   !PA      !     !    !51                    !   !                           !    +--------+-----+----+----------------------+---+---------------------------+   !RA      !     !    !58                    !   !                           !   +--------+-----+----+----------------------+---+---------------------------+   !AO      !     !    !94                    !   !                           !   +--------+-----+----+----------------------+---+---------------------------+   Pressures (mmHg)   +-----+--------------------------------------------------------------------+   !Site !Pressure                                                            !   +-----+--------------------------------------------------------------------+   !RA   !28/28 (25)                                                          !   +-----+--------------------------------------------------------------------+   !RV   !81/10 ,27                                                           !   +-----+--------------------------------------------------------------------+   !PA   !73/32 (43)                                                          !   +-----+--------------------------------------------------------------------+   !PCW  !68/66 (45)                                                          !   +-----+--------------------------------------------------------------------+   !PCW  !78/59 (45)                                                          !   +-----+--------------------------------------------------------------------+   !PCW  !23/36 (27)                                                          !   +-----+--------------------------------------------------------------------+   Cardiac Output   +------+-------------------------+----------------------------+------------+   !Method!CO (l/min)               !CI (l/min/m2)               !SV (ml)     !   +------+-------------------------+----------------------------+------------+   !Ramos  !5.35                     !2.2                         !131.67      !    +------+-------------------------+----------------------------+------------+   Shunts   Oxygen Values    O2 Capacity 129.2 O2 Consumption 297.5    Flows (l/min)    Qs 6.4 Qe/Qp 1.2    Qp 5.35 Qp/Qs 0.84    Qe 6.4   Vascular Resistance (dynes x sec x cm-5)   +-------------------------------------+----+---+----+----+---------+-------+   !CO method                            !TSVR!SVR!TPVR!PVR !TPVR/TSVR!PVR/SVR!   +-------------------------------------+----+---+----+----+---------+-------+   !Ramos                                 !    !   !8.03!2.94!         !       !   +-------------------------------------+----+---+----+----+---------+-------+   !Qp or Qs                             !    !   !8.03!2.94!         !       !   +-------------------------------------+----+---+----+----+---------+-------+    Signatures    ----------------------------------------------------------------    Electronically signed by MATHEUS IRELAND MD(Physician) on    12/22/2022 16:19    ----------------------------------------------------------------        -----------------------------------------------------  CXR/Imaging:   Imaging Results (Most Recent)       None            I personally reviewed and interpreted the CXR.      -----------------------------------------------------  CT:   CT Abdomen Pelvis With Contrast    Result Date: 5/9/2024  No acute abnormality seen in the abdomen or pelvis to account for the patient's symptoms. Mild to moderate distention of the stomach due to a combination of gas, fluid and ingested material. No bowel obstruction. Gross hepatomegaly with severe hepatic steatosis. Splenomegaly. Large widemouthed left-sided ventral abdominal hernia containing numerous nonobstructed loops of small bowel. Images personally reviewed, interpreted and dictated by MAGALY Sauceda M.D.   I personally reviewed the images of the CT scan.  My personal interpretation is below.       ----------------------------------------------------    PFTs:    No PFTS available.      --------------------------------------------------------------------------------------------------    Laboratory evaluations:    Lab Results   Component Value Date    GLUCOSE 274 (H) 05/23/2024    BUN 18 05/23/2024    CREATININE 1.05 (H) 05/23/2024    EGFRIFNONA 49 (L) 11/10/2020    BCR 17.1 05/23/2024    K 3.6 05/23/2024    CO2 28.3 05/23/2024    CALCIUM 9.4 05/23/2024    ALBUMIN 3.9 04/13/2024    LABIL2 1.3 (L) 06/09/2016    AST 17 04/13/2024    ALT 16 04/13/2024     Lab Results   Component Value Date    WBC 6.76 04/13/2024    HGB 8.4 (L) 04/13/2024    HCT 29.6 (L) 04/13/2024    .2 (H) 04/13/2024     04/13/2024     Lab Results   Component Value Date    CHOL 137 09/11/2020    CHLPL 103 04/21/2016    TRIG 80 09/11/2020    HDL 43 09/11/2020    LDL 78 09/11/2020     Lab Results   Component Value Date    TSH 2.960 01/31/2024     Lab Results   Component Value Date    HGBA1C 7.70 (H) 03/26/2024     Lab Results   Component Value Date    ALT 16 04/13/2024     Lab Results   Component Value Date    HGBA1C 7.70 (H) 03/26/2024    HGBA1C 7.30 (H) 11/13/2023    HGBA1C 8.40 (H) 09/10/2020     Lab Results   Component Value Date    MICROALBUR 3.2 09/12/2020    CREATININE 1.05 (H) 05/23/2024     Lab Results   Component Value Date    IRON 73 11/17/2023    TIBC 222 (L) 11/17/2023    FERRITIN 1,791.30 (H) 04/01/2024     Lab Results   Component Value Date    INR 0.96 05/10/2024    INR 1.04 05/09/2024    INR 0.97 03/13/2024    PROTIME 10.4 05/10/2024    PROTIME 11.2 05/09/2024    PROTIME 10.5 03/13/2024        Lab Results   Component Value Date    ABSOLUTELUNG 38 (A) 05/23/2024    ABSOLUTELUNG 43 (A) 04/18/2024    ABSOLUTELUNG 38 (A) 03/28/2024       PAH RISK ASSESSMENT:      1. Chronic combined systolic and diastolic HF (heart failure)    2. Dilated cardiomyopathy    3. CKD (chronic kidney disease) stage 2, GFR 60-89 ml/min    4.  Coronary artery disease involving native coronary artery of native heart without angina pectoris    5. Paroxysmal atrial fibrillation          ORDERS PLACED TODAY:  Orders Placed This Encounter   Procedures    ReDs Vest    Basic Metabolic Panel    Magnesium    proBNP    Basic Metabolic Panel    Magnesium    proBNP    POC Glucose Once        Diagnoses and all orders for this visit:    1. Chronic combined systolic and diastolic HF (heart failure) (Primary)  Overview:  11/12/[id number] 31 to 35%, mild LVH, grade 3 diastolic dysfunction with high LAP, moderately dilated left and right atrial cavities, dilated pulmonary artery, calcification of the left coronary cusp of the aortic valve    Orders:  -     Basic Metabolic Panel; Future  -     Magnesium; Future  -     proBNP; Future  -     Basic Metabolic Panel; Standing  -     Basic Metabolic Panel  -     Magnesium; Standing  -     Magnesium  -     proBNP; Standing  -     proBNP  -     ReDs Vest    2. Dilated cardiomyopathy  Overview:  11/12/20231607-NLS-MZTD 31 to 35%, mild LVH, grade 3 diastolic dysfunction with high LAP, dilated left and right atria, calcification of the left coronary cusp of the aortic valve, dilated pulmonary artery    Orders:  -     Basic Metabolic Panel; Future  -     Magnesium; Future  -     proBNP; Future  -     Basic Metabolic Panel; Standing  -     Basic Metabolic Panel  -     Magnesium; Standing  -     Magnesium  -     proBNP; Standing  -     proBNP    3. CKD (chronic kidney disease) stage 2, GFR 60-89 ml/min    4. Coronary artery disease involving native coronary artery of native heart without angina pectoris  Overview:  11/10/2020-[facility]-30 to 40% stenosis of the mid LAD      5. Paroxysmal atrial fibrillation  Overview:  11/12/2023-emergent cardioversion-sinus rhythm achieved      Other orders  -     POC Glucose Once; Standing  -     POC Glucose Once  -     digoxin (LANOXIN) 125 MCG tablet; Take 1 tablet by mouth Every Other Day.  Dispense: 15  tablet; Refill: 1  -     Aspirin Low Dose 81 MG EC tablet; Take 1 tablet by mouth Daily for 100 days.  Dispense: 30 tablet; Refill: 3             MEDS ORDERED TODAY:    New Medications Ordered This Visit   Medications    digoxin (LANOXIN) 125 MCG tablet     Sig: Take 1 tablet by mouth Every Other Day.     Dispense:  15 tablet     Refill:  1    Aspirin Low Dose 81 MG EC tablet     Sig: Take 1 tablet by mouth Daily for 100 days.     Dispense:  30 tablet     Refill:  3        ---------------------------------------------------------------------------------------------------------------------------          ASSESSMENT/PLAN:      Diagnosis Plan   1. Chronic combined systolic and diastolic HF (heart failure)  Basic Metabolic Panel    Magnesium    proBNP    Basic Metabolic Panel    Basic Metabolic Panel    Magnesium    Magnesium    proBNP    proBNP    ReDs Vest      2. Dilated cardiomyopathy  Basic Metabolic Panel    Magnesium    proBNP    Basic Metabolic Panel    Basic Metabolic Panel    Magnesium    Magnesium    proBNP    proBNP      3. CKD (chronic kidney disease) stage 2, GFR 60-89 ml/min        4. Coronary artery disease involving native coronary artery of native heart without angina pectoris        5. Paroxysmal atrial fibrillation            not acutely decompensated chronic moderate systolic and diastolic heart failure. CHF. Etiology: Unknown/Idiopathic. LVEF ~50%.     NYHA stage Stage D: Presence of advanced heart disease with continued heart failure symptoms requiring aggressive medical therapyFC-Class IV: Unable to carry on any physical activity without discomfort. Symptoms of heart failureat rest. If any physical activity is undertaken, discomfort increases.     Today, Patient is approaching euvolemia and with  Moderate perfusion. The patient's hemodynamics are currently acceptable. HR is: normal and is at goal. BP/MAP was reviewed and there is notroom for medication up-titration.  Clinical trajectory was  assessed and hasimproved.     CHF GOAL DIRECTED MEDICAL THERAPY FOR PATIENT ADDRESSED/ADJUSTED:     GDMT    Drug Class   Drug   Dose Last Dose Adjustment Additional Titration   Notes   ACEi/ARB/ARNI ACEI previously stopped due to renal fxn       Beta Blocker      MRA Spirono previously stopped due to renal fxn       SGLT2i Tried both Jardiance & Farxiga in the past with frequent UTIs   N/A    Secondaries          Secondary management:     -CHF Specific BB:   EF improved. No BB on board at present with amiodarone & dg on board.      -ACE/ARB/ARNi:   ACEi recently held during [facility] hospitalization.     -MRA:   The patient is FC-NYHA Class IV and MRA is indicated.   Spironolactone was previously stopped on regimen due to renal function.        -SGLT2 inhibitor therapy:    Farxiga transitioned to Jardiance during hospitalization in 2023.  Tolerating jardiance well, will continue at present.    SGLT2i Restarted by  Nephro in September 2023; will continue to monitor for  symptoms.        Secondary pharmacological therapy in HFreF:   EF improved and Verquevo stopped during one of her recent hospitalizatoins.       -Diuretic regimen:   ReDS Vest reading for 05/24/24 is 38;   Continue Bumex 2mg day per Nephro dosing.     -Fluid restriction/Sodium restriction:   Requested 2000 ml restriction  Patient has been asked to weigh daily and was provided with a printed diuretic strategy.  1,500 mg Na restriction was discussed.    -Acute vs. Chronic underlying conditions other than HF addressed during visit:          Paroxysmal Atrial fibrillation/Flutter:   Continue Amiodarone 200mg qd.   Dig level checked & elevated with decrease to 125 mcg every other day as patient has CKD & Cirrhosis with increased risk for toxicity.    EP visit from 03/22/24 reviewed with multiple prior ablations and limited further options with weight loss and possibly GLP 1 recommended.       CKD IIIb:   Creatinine within baseline. Continue f/u.         Debility:   Continue home health with PT/OT.   Multiple types of DME at home.     Iron/Anemia in CHF:  Recheck iron panel in future visit.  11/2023 panel reviewed with TSAT within acceptable limits.   Continue BID ferrous sulfate.          Identifiable barriers to Heart Failure Self-care:   Medical Barriers:  Debility  Social Barriers: Transport limitations      --------------------------------------------------------------------------------          BMI cannot be calculated due to outdated height or weight values with patient unable to stand. She has self reported weight of 240.               >45 minutes out of 60 minutes face to face spent counseling patient extensively on dietary Na+ intake, importance of activity, weight monitoring, compliance with medications in addition to importance of titration with goal directed medical therapy and follow up appointments.  10 of 60 minutes were spent reviewing lifevest report and discussing with patient.              This document has been electronically signed by Sonja Romo PA-C  May 24, 2024 10:21 EDT      Dictated Utilizing Dragon Dictation: Part of this note may be an electronic transcription/translation of spoken language to printed text using the Dragon Dictation System.    Follow-up appointment and medication changes provided in hand delivered After Visit Summary as well as reviewed in the room.

## 2024-05-28 ENCOUNTER — APPOINTMENT (OUTPATIENT)
Dept: CT IMAGING | Facility: HOSPITAL | Age: 59
End: 2024-05-28
Payer: COMMERCIAL

## 2024-05-28 ENCOUNTER — TELEPHONE (OUTPATIENT)
Dept: CARDIOLOGY | Facility: CLINIC | Age: 59
End: 2024-05-28
Payer: COMMERCIAL

## 2024-05-28 ENCOUNTER — HOSPITAL ENCOUNTER (EMERGENCY)
Facility: HOSPITAL | Age: 59
Discharge: HOME | End: 2024-05-28
Attending: STUDENT IN AN ORGANIZED HEALTH CARE EDUCATION/TRAINING PROGRAM | Admitting: STUDENT IN AN ORGANIZED HEALTH CARE EDUCATION/TRAINING PROGRAM
Payer: COMMERCIAL

## 2024-05-28 VITALS
DIASTOLIC BLOOD PRESSURE: 46 MMHG | SYSTOLIC BLOOD PRESSURE: 110 MMHG | OXYGEN SATURATION: 100 % | HEART RATE: 75 BPM | WEIGHT: 230 LBS | HEIGHT: 65 IN | TEMPERATURE: 98.2 F | BODY MASS INDEX: 38.32 KG/M2 | RESPIRATION RATE: 22 BRPM

## 2024-05-28 DIAGNOSIS — R06.09 CHRONIC DYSPNEA: ICD-10-CM

## 2024-05-28 DIAGNOSIS — K43.9 VENTRAL HERNIA WITHOUT OBSTRUCTION OR GANGRENE: ICD-10-CM

## 2024-05-28 DIAGNOSIS — R53.81 DEBILITATED PATIENT: ICD-10-CM

## 2024-05-28 DIAGNOSIS — D64.9 ACUTE ON CHRONIC ANEMIA: ICD-10-CM

## 2024-05-28 DIAGNOSIS — K74.60 HEPATIC CIRRHOSIS, UNSPECIFIED HEPATIC CIRRHOSIS TYPE, UNSPECIFIED WHETHER ASCITES PRESENT: ICD-10-CM

## 2024-05-28 DIAGNOSIS — I50.9 CHRONIC CONGESTIVE HEART FAILURE, UNSPECIFIED HEART FAILURE TYPE: Primary | ICD-10-CM

## 2024-05-28 LAB
ALBUMIN SERPL-MCNC: 3.7 G/DL (ref 3.5–5.2)
ALBUMIN/GLOB SERPL: 1.6 G/DL
ALP SERPL-CCNC: 89 U/L (ref 39–117)
ALT SERPL W P-5'-P-CCNC: 35 U/L (ref 1–33)
ANION GAP SERPL CALCULATED.3IONS-SCNC: 15.1 MMOL/L (ref 5–15)
ANISOCYTOSIS BLD QL: NORMAL
AST SERPL-CCNC: 35 U/L (ref 1–32)
BASOPHILS # BLD AUTO: 0.08 10*3/MM3 (ref 0–0.2)
BASOPHILS NFR BLD AUTO: 0.9 % (ref 0–1.5)
BILIRUB SERPL-MCNC: 3.8 MG/DL (ref 0–1.2)
BILIRUB UR QL STRIP: NEGATIVE
BUN SERPL-MCNC: 22 MG/DL (ref 6–20)
BUN/CREAT SERPL: 23.4 (ref 7–25)
CALCIUM SPEC-SCNC: 8.5 MG/DL (ref 8.6–10.5)
CHLORIDE SERPL-SCNC: 97 MMOL/L (ref 98–107)
CLARITY UR: CLEAR
CO2 SERPL-SCNC: 24.9 MMOL/L (ref 22–29)
COLOR UR: YELLOW
CREAT SERPL-MCNC: 0.94 MG/DL (ref 0.57–1)
CRP SERPL-MCNC: 0.72 MG/DL (ref 0–0.5)
D-LACTATE SERPL-SCNC: 1.9 MMOL/L (ref 0.5–2)
D-LACTATE SERPL-SCNC: 2.1 MMOL/L (ref 0.5–2)
DEPRECATED RDW RBC AUTO: 82.8 FL (ref 37–54)
DIGOXIN SERPL-MCNC: 0.7 NG/ML (ref 0.6–1.2)
EGFRCR SERPLBLD CKD-EPI 2021: 70.9 ML/MIN/1.73
EOSINOPHIL # BLD AUTO: 0.1 10*3/MM3 (ref 0–0.4)
EOSINOPHIL NFR BLD AUTO: 1.2 % (ref 0.3–6.2)
ERYTHROCYTE [DISTWIDTH] IN BLOOD BY AUTOMATED COUNT: 21.7 % (ref 12.3–15.4)
ERYTHROCYTE [SEDIMENTATION RATE] IN BLOOD: 12 MM/HR (ref 0–30)
GEN 5 2HR TROPONIN T REFLEX: 67 NG/L
GLOBULIN UR ELPH-MCNC: 2.3 GM/DL
GLUCOSE SERPL-MCNC: 295 MG/DL (ref 65–99)
GLUCOSE UR STRIP-MCNC: ABNORMAL MG/DL
HCT VFR BLD AUTO: 31.2 % (ref 34–46.6)
HGB BLD-MCNC: 9.2 G/DL (ref 12–15.9)
HGB UR QL STRIP.AUTO: NEGATIVE
HOLD SPECIMEN: NORMAL
HOLD SPECIMEN: NORMAL
HYPOCHROMIA BLD QL: NORMAL
IMM GRANULOCYTES # BLD AUTO: 0.08 10*3/MM3 (ref 0–0.05)
IMM GRANULOCYTES NFR BLD AUTO: 0.9 % (ref 0–0.5)
INR PPP: 1.31 (ref 0.9–1.1)
KETONES UR QL STRIP: NEGATIVE
LARGE PLATELETS: NORMAL
LEUKOCYTE ESTERASE UR QL STRIP.AUTO: NEGATIVE
LYMPHOCYTES # BLD AUTO: 1.4 10*3/MM3 (ref 0.7–3.1)
LYMPHOCYTES NFR BLD AUTO: 16.6 % (ref 19.6–45.3)
MACROCYTES BLD QL SMEAR: NORMAL
MCH RBC QN AUTO: 31.6 PG (ref 26.6–33)
MCHC RBC AUTO-ENTMCNC: 29.5 G/DL (ref 31.5–35.7)
MCV RBC AUTO: 107.2 FL (ref 79–97)
MONOCYTES # BLD AUTO: 0.36 10*3/MM3 (ref 0.1–0.9)
MONOCYTES NFR BLD AUTO: 4.3 % (ref 5–12)
NEUTROPHILS NFR BLD AUTO: 6.42 10*3/MM3 (ref 1.7–7)
NEUTROPHILS NFR BLD AUTO: 76.1 % (ref 42.7–76)
NITRITE UR QL STRIP: NEGATIVE
NRBC BLD AUTO-RTO: 0.7 /100 WBC (ref 0–0.2)
NT-PROBNP SERPL-MCNC: 1803 PG/ML (ref 0–900)
PH UR STRIP.AUTO: 6.5 [PH] (ref 5–8)
PLATELET # BLD AUTO: 147 10*3/MM3 (ref 140–450)
PMV BLD AUTO: 10.4 FL (ref 6–12)
POTASSIUM SERPL-SCNC: 3.9 MMOL/L (ref 3.5–5.2)
PROT SERPL-MCNC: 6 G/DL (ref 6–8.5)
PROT UR QL STRIP: NEGATIVE
PROTHROMBIN TIME: 16.4 SECONDS (ref 12.1–14.7)
QT INTERVAL: 400 MS
QTC INTERVAL: 521 MS
RBC # BLD AUTO: 2.91 10*6/MM3 (ref 3.77–5.28)
SODIUM SERPL-SCNC: 137 MMOL/L (ref 136–145)
SP GR UR STRIP: 1.01 (ref 1–1.03)
TROPONIN T DELTA: 0 NG/L
TROPONIN T SERPL HS-MCNC: 67 NG/L
UROBILINOGEN UR QL STRIP: ABNORMAL
WBC NRBC COR # BLD AUTO: 8.44 10*3/MM3 (ref 3.4–10.8)
WHOLE BLOOD HOLD COAG: NORMAL
WHOLE BLOOD HOLD SPECIMEN: NORMAL

## 2024-05-28 PROCEDURE — 85652 RBC SED RATE AUTOMATED: CPT | Performed by: STUDENT IN AN ORGANIZED HEALTH CARE EDUCATION/TRAINING PROGRAM

## 2024-05-28 PROCEDURE — 81003 URINALYSIS AUTO W/O SCOPE: CPT | Performed by: STUDENT IN AN ORGANIZED HEALTH CARE EDUCATION/TRAINING PROGRAM

## 2024-05-28 PROCEDURE — 74176 CT ABD & PELVIS W/O CONTRAST: CPT

## 2024-05-28 PROCEDURE — 83605 ASSAY OF LACTIC ACID: CPT | Performed by: STUDENT IN AN ORGANIZED HEALTH CARE EDUCATION/TRAINING PROGRAM

## 2024-05-28 PROCEDURE — 71250 CT THORAX DX C-: CPT

## 2024-05-28 PROCEDURE — 86140 C-REACTIVE PROTEIN: CPT | Performed by: STUDENT IN AN ORGANIZED HEALTH CARE EDUCATION/TRAINING PROGRAM

## 2024-05-28 PROCEDURE — 84484 ASSAY OF TROPONIN QUANT: CPT | Performed by: STUDENT IN AN ORGANIZED HEALTH CARE EDUCATION/TRAINING PROGRAM

## 2024-05-28 PROCEDURE — 80162 ASSAY OF DIGOXIN TOTAL: CPT | Performed by: STUDENT IN AN ORGANIZED HEALTH CARE EDUCATION/TRAINING PROGRAM

## 2024-05-28 PROCEDURE — 80053 COMPREHEN METABOLIC PANEL: CPT | Performed by: STUDENT IN AN ORGANIZED HEALTH CARE EDUCATION/TRAINING PROGRAM

## 2024-05-28 PROCEDURE — 85007 BL SMEAR W/DIFF WBC COUNT: CPT | Performed by: STUDENT IN AN ORGANIZED HEALTH CARE EDUCATION/TRAINING PROGRAM

## 2024-05-28 PROCEDURE — 93005 ELECTROCARDIOGRAM TRACING: CPT | Performed by: STUDENT IN AN ORGANIZED HEALTH CARE EDUCATION/TRAINING PROGRAM

## 2024-05-28 PROCEDURE — 85025 COMPLETE CBC W/AUTO DIFF WBC: CPT | Performed by: STUDENT IN AN ORGANIZED HEALTH CARE EDUCATION/TRAINING PROGRAM

## 2024-05-28 PROCEDURE — 83880 ASSAY OF NATRIURETIC PEPTIDE: CPT | Performed by: STUDENT IN AN ORGANIZED HEALTH CARE EDUCATION/TRAINING PROGRAM

## 2024-05-28 PROCEDURE — 99284 EMERGENCY DEPT VISIT MOD MDM: CPT

## 2024-05-28 PROCEDURE — 71250 CT THORAX DX C-: CPT | Performed by: RADIOLOGY

## 2024-05-28 PROCEDURE — 36415 COLL VENOUS BLD VENIPUNCTURE: CPT

## 2024-05-28 PROCEDURE — 74176 CT ABD & PELVIS W/O CONTRAST: CPT | Performed by: RADIOLOGY

## 2024-05-28 PROCEDURE — 72131 CT LUMBAR SPINE W/O DYE: CPT | Performed by: RADIOLOGY

## 2024-05-28 PROCEDURE — 72131 CT LUMBAR SPINE W/O DYE: CPT

## 2024-05-28 PROCEDURE — 93010 ELECTROCARDIOGRAM REPORT: CPT | Performed by: SPECIALIST

## 2024-05-28 PROCEDURE — 85610 PROTHROMBIN TIME: CPT | Performed by: STUDENT IN AN ORGANIZED HEALTH CARE EDUCATION/TRAINING PROGRAM

## 2024-05-28 RX ORDER — IPRATROPIUM BROMIDE AND ALBUTEROL SULFATE 2.5; .5 MG/3ML; MG/3ML
3 SOLUTION RESPIRATORY (INHALATION) ONCE
Status: DISCONTINUED | OUTPATIENT
Start: 2024-05-28 | End: 2024-05-28

## 2024-05-28 NOTE — TELEPHONE ENCOUNTER
Called having sob chest pain unable to walk I did advise her to go to the ER she is going to call [phone] so she will be taken to [place]

## 2024-05-28 NOTE — ED PROVIDER NOTES
"Subjective   History of Present Illness  Patient is a 58-year-old very chronically ill-appearing and much older than stated age female with a history of congestive heart failure, atrial fibrillation, hepatic cirrhosis, diabetes mellitus and is dependent on supplemental oxygen of 2 L of nasal cannula that presents from home for chest pain and feeling short of breath with intermittently complaining of lower abdominal pain that extends into her right leg.  Patient also states vague complaints of feeling numb on her anterior right leg. She states that she is very limited in her mobility and has not been unable to ambulate for several years, and only walks very short distances with a walker.  She states that earlier today while she was attempting to walk with her walker she started having shortness of breath with some anterior chest pain but was not radiating into her arms or through her back.  She reports that she requires a hospital bed at home due to her chronic debility and resides at home with her son.    Patient states that ever since beginning amiodarone she has felt intermittent decrease sensation or numbness throughout her body that comes and goes, but she has not actually discussed these potential side effects with any provider or cardiologist.  She states that she had a history of difficult to control A-fib and is on additional rate control medications and has had to be \"shocked \"several times.    Patient reports that she is also on antibiotics for urinary tract infection that she was diagnosed roughly 5 days ago at [facility] as has noted some increased weakness since the UTI.          Review of Systems   Constitutional: Negative.  Negative for fever.   HENT: Negative.     Respiratory: Negative.  Positive for shortness of breath.    Cardiovascular: Negative.  Positive for chest pain.   Gastrointestinal: Negative.  Negative for abdominal pain.   Endocrine: Negative.    Genitourinary: Negative.  Negative " for dysuria.   Skin: Negative.    Neurological: Negative.    Psychiatric/Behavioral: Negative.     All other systems reviewed and are negative.      Past Medical History:   Diagnosis Date    Anemia     CHF (congestive heart failure)     Chronic a-fib     stated by patient     Cirrhosis of liver     stated by patient     Diabetes mellitus     Ventricular tachycardia        Allergies   Allergen Reactions    Phenergan [Promethazine] Other (See Comments)     Anxiety       Past Surgical History:   Procedure Laterality Date    APPENDECTOMY      CARDIAC CATHETERIZATION N/A 11/10/2020    Procedure: Left Heart Cath;  Surgeon: Charlee Ken MD;  Location: [facility] CATH INVASIVE LOCATION;  Service: Cardiovascular;  Laterality: N/A;    CHOLECYSTECTOMY      TOE AMPUTATION Right     stated by patient.        Family History   Problem Relation Age of Onset    Heart attack Father     Heart attack Brother        Social History     Socioeconomic History    Marital status:    Tobacco Use    Smoking status: Never    Smokeless tobacco: Never   Vaping Use    Vaping status: Never Used   Substance and Sexual Activity    Alcohol use: Never    Drug use: Never    Sexual activity: Defer           Objective   Physical Exam  Vitals and nursing note reviewed.   Constitutional:       General: She is not in acute distress.     Appearance: She is obese. She is ill-appearing. She is not diaphoretic.      Comments: Patient is a very chronically ill-appearing much older than stated age 58-year-old female that presents by EMS but currently is in no acute distress.   HENT:      Head: Normocephalic and atraumatic.      Right Ear: External ear normal.      Left Ear: External ear normal.      Nose: Nose normal.   Eyes:      Conjunctiva/sclera: Conjunctivae normal.      Pupils: Pupils are equal, round, and reactive to light.   Neck:      Vascular: No JVD.      Trachea: No tracheal deviation.   Cardiovascular:      Rate and Rhythm: Tachycardia present.  Rhythm irregular.      Heart sounds: No murmur heard.     Comments: Irregularly irregular rhythm  Pulmonary:      Effort: No tachypnea, bradypnea, accessory muscle usage or respiratory distress.      Breath sounds: No wheezing.      Comments: Diffusely diminished breath sounds throughout all lung fields- faint bilateral crackles in the lower lung bases  Abdominal:      General: Bowel sounds are normal.      Palpations: Abdomen is soft.      Tenderness: There is no abdominal tenderness.   Musculoskeletal:         General: No deformity. Normal range of motion.      Cervical back: Normal range of motion and neck supple.   Skin:     General: Skin is warm and dry.      Capillary Refill: Capillary refill takes less than 2 seconds.      Coloration: Skin is not pale.      Findings: No erythema or rash.   Neurological:      General: No focal deficit present.      Mental Status: She is alert and oriented to person, place, and time.      Cranial Nerves: No cranial nerve deficit.      Comments: Symmetrical strength 5 out of 5 in upper and lower extremities bilaterally   Psychiatric:         Behavior: Behavior normal.         Thought Content: Thought content normal.         Procedures       Results for orders placed or performed during the hospital encounter of 05/28/24   Digoxin Level    Specimen: Blood   Result Value Ref Range    Digoxin 0.70 0.60 - 1.20 ng/mL   High Sensitivity Troponin T    Specimen: Blood   Result Value Ref Range    HS Troponin T 67 (C) <14 ng/L   Protime-INR    Specimen: Blood   Result Value Ref Range    Protime 16.4 (H) 12.1 - 14.7 Seconds    INR 1.31 (H) 0.90 - 1.10   Lactic Acid, Plasma    Specimen: Blood   Result Value Ref Range    Lactate 2.1 (C) 0.5 - 2.0 mmol/L   C-reactive Protein    Specimen: Blood   Result Value Ref Range    C-Reactive Protein 0.72 (H) 0.00 - 0.50 mg/dL   Sedimentation Rate    Specimen: Blood   Result Value Ref Range    Sed Rate 12 0 - 30 mm/hr   CBC Auto Differential     Specimen: Blood   Result Value Ref Range    WBC 8.44 3.40 - 10.80 10*3/mm3    RBC 2.91 (L) 3.77 - 5.28 10*6/mm3    Hemoglobin 9.2 (L) 12.0 - 15.9 g/dL    Hematocrit 31.2 (L) 34.0 - 46.6 %    .2 (H) 79.0 - 97.0 fL    MCH 31.6 26.6 - 33.0 pg    MCHC 29.5 (L) 31.5 - 35.7 g/dL    RDW 21.7 (H) 12.3 - 15.4 %    RDW-SD 82.8 (H) 37.0 - 54.0 fl    MPV 10.4 6.0 - 12.0 fL    Platelets 147 140 - 450 10*3/mm3    Neutrophil % 76.1 (H) 42.7 - 76.0 %    Lymphocyte % 16.6 (L) 19.6 - 45.3 %    Monocyte % 4.3 (L) 5.0 - 12.0 %    Eosinophil % 1.2 0.3 - 6.2 %    Basophil % 0.9 0.0 - 1.5 %    Immature Grans % 0.9 (H) 0.0 - 0.5 %    Neutrophils, Absolute 6.42 1.70 - 7.00 10*3/mm3    Lymphocytes, Absolute 1.40 0.70 - 3.10 10*3/mm3    Monocytes, Absolute 0.36 0.10 - 0.90 10*3/mm3    Eosinophils, Absolute 0.10 0.00 - 0.40 10*3/mm3    Basophils, Absolute 0.08 0.00 - 0.20 10*3/mm3    Immature Grans, Absolute 0.08 (H) 0.00 - 0.05 10*3/mm3    nRBC 0.7 (H) 0.0 - 0.2 /100 WBC   Scan Slide    Specimen: Blood   Result Value Ref Range    Anisocytosis Large/3+ None Seen    Hypochromia Mod/2+ None Seen    Macrocytes Mod/2+ None Seen    Large Platelets Slight/1+ None Seen   Urinalysis With Culture If Indicated - Urine, Clean Catch    Specimen: Urine, Clean Catch   Result Value Ref Range    Color, UA Yellow Yellow, Straw    Appearance, UA Clear Clear    pH, UA 6.5 5.0 - 8.0    Specific Gravity, UA 1.010 1.005 - 1.030    Glucose,  mg/dL (2+) (A) Negative    Ketones, UA Negative Negative    Bilirubin, UA Negative Negative    Blood, UA Negative Negative    Protein, UA Negative Negative    Leuk Esterase, UA Negative Negative    Nitrite, UA Negative Negative    Urobilinogen, UA 1.0 E.U./dL 0.2 - 1.0 E.U./dL   STAT Lactic Acid, Reflex    Specimen: Arm, Left; Blood   Result Value Ref Range    Lactate 1.9 0.5 - 2.0 mmol/L   High Sensitivity Troponin T 2Hr    Specimen: Arm, Left; Blood   Result Value Ref Range    HS Troponin T 67 (C) <14 ng/L     Troponin T Delta 0 >=-4 - <+4 ng/L   BNP    Specimen: Arm, Left; Blood   Result Value Ref Range    proBNP 1,803.0 (H) 0.0 - 900.0 pg/mL   Comprehensive Metabolic Panel    Specimen: Arm, Left; Blood   Result Value Ref Range    Glucose 295 (H) 65 - 99 mg/dL    BUN 22 (H) 6 - 20 mg/dL    Creatinine 0.94 0.57 - 1.00 mg/dL    Sodium 137 136 - 145 mmol/L    Potassium 3.9 3.5 - 5.2 mmol/L    Chloride 97 (L) 98 - 107 mmol/L    CO2 24.9 22.0 - 29.0 mmol/L    Calcium 8.5 (L) 8.6 - 10.5 mg/dL    Total Protein 6.0 6.0 - 8.5 g/dL    Albumin 3.7 3.5 - 5.2 g/dL    ALT (SGPT) 35 (H) 1 - 33 U/L    AST (SGOT) 35 (H) 1 - 32 U/L    Alkaline Phosphatase 89 39 - 117 U/L    Total Bilirubin 3.8 (H) 0.0 - 1.2 mg/dL    Globulin 2.3 gm/dL    A/G Ratio 1.6 g/dL    BUN/Creatinine Ratio 23.4 7.0 - 25.0    Anion Gap 15.1 (H) 5.0 - 15.0 mmol/L    eGFR 70.9 >60.0 mL/min/1.73   ECG 12 Lead Chest Pain   Result Value Ref Range    QT Interval 400 ms    QTC Interval 521 ms   Green Top (Gel)   Result Value Ref Range    Extra Tube Hold for add-ons.    Lavender Top   Result Value Ref Range    Extra Tube hold for add-on    Gold Top - SST   Result Value Ref Range    Extra Tube Hold for add-ons.    Light Blue Top   Result Value Ref Range    Extra Tube Hold for add-ons.        CT Lumbar Spine Without Contrast   Final Result   No acute process.                   This report was finalized on 5/28/2024 4:59 PM by Alex Talbot M.D..          CT Abdomen Pelvis Without Contrast   Final Result   1. Findings consistent with cirrhosis with splenomegaly.   2. Ventral hernia which contains nonobstructed loops of small bowel.                   This report was finalized on 5/28/2024 4:28 PM by Alex Talbot M.D..          CT Chest Without Contrast Diagnostic   Final Result   No acute process.           This report was finalized on 5/28/2024 4:48 PM by Alex Talbot M.D..            CT Lumbar Spine Without Contrast   Final Result   No acute process.                    This report was finalized on 5/28/2024 4:59 PM by Alex Talbot M.D..          CT Abdomen Pelvis Without Contrast   Final Result   1. Findings consistent with cirrhosis with splenomegaly.   2. Ventral hernia which contains nonobstructed loops of small bowel.                   This report was finalized on 5/28/2024 4:28 PM by Alex Talbot M.D..          CT Chest Without Contrast Diagnostic   Final Result   No acute process.           This report was finalized on 5/28/2024 4:48 PM by Alex Talbot M.D..                ED Course  ED Course as of 05/28/24 2010   Tue May 28, 2024   1603 Patient's EKG on arrival showed atrial fibrillation with a rate of 102.  Left axis deviation and nonspecific intraventricular block.  QTc 521.  Electronically signed by Juanita Box DO, 05/28/24, 4:03 PM EDT.   [LK]   1728 Patient has been comfortably resting in bed on her baseline 2 L nasal cannula oxygen.  Review of imaging shows no new changes from [LK]   1855 Patient has chronic atrial fibrillation with heart failure on supplemental oxygen continuously.  She has not required no increase in oxygen supplementation.    -Patient states that she usually takes her chronic medication and Bumex daily but occasionally will take the medication later in the day or miss a dose.    Increase in BNP as well as cardiac enzymes this is consistent with increase in fluid but no evidence of respiratory distress or needing any acute admission for IV diuretic therapy.  She is instructed to continue on her diuretic therapy and follow-up with her cardiologist to see if any type of routine frequency or dosing needs to be adjusted.  -Patient has had no complaints of chest pain and for the duration of her admission and has been comfortably resting in bed in no acute distress.    -Was given a DuoNeb prior to discharge. [LK]   1858 CT imaging was also unchanged from 5/9 CT imaging performed at [facility]. [LK]   1911 Patient has had  chronic and unchanged BGM's seen on CT imaging from November 2023.  Once again she has no evidence of focal deficits on evaluation she just a very chronically obese and debilitated female on continue with supplemental oxygen. [LK]   1916 EKG today has been reviewed and unchanged from 4/13/2024.  Patient was instructed to continue all chronic prescribed medications medications for chronic heart failure with atrial fibrillation.  Patient instructed to follow-up with her cardiologist in the next 1 to 2 days to discuss her chronic and ongoing shortness of breath and chest pain  At time of discharge she was reevaluated and patient denied any type of active chest pain or shortness of breath. [LK]   1932 Patient was also specifically counseled requiring a continuous use of adhering to diuretic therapy due to her chronic debility, CHF and atrial fibrillation she is at high risk to have rapid development of pulmonary edema which can cause her to have a chest pain or shortness of breath with minimal ambulation especially given that she is mostly bedbound at baseline.  Patient verbalized her understanding. [LK]      ED Course User Index  [LK] Juanita Box DO                                             Medical Decision Making  Problems Addressed:  Acute on chronic anemia: complicated acute illness or injury  Chronic congestive heart failure, unspecified heart failure type: complicated acute illness or injury  Chronic dyspnea: complicated acute illness or injury  Debilitated patient: complicated acute illness or injury  Hepatic cirrhosis, unspecified hepatic cirrhosis type, unspecified whether ascites present: complicated acute illness or injury  Ventral hernia without obstruction or gangrene: complicated acute illness or injury    Amount and/or Complexity of Data Reviewed  Labs: ordered.  Radiology: ordered.  ECG/medicine tests: ordered.    Risk  Prescription drug management.        Final diagnoses:   Chronic congestive heart  failure, unspecified heart failure type   Chronic dyspnea   Acute on chronic anemia   Ventral hernia without obstruction or gangrene   Debilitated patient   Hepatic cirrhosis, unspecified hepatic cirrhosis type, unspecified whether ascites present       ED Disposition  ED Disposition       ED Disposition   Discharge    Condition   Stable    Comment   --               Ramon Sahu MD   [address] [place] [address]  [phone]               Medication List      No changes were made to your prescriptions during this visit.            Juanita Box DO  05/28/24 1933       Juanita Box DO  05/28/24 2010

## 2024-05-30 ENCOUNTER — OFFICE VISIT (OUTPATIENT)
Dept: CARDIOLOGY | Facility: CLINIC | Age: 59
End: 2024-05-30
Payer: COMMERCIAL

## 2024-05-30 VITALS
HEART RATE: 86 BPM | HEIGHT: 65 IN | OXYGEN SATURATION: 100 % | RESPIRATION RATE: 16 BRPM | DIASTOLIC BLOOD PRESSURE: 58 MMHG | BODY MASS INDEX: 38.27 KG/M2 | SYSTOLIC BLOOD PRESSURE: 108 MMHG

## 2024-05-30 DIAGNOSIS — R07.2 PRECORDIAL PAIN: ICD-10-CM

## 2024-05-30 DIAGNOSIS — I48.0 PAROXYSMAL ATRIAL FIBRILLATION: ICD-10-CM

## 2024-05-30 DIAGNOSIS — I50.42 CHRONIC COMBINED SYSTOLIC AND DIASTOLIC HF (HEART FAILURE): Primary | ICD-10-CM

## 2024-05-30 PROCEDURE — 99214 OFFICE O/P EST MOD 30 MIN: CPT | Performed by: INTERNAL MEDICINE

## 2024-05-30 RX ORDER — NITROFURANTOIN MACROCRYSTALS 100 MG/1
100 CAPSULE ORAL 2 TIMES DAILY
COMMUNITY
Start: 2024-05-24

## 2024-05-30 RX ORDER — CARVEDILOL 6.25 MG/1
6.25 TABLET ORAL 2 TIMES DAILY
Qty: 60 TABLET | Refills: 3 | Status: SHIPPED | OUTPATIENT
Start: 2024-05-30

## 2024-05-30 RX ORDER — NITROGLYCERIN 0.4 MG/1
0.4 TABLET SUBLINGUAL
COMMUNITY

## 2024-05-30 RX ORDER — VALSARTAN 40 MG/1
40 TABLET ORAL DAILY
Qty: 30 TABLET | Refills: 2 | Status: SHIPPED | OUTPATIENT
Start: 2024-05-30

## 2024-05-30 NOTE — PROGRESS NOTES
Masha Davidson, KENNY  Yumiko Purdy  1965 05/30/2024    Patient Active Problem List   Diagnosis    Dilated cardiomyopathy    CKD (chronic kidney disease) stage 2, GFR 60-89 ml/min    Severe obesity (BMI 35.0-39.9) with comorbidity    Chronic anemia    Elevated bilirubin    Acute on chronic combined systolic and diastolic CHF (congestive heart failure)    Hyponatremia    Paroxysmal atrial fibrillation    Cirrhosis    Coronary artery disease involving native coronary artery of native heart without angina pectoris    Atrial fibrillation with RVR    Ventricular tachycardia       Dear Masha Davidson, APRN:    Subjective     Yumiko Purdy is a 58 y.o. female with the problems as listed above, presents    Chief Complaint: Follow-up of HFrEF and paroxysmal atrial fibrillation.     History of Present Illness: Ms. Purdy is a pleasant 58-year-old  female with history of paroxysmal SVT/atrial fibrillation with rapid ventricle response, status post previous PVI in October 2022 with a recurrence requiring electrocardioversion on 1/31/2024.  She also has chronic HFrEF with underlying advanced cardiomyopathy with LV ejection fraction of 30 to 35% with nonobstructive coronary artery disease noted on coronary angiography on 11/10/2020.  She has also chronic kidney disease (stage IIIa).  She is here today as a follow-up after recent visit to the emergency department at [facility] for complaints of shortness of breath and some numbness in her lower extremities.  She has been on amiodarone which was initiated by the electrophysiologist at [facility] for her recurrent atrial fibrillation.  Patient apparently gets more symptomatic when she goes back into [facility].  She also has intermittent substernal chest pains that seem to occur at random and resolve spontaneously.      Allergies   Allergen Reactions    Phenergan [Promethazine] Other (See Comments)     Anxiety   :    Current  Outpatient Medications:     amiodarone (Pacerone) 200 MG tablet, Take 1 tablet by mouth Daily., Disp: 30 tablet, Rfl: 0    apixaban (ELIQUIS) 5 MG tablet tablet, Take 1 tablet by mouth Every 12 (Twelve) Hours. Indications: Atrial Fibrillation, Disp: 60 tablet, Rfl: 3    Aspirin Low Dose 81 MG EC tablet, Take 1 tablet by mouth Daily for 100 days., Disp: 30 tablet, Rfl: 3    bumetanide (BUMEX) 2 MG tablet, Take 1 tablet by mouth Daily. Monday- Saturday; extra 2mg daily at lunch for 3-4 days, Disp: , Rfl:     busPIRone (BUSPAR) 10 MG tablet, Take 1 tablet by mouth 2 (Two) Times a Day., Disp: , Rfl:     digoxin (LANOXIN) 125 MCG tablet, Take 1 tablet by mouth Every Other Day., Disp: 15 tablet, Rfl: 1    ferrous sulfate 325 (65 FE) MG tablet, Take 1 tablet by mouth Daily., Disp: , Rfl:     HYDROcodone-acetaminophen (NORCO) 7.5-325 MG per tablet, Take 1 tablet by mouth 2 (Two) Times a Day., Disp: , Rfl:     Insulin Glargine (LANTUS SOLOSTAR) 100 UNIT/ML injection pen, Inject 45 Units under the skin into the appropriate area as directed 2 (Two) Times a Day., Disp: 30 mL, Rfl: 0    Insulin Lispro, 1 Unit Dial, (HUMALOG) 100 UNIT/ML solution pen-injector, Inject 15 Units under the skin into the appropriate area as directed 3 (Three) Times a Day With Meals., Disp: 15 mL, Rfl: 1    levothyroxine (SYNTHROID, LEVOTHROID) 50 MCG tablet, Take 1 tablet by mouth Daily., Disp: , Rfl:     nitrofurantoin (MACRODANTIN) 100 MG capsule, Take 1 capsule by mouth 2 (Two) Times a Day., Disp: , Rfl:     nitroglycerin (NITROSTAT) 0.4 MG SL tablet, Place 1 tablet under the tongue Every 5 (Five) Minutes As Needed for Chest Pain. Take no more than 3 doses in 15 minutes., Disp: , Rfl:     omeprazole (priLOSEC) 20 MG capsule, Take 1 capsule by mouth Daily., Disp: , Rfl:     Vortioxetine HBr (Trintellix) 5 MG tablet tablet, Take 1 tablet by mouth Daily With Breakfast., Disp: , Rfl:     carvedilol (COREG) 6.25 MG tablet, Take 1 tablet by mouth 2 (Two)  "Times a Day., Disp: 60 tablet, Rfl: 3    Polyethylene Glycol 3350 powder, Take 17 g by mouth Daily. As needed, Disp: , Rfl:     valsartan (DIOVAN) 40 MG tablet, Take 1 tablet by mouth Daily., Disp: 30 tablet, Rfl: 2    The following portions of the patient's history were reviewed and updated as appropriate: allergies, current medications, past family history, past medical history, past social history, past surgical history and problem list.    Social History     Tobacco Use    Smoking status: Never    Smokeless tobacco: Never   Vaping Use    Vaping status: Never Used   Substance Use Topics    Alcohol use: Never    Drug use: Never     Review of Systems   Constitutional: Negative for chills and fever.   HENT:  Negative for nosebleeds and sore throat.    Respiratory:  Positive for shortness of breath. Negative for cough, hemoptysis and wheezing.    Gastrointestinal:  Negative for abdominal pain, hematemesis, hematochezia, melena, nausea and vomiting.   Genitourinary:  Negative for dysuria and hematuria.   Neurological:  Negative for headaches.     Objective   Vitals:    05/30/24 1158   BP: 108/58   Pulse: 86   Resp: 16   SpO2: 100%   Height: 165.1 cm (65\")     Body mass index is 38.27 kg/m².    Constitutional:       Appearance: Well-developed. Chronically ill-appearing.   Eyes:      Conjunctiva/sclera: Conjunctivae normal.   HENT:      Head: Normocephalic.   Neck:      Thyroid: No thyromegaly.      Vascular: No JVD.      Trachea: No tracheal deviation.   Pulmonary:      Effort: No respiratory distress.      Breath sounds: Normal breath sounds. No wheezing. No rales.   Cardiovascular:      PMI at left midclavicular line. Normal rate. Regular rhythm. Normal S1. Normal S2.       Murmurs: There is no murmur.      No gallop.  No click. No rub.   Pulses:     Intact distal pulses.   Edema:     Pretibial: bilateral 1+ edema of the pretibial area.     Ankle: bilateral 1+ edema of the ankle.     Feet: bilateral 1+ edema of the " feet.  Abdominal:      General: Bowel sounds are normal.      Palpations: Abdomen is soft. There is no abdominal mass.      Tenderness: There is no abdominal tenderness.   Musculoskeletal:      Cervical back: Normal range of motion and neck supple. Skin:     General: Skin is warm and dry.   Neurological:      Mental Status: Alert and oriented to person, place, and time.      Cranial Nerves: No cranial nerve deficit.       Lab Results   Component Value Date     05/28/2024    K 3.9 05/28/2024    CL 97 (L) 05/28/2024    CO2 24.9 05/28/2024    BUN 22 (H) 05/28/2024    CREATININE 0.94 05/28/2024    GLUCOSE 295 (H) 05/28/2024    CALCIUM 8.5 (L) 05/28/2024    AST 35 (H) 05/28/2024    ALT 35 (H) 05/28/2024    ALKPHOS 89 05/28/2024    LABIL2 1.3 (L) 06/09/2016     Lab Results   Component Value Date    CKTOTAL 66 04/01/2024     Lab Results   Component Value Date    WBC 8.44 05/28/2024    HGB 9.2 (L) 05/28/2024    HCT 31.2 (L) 05/28/2024     05/28/2024       Lab Results   Component Value Date    MG 1.8 05/23/2024     Lab Results   Component Value Date    TSH 2.960 01/31/2024    CHLPL 103 04/21/2016    TRIG 80 09/11/2020    HDL 43 09/11/2020    LDL 78 09/11/2020      Lab Results   Component Value Date    BNP 86 05/08/2016       Assessment & Plan    Diagnosis Plan   1. Chronic combined systolic and diastolic HF (heart failure)  Comprehensive Metabolic Panel      2. Paroxysmal atrial fibrillation        3. Precordial pain  Stress Test With Myocardial Perfusion (1 Day)        Recommendations  Since she has LV systolic dysfunction, will discontinue the diltiazem.    Add carvedilol 6.25 mg p.o. twice daily  Add valsartan 40 mg daily  Check CMP in a week.  Continue with the bumetanide as needed for recurrent heart failure.  Will have the office staff to check on the cost of SGLT2 inhibitors and Entresto and consider adding these if affordable and tolerated by her blood pressure.  I have reemphasized about the importance  of avoiding salt rich foods such as all the chips, pickles, sausages, hot dogs, hamburgers and cheeseburgers and pizzas etc.  Patient expressed understanding.  Evaluate her chest pains further with a Lexiscan sestamibi study.    Return in about 3 weeks (around 6/20/2024).    As always, Masha Davidson, KENNY  I appreciate very much the opportunity to participate in the cardiovascular care of your patients. Please do not hesitate to call me with any questions with regards to Yumiko Purdy's evaluation and management.       With Best Regards,        Ramon Sahu MD, FACC    Please note that portions of this note were completed with a voice recognition program.

## 2024-05-30 NOTE — LETTER
May 31, 2024     KENNY Gonzales  [address] [place] [address] [address]  [facility] [address]    Patient: Yumiko Purdy   YOB: 1965   Date of Visit: 5/30/2024     Dear KENNY Gonzales:       Thank you for referring Yumiko Purdy to me for evaluation. Below are the relevant portions of my assessment and plan of care.    If you have questions, please do not hesitate to call me. I look forward to following Yumiko along with you.         Sincerely,        Ramon Sahu MD        CC: No Recipients    Ramon Sahu MD  05/31/24 1753  Sign when Signing Visit  Masha Davidson APRN  Yumiko Purdy  1965 05/30/2024    Patient Active Problem List   Diagnosis   • Dilated cardiomyopathy   • CKD (chronic kidney disease) stage 2, GFR 60-89 ml/min   • Severe obesity (BMI 35.0-39.9) with comorbidity   • Chronic anemia   • Elevated bilirubin   • Acute on chronic combined systolic and diastolic CHF (congestive heart failure)   • Hyponatremia   • Paroxysmal atrial fibrillation   • Cirrhosis   • Coronary artery disease involving native coronary artery of native heart without angina pectoris   • Atrial fibrillation with RVR   • Ventricular tachycardia       Dear Masha Davidson APRN:    Subjective     Yumiko Purdy is a 58 y.o. female with the problems as listed above, presents    Chief Complaint: Follow-up of HFrEF and paroxysmal atrial fibrillation.     History of Present Illness: Ms. Purdy is a pleasant 58-year-old  female with history of paroxysmal SVT/atrial fibrillation with rapid ventricle response, status post previous PVI in October 2022 with a recurrence requiring electrocardioversion on 1/31/2024.  She also has chronic HFrEF with underlying advanced cardiomyopathy with LV ejection fraction of 30 to 35% with nonobstructive coronary artery disease noted on coronary angiography on 11/10/2020.  She has also chronic kidney disease (stage IIIa).  She is here today as a  follow-up after recent visit to the emergency department at [facility] for complaints of shortness of breath and some numbness in her lower extremities.  She has been on amiodarone which was initiated by the electrophysiologist at [facility] for her recurrent atrial fibrillation.  Patient apparently gets more symptomatic when she goes back into [facility].  She also has intermittent substernal chest pains that seem to occur at random and resolve spontaneously.      Allergies   Allergen Reactions   • Phenergan [Promethazine] Other (See Comments)     Anxiety   :    Current Outpatient Medications:   •  amiodarone (Pacerone) 200 MG tablet, Take 1 tablet by mouth Daily., Disp: 30 tablet, Rfl: 0  •  apixaban (ELIQUIS) 5 MG tablet tablet, Take 1 tablet by mouth Every 12 (Twelve) Hours. Indications: Atrial Fibrillation, Disp: 60 tablet, Rfl: 3  •  Aspirin Low Dose 81 MG EC tablet, Take 1 tablet by mouth Daily for 100 days., Disp: 30 tablet, Rfl: 3  •  bumetanide (BUMEX) 2 MG tablet, Take 1 tablet by mouth Daily. Monday- Saturday; extra 2mg daily at lunch for 3-4 days, Disp: , Rfl:   •  busPIRone (BUSPAR) 10 MG tablet, Take 1 tablet by mouth 2 (Two) Times a Day., Disp: , Rfl:   •  digoxin (LANOXIN) 125 MCG tablet, Take 1 tablet by mouth Every Other Day., Disp: 15 tablet, Rfl: 1  •  ferrous sulfate 325 (65 FE) MG tablet, Take 1 tablet by mouth Daily., Disp: , Rfl:   •  HYDROcodone-acetaminophen (NORCO) 7.5-325 MG per tablet, Take 1 tablet by mouth 2 (Two) Times a Day., Disp: , Rfl:   •  Insulin Glargine (LANTUS SOLOSTAR) 100 UNIT/ML injection pen, Inject 45 Units under the skin into the appropriate area as directed 2 (Two) Times a Day., Disp: 30 mL, Rfl: 0  •  Insulin Lispro, 1 Unit Dial, (HUMALOG) 100 UNIT/ML solution pen-injector, Inject 15 Units under the skin into the appropriate area as directed 3 (Three) Times a Day With Meals., Disp: 15 mL, Rfl: 1  •  levothyroxine (SYNTHROID, LEVOTHROID) 50  "MCG tablet, Take 1 tablet by mouth Daily., Disp: , Rfl:   •  nitrofurantoin (MACRODANTIN) 100 MG capsule, Take 1 capsule by mouth 2 (Two) Times a Day., Disp: , Rfl:   •  nitroglycerin (NITROSTAT) 0.4 MG SL tablet, Place 1 tablet under the tongue Every 5 (Five) Minutes As Needed for Chest Pain. Take no more than 3 doses in 15 minutes., Disp: , Rfl:   •  omeprazole (priLOSEC) 20 MG capsule, Take 1 capsule by mouth Daily., Disp: , Rfl:   •  Vortioxetine HBr (Trintellix) 5 MG tablet tablet, Take 1 tablet by mouth Daily With Breakfast., Disp: , Rfl:   •  carvedilol (COREG) 6.25 MG tablet, Take 1 tablet by mouth 2 (Two) Times a Day., Disp: 60 tablet, Rfl: 3  •  Polyethylene Glycol 3350 powder, Take 17 g by mouth Daily. As needed, Disp: , Rfl:   •  valsartan (DIOVAN) 40 MG tablet, Take 1 tablet by mouth Daily., Disp: 30 tablet, Rfl: 2    The following portions of the patient's history were reviewed and updated as appropriate: allergies, current medications, past family history, past medical history, past social history, past surgical history and problem list.    Social History     Tobacco Use   • Smoking status: Never   • Smokeless tobacco: Never   Vaping Use   • Vaping status: Never Used   Substance Use Topics   • Alcohol use: Never   • Drug use: Never     Review of Systems   Constitutional: Negative for chills and fever.   HENT:  Negative for nosebleeds and sore throat.    Respiratory:  Positive for shortness of breath. Negative for cough, hemoptysis and wheezing.    Gastrointestinal:  Negative for abdominal pain, hematemesis, hematochezia, melena, nausea and vomiting.   Genitourinary:  Negative for dysuria and hematuria.   Neurological:  Negative for headaches.     Objective   Vitals:    05/30/24 1158   BP: 108/58   Pulse: 86   Resp: 16   SpO2: 100%   Height: 165.1 cm (65\")     Body mass index is 38.27 kg/m².    Constitutional:       Appearance: Well-developed. Chronically ill-appearing.   Eyes:      Conjunctiva/sclera: " Conjunctivae normal.   HENT:      Head: Normocephalic.   Neck:      Thyroid: No thyromegaly.      Vascular: No JVD.      Trachea: No tracheal deviation.   Pulmonary:      Effort: No respiratory distress.      Breath sounds: Normal breath sounds. No wheezing. No rales.   Cardiovascular:      PMI at left midclavicular line. Normal rate. Regular rhythm. Normal S1. Normal S2.       Murmurs: There is no murmur.      No gallop.  No click. No rub.   Pulses:     Intact distal pulses.   Edema:     Pretibial: bilateral 1+ edema of the pretibial area.     Ankle: bilateral 1+ edema of the ankle.     Feet: bilateral 1+ edema of the feet.  Abdominal:      General: Bowel sounds are normal.      Palpations: Abdomen is soft. There is no abdominal mass.      Tenderness: There is no abdominal tenderness.   Musculoskeletal:      Cervical back: Normal range of motion and neck supple. Skin:     General: Skin is warm and dry.   Neurological:      Mental Status: Alert and oriented to person, place, and time.      Cranial Nerves: No cranial nerve deficit.       Lab Results   Component Value Date     05/28/2024    K 3.9 05/28/2024    CL 97 (L) 05/28/2024    CO2 24.9 05/28/2024    BUN 22 (H) 05/28/2024    CREATININE 0.94 05/28/2024    GLUCOSE 295 (H) 05/28/2024    CALCIUM 8.5 (L) 05/28/2024    AST 35 (H) 05/28/2024    ALT 35 (H) 05/28/2024    ALKPHOS 89 05/28/2024    LABIL2 1.3 (L) 06/09/2016     Lab Results   Component Value Date    CKTOTAL 66 04/01/2024     Lab Results   Component Value Date    WBC 8.44 05/28/2024    HGB 9.2 (L) 05/28/2024    HCT 31.2 (L) 05/28/2024     05/28/2024       Lab Results   Component Value Date    MG 1.8 05/23/2024     Lab Results   Component Value Date    TSH 2.960 01/31/2024    CHLPL 103 04/21/2016    TRIG 80 09/11/2020    HDL 43 09/11/2020    LDL 78 09/11/2020      Lab Results   Component Value Date    BNP 86 05/08/2016       Assessment & Plan    Diagnosis Plan   1. Chronic combined systolic and  diastolic HF (heart failure)  Comprehensive Metabolic Panel      2. Paroxysmal atrial fibrillation        3. Precordial pain  Stress Test With Myocardial Perfusion (1 Day)        Recommendations  Since she has LV systolic dysfunction, will discontinue the diltiazem.    Add carvedilol 6.25 mg p.o. twice daily  Add valsartan 40 mg daily  Check CMP in a week.  Continue with the bumetanide as needed for recurrent heart failure.  Will have the office staff to check on the cost of SGLT2 inhibitors and Entresto and consider adding these if affordable and tolerated by her blood pressure.  I have reemphasized about the importance of avoiding salt rich foods such as all the chips, pickles, sausages, hot dogs, hamburgers and cheeseburgers and pizzas etc.  Patient expressed understanding.  Evaluate her chest pains further with a Lexiscan sestamibi study.    Return in about 3 weeks (around 6/20/2024).    As always, Masha Davidson APRN  I appreciate very much the opportunity to participate in the cardiovascular care of your patients. Please do not hesitate to call me with any questions with regards to Yumiko Purdy's evaluation and management.       With Best Regards,        Ramon Sahu MD, [facility]    Please note that portions of this note were completed with a voice recognition program.

## 2024-06-21 ENCOUNTER — APPOINTMENT (OUTPATIENT)
Dept: GENERAL RADIOLOGY | Facility: HOSPITAL | Age: 59
End: 2024-06-21
Payer: COMMERCIAL

## 2024-06-21 ENCOUNTER — HOSPITAL ENCOUNTER (EMERGENCY)
Facility: HOSPITAL | Age: 59
Discharge: HOME | End: 2024-06-21
Attending: STUDENT IN AN ORGANIZED HEALTH CARE EDUCATION/TRAINING PROGRAM
Payer: COMMERCIAL

## 2024-06-21 VITALS
SYSTOLIC BLOOD PRESSURE: 103 MMHG | BODY MASS INDEX: 37.49 KG/M2 | WEIGHT: 225 LBS | OXYGEN SATURATION: 100 % | TEMPERATURE: 99.7 F | RESPIRATION RATE: 22 BRPM | HEART RATE: 105 BPM | DIASTOLIC BLOOD PRESSURE: 57 MMHG | HEIGHT: 65 IN

## 2024-06-21 DIAGNOSIS — I48.20 CHRONIC ATRIAL FIBRILLATION: Primary | ICD-10-CM

## 2024-06-21 LAB
ALBUMIN SERPL-MCNC: 3.9 G/DL (ref 3.5–5.2)
ALBUMIN/GLOB SERPL: 1.7 G/DL
ALP SERPL-CCNC: 83 U/L (ref 39–117)
ALT SERPL W P-5'-P-CCNC: 27 U/L (ref 1–33)
ANION GAP SERPL CALCULATED.3IONS-SCNC: 12 MMOL/L (ref 5–15)
ANISOCYTOSIS BLD QL: NORMAL
AST SERPL-CCNC: 23 U/L (ref 1–32)
BASOPHILS # BLD AUTO: 0.08 10*3/MM3 (ref 0–0.2)
BASOPHILS NFR BLD AUTO: 0.9 % (ref 0–1.5)
BILIRUB SERPL-MCNC: 5.2 MG/DL (ref 0–1.2)
BUN SERPL-MCNC: 23 MG/DL (ref 6–20)
BUN/CREAT SERPL: 17.6 (ref 7–25)
CALCIUM SPEC-SCNC: 9 MG/DL (ref 8.6–10.5)
CHLORIDE SERPL-SCNC: 96 MMOL/L (ref 98–107)
CO2 SERPL-SCNC: 28 MMOL/L (ref 22–29)
CREAT SERPL-MCNC: 1.31 MG/DL (ref 0.57–1)
DEPRECATED RDW RBC AUTO: 83.9 FL (ref 37–54)
DIGOXIN SERPL-MCNC: 1 NG/ML (ref 0.6–1.2)
EGFRCR SERPLBLD CKD-EPI 2021: 47.6 ML/MIN/1.73
EOSINOPHIL # BLD AUTO: 0.11 10*3/MM3 (ref 0–0.4)
EOSINOPHIL NFR BLD AUTO: 1.2 % (ref 0.3–6.2)
ERYTHROCYTE [DISTWIDTH] IN BLOOD BY AUTOMATED COUNT: 21.5 % (ref 12.3–15.4)
GEN 5 2HR TROPONIN T REFLEX: 96 NG/L
GLOBULIN UR ELPH-MCNC: 2.3 GM/DL
GLUCOSE SERPL-MCNC: 348 MG/DL (ref 65–99)
HCT VFR BLD AUTO: 28.4 % (ref 34–46.6)
HGB BLD-MCNC: 8.3 G/DL (ref 12–15.9)
HOLD SPECIMEN: NORMAL
HOLD SPECIMEN: NORMAL
HYPOCHROMIA BLD QL: NORMAL
IMM GRANULOCYTES # BLD AUTO: 0.09 10*3/MM3 (ref 0–0.05)
IMM GRANULOCYTES NFR BLD AUTO: 1 % (ref 0–0.5)
LYMPHOCYTES # BLD AUTO: 1.24 10*3/MM3 (ref 0.7–3.1)
LYMPHOCYTES NFR BLD AUTO: 13.4 % (ref 19.6–45.3)
MACROCYTES BLD QL SMEAR: NORMAL
MCH RBC QN AUTO: 31.6 PG (ref 26.6–33)
MCHC RBC AUTO-ENTMCNC: 29.2 G/DL (ref 31.5–35.7)
MCV RBC AUTO: 108 FL (ref 79–97)
MONOCYTES # BLD AUTO: 0.29 10*3/MM3 (ref 0.1–0.9)
MONOCYTES NFR BLD AUTO: 3.1 % (ref 5–12)
NEUTROPHILS NFR BLD AUTO: 7.42 10*3/MM3 (ref 1.7–7)
NEUTROPHILS NFR BLD AUTO: 80.4 % (ref 42.7–76)
NRBC BLD AUTO-RTO: 0.7 /100 WBC (ref 0–0.2)
PLAT MORPH BLD: NORMAL
PLATELET # BLD AUTO: 135 10*3/MM3 (ref 140–450)
PMV BLD AUTO: 10.7 FL (ref 6–12)
POLYCHROMASIA BLD QL SMEAR: NORMAL
POTASSIUM SERPL-SCNC: 4.3 MMOL/L (ref 3.5–5.2)
PROT SERPL-MCNC: 6.2 G/DL (ref 6–8.5)
RBC # BLD AUTO: 2.63 10*6/MM3 (ref 3.77–5.28)
SODIUM SERPL-SCNC: 136 MMOL/L (ref 136–145)
STOMATOCYTES BLD QL SMEAR: NORMAL
TROPONIN T DELTA: -3 NG/L
TROPONIN T SERPL HS-MCNC: 99 NG/L
WBC NRBC COR # BLD AUTO: 9.23 10*3/MM3 (ref 3.4–10.8)
WHOLE BLOOD HOLD COAG: NORMAL
WHOLE BLOOD HOLD SPECIMEN: NORMAL

## 2024-06-21 PROCEDURE — 80162 ASSAY OF DIGOXIN TOTAL: CPT | Performed by: STUDENT IN AN ORGANIZED HEALTH CARE EDUCATION/TRAINING PROGRAM

## 2024-06-21 PROCEDURE — 93010 ELECTROCARDIOGRAM REPORT: CPT | Performed by: INTERNAL MEDICINE

## 2024-06-21 PROCEDURE — 96374 THER/PROPH/DIAG INJ IV PUSH: CPT

## 2024-06-21 PROCEDURE — 84484 ASSAY OF TROPONIN QUANT: CPT | Performed by: STUDENT IN AN ORGANIZED HEALTH CARE EDUCATION/TRAINING PROGRAM

## 2024-06-21 PROCEDURE — 85007 BL SMEAR W/DIFF WBC COUNT: CPT | Performed by: STUDENT IN AN ORGANIZED HEALTH CARE EDUCATION/TRAINING PROGRAM

## 2024-06-21 PROCEDURE — 25810000003 SODIUM CHLORIDE 0.9 % SOLUTION: Performed by: STUDENT IN AN ORGANIZED HEALTH CARE EDUCATION/TRAINING PROGRAM

## 2024-06-21 PROCEDURE — 85025 COMPLETE CBC W/AUTO DIFF WBC: CPT | Performed by: STUDENT IN AN ORGANIZED HEALTH CARE EDUCATION/TRAINING PROGRAM

## 2024-06-21 PROCEDURE — 71045 X-RAY EXAM CHEST 1 VIEW: CPT | Performed by: RADIOLOGY

## 2024-06-21 PROCEDURE — 93005 ELECTROCARDIOGRAM TRACING: CPT | Performed by: STUDENT IN AN ORGANIZED HEALTH CARE EDUCATION/TRAINING PROGRAM

## 2024-06-21 PROCEDURE — 99284 EMERGENCY DEPT VISIT MOD MDM: CPT

## 2024-06-21 PROCEDURE — 36415 COLL VENOUS BLD VENIPUNCTURE: CPT

## 2024-06-21 PROCEDURE — 80053 COMPREHEN METABOLIC PANEL: CPT | Performed by: STUDENT IN AN ORGANIZED HEALTH CARE EDUCATION/TRAINING PROGRAM

## 2024-06-21 PROCEDURE — 71045 X-RAY EXAM CHEST 1 VIEW: CPT

## 2024-06-21 RX ORDER — DILTIAZEM HYDROCHLORIDE 60 MG/1
60 TABLET, FILM COATED ORAL 2 TIMES DAILY
COMMUNITY

## 2024-06-21 RX ORDER — ASPIRIN 81 MG/1
324 TABLET, CHEWABLE ORAL ONCE
Status: COMPLETED | OUTPATIENT
Start: 2024-06-21 | End: 2024-06-21

## 2024-06-21 RX ORDER — SODIUM CHLORIDE 0.9 % (FLUSH) 0.9 %
10 SYRINGE (ML) INJECTION AS NEEDED
Status: DISCONTINUED | OUTPATIENT
Start: 2024-06-21 | End: 2024-06-22 | Stop reason: HOSPADM

## 2024-06-21 RX ORDER — DILTIAZEM HYDROCHLORIDE 5 MG/ML
10 INJECTION INTRAVENOUS ONCE
Status: COMPLETED | OUTPATIENT
Start: 2024-06-21 | End: 2024-06-21

## 2024-06-21 RX ADMIN — ASPIRIN 324 MG: 81 TABLET, CHEWABLE ORAL at 18:22

## 2024-06-21 RX ADMIN — SODIUM CHLORIDE 1000 ML: 9 INJECTION, SOLUTION INTRAVENOUS at 18:30

## 2024-06-21 RX ADMIN — DILTIAZEM HYDROCHLORIDE 10 MG: 5 INJECTION, SOLUTION INTRAVENOUS at 18:43

## 2024-06-22 LAB
QT INTERVAL: 352 MS
QTC INTERVAL: 537 MS

## 2024-06-22 NOTE — ED PROVIDER NOTES
Subjective   History of Present Illness  58-year-old female with past medical history of congestive heart failure, atrial fibrillation requiring anticoagulation, cirrhosis, diabetes presents to the ER due to concerns for increasing palpitations.  Patient notes she was recently discharged from another hospital due to atrial fibrillation with rapid ventricular rate.  Initial heart rate 140 noted on ER triage screening.  Patient denies chest pain.  Denies fever or chills.  Patient appears to be resting comfortably.  Vital stable.  Afebrile      Review of Systems   Cardiovascular:  Positive for palpitations.   All other systems reviewed and are negative.      Past Medical History:   Diagnosis Date    Anemia     CHF (congestive heart failure)     Chronic a-fib     stated by patient     Cirrhosis of liver     stated by patient     Diabetes mellitus     Ventricular tachycardia        Allergies   Allergen Reactions    Phenergan [Promethazine] Other (See Comments)     Anxiety       Past Surgical History:   Procedure Laterality Date    APPENDECTOMY      CARDIAC CATHETERIZATION N/A 11/10/2020    Procedure: Left Heart Cath;  Surgeon: Charlee Ken MD;  Location: [facility] CATH INVASIVE LOCATION;  Service: Cardiovascular;  Laterality: N/A;    CHOLECYSTECTOMY      TOE AMPUTATION Right     stated by patient.        Family History   Problem Relation Age of Onset    Heart attack Father     Heart attack Brother        Social History     Socioeconomic History    Marital status:    Tobacco Use    Smoking status: Never    Smokeless tobacco: Never   Vaping Use    Vaping status: Never Used   Substance and Sexual Activity    Alcohol use: Never    Drug use: Never    Sexual activity: Defer           Objective   Physical Exam  Constitutional:       General: She is not in acute distress.     Appearance: Normal appearance. She is not ill-appearing.   HENT:      Head: Normocephalic and atraumatic.      Right Ear: External ear normal.       Left Ear: External ear normal.      Nose: Nose normal.      Mouth/Throat:      Mouth: Mucous membranes are moist.   Eyes:      Extraocular Movements: Extraocular movements intact.      Pupils: Pupils are equal, round, and reactive to light.   Cardiovascular:      Rate and Rhythm: Tachycardia present. Rhythm irregularly irregular.      Heart sounds: No murmur heard.  Pulmonary:      Effort: Pulmonary effort is normal. No respiratory distress.      Breath sounds: Normal breath sounds. No wheezing.   Abdominal:      General: Bowel sounds are normal.      Palpations: Abdomen is soft.      Tenderness: There is no abdominal tenderness.   Musculoskeletal:         General: No deformity or signs of injury. Normal range of motion.      Cervical back: Normal range of motion and neck supple.   Skin:     General: Skin is warm and dry.      Findings: No erythema.   Neurological:      General: No focal deficit present.      Mental Status: She is alert and oriented to person, place, and time. Mental status is at baseline.      Cranial Nerves: No cranial nerve deficit.   Psychiatric:         Mood and Affect: Mood normal.         Behavior: Behavior normal.         Thought Content: Thought content normal.         Procedures           ED Course  ED Course as of 06/21/24 2209 Fri Jun 21, 2024   1825 EKG notes atrial fibrillation.  140 bpm with.  Rapid ventricular response.  Left anterior fascicular block.  No acute ST elevation.  QTc 537. [SF]      ED Course User Index  [SF] Rahul Alberto,                                  XR Chest 1 View    Result Date: 6/21/2024   CARDIOMEGALY.    This report was finalized on 6/21/2024 8:32 PM by Sonja Tabor MD.       Results for orders placed or performed during the hospital encounter of 06/21/24   High Sensitivity Troponin T    Specimen: Blood   Result Value Ref Range    HS Troponin T 99 (C) <14 ng/L   Comprehensive Metabolic Panel    Specimen: Blood   Result Value Ref Range    Glucose 348  (H) 65 - 99 mg/dL    BUN 23 (H) 6 - 20 mg/dL    Creatinine 1.31 (H) 0.57 - 1.00 mg/dL    Sodium 136 136 - 145 mmol/L    Potassium 4.3 3.5 - 5.2 mmol/L    Chloride 96 (L) 98 - 107 mmol/L    CO2 28.0 22.0 - 29.0 mmol/L    Calcium 9.0 8.6 - 10.5 mg/dL    Total Protein 6.2 6.0 - 8.5 g/dL    Albumin 3.9 3.5 - 5.2 g/dL    ALT (SGPT) 27 1 - 33 U/L    AST (SGOT) 23 1 - 32 U/L    Alkaline Phosphatase 83 39 - 117 U/L    Total Bilirubin 5.2 (H) 0.0 - 1.2 mg/dL    Globulin 2.3 gm/dL    A/G Ratio 1.7 g/dL    BUN/Creatinine Ratio 17.6 7.0 - 25.0    Anion Gap 12.0 5.0 - 15.0 mmol/L    eGFR 47.6 (L) >60.0 mL/min/1.73   Digoxin Level    Specimen: Blood   Result Value Ref Range    Digoxin 1.00 0.60 - 1.20 ng/mL   CBC Auto Differential    Specimen: Blood   Result Value Ref Range    WBC 9.23 3.40 - 10.80 10*3/mm3    RBC 2.63 (L) 3.77 - 5.28 10*6/mm3    Hemoglobin 8.3 (L) 12.0 - 15.9 g/dL    Hematocrit 28.4 (L) 34.0 - 46.6 %    .0 (H) 79.0 - 97.0 fL    MCH 31.6 26.6 - 33.0 pg    MCHC 29.2 (L) 31.5 - 35.7 g/dL    RDW 21.5 (H) 12.3 - 15.4 %    RDW-SD 83.9 (H) 37.0 - 54.0 fl    MPV 10.7 6.0 - 12.0 fL    Platelets 135 (L) 140 - 450 10*3/mm3    Neutrophil % 80.4 (H) 42.7 - 76.0 %    Lymphocyte % 13.4 (L) 19.6 - 45.3 %    Monocyte % 3.1 (L) 5.0 - 12.0 %    Eosinophil % 1.2 0.3 - 6.2 %    Basophil % 0.9 0.0 - 1.5 %    Immature Grans % 1.0 (H) 0.0 - 0.5 %    Neutrophils, Absolute 7.42 (H) 1.70 - 7.00 10*3/mm3    Lymphocytes, Absolute 1.24 0.70 - 3.10 10*3/mm3    Monocytes, Absolute 0.29 0.10 - 0.90 10*3/mm3    Eosinophils, Absolute 0.11 0.00 - 0.40 10*3/mm3    Basophils, Absolute 0.08 0.00 - 0.20 10*3/mm3    Immature Grans, Absolute 0.09 (H) 0.00 - 0.05 10*3/mm3    nRBC 0.7 (H) 0.0 - 0.2 /100 WBC   Scan Slide    Specimen: Blood   Result Value Ref Range    Anisocytosis Large/3+ None Seen    Hypochromia Slight/1+ None Seen    Macrocytes Slight/1+ None Seen    Polychromasia Slight/1+ None Seen    Stomatocytes Slight/1+ None Seen    Platelet  Morphology Normal Normal   High Sensitivity Troponin T 2Hr    Specimen: Blood   Result Value Ref Range    HS Troponin T 96 (C) <14 ng/L    Troponin T Delta -3 >=-4 - <+4 ng/L   ECG 12 Lead Chest Pain   Result Value Ref Range    QT Interval 352 ms    QTC Interval 537 ms   Green Top (Gel)   Result Value Ref Range    Extra Tube Hold for add-ons.    Lavender Top   Result Value Ref Range    Extra Tube hold for add-on    Gold Top - SST   Result Value Ref Range    Extra Tube Hold for add-ons.    Light Blue Top   Result Value Ref Range    Extra Tube Hold for add-ons.                  Medical Decision Making  CBC and CMP unremarkable.  Patient is not clinically septic.  Troponin trended and chronically elevated.  Troponins reaching as high as 100 noted over the past several months.  EKG listed.  Chest x-ray revealed no acute abnormality.  Patient's symptoms improved with IV fluids and Cardizem.  Heart rate improved to 102 bpm.  Patient is asymptomatic.  Course was discussed with the hospitalist team on-call.  They noted that this patient can be discharged home given she has achieved rate control with chronically elevated troponins.  Patient was agreeable to discharge plan.  Work up and results were discussed throughly with the patient.  The patient will be discharged for further monitoring and management with their PCP.  Red flags, warning signs, worsening symptoms, and when to return to the ER discussed with and understood by the patient.  Patient will follow up with their PCP in a timely manner.  Vitals stable at discharge.    Amount and/or Complexity of Data Reviewed  Labs: ordered. Decision-making details documented in ED Course.  Radiology: ordered. Decision-making details documented in ED Course.  ECG/medicine tests: ordered.    Risk  OTC drugs.  Prescription drug management.        Final diagnoses:   Chronic atrial fibrillation       ED Disposition  ED Disposition       ED Disposition   Discharge    Condition   Stable     Comment   --               Masha Davidson, APRN  [address] [place] [address] [address]TER[address] [place], [place] [id number]  [phone]    In 1 week      [facility] EMERGENCY DEPARTMENT  [address] [place] [id number] [phone]    If symptoms worsen         Medication List      No changes were made to your prescriptions during this visit.            Rahul Alberto,   06/21/24 1039

## 2024-06-26 ENCOUNTER — TELEPHONE (OUTPATIENT)
Dept: CARDIOLOGY | Facility: CLINIC | Age: 59
End: 2024-06-26
Payer: COMMERCIAL

## 2024-06-26 NOTE — TELEPHONE ENCOUNTER
Called patient due to not completing tests yet and needing to move her appt-she sated she was admitted to [facility]  ICU and wants to keep appoint ment-records requested

## 2024-06-27 ENCOUNTER — OFFICE VISIT (OUTPATIENT)
Dept: CARDIOLOGY | Facility: CLINIC | Age: 59
End: 2024-06-27
Payer: COMMERCIAL

## 2024-06-27 VITALS
WEIGHT: 225 LBS | BODY MASS INDEX: 37.49 KG/M2 | OXYGEN SATURATION: 93 % | SYSTOLIC BLOOD PRESSURE: 112 MMHG | HEIGHT: 65 IN | DIASTOLIC BLOOD PRESSURE: 49 MMHG | HEART RATE: 78 BPM

## 2024-06-27 DIAGNOSIS — I25.10 CORONARY ARTERY DISEASE INVOLVING NATIVE CORONARY ARTERY OF NATIVE HEART WITHOUT ANGINA PECTORIS: Primary | Chronic | ICD-10-CM

## 2024-06-27 DIAGNOSIS — I48.0 PAROXYSMAL ATRIAL FIBRILLATION: Chronic | ICD-10-CM

## 2024-06-27 DIAGNOSIS — K74.60 HEPATIC CIRRHOSIS, UNSPECIFIED HEPATIC CIRRHOSIS TYPE, UNSPECIFIED WHETHER ASCITES PRESENT: ICD-10-CM

## 2024-06-27 PROCEDURE — 1159F MED LIST DOCD IN RCRD: CPT | Performed by: PHYSICIAN ASSISTANT

## 2024-06-27 PROCEDURE — 99214 OFFICE O/P EST MOD 30 MIN: CPT | Performed by: PHYSICIAN ASSISTANT

## 2024-06-27 PROCEDURE — 1160F RVW MEDS BY RX/DR IN RCRD: CPT | Performed by: PHYSICIAN ASSISTANT

## 2024-06-27 RX ORDER — ONDANSETRON 4 MG/1
4 TABLET, FILM COATED ORAL
COMMUNITY
Start: 2024-06-26

## 2024-06-27 RX ORDER — NITROFURANTOIN 25; 75 MG/1; MG/1
100 CAPSULE ORAL
COMMUNITY
Start: 2024-06-26

## 2024-06-27 NOTE — PROGRESS NOTES
Masha Davidson APRN  Yumiko Purdy  1965 06/27/2024    Patient Active Problem List   Diagnosis    Dilated cardiomyopathy    CKD (chronic kidney disease) stage 2, GFR 60-89 ml/min    Severe obesity (BMI 35.0-39.9) with comorbidity    Chronic anemia    Elevated bilirubin    Acute on chronic combined systolic and diastolic CHF (congestive heart failure)    Hyponatremia    Paroxysmal atrial fibrillation    Cirrhosis    Coronary artery disease involving native coronary artery of native heart without angina pectoris    Atrial fibrillation with RVR    Ventricular tachycardia       Dear Masha Davidson APRN:    Subjective     History of Present Illness:    Chief Complaint   Patient presents with    Follow-up     RECENTLY IN HOSPITAL       Yumiko Purdy is a pleasant 58 y.o. female with a past medical history significant for paroxysmal atrial fibrillation/SVT status post PVI on October 2022 with recurrence requiring cardioversion on 1/31/2024, chronic HFrEF with advanced cardiomyopathy with LVEF of 30 to 35% with now improved LVEF of 65 to 70% but does have grade 2 diastolic dysfunction, nonobstructive CAD on left heart cath on 11/10/2020, CKD stage III.  She also has diabetes mellitus.  She comes in today for cardiology follow-up.    Since Yumiko was last seen she was unfortunately hospitalized at [facility] on 2 different occasions however were back to back initial admission date was 6/22/2024.  She apparently was hospitalized for atrial fibrillation with RVR requiring ICU admission although I do not have official records available to me currently.  She does report that her diltiazem was restarted and her valsartan was stopped.  Her biggest complaint today is just generalized weakness and fatigue.  She does report that she checks her heart rate closely with a pulse ox and has not noticed any atrial fibrillation with RVR since discharge from the hospital.  She also reports that she was found to  be fairly significantly anemic with hemoglobin around 7 although denies any visible blood loss that she is aware of.  She also reports to me that she was also diagnosed with UTI at the time    Allergies   Allergen Reactions    Phenergan [Promethazine] Other (See Comments)     Anxiety   :      Current Outpatient Medications:     amiodarone (Pacerone) 200 MG tablet, Take 1 tablet by mouth Daily., Disp: 30 tablet, Rfl: 0    apixaban (ELIQUIS) 5 MG tablet tablet, Take 1 tablet by mouth Every 12 (Twelve) Hours. Indications: Atrial Fibrillation, Disp: 60 tablet, Rfl: 3    Aspirin Low Dose 81 MG EC tablet, Take 1 tablet by mouth Daily for 100 days., Disp: 30 tablet, Rfl: 3    bumetanide (BUMEX) 2 MG tablet, Take 1 tablet by mouth Daily. Monday- Saturday; extra 2mg daily at lunch for 3-4 days, Disp: , Rfl:     busPIRone (BUSPAR) 10 MG tablet, Take 1 tablet by mouth 2 (Two) Times a Day., Disp: , Rfl:     digoxin (LANOXIN) 125 MCG tablet, Take 1 tablet by mouth Every Other Day., Disp: 15 tablet, Rfl: 1    dilTIAZem (CARDIZEM) 60 MG tablet, Take 1 tablet by mouth 2 (Two) Times a Day., Disp: , Rfl:     ferrous sulfate 325 (65 FE) MG tablet, Take 1 tablet by mouth Daily., Disp: , Rfl:     HYDROcodone-acetaminophen (NORCO) 7.5-325 MG per tablet, Take 1 tablet by mouth 2 (Two) Times a Day., Disp: , Rfl:     Insulin Glargine (LANTUS SOLOSTAR) 100 UNIT/ML injection pen, Inject 45 Units under the skin into the appropriate area as directed 2 (Two) Times a Day., Disp: 30 mL, Rfl: 0    Insulin Lispro, 1 Unit Dial, (HUMALOG) 100 UNIT/ML solution pen-injector, Inject 15 Units under the skin into the appropriate area as directed 3 (Three) Times a Day With Meals., Disp: 15 mL, Rfl: 1    levothyroxine (SYNTHROID, LEVOTHROID) 50 MCG tablet, Take 1 tablet by mouth Daily., Disp: , Rfl:     nitrofurantoin, macrocrystal-monohydrate, (MACROBID) 100 MG capsule, 1 capsule., Disp: , Rfl:     nitroglycerin (NITROSTAT) 0.4 MG SL tablet, Place 1 tablet  "under the tongue Every 5 (Five) Minutes As Needed for Chest Pain. Take no more than 3 doses in 15 minutes., Disp: , Rfl:     omeprazole (priLOSEC) 20 MG capsule, Take 1 capsule by mouth Daily., Disp: , Rfl:     ondansetron (ZOFRAN) 4 MG tablet, 1 tablet., Disp: , Rfl:     Polyethylene Glycol 3350 powder, Take 17 g by mouth Daily. As needed, Disp: , Rfl:     Vortioxetine HBr (Trintellix) 5 MG tablet tablet, Take 1 tablet by mouth Daily With Breakfast., Disp: , Rfl:     The following portions of the patient's history were reviewed and updated as appropriate: allergies, current medications, past family history, past medical history, past social history, past surgical history and problem list.    Social History     Tobacco Use    Smoking status: Never    Smokeless tobacco: Never   Vaping Use    Vaping status: Never Used   Substance Use Topics    Alcohol use: Never    Drug use: Never         Objective   Vitals:    06/27/24 1416   BP: 112/49   Pulse: 78   SpO2: 93%   Weight: 102 kg (225 lb)   Height: 165.1 cm (65\")     Body mass index is 37.44 kg/m².    ROS    Constitutional:       General: Not in acute distress.     Appearance: Healthy appearance. Well-developed and not in distress. Not diaphoretic.      Comments: Wheel chair bound   Eyes:      Conjunctiva/sclera: Conjunctivae normal.      Pupils: Pupils are equal, round, and reactive to light.   HENT:      Head: Normocephalic and atraumatic.   Neck:      Vascular: No carotid bruit or JVD.   Pulmonary:      Effort: Pulmonary effort is normal. No respiratory distress.      Breath sounds: Normal breath sounds.   Cardiovascular:      Normal rate. Irregularly irregular rhythm.   Edema:     Pretibial: bilateral 1+ edema of the pretibial area.     Ankle: bilateral 1+ edema of the ankle.     Feet: bilateral 1+ edema of the feet.  Skin:     General: Skin is cool.   Neurological:      Mental Status: Alert, oriented to person, place, and time and oriented to person, place and " time.         Lab Results   Component Value Date     06/21/2024    K 4.3 06/21/2024    CL 96 (L) 06/21/2024    CO2 28.0 06/21/2024    BUN 23 (H) 06/21/2024    CREATININE 1.31 (H) 06/21/2024    GLUCOSE 348 (H) 06/21/2024    CALCIUM 9.0 06/21/2024    AST 23 06/21/2024    ALT 27 06/21/2024    ALKPHOS 83 06/21/2024    LABIL2 1.3 (L) 06/09/2016     Lab Results   Component Value Date    CKTOTAL 66 04/01/2024     Lab Results   Component Value Date    WBC 9.23 06/21/2024    HGB 8.3 (L) 06/21/2024    HCT 28.4 (L) 06/21/2024     (L) 06/21/2024     Lab Results   Component Value Date    INR 1.31 (H) 05/28/2024    INR 0.96 05/10/2024    INR 1.04 05/09/2024     Lab Results   Component Value Date    MG 1.8 05/23/2024     Lab Results   Component Value Date    TSH 2.960 01/31/2024    CHLPL 103 04/21/2016    TRIG 80 09/11/2020    HDL 43 09/11/2020    LDL 78 09/11/2020      Lab Results   Component Value Date    BNP 86 05/08/2016       During this visit the following were done:  Labs Reviewed []    Labs Ordered []    Radiology Reports Reviewed []    Radiology Ordered []    PCP Records Reviewed []    Referring Provider Records Reviewed []    ER Records Reviewed []    Hospital Records Reviewed []    History Obtained From Family []    Radiology Images Reviewed []    Other Reviewed []    Records Requested []       Procedures    Assessment & Plan    Diagnosis Plan   1. Coronary artery disease involving native coronary artery of native heart without angina pectoris  Basic Metabolic Panel      2. Paroxysmal atrial fibrillation  Basic Metabolic Panel      3. Hepatic cirrhosis, unspecified hepatic cirrhosis type, unspecified whether ascites present  Ambulatory Referral to Gastroenterology               Recommendations:  Atrial fibrillation with RVR/SVT  By auscultation appears to be in atrial fibrillation today but is rate controlled she reports her breathing is at baseline denies any orthopnea or PND.  I will request recent  hospitalization discharge summary  Given that she is fairly rate controlled and is following closely with the EP we will continue current regimen she is on amiodarone and Cardizem 60 mg twice daily.  I did advise her that she can take an additional half tablet if she finds her heart rate greater than 110 bpm.  I am concerned of long-term amiodarone use as she does have known cirrhosis and does appear slightly jaundiced today.  Although last LFTs on 6/21/2024 were unremarkable.  It appears she has required cardioversion multiple times and is now on amiodarone and is still having breakthrough episodes of atrial fibrillation with RVR.  I emphasized to her the importance of keeping her appointment with Dr. Gutierrez, electrophysiologist.  Cirrhosis  Does not appear she has ever been referred to a gastroenterologist I will place referral.  She does seem jaundiced to me today  Precordial pain  Patient scheduled for stress test next month.  Will tailor therapy accordingly.  She did have left heart catheterization in 2020 that showed nonobstructive disease  Cardiomyopathy  Now with improved LVEF.  I am reluctant to stop diltiazem today as she did develop atrial fibrillation with RVR after he was recently stopped since EF is normal we will continue for now.  Also following with heart failure clinic. I offered trying to switch Bumex to Lasix as she reports she poor urine output despite high-dose Bumex however she follows with nephrology and advised her to avoid Lasix.  Will hold off on SGLT2 inhibitors given recent UTI.  I ordered a BMP if renal function has improved we will start spironolactone.    No follow-ups on file.    As always, I appreciate very much the opportunity to participate in the cardiovascular care of your patients.      With Best Regards,    Adonay Castellanos PA-C

## 2024-06-29 ENCOUNTER — LAB (OUTPATIENT)
Dept: LAB | Facility: HOSPITAL | Age: 59
End: 2024-06-29
Payer: COMMERCIAL

## 2024-06-29 DIAGNOSIS — I25.10 CORONARY ARTERY DISEASE INVOLVING NATIVE CORONARY ARTERY OF NATIVE HEART WITHOUT ANGINA PECTORIS: Chronic | ICD-10-CM

## 2024-06-29 DIAGNOSIS — I48.0 PAROXYSMAL ATRIAL FIBRILLATION: Chronic | ICD-10-CM

## 2024-06-29 LAB
ANION GAP SERPL CALCULATED.3IONS-SCNC: 16 MMOL/L (ref 5–15)
BUN SERPL-MCNC: 22 MG/DL (ref 6–20)
BUN/CREAT SERPL: 16.5 (ref 7–25)
CALCIUM SPEC-SCNC: 8.9 MG/DL (ref 8.6–10.5)
CHLORIDE SERPL-SCNC: 95 MMOL/L (ref 98–107)
CO2 SERPL-SCNC: 24 MMOL/L (ref 22–29)
CREAT SERPL-MCNC: 1.33 MG/DL (ref 0.57–1)
EGFRCR SERPLBLD CKD-EPI 2021: 46.8 ML/MIN/1.73
GLUCOSE SERPL-MCNC: 239 MG/DL (ref 65–99)
POTASSIUM SERPL-SCNC: 4.6 MMOL/L (ref 3.5–5.2)
SODIUM SERPL-SCNC: 135 MMOL/L (ref 136–145)

## 2024-06-29 PROCEDURE — 80048 BASIC METABOLIC PNL TOTAL CA: CPT

## 2024-06-29 PROCEDURE — 36415 COLL VENOUS BLD VENIPUNCTURE: CPT

## 2024-07-12 ENCOUNTER — HOSPITAL ENCOUNTER (INPATIENT)
Facility: HOSPITAL | Age: 59
LOS: 4 days | Discharge: HOME HEALTH | End: 2024-07-16
Attending: EMERGENCY MEDICINE | Admitting: INTERNAL MEDICINE
Payer: COMMERCIAL

## 2024-07-12 ENCOUNTER — APPOINTMENT (OUTPATIENT)
Dept: GENERAL RADIOLOGY | Facility: HOSPITAL | Age: 59
End: 2024-07-12
Payer: COMMERCIAL

## 2024-07-12 DIAGNOSIS — E11.65 TYPE 2 DIABETES MELLITUS WITH HYPERGLYCEMIA, WITH LONG-TERM CURRENT USE OF INSULIN: ICD-10-CM

## 2024-07-12 DIAGNOSIS — I50.43 ACUTE ON CHRONIC COMBINED SYSTOLIC AND DIASTOLIC CHF (CONGESTIVE HEART FAILURE): Chronic | ICD-10-CM

## 2024-07-12 DIAGNOSIS — N18.9 CHRONIC KIDNEY DISEASE, UNSPECIFIED CKD STAGE: ICD-10-CM

## 2024-07-12 DIAGNOSIS — N39.0 URINARY TRACT INFECTION WITHOUT HEMATURIA, SITE UNSPECIFIED: Primary | ICD-10-CM

## 2024-07-12 DIAGNOSIS — Z79.4 TYPE 2 DIABETES MELLITUS WITH HYPERGLYCEMIA, WITH LONG-TERM CURRENT USE OF INSULIN: ICD-10-CM

## 2024-07-12 DIAGNOSIS — M54.50 CHRONIC LOW BACK PAIN WITHOUT SCIATICA, UNSPECIFIED BACK PAIN LATERALITY: ICD-10-CM

## 2024-07-12 DIAGNOSIS — I50.9 ACUTE ON CHRONIC CONGESTIVE HEART FAILURE, UNSPECIFIED HEART FAILURE TYPE: ICD-10-CM

## 2024-07-12 DIAGNOSIS — R00.0 WIDE-COMPLEX TACHYCARDIA: ICD-10-CM

## 2024-07-12 DIAGNOSIS — G89.29 CHRONIC LOW BACK PAIN WITHOUT SCIATICA, UNSPECIFIED BACK PAIN LATERALITY: ICD-10-CM

## 2024-07-12 LAB
ALBUMIN SERPL-MCNC: 4 G/DL (ref 3.5–5.2)
ALBUMIN/GLOB SERPL: 1.5 G/DL
ALP SERPL-CCNC: 91 U/L (ref 39–117)
ALT SERPL W P-5'-P-CCNC: 26 U/L (ref 1–33)
ANION GAP SERPL CALCULATED.3IONS-SCNC: 14.5 MMOL/L (ref 5–15)
ANISOCYTOSIS BLD QL: NORMAL
AST SERPL-CCNC: 25 U/L (ref 1–32)
BACTERIA UR QL AUTO: ABNORMAL /HPF
BASOPHILS # BLD AUTO: 0.11 10*3/MM3 (ref 0–0.2)
BASOPHILS NFR BLD AUTO: 1 % (ref 0–1.5)
BILIRUB SERPL-MCNC: 5 MG/DL (ref 0–1.2)
BILIRUB UR QL STRIP: ABNORMAL
BUN SERPL-MCNC: 22 MG/DL (ref 6–20)
BUN/CREAT SERPL: 15 (ref 7–25)
CALCIUM SPEC-SCNC: 9.2 MG/DL (ref 8.6–10.5)
CHLORIDE SERPL-SCNC: 95 MMOL/L (ref 98–107)
CLARITY UR: ABNORMAL
CO2 SERPL-SCNC: 26.5 MMOL/L (ref 22–29)
COLOR UR: ABNORMAL
CREAT SERPL-MCNC: 1.47 MG/DL (ref 0.57–1)
D-LACTATE SERPL-SCNC: 2.3 MMOL/L (ref 0.5–2)
D-LACTATE SERPL-SCNC: 2.4 MMOL/L (ref 0.5–2)
DACRYOCYTES BLD QL SMEAR: NORMAL
DEPRECATED RDW RBC AUTO: 89.2 FL (ref 37–54)
DIGOXIN SERPL-MCNC: 1.1 NG/ML (ref 0.6–1.2)
EGFRCR SERPLBLD CKD-EPI 2021: 41.5 ML/MIN/1.73
EOSINOPHIL # BLD AUTO: 0.17 10*3/MM3 (ref 0–0.4)
EOSINOPHIL NFR BLD AUTO: 1.6 % (ref 0.3–6.2)
ERYTHROCYTE [DISTWIDTH] IN BLOOD BY AUTOMATED COUNT: 22.3 % (ref 12.3–15.4)
GEN 5 2HR TROPONIN T REFLEX: 81 NG/L
GLOBULIN UR ELPH-MCNC: 2.6 GM/DL
GLUCOSE SERPL-MCNC: 264 MG/DL (ref 65–99)
GLUCOSE UR STRIP-MCNC: NEGATIVE MG/DL
HCT VFR BLD AUTO: 31.3 % (ref 34–46.6)
HGB BLD-MCNC: 8.8 G/DL (ref 12–15.9)
HGB UR QL STRIP.AUTO: ABNORMAL
HOLD SPECIMEN: NORMAL
HOLD SPECIMEN: NORMAL
HYALINE CASTS UR QL AUTO: ABNORMAL /LPF
HYPOCHROMIA BLD QL: NORMAL
IMM GRANULOCYTES # BLD AUTO: 0.08 10*3/MM3 (ref 0–0.05)
IMM GRANULOCYTES NFR BLD AUTO: 0.7 % (ref 0–0.5)
KETONES UR QL STRIP: ABNORMAL
LEUKOCYTE ESTERASE UR QL STRIP.AUTO: ABNORMAL
LYMPHOCYTES # BLD AUTO: 1.6 10*3/MM3 (ref 0.7–3.1)
LYMPHOCYTES NFR BLD AUTO: 15 % (ref 19.6–45.3)
MACROCYTES BLD QL SMEAR: NORMAL
MCH RBC QN AUTO: 31.7 PG (ref 26.6–33)
MCHC RBC AUTO-ENTMCNC: 28.1 G/DL (ref 31.5–35.7)
MCV RBC AUTO: 112.6 FL (ref 79–97)
MONOCYTES # BLD AUTO: 0.43 10*3/MM3 (ref 0.1–0.9)
MONOCYTES NFR BLD AUTO: 4 % (ref 5–12)
NEUTROPHILS NFR BLD AUTO: 77.7 % (ref 42.7–76)
NEUTROPHILS NFR BLD AUTO: 8.3 10*3/MM3 (ref 1.7–7)
NITRITE UR QL STRIP: POSITIVE
NRBC BLD AUTO-RTO: 0.6 /100 WBC (ref 0–0.2)
NT-PROBNP SERPL-MCNC: 3998 PG/ML (ref 0–900)
PH UR STRIP.AUTO: <=5 [PH] (ref 5–8)
PLAT MORPH BLD: NORMAL
PLATELET # BLD AUTO: 145 10*3/MM3 (ref 140–450)
PMV BLD AUTO: 10.8 FL (ref 6–12)
POLYCHROMASIA BLD QL SMEAR: NORMAL
POTASSIUM SERPL-SCNC: 3.8 MMOL/L (ref 3.5–5.2)
PROT SERPL-MCNC: 6.6 G/DL (ref 6–8.5)
PROT UR QL STRIP: ABNORMAL
QT INTERVAL: 418 MS
QTC INTERVAL: 485 MS
RBC # BLD AUTO: 2.78 10*6/MM3 (ref 3.77–5.28)
RBC # UR STRIP: ABNORMAL /HPF
REF LAB TEST METHOD: ABNORMAL
SODIUM SERPL-SCNC: 136 MMOL/L (ref 136–145)
SP GR UR STRIP: 1.01 (ref 1–1.03)
SQUAMOUS #/AREA URNS HPF: ABNORMAL /HPF
STOMATOCYTES BLD QL SMEAR: NORMAL
TROPONIN T DELTA: -9 NG/L
TROPONIN T SERPL HS-MCNC: 90 NG/L
UROBILINOGEN UR QL STRIP: ABNORMAL
WBC # UR STRIP: ABNORMAL /HPF
WBC NRBC COR # BLD AUTO: 10.69 10*3/MM3 (ref 3.4–10.8)
WHOLE BLOOD HOLD COAG: NORMAL
WHOLE BLOOD HOLD SPECIMEN: NORMAL

## 2024-07-12 PROCEDURE — 87186 SC STD MICRODIL/AGAR DIL: CPT | Performed by: EMERGENCY MEDICINE

## 2024-07-12 PROCEDURE — 80050 GENERAL HEALTH PANEL: CPT | Performed by: EMERGENCY MEDICINE

## 2024-07-12 PROCEDURE — 25010000002 BUMETANIDE PER 0.5 MG: Performed by: EMERGENCY MEDICINE

## 2024-07-12 PROCEDURE — 87086 URINE CULTURE/COLONY COUNT: CPT | Performed by: EMERGENCY MEDICINE

## 2024-07-12 PROCEDURE — 25010000002 CEFTRIAXONE PER 250 MG: Performed by: EMERGENCY MEDICINE

## 2024-07-12 PROCEDURE — 85025 COMPLETE CBC W/AUTO DIFF WBC: CPT | Performed by: INTERNAL MEDICINE

## 2024-07-12 PROCEDURE — 99285 EMERGENCY DEPT VISIT HI MDM: CPT

## 2024-07-12 PROCEDURE — 87077 CULTURE AEROBIC IDENTIFY: CPT | Performed by: EMERGENCY MEDICINE

## 2024-07-12 PROCEDURE — 80162 ASSAY OF DIGOXIN TOTAL: CPT | Performed by: INTERNAL MEDICINE

## 2024-07-12 PROCEDURE — 93005 ELECTROCARDIOGRAM TRACING: CPT | Performed by: STUDENT IN AN ORGANIZED HEALTH CARE EDUCATION/TRAINING PROGRAM

## 2024-07-12 PROCEDURE — 25010000002 ONDANSETRON PER 1 MG: Performed by: EMERGENCY MEDICINE

## 2024-07-12 PROCEDURE — P9612 CATHETERIZE FOR URINE SPEC: HCPCS

## 2024-07-12 PROCEDURE — 81001 URINALYSIS AUTO W/SCOPE: CPT | Performed by: EMERGENCY MEDICINE

## 2024-07-12 PROCEDURE — 71045 X-RAY EXAM CHEST 1 VIEW: CPT | Performed by: RADIOLOGY

## 2024-07-12 PROCEDURE — 99223 1ST HOSP IP/OBS HIGH 75: CPT

## 2024-07-12 PROCEDURE — 83880 ASSAY OF NATRIURETIC PEPTIDE: CPT | Performed by: EMERGENCY MEDICINE

## 2024-07-12 PROCEDURE — 83735 ASSAY OF MAGNESIUM: CPT | Performed by: INTERNAL MEDICINE

## 2024-07-12 PROCEDURE — 25010000002 ONDANSETRON PER 1 MG: Performed by: INTERNAL MEDICINE

## 2024-07-12 PROCEDURE — 83605 ASSAY OF LACTIC ACID: CPT | Performed by: EMERGENCY MEDICINE

## 2024-07-12 PROCEDURE — 36415 COLL VENOUS BLD VENIPUNCTURE: CPT | Performed by: EMERGENCY MEDICINE

## 2024-07-12 PROCEDURE — 85007 BL SMEAR W/DIFF WBC COUNT: CPT | Performed by: EMERGENCY MEDICINE

## 2024-07-12 PROCEDURE — 71045 X-RAY EXAM CHEST 1 VIEW: CPT

## 2024-07-12 PROCEDURE — 25010000002 LORAZEPAM PER 2 MG: Performed by: INTERNAL MEDICINE

## 2024-07-12 PROCEDURE — 87040 BLOOD CULTURE FOR BACTERIA: CPT | Performed by: EMERGENCY MEDICINE

## 2024-07-12 PROCEDURE — 84484 ASSAY OF TROPONIN QUANT: CPT | Performed by: STUDENT IN AN ORGANIZED HEALTH CARE EDUCATION/TRAINING PROGRAM

## 2024-07-12 PROCEDURE — 80048 BASIC METABOLIC PNL TOTAL CA: CPT | Performed by: INTERNAL MEDICINE

## 2024-07-12 PROCEDURE — 85007 BL SMEAR W/DIFF WBC COUNT: CPT | Performed by: INTERNAL MEDICINE

## 2024-07-12 RX ORDER — LORAZEPAM 2 MG/ML
1 INJECTION INTRAMUSCULAR ONCE
Status: COMPLETED | OUTPATIENT
Start: 2024-07-12 | End: 2024-07-12

## 2024-07-12 RX ORDER — BISACODYL 10 MG
10 SUPPOSITORY, RECTAL RECTAL DAILY PRN
Status: DISCONTINUED | OUTPATIENT
Start: 2024-07-12 | End: 2024-07-16 | Stop reason: HOSPADM

## 2024-07-12 RX ORDER — ONDANSETRON 2 MG/ML
4 INJECTION INTRAMUSCULAR; INTRAVENOUS ONCE
Status: COMPLETED | OUTPATIENT
Start: 2024-07-12 | End: 2024-07-12

## 2024-07-12 RX ORDER — SODIUM CHLORIDE 0.9 % (FLUSH) 0.9 %
10 SYRINGE (ML) INJECTION AS NEEDED
Status: DISCONTINUED | OUTPATIENT
Start: 2024-07-12 | End: 2024-07-16 | Stop reason: HOSPADM

## 2024-07-12 RX ORDER — BISACODYL 5 MG/1
5 TABLET, DELAYED RELEASE ORAL DAILY PRN
Status: DISCONTINUED | OUTPATIENT
Start: 2024-07-12 | End: 2024-07-16 | Stop reason: HOSPADM

## 2024-07-12 RX ORDER — AMIODARONE HYDROCHLORIDE 200 MG/1
300 TABLET ORAL
Status: DISCONTINUED | OUTPATIENT
Start: 2024-07-12 | End: 2024-07-15

## 2024-07-12 RX ORDER — AMOXICILLIN 250 MG
2 CAPSULE ORAL 2 TIMES DAILY PRN
Status: DISCONTINUED | OUTPATIENT
Start: 2024-07-12 | End: 2024-07-16 | Stop reason: HOSPADM

## 2024-07-12 RX ORDER — SCOLOPAMINE TRANSDERMAL SYSTEM 1 MG/1
1 PATCH, EXTENDED RELEASE TRANSDERMAL
Status: DISCONTINUED | OUTPATIENT
Start: 2024-07-12 | End: 2024-07-16 | Stop reason: HOSPADM

## 2024-07-12 RX ORDER — NYSTATIN 100000 [USP'U]/G
1 POWDER TOPICAL EVERY 12 HOURS SCHEDULED
Status: DISCONTINUED | OUTPATIENT
Start: 2024-07-12 | End: 2024-07-16 | Stop reason: HOSPADM

## 2024-07-12 RX ORDER — SODIUM CHLORIDE 9 MG/ML
40 INJECTION, SOLUTION INTRAVENOUS AS NEEDED
Status: DISCONTINUED | OUTPATIENT
Start: 2024-07-12 | End: 2024-07-16 | Stop reason: HOSPADM

## 2024-07-12 RX ORDER — BUMETANIDE 0.25 MG/ML
2 INJECTION INTRAMUSCULAR; INTRAVENOUS ONCE
Status: COMPLETED | OUTPATIENT
Start: 2024-07-12 | End: 2024-07-12

## 2024-07-12 RX ORDER — SODIUM CHLORIDE 0.9 % (FLUSH) 0.9 %
10 SYRINGE (ML) INJECTION EVERY 12 HOURS SCHEDULED
Status: DISCONTINUED | OUTPATIENT
Start: 2024-07-12 | End: 2024-07-16 | Stop reason: HOSPADM

## 2024-07-12 RX ORDER — POLYETHYLENE GLYCOL 3350 17 G/17G
17 POWDER, FOR SOLUTION ORAL DAILY PRN
Status: DISCONTINUED | OUTPATIENT
Start: 2024-07-12 | End: 2024-07-16 | Stop reason: HOSPADM

## 2024-07-12 RX ORDER — NITROGLYCERIN 0.4 MG/1
0.4 TABLET SUBLINGUAL
Status: DISCONTINUED | OUTPATIENT
Start: 2024-07-12 | End: 2024-07-15

## 2024-07-12 RX ADMIN — ONDANSETRON 4 MG: 2 INJECTION INTRAMUSCULAR; INTRAVENOUS at 10:09

## 2024-07-12 RX ADMIN — SCOPALAMINE 1 PATCH: 1 PATCH, EXTENDED RELEASE TRANSDERMAL at 17:46

## 2024-07-12 RX ADMIN — LORAZEPAM 1 MG: 2 INJECTION INTRAMUSCULAR; INTRAVENOUS at 22:49

## 2024-07-12 RX ADMIN — BUMETANIDE 2 MG: 0.25 INJECTION, SOLUTION INTRAMUSCULAR; INTRAVENOUS at 15:05

## 2024-07-12 RX ADMIN — APIXABAN 5 MG: 5 TABLET, FILM COATED ORAL at 21:06

## 2024-07-12 RX ADMIN — Medication 10 ML: at 16:48

## 2024-07-12 RX ADMIN — Medication 10 ML: at 21:06

## 2024-07-12 RX ADMIN — AMIODARONE HYDROCHLORIDE 300 MG: 200 TABLET ORAL at 17:47

## 2024-07-12 RX ADMIN — SODIUM CHLORIDE 2000 MG: 9 INJECTION, SOLUTION INTRAVENOUS at 13:30

## 2024-07-12 RX ADMIN — ONDANSETRON 4 MG: 2 INJECTION INTRAMUSCULAR; INTRAVENOUS at 19:25

## 2024-07-12 NOTE — PLAN OF CARE
Goal Outcome Evaluation:           Progress: improving  Outcome Evaluation: remains in NSR since admission to floor. No issues stated.

## 2024-07-12 NOTE — ED PROVIDER NOTES
Subjective   History of Present Illness  58-year-old white female presents with chest pain.  Patient has a history of CHF, A-fib, chronic kidney disease, diabetes, and V. tach.  She had had increased lower extremity edema over the past 3 weeks.  She is on a cardiologist a week and a half ago and her still cause him was stopped, but her edema continued.  Yesterday her nephrologist increased her Bumex to 2 mg 3 times a day.  Early this morning she started having chest pain and elevated heart rate into the 140s.  EMS was called.  She was noted to have V. tach en route to the hospital.  Amiodarone was administered with no change in rhythm.  The patient was sedated with ketamine and cardioverted by EMS.  She was in sinus rhythm on arrival here and her chest pain had improved.  She reported that she recently been treated for UTI and was still having some dysuria symptoms at this time.      Review of Systems   All other systems reviewed and are negative.      Past Medical History:   Diagnosis Date    Anemia     CHF (congestive heart failure)     Chronic a-fib     stated by patient     Cirrhosis of liver     stated by patient     Diabetes mellitus     Ventricular tachycardia        Allergies   Allergen Reactions    Phenergan [Promethazine] Other (See Comments)     Anxiety       Past Surgical History:   Procedure Laterality Date    APPENDECTOMY      CARDIAC CATHETERIZATION N/A 11/10/2020    Procedure: Left Heart Cath;  Surgeon: Charlee Ken MD;  Location: [facility] INVASIVE LOCATION;  Service: Cardiovascular;  Laterality: N/A;    CHOLECYSTECTOMY      TOE AMPUTATION Right     stated by patient.        Family History   Problem Relation Age of Onset    Heart attack Father     Heart attack Brother        Social History     Socioeconomic History    Marital status:    Tobacco Use    Smoking status: Never    Smokeless tobacco: Never   Vaping Use    Vaping status: Never Used   Substance and Sexual Activity    Alcohol use:  Never    Drug use: Never    Sexual activity: Defer           Objective   Physical Exam  Vitals and nursing note reviewed. Exam conducted with a chaperone present (Fay).   Constitutional:       Appearance: Normal appearance. She is normal weight.   HENT:      Head: Normocephalic and atraumatic.   Cardiovascular:      Rate and Rhythm: Normal rate and regular rhythm.      Heart sounds: Normal heart sounds. No murmur heard.     No friction rub. No gallop.   Pulmonary:      Effort: Pulmonary effort is normal. No respiratory distress.      Breath sounds: Normal breath sounds. No wheezing, rhonchi or rales.   Chest:      Chest wall: No tenderness.   Abdominal:      General: Abdomen is flat. Bowel sounds are normal. There is no distension.      Palpations: Abdomen is soft.      Tenderness: There is no abdominal tenderness.   Musculoskeletal:         General: Normal range of motion.      Right lower leg: 3+ Edema present.      Left lower leg: 3+ Edema present.   Skin:     General: Skin is warm and dry.   Neurological:      General: No focal deficit present.      Mental Status: She is alert and oriented to person, place, and time.   Psychiatric:         Mood and Affect: Mood normal.         Behavior: Behavior normal.         Procedures  Results for orders placed or performed during the hospital encounter of 07/12/24   High Sensitivity Troponin T    Specimen: Blood   Result Value Ref Range    HS Troponin T 90 (C) <14 ng/L   Comprehensive Metabolic Panel    Specimen: Blood   Result Value Ref Range    Glucose 264 (H) 65 - 99 mg/dL    BUN 22 (H) 6 - 20 mg/dL    Creatinine 1.47 (H) 0.57 - 1.00 mg/dL    Sodium 136 136 - 145 mmol/L    Potassium 3.8 3.5 - 5.2 mmol/L    Chloride 95 (L) 98 - 107 mmol/L    CO2 26.5 22.0 - 29.0 mmol/L    Calcium 9.2 8.6 - 10.5 mg/dL    Total Protein 6.6 6.0 - 8.5 g/dL    Albumin 4.0 3.5 - 5.2 g/dL    ALT (SGPT) 26 1 - 33 U/L    AST (SGOT) 25 1 - 32 U/L    Alkaline Phosphatase 91 39 - 117 U/L    Total  Bilirubin 5.0 (H) 0.0 - 1.2 mg/dL    Globulin 2.6 gm/dL    A/G Ratio 1.5 g/dL    BUN/Creatinine Ratio 15.0 7.0 - 25.0    Anion Gap 14.5 5.0 - 15.0 mmol/L    eGFR 41.5 (L) >60.0 mL/min/1.73   High Sensitivity Troponin T 2Hr    Specimen: Hand, Right; Blood   Result Value Ref Range    HS Troponin T 81 (C) <14 ng/L    Troponin T Delta -9 (L) >=-4 - <+4 ng/L   Urinalysis With Culture If Indicated - Urine, Catheter    Specimen: Urine, Catheter   Result Value Ref Range    Color, UA Dark Yellow (A) Yellow, Straw    Appearance, UA Turbid (A) Clear    pH, UA <=5.0 5.0 - 8.0    Specific Gravity, UA 1.015 1.005 - 1.030    Glucose, UA Negative Negative    Ketones, UA Trace (A) Negative    Bilirubin, UA Small (1+) (A) Negative    Blood, UA Large (3+) (A) Negative    Protein, UA 30 mg/dL (1+) (A) Negative    Leuk Esterase, UA Large (3+) (A) Negative    Nitrite, UA Positive (A) Negative    Urobilinogen, UA 1.0 E.U./dL 0.2 - 1.0 E.U./dL   Urinalysis, Microscopic Only - Urine, Catheter    Specimen: Urine, Catheter   Result Value Ref Range    RBC, UA 21-50 (A) None Seen, 0-2 /HPF    WBC, UA Too Numerous to Count (A) None Seen, 0-2 /HPF    Bacteria, UA 1+ (A) None Seen /HPF    Squamous Epithelial Cells, UA 3-6 (A) None Seen, 0-2 /HPF    Hyaline Casts, UA 3-6 None Seen /LPF    Methodology Automated Microscopy    CBC Auto Differential    Specimen: Blood   Result Value Ref Range    WBC 10.69 3.40 - 10.80 10*3/mm3    RBC 2.78 (L) 3.77 - 5.28 10*6/mm3    Hemoglobin 8.8 (L) 12.0 - 15.9 g/dL    Hematocrit 31.3 (L) 34.0 - 46.6 %    .6 (H) 79.0 - 97.0 fL    MCH 31.7 26.6 - 33.0 pg    MCHC 28.1 (L) 31.5 - 35.7 g/dL    RDW 22.3 (H) 12.3 - 15.4 %    RDW-SD 89.2 (H) 37.0 - 54.0 fl    MPV 10.8 6.0 - 12.0 fL    Platelets 145 140 - 450 10*3/mm3    Neutrophil % 77.7 (H) 42.7 - 76.0 %    Lymphocyte % 15.0 (L) 19.6 - 45.3 %    Monocyte % 4.0 (L) 5.0 - 12.0 %    Eosinophil % 1.6 0.3 - 6.2 %    Basophil % 1.0 0.0 - 1.5 %    Immature Grans % 0.7 (H)  0.0 - 0.5 %    Neutrophils, Absolute 8.30 (H) 1.70 - 7.00 10*3/mm3    Lymphocytes, Absolute 1.60 0.70 - 3.10 10*3/mm3    Monocytes, Absolute 0.43 0.10 - 0.90 10*3/mm3    Eosinophils, Absolute 0.17 0.00 - 0.40 10*3/mm3    Basophils, Absolute 0.11 0.00 - 0.20 10*3/mm3    Immature Grans, Absolute 0.08 (H) 0.00 - 0.05 10*3/mm3    nRBC 0.6 (H) 0.0 - 0.2 /100 WBC   Scan Slide    Specimen: Blood   Result Value Ref Range    Anisocytosis Large/3+ None Seen    Dacrocytes Slight/1+ None Seen    Hypochromia Large/3+ None Seen    Macrocytes Mod/2+ None Seen    Polychromasia Slight/1+ None Seen    Stomatocytes Slight/1+ None Seen    Platelet Morphology Normal Normal   Lactic Acid, Plasma    Specimen: Arm, Right; Blood   Result Value Ref Range    Lactate 2.4 (C) 0.5 - 2.0 mmol/L   BNP    Specimen: Hand, Right; Blood   Result Value Ref Range    proBNP 3,998.0 (H) 0.0 - 900.0 pg/mL   Digoxin Level    Specimen: Hand, Right; Blood   Result Value Ref Range    Digoxin 1.10 0.60 - 1.20 ng/mL   ECG 12 Lead Chest Pain   Result Value Ref Range    QT Interval 418 ms    QTC Interval 485 ms   Green Top (Gel)   Result Value Ref Range    Extra Tube Hold for add-ons.    Lavender Top   Result Value Ref Range    Extra Tube hold for add-on    Gold Top - SST   Result Value Ref Range    Extra Tube Hold for add-ons.    Light Blue Top   Result Value Ref Range    Extra Tube Hold for add-ons.      XR Chest 1 View    Result Date: 7/12/2024  Narrative: EXAM:   XR Chest, 1 View  EXAM DATE:   7/12/2024 7:32 AM  CLINICAL HISTORY:   cp  TECHNIQUE:   Frontal view of the chest.  COMPARISON:   6/21/2024  FINDINGS:   Lungs and pleural spaces:  Unremarkable as visualized.  No consolidation.  No pneumothorax.   Heart:  Unremarkable as visualized.  No cardiomegaly.   Mediastinum:  Unremarkable as visualized.  Normal mediastinal contour.   Bones/joints:  Unremarkable as visualized.  No acute fracture.   Tubes, lines and devices:  Defibrillator pads left chest.       Impression:   No acute cardiopulmonary findings identified.   This report was finalized on 7/12/2024 8:10 AM by Dr. Marlo Parr MD.      XR Chest 1 View    Result Date: 6/21/2024  Narrative: CLINICAL HISTORY: Chest pain.  COMPARISON: 3/26/2024.  TECHNIQUE: Single portable semiupright AP view of the chest was obtained.  FINDINGS: Heart size is enlarged.  Lungs are clear.  There is no pleural effusion or pneumothorax.  Cardiomediastinal silhouette is normal.  No acute osseous or upper abdominal abnormality is seen.      Impression:  CARDIOMEGALY.    This report was finalized on 6/21/2024 8:32 PM by Sonja aTbor MD.              ED Course  ED Course as of 07/12/24 1601   Fri Jul 12, 2024   1444 ECG 12 Lead Chest Pain  Normal sinus rhythm.  Rate 81.  Left axis deviation.  Normal QT interval.  Idioventricular conduction delay.  No acute ischemic changes.  No significant change from 6/21/2024.  Abnormal EKG [BC]   1559 Discussed with Dr. Boland.  Patient admitted, see orders. [BC]      ED Course User Index  [BC] Sam Desai MD                                             Medical Decision Making  Problems Addressed:  Acute on chronic congestive heart failure, unspecified heart failure type: complicated acute illness or injury  Chronic kidney disease, unspecified CKD stage: complicated acute illness or injury  Urinary tract infection without hematuria, site unspecified: complicated acute illness or injury    Amount and/or Complexity of Data Reviewed  Labs: ordered.  Radiology: ordered.  ECG/medicine tests: ordered. Decision-making details documented in ED Course.    Risk  Prescription drug management.  Decision regarding hospitalization.        Final diagnoses:   Urinary tract infection without hematuria, site unspecified   Chronic kidney disease, unspecified CKD stage   Acute on chronic congestive heart failure, unspecified heart failure type       ED Disposition  ED Disposition       ED Disposition   Decision to  Admit    Condition   --    Comment   Level of Care: Progressive Care [20]   Diagnosis: Wide-complex tachycardia [010470]   Certification: I Certify That Inpatient Hospital Services Are Medically Necessary For Greater Than 2 Midnights                 No follow-up provider specified.       Medication List      No changes were made to your prescriptions during this visit.            Sam Desai MD  07/12/24 6698

## 2024-07-12 NOTE — ED NOTES
Patient lying in stretcher, NADN, VSS. Patient placed on zoll pads and monitor. Pt resting and comfortable at this time. Bilateral IVs flushed and had blood return.

## 2024-07-12 NOTE — H&P
"    [facility] Medicine Services  History & Physical    Patient Identification:  Name:  Yumiko Purdy  Age:  58 y.o.  Sex:  female  :  1965  MRN:  [id number]   Visit Number:  [id number]  Admit Date: 2024   Primary Care Physician:  Masha Davidson APRN    Subjective     Chief complaint: Chest pain    History of presenting illness:      Yumiko Purdy is a 58 y.o. female who presented for further evaluation of shortness of breath, chest pain.  Per report patient called EMS this a.m. secondary to shortness of breath and tachycardia.  On EMS arrival patient found to be in wide-complex tachycardia.  Per report did not respond to amiodarone in the field and required cardioversion and was in normal sinus rhythm on arrival to the ED.    Patient was seen and examined in the ED with no family present at bedside. She is jaundiced and ill appearing. She reports, as above, that she called EMS this this morning secondary to short of breath and rapid heart rate which she reports was up to 140 prior to EMS arrival. She states shortness of breath and swelling worsening over the past week or so. She is compliant with home bumex and notes nephrology increased her bumex to 6 mg daily at her f/u yesterday as she reports medications are not as effective lately as they once were with keeping her euvolemic. Tachycardia began late last evening and worsened through the night. She was having associated mild chest pain but she states no worse than her \"normal\" angina and it had actually improved at home with a dose of her nitroglycerin. This pain was retrosternal and felt like a pressure. It did not radiate and she had no associated nausea or diaphoresis. On further review of systems she also noted her abdomen is more swollen than baseline but not more tender. Her urine output is diminished as above and she is also complaining of dysuria. She is unsure if having significant flank pain given chronic low " back pain. No reported diarrhea or constipation.     Past medical history is significant for atrial fibrillation, Hx ventricular tachycardia s/p PVI with recurrence, dilated cardiomyopathy, heart failure with improved ejection fraction, CAD, CKD, chronic anemia, cirrhosis, diabetes mellitus    Upon arrival to the ED, vital signs were temp 98.5, heart rate 81, respirations 12, /64, SpO2 89% on RA.  HS troponin was 90 on arrival with repeat 81.  proBNP is up from recent baseline at 3998.  Creatinine is also very slightly up from recent baseline at 1.47 with BUN 22.  Total bilirubin is 5 but appears to be chronically elevated in the setting of cirrhosis.  Lactate is 2.4.  There is no leukocytosis.  UA is abnormal-nitrite positive with 3+ blood, 3+ leukocytes, 21-50 RBCs, many WBCs and 1+ bacteria.  CXR was negative.  EKG showed NSR with rate 81.    Known Emergency Department medications received prior to my evaluation included 2 mg IV Bumex, Rocephin, 4 mg Zofran.   Emergency Department Room location at the time of my evaluation was 102.     ---------------------------------------------------------------------------------------------------------------------   Review of Systems   Constitutional:  Negative for chills, diaphoresis and fever.   HENT:  Negative for congestion and rhinorrhea.    Respiratory:  Positive for shortness of breath. Negative for cough.    Cardiovascular:  Positive for chest pain, palpitations and leg swelling.   Gastrointestinal:  Positive for abdominal distention and abdominal pain. Negative for constipation and diarrhea.   Genitourinary:  Positive for decreased urine volume and dysuria. Negative for difficulty urinating.   Musculoskeletal:  Negative for arthralgias and myalgias.   Skin:  Negative for rash and wound.   Neurological:  Negative for dizziness and light-headedness.         ---------------------------------------------------------------------------------------------------------------------   Past Medical History:   Diagnosis Date    Anemia     CHF (congestive heart failure)     Chronic a-fib     stated by patient     Cirrhosis of liver     stated by patient     Diabetes mellitus     Ventricular tachycardia      Past Surgical History:   Procedure Laterality Date    APPENDECTOMY      CARDIAC CATHETERIZATION N/A 11/10/2020    Procedure: Left Heart Cath;  Surgeon: Charlee Ken MD;  Location: [facility] CATH INVASIVE LOCATION;  Service: Cardiovascular;  Laterality: N/A;    CHOLECYSTECTOMY      TOE AMPUTATION Right     stated by patient.      Family History   Problem Relation Age of Onset    Heart attack Father     Heart attack Brother      Social History     Socioeconomic History    Marital status:    Tobacco Use    Smoking status: Never    Smokeless tobacco: Never   Vaping Use    Vaping status: Never Used   Substance and Sexual Activity    Alcohol use: Never    Drug use: Never    Sexual activity: Defer     ---------------------------------------------------------------------------------------------------------------------   Allergies:  Phenergan [promethazine]  ---------------------------------------------------------------------------------------------------------------------   Home medications:    Medications below are reported home medications pulling from within the system; at this time, these medications have not been reconciled unless otherwise specified and are in the verification process for further verifcation as current home medications.  (Not in a hospital admission)      Hospital Scheduled Meds:          Current listed hospital scheduled medications may not yet reflect those currently placed in orders that are signed and held awaiting patient's arrival to floor.    ---------------------------------------------------------------------------------------------------------------------     Objective     Vital Signs:  Temp:  [98.5 °F (36.9 °C)] 98.5 °F (36.9 °C)  Heart Rate:  [67-81] 68  Resp:  [12] 12  BP: (100-117)/(44-85) 117/85      07/12/24  0645   Weight: 102 kg (224 lb 13.9 oz)     Body mass index is 37.42 kg/m².  ---------------------------------------------------------------------------------------------------------------------       Physical Exam  Vitals and nursing note reviewed.   Constitutional:       General: She is not in acute distress.     Appearance: She is obese. She is ill-appearing.   HENT:      Head: Normocephalic and atraumatic.   Eyes:      General: Scleral icterus present.      Extraocular Movements: Extraocular movements intact.   Cardiovascular:      Rate and Rhythm: Normal rate and regular rhythm.   Pulmonary:      Effort: Pulmonary effort is normal.      Comments: Diminished breath sounds bilaterally   Abdominal:      Palpations: Abdomen is soft.      Tenderness: There is no abdominal tenderness.   Musculoskeletal:      Right lower leg: Edema present.      Left lower leg: Edema present.      Comments: 2+   Skin:     General: Skin is warm and dry.      Coloration: Skin is jaundiced.   Neurological:      Mental Status: She is alert. Mental status is at baseline.   Psychiatric:         Mood and Affect: Mood normal.         Behavior: Behavior normal.             ---------------------------------------------------------------------------------------------------------------------  EKG:        I have personally looked at the EKG.  ---------------------------------------------------------------------------------------------------------------------   Results from last 7 days   Lab Units 07/12/24  1433 07/12/24  0652   LACTATE mmol/L 2.4*  --    WBC 10*3/mm3  --  10.69   HEMOGLOBIN g/dL  --  8.8*   HEMATOCRIT %  --  31.3*   MCV fL  --  112.6*   MCHC g/dL  --   "28.1*   PLATELETS 10*3/mm3  --  145         Results from last 7 days   Lab Units 07/12/24  0652   SODIUM mmol/L 136   POTASSIUM mmol/L 3.8   CHLORIDE mmol/L 95*   CO2 mmol/L 26.5   BUN mg/dL 22*   CREATININE mg/dL 1.47*   CALCIUM mg/dL 9.2   GLUCOSE mg/dL 264*   ALBUMIN g/dL 4.0   BILIRUBIN mg/dL 5.0*   ALK PHOS U/L 91   AST (SGOT) U/L 25   ALT (SGPT) U/L 26   Estimated Creatinine Clearance: 50 mL/min (A) (by C-G formula based on SCr of 1.47 mg/dL (H)).  No results found for: \"AMMONIA\"  Results from last 7 days   Lab Units 07/12/24  0912 07/12/24  0652   HSTROP T ng/L 81* 90*     Results from last 7 days   Lab Units 07/12/24  0912   PROBNP pg/mL 3,998.0*     Lab Results   Component Value Date    HGBA1C 7.70 (H) 03/26/2024     Lab Results   Component Value Date    TSH 2.960 01/31/2024    FREET4 1.30 04/01/2024     No results found for: \"PREGTESTUR\", \"PREGSERUM\", \"HCG\", \"HCGQUANT\"  Pain Management Panel  More data exists         Latest Ref Rng & Units 11/13/2023 9/12/2020   Pain Management Panel   Creatinine, Urine mg/dL  mg/dL - 46.3  46.3    Amphetamine, Urine Qual Negative Negative  -   Barbiturates Screen, Urine Negative Negative  -   Benzodiazepine Screen, Urine Negative Negative  -   Buprenorphine, Screen, Urine Negative Negative  -   Cocaine Screen, Urine Negative Negative  -   Fentanyl, Urine Negative Negative  -   Methadone Screen , Urine Negative Negative  -   Methamphetamine, Ur Negative Negative  -     No results found for: \"BLOODCX\"  No results found for: \"URINECX\"  No results found for: \"WOUNDCX\"  No results found for: \"STOOLCX\"      ---------------------------------------------------------------------------------------------------------------------  Imaging Results (Last 7 Days)       Procedure Component Value Units Date/Time    XR Chest 1 View [[id number]] Collected: 07/12/24 0810     Updated: 07/12/24 0812    Narrative:      EXAM:    XR Chest, 1 View     EXAM DATE:    7/12/2024 7:32 AM     CLINICAL " "HISTORY:    cp     TECHNIQUE:    Frontal view of the chest.     COMPARISON:    6/21/2024     FINDINGS:    Lungs and pleural spaces:  Unremarkable as visualized.  No  consolidation.  No pneumothorax.    Heart:  Unremarkable as visualized.  No cardiomegaly.    Mediastinum:  Unremarkable as visualized.  Normal mediastinal contour.    Bones/joints:  Unremarkable as visualized.  No acute fracture.    Tubes, lines and devices:  Defibrillator pads left chest.       Impression:        No acute cardiopulmonary findings identified.        This report was finalized on 7/12/2024 8:10 AM by Dr. Marlo Parr MD.               Cultures:  No results found for: \"BLOODCX\", \"URINECX\", \"WOUNDCX\", \"MRSACX\", \"RESPCX\", \"STOOLCX\"    Last echocardiogram:  Results for orders placed during the hospital encounter of 03/26/24    Adult Transthoracic Echo Complete W/ Cont if Necessary Per Protocol    Interpretation Summary    Normal left ventricular cavity size and wall thickness noted. All left ventricular wall segments contract normally.    Left ventricular ejection fraction appears to be 66 - 70%.    Left ventricular diastolic function is consistent with (grade II w/high LAP) pseudonormalization.    The aortic valve is structurally normal with no regurgitation or stenosis present.    Moderate mitral annular calcification is present. There is mild calcification of the mitral valve anterior leaflet(s). Mild mitral valve regurgitation is present. Mild mitral valve stenosis is present.    There is no evidence of pericardial effusion. .          I have personally reviewed the above radiology images and read the final radiology report on 07/12/24  ---------------------------------------------------------------------------------------------------------------------  Assessment / Plan     Active Hospital Problems    Diagnosis  POA    **Wide-complex tachycardia [R00.0]  Yes       ASSESSMENT/PLAN:    Wide-complex tachycardia, PTA  Hx ventricular " tachycardia s/p PVI  Acutely decompensated HFimpEF  Dilated cardiomyopathy  Paroxysmal atrial fibrillation  Nonobstructive CAD  Patient called EMS this a.m. secondary to shortness of breath.  EMS found the patient to be in wide-complex tachycardia and she was cardioverted in the field successfully.  She was NSR on arrival to the ED.  O2 sat initially 89% on RA.  proBNP is up from recent baseline at 3998.  HS troponin elevated but similar to chronic levels.  Will admit to the PCU for further management  Cardiology consulted and has evaluated the patient in the ED.  Recommended continued amiodarone.  Will obtain a digoxin level to ensure toxicity not inducing arrhythmia in the setting of CKD.  Hold digoxin for now.  Continue Eliquis.  Patient did receive 2 mg IV Bumex in the ED and will follow-up response to diuresis.  Continue to monitor clinical volume status, I's and O's, respiratory status closely and continue to diurese as clinically indicated  Update TTE  Continuous cardiac monitoring  Monitor and replace electrolytes as needed  Check TSH  Continue supportive care measures  Trend labs    Acute urinary tract infection  Patient complaining of recent UTI and continued dysuria.  UA is grossly abnormal and concerning for infection with 3+ blood, nitrite positive, 3+ leukocytes, many WBCs and 1+ bacteria.  Culture sent and pending.  Blood cultures are also pending.  Lactate is 2.4.  There is no leukocytosis or fever in the ED.  Continue Rocephin empirically for now.  Follow-up culture results and tailor therapy as needed.    Chronic:  CKD which appears to be stage IIIa  Chronic anemia  Cirrhosis  Diabetes mellitus  Continue home medication regimen as indicated once med rec is complete  Avoid nephrotoxins.  Close monitoring of renal function while diuresing as above.  Resume insulin regimen at appropriate doses.  Will need Accu-Cheks to monitor hypoglycemia protocol in place.  ----------  -DVT prophylaxis: Chronically  anticoagulated with Eliquis  -Activity: Up with assistance  -Expected length of stay: INPATIENT status due to the need for care which can only be reasonably provided in an hospital setting such as aggressive/expedited ancillary services and/or consultation services, the necessity for IV medications, close physician monitoring and/or the possible need for procedures.  In such, I feel patient’s risk for adverse outcomes and need for care warrant INPATIENT evaluation and predict the patient’s care encounter to likely last beyond 2 midnights.   -Disposition pending course    High risk secondary to Wide complex tachycardia s/p field cardioversion, decompensated CHF, UTI    Code Status and Medical Interventions:   Ordered at: 07/12/24 1528     Code Status (Patient has no pulse and is not breathing):    CPR (Attempt to Resuscitate)     Medical Interventions (Patient has pulse or is breathing):    Full Support       Francisco Armas PA-C   07/12/24  15:41 EDT

## 2024-07-13 ENCOUNTER — APPOINTMENT (OUTPATIENT)
Dept: CARDIOLOGY | Facility: HOSPITAL | Age: 59
End: 2024-07-13
Payer: COMMERCIAL

## 2024-07-13 ENCOUNTER — APPOINTMENT (OUTPATIENT)
Dept: CT IMAGING | Facility: HOSPITAL | Age: 59
End: 2024-07-13
Payer: COMMERCIAL

## 2024-07-13 LAB
ANION GAP SERPL CALCULATED.3IONS-SCNC: 12.4 MMOL/L (ref 5–15)
ANISOCYTOSIS BLD QL: NORMAL
BASOPHILS # BLD AUTO: 0.1 10*3/MM3 (ref 0–0.2)
BASOPHILS NFR BLD AUTO: 1.1 % (ref 0–1.5)
BH CV ECHO MEAS - AO MAX PG: 12.3 MMHG
BH CV ECHO MEAS - AO MEAN PG: 7 MMHG
BH CV ECHO MEAS - AO ROOT DIAM: 3.1 CM
BH CV ECHO MEAS - AO V2 MAX: 175 CM/SEC
BH CV ECHO MEAS - AO V2 VTI: 38.3 CM
BH CV ECHO MEAS - EDV(CUBED): 187.1 ML
BH CV ECHO MEAS - EDV(MOD-SP4): 78.2 ML
BH CV ECHO MEAS - EF(MOD-SP4): 54.7 %
BH CV ECHO MEAS - ESV(CUBED): 125.8 ML
BH CV ECHO MEAS - ESV(MOD-SP4): 35.4 ML
BH CV ECHO MEAS - FS: 12.4 %
BH CV ECHO MEAS - IVS/LVPW: 0.77 CM
BH CV ECHO MEAS - IVSD: 1.14 CM
BH CV ECHO MEAS - LA DIMENSION: 4.4 CM
BH CV ECHO MEAS - LAT PEAK E' VEL: 9.3 CM/SEC
BH CV ECHO MEAS - LV DIASTOLIC VOL/BSA (35-75): 36.7 CM2
BH CV ECHO MEAS - LV MASS(C)D: 327.5 GRAMS
BH CV ECHO MEAS - LV SYSTOLIC VOL/BSA (12-30): 16.6 CM2
BH CV ECHO MEAS - LVIDD: 5.7 CM
BH CV ECHO MEAS - LVIDS: 5 CM
BH CV ECHO MEAS - LVOT AREA: 2.27 CM2
BH CV ECHO MEAS - LVOT DIAM: 1.7 CM
BH CV ECHO MEAS - LVPWD: 1.48 CM
BH CV ECHO MEAS - MED PEAK E' VEL: 5.1 CM/SEC
BH CV ECHO MEAS - MV A MAX VEL: 35.5 CM/SEC
BH CV ECHO MEAS - MV E MAX VEL: 135 CM/SEC
BH CV ECHO MEAS - MV E/A: 3.8
BH CV ECHO MEAS - PA ACC TIME: 0.09 SEC
BH CV ECHO MEAS - RAP SYSTOLE: 10 MMHG
BH CV ECHO MEAS - RVSP: 57.6 MMHG
BH CV ECHO MEAS - SV(MOD-SP4): 42.8 ML
BH CV ECHO MEAS - SVI(MOD-SP4): 20.1 ML/M2
BH CV ECHO MEAS - TAPSE (>1.6): 1.89 CM
BH CV ECHO MEAS - TR MAX PG: 47.6 MMHG
BH CV ECHO MEAS - TR MAX VEL: 345 CM/SEC
BH CV ECHO MEASUREMENTS AVERAGE E/E' RATIO: 18.75
BUN SERPL-MCNC: 26 MG/DL (ref 6–20)
BUN/CREAT SERPL: 18.3 (ref 7–25)
CALCIUM SPEC-SCNC: 8.6 MG/DL (ref 8.6–10.5)
CHLORIDE SERPL-SCNC: 97 MMOL/L (ref 98–107)
CO2 SERPL-SCNC: 26.6 MMOL/L (ref 22–29)
CREAT SERPL-MCNC: 1.42 MG/DL (ref 0.57–1)
DEPRECATED RDW RBC AUTO: 86.4 FL (ref 37–54)
EGFRCR SERPLBLD CKD-EPI 2021: 43.2 ML/MIN/1.73
EOSINOPHIL # BLD AUTO: 0.12 10*3/MM3 (ref 0–0.4)
EOSINOPHIL NFR BLD AUTO: 1.3 % (ref 0.3–6.2)
ERYTHROCYTE [DISTWIDTH] IN BLOOD BY AUTOMATED COUNT: 22.2 % (ref 12.3–15.4)
GLUCOSE BLDC GLUCOMTR-MCNC: 252 MG/DL (ref 70–130)
GLUCOSE BLDC GLUCOMTR-MCNC: 299 MG/DL (ref 70–130)
GLUCOSE BLDC GLUCOMTR-MCNC: 311 MG/DL (ref 70–130)
GLUCOSE BLDC GLUCOMTR-MCNC: 349 MG/DL (ref 70–130)
GLUCOSE SERPL-MCNC: 230 MG/DL (ref 65–99)
HCT VFR BLD AUTO: 28.6 % (ref 34–46.6)
HGB BLD-MCNC: 8.2 G/DL (ref 12–15.9)
HYPOCHROMIA BLD QL: NORMAL
IMM GRANULOCYTES # BLD AUTO: 0.07 10*3/MM3 (ref 0–0.05)
IMM GRANULOCYTES NFR BLD AUTO: 0.8 % (ref 0–0.5)
LEFT ATRIUM VOLUME INDEX: 33.8 ML/M2
LYMPHOCYTES # BLD AUTO: 1.68 10*3/MM3 (ref 0.7–3.1)
LYMPHOCYTES NFR BLD AUTO: 18.3 % (ref 19.6–45.3)
MACROCYTES BLD QL SMEAR: NORMAL
MAGNESIUM SERPL-MCNC: 1.8 MG/DL (ref 1.6–2.6)
MCH RBC QN AUTO: 31.4 PG (ref 26.6–33)
MCHC RBC AUTO-ENTMCNC: 28.7 G/DL (ref 31.5–35.7)
MCV RBC AUTO: 109.6 FL (ref 79–97)
MONOCYTES # BLD AUTO: 0.53 10*3/MM3 (ref 0.1–0.9)
MONOCYTES NFR BLD AUTO: 5.8 % (ref 5–12)
NEUTROPHILS NFR BLD AUTO: 6.69 10*3/MM3 (ref 1.7–7)
NEUTROPHILS NFR BLD AUTO: 72.7 % (ref 42.7–76)
NRBC BLD AUTO-RTO: 0.5 /100 WBC (ref 0–0.2)
PLAT MORPH BLD: NORMAL
PLATELET # BLD AUTO: 125 10*3/MM3 (ref 140–450)
PMV BLD AUTO: 10.9 FL (ref 6–12)
POLYCHROMASIA BLD QL SMEAR: NORMAL
POTASSIUM SERPL-SCNC: 4.1 MMOL/L (ref 3.5–5.2)
RBC # BLD AUTO: 2.61 10*6/MM3 (ref 3.77–5.28)
SODIUM SERPL-SCNC: 136 MMOL/L (ref 136–145)
STOMATOCYTES BLD QL SMEAR: NORMAL
TSH SERPL DL<=0.05 MIU/L-ACNC: 4.99 UIU/ML (ref 0.27–4.2)
WBC NRBC COR # BLD AUTO: 9.19 10*3/MM3 (ref 3.4–10.8)

## 2024-07-13 PROCEDURE — 25010000002 PROCHLORPERAZINE 10 MG/2ML SOLUTION: Performed by: INTERNAL MEDICINE

## 2024-07-13 PROCEDURE — 63710000001 INSULIN LISPRO (HUMAN) PER 5 UNITS: Performed by: INTERNAL MEDICINE

## 2024-07-13 PROCEDURE — 99233 SBSQ HOSP IP/OBS HIGH 50: CPT | Performed by: INTERNAL MEDICINE

## 2024-07-13 PROCEDURE — 74176 CT ABD & PELVIS W/O CONTRAST: CPT

## 2024-07-13 PROCEDURE — 74176 CT ABD & PELVIS W/O CONTRAST: CPT | Performed by: RADIOLOGY

## 2024-07-13 PROCEDURE — 63710000001 INSULIN GLARGINE PER 5 UNITS: Performed by: INTERNAL MEDICINE

## 2024-07-13 PROCEDURE — 93306 TTE W/DOPPLER COMPLETE: CPT | Performed by: INTERNAL MEDICINE

## 2024-07-13 PROCEDURE — 82948 REAGENT STRIP/BLOOD GLUCOSE: CPT

## 2024-07-13 PROCEDURE — 94799 UNLISTED PULMONARY SVC/PX: CPT

## 2024-07-13 PROCEDURE — 93306 TTE W/DOPPLER COMPLETE: CPT

## 2024-07-13 PROCEDURE — 25010000002 CEFTRIAXONE PER 250 MG: Performed by: INTERNAL MEDICINE

## 2024-07-13 PROCEDURE — 25010000002 FUROSEMIDE PER 20 MG: Performed by: PHYSICIAN ASSISTANT

## 2024-07-13 PROCEDURE — 63710000001 ONDANSETRON ODT 4 MG TABLET DISPERSIBLE: Performed by: INTERNAL MEDICINE

## 2024-07-13 RX ORDER — FERROUS SULFATE 325(65) MG
325 TABLET ORAL DAILY
Status: DISCONTINUED | OUTPATIENT
Start: 2024-07-13 | End: 2024-07-16 | Stop reason: HOSPADM

## 2024-07-13 RX ORDER — LEVOTHYROXINE SODIUM 0.03 MG/1
12.5 TABLET ORAL
Status: DISCONTINUED | OUTPATIENT
Start: 2024-07-13 | End: 2024-07-16 | Stop reason: HOSPADM

## 2024-07-13 RX ORDER — CAPSAICIN 8 %
1 KIT TOPICAL TAKE AS DIRECTED
COMMUNITY

## 2024-07-13 RX ORDER — HYDROCODONE BITARTRATE AND ACETAMINOPHEN 7.5; 325 MG/1; MG/1
1 TABLET ORAL EVERY 12 HOURS PRN
Status: DISCONTINUED | OUTPATIENT
Start: 2024-07-13 | End: 2024-07-16 | Stop reason: HOSPADM

## 2024-07-13 RX ORDER — DIGOXIN 125 MCG
125 TABLET ORAL EVERY OTHER DAY
Status: DISCONTINUED | OUTPATIENT
Start: 2024-07-13 | End: 2024-07-13

## 2024-07-13 RX ORDER — INSULIN LISPRO 100 [IU]/ML
3-14 INJECTION, SOLUTION INTRAVENOUS; SUBCUTANEOUS
Status: DISCONTINUED | OUTPATIENT
Start: 2024-07-13 | End: 2024-07-16 | Stop reason: HOSPADM

## 2024-07-13 RX ORDER — BUSPIRONE HYDROCHLORIDE 10 MG/1
10 TABLET ORAL 2 TIMES DAILY
Status: DISCONTINUED | OUTPATIENT
Start: 2024-07-13 | End: 2024-07-16 | Stop reason: HOSPADM

## 2024-07-13 RX ORDER — NICOTINE POLACRILEX 4 MG
15 LOZENGE BUCCAL
Status: DISCONTINUED | OUTPATIENT
Start: 2024-07-13 | End: 2024-07-16 | Stop reason: HOSPADM

## 2024-07-13 RX ORDER — FLUTICASONE PROPIONATE 50 MCG
2 SPRAY, SUSPENSION (ML) NASAL DAILY
COMMUNITY

## 2024-07-13 RX ORDER — LEVOTHYROXINE SODIUM 0.05 MG/1
50 TABLET ORAL DAILY
Status: DISCONTINUED | OUTPATIENT
Start: 2024-07-13 | End: 2024-07-16 | Stop reason: HOSPADM

## 2024-07-13 RX ORDER — GLUCAGON 1 MG/ML
1 KIT INJECTION
Status: DISCONTINUED | OUTPATIENT
Start: 2024-07-13 | End: 2024-07-16 | Stop reason: HOSPADM

## 2024-07-13 RX ORDER — BUMETANIDE 1 MG/1
4 TABLET ORAL 3 TIMES DAILY
Status: CANCELLED | OUTPATIENT
Start: 2024-07-13

## 2024-07-13 RX ORDER — INSULIN LISPRO 100 [IU]/ML
8 INJECTION, SOLUTION INTRAVENOUS; SUBCUTANEOUS
Status: DISCONTINUED | OUTPATIENT
Start: 2024-07-13 | End: 2024-07-16 | Stop reason: HOSPADM

## 2024-07-13 RX ORDER — LEVOTHYROXINE SODIUM 0.03 MG/1
12.5 TABLET ORAL
COMMUNITY

## 2024-07-13 RX ORDER — PROCHLORPERAZINE EDISYLATE 5 MG/ML
5 INJECTION INTRAMUSCULAR; INTRAVENOUS EVERY 6 HOURS PRN
Status: DISCONTINUED | OUTPATIENT
Start: 2024-07-13 | End: 2024-07-16 | Stop reason: HOSPADM

## 2024-07-13 RX ORDER — FUROSEMIDE 10 MG/ML
40 INJECTION INTRAMUSCULAR; INTRAVENOUS ONCE
Status: COMPLETED | OUTPATIENT
Start: 2024-07-13 | End: 2024-07-13

## 2024-07-13 RX ORDER — BUMETANIDE 0.25 MG/ML
2 INJECTION INTRAMUSCULAR; INTRAVENOUS ONCE
Status: DISCONTINUED | OUTPATIENT
Start: 2024-07-13 | End: 2024-07-13

## 2024-07-13 RX ORDER — AMIODARONE HYDROCHLORIDE 200 MG/1
300 TABLET ORAL DAILY
Status: CANCELLED | OUTPATIENT
Start: 2024-07-13

## 2024-07-13 RX ORDER — ONDANSETRON 4 MG/1
4 TABLET, ORALLY DISINTEGRATING ORAL EVERY 8 HOURS PRN
Status: DISCONTINUED | OUTPATIENT
Start: 2024-07-13 | End: 2024-07-16 | Stop reason: HOSPADM

## 2024-07-13 RX ORDER — AMIODARONE HYDROCHLORIDE 200 MG/1
300 TABLET ORAL DAILY
COMMUNITY
End: 2024-07-16 | Stop reason: HOSPADM

## 2024-07-13 RX ORDER — DEXTROSE MONOHYDRATE 25 G/50ML
25 INJECTION, SOLUTION INTRAVENOUS
Status: DISCONTINUED | OUTPATIENT
Start: 2024-07-13 | End: 2024-07-16 | Stop reason: HOSPADM

## 2024-07-13 RX ORDER — ASPIRIN 81 MG/1
81 TABLET ORAL DAILY
Status: DISCONTINUED | OUTPATIENT
Start: 2024-07-13 | End: 2024-07-16 | Stop reason: HOSPADM

## 2024-07-13 RX ORDER — NITROGLYCERIN 0.4 MG/1
0.4 TABLET SUBLINGUAL
Status: CANCELLED | OUTPATIENT
Start: 2024-07-13

## 2024-07-13 RX ORDER — FLUTICASONE PROPIONATE 50 MCG
2 SPRAY, SUSPENSION (ML) NASAL DAILY
Status: DISCONTINUED | OUTPATIENT
Start: 2024-07-13 | End: 2024-07-16 | Stop reason: HOSPADM

## 2024-07-13 RX ORDER — PANTOPRAZOLE SODIUM 40 MG/1
40 TABLET, DELAYED RELEASE ORAL
Status: DISCONTINUED | OUTPATIENT
Start: 2024-07-13 | End: 2024-07-16 | Stop reason: HOSPADM

## 2024-07-13 RX ADMIN — NYSTATIN 1 APPLICATION: 100000 POWDER TOPICAL at 20:30

## 2024-07-13 RX ADMIN — PROCHLORPERAZINE EDISYLATE 5 MG: 5 INJECTION INTRAMUSCULAR; INTRAVENOUS at 11:41

## 2024-07-13 RX ADMIN — Medication 10 ML: at 20:30

## 2024-07-13 RX ADMIN — INSULIN LISPRO 8 UNITS: 100 INJECTION, SOLUTION INTRAVENOUS; SUBCUTANEOUS at 17:57

## 2024-07-13 RX ADMIN — INSULIN LISPRO 8 UNITS: 100 INJECTION, SOLUTION INTRAVENOUS; SUBCUTANEOUS at 20:29

## 2024-07-13 RX ADMIN — CEFTRIAXONE 1000 MG: 1 INJECTION, POWDER, FOR SOLUTION INTRAMUSCULAR; INTRAVENOUS at 14:47

## 2024-07-13 RX ADMIN — INSULIN GLARGINE 25 UNITS: 100 INJECTION, SOLUTION SUBCUTANEOUS at 20:30

## 2024-07-13 RX ADMIN — BUSPIRONE HYDROCHLORIDE 10 MG: 10 TABLET ORAL at 08:44

## 2024-07-13 RX ADMIN — PANTOPRAZOLE SODIUM 40 MG: 40 TABLET, DELAYED RELEASE ORAL at 07:03

## 2024-07-13 RX ADMIN — LEVOTHYROXINE SODIUM 50 MCG: 50 TABLET ORAL at 08:44

## 2024-07-13 RX ADMIN — LEVOTHYROXINE SODIUM 12.5 MCG: 50 TABLET ORAL at 07:03

## 2024-07-13 RX ADMIN — ASPIRIN 81 MG: 81 TABLET, COATED ORAL at 08:44

## 2024-07-13 RX ADMIN — BUSPIRONE HYDROCHLORIDE 10 MG: 10 TABLET ORAL at 20:29

## 2024-07-13 RX ADMIN — FUROSEMIDE 40 MG: 10 INJECTION, SOLUTION INTRAMUSCULAR; INTRAVENOUS at 17:57

## 2024-07-13 RX ADMIN — INSULIN LISPRO 8 UNITS: 100 INJECTION, SOLUTION INTRAVENOUS; SUBCUTANEOUS at 11:41

## 2024-07-13 RX ADMIN — ONDANSETRON 4 MG: 4 TABLET, ORALLY DISINTEGRATING ORAL at 07:03

## 2024-07-13 RX ADMIN — Medication 10 ML: at 08:45

## 2024-07-13 RX ADMIN — HYDROCODONE BITARTRATE AND ACETAMINOPHEN 1 TABLET: 7.5; 325 TABLET ORAL at 16:20

## 2024-07-13 RX ADMIN — NYSTATIN 1 APPLICATION: 100000 POWDER TOPICAL at 08:45

## 2024-07-13 RX ADMIN — AMIODARONE HYDROCHLORIDE 300 MG: 200 TABLET ORAL at 08:44

## 2024-07-13 RX ADMIN — APIXABAN 5 MG: 5 TABLET, FILM COATED ORAL at 08:44

## 2024-07-13 RX ADMIN — APIXABAN 5 MG: 5 TABLET, FILM COATED ORAL at 20:29

## 2024-07-13 RX ADMIN — FLUTICASONE PROPIONATE 2 SPRAY: 50 SPRAY, METERED NASAL at 08:45

## 2024-07-13 RX ADMIN — INSULIN LISPRO 8 UNITS: 100 INJECTION, SOLUTION INTRAVENOUS; SUBCUTANEOUS at 08:45

## 2024-07-13 RX ADMIN — FERROUS SULFATE TAB 325 MG (65 MG ELEMENTAL FE) 325 MG: 325 (65 FE) TAB at 08:44

## 2024-07-13 RX ADMIN — INSULIN GLARGINE 25 UNITS: 100 INJECTION, SOLUTION SUBCUTANEOUS at 08:45

## 2024-07-13 NOTE — PROGRESS NOTES
LOS: 1 day     Name: Yumiko Purdy  Age/Sex: 58 y.o. female  :  1965        PCP: Masha Davidson APRN    Principal Problem:    Wide-complex tachycardia        Following for: Wide-complex tachycardia versus atrial fibrillation with aberrancy    Telemetry Monitoring: Sinus rhythm    Interval history: Was able to review telemetry strips in question with Dr. Beckford this does appear to be more atrial flutter/fibrillation less suspicious for ventricular tachycardia.  Thankfully she has been maintaining sinus rhythm during her hospitalization so far.  She denies any irregular heart rhythms or palpitations.  She does admit to some chronic dyspnea although does not particularly state that this is worse than her baseline.    Subjective     ROS    Vital Signs  Vitals:    24 0800 24 0814 24 0844 24 0900   BP: 117/48  115/52 91/68   BP Location:       Pulse: 63  64 65   Resp: 22   15   Temp:  98.2 °F (36.8 °C)     TempSrc:  Oral     SpO2: 98%   99%   Weight:       Height:         Body mass index is 39.62 kg/m².      Intake/Output Summary (Last 24 hours) at 2024 1249  Last data filed at 2024 1124  Gross per 24 hour   Intake 800 ml   Output 600 ml   Net 200 ml       Constitutional:       General: Not in acute distress.     Appearance: Healthy appearance. Well-developed and not in distress. Not diaphoretic.   Eyes:      Conjunctiva/sclera: Conjunctivae normal.      Pupils: Pupils are equal, round, and reactive to light.   HENT:      Head: Normocephalic and atraumatic.   Neck:      Vascular: No carotid bruit or JVD.   Pulmonary:      Effort: Pulmonary effort is normal. No respiratory distress.      Comments: Bibasilar rales  Cardiovascular:      Normal rate. Regular rhythm.   Edema:     Pretibial: bilateral 1+ edema of the pretibial area.     Ankle: bilateral 1+ edema of the ankle.     Feet: bilateral 1+ edema of the feet.  Skin:     General: Skin is cool.   Neurological:       Mental Status: Alert, oriented to person, place, and time and oriented to person, place and time.         Results Review:     Results from last 7 days   Lab Units 07/12/24  2339 07/12/24  0652   WBC 10*3/mm3 9.19 10.69   HEMOGLOBIN g/dL 8.2* 8.8*   PLATELETS 10*3/mm3 125* 145     Results from last 7 days   Lab Units 07/12/24  2339 07/12/24  0652   SODIUM mmol/L 136 136   POTASSIUM mmol/L 4.1 3.8   CHLORIDE mmol/L 97* 95*   CO2 mmol/L 26.6 26.5   BUN mg/dL 26* 22*   CREATININE mg/dL 1.42* 1.47*   CALCIUM mg/dL 8.6 9.2   GLUCOSE mg/dL 230* 264*   ALT (SGPT) U/L  --  26   AST (SGOT) U/L  --  25     Results from last 7 days   Lab Units 07/12/24  0912 07/12/24  0652   HSTROP T ng/L 81* 90*             I reviewed the patient's new clinical results.  I reviewed the patient's new imaging results and agree with the interpretation.  I personally viewed and interpreted the patient's EKG/Telemetry data    Medication Review:   amiodarone, 300 mg, Oral, Q24H  apixaban, 5 mg, Oral, Q12H  aspirin, 81 mg, Oral, Daily  busPIRone, 10 mg, Oral, BID  cefTRIAXone, 1,000 mg, Intravenous, Q24H  ferrous sulfate, 325 mg, Oral, Daily  fluticasone, 2 spray, Nasal, Daily  insulin glargine, 25 Units, Subcutaneous, Q12H  insulin lispro, 8 Units, Subcutaneous, TID With Meals  levothyroxine, 12.5 mcg, Oral, Q AM  levothyroxine, 50 mcg, Oral, Daily  nystatin, 1 Application, Topical, Q12H  pantoprazole, 40 mg, Oral, Q AM  Scopolamine, 1 patch, Transdermal, Q72H  sodium chloride, 10 mL, Intravenous, Q12H  sodium chloride, 10 mL, Intravenous, Q12H  Vortioxetine HBr, 5 mg, Oral, Daily With Breakfast           Assessment:  Possible wide complex tachycardia versus atrial fibrillation with baseline conduction delay.  Patient cardioverted by EMS.  Paroxysmal/persistent atrial fibrillation currently in maintaining sinus rhythm since cardioversion by EMS  Cardiomyopathy-most recent LVEF found to be 60 to 65%  Acute on chronic HFpEF  Nonobstructive CAD  CKD  stage III      Recommendations:  Appears to still be in sinus rhythm we will continue amiodarone for 400 mg twice daily for now  Patient does report some slightly worsening shortness of breath has not received any diuretics is currently on Bumex 2 mg daily but does admit to a significant amount of urine output.  Will try Lasix 40 mg once.    I discussed the patients findings and my recommendations with patient and family    Adonay Castellanos PA-C  07/13/24  12:49 EDT

## 2024-07-13 NOTE — PLAN OF CARE
Problem: Adult Inpatient Plan of Care  Goal: Plan of Care Review  Outcome: Ongoing, Progressing  Flowsheets (Taken 7/13/2024 1851)  Outcome Evaluation: Patient resting in bed. VSS. A&Ox4. NSR w/ BBB. 2LNC. No needs noted at this time.  Goal: Patient-Specific Goal (Individualized)  Outcome: Ongoing, Progressing  Goal: Absence of Hospital-Acquired Illness or Injury  Outcome: Ongoing, Progressing  Intervention: Identify and Manage Fall Risk  Recent Flowsheet Documentation  Taken 7/13/2024 1700 by Renea Wilkerson RN  Safety Promotion/Fall Prevention:   safety round/check completed   room organization consistent   fall prevention program maintained   clutter free environment maintained   assistive device/personal items within reach   activity supervised  Taken 7/13/2024 1500 by Renea Wilkerson RN  Safety Promotion/Fall Prevention:   safety round/check completed   room organization consistent   fall prevention program maintained   clutter free environment maintained   assistive device/personal items within reach   activity supervised  Taken 7/13/2024 1411 by Renea Wilkerson RN  Safety Promotion/Fall Prevention: safety round/check completed  Taken 7/13/2024 1300 by Renea Wilkerson RN  Safety Promotion/Fall Prevention:   safety round/check completed   room organization consistent   fall prevention program maintained   clutter free environment maintained   assistive device/personal items within reach   activity supervised  Taken 7/13/2024 1100 by Renea Wilkerson RN  Safety Promotion/Fall Prevention:   safety round/check completed   room organization consistent   fall prevention program maintained   clutter free environment maintained   assistive device/personal items within reach   activity supervised  Taken 7/13/2024 0900 by Renea Wilkerson RN  Safety Promotion/Fall Prevention:   safety round/check completed   room organization consistent   fall prevention program maintained   clutter free environment maintained   assistive  device/personal items within reach   activity supervised  Taken 7/13/2024 0740 by Renea Wilkerson RN  Safety Promotion/Fall Prevention: safety round/check completed  Taken 7/13/2024 0700 by Renea Wilkerson RN  Safety Promotion/Fall Prevention:   safety round/check completed   room organization consistent   fall prevention program maintained   clutter free environment maintained   assistive device/personal items within reach   activity supervised  Intervention: Prevent and Manage VTE (Venous Thromboembolism) Risk  Recent Flowsheet Documentation  Taken 7/13/2024 1411 by Renea Wilkerson RN  VTE Prevention/Management: (eliquis) other (see comments)  Taken 7/13/2024 1250 by Renea Wilkerson RN  Activity Management: up to bedside commode  Taken 7/13/2024 0740 by Renea Wilkerson RN  Activity Management: activity encouraged  VTE Prevention/Management: (eliquis) other (see comments)  Intervention: Prevent Infection  Recent Flowsheet Documentation  Taken 7/13/2024 1700 by Renea Wilkerson RN  Infection Prevention: rest/sleep promoted  Taken 7/13/2024 1500 by Renea Wilkerson RN  Infection Prevention: rest/sleep promoted  Taken 7/13/2024 1411 by Renea Wilkerson RN  Infection Prevention: rest/sleep promoted  Taken 7/13/2024 1300 by Renea Wilkerson RN  Infection Prevention: rest/sleep promoted  Taken 7/13/2024 1100 by Renea Wilkerson RN  Infection Prevention: rest/sleep promoted  Taken 7/13/2024 0900 by Renea Wilkerson RN  Infection Prevention: rest/sleep promoted  Taken 7/13/2024 0740 by Renea Wilkerson RN  Infection Prevention: rest/sleep promoted  Taken 7/13/2024 0700 by Renea Wilkerson RN  Infection Prevention: rest/sleep promoted  Goal: Optimal Comfort and Wellbeing  Outcome: Ongoing, Progressing  Intervention: Provide Person-Centered Care  Recent Flowsheet Documentation  Taken 7/13/2024 1411 by Renea Wilkerson RN  Trust Relationship/Rapport:   care explained   choices provided  Taken 7/13/2024 0740 by Renea Wilkerson RN  Trust  Relationship/Rapport:   care explained   choices provided  Goal: Readiness for Transition of Care  Outcome: Ongoing, Progressing     Problem: Fall Injury Risk  Goal: Absence of Fall and Fall-Related Injury  Outcome: Ongoing, Progressing  Intervention: Identify and Manage Contributors  Recent Flowsheet Documentation  Taken 7/13/2024 1700 by Renea Wilkerson RN  Medication Review/Management: medications reviewed  Taken 7/13/2024 1500 by Renea Wilkerson RN  Medication Review/Management: medications reviewed  Taken 7/13/2024 1411 by Renea Wilkerson RN  Medication Review/Management: medications reviewed  Taken 7/13/2024 1300 by Renea Wilkerson RN  Medication Review/Management: medications reviewed  Taken 7/13/2024 1100 by Renea Wilkerson RN  Medication Review/Management: medications reviewed  Taken 7/13/2024 0900 by Renea Wilkerson RN  Medication Review/Management: medications reviewed  Taken 7/13/2024 0740 by Renea Wilkerson RN  Medication Review/Management: medications reviewed  Taken 7/13/2024 0700 by Renea Wilkerson RN  Medication Review/Management: medications reviewed  Intervention: Promote Injury-Free Environment  Recent Flowsheet Documentation  Taken 7/13/2024 1700 by Renea Wilkerson RN  Safety Promotion/Fall Prevention:   safety round/check completed   room organization consistent   fall prevention program maintained   clutter free environment maintained   assistive device/personal items within reach   activity supervised  Taken 7/13/2024 1500 by Renea Wilkerson RN  Safety Promotion/Fall Prevention:   safety round/check completed   room organization consistent   fall prevention program maintained   clutter free environment maintained   assistive device/personal items within reach   activity supervised  Taken 7/13/2024 1411 by Renea Wilkerson RN  Safety Promotion/Fall Prevention: safety round/check completed  Taken 7/13/2024 1300 by Renea Wilkerson RN  Safety Promotion/Fall Prevention:   safety round/check completed   room  organization consistent   fall prevention program maintained   clutter free environment maintained   assistive device/personal items within reach   activity supervised  Taken 7/13/2024 1100 by Renea Wilkerson RN  Safety Promotion/Fall Prevention:   safety round/check completed   room organization consistent   fall prevention program maintained   clutter free environment maintained   assistive device/personal items within reach   activity supervised  Taken 7/13/2024 0900 by Renea Wilkerson RN  Safety Promotion/Fall Prevention:   safety round/check completed   room organization consistent   fall prevention program maintained   clutter free environment maintained   assistive device/personal items within reach   activity supervised  Taken 7/13/2024 0740 by Renea Wilkerson RN  Safety Promotion/Fall Prevention: safety round/check completed  Taken 7/13/2024 0700 by Renea Wilkerson RN  Safety Promotion/Fall Prevention:   safety round/check completed   room organization consistent   fall prevention program maintained   clutter free environment maintained   assistive device/personal items within reach   activity supervised     Problem: COPD (Chronic Obstructive Pulmonary Disease) Comorbidity  Goal: Maintenance of COPD Symptom Control  Outcome: Ongoing, Progressing  Intervention: Maintain COPD-Symptom Control  Recent Flowsheet Documentation  Taken 7/13/2024 1700 by Renea Wilkerson RN  Medication Review/Management: medications reviewed  Taken 7/13/2024 1500 by Renea Wilkerson RN  Medication Review/Management: medications reviewed  Taken 7/13/2024 1411 by Renea Wilkerson RN  Medication Review/Management: medications reviewed  Taken 7/13/2024 1300 by Renea Wilkerson RN  Medication Review/Management: medications reviewed  Taken 7/13/2024 1100 by Renea Wilkerson RN  Medication Review/Management: medications reviewed  Taken 7/13/2024 0900 by Renea Wilkerson RN  Medication Review/Management: medications reviewed  Taken 7/13/2024 0740 by  Renea Wilkerson, RN  Medication Review/Management: medications reviewed  Taken 7/13/2024 0700 by Renea Wilkerson, RN  Medication Review/Management: medications reviewed     Problem: Skin Injury Risk Increased  Goal: Skin Health and Integrity  Outcome: Ongoing, Progressing   Goal Outcome Evaluation:              Outcome Evaluation: Patient resting in bed. VSS. A&Ox4. NSR w/ BBB. 2LNC. No needs noted at this time.

## 2024-07-13 NOTE — PAYOR COMM NOTE
"[facility]  BRIDGETT BROWNE  PHONE  [phone]  FAX  [phone]  NPI:  [id number]    REQUEST FOR INPATIENT AUTH    Dio Berry (58 y.o. Female)       Date of Birth   1965    Social Security Number       Address   [address]  [place] [address]    Home Phone   [phone]    MRN   [id number]       Buddhism   Tenriism    Marital Status                               Admission Date   7/12/24    Admission Type   Emergency    Admitting Provider   Charles Boland MD    Attending Provider   Charles Boland MD    Department, Room/Bed   [facility] PROGRESS CARE, P220/1P       Discharge Date       Discharge Disposition       Discharge Destination                                 Attending Provider: Charles Boland MD    Allergies: Phenergan [Promethazine]    Isolation: None   Infection: None   Code Status: CPR    Ht: 165.1 cm (65\")   Wt: 108 kg (238 lb 1.6 oz)    Admission Cmt: None   Principal Problem: Wide-complex tachycardia [R00.0]                   Active Insurance as of 7/12/2024       Primary Coverage       Payor Plan Insurance Group Employer/Plan Group    [employer] [place] [employer] [place]ICA[place]        Payor Plan Address Payor Plan Phone Number Payor Plan Fax Number Effective Dates    [address] [address] [phone]  9/9/2020 - None Entered    [facility] [address]         Subscriber Name Subscriber Birth Date Member ID       DIO BERRY 1965 [id number]                     Emergency Contacts        (Rel.) Home Phone Work Phone Mobile Phone    KATHY BERRY (Son) [phone] -- --    Bolivar Berry (Other) -- -- [phone]                 History & Physical        Francisco Armas PA-C at 07/12/24 1541       Attestation signed by Charles Boland MD at 07/12/24 1830    I have reviewed this documentation and agree.    Ms. Berry is our 57 yo F with hx of Vtach, pAfib, HFpEF (hx of HFrEF), DM II, CAD who presents with chest pain and tachycardia. Patient follows with " cardiology and was recently referred to Dr. Gutierrez for possible ablation for her afib. She was seen on 7/3 and had amiodarone increased at that visit to 400 mg TID for 3 days then to take 300 mg daily. Patient notes compliance to medications. Noted the patient has had 2 admissions a [facility], [facility] back to back and initial one was on 2024 where he was found to be in atrial fibrillation with RVR and anemic with a hemoglobin of 7.  She denied any visible blood loss. This is what led to referral to Dr. Gutierrez. Patient seen nephrology increased bumex yesterday per report. Patient notes sudden tachycardia this morning, with shortness of breath and noticed she was tachycardic. Patient found to be in wide complex tachycardia. She became hypotensive after loading with 150 mg of amioderone in the field and was emergently cardioverted. She as been in sinus rhythm in ED without further intervention. Cardiology contacted recommending to continue amioderone, stop digoxin and get digoxin level. Patient reports getting 2 mg IV bumex in ED and notes IV lasix usually works better. Patient found to have UTI and started on ceftriaxone as we await culture. Cardiology consulted. Appreciate recs.                       [facility] Medicine Services  History & Physical    Patient Identification:  Name:  Yumiko Purdy  Age:  58 y.o.  Sex:  female  :  1965  MRN:  [id number]   Visit Number:  [id number]  Admit Date: 2024   Primary Care Physician:  Masha Davidson APRN    Subjective     Chief complaint: Chest pain    History of presenting illness:      Yumiko Purdy is a 58 y.o. female who presented for further evaluation of shortness of breath, chest pain.  Per report patient called EMS this a.m. secondary to shortness of breath and tachycardia.  On EMS arrival patient found to be in wide-complex tachycardia.  Per report did not respond to amiodarone in the field and required cardioversion  "and was in normal sinus rhythm on arrival to the ED.    Patient was seen and examined in the ED with no family present at bedside. She is jaundiced and ill appearing. She reports, as above, that she called EMS this this morning secondary to short of breath and rapid heart rate which she reports was up to 140 prior to EMS arrival. She states shortness of breath and swelling worsening over the past week or so. She is compliant with home bumex and notes nephrology increased her bumex to 6 mg daily at her f/u yesterday as she reports medications are not as effective lately as they once were with keeping her euvolemic. Tachycardia began late last evening and worsened through the night. She was having associated mild chest pain but she states no worse than her \"normal\" angina and it had actually improved at home with a dose of her nitroglycerin. This pain was retrosternal and felt like a pressure. It did not radiate and she had no associated nausea or diaphoresis. On further review of systems she also noted her abdomen is more swollen than baseline but not more tender. Her urine output is diminished as above and she is also complaining of dysuria. She is unsure if having significant flank pain given chronic low back pain. No reported diarrhea or constipation.     Past medical history is significant for atrial fibrillation, Hx ventricular tachycardia s/p PVI with recurrence, dilated cardiomyopathy, heart failure with improved ejection fraction, CAD, CKD, chronic anemia, cirrhosis, diabetes mellitus    Upon arrival to the ED, vital signs were temp 98.5, heart rate 81, respirations 12, /64, SpO2 89% on RA.  HS troponin was 90 on arrival with repeat 81.  proBNP is up from recent baseline at 3998.  Creatinine is also very slightly up from recent baseline at 1.47 with BUN 22.  Total bilirubin is 5 but appears to be chronically elevated in the setting of cirrhosis.  Lactate is 2.4.  There is no leukocytosis.  UA is " abnormal-nitrite positive with 3+ blood, 3+ leukocytes, 21-50 RBCs, many WBCs and 1+ bacteria.  CXR was negative.  EKG showed NSR with rate 81.    Known Emergency Department medications received prior to my evaluation included 2 mg IV Bumex, Rocephin, 4 mg Zofran.   Emergency Department Room location at the time of my evaluation was 102.     ---------------------------------------------------------------------------------------------------------------------   Review of Systems   Constitutional:  Negative for chills, diaphoresis and fever.   HENT:  Negative for congestion and rhinorrhea.    Respiratory:  Positive for shortness of breath. Negative for cough.    Cardiovascular:  Positive for chest pain, palpitations and leg swelling.   Gastrointestinal:  Positive for abdominal distention and abdominal pain. Negative for constipation and diarrhea.   Genitourinary:  Positive for decreased urine volume and dysuria. Negative for difficulty urinating.   Musculoskeletal:  Negative for arthralgias and myalgias.   Skin:  Negative for rash and wound.   Neurological:  Negative for dizziness and light-headedness.        ---------------------------------------------------------------------------------------------------------------------   Past Medical History:   Diagnosis Date    Anemia     CHF (congestive heart failure)     Chronic a-fib     stated by patient     Cirrhosis of liver     stated by patient     Diabetes mellitus     Ventricular tachycardia      Past Surgical History:   Procedure Laterality Date    APPENDECTOMY      CARDIAC CATHETERIZATION N/A 11/10/2020    Procedure: Left Heart Cath;  Surgeon: Charlee Ken MD;  Location: [facility] CATH INVASIVE LOCATION;  Service: Cardiovascular;  Laterality: N/A;    CHOLECYSTECTOMY      TOE AMPUTATION Right     stated by patient.      Family History   Problem Relation Age of Onset    Heart attack Father     Heart attack Brother      Social History     Socioeconomic History    Marital  status:    Tobacco Use    Smoking status: Never    Smokeless tobacco: Never   Vaping Use    Vaping status: Never Used   Substance and Sexual Activity    Alcohol use: Never    Drug use: Never    Sexual activity: Defer     ---------------------------------------------------------------------------------------------------------------------   Allergies:  Phenergan [promethazine]  ---------------------------------------------------------------------------------------------------------------------   Home medications:    Medications below are reported home medications pulling from within the system; at this time, these medications have not been reconciled unless otherwise specified and are in the verification process for further verifcation as current home medications.  (Not in a hospital admission)      Hospital Scheduled Meds:          Current listed hospital scheduled medications may not yet reflect those currently placed in orders that are signed and held awaiting patient's arrival to floor.   ---------------------------------------------------------------------------------------------------------------------     Objective     Vital Signs:  Temp:  [98.5 °F (36.9 °C)] 98.5 °F (36.9 °C)  Heart Rate:  [67-81] 68  Resp:  [12] 12  BP: (100-117)/(44-85) 117/85      07/12/24  0645   Weight: 102 kg (224 lb 13.9 oz)     Body mass index is 37.42 kg/m².  ---------------------------------------------------------------------------------------------------------------------       Physical Exam  Vitals and nursing note reviewed.   Constitutional:       General: She is not in acute distress.     Appearance: She is obese. She is ill-appearing.   HENT:      Head: Normocephalic and atraumatic.   Eyes:      General: Scleral icterus present.      Extraocular Movements: Extraocular movements intact.   Cardiovascular:      Rate and Rhythm: Normal rate and regular rhythm.   Pulmonary:      Effort: Pulmonary effort is normal.       "Comments: Diminished breath sounds bilaterally   Abdominal:      Palpations: Abdomen is soft.      Tenderness: There is no abdominal tenderness.   Musculoskeletal:      Right lower leg: Edema present.      Left lower leg: Edema present.      Comments: 2+   Skin:     General: Skin is warm and dry.      Coloration: Skin is jaundiced.   Neurological:      Mental Status: She is alert. Mental status is at baseline.   Psychiatric:         Mood and Affect: Mood normal.         Behavior: Behavior normal.             ---------------------------------------------------------------------------------------------------------------------  EKG:        I have personally looked at the EKG.  ---------------------------------------------------------------------------------------------------------------------   Results from last 7 days   Lab Units 07/12/24  1433 07/12/24  0652   LACTATE mmol/L 2.4*  --    WBC 10*3/mm3  --  10.69   HEMOGLOBIN g/dL  --  8.8*   HEMATOCRIT %  --  31.3*   MCV fL  --  112.6*   MCHC g/dL  --  28.1*   PLATELETS 10*3/mm3  --  145         Results from last 7 days   Lab Units 07/12/24  0652   SODIUM mmol/L 136   POTASSIUM mmol/L 3.8   CHLORIDE mmol/L 95*   CO2 mmol/L 26.5   BUN mg/dL 22*   CREATININE mg/dL 1.47*   CALCIUM mg/dL 9.2   GLUCOSE mg/dL 264*   ALBUMIN g/dL 4.0   BILIRUBIN mg/dL 5.0*   ALK PHOS U/L 91   AST (SGOT) U/L 25   ALT (SGPT) U/L 26   Estimated Creatinine Clearance: 50 mL/min (A) (by C-G formula based on SCr of 1.47 mg/dL (H)).  No results found for: \"AMMONIA\"  Results from last 7 days   Lab Units 07/12/24  0912 07/12/24  0652   HSTROP T ng/L 81* 90*     Results from last 7 days   Lab Units 07/12/24  0912   PROBNP pg/mL 3,998.0*     Lab Results   Component Value Date    HGBA1C 7.70 (H) 03/26/2024     Lab Results   Component Value Date    TSH 2.960 01/31/2024    FREET4 1.30 04/01/2024     No results found for: \"PREGTESTUR\", \"PREGSERUM\", \"HCG\", \"HCGQUANT\"  Pain Management Panel  More data exists " "        Latest Ref Rng & Units 11/13/2023 9/12/2020   Pain Management Panel   Creatinine, Urine mg/dL  mg/dL - 46.3  46.3    Amphetamine, Urine Qual Negative Negative  -   Barbiturates Screen, Urine Negative Negative  -   Benzodiazepine Screen, Urine Negative Negative  -   Buprenorphine, Screen, Urine Negative Negative  -   Cocaine Screen, Urine Negative Negative  -   Fentanyl, Urine Negative Negative  -   Methadone Screen , Urine Negative Negative  -   Methamphetamine, Ur Negative Negative  -     No results found for: \"BLOODCX\"  No results found for: \"URINECX\"  No results found for: \"WOUNDCX\"  No results found for: \"STOOLCX\"      ---------------------------------------------------------------------------------------------------------------------  Imaging Results (Last 7 Days)       Procedure Component Value Units Date/Time    XR Chest 1 View [[id number]] Collected: 07/12/24 0810     Updated: 07/12/24 0812    Narrative:      EXAM:    XR Chest, 1 View     EXAM DATE:    7/12/2024 7:32 AM     CLINICAL HISTORY:    cp     TECHNIQUE:    Frontal view of the chest.     COMPARISON:    6/21/2024     FINDINGS:    Lungs and pleural spaces:  Unremarkable as visualized.  No  consolidation.  No pneumothorax.    Heart:  Unremarkable as visualized.  No cardiomegaly.    Mediastinum:  Unremarkable as visualized.  Normal mediastinal contour.    Bones/joints:  Unremarkable as visualized.  No acute fracture.    Tubes, lines and devices:  Defibrillator pads left chest.       Impression:        No acute cardiopulmonary findings identified.        This report was finalized on 7/12/2024 8:10 AM by Dr. Marlo Parr MD.               Cultures:  No results found for: \"BLOODCX\", \"URINECX\", \"WOUNDCX\", \"MRSACX\", \"RESPCX\", \"STOOLCX\"    Last echocardiogram:  Results for orders placed during the hospital encounter of 03/26/24    Adult Transthoracic Echo Complete W/ Cont if Necessary Per Protocol    Interpretation Summary    Normal left ventricular " cavity size and wall thickness noted. All left ventricular wall segments contract normally.    Left ventricular ejection fraction appears to be 66 - 70%.    Left ventricular diastolic function is consistent with (grade II w/high LAP) pseudonormalization.    The aortic valve is structurally normal with no regurgitation or stenosis present.    Moderate mitral annular calcification is present. There is mild calcification of the mitral valve anterior leaflet(s). Mild mitral valve regurgitation is present. Mild mitral valve stenosis is present.    There is no evidence of pericardial effusion. .          I have personally reviewed the above radiology images and read the final radiology report on 07/12/24  ---------------------------------------------------------------------------------------------------------------------  Assessment / Plan     Active Hospital Problems    Diagnosis  POA    **Wide-complex tachycardia [R00.0]  Yes       ASSESSMENT/PLAN:    Wide-complex tachycardia, PTA  Hx ventricular tachycardia s/p PVI  Acutely decompensated HFimpEF  Dilated cardiomyopathy  Paroxysmal atrial fibrillation  Nonobstructive CAD  Patient called EMS this a.m. secondary to shortness of breath.  EMS found the patient to be in wide-complex tachycardia and she was cardioverted in the field successfully.  She was NSR on arrival to the ED.  O2 sat initially 89% on RA.  proBNP is up from recent baseline at 3998.  HS troponin elevated but similar to chronic levels.  Will admit to the PCU for further management  Cardiology consulted and has evaluated the patient in the ED.  Recommended continued amiodarone.  Will obtain a digoxin level to ensure toxicity not inducing arrhythmia in the setting of CKD.  Hold digoxin for now.  Continue Eliquis.  Patient did receive 2 mg IV Bumex in the ED and will follow-up response to diuresis.  Continue to monitor clinical volume status, I's and O's, respiratory status closely and continue to diurese as  clinically indicated  Update TTE  Continuous cardiac monitoring  Monitor and replace electrolytes as needed  Check TSH  Continue supportive care measures  Trend labs    Acute urinary tract infection  Patient complaining of recent UTI and continued dysuria.  UA is grossly abnormal and concerning for infection with 3+ blood, nitrite positive, 3+ leukocytes, many WBCs and 1+ bacteria.  Culture sent and pending.  Blood cultures are also pending.  Lactate is 2.4.  There is no leukocytosis or fever in the ED.  Continue Rocephin empirically for now.  Follow-up culture results and tailor therapy as needed.    Chronic:  CKD which appears to be stage IIIa  Chronic anemia  Cirrhosis  Diabetes mellitus  Continue home medication regimen as indicated once med rec is complete  Avoid nephrotoxins.  Close monitoring of renal function while diuresing as above.  Resume insulin regimen at appropriate doses.  Will need Accu-Cheks to monitor hypoglycemia protocol in place.  ----------  -DVT prophylaxis: Chronically anticoagulated with Eliquis  -Activity: Up with assistance  -Expected length of stay: INPATIENT status due to the need for care which can only be reasonably provided in an hospital setting such as aggressive/expedited ancillary services and/or consultation services, the necessity for IV medications, close physician monitoring and/or the possible need for procedures.  In such, I feel patient’s risk for adverse outcomes and need for care warrant INPATIENT evaluation and predict the patient’s care encounter to likely last beyond 2 midnights.   -Disposition pending course    High risk secondary to Wide complex tachycardia s/p field cardioversion, decompensated CHF, UTI    Code Status and Medical Interventions:   Ordered at: 07/12/24 1528     Code Status (Patient has no pulse and is not breathing):    CPR (Attempt to Resuscitate)     Medical Interventions (Patient has pulse or is breathing):    Full Support       Francisco Armas,  ABDIFATAH   07/12/24  15:41 EDT    Electronically signed by Charles Boland MD at 07/12/24 1830          Emergency Department Notes        Sam Desai MD at 07/12/24 0751          Subjective   History of Present Illness  58-year-old white female presents with chest pain.  Patient has a history of CHF, A-fib, chronic kidney disease, diabetes, and V. tach.  She had had increased lower extremity edema over the past 3 weeks.  She is on a cardiologist a week and a half ago and her still cause him was stopped, but her edema continued.  Yesterday her nephrologist increased her Bumex to 2 mg 3 times a day.  Early this morning she started having chest pain and elevated heart rate into the 140s.  EMS was called.  She was noted to have V. tach en route to the hospital.  Amiodarone was administered with no change in rhythm.  The patient was sedated with ketamine and cardioverted by EMS.  She was in sinus rhythm on arrival here and her chest pain had improved.  She reported that she recently been treated for UTI and was still having some dysuria symptoms at this time.      Review of Systems   All other systems reviewed and are negative.      Past Medical History:   Diagnosis Date    Anemia     CHF (congestive heart failure)     Chronic a-fib     stated by patient     Cirrhosis of liver     stated by patient     Diabetes mellitus     Ventricular tachycardia        Allergies   Allergen Reactions    Phenergan [Promethazine] Other (See Comments)     Anxiety       Past Surgical History:   Procedure Laterality Date    APPENDECTOMY      CARDIAC CATHETERIZATION N/A 11/10/2020    Procedure: Left Heart Cath;  Surgeon: Charlee Ken MD;  Location: [facility] CATH INVASIVE LOC[facility];  Service: Cardiovascular;  Laterality: N/A;    CHOLECYSTECTOMY      TOE AMPUTATION Right     stated by patient.        Family History   Problem Relation Age of Onset    Heart attack Father     Heart attack Brother        Social History     Socioeconomic History     Marital status:    Tobacco Use    Smoking status: Never    Smokeless tobacco: Never   Vaping Use    Vaping status: Never Used   Substance and Sexual Activity    Alcohol use: Never    Drug use: Never    Sexual activity: Defer           Objective   Physical Exam  Vitals and nursing note reviewed. Exam conducted with a chaperone present (Fay).   Constitutional:       Appearance: Normal appearance. She is normal weight.   HENT:      Head: Normocephalic and atraumatic.   Cardiovascular:      Rate and Rhythm: Normal rate and regular rhythm.      Heart sounds: Normal heart sounds. No murmur heard.     No friction rub. No gallop.   Pulmonary:      Effort: Pulmonary effort is normal. No respiratory distress.      Breath sounds: Normal breath sounds. No wheezing, rhonchi or rales.   Chest:      Chest wall: No tenderness.   Abdominal:      General: Abdomen is flat. Bowel sounds are normal. There is no distension.      Palpations: Abdomen is soft.      Tenderness: There is no abdominal tenderness.   Musculoskeletal:         General: Normal range of motion.      Right lower leg: 3+ Edema present.      Left lower leg: 3+ Edema present.   Skin:     General: Skin is warm and dry.   Neurological:      General: No focal deficit present.      Mental Status: She is alert and oriented to person, place, and time.   Psychiatric:         Mood and Affect: Mood normal.         Behavior: Behavior normal.         Procedures  Results for orders placed or performed during the hospital encounter of 07/12/24   High Sensitivity Troponin T    Specimen: Blood   Result Value Ref Range    HS Troponin T 90 (C) <14 ng/L   Comprehensive Metabolic Panel    Specimen: Blood   Result Value Ref Range    Glucose 264 (H) 65 - 99 mg/dL    BUN 22 (H) 6 - 20 mg/dL    Creatinine 1.47 (H) 0.57 - 1.00 mg/dL    Sodium 136 136 - 145 mmol/L    Potassium 3.8 3.5 - 5.2 mmol/L    Chloride 95 (L) 98 - 107 mmol/L    CO2 26.5 22.0 - 29.0 mmol/L    Calcium 9.2 8.6 -  10.5 mg/dL    Total Protein 6.6 6.0 - 8.5 g/dL    Albumin 4.0 3.5 - 5.2 g/dL    ALT (SGPT) 26 1 - 33 U/L    AST (SGOT) 25 1 - 32 U/L    Alkaline Phosphatase 91 39 - 117 U/L    Total Bilirubin 5.0 (H) 0.0 - 1.2 mg/dL    Globulin 2.6 gm/dL    A/G Ratio 1.5 g/dL    BUN/Creatinine Ratio 15.0 7.0 - 25.0    Anion Gap 14.5 5.0 - 15.0 mmol/L    eGFR 41.5 (L) >60.0 mL/min/1.73   High Sensitivity Troponin T 2Hr    Specimen: Hand, Right; Blood   Result Value Ref Range    HS Troponin T 81 (C) <14 ng/L    Troponin T Delta -9 (L) >=-4 - <+4 ng/L   Urinalysis With Culture If Indicated - Urine, Catheter    Specimen: Urine, Catheter   Result Value Ref Range    Color, UA Dark Yellow (A) Yellow, Straw    Appearance, UA Turbid (A) Clear    pH, UA <=5.0 5.0 - 8.0    Specific Gravity, UA 1.015 1.005 - 1.030    Glucose, UA Negative Negative    Ketones, UA Trace (A) Negative    Bilirubin, UA Small (1+) (A) Negative    Blood, UA Large (3+) (A) Negative    Protein, UA 30 mg/dL (1+) (A) Negative    Leuk Esterase, UA Large (3+) (A) Negative    Nitrite, UA Positive (A) Negative    Urobilinogen, UA 1.0 E.U./dL 0.2 - 1.0 E.U./dL   Urinalysis, Microscopic Only - Urine, Catheter    Specimen: Urine, Catheter   Result Value Ref Range    RBC, UA 21-50 (A) None Seen, 0-2 /HPF    WBC, UA Too Numerous to Count (A) None Seen, 0-2 /HPF    Bacteria, UA 1+ (A) None Seen /HPF    Squamous Epithelial Cells, UA 3-6 (A) None Seen, 0-2 /HPF    Hyaline Casts, UA 3-6 None Seen /LPF    Methodology Automated Microscopy    CBC Auto Differential    Specimen: Blood   Result Value Ref Range    WBC 10.69 3.40 - 10.80 10*3/mm3    RBC 2.78 (L) 3.77 - 5.28 10*6/mm3    Hemoglobin 8.8 (L) 12.0 - 15.9 g/dL    Hematocrit 31.3 (L) 34.0 - 46.6 %    .6 (H) 79.0 - 97.0 fL    MCH 31.7 26.6 - 33.0 pg    MCHC 28.1 (L) 31.5 - 35.7 g/dL    RDW 22.3 (H) 12.3 - 15.4 %    RDW-SD 89.2 (H) 37.0 - 54.0 fl    MPV 10.8 6.0 - 12.0 fL    Platelets 145 140 - 450 10*3/mm3    Neutrophil % 77.7  (H) 42.7 - 76.0 %    Lymphocyte % 15.0 (L) 19.6 - 45.3 %    Monocyte % 4.0 (L) 5.0 - 12.0 %    Eosinophil % 1.6 0.3 - 6.2 %    Basophil % 1.0 0.0 - 1.5 %    Immature Grans % 0.7 (H) 0.0 - 0.5 %    Neutrophils, Absolute 8.30 (H) 1.70 - 7.00 10*3/mm3    Lymphocytes, Absolute 1.60 0.70 - 3.10 10*3/mm3    Monocytes, Absolute 0.43 0.10 - 0.90 10*3/mm3    Eosinophils, Absolute 0.17 0.00 - 0.40 10*3/mm3    Basophils, Absolute 0.11 0.00 - 0.20 10*3/mm3    Immature Grans, Absolute 0.08 (H) 0.00 - 0.05 10*3/mm3    nRBC 0.6 (H) 0.0 - 0.2 /100 WBC   Scan Slide    Specimen: Blood   Result Value Ref Range    Anisocytosis Large/3+ None Seen    Dacrocytes Slight/1+ None Seen    Hypochromia Large/3+ None Seen    Macrocytes Mod/2+ None Seen    Polychromasia Slight/1+ None Seen    Stomatocytes Slight/1+ None Seen    Platelet Morphology Normal Normal   Lactic Acid, Plasma    Specimen: Arm, Right; Blood   Result Value Ref Range    Lactate 2.4 (C) 0.5 - 2.0 mmol/L   BNP    Specimen: Hand, Right; Blood   Result Value Ref Range    proBNP 3,998.0 (H) 0.0 - 900.0 pg/mL   Digoxin Level    Specimen: Hand, Right; Blood   Result Value Ref Range    Digoxin 1.10 0.60 - 1.20 ng/mL   ECG 12 Lead Chest Pain   Result Value Ref Range    QT Interval 418 ms    QTC Interval 485 ms   Green Top (Gel)   Result Value Ref Range    Extra Tube Hold for add-ons.    Lavender Top   Result Value Ref Range    Extra Tube hold for add-on    Gold Top - SST   Result Value Ref Range    Extra Tube Hold for add-ons.    Light Blue Top   Result Value Ref Range    Extra Tube Hold for add-ons.      XR Chest 1 View    Result Date: 7/12/2024  Narrative: EXAM:   XR Chest, 1 View  EXAM DATE:   7/12/2024 7:32 AM  CLINICAL HISTORY:   cp  TECHNIQUE:   Frontal view of the chest.  COMPARISON:   6/21/2024  FINDINGS:   Lungs and pleural spaces:  Unremarkable as visualized.  No consolidation.  No pneumothorax.   Heart:  Unremarkable as visualized.  No cardiomegaly.   Mediastinum:   Unremarkable as visualized.  Normal mediastinal contour.   Bones/joints:  Unremarkable as visualized.  No acute fracture.   Tubes, lines and devices:  Defibrillator pads left chest.      Impression:   No acute cardiopulmonary findings identified.   This report was finalized on 7/12/2024 8:10 AM by Dr. Marlo Parr MD.      XR Chest 1 View    Result Date: 6/21/2024  Narrative: CLINICAL HISTORY: Chest pain.  COMPARISON: 3/26/2024.  TECHNIQUE: Single portable semiupright AP view of the chest was obtained.  FINDINGS: Heart size is enlarged.  Lungs are clear.  There is no pleural effusion or pneumothorax.  Cardiomediastinal silhouette is normal.  No acute osseous or upper abdominal abnormality is seen.      Impression:  CARDIOMEGALY.    This report was finalized on 6/21/2024 8:32 PM by Sonja Tabor MD.             ED Course  ED Course as of 07/12/24 1601   Fri Jul 12, 2024   1444 ECG 12 Lead Chest Pain  Normal sinus rhythm.  Rate 81.  Left axis deviation.  Normal QT interval.  Idioventricular conduction delay.  No acute ischemic changes.  No significant change from 6/21/2024.  Abnormal EKG [BC]   1559 Discussed with Dr. Boland.  Patient admitted, see orders. [BC]      ED Course User Index  [BC] Sam Desai MD                                             Medical Decision Making  Problems Addressed:  Acute on chronic congestive heart failure, unspecified heart failure type: complicated acute illness or injury  Chronic kidney disease, unspecified CKD stage: complicated acute illness or injury  Urinary tract infection without hematuria, site unspecified: complicated acute illness or injury    Amount and/or Complexity of Data Reviewed  Labs: ordered.  Radiology: ordered.  ECG/medicine tests: ordered. Decision-making details documented in ED Course.    Risk  Prescription drug management.  Decision regarding hospitalization.        Final diagnoses:   Urinary tract infection without hematuria, site unspecified   Chronic  kidney disease, unspecified CKD stage   Acute on chronic congestive heart failure, unspecified heart failure type       ED Disposition  ED Disposition       ED Disposition   Decision to Admit    Condition   --    Comment   Level of Care: Progressive Care [20]   Diagnosis: Wide-complex tachycardia [707242]   Certification: I Certify That Inpatient Hospital Services Are Medically Necessary For Greater Than 2 Midnights                 No follow-up provider specified.       Medication List      No changes were made to your prescriptions during this visit.            Sam Desai MD  07/12/24 1601      Electronically signed by Sam Desai MD at 07/12/24 1601       Fay Walker, RN at 07/12/24 0700          Patient lying in stretcher, NADN, VSS. Patient placed on zoll pads and monitor. Pt resting and comfortable at this time. Bilateral IVs flushed and had blood return.     Electronically signed by Fay Walker, RN at 07/12/24 0726       Current Facility-Administered Medications   Medication Dose Route Frequency Provider Last Rate Last Admin    amiodarone (PACERONE) tablet 300 mg  300 mg Oral Q24H Charles Boland MD   300 mg at 07/12/24 1747    apixaban (ELIQUIS) tablet 5 mg  5 mg Oral Q12H Charles Boland MD   5 mg at 07/12/24 2106    aspirin EC tablet 81 mg  81 mg Oral Daily Milagro Boudreaux DO        sennosides-docusate (PERICOLACE) 8.6-50 MG per tablet 2 tablet  2 tablet Oral BID PRN Charles Boland MD        And    polyethylene glycol (MIRALAX) packet 17 g  17 g Oral Daily PRN Charles Boland MD        And    bisacodyl (DULCOLAX) EC tablet 5 mg  5 mg Oral Daily PRN Charles Boland MD        And    bisacodyl (DULCOLAX) suppository 10 mg  10 mg Rectal Daily PRN Charles Boland MD        busPIRone (BUSPAR) tablet 10 mg  10 mg Oral BID Milagro Boudreaux DO        Calcium Replacement - Follow Nurse / BPA Driven Protocol   Does not apply PRN Charles Boland MD        cefTRIAXone (ROCEPHIN) 1,000 mg in  sodium chloride 0.9 % 100 mL IVPB-VTB  1,000 mg Intravenous Q24H Charles Boland MD        ferrous sulfate tablet 325 mg  325 mg Oral Daily Milagro Boudreaux DO        fluticasone (FLONASE) 50 MCG/ACT nasal spray 2 spray  2 spray Nasal Daily Milagro Boudreaux DO        HYDROcodone-acetaminophen (NORCO) 7.5-325 MG per tablet 1 tablet  1 tablet Oral Q12H PRN Milagro Bouderaux DO        insulin glargine (LANTUS, SEMGLEE) injection 25 Units  25 Units Subcutaneous Q12H Milagro Boudreaux DO        Insulin Lispro (humaLOG) injection 8 Units  8 Units Subcutaneous TID With Meals Milagro Boudreaux DO        levothyroxine (SYNTHROID, LEVOTHROID) tablet 12.5 mcg  12.5 mcg Oral Q AM Milagro Boudreaux DO        levothyroxine (SYNTHROID, LEVOTHROID) tablet 50 mcg  50 mcg Oral Daily Milagro Boudreaux,         Magnesium Standard Dose Replacement - Follow Nurse / BPA Driven Protocol   Does not apply PRN Charles Boland MD        nitroglycerin (NITROSTAT) SL tablet 0.4 mg  0.4 mg Sublingual Q5 Min PRN Charles Boland MD        nystatin (MYCOSTATIN) powder 1 Application  1 Application Topical Q12H Charles Boland MD        ondansetron ODT (ZOFRAN-ODT) disintegrating tablet 4 mg  4 mg Translingual Q8H PRN Milagro Boudreaux DO        pantoprazole (PROTONIX) EC tablet 40 mg  40 mg Oral Q AM Milagro Boudreaux DO        Phosphorus Replacement - Follow Nurse / BPA Driven Protocol   Does not apply PRN Charles Boland MD        Potassium Replacement - Follow Nurse / BPA Driven Protocol   Does not apply PRN Charles Boland MD        scopolamine patch 1 mg/72 hr  1 patch Transdermal Q72H Charles Boland MD   1 patch at 07/12/24 1746    sodium chloride 0.9 % flush 10 mL  10 mL Intravenous PRN Sam Desai MD        sodium chloride 0.9 % flush 10 mL  10 mL Intravenous Q12H Charles Boland MD   10 mL at 07/12/24 2106    sodium chloride 0.9 % flush 10 mL  10 mL Intravenous PRN Charles Boland MD        sodium  chloride 0.9 % flush 10 mL  10 mL Intravenous Q12H Charles Boland MD   10 mL at 07/12/24 2106    sodium chloride 0.9 % flush 10 mL  10 mL Intravenous PRN Charles Boland MD        sodium chloride 0.9 % infusion 40 mL  40 mL Intravenous PRN Charles Boland MD        sodium chloride 0.9 % infusion 40 mL  40 mL Intravenous PRN Charles Boland MD        Vortioxetine HBr (TRINTELLIX) tablet 5 mg  5 mg Oral Daily With Breakfast Milagro Boudreaux DO         Orders (last 24 hrs)        Start     Ordered    07/13/24 1300  cefTRIAXone (ROCEPHIN) 1,000 mg in sodium chloride 0.9 % 100 mL IVPB-VTB  Every 24 Hours         07/12/24 1617    07/13/24 0900  aspirin EC tablet 81 mg  Daily         07/13/24 0545    07/13/24 0900  busPIRone (BUSPAR) tablet 10 mg  2 Times Daily         07/13/24 0545    07/13/24 0900  digoxin (LANOXIN) tablet 125 mcg  Every Other Day,   Status:  Discontinued         07/13/24 0545    07/13/24 0900  ferrous sulfate tablet 325 mg  Daily         07/13/24 0545    07/13/24 0900  fluticasone (FLONASE) 50 MCG/ACT nasal spray 2 spray  Daily         07/13/24 0545    07/13/24 0900  insulin glargine (LANTUS, SEMGLEE) injection 25 Units  Every 12 Hours Scheduled         07/13/24 0545    07/13/24 0900  levothyroxine (SYNTHROID, LEVOTHROID) tablet 50 mcg  Daily         07/13/24 0545    07/13/24 0800  Insulin Lispro (humaLOG) injection 8 Units  3 Times Daily With Meals         07/13/24 0545    07/13/24 0800  Vortioxetine HBr (TRINTELLIX) tablet 5 mg  Daily With Breakfast         07/13/24 0545    07/13/24 0645  levothyroxine (SYNTHROID, LEVOTHROID) tablet 12.5 mcg  Every Early Morning         07/13/24 0545    07/13/24 0645  pantoprazole (PROTONIX) EC tablet 40 mg  Every Early Morning         07/13/24 0545    07/13/24 0621  Magnesium  STAT         07/13/24 0620    07/13/24 0600  Basic Metabolic Panel  Morning Draw         07/12/24 1617    07/13/24 0600  CBC Auto Differential  Morning Draw         07/12/24 1617     07/13/24 0600  TSH  Morning Draw         07/12/24 1617    07/13/24 0544  HYDROcodone-acetaminophen (NORCO) 7.5-325 MG per tablet 1 tablet  Every 12 Hours PRN         07/13/24 0545    07/13/24 0543  ondansetron ODT (ZOFRAN-ODT) disintegrating tablet 4 mg  Every 8 Hours PRN         07/13/24 0545    07/13/24 0029  Scan Slide  Once         07/13/24 0028    07/12/24 2330  LORazepam (ATIVAN) injection 1 mg  Once         07/12/24 2236 07/12/24 2100  sodium chloride 0.9 % flush 10 mL  Every 12 Hours Scheduled         07/12/24 1617 07/12/24 2100  apixaban (ELIQUIS) tablet 5 mg  Every 12 Hours Scheduled         07/12/24 1617 07/12/24 2100  nystatin (MYCOSTATIN) powder 1 Application  Every 12 Hours Scheduled         07/12/24 1903    07/12/24 2100  sodium chloride 0.9 % flush 10 mL  Every 12 Hours Scheduled         07/12/24 2006 07/12/24 2022  STAT Lactic Acid, Reflex  PROCEDURE ONCE,   Status:  Canceled         07/12/24 1818 07/12/24 2015  ondansetron (ZOFRAN) injection 4 mg  Once         07/12/24 1918 07/12/24 2007  Connectors / Hubs Must Be Scrubbed 15 Seconds Using 70% Alcohol Before Access - Allow to Dry Before Accessing Line  Continuous         07/12/24 2006 07/12/24 2006  sodium chloride 0.9 % flush 10 mL  As Needed         07/12/24 2006 07/12/24 2006  sodium chloride 0.9 % infusion 40 mL  As Needed         07/12/24 2006 07/12/24 2000  Vital Signs  Every 4 Hours       07/12/24 1617    07/12/24 1831  PT Consult: Eval & Treat Discharge Placement Assessment, Functional Mobility Below Baseline  Once         07/12/24 1831    07/12/24 1831  OT Consult: Eval & Treat ADL Performance Below Baseline, Discharge Placement Assessment  Once         07/12/24 1831    07/12/24 1800  Oral Care  2 Times Daily       07/12/24 1617    07/12/24 1800  Incentive Spirometry  Every 4 Hours While Awake       07/12/24 1617    07/12/24 1745  scopolamine patch 1 mg/72 hr  Every 72 Hours         07/12/24 1657    07/12/24 1730   STAT Lactic Acid, Reflex  PROCEDURE ONCE         07/12/24 1517    07/12/24 1725  Inpatient Case Management  Consult  Once        Provider:  (Not yet assigned)    07/12/24 1724    07/12/24 1725  Inpatient Diabetes Educator Consult  Once        Provider:  (Not yet assigned)    07/12/24 1724    07/12/24 1633  amiodarone (PACERONE) tablet 300 mg  Every 24 Hours Scheduled         07/12/24 1617    07/12/24 1618  Intake & Output  Every Shift       07/12/24 1617    07/12/24 1618  Weigh Patient  Once         07/12/24 1617    07/12/24 1618  Insert Peripheral IV  Once         07/12/24 1617    07/12/24 1618  Saline Lock & Maintain IV Access  Continuous,   Status:  Canceled         07/12/24 1617    07/12/24 1618  Inpatient Cardiology Consult  Once        Specialty:  Cardiology  Provider:  Ramon Sahu MD    07/12/24 1617    07/12/24 1618  Continuous Cardiac Monitoring  Continuous        Comments: Follow Standing Orders As Outlined in Process Instructions (Open Order Report to View Full Instructions)    07/12/24 1617    07/12/24 1618  Maintain IV Access  Continuous         07/12/24 1617    07/12/24 1618  Telemetry - Place Orders & Notify Provider of Results When Patient Experiences Acute Chest Pain, Dysrhythmia or Respiratory Distress  Continuous        Comments: Open Order Report to View Parameters Requiring Provider Notification    07/12/24 1617    07/12/24 1618  May Be Off Telemetry for Tests  Continuous         07/12/24 1617    07/12/24 1618  Continuous Pulse Oximetry  Continuous         07/12/24 1617    07/12/24 1618  Adult Transthoracic Echo Complete w/ Color, Spectral and Contrast if necessary per protocol  Once         07/12/24 1617    07/12/24 1618  Inpatient Cardiology Consult  Once        Specialty:  Cardiology  Provider:  Ramon Sahu MD    07/12/24 1617    07/12/24 1617  sodium chloride 0.9 % flush 10 mL  As Needed         07/12/24 1617    07/12/24 1617  sodium chloride 0.9 % infusion 40 mL   "As Needed         07/12/24 1617    07/12/24 1617  nitroglycerin (NITROSTAT) SL tablet 0.4 mg  Every 5 Minutes PRN         07/12/24 1617    07/12/24 1617  Potassium Replacement - Follow Nurse / BPA Driven Protocol  As Needed         07/12/24 1617    07/12/24 1617  Magnesium Standard Dose Replacement - Follow Nurse / BPA Driven Protocol  As Needed         07/12/24 1617    07/12/24 1617  Phosphorus Replacement - Follow Nurse / BPA Driven Protocol  As Needed         07/12/24 1617    07/12/24 1617  Calcium Replacement - Follow Nurse / BPA Driven Protocol  As Needed         07/12/24 1617    07/12/24 1617  sennosides-docusate (PERICOLACE) 8.6-50 MG per tablet 2 tablet  2 Times Daily PRN        Placed in \"And\" Linked Group    07/12/24 1617    07/12/24 1617  polyethylene glycol (MIRALAX) packet 17 g  Daily PRN        Placed in \"And\" Linked Group    07/12/24 1617    07/12/24 1617  bisacodyl (DULCOLAX) EC tablet 5 mg  Daily PRN        Placed in \"And\" Linked Group    07/12/24 1617    07/12/24 1617  bisacodyl (DULCOLAX) suppository 10 mg  Daily PRN        Placed in \"And\" Linked Group    07/12/24 1617    07/12/24 1527  Code Status and Medical Interventions:  Continuous         07/12/24 1528    07/12/24 1517  Inpatient Admission  Once         07/12/24 1528    07/12/24 1514  Digoxin Level  Once         07/12/24 1513    07/12/24 1509  Midline Consult  Once,   Status:  Canceled        Provider:  (Not yet assigned)    07/12/24 1508    07/12/24 1506  bumetanide (BUMEX) injection 2 mg  Once         07/12/24 1451    07/12/24 1252  cefTRIAXone (ROCEPHIN) 2,000 mg in sodium chloride 0.9 % 100 mL IVPB-VTB  Once         07/12/24 1236    07/12/24 1241  Diet: Regular/House; Fluid Consistency: Thin (IDDSI 0)  Diet Effective Now         07/12/24 1240    07/12/24 1237  Lactic Acid, Plasma  Once         07/12/24 1236    07/12/24 1237  Blood Culture - Blood, Hand, Right  Once        Placed in \"And\" Linked Group    07/12/24 1236    07/12/24 1237  " "Blood Culture - Blood, Hand, Left  Once        Placed in \"And\" Linked Group    07/12/24 1236    07/12/24 1237  BNP  Once         07/12/24 1236    07/12/24 1021  ondansetron (ZOFRAN) injection 4 mg  Once         07/12/24 1005    07/12/24 0852  High Sensitivity Troponin T 2Hr  PROCEDURE ONCE         07/12/24 0740    07/12/24 0848  Scan Slide  Once         07/12/24 0847    07/12/24 0846  CBC Auto Differential  Once         07/12/24 0845    07/12/24 0826  Urine Culture - Urine, Urine, Catheter  Once         07/12/24 0825    07/12/24 0818  Urinalysis, Microscopic Only - Urine, Catheter  Once         07/12/24 0817    07/12/24 0752  Urinalysis With Culture If Indicated - Urine, Catheter  Once         07/12/24 0751    07/12/24 0725  XR Chest 1 View  1 Time Imaging         07/12/24 0725    07/12/24 0723  NPO Diet NPO Type: Strict NPO  Diet Effective Now,   Status:  Canceled         07/12/24 0725    07/12/24 0723  Undress and Gown  Once         07/12/24 0725    07/12/24 0723  Cardiac Monitoring  Continuous,   Status:  Canceled        Comments: Follow Standing Orders As Outlined in Process Instructions (Open Order Report to View Full Instructions)    07/12/24 0725    07/12/24 0723  Continuous Pulse Oximetry  Continuous,   Status:  Canceled         07/12/24 0725    07/12/24 0723  Insert Peripheral IV  Once         07/12/24 0725    07/12/24 0723  CBC & Differential  Once         07/12/24 0725    07/12/24 0723  Comprehensive Metabolic Panel  Once         07/12/24 0725    07/12/24 0722  sodium chloride 0.9 % flush 10 mL  As Needed         07/12/24 0725    07/12/24 0652  [place] Draw  Once         07/12/24 0651    07/12/24 0652  Green Top (Gel)  PROCEDURE ONCE         07/12/24 0651    07/12/24 0652  Lavender Top  PROCEDURE ONCE         07/12/24 0651    07/12/24 0652  Gold Top - SST  PROCEDURE ONCE         07/12/24 0651    07/12/24 0652  Light Blue Top  PROCEDURE ONCE         07/12/24 0651    07/12/24 0651  ECG 12 Lead Chest Pain  " Once         07/12/24 0651    07/12/24 0651  High Sensitivity Troponin T  Once         07/12/24 0651    Unscheduled  Oxygen Therapy- Nasal Cannula; Titrate 1-6 LPM Per SpO2; 90 - 95%  Continuous PRN       07/12/24 0725    Unscheduled  ECG 12 Lead ED Triage Standing Order; Chest Pain  As Needed      Comments: Persistent, Unrelieved or Recurrent Chest Pain    07/12/24 0725    Unscheduled  Up With Assistance  As Needed       07/12/24 1617    Unscheduled  Change Dressing to IV Site As Needed When Damp, Loose or Soiled  As Needed       07/12/24 2006    Unscheduled  Change Needleless Connectors  As Needed      Comments: Change Needleless Connectors When:  - Administration Set Changed  - Dressing Changed  - Removed For Any Reason  - Residual Blood or Debris Within Connector  - Prior to Drawing Blood Cultures  - Contamination of Connector  - After Administration of Blood or Blood Components    07/12/24 2006    Unscheduled  Insert New Peripheral IV  As Needed      Comments: Frequency Per Facility Policy    07/12/24 2006    --  amiodarone (PACERONE) 200 MG tablet  Daily         07/13/24 0006    --  levothyroxine (SYNTHROID, LEVOTHROID) 25 MCG tablet  Every Early Morning         07/13/24 0008    --  fluticasone (FLONASE) 50 MCG/ACT nasal spray  Daily         07/13/24 0009    --  capsaicin-cleansing gel (Qutenza) 8 % kit topical system  Take As Directed         07/13/24 0009                  Physician Progress Notes (last 24 hours)  Notes from 07/12/24 0646 through 07/13/24 0646   No notes of this type exist for this encounter.          Consult Notes (last 24 hours)        Ramon Sahu MD at 07/12/24 1516              [facility]  CONSULT  NOTE      Patient information:  Date of Admit: 7/12/2024  Date of Consult: 07/12/24  Hospitalist/Referring MD:No admitting provider for patient encounter.  PCP: Masha Davidson APRN  MRN:  [id number]  Visit Number:  [id number]    LOS:  0  CODE STATUS:  There are no questions and answers to display.       PROBLEM LIST: Active Problems:    * No active hospital problems. *        Reason for Cardiology consultation: V tach and wide complex tachycardia    General Cardiology Consulting Physician: Dr. Ramon Sahu MD, [facility]    Primary Cardiologist: Ramon Sahu MD [facility].    Assessment      Wide-complex tachycardia prior to admission, could be either atrial fibrillation with rapid ventricular response with a baseline intermittent conduction delay (wide-complex rhythm) versus ventricular tachycardia, s/p electrocardioversion by EMS en route to hospital.  Patient is currently in sinus rhythm.  Paroxysmal atrial fibrillation, s/p previous PVI in October 2022 with recurrence requiring electrocardioversion on 1/31/2024.  Patient has been on amiodarone therapy.  Was recently evaluated by Dr. Gutierrez in [place] and was deemed to be not a candidate for any further RF ablation procedures  Previous cardiomyopathy possibly due to rate related, seems to have resolved based on recent echo Doppler study with LV ejection fraction of about 60 to 65%.  Acute on chronic HFpEF (previous HFrEF).  Grade 2 diastolic noncompliance of the left ventricular on recent echo Doppler study.  Nonobstructive coronary artery disease on coronary angiography in November 2020.  Chronic kidney disease (stage IIIa/B).    Recommendations     Continue with amiodarone at 400 mg p.o. twice daily for now.    Will discontinue the digoxin given her chronic kidney disease  IV diuretics as needed and tolerated.  Add SGLT2 inhibitor (Jardiance 10 mg daily) for her HFpEF.  Obtain echo Doppler study to evaluate LV systolic and diastolic function and tailor further therapy accordingly.    Subjective Data     7/12/2024    ADMISSION INFORMATION:  Chief Complaint   Patient presents with    Chest Pain     History of Present Illness    Yumiko Purdy is a 58 y.o. female with a past medical history significant for  nonobstructive CAD on left heart cath on 11/10/2020,, dilated cardiomyopathy, CKD stage II, chronic anemia, HFimpEF 66-70%, grade 2 diastolic dysfunction, paroxysmal atrial fibrillation, history of ventricular tachycardia, s/p PVI Oct 2022 with recurrent recurrent and last cardioversion on 1/31/2024, and diabetes.     Patient presented to [facility] ([facility] emergency room (ER) on 7/12/2024 with complaints of increased edema to BLE over the last 3 weeks.  Patient reports she saw nephrologist before presentation and her Bumex 2 mg was increased to 3 times daily.  Earlier this morning she began having chest pain with elevated heart rate in the 140s, thus she called EMS.     Of note, she was noted to have V. tach while en route to the hospital and amiodarone was administered with no change to the rhythm.  Patient was then sedated with ketamine and cardioverted by EMS.  She was SR on arrival to the ER and reported her chest pain had improved.  She also reported recently being treated for UTI and still having dysuria intermittently.    Cardiology has been consulted for further evaluation and management for V-tach and wide complex tachycardia.     Primary Cardiologist has been BRITTANY Nieves (last visit in office 06/27/2024) and Dr. Gutierrez (last seen in office on 07/17/2024).-Noted the patient has had 2 admissions a [facility] back to [facility] and initial one was on 06/22/2024 where he was found to be in atrial fibrillation with RVR and anemic with a hemoglobin of 7.  She denied any visible blood loss.     Patient was in room  when she was seen and examined by Dr. Sahu.  Patient is lying in bed resting quietly with her eyes closed. She is easily awaken with verbal stimuli.  No acute distress noted at this time. She reports increased edema BLE over the last couple weeks. Just yesterday her Nephrologist called and increased her Bumex up to 6 x per day. He had been increasing it slowly over the  last few weeks. Patient reports when she seen Matt Verma a few days ago, he stopped Cardizem thinking it maybe causing the edema.  Patient reports none of these measures has seemed to help. She reports waking up around 6 am today with heart rate in the 140s. She had not noted any rapid heart rate during the last few weeks. She did have associated nausea, shortness of breath and chest pain with the increased heart rate. She tried x 2 SL NTG and it did not help her symptoms. Thus, she called EMS to bring her to the ER.  She reports EMS did shock her enroute and that seemed to help the heart rate, chest pain and nausea. Patient reports she feels swollen all the way up into her abdomen.  She reports she was just given IV Bumex but has noticed in the past, IV Lasix seems to work better for her.  Patient currently denies any chest pain, but states she still is short of breath.  Oxygen saturation while at bedside is 100% on oxygen via nasal cannula.  Telemetry reveals sinus rhythm 60s last recorded blood pressure on the monitor in the room was 113/44.    Known medications given enroute via EMS and in the ER:       Personal History     Cardiac risk factors:arteriosclerotic heart disease, diabetes mellitus, hypercholesterolemia, hypertension, and Obesity      Last Echo: Results for orders placed during the hospital encounter of 03/26/24    Adult Transthoracic Echo Complete W/ Cont if Necessary Per Protocol    Interpretation Summary    Normal left ventricular cavity size and wall thickness noted. All left ventricular wall segments contract normally.    Left ventricular ejection fraction appears to be 66 - 70%.    Left ventricular diastolic function is consistent with (grade II w/high LAP) pseudonormalization.    The aortic valve is structurally normal with no regurgitation or stenosis present.    Moderate mitral annular calcification is present. There is mild calcification of the mitral valve anterior leaflet(s). Mild mitral  valve regurgitation is present. Mild mitral valve stenosis is present.    There is no evidence of pericardial effusion. .         Last Stress: Results for orders placed during the hospital encounter of 10/15/20    Nuclear Medicine Cardiac Blood Pool [facility] At Rest    Interpretation Summary  EXAMINATION: NUCLEAR MEDICINE CARDIAC BLOOD POOL [facility] AT REST-    CLINICAL INDICATION: Cardiomyopathy  TECHNIQUE: 21 mCi of Tc-99m autologous RBCs were injected IV after  in-vitro RBC labeling. Gated cardiac imaging was performed in the  anterior and left anterior oblique.  COMPARISON: None  FINDINGS: The left ventricle is normal in size with normal systolic  function. The calculated left ventricular ejection fraction (LVEF) = 38  %.  The right and left atria, aorta and pulmonary artery are normal. The  right ventricle is normal in size.    Impression  LVEF: 38%, at rest.    This report was finalized on 10/16/2020 11:57 AM by Dr. Marlo Parr MD.        Last Cath: Results for orders placed during the hospital encounter of 11/10/20    Cardiac Catheterization/Vascular Study    Narrative  Procedure type:  Left heart cath, LV gram, bilateral selective cholangiogram    Indication:  Cardiomyopathy    Procedure:  After informed consent the patient was brought into the cardiac aspiration lab she was prepped and draped in the usual sterile manner the right radial area was incised with 2% Xylocaine however several attempts to engage into the right radial artery was not successful so the right radial artery access was abandoned and the right groin area was excised in 2% Xylocaine right femoral artery was accessed using modified standard technique and 5 Canadian side-port arterial sheath was placed and secured then over a guidewire a 5 Canadian JL 4 catheter was used left main coronary artery with angiographic pressures were taken then the catheter was removed and over a guidewire a 5 Canadian JR4 catheter was used and engagement of the right  coronary arteries angioplasty was taken then the catheter was removed then over a guidewire 5 Vietnamese pigtail catheter used aortic valve was done hand-injection LV gram was done then pullback was done then the catheter was removed groin sheath was removed and minx closure device applied with good hemostasis the patient was transported back to her room in stable condition.    Results:  The patient LVEDP was 17 mmHg with hand-injection showing preserved left ventricular systolic function wall motion EF 50-55% with no significant aortic gradient on pullback.    Cholangiogram:  This right dominant system.  Left main cardiac angiographically normal and bifurcates into left and descending and left circumflex arteries.  LAD was normal caliber gives several septal perforators and diagonal branches and wraps around the apex LAD about 30 to 40% mid stenosis.  Left circumflex artery was nondominant for posterior circulation gives rise to several obtuse marginal branches left circumflex arteries branches angiographically normal.  The right coronary artery was a large artery was dominant for posterior circulation giving rise to acute marginal branches PDA and posterolateral branches the right coronary artery and its branches angiographically normal.    Conclusion:  Preserved LV systolic function ejection fraction 50-55% with elevated left ventricular end-diastolic pressure consistent with diastolic dysfunction coronary angiogram showed right dominant system with 30 to 40% mid LAD lesion otherwise coronaries arteries were normal.  Plan of care:  Medical management and secondary preventive measurements of coronary disease good lipid control blood pressure control healthy lifestyle patient is to follow-up with her primary care physician for further management.                            Past Medical History:   Diagnosis Date    Anemia     CHF (congestive heart failure)     Chronic a-fib     stated by patient     Cirrhosis of liver      stated by patient     Diabetes mellitus     Ventricular tachycardia      Past Surgical History:   Procedure Laterality Date    APPENDECTOMY      CARDIAC CATHETERIZATION N/A 11/10/2020    Procedure: Left Heart Cath;  Surgeon: Charlee Ken MD;  Location: [facility] CATH INVASIVE LOC[facility];  Service: Cardiovascular;  Laterality: N/A;    CHOLECYSTECTOMY      TOE AMPUTATION Right     stated by patient.      Family History   Problem Relation Age of Onset    Heart attack Father     Heart attack Brother      Social History     Tobacco Use    Smoking status: Never    Smokeless tobacco: Never   Vaping Use    Vaping status: Never Used   Substance Use Topics    Alcohol use: Never    Drug use: Never       ALLERGIES: Phenergan [promethazine]    Medications listed below are reported home medications pulling from within the system:  (Not in a hospital admission)      Review of Systems   Constitutional:  Negative for activity change, appetite change and diaphoresis.   HENT:  Negative for facial swelling and trouble swallowing.    Eyes:  Negative for visual disturbance.   Respiratory:  Positive for shortness of breath.    Cardiovascular:  Positive for chest pain, palpitations and leg swelling.   Gastrointestinal:  Positive for abdominal distention and nausea. Negative for blood in stool and vomiting.   Endocrine: Negative.    Genitourinary:  Negative for hematuria.   Musculoskeletal:  Negative for gait problem.   Skin:  Negative for color change.   Neurological:  Negative for dizziness, syncope, speech difficulty, weakness, light-headedness and headaches.   Psychiatric/Behavioral:  Negative for agitation and behavioral problems.      Objective Data      Vital Signs  Temp:  [98.5 °F (36.9 °C)] 98.5 °F (36.9 °C)  Heart Rate:  [67-81] 68  Resp:  [12] 12  BP: (100-117)/(44-85) 117/85  Device (Oxygen Therapy): room air  Vital Signs (last 72 hrs)         07/09 0700  07/10 0659 07/10 0700 07/11 0659 07/11 0700 07/12 0659 07/12 0700 07/12  1516   Most Recent      Temp (°F)       98.5       98.5 (36.9) 07/12 0645    Heart Rate     76 -  81    67 -  75     68 07/12 1505    Resp       12       12 07/12 0645    BP     115/52 -  117/64    100/46 -  117/85     117/85 07/12 1505    SpO2 (%)     89 -  99    92 -  100     99 07/12 1359          BMI:  Body mass index is 37.42 kg/m².  WEIGHT:      07/12/24  0645   Weight: 102 kg (224 lb 13.9 oz)     DIET:  Diet: Regular/House; Fluid Consistency: Thin (IDDSI 0)  I&O:  Intake & Output (last 3 days)         07/09 0701  07/10 0700 07/10 0701 07/11 0700 07/11 0701 07/12 0700 07/12 0701 07/13 0700    IV Piggyback    100    Total Intake(mL/kg)    100 (1)    Net    +100                  I have seen and evaluated Ms. Purdy with findings as scribed below.  Physical Exam  Constitutional:       General: She is not in acute distress.     Appearance: Normal appearance.      Comments: Tired and acutely ill-appearing.    HENT:      Head: Normocephalic and atraumatic.      Nose: Nose normal.      Mouth/Throat:      Mouth: Mucous membranes are moist.      Pharynx: Oropharynx is clear.   Eyes:      Conjunctiva/sclera: Conjunctivae normal.   Cardiovascular:      Rate and Rhythm: Normal rate and regular rhythm.      Pulses: Normal pulses.           Radial pulses are 2+ on the right side and 2+ on the left side.        Dorsalis pedis pulses are 2+ on the right side and 2+ on the left side.        Posterior tibial pulses are 2+ on the right side and 2+ on the left side.      Heart sounds: Normal heart sounds.   Pulmonary:      Effort: Pulmonary effort is normal.      Breath sounds: Rales present.      Comments: Bibasilar rales noted  Abdominal:      General: Bowel sounds are normal. There is distension.      Palpations: Abdomen is soft.   Genitourinary:     Comments: PureWick suction container with 200 cc of clear yellow urine noted at bedside  Musculoskeletal:         General: Normal range of motion.      Cervical back: Normal  "range of motion.      Right lower leg: 3+ Edema present.      Left lower leg: 3+ Edema present.   Skin:     General: Skin is warm and dry.   Neurological:      General: No focal deficit present.      Mental Status: She is alert and oriented to person, place, and time.   Psychiatric:         Mood and Affect: Mood normal.         Behavior: Behavior normal.       Results review   Results Review:    I have reviewed the patient's new clinical results. 07/12/24 15:16 EDT    Results from last 7 days   Lab Units 07/12/24  0912 07/12/24  0652   HSTROP T ng/L 81* 90*     Lab Results   Component Value Date    PROBNP 3,998.0 (H) 07/12/2024    PROBNP 1,803.0 (H) 05/28/2024    PROBNP 1,118.0 (H) 05/23/2024     Results from last 7 days   Lab Units 07/12/24  0652   WBC 10*3/mm3 10.69   HEMOGLOBIN g/dL 8.8*   PLATELETS 10*3/mm3 145     Results from last 7 days   Lab Units 07/12/24  0652   SODIUM mmol/L 136   POTASSIUM mmol/L 3.8   CHLORIDE mmol/L 95*   CO2 mmol/L 26.5   BUN mg/dL 22*   CREATININE mg/dL 1.47*   CALCIUM mg/dL 9.2   GLUCOSE mg/dL 264*   ALT (SGPT) U/L 26   AST (SGOT) U/L 25     Lab Results   Component Value Date    MG 1.8 05/23/2024    MG 1.9 04/18/2024    MG 1.7 04/13/2024     Lab Results   Component Value Date    CHOL 137 09/11/2020    TRIG 80 09/11/2020    HDL 43 09/11/2020    LDL 78 09/11/2020     Estimated Creatinine Clearance: 50 mL/min (A) (by C-G formula based on SCr of 1.47 mg/dL (H)).  Lab Results   Component Value Date    HGBA1C 7.70 (H) 03/26/2024       Lab Results   Component Value Date    LABHEPA <0.10 (L) 11/15/2023    LABHEPA 0.21 (L) 11/15/2023    LABHEPA <0.10 (L) 11/15/2023    LABHEPA 0.31 11/14/2023         Lab Results   Component Value Date    TSH 2.960 01/31/2024      No results found for: \"URICACID\"  Pain Management Panel  More data exists         Latest Ref Rng & Units 11/13/2023 9/12/2020   Pain Management Panel   Creatinine, Urine mg/dL  mg/dL - 46.3  46.3    Amphetamine, Urine Qual Negative " "Negative  -   Barbiturates Screen, Urine Negative Negative  -   Benzodiazepine Screen, Urine Negative Negative  -   Buprenorphine, Screen, Urine Negative Negative  -   Cocaine Screen, Urine Negative Negative  -   Fentanyl, Urine Negative Negative  -   Methadone Screen , Urine Negative Negative  -   Methamphetamine, Ur Negative Negative  -     Microbiology Results (last 10 days)       ** No results found for the last 240 hours. **           No results found for: \"BLOODCX\"      EC2024        ECG/EMG Results (last 24 hours)       Procedure Component Value Units Date/Time    ECG 12 Lead Chest Pain [[id number]] Collected: 24 0643     Updated: 24 1022     QT Interval 418 ms      QTC Interval 485 ms     Narrative:      Test Reason : Chest Pain  Blood Pressure :   */*   mmHG  Vent. Rate :  81 BPM     Atrial Rate :  81 BPM     P-R Int : 208 ms          QRS Dur : 146 ms      QT Int : 418 ms       P-R-T Axes :  32 -59  97 degrees     QTc Int : 485 ms    Normal sinus rhythm  Left axis deviation  Nonspecific intraventricular block  Possible Lateral infarct (cited on or before 28-MAY-2024)  Abnormal ECG  When compared with ECG of 2024 18:17,  Significant changes have occurred  Confirmed by Jesus Manuel Deshpande () on 2024 10:20:53 AM    Referred By: [facility]           Confirmed By: Jesus Manuel Deshpande            TELEMETRY:   SR 60s           RADIOLOGY STUDIES:  Imaging Results (Last 72 Hours)       Procedure Component Value Units Date/Time    XR Chest 1 View [[id number]] Collected: 24 0810     Updated: 24    Narrative:      EXAM:    XR Chest, 1 View     EXAM DATE:    2024 7:32 AM     CLINICAL HISTORY:    cp     TECHNIQUE:    Frontal view of the chest.     COMPARISON:    2024     FINDINGS:    Lungs and pleural spaces:  Unremarkable as visualized.  No  consolidation.  No pneumothorax.    Heart:  Unremarkable as visualized.  No cardiomegaly.    Mediastinum:  Unremarkable as " visualized.  Normal mediastinal contour.    Bones/joints:  Unremarkable as visualized.  No acute fracture.    Tubes, lines and devices:  Defibrillator pads left chest.       Impression:        No acute cardiopulmonary findings identified.        This report was finalized on 7/12/2024 8:10 AM by Dr. Marlo Parr MD.               ALLERGIES: Phenergan [promethazine]    CURRENT MEDICATIONS:  Current list of medications may not reflect those currently placed in orders that are not signed or are being held.             sodium chloride    Thank you very much for asking us to be involved in this patient's care.  We will follow along with you. Please do not hesitate to call for any questions or concerns.     I have discussed the patients findings and recommendations with the patient.    Electronically signed by KENNY Finn, 07/12/24, 4:07 PM EDT.     Electronically signed by Ramon Sahu MD, 07/12/24, 6:15 PM EDT.            Please note that portions of this note were copied and has been reviewed and is accurate as of 7/12/2024 .      Please note that portions of this note were completed with a voice recognition program.     Electronically signed by Ramon Sahu MD at 07/12/24 [id number]

## 2024-07-13 NOTE — PLAN OF CARE
Goal Outcome Evaluation:  Plan of Care Reviewed With: patient        Progress: improving  Outcome Evaluation: VSS. 2-3LNC. Pt A&Ox4. Pt in NSR during the night. Call light within reach, bed alarm on.

## 2024-07-13 NOTE — PROGRESS NOTES
[facility] HOSPITALIST PROGRESS NOTE     Patient Identification:  Name:  Yumiko Purdy  Age:  58 y.o.  Sex:  female  :  1965  MRN:  [id number]  Visit Number:  [id number]  ROOM: [address]     Primary Care Provider:  Masha Davidson APRN    Length of stay in inpatient status:  1    Subjective     Chief Compliant:    Chief Complaint   Patient presents with    Chest Pain       History of Presenting Illness:    Nausea had been persistent but improved today. No recurrent wide complex tachcyardia.     ROS:  Otherwise 10 point ROS negative other than documented above in HPI.     Objective     Current Hospital Meds:amiodarone, 300 mg, Oral, Q24H  apixaban, 5 mg, Oral, Q12H  aspirin, 81 mg, Oral, Daily  busPIRone, 10 mg, Oral, BID  cefTRIAXone, 1,000 mg, Intravenous, Q24H  ferrous sulfate, 325 mg, Oral, Daily  fluticasone, 2 spray, Nasal, Daily  insulin glargine, 25 Units, Subcutaneous, Q12H  insulin lispro, 8 Units, Subcutaneous, TID With Meals  levothyroxine, 12.5 mcg, Oral, Q AM  levothyroxine, 50 mcg, Oral, Daily  nystatin, 1 Application, Topical, Q12H  pantoprazole, 40 mg, Oral, Q AM  Scopolamine, 1 patch, Transdermal, Q72H  sodium chloride, 10 mL, Intravenous, Q12H  sodium chloride, 10 mL, Intravenous, Q12H  Vortioxetine HBr, 5 mg, Oral, Daily With Breakfast         Current Antimicrobial Therapy:  Anti-Infectives (From admission, onward)      Ordered     Dose/Rate Route Frequency Start Stop    24 1617  cefTRIAXone (ROCEPHIN) 1,000 mg in sodium chloride 0.9 % 100 mL IVPB-VTB        Ordering Provider: Charles Boland MD    1,000 mg  200 mL/hr over 30 Minutes Intravenous Every 24 Hours 24 1300 24 1259    24 1236  cefTRIAXone (ROCEPHIN) 2,000 mg in sodium chloride 0.9 % 100 mL IVPB-VTB        Ordering Provider: Sam Desai MD    2,000 mg  200 mL/hr over 30 Minutes Intravenous Once 24 1252 24 1400          Current Diuretic Therapy:  Diuretics (From  admission, onward)      Ordered     Dose/Rate Route Frequency Start Stop    07/13/24 1649  furosemide (LASIX) injection 40 mg        Ordering Provider: Adonay Castellanos PA-C    40 mg Intravenous Once 07/13/24 1745 07/13/24 1757    07/12/24 1451  bumetanide (BUMEX) injection 2 mg        Ordering Provider: Sam Desai MD    2 mg Intravenous Once 07/12/24 1506 07/12/24 1505          ----------------------------------------------------------------------------------------------------------------------  Vital Signs:  Temp:  [97.9 °F (36.6 °C)-98.6 °F (37 °C)] 97.9 °F (36.6 °C)  Heart Rate:  [60-75] 64  Resp:  [14-28] 20  BP: ()/(34-68) 148/52  SpO2:  [91 %-100 %] 99 %  on  Flow (L/min):  [2-3] 2;   Device (Oxygen Therapy): nasal cannula  Body mass index is 39.62 kg/m².    Wt Readings from Last 3 Encounters:   07/13/24 108 kg (238 lb 1.6 oz)   06/27/24 102 kg (225 lb)   06/21/24 102 kg (225 lb)     Intake & Output (last 3 days)         07/11 0701 07/12 0700 07/12 0701 07/13 0700 07/13 0701 07/14 0700    P.O.  240 460    I.V. (mL/kg)  0 (0) 108.4 (1)    IV Piggyback  100     Total Intake(mL/kg)  340 (3.1) 568.4 (5.3)    Urine (mL/kg/hr)  400 (0.2) 200 (0.1)    Stool   0    Total Output  400 200    Net  -60 +368.4           Stool Unmeasured Occurrence   1 x          Diet: Regular/House; Fluid Consistency: Thin (IDDSI 0)  ----------------------------------------------------------------------------------------------------------------------  Physical exam:  Constitutional:  Obese. Appears older than stated age.   HENT:  Head:  Normocephalic and atraumatic.  Mouth:  Moist mucous membranes.    Eyes:  Conjunctivae and EOM are normal. No scleral icterus.    Neck:  Neck supple.  No JVD present.    Cardiovascular:  Normal rate, regular rhythm and normal heart sounds with no murmur.  Pulmonary/Chest:  No respiratory distress, no wheezes, no crackles, with normal breath sounds and good air movement.  Abdominal:  Soft.   "Bowel sounds are normal.  No distension and no tenderness.   Musculoskeletal:  No edema, no tenderness, and no deformity.  No red or swollen joints anywhere.    Neurological:  Alert and oriented to person, place, and time.  No cranial nerve deficit.  No tongue deviation.  No facial droop.  No slurred speech.   Skin:  Skin is warm and dry. No rash noted. No pallor.   Peripheral vascular:  Pulses in all 4 extremities with no clubbing, no cyanosis, no edema.  ----------------------------------------------------------------------------------------------------------------------  Tele:    ----------------------------------------------------------------------------------------------------------------------  Results from last 7 days   Lab Units 07/12/24 2339 07/12/24  1722 07/12/24  1433 07/12/24  0652   LACTATE mmol/L  --  2.3* 2.4*  --    WBC 10*3/mm3 9.19  --   --  10.69   HEMOGLOBIN g/dL 8.2*  --   --  8.8*   HEMATOCRIT % 28.6*  --   --  31.3*   MCV fL 109.6*  --   --  112.6*   MCHC g/dL 28.7*  --   --  28.1*   PLATELETS 10*3/mm3 125*  --   --  145         Results from last 7 days   Lab Units 07/12/24 2339 07/12/24  0652   SODIUM mmol/L 136 136   POTASSIUM mmol/L 4.1 3.8   MAGNESIUM mg/dL 1.8  --    CHLORIDE mmol/L 97* 95*   CO2 mmol/L 26.6 26.5   BUN mg/dL 26* 22*   CREATININE mg/dL 1.42* 1.47*   CALCIUM mg/dL 8.6 9.2   GLUCOSE mg/dL 230* 264*   ALBUMIN g/dL  --  4.0   BILIRUBIN mg/dL  --  5.0*   ALK PHOS U/L  --  91   AST (SGOT) U/L  --  25   ALT (SGPT) U/L  --  26   Estimated Creatinine Clearance: 53.4 mL/min (A) (by C-G formula based on SCr of 1.42 mg/dL (H)).  No results found for: \"AMMONIA\"  Results from last 7 days   Lab Units 07/12/24  0912 07/12/24  0652   HSTROP T ng/L 81* 90*     Results from last 7 days   Lab Units 07/12/24  0912   PROBNP pg/mL 3,998.0*         Glucose   Date/Time Value Ref Range Status   07/13/2024 1801 311 (H) 70 - 130 mg/dL Final   07/13/2024 1140 299 (H) 70 - 130 mg/dL Final " "  07/13/2024 0849 349 (H) 70 - 130 mg/dL Final     Lab Results   Component Value Date    TSH 4.990 (H) 07/12/2024    FREET4 1.30 04/01/2024     No results found for: \"PREGTESTUR\", \"PREGSERUM\", \"HCG\", \"HCGQUANT\"  Pain Management Panel  More data exists         Latest Ref Rng & Units 11/13/2023 9/12/2020   Pain Management Panel   Creatinine, Urine mg/dL  mg/dL - 46.3  46.3    Amphetamine, Urine Qual Negative Negative  -   Barbiturates Screen, Urine Negative Negative  -   Benzodiazepine Screen, Urine Negative Negative  -   Buprenorphine, Screen, Urine Negative Negative  -   Cocaine Screen, Urine Negative Negative  -   Fentanyl, Urine Negative Negative  -   Methadone Screen , Urine Negative Negative  -   Methamphetamine, Ur Negative Negative  -     Brief Urine Lab Results  (Last result in the past 365 days)        Color   Clarity   Blood   Leuk Est   Nitrite   Protein   CREAT   Urine HCG        07/12/24 0757 Dark Yellow   Turbid   Large (3+)   Large (3+)   Positive   30 mg/dL (1+)                 Blood Culture   Date Value Ref Range Status   07/12/2024 No growth at 24 hours  Preliminary   07/12/2024 No growth at 24 hours  Preliminary     Urine Culture   Date Value Ref Range Status   07/12/2024 >100,000 CFU/mL Gram Negative Bacilli (A)  Preliminary     No results found for: \"WOUNDCX\"  No results found for: \"STOOLCX\"  No results found for: \"RESPCX\"  No results found for: \"AFBCX\"  Results from last 7 days   Lab Units 07/12/24  1722 07/12/24  1433   LACTATE mmol/L 2.3* 2.4*       I have personally looked at the labs and they are summarized above.  ----------------------------------------------------------------------------------------------------------------------  Detailed radiology reports for the last 24 hours:    Imaging Results (Last 24 Hours)       Procedure Component Value Units Date/Time    CT Abdomen Pelvis Without Contrast [[id number]] Collected: 07/13/24 1415     Updated: 07/13/24 1424    Narrative:      " PROCEDURE: CT of the abdomen and pelvis performed without IV contrast on  July 13, 2024. The examination was performed with 5 mm axial imaging and  5 mm sagittal and coronal reconstruction images. The examination was  performed according to as low as reasonably achievable dose protocol.  Total DLP is 677.     COMPARISON: None.     FINDINGS:     Normal alignment of the lumbar vertebral bodies.  Nonspecific areas of lucency in the mid and upper lumbar and lower  thoracic vertebral bodies. These could represent hemangiomas.  Slight levoconvex curve at the thoracolumbar junction and upper lumbar  spine levels.     Mild enlarged heart size.  Small right and left pleural effusion with compressive atelectasis at  the lung bases.  Cholecystectomy clips in the right upper quadrant.  Mild chronic bilateral renal cortical volume loss.  Small umbilical hernia contains only fat.  Left ventral hernia near the umbilicus along the anterior pelvic wall  contains a moderate volume of fluid as well as mesenteric fat and  multiple small bowel loops without features of obstruction.  Small volume of free fluid in the abdomen and pelvis.  No acute process seen in the liver, spleen, pancreas, adrenal glands,  and kidneys.  Stranding and skin thickening noted at the pannus distal margins  suggestive of edema and/or cellulitis related changes.  No acute process seen in the uterus and adnexal structures.  Mild bladder wall thickening with stranding around the bladder possibly  due to underlying cystitis.  No abdominal aortic aneurysm or retroperitoneal hemorrhage.  No features of acute appendicitis.       Impression:         1.  Mild enlarged heart size  2.  Small right and left pleural effusion.  3.  Small volume of ascites in the abdomen and pelvis.  4.  Moderate sized infraumbilical ventral hernia at the midline extends  into the left pannus and this contains some mesenteric fat, multiple  small bowel loops, and free fluid.  5.  No bowel  or renal obstruction.  6.  Mild chronic bilateral renal cortical volume loss  7.  Cholecystectomy.  8.  Possible cystitis.        This report was finalized on 7/13/2024 2:22 PM by Braydon Johnson MD.             Assessment & Plan      #Wide complex tachycardia s/p cardioversion in the field by EMS  #pAfib  #HFpEF, Hx of improved HFrEF  #Hx of CAD  - Digoxin level wnl  - Continue amiodarone as per cardiology   - IV lasix as per cardiology   - Monitor renal function and electrolytes   - Monitor on tele     #UTI  - UA and symptoms consistent with UTI. Cystitis also on CT scan.   - Urine growing 100k gram negative bacilli. Await speciation.   - Continue ceftriaxone.     #Persistent N/V  - No clear cause on CT. Patient with large known ventral hernia without evidence of incarceration or obstruction   - Possibly from UTI    Chronic medical problems:  CKID IIIa  Chronic anemia  Cirrhosis   DM II- Uncontrolled, add SSI to home regimen. Will get A1C      Code status: Full     Dispo: Continue PCU level of care    Charles Boland MD  [facility] Hospitalist  07/13/24  19:36 EDT

## 2024-07-14 LAB
ALBUMIN SERPL-MCNC: 3.5 G/DL (ref 3.5–5.2)
ALBUMIN/GLOB SERPL: 1.8 G/DL
ALP SERPL-CCNC: 72 U/L (ref 39–117)
ALT SERPL W P-5'-P-CCNC: 19 U/L (ref 1–33)
ANION GAP SERPL CALCULATED.3IONS-SCNC: 10.9 MMOL/L (ref 5–15)
ANISOCYTOSIS BLD QL: NORMAL
AST SERPL-CCNC: 22 U/L (ref 1–32)
BACTERIA SPEC AEROBE CULT: ABNORMAL
BASOPHILS # BLD AUTO: 0.1 10*3/MM3 (ref 0–0.2)
BASOPHILS NFR BLD AUTO: 1.2 % (ref 0–1.5)
BILIRUB SERPL-MCNC: 2.7 MG/DL (ref 0–1.2)
BUN SERPL-MCNC: 28 MG/DL (ref 6–20)
BUN/CREAT SERPL: 18.4 (ref 7–25)
CALCIUM SPEC-SCNC: 8.5 MG/DL (ref 8.6–10.5)
CHLORIDE SERPL-SCNC: 98 MMOL/L (ref 98–107)
CO2 SERPL-SCNC: 27.1 MMOL/L (ref 22–29)
CREAT SERPL-MCNC: 1.52 MG/DL (ref 0.57–1)
DEPRECATED RDW RBC AUTO: 89.9 FL (ref 37–54)
EGFRCR SERPLBLD CKD-EPI 2021: 39.8 ML/MIN/1.73
EOSINOPHIL # BLD AUTO: 0.16 10*3/MM3 (ref 0–0.4)
EOSINOPHIL NFR BLD AUTO: 1.9 % (ref 0.3–6.2)
ERYTHROCYTE [DISTWIDTH] IN BLOOD BY AUTOMATED COUNT: 22.4 % (ref 12.3–15.4)
GLOBULIN UR ELPH-MCNC: 1.9 GM/DL
GLUCOSE BLDC GLUCOMTR-MCNC: 232 MG/DL (ref 70–130)
GLUCOSE BLDC GLUCOMTR-MCNC: 257 MG/DL (ref 70–130)
GLUCOSE BLDC GLUCOMTR-MCNC: 267 MG/DL (ref 70–130)
GLUCOSE BLDC GLUCOMTR-MCNC: 351 MG/DL (ref 70–130)
GLUCOSE SERPL-MCNC: 176 MG/DL (ref 65–99)
HBA1C MFR BLD: 6.5 % (ref 4.8–5.6)
HCT VFR BLD AUTO: 28.4 % (ref 34–46.6)
HGB BLD-MCNC: 7.9 G/DL (ref 12–15.9)
HYPOCHROMIA BLD QL: NORMAL
IMM GRANULOCYTES # BLD AUTO: 0.07 10*3/MM3 (ref 0–0.05)
IMM GRANULOCYTES NFR BLD AUTO: 0.8 % (ref 0–0.5)
LARGE PLATELETS: NORMAL
LYMPHOCYTES # BLD AUTO: 2.16 10*3/MM3 (ref 0.7–3.1)
LYMPHOCYTES NFR BLD AUTO: 25.8 % (ref 19.6–45.3)
MAGNESIUM SERPL-MCNC: 1.9 MG/DL (ref 1.6–2.6)
MCH RBC QN AUTO: 31.2 PG (ref 26.6–33)
MCHC RBC AUTO-ENTMCNC: 27.8 G/DL (ref 31.5–35.7)
MCV RBC AUTO: 112.3 FL (ref 79–97)
MONOCYTES # BLD AUTO: 0.4 10*3/MM3 (ref 0.1–0.9)
MONOCYTES NFR BLD AUTO: 4.8 % (ref 5–12)
NEUTROPHILS NFR BLD AUTO: 5.49 10*3/MM3 (ref 1.7–7)
NEUTROPHILS NFR BLD AUTO: 65.5 % (ref 42.7–76)
NRBC BLD AUTO-RTO: 0.7 /100 WBC (ref 0–0.2)
PLATELET # BLD AUTO: 119 10*3/MM3 (ref 140–450)
PMV BLD AUTO: 10.7 FL (ref 6–12)
POLYCHROMASIA BLD QL SMEAR: NORMAL
POTASSIUM SERPL-SCNC: 4 MMOL/L (ref 3.5–5.2)
PROT SERPL-MCNC: 5.4 G/DL (ref 6–8.5)
RBC # BLD AUTO: 2.53 10*6/MM3 (ref 3.77–5.28)
SODIUM SERPL-SCNC: 136 MMOL/L (ref 136–145)
STOMATOCYTES BLD QL SMEAR: NORMAL
TARGETS BLD QL SMEAR: NORMAL
WBC NRBC COR # BLD AUTO: 8.38 10*3/MM3 (ref 3.4–10.8)

## 2024-07-14 PROCEDURE — 63710000001 INSULIN GLARGINE PER 5 UNITS: Performed by: INTERNAL MEDICINE

## 2024-07-14 PROCEDURE — 63710000001 ONDANSETRON ODT 4 MG TABLET DISPERSIBLE: Performed by: INTERNAL MEDICINE

## 2024-07-14 PROCEDURE — 63710000001 INSULIN LISPRO (HUMAN) PER 5 UNITS: Performed by: INTERNAL MEDICINE

## 2024-07-14 PROCEDURE — 25010000002 PROCHLORPERAZINE 10 MG/2ML SOLUTION: Performed by: INTERNAL MEDICINE

## 2024-07-14 PROCEDURE — 82948 REAGENT STRIP/BLOOD GLUCOSE: CPT

## 2024-07-14 PROCEDURE — 85007 BL SMEAR W/DIFF WBC COUNT: CPT | Performed by: INTERNAL MEDICINE

## 2024-07-14 PROCEDURE — 94799 UNLISTED PULMONARY SVC/PX: CPT

## 2024-07-14 PROCEDURE — 99232 SBSQ HOSP IP/OBS MODERATE 35: CPT | Performed by: INTERNAL MEDICINE

## 2024-07-14 PROCEDURE — 85025 COMPLETE CBC W/AUTO DIFF WBC: CPT | Performed by: INTERNAL MEDICINE

## 2024-07-14 PROCEDURE — 83735 ASSAY OF MAGNESIUM: CPT | Performed by: INTERNAL MEDICINE

## 2024-07-14 PROCEDURE — 94761 N-INVAS EAR/PLS OXIMETRY MLT: CPT

## 2024-07-14 PROCEDURE — 25010000002 CEFTRIAXONE PER 250 MG: Performed by: INTERNAL MEDICINE

## 2024-07-14 PROCEDURE — 80053 COMPREHEN METABOLIC PANEL: CPT | Performed by: INTERNAL MEDICINE

## 2024-07-14 PROCEDURE — 83036 HEMOGLOBIN GLYCOSYLATED A1C: CPT | Performed by: INTERNAL MEDICINE

## 2024-07-14 RX ORDER — BUMETANIDE 1 MG/1
2 TABLET ORAL 3 TIMES DAILY
Status: DISCONTINUED | OUTPATIENT
Start: 2024-07-14 | End: 2024-07-14

## 2024-07-14 RX ORDER — BUMETANIDE 1 MG/1
2 TABLET ORAL 3 TIMES DAILY
Status: DISCONTINUED | OUTPATIENT
Start: 2024-07-14 | End: 2024-07-16 | Stop reason: HOSPADM

## 2024-07-14 RX ORDER — BUMETANIDE 1 MG/1
2 TABLET ORAL
Status: DISCONTINUED | OUTPATIENT
Start: 2024-07-14 | End: 2024-07-14

## 2024-07-14 RX ADMIN — INSULIN LISPRO 8 UNITS: 100 INJECTION, SOLUTION INTRAVENOUS; SUBCUTANEOUS at 17:40

## 2024-07-14 RX ADMIN — PROCHLORPERAZINE EDISYLATE 5 MG: 5 INJECTION INTRAMUSCULAR; INTRAVENOUS at 11:10

## 2024-07-14 RX ADMIN — INSULIN LISPRO 5 UNITS: 100 INJECTION, SOLUTION INTRAVENOUS; SUBCUTANEOUS at 20:40

## 2024-07-14 RX ADMIN — FERROUS SULFATE TAB 325 MG (65 MG ELEMENTAL FE) 325 MG: 325 (65 FE) TAB at 08:41

## 2024-07-14 RX ADMIN — Medication 10 ML: at 08:40

## 2024-07-14 RX ADMIN — APIXABAN 5 MG: 5 TABLET, FILM COATED ORAL at 08:41

## 2024-07-14 RX ADMIN — INSULIN LISPRO 8 UNITS: 100 INJECTION, SOLUTION INTRAVENOUS; SUBCUTANEOUS at 08:40

## 2024-07-14 RX ADMIN — INSULIN GLARGINE 25 UNITS: 100 INJECTION, SOLUTION SUBCUTANEOUS at 20:39

## 2024-07-14 RX ADMIN — Medication 10 ML: at 20:40

## 2024-07-14 RX ADMIN — HYDROCODONE BITARTRATE AND ACETAMINOPHEN 1 TABLET: 7.5; 325 TABLET ORAL at 13:54

## 2024-07-14 RX ADMIN — ONDANSETRON 4 MG: 4 TABLET, ORALLY DISINTEGRATING ORAL at 08:39

## 2024-07-14 RX ADMIN — AMIODARONE HYDROCHLORIDE 300 MG: 200 TABLET ORAL at 08:41

## 2024-07-14 RX ADMIN — FLUTICASONE PROPIONATE 2 SPRAY: 50 SPRAY, METERED NASAL at 08:41

## 2024-07-14 RX ADMIN — ASPIRIN 81 MG: 81 TABLET, COATED ORAL at 08:41

## 2024-07-14 RX ADMIN — INSULIN LISPRO 12 UNITS: 100 INJECTION, SOLUTION INTRAVENOUS; SUBCUTANEOUS at 11:09

## 2024-07-14 RX ADMIN — INSULIN LISPRO 8 UNITS: 100 INJECTION, SOLUTION INTRAVENOUS; SUBCUTANEOUS at 11:10

## 2024-07-14 RX ADMIN — CEFTRIAXONE 1000 MG: 1 INJECTION, POWDER, FOR SOLUTION INTRAMUSCULAR; INTRAVENOUS at 13:44

## 2024-07-14 RX ADMIN — ONDANSETRON 4 MG: 4 TABLET, ORALLY DISINTEGRATING ORAL at 00:31

## 2024-07-14 RX ADMIN — BUMETANIDE 2 MG: 1 TABLET ORAL at 20:39

## 2024-07-14 RX ADMIN — APIXABAN 5 MG: 5 TABLET, FILM COATED ORAL at 20:39

## 2024-07-14 RX ADMIN — BUSPIRONE HYDROCHLORIDE 10 MG: 10 TABLET ORAL at 20:39

## 2024-07-14 RX ADMIN — PANTOPRAZOLE SODIUM 40 MG: 40 TABLET, DELAYED RELEASE ORAL at 05:49

## 2024-07-14 RX ADMIN — LEVOTHYROXINE SODIUM 12.5 MCG: 50 TABLET ORAL at 05:49

## 2024-07-14 RX ADMIN — INSULIN LISPRO 8 UNITS: 100 INJECTION, SOLUTION INTRAVENOUS; SUBCUTANEOUS at 08:39

## 2024-07-14 RX ADMIN — NYSTATIN 1 APPLICATION: 100000 POWDER TOPICAL at 08:40

## 2024-07-14 RX ADMIN — LEVOTHYROXINE SODIUM 50 MCG: 50 TABLET ORAL at 08:41

## 2024-07-14 RX ADMIN — INSULIN GLARGINE 25 UNITS: 100 INJECTION, SOLUTION SUBCUTANEOUS at 08:39

## 2024-07-14 RX ADMIN — BUMETANIDE 2 MG: 1 TABLET ORAL at 17:40

## 2024-07-14 RX ADMIN — BUSPIRONE HYDROCHLORIDE 10 MG: 10 TABLET ORAL at 08:41

## 2024-07-14 NOTE — PROGRESS NOTES
LOS: 2 days     Name: Yumiko Purdy  Age/Sex: 58 y.o. female  :  1965        PCP: Masha Davidson APRN    Principal Problem:    Wide-complex tachycardia          Following for: Atrial fibrillation with RVR    Telemetry Monitoring: Sinus rhythm    Interval history: Patient has maintained sinus rhythm since yesterday no major dysrhythmias seen.  She denies any chest pains.  She admits that she is short of breath although this is chronic.  She reports that her breathing is currently slightly worse than her baseline.    Subjective     ROS    Vital Signs  Vitals:    24 1355 24 1400 24 1500 24 1600   BP:  123/44 101/41 101/43   Pulse: 65 64 61 60   Resp:  15 27 19   Temp:       TempSrc:       SpO2: 100% 100% 100% 100%   Weight:       Height:         Body mass index is 39.69 kg/m².      Intake/Output Summary (Last 24 hours) at 2024 1640  Last data filed at 2024 1620  Gross per 24 hour   Intake 695.56 ml   Output 500 ml   Net 195.56 ml       Constitutional:       General: Not in acute distress.     Appearance: Healthy appearance. Well-developed and not in distress. Not diaphoretic.   Eyes:      Conjunctiva/sclera: Conjunctivae normal.      Pupils: Pupils are equal, round, and reactive to light.   HENT:      Head: Normocephalic and atraumatic.   Neck:      Vascular: No carotid bruit or JVD.   Pulmonary:      Effort: Pulmonary effort is normal. No respiratory distress.      Comments: Lungs are diminished bilaterally however did not hear any rhonchi  Cardiovascular:      Normal rate. Regular rhythm.   Edema:     Pretibial: bilateral 1+ edema of the pretibial area.     Ankle: bilateral 1+ edema of the ankle.     Feet: bilateral 1+ edema of the feet.  Skin:     General: Skin is cool.   Neurological:      Mental Status: Alert, oriented to person, place, and time and oriented to person, place and time.         Results Review:     Results from last 7 days   Lab Units  07/14/24  0050 07/12/24  2339 07/12/24  0652   WBC 10*3/mm3 8.38 9.19 10.69   HEMOGLOBIN g/dL 7.9* 8.2* 8.8*   PLATELETS 10*3/mm3 119* 125* 145     Results from last 7 days   Lab Units 07/14/24  0050 07/12/24  2339 07/12/24  0652   SODIUM mmol/L 136 136 136   POTASSIUM mmol/L 4.0 4.1 3.8   CHLORIDE mmol/L 98 97* 95*   CO2 mmol/L 27.1 26.6 26.5   BUN mg/dL 28* 26* 22*   CREATININE mg/dL 1.52* 1.42* 1.47*   CALCIUM mg/dL 8.5* 8.6 9.2   GLUCOSE mg/dL 176* 230* 264*   ALT (SGPT) U/L 19  --  26   AST (SGOT) U/L 22  --  25     Results from last 7 days   Lab Units 07/12/24  0912 07/12/24  0652   HSTROP T ng/L 81* 90*             I reviewed the patient's new clinical results.  I reviewed the patient's new imaging results and agree with the interpretation.  I personally viewed and interpreted the patient's EKG/Telemetry data    Medication Review:   amiodarone, 300 mg, Oral, Q24H  apixaban, 5 mg, Oral, Q12H  aspirin, 81 mg, Oral, Daily  bumetanide, 2 mg, Oral, TID  busPIRone, 10 mg, Oral, BID  cefTRIAXone, 1,000 mg, Intravenous, Q24H  ferrous sulfate, 325 mg, Oral, Daily  fluticasone, 2 spray, Nasal, Daily  insulin glargine, 25 Units, Subcutaneous, Q12H  insulin lispro, 3-14 Units, Subcutaneous, 4x Daily AC & at Bedtime  insulin lispro, 8 Units, Subcutaneous, TID With Meals  levothyroxine, 12.5 mcg, Oral, Q AM  levothyroxine, 50 mcg, Oral, Daily  nystatin, 1 Application, Topical, Q12H  pantoprazole, 40 mg, Oral, Q AM  Scopolamine, 1 patch, Transdermal, Q72H  sodium chloride, 10 mL, Intravenous, Q12H  sodium chloride, 10 mL, Intravenous, Q12H  Vortioxetine HBr, 5 mg, Oral, Daily With Breakfast           Assessment:  Atrial flutter with RVR, now in sinus.  Paroxysmal/persistent atrial fibrillation currently in maintaining sinus rhythm since cardioversion by EMS  Cardiomyopathy-most recent LVEF found to be 60 to 65%  Acute on chronic HFpEF  Nonobstructive CAD  CKD stage III      Recommendations:  Thankfully has maintained sinus  rhythm tomorrow will have Dr. Ken/Luis Alberto evaluate patient and decide on lowering dose of amiodarone.  She did have breakthrough episodes of atrial flutter on 300 mg.  Patient is adamant that she was recently told to increase Bumex to 2 mg 3 times daily by nephrology.  I am reluctant on prescribing this high of a dose without nephrology's opinion.  However do think it is perfectly fine to continue with 2 mg once a day or switching to Lasix.  She does report her breathing is slightly worse than her baseline but as a whole appears to be fairly stable lungs appear clear and only minimal pedal edema on exam.    I discussed the patients findings and my recommendations with patient and family    Plan of care discussed with Dr. Analy Castellanos PA-C  07/14/24  16:40 EDT

## 2024-07-14 NOTE — PLAN OF CARE
Problem: Adult Inpatient Plan of Care  Goal: Plan of Care Review  Outcome: Ongoing, Progressing  Flowsheets (Taken 7/13/2024 1851)  Outcome Evaluation: Patient resting in bed. VSS. A&Ox4. NSR w/ BBB. 2LNC. No needs noted at this time.  Goal: Patient-Specific Goal (Individualized)  Outcome: Ongoing, Progressing  Goal: Absence of Hospital-Acquired Illness or Injury  Outcome: Ongoing, Progressing  Intervention: Identify and Manage Fall Risk  Recent Flowsheet Documentation  Taken 7/14/2024 1401 by Renea Wilkerson RN  Safety Promotion/Fall Prevention: safety round/check completed  Taken 7/14/2024 1300 by Renea Wilkerson RN  Safety Promotion/Fall Prevention:   safety round/check completed   room organization consistent   fall prevention program maintained   clutter free environment maintained   assistive device/personal items within reach   activity supervised  Taken 7/14/2024 1100 by Renea Wilkerson RN  Safety Promotion/Fall Prevention:   safety round/check completed   room organization consistent   fall prevention program maintained   clutter free environment maintained   assistive device/personal items within reach   activity supervised  Taken 7/14/2024 0900 by Renea Wilkerson RN  Safety Promotion/Fall Prevention:   safety round/check completed   room organization consistent   fall prevention program maintained   clutter free environment maintained   assistive device/personal items within reach   activity supervised  Taken 7/14/2024 0835 by Renea Wilkerson RN  Safety Promotion/Fall Prevention: safety round/check completed  Taken 7/14/2024 0700 by Renea Wilkerson, OLIVIA  Safety Promotion/Fall Prevention:   safety round/check completed   room organization consistent   fall prevention program maintained   clutter free environment maintained   assistive device/personal items within reach   activity supervised  Intervention: Prevent and Manage VTE (Venous Thromboembolism) Risk  Recent Flowsheet Documentation  Taken 7/14/2024 1401  by Rock, Renea J, RN  Activity Management: back to bed  VTE Prevention/Management: (eliquis) other (see comments)  Taken 7/14/2024 1245 by Renea Wilkerson RN  Activity Management: up in chair  Intervention: Prevent Infection  Recent Flowsheet Documentation  Taken 7/14/2024 1401 by Renea Wilkerson RN  Infection Prevention: rest/sleep promoted  Taken 7/14/2024 1300 by Renea Wilkerson RN  Infection Prevention: rest/sleep promoted  Taken 7/14/2024 1100 by Renea Wilkerson RN  Infection Prevention: rest/sleep promoted  Taken 7/14/2024 0900 by Renea Wilkerson RN  Infection Prevention: rest/sleep promoted  Taken 7/14/2024 0835 by Renea Wilkerson RN  Infection Prevention: rest/sleep promoted  Taken 7/14/2024 0700 by Renea Wilkerson RN  Infection Prevention: rest/sleep promoted  Goal: Optimal Comfort and Wellbeing  Outcome: Ongoing, Progressing  Intervention: Provide Person-Centered Care  Recent Flowsheet Documentation  Taken 7/14/2024 1401 by Renea Wilkerson RN  Trust Relationship/Rapport:   care explained   choices provided  Taken 7/14/2024 0835 by Renea Wilkerson RN  Trust Relationship/Rapport:   care explained   choices provided  Goal: Readiness for Transition of Care  Outcome: Ongoing, Progressing     Problem: Fall Injury Risk  Goal: Absence of Fall and Fall-Related Injury  Outcome: Ongoing, Progressing  Intervention: Identify and Manage Contributors  Recent Flowsheet Documentation  Taken 7/14/2024 1401 by Renea Wilkerson RN  Medication Review/Management: medications reviewed  Taken 7/14/2024 1300 by Renea Wilkerson RN  Medication Review/Management: medications reviewed  Taken 7/14/2024 1100 by Renea Wilkerson RN  Medication Review/Management: medications reviewed  Taken 7/14/2024 0900 by Renea Wilkerson RN  Medication Review/Management: medications reviewed  Taken 7/14/2024 0835 by Renea Wilkerson RN  Medication Review/Management: medications reviewed  Taken 7/14/2024 0700 by Renea Wilkerson RN  Medication Review/Management:  medications reviewed  Intervention: Promote Injury-Free Environment  Recent Flowsheet Documentation  Taken 7/14/2024 1401 by Renea Wilkerson RN  Safety Promotion/Fall Prevention: safety round/check completed  Taken 7/14/2024 1300 by Renea Wilkerson RN  Safety Promotion/Fall Prevention:   safety round/check completed   room organization consistent   fall prevention program maintained   clutter free environment maintained   assistive device/personal items within reach   activity supervised  Taken 7/14/2024 1100 by Renea Wilkerson RN  Safety Promotion/Fall Prevention:   safety round/check completed   room organization consistent   fall prevention program maintained   clutter free environment maintained   assistive device/personal items within reach   activity supervised  Taken 7/14/2024 0900 by Renea Wilkerson RN  Safety Promotion/Fall Prevention:   safety round/check completed   room organization consistent   fall prevention program maintained   clutter free environment maintained   assistive device/personal items within reach   activity supervised  Taken 7/14/2024 0835 by Renea Wilkerson RN  Safety Promotion/Fall Prevention: safety round/check completed  Taken 7/14/2024 0700 by Renea Wilkerson RN  Safety Promotion/Fall Prevention:   safety round/check completed   room organization consistent   fall prevention program maintained   clutter free environment maintained   assistive device/personal items within reach   activity supervised     Problem: COPD (Chronic Obstructive Pulmonary Disease) Comorbidity  Goal: Maintenance of COPD Symptom Control  Outcome: Ongoing, Progressing  Intervention: Maintain COPD-Symptom Control  Recent Flowsheet Documentation  Taken 7/14/2024 1401 by Renea Wilkerson RN  Medication Review/Management: medications reviewed  Taken 7/14/2024 1300 by Renea Wilkerson RN  Medication Review/Management: medications reviewed  Taken 7/14/2024 1100 by Renea Wilkerson RN  Medication Review/Management: medications  reviewed  Taken 7/14/2024 0900 by Renea Wilkerson, RN  Medication Review/Management: medications reviewed  Taken 7/14/2024 0835 by Renea Wilkerson, RN  Medication Review/Management: medications reviewed  Taken 7/14/2024 0700 by Renea Wilkerson, RN  Medication Review/Management: medications reviewed     Problem: Skin Injury Risk Increased  Goal: Skin Health and Integrity  Outcome: Ongoing, Progressing   Goal Outcome Evaluation:              Outcome Evaluation: Patient resting in bed. VSS. A&Ox4. NSR w/ BBB. 2LNC. No needs noted at this time.

## 2024-07-14 NOTE — PLAN OF CARE
Problem: Adult Inpatient Plan of Care  Goal: Plan of Care Review  Outcome: Ongoing, Progressing  Goal: Patient-Specific Goal (Individualized)  Outcome: Ongoing, Progressing  Goal: Absence of Hospital-Acquired Illness or Injury  Outcome: Ongoing, Progressing  Intervention: Identify and Manage Fall Risk  Description: Perform standard risk assessment on admission using a validated tool or comprehensive approach appropriate to the patient; reassess fall risk frequently, with change in status or transfer to another level of care.  Communicate fall injury risk to interprofessional healthcare team.  Determine need for increased observation, equipment and environmental modification, such as low bed, signage and supportive, nonskid footwear.  Adjust safety measures to individual developmental age, stage and identified risk factors.  Reinforce the importance of safety and physical activity with patient and family.  Perform regular intentional rounding to assess need for position change, pain assessment and personal needs, including assistance with toileting.  Recent Flowsheet Documentation  Taken 7/14/2024 0500 by Lali Ferrell RN  Safety Promotion/Fall Prevention:   safety round/check completed   room organization consistent   fall prevention program maintained  Taken 7/14/2024 0300 by Lali Ferrell RN  Safety Promotion/Fall Prevention:   safety round/check completed   room organization consistent   fall prevention program maintained  Taken 7/14/2024 0100 by Lali Ferrell RN  Safety Promotion/Fall Prevention:   safety round/check completed   room organization consistent   fall prevention program maintained  Taken 7/13/2024 2300 by Lali Ferrell RN  Safety Promotion/Fall Prevention:   safety round/check completed   room organization consistent   fall prevention program maintained  Taken 7/13/2024 2100 by Lali Ferrell RN  Safety Promotion/Fall  Prevention:   safety round/check completed   room organization consistent   fall prevention program maintained  Taken 7/13/2024 1900 by Lali Ferrell RN  Safety Promotion/Fall Prevention:   safety round/check completed   room organization consistent   fall prevention program maintained  Intervention: Prevent and Manage VTE (Venous Thromboembolism) Risk  Description: Assess for VTE (venous thromboembolism) risk.  Encourage and assist with early ambulation.  Initiate and maintain compression or other therapy, as indicated, based on identified risk in accordance with organizational protocol and provider order.  Encourage both active and passive leg exercises while in bed, if unable to ambulate.  Recent Flowsheet Documentation  Taken 7/13/2024 2000 by Lali Ferrell RN  VTE Prevention/Management: (eliquis) other (see comments)  Range of Motion: active ROM (range of motion) encouraged  Intervention: Prevent Infection  Description: Maintain skin and mucous membrane integrity; promote hand, oral and pulmonary hygiene.  Optimize fluid balance, nutrition, sleep and glycemic control to maximize infection resistance.  Identify potential sources of infection early to prevent or mitigate progression of infection (e.g., wound, lines, devices).  Evaluate ongoing need for invasive devices; remove promptly when no longer indicated.  Recent Flowsheet Documentation  Taken 7/14/2024 0500 by Lali Ferrell RN  Infection Prevention:   equipment surfaces disinfected   hand hygiene promoted   personal protective equipment utilized   rest/sleep promoted   single patient room provided  Taken 7/14/2024 0300 by Lali Ferrell RN  Infection Prevention:   equipment surfaces disinfected   hand hygiene promoted   personal protective equipment utilized   rest/sleep promoted   single patient room provided   visitors restricted/screened  Taken 7/14/2024 0100 by Lali Ferrell  RN  Infection Prevention:   equipment surfaces disinfected   hand hygiene promoted   personal protective equipment utilized   rest/sleep promoted   single patient room provided  Taken 7/13/2024 2300 by Lali Ferrell RN  Infection Prevention:   equipment surfaces disinfected   hand hygiene promoted   personal protective equipment utilized   rest/sleep promoted   single patient room provided  Taken 7/13/2024 2100 by Lali Ferrell RN  Infection Prevention:   equipment surfaces disinfected   hand hygiene promoted   personal protective equipment utilized   rest/sleep promoted   single patient room provided  Taken 7/13/2024 1900 by Lali Ferrell RN  Infection Prevention:   equipment surfaces disinfected   hand hygiene promoted   personal protective equipment utilized   rest/sleep promoted   single patient room provided  Goal: Optimal Comfort and Wellbeing  Outcome: Ongoing, Progressing  Intervention: Provide Person-Centered Care  Description: Use a family-focused approach to care.  Develop trust and rapport by proactively providing information, encouraging questions, addressing concerns and offering reassurance.  Acknowledge emotional response to hospitalization.  Recognize and utilize personal coping strategies.  Honor spiritual and cultural preferences.  Recent Flowsheet Documentation  Taken 7/14/2024 0200 by Lali Ferrell RN  Trust Relationship/Rapport:   care explained   choices provided   emotional support provided   empathic listening provided   questions answered   questions encouraged   reassurance provided   thoughts/feelings acknowledged  Taken 7/13/2024 2000 by Lali Ferrell RN  Trust Relationship/Rapport:   care explained   choices provided   emotional support provided   empathic listening provided   questions answered   questions encouraged   reassurance provided   thoughts/feelings acknowledged  Goal: Readiness for Transition of  Care  Outcome: Ongoing, Progressing     Problem: Fall Injury Risk  Goal: Absence of Fall and Fall-Related Injury  Outcome: Ongoing, Progressing  Intervention: Identify and Manage Contributors  Description: Develop a fall prevention plan with the patient and caregiver/family.  Provide reorientation, appropriate sensory stimulation and routines with changes in mental status to decrease risk of fall.  Promote use of personal vision and auditory aids.  Assess assistance level required for safe and effective self-care; provide support as needed, such as toileting, mobilization. For age 65 and older, implement timed toileting with assistance.  Encourage physical activity, such as performance of mobility and self-care at highest level of patient ability, multicomponent exercise program and provision of appropriate assistive devices.  If fall occurs, assess the severity of injury; implement fall injury protocol. Determine the cause and revise fall injury prevention plan.  Regularly review medication contribution to fall risk; adjust medication administration times to minimize risk of falling.  Consider risk related to polypharmacy and age.  Balance adequate pain management with potential for oversedation.  Recent Flowsheet Documentation  Taken 7/14/2024 0500 by Lali Ferrell RN  Medication Review/Management: medications reviewed  Taken 7/14/2024 0300 by Lali Ferrell RN  Medication Review/Management: medications reviewed  Taken 7/14/2024 0100 by Lali Ferrell RN  Medication Review/Management: medications reviewed  Taken 7/13/2024 2300 by Lali Ferrell RN  Medication Review/Management: medications reviewed  Taken 7/13/2024 2100 by Lali Ferrell RN  Medication Review/Management: medications reviewed  Taken 7/13/2024 1900 by Lali Ferrell RN  Medication Review/Management: medications reviewed  Intervention: Promote Injury-Free  Environment  Description: Provide a safe, barrier-free environment that encourages independent activity.  Keep care area uncluttered and well-lighted.  Determine need for increased observation or monitoring.  Avoid use of devices that minimize mobility, such as restraints or indwelling urinary catheter.  Recent Flowsheet Documentation  Taken 7/14/2024 0500 by Lali Ferrell RN  Safety Promotion/Fall Prevention:   safety round/check completed   room organization consistent   fall prevention program maintained  Taken 7/14/2024 0300 by Lali Ferrell RN  Safety Promotion/Fall Prevention:   safety round/check completed   room organization consistent   fall prevention program maintained  Taken 7/14/2024 0100 by Lali Ferrell RN  Safety Promotion/Fall Prevention:   safety round/check completed   room organization consistent   fall prevention program maintained  Taken 7/13/2024 2300 by Lali Ferrell RN  Safety Promotion/Fall Prevention:   safety round/check completed   room organization consistent   fall prevention program maintained  Taken 7/13/2024 2100 by Lali Ferrell RN  Safety Promotion/Fall Prevention:   safety round/check completed   room organization consistent   fall prevention program maintained  Taken 7/13/2024 1900 by Lali Ferrell RN  Safety Promotion/Fall Prevention:   safety round/check completed   room organization consistent   fall prevention program maintained     Problem: COPD (Chronic Obstructive Pulmonary Disease) Comorbidity  Goal: Maintenance of COPD Symptom Control  Outcome: Ongoing, Progressing  Intervention: Maintain COPD-Symptom Control  Description: Evaluate adherence to management plan (e.g., medication, trigger avoidance, infection prevention, self-monitoring).  Advocate for continuation of home regimen, including medication, method of delivery, schedule and symptom monitoring.  Anticipate the need for breathing  techniques and activity pacing to minimize fatigue and breathlessness.  Assess for proper use of inhaled medication and delivery technique; assist or reinstruct if needed.  Evaluate effectiveness of coping skills; encourage expression of feelings, expectations and concerns related to disease management and quality of life; reinforce education to enhance management plan and wellbeing.  Recent Flowsheet Documentation  Taken 7/14/2024 0500 by Lali Ferrell RN  Medication Review/Management: medications reviewed  Taken 7/14/2024 0300 by Lali Ferrell RN  Medication Review/Management: medications reviewed  Taken 7/14/2024 0100 by Lali Ferrell RN  Medication Review/Management: medications reviewed  Taken 7/13/2024 2300 by Lali Ferrell RN  Medication Review/Management: medications reviewed  Taken 7/13/2024 2100 by Lali Ferrell RN  Medication Review/Management: medications reviewed  Taken 7/13/2024 1900 by Lali Ferrell RN  Medication Review/Management: medications reviewed     Problem: Skin Injury Risk Increased  Goal: Skin Health and Integrity  Outcome: Ongoing, Progressing   Goal Outcome Evaluation:

## 2024-07-14 NOTE — PROGRESS NOTES
[facility] HOSPITALIST PROGRESS NOTE     Patient Identification:  Name:  Yumiko Purdy  Age:  58 y.o.  Sex:  female  :  1965  MRN:  [id number]  Visit Number:  [id number]  ROOM: [address]     Primary Care Provider:  Masha Davidson APRN    Length of stay in inpatient status:  2    Subjective     Chief Compliant:    Chief Complaint   Patient presents with    Chest Pain       History of Presenting Illness:    Patient denies any new complaints. No recurrent wide complex tachycardia. Reports LE swelling improving.     ROS:  Otherwise 10 point ROS negative other than documented above in HPI.     Objective     Current Hospital Meds:amiodarone, 300 mg, Oral, Q24H  apixaban, 5 mg, Oral, Q12H  aspirin, 81 mg, Oral, Daily  busPIRone, 10 mg, Oral, BID  cefTRIAXone, 1,000 mg, Intravenous, Q24H  ferrous sulfate, 325 mg, Oral, Daily  fluticasone, 2 spray, Nasal, Daily  insulin glargine, 25 Units, Subcutaneous, Q12H  insulin lispro, 3-14 Units, Subcutaneous, 4x Daily AC & at Bedtime  insulin lispro, 8 Units, Subcutaneous, TID With Meals  levothyroxine, 12.5 mcg, Oral, Q AM  levothyroxine, 50 mcg, Oral, Daily  nystatin, 1 Application, Topical, Q12H  pantoprazole, 40 mg, Oral, Q AM  Scopolamine, 1 patch, Transdermal, Q72H  sodium chloride, 10 mL, Intravenous, Q12H  sodium chloride, 10 mL, Intravenous, Q12H  Vortioxetine HBr, 5 mg, Oral, Daily With Breakfast         Current Antimicrobial Therapy:  Anti-Infectives (From admission, onward)      Ordered     Dose/Rate Route Frequency Start Stop    24 1617  cefTRIAXone (ROCEPHIN) 1,000 mg in sodium chloride 0.9 % 100 mL IVPB-VTB        Ordering Provider: Charles Boland MD    1,000 mg  200 mL/hr over 30 Minutes Intravenous Every 24 Hours 24 1300 24 1259    24 1236  cefTRIAXone (ROCEPHIN) 2,000 mg in sodium chloride 0.9 % 100 mL IVPB-VTB        Ordering Provider: Sam Desai MD    2,000 mg  200 mL/hr over 30 Minutes Intravenous Once  07/12/24 1252 07/12/24 1400          Current Diuretic Therapy:  Diuretics (From admission, onward)      Ordered     Dose/Rate Route Frequency Start Stop    07/13/24 1649  furosemide (LASIX) injection 40 mg        Ordering Provider: Adonay Castellanos PA-C    40 mg Intravenous Once 07/13/24 1745 07/13/24 1757    07/12/24 1451  bumetanide (BUMEX) injection 2 mg        Ordering Provider: Sam Desai MD    2 mg Intravenous Once 07/12/24 1506 07/12/24 1505          ----------------------------------------------------------------------------------------------------------------------  Vital Signs:  Temp:  [97.7 °F (36.5 °C)-98.3 °F (36.8 °C)] 97.9 °F (36.6 °C)  Heart Rate:  [58-71] 64  Resp:  [14-25] 15  BP: ()/(29-73) 123/44  SpO2:  [68 %-100 %] 100 %  on  Flow (L/min):  [2] 2;   Device (Oxygen Therapy): nasal cannula  Body mass index is 39.69 kg/m².    Wt Readings from Last 3 Encounters:   07/14/24 108 kg (238 lb 8.6 oz)   06/27/24 102 kg (225 lb)   06/21/24 102 kg (225 lb)     Intake & Output (last 3 days)         07/11 0701 07/12 0700 07/12 0701 07/13 0700 07/13 0701 07/14 0700 07/14 0701  07/15 0700    P.O.  240 460 595    I.V. (mL/kg)  0 (0) 108.4 (1)     IV Piggyback  100      Total Intake(mL/kg)  340 (3.1) 568.4 (5.3) 595 (5.5)    Urine (mL/kg/hr)  400 (0.2) 700 (0.3)     Stool   0     Total Output  400 700     Net  -60 -131.6 +595            Urine Unmeasured Occurrence    1 x    Stool Unmeasured Occurrence   1 x           Diet: Regular/House; Fluid Consistency: Thin (IDDSI 0)  ----------------------------------------------------------------------------------------------------------------------  Physical exam:  Constitutional:  Appears older than stated age.   HENT:  Head:  Normocephalic and atraumatic.  Mouth:  Moist mucous membranes.    Eyes:  Conjunctivae and EOM are normal. No scleral icterus.    Neck:  Neck supple.  No JVD present.    Cardiovascular:  Normal rate, regular rhythm and normal heart  "sounds with no murmur.  Pulmonary/Chest:  No respiratory distress, no wheezes, no crackles, with normal breath sounds and good air movement.  Abdominal:  Soft.  Bowel sounds are normal.  No distension and no tenderness.   Musculoskeletal:  No edema, no tenderness, and no deformity.  No red or swollen joints anywhere.    Neurological:  Alert and oriented to person, place, and time.  No cranial nerve deficit.  No tongue deviation.  No facial droop.  No slurred speech.   Skin:  Skin is warm and dry. No rash noted. No pallor.   Peripheral vascular:  Pulses in all 4 extremities with no clubbing, no cyanosis, no edema.  ----------------------------------------------------------------------------------------------------------------------  Tele:    ----------------------------------------------------------------------------------------------------------------------  Results from last 7 days   Lab Units 07/14/24  0050 07/12/24  2339 07/12/24  1722 07/12/24  1433 07/12/24  0652   LACTATE mmol/L  --   --  2.3* 2.4*  --    WBC 10*3/mm3 8.38 9.19  --   --  10.69   HEMOGLOBIN g/dL 7.9* 8.2*  --   --  8.8*   HEMATOCRIT % 28.4* 28.6*  --   --  31.3*   MCV fL 112.3* 109.6*  --   --  112.6*   MCHC g/dL 27.8* 28.7*  --   --  28.1*   PLATELETS 10*3/mm3 119* 125*  --   --  145         Results from last 7 days   Lab Units 07/14/24  0050 07/12/24  2339 07/12/24  0652   SODIUM mmol/L 136 136 136   POTASSIUM mmol/L 4.0 4.1 3.8   MAGNESIUM mg/dL 1.9 1.8  --    CHLORIDE mmol/L 98 97* 95*   CO2 mmol/L 27.1 26.6 26.5   BUN mg/dL 28* 26* 22*   CREATININE mg/dL 1.52* 1.42* 1.47*   CALCIUM mg/dL 8.5* 8.6 9.2   GLUCOSE mg/dL 176* 230* 264*   ALBUMIN g/dL 3.5  --  4.0   BILIRUBIN mg/dL 2.7*  --  5.0*   ALK PHOS U/L 72  --  91   AST (SGOT) U/L 22  --  25   ALT (SGPT) U/L 19  --  26   Estimated Creatinine Clearance: 49.9 mL/min (A) (by C-G formula based on SCr of 1.52 mg/dL (H)).  No results found for: \"AMMONIA\"  Results from last 7 days   Lab Units " "07/12/24  0912 07/12/24  0652   HSTROP T ng/L 81* 90*     Results from last 7 days   Lab Units 07/12/24  0912   PROBNP pg/mL 3,998.0*         Hemoglobin A1C   Date/Time Value Ref Range Status   07/14/2024 0050 6.50 (H) 4.80 - 5.60 % Final     Glucose   Date/Time Value Ref Range Status   07/14/2024 1104 351 (H) 70 - 130 mg/dL Final   07/14/2024 0548 257 (H) 70 - 130 mg/dL Final   07/13/2024 2017 252 (H) 70 - 130 mg/dL Final   07/13/2024 1801 311 (H) 70 - 130 mg/dL Final   07/13/2024 1140 299 (H) 70 - 130 mg/dL Final   07/13/2024 0849 349 (H) 70 - 130 mg/dL Final     Lab Results   Component Value Date    TSH 4.990 (H) 07/12/2024    FREET4 1.30 04/01/2024     No results found for: \"PREGTESTUR\", \"PREGSERUM\", \"HCG\", \"HCGQUANT\"  Pain Management Panel  More data exists         Latest Ref Rng & Units 11/13/2023 9/12/2020   Pain Management Panel   Creatinine, Urine mg/dL  mg/dL - 46.3  46.3    Amphetamine, Urine Qual Negative Negative  -   Barbiturates Screen, Urine Negative Negative  -   Benzodiazepine Screen, Urine Negative Negative  -   Buprenorphine, Screen, Urine Negative Negative  -   Cocaine Screen, Urine Negative Negative  -   Fentanyl, Urine Negative Negative  -   Methadone Screen , Urine Negative Negative  -   Methamphetamine, Ur Negative Negative  -     Brief Urine Lab Results  (Last result in the past 365 days)        Color   Clarity   Blood   Leuk Est   Nitrite   Protein   CREAT   Urine HCG        07/12/24 0757 Dark Yellow   Turbid   Large (3+)   Large (3+)   Positive   30 mg/dL (1+)                 Blood Culture   Date Value Ref Range Status   07/12/2024 No growth at 2 days  Preliminary   07/12/2024 No growth at 2 days  Preliminary     Urine Culture   Date Value Ref Range Status   07/12/2024 >100,000 CFU/mL Klebsiella oxytoca (A)  Final     No results found for: \"WOUNDCX\"  No results found for: \"STOOLCX\"  No results found for: \"RESPCX\"  No results found for: \"AFBCX\"  Results from last 7 days   Lab Units " 07/12/24  1722 07/12/24  1433   LACTATE mmol/L 2.3* 2.4*       I have personally looked at the labs and they are summarized above.  ----------------------------------------------------------------------------------------------------------------------  Detailed radiology reports for the last 24 hours:    Imaging Results (Last 24 Hours)       ** No results found for the last 24 hours. **          Assessment & Plan    #Wide complex tachycardia s/p cardioversion in the field by EMS  #pAfib  #HFpEF, Hx of improved HFrEF  #Hx of CAD  - Digoxin level wnl  - Continue amiodarone as per cardiology   - IV lasix as per cardiology on 7/13. Volume status improved. Will order home PO Bumex. Discussed with Dr. Mejia who recently recommended increasing bumex to 2 mg TID then back to BID. Will follow his plan.    - Monitor renal function and electrolytes   - Monitor on tele      #UTI  - UA and symptoms consistent with UTI. Cystitis also on CT scan.   - Urine growing 100k klebsiella oxytoca. Sensitivities reviewed.   - Continue ceftriaxone. Day 3/5.      #Persistent N/V  - No clear cause on CT. Patient with large known ventral hernia without evidence of incarceration or obstruction   - Possibly from UTI     Chronic medical problems:  CKID IIIa  Chronic anemia  Cirrhosis   DM II- Uncontrolled, add SSI to home regimen. A1C only 6.5%.         Code status: Full      Dispo: Continue PCU level of care       Charles Boland MD  [facility]  07/14/24  14:52 EDT

## 2024-07-15 LAB
ANION GAP SERPL CALCULATED.3IONS-SCNC: 10.6 MMOL/L (ref 5–15)
ANISOCYTOSIS BLD QL: NORMAL
BASOPHILS # BLD AUTO: 0.09 10*3/MM3 (ref 0–0.2)
BASOPHILS NFR BLD AUTO: 1.1 % (ref 0–1.5)
BUN SERPL-MCNC: 26 MG/DL (ref 6–20)
BUN/CREAT SERPL: 18.2 (ref 7–25)
CALCIUM SPEC-SCNC: 8.5 MG/DL (ref 8.6–10.5)
CHLORIDE SERPL-SCNC: 100 MMOL/L (ref 98–107)
CO2 SERPL-SCNC: 28.4 MMOL/L (ref 22–29)
CREAT SERPL-MCNC: 1.43 MG/DL (ref 0.57–1)
DEPRECATED RDW RBC AUTO: 87.6 FL (ref 37–54)
EGFRCR SERPLBLD CKD-EPI 2021: 42.9 ML/MIN/1.73
ELLIPTOCYTES BLD QL SMEAR: NORMAL
EOSINOPHIL # BLD AUTO: 0.16 10*3/MM3 (ref 0–0.4)
EOSINOPHIL NFR BLD AUTO: 2 % (ref 0.3–6.2)
ERYTHROCYTE [DISTWIDTH] IN BLOOD BY AUTOMATED COUNT: 21.9 % (ref 12.3–15.4)
GLUCOSE BLDC GLUCOMTR-MCNC: 246 MG/DL (ref 70–130)
GLUCOSE BLDC GLUCOMTR-MCNC: 247 MG/DL (ref 70–130)
GLUCOSE BLDC GLUCOMTR-MCNC: 268 MG/DL (ref 70–130)
GLUCOSE BLDC GLUCOMTR-MCNC: 331 MG/DL (ref 70–130)
GLUCOSE SERPL-MCNC: 202 MG/DL (ref 65–99)
HCT VFR BLD AUTO: 28.2 % (ref 34–46.6)
HGB BLD-MCNC: 7.9 G/DL (ref 12–15.9)
HYPOCHROMIA BLD QL: NORMAL
IMM GRANULOCYTES # BLD AUTO: 0.06 10*3/MM3 (ref 0–0.05)
IMM GRANULOCYTES NFR BLD AUTO: 0.7 % (ref 0–0.5)
LARGE PLATELETS: NORMAL
LYMPHOCYTES # BLD AUTO: 1.72 10*3/MM3 (ref 0.7–3.1)
LYMPHOCYTES NFR BLD AUTO: 21.1 % (ref 19.6–45.3)
MACROCYTES BLD QL SMEAR: NORMAL
MAGNESIUM SERPL-MCNC: 2 MG/DL (ref 1.6–2.6)
MCH RBC QN AUTO: 31.3 PG (ref 26.6–33)
MCHC RBC AUTO-ENTMCNC: 28 G/DL (ref 31.5–35.7)
MCV RBC AUTO: 111.9 FL (ref 79–97)
MONOCYTES # BLD AUTO: 0.36 10*3/MM3 (ref 0.1–0.9)
MONOCYTES NFR BLD AUTO: 4.4 % (ref 5–12)
NEUTROPHILS NFR BLD AUTO: 5.78 10*3/MM3 (ref 1.7–7)
NEUTROPHILS NFR BLD AUTO: 70.7 % (ref 42.7–76)
NRBC BLD AUTO-RTO: 1 /100 WBC (ref 0–0.2)
PLATELET # BLD AUTO: 120 10*3/MM3 (ref 140–450)
PMV BLD AUTO: 10.8 FL (ref 6–12)
POLYCHROMASIA BLD QL SMEAR: NORMAL
POTASSIUM SERPL-SCNC: 4 MMOL/L (ref 3.5–5.2)
RBC # BLD AUTO: 2.52 10*6/MM3 (ref 3.77–5.28)
SODIUM SERPL-SCNC: 139 MMOL/L (ref 136–145)
STOMATOCYTES BLD QL SMEAR: NORMAL
WBC NRBC COR # BLD AUTO: 8.17 10*3/MM3 (ref 3.4–10.8)

## 2024-07-15 PROCEDURE — 63710000001 INSULIN LISPRO (HUMAN) PER 5 UNITS: Performed by: INTERNAL MEDICINE

## 2024-07-15 PROCEDURE — 85025 COMPLETE CBC W/AUTO DIFF WBC: CPT | Performed by: INTERNAL MEDICINE

## 2024-07-15 PROCEDURE — 82948 REAGENT STRIP/BLOOD GLUCOSE: CPT

## 2024-07-15 PROCEDURE — 99232 SBSQ HOSP IP/OBS MODERATE 35: CPT | Performed by: INTERNAL MEDICINE

## 2024-07-15 PROCEDURE — 83735 ASSAY OF MAGNESIUM: CPT | Performed by: INTERNAL MEDICINE

## 2024-07-15 PROCEDURE — 25010000002 CEFTRIAXONE PER 250 MG: Performed by: INTERNAL MEDICINE

## 2024-07-15 PROCEDURE — 25010000002 PROCHLORPERAZINE 10 MG/2ML SOLUTION: Performed by: INTERNAL MEDICINE

## 2024-07-15 PROCEDURE — 85007 BL SMEAR W/DIFF WBC COUNT: CPT | Performed by: INTERNAL MEDICINE

## 2024-07-15 PROCEDURE — 63710000001 INSULIN GLARGINE PER 5 UNITS: Performed by: INTERNAL MEDICINE

## 2024-07-15 PROCEDURE — 80048 BASIC METABOLIC PNL TOTAL CA: CPT | Performed by: INTERNAL MEDICINE

## 2024-07-15 PROCEDURE — 97162 PT EVAL MOD COMPLEX 30 MIN: CPT

## 2024-07-15 RX ORDER — NITROGLYCERIN 0.4 MG/1
0.4 TABLET SUBLINGUAL
Status: DISCONTINUED | OUTPATIENT
Start: 2024-07-15 | End: 2024-07-16 | Stop reason: HOSPADM

## 2024-07-15 RX ORDER — CARVEDILOL 3.12 MG/1
3.12 TABLET ORAL 2 TIMES DAILY WITH MEALS
Status: DISCONTINUED | OUTPATIENT
Start: 2024-07-15 | End: 2024-07-16 | Stop reason: HOSPADM

## 2024-07-15 RX ORDER — ISOSORBIDE MONONITRATE 30 MG/1
30 TABLET, EXTENDED RELEASE ORAL
Status: DISCONTINUED | OUTPATIENT
Start: 2024-07-15 | End: 2024-07-16 | Stop reason: HOSPADM

## 2024-07-15 RX ORDER — HYDRALAZINE HYDROCHLORIDE 10 MG/1
10 TABLET, FILM COATED ORAL EVERY 8 HOURS SCHEDULED
Status: DISCONTINUED | OUTPATIENT
Start: 2024-07-15 | End: 2024-07-16 | Stop reason: HOSPADM

## 2024-07-15 RX ORDER — AMIODARONE HYDROCHLORIDE 200 MG/1
200 TABLET ORAL
Status: DISCONTINUED | OUTPATIENT
Start: 2024-07-16 | End: 2024-07-16 | Stop reason: HOSPADM

## 2024-07-15 RX ADMIN — APIXABAN 5 MG: 5 TABLET, FILM COATED ORAL at 08:39

## 2024-07-15 RX ADMIN — ISOSORBIDE MONONITRATE 30 MG: 30 TABLET, EXTENDED RELEASE ORAL at 11:10

## 2024-07-15 RX ADMIN — ASPIRIN 81 MG: 81 TABLET, COATED ORAL at 08:39

## 2024-07-15 RX ADMIN — CARVEDILOL 3.12 MG: 3.12 TABLET, FILM COATED ORAL at 18:03

## 2024-07-15 RX ADMIN — INSULIN LISPRO 8 UNITS: 100 INJECTION, SOLUTION INTRAVENOUS; SUBCUTANEOUS at 11:10

## 2024-07-15 RX ADMIN — INSULIN GLARGINE 25 UNITS: 100 INJECTION, SOLUTION SUBCUTANEOUS at 08:35

## 2024-07-15 RX ADMIN — LEVOTHYROXINE SODIUM 50 MCG: 50 TABLET ORAL at 08:44

## 2024-07-15 RX ADMIN — INSULIN LISPRO 8 UNITS: 100 INJECTION, SOLUTION INTRAVENOUS; SUBCUTANEOUS at 18:04

## 2024-07-15 RX ADMIN — HYDROCODONE BITARTRATE AND ACETAMINOPHEN 1 TABLET: 7.5; 325 TABLET ORAL at 21:36

## 2024-07-15 RX ADMIN — APIXABAN 5 MG: 5 TABLET, FILM COATED ORAL at 20:58

## 2024-07-15 RX ADMIN — FERROUS SULFATE TAB 325 MG (65 MG ELEMENTAL FE) 325 MG: 325 (65 FE) TAB at 08:38

## 2024-07-15 RX ADMIN — BUSPIRONE HYDROCHLORIDE 10 MG: 10 TABLET ORAL at 08:39

## 2024-07-15 RX ADMIN — SCOPALAMINE 1 PATCH: 1 PATCH, EXTENDED RELEASE TRANSDERMAL at 18:03

## 2024-07-15 RX ADMIN — AMIODARONE HYDROCHLORIDE 300 MG: 200 TABLET ORAL at 08:39

## 2024-07-15 RX ADMIN — BUSPIRONE HYDROCHLORIDE 10 MG: 10 TABLET ORAL at 20:58

## 2024-07-15 RX ADMIN — BUMETANIDE 2 MG: 1 TABLET ORAL at 16:41

## 2024-07-15 RX ADMIN — Medication 10 ML: at 20:59

## 2024-07-15 RX ADMIN — BUMETANIDE 2 MG: 1 TABLET ORAL at 08:39

## 2024-07-15 RX ADMIN — INSULIN LISPRO 5 UNITS: 100 INJECTION, SOLUTION INTRAVENOUS; SUBCUTANEOUS at 07:06

## 2024-07-15 RX ADMIN — Medication 10 ML: at 08:38

## 2024-07-15 RX ADMIN — LEVOTHYROXINE SODIUM 12.5 MCG: 50 TABLET ORAL at 06:12

## 2024-07-15 RX ADMIN — HYDROCODONE BITARTRATE AND ACETAMINOPHEN 1 TABLET: 7.5; 325 TABLET ORAL at 01:46

## 2024-07-15 RX ADMIN — PANTOPRAZOLE SODIUM 40 MG: 40 TABLET, DELAYED RELEASE ORAL at 06:12

## 2024-07-15 RX ADMIN — INSULIN LISPRO 8 UNITS: 100 INJECTION, SOLUTION INTRAVENOUS; SUBCUTANEOUS at 08:35

## 2024-07-15 RX ADMIN — HYDRALAZINE HYDROCHLORIDE 10 MG: 10 TABLET, FILM COATED ORAL at 13:45

## 2024-07-15 RX ADMIN — Medication 10 ML: at 08:40

## 2024-07-15 RX ADMIN — PROCHLORPERAZINE EDISYLATE 5 MG: 5 INJECTION INTRAMUSCULAR; INTRAVENOUS at 11:13

## 2024-07-15 RX ADMIN — NYSTATIN 1 APPLICATION: 100000 POWDER TOPICAL at 08:44

## 2024-07-15 RX ADMIN — INSULIN GLARGINE 25 UNITS: 100 INJECTION, SOLUTION SUBCUTANEOUS at 20:58

## 2024-07-15 RX ADMIN — FLUTICASONE PROPIONATE 2 SPRAY: 50 SPRAY, METERED NASAL at 08:40

## 2024-07-15 RX ADMIN — INSULIN LISPRO 10 UNITS: 100 INJECTION, SOLUTION INTRAVENOUS; SUBCUTANEOUS at 20:58

## 2024-07-15 RX ADMIN — NYSTATIN 1 APPLICATION: 100000 POWDER TOPICAL at 20:58

## 2024-07-15 RX ADMIN — CEFTRIAXONE 1000 MG: 1 INJECTION, POWDER, FOR SOLUTION INTRAMUSCULAR; INTRAVENOUS at 13:45

## 2024-07-15 NOTE — CASE MANAGEMENT/SOCIAL WORK
Discharge Planning Assessment   Isaías     Patient Name: Yumiko Purdy  MRN: [id number]  Today's Date: 7/15/2024    Admit Date: 7/12/2024    Plan: Pt lives with son and she plans to return home at discharge ([address] [place]). Pt utilizes [facility] for skilled nursing and aide services. [facility] to be contacted at discharge. Pt has O2 @ 3 liters, portable O2 tanks, wheelchair, hospital bed, bedside commode, and shower chair via [employer], and rolling walker via unknown provider. Pt does not have a POA or living will. PCP is Masha Davidson. SS to follow and assist as needed with discharge planning.   Discharge Needs Assessment       Row Name 07/15/24 1610       Living Environment    People in Home child(ferdinand), adult    Name(s) of People in Home Angel Purdy    Primary Care Provided by self;child(ferdinand)    Provides Primary Care For no one    Family Caregiver if Needed child(ferdinand), adult    Quality of Family Relationships unable to assess    Able to Return to Prior Arrangements yes       Transition Planning    Patient/Family Anticipates Transition to home with family    Transportation Anticipated family or friend will provide       Discharge Needs Assessment    Equipment Currently Used at Home oxygen;wheelchair;hospital bed;commode;shower chair;walker, rolling  O2 @ 3 liters, portable O2 tanks, wheelchair, hospital bed, bedside commode, and shower chair via [employer], and rolling walker via unknown provider                   Discharge Plan       Row Name 07/15/24 1612       Plan    Plan Pt lives with son and she plans to return home at discharge ([address] [place]). Pt utilizes [facility] for skilled nursing and aide services. [facility] to be contacted at discharge. Pt has O2 @ 3 liters, portable O2 tanks, wheelchair, hospital bed, bedside commode, and shower chair via [employer], and rolling walker via  unknown provider. Pt does not have a POA or living will. PCP is Masha Davidson. SS to follow and assist as needed with discharge planning.    Patient/Family in Agreement with Plan yes                  Continued Care and Services - Admitted Since 7/12/2024       Home Medical Care       Service Provider Request Status Selected Services Address Phone Fax Patient Preferred    [facility] Pending - No Request Sent N/A [address] [place] [address] [facility] [address] [phone] --                  Expected Discharge Date and Time       Expected Discharge Date Expected Discharge Time    Jul 15, 2024            Demographic Summary       Row Name 07/15/24 1611       General Information    Referral Source nursing    Reason for Consult --  SS received consult for hh services. SS spoke to pt at bedside.             CAPRI Leavitt

## 2024-07-15 NOTE — PROGRESS NOTES
[facility]  PROGRESS NOTE    Patient information:  Name: Yumiko Purdy  Age/Sex: 58 y.o. female  :  1965        PCP: Masha Davidson APRN  Attending: Charles Boland MD  MRN:  [id number]  Visit Number:  [id number]    LOS: 3  CODE STATUS:    Code Status and Medical Interventions:   Ordered at: 24 1528     Code Status (Patient has no pulse and is not breathing):    CPR (Attempt to Resuscitate)     Medical Interventions (Patient has pulse or is breathing):    Full Support       PROBLEM LIST:Principal Problem:    Wide-complex tachycardia    Reason for Cardiology follow-up:  Wide complex tachycardia versus atrial fibrillation with aberrancy     Subjective   ADMISSION INFORMATION:  Chief Complaint   Patient presents with    Chest Pain       DATE:7/15/2024    HPI:  Yumiko Purdy is a 58 y.o. female with a past medical history significant for nonobstructive CAD on left heart cath on 11/10/2020,, dilated cardiomyopathy, CKD stage II, chronic anemia, HFimpEF 66-70%, grade 2 diastolic dysfunction, paroxysmal atrial fibrillation, history of ventricular tachycardia, s/p PVI Oct 2022 with recurrent recurrent and last cardioversion on 2024, diabetes, and s/p right great toe amputation.      Patient presented to [facility] ([facility] emergency room (ER) on 2024 with complaints of increased edema to BLE over the last 3 weeks.    Primary Cardiologist has been BRITTANY Nieves (last visit in office 2024) and Dr. Gutierrez (last seen in office on 2024).    Interval History:   Telemetry reveals SR 60s with a BBB. According to patient's I & O flow chart reveals -729.4 mL overnight. AM labs reveal creatinine slightly improved to 1.43 from 1.52.     Patient was in room  when she was seen and examined.  Patient is lying in bed resting quietly.  No acute distress noted at this time.  Patient currently denies any chest pain, palpitations,  reports her breathing has improved and is back to her baseline.  Patient reports she does wear oxygen pretty much all the time at home.  Patient also reports intolerance to Jardiance secondary to frequent UTIs.     ORDERS:   Decreased PO amiodarone to 200 mg daily  Added hydralazine 10 mg TID and Imdur 30 mg p.o. daily    EVENT TIMELINE:   7/13: ECHO w EF of 46-50%.  Which is a slight decrease when compared to ECHO on 3/28/2024 of 66 to 70%.  please see full report attached below.  7/15: Patient also reports intolerance to Jardiance secondary to frequent UTIs.       Objective     Vital Signs  Temp:  [97.6 °F (36.4 °C)-98.6 °F (37 °C)] 97.9 °F (36.6 °C)  Heart Rate:  [59-72] 64  Resp:  [10-27] 21  BP: ()/() 124/51  Flow (L/min):  [2] 2  Device (Oxygen Therapy): nasal cannula  Vital Signs (last 72 hrs)         07/12 0700  07/13 0659 07/13 0700 07/14 0659 07/14 0700  07/15 0659 07/15 0700  07/15 0806   Most Recent      Temp (°F) 97.6 -  98.6    97.7 -  98.2    97.6 -  98.6       98.6 (37) 07/15 0400    Heart Rate 60 -  75    58 -  75    60 -  72      59     59 07/15 0700    Resp 14 -  23 14 -  28    10 -  27      20     20 07/15 0700    BP 95/45 -  121/62    91/68 -  148/52    93/46 -  128/44      132/56     132/56 07/15 0700    SpO2 (%) 92 -  100    68 -  100    97 -  100      99     99 07/15 0700    Flow (L/min)   3      2      2       2 07/15 0600          BMI:Body mass index is 39.69 kg/m².    WEIGHT:      07/13/24  0400 07/14/24  0400 07/15/24  0400   Weight: 108 kg (238 lb 1.6 oz) 108 kg (238 lb 8.6 oz) 108 kg (238 lb 8.6 oz)       DIET:Diet: Regular/House; Fluid Consistency: Thin (IDDSI 0)    I&O:  Intake & Output (last 3 days)         07/12 0701  07/13 0700 07/13 0701  07/14 0700 07/14 0701  07/15 0700 07/15 0701  07/16 0700    P.O. 240 460 770     I.V. (mL/kg) 0 (0) 108.4 (1) 100.6 (0.9)     IV Piggyback 100       Total Intake(mL/kg) 340 (3.1) 568.4 (5.3) 870.6 (8.1)     Urine (mL/kg/hr) 400  (0.2) 700 (0.3) 1600 (0.6)     Stool  0 0     Total Output      Net -60 -131.6 -729.4             Urine Unmeasured Occurrence   1 x     Stool Unmeasured Occurrence  1 x 0 x              PHYSICAL EXAM:  Vitals reviewed.   Constitutional:       Appearance: Healthy appearance. Not in distress.   Eyes:      Conjunctiva/sclera: Conjunctivae normal.      Pupils: Pupils are equal, round, and reactive to light.   HENT:      Nose: Nose normal.    Mouth/Throat:      Pharynx: Oropharynx is clear.   Neck:      Vascular: JVD normal.   Pulmonary:      Effort: Pulmonary effort is normal.      Comments: Crackles noted bibasilar.  Cardiovascular:      Normal rate. Regular rhythm.      Murmurs: There is no murmur.      No gallop.  No rub.   Pulses:     Intact distal pulses.   Abdominal:      General: Bowel sounds are normal.      Palpations: Abdomen is soft.   Musculoskeletal: Normal range of motion.      Cervical back: Normal range of motion and neck supple.      Comments: Right great toe amputee noted.  Skin:     General: Skin is warm and dry.   Neurological:      General: No focal deficit present.      Mental Status: Alert and oriented to person, place and time.                      Results review   Results Review:    I have reviewed the patient's new clinical results. 07/15/24 09:41 EDT    Results from last 7 days   Lab Units 07/12/24  0912 07/12/24  0652   HSTROP T ng/L 81* 90*     Lab Results   Component Value Date    PROBNP 3,998.0 (H) 07/12/2024    PROBNP 1,803.0 (H) 05/28/2024    PROBNP 1,118.0 (H) 05/23/2024     Results from last 7 days   Lab Units 07/15/24  0011 07/14/24  0050 07/12/24  2339 07/12/24  0652   WBC 10*3/mm3 8.17 8.38 9.19 10.69   HEMOGLOBIN g/dL 7.9* 7.9* 8.2* 8.8*   PLATELETS 10*3/mm3 120* 119* 125* 145     Results from last 7 days   Lab Units 07/15/24  0011 07/14/24  0050 07/12/24 2339 07/12/24  0652   SODIUM mmol/L 139 136 136 136   POTASSIUM mmol/L 4.0 4.0 4.1 3.8   CHLORIDE mmol/L 100 98 97*  95*   CO2 mmol/L 28.4 27.1 26.6 26.5   BUN mg/dL 26* 28* 26* 22*   CREATININE mg/dL 1.43* 1.52* 1.42* 1.47*   CALCIUM mg/dL 8.5* 8.5* 8.6 9.2   GLUCOSE mg/dL 202* 176* 230* 264*   ALT (SGPT) U/L  --  19  --  26   AST (SGOT) U/L  --  22  --  25     Lab Results   Component Value Date    MG 2.0 07/15/2024    MG 1.9 07/14/2024    MG 1.8 07/12/2024     Estimated Creatinine Clearance: 53 mL/min (A) (by C-G formula based on SCr of 1.43 mg/dL (H)).    Lab Results   Component Value Date    HGBA1C 6.50 (H) 07/14/2024    HGBA1C 7.70 (H) 03/26/2024    HGBA1C 7.30 (H) 11/13/2023     Lab Results   Component Value Date    CHOL 137 09/11/2020    TRIG 80 09/11/2020    LDL 78 09/11/2020    HDL 43 09/11/2020     Lab Results   Component Value Date    ABSOLUTELUNG 38 (A) 05/23/2024    ABSOLUTELUNG 43 (A) 04/18/2024    ABSOLUTELUNG 38 (A) 03/28/2024     Lab Results   Component Value Date    INR 1.31 (H) 05/28/2024    INR 0.96 05/10/2024    INR 1.04 05/09/2024    INR 0.97 03/13/2024    INR 1.13 (H) 01/31/2024    INR 0.97 11/15/2023    INR 1.20 (H) 11/12/2023     Lab Results   Component Value Date    LABHEPA <0.10 (L) 11/15/2023    LABHEPA 0.21 (L) 11/15/2023    LABHEPA <0.10 (L) 11/15/2023    LABHEPA 0.31 11/14/2023       Lab Results   Component Value Date    TSH 4.990 (H) 07/12/2024      Pain Management Panel  More data exists         Latest Ref Rng & Units 11/13/2023 9/12/2020   Pain Management Panel   Creatinine, Urine mg/dL  mg/dL - 46.3  46.3    Amphetamine, Urine Qual Negative Negative  -   Barbiturates Screen, Urine Negative Negative  -   Benzodiazepine Screen, Urine Negative Negative  -   Buprenorphine, Screen, Urine Negative Negative  -   Cocaine Screen, Urine Negative Negative  -   Fentanyl, Urine Negative Negative  -   Methadone Screen , Urine Negative Negative  -   Methamphetamine, Ur Negative Negative  -     Microbiology Results (last 10 days)       Procedure Component Value - Date/Time    Blood Culture - Blood, Hand, Left  [[id number]]  (Normal) Collected: 07/12/24 1306    Lab Status: Preliminary result Specimen: Blood from Hand, Left Updated: 07/14/24 1415     Blood Culture No growth at 2 days    Blood Culture - Blood, Hand, Right [[id number]]  (Normal) Collected: 07/12/24 1300    Lab Status: Preliminary result Specimen: Blood from Hand, Right Updated: 07/14/24 1415     Blood Culture No growth at 2 days    Urine Culture - Urine, Urine, Catheter [[id number]]  (Abnormal)  (Susceptibility) Collected: 07/12/24 0757    Lab Status: Final result Specimen: Urine, Catheter Updated: 07/14/24 1021     Urine Culture >100,000 CFU/mL Klebsiella oxytoca    Narrative:      Colonization of the urinary tract without infection is common. Treatment is discouraged unless the patient is symptomatic, pregnant, or undergoing an invasive urologic procedure.      Susceptibility        Klebsiella oxytoca      JANICE      Amoxicillin + Clavulanate Susceptible      Ampicillin Resistant      Ampicillin + Sulbactam Intermediate      Cefazolin Susceptible      Cefepime Susceptible      Ceftazidime Susceptible      Ceftriaxone Susceptible      Gentamicin Susceptible      Levofloxacin Susceptible      Nitrofurantoin Resistant      Piperacillin + Tazobactam Susceptible      Trimethoprim + Sulfamethoxazole Susceptible                                  Imaging Results (Last 24 Hours)       ** No results found for the last 24 hours. **          ECHO:  Results for orders placed during the hospital encounter of 07/12/24    Adult Transthoracic Echo Complete w/ Color, Spectral and Contrast if necessary per protocol    Interpretation Summary    Left ventricular ejection fraction appears to be 46 - 50%.    Mildly reduced right ventricular systolic function noted.    The right ventricular cavity is mild to moderately dilated.    The left atrial cavity is mildly dilated.    The right atrial cavity is mildly  dilated.    There is posterior mitral leaflet thickening present.    Estimated  right ventricular systolic pressure from tricuspid regurgitation is markedly elevated (>55 mmHg). Calculated right ventricular systolic pressure from tricuspid regurgitation is 58 mmHg.    The aortic root measures 3.1 cm.      STRESS TEST:  Results for orders placed during the hospital encounter of 10/15/20    Nuclear Medicine Cardiac Blood Pool [facility] At Rest    Interpretation Summary  EXAMINATION: NUCLEAR MEDICINE CARDIAC BLOOD POOL MUGA AT REST-    CLINICAL INDICATION: Cardiomyopathy  TECHNIQUE: 21 mCi of Tc-99m autologous RBCs were injected IV after  in-vitro RBC labeling. Gated cardiac imaging was performed in the  anterior and left anterior oblique.  COMPARISON: None  FINDINGS: The left ventricle is normal in size with normal systolic  function. The calculated left ventricular ejection fraction (LVEF) = 38  %.  The right and left atria, aorta and pulmonary artery are normal. The  right ventricle is normal in size.    Impression  LVEF: 38%, at rest.    This report was finalized on 10/16/2020 11:57 AM by Dr. Marlo Parr MD.       HEART CATH:  Results for orders placed during the hospital encounter of 11/10/20    Cardiac Catheterization/Vascular Study    Narrative  Procedure type:  Left heart cath, LV gram, bilateral selective cholangiogram    Indication:  Cardiomyopathy    Procedure:  After informed consent the patient was brought into the cardiac aspiration lab she was prepped and draped in the usual sterile manner the right radial area was incised with 2% Xylocaine however several attempts to engage into the right radial artery was not successful so the right radial artery access was abandoned and the right groin area was excised in 2% Xylocaine right femoral artery was accessed using modified standard technique and 5 Lithuanian side-port arterial sheath was placed and secured then over a guidewire a 5 Lithuanian JL 4 catheter was used left main coronary artery with angiographic pressures were taken then the  catheter was removed and over a guidewire a 5 Kuwaiti JR4 catheter was used and engagement of the right coronary arteries angioplasty was taken then the catheter was removed then over a guidewire 5 [place]i pigtail catheter used aortic valve was done hand-injection LV gram was done then pullback was done then the catheter was removed groin sheath was removed and minx closure device applied with good hemostasis the patient was transported back to her room in stable condition.    Results:  The patient LVEDP was 17 mmHg with hand-injection showing preserved left ventricular systolic function wall motion EF 50-55% with no significant aortic gradient on pullback.    Cholangiogram:  This right dominant system.  Left main cardiac angiographically normal and bifurcates into left and descending and left circumflex arteries.  LAD was normal caliber gives several septal perforators and diagonal branches and wraps around the apex LAD about 30 to 40% mid stenosis.  Left circumflex artery was nondominant for posterior circulation gives rise to several obtuse marginal branches left circumflex arteries branches angiographically normal.  The right coronary artery was a large artery was dominant for posterior circulation giving rise to acute marginal branches PDA and posterolateral branches the right coronary artery and its branches angiographically normal.    Conclusion:  Preserved LV systolic function ejection fraction 50-55% with elevated left ventricular end-diastolic pressure consistent with diastolic dysfunction coronary angiogram showed right dominant system with 30 to 40% mid LAD lesion otherwise coronaries arteries were normal.  Plan of care:  Medical management and secondary preventive measurements of coronary disease good lipid control blood pressure control healthy lifestyle patient is to follow-up with her primary care physician for further management.        TELEMETRY:      SR 60s with a BBB        I reviewed the patient's  new clinical results.    ALLERGIES: Phenergan [promethazine]    Medication Review:   Current list of medications may not reflect those currently placed in orders that are not signed or are being held.     [START ON 7/16/2024] amiodarone, 200 mg, Oral, Q24H  apixaban, 5 mg, Oral, Q12H  aspirin, 81 mg, Oral, Daily  bumetanide, 2 mg, Oral, TID  busPIRone, 10 mg, Oral, BID  cefTRIAXone, 1,000 mg, Intravenous, Q24H  ferrous sulfate, 325 mg, Oral, Daily  fluticasone, 2 spray, Nasal, Daily  hydrALAZINE, 10 mg, Oral, Q8H  insulin glargine, 25 Units, Subcutaneous, Q12H  insulin lispro, 3-14 Units, Subcutaneous, 4x Daily AC & at Bedtime  insulin lispro, 8 Units, Subcutaneous, TID With Meals  isosorbide mononitrate, 30 mg, Oral, Q24H  levothyroxine, 12.5 mcg, Oral, Q AM  levothyroxine, 50 mcg, Oral, Daily  nystatin, 1 Application, Topical, Q12H  pantoprazole, 40 mg, Oral, Q AM  Scopolamine, 1 patch, Transdermal, Q72H  sodium chloride, 10 mL, Intravenous, Q12H  sodium chloride, 10 mL, Intravenous, Q12H  Vortioxetine HBr, 5 mg, Oral, Daily With Breakfast           senna-docusate sodium **AND** polyethylene glycol **AND** bisacodyl **AND** bisacodyl    Calcium Replacement - Follow Nurse / BPA Driven Protocol    dextrose    dextrose    glucagon (human recombinant)    HYDROcodone-acetaminophen    Magnesium Standard Dose Replacement - Follow Nurse / BPA Driven Protocol    nitroglycerin    ondansetron ODT    Phosphorus Replacement - Follow Nurse / BPA Driven Protocol    Potassium Replacement - Follow Nurse / BPA Driven Protocol    prochlorperazine    sodium chloride    sodium chloride    sodium chloride    sodium chloride    sodium chloride    Assessment      Acute on chronic HFrEF, improving  Paroxysmal atrial flutter/fibrillation, status post electrocardioversion by EMS, maintaining sinus rhythm.  Nonobstructive CAD.  Chronic kidney disease (stage IIIa)    Recommendations / Plan     Will decrease the dose of amiodarone to 200 mg a  day  I with regards to Bumex, apparently nephrologist have increased the dose to 3 times a day.  Will leave the dosing schedule to the nephrology and may be cut back to twice daily whenever it is okay with them.  She is not a good candidate for ACE inhibitor/ARB/ARNI at this time due to her chronic kidney disease.  Will consider this later on if and when her kidney function improves adequately  For now continue with hydralazine and oral nitrates and add a low-dose beta-blocker for heart rate stays in  at least mid to upper 60s.  Would like to start her on SGLT2 inhibitor (Jardiance) but patient clearly states that she has had problems with this having recurrent urinary tract infections when she took it before.  Hence we will not consider this at this time.      I have discussed the patients findings and recommendations with the patient.    Thank you very much for asking us to be involved in this patient's care.  We will follow along with you.    Electronically signed by KENNY Finn, 07/15/24, 9:42 AM EDT.     Electronically signed by Ramon Sahu MD, 07/15/24, 2:16 PM EDT.          Please note that portions of this note were completed with a voice recognition program.    Please note that portions of this note were copied and has been reviewed and is accurate as of 7/15/2024 .

## 2024-07-15 NOTE — THERAPY EVALUATION
Acute Care - Physical Therapy Initial Evaluation   Isaías     Patient Name: Yumiko Purdy  : 1965  MRN: [id number]  Today's Date: 7/15/2024   Onset of Illness/Injury or Date of Surgery: 24  Visit Dx:     ICD-10-CM ICD-9-CM   1. Urinary tract infection without hematuria, site unspecified  N39.0 599.0   2. Chronic kidney disease, unspecified CKD stage  N18.9 585.9   3. Acute on chronic congestive heart failure, unspecified heart failure type  I50.9 428.0     Patient Active Problem List   Diagnosis    Dilated cardiomyopathy    CKD (chronic kidney disease) stage 2, GFR 60-89 ml/min    Severe obesity (BMI 35.0-39.9) with comorbidity    Chronic anemia    Elevated bilirubin    Acute on chronic combined systolic and diastolic CHF (congestive heart failure)    Hyponatremia    Paroxysmal atrial fibrillation    Cirrhosis    Coronary artery disease involving native coronary artery of native heart without angina pectoris    Atrial fibrillation with RVR    Ventricular tachycardia    Wide-complex tachycardia     Past Medical History:   Diagnosis Date    Anemia     CHF (congestive heart failure)     Chronic a-fib     stated by patient     Cirrhosis of liver     stated by patient     Diabetes mellitus     Ventricular tachycardia      Past Surgical History:   Procedure Laterality Date    APPENDECTOMY      CARDIAC CATHETERIZATION N/A 11/10/2020    Procedure: Left Heart Cath;  Surgeon: Charlee Ken MD;  Location: [facility] INVASIVE LOCATION;  Service: Cardiovascular;  Laterality: N/A;    CHOLECYSTECTOMY      TOE AMPUTATION Right     stated by patient.      PT Assessment (Last 12 Hours)       PT Evaluation and Treatment       Row Name 07/15/24 1352          Physical Therapy Time and Intention    Document Type evaluation  -KP     Mode of Treatment physical therapy  -KP     Patient Effort good  -KP       Row Name 07/15/24 9209          General Information    Patient Profile Reviewed yes  -KP     Onset of  Illness/Injury or Date of Surgery 07/12/24  -     Referring Physician Kya  -     Patient Observations alert;cooperative;agree to therapy  -     General Observations of Patient Nursing reports patient was just sitting up in chair for a few hours prior to therapy arrival.  She had just been assisted back into bed.  -     Prior Level of Function independent:;min assist:;all household mobility;community mobility;transfer;gait;bed mobility  -     Equipment Currently Used at Home commode, bedside;hospital bed;oxygen;walker, rolling;wheelchair  -     Existing Precautions/Restrictions fall;oxygen therapy device and L/min  -       Row Name 07/15/24 1357          Previous Level of Function/Home Environm    Bed Mobility, Premorbid Functional Level independent  -     Transfers, Premorbid Functional Level independent;partial assistance  -     Household Ambulation, Premorbid Functional Level independent;partial assistance;uses device or equipment  -     Community Ambulation, Premorbid Functional Level uses wheelchair  -     Previous Level of Function, Premorbid Pt reports she was anywhere from SBA to Linda at home.  Her son is home all the time and can assist her as needed.  She reports no falls at home.  She uses a w/c for longer distances.  She has home health nursing and used to have home health therapy.  -       Row Name 07/15/24 1354          Living Environment    Current Living Arrangements home  -     People in Home child(ferdinand), adult  -     Primary Care Provided by self;child(ferdinand)  -       Row Name 07/15/24 1354          Home Use of Assistive/Adaptive Equipment    Equipment Currently Used at Home wheelchair;walker, standard;pulse ox;oxygen  -       Row Name 07/15/24 1350          Pain    Pre/Posttreatment Pain Comment Pt reports chronic pain in hands, legs and feet from arthritis and diabetes.  She did not rate pain.  -       Row Name 07/15/24 1356          Cognition    Affect/Mental  Status (Cognition) [facility]     Orientation Status (Cognition) oriented x 4  -     Follows Commands (Cognition) [facility]     Personal Safety Interventions fall prevention program maintained;gait belt;muscle strengthening facilitated;nonskid shoes/slippers when out of bed;supervised activity  -       Row Name 07/15/24 1353          Range of Motion (ROM)    Range of Motion ROM is [facility] Name 07/15/24 1353          Strength Comprehensive (MMT)    Comment, General Manual Muscle Testing (MMT) Assessment Strength in LE grossly 3/5  -[facility] Name 07/15/24 1359          Sensory Assessment (Somatosensory)    Sensory Assessment Pt reports decreased sensation in bilateral LE from neuropathy.  -[facility] Name 07/15/24 1351          Bed Mobility    Bed Mobility supine-sit;sit-supine;rolling left;rolling right;scooting/bridging  -     Rolling Left [place] (Bed Mobility) minimum assist (75% patient effort);1 person assist  -     Rolling Right [place] (Bed Mobility) minimum assist (75% patient effort);1 person assist  -     Scooting/Bridging [place] (Bed Mobility) 1 person assist;moderate assist (50% patient effort);verbal cues  -     Supine-Sit [place] (Bed Mobility) minimum assist (75% patient effort);1 person assist;verbal cues  [facility]     Sit-Supine [place] (Bed Mobility) moderate assist (50% patient effort);1 person assist;verbal cues  -     Bed Mobility, Safety Issues decreased use of arms for pushing/pulling;decreased use of legs for bridging/pushing  [facility]     Assistive Device (Bed Mobility) draw sheet;bed rails;head of bed elevated  -KP       Row Name 07/15/24 1354          Transfers    Transfers sit-stand transfer;stand-sit transfer  -KP       Row Name 07/15/24 1359          Sit-Stand Transfer    Sit-Stand [place] (Transfers) moderate assist (50% patient effort);1 person assist;verbal cues  [facility]     Assistive Device (Sit-Stand Transfers) walker, front-wheeled  -        Row Name 07/15/24 9963          Stand-Sit Transfer    Stand-Sit [place] (Transfers) minimum assist (75% patient effort);moderate assist (50% patient effort);1 person assist;verbal cues  -     Assistive Device (Stand-Sit Transfers) walker, front-wheeled  -       Row Name 07/15/24 1548          Gait/Stairs (Locomotion)    Comment, (Gait/Stairs) Pt performed side steps with FWW towards head of bed.  She required CGA/Linda for balance with FWW during stepping.  -       Row Name 07/15/24 2327          Plan of Care Review    Plan of Care Reviewed With patient  -     Outcome Evaluation PT evaluation completed.  Patient required minimum to moderate assist for mobility and was able to perform stepping with FWW.  She presents with swelling in LE, decreased strength and endurance.  She will benefit from continued skilled PT services this admission.  -[facility] Name 07/15/24 4046          Positioning and Restraints    Pre-Treatment Position in bed  -     Post Treatment Position bed  -     In Bed notified nsg;fowlers;call light within reach;encouraged to call for assist;exit alarm on;side rails up x3  Lines in tact and needs in reach  -[facility] Name 07/15/24 6749          Therapy Assessment/Plan (PT)    Patient/Family Therapy Goals Statement (PT) Return home  -     Functional Level at Time of Evaluation (PT) minimum to moderate assist  -     PT Diagnosis (PT) Complex tachycardia  -     Rehab Potential (PT) good, to achieve stated therapy goals  -     Criteria for Skilled Interventions Met (PT) yes;meets criteria;skilled treatment is necessary  -     Therapy Frequency (PT) other (see comments)  2-5x/wk  -     Problem List (PT) problems related to;balance;mobility;strength  -     Activity Limitations Related to Problem List (PT) unable to transfer safely;unable to ambulate safely  -       Row Name 07/15/24 3634          PT Evaluation Complexity    History, PT Evaluation Complexity 3 or  more personal factors and/or comorbidities  -     Examination of Body Systems (PT Eval Complexity) total of 3 or more elements  -KP     Clinical Presentation (PT Evaluation Complexity) evolving  -KP     Clinical Decision Making (PT Evaluation Complexity) moderate complexity  -KP     Overall Complexity (PT Evaluation Complexity) moderate complexity  -       Row Name 07/15/24 1355          Physical Therapy Goals    Bed Mobility Goal Selection (PT) bed mobility, PT goal 1  -KP     Transfer Goal Selection (PT) transfer, PT goal 1  -     Gait Training Goal Selection (PT) gait training, PT goal 1  -       Row Name 07/15/24 1350          Bed Mobility Goal 1 (PT)    Activity/Assistive Device (Bed Mobility Goal 1, PT) sit to supine/supine to sit  -KP     [place] Level/Cues Needed (Bed Mobility Goal 1, PT) supervision required  -KP     Time Frame (Bed Mobility Goal 1, PT) by discharge;long term goal (LTG)  -       Row Name 07/15/24 4698          Transfer Goal 1 (PT)    Activity/Assistive Device (Transfer Goal 1, PT) sit-to-stand/stand-to-sit;walker, rolling  -KP     [place] Level/Cues Needed (Transfer Goal 1, PT) standby assist  -KP     Time Frame (Transfer Goal 1, PT) long term goal (LTG);by discharge  -       Row Name 07/15/24 3054          Gait Training Goal 1 (PT)    Activity/Assistive Device (Gait Training Goal 1, PT) gait (walking locomotion);assistive device use;walker, rolling  -KP     [place] Level (Gait Training Goal 1, PT) standby assist  -KP     Distance (Gait Training Goal 1, PT) 50ft  -KP     Time Frame (Gait Training Goal 1, PT) long term goal (LTG);by discharge  -               User Key  (r) = Recorded By, (t) = Taken By, (c) = Cosigned By      Initials Name Provider Type    Ave Greene, PT Physical Therapist                      PT Recommendation and Plan  Planned Therapy Interventions (PT): balance training, bed mobility training, gait training, home exercise program, motor  coordination training, neuromuscular re-education, patient/family education, postural re-education, stair training, strengthening, stretching, manual therapy techniques, transfer training, wheelchair management/propulsion training  Therapy Frequency (PT): other (see comments) (2-5x/wk)  Plan of Care Reviewed With: patient  Outcome Evaluation: PT evaluation completed.  Patient required minimum to moderate assist for mobility and was able to perform stepping with FWW.  She presents with swelling in LE, decreased strength and endurance.  She will benefit from continued skilled PT services this admission.       Time Calculation:    PT Charges       Row Name 07/15/24 1423             Time Calculation    PT Received On 07/15/24  -      PT Goal Re-Cert Due Date 07/29/24  -                User Key  (r) = Recorded By, (t) = Taken By, (c) = Cosigned By      Initials Name Provider Type    Ave Greene PT Physical Therapist                  Therapy Charges for Today       Code Description Service Date Service Provider Modifiers Qty    09937805047 HC PT EVAL MOD COMPLEXITY 4 7/15/2024 Ave Haynes, PT GP 1            PT G-Codes  AM-PAC 6 Clicks Score (PT): 15    Ave Haynes PT  7/15/2024

## 2024-07-15 NOTE — PROGRESS NOTES
[facility] HOSPITALIST PROGRESS NOTE     Patient Identification:  Name:  Yumiko Purdy  Age:  58 y.o.  Sex:  female  :  1965  MRN:  [id number]  Visit Number:  [id number]  ROOM: [address]     Primary Care Provider:  Masha Davidson APRN    Length of stay in inpatient status:  3    Subjective     Chief Compliant:    Chief Complaint   Patient presents with    Chest Pain       History of Presenting Illness:    Patient reports feeling better. Denies any new complaints. No further arrhythmias. Getting up to bedside chair this morning and awaiting PT/OT to evaluate patient.     ROS:  Otherwise 10 point ROS negative other than documented above in HPI.     Objective     Current Hospital Meds:[START ON 2024] amiodarone, 200 mg, Oral, Q24H  apixaban, 5 mg, Oral, Q12H  aspirin, 81 mg, Oral, Daily  bumetanide, 2 mg, Oral, TID  busPIRone, 10 mg, Oral, BID  cefTRIAXone, 1,000 mg, Intravenous, Q24H  ferrous sulfate, 325 mg, Oral, Daily  fluticasone, 2 spray, Nasal, Daily  hydrALAZINE, 10 mg, Oral, Q8H  insulin glargine, 25 Units, Subcutaneous, Q12H  insulin lispro, 3-14 Units, Subcutaneous, 4x Daily AC & at Bedtime  insulin lispro, 8 Units, Subcutaneous, TID With Meals  isosorbide mononitrate, 30 mg, Oral, Q24H  levothyroxine, 12.5 mcg, Oral, Q AM  levothyroxine, 50 mcg, Oral, Daily  nystatin, 1 Application, Topical, Q12H  pantoprazole, 40 mg, Oral, Q AM  Scopolamine, 1 patch, Transdermal, Q72H  sodium chloride, 10 mL, Intravenous, Q12H  sodium chloride, 10 mL, Intravenous, Q12H  Vortioxetine HBr, 5 mg, Oral, Daily With Breakfast         Current Antimicrobial Therapy:  Anti-Infectives (From admission, onward)      Ordered     Dose/Rate Route Frequency Start Stop    24 1617  cefTRIAXone (ROCEPHIN) 1,000 mg in sodium chloride 0.9 % 100 mL IVPB-VTB        Ordering Provider: Charles Boland MD    1,000 mg  200 mL/hr over 30 Minutes Intravenous Every 24 Hours 24 1300 24 1259     07/12/24 1236  cefTRIAXone (ROCEPHIN) 2,000 mg in sodium chloride 0.9 % 100 mL IVPB-VTB        Ordering Provider: Sam Desai MD    2,000 mg  200 mL/hr over 30 Minutes Intravenous Once 07/12/24 1252 07/12/24 1400          Current Diuretic Therapy:  Diuretics (From admission, onward)      Ordered     Dose/Rate Route Frequency Start Stop    07/14/24 1643  bumetanide (BUMEX) tablet 2 mg        Ordering Provider: Charles Boland MD    2 mg Oral 3 Times Daily 07/14/24 1730 07/19/24 1559    07/13/24 1649  furosemide (LASIX) injection 40 mg        Ordering Provider: Adonay Castellanos PA-C    40 mg Intravenous Once 07/13/24 1745 07/13/24 1757    07/12/24 1451  bumetanide (BUMEX) injection 2 mg        Ordering Provider: Sam Desai MD    2 mg Intravenous Once 07/12/24 1506 07/12/24 1505          ----------------------------------------------------------------------------------------------------------------------  Vital Signs:  Temp:  [97.6 °F (36.4 °C)-98.6 °F (37 °C)] 97.9 °F (36.6 °C)  Heart Rate:  [59-72] 64  Resp:  [10-27] 21  BP: ()/() 124/51  SpO2:  [97 %-100 %] 100 %  on  Flow (L/min):  [2] 2;   Device (Oxygen Therapy): nasal cannula  Body mass index is 39.69 kg/m².    Wt Readings from Last 3 Encounters:   07/15/24 108 kg (238 lb 8.6 oz)   06/27/24 102 kg (225 lb)   06/21/24 102 kg (225 lb)     Intake & Output (last 3 days)         07/12 0701  07/13 0700 07/13 0701  07/14 0700 07/14 0701  07/15 0700 07/15 0701  07/16 0700    P.O. 240 460 770 354    I.V. (mL/kg) 0 (0) 108.4 (1) 100.6 (0.9)     IV Piggyback 100       Total Intake(mL/kg) 340 (3.1) 568.4 (5.3) 870.6 (8.1) 354 (3.3)    Urine (mL/kg/hr) 400 (0.2) 700 (0.3) 1600 (0.6)     Stool  0 0     Total Output      Net -60 -131.6 -729.4 +354            Urine Unmeasured Occurrence   1 x     Stool Unmeasured Occurrence  1 x 0 x           Diet: Regular/House; Fluid Consistency: Thin (IDDSI  0)  ----------------------------------------------------------------------------------------------------------------------  Physical exam:  Constitutional:  Appears older than stated age.   HENT:  Head:  Normocephalic and atraumatic.  Mouth:  Moist mucous membranes.    Eyes:  Conjunctivae and EOM are normal. No scleral icterus.    Neck:  Neck supple.  No JVD present.    Cardiovascular:  Normal rate, regular rhythm and normal heart sounds with no murmur.  Pulmonary/Chest:  No respiratory distress, no wheezes, no crackles, with normal breath sounds and good air movement.  Abdominal:  Soft.  Bowel sounds are normal.  No distension and no tenderness.   Musculoskeletal:  No edema, no tenderness, and no deformity.  No red or swollen joints anywhere.    Neurological:  Alert and oriented to person, place, and time.  No cranial nerve deficit.  No tongue deviation.  No facial droop.  No slurred speech.   Skin:  Skin is warm and dry. No rash noted. No pallor.   Peripheral vascular:  Pulses in all 4 extremities with no clubbing, no cyanosis, no edema.  ----------------------------------------------------------------------------------------------------------------------  Tele:    ----------------------------------------------------------------------------------------------------------------------  Results from last 7 days   Lab Units 07/15/24  0011 07/14/24 0050 07/12/24  2339 07/12/24  1722 07/12/24  1433   LACTATE mmol/L  --   --   --  2.3* 2.4*   WBC 10*3/mm3 8.17 8.38 9.19  --   --    HEMOGLOBIN g/dL 7.9* 7.9* 8.2*  --   --    HEMATOCRIT % 28.2* 28.4* 28.6*  --   --    MCV fL 111.9* 112.3* 109.6*  --   --    MCHC g/dL 28.0* 27.8* 28.7*  --   --    PLATELETS 10*3/mm3 120* 119* 125*  --   --          Results from last 7 days   Lab Units 07/15/24  0011 07/14/24 0050 07/12/24  2339 07/12/24  0652   SODIUM mmol/L 139 136 136 136   POTASSIUM mmol/L 4.0 4.0 4.1 3.8   MAGNESIUM mg/dL 2.0 1.9 1.8  --    CHLORIDE mmol/L 100 98 97*  "95*   CO2 mmol/L 28.4 27.1 26.6 26.5   BUN mg/dL 26* 28* 26* 22*   CREATININE mg/dL 1.43* 1.52* 1.42* 1.47*   CALCIUM mg/dL 8.5* 8.5* 8.6 9.2   GLUCOSE mg/dL 202* 176* 230* 264*   ALBUMIN g/dL  --  3.5  --  4.0   BILIRUBIN mg/dL  --  2.7*  --  5.0*   ALK PHOS U/L  --  72  --  91   AST (SGOT) U/L  --  22  --  25   ALT (SGPT) U/L  --  19  --  26   Estimated Creatinine Clearance: 53 mL/min (A) (by C-G formula based on SCr of 1.43 mg/dL (H)).  No results found for: \"AMMONIA\"  Results from last 7 days   Lab Units 07/12/24  0912 07/12/24  0652   HSTROP T ng/L 81* 90*     Results from last 7 days   Lab Units 07/12/24  0912   PROBNP pg/mL 3,998.0*         Hemoglobin A1C   Date/Time Value Ref Range Status   07/14/2024 0050 6.50 (H) 4.80 - 5.60 % Final     Glucose   Date/Time Value Ref Range Status   07/15/2024 0614 247 (H) 70 - 130 mg/dL Final   07/14/2024 2038 232 (H) 70 - 130 mg/dL Final   07/14/2024 1701 267 (H) 70 - 130 mg/dL Final   07/14/2024 1104 351 (H) 70 - 130 mg/dL Final   07/14/2024 0548 257 (H) 70 - 130 mg/dL Final   07/13/2024 2017 252 (H) 70 - 130 mg/dL Final   07/13/2024 1801 311 (H) 70 - 130 mg/dL Final   07/13/2024 1140 299 (H) 70 - 130 mg/dL Final     Lab Results   Component Value Date    TSH 4.990 (H) 07/12/2024    FREET4 1.30 04/01/2024     No results found for: \"PREGTESTUR\", \"PREGSERUM\", \"HCG\", \"HCGQUANT\"  Pain Management Panel  More data exists         Latest Ref Rng & Units 11/13/2023 9/12/2020   Pain Management Panel   Creatinine, Urine mg/dL  mg/dL - 46.3  46.3    Amphetamine, Urine Qual Negative Negative  -   Barbiturates Screen, Urine Negative Negative  -   Benzodiazepine Screen, Urine Negative Negative  -   Buprenorphine, Screen, Urine Negative Negative  -   Cocaine Screen, Urine Negative Negative  -   Fentanyl, Urine Negative Negative  -   Methadone Screen , Urine Negative Negative  -   Methamphetamine, Ur Negative Negative  -     Brief Urine Lab Results  (Last result in the past 365 days)       " " Color   Clarity   Blood   Leuk Est   Nitrite   Protein   CREAT   Urine HCG        07/12/24 0757 Dark Yellow   Turbid   Large (3+)   Large (3+)   Positive   30 mg/dL (1+)                 Blood Culture   Date Value Ref Range Status   07/12/2024 No growth at 2 days  Preliminary   07/12/2024 No growth at 2 days  Preliminary     Urine Culture   Date Value Ref Range Status   07/12/2024 >100,000 CFU/mL Klebsiella oxytoca (A)  Final     No results found for: \"WOUNDCX\"  No results found for: \"STOOLCX\"  No results found for: \"RESPCX\"  No results found for: \"AFBCX\"  Results from last 7 days   Lab Units 07/12/24  1722 07/12/24  1433   LACTATE mmol/L 2.3* 2.4*       I have personally looked at the labs and they are summarized above.  ----------------------------------------------------------------------------------------------------------------------  Detailed radiology reports for the last 24 hours:    Imaging Results (Last 24 Hours)       ** No results found for the last 24 hours. **          Assessment & Plan    #Wide complex tachycardia s/p cardioversion in the field by EMS  #pAfib  #HFpEF, Hx of improved HFrEF  #Hx of CAD  - Digoxin level wnl  - Continue amiodarone as per cardiology   - IV lasix as per cardiology on 7/13. Volume status improved. Will order home PO Bumex. Discussed with Dr. Mejia who recently recommended increasing bumex to 2 mg TID then back to BID. Will follow his plan.    - Cr stable at 1.43.   - Monitor renal function and electrolytes   - Monitor on tele      #UTI  - UA and symptoms consistent with UTI. Cystitis also on CT scan.   - Urine growing 100k klebsiella oxytoca. Sensitivities reviewed.   - Continue ceftriaxone. Day 4/5.      #Persistent N/V  - No clear cause on CT. Patient with large known ventral hernia without evidence of incarceration or obstruction   - Possibly from UTI     Chronic medical problems:  CKID IIIa  Chronic anemia  Cirrhosis   DM II- Uncontrolled, add SSI to home regimen. A1C " only 6.5%.         Code status: Full      Dispo: Given improvement. Transfer to tele. Lives with son. Depending on PT/OT evaluation, may be able to go home tomorrow.        Charles Boland MD  [facility]  07/15/24  10:32 EDT

## 2024-07-15 NOTE — CONSULTS
"Diabetes Education  Assessment/Teaching    Patient Name:  Yumiko Purdy  YOB: 1965  MRN: [id number]  Admit Date:  7/12/2024      Assessment Date:  7/15/2024  Flowsheet Row Most Recent Value   General Information     Height 165.1 cm (65\")   Height Method Estimated   Weight 108 kg (238 lb 8.6 oz)   Weight Method Bed scale   Pregnancy Assessment    Diabetes History    Education Preferences    Nutrition Information    Assessment Topics    DM Goals             Flowsheet Row Most Recent Value   DM Education Needs    Meter Has own   Frequency of Testing 3 times a day   Medication Insulin   Problem Solving Hypoglycemia, Hyperglycemia, Sick days, Signs, Symptoms, Treatment   Reducing Risks Cardiovascular, Immunizations, Foot care, Dental exam, Eye exam, Blood pressure, Lipids, A1C testing, Neuropathy, Retinopathy   Healthy Eating Basic meal plan provided   Physical Activity Walking   Physical Activity Frequency Regularly   Healthy Coping Appropriate   Discharge Plan Follow-up with PCP   Motivation Moderate   Teaching Method Discussion, Explanation, Handouts   Patient Response Verbalized understanding              Other Comments:  A1C 6.5 Patient was educated and received AADE7 and ADA handouts on diet, activity, checking blood glucose, taking medication as prescribed, checking feet daily and S/S of hypo/hyperglycemia. Patient was educated on sick rule days. Patient had no questions or concerns. Please re-consult or call for concerns or questions. Thank you.        Electronically signed by:  Ida Santamaria RN  07/15/24 10:31 EDT  "

## 2024-07-16 ENCOUNTER — READMISSION MANAGEMENT (OUTPATIENT)
Dept: CALL CENTER | Facility: HOSPITAL | Age: 59
End: 2024-07-16
Payer: COMMERCIAL

## 2024-07-16 VITALS
SYSTOLIC BLOOD PRESSURE: 111 MMHG | HEART RATE: 62 BPM | BODY MASS INDEX: 40.59 KG/M2 | RESPIRATION RATE: 24 BRPM | OXYGEN SATURATION: 99 % | HEIGHT: 65 IN | WEIGHT: 243.61 LBS | TEMPERATURE: 98 F | DIASTOLIC BLOOD PRESSURE: 49 MMHG

## 2024-07-16 PROBLEM — R00.0 WIDE-COMPLEX TACHYCARDIA: Status: RESOLVED | Noted: 2024-07-12 | Resolved: 2024-07-16

## 2024-07-16 LAB
ANION GAP SERPL CALCULATED.3IONS-SCNC: 11.3 MMOL/L (ref 5–15)
BUN SERPL-MCNC: 24 MG/DL (ref 6–20)
BUN/CREAT SERPL: 17.4 (ref 7–25)
CALCIUM SPEC-SCNC: 8.2 MG/DL (ref 8.6–10.5)
CHLORIDE SERPL-SCNC: 97 MMOL/L (ref 98–107)
CO2 SERPL-SCNC: 27.7 MMOL/L (ref 22–29)
CREAT SERPL-MCNC: 1.38 MG/DL (ref 0.57–1)
EGFRCR SERPLBLD CKD-EPI 2021: 44.7 ML/MIN/1.73
GLUCOSE BLDC GLUCOMTR-MCNC: 359 MG/DL (ref 70–130)
GLUCOSE BLDC GLUCOMTR-MCNC: 361 MG/DL (ref 70–130)
GLUCOSE SERPL-MCNC: 305 MG/DL (ref 65–99)
MAGNESIUM SERPL-MCNC: 1.9 MG/DL (ref 1.6–2.6)
POTASSIUM SERPL-SCNC: 4.2 MMOL/L (ref 3.5–5.2)
SODIUM SERPL-SCNC: 136 MMOL/L (ref 136–145)

## 2024-07-16 PROCEDURE — 82948 REAGENT STRIP/BLOOD GLUCOSE: CPT

## 2024-07-16 PROCEDURE — 99239 HOSP IP/OBS DSCHRG MGMT >30: CPT | Performed by: INTERNAL MEDICINE

## 2024-07-16 PROCEDURE — 63710000001 INSULIN LISPRO (HUMAN) PER 5 UNITS: Performed by: INTERNAL MEDICINE

## 2024-07-16 PROCEDURE — 83735 ASSAY OF MAGNESIUM: CPT | Performed by: INTERNAL MEDICINE

## 2024-07-16 PROCEDURE — 63710000001 INSULIN GLARGINE PER 5 UNITS: Performed by: INTERNAL MEDICINE

## 2024-07-16 PROCEDURE — 63710000001 ONDANSETRON ODT 4 MG TABLET DISPERSIBLE: Performed by: INTERNAL MEDICINE

## 2024-07-16 PROCEDURE — 80048 BASIC METABOLIC PNL TOTAL CA: CPT | Performed by: INTERNAL MEDICINE

## 2024-07-16 RX ORDER — BUMETANIDE 2 MG/1
4 TABLET ORAL 3 TIMES DAILY
Qty: 72 TABLET | Refills: 0 | Status: SHIPPED | OUTPATIENT
Start: 2024-07-16 | End: 2024-07-16

## 2024-07-16 RX ORDER — HYDRALAZINE HYDROCHLORIDE 10 MG/1
10 TABLET, FILM COATED ORAL EVERY 8 HOURS SCHEDULED
Qty: 90 TABLET | Refills: 0 | Status: CANCELLED | OUTPATIENT
Start: 2024-07-16

## 2024-07-16 RX ORDER — POLYETHYLENE GLYCOL 3350 17 G/17G
17 POWDER, FOR SOLUTION ORAL DAILY PRN
Start: 2024-07-16

## 2024-07-16 RX ORDER — CARVEDILOL 3.12 MG/1
3.12 TABLET ORAL 2 TIMES DAILY WITH MEALS
Qty: 60 TABLET | Refills: 0 | Status: CANCELLED | OUTPATIENT
Start: 2024-07-16

## 2024-07-16 RX ORDER — AMIODARONE HYDROCHLORIDE 200 MG/1
200 TABLET ORAL
Start: 2024-07-16

## 2024-07-16 RX ORDER — BUMETANIDE 2 MG/1
2 TABLET ORAL 2 TIMES DAILY
Qty: 72 TABLET | Refills: 0 | Status: SHIPPED | OUTPATIENT
Start: 2024-07-19 | End: 2024-07-16

## 2024-07-16 RX ORDER — BUMETANIDE 2 MG/1
TABLET ORAL
Qty: 66 TABLET | Refills: 0 | Status: SHIPPED | OUTPATIENT
Start: 2024-07-16 | End: 2024-08-17

## 2024-07-16 RX ORDER — HYDROCODONE BITARTRATE AND ACETAMINOPHEN 7.5; 325 MG/1; MG/1
1 TABLET ORAL EVERY 12 HOURS PRN
Start: 2024-07-16

## 2024-07-16 RX ADMIN — Medication 10 ML: at 08:43

## 2024-07-16 RX ADMIN — FLUTICASONE PROPIONATE 2 SPRAY: 50 SPRAY, METERED NASAL at 08:46

## 2024-07-16 RX ADMIN — ASPIRIN 81 MG: 81 TABLET, COATED ORAL at 08:47

## 2024-07-16 RX ADMIN — Medication 10 ML: at 08:46

## 2024-07-16 RX ADMIN — NYSTATIN 1 APPLICATION: 100000 POWDER TOPICAL at 08:45

## 2024-07-16 RX ADMIN — BUSPIRONE HYDROCHLORIDE 10 MG: 10 TABLET ORAL at 08:48

## 2024-07-16 RX ADMIN — ISOSORBIDE MONONITRATE 30 MG: 30 TABLET, EXTENDED RELEASE ORAL at 08:48

## 2024-07-16 RX ADMIN — INSULIN LISPRO 8 UNITS: 100 INJECTION, SOLUTION INTRAVENOUS; SUBCUTANEOUS at 08:43

## 2024-07-16 RX ADMIN — FERROUS SULFATE TAB 325 MG (65 MG ELEMENTAL FE) 325 MG: 325 (65 FE) TAB at 08:48

## 2024-07-16 RX ADMIN — BUMETANIDE 2 MG: 1 TABLET ORAL at 08:48

## 2024-07-16 RX ADMIN — INSULIN LISPRO 12 UNITS: 100 INJECTION, SOLUTION INTRAVENOUS; SUBCUTANEOUS at 06:59

## 2024-07-16 RX ADMIN — LEVOTHYROXINE SODIUM 12.5 MCG: 50 TABLET ORAL at 06:13

## 2024-07-16 RX ADMIN — INSULIN LISPRO 8 UNITS: 100 INJECTION, SOLUTION INTRAVENOUS; SUBCUTANEOUS at 11:38

## 2024-07-16 RX ADMIN — AMIODARONE HYDROCHLORIDE 200 MG: 200 TABLET ORAL at 08:47

## 2024-07-16 RX ADMIN — APIXABAN 5 MG: 5 TABLET, FILM COATED ORAL at 08:48

## 2024-07-16 RX ADMIN — PANTOPRAZOLE SODIUM 40 MG: 40 TABLET, DELAYED RELEASE ORAL at 06:13

## 2024-07-16 RX ADMIN — CARVEDILOL 3.12 MG: 3.12 TABLET, FILM COATED ORAL at 08:47

## 2024-07-16 RX ADMIN — INSULIN LISPRO 12 UNITS: 100 INJECTION, SOLUTION INTRAVENOUS; SUBCUTANEOUS at 11:37

## 2024-07-16 RX ADMIN — LEVOTHYROXINE SODIUM 50 MCG: 50 TABLET ORAL at 08:48

## 2024-07-16 RX ADMIN — INSULIN GLARGINE 25 UNITS: 100 INJECTION, SOLUTION SUBCUTANEOUS at 08:43

## 2024-07-16 RX ADMIN — ONDANSETRON 4 MG: 4 TABLET, ORALLY DISINTEGRATING ORAL at 06:18

## 2024-07-16 NOTE — DISCHARGE PLACEMENT REQUEST
"Dio Berry (58 y.o. Female)       Date of Birth   1965    Social Security Number       Address   [address]  [place] [address]    Home Phone   [phone]    MRN   [id number]       Rastafari   Anabaptist    Marital Status                               Admission Date   7/12/24    Admission Type   Emergency    Admitting Provider   Charles Boland MD    Attending Provider   Milagro Boudreaux DO    Department, Room/Bed   [facility], P220/1P       Discharge Date       Discharge Disposition   [place] [facility] [place]    [place]charge Destination                                 Attending Provider: Milagro Boudreaux DO    Allergies: Phenergan [Promethazine]    Isolation: None   Infection: None   Code Status: CPR    Ht: 165.1 cm (65\")   Wt: 110 kg (243 lb 9.7 oz)    Admission Cmt: None   Principal Problem: Wide-complex tachycardia [R00.0]                   Active Insurance as of 7/12/2024       Primary Coverage       Payor Plan Insurance Group Employer/Plan Group    [employer] [place] [employer] [place]ICA[place]        Payor Plan Address Payor Plan Phone Number Payor Plan Fax Number Effective Dates    [address] [address] [phone]  9/9/2020 - None Entered    [facility] [id number]         Subscriber Name Subscriber Birth Date Member ID       DIO BERRY 1965 [id number]                     Emergency Contacts        (Rel.) Home Phone Work Phone Mobile Phone    KATHY BERRY (Son) [phone] -- --    Bolivar Berry (Other) -- -- [phone]              Insurance Information                  [facility] MEDICAID Phone: [phone]    Subscriber: Dio Berry Subscriber#: [id number]    Group#: -- Precert#: --             History & Physical        Francisco Armas PA-C at 07/12/24 1541       Attestation signed by Charles Boland MD at 07/12/24 1830    I have reviewed this documentation and agree.    Ms. Berry is our 57 yo F with hx of Vtach, pAfib, HFpEF " (hx of HFrEF), DM II, CAD who presents with chest pain and tachycardia. Patient follows with cardiology and was recently referred to Dr. Gutierrez for possible ablation for her afib. She was seen on 7/3 and had amiodarone increased at that visit to 400 mg TID for 3 days then to take 300 mg daily. Patient notes compliance to medications. Noted the patient has had 2 admissions a [facility], [facility] back to back and initial one was on 2024 where he was found to be in atrial fibrillation with RVR and anemic with a hemoglobin of 7.  She denied any visible blood loss. This is what led to referral to Dr. Gutierrez. Patient seen nephrology increased bumex yesterday per report. Patient notes sudden tachycardia this morning, with shortness of breath and noticed she was tachycardic. Patient found to be in wide complex tachycardia. She became hypotensive after loading with 150 mg of amioderone in the field and was emergently cardioverted. She as been in sinus rhythm in ED without further intervention. Cardiology contacted recommending to continue amioderone, stop digoxin and get digoxin level. Patient reports getting 2 mg IV bumex in ED and notes IV lasix usually works better. Patient found to have UTI and started on ceftriaxone as we await culture. Cardiology consulted. Appreciate recs.                       [facility] Medicine Services  History & Physical    Patient Identification:  Name:  Yumiko Purdy  Age:  58 y.o.  Sex:  female  :  1965  MRN:  [id number]   Visit Number:  [id number]  Admit Date: 2024   Primary Care Physician:  Masha Davidson APRN    Subjective     Chief complaint: Chest pain    History of presenting illness:      Yumiko Purdy is a 58 y.o. female who presented for further evaluation of shortness of breath, chest pain.  Per report patient called EMS this a.m. secondary to shortness of breath and tachycardia.  On EMS arrival patient found to be in wide-complex  "tachycardia.  Per report did not respond to amiodarone in the field and required cardioversion and was in normal sinus rhythm on arrival to the ED.    Patient was seen and examined in the ED with no family present at bedside. She is jaundiced and ill appearing. She reports, as above, that she called EMS this this morning secondary to short of breath and rapid heart rate which she reports was up to 140 prior to EMS arrival. She states shortness of breath and swelling worsening over the past week or so. She is compliant with home bumex and notes nephrology increased her bumex to 6 mg daily at her f/u yesterday as she reports medications are not as effective lately as they once were with keeping her euvolemic. Tachycardia began late last evening and worsened through the night. She was having associated mild chest pain but she states no worse than her \"normal\" angina and it had actually improved at home with a dose of her nitroglycerin. This pain was retrosternal and felt like a pressure. It did not radiate and she had no associated nausea or diaphoresis. On further review of systems she also noted her abdomen is more swollen than baseline but not more tender. Her urine output is diminished as above and she is also complaining of dysuria. She is unsure if having significant flank pain given chronic low back pain. No reported diarrhea or constipation.     Past medical history is significant for atrial fibrillation, Hx ventricular tachycardia s/p PVI with recurrence, dilated cardiomyopathy, heart failure with improved ejection fraction, CAD, CKD, chronic anemia, cirrhosis, diabetes mellitus    Upon arrival to the ED, vital signs were temp 98.5, heart rate 81, respirations 12, /64, SpO2 89% on RA.  HS troponin was 90 on arrival with repeat 81.  proBNP is up from recent baseline at 3998.  Creatinine is also very slightly up from recent baseline at 1.47 with BUN 22.  Total bilirubin is 5 but appears to be chronically " elevated in the setting of cirrhosis.  Lactate is 2.4.  There is no leukocytosis.  UA is abnormal-nitrite positive with 3+ blood, 3+ leukocytes, 21-50 RBCs, many WBCs and 1+ bacteria.  CXR was negative.  EKG showed NSR with rate 81.    Known Emergency Department medications received prior to my evaluation included 2 mg IV Bumex, Rocephin, 4 mg Zofran.   Emergency Department Room location at the time of my evaluation was 102.     ---------------------------------------------------------------------------------------------------------------------   Review of Systems   Constitutional:  Negative for chills, diaphoresis and fever.   HENT:  Negative for congestion and rhinorrhea.    Respiratory:  Positive for shortness of breath. Negative for cough.    Cardiovascular:  Positive for chest pain, palpitations and leg swelling.   Gastrointestinal:  Positive for abdominal distention and abdominal pain. Negative for constipation and diarrhea.   Genitourinary:  Positive for decreased urine volume and dysuria. Negative for difficulty urinating.   Musculoskeletal:  Negative for arthralgias and myalgias.   Skin:  Negative for rash and wound.   Neurological:  Negative for dizziness and light-headedness.        ---------------------------------------------------------------------------------------------------------------------   Past Medical History:   Diagnosis Date    Anemia     CHF (congestive heart failure)     Chronic a-fib     stated by patient     Cirrhosis of liver     stated by patient     Diabetes mellitus     Ventricular tachycardia      Past Surgical History:   Procedure Laterality Date    APPENDECTOMY      CARDIAC CATHETERIZATION N/A 11/10/2020    Procedure: Left Heart Cath;  Surgeon: Charlee Ken MD;  Location:  [facility]ATH [facility]AS[facility];  Service: Cardiovascular;  Laterality: N/A;    CHOLECYSTECTOMY      TOE AMPUTATION Right     stated by patient.      Family History   Problem Relation Age of Onset    Heart attack  Father     Heart attack Brother      Social History     Socioeconomic History    Marital status:    Tobacco Use    Smoking status: Never    Smokeless tobacco: Never   Vaping Use    Vaping status: Never Used   Substance and Sexual Activity    Alcohol use: Never    Drug use: Never    Sexual activity: Defer     ---------------------------------------------------------------------------------------------------------------------   Allergies:  Phenergan [promethazine]  ---------------------------------------------------------------------------------------------------------------------   Home medications:    Medications below are reported home medications pulling from within the system; at this time, these medications have not been reconciled unless otherwise specified and are in the verification process for further verifcation as current home medications.  (Not in a hospital admission)      Hospital Scheduled Meds:          Current listed hospital scheduled medications may not yet reflect those currently placed in orders that are signed and held awaiting patient's arrival to floor.   ---------------------------------------------------------------------------------------------------------------------     Objective     Vital Signs:  Temp:  [98.5 °F (36.9 °C)] 98.5 °F (36.9 °C)  Heart Rate:  [67-81] 68  Resp:  [12] 12  BP: (100-117)/(44-85) 117/85      07/12/24  0645   Weight: 102 kg (224 lb 13.9 oz)     Body mass index is 37.42 kg/m².  ---------------------------------------------------------------------------------------------------------------------       Physical Exam  Vitals and nursing note reviewed.   Constitutional:       General: She is not in acute distress.     Appearance: She is obese. She is ill-appearing.   HENT:      Head: Normocephalic and atraumatic.   Eyes:      General: Scleral icterus present.      Extraocular Movements: Extraocular movements intact.   Cardiovascular:      Rate and Rhythm: Normal  "rate and regular rhythm.   Pulmonary:      Effort: Pulmonary effort is normal.      Comments: Diminished breath sounds bilaterally   Abdominal:      Palpations: Abdomen is soft.      Tenderness: There is no abdominal tenderness.   Musculoskeletal:      Right lower leg: Edema present.      Left lower leg: Edema present.      Comments: 2+   Skin:     General: Skin is warm and dry.      Coloration: Skin is jaundiced.   Neurological:      Mental Status: She is alert. Mental status is at baseline.   Psychiatric:         Mood and Affect: Mood normal.         Behavior: Behavior normal.             ---------------------------------------------------------------------------------------------------------------------  EKG:        I have personally looked at the EKG.  ---------------------------------------------------------------------------------------------------------------------   Results from last 7 days   Lab Units 07/12/24  1433 07/12/24  0652   LACTATE mmol/L 2.4*  --    WBC 10*3/mm3  --  10.69   HEMOGLOBIN g/dL  --  8.8*   HEMATOCRIT %  --  31.3*   MCV fL  --  112.6*   MCHC g/dL  --  28.1*   PLATELETS 10*3/mm3  --  145         Results from last 7 days   Lab Units 07/12/24  0652   SODIUM mmol/L 136   POTASSIUM mmol/L 3.8   CHLORIDE mmol/L 95*   CO2 mmol/L 26.5   BUN mg/dL 22*   CREATININE mg/dL 1.47*   CALCIUM mg/dL 9.2   GLUCOSE mg/dL 264*   ALBUMIN g/dL 4.0   BILIRUBIN mg/dL 5.0*   ALK PHOS U/L 91   AST (SGOT) U/L 25   ALT (SGPT) U/L 26   Estimated Creatinine Clearance: 50 mL/min (A) (by C-G formula based on SCr of 1.47 mg/dL (H)).  No results found for: \"AMMONIA\"  Results from last 7 days   Lab Units 07/12/24  0912 07/12/24  0652   HSTROP T ng/L 81* 90*     Results from last 7 days   Lab Units 07/12/24  0912   PROBNP pg/mL 3,998.0*     Lab Results   Component Value Date    HGBA1C 7.70 (H) 03/26/2024     Lab Results   Component Value Date    TSH 2.960 01/31/2024    FREET4 1.30 04/01/2024     No results found for: " "\"PREGTESTUR\", \"PREGSERUM\", \"HCG\", \"HCGQUANT\"  Pain Management Panel  More data exists         Latest Ref Rng & Units 11/13/2023 9/12/2020   Pain Management Panel   Creatinine, Urine mg/dL  mg/dL - 46.3  46.3    Amphetamine, Urine Qual Negative Negative  -   Barbiturates Screen, Urine Negative Negative  -   Benzodiazepine Screen, Urine Negative Negative  -   Buprenorphine, Screen, Urine Negative Negative  -   Cocaine Screen, Urine Negative Negative  -   Fentanyl, Urine Negative Negative  -   Methadone Screen , Urine Negative Negative  -   Methamphetamine, Ur Negative Negative  -     No results found for: \"BLOODCX\"  No results found for: \"URINECX\"  No results found for: \"WOUNDCX\"  No results found for: \"STOOLCX\"      ---------------------------------------------------------------------------------------------------------------------  Imaging Results (Last 7 Days)       Procedure Component Value Units Date/Time    XR Chest 1 View [[id number]] Collected: 07/12/24 0810     Updated: 07/12/24 0812    Narrative:      EXAM:    XR Chest, 1 View     EXAM DATE:    7/12/2024 7:32 AM     CLINICAL HISTORY:    cp     TECHNIQUE:    Frontal view of the chest.     COMPARISON:    6/21/2024     FINDINGS:    Lungs and pleural spaces:  Unremarkable as visualized.  No  consolidation.  No pneumothorax.    Heart:  Unremarkable as visualized.  No cardiomegaly.    Mediastinum:  Unremarkable as visualized.  Normal mediastinal contour.    Bones/joints:  Unremarkable as visualized.  No acute fracture.    Tubes, lines and devices:  Defibrillator pads left chest.       Impression:        No acute cardiopulmonary findings identified.        This report was finalized on 7/12/2024 8:10 AM by Dr. Marlo Parr MD.               Cultures:  No results found for: \"BLOODCX\", \"URINECX\", \"WOUNDCX\", \"MRSACX\", \"RESPCX\", \"STOOLCX\"    Last echocardiogram:  Results for orders placed during the hospital encounter of 03/26/24    Adult Transthoracic Echo Complete W/ " Cont if Necessary Per Protocol    Interpretation Summary    Normal left ventricular cavity size and wall thickness noted. All left ventricular wall segments contract normally.    Left ventricular ejection fraction appears to be 66 - 70%.    Left ventricular diastolic function is consistent with (grade II w/high LAP) pseudonormalization.    The aortic valve is structurally normal with no regurgitation or stenosis present.    Moderate mitral annular calcification is present. There is mild calcification of the mitral valve anterior leaflet(s). Mild mitral valve regurgitation is present. Mild mitral valve stenosis is present.    There is no evidence of pericardial effusion. .          I have personally reviewed the above radiology images and read the final radiology report on 07/12/24  ---------------------------------------------------------------------------------------------------------------------  Assessment / Plan     Active Hospital Problems    Diagnosis  POA    **Wide-complex tachycardia [R00.0]  Yes       ASSESSMENT/PLAN:    Wide-complex tachycardia, PTA  Hx ventricular tachycardia s/p PVI  Acutely decompensated HFimpEF  Dilated cardiomyopathy  Paroxysmal atrial fibrillation  Nonobstructive CAD  Patient called EMS this a.m. secondary to shortness of breath.  EMS found the patient to be in wide-complex tachycardia and she was cardioverted in the field successfully.  She was NSR on arrival to the ED.  O2 sat initially 89% on RA.  proBNP is up from recent baseline at 3998.  HS troponin elevated but similar to chronic levels.  Will admit to the PCU for further management  Cardiology consulted and has evaluated the patient in the ED.  Recommended continued amiodarone.  Will obtain a digoxin level to ensure toxicity not inducing arrhythmia in the setting of CKD.  Hold digoxin for now.  Continue Eliquis.  Patient did receive 2 mg IV Bumex in the ED and will follow-up response to diuresis.  Continue to monitor clinical  volume status, I's and O's, respiratory status closely and continue to diurese as clinically indicated  Update TTE  Continuous cardiac monitoring  Monitor and replace electrolytes as needed  Check TSH  Continue supportive care measures  Trend labs    Acute urinary tract infection  Patient complaining of recent UTI and continued dysuria.  UA is grossly abnormal and concerning for infection with 3+ blood, nitrite positive, 3+ leukocytes, many WBCs and 1+ bacteria.  Culture sent and pending.  Blood cultures are also pending.  Lactate is 2.4.  There is no leukocytosis or fever in the ED.  Continue Rocephin empirically for now.  Follow-up culture results and tailor therapy as needed.    Chronic:  CKD which appears to be stage IIIa  Chronic anemia  Cirrhosis  Diabetes mellitus  Continue home medication regimen as indicated once med rec is complete  Avoid nephrotoxins.  Close monitoring of renal function while diuresing as above.  Resume insulin regimen at appropriate doses.  Will need Accu-Cheks to monitor hypoglycemia protocol in place.  ----------  -DVT prophylaxis: Chronically anticoagulated with Eliquis  -Activity: Up with assistance  -Expected length of stay: INPATIENT status due to the need for care which can only be reasonably provided in an hospital setting such as aggressive/expedited ancillary services and/or consultation services, the necessity for IV medications, close physician monitoring and/or the possible need for procedures.  In such, I feel patient’s risk for adverse outcomes and need for care warrant INPATIENT evaluation and predict the patient’s care encounter to likely last beyond 2 midnights.   -Disposition pending course    High risk secondary to Wide complex tachycardia s/p field cardioversion, decompensated CHF, UTI    Code Status and Medical Interventions:   Ordered at: 07/12/24 1528     Code Status (Patient has no pulse and is not breathing):    CPR (Attempt to Resuscitate)     Medical  Interventions (Patient has pulse or is breathing):    Full Support       Francisco Amras PA-C   07/12/24  15:41 EDT    Electronically signed by Charles Boland MD at 07/12/24 1830       Vital Signs (last day)       Date/Time Temp Temp src Pulse Resp BP Patient Position SpO2    07/16/24 0900 -- -- 63 24 106/66 -- 98    07/16/24 0800 98 (36.7) Oral 61 16 126/55 -- 98    07/16/24 0700 -- -- 59 -- 121/48 -- 98    07/16/24 0614 -- -- 63 -- 109/39 -- --    07/16/24 0600 -- -- 64 21 109/39 -- 100    07/16/24 0500 -- -- 64 22 97/77 -- 100    07/16/24 0400 97.9 (36.6) Oral 57 15 118/55 -- 99    07/16/24 0300 -- -- 58 21 117/51 -- 98    07/16/24 0200 -- -- 57 20 109/40 -- 99    07/16/24 0100 -- -- 59 17 113/50 -- 98    07/16/24 0000 98.3 (36.8) Oral 64 20 109/42 -- 97    07/15/24 2300 -- -- 65 16 102/35 -- 98    07/15/24 2221 -- -- 66 -- 117/42 -- --    07/15/24 2200 -- -- 66 17 117/42 -- 98    07/15/24 2130 -- -- 65 -- 115/50 -- 97    07/15/24 2100 -- -- 64 17 102/55 -- 99    07/15/24 2000 97.5 (36.4) Axillary 64 20 111/50 -- 98    07/15/24 1900 -- -- 66 19 109/45 -- 98    07/15/24 1800 -- -- 69 18 106/47 -- 99    07/15/24 1700 -- -- 65 14 106/85 -- 100    07/15/24 1639 -- -- 64 20 105/47 -- 100    07/15/24 1600 98.1 (36.7) Oral -- -- -- -- --    07/15/24 1500 -- -- 66 -- 122/65 -- 94    07/15/24 1400 -- -- 61 14 114/42 -- 98    07/15/24 1345 -- -- 62 -- 113/50 -- --    07/15/24 1300 -- -- 67 -- 113/50 -- 99    07/15/24 1200 98.3 (36.8) Oral 64 -- 108/45 -- 100    07/15/24 1110 -- -- 64 -- 106/47 -- --    07/15/24 1100 -- -- 62 22 110/41 -- 100    07/15/24 1000 -- -- 63 22 109/48 -- 100    07/15/24 0900 -- -- 62 -- 104/44 -- 99    07/15/24 0830 -- -- 64 21 124/51 -- 100    07/15/24 0800 97.9 (36.6) Oral -- -- -- -- --    07/15/24 0700 -- -- 59 20 132/56 -- 99    07/15/24 0600 -- -- 60 21 97/47 -- 100    07/15/24 0500 -- -- 63 15 119/52 -- 100    07/15/24 0400 98.6 (37) -- 64 23 107/35 -- 99    07/15/24 0300 -- -- 65 20  121/51 -- 99    07/15/24 0200 -- -- 70 22 123/52 -- 99    07/15/24 0100 -- -- 64 19 117/49 -- 97    07/15/24 0000 97.6 (36.4) Oral 68 17 122/37 -- 100          Current Facility-Administered Medications   Medication Dose Route Frequency Provider Last Rate Last Admin    amiodarone (PACERONE) tablet 200 mg  200 mg Oral Q24H Anastasiia Mclaughlin APRN   200 mg at 07/16/24 0847    apixaban (ELIQUIS) tablet 5 mg  5 mg Oral Q12H Charles Boland MD   5 mg at 07/16/24 0848    aspirin EC tablet 81 mg  81 mg Oral Daily Milagro Boudreaux DO   81 mg at 07/16/24 0847    sennosides-docusate (PERICOLACE) 8.6-50 MG per tablet 2 tablet  2 tablet Oral BID PRN Charles Boland MD        And    polyethylene glycol (MIRALAX) packet 17 g  17 g Oral Daily PRN Charles Boland MD        And    bisacodyl (DULCOLAX) EC tablet 5 mg  5 mg Oral Daily PRN Charles Bloand MD        And    bisacodyl (DULCOLAX) suppository 10 mg  10 mg Rectal Daily PRN Charles Boland MD        bumetanide (BUMEX) tablet 2 mg  2 mg Oral TID Charles Boland MD   2 mg at 07/16/24 0848    busPIRone (BUSPAR) tablet 10 mg  10 mg Oral BID Milagro Boudreaux DO   10 mg at 07/16/24 0848    Calcium Replacement - Follow Nurse / BPA Driven Protocol   Does not apply PRN Charles Boland MD        carvedilol (COREG) tablet 3.125 mg  3.125 mg Oral BID With Meals Ramon Sahu MD   3.125 mg at 07/16/24 0847    cefTRIAXone (ROCEPHIN) 1,000 mg in sodium chloride 0.9 % 100 mL IVPB-VTB  1,000 mg Intravenous Q24H Charles Boland  mL/hr at 07/15/24 1345 1,000 mg at 07/15/24 1345    dextrose (D50W) (25 g/50 mL) IV injection 25 g  25 g Intravenous Q15 Min PRN Charles Boland MD        dextrose (GLUTOSE) oral gel 15 g  15 g Oral Q15 Min PRN Charles Boland MD        ferrous sulfate tablet 325 mg  325 mg Oral Daily Milagro Boudreaux DO   325 mg at 07/16/24 0848    fluticasone (FLONASE) 50 MCG/ACT nasal spray 2 spray  2 spray Nasal Daily Milagro Boudreaux DO   2 spray at  07/16/24 0846    glucagon HCl (Diagnostic) injection 1 mg  1 mg Intramuscular Q15 Min PRN Charles Boland MD        hydrALAZINE (APRESOLINE) tablet 10 mg  10 mg Oral Q8H Anastasiia Mclaughlin APRN   10 mg at 07/15/24 1345    HYDROcodone-acetaminophen (NORCO) 7.5-325 MG per tablet 1 tablet  1 tablet Oral Q12H PRN Milagro Boudreaux DO   1 tablet at 07/15/24 2136    insulin glargine (LANTUS, SEMGLEE) injection 25 Units  25 Units Subcutaneous Q12H Milagro Boudreaux DO   25 Units at 07/16/24 0843    Insulin Lispro (humaLOG) injection 3-14 Units  3-14 Units Subcutaneous 4x Daily AC & at Bedtime Charles Boland MD   12 Units at 07/16/24 0659    Insulin Lispro (humaLOG) injection 8 Units  8 Units Subcutaneous TID With Meals Milagro Boudreaux DO   8 Units at 07/16/24 0843    isosorbide mononitrate (IMDUR) 24 hr tablet 30 mg  30 mg Oral Q24H Anastasiia Mclaughlin APRN   30 mg at 07/16/24 0848    levothyroxine (SYNTHROID, LEVOTHROID) tablet 12.5 mcg  12.5 mcg Oral Q AM Milagro Boudreaux DO   12.5 mcg at 07/16/24 0613    levothyroxine (SYNTHROID, LEVOTHROID) tablet 50 mcg  50 mcg Oral Daily Milagor Boudreaux DO   50 mcg at 07/16/24 0848    Magnesium Standard Dose Replacement - Follow Nurse / BPA Driven Protocol   Does not apply PRN Charles Boland MD        nitroglycerin (NITROSTAT) SL tablet 0.4 mg  0.4 mg Sublingual Q5 Min PRN Charles Boland MD        nystatin (MYCOSTATIN) powder 1 Application  1 Application Topical Q12H Charles Boland MD   1 Application at 07/16/24 0845    ondansetron ODT (ZOFRAN-ODT) disintegrating tablet 4 mg  4 mg Translingual Q8H PRN Milagro Boudreaux DO   4 mg at 07/16/24 0618    pantoprazole (PROTONIX) EC tablet 40 mg  40 mg Oral Q AM Milagro Boudreaux, DO   40 mg at 07/16/24 0613    Phosphorus Replacement - Follow Nurse / BPA Driven Protocol   Does not apply PRCharles Koenig MD        Potassium Replacement - Follow Nurse / BPA Driven Protocol   Does not apply PRN Hacker, Bill  MD SHA        prochlorperazine (COMPAZINE) injection 5 mg  5 mg Intravenous Q6H PRN Charles Boland MD   5 mg at 07/15/24 1113    scopolamine patch 1 mg/72 hr  1 patch Transdermal Q72H Charles Boland MD   1 patch at 07/15/24 1803    sodium chloride 0.9 % flush 10 mL  10 mL Intravenous PRN Sam Desai MD        sodium chloride 0.9 % flush 10 mL  10 mL Intravenous Q12H Charles Boland MD   10 mL at 24 0846    sodium chloride 0.9 % flush 10 mL  10 mL Intravenous PRN Charles Boland MD        sodium chloride 0.9 % flush 10 mL  10 mL Intravenous Q12H Charles Boland MD   10 mL at 24 0843    sodium chloride 0.9 % flush 10 mL  10 mL Intravenous PRN Charles Boland MD        sodium chloride 0.9 % infusion 40 mL  40 mL Intravenous PRN Charles Boland MD        sodium chloride 0.9 % infusion 40 mL  40 mL Intravenous PRN Charles Boland MD        Vortioxetine HBr (TRINTELLIX) tablet 5 mg  5 mg Oral Daily With Breakfast Milagro Boudreaux DO         Operative/Procedure Notes (most recent note)    No notes of this type exist for this encounter.          Physician Progress Notes (most recent note)        Charles Boland MD at 07/15/24 1032              [facility]     Patient Identification:  Name:  Yumiko Purdy  Age:  58 y.o.  Sex:  female  :  1965  MRN:  [id number]  Visit Number:  [id number]  ROOM: [address]     Primary Care Provider:  Masha Davidson APRN    Length of stay in inpatient status:  3    Subjective     Chief Compliant:    Chief Complaint   Patient presents with    Chest Pain       History of Presenting Illness:    Patient reports feeling better. Denies any new complaints. No further arrhythmias. Getting up to bedside chair this morning and awaiting PT/OT to evaluate patient.     ROS:  Otherwise 10 point ROS negative other than documented above in HPI.     Objective     Current Hospital Meds:[START ON 2024] amiodarone, 200 mg, Oral,  Q24H  apixaban, 5 mg, Oral, Q12H  aspirin, 81 mg, Oral, Daily  bumetanide, 2 mg, Oral, TID  busPIRone, 10 mg, Oral, BID  cefTRIAXone, 1,000 mg, Intravenous, Q24H  ferrous sulfate, 325 mg, Oral, Daily  fluticasone, 2 spray, Nasal, Daily  hydrALAZINE, 10 mg, Oral, Q8H  insulin glargine, 25 Units, Subcutaneous, Q12H  insulin lispro, 3-14 Units, Subcutaneous, 4x Daily AC & at Bedtime  insulin lispro, 8 Units, Subcutaneous, TID With Meals  isosorbide mononitrate, 30 mg, Oral, Q24H  levothyroxine, 12.5 mcg, Oral, Q AM  levothyroxine, 50 mcg, Oral, Daily  nystatin, 1 Application, Topical, Q12H  pantoprazole, 40 mg, Oral, Q AM  Scopolamine, 1 patch, Transdermal, Q72H  sodium chloride, 10 mL, Intravenous, Q12H  sodium chloride, 10 mL, Intravenous, Q12H  Vortioxetine HBr, 5 mg, Oral, Daily With Breakfast         Current Antimicrobial Therapy:  Anti-Infectives (From admission, onward)      Ordered     Dose/Rate Route Frequency Start Stop    07/12/24 1617  cefTRIAXone (ROCEPHIN) 1,000 mg in sodium chloride 0.9 % 100 mL IVPB-VTB        Ordering Provider: Charles Boland MD    1,000 mg  200 mL/hr over 30 Minutes Intravenous Every 24 Hours 07/13/24 1300 07/18/24 1259    07/12/24 1236  cefTRIAXone (ROCEPHIN) 2,000 mg in sodium chloride 0.9 % 100 mL IVPB-VTB        Ordering Provider: Sam Desai MD    2,000 mg  200 mL/hr over 30 Minutes Intravenous Once 07/12/24 1252 07/12/24 1400          Current Diuretic Therapy:  Diuretics (From admission, onward)      Ordered     Dose/Rate Route Frequency Start Stop    07/14/24 1643  bumetanide (BUMEX) tablet 2 mg        Ordering Provider: Charlse Boland MD    2 mg Oral 3 Times Daily 07/14/24 1730 07/19/24 1559    07/13/24 1649  furosemide (LASIX) injection 40 mg        Ordering Provider: Adonay Castellanos PA-C    40 mg Intravenous Once 07/13/24 1745 07/13/24 1757    07/12/24 1451  bumetanide (BUMEX) injection 2 mg        Ordering Provider: Sam Desai MD    2 mg Intravenous Once 07/12/24  1506 07/12/24 1505          ----------------------------------------------------------------------------------------------------------------------  Vital Signs:  Temp:  [97.6 °F (36.4 °C)-98.6 °F (37 °C)] 97.9 °F (36.6 °C)  Heart Rate:  [59-72] 64  Resp:  [10-27] 21  BP: ()/() 124/51  SpO2:  [97 %-100 %] 100 %  on  Flow (L/min):  [2] 2;   Device (Oxygen Therapy): nasal cannula  Body mass index is 39.69 kg/m².    Wt Readings from Last 3 Encounters:   07/15/24 108 kg (238 lb 8.6 oz)   06/27/24 102 kg (225 lb)   06/21/24 102 kg (225 lb)     Intake & Output (last 3 days)         07/12 0701 07/13 0700 07/13 0701 07/14 0700 07/14 0701  07/15 0700 07/15 0701  07/16 0700    P.O. 240 460 770 354    I.V. (mL/kg) 0 (0) 108.4 (1) 100.6 (0.9)     IV Piggyback 100       Total Intake(mL/kg) 340 (3.1) 568.4 (5.3) 870.6 (8.1) 354 (3.3)    Urine (mL/kg/hr) 400 (0.2) 700 (0.3) 1600 (0.6)     Stool  0 0     Total Output      Net -60 -131.6 -729.4 +354            Urine Unmeasured Occurrence   1 x     Stool Unmeasured Occurrence  1 x 0 x           Diet: Regular/House; Fluid Consistency: Thin (IDDSI 0)  ----------------------------------------------------------------------------------------------------------------------  Physical exam:  Constitutional:  Appears older than stated age.   HENT:  Head:  Normocephalic and atraumatic.  Mouth:  Moist mucous membranes.    Eyes:  Conjunctivae and EOM are normal. No scleral icterus.    Neck:  Neck supple.  No JVD present.    Cardiovascular:  Normal rate, regular rhythm and normal heart sounds with no murmur.  Pulmonary/Chest:  No respiratory distress, no wheezes, no crackles, with normal breath sounds and good air movement.  Abdominal:  Soft.  Bowel sounds are normal.  No distension and no tenderness.   Musculoskeletal:  No edema, no tenderness, and no deformity.  No red or swollen joints anywhere.    Neurological:  Alert and oriented to person, place, and time.  No  "cranial nerve deficit.  No tongue deviation.  No facial droop.  No slurred speech.   Skin:  Skin is warm and dry. No rash noted. No pallor.   Peripheral vascular:  Pulses in all 4 extremities with no clubbing, no cyanosis, no edema.  ----------------------------------------------------------------------------------------------------------------------  Tele:    ----------------------------------------------------------------------------------------------------------------------  Results from last 7 days   Lab Units 07/15/24  0011 07/14/24  0050 07/12/24  2339 07/12/24  1722 07/12/24  1433   LACTATE mmol/L  --   --   --  2.3* 2.4*   WBC 10*3/mm3 8.17 8.38 9.19  --   --    HEMOGLOBIN g/dL 7.9* 7.9* 8.2*  --   --    HEMATOCRIT % 28.2* 28.4* 28.6*  --   --    MCV fL 111.9* 112.3* 109.6*  --   --    MCHC g/dL 28.0* 27.8* 28.7*  --   --    PLATELETS 10*3/mm3 120* 119* 125*  --   --          Results from last 7 days   Lab Units 07/15/24  0011 07/14/24  0050 07/12/24  2339 07/12/24  0652   SODIUM mmol/L 139 136 136 136   POTASSIUM mmol/L 4.0 4.0 4.1 3.8   MAGNESIUM mg/dL 2.0 1.9 1.8  --    CHLORIDE mmol/L 100 98 97* 95*   CO2 mmol/L 28.4 27.1 26.6 26.5   BUN mg/dL 26* 28* 26* 22*   CREATININE mg/dL 1.43* 1.52* 1.42* 1.47*   CALCIUM mg/dL 8.5* 8.5* 8.6 9.2   GLUCOSE mg/dL 202* 176* 230* 264*   ALBUMIN g/dL  --  3.5  --  4.0   BILIRUBIN mg/dL  --  2.7*  --  5.0*   ALK PHOS U/L  --  72  --  91   AST (SGOT) U/L  --  22  --  25   ALT (SGPT) U/L  --  19  --  26   Estimated Creatinine Clearance: 53 mL/min (A) (by C-G formula based on SCr of 1.43 mg/dL (H)).  No results found for: \"AMMONIA\"  Results from last 7 days   Lab Units 07/12/24  0912 07/12/24  0652   HSTROP T ng/L 81* 90*     Results from last 7 days   Lab Units 07/12/24  0912   PROBNP pg/mL 3,998.0*         Hemoglobin A1C   Date/Time Value Ref Range Status   07/14/2024 0050 6.50 (H) 4.80 - 5.60 % Final     Glucose   Date/Time Value Ref Range Status   07/15/2024 0614 247 " "(H) 70 - 130 mg/dL Final   07/14/2024 2038 232 (H) 70 - 130 mg/dL Final   07/14/2024 1701 267 (H) 70 - 130 mg/dL Final   07/14/2024 1104 351 (H) 70 - 130 mg/dL Final   07/14/2024 0548 257 (H) 70 - 130 mg/dL Final   07/13/2024 2017 252 (H) 70 - 130 mg/dL Final   07/13/2024 1801 311 (H) 70 - 130 mg/dL Final   07/13/2024 1140 299 (H) 70 - 130 mg/dL Final     Lab Results   Component Value Date    TSH 4.990 (H) 07/12/2024    FREET4 1.30 04/01/2024     No results found for: \"PREGTESTUR\", \"PREGSERUM\", \"HCG\", \"HCGQUANT\"  Pain Management Panel  More data exists         Latest Ref Rng & Units 11/13/2023 9/12/2020   Pain Management Panel   Creatinine, Urine mg/dL  mg/dL - 46.3  46.3    Amphetamine, Urine Qual Negative Negative  -   Barbiturates Screen, Urine Negative Negative  -   Benzodiazepine Screen, Urine Negative Negative  -   Buprenorphine, Screen, Urine Negative Negative  -   Cocaine Screen, Urine Negative Negative  -   Fentanyl, Urine Negative Negative  -   Methadone Screen , Urine Negative Negative  -   Methamphetamine, Ur Negative Negative  -     Brief Urine Lab Results  (Last result in the past 365 days)        Color   Clarity   Blood   Leuk Est   Nitrite   Protein   CREAT   Urine HCG        07/12/24 0757 Dark Yellow   Turbid   Large (3+)   Large (3+)   Positive   30 mg/dL (1+)                 Blood Culture   Date Value Ref Range Status   07/12/2024 No growth at 2 days  Preliminary   07/12/2024 No growth at 2 days  Preliminary     Urine Culture   Date Value Ref Range Status   07/12/2024 >100,000 CFU/mL Klebsiella oxytoca (A)  Final     No results found for: \"WOUNDCX\"  No results found for: \"STOOLCX\"  No results found for: \"RESPCX\"  No results found for: \"AFBCX\"  Results from last 7 days   Lab Units 07/12/24  1722 07/12/24  1433   LACTATE mmol/L 2.3* 2.4*       I have personally looked at the labs and they are summarized " above.  ----------------------------------------------------------------------------------------------------------------------  Detailed radiology reports for the last 24 hours:    Imaging Results (Last 24 Hours)       ** No results found for the last 24 hours. **          Assessment & Plan    #Wide complex tachycardia s/p cardioversion in the field by EMS  #pAfib  #HFpEF, Hx of improved HFrEF  #Hx of CAD  - Digoxin level wnl  - Continue amiodarone as per cardiology   - IV lasix as per cardiology on 7/13. Volume status improved. Will order home PO Bumex. Discussed with Dr. Mejia who recently recommended increasing bumex to 2 mg TID then back to BID. Will follow his plan.    - Cr stable at 1.43.   - Monitor renal function and electrolytes   - Monitor on tele      #UTI  - UA and symptoms consistent with UTI. Cystitis also on CT scan.   - Urine growing 100k klebsiella oxytoca. Sensitivities reviewed.   - Continue ceftriaxone. Day 4/5.      #Persistent N/V  - No clear cause on CT. Patient with large known ventral hernia without evidence of incarceration or obstruction   - Possibly from UTI     Chronic medical problems:  CKID IIIa  Chronic anemia  Cirrhosis   DM II- Uncontrolled, add SSI to home regimen. A1C only 6.5%.         Code status: Full      Dispo: Given improvement. Transfer to tele. Lives with son. Depending on PT/OT evaluation, may be able to go home tomorrow.        Charles Boland MD  [facility] Hospitalist  07/15/24  10:32 EDT    Electronically signed by Charles Boland MD at 07/15/24 0211          Consult Notes (most recent note)        Ramon Sahu MD at 07/12/24 0896              [facility]  CONSULT  NOTE      Patient information:  Date of Admit: 7/12/2024  Date of Consult: 07/12/24  Hospitalist/Referring MD:No admitting provider for patient encounter.  PCP: Masha Davidson APRN  MRN:  [id number]  Visit Number:  [id number]    LOS: 0  CODE  STATUS:  There are no questions and answers to display.       PROBLEM LIST: Active Problems:    * No active hospital problems. *        Reason for Cardiology consultation: V tach and wide complex tachycardia    General Cardiology Consulting Physician: Dr. Ramon Sahu MD, [facility]    Primary Cardiologist: Ramon Sahu MD [facility].    Assessment      Wide-complex tachycardia prior to admission, could be either atrial fibrillation with rapid ventricular response with a baseline intermittent conduction delay (wide-complex rhythm) versus ventricular tachycardia, s/p electrocardioversion by EMS en route to hospital.  Patient is currently in sinus rhythm.  Paroxysmal atrial fibrillation, s/p previous PVI in October 2022 with recurrence requiring electrocardioversion on 1/31/2024.  Patient has been on amiodarone therapy.  Was recently evaluated by Dr. Gutierrez in [place] and was deemed to be not a candidate for any further RF ablation procedures  Previous cardiomyopathy possibly due to rate related, seems to have resolved based on recent echo Doppler study with LV ejection fraction of about 60 to 65%.  Acute on chronic HFpEF (previous HFrEF).  Grade 2 diastolic noncompliance of the left ventricular on recent echo Doppler study.  Nonobstructive coronary artery disease on coronary angiography in November 2020.  Chronic kidney disease (stage IIIa/B).    Recommendations     Continue with amiodarone at 400 mg p.o. twice daily for now.    Will discontinue the digoxin given her chronic kidney disease  IV diuretics as needed and tolerated.  Add SGLT2 inhibitor (Jardiance 10 mg daily) for her HFpEF.  Obtain echo Doppler study to evaluate LV systolic and diastolic function and tailor further therapy accordingly.    Subjective Data     7/12/2024    ADMISSION INFORMATION:  Chief Complaint   Patient presents with    Chest Pain     History of Present Illness    Yumiko Purdy is a 58 y.o. female with a past medical history significant for  nonobstructive CAD on left heart cath on 11/10/2020,, dilated cardiomyopathy, CKD stage II, chronic anemia, HFimpEF 66-70%, grade 2 diastolic dysfunction, paroxysmal atrial fibrillation, history of ventricular tachycardia, s/p PVI Oct 2022 with recurrent recurrent and last cardioversion on 1/31/2024, and diabetes.     Patient presented to [facility] emergency room (ER) on 7/12/2024 with complaints of increased edema to BLE over the last 3 weeks.  Patient reports she saw nephrologist before presentation and her Bumex 2 mg was increased to 3 times daily.  Earlier this morning she began having chest pain with elevated heart rate in the 140s, thus she called EMS.     Of note, she was noted to have V. tach while en route to the hospital and amiodarone was administered with no change to the rhythm.  Patient was then sedated with ketamine and cardioverted by EMS.  She was SR on arrival to the ER and reported her chest pain had improved.  She also reported recently being treated for UTI and still having dysuria intermittently.    Cardiology has been consulted for further evaluation and management for V-tach and wide complex tachycardia.     Primary Cardiologist has been BRITTANY Nieves (last visit in office 06/27/2024) and Dr. Gutierrez (last seen in office on 07/17/2024).-Noted the patient has had 2 admissions a [facility] back to [facility] and initial one was on 06/22/2024 where he was found to be in atrial fibrillation with RVR and anemic with a hemoglobin of 7.  She denied any visible blood loss.     Patient was in room  when she was seen and examined by Dr. Sahu.  Patient is lying in bed resting quietly with her eyes closed. She is easily awaken with verbal stimuli.  No acute distress noted at this time. She reports increased edema BLE over the last couple weeks. Just yesterday her Nephrologist called and increased her Bumex up to 6 x per day. He had been increasing it slowly over the  last few weeks. Patient reports when she seen Matt Verma a few days ago, he stopped Cardizem thinking it maybe causing the edema.  Patient reports none of these measures has seemed to help. She reports waking up around 6 am today with heart rate in the 140s. She had not noted any rapid heart rate during the last few weeks. She did have associated nausea, shortness of breath and chest pain with the increased heart rate. She tried x 2 SL NTG and it did not help her symptoms. Thus, she called EMS to bring her to the ER.  She reports EMS did shock her enroute and that seemed to help the heart rate, chest pain and nausea. Patient reports she feels swollen all the way up into her abdomen.  She reports she was just given IV Bumex but has noticed in the past, IV Lasix seems to work better for her.  Patient currently denies any chest pain, but states she still is short of breath.  Oxygen saturation while at bedside is 100% on oxygen via nasal cannula.  Telemetry reveals sinus rhythm 60s last recorded blood pressure on the monitor in the room was 113/44.    Known medications given enroute via EMS and in the ER:       Personal History     Cardiac risk factors:arteriosclerotic heart disease, diabetes mellitus, hypercholesterolemia, hypertension, and Obesity      Last Echo: Results for orders placed during the hospital encounter of 03/26/24    Adult Transthoracic Echo Complete W/ Cont if Necessary Per Protocol    Interpretation Summary    Normal left ventricular cavity size and wall thickness noted. All left ventricular wall segments contract normally.    Left ventricular ejection fraction appears to be 66 - 70%.    Left ventricular diastolic function is consistent with (grade II w/high LAP) pseudonormalization.    The aortic valve is structurally normal with no regurgitation or stenosis present.    Moderate mitral annular calcification is present. There is mild calcification of the mitral valve anterior leaflet(s). Mild mitral  valve regurgitation is present. Mild mitral valve stenosis is present.    There is no evidence of pericardial effusion. .         Last Stress: Results for orders placed during the hospital encounter of 10/15/20    Nuclear Medicine Cardiac Blood Pool [facility] At Rest    Interpretation Summary  EXAMINATION: NUCLEAR MEDICINE CARDIAC BLOOD POOL [facility] AT REST-    CLINICAL INDICATION: Cardiomyopathy  TECHNIQUE: 21 mCi of Tc-99m autologous RBCs were injected IV after  in-vitro RBC labeling. Gated cardiac imaging was performed in the  anterior and left anterior oblique.  COMPARISON: None  FINDINGS: The left ventricle is normal in size with normal systolic  function. The calculated left ventricular ejection fraction (LVEF) = 38  %.  The right and left atria, aorta and pulmonary artery are normal. The  right ventricle is normal in size.    Impression  LVEF: 38%, at rest.    This report was finalized on 10/16/2020 11:57 AM by Dr. Marlo Parr MD.        Last Cath: Results for orders placed during the hospital encounter of 11/10/20    Cardiac Catheterization/Vascular Study    Narrative  Procedure type:  Left heart cath, LV gram, bilateral selective cholangiogram    Indication:  Cardiomyopathy    Procedure:  After informed consent the patient was brought into the cardiac aspiration lab she was prepped and draped in the usual sterile manner the right radial area was incised with 2% Xylocaine however several attempts to engage into the right radial artery was not successful so the right radial artery access was abandoned and the right groin area was excised in 2% Xylocaine right femoral artery was accessed using modified standard technique and 5 Japanese side-port arterial sheath was placed and secured then over a guidewire a 5 Japanese JL 4 catheter was used left main coronary artery with angiographic pressures were taken then the catheter was removed and over a guidewire a 5 Japanese JR4 catheter was used and engagement of the right  coronary arteries angioplasty was taken then the catheter was removed then over a guidewire 5 [place] pigtail catheter used aortic valve was done hand-injection LV gram was done then pullback was done then the catheter was removed groin sheath was removed and minx closure device applied with good hemostasis the patient was transported back to her room in stable condition.    Results:  The patient LVEDP was 17 mmHg with hand-injection showing preserved left ventricular systolic function wall motion EF 50-55% with no significant aortic gradient on pullback.    Cholangiogram:  This right dominant system.  Left main cardiac angiographically normal and bifurcates into left and descending and left circumflex arteries.  LAD was normal caliber gives several septal perforators and diagonal branches and wraps around the apex LAD about 30 to 40% mid stenosis.  Left circumflex artery was nondominant for posterior circulation gives rise to several obtuse marginal branches left circumflex arteries branches angiographically normal.  The right coronary artery was a large artery was dominant for posterior circulation giving rise to acute marginal branches PDA and posterolateral branches the right coronary artery and its branches angiographically normal.    Conclusion:  Preserved LV systolic function ejection fraction 50-55% with elevated left ventricular end-diastolic pressure consistent with diastolic dysfunction coronary angiogram showed right dominant system with 30 to 40% mid LAD lesion otherwise coronaries arteries were normal.  Plan of care:  Medical management and secondary preventive measurements of coronary disease good lipid control blood pressure control healthy lifestyle patient is to follow-up with her primary care physician for further management.                            Past Medical History:   Diagnosis Date    Anemia     CHF (congestive heart failure)     Chronic a-fib     stated by patient     Cirrhosis of liver      stated by patient     Diabetes mellitus     Ventricular tachycardia      Past Surgical History:   Procedure Laterality Date    APPENDECTOMY      CARDIAC CATHETERIZATION N/A 11/10/2020    Procedure: Left Heart Cath;  Surgeon: Charlee Ken MD;  Location: [facility] CATH INVASIVE [facility];  Service: Cardiovascular;  Laterality: N/A;    CHOLECYSTECTOMY      TOE AMPUTATION Right     stated by patient.      Family History   Problem Relation Age of Onset    Heart attack Father     Heart attack Brother      Social History     Tobacco Use    Smoking status: Never    Smokeless tobacco: Never   Vaping Use    Vaping status: Never Used   Substance Use Topics    Alcohol use: Never    Drug use: Never       ALLERGIES: Phenergan [promethazine]    Medications listed below are reported home medications pulling from within the system:  (Not in a hospital admission)      Review of Systems   Constitutional:  Negative for activity change, appetite change and diaphoresis.   HENT:  Negative for facial swelling and trouble swallowing.    Eyes:  Negative for visual disturbance.   Respiratory:  Positive for shortness of breath.    Cardiovascular:  Positive for chest pain, palpitations and leg swelling.   Gastrointestinal:  Positive for abdominal distention and nausea. Negative for blood in stool and vomiting.   Endocrine: Negative.    Genitourinary:  Negative for hematuria.   Musculoskeletal:  Negative for gait problem.   Skin:  Negative for color change.   Neurological:  Negative for dizziness, syncope, speech difficulty, weakness, light-headedness and headaches.   Psychiatric/Behavioral:  Negative for agitation and behavioral problems.      Objective Data      Vital Signs  Temp:  [98.5 °F (36.9 °C)] 98.5 °F (36.9 °C)  Heart Rate:  [67-81] 68  Resp:  [12] 12  BP: (100-117)/(44-85) 117/85  Device (Oxygen Therapy): room air  Vital Signs (last 72 hrs)         07/09 0700  07/10 0659 07/10 0700 07/11 0659 07/11 0700 07/12 0659 07/12 0700 07/12  1516   Most Recent      Temp (°F)       98.5       98.5 (36.9) 07/12 0645    Heart Rate     76 -  81    67 -  75     68 07/12 1505    Resp       12       12 07/12 0645    BP     115/52 -  117/64    100/46 -  117/85     117/85 07/12 1505    SpO2 (%)     89 -  99    92 -  100     99 07/12 1359          BMI:  Body mass index is 37.42 kg/m².  WEIGHT:      07/12/24  0645   Weight: 102 kg (224 lb 13.9 oz)     DIET:  Diet: Regular/House; Fluid Consistency: Thin (IDDSI 0)  I&O:  Intake & Output (last 3 days)         07/09 0701  07/10 0700 07/10 0701 07/11 0700 07/11 0701 07/12 0700 07/12 0701 07/13 0700    IV Piggyback    100    Total Intake(mL/kg)    100 (1)    Net    +100                  I have seen and evaluated Ms. Purdy with findings as scribed below.  Physical Exam  Constitutional:       General: She is not in acute distress.     Appearance: Normal appearance.      Comments: Tired and acutely ill-appearing.    HENT:      Head: Normocephalic and atraumatic.      Nose: Nose normal.      Mouth/Throat:      Mouth: Mucous membranes are moist.      Pharynx: Oropharynx is clear.   Eyes:      Conjunctiva/sclera: Conjunctivae normal.   Cardiovascular:      Rate and Rhythm: Normal rate and regular rhythm.      Pulses: Normal pulses.           Radial pulses are 2+ on the right side and 2+ on the left side.        Dorsalis pedis pulses are 2+ on the right side and 2+ on the left side.        Posterior tibial pulses are 2+ on the right side and 2+ on the left side.      Heart sounds: Normal heart sounds.   Pulmonary:      Effort: Pulmonary effort is normal.      Breath sounds: Rales present.      Comments: Bibasilar rales noted  Abdominal:      General: Bowel sounds are normal. There is distension.      Palpations: Abdomen is soft.   Genitourinary:     Comments: PureWick suction container with 200 cc of clear yellow urine noted at bedside  Musculoskeletal:         General: Normal range of motion.      Cervical back: Normal  "range of motion.      Right lower leg: 3+ Edema present.      Left lower leg: 3+ Edema present.   Skin:     General: Skin is warm and dry.   Neurological:      General: No focal deficit present.      Mental Status: She is alert and oriented to person, place, and time.   Psychiatric:         Mood and Affect: Mood normal.         Behavior: Behavior normal.       Results review   Results Review:    I have reviewed the patient's new clinical results. 07/12/24 15:16 EDT    Results from last 7 days   Lab Units 07/12/24  0912 07/12/24  0652   HSTROP T ng/L 81* 90*     Lab Results   Component Value Date    PROBNP 3,998.0 (H) 07/12/2024    PROBNP 1,803.0 (H) 05/28/2024    PROBNP 1,118.0 (H) 05/23/2024     Results from last 7 days   Lab Units 07/12/24  0652   WBC 10*3/mm3 10.69   HEMOGLOBIN g/dL 8.8*   PLATELETS 10*3/mm3 145     Results from last 7 days   Lab Units 07/12/24  0652   SODIUM mmol/L 136   POTASSIUM mmol/L 3.8   CHLORIDE mmol/L 95*   CO2 mmol/L 26.5   BUN mg/dL 22*   CREATININE mg/dL 1.47*   CALCIUM mg/dL 9.2   GLUCOSE mg/dL 264*   ALT (SGPT) U/L 26   AST (SGOT) U/L 25     Lab Results   Component Value Date    MG 1.8 05/23/2024    MG 1.9 04/18/2024    MG 1.7 04/13/2024     Lab Results   Component Value Date    CHOL 137 09/11/2020    TRIG 80 09/11/2020    HDL 43 09/11/2020    LDL 78 09/11/2020     Estimated Creatinine Clearance: 50 mL/min (A) (by C-G formula based on SCr of 1.47 mg/dL (H)).  Lab Results   Component Value Date    HGBA1C 7.70 (H) 03/26/2024       Lab Results   Component Value Date    LABHEPA <0.10 (L) 11/15/2023    LABHEPA 0.21 (L) 11/15/2023    LABHEPA <0.10 (L) 11/15/2023    LABHEPA 0.31 11/14/2023         Lab Results   Component Value Date    TSH 2.960 01/31/2024      No results found for: \"URICACID\"  Pain Management Panel  More data exists         Latest Ref Rng & Units 11/13/2023 9/12/2020   Pain Management Panel   Creatinine, Urine mg/dL  mg/dL - 46.3  46.3    Amphetamine, Urine Qual Negative " "Negative  -   Barbiturates Screen, Urine Negative Negative  -   Benzodiazepine Screen, Urine Negative Negative  -   Buprenorphine, Screen, Urine Negative Negative  -   Cocaine Screen, Urine Negative Negative  -   Fentanyl, Urine Negative Negative  -   Methadone Screen , Urine Negative Negative  -   Methamphetamine, Ur Negative Negative  -     Microbiology Results (last 10 days)       ** No results found for the last 240 hours. **           No results found for: \"BLOODCX\"      EC2024        ECG/EMG Results (last 24 hours)       Procedure Component Value Units Date/Time    ECG 12 Lead Chest Pain [[id number]] Collected: 24 0643     Updated: 24 1022     QT Interval 418 ms      QTC Interval 485 ms     Narrative:      Test Reason : Chest Pain  Blood Pressure :   */*   mmHG  Vent. Rate :  81 BPM     Atrial Rate :  81 BPM     P-R Int : 208 ms          QRS Dur : 146 ms      QT Int : 418 ms       P-R-T Axes :  32 -59  97 degrees     QTc Int : 485 ms    Normal sinus rhythm  Left axis deviation  Nonspecific intraventricular block  Possible Lateral infarct (cited on or before 28-MAY-2024)  Abnormal ECG  When compared with ECG of 2024 18:17,  Significant changes have occurred  Confirmed by Jesus Manuel Deshpande () on 2024 10:20:53 AM    Referred By: FAB           Confirmed By: Jesus Manuel Deshpande            TELEMETRY:   SR 60s           RADIOLOGY STUDIES:  Imaging Results (Last 72 Hours)       Procedure Component Value Units Date/Time    XR Chest 1 View [[id number]] Collected: 24 0810     Updated: 24    Narrative:      EXAM:    XR Chest, 1 View     EXAM DATE:    2024 7:32 AM     CLINICAL HISTORY:    cp     TECHNIQUE:    Frontal view of the chest.     COMPARISON:    2024     FINDINGS:    Lungs and pleural spaces:  Unremarkable as visualized.  No  consolidation.  No pneumothorax.    Heart:  Unremarkable as visualized.  No cardiomegaly.    Mediastinum:  Unremarkable as " visualized.  Normal mediastinal contour.    Bones/joints:  Unremarkable as visualized.  No acute fracture.    Tubes, lines and devices:  Defibrillator pads left chest.       Impression:        No acute cardiopulmonary findings identified.        This report was finalized on 2024 8:10 AM by Dr. Marlo Parr MD.               ALLERGIES: Phenergan [promethazine]    CURRENT MEDICATIONS:  Current list of medications may not reflect those currently placed in orders that are not signed or are being held.             sodium chloride    Thank you very much for asking us to be involved in this patient's care.  We will follow along with you. Please do not hesitate to call for any questions or concerns.     I have discussed the patients findings and recommendations with the patient.    Electronically signed by KENNY Finn, 24, 4:07 PM EDT.     Electronically signed by Ramon Sahu MD, 24, 6:15 PM EDT.            Please note that portions of this note were copied and has been reviewed and is accurate as of 2024 .      Please note that portions of this note were completed with a voice recognition program.     Electronically signed by Ramon Sahu MD at 24 1818          Physical Therapy Notes (most recent note)        Ave Haynes, PARK at 07/15/24 1423  Version 1 of 1         Acute Care - Physical Therapy Initial Evaluation   Isaías     Patient Name: Yumiko Purdy  : 1965  MRN: [id number]  Today's Date: 7/15/2024   Onset of Illness/Injury or Date of Surgery: 24  Visit Dx:     ICD-10-CM ICD-9-CM   1. Urinary tract infection without hematuria, site unspecified  N39.0 599.0   2. Chronic kidney disease, unspecified CKD stage  N18.9 585.9   3. Acute on chronic congestive heart failure, unspecified heart failure type  I50.9 428.0     Patient Active Problem List   Diagnosis    Dilated cardiomyopathy    CKD (chronic kidney disease) stage 2, GFR 60-89 ml/min    Severe  obesity (BMI 35.0-39.9) with comorbidity    Chronic anemia    Elevated bilirubin    Acute on chronic combined systolic and diastolic CHF (congestive heart failure)    Hyponatremia    Paroxysmal atrial fibrillation    Cirrhosis    Coronary artery disease involving native coronary artery of native heart without angina pectoris    Atrial fibrillation with RVR    Ventricular tachycardia    Wide-complex tachycardia     Past Medical History:   Diagnosis Date    Anemia     CHF (congestive heart failure)     Chronic a-fib     stated by patient     Cirrhosis of liver     stated by patient     Diabetes mellitus     Ventricular tachycardia      Past Surgical History:   Procedure Laterality Date    APPENDECTOMY      CARDIAC CATHETERIZATION N/A 11/10/2020    Procedure: Left Heart Cath;  Surgeon: Charlee Ken MD;  [facility]: [facility] CATH [facility]ASIVE LOC[facility];  Service: Cardiovascular;  Laterality: N/A;    CHOLECYSTECTOMY      TOE AMPUTATION Right     stated by patient.      PT Assessment (Last 12 Hours)       PT Evaluation and Treatment       Row Name 07/15/24 1359          Physical Therapy Time and Intention    Document Type evaluation  -     Mode of Treatment physical therapy  -     Patient Effort good  -       Row Name 07/15/24 1359          General Information    Patient Profile Reviewed yes  -     Onset of Illness/Injury or Date of Surgery 07/12/24  -     Referring Physician Kya  -     Patient Observations alert;cooperative;agree to therapy  -     General Observations of Patient Nursing reports patient was just sitting up in chair for a few hours prior to therapy arrival.  She had just been assisted back into bed.  -     Prior Level of Function independent:;min assist:;all household mobility;community mobility;transfer;gait;bed mobility  -     Equipment Currently Used at Home commode, bedside;hospital bed;oxygen;walker, rolling;wheelchair  -     Existing Precautions/Restrictions fall;oxygen therapy device  and L/min  -[facility] Name 07/15/24 1351          Previous Level of Function/Home Environm    Bed Mobility, Premorbid Functional Level independent  -     Transfers, Premorbid Functional Level independent;partial assistance  -     Household Ambulation, Premorbid Functional Level independent;partial assistance;uses device or equipment  -     Community Ambulation, Premorbid Functional Level uses wheelchair  -     Previous Level of Function, Premorbid Pt reports she was anywhere from SBA to Linda at home.  Her son is home all the time and can assist her as needed.  She reports no falls at home.  She uses a w/c for longer distances.  She has home health nursing and used to have home health therapy.  -[facility] Name 07/15/24 1357          Living Environment    Current Living Arrangements home  -     People in Home child(ferdinand), adult  -     Primary Care Provided by self;child(ferdinand)  -[facility] Name 07/15/24 1352          Home Use of Assistive/Adaptive Equipment    Equipment Currently Used at Home wheelchair;walker, standard;pulse ox;oxygen  -[facility] Name 07/15/24 1351          Pain    Pre/Posttreatment Pain Comment Pt reports chronic pain in hands, legs and feet from arthritis and diabetes.  She did not rate pain.  -[facility] Name 07/15/24 1350          Cognition    Affect/Mental Status (Cognition) [facility]     Orientation Status (Cognition) oriented x 4  -     Follows Commands (Cognition) [facility]     Personal Safety Interventions fall prevention program maintained;gait belt;muscle strengthening facilitated;nonskid shoes/slippers when out of bed;supervised activity  -[facility] Name 07/15/24 1354          Range of Motion (ROM)    Range of Motion ROM is [facility]  -[facility] Name 07/15/24 1358          Strength Comprehensive (MMT)    Comment, General Manual Muscle Testing (MMT) Assessment Strength in LE grossly 3/5  -[facility] Name 07/15/24 1354          Sensory Assessment (Somatosensory)     Sensory Assessment Pt reports decreased sensation in bilateral LE from neuropathy.  -       Row Name 07/15/24 1350          Bed Mobility    Bed Mobility supine-sit;sit-supine;rolling left;rolling right;scooting/bridging  -     Rolling Left [place] (Bed Mobility) minimum assist (75% patient effort);1 person assist  -     Rolling Right [place] (Bed Mobility) minimum assist (75% patient effort);1 person assist  -KP     Scooting/Bridging [place] (Bed Mobility) 1 person assist;moderate assist (50% patient effort);verbal cues  -KP     Supine-Sit [place] (Bed Mobility) minimum assist (75% patient effort);1 person assist;verbal cues  -KP     Sit-Supine [place] (Bed Mobility) moderate assist (50% patient effort);1 person assist;verbal cues  -KP     Bed Mobility, Safety Issues decreased use of arms for pushing/pulling;decreased use of legs for bridging/pushing  -     Assistive Device (Bed Mobility) draw sheet;bed rails;head of bed elevated  -       Row Name 07/15/24 2506          Transfers    Transfers sit-stand transfer;stand-sit transfer  -       Row Name 07/15/24 4408          Sit-Stand Transfer    Sit-Stand [place] (Transfers) moderate assist (50% patient effort);1 person assist;verbal cues  -     Assistive Device (Sit-Stand Transfers) walker, front-wheeled  -       Row Name 07/15/24 6806          Stand-Sit Transfer    Stand-Sit [place] (Transfers) minimum assist (75% patient effort);moderate assist (50% patient effort);1 person assist;verbal cues  -     Assistive Device (Stand-Sit Transfers) walker, front-wheeled  -       Row Name 07/15/24 0924          Gait/Stairs (Locomotion)    Comment, (Gait/Stairs) Pt performed side steps with FWW towards head of bed.  She required CGA/Linda for balance with FWW during stepping.  -       Row Name 07/15/24 0308          Plan of Care Review    Plan of Care Reviewed With patient  -     Outcome Evaluation PT evaluation completed.   Patient required minimum to moderate assist for mobility and was able to perform stepping with FWW.  She presents with swelling in LE, decreased strength and endurance.  She will benefit from continued skilled PT services this admission.  -       Row Name 07/15/24 2821          Positioning and Restraints    Pre-Treatment Position in bed  -     Post Treatment Position bed  -KP     In Bed notified nsg;fowlers;call light within reach;encouraged to call for assist;exit alarm on;side rails up x3  Lines in tact and needs in reach  -       Row Name 07/15/24 2300          Therapy Assessment/Plan (PT)    Patient/Family Therapy Goals Statement (PT) Return home  -     Functional Level at Time of Evaluation (PT) minimum to moderate assist  -     PT Diagnosis (PT) Complex tachycardia  -     Rehab Potential (PT) good, to achieve stated therapy goals  -     Criteria for Skilled Interventions Met (PT) yes;meets criteria;skilled treatment is necessary  -     Therapy Frequency (PT) other (see comments)  2-5x/wk  -     Problem List (PT) problems related to;balance;mobility;strength  -     Activity Limitations Related to Problem List (PT) unable to transfer safely;unable to ambulate safely  -       Row Name 07/15/24 6363          PT Evaluation Complexity    History, PT Evaluation Complexity 3 or more personal factors and/or comorbidities  -     Examination of Body Systems (PT Eval Complexity) total of 3 or more elements  -     Clinical Presentation (PT Evaluation Complexity) evolving  -     Clinical Decision Making (PT Evaluation Complexity) moderate complexity  -     Overall Complexity (PT Evaluation Complexity) moderate complexity  -       Row Name 07/15/24 7297          Physical Therapy Goals    Bed Mobility Goal Selection (PT) bed mobility, PT goal 1  -     Transfer Goal Selection (PT) transfer, PT goal 1  -     Gait Training Goal Selection (PT) gait training, PT goal 1  -       Row Name  07/15/24 1359          Bed Mobility Goal 1 (PT)    Activity/Assistive Device (Bed Mobility Goal 1, PT) sit to supine/supine to sit  -KP     [place] Level/Cues Needed (Bed Mobility Goal 1, PT) supervision required  -KP     Time Frame (Bed Mobility Goal 1, PT) by discharge;long term goal (LTG)  -       Row Name 07/15/24 1359          Transfer Goal 1 (PT)    Activity/Assistive Device (Transfer Goal 1, PT) sit-to-stand/stand-to-sit;walker, rolling  -KP     [place] Level/Cues Needed (Transfer Goal 1, PT) standby assist  -KP     Time Frame (Transfer Goal 1, PT) long term goal (LTG);by discharge  -       Row Name 07/15/24 1355          Gait Training Goal 1 (PT)    Activity/Assistive Device (Gait Training Goal 1, PT) gait (walking locomotion);assistive device use;walker, rolling  -KP     [place] Level (Gait Training Goal 1, PT) standby assist  -KP     Distance (Gait Training Goal 1, PT) 50ft  -KP     Time Frame (Gait Training Goal 1, PT) long term goal (LTG);by discharge  -               User Key  (r) = Recorded By, (t) = Taken By, (c) = Cosigned By      Initials Name Provider Type    Ave Greene, PT Physical Therapist                      PT Recommendation and Plan  Planned Therapy Interventions (PT): balance training, bed mobility training, gait training, home exercise program, motor coordination training, neuromuscular re-education, patient/family education, postural re-education, stair training, strengthening, stretching, manual therapy techniques, transfer training, wheelchair management/propulsion training  Therapy Frequency (PT): other (see comments) (2-5x/wk)  Plan of Care Reviewed With: patient  Outcome Evaluation: PT evaluation completed.  Patient required minimum to moderate assist for mobility and was able to perform stepping with FWW.  She presents with swelling in LE, decreased strength and endurance.  She will benefit from continued skilled PT services this admission.       Time  Calculation:    PT Charges       Row Name 07/15/24 1423             Time Calculation    PT Received On 07/15/24  -      PT Goal Re-Cert Due Date 24  -                User Key  (r) = Recorded By, (t) = Taken By, (c) = Cosigned By      Initials Name Provider Type    Ave Greene PT Physical Therapist                  Therapy Charges for Today       Code Description Service Date Service Provider Modifiers Qty    71697350448 HC PT EVAL MOD COMPLEXITY 4 7/15/2024 Ave Haynes, PT GP 1            PT G-Codes  AM-PAC 6 Clicks Score (PT): 15    Ave Haynes PT  7/15/2024      Electronically signed by Ave Haynes PT at 07/15/24 1424       Occupational Therapy Notes (most recent note)    No notes exist for this encounter.       Discharge Summary    No notes of this type exist for this encounter.       Discharge Order (From admission, onward)       Start     Ordered    24 0846  Discharge patient  Once        Expected Discharge Date: 24   Discharge Disposition: Home-Health Care [facility]   Physician of Record for Attribution - Please select from Treatment Team: SWATI WESTFALL [[id number]]   Review needed by CMO to determine Physician of Record: No      Question Answer Comment   Physician of Record for Attribution - Please select from Treatment Team SWATI WESTFALL    Review needed by CMO to determine Physician of Record No        24 0845                  [address] [address]  Phone:  [phone]  Fax:  [phone] Date: 2024      Ambulatory Referral to Home Health     Patient:  Yumiko Purdy MRN:  [id number]   [address]   [place] [address] :  1965  SSN:    Phone: [phone] Sex:  F      INSURANCE [employer] # SUBSCRIBER ID   Primary:    [facility] [id number]   [id number]      Referring Provider Information:  LYNDSAY MEDINA Phone: [phone] Fax: [phone]       Referral Information:   # Visits:   999 Referral Type: Home Health [42]   Urgency:  Routine Referral Reason: Specialty Services Required   Start Date: Jul 16, 2024 End Date:  To be determined by Insurer   Diagnosis: Wide-complex tachycardia (R00.0 [ICD-10-CM] 785.0 [ICD-9-CM])  Acute on chronic combined systolic and diastolic CHF (congestive heart failure) (I50.43 [ICD-10-CM] 428.43,428.0 [ICD-9-CM])      Refer to Dept:   Refer to Provider:   Refer to Provider Phone:   Refer to Facility:       Face to Face Visit Date: 7/16/2024  Follow-up provider for Plan of Care? I treated the patient in an acute care facility and will not continue treatment after discharge.  Follow-up provider: LENNY LEACH [[id number]]  Reason/Clinical Findings: generalized weakness, severe heart disease, heart failure  Describe mobility limitations that make leaving home difficult: recent prolonged hospitalization, resumption of previous home health  Nursing/Therapeutic Services Requested: Skilled Nursing  Nursing/Therapeutic Services Requested: Physical Therapy  Nursing/Therapeutic Services Requested: Occupational Therapy  Skilled nursing orders: Medication education  Skilled nursing orders: CHF management  PT orders: Gait Training  PT orders: Strengthening  PT orders: Home safety assessment  Weight Bearing Status: As Tolerated  Occupational orders: Activities of daily living  Occupational orders: Strengthening  Occupational orders: Energy conservation  Frequency: 1 Week 1     This document serves as a request of services and does not constitute Insurance authorization or approval of services.  To determine eligibility, please contact the members Insurance carrier to verify and review coverage.     If you have medical questions regarding this request for services. Please contact [facility] at [phone] during normal business hours.        Authorizing Provider:Milagro Boudreaux DO  Authorizing Provider's NPI: [id number]  Order Entered By: Kanika  Milagro Pompa DO 7/16/2024  9:46 AM     Electronically signed by: Milagro Boudreaux DO 7/16/2024  9:46 AM

## 2024-07-16 NOTE — DISCHARGE SUMMARY
[facility] HOSPITALISTS DISCHARGE SUMMARY    Patient Identification:  Name:  Yumiko Purdy  Age:  58 y.o.  Sex:  female  :  1965  MRN:  [id number]  Visit Number:  [id number]    Date of Admission: 2024  Date of Discharge:  2024    PCP: Masha Davidson APRN    DISCHARGE DIAGNOSIS  Tach wide-complex tachycardia status post cardioversion in the field per EMS  #Paroxysmal atrial fibrillation  #Acute on chronic HFpEF  #Klebsiella oxytoca UTI  #Persistent nausea and vomiting, improved  #Generalized weakness/physical debility    CONSULTS   Cardiology    PROCEDURES PERFORMED  Echocardiogram:    Left ventricular ejection fraction appears to be 46 - 50%.    Mildly reduced right ventricular systolic function noted.    The right ventricular cavity is mild to moderately dilated.    The left atrial cavity is mildly dilated.    The right atrial cavity is mildly  dilated.    There is posterior mitral leaflet thickening present.    Estimated right ventricular systolic pressure from tricuspid regurgitation is markedly elevated (>55 mmHg). Calculated right ventricular systolic pressure from tricuspid regurgitation is 58 mmHg.    The aortic root measures 3.1 cm.    HOSPITAL COURSE  Patient is a 58 y.o. female presented to [facility] complaining of chest pain.  Please see the admitting history and physical for further details.      Patient was admitted to the hospital medicine service.  Cardiology was consulted early on the patient's hospitalization. Patient was continued on amiodarone as per cardiology recommendations.  The patient also received intravenous diuretics.  Echocardiogram was obtained and he has noted above.    Patient did not have any recurrent wide-complex tachycardia during her hospital stay.  Patient had a digoxin level obtained that was found to be with normal limits.  Was continued on amiodarone as noted above.    Additionally, the patient was noted to have a  symptomatic urinary tract infection.  She was started on IV ceftriaxone and cultures did reveal a pansensitive Klebsiella oxytocin.  Patient completed a 5-day course of IV ceftriaxone during her hospitalization.    Patient with some persistent nausea and vomiting earlier on during her hospitalization.  CT abdomen/pelvis revealed a known large ventral hernia without evidence of incarceration or obstruction.  It was thought that the patient's UTI or perhaps medications may have been causing her nausea and vomiting.  Interestingly, this did appear to improve towards the end of her hospital stay so it may have been related to the acute UTI.    Patient was converted from IV Lasix back to her home dose of p.o. Bumex.  Patient currently on an increased dose of 2 mg p.o. 3 times daily for a total of 5 days (2 more days at the time of discharge) then back to 2 mg twice daily.  The patient's creatinine remained stable during her hospital stay.     Unfortunately, the patient was not a good candidate for ACE inhibitors/ARB/ARNI due to her chronic kidney disease.  Blood pressure and heart rate are also on the lower side, so patient was not started on hydralazine, oral nitrates and/or a beta-blocker.  Patient will follow-up with cardiology upon discharge and hopefully these medications can be added as BP and heart rate tolerates.    Patient was evaluated by physical and Occupational Therapy during her hospital stay.  It was felt the patient had maximized benefit of her inpatient hospital stay and patient was discharged home with resumption of her home health and additional orders for physical and Occupational Therapy.    Patient was discharged home in hemodynamically stable condition.    VITAL SIGNS:  Temp:  [97.5 °F (36.4 °C)-98.3 °F (36.8 °C)] 98 °F (36.7 °C)  Heart Rate:  [57-69] 63  Resp:  [14-24] 24  BP: ()/(35-85) 106/66  SpO2:  [94 %-100 %] 98 %  on  Flow (L/min):  [2] 2;   Device (Oxygen Therapy): nasal  cannula    Body mass index is 40.54 kg/m².  Wt Readings from Last 3 Encounters:   07/16/24 110 kg (243 lb 9.7 oz)   06/27/24 102 kg (225 lb)   06/21/24 102 kg (225 lb)       PHYSICAL EXAM:  Physical Exam  Constitutional:       General: She is not in acute distress.     Appearance: She is well-developed.   HENT:      Head: Normocephalic and atraumatic.   Eyes:      Conjunctiva/sclera: Conjunctivae normal.   Neck:      Trachea: No tracheal deviation.   Cardiovascular:      Rate and Rhythm: Normal rate and regular rhythm.      Heart sounds: No murmur heard.     No friction rub. No gallop.      Comments: No significant LE edema  Pulmonary:      Effort: No respiratory distress.      Breath sounds: Normal breath sounds. No wheezing or rales.   Abdominal:      General: Bowel sounds are normal. There is no distension.      Palpations: Abdomen is soft.      Tenderness: There is no abdominal tenderness. There is no guarding.   Musculoskeletal:         General: No tenderness. Normal range of motion.      Comments: R great toe s/p amputation   Skin:     General: Skin is warm and dry.      Findings: No erythema or rash.   Neurological:      Mental Status: She is alert and oriented to person, place, and time.      Cranial Nerves: No cranial nerve deficit.          DISCHARGE MEDICATIONS:     Your medication list        START taking these medications        Instructions Last Dose Given Next Dose Due   polyethylene glycol 17 g packet  Commonly known as: MIRALAX      Take 17 g by mouth Daily As Needed (Use if senna-docusate is ineffective).              CHANGE how you take these medications        Instructions Last Dose Given Next Dose Due   amiodarone 200 MG tablet  Commonly known as: PACERONE  What changed:   how much to take  when to take this      Take 1 tablet by mouth Daily.       bumetanide 2 MG tablet  Commonly known as: BUMEX  Start taking on: July 16, 2024  What changed: See the new instructions.      Take 1 tablet by mouth  3 (Three) Times a Day for 2 days, THEN 1 tablet 2 (Two) Times a Day for 30 days.       HYDROcodone-acetaminophen 7.5-325 MG per tablet  Commonly known as: NORCO  What changed:   when to take this  reasons to take this      Take 1 tablet by mouth Every 12 (Twelve) Hours As Needed for Moderate Pain.              CONTINUE taking these medications        Instructions Last Dose Given Next Dose Due   apixaban 5 MG tablet tablet  Commonly known as: ELIQUIS      Take 1 tablet by mouth Every 12 (Twelve) Hours. Indications: Atrial Fibrillation       Aspirin Low Dose 81 MG EC tablet  Generic drug: aspirin      Take 1 tablet by mouth Daily for 100 days.       busPIRone 10 MG tablet  Commonly known as: BUSPAR      Take 1 tablet by mouth 2 (Two) Times a Day.       ferrous sulfate 325 (65 FE) MG tablet      Take 1 tablet by mouth Daily.       fluticasone 50 MCG/ACT nasal spray  Commonly known as: FLONASE      2 sprays into the nostril(s) as directed by provider Daily.       HumaLOG KwikPen 100 UNIT/ML solution pen-injector  Generic drug: Insulin Lispro (1 Unit Dial)      Inject 15 Units under the skin into the appropriate area as directed 3 (Three) Times a Day With Meals.       Lantus SoloStar 100 UNIT/ML injection pen  Generic drug: Insulin Glargine      Inject 45 Units under the skin into the appropriate area as directed 2 (Two) Times a Day.       levothyroxine 50 MCG tablet  Commonly known as: SYNTHROID, LEVOTHROID      Take 1 tablet by mouth Daily.       levothyroxine 25 MCG tablet  Commonly known as: SYNTHROID, LEVOTHROID      Take 0.5 tablets by mouth Every Morning.       nitroglycerin 0.4 MG SL tablet  Commonly known as: NITROSTAT      Place 1 tablet under the tongue Every 5 (Five) Minutes As Needed for Chest Pain. Take no more than 3 doses in 15 minutes.       omeprazole 20 MG capsule  Commonly known as: priLOSEC      Take 1 capsule by mouth Daily.       ondansetron 4 MG tablet  Commonly known as: ZOFRAN      Take 1 tablet  by mouth Every 8 (Eight) Hours As Needed.       Qutenza 8 % kit topical system  Generic drug: capsaicin-cleansing gel      Apply 1 each topically to the appropriate area as directed Take As Directed.       Trintellix 5 MG tablet tablet  Generic drug: Vortioxetine HBr      Take 1 tablet by mouth Daily With Breakfast.              STOP taking these medications      digoxin 125 MCG tablet  Commonly known as: LANOXIN                  Where to Get Your Medications        These medications were sent to [place] [facility] [address] [place] [place] [address]      Hours: Monday to Friday 7 AM to 6 PM Phone: [phone]   bumetanide 2 MG tablet       Information about where to get these medications is not yet available    Ask your nurse or doctor about these medications  amiodarone 200 MG tablet  HYDROcodone-acetaminophen 7.5-325 MG per tablet  polyethylene glycol 17 g packet           DISCHARGE DISPOSITION   Stable    Diet Instructions       Diet: Cardiac Diets, Diabetic Diets; Healthy Heart (2-3 Na+); Regular (IDDSI 7); Thin (IDDSI 0); Consistent Carbohydrate      Discharge Diet:  Cardiac Diets  Diabetic Diets       Cardiac Diet: Healthy Heart (2-3 Na+)    Texture: Regular (IDDSI 7)    Fluid Consistency: Thin (IDDSI 0)    Diabetic Diet: Consistent Carbohydrate          Activity Instructions       Gradually Increase Activity Until at Pre-Hospitalization Level      Measure Blood Pressure            Future Appointments   Date Time Provider Department Center   7/26/2024  9:00 AM COR NM ADMIN RM 1 CARD BH COR NM COR   7/26/2024  9:45 AM COR NM 2 CARD BH COR NM COR   7/26/2024 10:15 AM COR STRESS LAB 2 CARD BH COR STR COR   7/26/2024 11:15 AM COR NM 2 CARD BH COR NM COR   7/26/2024  2:00 PM [facility] BH COR HFC COR   8/6/2024 11:00 AM Ramon Sahu MD [facility] HRTS COR COR   8/20/2024 10:30 AM Rosa Maria Moffett PA-C [facility] GE CORBN COR     Your Scheduled Appointments      Jul 26, 2024  9:00 AM  NUC MED INJECTION  VISIT with [facility] ADMIN  1 [facility] NUCLEAR MEDICINE ([place]) 1 [facility] [place] [address]  [phone]   No Caffeine 12 hours before test.  Please expect testing to take at least 4-5 hours.  If your test is before 11am, do not eat or drink after midnight.  If your test is after 11am, you may have a light breakfast. Diabetics may have a very light breakfast (1 toast 1 egg) when taking insulin, regardless of appointment time.  Take all medications as normal, except long acting Nitrates (Imdur), Isordil).  If you have chest pain and need Nitroglycerin, please take it.  However, please alert the technologist upon arrival if nitroglycerin has been taken.  If using Inhalers, bring them with you.  Please bring your medication list (do not bring bottles).  Ladies avoid wearing dresses or whole slips.  Men do not wear overalls.  No flip-flops or sandals.        You will be receiving a call for pre-registration prior to date of appointment.         Jul 26, 2024  9:45 AM  NUC MED SCAN VISIT with [facility] 2 [facility] NUCLEAR MEDICINE ([place]) 1 [facility] [address]  [phone]   No Caffeine 12 hours before test.  Please expect testing to take at least 4-5 hours.  If your test is before 11am, do not eat or drink after midnight.  If your test is after 11am, you may have a light breakfast. Diabetics may have a very light breakfast (1 toast 1 egg) when taking insulin, regardless of appointment time.  Take all medications as normal, except long acting Nitrates (Imdur), Isordil).  If you have chest pain and need Nitroglycerin, please take it.  However, please alert the technologist upon arrival if nitroglycerin has been taken.  If using Inhalers, bring them with you.  Please bring your medication list (do not bring bottles).  Ladies avoid wearing dresses or whole slips.  Men do not wear overalls.  No flip-flops or sandals.        You will be receiving a call for  pre-registration prior to date of appointment.         Jul 26, 2024 10:15 AM  NUC MED STRESS VISIT with COR STRESS LAB 2 [facility] STRESS TESTS ([place]) 1 [facility] [address]  [phone]   No Caffeine 12 hours before test.  Please expect testing to take at least 4-5 hours.  If your test is before 11am, do not eat or drink after midnight.  If your test is after 11am, you may have a light breakfast. Diabetics may have a very light breakfast (1 toast 1 egg) when taking insulin, regardless of appointment time.  Take all medications as normal, except long acting Nitrates (Imdur), Isordil).  If you have chest pain and need Nitroglycerin, please take it.  However, please alert the technologist upon arrival if nitroglycerin has been taken.  If using Inhalers, bring them with you.  Please bring your medication list (do not bring bottles).  Ladies avoid wearing dresses or whole slips.  Men do not wear overalls.  No flip-flops or sandals.        You will be receiving a call for pre-registration prior to date of appointment.         Jul 26, 2024 11:15 AM  NUC MED SCAN VISIT with COR NM 2 [facility] NUCLEAR MEDICINE ([place]) 1 [facility] [place] [address]  [phone]   No Caffeine 12 hours before test.  Please expect testing to take at least 4-5 hours.  If your test is before 11am, do not eat or drink after midnight.  If your test is after 11am, you may have a light breakfast. Diabetics may have a very light breakfast (1 toast 1 egg) when taking insulin, regardless of appointment time.  Take all medications as normal, except long acting Nitrates (Imdur), Isordil).  If you have chest pain and need Nitroglycerin, please take it.  However, please alert the technologist upon arrival if nitroglycerin has been taken.  If using Inhalers, bring them with you.  Please bring your medication list (do not bring bottles).  Ladies avoid wearing dresses or whole slips.  Men do not  wear overalls.  No flip-flops or sandals.        You will be receiving a call for pre-registration prior to date of appointment.         Jul 26, 2024  2:00 PM  Follow Up with [facility]  [facility] [address]  [facility] [address]  [phone]   Arrive 15 minutes prior to appointment.         Aug 06, 2024 11:00 AM  Follow Up with Ramon Sahu MD  [facility] CARDIOLOGY ([place]) [address]  [facility] [address]  [phone]   -Bring photo ID, insurance card, and list of medications to appointment  -If testing was completed outside of [facility] then patient must bring images on a disc  -Copay will be collected at time of appointment  -Established patients should arrive 10 minutes prior to appointment         Aug 20, 2024 10:30 AM  New Patient with Rosa Maria Moffett PA-C  [facility] GASTROENTEROLOGY & UROLOGY ([place]) [address] [facility] [address]  [facility] [address] [phone]   Please bring valid picture ID, current insurance cards, a list of all current medications and arrive 15 minutes early in order to complete paperwork. If referred by a provider and imaging has been completed, bring disc with images to appointment.        Additional instructions:    Follow up with PCP on 7/23 @ 8:30 AM          Additional Instructions for the Follow-ups that You Need to Schedule       Ambulatory Referral to Home Health   As directed      Face to Face Visit Date: 7/16/2024   Follow-up provider for Plan of Care?: I treated the patient in an acute care facility and will not continue treatment after discharge.   Follow-up provider: LENNY LEACH [[id number]]   Reason/Clinical Findings: generalized weakness, severe heart disease, heart failure   Describe mobility limitations that make leaving home difficult: recent prolonged hospitalization, resumption of previous home health   Nursing/Therapeutic Services Requested: Skilled  Nursing Physical Therapy Occupational Therapy   Skilled nursing orders: Medication education CHF management   PT orders: Gait Training Strengthening Home safety assessment   Weight Bearing Status: As Tolerated   Occupational orders: Activities of daily living Strengthening Energy conservation   Frequency: 1 Week 1        Discharge Follow-up with PCP   As directed       Currently Documented PCP:    Masha Davidson APRN    PCP Phone Number:    [phone]     Follow Up Details: 1 week with BMP; CHF exacerbation hospital follow up, UTI        Discharge Follow-up with Specified Provider: Keep currently scheduled appts with Dr. Gutierrez, Dr. Sahu/Adonay Rodriguez and the [facility]   As directed      To: Keep currently scheduled appts with Dr. Gutierrez, Dr. Sahu/Adonay Rodriguez and the [facility]               Follow-up Information       Masha Davidson APRN .    Specialty: Family Medicine  Why: 1 week with BMP; CHF exacerbation hospital follow up, UTI  Contact information:  [address] [place] [address] DR OTERO [address] [place] [address]  [phone]                              TEST  RESULTS PENDING AT DISCHARGE  Pending Labs       Order Current Status    Blood Culture - Blood, Hand, Left Preliminary result    Blood Culture - Blood, Hand, Right Preliminary result             CODE STATUS  Code Status and Medical Interventions:   Ordered at: 07/12/24 1528     Code Status (Patient has no pulse and is not breathing):    CPR (Attempt to Resuscitate)     Medical Interventions (Patient has pulse or is breathing):    Full Support       Milagro Boudreaux DO  07/16/24  09:47 EDT    Please note that this discharge summary required more than 30 minutes to complete.    Please send a copy of this dictation to the following providers:  Masha Davidson APRN

## 2024-07-16 NOTE — DISCHARGE PLACEMENT REQUEST
"[facility]  [facility] [address]  Phone:  [phone]  Fax:  [phone]        Patient:     Yumiko Purdy MRN:  [id number]   [address]   [place] [address] :  1965  SSN:    Phone: [phone] Sex:  F      INSURANCE PAYOR PLAN GROUP # SUBSCRIBER ID   Primary:    [facility] [id number]   [id number]   Admitting Diagnosis: Wide-complex tachycardia [R00.0]  Order Date:  2024        Resume Oxygen Therapy After Discharge       (Order ID: [id number])     Diagnosis:         Priority:  Routine Expected Date:   Expiration Date:        Interval:   Count:    Device: Nasal Cannula     Specimen Type:   Specimen Source:   Specimen Taken Date:   Specimen Taken Time:                  Authorizing Provider:Milagro Boudreaux DO  Authorizing Provider's NPI: [id number]  Order Entered By: Milagro Boudreaux DO 2024  9:46 AM     Electronically signed by: Milagro Boudreaux DO 2024  9:46 AM       Yumiko Purdy (58 y.o. Female)       Date of Birth   1965    Social Security Number       Address   [address] [address]    Home Phone   [phone]    MRN   [id number]       [facility]    Marital Status                               Admission Date   24    Admission Type   Emergency    Admitting Provider   Charles Boland MD    Attending Provider   Milagro Boudreaux DO    Department, Room/Bed   [facility], P220/1P       Discharge Date       Discharge Disposition   [facility]    Discharge Destination                                 Attending Provider: Milagro Boudreaux DO    Allergies: Phenergan [Promethazine]    Isolation: None   Infection: None   Code Status: CPR    Ht: 165.1 cm (65\")   Wt: 110 kg (243 lb 9.7 oz)    Admission Cmt: None   Principal Problem: Wide-complex tachycardia [R00.0]                   Active Insurance as of 2024       Primary Coverage       " Payor Plan Insurance Group Employer/Plan Group    [employer] [place] [employer] [place]ICA[place]        Payor Plan Address Payor Plan Phone Number Payor Plan Fax Number Effective Dates    [address] [address] [phone]  9/9/2020 - None Entered    [facility] [id number]         Subscriber Name Subscriber Birth Date Member ID       DIO PURDY 1965 [id number]                     Emergency Contacts        (Rel.) Home Phone Work Phone Mobile Phone    KATHY PURDY (Son) [phone] -- --    Bolivar Purdy (Other) -- -- [phone]

## 2024-07-16 NOTE — CASE MANAGEMENT/SOCIAL WORK
Discharge Planning Assessment   [facility]     Patient Name: Yumiko Purdy  MRN: [id number]  Today's Date: 7/16/2024    Admit Date: 7/12/2024     Discharge Plan       Row Name 07/16/24 1002       Plan    Final Discharge Disposition Code 06 - home with home health care    Final Note Pt is being discharged home with home health services on this date. Pt utilized [facility] prior to admission. SS faxed home health order to [facility] [phone]. SS to provide RN with report number once referral has been received and reviewed. SS to follow.    10:55: SS contacted [facility] per Mariam who states referral was received and accepted.  provided RN with report number [phone]. No other needs identified.                Home Medical Care       Service Provider Request Status Selected Services Address Phone Fax Patient Preferred    [facility] - No Request Sent N/A [address] [place] [address] [address] [facility] [address] [phone] --             Expected Discharge Date and Time       Expected Discharge Date Expected Discharge Time    Jul 16, 2024        SAMY Gong

## 2024-07-16 NOTE — OUTREACH NOTE
Prep Survey      Flowsheet Row Responses   [facility] facility patient discharged from? [facility]   Is LACE score < 7 ? No   Eligibility Readm Mgmt   Discharge diagnosis Wide complex tachycardia   Does the patient have one of the following disease processes/diagnoses(primary or secondary)? Other   Does the patient have Home health ordered? Yes   What is the Home health agency?  [facility]   Is there a DME ordered? Yes   What DME was ordered? [facility] for DME   Prep survey completed? Yes            FILIPPO CALL - Registered Nurse

## 2024-07-16 NOTE — CONSULTS
Heart Failure Education Consult    58 y.o. female PMHx: pASauravfib, VT, HFrecEF, DM II, CAD, PAD, CKD III, chronic hypoxemic respiratory failure, BMI >40, cirrhosis,     Current patient of [facility] HF clinic. Last seen by Sonja Romo PA-C on 5/23/24    Has follow up appointment scheduled for 7/26/24      Social History     Socioeconomic History    Marital status:    Tobacco Use    Smoking status: Never    Smokeless tobacco: Never   Vaping Use    Vaping status: Never Used   Substance and Sexual Activity    Alcohol use: Never    Drug use: Never    Sexual activity: Defer       Type of Heart Failure: Combined     Length of diagnosis: Previous Diagnosis     Current HF knowledge: good     Have you had HF education/teaching in the past? Yes    Current weight        07/15/24  0400 07/16/24  0400   Weight: 108 kg (238 lb 8.6 oz) 110 kg (243 lb 9.7 oz)          Most recent   Results for orders placed during the hospital encounter of 07/12/24    Adult Transthoracic Echo Complete w/ Color, Spectral and Contrast if necessary per protocol    Interpretation Summary    Left ventricular ejection fraction appears to be 46 - 50%.    Mildly reduced right ventricular systolic function noted.    The right ventricular cavity is mild to moderately dilated.    The left atrial cavity is mildly dilated.    The right atrial cavity is mildly  dilated.    There is posterior mitral leaflet thickening present.    Estimated right ventricular systolic pressure from tricuspid regurgitation is markedly elevated (>55 mmHg). Calculated right ventricular systolic pressure from tricuspid regurgitation is 58 mmHg.    The aortic root measures 3.1 cm.          Most recent ProBNP   Lab Results   Component Value Date    PROBNP 3,998.0 (H) 07/12/2024      Most recent ReDS 38% Date 5/23/24  Repeat not obtained during this admission. Patient has discharge orders for today.        Medications Prior to Admission   Medication Sig Dispense Refill Last Dose     amiodarone (PACERONE) 200 MG tablet Take 1.5 tablets by mouth Daily.   7/12/2024    apixaban (ELIQUIS) 5 MG tablet tablet Take 1 tablet by mouth Every 12 (Twelve) Hours. Indications: Atrial Fibrillation 60 tablet 3 7/12/2024    Aspirin Low Dose 81 MG EC tablet Take 1 tablet by mouth Daily for 100 days. 30 tablet 3 7/12/2024    bumetanide (BUMEX) 2 MG tablet Take 2 tablets by mouth 3 times a day.   7/12/2024    busPIRone (BUSPAR) 10 MG tablet Take 1 tablet by mouth 2 (Two) Times a Day.   7/12/2024    digoxin (LANOXIN) 125 MCG tablet Take 1 tablet by mouth Every Other Day. 15 tablet 1 7/12/2024    ferrous sulfate 325 (65 FE) MG tablet Take 1 tablet by mouth Daily.   7/12/2024    fluticasone (FLONASE) 50 MCG/ACT nasal spray 2 sprays into the nostril(s) as directed by provider Daily.   7/12/2024    HYDROcodone-acetaminophen (NORCO) 7.5-325 MG per tablet Take 1 tablet by mouth 2 (Two) Times a Day.   7/12/2024    Insulin Glargine (LANTUS SOLOSTAR) 100 UNIT/ML injection pen Inject 45 Units under the skin into the appropriate area as directed 2 (Two) Times a Day. 30 mL 0 7/12/2024    Insulin Lispro, 1 Unit Dial, (HUMALOG) 100 UNIT/ML solution pen-injector Inject 15 Units under the skin into the appropriate area as directed 3 (Three) Times a Day With Meals. 15 mL 1 7/12/2024    levothyroxine (SYNTHROID, LEVOTHROID) 25 MCG tablet Take 0.5 tablets by mouth Every Morning.   7/12/2024    levothyroxine (SYNTHROID, LEVOTHROID) 50 MCG tablet Take 1 tablet by mouth Daily.   7/12/2024    omeprazole (priLOSEC) 20 MG capsule Take 1 capsule by mouth Daily.   7/12/2024    Vortioxetine HBr (Trintellix) 5 MG tablet tablet Take 1 tablet by mouth Daily With Breakfast.   7/12/2024    capsaicin-cleansing gel (Qutenza) 8 % kit topical system Apply 1 each topically to the appropriate area as directed Take As Directed.   Unknown    nitroglycerin (NITROSTAT) 0.4 MG SL tablet Place 1 tablet under the tongue Every 5 (Five) Minutes As Needed for  Chest Pain. Take no more than 3 doses in 15 minutes.   Unknown    ondansetron (ZOFRAN) 4 MG tablet Take 1 tablet by mouth Every 8 (Eight) Hours As Needed.   Unknown              Referral candidate for HF Clinic:Yes - appointment 7/26/24    Thank you for this consult. Please let me know if I can be of any assistance with HF education for this patient.    <30 minutes was spent on this visit    Electronically signed by,   Katalina Bedolla RN ,DNP, MSN, CHFN  07/16/24 09:01 EDT

## 2024-07-16 NOTE — PLAN OF CARE
Goal Outcome Evaluation:  Plan of Care Reviewed With: patient        Progress: improving  Outcome Evaluation: VSS on baseline 2LNC. Call light within reach. Bed alarm set. No complaints or distress noted at this time.

## 2024-07-17 LAB
BACTERIA SPEC AEROBE CULT: NORMAL
BACTERIA SPEC AEROBE CULT: NORMAL

## 2024-07-23 RX ORDER — DIGOXIN 125 MCG
125 TABLET ORAL EVERY OTHER DAY
Qty: 15 TABLET | Refills: 1 | OUTPATIENT
Start: 2024-07-23

## 2024-07-26 ENCOUNTER — HOSPITAL ENCOUNTER (OUTPATIENT)
Dept: CARDIOLOGY | Facility: HOSPITAL | Age: 59
Discharge: HOME | End: 2024-07-26
Payer: COMMERCIAL

## 2024-07-26 VITALS
BODY MASS INDEX: 40.48 KG/M2 | OXYGEN SATURATION: 96 % | HEART RATE: 66 BPM | HEIGHT: 65 IN | DIASTOLIC BLOOD PRESSURE: 52 MMHG | SYSTOLIC BLOOD PRESSURE: 106 MMHG | WEIGHT: 243 LBS

## 2024-07-26 DIAGNOSIS — I50.42 CHRONIC COMBINED SYSTOLIC AND DIASTOLIC HF (HEART FAILURE): Primary | ICD-10-CM

## 2024-07-26 LAB — ABSOLUTE LUNG FLUID CONTENT: 48 % (ref 20–35)

## 2024-07-26 PROCEDURE — 94726 PLETHYSMOGRAPHY LUNG VOLUMES: CPT | Performed by: PHYSICIAN ASSISTANT

## 2024-07-26 RX ORDER — METOLAZONE 5 MG/1
5 TABLET ORAL 2 TIMES DAILY
COMMUNITY
Start: 2024-07-25

## 2024-07-26 NOTE — PROGRESS NOTES
[facility]  ABDIFATAH Galarza, Masha Tucker, APRN  [address]  [address]  [place] [address]    Thank you for asking me to see Yumiko Gurwinder for congestive heart failure.    HPI:     This is a 58 y.o. female with known past medical history of:  Chronic systolic & diastolic HF  TTE from 03/27/24 with EF 66-70% and grade 2 diastolic dysfunction  TTE from 11/2023 with EF 31-35% with grade 3 diastolic dysfxn.   TTE from 02/08/23 @ [facility] with EF ~50%  TTE 11/13/23 with EF 31-35% and grade III diastolic dysfunction as well as moderately decreased systolic fxn and mildly reduced RVSP.    TTE from November 2022 with visually estimated ejection fraction of 30% ±5% and noted abnormal systolic strain pattern as well as grade 3 diastolic dysfunction as well as abnormal TAPSE with abnormal right ventricular function  KHAI on 09/2020 with EF 21-25% with LV systolic function severely decreased and RV cavity mildly dilated with moderately reduced RV systolic function noted, moderate TVR, and aortic plaquing  Right heart cath on 12/22/2022 with elevated left and right heart pressures with report not available  ASCVD  LHC 11/10/20 with preserved LV systolic, EF 50-55% with elevated LV end diastolic pressure consistent with diastolic dysfunction and right dominant system with 30-40% mid LAD lesion.   LHC attempted on 12/2022 at [facility] with unsuccessful access due to small artery appearing occluded or near occluded radially on right  Peripheral Arterial disease  Atrial Flutter  Multiple starts and stops of amiodarone  Most recent in March 2024 on March 13 @ [facility] following concern for VTach with EP feeling strips were Afib with RVR with LBBB (per chart review) with conversion to NSR and amiodarone restart.    CKD stage IIIb  Cirrhosis of the liver  Stage III CKD  Chronic hypoxemic respiratory failure  Morbid Obesity  Diffuse purpuric rash with prior w/u negative for  vasculitis    Yumiko Purdy presents for today for HF clinic evaluation.  The patient is typically seen by Masha Davidson APRN.  Patient's primary cardiologist is Dr. Jad Meredith.      Last known EF~ 55% by TTE from [facility] in Feb 2024  Last known hospitalization and/or ED visit: Hospitalized in [facility] with amiodarone restarted by EP due to afib with RVR at 400mg TID for 4 days followed by 300mg daily following.     Hospitalized from March 26 through March 30 with possible SVT with baseline bundle branch block and paroxysmal atrial fibrillation with rapid ventricular response with aberrant conduction status post PVI October 2022  Admission to [facility] through April 3, 2024 with wide-complex tachycardia and persistent atrial fibrillation with amiodarone drip with transition to oral amiodarone with attempt to stop intermittent episodes of atrial fibrillation.  She was ultimately discharged on amiodarone with digoxin  Transferred from [facility] to [facility] secondary to cardiac arrhythmia with EP at [facility].  She was started on Cardizem and had transition from A-fib to sinus rhythm  Recent July 2024 [facility] hospitalization with arrhythmia.  DC summary reviewed.    Accompanied by: Son    07/29/24  visit data/details regarding:   Dyspnea: Improving, Patient is WC bound and mostly just exerts herself with transfers and such; This remains unchanged on today's visit.   Lower extremity swelling: Bilateral lower extremity swelling is improving  Abdominal swelling: Abdominal swelling is improving.    Home weight: Not currently monitoring due to inability to stand  Home BP: SBP has been in the 100s-110s with less drops at home  Home heart rate: 60s  Daily activities of living: Performing with assistance  HF zone: Yellow  Pillows/lying flat: Sleeps in hospital bed.  Mobility assistance devices:  WC at home, bedside commode.    Mrs. Purdy is chest pain free.  She was started  on metolazone by Nephrology and feels this has helped her swelling.   She has upcoming appointment with EP for further arrhythmia management.    Today, she reports she feels fatigued but does not feel as short of breath or swollen.        Specialists:   Cardiology: [facility] with EP & General        Review of Systems - Review of Systems   Constitutional: Negative for chills, decreased appetite and malaise/fatigue.   HENT:  Negative for congestion, ear discharge and ear pain.    Eyes:  Negative for blurred vision, discharge and double vision.   Cardiovascular:  Positive for dyspnea on exertion. Negative for leg swelling.   Respiratory:  Negative for cough, hemoptysis and shortness of breath.    Endocrine: Negative for cold intolerance and heat intolerance.   Hematologic/Lymphatic: Negative for adenopathy and bleeding problem.   Skin:  Negative for color change, dry skin and nail changes.   Musculoskeletal:  Negative for arthritis, muscle weakness and myalgias.   Gastrointestinal:  Negative for bloating and abdominal pain.   Genitourinary:  Negative for bladder incontinence, decreased libido and dysuria.   Neurological:  Negative for brief paralysis, difficulty with concentration and dizziness.   Psychiatric/Behavioral:  Negative for altered mental status, depression and hallucinations.         Very pleasant   Allergic/Immunologic: Negative for environmental allergies and HIV exposure.         All other systems were reviewed and were negative.    Patient Active Problem List   Diagnosis    Dilated cardiomyopathy    CKD (chronic kidney disease) stage 2, GFR 60-89 ml/min    Severe obesity (BMI 35.0-39.9) with comorbidity    Chronic anemia    Elevated bilirubin    Acute on chronic combined systolic and diastolic CHF (congestive heart failure)    Hyponatremia    Paroxysmal atrial fibrillation    Cirrhosis    Coronary artery disease involving native coronary artery of native heart without angina pectoris     Atrial fibrillation with RVR    Ventricular tachycardia       family history includes Heart attack in her brother and father.     reports that she has never smoked. She has never used smokeless tobacco. She reports that she does not drink alcohol and does not use drugs.    Allergies   Allergen Reactions    Phenergan [Promethazine] Other (See Comments)     Anxiety         Current Outpatient Medications:     amiodarone (PACERONE) 200 MG tablet, Take 1 tablet by mouth Daily., Disp: , Rfl:     apixaban (ELIQUIS) 5 MG tablet tablet, Take 1 tablet by mouth Every 12 (Twelve) Hours. Indications: Atrial Fibrillation, Disp: 60 tablet, Rfl: 3    Aspirin Low Dose 81 MG EC tablet, Take 1 tablet by mouth Daily for 100 days., Disp: 30 tablet, Rfl: 3    bumetanide (BUMEX) 2 MG tablet, Take 1 tablet by mouth 3 (Three) Times a Day for 2 days, THEN 1 tablet 2 (Two) Times a Day for 30 days., Disp: 66 tablet, Rfl: 0    busPIRone (BUSPAR) 10 MG tablet, Take 1 tablet by mouth 2 (Two) Times a Day., Disp: , Rfl:     ferrous sulfate 325 (65 FE) MG tablet, Take 1 tablet by mouth Daily., Disp: , Rfl:     fluticasone (FLONASE) 50 MCG/ACT nasal spray, 2 sprays into the nostril(s) as directed by provider Daily., Disp: , Rfl:     HYDROcodone-acetaminophen (NORCO) 7.5-325 MG per tablet, Take 1 tablet by mouth Every 12 (Twelve) Hours As Needed for Moderate Pain., Disp: , Rfl:     Insulin Glargine (LANTUS SOLOSTAR) 100 UNIT/ML injection pen, Inject 45 Units under the skin into the appropriate area as directed 2 (Two) Times a Day., Disp: 30 mL, Rfl: 0    Insulin Lispro, 1 Unit Dial, (HUMALOG) 100 UNIT/ML solution pen-injector, Inject 15 Units under the skin into the appropriate area as directed 3 (Three) Times a Day With Meals., Disp: 15 mL, Rfl: 1    levothyroxine (SYNTHROID, LEVOTHROID) 25 MCG tablet, Take 0.5 tablets by mouth Every Morning., Disp: , Rfl:     levothyroxine (SYNTHROID, LEVOTHROID) 50 MCG tablet, Take 1 tablet by mouth Daily., Disp: ,  "Rfl:     metOLazone (ZAROXOLYN) 5 MG tablet, Take 1 tablet by mouth 2 (Two) Times a Day., Disp: , Rfl:     nitroglycerin (NITROSTAT) 0.4 MG SL tablet, Place 1 tablet under the tongue Every 5 (Five) Minutes As Needed for Chest Pain. Take no more than 3 doses in 15 minutes., Disp: , Rfl:     omeprazole (priLOSEC) 20 MG capsule, Take 1 capsule by mouth Daily., Disp: , Rfl:     ondansetron (ZOFRAN) 4 MG tablet, Take 1 tablet by mouth Every 8 (Eight) Hours As Needed., Disp: , Rfl:     polyethylene glycol (MIRALAX) 17 g packet, Take 17 g by mouth Daily As Needed (Use if senna-docusate is ineffective)., Disp: , Rfl:     Vortioxetine HBr (Trintellix) 5 MG tablet tablet, Take 1 tablet by mouth Daily With Breakfast., Disp: , Rfl:     capsaicin-cleansing gel (Qutenza) 8 % kit topical system, Apply 1 each topically to the appropriate area as directed Take As Directed., Disp: , Rfl:       Physical Exam:  I have reviewed the patient's current vital signs as documented in the patient's EMR.     Vitals:    24 1359   BP: 106/52   BP Location: Left arm   Patient Position: Sitting   Cuff Size: Adult   Pulse: 66   SpO2: 96%   Weight: 110 kg (243 lb)   Height: 165.1 cm (65\")             Physical Exam  Vitals and nursing note reviewed.   Constitutional:       Appearance: Normal appearance.   HENT:      Head: Normocephalic and atraumatic.   Eyes:      General: Lids are normal.   Cardiovascular:      Rate and Rhythm: Normal rate and regular rhythm.      Heart sounds: S1 normal and S2 normal.   Pulmonary:      Effort: No tachypnea or bradypnea.      Breath sounds: No decreased breath sounds, wheezing, rhonchi or rales.   Chest:      Chest wall: No mass or lacerations.   Abdominal:      General: Abdomen is protuberant. Bowel sounds are normal.      Palpations: Abdomen is soft.      Tenderness: There is no abdominal tenderness.      Comments: Less distended than on prior visits.     Musculoskeletal:      Right lower le+ Edema " present.      Left lower le+ Edema present.      Right foot: No swelling.      Left foot: No swelling.   Skin:     General: Skin is warm and moist.   Neurological:      Mental Status: She is alert and oriented to person, place, and time.      Comments: Equal bilateral  strength.     Psychiatric:         Attention and Perception: Attention normal.         Mood and Affect: Mood normal.          JVP: Volume/Pulsation: Normal.        DATA REVIEWED:     EKG. I personally reviewed and interpreted the EKG.    Last EKG at [facility] not available for viewing.      ---------------------------------------------------  TTE/KHAI:    TRANSTHORACIC ECHOCARDIOGRAPHY REPORT    Demographics    Patient Name:          JAMAL WHARTON      :            1965    Medical Record Number: [id number]          Age:            56 year(s)    Corporate ID Number:   [id number]          Gender          Female    Account Number:        [id number]    Sonographer:           Hayden Chaudhry,     Height:         65 inches                           [facility]    Referring Physician:   ANDREAS HERNANDEZ     Weight:         240 pounds    Interpreting           MATHEUS IRELAND   BMI:            39.94 kg/m^2    Physician:             MD    Date of Service:       2022          Blood Pressure: 113/40 mmHg    Room Number:           320   Type of Study:    TTE procedure: EC Echo Complete, ECHO COMPLETE (DOPPLER / COLOR) W OR WO    CONTRAST.    Patient Status: Routine IP    [facility]    Contrast Medium: Optison. Amount - 3 ml    Impression:    Indication:Hypertensive heart disease with heart failure I11.0    Mild left ventricular hypertrophy. Visually estimated ejection fraction    30% +/- 5%. Abnormal left ventricular systolic function; abnormal systolic    strain pattern. Restrictive filling pattern consistent with severely    increased LV filling pressure (Grade III Diastolic dysfunction).     Dilated right ventricle. Abnormal TAPSE; abnormal right ventricular    function. Abnormal right atrial size.    Mild mitral stenosis. Peak/Mean 6/2mmHg    Moderate tricuspid (2+) regurgitation.    No masses or thrombi are seen.   Measurements Summary:    LVEDd: 4.99 cm         LVESd: 4.08 cm          IVSEd: 0.79 cm    AO Root:2.97 cm        LVPWd: 1.04 cm    Contractility Score    Global Left Ventricular Hypokinesis was noted.    LV regional wall motion: (0-Not visualized 1-Normal 2-Hypokinesis    3-Akinesis 4-Dyskinesis 5-Aneurysm)    Left Ventricle    Peak E-wave: 1.22   Peak A-wave: 0.2 m/s    E/A ratio: 5.99    m/s                 Volume [id number]  LV length: 8.47 cm    ml    Volume [id number] ml    LVOT diameter: 2.05 cm    Normal sized left ventricle.    Mild left ventricular hypertrophy.    Visually estimated ejection fraction 30% +/- 5%.    Abnormal left ventricular systolic function; abnormal systolic strain    pattern.    Restrictive filling pattern consistent with severely increased LV filling    pressure (Grade III Diastolic dysfunction).    No left ventricular masses or thrombi.    Right Ventricle    Diastolic dimension: 4.72     RV systolic pressure: 22.15 mmHg    cm    Dilated right ventricle.    Abnormal TAPSE; abnormal right ventricular function.    Left Atrium    LA dimension: 4.64 cm               LA volume:95.38 ml    LA/Aorta: 1.56    Abnormal left atrial volume index 45ml/m2.    Intact atrial septum.    No atrial mass or thrombus.    Right Atrium    Abnormal right atrial size.    Intact atrial septum.    No atrial mass or thrombus.    Mitral Valve    Deceleration time:     Area PHT: 2.04 cm^2  Mean velocity:    248.96 msec            P1/2t: 107.68 msec   0.57 m/s    Area (continuity):   Mean gradient:    1.73 cm^2            1.89 mmHg    Peak gradient:    5.97 mmHg    Thickened mitral valve leaflets.    Mild mitral regurgitation.    Mild mitral stenosis. Peak/Mean 6/2mmHg    Echogenic  density noted on mitral valve chordae. Seen on prior exam    10/16/2022    Aortic Valve    LVOT VTI: 18.22 cm    Mildly thickend free edges of the aortic valve leaflets.    No aortic regurgitation.    No aortic stenosis.    No masses or vegetations seen.    Tricuspid Valve    TR velocity: 2.19 m/s     TR gradient: 19.17164 mmHg    Estimated RAP: 3 mmHg     RVSP: 22.17 mmHg    Structurally normal tricuspid valve.    Moderate tricuspid (2+) regurgitation.    RVSP likely underestimated due to RV systolic function.    No tricuspid stenosis.    No masses or vegetations seen.    Pulmonic Valve    Acceleration time: 119.95 msec          PASP: 22.15 mmHg    Structurally normal pulmonic valve.    Mild pulmonic regurgitation (1+).    No pulmonic stenosis.    No masses or vegetations seen.   Great Vessels   Aorta    Aortic Root: 2.97 cm    Ascending Aorta: 2.76 cm    LVOT Diameter: 2.05 cm   Visualized aorta is normal.   Normal aortic root.   No evidence of dissection.   IVC is not well visualized.   Unable to obtain adequate subcostal view.    Pericardium / Pleura   No pericardial effusion.    Other    No masses or thrombi.    No intracardiac shunt.    ----------------------------------------------------------------    Electronically signed by MATHEUS IRELAND MD(Interpreting    Physician) on 11/17/2022 17:38       Results for orders placed during the hospital encounter of 07/12/24    Adult Transthoracic Echo Complete w/ Color, Spectral and Contrast if necessary per protocol    Interpretation Summary    Left ventricular ejection fraction appears to be 46 - 50%.    Mildly reduced right ventricular systolic function noted.    The right ventricular cavity is mild to moderately dilated.    The left atrial cavity is mildly dilated.    The right atrial cavity is mildly  dilated.    There is posterior mitral leaflet thickening present.    Estimated right ventricular systolic pressure from tricuspid regurgitation is markedly  elevated (>55 mmHg). Calculated right ventricular systolic pressure from tricuspid regurgitation is 58 mmHg.    The aortic root measures 3.1 cm.    RHC report:      CARDIAC CATH - RIGHT HEART CATH    Anatomical Region Laterality Modality   Heart -- Other     Narrative    Cardiac Diagnostic Report    Demographics    Patient Name       JAMAL WHARTON      Date of Birth          1965    Patient #          [id number]          Accession #            [id number]    Visit #            [id number]          Medical Record #    Physician          MATHEUS IRELAND   Date of Study          12/22/2022                       MD    Referring                              Interventional    Physician                              physician    Procedure   Procedure Type    Diagnostic procedure: RHC with Cardiomems, RIGHT HEART CATH   Indications: Angina.    Conclusions   Procedure Description   Using ultrasound and micropuncture, access to right brachial vein obtained   and 7F sheath inserted. 7F PA catheter used for RHC.   I attempted right radial access for LHC but unsuccessful due to very small   artery which appears occluded or near-occluded.   Procedure Summary   Elevated left and right heart filling pressures.   Elevated pulmonary pressures.   Borderline-low Ramos CO/CI.    Procedure Data   Procedure Date   Date: 12/22/2022Start: 15:32End: 16:09   Entry Locations     - Retrograde Percutaneous access was performed through the Right Axiliary.       A 7 Fr sheath was inserted. Hemostasis was successfully obtained using       Manual Compression.       Entry Comments: brachial vein.   Procedure Medications     - Fentanyl I.V. 25 mcg.     - Versed Sheath 1 mg.     - Oxygen NC 6 l/min.   Diagnostic Catheters     - A7 FR MON Dawson-Misael [id number] used for:Arch.   Contrast Material     - None0 ml   Fluoroscopy Time: Total: 2:48 minutes.   Fluoroscopy Dose: Total: 155 mGy.    Medical History    Risk Factors    The patient risk factors  include:obesity, hypercholesterolemia, treated    and uncontrolled hypertension, diabetes mellitus, last creatinine: 2    mg/dl, creatinine clearance: 69.72 ml/min and dyslipidemia.    Admission Data    Hemodynamics    Condition: Baseline    O2 Consumption: Estimated: 297.50Heart Rate: 41 bpm   Oxygen Saturation   +--------+-----+----+----------------------+---+---------------------------+   !Location!pCO2 !pO2 !% Saturation          !Hgb!O2 Content                 !   +--------+-----+----+----------------------+---+---------------------------+   !PA      !     !    !51                    !   !                           !   +--------+-----+----+----------------------+---+---------------------------+   !RA      !     !    !58                    !   !                           !   +--------+-----+----+----------------------+---+---------------------------+   !AO      !     !    !94                    !   !                           !   +--------+-----+----+----------------------+---+---------------------------+   Pressures (mmHg)   +-----+--------------------------------------------------------------------+   !Site !Pressure                                                            !   +-----+--------------------------------------------------------------------+   !RA   !28/28 (25)                                                          !   +-----+--------------------------------------------------------------------+   !RV   !81/10 ,27                                                           !   +-----+--------------------------------------------------------------------+   !PA   !73/32 (43)                                                          !   +-----+--------------------------------------------------------------------+   !PCW  !68/66 (45)                                                          !   +-----+--------------------------------------------------------------------+   !PCW  !78/59 (45)                                                           !   +-----+--------------------------------------------------------------------+   !PCW  !23/36 (27)                                                          !   +-----+--------------------------------------------------------------------+   Cardiac Output   +------+-------------------------+----------------------------+------------+   !Method!CO (l/min)               !CI (l/min/m2)               !SV (ml)     !   +------+-------------------------+----------------------------+------------+   !Ramos  !5.35                     !2.2                         !131.67      !   +------+-------------------------+----------------------------+------------+   Shunts   Oxygen Values    O2 Capacity 129.2 O2 Consumption 297.5    Flows (l/min)    Qs 6.4 Qe/Qp 1.2    Qp 5.35 Qp/Qs 0.84    Qe 6.4   Vascular Resistance (dynes x sec x cm-5)   +-------------------------------------+----+---+----+----+---------+-------+   !CO method                            !TSVR!SVR!TPVR!PVR !TPVR/TSVR!PVR/SVR!   +-------------------------------------+----+---+----+----+---------+-------+   !Ramos                                 !    !   !8.03!2.94!         !       !   +-------------------------------------+----+---+----+----+---------+-------+   !Qp or Qs                             !    !   !8.03!2.94!         !       !   +-------------------------------------+----+---+----+----+---------+-------+    Signatures    ----------------------------------------------------------------    Electronically signed by MATHEUS IRELAND MD(Physician) on    12/22/2022 16:19    ----------------------------------------------------------------        -----------------------------------------------------  CXR/Imaging:   Imaging Results (Most Recent)       None            I personally reviewed and interpreted the CXR.      -----------------------------------------------------  CT:   CT Abdomen Pelvis Without Contrast    Result Date:  7/13/2024   1.  Mild enlarged heart size 2.  Small right and left pleural effusion. 3.  Small volume of ascites in the abdomen and pelvis. 4.  Moderate sized infraumbilical ventral hernia at the midline extends into the left pannus and this contains some mesenteric fat, multiple small bowel loops, and free fluid. 5.  No bowel or renal obstruction. 6.  Mild chronic bilateral renal cortical volume loss 7.  Cholecystectomy. 8.  Possible cystitis.   This report was finalized on 7/13/2024 2:22 PM by Braydon Johnson MD.      Adult Transthoracic Echo Complete w/ Color, Spectral and Contrast if necessary per protocol    Addendum Date: 7/13/2024      Left ventricular ejection fraction appears to be 46 - 50%.   Mildly reduced right ventricular systolic function noted.   The right ventricular cavity is mild to moderately dilated.   The left atrial cavity is mildly dilated.   The right atrial cavity is mildly  dilated.   There is posterior mitral leaflet thickening present.   Estimated right ventricular systolic pressure from tricuspid regurgitation is markedly elevated (>55 mmHg). Calculated right ventricular systolic pressure from tricuspid regurgitation is 58 mmHg.   The aortic root measures 3.1 cm.     XR Chest 1 View    Result Date: 7/12/2024    No acute cardiopulmonary findings identified.   This report was finalized on 7/12/2024 8:10 AM by Dr. Marlo Parr MD.     I personally reviewed the images of the CT scan.  My personal interpretation is below.      ----------------------------------------------------    PFTs:    No PFTS available.      --------------------------------------------------------------------------------------------------    Laboratory evaluations:    Lab Results   Component Value Date    GLUCOSE 305 (H) 07/16/2024    BUN 24 (H) 07/16/2024    CREATININE 1.38 (H) 07/16/2024    EGFRIFNONA 49 (L) 11/10/2020    BCR 17.4 07/16/2024    K 4.2 07/16/2024    CO2 27.7 07/16/2024    CALCIUM 8.2 (L) 07/16/2024     ALBUMIN 3.5 07/14/2024    LABIL2 1.3 (L) 06/09/2016    AST 22 07/14/2024    ALT 19 07/14/2024     Lab Results   Component Value Date    WBC 8.17 07/15/2024    HGB 7.9 (L) 07/15/2024    HCT 28.2 (L) 07/15/2024    .9 (H) 07/15/2024     (L) 07/15/2024     Lab Results   Component Value Date    CHOL 137 09/11/2020    CHLPL 103 04/21/2016    TRIG 80 09/11/2020    HDL 43 09/11/2020    LDL 78 09/11/2020     Lab Results   Component Value Date    TSH 4.990 (H) 07/12/2024     Lab Results   Component Value Date    HGBA1C 6.50 (H) 07/14/2024     Lab Results   Component Value Date    ALT 19 07/14/2024     Lab Results   Component Value Date    HGBA1C 6.50 (H) 07/14/2024    HGBA1C 7.70 (H) 03/26/2024    HGBA1C 7.30 (H) 11/13/2023     Lab Results   Component Value Date    MICROALBUR 3.2 09/12/2020    CREATININE 1.38 (H) 07/16/2024     Lab Results   Component Value Date    IRON 73 11/17/2023    TIBC 222 (L) 11/17/2023    FERRITIN 1,791.30 (H) 04/01/2024     Lab Results   Component Value Date    INR 1.31 (H) 05/28/2024    INR 0.96 05/10/2024    INR 1.04 05/09/2024    PROTIME 16.4 (H) 05/28/2024    PROTIME 10.4 05/10/2024    PROTIME 11.2 05/09/2024        Lab Results   Component Value Date    ABSOLUTELUNG 48 (A) 07/26/2024    ABSOLUTELUNG 38 (A) 05/23/2024    ABSOLUTELUNG 43 (A) 04/18/2024       PAH RISK ASSESSMENT:      1. Chronic combined systolic and diastolic HF (heart failure)            ORDERS PLACED TODAY:  Orders Placed This Encounter   Procedures    ReDs Vest        Diagnoses and all orders for this visit:    1. Chronic combined systolic and diastolic HF (heart failure) (Primary)  -     ReDs Vest               MEDS ORDERED TODAY:    No orders of the defined types were placed in this encounter.       ---------------------------------------------------------------------------------------------------------------------------          ASSESSMENT/PLAN:      Diagnosis Plan   1. Chronic combined systolic and diastolic HF  (heart failure)  ReDs Vest            not acutely decompensated chronic moderate systolic and diastolic heart failure. CHF. Etiology: Unknown/Idiopathic. LVEF ~50%.     NYHA stage Stage D: Presence of advanced heart disease with continued heart failure symptoms requiring aggressive medical therapyFC-Class IV: Unable to carry on any physical activity without discomfort. Symptoms of heart failureat rest. If any physical activity is undertaken, discomfort increases.     Today, Patient is approaching euvolemia and with  Moderate perfusion. The patient's hemodynamics are currently acceptable. HR is: normal and is at goal. BP/MAP was reviewed and there is notroom for medication up-titration.  Clinical trajectory was assessed and hasimproved.     CHF GOAL DIRECTED MEDICAL THERAPY FOR PATIENT ADDRESSED/ADJUSTED:     GDMT    Drug Class   Drug   Dose Last Dose Adjustment Additional Titration   Notes   ACEi/ARB/ARNI ACEI previously stopped due to renal fxn       Beta Blocker      MRA Spirono previously stopped due to renal fxn       SGLT2i Tried both Jardiance & Farxiga in the past with frequent UTIs   N/A    Secondaries          Secondary management:     -CHF Specific BB:   EF improved. No BB on board at present with amiodarone & dg on board.      -ACE/ARB/ARNi:   ACEi recently held during [facility] hospitalization.     -MRA:   The patient is FC-NYHA Class IV and MRA is indicated.   Spironolactone was previously stopped on regimen due to renal function.        -SGLT2 inhibitor therapy:   Tried many options but has recurrent UTIs.        Secondary pharmacological therapy in HFreF:   EF improved and Verquevo stopped during one of her recent hospitalizatoins.       -Diuretic regimen:   ReDS Vest reading for 07/29/24 is 48;   Continue Bumex 2mg day per Nephro dosing.     -Fluid restriction/Sodium restriction:   Requested 2000 ml restriction  Patient has been asked to weigh daily and was provided with a printed diuretic  strategy.  1,500 mg Na restriction was discussed.    -Acute vs. Chronic underlying conditions other than HF addressed during visit:          Paroxysmal Atrial fibrillation/Flutter:   Continue Amiodarone 200mg qd.   Continue f/u with EP.     CKD IIIb:   Creatinine within baseline. Continue f/u.        Debility:   Continue home health with PT/OT.   Multiple types of DME at home.     Iron/Anemia in CHF:  Continue f/u with Hem/Onc.          Identifiable barriers to Heart Failure Self-care:   Medical Barriers:  Debility  Social Barriers: Transport limitations      --------------------------------------------------------------------------------          BMI cannot be calculated due to outdated height or weight values with patient unable to stand. She has self reported weight of 240.               >45 minutes out of 60 minutes face to face spent counseling patient extensively on dietary Na+ intake, importance of activity, weight monitoring, compliance with medications in addition to importance of titration with goal directed medical therapy and follow up appointments.  10 of 60 minutes were spent reviewing lifevest report and discussing with patient.              This document has been electronically signed by Sonja Romo PA-C  July 29, 2024 09:32 EDT      Dictated Utilizing Dragon Dictation: Part of this note may be an electronic transcription/translation of spoken language to printed text using the Dragon Dictation System.    Follow-up appointment and medication changes provided in hand delivered After Visit Summary as well as reviewed in the room.

## 2024-07-26 NOTE — PROGRESS NOTES
Heart Failure Clinic    Date: 07/26/24     Vitals:    07/26/24 1359   BP: 106/52   Pulse: 66   SpO2: 96%    Weight 243    Method of arrival: Other wheelchair    Weighing self daily: No    Monitoring Heart Failure Zones: Most days    Today's HF Zone: Yellow     Taking medications as prescribed: Yes    Edema Yes    Shortness of Air: Yes    Number of pillows used at night:Recliner-hospital bed    Educational Materials given:  avs                                                                         ReDS Value: 48  Over 41 Hypervolemic Status      Chris Aviles RN 07/26/24 14:00 EDT

## 2024-07-26 NOTE — PROGRESS NOTES
Heart Failure Clinic  Pharmacist Note     Yumiko Purdy is a 58 y.o. female seen in the Heart Failure Clinic for HFrEF.     The patient had several recent hospital admissions and other appointments-     3/13/24 admission for atrial fibrillation  4/1/24 at [facility] for afib with RVR- amiodarone increased to 200mg and digoxin added on  4/10/24 cardiology appointment with Courtney- all GDMT stopped due to hypotension   4/13/24 ED visit for chest pain- amiodarone decreased to 100mg  Recent nephrology appointment- restarted Bumex at 2mg daily on Monday- Saturday 5/9/24 [facility] hospitalization- started Diltiazem 60mg bid  ~~  6/21/24 ED visit for afib  7/12-7/16/24 hospitalization for acute on chronic HF, afib and UTI    On her most recent discharge, her dose of Amiodarone and Bumex doses were changed (2mg tid for 2 days, then to decrease to bid thereafter) and her digoxin was stopped.     Patient reports that she saw Dr. Mejia recently and he added on Metolazone 5mg bid and that has been significantly helping her swelling in addition to Bumex 2mg bid. She reports some remaining swelling and SOB sometimes at rest.     She reports that a nurse is at her house at least 2-3 times a week and they check her BP and reports that it runs low, but she denies any lightheadedness. She reports that she is tired and fatigue a lot of the time.     Yumiko Purdy reports a poor understanding of medications.       Medication Use:   Hx of med intolerances: Farxiga (UTI) -but Nephro started back in September; cannot split Toprol tablets to make 12.5mg dose- may restart at 25mg when possible?  Retail Rx Management: Uses [facility]/ Uses our pharmacy for verquvo, toprol and jardiance -currently not on any on these as of 4/18/24    Past Medical History:   Diagnosis Date    Anemia     CHF (congestive heart failure)     Chronic a-fib     stated by patient     Cirrhosis of liver     stated by patient     Diabetes mellitus     Ventricular tachycardia       ALLERGIES: Phenergan [promethazine]  Current Outpatient Medications   Medication Sig Dispense Refill    amiodarone (PACERONE) 200 MG tablet Take 1 tablet by mouth Daily.      apixaban (ELIQUIS) 5 MG tablet tablet Take 1 tablet by mouth Every 12 (Twelve) Hours. Indications: Atrial Fibrillation 60 tablet 3    Aspirin Low Dose 81 MG EC tablet Take 1 tablet by mouth Daily for 100 days. 30 tablet 3    bumetanide (BUMEX) 2 MG tablet Take 1 tablet by mouth 3 (Three) Times a Day for 2 days, THEN 1 tablet 2 (Two) Times a Day for 30 days. 66 tablet 0    busPIRone (BUSPAR) 10 MG tablet Take 1 tablet by mouth 2 (Two) Times a Day.      ferrous sulfate 325 (65 FE) MG tablet Take 1 tablet by mouth Daily.      fluticasone (FLONASE) 50 MCG/ACT nasal spray 2 sprays into the nostril(s) as directed by provider Daily.      HYDROcodone-acetaminophen (NORCO) 7.5-325 MG per tablet Take 1 tablet by mouth Every 12 (Twelve) Hours As Needed for Moderate Pain.      Insulin Glargine (LANTUS SOLOSTAR) 100 UNIT/ML injection pen Inject 45 Units under the skin into the appropriate area as directed 2 (Two) Times a Day. 30 mL 0    Insulin Lispro, 1 Unit Dial, (HUMALOG) 100 UNIT/ML solution pen-injector Inject 15 Units under the skin into the appropriate area as directed 3 (Three) Times a Day With Meals. 15 mL 1    levothyroxine (SYNTHROID, LEVOTHROID) 25 MCG tablet Take 0.5 tablets by mouth Every Morning.      levothyroxine (SYNTHROID, LEVOTHROID) 50 MCG tablet Take 1 tablet by mouth Daily.      metOLazone (ZAROXOLYN) 5 MG tablet Take 1 tablet by mouth 2 (Two) Times a Day.      nitroglycerin (NITROSTAT) 0.4 MG SL tablet Place 1 tablet under the tongue Every 5 (Five) Minutes As Needed for Chest Pain. Take no more than 3 doses in 15 minutes.      omeprazole (priLOSEC) 20 MG capsule Take 1 capsule by mouth Daily.      ondansetron (ZOFRAN) 4 MG tablet Take 1 tablet by mouth Every 8 (Eight) Hours As Needed.      polyethylene glycol (MIRALAX) 17 g  "packet Take 17 g by mouth Daily As Needed (Use if senna-docusate is ineffective).      Vortioxetine HBr (Trintellix) 5 MG tablet tablet Take 1 tablet by mouth Daily With Breakfast.      capsaicin-cleansing gel (Qutenza) 8 % kit topical system Apply 1 each topically to the appropriate area as directed Take As Directed.       No current facility-administered medications for this encounter.       Vaccination History:   Pneumonia: Declined 3/19/24  Annual Influenza: Declined  Shingles: Declined 3/19/24      Objective  Vitals:    07/26/24 1359   BP: 106/52   BP Location: Left arm   Patient Position: Sitting   Cuff Size: Adult   Pulse: 66   SpO2: 96%   Weight: 110 kg (243 lb)   Height: 165.1 cm (65\")       Wt Readings from Last 3 Encounters:   07/26/24 110 kg (243 lb)   07/16/24 110 kg (243 lb 9.7 oz)   06/27/24 102 kg (225 lb)     Lab Results   Component Value Date    GLUCOSE 305 (H) 07/16/2024    BUN 24 (H) 07/16/2024    CREATININE 1.38 (H) 07/16/2024    EGFRIFNONA 49 (L) 11/10/2020    BCR 17.4 07/16/2024    K 4.2 07/16/2024    CO2 27.7 07/16/2024    CALCIUM 8.2 (L) 07/16/2024    ALBUMIN 3.5 07/14/2024    LABIL2 1.3 (L) 06/09/2016    AST 22 07/14/2024    ALT 19 07/14/2024     Lab Results   Component Value Date    WBC 8.17 07/15/2024    HGB 7.9 (L) 07/15/2024    HCT 28.2 (L) 07/15/2024    .9 (H) 07/15/2024     (L) 07/15/2024     Lab Results   Component Value Date    CKTOTAL 66 04/01/2024    TROPONINT 81 (C) 07/12/2024     Lab Results   Component Value Date    PROBNP 3,998.0 (H) 07/12/2024     Results for orders placed during the hospital encounter of 07/12/24    Adult Transthoracic Echo Complete w/ Color, Spectral and Contrast if necessary per protocol    Interpretation Summary    Left ventricular ejection fraction appears to be 46 - 50%.    Mildly reduced right ventricular systolic function noted.    The right ventricular cavity is mild to moderately dilated.    The left atrial cavity is mildly dilated.    " The right atrial cavity is mildly  dilated.    There is posterior mitral leaflet thickening present.    Estimated right ventricular systolic pressure from tricuspid regurgitation is markedly elevated (>55 mmHg). Calculated right ventricular systolic pressure from tricuspid regurgitation is 58 mmHg.    The aortic root measures 3.1 cm.         GDMT    Drug Class   Drug   Dose Last Dose Adjustment Additional Titration   Notes   ACEi/ARB/ARNI 2/4/24  Was previously on lisinopril, stopped d/t renal function    Beta Blocker 2/4/24??-not been adherent  Stopped due to recent hypotension    MRA      SGLT2i Changed from Farxiga in hospital 11/21/23 N/A Stopped due to hypotension    12/8/23 restart   Stopped due to hypotension      Bumex 2mg bid  Metolazone 5mg bid per Dr Mejia    Drug Therapy Problems    Eliquis ADH  Edema      Recommendations:     Last Rx was filled on 6/18/24 for a 30 day supply. Patient reports that she knows she is still taking it and must have some at home. She thinks she might be close to needing a refill  Sonja to continue current regimen at this time.       Patient was educated on heart failure medications and the importance of medication adherence. All questions were addressed and patient expressed some understanding. Would benefit from additional education at each visit.    Thank you for allowing me to participate in the care of your patient,    Kelli Soto, [facility]  07/26/24  14:19 EDT

## 2024-07-31 ENCOUNTER — READMISSION MANAGEMENT (OUTPATIENT)
Dept: CALL CENTER | Facility: HOSPITAL | Age: 59
End: 2024-07-31
Payer: COMMERCIAL

## 2024-07-31 NOTE — OUTREACH NOTE
Medical Week 3 Survey      Flowsheet Row Responses   [facility] patient discharged from? [facility]   Does the patient have one of the following disease processes/diagnoses(primary or secondary)? Other   Week 3 attempt successful? Yes   Call start time 0839   Call end time 0853   Discharge diagnosis Wide complex tachycardia   Person spoke with today (if not patient) and relationship patient   Meds reviewed with patient/caregiver? Yes   Is the patient having any side effects they believe may be caused by any medication additions or changes? No   Does the patient have all medications ordered at discharge? Yes   Is the patient taking all medications as directed (includes completed medication regime)? Yes   Does the patient have a primary care provider?  Yes   Comments regarding PCP Patient has already seen her PCP.   Does the patient have an appointment with their PCP within 7 days of discharge? Yes   Has the patient kept scheduled appointments due by today? Yes   Psychosocial issues? No   Did the patient receive a copy of their discharge instructions? Yes   Nursing interventions Reviewed instructions with patient   What is the patient's perception of their health status since discharge? Improving   Is the patient/caregiver able to teach back signs and symptoms related to disease process for when to call PCP? Yes   Is the patient/caregiver able to teach back signs and symptoms related to disease process for when to call 911? Yes   Is the patient/caregiver able to teach back the hierarchy of who to call/visit for symptoms/problems? PCP, Specialist, Home health nurse, Urgent Care, ED, 911 Yes   If the patient is a current smoker, are they able to teach back resources for cessation? Not a smoker   Week 3 Call Completed? Yes   Graduated Yes   Graduated/Revoked comments Has followup with cardiology coming up.   Call end time 0853            Jas VENTURA - Registered Nurse

## 2024-09-12 ENCOUNTER — HOSPITAL ENCOUNTER (INPATIENT)
Facility: HOSPITAL | Age: 59
LOS: 4 days | Discharge: HOME | End: 2024-09-17
Attending: STUDENT IN AN ORGANIZED HEALTH CARE EDUCATION/TRAINING PROGRAM | Admitting: HOSPITALIST
Payer: COMMERCIAL

## 2024-09-12 DIAGNOSIS — Z99.81 SUPPLEMENTAL OXYGEN DEPENDENT: ICD-10-CM

## 2024-09-12 DIAGNOSIS — I48.91 ATRIAL FIBRILLATION WITH RAPID VENTRICULAR RESPONSE: Primary | ICD-10-CM

## 2024-09-12 DIAGNOSIS — I50.43 ACUTE ON CHRONIC COMBINED SYSTOLIC AND DIASTOLIC CHF (CONGESTIVE HEART FAILURE): Chronic | ICD-10-CM

## 2024-09-12 LAB
BASOPHILS # BLD AUTO: 0.05 10*3/MM3 (ref 0–0.2)
BASOPHILS NFR BLD AUTO: 0.7 % (ref 0–1.5)
DEPRECATED RDW RBC AUTO: 88.9 FL (ref 37–54)
EOSINOPHIL # BLD AUTO: 0.08 10*3/MM3 (ref 0–0.4)
EOSINOPHIL NFR BLD AUTO: 1.1 % (ref 0.3–6.2)
ERYTHROCYTE [DISTWIDTH] IN BLOOD BY AUTOMATED COUNT: 22.3 % (ref 12.3–15.4)
HCT VFR BLD AUTO: 32.5 % (ref 34–46.6)
HGB BLD-MCNC: 9.4 G/DL (ref 12–15.9)
IMM GRANULOCYTES # BLD AUTO: 0.05 10*3/MM3 (ref 0–0.05)
IMM GRANULOCYTES NFR BLD AUTO: 0.7 % (ref 0–0.5)
LYMPHOCYTES # BLD AUTO: 1.08 10*3/MM3 (ref 0.7–3.1)
LYMPHOCYTES NFR BLD AUTO: 15.2 % (ref 19.6–45.3)
MCH RBC QN AUTO: 31.6 PG (ref 26.6–33)
MCHC RBC AUTO-ENTMCNC: 28.9 G/DL (ref 31.5–35.7)
MCV RBC AUTO: 109.4 FL (ref 79–97)
MONOCYTES # BLD AUTO: 0.25 10*3/MM3 (ref 0.1–0.9)
MONOCYTES NFR BLD AUTO: 3.5 % (ref 5–12)
NEUTROPHILS NFR BLD AUTO: 5.58 10*3/MM3 (ref 1.7–7)
NEUTROPHILS NFR BLD AUTO: 78.8 % (ref 42.7–76)
NRBC BLD AUTO-RTO: 0 /100 WBC (ref 0–0.2)
PLATELET # BLD AUTO: 134 10*3/MM3 (ref 140–450)
PMV BLD AUTO: 9.6 FL (ref 6–12)
RBC # BLD AUTO: 2.97 10*6/MM3 (ref 3.77–5.28)
WBC NRBC COR # BLD AUTO: 7.09 10*3/MM3 (ref 3.4–10.8)

## 2024-09-12 PROCEDURE — 83880 ASSAY OF NATRIURETIC PEPTIDE: CPT | Performed by: STUDENT IN AN ORGANIZED HEALTH CARE EDUCATION/TRAINING PROGRAM

## 2024-09-12 PROCEDURE — 85610 PROTHROMBIN TIME: CPT | Performed by: STUDENT IN AN ORGANIZED HEALTH CARE EDUCATION/TRAINING PROGRAM

## 2024-09-12 PROCEDURE — 93005 ELECTROCARDIOGRAM TRACING: CPT | Performed by: STUDENT IN AN ORGANIZED HEALTH CARE EDUCATION/TRAINING PROGRAM

## 2024-09-12 PROCEDURE — 85025 COMPLETE CBC W/AUTO DIFF WBC: CPT | Performed by: STUDENT IN AN ORGANIZED HEALTH CARE EDUCATION/TRAINING PROGRAM

## 2024-09-12 PROCEDURE — 84484 ASSAY OF TROPONIN QUANT: CPT | Performed by: STUDENT IN AN ORGANIZED HEALTH CARE EDUCATION/TRAINING PROGRAM

## 2024-09-12 PROCEDURE — 99291 CRITICAL CARE FIRST HOUR: CPT

## 2024-09-12 PROCEDURE — 83735 ASSAY OF MAGNESIUM: CPT | Performed by: HOSPITALIST

## 2024-09-12 PROCEDURE — 85007 BL SMEAR W/DIFF WBC COUNT: CPT | Performed by: STUDENT IN AN ORGANIZED HEALTH CARE EDUCATION/TRAINING PROGRAM

## 2024-09-12 PROCEDURE — 83605 ASSAY OF LACTIC ACID: CPT | Performed by: STUDENT IN AN ORGANIZED HEALTH CARE EDUCATION/TRAINING PROGRAM

## 2024-09-12 PROCEDURE — 36415 COLL VENOUS BLD VENIPUNCTURE: CPT

## 2024-09-12 PROCEDURE — 0202U NFCT DS 22 TRGT SARS-COV-2: CPT | Performed by: STUDENT IN AN ORGANIZED HEALTH CARE EDUCATION/TRAINING PROGRAM

## 2024-09-12 PROCEDURE — 80053 COMPREHEN METABOLIC PANEL: CPT | Performed by: STUDENT IN AN ORGANIZED HEALTH CARE EDUCATION/TRAINING PROGRAM

## 2024-09-13 ENCOUNTER — APPOINTMENT (OUTPATIENT)
Dept: GENERAL RADIOLOGY | Facility: HOSPITAL | Age: 59
End: 2024-09-13
Payer: COMMERCIAL

## 2024-09-13 PROBLEM — I48.91 ATRIAL FIBRILLATION WITH RAPID VENTRICULAR RESPONSE: Status: ACTIVE | Noted: 2024-09-13

## 2024-09-13 LAB
A-A DO2: 54 MMHG (ref 0–300)
ALBUMIN SERPL-MCNC: 3.4 G/DL (ref 3.5–5.2)
ALBUMIN SERPL-MCNC: 4.1 G/DL (ref 3.5–5.2)
ALBUMIN/GLOB SERPL: 1.3 G/DL
ALBUMIN/GLOB SERPL: 1.3 G/DL
ALP SERPL-CCNC: 79 U/L (ref 39–117)
ALP SERPL-CCNC: 90 U/L (ref 39–117)
ALT SERPL W P-5'-P-CCNC: 23 U/L (ref 1–33)
ALT SERPL W P-5'-P-CCNC: 28 U/L (ref 1–33)
ANION GAP SERPL CALCULATED.3IONS-SCNC: 11.6 MMOL/L (ref 5–15)
ANION GAP SERPL CALCULATED.3IONS-SCNC: 11.7 MMOL/L (ref 5–15)
ANISOCYTOSIS BLD QL: NORMAL
ANISOCYTOSIS BLD QL: NORMAL
ARTERIAL PATENCY WRIST A: POSITIVE
AST SERPL-CCNC: 27 U/L (ref 1–32)
AST SERPL-CCNC: 28 U/L (ref 1–32)
ATMOSPHERIC PRESS: 729 MMHG
B PARAPERT DNA SPEC QL NAA+PROBE: NOT DETECTED
B PERT DNA SPEC QL NAA+PROBE: NOT DETECTED
BASE EXCESS BLDA CALC-SCNC: 3.2 MMOL/L (ref 0–2)
BASOPHILS # BLD AUTO: 0.07 10*3/MM3 (ref 0–0.2)
BASOPHILS NFR BLD AUTO: 1 % (ref 0–1.5)
BDY SITE: ABNORMAL
BILIRUB SERPL-MCNC: 3.1 MG/DL (ref 0–1.2)
BILIRUB SERPL-MCNC: 3.9 MG/DL (ref 0–1.2)
BILIRUB UR QL STRIP: NEGATIVE
BUN SERPL-MCNC: 21 MG/DL (ref 6–20)
BUN SERPL-MCNC: 21 MG/DL (ref 6–20)
BUN/CREAT SERPL: 18.9 (ref 7–25)
BUN/CREAT SERPL: 22.3 (ref 7–25)
C PNEUM DNA NPH QL NAA+NON-PROBE: NOT DETECTED
CALCIUM SPEC-SCNC: 8.6 MG/DL (ref 8.6–10.5)
CALCIUM SPEC-SCNC: 9.2 MG/DL (ref 8.6–10.5)
CHLORIDE SERPL-SCNC: 100 MMOL/L (ref 98–107)
CHLORIDE SERPL-SCNC: 101 MMOL/L (ref 98–107)
CLARITY UR: CLEAR
CO2 BLDA-SCNC: 28.7 MMOL/L (ref 22–33)
CO2 SERPL-SCNC: 23.4 MMOL/L (ref 22–29)
CO2 SERPL-SCNC: 26.3 MMOL/L (ref 22–29)
COHGB MFR BLD: 4 % (ref 0–5)
COLOR UR: YELLOW
CREAT SERPL-MCNC: 0.94 MG/DL (ref 0.57–1)
CREAT SERPL-MCNC: 1.11 MG/DL (ref 0.57–1)
CRP SERPL-MCNC: 0.38 MG/DL (ref 0–0.5)
D-LACTATE SERPL-SCNC: 2 MMOL/L (ref 0.5–2)
DEPRECATED RDW RBC AUTO: 92.9 FL (ref 37–54)
EGFRCR SERPLBLD CKD-EPI 2021: 58.1 ML/MIN/1.73
EGFRCR SERPLBLD CKD-EPI 2021: 70.9 ML/MIN/1.73
EOSINOPHIL # BLD AUTO: 0.11 10*3/MM3 (ref 0–0.4)
EOSINOPHIL NFR BLD AUTO: 1.5 % (ref 0.3–6.2)
ERYTHROCYTE [DISTWIDTH] IN BLOOD BY AUTOMATED COUNT: 22.3 % (ref 12.3–15.4)
FLUAV SUBTYP SPEC NAA+PROBE: NOT DETECTED
FLUBV RNA ISLT QL NAA+PROBE: NOT DETECTED
GAS FLOW AIRWAY: 2 LPM
GEN 5 2HR TROPONIN T REFLEX: 52 NG/L
GLOBULIN UR ELPH-MCNC: 2.7 GM/DL
GLOBULIN UR ELPH-MCNC: 3.1 GM/DL
GLUCOSE BLDC GLUCOMTR-MCNC: 190 MG/DL (ref 70–130)
GLUCOSE BLDC GLUCOMTR-MCNC: 223 MG/DL (ref 70–130)
GLUCOSE BLDC GLUCOMTR-MCNC: 285 MG/DL (ref 70–130)
GLUCOSE SERPL-MCNC: 229 MG/DL (ref 65–99)
GLUCOSE SERPL-MCNC: 240 MG/DL (ref 65–99)
GLUCOSE UR STRIP-MCNC: ABNORMAL MG/DL
HADV DNA SPEC NAA+PROBE: NOT DETECTED
HCO3 BLDA-SCNC: 27.5 MMOL/L (ref 20–26)
HCOV 229E RNA SPEC QL NAA+PROBE: NOT DETECTED
HCOV HKU1 RNA SPEC QL NAA+PROBE: NOT DETECTED
HCOV NL63 RNA SPEC QL NAA+PROBE: NOT DETECTED
HCOV OC43 RNA SPEC QL NAA+PROBE: NOT DETECTED
HCT VFR BLD AUTO: 30.7 % (ref 34–46.6)
HCT VFR BLD CALC: 28.7 % (ref 38–51)
HGB BLD-MCNC: 8.5 G/DL (ref 12–15.9)
HGB BLDA-MCNC: 9.4 G/DL (ref 13.5–17.5)
HGB UR QL STRIP.AUTO: NEGATIVE
HMPV RNA NPH QL NAA+NON-PROBE: NOT DETECTED
HOLD SPECIMEN: NORMAL
HOLD SPECIMEN: NORMAL
HPIV1 RNA ISLT QL NAA+PROBE: NOT DETECTED
HPIV2 RNA SPEC QL NAA+PROBE: NOT DETECTED
HPIV3 RNA NPH QL NAA+PROBE: NOT DETECTED
HPIV4 P GENE NPH QL NAA+PROBE: NOT DETECTED
HYPOCHROMIA BLD QL: NORMAL
HYPOCHROMIA BLD QL: NORMAL
IMM GRANULOCYTES # BLD AUTO: 0.06 10*3/MM3 (ref 0–0.05)
IMM GRANULOCYTES NFR BLD AUTO: 0.8 % (ref 0–0.5)
INHALED O2 CONCENTRATION: 28 %
INR PPP: 1.08 (ref 0.9–1.1)
KETONES UR QL STRIP: NEGATIVE
LEUKOCYTE ESTERASE UR QL STRIP.AUTO: NEGATIVE
LYMPHOCYTES # BLD AUTO: 1.89 10*3/MM3 (ref 0.7–3.1)
LYMPHOCYTES NFR BLD AUTO: 26 % (ref 19.6–45.3)
Lab: ABNORMAL
M PNEUMO IGG SER IA-ACNC: NOT DETECTED
MACROCYTES BLD QL SMEAR: NORMAL
MAGNESIUM SERPL-MCNC: 1.9 MG/DL (ref 1.6–2.6)
MAGNESIUM SERPL-MCNC: 2 MG/DL (ref 1.6–2.6)
MCH RBC QN AUTO: 31.6 PG (ref 26.6–33)
MCHC RBC AUTO-ENTMCNC: 27.7 G/DL (ref 31.5–35.7)
MCV RBC AUTO: 114.1 FL (ref 79–97)
METHGB BLD QL: 0.5 % (ref 0–3)
MODALITY: ABNORMAL
MONOCYTES # BLD AUTO: 0.31 10*3/MM3 (ref 0.1–0.9)
MONOCYTES NFR BLD AUTO: 4.3 % (ref 5–12)
NEUTROPHILS NFR BLD AUTO: 4.84 10*3/MM3 (ref 1.7–7)
NEUTROPHILS NFR BLD AUTO: 66.4 % (ref 42.7–76)
NITRITE UR QL STRIP: NEGATIVE
NRBC BLD AUTO-RTO: 0 /100 WBC (ref 0–0.2)
NT-PROBNP SERPL-MCNC: 1822 PG/ML (ref 0–900)
OXYHGB MFR BLDV: 94.1 % (ref 94–99)
PCO2 BLDA: 39.9 MM HG (ref 35–45)
PCO2 TEMP ADJ BLD: ABNORMAL MM[HG]
PH BLDA: 7.45 PH UNITS (ref 7.35–7.45)
PH UR STRIP.AUTO: 6 [PH] (ref 5–8)
PH, TEMP CORRECTED: ABNORMAL
PLAT MORPH BLD: NORMAL
PLATELET # BLD AUTO: 121 10*3/MM3 (ref 140–450)
PMV BLD AUTO: 9.9 FL (ref 6–12)
PO2 BLDA: 92.4 MM HG (ref 83–108)
PO2 TEMP ADJ BLD: ABNORMAL MM[HG]
POLYCHROMASIA BLD QL SMEAR: NORMAL
POLYCHROMASIA BLD QL SMEAR: NORMAL
POTASSIUM SERPL-SCNC: 3.5 MMOL/L (ref 3.5–5.2)
POTASSIUM SERPL-SCNC: 3.7 MMOL/L (ref 3.5–5.2)
PROT SERPL-MCNC: 6.1 G/DL (ref 6–8.5)
PROT SERPL-MCNC: 7.2 G/DL (ref 6–8.5)
PROT UR QL STRIP: NEGATIVE
PROTHROMBIN TIME: 14.1 SECONDS (ref 12.1–14.7)
QT INTERVAL: 364 MS
QT INTERVAL: 408 MS
QT INTERVAL: 422 MS
QTC INTERVAL: 535 MS
QTC INTERVAL: 571 MS
QTC INTERVAL: 576 MS
RBC # BLD AUTO: 2.69 10*6/MM3 (ref 3.77–5.28)
RHINOVIRUS RNA SPEC NAA+PROBE: NOT DETECTED
RSV RNA NPH QL NAA+NON-PROBE: NOT DETECTED
SAO2 % BLDCOA: 98.5 % (ref 94–99)
SARS-COV-2 RNA NPH QL NAA+NON-PROBE: NOT DETECTED
SMALL PLATELETS BLD QL SMEAR: NORMAL
SODIUM SERPL-SCNC: 136 MMOL/L (ref 136–145)
SODIUM SERPL-SCNC: 138 MMOL/L (ref 136–145)
SP GR UR STRIP: 1.01 (ref 1–1.03)
TROPONIN T DELTA: 0 NG/L
TROPONIN T SERPL HS-MCNC: 52 NG/L
TSH SERPL DL<=0.05 MIU/L-ACNC: 5.07 UIU/ML (ref 0.27–4.2)
UROBILINOGEN UR QL STRIP: ABNORMAL
VENTILATOR MODE: ABNORMAL
WBC NRBC COR # BLD AUTO: 7.28 10*3/MM3 (ref 3.4–10.8)
WHOLE BLOOD HOLD COAG: NORMAL
WHOLE BLOOD HOLD SPECIMEN: NORMAL

## 2024-09-13 PROCEDURE — 99221 1ST HOSP IP/OBS SF/LOW 40: CPT | Performed by: INTERNAL MEDICINE

## 2024-09-13 PROCEDURE — 25010000002 BUMETANIDE PER 0.5 MG: Performed by: NURSE PRACTITIONER

## 2024-09-13 PROCEDURE — 71045 X-RAY EXAM CHEST 1 VIEW: CPT

## 2024-09-13 PROCEDURE — 94761 N-INVAS EAR/PLS OXIMETRY MLT: CPT

## 2024-09-13 PROCEDURE — 36600 WITHDRAWAL OF ARTERIAL BLOOD: CPT

## 2024-09-13 PROCEDURE — 25010000002 AMIODARONE IN DEXTROSE 5% 150-4.21 MG/100ML-% SOLUTION: Performed by: STUDENT IN AN ORGANIZED HEALTH CARE EDUCATION/TRAINING PROGRAM

## 2024-09-13 PROCEDURE — 80050 GENERAL HEALTH PANEL: CPT | Performed by: HOSPITALIST

## 2024-09-13 PROCEDURE — 81003 URINALYSIS AUTO W/O SCOPE: CPT | Performed by: STUDENT IN AN ORGANIZED HEALTH CARE EDUCATION/TRAINING PROGRAM

## 2024-09-13 PROCEDURE — 25010000002 AMIODARONE IN DEXTROSE 5% 360-4.14 MG/200ML-% SOLUTION: Performed by: HOSPITALIST

## 2024-09-13 PROCEDURE — 93005 ELECTROCARDIOGRAM TRACING: CPT | Performed by: STUDENT IN AN ORGANIZED HEALTH CARE EDUCATION/TRAINING PROGRAM

## 2024-09-13 PROCEDURE — 94799 UNLISTED PULMONARY SVC/PX: CPT

## 2024-09-13 PROCEDURE — 99223 1ST HOSP IP/OBS HIGH 75: CPT | Performed by: HOSPITALIST

## 2024-09-13 PROCEDURE — 84484 ASSAY OF TROPONIN QUANT: CPT | Performed by: STUDENT IN AN ORGANIZED HEALTH CARE EDUCATION/TRAINING PROGRAM

## 2024-09-13 PROCEDURE — 25010000002 AMIODARONE IN DEXTROSE 5% 360-4.14 MG/200ML-% SOLUTION: Performed by: STUDENT IN AN ORGANIZED HEALTH CARE EDUCATION/TRAINING PROGRAM

## 2024-09-13 PROCEDURE — 82375 ASSAY CARBOXYHB QUANT: CPT

## 2024-09-13 PROCEDURE — 86140 C-REACTIVE PROTEIN: CPT | Performed by: HOSPITALIST

## 2024-09-13 PROCEDURE — 85007 BL SMEAR W/DIFF WBC COUNT: CPT | Performed by: HOSPITALIST

## 2024-09-13 PROCEDURE — 97162 PT EVAL MOD COMPLEX 30 MIN: CPT

## 2024-09-13 PROCEDURE — 25010000002 DIGOXIN PER 500 MCG: Performed by: NURSE PRACTITIONER

## 2024-09-13 PROCEDURE — 63710000001 INSULIN REGULAR HUMAN PER 5 UNITS: Performed by: HOSPITALIST

## 2024-09-13 PROCEDURE — 82948 REAGENT STRIP/BLOOD GLUCOSE: CPT

## 2024-09-13 PROCEDURE — 71045 X-RAY EXAM CHEST 1 VIEW: CPT | Performed by: RADIOLOGY

## 2024-09-13 PROCEDURE — 82805 BLOOD GASES W/O2 SATURATION: CPT

## 2024-09-13 PROCEDURE — 63710000001 INSULIN GLARGINE PER 5 UNITS: Performed by: HOSPITALIST

## 2024-09-13 PROCEDURE — 25010000002 MAGNESIUM SULFATE IN D5W 1G/100ML (PREMIX) 1-5 GM/100ML-% SOLUTION: Performed by: HOSPITALIST

## 2024-09-13 PROCEDURE — 93005 ELECTROCARDIOGRAM TRACING: CPT | Performed by: HOSPITALIST

## 2024-09-13 PROCEDURE — 83735 ASSAY OF MAGNESIUM: CPT | Performed by: STUDENT IN AN ORGANIZED HEALTH CARE EDUCATION/TRAINING PROGRAM

## 2024-09-13 PROCEDURE — 83050 HGB METHEMOGLOBIN QUAN: CPT

## 2024-09-13 RX ORDER — LEVOTHYROXINE SODIUM 25 UG/1
12.5 TABLET ORAL
Status: DISCONTINUED | OUTPATIENT
Start: 2024-09-14 | End: 2024-09-17 | Stop reason: HOSPADM

## 2024-09-13 RX ORDER — POLYETHYLENE GLYCOL 3350 17 G/17G
17 POWDER, FOR SOLUTION ORAL DAILY PRN
Status: DISCONTINUED | OUTPATIENT
Start: 2024-09-13 | End: 2024-09-17 | Stop reason: HOSPADM

## 2024-09-13 RX ORDER — BISACODYL 5 MG/1
5 TABLET, DELAYED RELEASE ORAL DAILY PRN
Status: DISCONTINUED | OUTPATIENT
Start: 2024-09-13 | End: 2024-09-17 | Stop reason: HOSPADM

## 2024-09-13 RX ORDER — FOLIC ACID/VIT B COMPLEX AND C 0.8 MG
1 TABLET ORAL DAILY
COMMUNITY

## 2024-09-13 RX ORDER — AMIODARONE HYDROCHLORIDE 200 MG/1
200 TABLET ORAL
Status: CANCELLED | OUTPATIENT
Start: 2024-09-13

## 2024-09-13 RX ORDER — AMOXICILLIN 250 MG
2 CAPSULE ORAL 2 TIMES DAILY PRN
Status: DISCONTINUED | OUTPATIENT
Start: 2024-09-13 | End: 2024-09-17 | Stop reason: HOSPADM

## 2024-09-13 RX ORDER — PREDNISOLONE ACETATE 10 MG/ML
1 SUSPENSION/ DROPS OPHTHALMIC 4 TIMES DAILY PRN
COMMUNITY

## 2024-09-13 RX ORDER — METOLAZONE 5 MG/1
5 TABLET ORAL 2 TIMES DAILY
COMMUNITY
End: 2024-09-23

## 2024-09-13 RX ORDER — ONDANSETRON 4 MG/1
4 TABLET, ORALLY DISINTEGRATING ORAL EVERY 8 HOURS PRN
Status: DISCONTINUED | OUTPATIENT
Start: 2024-09-13 | End: 2024-09-17 | Stop reason: HOSPADM

## 2024-09-13 RX ORDER — SODIUM CHLORIDE 0.9 % (FLUSH) 0.9 %
10 SYRINGE (ML) INJECTION EVERY 12 HOURS SCHEDULED
Status: DISCONTINUED | OUTPATIENT
Start: 2024-09-13 | End: 2024-09-17 | Stop reason: HOSPADM

## 2024-09-13 RX ORDER — SODIUM CHLORIDE 9 MG/ML
40 INJECTION, SOLUTION INTRAVENOUS AS NEEDED
Status: DISCONTINUED | OUTPATIENT
Start: 2024-09-13 | End: 2024-09-17 | Stop reason: HOSPADM

## 2024-09-13 RX ORDER — INSULIN LISPRO 100 [IU]/ML
15 INJECTION, SOLUTION INTRAVENOUS; SUBCUTANEOUS
Status: CANCELLED | OUTPATIENT
Start: 2024-09-13

## 2024-09-13 RX ORDER — POTASSIUM CHLORIDE 1500 MG/1
40 TABLET, EXTENDED RELEASE ORAL ONCE
Status: COMPLETED | OUTPATIENT
Start: 2024-09-13 | End: 2024-09-13

## 2024-09-13 RX ORDER — FERROUS SULFATE 325(65) MG
325 TABLET ORAL DAILY
COMMUNITY

## 2024-09-13 RX ORDER — PANTOPRAZOLE SODIUM 40 MG/1
40 TABLET, DELAYED RELEASE ORAL
Status: DISCONTINUED | OUTPATIENT
Start: 2024-09-14 | End: 2024-09-17 | Stop reason: HOSPADM

## 2024-09-13 RX ORDER — DEXTROSE MONOHYDRATE 25 G/50ML
25 INJECTION, SOLUTION INTRAVENOUS
Status: DISCONTINUED | OUTPATIENT
Start: 2024-09-13 | End: 2024-09-17 | Stop reason: HOSPADM

## 2024-09-13 RX ORDER — METOLAZONE 2.5 MG/1
5 TABLET ORAL 2 TIMES DAILY
Status: CANCELLED | OUTPATIENT
Start: 2024-09-13

## 2024-09-13 RX ORDER — ASPIRIN 81 MG/1
81 TABLET ORAL DAILY
Status: DISCONTINUED | OUTPATIENT
Start: 2024-09-13 | End: 2024-09-15

## 2024-09-13 RX ORDER — DIGOXIN 0.25 MG/ML
250 INJECTION INTRAMUSCULAR; INTRAVENOUS ONCE
Status: COMPLETED | OUTPATIENT
Start: 2024-09-13 | End: 2024-09-13

## 2024-09-13 RX ORDER — FERROUS SULFATE 325(65) MG
325 TABLET ORAL
Status: DISCONTINUED | OUTPATIENT
Start: 2024-09-14 | End: 2024-09-17 | Stop reason: HOSPADM

## 2024-09-13 RX ORDER — BUMETANIDE 0.25 MG/ML
2 INJECTION INTRAMUSCULAR; INTRAVENOUS ONCE
Status: COMPLETED | OUTPATIENT
Start: 2024-09-13 | End: 2024-09-13

## 2024-09-13 RX ORDER — BUSPIRONE HYDROCHLORIDE 10 MG/1
10 TABLET ORAL 2 TIMES DAILY
Status: ON HOLD | COMMUNITY
End: 2024-09-13

## 2024-09-13 RX ORDER — NICOTINE POLACRILEX 4 MG
15 LOZENGE BUCCAL
Status: DISCONTINUED | OUTPATIENT
Start: 2024-09-13 | End: 2024-09-17 | Stop reason: HOSPADM

## 2024-09-13 RX ORDER — RENO CAPS 100; 1.5; 1.7; 20; 10; 1; 150; 5; 6 MG/1; MG/1; MG/1; MG/1; MG/1; MG/1; UG/1; MG/1; UG/1
1 CAPSULE ORAL DAILY
Status: DISCONTINUED | OUTPATIENT
Start: 2024-09-13 | End: 2024-09-17 | Stop reason: HOSPADM

## 2024-09-13 RX ORDER — CAPSAICIN 8 %
4 KIT TOPICAL
COMMUNITY
End: 2024-09-23

## 2024-09-13 RX ORDER — HYDROCODONE BITARTRATE AND ACETAMINOPHEN 7.5; 325 MG/1; MG/1
1 TABLET ORAL 2 TIMES DAILY
Status: DISCONTINUED | OUTPATIENT
Start: 2024-09-13 | End: 2024-09-17 | Stop reason: HOSPADM

## 2024-09-13 RX ORDER — SODIUM CHLORIDE 0.9 % (FLUSH) 0.9 %
10 SYRINGE (ML) INJECTION AS NEEDED
Status: DISCONTINUED | OUTPATIENT
Start: 2024-09-13 | End: 2024-09-17 | Stop reason: HOSPADM

## 2024-09-13 RX ORDER — ASPIRIN 81 MG/1
81 TABLET ORAL DAILY
COMMUNITY

## 2024-09-13 RX ORDER — MAGNESIUM SULFATE 1 G/100ML
1 INJECTION INTRAVENOUS ONCE
Status: COMPLETED | OUTPATIENT
Start: 2024-09-13 | End: 2024-09-13

## 2024-09-13 RX ORDER — BISACODYL 10 MG
10 SUPPOSITORY, RECTAL RECTAL DAILY PRN
Status: DISCONTINUED | OUTPATIENT
Start: 2024-09-13 | End: 2024-09-17 | Stop reason: HOSPADM

## 2024-09-13 RX ORDER — OFLOXACIN 3 MG/ML
1 SOLUTION/ DROPS OPHTHALMIC 4 TIMES DAILY PRN
COMMUNITY
End: 2024-09-23

## 2024-09-13 RX ORDER — BUMETANIDE 1 MG/1
2 TABLET ORAL DAILY
Status: CANCELLED | OUTPATIENT
Start: 2024-09-13

## 2024-09-13 RX ORDER — ONDANSETRON 4 MG/1
4 TABLET, FILM COATED ORAL EVERY 8 HOURS PRN
COMMUNITY

## 2024-09-13 RX ORDER — NITROGLYCERIN 0.4 MG/1
0.4 TABLET SUBLINGUAL
Status: DISCONTINUED | OUTPATIENT
Start: 2024-09-13 | End: 2024-09-17 | Stop reason: HOSPADM

## 2024-09-13 RX ORDER — LEVOTHYROXINE SODIUM 50 UG/1
50 TABLET ORAL
Status: DISCONTINUED | OUTPATIENT
Start: 2024-09-14 | End: 2024-09-17 | Stop reason: HOSPADM

## 2024-09-13 RX ORDER — GLUCAGON 1 MG/ML
1 KIT INJECTION
Status: DISCONTINUED | OUTPATIENT
Start: 2024-09-13 | End: 2024-09-17 | Stop reason: HOSPADM

## 2024-09-13 RX ORDER — BUMETANIDE 2 MG/1
4 TABLET ORAL DAILY
COMMUNITY

## 2024-09-13 RX ORDER — HYDROCODONE BITARTRATE AND ACETAMINOPHEN 7.5; 325 MG/1; MG/1
1 TABLET ORAL 2 TIMES DAILY
COMMUNITY

## 2024-09-13 RX ADMIN — INSULIN HUMAN 5 UNITS: 100 INJECTION, SOLUTION PARENTERAL at 05:39

## 2024-09-13 RX ADMIN — APIXABAN 5 MG: 5 TABLET, FILM COATED ORAL at 20:46

## 2024-09-13 RX ADMIN — INSULIN HUMAN 8 UNITS: 100 INJECTION, SOLUTION PARENTERAL at 17:03

## 2024-09-13 RX ADMIN — ASPIRIN 81 MG: 81 TABLET, COATED ORAL at 15:07

## 2024-09-13 RX ADMIN — AMIODARONE HYDROCHLORIDE 1 MG/MIN: 1.8 INJECTION, SOLUTION INTRAVENOUS at 02:16

## 2024-09-13 RX ADMIN — INSULIN GLARGINE 10 UNITS: 100 INJECTION, SOLUTION SUBCUTANEOUS at 08:50

## 2024-09-13 RX ADMIN — POTASSIUM CHLORIDE 40 MEQ: 1500 TABLET, EXTENDED RELEASE ORAL at 05:08

## 2024-09-13 RX ADMIN — MAGNESIUM SULFATE HEPTAHYDRATE 1 G: 1 INJECTION, SOLUTION INTRAVENOUS at 05:08

## 2024-09-13 RX ADMIN — AMIODARONE HYDROCHLORIDE 0.5 MG/MIN: 1.8 INJECTION, SOLUTION INTRAVENOUS at 08:04

## 2024-09-13 RX ADMIN — APIXABAN 5 MG: 5 TABLET, FILM COATED ORAL at 08:50

## 2024-09-13 RX ADMIN — Medication 10 ML: at 08:50

## 2024-09-13 RX ADMIN — AMIODARONE HYDROCHLORIDE 150 MG: 1.5 INJECTION, SOLUTION INTRAVENOUS at 02:00

## 2024-09-13 RX ADMIN — Medication 10 ML: at 20:46

## 2024-09-13 RX ADMIN — INSULIN GLARGINE 10 UNITS: 100 INJECTION, SOLUTION SUBCUTANEOUS at 20:46

## 2024-09-13 RX ADMIN — BUMETANIDE 2 MG: 0.25 INJECTION, SOLUTION INTRAMUSCULAR; INTRAVENOUS at 15:06

## 2024-09-13 RX ADMIN — AMIODARONE HYDROCHLORIDE 1 MG/MIN: 1.8 INJECTION, SOLUTION INTRAVENOUS at 08:01

## 2024-09-13 RX ADMIN — AMIODARONE HYDROCHLORIDE 0.5 MG/MIN: 1.8 INJECTION, SOLUTION INTRAVENOUS at 20:08

## 2024-09-13 RX ADMIN — HYDROCODONE BITARTRATE AND ACETAMINOPHEN 1 TABLET: 7.5; 325 TABLET ORAL at 20:46

## 2024-09-13 RX ADMIN — DIGOXIN 250 MCG: 0.25 INJECTION INTRAMUSCULAR; INTRAVENOUS at 15:07

## 2024-09-14 LAB
ANION GAP SERPL CALCULATED.3IONS-SCNC: 8.1 MMOL/L (ref 5–15)
BUN SERPL-MCNC: 21 MG/DL (ref 6–20)
BUN/CREAT SERPL: 18.4 (ref 7–25)
CALCIUM SPEC-SCNC: 8.6 MG/DL (ref 8.6–10.5)
CHLORIDE SERPL-SCNC: 102 MMOL/L (ref 98–107)
CO2 SERPL-SCNC: 26.9 MMOL/L (ref 22–29)
CREAT SERPL-MCNC: 1.14 MG/DL (ref 0.57–1)
DEPRECATED RDW RBC AUTO: 89 FL (ref 37–54)
EGFRCR SERPLBLD CKD-EPI 2021: 56.3 ML/MIN/1.73
ERYTHROCYTE [DISTWIDTH] IN BLOOD BY AUTOMATED COUNT: 22 % (ref 12.3–15.4)
GLUCOSE BLDC GLUCOMTR-MCNC: 217 MG/DL (ref 70–130)
GLUCOSE BLDC GLUCOMTR-MCNC: 272 MG/DL (ref 70–130)
GLUCOSE BLDC GLUCOMTR-MCNC: 277 MG/DL (ref 70–130)
GLUCOSE BLDC GLUCOMTR-MCNC: 326 MG/DL (ref 70–130)
GLUCOSE BLDC GLUCOMTR-MCNC: 368 MG/DL (ref 70–130)
GLUCOSE SERPL-MCNC: 244 MG/DL (ref 65–99)
HCT VFR BLD AUTO: 30.4 % (ref 34–46.6)
HGB BLD-MCNC: 8.8 G/DL (ref 12–15.9)
IRON 24H UR-MRATE: 80 MCG/DL (ref 37–145)
IRON SATN MFR SERPL: 34 % (ref 20–50)
MAGNESIUM SERPL-MCNC: 1.9 MG/DL (ref 1.6–2.6)
MCH RBC QN AUTO: 31.9 PG (ref 26.6–33)
MCHC RBC AUTO-ENTMCNC: 28.9 G/DL (ref 31.5–35.7)
MCV RBC AUTO: 110.1 FL (ref 79–97)
PLATELET # BLD AUTO: 132 10*3/MM3 (ref 140–450)
PMV BLD AUTO: 10.3 FL (ref 6–12)
POTASSIUM SERPL-SCNC: 3.8 MMOL/L (ref 3.5–5.2)
RBC # BLD AUTO: 2.76 10*6/MM3 (ref 3.77–5.28)
SODIUM SERPL-SCNC: 137 MMOL/L (ref 136–145)
TIBC SERPL-MCNC: 238 MCG/DL (ref 298–536)
TRANSFERRIN SERPL-MCNC: 160 MG/DL (ref 200–360)
WBC NRBC COR # BLD AUTO: 8.11 10*3/MM3 (ref 3.4–10.8)

## 2024-09-14 PROCEDURE — 25010000002 BUMETANIDE PER 0.5 MG: Performed by: INTERNAL MEDICINE

## 2024-09-14 PROCEDURE — 82948 REAGENT STRIP/BLOOD GLUCOSE: CPT

## 2024-09-14 PROCEDURE — 80048 BASIC METABOLIC PNL TOTAL CA: CPT | Performed by: STUDENT IN AN ORGANIZED HEALTH CARE EDUCATION/TRAINING PROGRAM

## 2024-09-14 PROCEDURE — 63710000001 INSULIN GLARGINE PER 5 UNITS: Performed by: HOSPITALIST

## 2024-09-14 PROCEDURE — 84466 ASSAY OF TRANSFERRIN: CPT | Performed by: STUDENT IN AN ORGANIZED HEALTH CARE EDUCATION/TRAINING PROGRAM

## 2024-09-14 PROCEDURE — 83735 ASSAY OF MAGNESIUM: CPT | Performed by: STUDENT IN AN ORGANIZED HEALTH CARE EDUCATION/TRAINING PROGRAM

## 2024-09-14 PROCEDURE — 63710000001 INSULIN REGULAR HUMAN PER 5 UNITS: Performed by: HOSPITALIST

## 2024-09-14 PROCEDURE — 99232 SBSQ HOSP IP/OBS MODERATE 35: CPT | Performed by: STUDENT IN AN ORGANIZED HEALTH CARE EDUCATION/TRAINING PROGRAM

## 2024-09-14 PROCEDURE — 25010000002 MAGNESIUM SULFATE 2 GM/50ML SOLUTION: Performed by: PHYSICIAN ASSISTANT

## 2024-09-14 PROCEDURE — 85027 COMPLETE CBC AUTOMATED: CPT | Performed by: STUDENT IN AN ORGANIZED HEALTH CARE EDUCATION/TRAINING PROGRAM

## 2024-09-14 PROCEDURE — 25010000002 DIGOXIN PER 500 MCG: Performed by: INTERNAL MEDICINE

## 2024-09-14 PROCEDURE — 63710000001 INSULIN GLARGINE PER 5 UNITS: Performed by: STUDENT IN AN ORGANIZED HEALTH CARE EDUCATION/TRAINING PROGRAM

## 2024-09-14 PROCEDURE — 99232 SBSQ HOSP IP/OBS MODERATE 35: CPT | Performed by: INTERNAL MEDICINE

## 2024-09-14 PROCEDURE — 25010000002 AMIODARONE IN DEXTROSE 5% 360-4.14 MG/200ML-% SOLUTION: Performed by: HOSPITALIST

## 2024-09-14 PROCEDURE — 83540 ASSAY OF IRON: CPT | Performed by: STUDENT IN AN ORGANIZED HEALTH CARE EDUCATION/TRAINING PROGRAM

## 2024-09-14 PROCEDURE — 93005 ELECTROCARDIOGRAM TRACING: CPT | Performed by: STUDENT IN AN ORGANIZED HEALTH CARE EDUCATION/TRAINING PROGRAM

## 2024-09-14 RX ORDER — POTASSIUM CHLORIDE 20MEQ/15ML
40 LIQUID (ML) ORAL ONCE
Status: COMPLETED | OUTPATIENT
Start: 2024-09-14 | End: 2024-09-14

## 2024-09-14 RX ORDER — DIGOXIN 0.25 MG/ML
250 INJECTION INTRAMUSCULAR; INTRAVENOUS ONCE
Status: COMPLETED | OUTPATIENT
Start: 2024-09-14 | End: 2024-09-14

## 2024-09-14 RX ORDER — BUMETANIDE 0.25 MG/ML
2 INJECTION INTRAMUSCULAR; INTRAVENOUS EVERY 12 HOURS
Status: DISCONTINUED | OUTPATIENT
Start: 2024-09-14 | End: 2024-09-17

## 2024-09-14 RX ORDER — MAGNESIUM SULFATE HEPTAHYDRATE 40 MG/ML
2 INJECTION, SOLUTION INTRAVENOUS ONCE
Status: COMPLETED | OUTPATIENT
Start: 2024-09-14 | End: 2024-09-14

## 2024-09-14 RX ORDER — LORAZEPAM 0.5 MG/1
0.5 TABLET ORAL ONCE
Status: COMPLETED | OUTPATIENT
Start: 2024-09-14 | End: 2024-09-14

## 2024-09-14 RX ADMIN — APIXABAN 5 MG: 5 TABLET, FILM COATED ORAL at 08:39

## 2024-09-14 RX ADMIN — Medication 10 ML: at 20:15

## 2024-09-14 RX ADMIN — LEVOTHYROXINE SODIUM 50 MCG: 0.05 TABLET ORAL at 06:32

## 2024-09-14 RX ADMIN — INSULIN HUMAN 8 UNITS: 100 INJECTION, SOLUTION PARENTERAL at 13:45

## 2024-09-14 RX ADMIN — FERROUS SULFATE TAB 325 MG (65 MG ELEMENTAL FE) 325 MG: 325 (65 FE) TAB at 08:39

## 2024-09-14 RX ADMIN — BUMETANIDE 2 MG: 0.25 INJECTION, SOLUTION INTRAMUSCULAR; INTRAVENOUS at 20:14

## 2024-09-14 RX ADMIN — APIXABAN 5 MG: 5 TABLET, FILM COATED ORAL at 20:15

## 2024-09-14 RX ADMIN — LEVOTHYROXINE SODIUM 12.5 MCG: 0.05 TABLET ORAL at 06:32

## 2024-09-14 RX ADMIN — DIGOXIN 250 MCG: 0.25 INJECTION INTRAMUSCULAR; INTRAVENOUS at 20:15

## 2024-09-14 RX ADMIN — INSULIN GLARGINE 20 UNITS: 100 INJECTION, SOLUTION SUBCUTANEOUS at 20:15

## 2024-09-14 RX ADMIN — PANTOPRAZOLE SODIUM 40 MG: 40 TABLET, DELAYED RELEASE ORAL at 06:31

## 2024-09-14 RX ADMIN — AMIODARONE HYDROCHLORIDE 0.5 MG/MIN: 1.8 INJECTION, SOLUTION INTRAVENOUS at 06:31

## 2024-09-14 RX ADMIN — INSULIN HUMAN 12 UNITS: 100 INJECTION, SOLUTION PARENTERAL at 23:37

## 2024-09-14 RX ADMIN — INSULIN GLARGINE 10 UNITS: 100 INJECTION, SOLUTION SUBCUTANEOUS at 08:39

## 2024-09-14 RX ADMIN — INSULIN HUMAN 8 UNITS: 100 INJECTION, SOLUTION PARENTERAL at 00:28

## 2024-09-14 RX ADMIN — MICONAZOLE NITRATE 1 APPLICATION: 2 POWDER TOPICAL at 13:45

## 2024-09-14 RX ADMIN — MICONAZOLE NITRATE 1 APPLICATION: 2 POWDER TOPICAL at 20:16

## 2024-09-14 RX ADMIN — HYDROCODONE BITARTRATE AND ACETAMINOPHEN 1 TABLET: 7.5; 325 TABLET ORAL at 20:15

## 2024-09-14 RX ADMIN — HYDROCODONE BITARTRATE AND ACETAMINOPHEN 1 TABLET: 7.5; 325 TABLET ORAL at 08:39

## 2024-09-14 RX ADMIN — MAGNESIUM SULFATE HEPTAHYDRATE 2 G: 40 INJECTION, SOLUTION INTRAVENOUS at 19:16

## 2024-09-14 RX ADMIN — INSULIN HUMAN 12 UNITS: 100 INJECTION, SOLUTION PARENTERAL at 17:33

## 2024-09-14 RX ADMIN — Medication 10 ML: at 08:39

## 2024-09-14 RX ADMIN — POTASSIUM CHLORIDE 40 MEQ: 20 SOLUTION ORAL at 20:15

## 2024-09-14 RX ADMIN — ASPIRIN 81 MG: 81 TABLET, COATED ORAL at 08:39

## 2024-09-14 RX ADMIN — INSULIN HUMAN 5 UNITS: 100 INJECTION, SOLUTION PARENTERAL at 06:31

## 2024-09-14 RX ADMIN — LORAZEPAM 0.5 MG: 0.5 TABLET ORAL at 15:55

## 2024-09-15 LAB
ALBUMIN SERPL-MCNC: 3.6 G/DL (ref 3.5–5.2)
ALBUMIN/GLOB SERPL: 1.3 G/DL
ALP SERPL-CCNC: 82 U/L (ref 39–117)
ALT SERPL W P-5'-P-CCNC: 22 U/L (ref 1–33)
ANION GAP SERPL CALCULATED.3IONS-SCNC: 8.5 MMOL/L (ref 5–15)
AST SERPL-CCNC: 24 U/L (ref 1–32)
BILIRUB SERPL-MCNC: 2.4 MG/DL (ref 0–1.2)
BUN SERPL-MCNC: 22 MG/DL (ref 6–20)
BUN/CREAT SERPL: 18 (ref 7–25)
CALCIUM SPEC-SCNC: 8.7 MG/DL (ref 8.6–10.5)
CHLORIDE SERPL-SCNC: 101 MMOL/L (ref 98–107)
CO2 SERPL-SCNC: 28.5 MMOL/L (ref 22–29)
CREAT SERPL-MCNC: 1.22 MG/DL (ref 0.57–1)
DEPRECATED RDW RBC AUTO: 86.2 FL (ref 37–54)
EGFRCR SERPLBLD CKD-EPI 2021: 51.9 ML/MIN/1.73
ERYTHROCYTE [DISTWIDTH] IN BLOOD BY AUTOMATED COUNT: 21.4 % (ref 12.3–15.4)
GEN 5 2HR TROPONIN T REFLEX: 76 NG/L
GLOBULIN UR ELPH-MCNC: 2.7 GM/DL
GLUCOSE BLDC GLUCOMTR-MCNC: 187 MG/DL (ref 70–130)
GLUCOSE BLDC GLUCOMTR-MCNC: 277 MG/DL (ref 70–130)
GLUCOSE BLDC GLUCOMTR-MCNC: 286 MG/DL (ref 70–130)
GLUCOSE BLDC GLUCOMTR-MCNC: 300 MG/DL (ref 70–130)
GLUCOSE SERPL-MCNC: 302 MG/DL (ref 65–99)
HCT VFR BLD AUTO: 32.7 % (ref 34–46.6)
HGB BLD-MCNC: 9.3 G/DL (ref 12–15.9)
MCH RBC QN AUTO: 31.3 PG (ref 26.6–33)
MCHC RBC AUTO-ENTMCNC: 28.4 G/DL (ref 31.5–35.7)
MCV RBC AUTO: 110.1 FL (ref 79–97)
PLATELET # BLD AUTO: 123 10*3/MM3 (ref 140–450)
PMV BLD AUTO: 10.5 FL (ref 6–12)
POTASSIUM SERPL-SCNC: 3.9 MMOL/L (ref 3.5–5.2)
PROT SERPL-MCNC: 6.3 G/DL (ref 6–8.5)
QT INTERVAL: 372 MS
QT INTERVAL: 388 MS
QTC INTERVAL: 567 MS
QTC INTERVAL: 579 MS
RBC # BLD AUTO: 2.97 10*6/MM3 (ref 3.77–5.28)
SODIUM SERPL-SCNC: 138 MMOL/L (ref 136–145)
TROPONIN T DELTA: 4 NG/L
TROPONIN T SERPL HS-MCNC: 72 NG/L
TROPONIN T SERPL HS-MCNC: 84 NG/L
WBC NRBC COR # BLD AUTO: 9.1 10*3/MM3 (ref 3.4–10.8)

## 2024-09-15 PROCEDURE — 25010000002 LORAZEPAM PER 2 MG: Performed by: STUDENT IN AN ORGANIZED HEALTH CARE EDUCATION/TRAINING PROGRAM

## 2024-09-15 PROCEDURE — 85027 COMPLETE CBC AUTOMATED: CPT | Performed by: STUDENT IN AN ORGANIZED HEALTH CARE EDUCATION/TRAINING PROGRAM

## 2024-09-15 PROCEDURE — 82948 REAGENT STRIP/BLOOD GLUCOSE: CPT

## 2024-09-15 PROCEDURE — 25010000002 DIGOXIN PER 500 MCG: Performed by: INTERNAL MEDICINE

## 2024-09-15 PROCEDURE — 99232 SBSQ HOSP IP/OBS MODERATE 35: CPT | Performed by: STUDENT IN AN ORGANIZED HEALTH CARE EDUCATION/TRAINING PROGRAM

## 2024-09-15 PROCEDURE — 99232 SBSQ HOSP IP/OBS MODERATE 35: CPT | Performed by: INTERNAL MEDICINE

## 2024-09-15 PROCEDURE — 63710000001 INSULIN GLARGINE PER 5 UNITS: Performed by: STUDENT IN AN ORGANIZED HEALTH CARE EDUCATION/TRAINING PROGRAM

## 2024-09-15 PROCEDURE — P9047 ALBUMIN (HUMAN), 25%, 50ML: HCPCS | Performed by: INTERNAL MEDICINE

## 2024-09-15 PROCEDURE — 25010000002 BUMETANIDE PER 0.5 MG: Performed by: INTERNAL MEDICINE

## 2024-09-15 PROCEDURE — 93005 ELECTROCARDIOGRAM TRACING: CPT | Performed by: STUDENT IN AN ORGANIZED HEALTH CARE EDUCATION/TRAINING PROGRAM

## 2024-09-15 PROCEDURE — 63710000001 INSULIN REGULAR HUMAN PER 5 UNITS: Performed by: HOSPITALIST

## 2024-09-15 PROCEDURE — 80053 COMPREHEN METABOLIC PANEL: CPT | Performed by: STUDENT IN AN ORGANIZED HEALTH CARE EDUCATION/TRAINING PROGRAM

## 2024-09-15 PROCEDURE — 84484 ASSAY OF TROPONIN QUANT: CPT | Performed by: STUDENT IN AN ORGANIZED HEALTH CARE EDUCATION/TRAINING PROGRAM

## 2024-09-15 PROCEDURE — 25010000002 ALBUMIN HUMAN 25% PER 50 ML: Performed by: INTERNAL MEDICINE

## 2024-09-15 RX ORDER — METOPROLOL TARTRATE 25 MG/1
25 TABLET, FILM COATED ORAL EVERY 12 HOURS SCHEDULED
Status: DISCONTINUED | OUTPATIENT
Start: 2024-09-15 | End: 2024-09-17 | Stop reason: HOSPADM

## 2024-09-15 RX ORDER — DIGOXIN 0.25 MG/ML
250 INJECTION INTRAMUSCULAR; INTRAVENOUS ONCE
Status: COMPLETED | OUTPATIENT
Start: 2024-09-15 | End: 2024-09-15

## 2024-09-15 RX ORDER — LORAZEPAM 2 MG/ML
0.5 INJECTION INTRAMUSCULAR EVERY 4 HOURS PRN
Status: DISCONTINUED | OUTPATIENT
Start: 2024-09-15 | End: 2024-09-17 | Stop reason: HOSPADM

## 2024-09-15 RX ORDER — DIGOXIN 0.25 MG/ML
125 INJECTION INTRAMUSCULAR; INTRAVENOUS ONCE
Status: COMPLETED | OUTPATIENT
Start: 2024-09-16 | End: 2024-09-16

## 2024-09-15 RX ORDER — ADENOSINE 3 MG/ML
6 INJECTION, SOLUTION INTRAVENOUS ONCE
Status: DISCONTINUED | OUTPATIENT
Start: 2024-09-15 | End: 2024-09-15

## 2024-09-15 RX ORDER — ALBUMIN (HUMAN) 12.5 G/50ML
25 SOLUTION INTRAVENOUS ONCE
Status: DISCONTINUED | OUTPATIENT
Start: 2024-09-15 | End: 2024-09-15

## 2024-09-15 RX ORDER — ALBUMIN (HUMAN) 12.5 G/50ML
25 SOLUTION INTRAVENOUS ONCE
Status: COMPLETED | OUTPATIENT
Start: 2024-09-15 | End: 2024-09-15

## 2024-09-15 RX ADMIN — INSULIN GLARGINE 25 UNITS: 100 INJECTION, SOLUTION SUBCUTANEOUS at 20:47

## 2024-09-15 RX ADMIN — LORAZEPAM 0.5 MG: 2 INJECTION INTRAMUSCULAR; INTRAVENOUS at 09:44

## 2024-09-15 RX ADMIN — PANTOPRAZOLE SODIUM 40 MG: 40 TABLET, DELAYED RELEASE ORAL at 06:47

## 2024-09-15 RX ADMIN — HYDROCODONE BITARTRATE AND ACETAMINOPHEN 1 TABLET: 7.5; 325 TABLET ORAL at 20:31

## 2024-09-15 RX ADMIN — DIGOXIN 250 MCG: 0.25 INJECTION INTRAMUSCULAR; INTRAVENOUS at 08:29

## 2024-09-15 RX ADMIN — BUMETANIDE 2 MG: 0.25 INJECTION, SOLUTION INTRAMUSCULAR; INTRAVENOUS at 06:46

## 2024-09-15 RX ADMIN — FERROUS SULFATE TAB 325 MG (65 MG ELEMENTAL FE) 325 MG: 325 (65 FE) TAB at 08:29

## 2024-09-15 RX ADMIN — LEVOTHYROXINE SODIUM 50 MCG: 0.05 TABLET ORAL at 06:47

## 2024-09-15 RX ADMIN — MICONAZOLE NITRATE 1 APPLICATION: 2 POWDER TOPICAL at 20:35

## 2024-09-15 RX ADMIN — LEVOTHYROXINE SODIUM 12.5 MCG: 0.05 TABLET ORAL at 06:47

## 2024-09-15 RX ADMIN — MICONAZOLE NITRATE 1 APPLICATION: 2 POWDER TOPICAL at 08:29

## 2024-09-15 RX ADMIN — ALBUMIN (HUMAN) 25 G: 0.25 INJECTION, SOLUTION INTRAVENOUS at 09:43

## 2024-09-15 RX ADMIN — Medication 10 ML: at 20:35

## 2024-09-15 RX ADMIN — ASPIRIN 81 MG: 81 TABLET, COATED ORAL at 08:29

## 2024-09-15 RX ADMIN — Medication 10 ML: at 08:30

## 2024-09-15 RX ADMIN — APIXABAN 5 MG: 5 TABLET, FILM COATED ORAL at 08:29

## 2024-09-15 RX ADMIN — INSULIN HUMAN 8 UNITS: 100 INJECTION, SOLUTION PARENTERAL at 17:59

## 2024-09-15 RX ADMIN — METOPROLOL TARTRATE 25 MG: 25 TABLET, FILM COATED ORAL at 10:49

## 2024-09-15 RX ADMIN — INSULIN HUMAN 8 UNITS: 100 INJECTION, SOLUTION PARENTERAL at 12:46

## 2024-09-15 RX ADMIN — LORAZEPAM 0.5 MG: 2 INJECTION INTRAMUSCULAR; INTRAVENOUS at 20:47

## 2024-09-15 RX ADMIN — APIXABAN 5 MG: 5 TABLET, FILM COATED ORAL at 20:31

## 2024-09-15 RX ADMIN — INSULIN HUMAN 3 UNITS: 100 INJECTION, SOLUTION PARENTERAL at 06:47

## 2024-09-15 RX ADMIN — INSULIN GLARGINE 20 UNITS: 100 INJECTION, SOLUTION SUBCUTANEOUS at 08:29

## 2024-09-16 LAB
ANION GAP SERPL CALCULATED.3IONS-SCNC: 9.2 MMOL/L (ref 5–15)
BUN SERPL-MCNC: 23 MG/DL (ref 6–20)
BUN/CREAT SERPL: 19 (ref 7–25)
CALCIUM SPEC-SCNC: 8.6 MG/DL (ref 8.6–10.5)
CHLORIDE SERPL-SCNC: 101 MMOL/L (ref 98–107)
CO2 SERPL-SCNC: 26.8 MMOL/L (ref 22–29)
CREAT SERPL-MCNC: 1.21 MG/DL (ref 0.57–1)
DEPRECATED RDW RBC AUTO: 83.7 FL (ref 37–54)
EGFRCR SERPLBLD CKD-EPI 2021: 52.4 ML/MIN/1.73
ERYTHROCYTE [DISTWIDTH] IN BLOOD BY AUTOMATED COUNT: 21.2 % (ref 12.3–15.4)
GLUCOSE BLDC GLUCOMTR-MCNC: 237 MG/DL (ref 70–130)
GLUCOSE BLDC GLUCOMTR-MCNC: 255 MG/DL (ref 70–130)
GLUCOSE BLDC GLUCOMTR-MCNC: 353 MG/DL (ref 70–130)
GLUCOSE BLDC GLUCOMTR-MCNC: 389 MG/DL (ref 70–130)
GLUCOSE SERPL-MCNC: 252 MG/DL (ref 65–99)
HCT VFR BLD AUTO: 29.7 % (ref 34–46.6)
HGB BLD-MCNC: 8.6 G/DL (ref 12–15.9)
MCH RBC QN AUTO: 31.7 PG (ref 26.6–33)
MCHC RBC AUTO-ENTMCNC: 29 G/DL (ref 31.5–35.7)
MCV RBC AUTO: 109.6 FL (ref 79–97)
PLATELET # BLD AUTO: 119 10*3/MM3 (ref 140–450)
PMV BLD AUTO: 10.4 FL (ref 6–12)
POTASSIUM SERPL-SCNC: 4.1 MMOL/L (ref 3.5–5.2)
QT INTERVAL: 438 MS
QTC INTERVAL: 486 MS
RBC # BLD AUTO: 2.71 10*6/MM3 (ref 3.77–5.28)
SODIUM SERPL-SCNC: 137 MMOL/L (ref 136–145)
WBC NRBC COR # BLD AUTO: 7.01 10*3/MM3 (ref 3.4–10.8)

## 2024-09-16 PROCEDURE — 82948 REAGENT STRIP/BLOOD GLUCOSE: CPT

## 2024-09-16 PROCEDURE — 63710000001 INSULIN REGULAR HUMAN PER 5 UNITS: Performed by: HOSPITALIST

## 2024-09-16 PROCEDURE — 85027 COMPLETE CBC AUTOMATED: CPT | Performed by: STUDENT IN AN ORGANIZED HEALTH CARE EDUCATION/TRAINING PROGRAM

## 2024-09-16 PROCEDURE — 25010000002 LORAZEPAM PER 2 MG: Performed by: STUDENT IN AN ORGANIZED HEALTH CARE EDUCATION/TRAINING PROGRAM

## 2024-09-16 PROCEDURE — 93010 ELECTROCARDIOGRAM REPORT: CPT | Performed by: INTERNAL MEDICINE

## 2024-09-16 PROCEDURE — 93005 ELECTROCARDIOGRAM TRACING: CPT | Performed by: STUDENT IN AN ORGANIZED HEALTH CARE EDUCATION/TRAINING PROGRAM

## 2024-09-16 PROCEDURE — 80048 BASIC METABOLIC PNL TOTAL CA: CPT | Performed by: STUDENT IN AN ORGANIZED HEALTH CARE EDUCATION/TRAINING PROGRAM

## 2024-09-16 PROCEDURE — 25010000002 DIGOXIN PER 500 MCG: Performed by: INTERNAL MEDICINE

## 2024-09-16 PROCEDURE — 63710000001 INSULIN GLARGINE PER 5 UNITS: Performed by: STUDENT IN AN ORGANIZED HEALTH CARE EDUCATION/TRAINING PROGRAM

## 2024-09-16 PROCEDURE — 99232 SBSQ HOSP IP/OBS MODERATE 35: CPT | Performed by: INTERNAL MEDICINE

## 2024-09-16 RX ORDER — DIGOXIN 125 MCG
125 TABLET ORAL
Status: DISCONTINUED | OUTPATIENT
Start: 2024-09-17 | End: 2024-09-17 | Stop reason: HOSPADM

## 2024-09-16 RX ADMIN — INSULIN HUMAN 10 UNITS: 100 INJECTION, SOLUTION PARENTERAL at 00:02

## 2024-09-16 RX ADMIN — LEVOTHYROXINE SODIUM 50 MCG: 0.05 TABLET ORAL at 06:52

## 2024-09-16 RX ADMIN — INSULIN HUMAN 5 UNITS: 100 INJECTION, SOLUTION PARENTERAL at 06:52

## 2024-09-16 RX ADMIN — HYDROCODONE BITARTRATE AND ACETAMINOPHEN 1 TABLET: 7.5; 325 TABLET ORAL at 09:18

## 2024-09-16 RX ADMIN — LORAZEPAM 0.5 MG: 2 INJECTION INTRAMUSCULAR; INTRAVENOUS at 22:01

## 2024-09-16 RX ADMIN — INSULIN HUMAN 12 UNITS: 100 INJECTION, SOLUTION PARENTERAL at 12:19

## 2024-09-16 RX ADMIN — Medication 10 ML: at 22:02

## 2024-09-16 RX ADMIN — INSULIN GLARGINE 25 UNITS: 100 INJECTION, SOLUTION SUBCUTANEOUS at 09:11

## 2024-09-16 RX ADMIN — HYDROCODONE BITARTRATE AND ACETAMINOPHEN 1 TABLET: 7.5; 325 TABLET ORAL at 21:55

## 2024-09-16 RX ADMIN — APIXABAN 5 MG: 5 TABLET, FILM COATED ORAL at 09:11

## 2024-09-16 RX ADMIN — APIXABAN 5 MG: 5 TABLET, FILM COATED ORAL at 21:56

## 2024-09-16 RX ADMIN — LEVOTHYROXINE SODIUM 12.5 MCG: 0.05 TABLET ORAL at 06:52

## 2024-09-16 RX ADMIN — FERROUS SULFATE TAB 325 MG (65 MG ELEMENTAL FE) 325 MG: 325 (65 FE) TAB at 09:11

## 2024-09-16 RX ADMIN — DIGOXIN 125 MCG: 0.25 INJECTION INTRAMUSCULAR; INTRAVENOUS at 06:52

## 2024-09-16 RX ADMIN — MICONAZOLE NITRATE 1 APPLICATION: 2 POWDER TOPICAL at 21:58

## 2024-09-16 RX ADMIN — MICONAZOLE NITRATE 1 APPLICATION: 2 POWDER TOPICAL at 09:12

## 2024-09-16 RX ADMIN — Medication 10 ML: at 09:12

## 2024-09-16 RX ADMIN — INSULIN GLARGINE 25 UNITS: 100 INJECTION, SOLUTION SUBCUTANEOUS at 21:56

## 2024-09-16 RX ADMIN — PANTOPRAZOLE SODIUM 40 MG: 40 TABLET, DELAYED RELEASE ORAL at 06:52

## 2024-09-16 RX ADMIN — Medication 10 ML: at 21:56

## 2024-09-16 RX ADMIN — METOPROLOL TARTRATE 25 MG: 25 TABLET, FILM COATED ORAL at 09:11

## 2024-09-16 RX ADMIN — INSULIN HUMAN 12 UNITS: 100 INJECTION, SOLUTION PARENTERAL at 17:29

## 2024-09-17 ENCOUNTER — HOSPITAL ENCOUNTER (INPATIENT)
Facility: HOSPITAL | Age: 59
LOS: 4 days | Discharge: HOME | DRG: 308 | End: 2024-09-21
Attending: STUDENT IN AN ORGANIZED HEALTH CARE EDUCATION/TRAINING PROGRAM | Admitting: INTERNAL MEDICINE
Payer: COMMERCIAL

## 2024-09-17 ENCOUNTER — READMISSION MANAGEMENT (OUTPATIENT)
Dept: CALL CENTER | Facility: HOSPITAL | Age: 59
End: 2024-09-17
Payer: COMMERCIAL

## 2024-09-17 ENCOUNTER — APPOINTMENT (OUTPATIENT)
Dept: GENERAL RADIOLOGY | Facility: HOSPITAL | Age: 59
DRG: 308 | End: 2024-09-17
Payer: COMMERCIAL

## 2024-09-17 VITALS
RESPIRATION RATE: 19 BRPM | HEART RATE: 90 BPM | SYSTOLIC BLOOD PRESSURE: 107 MMHG | DIASTOLIC BLOOD PRESSURE: 72 MMHG | TEMPERATURE: 98.3 F | WEIGHT: 223.55 LBS | OXYGEN SATURATION: 94 % | HEIGHT: 65 IN | BODY MASS INDEX: 37.25 KG/M2

## 2024-09-17 DIAGNOSIS — I48.91 ATRIAL FIBRILLATION WITH RAPID VENTRICULAR RESPONSE: Primary | ICD-10-CM

## 2024-09-17 LAB
ANION GAP SERPL CALCULATED.3IONS-SCNC: 4.2 MMOL/L (ref 5–15)
ANION GAP SERPL CALCULATED.3IONS-SCNC: 9.5 MMOL/L (ref 5–15)
BASOPHILS # BLD AUTO: 0.08 10*3/MM3 (ref 0–0.2)
BASOPHILS NFR BLD AUTO: 0.9 % (ref 0–1.5)
BUN SERPL-MCNC: 21 MG/DL (ref 6–20)
BUN SERPL-MCNC: 24 MG/DL (ref 6–20)
BUN/CREAT SERPL: 18.2 (ref 7–25)
BUN/CREAT SERPL: 19.3 (ref 7–25)
CALCIUM SPEC-SCNC: 8.5 MG/DL (ref 8.6–10.5)
CALCIUM SPEC-SCNC: 9.2 MG/DL (ref 8.6–10.5)
CHLORIDE SERPL-SCNC: 101 MMOL/L (ref 98–107)
CHLORIDE SERPL-SCNC: 102 MMOL/L (ref 98–107)
CO2 SERPL-SCNC: 25.5 MMOL/L (ref 22–29)
CO2 SERPL-SCNC: 28.8 MMOL/L (ref 22–29)
CREAT SERPL-MCNC: 1.09 MG/DL (ref 0.57–1)
CREAT SERPL-MCNC: 1.32 MG/DL (ref 0.57–1)
DEPRECATED RDW RBC AUTO: 82.8 FL (ref 37–54)
DEPRECATED RDW RBC AUTO: 83.1 FL (ref 37–54)
DIGOXIN SERPL-MCNC: 1.1 NG/ML (ref 0.6–1.2)
EGFRCR SERPLBLD CKD-EPI 2021: 47.2 ML/MIN/1.73
EGFRCR SERPLBLD CKD-EPI 2021: 59.4 ML/MIN/1.73
EOSINOPHIL # BLD AUTO: 0.15 10*3/MM3 (ref 0–0.4)
EOSINOPHIL NFR BLD AUTO: 1.7 % (ref 0.3–6.2)
ERYTHROCYTE [DISTWIDTH] IN BLOOD BY AUTOMATED COUNT: 21.1 % (ref 12.3–15.4)
ERYTHROCYTE [DISTWIDTH] IN BLOOD BY AUTOMATED COUNT: 21.2 % (ref 12.3–15.4)
GEN 5 2HR TROPONIN T REFLEX: 55 NG/L
GLUCOSE BLDC GLUCOMTR-MCNC: 138 MG/DL (ref 70–130)
GLUCOSE BLDC GLUCOMTR-MCNC: 220 MG/DL (ref 70–130)
GLUCOSE BLDC GLUCOMTR-MCNC: 336 MG/DL (ref 70–130)
GLUCOSE SERPL-MCNC: 172 MG/DL (ref 65–99)
GLUCOSE SERPL-MCNC: 260 MG/DL (ref 65–99)
HCT VFR BLD AUTO: 30.1 % (ref 34–46.6)
HCT VFR BLD AUTO: 34.3 % (ref 34–46.6)
HGB BLD-MCNC: 8.5 G/DL (ref 12–15.9)
HGB BLD-MCNC: 9.9 G/DL (ref 12–15.9)
HOLD SPECIMEN: NORMAL
HOLD SPECIMEN: NORMAL
IMM GRANULOCYTES # BLD AUTO: 0.06 10*3/MM3 (ref 0–0.05)
IMM GRANULOCYTES NFR BLD AUTO: 0.7 % (ref 0–0.5)
LYMPHOCYTES # BLD AUTO: 0.95 10*3/MM3 (ref 0.7–3.1)
LYMPHOCYTES NFR BLD AUTO: 10.7 % (ref 19.6–45.3)
MAGNESIUM SERPL-MCNC: 2 MG/DL (ref 1.6–2.6)
MCH RBC QN AUTO: 31.1 PG (ref 26.6–33)
MCH RBC QN AUTO: 31.5 PG (ref 26.6–33)
MCHC RBC AUTO-ENTMCNC: 28.2 G/DL (ref 31.5–35.7)
MCHC RBC AUTO-ENTMCNC: 28.9 G/DL (ref 31.5–35.7)
MCV RBC AUTO: 109.2 FL (ref 79–97)
MCV RBC AUTO: 110.3 FL (ref 79–97)
MONOCYTES # BLD AUTO: 0.35 10*3/MM3 (ref 0.1–0.9)
MONOCYTES NFR BLD AUTO: 3.9 % (ref 5–12)
NEUTROPHILS NFR BLD AUTO: 7.32 10*3/MM3 (ref 1.7–7)
NEUTROPHILS NFR BLD AUTO: 82.1 % (ref 42.7–76)
NRBC BLD AUTO-RTO: 0.2 /100 WBC (ref 0–0.2)
NT-PROBNP SERPL-MCNC: 6667 PG/ML (ref 0–900)
PLATELET # BLD AUTO: 119 10*3/MM3 (ref 140–450)
PLATELET # BLD AUTO: 150 10*3/MM3 (ref 140–450)
PMV BLD AUTO: 10.5 FL (ref 6–12)
PMV BLD AUTO: 10.6 FL (ref 6–12)
POTASSIUM SERPL-SCNC: 3.9 MMOL/L (ref 3.5–5.2)
POTASSIUM SERPL-SCNC: 5.1 MMOL/L (ref 3.5–5.2)
RBC # BLD AUTO: 2.73 10*6/MM3 (ref 3.77–5.28)
RBC # BLD AUTO: 3.14 10*6/MM3 (ref 3.77–5.28)
SODIUM SERPL-SCNC: 135 MMOL/L (ref 136–145)
SODIUM SERPL-SCNC: 136 MMOL/L (ref 136–145)
TROPONIN T DELTA: -9 NG/L
TROPONIN T SERPL HS-MCNC: 64 NG/L
TSH SERPL DL<=0.05 MIU/L-ACNC: 5.94 UIU/ML (ref 0.27–4.2)
WBC NRBC COR # BLD AUTO: 6.63 10*3/MM3 (ref 3.4–10.8)
WBC NRBC COR # BLD AUTO: 8.91 10*3/MM3 (ref 3.4–10.8)
WHOLE BLOOD HOLD COAG: NORMAL
WHOLE BLOOD HOLD SPECIMEN: NORMAL

## 2024-09-17 PROCEDURE — 99285 EMERGENCY DEPT VISIT HI MDM: CPT

## 2024-09-17 PROCEDURE — 82948 REAGENT STRIP/BLOOD GLUCOSE: CPT

## 2024-09-17 PROCEDURE — 25010000002 AMIODARONE IN DEXTROSE 5% 360-4.14 MG/200ML-% SOLUTION: Performed by: STUDENT IN AN ORGANIZED HEALTH CARE EDUCATION/TRAINING PROGRAM

## 2024-09-17 PROCEDURE — 93010 ELECTROCARDIOGRAM REPORT: CPT | Performed by: INTERNAL MEDICINE

## 2024-09-17 PROCEDURE — 80162 ASSAY OF DIGOXIN TOTAL: CPT | Performed by: STUDENT IN AN ORGANIZED HEALTH CARE EDUCATION/TRAINING PROGRAM

## 2024-09-17 PROCEDURE — 25010000002 FUROSEMIDE PER 20 MG: Performed by: STUDENT IN AN ORGANIZED HEALTH CARE EDUCATION/TRAINING PROGRAM

## 2024-09-17 PROCEDURE — 71045 X-RAY EXAM CHEST 1 VIEW: CPT

## 2024-09-17 PROCEDURE — 93005 ELECTROCARDIOGRAM TRACING: CPT | Performed by: STUDENT IN AN ORGANIZED HEALTH CARE EDUCATION/TRAINING PROGRAM

## 2024-09-17 PROCEDURE — 36415 COLL VENOUS BLD VENIPUNCTURE: CPT

## 2024-09-17 PROCEDURE — 63710000001 INSULIN REGULAR HUMAN PER 5 UNITS: Performed by: HOSPITALIST

## 2024-09-17 PROCEDURE — 25010000002 ADENOSINE PER 6 MG: Performed by: STUDENT IN AN ORGANIZED HEALTH CARE EDUCATION/TRAINING PROGRAM

## 2024-09-17 PROCEDURE — 83735 ASSAY OF MAGNESIUM: CPT | Performed by: STUDENT IN AN ORGANIZED HEALTH CARE EDUCATION/TRAINING PROGRAM

## 2024-09-17 PROCEDURE — 25010000002 AMIODARONE IN DEXTROSE 5% 360-4.14 MG/200ML-% SOLUTION: Performed by: INTERNAL MEDICINE

## 2024-09-17 PROCEDURE — 85025 COMPLETE CBC W/AUTO DIFF WBC: CPT | Performed by: STUDENT IN AN ORGANIZED HEALTH CARE EDUCATION/TRAINING PROGRAM

## 2024-09-17 PROCEDURE — 85027 COMPLETE CBC AUTOMATED: CPT | Performed by: INTERNAL MEDICINE

## 2024-09-17 PROCEDURE — 63710000001 INSULIN GLARGINE PER 5 UNITS: Performed by: STUDENT IN AN ORGANIZED HEALTH CARE EDUCATION/TRAINING PROGRAM

## 2024-09-17 PROCEDURE — 99239 HOSP IP/OBS DSCHRG MGMT >30: CPT | Performed by: INTERNAL MEDICINE

## 2024-09-17 PROCEDURE — 84484 ASSAY OF TROPONIN QUANT: CPT | Performed by: STUDENT IN AN ORGANIZED HEALTH CARE EDUCATION/TRAINING PROGRAM

## 2024-09-17 PROCEDURE — 71045 X-RAY EXAM CHEST 1 VIEW: CPT | Performed by: RADIOLOGY

## 2024-09-17 PROCEDURE — 93005 ELECTROCARDIOGRAM TRACING: CPT | Performed by: INTERNAL MEDICINE

## 2024-09-17 PROCEDURE — 99223 1ST HOSP IP/OBS HIGH 75: CPT | Performed by: INTERNAL MEDICINE

## 2024-09-17 PROCEDURE — 80048 BASIC METABOLIC PNL TOTAL CA: CPT | Performed by: INTERNAL MEDICINE

## 2024-09-17 PROCEDURE — 83880 ASSAY OF NATRIURETIC PEPTIDE: CPT | Performed by: STUDENT IN AN ORGANIZED HEALTH CARE EDUCATION/TRAINING PROGRAM

## 2024-09-17 PROCEDURE — 84443 ASSAY THYROID STIM HORMONE: CPT | Performed by: INTERNAL MEDICINE

## 2024-09-17 PROCEDURE — 25010000002 AMIODARONE IN DEXTROSE 5% 150-4.21 MG/100ML-% SOLUTION: Performed by: STUDENT IN AN ORGANIZED HEALTH CARE EDUCATION/TRAINING PROGRAM

## 2024-09-17 PROCEDURE — 80048 BASIC METABOLIC PNL TOTAL CA: CPT | Performed by: STUDENT IN AN ORGANIZED HEALTH CARE EDUCATION/TRAINING PROGRAM

## 2024-09-17 RX ORDER — POLYETHYLENE GLYCOL 3350 17 G/17G
17 POWDER, FOR SOLUTION ORAL DAILY PRN
Status: DISCONTINUED | OUTPATIENT
Start: 2024-09-17 | End: 2024-09-21 | Stop reason: HOSPADM

## 2024-09-17 RX ORDER — METOLAZONE 2.5 MG/1
5 TABLET ORAL 2 TIMES DAILY
Status: CANCELLED | OUTPATIENT
Start: 2024-09-18

## 2024-09-17 RX ORDER — INSULIN LISPRO 100 [IU]/ML
2-7 INJECTION, SOLUTION INTRAVENOUS; SUBCUTANEOUS
Status: DISCONTINUED | OUTPATIENT
Start: 2024-09-18 | End: 2024-09-21 | Stop reason: HOSPADM

## 2024-09-17 RX ORDER — NITROGLYCERIN 0.4 MG/1
0.4 TABLET SUBLINGUAL
Status: DISCONTINUED | OUTPATIENT
Start: 2024-09-17 | End: 2024-09-21 | Stop reason: HOSPADM

## 2024-09-17 RX ORDER — BISACODYL 5 MG/1
5 TABLET, DELAYED RELEASE ORAL DAILY PRN
Status: DISCONTINUED | OUTPATIENT
Start: 2024-09-17 | End: 2024-09-21 | Stop reason: HOSPADM

## 2024-09-17 RX ORDER — HYDROCODONE BITARTRATE AND ACETAMINOPHEN 7.5; 325 MG/1; MG/1
1 TABLET ORAL EVERY 12 HOURS PRN
Status: DISCONTINUED | OUTPATIENT
Start: 2024-09-17 | End: 2024-09-21 | Stop reason: HOSPADM

## 2024-09-17 RX ORDER — SODIUM CHLORIDE 0.9 % (FLUSH) 0.9 %
10 SYRINGE (ML) INJECTION AS NEEDED
Status: DISCONTINUED | OUTPATIENT
Start: 2024-09-17 | End: 2024-09-21 | Stop reason: HOSPADM

## 2024-09-17 RX ORDER — NICOTINE POLACRILEX 4 MG
15 LOZENGE BUCCAL
Status: DISCONTINUED | OUTPATIENT
Start: 2024-09-17 | End: 2024-09-21 | Stop reason: HOSPADM

## 2024-09-17 RX ORDER — BUMETANIDE 1 MG/1
2 TABLET ORAL DAILY
Status: DISCONTINUED | OUTPATIENT
Start: 2024-09-17 | End: 2024-09-17 | Stop reason: HOSPADM

## 2024-09-17 RX ORDER — METOPROLOL SUCCINATE 25 MG/1
25 TABLET, EXTENDED RELEASE ORAL DAILY
Qty: 30 TABLET | Refills: 1 | Status: SHIPPED | OUTPATIENT
Start: 2024-09-17

## 2024-09-17 RX ORDER — LEVOTHYROXINE SODIUM 25 UG/1
12.5 TABLET ORAL
Status: DISCONTINUED | OUTPATIENT
Start: 2024-09-18 | End: 2024-09-21 | Stop reason: HOSPADM

## 2024-09-17 RX ORDER — FUROSEMIDE 10 MG/ML
80 INJECTION INTRAMUSCULAR; INTRAVENOUS ONCE
Status: COMPLETED | OUTPATIENT
Start: 2024-09-17 | End: 2024-09-17

## 2024-09-17 RX ORDER — ATORVASTATIN CALCIUM 20 MG/1
20 TABLET, FILM COATED ORAL DAILY
Qty: 90 TABLET | Refills: 1 | Status: SHIPPED | OUTPATIENT
Start: 2024-09-17

## 2024-09-17 RX ORDER — BUMETANIDE 1 MG/1
2 TABLET ORAL DAILY
Status: CANCELLED | OUTPATIENT
Start: 2024-09-18

## 2024-09-17 RX ORDER — LEVOTHYROXINE SODIUM 50 UG/1
50 TABLET ORAL
Status: DISCONTINUED | OUTPATIENT
Start: 2024-09-18 | End: 2024-09-21 | Stop reason: HOSPADM

## 2024-09-17 RX ORDER — AMOXICILLIN 250 MG
2 CAPSULE ORAL 2 TIMES DAILY PRN
Status: DISCONTINUED | OUTPATIENT
Start: 2024-09-17 | End: 2024-09-21 | Stop reason: HOSPADM

## 2024-09-17 RX ORDER — BISACODYL 10 MG
10 SUPPOSITORY, RECTAL RECTAL DAILY PRN
Status: DISCONTINUED | OUTPATIENT
Start: 2024-09-17 | End: 2024-09-21 | Stop reason: HOSPADM

## 2024-09-17 RX ORDER — DEXTROSE MONOHYDRATE 25 G/50ML
25 INJECTION, SOLUTION INTRAVENOUS
Status: DISCONTINUED | OUTPATIENT
Start: 2024-09-17 | End: 2024-09-21 | Stop reason: HOSPADM

## 2024-09-17 RX ORDER — ATORVASTATIN CALCIUM 20 MG/1
20 TABLET, FILM COATED ORAL DAILY
Status: DISCONTINUED | OUTPATIENT
Start: 2024-09-18 | End: 2024-09-21 | Stop reason: HOSPADM

## 2024-09-17 RX ORDER — GLUCAGON 1 MG/ML
1 KIT INJECTION
Status: DISCONTINUED | OUTPATIENT
Start: 2024-09-17 | End: 2024-09-21 | Stop reason: HOSPADM

## 2024-09-17 RX ORDER — DIGOXIN 125 MCG
125 TABLET ORAL
Qty: 30 TABLET | Refills: 1 | Status: SHIPPED | OUTPATIENT
Start: 2024-09-17 | End: 2024-09-21 | Stop reason: HOSPADM

## 2024-09-17 RX ORDER — ADENOSINE 3 MG/ML
INJECTION, SOLUTION INTRAVENOUS
Status: COMPLETED | OUTPATIENT
Start: 2024-09-17 | End: 2024-09-17

## 2024-09-17 RX ADMIN — APIXABAN 5 MG: 5 TABLET, FILM COATED ORAL at 23:37

## 2024-09-17 RX ADMIN — Medication 10 ML: at 09:19

## 2024-09-17 RX ADMIN — INSULIN GLARGINE 25 UNITS: 100 INJECTION, SOLUTION SUBCUTANEOUS at 09:14

## 2024-09-17 RX ADMIN — FUROSEMIDE 80 MG: 10 INJECTION, SOLUTION INTRAMUSCULAR; INTRAVENOUS at 18:44

## 2024-09-17 RX ADMIN — RENO CAPS 1 MG: 100; 1.5; 1.7; 20; 10; 1; 150; 5; 6 CAPSULE ORAL at 10:32

## 2024-09-17 RX ADMIN — AMIODARONE HYDROCHLORIDE 0.5 MG/MIN: 1.8 INJECTION, SOLUTION INTRAVENOUS at 23:37

## 2024-09-17 RX ADMIN — ADENOSINE 6 MG: 3 INJECTION, SOLUTION INTRAVENOUS at 17:21

## 2024-09-17 RX ADMIN — INSULIN HUMAN 10 UNITS: 100 INJECTION, SOLUTION PARENTERAL at 13:33

## 2024-09-17 RX ADMIN — APIXABAN 5 MG: 5 TABLET, FILM COATED ORAL at 09:17

## 2024-09-17 RX ADMIN — LEVOTHYROXINE SODIUM 12.5 MCG: 0.05 TABLET ORAL at 06:49

## 2024-09-17 RX ADMIN — LEVOTHYROXINE SODIUM 50 MCG: 0.05 TABLET ORAL at 06:48

## 2024-09-17 RX ADMIN — ADENOSINE 12 MG: 3 INJECTION, SOLUTION INTRAVENOUS at 17:23

## 2024-09-17 RX ADMIN — AMIODARONE HYDROCHLORIDE 150 MG: 1.5 INJECTION, SOLUTION INTRAVENOUS at 17:29

## 2024-09-17 RX ADMIN — BUMETANIDE 2 MG: 1 TABLET ORAL at 09:15

## 2024-09-17 RX ADMIN — PANTOPRAZOLE SODIUM 40 MG: 40 TABLET, DELAYED RELEASE ORAL at 06:49

## 2024-09-17 RX ADMIN — AMIODARONE HYDROCHLORIDE 1 MG/MIN: 1.8 INJECTION, SOLUTION INTRAVENOUS at 17:39

## 2024-09-17 RX ADMIN — MICONAZOLE NITRATE 1 APPLICATION: 2 POWDER TOPICAL at 09:19

## 2024-09-17 RX ADMIN — DIGOXIN 125 MCG: 125 TABLET ORAL at 13:33

## 2024-09-17 RX ADMIN — FERROUS SULFATE TAB 325 MG (65 MG ELEMENTAL FE) 325 MG: 325 (65 FE) TAB at 09:15

## 2024-09-17 RX ADMIN — HYDROCODONE BITARTRATE AND ACETAMINOPHEN 1 TABLET: 7.5; 325 TABLET ORAL at 09:15

## 2024-09-17 RX ADMIN — INSULIN HUMAN 5 UNITS: 100 INJECTION, SOLUTION PARENTERAL at 00:02

## 2024-09-18 LAB
ABSOLUTE LUNG FLUID CONTENT: 40 % (ref 20–35)
ALBUMIN SERPL-MCNC: 3.5 G/DL (ref 3.5–5.2)
ALBUMIN/GLOB SERPL: 1.3 G/DL
ALP SERPL-CCNC: 74 U/L (ref 39–117)
ALT SERPL W P-5'-P-CCNC: 22 U/L (ref 1–33)
ANION GAP SERPL CALCULATED.3IONS-SCNC: 10.2 MMOL/L (ref 5–15)
ANISOCYTOSIS BLD QL: NORMAL
AST SERPL-CCNC: 25 U/L (ref 1–32)
BASOPHILS # BLD AUTO: 0.06 10*3/MM3 (ref 0–0.2)
BASOPHILS NFR BLD AUTO: 0.9 % (ref 0–1.5)
BILIRUB SERPL-MCNC: 2.8 MG/DL (ref 0–1.2)
BUN SERPL-MCNC: 24 MG/DL (ref 6–20)
BUN/CREAT SERPL: 17.6 (ref 7–25)
CALCIUM SPEC-SCNC: 8.3 MG/DL (ref 8.6–10.5)
CHLORIDE SERPL-SCNC: 100 MMOL/L (ref 98–107)
CO2 SERPL-SCNC: 26.8 MMOL/L (ref 22–29)
CREAT SERPL-MCNC: 1.36 MG/DL (ref 0.57–1)
DEPRECATED RDW RBC AUTO: 85 FL (ref 37–54)
EGFRCR SERPLBLD CKD-EPI 2021: 45.5 ML/MIN/1.73
EOSINOPHIL # BLD AUTO: 0.11 10*3/MM3 (ref 0–0.4)
EOSINOPHIL NFR BLD AUTO: 1.6 % (ref 0.3–6.2)
ERYTHROCYTE [DISTWIDTH] IN BLOOD BY AUTOMATED COUNT: 21 % (ref 12.3–15.4)
GEN 5 2HR TROPONIN T REFLEX: 53 NG/L
GLOBULIN UR ELPH-MCNC: 2.6 GM/DL
GLUCOSE BLDC GLUCOMTR-MCNC: 297 MG/DL (ref 70–130)
GLUCOSE BLDC GLUCOMTR-MCNC: 298 MG/DL (ref 70–130)
GLUCOSE BLDC GLUCOMTR-MCNC: 324 MG/DL (ref 70–130)
GLUCOSE BLDC GLUCOMTR-MCNC: 361 MG/DL (ref 70–130)
GLUCOSE SERPL-MCNC: 271 MG/DL (ref 65–99)
HCT VFR BLD AUTO: 28.9 % (ref 34–46.6)
HGB BLD-MCNC: 8.2 G/DL (ref 12–15.9)
HYPOCHROMIA BLD QL: NORMAL
IMM GRANULOCYTES # BLD AUTO: 0.05 10*3/MM3 (ref 0–0.05)
IMM GRANULOCYTES NFR BLD AUTO: 0.7 % (ref 0–0.5)
LYMPHOCYTES # BLD AUTO: 1.74 10*3/MM3 (ref 0.7–3.1)
LYMPHOCYTES NFR BLD AUTO: 25.9 % (ref 19.6–45.3)
MACROCYTES BLD QL SMEAR: NORMAL
MCH RBC QN AUTO: 31.4 PG (ref 26.6–33)
MCHC RBC AUTO-ENTMCNC: 28.4 G/DL (ref 31.5–35.7)
MCV RBC AUTO: 110.7 FL (ref 79–97)
MONOCYTES # BLD AUTO: 0.3 10*3/MM3 (ref 0.1–0.9)
MONOCYTES NFR BLD AUTO: 4.5 % (ref 5–12)
NEUTROPHILS NFR BLD AUTO: 4.45 10*3/MM3 (ref 1.7–7)
NEUTROPHILS NFR BLD AUTO: 66.4 % (ref 42.7–76)
NRBC BLD AUTO-RTO: 0 /100 WBC (ref 0–0.2)
PLATELET # BLD AUTO: 120 10*3/MM3 (ref 140–450)
PMV BLD AUTO: 10.3 FL (ref 6–12)
POLYCHROMASIA BLD QL SMEAR: NORMAL
POTASSIUM SERPL-SCNC: 3.9 MMOL/L (ref 3.5–5.2)
PROT SERPL-MCNC: 6.1 G/DL (ref 6–8.5)
QT INTERVAL: 312 MS
QT INTERVAL: 354 MS
QT INTERVAL: 446 MS
QT INTERVAL: 458 MS
QTC INTERVAL: 491 MS
QTC INTERVAL: 494 MS
QTC INTERVAL: 508 MS
QTC INTERVAL: 559 MS
RBC # BLD AUTO: 2.61 10*6/MM3 (ref 3.77–5.28)
SMALL PLATELETS BLD QL SMEAR: NORMAL
SODIUM SERPL-SCNC: 137 MMOL/L (ref 136–145)
STOMATOCYTES BLD QL SMEAR: NORMAL
T4 FREE SERPL-MCNC: 1.56 NG/DL (ref 0.92–1.68)
TROPONIN T DELTA: 2 NG/L
TROPONIN T SERPL HS-MCNC: 51 NG/L
WBC NRBC COR # BLD AUTO: 6.71 10*3/MM3 (ref 3.4–10.8)

## 2024-09-18 PROCEDURE — 80053 COMPREHEN METABOLIC PANEL: CPT | Performed by: INTERNAL MEDICINE

## 2024-09-18 PROCEDURE — 25010000002 FUROSEMIDE PER 20 MG: Performed by: INTERNAL MEDICINE

## 2024-09-18 PROCEDURE — 93005 ELECTROCARDIOGRAM TRACING: CPT | Performed by: INTERNAL MEDICINE

## 2024-09-18 PROCEDURE — 99222 1ST HOSP IP/OBS MODERATE 55: CPT | Performed by: INTERNAL MEDICINE

## 2024-09-18 PROCEDURE — 93010 ELECTROCARDIOGRAM REPORT: CPT | Performed by: INTERNAL MEDICINE

## 2024-09-18 PROCEDURE — 63710000001 INSULIN GLARGINE PER 5 UNITS: Performed by: INTERNAL MEDICINE

## 2024-09-18 PROCEDURE — 84484 ASSAY OF TROPONIN QUANT: CPT | Performed by: INTERNAL MEDICINE

## 2024-09-18 PROCEDURE — 25010000002 AMIODARONE IN DEXTROSE 5% 360-4.14 MG/200ML-% SOLUTION: Performed by: INTERNAL MEDICINE

## 2024-09-18 PROCEDURE — 99232 SBSQ HOSP IP/OBS MODERATE 35: CPT | Performed by: INTERNAL MEDICINE

## 2024-09-18 PROCEDURE — 93005 ELECTROCARDIOGRAM TRACING: CPT | Performed by: STUDENT IN AN ORGANIZED HEALTH CARE EDUCATION/TRAINING PROGRAM

## 2024-09-18 PROCEDURE — 85007 BL SMEAR W/DIFF WBC COUNT: CPT | Performed by: INTERNAL MEDICINE

## 2024-09-18 PROCEDURE — 85025 COMPLETE CBC W/AUTO DIFF WBC: CPT | Performed by: INTERNAL MEDICINE

## 2024-09-18 PROCEDURE — 82948 REAGENT STRIP/BLOOD GLUCOSE: CPT

## 2024-09-18 PROCEDURE — 63710000001 INSULIN LISPRO (HUMAN) PER 5 UNITS: Performed by: INTERNAL MEDICINE

## 2024-09-18 PROCEDURE — 84439 ASSAY OF FREE THYROXINE: CPT | Performed by: INTERNAL MEDICINE

## 2024-09-18 PROCEDURE — 94726 PLETHYSMOGRAPHY LUNG VOLUMES: CPT

## 2024-09-18 RX ORDER — PANTOPRAZOLE SODIUM 40 MG/1
40 TABLET, DELAYED RELEASE ORAL
Status: DISCONTINUED | OUTPATIENT
Start: 2024-09-18 | End: 2024-09-21 | Stop reason: HOSPADM

## 2024-09-18 RX ORDER — FUROSEMIDE 10 MG/ML
80 INJECTION INTRAMUSCULAR; INTRAVENOUS
Status: DISCONTINUED | OUTPATIENT
Start: 2024-09-18 | End: 2024-09-20

## 2024-09-18 RX ORDER — ASPIRIN 81 MG/1
81 TABLET ORAL DAILY
Status: DISCONTINUED | OUTPATIENT
Start: 2024-09-18 | End: 2024-09-21 | Stop reason: HOSPADM

## 2024-09-18 RX ORDER — RENO CAPS 100; 1.5; 1.7; 20; 10; 1; 150; 5; 6 MG/1; MG/1; MG/1; MG/1; MG/1; MG/1; UG/1; MG/1; UG/1
1 CAPSULE ORAL DAILY
Status: DISCONTINUED | OUTPATIENT
Start: 2024-09-18 | End: 2024-09-21 | Stop reason: HOSPADM

## 2024-09-18 RX ORDER — BUMETANIDE 0.25 MG/ML
1 INJECTION INTRAMUSCULAR; INTRAVENOUS ONCE
Status: DISCONTINUED | OUTPATIENT
Start: 2024-09-18 | End: 2024-09-18

## 2024-09-18 RX ORDER — DIGOXIN 125 MCG
125 TABLET ORAL
Status: CANCELLED | OUTPATIENT
Start: 2024-09-18

## 2024-09-18 RX ORDER — INSULIN LISPRO 100 [IU]/ML
15 INJECTION, SOLUTION INTRAVENOUS; SUBCUTANEOUS
Status: CANCELLED | OUTPATIENT
Start: 2024-09-18

## 2024-09-18 RX ORDER — FERROUS SULFATE 325(65) MG
325 TABLET ORAL
Status: DISCONTINUED | OUTPATIENT
Start: 2024-09-18 | End: 2024-09-21 | Stop reason: HOSPADM

## 2024-09-18 RX ORDER — METOPROLOL SUCCINATE 25 MG/1
25 TABLET, EXTENDED RELEASE ORAL DAILY
Status: DISCONTINUED | OUTPATIENT
Start: 2024-09-18 | End: 2024-09-21 | Stop reason: HOSPADM

## 2024-09-18 RX ORDER — ONDANSETRON 4 MG/1
4 TABLET, ORALLY DISINTEGRATING ORAL EVERY 8 HOURS PRN
Status: DISCONTINUED | OUTPATIENT
Start: 2024-09-18 | End: 2024-09-21 | Stop reason: HOSPADM

## 2024-09-18 RX ADMIN — Medication 10 ML: at 08:45

## 2024-09-18 RX ADMIN — LEVOTHYROXINE SODIUM 50 MCG: 0.05 TABLET ORAL at 06:32

## 2024-09-18 RX ADMIN — AMIODARONE HYDROCHLORIDE 0.5 MG/MIN: 1.8 INJECTION, SOLUTION INTRAVENOUS at 13:36

## 2024-09-18 RX ADMIN — INSULIN LISPRO 4 UNITS: 100 INJECTION, SOLUTION INTRAVENOUS; SUBCUTANEOUS at 06:49

## 2024-09-18 RX ADMIN — ASPIRIN 81 MG: 81 TABLET, COATED ORAL at 08:41

## 2024-09-18 RX ADMIN — FERROUS SULFATE TAB 325 MG (65 MG ELEMENTAL FE) 325 MG: 325 (65 FE) TAB at 08:43

## 2024-09-18 RX ADMIN — INSULIN GLARGINE 25 UNITS: 100 INJECTION, SOLUTION SUBCUTANEOUS at 20:25

## 2024-09-18 RX ADMIN — APIXABAN 5 MG: 5 TABLET, FILM COATED ORAL at 20:26

## 2024-09-18 RX ADMIN — HYDROCODONE BITARTRATE AND ACETAMINOPHEN 1 TABLET: 7.5; 325 TABLET ORAL at 23:46

## 2024-09-18 RX ADMIN — INSULIN LISPRO 4 UNITS: 100 INJECTION, SOLUTION INTRAVENOUS; SUBCUTANEOUS at 11:48

## 2024-09-18 RX ADMIN — APIXABAN 5 MG: 5 TABLET, FILM COATED ORAL at 08:43

## 2024-09-18 RX ADMIN — INSULIN GLARGINE 25 UNITS: 100 INJECTION, SOLUTION SUBCUTANEOUS at 08:43

## 2024-09-18 RX ADMIN — LEVOTHYROXINE SODIUM 12.5 MCG: 0.05 TABLET ORAL at 06:32

## 2024-09-18 RX ADMIN — INSULIN LISPRO 6 UNITS: 100 INJECTION, SOLUTION INTRAVENOUS; SUBCUTANEOUS at 20:25

## 2024-09-18 RX ADMIN — PANTOPRAZOLE SODIUM 40 MG: 40 TABLET, DELAYED RELEASE ORAL at 06:32

## 2024-09-18 RX ADMIN — INSULIN LISPRO 5 UNITS: 100 INJECTION, SOLUTION INTRAVENOUS; SUBCUTANEOUS at 17:55

## 2024-09-18 RX ADMIN — ATORVASTATIN CALCIUM 20 MG: 20 TABLET, FILM COATED ORAL at 08:43

## 2024-09-18 RX ADMIN — HYDROCODONE BITARTRATE AND ACETAMINOPHEN 1 TABLET: 7.5; 325 TABLET ORAL at 03:03

## 2024-09-18 RX ADMIN — FUROSEMIDE 80 MG: 10 INJECTION, SOLUTION INTRAMUSCULAR; INTRAVENOUS at 17:56

## 2024-09-19 LAB
ANION GAP SERPL CALCULATED.3IONS-SCNC: 7.5 MMOL/L (ref 5–15)
BUN SERPL-MCNC: 24 MG/DL (ref 6–20)
BUN/CREAT SERPL: 19.5 (ref 7–25)
CALCIUM SPEC-SCNC: 8.4 MG/DL (ref 8.6–10.5)
CHLORIDE SERPL-SCNC: 100 MMOL/L (ref 98–107)
CO2 SERPL-SCNC: 27.5 MMOL/L (ref 22–29)
CREAT SERPL-MCNC: 1.23 MG/DL (ref 0.57–1)
DEPRECATED RDW RBC AUTO: 82.7 FL (ref 37–54)
EGFRCR SERPLBLD CKD-EPI 2021: 51.4 ML/MIN/1.73
ERYTHROCYTE [DISTWIDTH] IN BLOOD BY AUTOMATED COUNT: 20.7 % (ref 12.3–15.4)
GLUCOSE BLDC GLUCOMTR-MCNC: 235 MG/DL (ref 70–130)
GLUCOSE BLDC GLUCOMTR-MCNC: 259 MG/DL (ref 70–130)
GLUCOSE BLDC GLUCOMTR-MCNC: 301 MG/DL (ref 70–130)
GLUCOSE BLDC GLUCOMTR-MCNC: 329 MG/DL (ref 70–130)
GLUCOSE BLDC GLUCOMTR-MCNC: 356 MG/DL (ref 70–130)
GLUCOSE SERPL-MCNC: 308 MG/DL (ref 65–99)
HCT VFR BLD AUTO: 28 % (ref 34–46.6)
HGB BLD-MCNC: 7.9 G/DL (ref 12–15.9)
MAGNESIUM SERPL-MCNC: 1.9 MG/DL (ref 1.6–2.6)
MCH RBC QN AUTO: 31 PG (ref 26.6–33)
MCHC RBC AUTO-ENTMCNC: 28.2 G/DL (ref 31.5–35.7)
MCV RBC AUTO: 109.8 FL (ref 79–97)
PLATELET # BLD AUTO: 126 10*3/MM3 (ref 140–450)
PMV BLD AUTO: 10.8 FL (ref 6–12)
POTASSIUM SERPL-SCNC: 3.8 MMOL/L (ref 3.5–5.2)
QT INTERVAL: 430 MS
QTC INTERVAL: 543 MS
RBC # BLD AUTO: 2.55 10*6/MM3 (ref 3.77–5.28)
SODIUM SERPL-SCNC: 135 MMOL/L (ref 136–145)
WBC NRBC COR # BLD AUTO: 6.29 10*3/MM3 (ref 3.4–10.8)

## 2024-09-19 PROCEDURE — 99232 SBSQ HOSP IP/OBS MODERATE 35: CPT | Performed by: INTERNAL MEDICINE

## 2024-09-19 PROCEDURE — 25010000002 FUROSEMIDE PER 20 MG: Performed by: INTERNAL MEDICINE

## 2024-09-19 PROCEDURE — 83735 ASSAY OF MAGNESIUM: CPT | Performed by: INTERNAL MEDICINE

## 2024-09-19 PROCEDURE — 25010000002 AMIODARONE IN DEXTROSE 5% 150-4.21 MG/100ML-% SOLUTION: Performed by: NURSE PRACTITIONER

## 2024-09-19 PROCEDURE — 80048 BASIC METABOLIC PNL TOTAL CA: CPT | Performed by: INTERNAL MEDICINE

## 2024-09-19 PROCEDURE — 63710000001 INSULIN GLARGINE PER 5 UNITS: Performed by: INTERNAL MEDICINE

## 2024-09-19 PROCEDURE — 93005 ELECTROCARDIOGRAM TRACING: CPT | Performed by: STUDENT IN AN ORGANIZED HEALTH CARE EDUCATION/TRAINING PROGRAM

## 2024-09-19 PROCEDURE — 93010 ELECTROCARDIOGRAM REPORT: CPT | Performed by: INTERNAL MEDICINE

## 2024-09-19 PROCEDURE — 63710000001 ONDANSETRON ODT 4 MG TABLET DISPERSIBLE: Performed by: INTERNAL MEDICINE

## 2024-09-19 PROCEDURE — 82948 REAGENT STRIP/BLOOD GLUCOSE: CPT

## 2024-09-19 PROCEDURE — 63710000001 INSULIN LISPRO (HUMAN) PER 5 UNITS: Performed by: INTERNAL MEDICINE

## 2024-09-19 PROCEDURE — 85027 COMPLETE CBC AUTOMATED: CPT | Performed by: INTERNAL MEDICINE

## 2024-09-19 PROCEDURE — 99232 SBSQ HOSP IP/OBS MODERATE 35: CPT | Performed by: SPECIALIST

## 2024-09-19 PROCEDURE — 93005 ELECTROCARDIOGRAM TRACING: CPT | Performed by: INTERNAL MEDICINE

## 2024-09-19 RX ORDER — SODIUM CHLORIDE 9 MG/ML
40 INJECTION, SOLUTION INTRAVENOUS AS NEEDED
Status: DISCONTINUED | OUTPATIENT
Start: 2024-09-19 | End: 2024-09-21 | Stop reason: HOSPADM

## 2024-09-19 RX ORDER — SODIUM CHLORIDE 0.9 % (FLUSH) 0.9 %
10 SYRINGE (ML) INJECTION AS NEEDED
Status: DISCONTINUED | OUTPATIENT
Start: 2024-09-19 | End: 2024-09-21 | Stop reason: HOSPADM

## 2024-09-19 RX ORDER — AMIODARONE HYDROCHLORIDE 200 MG/1
400 TABLET ORAL
Status: DISCONTINUED | OUTPATIENT
Start: 2024-09-19 | End: 2024-09-21 | Stop reason: HOSPADM

## 2024-09-19 RX ORDER — SODIUM CHLORIDE 0.9 % (FLUSH) 0.9 %
10 SYRINGE (ML) INJECTION EVERY 12 HOURS SCHEDULED
Status: DISCONTINUED | OUTPATIENT
Start: 2024-09-19 | End: 2024-09-21 | Stop reason: HOSPADM

## 2024-09-19 RX ADMIN — FUROSEMIDE 80 MG: 10 INJECTION, SOLUTION INTRAMUSCULAR; INTRAVENOUS at 09:02

## 2024-09-19 RX ADMIN — AMIODARONE HYDROCHLORIDE 150 MG: 1.5 INJECTION, SOLUTION INTRAVENOUS at 12:56

## 2024-09-19 RX ADMIN — APIXABAN 5 MG: 5 TABLET, FILM COATED ORAL at 21:02

## 2024-09-19 RX ADMIN — Medication 10 ML: at 21:07

## 2024-09-19 RX ADMIN — AMIODARONE HYDROCHLORIDE 400 MG: 200 TABLET ORAL at 09:04

## 2024-09-19 RX ADMIN — APIXABAN 5 MG: 5 TABLET, FILM COATED ORAL at 09:04

## 2024-09-19 RX ADMIN — FERROUS SULFATE TAB 325 MG (65 MG ELEMENTAL FE) 325 MG: 325 (65 FE) TAB at 09:04

## 2024-09-19 RX ADMIN — INSULIN LISPRO 3 UNITS: 100 INJECTION, SOLUTION INTRAVENOUS; SUBCUTANEOUS at 17:40

## 2024-09-19 RX ADMIN — INSULIN LISPRO 4 UNITS: 100 INJECTION, SOLUTION INTRAVENOUS; SUBCUTANEOUS at 06:30

## 2024-09-19 RX ADMIN — FUROSEMIDE 80 MG: 10 INJECTION, SOLUTION INTRAMUSCULAR; INTRAVENOUS at 17:40

## 2024-09-19 RX ADMIN — LEVOTHYROXINE SODIUM 12.5 MCG: 0.05 TABLET ORAL at 06:27

## 2024-09-19 RX ADMIN — ONDANSETRON 4 MG: 4 TABLET, ORALLY DISINTEGRATING ORAL at 10:37

## 2024-09-19 RX ADMIN — METOPROLOL SUCCINATE 25 MG: 25 TABLET, EXTENDED RELEASE ORAL at 09:04

## 2024-09-19 RX ADMIN — INSULIN LISPRO 6 UNITS: 100 INJECTION, SOLUTION INTRAVENOUS; SUBCUTANEOUS at 12:54

## 2024-09-19 RX ADMIN — Medication 10 MG: at 02:47

## 2024-09-19 RX ADMIN — HYDROCODONE BITARTRATE AND ACETAMINOPHEN 1 TABLET: 7.5; 325 TABLET ORAL at 21:49

## 2024-09-19 RX ADMIN — Medication 10 ML: at 09:07

## 2024-09-19 RX ADMIN — INSULIN GLARGINE 25 UNITS: 100 INJECTION, SOLUTION SUBCUTANEOUS at 09:04

## 2024-09-19 RX ADMIN — INSULIN LISPRO 5 UNITS: 100 INJECTION, SOLUTION INTRAVENOUS; SUBCUTANEOUS at 21:02

## 2024-09-19 RX ADMIN — ASPIRIN 81 MG: 81 TABLET, COATED ORAL at 09:05

## 2024-09-19 RX ADMIN — ONDANSETRON 4 MG: 4 TABLET, ORALLY DISINTEGRATING ORAL at 19:19

## 2024-09-19 RX ADMIN — PANTOPRAZOLE SODIUM 40 MG: 40 TABLET, DELAYED RELEASE ORAL at 06:30

## 2024-09-19 RX ADMIN — LEVOTHYROXINE SODIUM 50 MCG: 0.05 TABLET ORAL at 06:27

## 2024-09-19 RX ADMIN — ATORVASTATIN CALCIUM 20 MG: 20 TABLET, FILM COATED ORAL at 09:05

## 2024-09-19 RX ADMIN — INSULIN GLARGINE 25 UNITS: 100 INJECTION, SOLUTION SUBCUTANEOUS at 21:01

## 2024-09-20 ENCOUNTER — APPOINTMENT (OUTPATIENT)
Dept: GENERAL RADIOLOGY | Facility: HOSPITAL | Age: 59
DRG: 308 | End: 2024-09-20
Payer: COMMERCIAL

## 2024-09-20 LAB
ANION GAP SERPL CALCULATED.3IONS-SCNC: 10.4 MMOL/L (ref 5–15)
BUN SERPL-MCNC: 26 MG/DL (ref 6–20)
BUN/CREAT SERPL: 21 (ref 7–25)
CALCIUM SPEC-SCNC: 8.8 MG/DL (ref 8.6–10.5)
CHLORIDE SERPL-SCNC: 98 MMOL/L (ref 98–107)
CO2 SERPL-SCNC: 28.6 MMOL/L (ref 22–29)
CREAT SERPL-MCNC: 1.24 MG/DL (ref 0.57–1)
DEPRECATED RDW RBC AUTO: 81 FL (ref 37–54)
EGFRCR SERPLBLD CKD-EPI 2021: 50.9 ML/MIN/1.73
ERYTHROCYTE [DISTWIDTH] IN BLOOD BY AUTOMATED COUNT: 20.6 % (ref 12.3–15.4)
GLUCOSE BLDC GLUCOMTR-MCNC: 162 MG/DL (ref 70–130)
GLUCOSE BLDC GLUCOMTR-MCNC: 189 MG/DL (ref 70–130)
GLUCOSE BLDC GLUCOMTR-MCNC: 210 MG/DL (ref 70–130)
GLUCOSE BLDC GLUCOMTR-MCNC: 239 MG/DL (ref 70–130)
GLUCOSE BLDC GLUCOMTR-MCNC: 398 MG/DL (ref 70–130)
GLUCOSE SERPL-MCNC: 222 MG/DL (ref 65–99)
HCT VFR BLD AUTO: 29.9 % (ref 34–46.6)
HGB BLD-MCNC: 8.7 G/DL (ref 12–15.9)
MAGNESIUM SERPL-MCNC: 1.8 MG/DL (ref 1.6–2.6)
MCH RBC QN AUTO: 31.4 PG (ref 26.6–33)
MCHC RBC AUTO-ENTMCNC: 29.1 G/DL (ref 31.5–35.7)
MCV RBC AUTO: 107.9 FL (ref 79–97)
PLATELET # BLD AUTO: 145 10*3/MM3 (ref 140–450)
PMV BLD AUTO: 10.3 FL (ref 6–12)
POTASSIUM SERPL-SCNC: 4 MMOL/L (ref 3.5–5.2)
QT INTERVAL: 408 MS
QT INTERVAL: 458 MS
QT INTERVAL: 468 MS
QTC INTERVAL: 508 MS
QTC INTERVAL: 522 MS
QTC INTERVAL: 564 MS
RBC # BLD AUTO: 2.77 10*6/MM3 (ref 3.77–5.28)
SODIUM SERPL-SCNC: 137 MMOL/L (ref 136–145)
WBC NRBC COR # BLD AUTO: 6.64 10*3/MM3 (ref 3.4–10.8)

## 2024-09-20 PROCEDURE — 85027 COMPLETE CBC AUTOMATED: CPT | Performed by: INTERNAL MEDICINE

## 2024-09-20 PROCEDURE — 63710000001 INSULIN GLARGINE PER 5 UNITS: Performed by: INTERNAL MEDICINE

## 2024-09-20 PROCEDURE — 93005 ELECTROCARDIOGRAM TRACING: CPT | Performed by: STUDENT IN AN ORGANIZED HEALTH CARE EDUCATION/TRAINING PROGRAM

## 2024-09-20 PROCEDURE — 82948 REAGENT STRIP/BLOOD GLUCOSE: CPT

## 2024-09-20 PROCEDURE — 93010 ELECTROCARDIOGRAM REPORT: CPT | Performed by: INTERNAL MEDICINE

## 2024-09-20 PROCEDURE — 63710000001 ONDANSETRON ODT 4 MG TABLET DISPERSIBLE: Performed by: INTERNAL MEDICINE

## 2024-09-20 PROCEDURE — 99232 SBSQ HOSP IP/OBS MODERATE 35: CPT | Performed by: INTERNAL MEDICINE

## 2024-09-20 PROCEDURE — 71045 X-RAY EXAM CHEST 1 VIEW: CPT

## 2024-09-20 PROCEDURE — 63710000001 INSULIN LISPRO (HUMAN) PER 5 UNITS: Performed by: INTERNAL MEDICINE

## 2024-09-20 PROCEDURE — 71045 X-RAY EXAM CHEST 1 VIEW: CPT | Performed by: RADIOLOGY

## 2024-09-20 PROCEDURE — 83735 ASSAY OF MAGNESIUM: CPT | Performed by: INTERNAL MEDICINE

## 2024-09-20 PROCEDURE — 80048 BASIC METABOLIC PNL TOTAL CA: CPT | Performed by: INTERNAL MEDICINE

## 2024-09-20 PROCEDURE — 25010000002 FUROSEMIDE PER 20 MG: Performed by: INTERNAL MEDICINE

## 2024-09-20 RX ORDER — BUMETANIDE 1 MG/1
2 TABLET ORAL DAILY
Status: DISCONTINUED | OUTPATIENT
Start: 2024-09-21 | End: 2024-09-21 | Stop reason: HOSPADM

## 2024-09-20 RX ORDER — TROLAMINE SALICYLATE 10 G/100G
1 CREAM TOPICAL 2 TIMES DAILY PRN
Status: DISCONTINUED | OUTPATIENT
Start: 2024-09-20 | End: 2024-09-21 | Stop reason: HOSPADM

## 2024-09-20 RX ORDER — ACETAMINOPHEN 500 MG
1000 TABLET ORAL EVERY 8 HOURS PRN
Status: DISCONTINUED | OUTPATIENT
Start: 2024-09-20 | End: 2024-09-21 | Stop reason: HOSPADM

## 2024-09-20 RX ORDER — PROCHLORPERAZINE MALEATE 5 MG
5 TABLET ORAL EVERY 6 HOURS PRN
Status: DISCONTINUED | OUTPATIENT
Start: 2024-09-20 | End: 2024-09-21 | Stop reason: HOSPADM

## 2024-09-20 RX ADMIN — INSULIN LISPRO 6 UNITS: 100 INJECTION, SOLUTION INTRAVENOUS; SUBCUTANEOUS at 21:37

## 2024-09-20 RX ADMIN — INSULIN GLARGINE 25 UNITS: 100 INJECTION, SOLUTION SUBCUTANEOUS at 21:34

## 2024-09-20 RX ADMIN — ASPIRIN 81 MG: 81 TABLET, COATED ORAL at 08:25

## 2024-09-20 RX ADMIN — INSULIN LISPRO 2 UNITS: 100 INJECTION, SOLUTION INTRAVENOUS; SUBCUTANEOUS at 08:25

## 2024-09-20 RX ADMIN — AMIODARONE HYDROCHLORIDE 400 MG: 200 TABLET ORAL at 08:25

## 2024-09-20 RX ADMIN — APIXABAN 5 MG: 5 TABLET, FILM COATED ORAL at 21:34

## 2024-09-20 RX ADMIN — ATORVASTATIN CALCIUM 20 MG: 20 TABLET, FILM COATED ORAL at 08:25

## 2024-09-20 RX ADMIN — INSULIN LISPRO 3 UNITS: 100 INJECTION, SOLUTION INTRAVENOUS; SUBCUTANEOUS at 11:02

## 2024-09-20 RX ADMIN — FERROUS SULFATE TAB 325 MG (65 MG ELEMENTAL FE) 325 MG: 325 (65 FE) TAB at 08:25

## 2024-09-20 RX ADMIN — FUROSEMIDE 80 MG: 10 INJECTION, SOLUTION INTRAMUSCULAR; INTRAVENOUS at 08:25

## 2024-09-20 RX ADMIN — APIXABAN 5 MG: 5 TABLET, FILM COATED ORAL at 08:25

## 2024-09-20 RX ADMIN — HYDROCODONE BITARTRATE AND ACETAMINOPHEN 1 TABLET: 7.5; 325 TABLET ORAL at 21:53

## 2024-09-20 RX ADMIN — Medication 10 ML: at 21:34

## 2024-09-20 RX ADMIN — INSULIN LISPRO 3 UNITS: 100 INJECTION, SOLUTION INTRAVENOUS; SUBCUTANEOUS at 17:04

## 2024-09-20 RX ADMIN — ONDANSETRON 4 MG: 4 TABLET, ORALLY DISINTEGRATING ORAL at 05:35

## 2024-09-20 RX ADMIN — METOPROLOL SUCCINATE 25 MG: 25 TABLET, EXTENDED RELEASE ORAL at 08:25

## 2024-09-20 RX ADMIN — Medication 10 ML: at 08:26

## 2024-09-20 RX ADMIN — PANTOPRAZOLE SODIUM 40 MG: 40 TABLET, DELAYED RELEASE ORAL at 06:25

## 2024-09-20 RX ADMIN — LEVOTHYROXINE SODIUM 50 MCG: 0.05 TABLET ORAL at 06:26

## 2024-09-20 RX ADMIN — LEVOTHYROXINE SODIUM 12.5 MCG: 0.05 TABLET ORAL at 06:26

## 2024-09-20 RX ADMIN — ACETAMINOPHEN 1000 MG: 500 TABLET ORAL at 06:56

## 2024-09-20 RX ADMIN — RENO CAPS 1 MG: 100; 1.5; 1.7; 20; 10; 1; 150; 5; 6 CAPSULE ORAL at 08:25

## 2024-09-20 RX ADMIN — INSULIN GLARGINE 25 UNITS: 100 INJECTION, SOLUTION SUBCUTANEOUS at 08:25

## 2024-09-21 ENCOUNTER — READMISSION MANAGEMENT (OUTPATIENT)
Dept: CALL CENTER | Facility: HOSPITAL | Age: 59
End: 2024-09-21
Payer: COMMERCIAL

## 2024-09-21 VITALS
BODY MASS INDEX: 36.84 KG/M2 | WEIGHT: 221.12 LBS | HEIGHT: 65 IN | RESPIRATION RATE: 16 BRPM | HEART RATE: 102 BPM | SYSTOLIC BLOOD PRESSURE: 116 MMHG | DIASTOLIC BLOOD PRESSURE: 74 MMHG | TEMPERATURE: 97.4 F | OXYGEN SATURATION: 100 %

## 2024-09-21 PROBLEM — I48.91 ATRIAL FIBRILLATION WITH RVR: Status: RESOLVED | Noted: 2024-01-31 | Resolved: 2024-09-21

## 2024-09-21 LAB
ANION GAP SERPL CALCULATED.3IONS-SCNC: 7.4 MMOL/L (ref 5–15)
BUN SERPL-MCNC: 24 MG/DL (ref 6–20)
BUN/CREAT SERPL: 19.8 (ref 7–25)
CALCIUM SPEC-SCNC: 8.4 MG/DL (ref 8.6–10.5)
CHLORIDE SERPL-SCNC: 99 MMOL/L (ref 98–107)
CO2 SERPL-SCNC: 27.6 MMOL/L (ref 22–29)
CREAT SERPL-MCNC: 1.21 MG/DL (ref 0.57–1)
DEPRECATED RDW RBC AUTO: 80.5 FL (ref 37–54)
EGFRCR SERPLBLD CKD-EPI 2021: 52.4 ML/MIN/1.73
ERYTHROCYTE [DISTWIDTH] IN BLOOD BY AUTOMATED COUNT: 20.4 % (ref 12.3–15.4)
GLUCOSE BLDC GLUCOMTR-MCNC: 271 MG/DL (ref 70–130)
GLUCOSE BLDC GLUCOMTR-MCNC: 290 MG/DL (ref 70–130)
GLUCOSE BLDC GLUCOMTR-MCNC: 341 MG/DL (ref 70–130)
GLUCOSE SERPL-MCNC: 310 MG/DL (ref 65–99)
HCT VFR BLD AUTO: 28.9 % (ref 34–46.6)
HGB BLD-MCNC: 8.2 G/DL (ref 12–15.9)
MCH RBC QN AUTO: 30.7 PG (ref 26.6–33)
MCHC RBC AUTO-ENTMCNC: 28.4 G/DL (ref 31.5–35.7)
MCV RBC AUTO: 108.2 FL (ref 79–97)
PLATELET # BLD AUTO: 141 10*3/MM3 (ref 140–450)
PMV BLD AUTO: 10.5 FL (ref 6–12)
POTASSIUM SERPL-SCNC: 3.8 MMOL/L (ref 3.5–5.2)
QT INTERVAL: 430 MS
QTC INTERVAL: 523 MS
RBC # BLD AUTO: 2.67 10*6/MM3 (ref 3.77–5.28)
SODIUM SERPL-SCNC: 134 MMOL/L (ref 136–145)
WBC NRBC COR # BLD AUTO: 6.22 10*3/MM3 (ref 3.4–10.8)

## 2024-09-21 PROCEDURE — 93005 ELECTROCARDIOGRAM TRACING: CPT | Performed by: PHYSICIAN ASSISTANT

## 2024-09-21 PROCEDURE — 63710000001 ONDANSETRON ODT 4 MG TABLET DISPERSIBLE: Performed by: INTERNAL MEDICINE

## 2024-09-21 PROCEDURE — 99239 HOSP IP/OBS DSCHRG MGMT >30: CPT | Performed by: INTERNAL MEDICINE

## 2024-09-21 PROCEDURE — 63710000001 PROCHLORPERAZINE MALEATE PER 5 MG: Performed by: INTERNAL MEDICINE

## 2024-09-21 PROCEDURE — 80048 BASIC METABOLIC PNL TOTAL CA: CPT | Performed by: INTERNAL MEDICINE

## 2024-09-21 PROCEDURE — 82948 REAGENT STRIP/BLOOD GLUCOSE: CPT

## 2024-09-21 PROCEDURE — 63710000001 INSULIN LISPRO (HUMAN) PER 5 UNITS: Performed by: INTERNAL MEDICINE

## 2024-09-21 PROCEDURE — 99232 SBSQ HOSP IP/OBS MODERATE 35: CPT | Performed by: PHYSICIAN ASSISTANT

## 2024-09-21 PROCEDURE — 93010 ELECTROCARDIOGRAM REPORT: CPT | Performed by: INTERNAL MEDICINE

## 2024-09-21 PROCEDURE — 63710000001 INSULIN GLARGINE PER 5 UNITS: Performed by: INTERNAL MEDICINE

## 2024-09-21 PROCEDURE — 85027 COMPLETE CBC AUTOMATED: CPT | Performed by: INTERNAL MEDICINE

## 2024-09-21 RX ORDER — AMIODARONE HYDROCHLORIDE 400 MG/1
400 TABLET ORAL
Qty: 30 TABLET | Refills: 0 | Status: SHIPPED | OUTPATIENT
Start: 2024-09-22

## 2024-09-21 RX ADMIN — RENO CAPS 1 MG: 100; 1.5; 1.7; 20; 10; 1; 150; 5; 6 CAPSULE ORAL at 08:30

## 2024-09-21 RX ADMIN — ASPIRIN 81 MG: 81 TABLET, COATED ORAL at 08:30

## 2024-09-21 RX ADMIN — APIXABAN 5 MG: 5 TABLET, FILM COATED ORAL at 08:34

## 2024-09-21 RX ADMIN — PROCHLORPERAZINE MALEATE 5 MG: 5 TABLET ORAL at 08:30

## 2024-09-21 RX ADMIN — FERROUS SULFATE TAB 325 MG (65 MG ELEMENTAL FE) 325 MG: 325 (65 FE) TAB at 08:30

## 2024-09-21 RX ADMIN — Medication 10 ML: at 08:34

## 2024-09-21 RX ADMIN — AMIODARONE HYDROCHLORIDE 400 MG: 200 TABLET ORAL at 08:30

## 2024-09-21 RX ADMIN — LEVOTHYROXINE SODIUM 50 MCG: 0.05 TABLET ORAL at 06:39

## 2024-09-21 RX ADMIN — INSULIN LISPRO 4 UNITS: 100 INJECTION, SOLUTION INTRAVENOUS; SUBCUTANEOUS at 11:57

## 2024-09-21 RX ADMIN — INSULIN GLARGINE 25 UNITS: 100 INJECTION, SOLUTION SUBCUTANEOUS at 08:29

## 2024-09-21 RX ADMIN — ATORVASTATIN CALCIUM 20 MG: 20 TABLET, FILM COATED ORAL at 08:30

## 2024-09-21 RX ADMIN — ONDANSETRON 4 MG: 4 TABLET, ORALLY DISINTEGRATING ORAL at 09:56

## 2024-09-21 RX ADMIN — METOPROLOL SUCCINATE 25 MG: 25 TABLET, EXTENDED RELEASE ORAL at 09:27

## 2024-09-21 RX ADMIN — LEVOTHYROXINE SODIUM 12.5 MCG: 0.05 TABLET ORAL at 06:39

## 2024-09-21 RX ADMIN — PANTOPRAZOLE SODIUM 40 MG: 40 TABLET, DELAYED RELEASE ORAL at 06:39

## 2024-09-21 RX ADMIN — INSULIN LISPRO 4 UNITS: 100 INJECTION, SOLUTION INTRAVENOUS; SUBCUTANEOUS at 08:29

## 2024-09-21 RX ADMIN — BUMETANIDE 2 MG: 1 TABLET ORAL at 09:26

## 2024-09-23 ENCOUNTER — TELEPHONE (OUTPATIENT)
Dept: TELEMETRY | Facility: HOSPITAL | Age: 59
End: 2024-09-23
Payer: COMMERCIAL

## 2024-09-23 ENCOUNTER — HOSPITAL ENCOUNTER (OUTPATIENT)
Dept: CARDIOLOGY | Facility: HOSPITAL | Age: 59
Discharge: HOME | End: 2024-09-23
Admitting: PHYSICIAN ASSISTANT
Payer: COMMERCIAL

## 2024-09-23 VITALS
HEART RATE: 61 BPM | OXYGEN SATURATION: 96 % | BODY MASS INDEX: 36.82 KG/M2 | DIASTOLIC BLOOD PRESSURE: 52 MMHG | SYSTOLIC BLOOD PRESSURE: 95 MMHG | HEIGHT: 65 IN | WEIGHT: 221 LBS

## 2024-09-23 DIAGNOSIS — I50.42 CHRONIC COMBINED SYSTOLIC AND DIASTOLIC CHF, NYHA CLASS 4: Primary | Chronic | ICD-10-CM

## 2024-09-23 DIAGNOSIS — E66.01 SEVERE OBESITY (BMI 35.0-39.9) WITH COMORBIDITY: ICD-10-CM

## 2024-09-23 DIAGNOSIS — J06.9 UPPER RESPIRATORY TRACT INFECTION, UNSPECIFIED TYPE: ICD-10-CM

## 2024-09-23 DIAGNOSIS — Z79.01 CHRONIC ANTICOAGULATION: ICD-10-CM

## 2024-09-23 DIAGNOSIS — I48.19 ATRIAL FIBRILLATION, PERSISTENT: ICD-10-CM

## 2024-09-23 LAB — ABSOLUTE LUNG FLUID CONTENT: 43 % (ref 20–35)

## 2024-09-23 PROCEDURE — 94726 PLETHYSMOGRAPHY LUNG VOLUMES: CPT | Performed by: PHYSICIAN ASSISTANT

## 2024-09-23 PROCEDURE — 99215 OFFICE O/P EST HI 40 MIN: CPT | Performed by: PHYSICIAN ASSISTANT

## 2024-09-23 RX ORDER — CEFDINIR 300 MG/1
300 CAPSULE ORAL 2 TIMES DAILY
Qty: 14 CAPSULE | Refills: 0 | Status: SHIPPED | OUTPATIENT
Start: 2024-09-23 | End: 2024-09-30

## 2024-09-23 RX ORDER — DOXYCYCLINE 100 MG/1
100 CAPSULE ORAL 2 TIMES DAILY
Qty: 14 CAPSULE | Refills: 0 | Status: SHIPPED | OUTPATIENT
Start: 2024-09-23 | End: 2024-09-30

## 2024-09-23 RX ORDER — FLUTICASONE PROPIONATE 50 UG/1
2 SPRAY, METERED NASAL DAILY
Qty: 18.2 ML | Refills: 3 | Status: SHIPPED | OUTPATIENT
Start: 2024-09-23 | End: 2024-12-22

## 2024-09-25 ENCOUNTER — READMISSION MANAGEMENT (OUTPATIENT)
Dept: CALL CENTER | Facility: HOSPITAL | Age: 59
End: 2024-09-25
Payer: COMMERCIAL

## 2024-09-26 ENCOUNTER — PATIENT OUTREACH (OUTPATIENT)
Dept: CASE MANAGEMENT | Facility: OTHER | Age: 59
End: 2024-09-26
Payer: COMMERCIAL

## 2024-09-30 ENCOUNTER — PATIENT OUTREACH (OUTPATIENT)
Dept: CASE MANAGEMENT | Facility: OTHER | Age: 59
End: 2024-09-30
Payer: COMMERCIAL

## 2024-09-30 NOTE — OUTREACH NOTE
AMBULATORY CASE MANAGEMENT NOTE    Names and Relationships of Patient/Support Persons: Contact: Yumiko Purdy; Relationship: Self -     Patient Outreach    RN-ACM outreach with patient.  Patient confirmed the issues with resumption of home health services following her discharge from the hospital have now been resolved.  Patient denied other needs, questions and concerns for RN-ACM to address.         Evelia MOORE  Ambulatory Case Management    9/30/2024, 15:16 EDT

## 2024-10-29 ENCOUNTER — HOSPITAL ENCOUNTER (OUTPATIENT)
Dept: CARDIOLOGY | Facility: HOSPITAL | Age: 59
Discharge: HOME | End: 2024-10-29
Admitting: PHYSICIAN ASSISTANT
Payer: COMMERCIAL

## 2024-10-29 VITALS
WEIGHT: 216 LBS | DIASTOLIC BLOOD PRESSURE: 47 MMHG | OXYGEN SATURATION: 97 % | HEART RATE: 61 BPM | HEIGHT: 65 IN | SYSTOLIC BLOOD PRESSURE: 122 MMHG | BODY MASS INDEX: 35.99 KG/M2

## 2024-10-29 DIAGNOSIS — I12.9 BENIGN HYPERTENSION WITH CKD (CHRONIC KIDNEY DISEASE) STAGE III: ICD-10-CM

## 2024-10-29 DIAGNOSIS — K74.60 CIRRHOSIS OF LIVER WITHOUT ASCITES, UNSPECIFIED HEPATIC CIRRHOSIS TYPE: ICD-10-CM

## 2024-10-29 DIAGNOSIS — I48.0 PAROXYSMAL ATRIAL FIBRILLATION: Chronic | ICD-10-CM

## 2024-10-29 DIAGNOSIS — I50.42 CHRONIC COMBINED SYSTOLIC AND DIASTOLIC CHF, NYHA CLASS 3: Primary | ICD-10-CM

## 2024-10-29 DIAGNOSIS — N18.30 BENIGN HYPERTENSION WITH CKD (CHRONIC KIDNEY DISEASE) STAGE III: ICD-10-CM

## 2024-10-29 LAB — ABSOLUTE LUNG FLUID CONTENT: 40 % (ref 20–35)

## 2024-10-29 PROCEDURE — 94726 PLETHYSMOGRAPHY LUNG VOLUMES: CPT | Performed by: PHYSICIAN ASSISTANT

## 2024-10-29 PROCEDURE — 99215 OFFICE O/P EST HI 40 MIN: CPT | Performed by: PHYSICIAN ASSISTANT

## 2024-10-29 RX ORDER — MIDODRINE HYDROCHLORIDE 5 MG/1
5 TABLET ORAL
COMMUNITY

## 2024-10-29 RX ORDER — DAPAGLIFLOZIN 5 MG/1
10 TABLET, FILM COATED ORAL DAILY
COMMUNITY

## 2024-10-29 RX ORDER — AMIODARONE HYDROCHLORIDE 200 MG/1
200 TABLET ORAL 2 TIMES DAILY
COMMUNITY

## 2024-10-29 RX ORDER — SCOLOPAMINE TRANSDERMAL SYSTEM 1 MG/1
1 PATCH, EXTENDED RELEASE TRANSDERMAL
COMMUNITY

## 2024-10-29 RX ORDER — LACTULOSE 10 G/15ML
20 SOLUTION ORAL 2 TIMES DAILY PRN
COMMUNITY

## 2024-10-29 NOTE — PROGRESS NOTES
Heart Failure Clinic  Pharmacist Note     Yumiko Purdy is a 58 y.o. female seen in the Heart Failure Clinic for HFrEF.     The patient had several recent hospital admissions and other appointments-     3/13/24 admission for atrial fibrillation  4/1/24 at [facility] for afib with RVR- amiodarone increased to 200mg and digoxin added on  4/10/24 cardiology appointment with Courtney- all GDMT stopped due to hypotension   4/13/24 ED visit for chest pain- amiodarone decreased to 100mg  Recent nephrology appointment- restarted Bumex at 2mg daily on Monday- Saturday 5/9/24 [facility] hospitalization- started Diltiazem 60mg bid  ~~  6/21/24 ED visit for afib  7/12-7/16/24 hospitalization for acute on chronic HF, afib and UTI  9/12-9/17 hospitalization for Afib with RVR, amiodarone stopped  9/17-9/21/24 hospitalization for Afib with RVR and acute on chronic HF- amiodarone 400mg continued  10/22 -10/27 hospitalization for Afib with RVR- Amiodarone 200mg daily    On her most recent discharge, her Amiodarone was decreased to 200mg daily. She was started on Farxiga 10mg daily, Scopolamine patch q3days, Lactulose 20g bid prn constipation, and Midodrine 5mg tid with meals.     Yumiko Purdy reports a poor understanding of medications. She brought in her AVS from Sunday and reports that she has made all of the changes to her medications. She states that home health visits a few times a week and will check her blood pressure then, but otherwise she does not check it. She states that they never tell her what it is, but denies episodes of lightheadedness.    She reports taking Bumex 2 mg daily. She only has to use an extra dose about twice a week.       Medication Use:   Hx of med intolerances: Farxiga (UTI) -but Nephro started back in September; cannot split Toprol tablets to make 12.5mg dose- may restart at 25mg when possible?  Retail Rx Management: Uses [facility]/ Uses our pharmacy for verquvo, toprol and jardiance -currently not on any on  these as of 4/18/24    Past Medical History:   Diagnosis Date    Anemia     CHF (congestive heart failure)     Chronic a-fib     stated by patient     Cirrhosis of liver     stated by patient     Diabetes mellitus     Ventricular tachycardia      ALLERGIES: Promethazine  Current Outpatient Medications   Medication Sig Dispense Refill    amiodarone (PACERONE) 200 MG tablet Take 1 tablet by mouth 2 (Two) Times a Day.      apixaban (ELIQUIS) 5 MG tablet tablet Take 1 tablet by mouth Every 12 (Twelve) Hours. Indications: Atrial Fibrillation 60 tablet 3    B Complex-C-Folic Acid (Renal-Bill) 0.8 MG tablet Take 1 tablet by mouth Daily.      bumetanide (BUMEX) 2 MG tablet Take 1 tablet by mouth Daily.      dapagliflozin (Farxiga) 5 MG tablet tablet Take 2 tablets by mouth Daily.      ferrous sulfate 325 (65 FE) MG tablet Take 1 tablet by mouth Daily.      fluticasone (FLONASE) 50 MCG/ACT nasal spray Administer 2 sprays into the nostril(s) as directed by provider Daily for 90 days. 18.2 mL 3    HYDROcodone-acetaminophen (NORCO) 7.5-325 MG per tablet Take 1 tablet by mouth 2 (Two) Times a Day.      Insulin Glargine (LANTUS SOLOSTAR) 100 UNIT/ML injection pen Inject 45 Units under the skin into the appropriate area as directed 2 (Two) Times a Day. 30 mL 0    Insulin Lispro, 1 Unit Dial, (HUMALOG) 100 UNIT/ML solution pen-injector Inject 15 Units under the skin into the appropriate area as directed 3 (Three) Times a Day With Meals. 15 mL 1    lactulose (CHRONULAC) 10 GM/15ML solution Take 30 mL by mouth 2 (Two) Times a Day As Needed (constipation).      levothyroxine (SYNTHROID, LEVOTHROID) 25 MCG tablet Take 0.5 tablets by mouth Every Morning.      levothyroxine (SYNTHROID, LEVOTHROID) 50 MCG tablet Take 1 tablet by mouth Daily.      midodrine (PROAMATINE) 5 MG tablet Take 1 tablet by mouth 3 (Three) Times a Day Before Meals.      omeprazole (priLOSEC) 20 MG capsule Take 1 capsule by mouth Daily.      ondansetron (ZOFRAN) 4  "MG tablet Take 1 tablet by mouth Every 8 (Eight) Hours As Needed for Nausea or Vomiting.      prednisoLONE acetate (PRED FORTE) 1 % ophthalmic suspension Administer 1 drop to both eyes 4 (Four) Times a Day As Needed.      Scopolamine 1 MG/3DAYS patch Place 1 patch on the skin as directed by provider Every 72 (Seventy-Two) Hours.      Vortioxetine HBr (Trintellix) 5 MG tablet tablet Take 1 tablet by mouth Daily.       No current facility-administered medications for this encounter.       Vaccination History: Declines all vaccines  Pneumonia: Declined   Annual Influenza: Declined  Shingles: Declined       Objective  Vitals:    10/29/24 1352   BP: 122/47   BP Location: Left arm   Patient Position: Sitting   Cuff Size: Adult   Pulse: 61   SpO2: 97%   Weight: 98 kg (216 lb)   Height: 165.1 cm (65\")         Wt Readings from Last 3 Encounters:   10/29/24 98 kg (216 lb)   09/23/24 100 kg (221 lb)   09/21/24 100 kg (221 lb 1.9 oz)     Lab Results   Component Value Date    GLUCOSE 310 (H) 09/21/2024    BUN 24 (H) 09/21/2024    CREATININE 1.21 (H) 09/21/2024    EGFRIFNONA 49 (L) 11/10/2020    BCR 19.8 09/21/2024    K 3.8 09/21/2024    CO2 27.6 09/21/2024    CALCIUM 8.4 (L) 09/21/2024    ALBUMIN 3.5 09/18/2024    LABIL2 1.3 (L) 06/09/2016    AST 25 09/18/2024    ALT 22 09/18/2024     Lab Results   Component Value Date    WBC 6.22 09/21/2024    HGB 8.2 (L) 09/21/2024    HCT 28.9 (L) 09/21/2024    .2 (H) 09/21/2024     09/21/2024     Lab Results   Component Value Date    CKTOTAL 66 04/01/2024    TROPONINT 53 (C) 09/18/2024     Lab Results   Component Value Date    PROBNP 6,667.0 (H) 09/17/2024     Results for orders placed during the hospital encounter of 07/12/24    Adult Transthoracic Echo Complete w/ Color, Spectral and Contrast if necessary per protocol    Interpretation Summary    Left ventricular ejection fraction appears to be 46 - 50%.    Mildly reduced right ventricular systolic function noted.    The right " ventricular cavity is mild to moderately dilated.    The left atrial cavity is mildly dilated.    The right atrial cavity is mildly  dilated.    There is posterior mitral leaflet thickening present.    Estimated right ventricular systolic pressure from tricuspid regurgitation is markedly elevated (>55 mmHg). Calculated right ventricular systolic pressure from tricuspid regurgitation is 58 mmHg.    The aortic root measures 3.1 cm.         GDMT    Drug Class   Drug   Dose Last Dose Adjustment Additional Titration   Notes   ACEi/ARB/ARNI 2/4/24  Was previously on lisinopril, stopped d/t renal function    Beta Blocker Toprol 25mg   Stopped due to recent hypotension    MRA      SGLT2i Changed from Farxiga in hospital 11/21/23 N/A Stopped due to hypotension    12/8/23 restart   Stopped due to hypotension      Bumex 2mg qd + PRN    Drug Therapy Problems  Not checking BP  Sinus infection      Recommendations:   Counseled patient to start checking her BP   Sonja sent in meds for URI      Patient was educated on heart failure medications and the importance of medication adherence. All questions were addressed and patient expressed some understanding. Would benefit from additional education at each visit.    Thank you for allowing me to participate in the care of your patient,    Jesus Rosales [facility]  10/29/24  17:17 EDT

## 2024-10-29 NOTE — PROGRESS NOTES
Heart Failure Clinic    Date: 10/29/24     Vitals:    10/29/24 1352   BP: 122/47   Pulse: 61   SpO2: 97%    Weight 216    Method of arrival: Other wheelchair    Weighing self daily: No    Monitoring Heart Failure Zones: No    Today's HF Zone: Yellow     Taking medications as prescribed: Yes    Edema Yes    Shortness of Air: Yes    Number of pillows used at night:adjustable bed    Educational Materials given:  avs                                                                         ReDS Value: 40  36-41 Possible Hypervolemic Status      Chris Aviles RN 10/29/24 13:58 EDT

## 2024-10-29 NOTE — PROGRESS NOTES
[facility]  ABDIFATAH Galarza, Masha Tucker, APRN  [address]  [address]  [place] [address]    Thank you for asking me to see Yumiko Gurwinder for congestive heart failure.    HPI:     This is a 58 y.o. female with known past medical history of:  Chronic systolic & diastolic HF  TTE from 03/27/24 with EF 66-70% and grade 2 diastolic dysfunction  TTE from 11/2023 with EF 31-35% with grade 3 diastolic dysfxn.   TTE from 02/08/23 @  [facility] with EF ~50%  TTE 11/13/23 with EF 31-35% and grade III diastolic dysfunction as well as moderately decreased systolic fxn and mildly reduced RVSP.    TTE from November 2022 with visually estimated ejection fraction of 30% ±5% and noted abnormal systolic strain pattern as well as grade 3 diastolic dysfunction as well as abnormal TAPSE with abnormal right ventricular function  [facility] on 09/2020 with EF 21-25% with LV systolic function severely decreased and RV cavity mildly dilated with moderately reduced RV systolic function noted, moderate TVR, and aortic plaquing  Right heart cath on 12/22/2022 with elevated left and right heart pressures with report not available  ASCVD  LHC 11/10/20 with preserved LV systolic, EF 50-55% with elevated LV end diastolic pressure consistent with diastolic dysfunction and right dominant system with 30-40% mid LAD lesion.   LHC attempted on 12/2022 at [facility] with unsuccessful access due to small artery appearing occluded or near occluded radially on right  Peripheral Arterial disease  Atrial Flutter  Prior Ablations:   11/2020 PVI ablation with flutter ablation and DCCV with a lot of scarring noted @  [facility]   Multiple prior Cardioversions:   Multiple starts and stops of amiodarone  Most recent in March 2024 on March 13 @ [facility] following concern for VTach with EP feeling strips were Afib with RVR with LBBB (per chart review) with conversion to NSR and amiodarone restart.    CKD stage  IIIb  Cirrhosis of the liver  Stage III CKD  Chronic hypoxemic respiratory failure  Morbid Obesity  Diffuse purpuric rash with prior w/u negative for vasculitis    Yumiko Purdy presents for today for HF clinic evaluation.  The patient is typically seen by Masha Davidson APRN.  Patient's primary cardiologist is Dr. Jad Meredith.      Last known EF~ 55% by TTE from [facility] in Feb 2024  Last known hospitalization and/or ED visit: Hospitalized in [facility] with amiodarone restarted by EP due to afib with RVR at 400mg TID for 4 days followed by 300mg daily following.     Hospitalized from March 26 through March 30 with possible SVT with baseline bundle branch block and paroxysmal atrial fibrillation with rapid ventricular response with aberrant conduction status post PVI October 2022  Admission to [facility] through April 3, 2024 with wide-complex tachycardia and persistent atrial fibrillation with amiodarone drip with transition to oral amiodarone with attempt to stop intermittent episodes of atrial fibrillation.  She was ultimately discharged on amiodarone with digoxin  Transferred from [facility] to [facility] secondary to cardiac arrhythmia with EP at [facility].  She was started on Cardizem and had transition from A-fib to sinus rhythm  Recent July 2024 [facility] hospitalization with arrhythmia.  DC summary reviewed.     Hospitalized in September 2024 @ [facility] x2 with Afib with RVR.  The first time on amiodarone therapy and the second time after it was stopped.    Hospitalized at [facility] with DC 10/28/24  Accompanied by: Son    10/29/24  visit data/details regarding:   Dyspnea: Improving, Patient is WC bound and mostly just exerts herself with transfers and such; This remains unchanged on today's visit.   Lower extremity swelling: Bilateral lower extremity swelling is at baseline  Abdominal swelling: Abdominal swelling is improving.    Home weight: Not  currently monitoring due to inability to stand  Home BP: SBP has been in the 100s-110s with less drops at home  Home heart rate: 60s  Daily activities of living: Performing with assistance  HF zone: Yellow  Pillows/lying flat: Sleeps in hospital bed.  Mobility assistance devices:  WC at home, bedside commode.    Mrs. Purdy is chest pain free.  She was discharged from [facility] with afib on 10/28/24. She did undergo CV at [facility] per discussion with patient.  She plans to continue f/u with EP Dr. Gutierrez.     Specialists:   EP Cardiology:  Dr. Gutierrez        Review of Systems - Review of Systems   Constitutional: Negative for chills, decreased appetite and malaise/fatigue.   HENT:  Negative for congestion, ear discharge and ear pain.    Eyes:  Negative for blurred vision, discharge and double vision.   Cardiovascular:  Positive for dyspnea on exertion. Negative for leg swelling.   Respiratory:  Negative for cough, hemoptysis and shortness of breath.    Endocrine: Negative for cold intolerance and heat intolerance.   Hematologic/Lymphatic: Negative for adenopathy and bleeding problem.   Skin:  Negative for color change, dry skin and nail changes.   Musculoskeletal:  Negative for arthritis, muscle weakness and myalgias.   Gastrointestinal:  Negative for bloating and abdominal pain.   Genitourinary:  Negative for bladder incontinence, decreased libido and dysuria.   Neurological:  Negative for brief paralysis, difficulty with concentration and dizziness.   Psychiatric/Behavioral:  Negative for altered mental status, depression and hallucinations.         Very pleasant   Allergic/Immunologic: Negative for environmental allergies and HIV exposure.         All other systems were reviewed and were negative.    Patient Active Problem List   Diagnosis    Dilated cardiomyopathy    CKD (chronic kidney disease) stage 2, GFR 60-89 ml/min    Severe obesity (BMI 35.0-39.9) with comorbidity    Chronic anemia    Elevated bilirubin     Acute on chronic combined systolic and diastolic CHF (congestive heart failure)    Hyponatremia    Paroxysmal atrial fibrillation    Cirrhosis    Coronary artery disease involving native coronary artery of native heart without angina pectoris    Ventricular tachycardia       family history includes Heart attack in her brother and father.     reports that she has never smoked. She has never used smokeless tobacco. She reports that she does not drink alcohol and does not use drugs.    Allergies   Allergen Reactions    Promethazine Other (See Comments) and Anxiety     Anxiety         Current Outpatient Medications:     amiodarone (PACERONE) 200 MG tablet, Take 1 tablet by mouth 2 (Two) Times a Day., Disp: , Rfl:     apixaban (ELIQUIS) 5 MG tablet tablet, Take 1 tablet by mouth Every 12 (Twelve) Hours. Indications: Atrial Fibrillation, Disp: 60 tablet, Rfl: 3    B Complex-C-Folic Acid (Renal-Bill) 0.8 MG tablet, Take 1 tablet by mouth Daily., Disp: , Rfl:     bumetanide (BUMEX) 2 MG tablet, Take 1 tablet by mouth Daily., Disp: , Rfl:     dapagliflozin (Farxiga) 5 MG tablet tablet, Take 2 tablets by mouth Daily., Disp: , Rfl:     ferrous sulfate 325 (65 FE) MG tablet, Take 1 tablet by mouth Daily., Disp: , Rfl:     fluticasone (FLONASE) 50 MCG/ACT nasal spray, Administer 2 sprays into the nostril(s) as directed by provider Daily for 90 days., Disp: 18.2 mL, Rfl: 3    HYDROcodone-acetaminophen (NORCO) 7.5-325 MG per tablet, Take 1 tablet by mouth 2 (Two) Times a Day., Disp: , Rfl:     Insulin Glargine (LANTUS SOLOSTAR) 100 UNIT/ML injection pen, Inject 45 Units under the skin into the appropriate area as directed 2 (Two) Times a Day., Disp: 30 mL, Rfl: 0    Insulin Lispro, 1 Unit Dial, (HUMALOG) 100 UNIT/ML solution pen-injector, Inject 15 Units under the skin into the appropriate area as directed 3 (Three) Times a Day With Meals., Disp: 15 mL, Rfl: 1    lactulose (CHRONULAC) 10 GM/15ML solution, Take 30 mL by mouth 2  "(Two) Times a Day As Needed (constipation)., Disp: , Rfl:     levothyroxine (SYNTHROID, LEVOTHROID) 25 MCG tablet, Take 0.5 tablets by mouth Every Morning., Disp: , Rfl:     levothyroxine (SYNTHROID, LEVOTHROID) 50 MCG tablet, Take 1 tablet by mouth Daily., Disp: , Rfl:     midodrine (PROAMATINE) 5 MG tablet, Take 1 tablet by mouth 3 (Three) Times a Day Before Meals., Disp: , Rfl:     omeprazole (priLOSEC) 20 MG capsule, Take 1 capsule by mouth Daily., Disp: , Rfl:     ondansetron (ZOFRAN) 4 MG tablet, Take 1 tablet by mouth Every 8 (Eight) Hours As Needed for Nausea or Vomiting., Disp: , Rfl:     prednisoLONE acetate (PRED FORTE) 1 % ophthalmic suspension, Administer 1 drop to both eyes 4 (Four) Times a Day As Needed., Disp: , Rfl:     Scopolamine 1 MG/3DAYS patch, Place 1 patch on the skin as directed by provider Every 72 (Seventy-Two) Hours., Disp: , Rfl:     Vortioxetine HBr (Trintellix) 5 MG tablet tablet, Take 1 tablet by mouth Daily., Disp: , Rfl:       Physical Exam:  I have reviewed the patient's current vital signs as documented in the patient's EMR.     Vitals:    10/29/24 1352   BP: 122/47   BP Location: Left arm   Patient Position: Sitting   Cuff Size: Adult   Pulse: 61   SpO2: 97%   Weight: 98 kg (216 lb)   Height: 165.1 cm (65\")               Physical Exam  Vitals and nursing note reviewed.   Constitutional:       Appearance: Normal appearance.   HENT:      Head: Normocephalic and atraumatic.   Eyes:      General: Lids are normal.   Cardiovascular:      Rate and Rhythm: Normal rate and regular rhythm.      Heart sounds: S1 normal and S2 normal.   Pulmonary:      Effort: No tachypnea or bradypnea.      Breath sounds: No decreased breath sounds, wheezing, rhonchi or rales.   Chest:      Chest wall: No mass or lacerations.   Abdominal:      General: Abdomen is protuberant. Bowel sounds are normal.      Palpations: Abdomen is soft.      Tenderness: There is no abdominal tenderness.      Comments: Less " distended than on prior visits.     Musculoskeletal:      Right lower le+ Edema present.      Left lower le+ Edema present.      Right foot: No swelling.      Left foot: No swelling.   Skin:     General: Skin is warm and moist.   Neurological:      Mental Status: She is alert and oriented to person, place, and time.      Comments: Equal bilateral  strength.     Psychiatric:         Attention and Perception: Attention normal.         Mood and Affect: Mood normal.          JVP: Volume/Pulsation: Normal.        DATA REVIEWED:     EKG. I personally reviewed and interpreted the EKG.    Last EKG at [facility] not available for viewing.      ---------------------------------------------------  TTE/KHAI:    TRANSTHORACIC ECHOCARDIOGRAPHY REPORT    Demographics    Patient Name:          JAMAL WHARTON      :            1965    Medical Record Number: [id number]          Age:            56 year(s)    Corporate ID Number:   [id number]          Gender          Female    Account Number:        [id number]    Sonographer:           Hayden Chaudhry,     Height:         65 inches                           [facility]    Referring Physician:   ANDREAS HERNANDEZ     Weight:         240 pounds    Interpreting           MATHEUS IRELAND   BMI:            39.94 kg/m^2    Physician:             MD    Date of Service:       2022          Blood Pressure: 113/40 mmHg    Room Number:           320   Type of Study:    TTE procedure: EC Echo Complete, ECHO COMPLETE (DOPPLER / COLOR) W OR WO    CONTRAST.    Patient Status: Routine IP    Study Location: PortableTechnical Quality: Adequate visualization    Contrast Medium: Optison. Amount - 3 ml    Impression:    Indication:Hypertensive heart disease with heart failure I11.0    Mild left ventricular hypertrophy. Visually estimated ejection fraction    30% +/- 5%. Abnormal left ventricular systolic function; abnormal systolic    strain pattern. Restrictive filling pattern consistent  with severely    increased LV filling pressure (Grade III Diastolic dysfunction).    Dilated right ventricle. Abnormal TAPSE; abnormal right ventricular    function. Abnormal right atrial size.    Mild mitral stenosis. Peak/Mean 6/2mmHg    Moderate tricuspid (2+) regurgitation.    No masses or thrombi are seen.   Measurements Summary:    LVEDd: 4.99 cm         LVESd: 4.08 cm          IVSEd: 0.79 cm    AO Root:2.97 cm        LVPWd: 1.04 cm    Contractility Score    Global Left Ventricular Hypokinesis was noted.    LV regional wall motion: (0-Not visualized 1-Normal 2-Hypokinesis    3-Akinesis 4-Dyskinesis 5-Aneurysm)    Left Ventricle    Peak E-wave: 1.22   Peak A-wave: 0.2 m/s    E/A ratio: 5.99    m/s                 Volume [id number]  LV length: 8.47 cm    ml    Volume zspywfrn58.87 ml    LVOT diameter: 2.05 cm    Normal sized left ventricle.    Mild left ventricular hypertrophy.    Visually estimated ejection fraction 30% +/- 5%.    Abnormal left ventricular systolic function; abnormal systolic strain    pattern.    Restrictive filling pattern consistent with severely increased LV filling    pressure (Grade III Diastolic dysfunction).    No left ventricular masses or thrombi.    Right Ventricle    Diastolic dimension: 4.72     RV systolic pressure: 22.15 mmHg    cm    Dilated right ventricle.    Abnormal TAPSE; abnormal right ventricular function.    Left Atrium    LA dimension: 4.64 cm               LA volume:95.38 ml    LA/Aorta: 1.56    Abnormal left atrial volume index 45ml/m2.    Intact atrial septum.    No atrial mass or thrombus.    Right Atrium    Abnormal right atrial size.    Intact atrial septum.    No atrial mass or thrombus.    Mitral Valve    Deceleration time:     Area PHT: 2.04 cm^2  Mean velocity:    248.96 msec            P1/2t: 107.68 msec   0.57 m/s    Area (continuity):   Mean gradient:    1.73 cm^2            1.89 mmHg    Peak gradient:    5.97 mmHg    Thickened mitral valve leaflets.     Mild mitral regurgitation.    Mild mitral stenosis. Peak/Mean 6/2mmHg    Echogenic density noted on mitral valve chordae. Seen on prior exam    10/16/2022    Aortic Valve    LVOT VTI: 18.22 cm    Mildly thickend free edges of the aortic valve leaflets.    No aortic regurgitation.    No aortic stenosis.    No masses or vegetations seen.    Tricuspid Valve    TR velocity: 2.19 m/s     TR gradient: 19.58792 mmHg    Estimated RAP: 3 mmHg     RVSP: 22.17 mmHg    Structurally normal tricuspid valve.    Moderate tricuspid (2+) regurgitation.    RVSP likely underestimated due to RV systolic function.    No tricuspid stenosis.    No masses or vegetations seen.    Pulmonic Valve    Acceleration time: 119.95 msec          PASP: 22.15 mmHg    Structurally normal pulmonic valve.    Mild pulmonic regurgitation (1+).    No pulmonic stenosis.    No masses or vegetations seen.   Great Vessels   Aorta    Aortic Root: 2.97 cm    Ascending Aorta: 2.76 cm    LVOT Diameter: 2.05 cm   Visualized aorta is normal.   Normal aortic root.   No evidence of dissection.   IVC is not well visualized.   Unable to obtain adequate subcostal view.    Pericardium / Pleura   No pericardial effusion.    Other    No masses or thrombi.    No intracardiac shunt.    ----------------------------------------------------------------    Electronically signed by MATHEUS IRELAND MD(Interpreting    Physician) on 11/17/2022 17:38       Results for orders placed during the hospital encounter of 07/12/24    Adult Transthoracic Echo Complete w/ Color, Spectral and Contrast if necessary per protocol    Interpretation Summary    Left ventricular ejection fraction appears to be 46 - 50%.    Mildly reduced right ventricular systolic function noted.    The right ventricular cavity is mild to moderately dilated.    The left atrial cavity is mildly dilated.    The right atrial cavity is mildly  dilated.    There is posterior mitral leaflet thickening present.     Estimated right ventricular systolic pressure from tricuspid regurgitation is markedly elevated (>55 mmHg). Calculated right ventricular systolic pressure from tricuspid regurgitation is 58 mmHg.    The aortic root measures 3.1 cm.    RHC report:      CARDIAC CATH - RIGHT HEART CATH    Anatomical Region Laterality Modality   Heart -- Other     Narrative    Cardiac Diagnostic Report    Demographics    Patient Name       JAMAL WHARTON      Date of Birth          1965    Patient #          [id number]          Accession #            [id number]    Visit #            [id number]          Medical Record #    Physician          MATHEUS IRELAND   Date of Study          12/22/2022                       MD    Referring                              Interventional    Physician                              physician    Procedure   Procedure Type    Diagnostic procedure: RHC with Cardiomems, RIGHT HEART CATH   Indications: Angina.    Conclusions   Procedure Description   Using ultrasound and micropuncture, access to right brachial vein obtained   and 7F sheath inserted. 7F PA catheter used for RHC.   I attempted right radial access for LHC but unsuccessful due to very small   artery which appears occluded or near-occluded.   Procedure Summary   Elevated left and right heart filling pressures.   Elevated pulmonary pressures.   Borderline-low Ramos CO/CI.    Procedure Data   Procedure Date   Date: 12/22/2022Start: 15:32End: 16:09   Entry Locations     - Retrograde Percutaneous access was performed through the Right Axiliary.       A 7 Fr sheath was inserted. Hemostasis was successfully obtained using       Manual Compression.       Entry Comments: brachial vein.   Procedure Medications     - Fentanyl I.V. 25 mcg.     - Versed Sheath 1 mg.     - Oxygen NC 6 l/min.   Diagnostic Catheters     - A7 FR MON Avery-Misael [id number] used for:Arch.   Contrast Material     - None0 ml   Fluoroscopy Time: Total: 2:48 minutes.   Fluoroscopy  Dose: Total: 155 mGy.    Medical History    Risk Factors    The patient risk factors include:obesity, hypercholesterolemia, treated    and uncontrolled hypertension, diabetes mellitus, last creatinine: 2    mg/dl, creatinine clearance: 69.72 ml/min and dyslipidemia.    Admission Data    Hemodynamics    Condition: Baseline    O2 Consumption: Estimated: 297.50Heart Rate: 41 bpm   Oxygen Saturation   +--------+-----+----+----------------------+---+---------------------------+   !Location!pCO2 !pO2 !% Saturation          !Hgb!O2 Content                 !   +--------+-----+----+----------------------+---+---------------------------+   !PA      !     !    !51                    !   !                           !   +--------+-----+----+----------------------+---+---------------------------+   !RA      !     !    !58                    !   !                           !   +--------+-----+----+----------------------+---+---------------------------+   !AO      !     !    !94                    !   !                           !   +--------+-----+----+----------------------+---+---------------------------+   Pressures (mmHg)   +-----+--------------------------------------------------------------------+   !Site !Pressure                                                            !   +-----+--------------------------------------------------------------------+   !RA   !28/28 (25)                                                          !   +-----+--------------------------------------------------------------------+   !RV   !81/10 ,27                                                           !   +-----+--------------------------------------------------------------------+   !PA   !73/32 (43)                                                          !   +-----+--------------------------------------------------------------------+   !PCW  !68/66 (45)                                                          !    +-----+--------------------------------------------------------------------+   !PCW  !78/59 (45)                                                          !   +-----+--------------------------------------------------------------------+   !PCW  !23/36 (27)                                                          !   +-----+--------------------------------------------------------------------+   Cardiac Output   +------+-------------------------+----------------------------+------------+   !Method!CO (l/min)               !CI (l/min/m2)               !SV (ml)     !   +------+-------------------------+----------------------------+------------+   !Ramos  !5.35                     !2.2                         !131.67      !   +------+-------------------------+----------------------------+------------+   Shunts   Oxygen Values    O2 Capacity 129.2 O2 Consumption 297.5    Flows (l/min)    Qs 6.4 Qe/Qp 1.2    Qp 5.35 Qp/Qs 0.84    Qe 6.4   Vascular Resistance (dynes x sec x cm-5)   +-------------------------------------+----+---+----+----+---------+-------+   !CO method                            !TSVR!SVR!TPVR!PVR !TPVR/TSVR!PVR/SVR!   +-------------------------------------+----+---+----+----+---------+-------+   !Ramos                                 !    !   !8.03!2.94!         !       !   +-------------------------------------+----+---+----+----+---------+-------+   !Qp or Qs                             !    !   !8.03!2.94!         !       !   +-------------------------------------+----+---+----+----+---------+-------+    Signatures    ----------------------------------------------------------------    Electronically signed by MATHEUS IRELAND MD(Physician) on    12/22/2022 16:19    ----------------------------------------------------------------        -----------------------------------------------------  CXR/Imaging:   Imaging Results (Most Recent)       None            I personally reviewed and interpreted the CXR.       -----------------------------------------------------  CT:   XR Abdomen 1 View    Result Date: 10/27/2024  Possible constipation. Images reviewed, interpreted, and dictated by Dr. Nbole Lafleur. Transcribed by Kati العلي PA-C.    XR chest AP portable    Result Date: 10/22/2024  No acute process. Images reviewed, interpreted, and dictated by Dr. Arianna Fields. Transcribed by Kati العلي PA-C.   I personally reviewed the images of the CT scan.  My personal interpretation is below.      ----------------------------------------------------    PFTs:    No PFTS available.      --------------------------------------------------------------------------------------------------    Laboratory evaluations:    Lab Results   Component Value Date    GLUCOSE 310 (H) 09/21/2024    BUN 24 (H) 09/21/2024    CREATININE 1.21 (H) 09/21/2024    EGFRIFNONA 49 (L) 11/10/2020    BCR 19.8 09/21/2024    K 3.8 09/21/2024    CO2 27.6 09/21/2024    CALCIUM 8.4 (L) 09/21/2024    ALBUMIN 3.5 09/18/2024    LABIL2 1.3 (L) 06/09/2016    AST 25 09/18/2024    ALT 22 09/18/2024     Lab Results   Component Value Date    WBC 6.22 09/21/2024    HGB 8.2 (L) 09/21/2024    HCT 28.9 (L) 09/21/2024    .2 (H) 09/21/2024     09/21/2024     Lab Results   Component Value Date    CHOL 137 09/11/2020    CHLPL 103 04/21/2016    TRIG 80 09/11/2020    HDL 43 09/11/2020    LDL 78 09/11/2020     Lab Results   Component Value Date    TSH 5.940 (H) 09/17/2024     Lab Results   Component Value Date    HGBA1C 6.50 (H) 07/14/2024     Lab Results   Component Value Date    ALT 22 09/18/2024     Lab Results   Component Value Date    HGBA1C 6.50 (H) 07/14/2024    HGBA1C 7.70 (H) 03/26/2024    HGBA1C 7.30 (H) 11/13/2023     Lab Results   Component Value Date    MICROALBUR 3.2 09/12/2020    CREATININE 1.21 (H) 09/21/2024     Lab Results   Component Value Date    IRON 80 09/14/2024    TIBC 238 (L) 09/14/2024    FERRITIN 1,791.30 (H) 04/01/2024     Lab  Results   Component Value Date    INR 1.08 09/12/2024    INR 1.31 (H) 05/28/2024    INR 0.96 05/10/2024    PROTIME 14.1 09/12/2024    PROTIME 16.4 (H) 05/28/2024    PROTIME 10.4 05/10/2024        Lab Results   Component Value Date    ABSOLUTELUNG 40 (A) 10/29/2024    ABSOLUTELUNG 43 (A) 09/23/2024    ABSOLUTELUNG 40 (A) 09/18/2024         1. Chronic combined systolic and diastolic CHF, NYHA class 3    2. Paroxysmal atrial fibrillation    3. Cirrhosis of liver without ascites, unspecified hepatic cirrhosis type    4. Benign hypertension with CKD (chronic kidney disease) stage III            ORDERS PLACED TODAY:  Orders Placed This Encounter   Procedures    ReDs Vest    Basic Metabolic Panel    Magnesium    proBNP    Basic Metabolic Panel    Magnesium    proBNP        Diagnoses and all orders for this visit:    1. Chronic combined systolic and diastolic CHF, NYHA class 3 (Primary)  Overview:  11/12/[id number] 31 to 35%, mild LVH, grade 3 diastolic dysfunction with high LAP, moderately dilated left and right atrial cavities, dilated pulmonary artery, calcification of the left coronary cusp of the aortic valve    Orders:  -     Basic Metabolic Panel; Future  -     Magnesium; Future  -     proBNP; Future  -     Basic Metabolic Panel; Standing  -     Basic Metabolic Panel  -     Magnesium; Standing  -     Magnesium  -     proBNP; Standing  -     proBNP  -     ReDs Vest    2. Paroxysmal atrial fibrillation  Overview:  11/12/2023-emergent cardioversion-sinus rhythm achieved      3. Cirrhosis of liver without ascites, unspecified hepatic cirrhosis type    4. Benign hypertension with CKD (chronic kidney disease) stage III               MEDS ORDERED TODAY:    No orders of the defined types were placed in this encounter.       ---------------------------------------------------------------------------------------------------------------------------          ASSESSMENT/PLAN:      Diagnosis Plan   1. Chronic combined systolic  and diastolic CHF, NYHA class 3  Basic Metabolic Panel    Magnesium    proBNP    Basic Metabolic Panel    Basic Metabolic Panel    Magnesium    Magnesium    proBNP    proBNP    ReDs Vest      2. Paroxysmal atrial fibrillation        3. Cirrhosis of liver without ascites, unspecified hepatic cirrhosis type        4. Benign hypertension with CKD (chronic kidney disease) stage III              not acutely decompensated chronic moderate systolic and diastolic heart failure. CHF. Etiology: Unknown/Idiopathic. LVEF ~50%.     NYHA stage Stage D: Presence of advanced heart disease with continued heart failure symptoms requiring aggressive medical therapyFC-Class IV: Unable to carry on any physical activity without discomfort. Symptoms of heart failureat rest. If any physical activity is undertaken, discomfort increases.     Today, Patient is approaching euvolemia and with  Moderate perfusion. The patient's hemodynamics are currently acceptable. HR is: normal and is at goal. BP/MAP was reviewed and there is notroom for medication up-titration.  Clinical trajectory was assessed and hasimproved.     CHF GOAL DIRECTED MEDICAL THERAPY FOR PATIENT ADDRESSED/ADJUSTED:     GDMT    Drug Class   Drug   Dose Last Dose Adjustment Additional Titration   Notes   ACEi/ARB/ARNI ACEI previously stopped due to renal fxn       Beta Blocker      MRA Spirono previously stopped due to renal fxn       SGLT2i Tried both Jardiance & Farxiga in the past with frequent UTIs   N/A    Secondaries          Secondary management:     -CHF Specific BB:   EF improved. No BB on board at present with amiodarone & dg on board.      -ACE/ARB/ARNi:   ACEi recently held during [facility] hospitalization.     -MRA:   The patient is FC-NYHA Class IV and MRA is indicated.   Spironolactone was previously stopped on regimen due to renal function.        -SGLT2 inhibitor therapy:   Tried many options but has recurrent UTIs.        Secondary pharmacological therapy in  HFreF:   EF improved and Verquevo stopped during one of her recent hospitalizatoins.       -Diuretic regimen:   ReDS Vest reading for 10/29/24 is 43   Continue Bumex 2mg day per Nephro dosing.     -Fluid restriction/Sodium restriction:   Requested 2000 ml restriction  Patient has been asked to weigh daily and was provided with a printed diuretic strategy.  1,500 mg Na restriction was discussed.    -Acute vs. Chronic underlying conditions other than HF addressed during visit:             Paroxysmal Atrial fibrillation/Flutter:   Continue Amiodarone as dosed at hospital PA until she follows up with EP next week.    Continue f/u with EP.     Cirrhosis:   Continue f/u with PCP & GI.     CKD IIIb:   Creatinine within baseline. Continue f/u.            Identifiable barriers to Heart Failure Self-care:   Medical Barriers:  Debility  Social Barriers: Transport limitations      --------------------------------------------------------------------------------          BMI cannot be calculated due to outdated height or weight values with patient unable to stand. She has self reported weight of 240.               >45 minutes out of 60 minutes face to face spent counseling patient extensively on dietary Na+ intake, importance of activity, weight monitoring, compliance with medications in addition to importance of titration with goal directed medical therapy and follow up appointments.  10 of 60 minutes were spent reviewing lifevest report and discussing with patient.              This document has been electronically signed by Sonja Romo PA-C  October 29, 2024 17:35 EDT      Dictated Utilizing Dragon Dictation: Part of this note may be an electronic transcription/translation of spoken language to printed text using the Dragon Dictation System.    Follow-up appointment and medication changes provided in hand delivered After Visit Summary as well as reviewed in the room.

## 2025-01-30 ENCOUNTER — HOSPITAL ENCOUNTER (OUTPATIENT)
Dept: CARDIOLOGY | Facility: HOSPITAL | Age: 60
Discharge: HOME | End: 2025-01-30
Payer: COMMERCIAL

## 2025-01-30 VITALS — OXYGEN SATURATION: 97 % | SYSTOLIC BLOOD PRESSURE: 97 MMHG | DIASTOLIC BLOOD PRESSURE: 63 MMHG | HEART RATE: 97 BPM

## 2025-01-30 DIAGNOSIS — N18.32 CKD STAGE 3B, GFR 30-44 ML/MIN: ICD-10-CM

## 2025-01-30 DIAGNOSIS — I50.42 CHRONIC COMBINED SYSTOLIC AND DIASTOLIC HEART FAILURE: Chronic | ICD-10-CM

## 2025-01-30 DIAGNOSIS — K74.60 CIRRHOSIS OF LIVER WITHOUT ASCITES, UNSPECIFIED HEPATIC CIRRHOSIS TYPE: ICD-10-CM

## 2025-01-30 DIAGNOSIS — I48.0 PAROXYSMAL ATRIAL FIBRILLATION: Chronic | ICD-10-CM

## 2025-01-30 DIAGNOSIS — I42.0 DILATED CARDIOMYOPATHY: Primary | Chronic | ICD-10-CM

## 2025-01-30 LAB
ABSOLUTE LUNG FLUID CONTENT: 37 % (ref 20–35)
ANION GAP SERPL CALCULATED.3IONS-SCNC: 11.2 MMOL/L (ref 5–15)
BUN SERPL-MCNC: 40 MG/DL (ref 6–20)
BUN/CREAT SERPL: 26.8 (ref 7–25)
CALCIUM SPEC-SCNC: 9.1 MG/DL (ref 8.6–10.5)
CHLORIDE SERPL-SCNC: 99 MMOL/L (ref 98–107)
CO2 SERPL-SCNC: 25.8 MMOL/L (ref 22–29)
CREAT SERPL-MCNC: 1.49 MG/DL (ref 0.57–1)
EGFRCR SERPLBLD CKD-EPI 2021: 40.6 ML/MIN/1.73
GLUCOSE SERPL-MCNC: 220 MG/DL (ref 65–99)
MAGNESIUM SERPL-MCNC: 2.3 MG/DL (ref 1.6–2.6)
NT-PROBNP SERPL-MCNC: 6815 PG/ML (ref 0–900)
POTASSIUM SERPL-SCNC: 4.4 MMOL/L (ref 3.5–5.2)
SODIUM SERPL-SCNC: 136 MMOL/L (ref 136–145)

## 2025-01-30 PROCEDURE — 36415 COLL VENOUS BLD VENIPUNCTURE: CPT | Performed by: PHYSICIAN ASSISTANT

## 2025-01-30 PROCEDURE — 83880 ASSAY OF NATRIURETIC PEPTIDE: CPT | Performed by: PHYSICIAN ASSISTANT

## 2025-01-30 PROCEDURE — 94726 PLETHYSMOGRAPHY LUNG VOLUMES: CPT | Performed by: PHYSICIAN ASSISTANT

## 2025-01-30 PROCEDURE — 80048 BASIC METABOLIC PNL TOTAL CA: CPT | Performed by: PHYSICIAN ASSISTANT

## 2025-01-30 PROCEDURE — 83735 ASSAY OF MAGNESIUM: CPT | Performed by: PHYSICIAN ASSISTANT

## 2025-01-30 RX ORDER — NITROFURANTOIN 25; 75 MG/1; MG/1
100 CAPSULE ORAL 2 TIMES DAILY
COMMUNITY

## 2025-01-30 RX ORDER — BUSPIRONE HYDROCHLORIDE 10 MG/1
1 TABLET ORAL EVERY 12 HOURS SCHEDULED
COMMUNITY
Start: 2025-01-24

## 2025-01-30 NOTE — PROGRESS NOTES
Heart Failure Clinic    Date: 01/30/25     Vitals:    01/30/25 1357   BP: 97/63   Pulse: (!) 128   SpO2: 97%      Weight: unable to stand for weight.    Method of arrival: Other Wheelchair    Weighing self daily: No    Monitoring Heart Failure Zones: Yes    Today's HF Zone: Yellow     Taking medications as prescribed: Yes    Edema No    Shortness of Air: Yes    Number of pillows used at night: Hospital Bed    Educational Materials given:  AVS Given                                                                         ReDS Value: 37  36-41 Possible Hypervolemic Status      Shari Perez RN 01/30/25 13:58 EST

## 2025-01-30 NOTE — PROGRESS NOTES
Heart Failure Clinic  Pharmacist Note     Yumiko Purdy is a 59 y.o. female seen in the Heart Failure Clinic for HFrEF.     The patient had several recent hospital admissions and other appointments-     3/13/24 admission for atrial fibrillation  4/1/24 at [facility] for afib with RVR- amiodarone increased to 200mg and digoxin added on  4/10/24 cardiology appointment with Courtney- all GDMT stopped due to hypotension   4/13/24 ED visit for chest pain- amiodarone decreased to 100mg  Recent nephrology appointment- restarted Bumex at 2mg daily on Monday- Saturday 5/9/24 [facility] hospitalization- started Diltiazem 60mg bid  ~~  6/21/24 ED visit for afib  7/12-7/16/24 hospitalization for acute on chronic HF, afib and UTI  9/12-9/17 hospitalization for Afib with RVR, amiodarone stopped  9/17-9/21/24 hospitalization for Afib with RVR and acute on chronic HF- amiodarone 400mg continued  10/22 -10/27 hospitalization for Afib with RVR- Amiodarone 200mg daily    On her most recent discharge, her Amiodarone was decreased to 200mg daily. She was started on Farxiga 10mg daily, Scopolamine patch q3days, Lactulose 20g bid prn constipation, and Midodrine 5mg tid with meals.     Yumiko Purdy reports a poor understanding of medications. She forgot to bring in her list of medications, but was able to remember most. She reports that she is currently being treated for a UTI with Macrobid. She reports that this is her first UTI while being on Farxiga since October. She reports that she has been prone to them in the past. She reports that due to this infection, everything else is out of sort and she feels awful.    She reports that her BP's have been in the normal to high range. She is no longer taking Midodrine and is not on any BP medications. She denies any swelling and SOB and takes Bumex 2mg daily + PRN. She reports that her last PRN dose was last week.       Medication Use:   Hx of med intolerances: Farxiga (UTI) -but Nephro started  back in September; cannot split Toprol tablets to make 12.5mg dose- may restart at 25mg when possible?  Retail Rx Management: Uses [facility]    Past Medical History:   Diagnosis Date    Anemia     CHF (congestive heart failure)     Chronic a-fib     stated by patient     Cirrhosis of liver     stated by patient     Diabetes mellitus     Ventricular tachycardia      ALLERGIES: Promethazine  Current Outpatient Medications   Medication Sig Dispense Refill    amiodarone (PACERONE) 200 MG tablet Take 1 tablet by mouth 2 (Two) Times a Day.      apixaban (ELIQUIS) 5 MG tablet tablet Take 1 tablet by mouth Every 12 (Twelve) Hours. Indications: Atrial Fibrillation 60 tablet 3    B Complex-C-Folic Acid (Renal-Bill) 0.8 MG tablet Take 1 tablet by mouth Daily.      bumetanide (BUMEX) 2 MG tablet Take 1 tablet by mouth Daily.      busPIRone (BUSPAR) 10 MG tablet Take 1 tablet by mouth Every 12 (Twelve) Hours.      ferrous sulfate 325 (65 FE) MG tablet Take 1 tablet by mouth Daily.      fluticasone (FLONASE) 50 MCG/ACT nasal spray Administer 2 sprays into the nostril(s) as directed by provider Daily for 90 days. 18.2 mL 3    HYDROcodone-acetaminophen (NORCO) 7.5-325 MG per tablet Take 1 tablet by mouth 2 (Two) Times a Day.      Insulin Glargine (LANTUS SOLOSTAR) 100 UNIT/ML injection pen Inject 45 Units under the skin into the appropriate area as directed 2 (Two) Times a Day. 30 mL 0    Insulin Lispro, 1 Unit Dial, (HUMALOG) 100 UNIT/ML solution pen-injector Inject 15 Units under the skin into the appropriate area as directed 3 (Three) Times a Day With Meals. 15 mL 1    lactulose (CHRONULAC) 10 GM/15ML solution Take 30 mL by mouth 2 (Two) Times a Day As Needed (constipation).      levothyroxine (SYNTHROID, LEVOTHROID) 25 MCG tablet Take 0.5 tablets by mouth Every Morning.      levothyroxine (SYNTHROID, LEVOTHROID) 50 MCG tablet Take 1 tablet by mouth Daily.      nitrofurantoin, macrocrystal-monohydrate, (MACROBID) 100 MG capsule Take 1  capsule by mouth 2 (Two) Times a Day.      omeprazole (priLOSEC) 20 MG capsule Take 1 capsule by mouth Daily.      ondansetron (ZOFRAN) 4 MG tablet Take 1 tablet by mouth Every 8 (Eight) Hours As Needed for Nausea or Vomiting.      Vortioxetine HBr (Trintellix) 5 MG tablet tablet Take 1 tablet by mouth Daily.      prednisoLONE acetate (PRED FORTE) 1 % ophthalmic suspension Administer 1 drop to both eyes 4 (Four) Times a Day As Needed. (Patient not taking: Reported on 1/30/2025)      Scopolamine 1 MG/3DAYS patch Place 1 patch on the skin as directed by provider Every 72 (Seventy-Two) Hours. (Patient not taking: Reported on 1/30/2025)       No current facility-administered medications for this encounter.       Vaccination History: Declines all vaccines  Pneumonia: Declined   Annual Influenza: Declined  Shingles: Declined       Objective  Vitals:    01/30/25 1357 01/30/25 1408   BP: 97/63    BP Location: Right arm    Patient Position: Sitting    Cuff Size: Adult    Pulse: (!) 128 97   SpO2: 97%            Wt Readings from Last 3 Encounters:   10/29/24 98 kg (216 lb)   09/23/24 100 kg (221 lb)   09/21/24 100 kg (221 lb 1.9 oz)     Lab Results   Component Value Date    GLUCOSE 220 (H) 01/30/2025    BUN 40 (H) 01/30/2025    CREATININE 1.49 (H) 01/30/2025    EGFRIFNONA 49 (L) 11/10/2020    BCR 26.8 (H) 01/30/2025    K 4.4 01/30/2025    CO2 25.8 01/30/2025    CALCIUM 9.1 01/30/2025    ALBUMIN 3.5 09/18/2024    LABIL2 1.3 (L) 06/09/2016    AST 25 09/18/2024    ALT 22 09/18/2024     Lab Results   Component Value Date    WBC 6.22 09/21/2024    HGB 8.2 (L) 09/21/2024    HCT 28.9 (L) 09/21/2024    .2 (H) 09/21/2024     09/21/2024     Lab Results   Component Value Date    CKTOTAL 66 04/01/2024    TROPONINT 53 (C) 09/18/2024     Lab Results   Component Value Date    PROBNP 6,815.0 (H) 01/30/2025     Results for orders placed during the hospital encounter of 07/12/24    Adult Transthoracic Echo Complete w/ Color,  Spectral and Contrast if necessary per protocol    Interpretation Summary    Left ventricular ejection fraction appears to be 46 - 50%.    Mildly reduced right ventricular systolic function noted.    The right ventricular cavity is mild to moderately dilated.    The left atrial cavity is mildly dilated.    The right atrial cavity is mildly  dilated.    There is posterior mitral leaflet thickening present.    Estimated right ventricular systolic pressure from tricuspid regurgitation is markedly elevated (>55 mmHg). Calculated right ventricular systolic pressure from tricuspid regurgitation is 58 mmHg.    The aortic root measures 3.1 cm.         GDMT    Drug Class   Drug   Dose Last Dose Adjustment Additional Titration   Notes   ACEi/ARB/ARNI 2/4/24  Was previously on lisinopril, stopped d/t renal function    Beta Blocker     Stopped due to recent hypotension    MRA      SGLT2i   1/30/25 UTI       12/8/23 restart   Stopped due to hypotension      Bumex 2mg qd + PRN      No recommendations at this time.       Patient was educated on heart failure medications and the importance of medication adherence. All questions were addressed and patient expressed some understanding. Would benefit from additional education at each visit.    Thank you for allowing me to participate in the care of your patient,    Kelli Soto, PharmD  01/30/25  15:27 EST

## 2025-01-31 NOTE — PROGRESS NOTES
[facility]  ABDIFATAH Galarza, Masha Tucker, APRN  [address]  [address]  [place] [address]    Thank you for asking me to see Yumiko Gurwinder for congestive heart failure.    HPI:     This is a 59 y.o. female with known past medical history of:  Chronic systolic & diastolic HF  TTE from 03/27/24 with EF 66-70% and grade 2 diastolic dysfunction  TTE from 11/2023 with EF 31-35% with grade 3 diastolic dysfxn.   TTE from 02/08/23 @  [facility] with EF ~50%  TTE 11/13/23 with EF 31-35% and grade III diastolic dysfunction as well as moderately decreased systolic fxn and mildly reduced RVSP.    TTE from November 2022 with visually estimated ejection fraction of 30% ±5% and noted abnormal systolic strain pattern as well as grade 3 diastolic dysfunction as well as abnormal TAPSE with abnormal right ventricular function  KHAI on 09/2020 with EF 21-25% with LV systolic function severely decreased and RV cavity mildly dilated with moderately reduced RV systolic function noted, moderate TVR, and aortic plaquing  Right heart cath on 12/22/2022 with elevated left and right heart pressures with report not available  ASCVD  LHC 11/10/20 with preserved LV systolic, EF 50-55% with elevated LV end diastolic pressure consistent with diastolic dysfunction and right dominant system with 30-40% mid LAD lesion.   LHC attempted on 12/2022 at [facility] with unsuccessful access due to small artery appearing occluded or near occluded radially on right  Peripheral Arterial disease  Atrial Flutter  Prior Ablations:   11/2020 PVI ablation with flutter ablation and DCCV with a lot of scarring noted @  [facility]   Multiple prior Cardioversions:   Multiple starts and stops of amiodarone  Most recent in March 2024 on March 13 @ [facility] following concern for VTach with EP feeling strips were Afib with RVR with LBBB (per chart review) with conversion to NSR and amiodarone restart.    CKD stage  IIIb  Cirrhosis of the liver  Stage III CKD  Chronic hypoxemic respiratory failure  Morbid Obesity  Diffuse purpuric rash with prior w/u negative for vasculitis    Yumiko Purdy presents for today for HF clinic evaluation.  The patient is typically seen by Masha Davidson APRN.  Patient's primary cardiologist is Dr. Jad Meerdith.      Last known EF~ 55% by TTE from [facility] in Feb 2024  Last known hospitalization and/or ED visit: [facility] with amiodarone restarted by EP due to afib with RVR at 400mg TID for 4 days followed by 300mg daily following.     Hospitalized from March 26 through March 30 with possible SVT with baseline bundle branch block and paroxysmal atrial fibrillation with rapid ventricular response with aberrant conduction status post PVI October 2022  Admission to [facility] through April 3, 2024 with wide-complex tachycardia and persistent atrial fibrillation with amiodarone drip with transition to oral amiodarone with attempt to stop intermittent episodes of atrial fibrillation.  She was ultimately discharged on amiodarone with digoxin  Transferred from [facility] to [facility] secondary to cardiac arrhythmia with EP at [facility].  She was started on Cardizem and had transition from A-fib to sinus rhythm  Recent July 2024 [facility] hospitalization with arrhythmia.  DC summary reviewed.     Hospitalized in September 2024 @ [facility] x2 with Afib with RVR.  The first time on amiodarone therapy and the second time after it was stopped.    Hospitalized at [facility] with DC 10/28/24  Accompanied by: Son    01/31/25  visit data/details regarding:   Dyspnea: Improving, Patient is WC bound and mostly just exerts herself with transfers and such; This remains unchanged on today's visit.   Lower extremity swelling: Bilateral lower extremity swelling is at baseline  Abdominal swelling: Abdominal swelling is improving.    Home weight: Not  currently monitoring due to inability to stand  Home BP: SBP has been in the 100s-110s with less drops at home  Home heart rate: 60s  Daily activities of living: Performing with assistance  HF zone: Yellow  Pillows/lying flat: Sleeps in hospital bed.  Mobility assistance devices:  WC at home, bedside commode.    Mrs. Purdy is chest pain free.  She was recently diagnosed with UTI and is being treated with antibiotics.  She reports UTIs restarted after starting Farxiga.    Initial HR was in the 120s but it did improve to the 90s while resting in office.     Specialists:   EP Cardiology:  Dr. Gutierrez        Review of Systems - Review of Systems   Constitutional: Negative for chills, decreased appetite and malaise/fatigue.   HENT:  Negative for congestion, ear discharge and ear pain.    Eyes:  Negative for blurred vision, discharge and double vision.   Cardiovascular:  Positive for dyspnea on exertion. Negative for leg swelling.   Respiratory:  Negative for cough, hemoptysis and shortness of breath.    Endocrine: Negative for cold intolerance and heat intolerance.   Hematologic/Lymphatic: Negative for adenopathy and bleeding problem.   Skin:  Negative for color change, dry skin and nail changes.   Musculoskeletal:  Negative for arthritis, muscle weakness and myalgias.   Gastrointestinal:  Negative for bloating and abdominal pain.   Genitourinary:  Negative for bladder incontinence, decreased libido and dysuria.   Neurological:  Negative for brief paralysis, difficulty with concentration and dizziness.   Psychiatric/Behavioral:  Negative for altered mental status, depression and hallucinations.         Very pleasant   Allergic/Immunologic: Negative for environmental allergies and HIV exposure.         All other systems were reviewed and were negative.    Patient Active Problem List   Diagnosis    Dilated cardiomyopathy    CKD (chronic kidney disease) stage 2, GFR 60-89 ml/min    Severe obesity (BMI 35.0-39.9) with  comorbidity    Chronic anemia    Elevated bilirubin    Acute on chronic combined systolic and diastolic CHF (congestive heart failure)    Hyponatremia    Paroxysmal atrial fibrillation    Cirrhosis    Coronary artery disease involving native coronary artery of native heart without angina pectoris    Ventricular tachycardia       family history includes Heart attack in her brother and father.     reports that she has never smoked. She has never used smokeless tobacco. She reports that she does not drink alcohol and does not use drugs.    Allergies   Allergen Reactions    Promethazine Other (See Comments) and Anxiety     Anxiety         Current Outpatient Medications:     amiodarone (PACERONE) 200 MG tablet, Take 1 tablet by mouth 2 (Two) Times a Day., Disp: , Rfl:     apixaban (ELIQUIS) 5 MG tablet tablet, Take 1 tablet by mouth Every 12 (Twelve) Hours. Indications: Atrial Fibrillation, Disp: 60 tablet, Rfl: 3    B Complex-C-Folic Acid (Renal-Bill) 0.8 MG tablet, Take 1 tablet by mouth Daily., Disp: , Rfl:     bumetanide (BUMEX) 2 MG tablet, Take 1 tablet by mouth Daily., Disp: , Rfl:     busPIRone (BUSPAR) 10 MG tablet, Take 1 tablet by mouth Every 12 (Twelve) Hours., Disp: , Rfl:     ferrous sulfate 325 (65 FE) MG tablet, Take 1 tablet by mouth Daily., Disp: , Rfl:     fluticasone (FLONASE) 50 MCG/ACT nasal spray, Administer 2 sprays into the nostril(s) as directed by provider Daily for 90 days., Disp: 18.2 mL, Rfl: 3    HYDROcodone-acetaminophen (NORCO) 7.5-325 MG per tablet, Take 1 tablet by mouth 2 (Two) Times a Day., Disp: , Rfl:     Insulin Glargine (LANTUS SOLOSTAR) 100 UNIT/ML injection pen, Inject 45 Units under the skin into the appropriate area as directed 2 (Two) Times a Day., Disp: 30 mL, Rfl: 0    Insulin Lispro, 1 Unit Dial, (HUMALOG) 100 UNIT/ML solution pen-injector, Inject 15 Units under the skin into the appropriate area as directed 3 (Three) Times a Day With Meals., Disp: 15 mL, Rfl: 1     lactulose (CHRONULAC) 10 GM/15ML solution, Take 30 mL by mouth 2 (Two) Times a Day As Needed (constipation)., Disp: , Rfl:     levothyroxine (SYNTHROID, LEVOTHROID) 25 MCG tablet, Take 0.5 tablets by mouth Every Morning., Disp: , Rfl:     levothyroxine (SYNTHROID, LEVOTHROID) 50 MCG tablet, Take 1 tablet by mouth Daily., Disp: , Rfl:     nitrofurantoin, macrocrystal-monohydrate, (MACROBID) 100 MG capsule, Take 1 capsule by mouth 2 (Two) Times a Day., Disp: , Rfl:     omeprazole (priLOSEC) 20 MG capsule, Take 1 capsule by mouth Daily., Disp: , Rfl:     ondansetron (ZOFRAN) 4 MG tablet, Take 1 tablet by mouth Every 8 (Eight) Hours As Needed for Nausea or Vomiting., Disp: , Rfl:     Vortioxetine HBr (Trintellix) 5 MG tablet tablet, Take 1 tablet by mouth Daily., Disp: , Rfl:     prednisoLONE acetate (PRED FORTE) 1 % ophthalmic suspension, Administer 1 drop to both eyes 4 (Four) Times a Day As Needed. (Patient not taking: Reported on 1/30/2025), Disp: , Rfl:     Scopolamine 1 MG/3DAYS patch, Place 1 patch on the skin as directed by provider Every 72 (Seventy-Two) Hours. (Patient not taking: Reported on 1/30/2025), Disp: , Rfl:       Physical Exam:  I have reviewed the patient's current vital signs as documented in the patient's EMR.     Vitals:    01/30/25 1357 01/30/25 1408   BP: 97/63    BP Location: Right arm    Patient Position: Sitting    Cuff Size: Adult    Pulse: (!) 128 97   SpO2: 97%                Physical Exam  Vitals and nursing note reviewed.   Constitutional:       Appearance: Normal appearance.   HENT:      Head: Normocephalic and atraumatic.   Eyes:      General: Lids are normal.   Cardiovascular:      Rate and Rhythm: Normal rate and regular rhythm.      Heart sounds: S1 normal and S2 normal.      Comments: Faint, non-pitting edema of the lower extremities  Pulmonary:      Effort: No tachypnea or bradypnea.      Breath sounds: No decreased breath sounds, wheezing, rhonchi or rales.   Chest:      Chest  wall: No mass or lacerations.   Abdominal:      General: Abdomen is protuberant. Bowel sounds are normal.      Palpations: Abdomen is soft.      Tenderness: There is no abdominal tenderness.      Comments: Less distended than on prior visits.     Musculoskeletal:      Right lower leg: Edema present.      Left lower leg: Edema present.      Right foot: No swelling.      Left foot: No swelling.   Skin:     General: Skin is warm and moist.   Neurological:      Mental Status: She is alert and oriented to person, place, and time.      Comments: Equal bilateral  strength.     Psychiatric:         Attention and Perception: Attention normal.         Mood and Affect: Mood normal.          JVP: Volume/Pulsation: Normal.        DATA REVIEWED:     EKG. I personally reviewed and interpreted the EKG.    Last EKG at [facility] not available for viewing.      ---------------------------------------------------  TTE/KHAI:    TRANSTHORACIC ECHOCARDIOGRAPHY REPORT    Demographics    Patient Name:          JAMAL WHARTON      :            1965    Medical Record Number: [id number]          Age:            56 year(s)    Corporate ID Number:   [id number]          Gender          Female    Account Number:        [id number]    Sonographer:           Hayden Chaudhry,     Height:         65 inches                           [facility]    Referring Physician:   ANDREAS HERNANDEZ     Weight:         240 pounds    Interpreting           MATHEUS IRELAND   BMI:            39.94 kg/m^2    Physician:             MD    Date of Service:       2022          Blood Pressure: 113/40 mmHg    Room Number:           320   Type of Study:    TTE procedure: EC Echo Complete, ECHO COMPLETE (DOPPLER / COLOR) W OR WO    CONTRAST.    Patient Status: Routine IP    [facility]    Contrast Medium: Optison. Amount - 3 ml    Impression:    Indication:Hypertensive heart disease with heart failure I11.0    Mild  left ventricular hypertrophy. Visually estimated ejection fraction    30% +/- 5%. Abnormal left ventricular systolic function; abnormal systolic    strain pattern. Restrictive filling pattern consistent with severely    increased LV filling pressure (Grade III Diastolic dysfunction).    Dilated right ventricle. Abnormal TAPSE; abnormal right ventricular    function. Abnormal right atrial size.    Mild mitral stenosis. Peak/Mean 6/2mmHg    Moderate tricuspid (2+) regurgitation.    No masses or thrombi are seen.   Measurements Summary:    LVEDd: 4.99 cm         LVESd: 4.08 cm          IVSEd: 0.79 cm    AO Root:2.97 cm        LVPWd: 1.04 cm    Contractility Score    Global Left Ventricular Hypokinesis was noted.    LV regional wall motion: (0-Not visualized 1-Normal 2-Hypokinesis    3-Akinesis 4-Dyskinesis 5-Aneurysm)    Left Ventricle    Peak E-wave: 1.22   Peak A-wave: 0.2 m/s    E/A ratio: 5.99    m/s                 Volume [id number]  LV length: 8.47 cm    ml    Volume [id number] ml    LVOT diameter: 2.05 cm    Normal sized left ventricle.    Mild left ventricular hypertrophy.    Visually estimated ejection fraction 30% +/- 5%.    Abnormal left ventricular systolic function; abnormal systolic strain    pattern.    Restrictive filling pattern consistent with severely increased LV filling    pressure (Grade III Diastolic dysfunction).    No left ventricular masses or thrombi.    Right Ventricle    Diastolic dimension: 4.72     RV systolic pressure: 22.15 mmHg    cm    Dilated right ventricle.    Abnormal TAPSE; abnormal right ventricular function.    Left Atrium    LA dimension: 4.64 cm               LA volume:95.38 ml    LA/Aorta: 1.56    Abnormal left atrial volume index 45ml/m2.    Intact atrial septum.    No atrial mass or thrombus.    Right Atrium    Abnormal right atrial size.    Intact atrial septum.    No atrial mass or thrombus.    Mitral Valve    Deceleration time:     Area PHT: 2.04 cm^2  Mean  velocity:    248.96 msec            P1/2t: 107.68 msec   0.57 m/s    Area (continuity):   Mean gradient:    1.73 cm^2            1.89 mmHg    Peak gradient:    5.97 mmHg    Thickened mitral valve leaflets.    Mild mitral regurgitation.    Mild mitral stenosis. Peak/Mean 6/2mmHg    Echogenic density noted on mitral valve chordae. Seen on prior exam    10/16/2022    Aortic Valve    LVOT VTI: 18.22 cm    Mildly thickend free edges of the aortic valve leaflets.    No aortic regurgitation.    No aortic stenosis.    No masses or vegetations seen.    Tricuspid Valve    TR velocity: 2.19 m/s     TR gradient: 19.45796 mmHg    Estimated RAP: 3 mmHg     RVSP: 22.17 mmHg    Structurally normal tricuspid valve.    Moderate tricuspid (2+) regurgitation.    RVSP likely underestimated due to RV systolic function.    No tricuspid stenosis.    No masses or vegetations seen.    Pulmonic Valve    Acceleration time: 119.95 msec          PASP: 22.15 mmHg    Structurally normal pulmonic valve.    Mild pulmonic regurgitation (1+).    No pulmonic stenosis.    No masses or vegetations seen.   Great Vessels   Aorta    Aortic Root: 2.97 cm    Ascending Aorta: 2.76 cm    LVOT Diameter: 2.05 cm   Visualized aorta is normal.   Normal aortic root.   No evidence of dissection.   IVC is not well visualized.   Unable to obtain adequate subcostal view.    Pericardium / Pleura   No pericardial effusion.    Other    No masses or thrombi.    No intracardiac shunt.    ----------------------------------------------------------------    Electronically signed by MATHEUS IRELAND MD(Interpreting    Physician) on 11/17/2022 17:38       Results for orders placed during the hospital encounter of 07/12/24    Adult Transthoracic Echo Complete w/ Color, Spectral and Contrast if necessary per protocol    Interpretation Summary    Left ventricular ejection fraction appears to be 46 - 50%.    Mildly reduced right ventricular systolic function noted.    The right  ventricular cavity is mild to moderately dilated.    The left atrial cavity is mildly dilated.    The right atrial cavity is mildly  dilated.    There is posterior mitral leaflet thickening present.    Estimated right ventricular systolic pressure from tricuspid regurgitation is markedly elevated (>55 mmHg). Calculated right ventricular systolic pressure from tricuspid regurgitation is 58 mmHg.    The aortic root measures 3.1 cm.    RHC report:      CARDIAC CATH - RIGHT HEART CATH    Anatomical Region Laterality Modality   Heart -- Other     Narrative    Cardiac Diagnostic Report    Demographics    Patient Name       JAMAL WHARTON      Date of Birth          1965    Patient #          [id number]          Accession #            [id number]    Visit #            [id number]          Medical Record #    Physician          MATHEUS IRELAND   Date of Study          12/22/2022                       MD    Referring                              Interventional    Physician                              physician    Procedure   Procedure Type    Diagnostic procedure: RHC with Cardiomems, RIGHT HEART CATH   Indications: Angina.    Conclusions   Procedure Description   Using ultrasound and micropuncture, access to right brachial vein obtained   and 7F sheath inserted. 7F PA catheter used for RHC.   I attempted right radial access for LHC but unsuccessful due to very small   artery which appears occluded or near-occluded.   Procedure Summary   Elevated left and right heart filling pressures.   Elevated pulmonary pressures.   Borderline-low Ramos CO/CI.    Procedure Data   Procedure Date   Date: 12/22/2022Start: 15:32End: 16:09   Entry Locations     - Retrograde Percutaneous access was performed through the Right Axiliary.       A 7 Fr sheath was inserted. Hemostasis was successfully obtained using       Manual Compression.       Entry Comments: brachial vein.   Procedure Medications     - Fentanyl I.V. 25 mcg.     - Versed  Sheath 1 mg.     - Oxygen NC 6 l/min.   Diagnostic Catheters     - A7 FR MON [place]ael [id number] used for:Arch.   Contrast Material     - None0 ml   Fluoroscopy Time: Total: 2:48 minutes.   Fluoroscopy Dose: Total: 155 mGy.    Medical History    Risk Factors    The patient risk factors include:obesity, hypercholesterolemia, treated    and uncontrolled hypertension, diabetes mellitus, last creatinine: 2    mg/dl, creatinine clearance: 69.72 ml/min and dyslipidemia.    Admission Data    Hemodynamics    Condition: Baseline    O2 Consumption: Estimated: 297.50Heart Rate: 41 bpm   Oxygen Saturation   +--------+-----+----+----------------------+---+---------------------------+   !Location!pCO2 !pO2 !% Saturation          !Hgb!O2 Content                 !   +--------+-----+----+----------------------+---+---------------------------+   !PA      !     !    !51                    !   !                           !   +--------+-----+----+----------------------+---+---------------------------+   !RA      !     !    !58                    !   !                           !   +--------+-----+----+----------------------+---+---------------------------+   !AO      !     !    !94                    !   !                           !   +--------+-----+----+----------------------+---+---------------------------+   Pressures (mmHg)   +-----+--------------------------------------------------------------------+   !Site !Pressure                                                            !   +-----+--------------------------------------------------------------------+   !RA   !28/28 (25)                                                          !   +-----+--------------------------------------------------------------------+   !RV   !81/10 ,27                                                           !   +-----+--------------------------------------------------------------------+   !PA   !73/32 (43)                                                           !   +-----+--------------------------------------------------------------------+   !PCW  !68/66 (45)                                                          !   +-----+--------------------------------------------------------------------+   !PCW  !78/59 (45)                                                          !   +-----+--------------------------------------------------------------------+   !PCW  !23/36 (27)                                                          !   +-----+--------------------------------------------------------------------+   Cardiac Output   +------+-------------------------+----------------------------+------------+   !Method!CO (l/min)               !CI (l/min/m2)               !SV (ml)     !   +------+-------------------------+----------------------------+------------+   !Ramos  !5.35                     !2.2                         !131.67      !   +------+-------------------------+----------------------------+------------+   Shunts   Oxygen Values    O2 Capacity 129.2 O2 Consumption 297.5    Flows (l/min)    Qs 6.4 Qe/Qp 1.2    Qp 5.35 Qp/Qs 0.84    Qe 6.4   Vascular Resistance (dynes x sec x cm-5)   +-------------------------------------+----+---+----+----+---------+-------+   !CO method                            !TSVR!SVR!TPVR!PVR !TPVR/TSVR!PVR/SVR!   +-------------------------------------+----+---+----+----+---------+-------+   !Ramos                                 !    !   !8.03!2.94!         !       !   +-------------------------------------+----+---+----+----+---------+-------+   !Qp or Qs                             !    !   !8.03!2.94!         !       !   +-------------------------------------+----+---+----+----+---------+-------+    Signatures    ----------------------------------------------------------------    Electronically signed by MATHEUS IRELAND MD(Physician) on    12/22/2022 16:19     ----------------------------------------------------------------        -----------------------------------------------------  CXR/Imaging:   Imaging Results (Most Recent)       None            I personally reviewed and interpreted the CXR.      -----------------------------------------------------  CT:   No radiology results for the last 30 days.  I personally reviewed the images of the CT scan.  My personal interpretation is below.      ----------------------------------------------------    PFTs:    No PFTS available.      --------------------------------------------------------------------------------------------------    Laboratory evaluations:    Lab Results   Component Value Date    GLUCOSE 220 (H) 01/30/2025    BUN 40 (H) 01/30/2025    CREATININE 1.49 (H) 01/30/2025    EGFRIFNONA 49 (L) 11/10/2020    BCR 26.8 (H) 01/30/2025    K 4.4 01/30/2025    CO2 25.8 01/30/2025    CALCIUM 9.1 01/30/2025    ALBUMIN 3.5 09/18/2024    LABIL2 1.3 (L) 06/09/2016    AST 25 09/18/2024    ALT 22 09/18/2024     Lab Results   Component Value Date    WBC 6.22 09/21/2024    HGB 8.2 (L) 09/21/2024    HCT 28.9 (L) 09/21/2024    .2 (H) 09/21/2024     09/21/2024     Lab Results   Component Value Date    CHOL 137 09/11/2020    CHLPL 103 04/21/2016    TRIG 80 09/11/2020    HDL 43 09/11/2020    LDL 78 09/11/2020     Lab Results   Component Value Date    TSH 5.940 (H) 09/17/2024     Lab Results   Component Value Date    HGBA1C 6.50 (H) 07/14/2024     Lab Results   Component Value Date    ALT 22 09/18/2024     Lab Results   Component Value Date    HGBA1C 6.50 (H) 07/14/2024    HGBA1C 7.70 (H) 03/26/2024    HGBA1C 7.30 (H) 11/13/2023     Lab Results   Component Value Date    MICROALBUR 3.2 09/12/2020    CREATININE 1.49 (H) 01/30/2025     Lab Results   Component Value Date    IRON 80 09/14/2024    TIBC 238 (L) 09/14/2024    FERRITIN 1,791.30 (H) 04/01/2024     Lab Results   Component Value Date    INR 1.08 09/12/2024    INR 1.31  (H) 05/28/2024    INR 0.96 05/10/2024    PROTIME 14.1 09/12/2024    PROTIME 16.4 (H) 05/28/2024    PROTIME 10.4 05/10/2024        Lab Results   Component Value Date    ABSOLUTELUNG 37 (A) 01/30/2025    ABSOLUTELUNG 40 (A) 10/29/2024    ABSOLUTELUNG 43 (A) 09/23/2024         1. Dilated cardiomyopathy    2. Chronic combined systolic and diastolic heart failure    3. Paroxysmal atrial fibrillation    4. CKD stage 3b, GFR 30-44 ml/min            ORDERS PLACED TODAY:  Orders Placed This Encounter   Procedures    ReDs Vest    Basic Metabolic Panel    Magnesium    proBNP    Basic Metabolic Panel    Magnesium    proBNP        Diagnoses and all orders for this visit:    1. Dilated cardiomyopathy (Primary)  Overview:  11/12/20236262-UEN-[id number] 31 to 35%, mild LVH, grade 3 diastolic dysfunction with high LAP, dilated left and right atria, calcification of the left coronary cusp of the aortic valve, dilated pulmonary artery    Orders:  -     Basic Metabolic Panel; Future  -     Magnesium; Future  -     proBNP; Future  -     Basic Metabolic Panel; Standing  -     Basic Metabolic Panel  -     Magnesium; Standing  -     Magnesium  -     proBNP; Standing  -     proBNP    2. Chronic combined systolic and diastolic heart failure  Overview:  11/12/20238405-OND-GZOD 31 to 35%, mild LVH, grade 3 diastolic dysfunction with high LAP, moderately dilated left and right atrial cavities, dilated pulmonary artery, calcification of the left coronary cusp of the aortic valve    Orders:  -     ReDs Vest    3. Paroxysmal atrial fibrillation  Overview:  11/12/2023-emergent cardioversion-sinus rhythm achieved      4. CKD stage 3b, GFR 30-44 ml/min               MEDS ORDERED TODAY:    No orders of the defined types were placed in this encounter.       ---------------------------------------------------------------------------------------------------------------------------          ASSESSMENT/PLAN:      Diagnosis Plan   1. Dilated cardiomyopathy  Basic  Metabolic Panel    Magnesium    proBNP    Basic Metabolic Panel    Basic Metabolic Panel    Magnesium    Magnesium    proBNP    proBNP      2. Chronic combined systolic and diastolic heart failure  ReDs Vest      3. Paroxysmal atrial fibrillation        4. CKD stage 3b, GFR 30-44 ml/min              not acutely decompensated chronic moderate systolic and diastolic heart failure. CHF. Etiology: Unknown/Idiopathic. LVEF ~50%.     NYHA stage Stage D: Presence of advanced heart disease with continued heart failure symptoms requiring aggressive medical therapyFC-Class IV: Unable to carry on any physical activity without discomfort. Symptoms of heart failureat rest. If any physical activity is undertaken, discomfort increases.     Today, Patient is approaching euvolemia and with  Moderate perfusion. The patient's hemodynamics are currently acceptable. HR is: normal and is at goal. BP/MAP was reviewed and there is notroom for medication up-titration.  Clinical trajectory was assessed and hasimproved.     CHF GOAL DIRECTED MEDICAL THERAPY FOR PATIENT ADDRESSED/ADJUSTED:     GDMT    Drug Class   Drug   Dose Last Dose Adjustment Additional Titration   Notes   ACEi/ARB/ARNI ACEI previously stopped due to renal fxn       Beta Blocker      MRA Spirono previously stopped due to renal fxn       SGLT2i Tried both Jardiance & Farxiga in the past with frequent UTIs   N/A    Secondaries          Secondary management:     -CHF Specific BB:   EF improved. No BB on board at present with amiodarone on board.    -ACE/ARB/ARNi:   CKD 3b.  BP would not tolerate hydralazine/isordil combo.     -MRA:   The patient is FC-NYHA Class IV and MRA is indicated.   Spironolactone was previously stopped on regimen due to renal function.        -SGLT2 inhibitor therapy:   Tried many options but has recurrent UTIs.  Stop Farxiga due to recurrent UTIs.        Secondary pharmacological therapy in HFreF:   EF improved and Verquevo stopped during one of her  recent hospitalizatoins.       -Diuretic regimen:   ReDS Vest reading for 01/31/25 is 437   Continue Bumex 2mg day per Nephro dosing.     -Fluid restriction/Sodium restriction:   Requested 2000 ml restriction  Patient has been asked to weigh daily and was provided with a printed diuretic strategy.  1,500 mg Na restriction was discussed.    -Acute vs. Chronic underlying conditions other than HF addressed during visit:             Paroxysmal Atrial fibrillation/Flutter:   Continue Amiodarone & EP follow-up.     Cirrhosis:   Continue f/u with PCP & GI.     CKD IIIb:   Creatinine within baseline. Continue f/u.          Identifiable barriers to Heart Failure Self-care:   Medical Barriers:  Debility  Social Barriers: Transport limitations      --------------------------------------------------------------------------------          BMI cannot be calculated due to outdated height or weight values with patient unable to stand. She has self reported weight of 240.                   This document has been electronically signed by Sonja Romo PA-C  January 31, 2025 09:14 EST      Dictated Utilizing Dragon Dictation: Part of this note may be an electronic transcription/translation of spoken language to printed text using the Dragon Dictation System.    Follow-up appointment and medication changes provided in hand delivered After Visit Summary as well as reviewed in the room.

## (undated) DEVICE — Device

## (undated) DEVICE — RUNWAY RADL W/TOP PAD

## (undated) DEVICE — GW INQW FIX/CORE PTFE J/3MM .035 260CM

## (undated) DEVICE — SKIN PREP TRAY W/CHG: Brand: MEDLINE INDUSTRIES, INC.

## (undated) DEVICE — DRSNG SURESITE WNDW 4X4.5

## (undated) DEVICE — ST EXT IV SMARTSITE 2VLV SP M LL 5ML IV1

## (undated) DEVICE — CANNULA,OXY,ADULT,SUPER SOFT,W/14'TUB,UC: Brand: MEDLINE INDUSTRIES, INC.

## (undated) DEVICE — CATH F5 INF JR 4 100CM: Brand: INFINITI

## (undated) DEVICE — MYNXGRIP 5F: Brand: MYNXGRIP

## (undated) DEVICE — SHEATH INTRO SUPERSHEATH JWIRE .035 5F 11CM

## (undated) DEVICE — CATH F5 INF PIG145 110CM 6SH: Brand: INFINITI

## (undated) DEVICE — ST INF PRI SMRTSTE 20DRP 2VLV 24ML 117

## (undated) DEVICE — DRAPE, RADIAL, STERILE: Brand: MEDLINE

## (undated) DEVICE — CATH F5 INF JL 4 100CM: Brand: INFINITI

## (undated) DEVICE — PAD, DEFIB, ADULT, RADIOTRANS, ZOLL: Brand: MEDLINE

## (undated) DEVICE — LN INJ CONTRST FLXCIL HP F/M LL 1200PSI10

## (undated) DEVICE — ADULT DISPOSABLE SINGLE-PATIENT USE PULSE OXIMETER SENSOR: Brand: NONIN

## (undated) DEVICE — PK CATH CARD 70